# Patient Record
Sex: FEMALE | Race: WHITE | Employment: OTHER | URBAN - METROPOLITAN AREA
[De-identification: names, ages, dates, MRNs, and addresses within clinical notes are randomized per-mention and may not be internally consistent; named-entity substitution may affect disease eponyms.]

---

## 2017-01-09 PROBLEM — I20.1 CORONARY ARTERY SPASM (HCC): Status: ACTIVE | Noted: 2017-01-09

## 2017-01-09 PROBLEM — K21.9 GASTROESOPHAGEAL REFLUX DISEASE WITHOUT ESOPHAGITIS: Chronic | Status: ACTIVE | Noted: 2017-01-09

## 2017-01-09 PROBLEM — F32.A ANXIETY AND DEPRESSION: Chronic | Status: ACTIVE | Noted: 2017-01-09

## 2017-01-09 PROBLEM — Z86.39 HISTORY OF HYPERLIPIDEMIA: Chronic | Status: ACTIVE | Noted: 2017-01-09

## 2017-01-09 PROBLEM — Z87.2 HISTORY OF PSORIASIS: Chronic | Status: ACTIVE | Noted: 2017-01-09

## 2017-01-09 PROBLEM — F41.9 ANXIETY AND DEPRESSION: Chronic | Status: ACTIVE | Noted: 2017-01-09

## 2017-01-09 PROBLEM — G89.29 CHRONIC PAIN OF MULTIPLE JOINTS: Chronic | Status: ACTIVE | Noted: 2017-01-09

## 2017-01-09 PROBLEM — E03.9 HYPOTHYROIDISM, ADULT: Chronic | Status: ACTIVE | Noted: 2017-01-09

## 2017-01-09 PROBLEM — M25.50 CHRONIC PAIN OF MULTIPLE JOINTS: Chronic | Status: ACTIVE | Noted: 2017-01-09

## 2017-02-01 ENCOUNTER — HOSPITAL ENCOUNTER (OUTPATIENT)
Dept: GENERAL RADIOLOGY | Age: 67
Discharge: HOME | End: 2017-02-01
Payer: MEDICARE

## 2017-02-01 DIAGNOSIS — G89.29 CHRONIC HAND PAIN, LEFT: ICD-10-CM

## 2017-02-01 DIAGNOSIS — G89.29 CHRONIC HAND PAIN, RIGHT: ICD-10-CM

## 2017-02-01 DIAGNOSIS — M79.642 CHRONIC HAND PAIN, LEFT: ICD-10-CM

## 2017-02-01 DIAGNOSIS — M79.641 CHRONIC HAND PAIN, RIGHT: ICD-10-CM

## 2017-02-01 PROCEDURE — 73120 X-RAY EXAM OF HAND: CPT

## 2017-02-25 PROCEDURE — 85025 COMPLETE CBC W/AUTO DIFF WBC: CPT | Performed by: EMERGENCY MEDICINE

## 2017-02-25 PROCEDURE — 80048 BASIC METABOLIC PNL TOTAL CA: CPT | Performed by: EMERGENCY MEDICINE

## 2017-02-25 PROCEDURE — 99284 EMERGENCY DEPT VISIT MOD MDM: CPT | Performed by: EMERGENCY MEDICINE

## 2017-02-26 ENCOUNTER — HOSPITAL ENCOUNTER (EMERGENCY)
Age: 67
Discharge: HOME | End: 2017-02-26
Attending: EMERGENCY MEDICINE
Payer: MEDICARE

## 2017-02-26 VITALS
BODY MASS INDEX: 38.87 KG/M2 | RESPIRATION RATE: 20 BRPM | HEART RATE: 64 BPM | TEMPERATURE: 98 F | OXYGEN SATURATION: 97 % | DIASTOLIC BLOOD PRESSURE: 80 MMHG | WEIGHT: 198 LBS | HEIGHT: 60 IN | SYSTOLIC BLOOD PRESSURE: 164 MMHG

## 2017-02-26 DIAGNOSIS — F41.1 ANXIETY STATE: ICD-10-CM

## 2017-02-26 DIAGNOSIS — R07.89 ATYPICAL CHEST PAIN: Primary | ICD-10-CM

## 2017-02-26 LAB
ANION GAP BLD CALC-SCNC: 10 MMOL/L (ref 7–16)
ATRIAL RATE: 72 BPM
BASOPHILS # BLD AUTO: 0 K/UL (ref 0–0.2)
BASOPHILS # BLD: 0 % (ref 0–2)
BUN SERPL-MCNC: 14 MG/DL (ref 8–23)
CALCIUM SERPL-MCNC: 9.6 MG/DL (ref 8.3–10.4)
CALCULATED P AXIS, ECG09: 39 DEGREES
CALCULATED R AXIS, ECG10: 29 DEGREES
CALCULATED T AXIS, ECG11: 68 DEGREES
CHLORIDE SERPL-SCNC: 102 MMOL/L (ref 98–107)
CO2 SERPL-SCNC: 27 MMOL/L (ref 21–32)
CREAT SERPL-MCNC: 0.88 MG/DL (ref 0.6–1)
DIAGNOSIS, 93000: NORMAL
DIASTOLIC BP, ECG02: NORMAL MMHG
DIFFERENTIAL METHOD BLD: ABNORMAL
EOSINOPHIL # BLD: 0.2 K/UL (ref 0–0.8)
EOSINOPHIL NFR BLD: 3 % (ref 0.5–7.8)
ERYTHROCYTE [DISTWIDTH] IN BLOOD BY AUTOMATED COUNT: 13.2 % (ref 11.9–14.6)
GLUCOSE SERPL-MCNC: 100 MG/DL (ref 65–100)
HCT VFR BLD AUTO: 41.7 % (ref 35.8–46.3)
HGB BLD-MCNC: 14 G/DL (ref 11.7–15.4)
IMM GRANULOCYTES # BLD: 0 K/UL (ref 0–0.5)
IMM GRANULOCYTES NFR BLD AUTO: 0.5 % (ref 0–5)
LYMPHOCYTES # BLD AUTO: 38 % (ref 13–44)
LYMPHOCYTES # BLD: 2.5 K/UL (ref 0.5–4.6)
MCH RBC QN AUTO: 29.9 PG (ref 26.1–32.9)
MCHC RBC AUTO-ENTMCNC: 33.6 G/DL (ref 31.4–35)
MCV RBC AUTO: 88.9 FL (ref 79.6–97.8)
MONOCYTES # BLD: 0.5 K/UL (ref 0.1–1.3)
MONOCYTES NFR BLD AUTO: 7 % (ref 4–12)
NEUTS SEG # BLD: 3.5 K/UL (ref 1.7–8.2)
NEUTS SEG NFR BLD AUTO: 52 % (ref 43–78)
P-R INTERVAL, ECG05: 160 MS
PLATELET # BLD AUTO: 322 K/UL (ref 150–450)
PMV BLD AUTO: 9.7 FL (ref 10.8–14.1)
POTASSIUM SERPL-SCNC: 4.2 MMOL/L (ref 3.5–5.1)
Q-T INTERVAL, ECG07: 426 MS
QRS DURATION, ECG06: 82 MS
QTC CALCULATION (BEZET), ECG08: 466 MS
RBC # BLD AUTO: 4.69 M/UL (ref 4.05–5.25)
SODIUM SERPL-SCNC: 139 MMOL/L (ref 136–145)
SYSTOLIC BP, ECG01: NORMAL MMHG
TROPONIN I BLD-MCNC: 0 NG/ML (ref 0–0.08)
VENTRICULAR RATE, ECG03: 72 BPM
WBC # BLD AUTO: 6.6 K/UL (ref 4.3–11.1)

## 2017-02-26 PROCEDURE — 93005 ELECTROCARDIOGRAM TRACING: CPT | Performed by: EMERGENCY MEDICINE

## 2017-02-26 PROCEDURE — 84484 ASSAY OF TROPONIN QUANT: CPT

## 2017-02-26 NOTE — ED TRIAGE NOTES
Pt c/o intermittent chest pain all day , took a ntg with no relief, c/o chronic arm pain with anxiety.

## 2017-02-26 NOTE — DISCHARGE INSTRUCTIONS
Chest Pain: Care Instructions  Your Care Instructions  There are many things that can cause chest pain. Some are not serious and will get better on their own in a few days. But some kinds of chest pain need more testing and treatment. Your doctor may have recommended a follow-up visit in the next 8 to 12 hours. If you are not getting better, you may need more tests or treatment. Even though your doctor has released you, you still need to watch for any problems. The doctor carefully checked you, but sometimes problems can develop later. If you have new symptoms or if your symptoms do not get better, get medical care right away. If you have worse or different chest pain or pressure that lasts more than 5 minutes or you passed out (lost consciousness), call 911 or seek other emergency help right away. A medical visit is only one step in your treatment. Even if you feel better, you still need to do what your doctor recommends, such as going to all suggested follow-up appointments and taking medicines exactly as directed. This will help you recover and help prevent future problems. How can you care for yourself at home? · Rest until you feel better. · Take your medicine exactly as prescribed. Call your doctor if you think you are having a problem with your medicine. · Do not drive after taking a prescription pain medicine. When should you call for help? Call 911 if:  · You passed out (lost consciousness). · You have severe difficulty breathing. · You have symptoms of a heart attack. These may include:  ¨ Chest pain or pressure, or a strange feeling in your chest.  ¨ Sweating. ¨ Shortness of breath. ¨ Nausea or vomiting. ¨ Pain, pressure, or a strange feeling in your back, neck, jaw, or upper belly or in one or both shoulders or arms. ¨ Lightheadedness or sudden weakness. ¨ A fast or irregular heartbeat.   After you call 911, the  may tell you to chew 1 adult-strength or 2 to 4 low-dose aspirin. Wait for an ambulance. Do not try to drive yourself. Call your doctor today if:  · You have any trouble breathing. · Your chest pain gets worse. · You are dizzy or lightheaded, or you feel like you may faint. · You are not getting better as expected. · You are having new or different chest pain. Where can you learn more? Go to http://bre-tiffanie.info/. Enter A120 in the search box to learn more about \"Chest Pain: Care Instructions. \"  Current as of: May 27, 2016  Content Version: 11.1  © 3059-8651 [employer]. Care instructions adapted under license by [employer] (which disclaims liability or warranty for this information). If you have questions about a medical condition or this instruction, always ask your healthcare professional. [address] any warranty or liability for your use of this information.

## 2017-02-26 NOTE — ED NOTES
I have reviewed discharge instructions with the patient. The patient verbalized understanding. Pt to car via w/c.

## 2017-02-26 NOTE — ED NOTES
Agree w/ triage note, pt reports intermittent CP throughout the day, she states that she took NTG x 3 w/ slight relief, increased anxiety and pain to L UE that is chronic for this pt. Noted to have tremor to same arm that is also chronic but more pronounced per the spouse.

## 2017-02-26 NOTE — ED PROVIDER NOTES
HPI Comments: Patient states that sometime before 6 PM she started having left-sided chest pressure. She describes it as severe at times without any obvious aggravating or alleviating factors. She took nitroglycerin around 6 PM which seemed to markedly improve her symptoms. She continued having pain after that but it was better. She states that she had similar pain several years ago when she had to have a cardiac catheterization which she states showed Prinzmetal's angina. She sees [place] [facility] for this and has not had any problems since then. She has a history of anxiety for which she takes no medication. Her  states that she has been out of her Synthroid recently which causes her to get anxious. Elements of this note were created with speech recognition software. As such, there may be errors of speech recognition present. Patient is a 73 y.o. female presenting with anxiety and arm pain. The history is provided by the patient. Anxiety    Pertinent negatives include no fever, no nausea and no vomiting. Arm Pain           Past Medical History:   Diagnosis Date    Arthritis     Autoimmune disease ([facility] [address]     skin changes, unknown name    Cancer ([facility] [address]     ovarian    Chronic pain     arthritis in back and legs    Gastritis     GERD (gastroesophageal reflux disease)     Migraine headache     Psoriasis     Psychiatric disorder     anxiety and depression    Thyroid disease     Diagnosed in        Past Surgical History:   Procedure Laterality Date    CARDIAC SURG PROCEDURE UNLIST      cardiac cath. Dx with spasms.     HX GYN      ovaries    HX TOTAL ABDOMINAL HYSTERECTOMY      HX TUBAL LIGATION           Family History:   Problem Relation Age of Onset    Parkinson's Disease Mother     Stroke Mother      [facility] Father      Kidney    Prostate Cancer Father       age 80        Social History     Social History    Marital status:  Spouse name: N/A    Number of children: N/A    Years of education: N/A     Occupational History    Not on file. Social History Main Topics    Smoking status: Never Smoker    Smokeless tobacco: Never Used    Alcohol use No    Drug use: No    Sexual activity: No     Other Topics Concern     Service No    Blood Transfusions No    Caffeine Concern No    Occupational Exposure No    Hobby Hazards No    Sleep Concern No    Stress Concern Yes    Weight Concern Yes    Special Diet No    Back Care No    Exercise No    Bike Helmet No    Seat Belt Yes    Self-Exams Yes     Social History Narrative         ALLERGIES: Codeine; Pcn [penicillins]; and Sulfa (sulfonamide antibiotics)    Review of Systems   Constitutional: Negative for chills and fever. Gastrointestinal: Negative for nausea and vomiting. All other systems reviewed and are negative. Vitals:    02/25/17 2344 02/26/17 0133 02/26/17 0135 02/26/17 0201   BP: 147/84 152/84  (!) 153/98   Pulse: 73  62 61   Resp: 20  11 24   Temp: 98 °F (36.7 °C)      SpO2: 96%  96% 96%   Weight: 89.8 kg (198 lb)      Height: 5' (1.524 m)               Physical Exam   Constitutional: She is oriented to person, place, and time. She appears well-developed and well-nourished. HENT:   Head: Normocephalic and atraumatic. Eyes: Conjunctivae are normal. Pupils are equal, round, and reactive to light. Neck: Normal range of motion. Neck supple. Cardiovascular: Normal rate and regular rhythm. Pulmonary/Chest: Effort normal and breath sounds normal.   Abdominal: Soft. Bowel sounds are normal.   Musculoskeletal: She exhibits no edema or tenderness. Neurological: She is alert and oriented to person, place, and time. Skin: Skin is warm and dry. Psychiatric:   Patient appears anxious and tremulous   Nursing note and vitals reviewed.        MDM  Number of Diagnoses or Management Options  Diagnosis management comments: differential diagnosis: acute coronary syndrome, Prinzmetal's angina, esophageal spasm  2:22 AM 4/16/12 cardiac catheter showed normal coronary arteries with an EF 55%. Discussed normal labs with patient, need for follow-up. She has no history of coronary artery disease and appears comfortable. Her EKG is normal.  She does have some artifact in her lateral leads which is likely due to her hand tremor.        Amount and/or Complexity of Data Reviewed  Clinical lab tests: ordered and reviewed  Tests in the medicine section of CPT®: ordered and reviewed  Decide to obtain previous medical records or to obtain history from someone other than the patient: yes  Review and summarize past medical records: yes    Risk of Complications, Morbidity, and/or Mortality  Presenting problems: moderate  Diagnostic procedures: moderate  Management options: moderate    Patient Progress  Patient progress: improved    ED Course       Procedures

## 2017-03-07 PROBLEM — Z85.43 HISTORY OF OVARIAN CANCER: Chronic | Status: ACTIVE | Noted: 2017-03-07

## 2017-03-07 PROBLEM — I20.1 CORONARY ARTERY SPASM (HCC): Chronic | Status: ACTIVE | Noted: 2017-01-09

## 2017-03-07 PROBLEM — Z87.2 HISTORY OF PSORIASIS: Chronic | Status: RESOLVED | Noted: 2017-01-09 | Resolved: 2017-03-07

## 2017-03-07 PROBLEM — E78.2 MIXED HYPERLIPIDEMIA: Chronic | Status: ACTIVE | Noted: 2017-03-07

## 2017-03-07 PROBLEM — Z88.9 MULTIPLE ALLERGIES: Chronic | Status: ACTIVE | Noted: 2017-03-07

## 2017-03-07 PROBLEM — G89.29 CHRONIC HAND PAIN: Chronic | Status: ACTIVE | Noted: 2017-03-07

## 2017-03-07 PROBLEM — L40.4 PSORIASIS, GUTTATE: Chronic | Status: ACTIVE | Noted: 2017-03-07

## 2017-03-07 PROBLEM — M79.643 CHRONIC HAND PAIN: Chronic | Status: ACTIVE | Noted: 2017-03-07

## 2017-03-16 ENCOUNTER — HOSPITAL ENCOUNTER (OUTPATIENT)
Dept: MRI IMAGING | Age: 67
Discharge: HOME | End: 2017-03-16
Attending: PSYCHIATRY & NEUROLOGY
Payer: MEDICARE

## 2017-03-16 DIAGNOSIS — R29.898 WEAKNESS OF BOTH ARMS: ICD-10-CM

## 2017-03-16 DIAGNOSIS — R25.1 TREMORS OF NERVOUS SYSTEM: ICD-10-CM

## 2017-03-16 DIAGNOSIS — R41.3 MEMORY CHANGE: ICD-10-CM

## 2017-03-16 DIAGNOSIS — R29.898 WEAKNESS OF BOTH LEGS: ICD-10-CM

## 2017-03-16 PROCEDURE — A9577 INJ MULTIHANCE: HCPCS | Performed by: PSYCHIATRY & NEUROLOGY

## 2017-03-16 PROCEDURE — 72141 MRI NECK SPINE W/O DYE: CPT

## 2017-03-16 PROCEDURE — 70553 MRI BRAIN STEM W/O & W/DYE: CPT

## 2017-03-16 PROCEDURE — 74011250636 HC RX REV CODE- 250/636: Performed by: PSYCHIATRY & NEUROLOGY

## 2017-03-16 RX ORDER — SODIUM CHLORIDE 0.9 % (FLUSH) 0.9 %
10 SYRINGE (ML) INJECTION
Status: COMPLETED | OUTPATIENT
Start: 2017-03-16 | End: 2017-03-16

## 2017-03-16 RX ADMIN — GADOBENATE DIMEGLUMINE 18 ML: 529 INJECTION, SOLUTION INTRAVENOUS at 16:47

## 2017-03-16 RX ADMIN — Medication 10 ML: at 16:47

## 2017-04-28 ENCOUNTER — HOSPITAL ENCOUNTER (OUTPATIENT)
Dept: MAMMOGRAPHY | Age: 67
Discharge: HOME | End: 2017-04-28
Attending: FAMILY MEDICINE
Payer: MEDICARE

## 2017-04-28 ENCOUNTER — HOSPITAL ENCOUNTER (OUTPATIENT)
Dept: MAMMOGRAPHY | Age: 67
Discharge: HOME | End: 2017-04-28
Attending: NURSE PRACTITIONER
Payer: MEDICARE

## 2017-04-28 DIAGNOSIS — Z12.31 ENCOUNTER FOR SCREENING MAMMOGRAM FOR MALIGNANT NEOPLASM OF BREAST: ICD-10-CM

## 2017-04-28 DIAGNOSIS — Z78.0 POSTMENOPAUSAL: ICD-10-CM

## 2017-04-28 PROCEDURE — 77067 SCR MAMMO BI INCL CAD: CPT

## 2017-04-28 PROCEDURE — 77080 DXA BONE DENSITY AXIAL: CPT

## 2017-04-28 NOTE — PROGRESS NOTES
Mammogram impression: No mammographic evidence of malignancy. Please notify patient of normal results.

## 2017-05-01 NOTE — PROGRESS NOTES
I called patient to inform them of normal lab/radiology results. Patient verbalized understanding on all.

## 2017-06-07 PROBLEM — Z86.39 HISTORY OF HYPERLIPIDEMIA: Chronic | Status: RESOLVED | Noted: 2017-01-09 | Resolved: 2017-06-07

## 2017-09-14 ENCOUNTER — HOSPITAL ENCOUNTER (OUTPATIENT)
Dept: PHYSICAL THERAPY | Age: 67
End: 2017-09-14

## 2017-12-06 PROBLEM — R10.13 EPIGASTRIC PAIN: Status: ACTIVE | Noted: 2017-12-06

## 2017-12-06 PROBLEM — L40.50 PSORIATIC ARTHRITIS (HCC): Chronic | Status: ACTIVE | Noted: 2017-12-06

## 2018-02-07 ENCOUNTER — HOSPITAL ENCOUNTER (OUTPATIENT)
Dept: LAB | Age: 68
Discharge: HOME | End: 2018-02-07

## 2018-02-07 PROCEDURE — 88312 SPECIAL STAINS GROUP 1: CPT | Performed by: INTERNAL MEDICINE

## 2018-02-07 PROCEDURE — 88305 TISSUE EXAM BY PATHOLOGIST: CPT | Performed by: INTERNAL MEDICINE

## 2018-06-26 PROBLEM — M54.50 CHRONIC MIDLINE LOW BACK PAIN WITHOUT SCIATICA: Chronic | Status: ACTIVE | Noted: 2018-06-26

## 2018-06-26 PROBLEM — G89.29 CHRONIC MIDLINE LOW BACK PAIN WITHOUT SCIATICA: Chronic | Status: ACTIVE | Noted: 2018-06-26

## 2018-06-26 PROBLEM — E66.01 SEVERE OBESITY (BMI 35.0-39.9): Status: ACTIVE | Noted: 2018-06-26

## 2018-06-26 PROBLEM — E66.01 SEVERE OBESITY (BMI 35.0-39.9): Chronic | Status: ACTIVE | Noted: 2018-06-26

## 2018-06-26 PROBLEM — R10.13 EPIGASTRIC PAIN: Status: RESOLVED | Noted: 2017-12-06 | Resolved: 2018-06-26

## 2019-11-08 PROBLEM — I10 ESSENTIAL HYPERTENSION: Status: ACTIVE | Noted: 2019-11-08

## 2020-02-24 PROBLEM — R11.0 NAUSEA: Status: ACTIVE | Noted: 2020-02-24

## 2021-02-25 ENCOUNTER — APPOINTMENT (OUTPATIENT)
Dept: GENERAL RADIOLOGY | Age: 71
End: 2021-02-25
Attending: EMERGENCY MEDICINE
Payer: MEDICARE

## 2021-02-25 ENCOUNTER — HOSPITAL ENCOUNTER (EMERGENCY)
Age: 71
Discharge: HOME | End: 2021-02-25
Attending: EMERGENCY MEDICINE
Payer: MEDICARE

## 2021-02-25 VITALS
WEIGHT: 195 LBS | OXYGEN SATURATION: 97 % | TEMPERATURE: 98.9 F | HEIGHT: 61 IN | DIASTOLIC BLOOD PRESSURE: 61 MMHG | RESPIRATION RATE: 16 BRPM | BODY MASS INDEX: 36.82 KG/M2 | HEART RATE: 86 BPM | SYSTOLIC BLOOD PRESSURE: 138 MMHG

## 2021-02-25 DIAGNOSIS — R44.1 EPISODES OF FORMED VISUAL HALLUCINATIONS: ICD-10-CM

## 2021-02-25 DIAGNOSIS — L40.50 PSORIATIC ARTHRITIS (HCC): Primary | ICD-10-CM

## 2021-02-25 DIAGNOSIS — F41.9 ANXIETY: ICD-10-CM

## 2021-02-25 LAB
ALBUMIN SERPL-MCNC: 3.9 G/DL (ref 3.2–4.6)
ALBUMIN/GLOB SERPL: 1 {RATIO} (ref 1.2–3.5)
ALP SERPL-CCNC: 107 U/L (ref 50–136)
ALT SERPL-CCNC: 100 U/L (ref 12–65)
AMMONIA PLAS-SCNC: 30 UMOL/L (ref 11–32)
ANION GAP SERPL CALC-SCNC: 2 MMOL/L (ref 7–16)
AST SERPL-CCNC: 50 U/L (ref 15–37)
BACTERIA URNS QL MICRO: 0 /HPF
BASOPHILS # BLD: 0.1 K/UL (ref 0–0.2)
BASOPHILS NFR BLD: 1 % (ref 0–2)
BILIRUB SERPL-MCNC: 1.3 MG/DL (ref 0.2–1.1)
BUN SERPL-MCNC: 19 MG/DL (ref 8–23)
CALCIUM SERPL-MCNC: 9.3 MG/DL (ref 8.3–10.4)
CASTS URNS QL MICRO: NORMAL /LPF
CHLORIDE SERPL-SCNC: 107 MMOL/L (ref 98–107)
CO2 SERPL-SCNC: 30 MMOL/L (ref 21–32)
CREAT SERPL-MCNC: 0.96 MG/DL (ref 0.6–1)
DIFFERENTIAL METHOD BLD: ABNORMAL
EOSINOPHIL # BLD: 0.2 K/UL (ref 0–0.8)
EOSINOPHIL NFR BLD: 3 % (ref 0.5–7.8)
EPI CELLS #/AREA URNS HPF: NORMAL /HPF
ERYTHROCYTE [DISTWIDTH] IN BLOOD BY AUTOMATED COUNT: 16.7 % (ref 11.9–14.6)
GLOBULIN SER CALC-MCNC: 4 G/DL (ref 2.3–3.5)
GLUCOSE SERPL-MCNC: 104 MG/DL (ref 65–100)
HCT VFR BLD AUTO: 44.9 % (ref 35.8–46.3)
HGB BLD-MCNC: 14.6 G/DL (ref 11.7–15.4)
IMM GRANULOCYTES # BLD AUTO: 0 K/UL (ref 0–0.5)
IMM GRANULOCYTES NFR BLD AUTO: 0 % (ref 0–5)
INR PPP: 0.9
LYMPHOCYTES # BLD: 2 K/UL (ref 0.5–4.6)
LYMPHOCYTES NFR BLD: 36 % (ref 13–44)
MAGNESIUM SERPL-MCNC: 2.4 MG/DL (ref 1.8–2.4)
MCH RBC QN AUTO: 28.4 PG (ref 26.1–32.9)
MCHC RBC AUTO-ENTMCNC: 32.5 G/DL (ref 31.4–35)
MCV RBC AUTO: 87.4 FL (ref 79.6–97.8)
MONOCYTES # BLD: 0.8 K/UL (ref 0.1–1.3)
MONOCYTES NFR BLD: 14 % (ref 4–12)
NEUTS SEG # BLD: 2.7 K/UL (ref 1.7–8.2)
NEUTS SEG NFR BLD: 47 % (ref 43–78)
NRBC # BLD: 0.02 K/UL (ref 0–0.2)
PLATELET # BLD AUTO: 199 K/UL (ref 150–450)
PMV BLD AUTO: 10.4 FL (ref 9.4–12.3)
POTASSIUM SERPL-SCNC: 4.1 MMOL/L (ref 3.5–5.1)
PROT SERPL-MCNC: 7.9 G/DL (ref 6.3–8.2)
PROTHROMBIN TIME: 12.8 SEC (ref 12.5–14.7)
RBC # BLD AUTO: 5.14 M/UL (ref 4.05–5.2)
RBC #/AREA URNS HPF: NORMAL /HPF
SODIUM SERPL-SCNC: 139 MMOL/L (ref 136–145)
TSH SERPL DL<=0.005 MIU/L-ACNC: 1.69 UIU/ML (ref 0.36–3.74)
VIT B12 SERPL-MCNC: 558 PG/ML (ref 193–986)
WBC # BLD AUTO: 5.7 K/UL (ref 4.3–11.1)
WBC URNS QL MICRO: NORMAL /HPF

## 2021-02-25 PROCEDURE — 80053 COMPREHEN METABOLIC PANEL: CPT

## 2021-02-25 PROCEDURE — 82607 VITAMIN B-12: CPT

## 2021-02-25 PROCEDURE — 83735 ASSAY OF MAGNESIUM: CPT

## 2021-02-25 PROCEDURE — 74022 RADEX COMPL AQT ABD SERIES: CPT

## 2021-02-25 PROCEDURE — 85610 PROTHROMBIN TIME: CPT

## 2021-02-25 PROCEDURE — 85025 COMPLETE CBC W/AUTO DIFF WBC: CPT

## 2021-02-25 PROCEDURE — 99284 EMERGENCY DEPT VISIT MOD MDM: CPT

## 2021-02-25 PROCEDURE — 84443 ASSAY THYROID STIM HORMONE: CPT

## 2021-02-25 PROCEDURE — 81003 URINALYSIS AUTO W/O SCOPE: CPT

## 2021-02-25 PROCEDURE — 82140 ASSAY OF AMMONIA: CPT

## 2021-02-25 PROCEDURE — 81015 MICROSCOPIC EXAM OF URINE: CPT

## 2021-02-25 NOTE — ED NOTES
I have reviewed discharge instructions with the patient and spouse. The patient and spouse verbalized understanding. Patient left ED via Discharge Method: ambulatory to Home with transport from spouse. The patient is ambulatory upon exit and appears in no acute distress. The patient and spouse have been provided discharge instructions and follow up information. The patient and  do not have any questions at this time. Opportunity for questions and clarification provided. Patient given 0 scripts. To continue your aftercare when you leave the hospital, you may receive an automated call from our care team to check in on how you are doing. This is a free service and part of our promise to provide the best care and service to meet your aftercare needs.  If you have questions, or wish to unsubscribe from this service please call [phone]. Thank you for Choosing our [facility] Department.

## 2021-02-25 NOTE — ED PROVIDER NOTES
Patient is a 52-year-old female presenting to the emergency department today with 1 week worth of visual hallucinations. The patient was on methotrexate about 6 weeks ago but was stopped because of progressive liver damage. The patient has had several follow-ups with her doctor and continues to have worsening liver function. The patient has not had any burning with urination. She went to her family doctor yesterday and he recommended she come to the emergency department because of the hallucinations which are new however she had an appointment see her pain management doctor today so they went to that appointment today but then decided to come this evening. The patient is trembling but says this is something has been ongoing for years. She has been evaluated for Parkinson's which was negative. The patient tells me that she always shakes on the inside but if it comes out upper body through her hands it is better. Past Medical History:   Diagnosis Date    Arthritis     Autoimmune disease ([facility] [address]     skin changes, unknown name    Cancer ([facility] [address]     ovarian    Chronic pain     arthritis in back and legs    Essential hypertension 2019    Gastritis     GERD (gastroesophageal reflux disease)     Migraine headache     Psoriasis     Psychiatric disorder     anxiety and depression    Severe obesity (BMI 35.0-39.9) 2018    Thyroid disease     Diagnosed in        Past Surgical History:   Procedure Laterality Date    HX CARPAL TUNNEL RELEASE      HX GYN      ovaries    HX TOTAL ABDOMINAL HYSTERECTOMY      HX TUBAL LIGATION      IN CARDIAC SURG PROCEDURE UNLIST      cardiac cath. Dx with spasms.          Family History:   Problem Relation Age of Onset    Parkinson's Disease Mother     Stroke Mother         Passed away in [facility] [place] 59 Father         Kidney    Prostate Cancer Father          age 80     No Known Problems Maternal Grandmother     No Known Problems Maternal Grandfather     No Known Problems Paternal Grandmother     No Known Problems Paternal Grandfather        Social History     Socioeconomic History    Marital status:      Spouse name: Not on file    Number of children: Not on file    Years of education: Not on file    Highest education level: Not on file   Occupational History    Not on file   Social Needs    Financial resource strain: Not on file    Food insecurity     Worry: Not on file     Inability: Not on file   [employer] needs     Medical: Not on file     Non-medical: Not on file   Tobacco Use    Smoking status: Never Smoker    Smokeless tobacco: Never Used   Substance and Sexual Activity    Alcohol use: No    Drug use: Yes     Types: Prescription    Sexual activity: Not Currently     Partners: Male     Birth control/protection: None   Lifestyle    Physical activity     Days per week: Not on file     Minutes per session: Not on file    Stress: Not on file   Relationships    Social connections     Talks on phone: Not on file     Gets together: Not on file     Attends Gnosticism service: Not on file     Active member of club or organization: Not on file     Attends meetings of clubs or organizations: Not on file     Relationship status: Not on file    Intimate partner violence     Fear of current or ex partner: Not on file     Emotionally abused: Not on file     Physically abused: Not on file     Forced sexual activity: Not on file   Other Topics Concern     Service No    Blood Transfusions No    Caffeine Concern No    Occupational Exposure No    Hobby Hazards No    Sleep Concern No    Stress Concern Yes    Weight Concern Yes    Special Diet No    Back Care No    Exercise No    Bike Helmet No    Seat Belt Yes    Self-Exams Yes   Social History Narrative    Not on file         ALLERGIES: Codeine, Pcn [penicillins], and Sulfa (sulfonamide antibiotics)    Review of Systems   Psychiatric/Behavioral: Positive for agitation, hallucinations and sleep disturbance. Negative for suicidal ideas. The patient is nervous/anxious and is hyperactive. All other systems reviewed and are negative. Vitals:    02/25/21 1712 02/25/21 1753 02/25/21 1830   BP: (!) 140/81 130/72 (!) 149/72   Pulse: 86     Resp: 17     Temp: 98.8 °F (37.1 °C)     SpO2: 96% 97% 96%   Weight: 88.5 kg (195 lb)     Height: 5' 1\" (1.549 m)              Physical Exam     GENERAL:The patient has Body mass index is 36.84 kg/m². Well-hydrated. VITAL SIGNS: Heart rate, blood pressure, respiratory rate reviewed as recorded in  nurse's notes  EYES: Pupils reactive. Extraocular motion intact. No conjunctival redness or drainage. NECK: Supple, no meningeal signs. Trachea midline. No masses or thyromegaly. LUNGS: Breath sounds clear and equal bilaterally no accessory muscle use  CHEST: No deformity  CARDIOVASCULAR: Regular rate and rhythm  ABDOMEN: Soft without tenderness. No palpable masses or organomegaly. No  peritoneal signs. No rigidity. EXTREMITIES: No clubbing or cyanosis. No joint swelling. Normal muscle tone. No  restricted range of motion appreciated. NEUROLOGIC: Sensation is grossly intact. Cranial nerve exam reveals face is  symmetrical, tongue is midline speech is clear. No clonus present. SKIN: No rash or petechiae. Good skin turgor palpated. PSYCHIATRIC: Alert and oriented. Positive visual hallucinations. The patient is very agitated and has constant tremoring in her hands bilaterally.        MDM  Number of Diagnoses or Management Options  Diagnosis management comments: Suicidal ideations, homicidal ideations, self-inflicted injury,    Schizophrenia, schizoaffective disorder, personality disorder, major depression,   depression with anxiety, depression reactive to situation, depression, anxiety, panic  attack, hyperventilation syndrome, bipolar disorder,    Substance abuse, medication noncompliance, drug abuse, alcohol abuse, alcohol  intoxication,           Amount and/or Complexity of Data Reviewed  Clinical lab tests: reviewed and ordered  Tests in the radiology section of CPT®: ordered and reviewed  Tests in the medicine section of CPT®: ordered and reviewed  Obtain history from someone other than the patient: yes  Review and summarize past medical records: yes  Independent visualization of images, tracings, or specimens: yes      ED Course as of Feb 25 1840   Thu Feb 25, 2021   1828 IMPRESSION  Negative for free air, ileus or obstruction. XR ABD ACUTE W 1 V CHEST [KH]   1837 I talked to the patient and her  about the findings in the emergency department on the blood work and the imaging studies. The patient has not interested in talking to a psychiatrist on the telemetry psych and I do not believe she needs to be committed to a facility at this time. I did talk to them both at length about the need for psychiatry evaluation as an outpatient. I also encouraged the patient to talk to her pain management doctor about the use of the Ultram and see if there is something alternatively available for control of her pain.     [KH]      ED Course User Index  [KH] Adline Morning, DO       Procedures

## 2021-02-25 NOTE — DISCHARGE INSTRUCTIONS
The patient needs to be evaluated by a psychiatrist in the next 1 to 2 weeks. Follow-up with your family doctor tomorrow to discuss your other concerns about pain control and the use of the Ultram.  Continue her other medications as prescribed.

## 2022-01-26 ENCOUNTER — NURSE TRIAGE (OUTPATIENT)
Dept: OTHER | Facility: CLINIC | Age: 72
End: 2022-01-26

## 2022-01-26 NOTE — TELEPHONE ENCOUNTER
Received call from [place] at [facility] with [facility]. Triage completed with . Subjective: Caller states \"Chest tightness, productive cough and negative COVID. Getting worse. Her anxiety level is through the roof. \"     Current Symptoms: Chest tightness that comes and goes, productive cough, generalized soreness. Onset: 1 month ago;   Pain Severity: 5/6    Temperature: none. What has been tried: COVID tested (negative) 3 weeks ago       Recommended disposition: be seen today or go to [facility]/walk in clinic. Care advice provided, patient verbalizes understanding; denies any other questions or concerns; instructed to call back for any new or worsening symptoms. Writer provided warm transfer to [place] at [facility] for appointment scheduling    Attention Provider: Thank you for allowing me to participate in the care of your patient. The patient was connected to triage in response to information provided to the [facility]. Please do not respond through this encounter as the response is not directed to a shared pool.     Reminders:     Call [address] Provider/Office    Care Advice - both instruction and documentation    Routing - PCP     PLEASE DELETE ALL RED TEXT PRIOR TO SIGNING NOTE    Reason for Disposition   MILD difficulty breathing (e.g., minimal/no SOB at rest, SOB with walking, pulse <100) and still present when not coughing    Protocols used: COUGH-ADULT-OH

## 2022-02-02 ENCOUNTER — HOSPITAL ENCOUNTER (OUTPATIENT)
Dept: GENERAL RADIOLOGY | Age: 72
Discharge: HOME | End: 2022-02-02
Payer: MEDICARE

## 2022-02-02 DIAGNOSIS — M25.511 CHRONIC RIGHT SHOULDER PAIN: ICD-10-CM

## 2022-02-02 DIAGNOSIS — G89.29 CHRONIC RIGHT SHOULDER PAIN: ICD-10-CM

## 2022-02-02 PROCEDURE — 73030 X-RAY EXAM OF SHOULDER: CPT

## 2022-03-18 PROBLEM — M79.643 CHRONIC HAND PAIN: Status: ACTIVE | Noted: 2017-03-07

## 2022-03-18 PROBLEM — L40.4 PSORIASIS, GUTTATE: Status: ACTIVE | Noted: 2017-03-07

## 2022-03-18 PROBLEM — I10 ESSENTIAL HYPERTENSION: Status: ACTIVE | Noted: 2019-11-08

## 2022-03-18 PROBLEM — Z88.9 MULTIPLE ALLERGIES: Status: ACTIVE | Noted: 2017-03-07

## 2022-03-18 PROBLEM — L40.50 PSORIATIC ARTHRITIS (HCC): Status: ACTIVE | Noted: 2017-12-06

## 2022-03-18 PROBLEM — G89.29 CHRONIC HAND PAIN: Status: ACTIVE | Noted: 2017-03-07

## 2022-03-18 PROBLEM — R11.0 NAUSEA: Status: ACTIVE | Noted: 2020-02-24

## 2022-03-18 PROBLEM — E03.9 HYPOTHYROIDISM, ADULT: Status: ACTIVE | Noted: 2017-01-09

## 2022-03-19 PROBLEM — K21.9 GASTROESOPHAGEAL REFLUX DISEASE WITHOUT ESOPHAGITIS: Status: ACTIVE | Noted: 2017-01-09

## 2022-03-19 PROBLEM — E78.2 MIXED HYPERLIPIDEMIA: Status: ACTIVE | Noted: 2017-03-07

## 2022-03-19 PROBLEM — I20.1 CORONARY ARTERY SPASM (HCC): Status: ACTIVE | Noted: 2017-01-09

## 2022-03-19 PROBLEM — M54.50 CHRONIC MIDLINE LOW BACK PAIN WITHOUT SCIATICA: Status: ACTIVE | Noted: 2018-06-26

## 2022-03-19 PROBLEM — G89.29 CHRONIC PAIN OF MULTIPLE JOINTS: Status: ACTIVE | Noted: 2017-01-09

## 2022-03-19 PROBLEM — F41.9 ANXIETY AND DEPRESSION: Status: ACTIVE | Noted: 2017-01-09

## 2022-03-19 PROBLEM — G89.29 CHRONIC MIDLINE LOW BACK PAIN WITHOUT SCIATICA: Status: ACTIVE | Noted: 2018-06-26

## 2022-03-19 PROBLEM — F32.A ANXIETY AND DEPRESSION: Status: ACTIVE | Noted: 2017-01-09

## 2022-03-19 PROBLEM — M25.50 CHRONIC PAIN OF MULTIPLE JOINTS: Status: ACTIVE | Noted: 2017-01-09

## 2022-03-19 PROBLEM — Z85.43 HISTORY OF OVARIAN CANCER: Status: ACTIVE | Noted: 2017-03-07

## 2022-03-20 PROBLEM — E66.01 SEVERE OBESITY WITH BODY MASS INDEX (BMI) OF 35.0 TO 39.9 WITH SERIOUS COMORBIDITY (HCC): Status: ACTIVE | Noted: 2018-06-26

## 2022-05-23 ENCOUNTER — TELEPHONE (OUTPATIENT)
Dept: FAMILY MEDICINE CLINIC | Facility: CLINIC | Age: 72
End: 2022-05-23

## 2022-05-29 DIAGNOSIS — E78.2 MIXED HYPERLIPIDEMIA: ICD-10-CM

## 2022-05-29 DIAGNOSIS — E03.9 HYPOTHYROIDISM, UNSPECIFIED: ICD-10-CM

## 2022-05-29 DIAGNOSIS — K21.9 GASTRO-ESOPHAGEAL REFLUX DISEASE WITHOUT ESOPHAGITIS: ICD-10-CM

## 2022-05-29 DIAGNOSIS — M62.838 OTHER MUSCLE SPASM: ICD-10-CM

## 2022-05-29 DIAGNOSIS — Z88.9 ALLERGY STATUS TO UNSPECIFIED DRUGS, MEDICAMENTS AND BIOLOGICAL SUBSTANCES: ICD-10-CM

## 2022-05-29 DIAGNOSIS — I20.1 ANGINA PECTORIS WITH DOCUMENTED SPASM (HCC): ICD-10-CM

## 2022-05-29 DIAGNOSIS — F41.9 ANXIETY DISORDER, UNSPECIFIED: ICD-10-CM

## 2022-05-31 RX ORDER — FLUOXETINE HYDROCHLORIDE 20 MG/1
CAPSULE ORAL
Qty: 180 CAPSULE | Refills: 1 | Status: SHIPPED | OUTPATIENT
Start: 2022-05-31

## 2022-05-31 RX ORDER — MONTELUKAST SODIUM 10 MG/1
TABLET ORAL
Qty: 90 TABLET | Refills: 1 | Status: SHIPPED | OUTPATIENT
Start: 2022-05-31

## 2022-05-31 RX ORDER — ROSUVASTATIN CALCIUM 10 MG/1
TABLET, COATED ORAL
Qty: 90 TABLET | Refills: 1 | Status: SHIPPED | OUTPATIENT
Start: 2022-05-31

## 2022-05-31 RX ORDER — PANTOPRAZOLE SODIUM 40 MG/1
TABLET, DELAYED RELEASE ORAL
Qty: 180 TABLET | Refills: 1 | Status: SHIPPED | OUTPATIENT
Start: 2022-05-31

## 2022-05-31 RX ORDER — AMLODIPINE BESYLATE 5 MG/1
TABLET ORAL
Qty: 90 TABLET | Refills: 1 | Status: SHIPPED | OUTPATIENT
Start: 2022-05-31

## 2022-05-31 RX ORDER — TIZANIDINE 4 MG/1
TABLET ORAL
Qty: 90 TABLET | Refills: 1 | OUTPATIENT
Start: 2022-05-31

## 2022-05-31 RX ORDER — LEVOTHYROXINE SODIUM 0.12 MG/1
TABLET ORAL
Qty: 90 TABLET | Refills: 1 | Status: SHIPPED | OUTPATIENT
Start: 2022-05-31

## 2022-06-02 ENCOUNTER — OFFICE VISIT (OUTPATIENT)
Dept: RHEUMATOLOGY | Age: 72
End: 2022-06-02
Payer: MEDICARE

## 2022-06-02 VITALS
DIASTOLIC BLOOD PRESSURE: 65 MMHG | HEIGHT: 60 IN | BODY MASS INDEX: 39.66 KG/M2 | SYSTOLIC BLOOD PRESSURE: 138 MMHG | HEART RATE: 73 BPM | WEIGHT: 202 LBS

## 2022-06-02 DIAGNOSIS — Z11.1 SCREENING FOR TUBERCULOSIS: ICD-10-CM

## 2022-06-02 DIAGNOSIS — Z79.899 LONG-TERM USE OF HIGH-RISK MEDICATION: ICD-10-CM

## 2022-06-02 DIAGNOSIS — L40.50 PSORIATIC ARTHRITIS (HCC): Primary | ICD-10-CM

## 2022-06-02 DIAGNOSIS — M62.838 MUSCLE SPASMS OF NECK: ICD-10-CM

## 2022-06-02 PROCEDURE — G8400 PT W/DXA NO RESULTS DOC: HCPCS | Performed by: INTERNAL MEDICINE

## 2022-06-02 PROCEDURE — 3017F COLORECTAL CA SCREEN DOC REV: CPT | Performed by: INTERNAL MEDICINE

## 2022-06-02 PROCEDURE — 1036F TOBACCO NON-USER: CPT | Performed by: INTERNAL MEDICINE

## 2022-06-02 PROCEDURE — 1090F PRES/ABSN URINE INCON ASSESS: CPT | Performed by: INTERNAL MEDICINE

## 2022-06-02 PROCEDURE — G8427 DOCREV CUR MEDS BY ELIG CLIN: HCPCS | Performed by: INTERNAL MEDICINE

## 2022-06-02 PROCEDURE — 99214 OFFICE O/P EST MOD 30 MIN: CPT | Performed by: INTERNAL MEDICINE

## 2022-06-02 PROCEDURE — 1123F ACP DISCUSS/DSCN MKR DOCD: CPT | Performed by: INTERNAL MEDICINE

## 2022-06-02 PROCEDURE — G8419 CALC BMI OUT NRM PARAM NOF/U: HCPCS | Performed by: INTERNAL MEDICINE

## 2022-06-02 RX ORDER — TIZANIDINE 4 MG/1
TABLET ORAL
Qty: 90 TABLET | Refills: 1 | Status: SHIPPED | OUTPATIENT
Start: 2022-06-02 | End: 2022-10-04 | Stop reason: SDUPTHER

## 2022-06-02 RX ORDER — ADALIMUMAB 40MG/0.4ML
40 KIT SUBCUTANEOUS
Qty: 6 EACH | Refills: 1 | Status: SHIPPED | OUTPATIENT
Start: 2022-06-02 | End: 2022-08-11 | Stop reason: SDUPTHER

## 2022-06-02 ASSESSMENT — COLUMBIA-SUICIDE SEVERITY RATING SCALE - C-SSRS
6. HAVE YOU EVER DONE ANYTHING, STARTED TO DO ANYTHING, OR PREPARED TO DO ANYTHING TO END YOUR LIFE?: NO
1. WITHIN THE PAST MONTH, HAVE YOU WISHED YOU WERE DEAD OR WISHED YOU COULD GO TO SLEEP AND NOT WAKE UP?: YES
2. HAVE YOU ACTUALLY HAD ANY THOUGHTS OF KILLING YOURSELF?: NO

## 2022-06-02 ASSESSMENT — PATIENT HEALTH QUESTIONNAIRE - PHQ9
1. LITTLE INTEREST OR PLEASURE IN DOING THINGS: 1
4. FEELING TIRED OR HAVING LITTLE ENERGY: 1
SUM OF ALL RESPONSES TO PHQ QUESTIONS 1-9: 8
SUM OF ALL RESPONSES TO PHQ QUESTIONS 1-9: 8
2. FEELING DOWN, DEPRESSED OR HOPELESS: 1
3. TROUBLE FALLING OR STAYING ASLEEP: 1
8. MOVING OR SPEAKING SO SLOWLY THAT OTHER PEOPLE COULD HAVE NOTICED. OR THE OPPOSITE, BEING SO FIGETY OR RESTLESS THAT YOU HAVE BEEN MOVING AROUND A LOT MORE THAN USUAL: 0
SUM OF ALL RESPONSES TO PHQ QUESTIONS 1-9: 8
SUM OF ALL RESPONSES TO PHQ9 QUESTIONS 1 & 2: 2
SUM OF ALL RESPONSES TO PHQ QUESTIONS 1-9: 7
10. IF YOU CHECKED OFF ANY PROBLEMS, HOW DIFFICULT HAVE THESE PROBLEMS MADE IT FOR YOU TO DO YOUR WORK, TAKE CARE OF THINGS AT HOME, OR GET ALONG WITH OTHER PEOPLE: 2
5. POOR APPETITE OR OVEREATING: 1
7. TROUBLE CONCENTRATING ON THINGS, SUCH AS READING THE NEWSPAPER OR WATCHING TELEVISION: 1
9. THOUGHTS THAT YOU WOULD BE BETTER OFF DEAD, OR OF HURTING YOURSELF: 1
6. FEELING BAD ABOUT YOURSELF - OR THAT YOU ARE A FAILURE OR HAVE LET YOURSELF OR YOUR FAMILY DOWN: 1

## 2022-06-02 NOTE — PROGRESS NOTES
Chrissy Pacheco M.D.  [address] [place] [address], [address]ly [address] [facility]  Office : [phone], Fax: [phone] OFFICE VISIT NOTE  Date of Visit:  2022 2:55 PM    Patient Information:  Name:  Greg Olivas  :  1950  Age:  80 y.o. Gender:  female      Ms. Maria Dolores Aguilar is here today for follow-up of psoriatic arthritis and muscle spasms. Last visit: 22 virtual      History of Present Illness: On talking to the patient today she states that she has had COVID in the mid part of May but was not admitted to the hospital for it. Has had increased joint pain post COVID. Since she last saw me she has had left knee pain with a sensation of it giving away along with pain in her groin that comes and goes. Has also noticed some leg weakness. Her other current joint complaints are as mentioned below. Since the last visit, patient is feeling \"good\". Pain: 7/10  Location:  Some left knee pain worse than the right knee pain with swelling of the left knee worse than the right knee. Occasional buckling of the knees. Some lower back pain. Some right hip and groin pain. Some right ankle pain with swelling with no warmth and redness. Occasional buckling of the right ankle. Has cramping sensation of her feet. Quality:  Sharp to throbbing to achy pain. Modifying Factors: End of the day the pain and stiffness is the worst.   Associated Symptoms: Intermittent tingling and numbness with no pain down the arms from the shoulders to the fingertips with intermittent pain from the hips to the toes with no tingling and numbness down the  Legs. Bilateral LE weakness.      Last TB screen: 2021   TB result: Negative     Curent dose of steroids: None  How long on current dose of steroids: N/A  How long on continuous steroid therapy: N/A     Past DMARDs, if applicable (methotrexate, plaquenil/hydroxychloroquine, sulfasalazine, Arava/leflunomide): Was on Methotrexate 2.5 mg Every Sunday- (8 tablets) stopped due to elevated LFT's in past .      Past biologics, if applicable (enbrel, humira, simponi, cimzia, xeljanz, orencia, remicade, simponi aria, actemra, rituximab, Sandra Eth, stelara, cosentyx): Currently on Humira 40 mg 1 shot to be administered every 2 weeks. Held this last week due to being on ABX.     Past NSAIDs, if applicable (motrin, aleve, naproxen, advil, ibuprofen, celebrex, voltaren/diclofenac, etc.):Aleve PRN. Diclofenac in the past, Tazidime prn      Last BMD: Unsure  Past osteoporosis drugs, if applicable (fosamax, actonel, boniva, reclast, prolia, forteo):none     Current exercise regimen, if any: none  Current vitamin D dose: none  Current calcium dose: none  Fractures since last visit, if any: none     The patient otherwise has no significant interval changes in health or medical history to report. History Reviewed:    Past Medical History  Past Medical History:   Diagnosis Date    Arthritis     Autoimmune disease ([facility] [address]     skin changes, unknown name    Cancer ([facility] [address] 1990    ovarian    Chronic pain     arthritis in back and legs    COVID 05/15/2022    Essential hypertension 11/8/2019    Gastritis     GERD (gastroesophageal reflux disease)     Migraine headache     Psoriasis     Psychiatric disorder     anxiety and depression    Severe obesity (BMI 35.0-39.9) 6/26/2018    Thyroid disease     Diagnosed in 1996       Past Surgical History  Past Surgical History:   Procedure Laterality Date    CARPAL TUNNEL RELEASE      GYN      ovaries    HYSTERECTOMY, TOTAL ABDOMINAL  1990    [place] CARDIAC SURG PROCEDURE UNLIST      cardiac cath. Dx with spasms.     TUBAL LIGATION         Family History  Family History   Problem Relation Age of Onset    Parkinson's Disease Mother     Stroke Mother         Passed away in 1969    No Known Problems Paternal Grandfather     No Known Problems Paternal Grandmother     No Known Problems Maternal Grandfather     No Known Problems Maternal Grandmother     Prostate Cancer Father          age 80     Cancer Father         Kidney       Social History  Social History     Socioeconomic History    Marital status:      Spouse name: None    Number of children: None    Years of education: None    Highest education level: None   Occupational History    None   Tobacco Use    Smoking status: Never Smoker    Smokeless tobacco: Never Used   Substance and Sexual Activity    Alcohol use: No    Drug use: Yes     Types: Prescription    Sexual activity: None   Other Topics Concern    None   Social History Narrative    None     Social Determinants of Health     Financial Resource Strain:     Difficulty of Paying Living Expenses: Not on file   Food Insecurity:     Worried About Running Out of Food in the Last Year: Not on file    Amelia of Food in the Last Year: Not on file   Transportation Needs:     Lack of Transportation (Medical): Not on file    Lack of Transportation (Non-Medical):  Not on file   Physical Activity:     Days of Exercise per Week: Not on file    Minutes of Exercise per Session: Not on file   Stress:     Feeling of Stress : Not on file   Social Connections:     Frequency of Communication with Friends and Family: Not on file    Frequency of Social Gatherings with Friends and Family: Not on file    Attends Jehovah's witness Services: Not on file    Active Member of [address] [place] [address] or Organizations: Not on file    Attends Club or Organization Meetings: Not on file    Marital Status: Not on file   Intimate Partner Violence:     Fear of Current or Ex-Partner: Not on file    Emotionally Abused: Not on file    Physically Abused: Not on file    Sexually Abused: Not on file   Housing Stability:     Unable to Pay for Housing in the Last Year: Not on file    Number of [place] in the Last Year: Not on file    Unstable Housing in the Last Year: Not on file               Allergy:  Allergies   Allergen Reactions  Penicillins Hives    Sulfa Antibiotics Hives    Codeine Nausea And Vomiting         Current Medications:  Outpatient Encounter Medications as of 6/2/2022   Medication Sig Dispense Refill    Adalimumab (HUMIRA PEN) 40 MG/0.4ML PNKT Inject 40 mg into the skin every 14 days 6 each 1    tiZANidine (ZANAFLEX) 4 MG tablet Take 1 pill at bedtime as needed. 90 tablet 1    montelukast (SINGULAIR) 10 MG tablet TAKE 1 TABLET BY MOUTH EVERY DAY 90 tablet 1    pantoprazole (PROTONIX) 40 MG tablet TAKE 1 TABLET BY MOUTH TWO TIMES A DAY. 180 tablet 1    levothyroxine (SYNTHROID) 125 MCG tablet TAKE 1 TABLET BY MOUTH EVERY DAY BEFORE BREAKFAST 90 tablet 1    FLUoxetine (PROZAC) 20 MG capsule TAKE 1 CAPSULE BY MOUTH TWICE A  capsule 1    rosuvastatin (CRESTOR) 10 MG tablet TAKE 1 TABLET BY MOUTH EVERYDAY AT BEDTIME 90 tablet 1    amLODIPine (NORVASC) 5 MG tablet TAKE 1 TABLET BY MOUTH EVERY DAY 90 tablet 1    aspirin 81 MG EC tablet Take by mouth daily      clobetasol (TEMOVATE) 0.05 % ointment APPLY TO AFFECTED AREA TWICE A DAY      diclofenac sodium (VOLTAREN) 1 % GEL Apply topically 4 times daily      docusate (COLACE, DULCOLAX) 100 MG CAPS Take 200 mg by mouth 2 times daily      EPINEPHrine (EPIPEN) 0.3 MG/0.3ML SOAJ injection Inject 0.3 mg into the muscle once as needed      hydrOXYzine (ATARAX) 25 MG tablet TAKE 1 TABLET BY MOUTH EVERY 12 HOURS AS NEEDED      leucovorin calcium (WELLCOVORIN) 5 MG tablet TAKE 1 TABLET BY MOUTH EVERY DAY      nitroGLYCERIN (NITROSTAT) 0.4 MG SL tablet Place 0.4 mg under the tongue      traMADol (ULTRAM) 50 MG tablet Take 50 mg by mouth every 6 hours as needed.       zoster recombinant adjuvanted vaccine [facility]) 50 MCG/0.5ML SUSR injection 0.5mL by IntraMUSCular route once now and then repeat in 2-6 months (Patient not taking: Reported on 6/2/2022)      [DISCONTINUED] Adalimumab (HUMIRA PEN) 40 MG/0.4ML PNKT Inject 40 mg into the skin every 14 days      [DISCONTINUED] tiZANidine (ZANAFLEX) 4 MG tablet Take 4 mg by mouth       No facility-administered encounter medications on file as of 6/2/2022. REVIEW OF SYSTEMS: The following systems were reviewed with patient today and were negative except for the following (depicted with an \"X\"):        \"X\" General  \"X\" Head and Neck  \"X\" Heart and Breathing  \"X\" Gastrointestinal   x Fever/chills   Hair loss   Shortness of breath   Upset stomach    Falls   Dry mouth   Coughing   Diarrhea / constipation    Wt loss   Mouth sores   Wheezing   Heartburn    Wt gain   Ringing ears   Chest pain   Dark or bloody stools    Night sweats   Diff. swallowing  X None of above   Nausea or vomiting    None of above  X None of above     X None of above                \"X\" Skin  \"X\" Neurology  \"X\" Urinary/Gyn  \"X\" Other    Easy bruising   Numbness/ tingling   Female problems   Depression    Rashes  x Weakness  x Problems with urination  x Feeling anxious    Sun sensitivity   Headaches   None of above   Problems sleeping   X None of above   None of above      None of above          Physical Exam:  Blood pressure 138/65, pulse 73, height 5' (1.524 m), weight 202 lb (91.6 kg). General:  Patient alert, cooperative and in no apparent distress. HEENT: Pupils equally reactive to light and accommodation, no scleral injection noted. Heart: Regular rate and rhythm, normal S1 and S2, no rubs or gallops. Lungs: Clear to auscultation bilaterally. Abdomen: Soft, nontender, no hepatosplenomegaly. Skin:  No rashes. No nail abnormalities. Neurologic:  Oriented, normal speech and affect. Normal gait. Extremities:  No edema in bilateral lower extremities with no cyanosis or clubbing. Muskoskeletal Exam:     I examined the shoulders, elbows, wrists, MCPs, PIPs, DIPs and knees bilaterally for strength, range of motion, deformity, tenderness, swelling, and synovitis.       The findings are: Does have tenderness on palpation of the left little finger MCP joint and the right ring finger PIP joint with no synovitis, warmth or redness. Flexion as well as extension of the fingers in both hands is intact. Does have tenderness on palpation of the T-spine as well as L-spine with no C-spine tenderness. No paraspinal muscle tenderness with no SI joint tenderness. Patient otherwise has a normal joint exam without other evidence of joint tenderness, synovitis, warmth, erythema, decreased ROM, weakness or deformities. Radiology Reports Reviewed (if available):  Last 3 months  [unfilled]    Lab Reports Reviewed (if available): Last 3 months    No visits with results within 3 Month(s) from this visit. Latest known visit with results is:   Office Visit on 02/22/2022   Component Date Value Ref Range Status    Cholesterol, Total 02/22/2022 177  100 - 199 mg/dL Final    Triglycerides 02/22/2022 111  0 - 149 mg/dL Final    HDL 02/22/2022 61  >39 mg/dL Final    VLDL 02/22/2022 20  5 - 40 mg/dL Final    LDL Calculated 02/22/2022 96  0 - 99 mg/dL Final    TSH 02/22/2022 7.060* 0.450 - 4.500 uIU/mL Final         The results above were reviewed and discussed with patient. Assessment/Plan:   Greg Olivas is a 80 y.o. female who presents with:     1. Psoriatic arthritis ([facility] [address]): She was instructed to continue Humira 40 mg 1 shot to be administered to self every 2 weeks. Patient is aware that if she is sick requiring her to be on an antibiotic or antiviral drug she will need to skip administering the Humira until she has completed the antibiotic/antiviral course and is over the infection.  -     Adalimumab (HUMIRA PEN) 40 MG/0.4ML PNKT; Inject 40 mg into the skin every 14 days  -     C-Reactive Protein; Future  -     C-Reactive Protein    2. Muscle spasms of neck: She was instructed to continue tizanidine 4 mg 1 pill at bedtime as needed for muscle spasms. -     tiZANidine (ZANAFLEX) 4 MG tablet; Take 1 pill at bedtime as needed.     3. Long-term use of high-risk medication: If there is any noted abnormality I will keep the patient informed but if not I will review her labs with her on follow-up. -     CBC with Auto Differential; Future  -     Comprehensive Metabolic Panel; Future  -     Comprehensive Metabolic Panel  -     CBC with Auto Differential    4. Screening for tuberculosis: I will keep the patient informed accordingly. -     QuantiFERON In Tube; Future  -     QuantiFERON In Tube    Disease activity plan:  As stated above. Steroid management plan:  As stated above, if applicable. Pain management plan:  As stated above, if applicable. Weight management plan:  Weight loss through diet and exercise is always encouraged    Disease prognosis: Good        I appreciate the opportunity to continue to participate in the care of this patient. Follow-up and Dispositions    · Return in about 4 months (around 10/2/2022). Electronically signed by:  Allie Wall MD      This note was dictated using dragon voice recognition software.   It has been proofread, but there may still exist voice recognition errors that the author did not detect.                --------------------------------------------------------------------------------------------------------------------------------------------------------------------------------------------------------------------------------

## 2022-06-03 LAB
ALBUMIN SERPL-MCNC: 4 G/DL (ref 3.2–4.6)
ALBUMIN/GLOB SERPL: 1.1 {RATIO} (ref 1.2–3.5)
ALP SERPL-CCNC: 70 U/L (ref 50–136)
ALT SERPL-CCNC: 28 U/L (ref 12–65)
ANION GAP SERPL CALC-SCNC: 4 MMOL/L (ref 7–16)
AST SERPL-CCNC: 24 U/L (ref 15–37)
BASOPHILS # BLD: 0.1 K/UL (ref 0–0.2)
BASOPHILS NFR BLD: 1 % (ref 0–2)
BILIRUB SERPL-MCNC: 1 MG/DL (ref 0.2–1.1)
BUN SERPL-MCNC: 15 MG/DL (ref 8–23)
CALCIUM SERPL-MCNC: 9.4 MG/DL (ref 8.3–10.4)
CHLORIDE SERPL-SCNC: 104 MMOL/L (ref 98–107)
CO2 SERPL-SCNC: 30 MMOL/L (ref 21–32)
CREAT SERPL-MCNC: 0.8 MG/DL (ref 0.6–1)
CRP SERPL-MCNC: <0.3 MG/DL (ref 0–0.9)
DIFFERENTIAL METHOD BLD: ABNORMAL
EOSINOPHIL # BLD: 0.3 K/UL (ref 0–0.8)
EOSINOPHIL NFR BLD: 6 % (ref 0.5–7.8)
ERYTHROCYTE [DISTWIDTH] IN BLOOD BY AUTOMATED COUNT: 16.2 % (ref 11.9–14.6)
GLOBULIN SER CALC-MCNC: 3.6 G/DL (ref 2.3–3.5)
GLUCOSE SERPL-MCNC: 86 MG/DL (ref 65–100)
HCT VFR BLD AUTO: 42.2 % (ref 35.8–46.3)
HGB BLD-MCNC: 13.8 G/DL (ref 11.7–15.4)
IMM GRANULOCYTES # BLD AUTO: 0 K/UL (ref 0–0.5)
IMM GRANULOCYTES NFR BLD AUTO: 0 % (ref 0–5)
LYMPHOCYTES # BLD: 2.7 K/UL (ref 0.5–4.6)
LYMPHOCYTES NFR BLD: 52 % (ref 13–44)
MCH RBC QN AUTO: 29.3 PG (ref 26.1–32.9)
MCHC RBC AUTO-ENTMCNC: 32.7 G/DL (ref 31.4–35)
MCV RBC AUTO: 89.6 FL (ref 79.6–97.8)
MONOCYTES # BLD: 0.6 K/UL (ref 0.1–1.3)
MONOCYTES NFR BLD: 12 % (ref 4–12)
NEUTS SEG # BLD: 1.5 K/UL (ref 1.7–8.2)
NEUTS SEG NFR BLD: 29 % (ref 43–78)
NRBC # BLD: 0 K/UL (ref 0–0.2)
PLATELET # BLD AUTO: 254 K/UL (ref 150–450)
PMV BLD AUTO: 11.6 FL (ref 9.4–12.3)
POTASSIUM SERPL-SCNC: 4.6 MMOL/L (ref 3.5–5.1)
PROT SERPL-MCNC: 7.6 G/DL (ref 6.3–8.2)
RBC # BLD AUTO: 4.71 M/UL (ref 4.05–5.2)
SODIUM SERPL-SCNC: 138 MMOL/L (ref 136–145)
WBC # BLD AUTO: 5.2 K/UL (ref 4.3–11.1)

## 2022-06-06 LAB
M TB IFN-G BLD-IMP: NEGATIVE
QUANTIFERON CRITERIA: NORMAL
QUANTIFERON MITOGEN VALUE: >10 IU/ML
QUANTIFERON NIL VALUE: 0.03 IU/ML
QUANTIFERON TB1 AG: 0.05 IU/ML
QUANTIFERON TB2 AG: 0.05 IU/ML
QUANTIFERON, INCUBATION: NORMAL

## 2022-06-23 ENCOUNTER — OFFICE VISIT (OUTPATIENT)
Dept: FAMILY MEDICINE CLINIC | Facility: CLINIC | Age: 72
End: 2022-06-23
Payer: MEDICARE

## 2022-06-23 VITALS
SYSTOLIC BLOOD PRESSURE: 133 MMHG | DIASTOLIC BLOOD PRESSURE: 70 MMHG | HEART RATE: 74 BPM | HEIGHT: 60 IN | WEIGHT: 203 LBS | TEMPERATURE: 98 F | OXYGEN SATURATION: 96 % | BODY MASS INDEX: 39.85 KG/M2 | RESPIRATION RATE: 12 BRPM

## 2022-06-23 DIAGNOSIS — R93.89 ABNORMAL FINDING ON CHEST XRAY: Primary | ICD-10-CM

## 2022-06-23 PROCEDURE — 1123F ACP DISCUSS/DSCN MKR DOCD: CPT | Performed by: INTERNAL MEDICINE

## 2022-06-23 PROCEDURE — G8427 DOCREV CUR MEDS BY ELIG CLIN: HCPCS | Performed by: INTERNAL MEDICINE

## 2022-06-23 PROCEDURE — 1036F TOBACCO NON-USER: CPT | Performed by: INTERNAL MEDICINE

## 2022-06-23 PROCEDURE — 99214 OFFICE O/P EST MOD 30 MIN: CPT | Performed by: INTERNAL MEDICINE

## 2022-06-23 PROCEDURE — G8417 CALC BMI ABV UP PARAM F/U: HCPCS | Performed by: INTERNAL MEDICINE

## 2022-06-23 PROCEDURE — G8400 PT W/DXA NO RESULTS DOC: HCPCS | Performed by: INTERNAL MEDICINE

## 2022-06-23 PROCEDURE — 1090F PRES/ABSN URINE INCON ASSESS: CPT | Performed by: INTERNAL MEDICINE

## 2022-06-23 PROCEDURE — 3017F COLORECTAL CA SCREEN DOC REV: CPT | Performed by: INTERNAL MEDICINE

## 2022-06-23 RX ORDER — HYDROCODONE BITARTRATE AND ACETAMINOPHEN 7.5; 325 MG/1; MG/1
TABLET ORAL
COMMUNITY
Start: 2022-06-12

## 2022-06-23 ASSESSMENT — PATIENT HEALTH QUESTIONNAIRE - PHQ9
4. FEELING TIRED OR HAVING LITTLE ENERGY: 0
7. TROUBLE CONCENTRATING ON THINGS, SUCH AS READING THE NEWSPAPER OR WATCHING TELEVISION: 0
3. TROUBLE FALLING OR STAYING ASLEEP: 0
9. THOUGHTS THAT YOU WOULD BE BETTER OFF DEAD, OR OF HURTING YOURSELF: 0
10. IF YOU CHECKED OFF ANY PROBLEMS, HOW DIFFICULT HAVE THESE PROBLEMS MADE IT FOR YOU TO DO YOUR WORK, TAKE CARE OF THINGS AT HOME, OR GET ALONG WITH OTHER PEOPLE: 0
SUM OF ALL RESPONSES TO PHQ QUESTIONS 1-9: 0
1. LITTLE INTEREST OR PLEASURE IN DOING THINGS: 0
5. POOR APPETITE OR OVEREATING: 0
SUM OF ALL RESPONSES TO PHQ9 QUESTIONS 1 & 2: 0
2. FEELING DOWN, DEPRESSED OR HOPELESS: 0
SUM OF ALL RESPONSES TO PHQ QUESTIONS 1-9: 0
8. MOVING OR SPEAKING SO SLOWLY THAT OTHER PEOPLE COULD HAVE NOTICED. OR THE OPPOSITE, BEING SO FIGETY OR RESTLESS THAT YOU HAVE BEEN MOVING AROUND A LOT MORE THAN USUAL: 0
6. FEELING BAD ABOUT YOURSELF - OR THAT YOU ARE A FAILURE OR HAVE LET YOURSELF OR YOUR FAMILY DOWN: 0

## 2022-06-23 ASSESSMENT — ENCOUNTER SYMPTOMS
RESPIRATORY NEGATIVE: 1
GASTROINTESTINAL NEGATIVE: 1

## 2022-06-23 NOTE — PROGRESS NOTES
Deandre Maya D.O. [facility]  [address], [address] [id number]  Tel: [phone]    Office Visit: Follow Up     Patient Name: Estevan Sanders   :  1950   MRN:   [id number]      Today's Date: 22 8:37 AM    Subjective     The patient is a 80y.o.-year-old female who presents for follow-up. She is a patient of Dr. Ghada Peralta. Patient states she had an appointment at the pain clinic about 2 weeks ago and had xrays there and they told her that they found something significant in her lungs on the XRAY and that she would need to be seen for a pulmonology referral. She is only here for a pulmonology referral. She denies any new symptoms today. She did have COVID-19 6 weeks prior to her XRAY. She states she will sometimes have a feeling that something is \"sucking the air out of her lungs\", but other than this she has no other pulmonary symptoms. Review of Systems   Constitutional: Negative. HENT: Negative. Respiratory: Negative. Cardiovascular: Negative. Gastrointestinal: Negative. Genitourinary: Negative. Musculoskeletal: Negative. Neurological: Negative. Psychiatric/Behavioral: Negative.        Past Medical History:   Diagnosis Date    Arthritis     Autoimmune disease ([facility] [address]     skin changes, unknown name    Cancer ([facility] [address]     ovarian    Chronic pain     arthritis in back and legs    COVID 05/15/2022    Essential hypertension 2019    Gastritis     GERD (gastroesophageal reflux disease)     Migraine headache     Psoriasis     Psychiatric disorder     anxiety and depression    Severe obesity (BMI 35.0-39.9) 2018    Thyroid disease     Diagnosed in      Social History     Socioeconomic History    Marital status:      Spouse name: Not on file    Number of children: Not on file    Years of education: Not on file    Highest education level: Not on file   Occupational History    Not on file   Tobacco Use    Smoking status: Never Smoker    Smokeless tobacco: Never Used   Substance and Sexual Activity    Alcohol use: No    Drug use: Yes     Types: Prescription    Sexual activity: Not on file   Other Topics Concern    Not on file   Social History Narrative    Not on file     Social Determinants of Health     Financial Resource Strain:     Difficulty of Paying Living Expenses: Not on file   Food Insecurity:     Worried About Running Out of Food in the Last Year: Not on file    Amelia of Food in the Last Year: Not on file   Transportation Needs:     Lack of Transportation (Medical): Not on file    Lack of Transportation (Non-Medical): Not on file   Physical Activity:     Days of Exercise per Week: Not on file    Minutes of Exercise per Session: Not on file   Stress:     Feeling of Stress : Not on file   Social Connections:     Frequency of Communication with Friends and Family: Not on file    Frequency of Social Gatherings with Friends and Family: Not on file    Attends Voodoo Services: Not on file    Active Member of [address] [place] [address] or Organizations: Not on file    Attends Club or Organization Meetings: Not on file    Marital Status: Not on file   Intimate Partner Violence:     Fear of Current or Ex-Partner: Not on file    Emotionally Abused: Not on file    Physically Abused: Not on file    Sexually Abused: Not on file   Housing Stability:     Unable to Pay for Housing in the Last Year: Not on file    Number of [place] in the Last Year: Not on file    Unstable Housing in the Last Year: Not on file         Current Outpatient Medications   Medication Sig    Adalimumab (HUMIRA PEN) 40 MG/0.4ML PNKT Inject 40 mg into the skin every 14 days    tiZANidine (ZANAFLEX) 4 MG tablet Take 1 pill at bedtime as needed.  montelukast (SINGULAIR) 10 MG tablet TAKE 1 TABLET BY MOUTH EVERY DAY    pantoprazole (PROTONIX) 40 MG tablet TAKE 1 TABLET BY MOUTH TWO TIMES A DAY.     levothyroxine (SYNTHROID) 125 MCG tablet TAKE 1 TABLET BY MOUTH EVERY DAY BEFORE BREAKFAST    FLUoxetine (PROZAC) 20 MG capsule TAKE 1 CAPSULE BY MOUTH TWICE A DAY    rosuvastatin (CRESTOR) 10 MG tablet TAKE 1 TABLET BY MOUTH EVERYDAY AT BEDTIME    amLODIPine (NORVASC) 5 MG tablet TAKE 1 TABLET BY MOUTH EVERY DAY    aspirin 81 MG EC tablet Take by mouth daily    clobetasol (TEMOVATE) 0.05 % ointment APPLY TO AFFECTED AREA TWICE A DAY    diclofenac sodium (VOLTAREN) 1 % GEL Apply topically 4 times daily    docusate (COLACE, DULCOLAX) 100 MG CAPS Take 200 mg by mouth 2 times daily    EPINEPHrine (EPIPEN) 0.3 MG/0.3ML SOAJ injection Inject 0.3 mg into the muscle once as needed    hydrOXYzine (ATARAX) 25 MG tablet TAKE 1 TABLET BY MOUTH EVERY 12 HOURS AS NEEDED    leucovorin calcium (WELLCOVORIN) 5 MG tablet TAKE 1 TABLET BY MOUTH EVERY DAY    nitroGLYCERIN (NITROSTAT) 0.4 MG SL tablet Place 0.4 mg under the tongue    traMADol (ULTRAM) 50 MG tablet Take 50 mg by mouth every 6 hours as needed.  zoster recombinant adjuvanted vaccine [facility]) 50 MCG/0.5ML SUSR injection 0.5mL by IntraMUSCular route once now and then repeat in 2-6 months (Patient not taking: Reported on 6/2/2022)     No current facility-administered medications for this visit. Objective     Vitals:    06/23/22 0835   Weight: 203 lb (92.1 kg)   Height: 5' (1.524 m)      Physical Exam:  Constitutional: Appears well kempt. Alert/oriented x3. In no acute distress. Head: Normocephalic No trauma. No deformity. No bruits. Neck: Supple. ROM normal. No tenderness. No masses. Cardiac: Heart with normal rate/rhythm. No murmurs. No gallops. Pulses normal.   Pulmonary: Lungs clear to auscultation bilaterally. In no respiratory distress. No wheezing. No rales. No rhonci. Psychiatric: Normal thought content. Normal behavior. Normal judgment.      Recommendations     Assessment:  Patient Active Problem List   Diagnosis    Hypothyroidism, adult    Essential hypertension    Chronic hand pain    Nausea    Psoriatic arthritis (HCC)    Multiple allergies    Psoriasis, guttate    Other chest pain    Coronary artery spasm (HCC)    Chronic midline low back pain without sciatica    History of ovarian cancer    Chronic pain of multiple joints    Gastroesophageal reflux disease without esophagitis    Anxiety and depression    Mixed hyperlipidemia    Severe obesity with body mass index (BMI) of 35.0 to 39.9 with serious comorbidity ([facility] [address]      Plan:  -Pulmonology referral for abnormal finding on an xray done two weeks ago  -CT scan of her chest with contrast to get a better picture of what her xray at the pain clinic found. We do not have these xrays or the report. -The patient expresses understanding of the plan as I've explained it to her and is in agreement with the current plan.     Follow Up: 4 weeks with Dr. Valdemar Grubbs     Time Spent in Patient Room: 15 minutes  Time Spent Pre- and Post Reviewing patient's chart: 15 minutes  Total Time: 30 minutes    Signed: Joseph Norton D.O.  06/23/22  8:37 AM

## 2022-07-12 ENCOUNTER — TELEPHONE (OUTPATIENT)
Dept: INTERNAL MEDICINE CLINIC | Facility: CLINIC | Age: 72
End: 2022-07-12

## 2022-07-12 DIAGNOSIS — L40.50 ARTHROPATHIC PSORIASIS, UNSPECIFIED (HCC): ICD-10-CM

## 2022-07-12 NOTE — TELEPHONE ENCOUNTER
Abhinav ARNOLD On behalf of [facility] Verification Department called stating that the CT of the Chest that was ordered by Hay Wilhelm on 06/23/2023 will be denied due to the reason for the CT SCAN being invalid. Abhinav Sanchez stated that she contacted [address] office and was told that the last imaging that was done was of the patient shoulder. Information forwarded to Hay Wilhelm for recommendation:    Upon receiving this information  recommends that the Imaging Order be cancelled until we can locate the Pain Clinic the patient stated she had the xray done at and the results of that xray. Abhinav ARNOLD verbally expressed understanding of recommendations and denied all additional questions, comments and/or concerns at this time.

## 2022-07-13 ENCOUNTER — HOSPITAL ENCOUNTER (OUTPATIENT)
Dept: MAMMOGRAPHY | Age: 72
Discharge: HOME | End: 2022-07-16
Payer: MEDICARE

## 2022-07-13 ENCOUNTER — HOSPITAL ENCOUNTER (OUTPATIENT)
Dept: CT IMAGING | Age: 72
End: 2022-07-13
Payer: MEDICARE

## 2022-07-13 DIAGNOSIS — Z12.31 ENCOUNTER FOR SCREENING MAMMOGRAM FOR MALIGNANT NEOPLASM OF BREAST: ICD-10-CM

## 2022-07-13 PROCEDURE — 77067 SCR MAMMO BI INCL CAD: CPT

## 2022-07-13 RX ORDER — LEUCOVORIN CALCIUM 5 MG/1
TABLET ORAL
Qty: 90 TABLET | Refills: 1 | Status: SHIPPED | OUTPATIENT
Start: 2022-07-13

## 2022-07-18 ENCOUNTER — OFFICE VISIT (OUTPATIENT)
Dept: FAMILY MEDICINE CLINIC | Facility: CLINIC | Age: 72
End: 2022-07-18
Payer: MEDICARE

## 2022-07-18 VITALS
TEMPERATURE: 97 F | HEART RATE: 82 BPM | OXYGEN SATURATION: 93 % | DIASTOLIC BLOOD PRESSURE: 80 MMHG | BODY MASS INDEX: 38.87 KG/M2 | HEIGHT: 60 IN | WEIGHT: 198 LBS | SYSTOLIC BLOOD PRESSURE: 122 MMHG

## 2022-07-18 DIAGNOSIS — E03.9 HYPOTHYROIDISM, UNSPECIFIED TYPE: Primary | ICD-10-CM

## 2022-07-18 DIAGNOSIS — E78.2 MIXED HYPERLIPIDEMIA: ICD-10-CM

## 2022-07-18 DIAGNOSIS — E03.9 HYPOTHYROIDISM, UNSPECIFIED TYPE: ICD-10-CM

## 2022-07-18 DIAGNOSIS — R05.9 COUGH: ICD-10-CM

## 2022-07-18 PROCEDURE — 1123F ACP DISCUSS/DSCN MKR DOCD: CPT | Performed by: STUDENT IN AN ORGANIZED HEALTH CARE EDUCATION/TRAINING PROGRAM

## 2022-07-18 PROCEDURE — G8400 PT W/DXA NO RESULTS DOC: HCPCS | Performed by: STUDENT IN AN ORGANIZED HEALTH CARE EDUCATION/TRAINING PROGRAM

## 2022-07-18 PROCEDURE — 1036F TOBACCO NON-USER: CPT | Performed by: STUDENT IN AN ORGANIZED HEALTH CARE EDUCATION/TRAINING PROGRAM

## 2022-07-18 PROCEDURE — G8427 DOCREV CUR MEDS BY ELIG CLIN: HCPCS | Performed by: STUDENT IN AN ORGANIZED HEALTH CARE EDUCATION/TRAINING PROGRAM

## 2022-07-18 PROCEDURE — G8417 CALC BMI ABV UP PARAM F/U: HCPCS | Performed by: STUDENT IN AN ORGANIZED HEALTH CARE EDUCATION/TRAINING PROGRAM

## 2022-07-18 PROCEDURE — 1090F PRES/ABSN URINE INCON ASSESS: CPT | Performed by: STUDENT IN AN ORGANIZED HEALTH CARE EDUCATION/TRAINING PROGRAM

## 2022-07-18 PROCEDURE — 3017F COLORECTAL CA SCREEN DOC REV: CPT | Performed by: STUDENT IN AN ORGANIZED HEALTH CARE EDUCATION/TRAINING PROGRAM

## 2022-07-18 PROCEDURE — 99214 OFFICE O/P EST MOD 30 MIN: CPT | Performed by: STUDENT IN AN ORGANIZED HEALTH CARE EDUCATION/TRAINING PROGRAM

## 2022-07-18 RX ORDER — FAMOTIDINE 40 MG/1
40 TABLET, FILM COATED ORAL EVERY EVENING
Qty: 30 TABLET | Refills: 0 | Status: SHIPPED | OUTPATIENT
Start: 2022-07-18

## 2022-07-18 ASSESSMENT — PATIENT HEALTH QUESTIONNAIRE - PHQ9
7. TROUBLE CONCENTRATING ON THINGS, SUCH AS READING THE NEWSPAPER OR WATCHING TELEVISION: 0
9. THOUGHTS THAT YOU WOULD BE BETTER OFF DEAD, OR OF HURTING YOURSELF: 0
SUM OF ALL RESPONSES TO PHQ QUESTIONS 1-9: 0
1. LITTLE INTEREST OR PLEASURE IN DOING THINGS: 0
5. POOR APPETITE OR OVEREATING: 0
SUM OF ALL RESPONSES TO PHQ9 QUESTIONS 1 & 2: 0
2. FEELING DOWN, DEPRESSED OR HOPELESS: 0
SUM OF ALL RESPONSES TO PHQ QUESTIONS 1-9: 0
3. TROUBLE FALLING OR STAYING ASLEEP: 0
6. FEELING BAD ABOUT YOURSELF - OR THAT YOU ARE A FAILURE OR HAVE LET YOURSELF OR YOUR FAMILY DOWN: 0
SUM OF ALL RESPONSES TO PHQ QUESTIONS 1-9: 0
SUM OF ALL RESPONSES TO PHQ QUESTIONS 1-9: 0
10. IF YOU CHECKED OFF ANY PROBLEMS, HOW DIFFICULT HAVE THESE PROBLEMS MADE IT FOR YOU TO DO YOUR WORK, TAKE CARE OF THINGS AT HOME, OR GET ALONG WITH OTHER PEOPLE: 0
4. FEELING TIRED OR HAVING LITTLE ENERGY: 0
8. MOVING OR SPEAKING SO SLOWLY THAT OTHER PEOPLE COULD HAVE NOTICED. OR THE OPPOSITE, BEING SO FIGETY OR RESTLESS THAT YOU HAVE BEEN MOVING AROUND A LOT MORE THAN USUAL: 0

## 2022-07-18 ASSESSMENT — ANXIETY QUESTIONNAIRES
1. FEELING NERVOUS, ANXIOUS, OR ON EDGE: 0
4. TROUBLE RELAXING: 0
2. NOT BEING ABLE TO STOP OR CONTROL WORRYING: 0
5. BEING SO RESTLESS THAT IT IS HARD TO SIT STILL: 0
7. FEELING AFRAID AS IF SOMETHING AWFUL MIGHT HAPPEN: 0
6. BECOMING EASILY ANNOYED OR IRRITABLE: 0
3. WORRYING TOO MUCH ABOUT DIFFERENT THINGS: 0
GAD7 TOTAL SCORE: 0
IF YOU CHECKED OFF ANY PROBLEMS ON THIS QUESTIONNAIRE, HOW DIFFICULT HAVE THESE PROBLEMS MADE IT FOR YOU TO DO YOUR WORK, TAKE CARE OF THINGS AT HOME, OR GET ALONG WITH OTHER PEOPLE: NOT DIFFICULT AT ALL

## 2022-07-18 ASSESSMENT — ENCOUNTER SYMPTOMS
VOMITING: 0
BLOOD IN STOOL: 0
DIARRHEA: 0
SHORTNESS OF BREATH: 0

## 2022-07-18 NOTE — PROGRESS NOTES
[facility]  [address]  Phone [phone]  Fax:  [phone]    Osito Zuniga (:  1950) is a 80 y.o. female here for evaluation of the following chief complaint(s):  Orders (Pt saw Rosa Elena Nelson-- abn cxr-- ct scan cancelled due to ins and information needed) and Hypothyroidism (Due for lab. Last was abnormal)       ASSESSMENT/PLAN:  1. Hypothyroidism, unspecified type  -     TSH with Reflex; Future  2. Cough  -     XR CHEST PA LAT (2 VIEWS); Future  3. Mixed hyperlipidemia  -     Lipid Panel; Future    Continue Synthroid 125 mcg daily for hypothyroidism, will check TSH and adjust dose if needed. Continue Crestor for HLD, check lipid panel. Try daily Pepcid for upper abdominal discomfort, suspect this is residual from recent gastroenteritis. Reportedly had abnormal chest x-ray a few months ago but do not have these results. CT chest was previously ordered but insurance did not cover it. Will recheck chest x-ray. Return if symptoms worsen or fail to improve. Subjective   SUBJECTIVE/OBJECTIVE:  HPI  52-year-old female with PMH of HTN, CAD, hypothyroidism, psoriatic arthritis, HLD, ovarian cancer status post hysterectomy, GERD, and anxiety/depression who presents for 4 week f/u. -Reports she had an xray a few months ago by pain management that reportedly showed abnormality of her lungs, reports it was done a few weeks after she had covid  -No SOB, occasional dry cough  -Reports recent gastroenteritis, still having some upper abd discomfort    Review of Systems   Constitutional:  Negative for fever. Respiratory:  Negative for shortness of breath. Gastrointestinal:  Negative for blood in stool, diarrhea and vomiting. Objective     Vitals:    22 1114   BP: 122/80   Pulse: 82   Temp: 97 °F (36.1 °C)   SpO2: 93%       Physical Exam  Vitals reviewed. Constitutional:       General: She is not in acute distress. Appearance: She is obese. HENT:      Head: Normocephalic and atraumatic. Cardiovascular:      Rate and Rhythm: Normal rate and regular rhythm. Pulmonary:      Effort: Pulmonary effort is normal.      Breath sounds: Normal breath sounds. Abdominal:      General: There is no distension. Palpations: Abdomen is soft. Tenderness: There is no abdominal tenderness. Musculoskeletal:      Right lower leg: No edema. Left lower leg: No edema. Skin:     General: Skin is warm and dry. Neurological:      General: No focal deficit present. Mental Status: She is alert and oriented to person, place, and time. An electronic signature was used to authenticate this note.     --Arabella Oleary MD

## 2022-07-19 LAB
CHOLEST SERPL-MCNC: 154 MG/DL
HDLC SERPL-MCNC: 51 MG/DL (ref 40–60)
HDLC SERPL: 3 {RATIO}
LDLC SERPL CALC-MCNC: 73 MG/DL
TRIGL SERPL-MCNC: 150 MG/DL (ref 35–150)
TSH W FREE THYROID IF ABNORMAL: 3.69 UIU/ML (ref 0.36–3.74)
VLDLC SERPL CALC-MCNC: 30 MG/DL (ref 6–23)

## 2022-07-22 ENCOUNTER — HOSPITAL ENCOUNTER (OUTPATIENT)
Dept: GENERAL RADIOLOGY | Age: 72
Discharge: HOME | End: 2022-07-25
Payer: MEDICARE

## 2022-07-22 DIAGNOSIS — R05.9 COUGH: ICD-10-CM

## 2022-07-22 PROCEDURE — 71046 X-RAY EXAM CHEST 2 VIEWS: CPT

## 2022-08-11 ENCOUNTER — TELEPHONE (OUTPATIENT)
Dept: RHEUMATOLOGY | Age: 72
End: 2022-08-11

## 2022-08-11 DIAGNOSIS — L40.50 PSORIATIC ARTHRITIS (HCC): Primary | ICD-10-CM

## 2022-08-11 RX ORDER — ADALIMUMAB 40MG/0.4ML
40 KIT SUBCUTANEOUS
Qty: 6 EACH | Refills: 1 | Status: SHIPPED | OUTPATIENT
Start: 2022-08-11 | End: 2022-10-04 | Stop reason: SDUPTHER

## 2022-08-11 NOTE — TELEPHONE ENCOUNTER
Electronic prescription for Humira that was pended was electronically signed by me and sent to [facility] just now.

## 2022-08-11 NOTE — TELEPHONE ENCOUNTER
PC stating that the Pharmacy never got her RX from her visit , ask that we resend , rx pend for humana specality

## 2022-08-15 ENCOUNTER — TELEPHONE (OUTPATIENT)
Dept: RHEUMATOLOGY | Age: 72
End: 2022-08-15

## 2022-08-23 DIAGNOSIS — L40.4 GUTTATE PSORIASIS: ICD-10-CM

## 2022-08-23 RX ORDER — FAMOTIDINE 40 MG/1
TABLET, FILM COATED ORAL
Qty: 30 TABLET | Refills: 0 | OUTPATIENT
Start: 2022-08-23

## 2022-08-23 RX ORDER — HYDROXYZINE HYDROCHLORIDE 25 MG/1
TABLET, FILM COATED ORAL
Qty: 90 TABLET | Refills: 1 | OUTPATIENT
Start: 2022-08-23

## 2022-09-06 RX ORDER — HYDROXYZINE HYDROCHLORIDE 25 MG/1
TABLET, FILM COATED ORAL
Qty: 90 TABLET | Refills: 1 | OUTPATIENT
Start: 2022-09-06

## 2022-09-06 RX ORDER — FAMOTIDINE 40 MG/1
TABLET, FILM COATED ORAL
Qty: 30 TABLET | Refills: 0 | OUTPATIENT
Start: 2022-09-06

## 2022-09-14 RX ORDER — HYDROXYZINE HYDROCHLORIDE 25 MG/1
TABLET, FILM COATED ORAL
Qty: 90 TABLET | Refills: 1 | OUTPATIENT
Start: 2022-09-14

## 2022-09-14 RX ORDER — FAMOTIDINE 40 MG/1
TABLET, FILM COATED ORAL
Qty: 30 TABLET | Refills: 0 | OUTPATIENT
Start: 2022-09-14

## 2022-10-04 ENCOUNTER — OFFICE VISIT (OUTPATIENT)
Dept: RHEUMATOLOGY | Age: 72
End: 2022-10-04
Payer: MEDICARE

## 2022-10-04 VITALS
WEIGHT: 198 LBS | SYSTOLIC BLOOD PRESSURE: 135 MMHG | HEART RATE: 77 BPM | DIASTOLIC BLOOD PRESSURE: 84 MMHG | HEIGHT: 60 IN | BODY MASS INDEX: 38.87 KG/M2

## 2022-10-04 DIAGNOSIS — L40.50 PSORIATIC ARTHRITIS (HCC): ICD-10-CM

## 2022-10-04 DIAGNOSIS — L40.50 PSORIATIC ARTHRITIS (HCC): Primary | ICD-10-CM

## 2022-10-04 DIAGNOSIS — Z79.899 LONG-TERM USE OF HIGH-RISK MEDICATION: ICD-10-CM

## 2022-10-04 DIAGNOSIS — M62.838 MUSCLE SPASMS OF NECK: ICD-10-CM

## 2022-10-04 LAB
ALBUMIN SERPL-MCNC: 4.3 G/DL (ref 3.2–4.6)
ALBUMIN/GLOB SERPL: 1.2 {RATIO} (ref 1.2–3.5)
ALP SERPL-CCNC: 71 U/L (ref 50–136)
ALT SERPL-CCNC: 46 U/L (ref 12–65)
ANION GAP SERPL CALC-SCNC: 6 MMOL/L (ref 4–13)
AST SERPL-CCNC: 27 U/L (ref 15–37)
BASOPHILS # BLD: 0 K/UL (ref 0–0.2)
BASOPHILS NFR BLD: 1 % (ref 0–2)
BILIRUB SERPL-MCNC: 1 MG/DL (ref 0.2–1.1)
BUN SERPL-MCNC: 15 MG/DL (ref 8–23)
CALCIUM SERPL-MCNC: 10.1 MG/DL (ref 8.3–10.4)
CHLORIDE SERPL-SCNC: 106 MMOL/L (ref 101–110)
CO2 SERPL-SCNC: 27 MMOL/L (ref 21–32)
CREAT SERPL-MCNC: 0.8 MG/DL (ref 0.6–1)
CRP SERPL-MCNC: <0.3 MG/DL (ref 0–0.9)
DIFFERENTIAL METHOD BLD: ABNORMAL
EOSINOPHIL # BLD: 0.2 K/UL (ref 0–0.8)
EOSINOPHIL NFR BLD: 3 % (ref 0.5–7.8)
ERYTHROCYTE [DISTWIDTH] IN BLOOD BY AUTOMATED COUNT: 16.7 % (ref 11.9–14.6)
GLOBULIN SER CALC-MCNC: 3.5 G/DL (ref 2.3–3.5)
GLUCOSE SERPL-MCNC: 104 MG/DL (ref 65–100)
HCT VFR BLD AUTO: 42.3 % (ref 35.8–46.3)
HGB BLD-MCNC: 13.5 G/DL (ref 11.7–15.4)
IMM GRANULOCYTES # BLD AUTO: 0 K/UL (ref 0–0.5)
IMM GRANULOCYTES NFR BLD AUTO: 1 % (ref 0–5)
LYMPHOCYTES # BLD: 1.6 K/UL (ref 0.5–4.6)
LYMPHOCYTES NFR BLD: 30 % (ref 13–44)
MCH RBC QN AUTO: 28.7 PG (ref 26.1–32.9)
MCHC RBC AUTO-ENTMCNC: 31.9 G/DL (ref 31.4–35)
MCV RBC AUTO: 89.8 FL (ref 79.6–97.8)
MONOCYTES # BLD: 0.8 K/UL (ref 0.1–1.3)
MONOCYTES NFR BLD: 15 % (ref 4–12)
NEUTS SEG # BLD: 2.7 K/UL (ref 1.7–8.2)
NEUTS SEG NFR BLD: 50 % (ref 43–78)
NRBC # BLD: 0 K/UL (ref 0–0.2)
PLATELET # BLD AUTO: 206 K/UL (ref 150–450)
PMV BLD AUTO: 11.4 FL (ref 9.4–12.3)
POTASSIUM SERPL-SCNC: 4 MMOL/L (ref 3.5–5.1)
PROT SERPL-MCNC: 7.8 G/DL (ref 6.3–8.2)
RBC # BLD AUTO: 4.71 M/UL (ref 4.05–5.2)
SODIUM SERPL-SCNC: 139 MMOL/L (ref 136–145)
WBC # BLD AUTO: 5.4 K/UL (ref 4.3–11.1)

## 2022-10-04 PROCEDURE — G8417 CALC BMI ABV UP PARAM F/U: HCPCS | Performed by: INTERNAL MEDICINE

## 2022-10-04 PROCEDURE — G8484 FLU IMMUNIZE NO ADMIN: HCPCS | Performed by: INTERNAL MEDICINE

## 2022-10-04 PROCEDURE — 3017F COLORECTAL CA SCREEN DOC REV: CPT | Performed by: INTERNAL MEDICINE

## 2022-10-04 PROCEDURE — G8427 DOCREV CUR MEDS BY ELIG CLIN: HCPCS | Performed by: INTERNAL MEDICINE

## 2022-10-04 PROCEDURE — 1123F ACP DISCUSS/DSCN MKR DOCD: CPT | Performed by: INTERNAL MEDICINE

## 2022-10-04 PROCEDURE — 1036F TOBACCO NON-USER: CPT | Performed by: INTERNAL MEDICINE

## 2022-10-04 PROCEDURE — 1090F PRES/ABSN URINE INCON ASSESS: CPT | Performed by: INTERNAL MEDICINE

## 2022-10-04 PROCEDURE — G8400 PT W/DXA NO RESULTS DOC: HCPCS | Performed by: INTERNAL MEDICINE

## 2022-10-04 PROCEDURE — 99214 OFFICE O/P EST MOD 30 MIN: CPT | Performed by: INTERNAL MEDICINE

## 2022-10-04 RX ORDER — TIZANIDINE 4 MG/1
TABLET ORAL
Qty: 90 TABLET | Refills: 1 | Status: SHIPPED | OUTPATIENT
Start: 2022-10-04

## 2022-10-04 RX ORDER — ADALIMUMAB 40MG/0.4ML
40 KIT SUBCUTANEOUS
Qty: 6 EACH | Refills: 1 | Status: SHIPPED | OUTPATIENT
Start: 2022-10-04

## 2022-10-04 ASSESSMENT — ROUTINE ASSESSMENT OF PATIENT INDEX DATA (RAPID3)
ON A SCALE OF ONE TO TEN, CONSIDERING ALL THE WAYS IN WHICH ILLNESS AND HEALTH CONDITIONS MAY AFFECT YOU AT THIS TIME, PLEASE INDICATE BELOW HOW YOU ARE DOING:: 8
ON A SCALE OF ONE TO TEN, HOW DIFFICULT WAS IT FOR YOU TO COMPLETE THE LISTED DAILY PHYSICAL TASKS OVER THE LAST WEEK: 1.7
ON A SCALE OF ONE TO TEN, HOW MUCH OF A PROBLEM HAS UNUSUAL FATIGUE OR TIREDNESS BEEN FOR YOU OVER THE PAST WEEK?: 8
ON A SCALE OF ONE TO TEN, HOW MUCH PAIN HAVE YOU HAD BECAUSE OF YOUR CONDITION OVER THE PAST WEEK?: 8
WHEN YOU AWAKENED IN THE MORNING OVER THE LAST WEEK, PLEASE INDICATE THE AMOUNT OF TIME IT TAKES UNTIL YOU ARE AS LIMBER AS YOU WILL BE FOR THE DAY: > 1 HOUR

## 2022-10-04 ASSESSMENT — JOINT PAIN
TOTAL NUMBER OF TENDER JOINTS: 11
TOTAL NUMBER OF SWOLLEN JOINTS: 0

## 2022-10-04 NOTE — PROGRESS NOTES
Lisette Lam M.D.  [address] [place] [address], [address] [facility]  Office : [phone], Fax: [phone] OFFICE VISIT NOTE  Date of Visit:  10/4/2022 3:18 PM    Patient Information:  Name:  Radha Chou  :  1950  Age:  80 y.o. Gender:  female      Ms. Mary Torres is here today for follow-up of psoriatic arthritis and muscle spasms. Last visit: 22      History of Present Illness: On talking to the patient today she states that she has not had any cough, congestion, fever or chills secondary to allergy related problems. Has had increased pain and stiffness all over. Has had radiofrequency ablation of the L spine which has helped relieve the pain down her lower extremities. She does have recurrence of her psoriatic rash when she had to skip administering the Humira shot as there was a delay in the shipment of the drug. Her current joint complaints are as mentioned below. Since the last visit, patient is feeling \"poor\". Pain: 8/10  Location:  Some pain and swelling with MCP an PIP joints with no warmth and redness. Some neck stiffness with pain with neck ROM. Occasional headaches. Some para spinal muscle pain. Bilateral shoulder pain. No elbow or wrist pain. Some right hip and groin pain. Some left knee pain with swelling with no buckling with no warmth and redness. Some right ankle pain with no swelling or buckling with no warmth and redness. Has pain in the arches of her feet with no swelling, warmth and redness. Quality:  Deep achy to throbbing pain. Modifying Factors:  End of the day the pain and stiffness is the worst.   Associated Symptoms:  Needs help with opening jars and has been dropping things. Intermittent tingling and numbness of the hands. Has some LE weakness. No tingling, numbness or pain down the legs.         DMARD/Biologic 10/4/2022   AM Stiffness > 1 hour   Pain 8   Fatigue 8   MDHAQ 1.7   Patient Global Score 8 Medication Name Humira     Last TB screen: 06/2/2022   TB result: Negative     Curent dose of steroids: None  How long on current dose of steroids: N/A  How long on continuous steroid therapy: N/A     Past DMARDs, if applicable (methotrexate, plaquenil/hydroxychloroquine, sulfasalazine, Arava/leflunomide): Was on Methotrexate 2.5 mg Every Sunday- (8 tablets) stopped due to elevated LFT's in past .      Past biologics, if applicable (enbrel, humira, simponi, cimzia, xeljanz, CEVALLOS, remicade, simponi Amy Reek, rituximab, Kristofer Serum, stelara, cosentyx): Was on [place] Levi in the past but it did not help her underlying disease. Currently on Humira 40 mg 1 shot to be administered every 2 weeks. Past NSAIDs, if applicable (motrin, aleve, naproxen, advil, ibuprofen, celebrex, voltaren/diclofenac, etc.):Aleve PRN. Diclofenac in the past, Tazidime prn      Last BMD: Unsure  Past osteoporosis drugs, if applicable (fosamax, actonel, boniva, reclast, prolia, forteo):none     BMI:38.67  Current exercise regimen, if any: none  Current vitamin D dose: none  Current calcium dose: none  Fractures since last visit, if any: none       The patient otherwise has no significant interval changes in health or medical history to report.      History Reviewed:    Past Medical History  Past Medical History:   Diagnosis Date    Arthritis     Autoimmune disease ([facility] [address]     skin changes, unknown name    Cancer ([facility] [address] 1990    ovarian    Chronic pain     arthritis in back and legs    Coronary artery spasm ([facility] [address]     COVID 05/15/2022    Essential hypertension 11/8/2019    Gastritis     GERD (gastroesophageal reflux disease)     Hx antineoplastic chemo     Migraine headache     Psoriasis     Psychiatric disorder     anxiety and depression    Severe obesity (BMI 35.0-39.9) 6/26/2018    Thyroid disease     Diagnosed in 1996       Past Surgical History  Past Surgical History:   Procedure Laterality Date    CARPAL TUNNEL RELEASE      GYN      ovaries tablet TAKE 1 TABLET BY MOUTH EVERY DAY BEFORE BREAKFAST 90 tablet 1    FLUoxetine (PROZAC) 20 MG capsule TAKE 1 CAPSULE BY MOUTH TWICE A  capsule 1    rosuvastatin (CRESTOR) 10 MG tablet TAKE 1 TABLET BY MOUTH EVERYDAY AT BEDTIME 90 tablet 1    amLODIPine (NORVASC) 5 MG tablet TAKE 1 TABLET BY MOUTH EVERY DAY 90 tablet 1    aspirin 81 MG EC tablet Take by mouth daily      clobetasol (TEMOVATE) 0.05 % ointment APPLY TO AFFECTED AREA TWICE A DAY      diclofenac sodium (VOLTAREN) 1 % GEL Apply topically 4 times daily      docusate (COLACE, DULCOLAX) 100 MG CAPS Take 200 mg by mouth 2 times daily      EPINEPHrine (EPIPEN) 0.3 MG/0.3ML SOAJ injection Inject 0.3 mg into the muscle once as needed      hydrOXYzine (ATARAX) 25 MG tablet TAKE 1 TABLET BY MOUTH EVERY 12 HOURS AS NEEDED      nitroGLYCERIN (NITROSTAT) 0.4 MG SL tablet Place 0.4 mg under the tongue      [DISCONTINUED] Adalimumab (HUMIRA PEN) 40 MG/0.4ML PNKT Inject 40 mg into the skin every 14 days 6 each 1    [DISCONTINUED] tiZANidine (ZANAFLEX) 4 MG tablet Take 1 pill at bedtime as needed. 90 tablet 1     No facility-administered encounter medications on file as of 10/4/2022.            REVIEW OF SYSTEMS: The following systems were reviewed with patient today and were negative except for the following (depicted with an \"X\"):        \"X\" General  \"X\" Head and Neck  \"X\" Heart and Breathing  \"X\" Gastrointestinal    Fever/chills   Hair loss   Shortness of breath   Upset stomach    Falls   Dry mouth   Coughing  x Diarrhea / constipation    Wt loss   Mouth sores   Wheezing   Heartburn    Wt gain  x Ringing ears   Chest pain   Dark or bloody stools    Night sweats   Diff. swallowing  X None of above  x Nausea or vomiting   X None of above   None of above      None of above                \"X\" Skin  \"X\" Neurology  \"X\" Urinary/Gyn  \"X\" Other    Easy bruising  x Numbness/ tingling   Female problems  x Depression   x Rashes  x Weakness  x Problems with urination Feeling anxious    Sun sensitivity   Headaches   None of above   Problems sleeping    None of above   None of above      None of above          Physical Exam:  Blood pressure 135/84, pulse 77, height 5' (1.524 m), weight 198 lb (89.8 kg). General:  Patient alert, cooperative and in no apparent distress. HEENT: Pupils equally reactive to light and accommodation, minimal scleral injection noted. Heart: Regular rate and rhythm, normal S1 and S2, audible murmur with no rubs or gallops. Lungs: Clear to auscultation bilaterally. Abdomen: Soft, nontender, no hepatosplenomegaly. Skin:  Noted psoriatic patches on her upper extremity. No nail abnormalities. Neurologic:  Oriented, normal speech and affect. Normal gait. Extremities:  No edema in bilateral lower extremities with no cyanosis or clubbing. Muskoskeletal Exam:     I examined the shoulders, elbows, wrists, MCPs, PIPs, DIPs and knees bilaterally for strength, range of motion, deformity, tenderness, swelling, and synovitis. The findings are:       Physical Exam              Joint Exam 10/04/2022        Right  Left   Glenohumeral   Tender   Tender   PIP 2   Tender   Tender   PIP 3   Tender   Tender   PIP 4   Tender   Tender   PIP 5   Tender   Tender   Cervical Spine   Tender      Knee      Tender   Tarsometatarsal      Tender   MTP 1      Tender   MTP 2      Tender   MTP 3      Tender   MTP 4      Tender   MTP 5      Tender       Patient otherwise has a normal joint exam without other evidence of joint tenderness, synovitis, warmth, erythema, decreased ROM, weakness or deformities.      Radiology Reports Reviewed (if available):  Last 3 months  [unfilled]    Lab Reports Reviewed (if available): Last 3 months    Orders Only on 07/18/2022   Component Date Value Ref Range Status    TSH w Free Thyroid if Abnormal 07/18/2022 3.69  0.358 - 3.740 UIU/ML Final    Cholesterol, Total 07/18/2022 154  <200 MG/DL Final    Comment: Borderline High: 200-239 mg/dL  High: Greater than or equal to 240 mg/dL      Triglycerides 07/18/2022 150  35 - 150 MG/DL Final    Comment: Borderline High: 150-199 mg/dL, High: 200-499 mg/dL  Very High: Greater than or equal to 500 mg/dL      HDL 07/18/2022 51  40 - 60 MG/DL Final    LDL Calculated 07/18/2022 73  <100 MG/DL Final    Comment: Near Optimal: 100-129 mg/dL  Borderline High: 130-159, High: 160-189 mg/dL  Very High: Greater than or equal to 190 mg/dL      VLDL Cholesterol Calculated 07/18/2022 30 (A)  6.0 - 23.0 MG/DL Final    Chol/HDL Ratio 07/18/2022 3.0    Final         The results above were reviewed and discussed with patient. Assessment/Plan:   Shazia Hernandez is a 80 y.o. female who presents with:     Psoriatic arthritis [facility]): Patient was instructed to continue Humira 40 mg 1 shot to be administered to self every 2 weeks. Patient is aware that if she is sick requiring her to be on an antibiotic or antiviral drug she will need to skip administering the Humira until she has completed the antibiotic/antiviral course and is over the infection.  -     Adalimumab (HUMIRA PEN) 40 MG/0.4ML PNKT; Inject 40 mg into the skin every 14 days  -     C-Reactive Protein; Future    Muscle spasms of neck: Patient was instructed to continue tizanidine 4 mg to be taken at bedtime as needed for muscle spasms. -     tiZANidine (ZANAFLEX) 4 MG tablet; Take 1 pill at bedtime as needed. Long-term use of high-risk medication: If there is any noted abnormality I will keep the patient informed but if not I will review her labs with her on follow-up. -     CBC with Auto Differential; Future  -     Comprehensive Metabolic Panel; Future     Disease activity plan:  As stated above. Steroid management plan:  As stated above, if applicable. Pain management plan:  As stated above, if applicable.     Weight management plan:  Weight loss through diet and exercise is always encouraged    Disease prognosis: Good    I appreciate the opportunity to continue to participate in the care of this patient. Follow-up and Dispositions    Return in about 4 months (around 2/4/2023). Electronically signed by:  Leobardo Valenzuela MD      This note was dictated using dragon voice recognition software.   It has been proofread, but there may still exist voice recognition errors that the author did not detect.                --------------------------------------------------------------------------------------------------------------------------------------------------------------------------------------------------------------------------------

## 2022-10-10 RX ORDER — HYDROXYZINE HYDROCHLORIDE 25 MG/1
TABLET, FILM COATED ORAL
Qty: 90 TABLET | Refills: 1 | OUTPATIENT
Start: 2022-10-10

## 2022-10-10 RX ORDER — FAMOTIDINE 40 MG/1
TABLET, FILM COATED ORAL
Qty: 30 TABLET | Refills: 0 | OUTPATIENT
Start: 2022-10-10

## 2022-10-21 DIAGNOSIS — I20.1 ANGINA PECTORIS WITH DOCUMENTED SPASM (HCC): ICD-10-CM

## 2022-10-24 RX ORDER — AMLODIPINE BESYLATE 5 MG/1
TABLET ORAL
Qty: 90 TABLET | Refills: 1 | OUTPATIENT
Start: 2022-10-24

## 2022-10-24 RX ORDER — HYDROXYZINE HYDROCHLORIDE 25 MG/1
TABLET, FILM COATED ORAL
Qty: 90 TABLET | Refills: 1 | OUTPATIENT
Start: 2022-10-24

## 2022-10-26 RX ORDER — HYDROXYZINE HYDROCHLORIDE 25 MG/1
25 TABLET, FILM COATED ORAL EVERY 12 HOURS
Qty: 60 TABLET | Refills: 5 | Status: SHIPPED | OUTPATIENT
Start: 2022-10-26

## 2022-11-09 DIAGNOSIS — E03.9 HYPOTHYROIDISM, UNSPECIFIED: ICD-10-CM

## 2022-11-09 DIAGNOSIS — Z88.9 ALLERGY STATUS TO UNSPECIFIED DRUGS, MEDICAMENTS AND BIOLOGICAL SUBSTANCES: ICD-10-CM

## 2022-11-09 DIAGNOSIS — I20.1 ANGINA PECTORIS WITH DOCUMENTED SPASM (HCC): ICD-10-CM

## 2022-11-09 DIAGNOSIS — E78.2 MIXED HYPERLIPIDEMIA: ICD-10-CM

## 2022-11-09 DIAGNOSIS — K21.9 GASTRO-ESOPHAGEAL REFLUX DISEASE WITHOUT ESOPHAGITIS: ICD-10-CM

## 2022-11-09 DIAGNOSIS — F41.9 ANXIETY DISORDER, UNSPECIFIED: ICD-10-CM

## 2022-11-09 RX ORDER — LEVOTHYROXINE SODIUM 0.12 MG/1
TABLET ORAL
Qty: 90 TABLET | Refills: 1 | OUTPATIENT
Start: 2022-11-09

## 2022-11-09 RX ORDER — FLUOXETINE HYDROCHLORIDE 20 MG/1
CAPSULE ORAL
Qty: 180 CAPSULE | Refills: 1 | OUTPATIENT
Start: 2022-11-09

## 2022-11-09 RX ORDER — ROSUVASTATIN CALCIUM 10 MG/1
TABLET, COATED ORAL
Qty: 90 TABLET | Refills: 1 | OUTPATIENT
Start: 2022-11-09

## 2022-11-09 RX ORDER — FAMOTIDINE 40 MG/1
TABLET, FILM COATED ORAL
Qty: 30 TABLET | Refills: 0 | OUTPATIENT
Start: 2022-11-09

## 2022-11-09 RX ORDER — AMLODIPINE BESYLATE 5 MG/1
TABLET ORAL
Qty: 90 TABLET | Refills: 1 | OUTPATIENT
Start: 2022-11-09

## 2022-11-09 RX ORDER — PANTOPRAZOLE SODIUM 40 MG/1
TABLET, DELAYED RELEASE ORAL
Qty: 180 TABLET | Refills: 1 | OUTPATIENT
Start: 2022-11-09

## 2022-11-09 RX ORDER — MONTELUKAST SODIUM 10 MG/1
TABLET ORAL
Qty: 90 TABLET | Refills: 1 | OUTPATIENT
Start: 2022-11-09

## 2022-11-16 RX ORDER — FAMOTIDINE 40 MG/1
TABLET, FILM COATED ORAL
Qty: 30 TABLET | Refills: 0 | OUTPATIENT
Start: 2022-11-16

## 2022-11-28 RX ORDER — FAMOTIDINE 40 MG/1
TABLET, FILM COATED ORAL
Qty: 30 TABLET | Refills: 5 | OUTPATIENT
Start: 2022-11-28

## 2022-12-31 DIAGNOSIS — E78.2 MIXED HYPERLIPIDEMIA: ICD-10-CM

## 2023-01-03 RX ORDER — ROSUVASTATIN CALCIUM 10 MG/1
TABLET, COATED ORAL
Qty: 90 TABLET | Refills: 1 | OUTPATIENT
Start: 2023-01-03

## 2023-01-08 DIAGNOSIS — E03.9 HYPOTHYROIDISM, UNSPECIFIED: ICD-10-CM

## 2023-01-08 DIAGNOSIS — I20.1 ANGINA PECTORIS WITH DOCUMENTED SPASM (HCC): ICD-10-CM

## 2023-01-08 DIAGNOSIS — E78.2 MIXED HYPERLIPIDEMIA: ICD-10-CM

## 2023-01-08 DIAGNOSIS — F41.9 ANXIETY DISORDER, UNSPECIFIED: ICD-10-CM

## 2023-01-08 DIAGNOSIS — K21.9 GASTRO-ESOPHAGEAL REFLUX DISEASE WITHOUT ESOPHAGITIS: ICD-10-CM

## 2023-01-08 DIAGNOSIS — Z88.9 ALLERGY STATUS TO UNSPECIFIED DRUGS, MEDICAMENTS AND BIOLOGICAL SUBSTANCES: ICD-10-CM

## 2023-01-09 RX ORDER — PANTOPRAZOLE SODIUM 40 MG/1
TABLET, DELAYED RELEASE ORAL
Qty: 180 TABLET | Refills: 1 | OUTPATIENT
Start: 2023-01-09

## 2023-01-09 RX ORDER — FAMOTIDINE 40 MG/1
TABLET, FILM COATED ORAL
Qty: 30 TABLET | Refills: 0 | OUTPATIENT
Start: 2023-01-09

## 2023-01-09 RX ORDER — LEVOTHYROXINE SODIUM 0.12 MG/1
TABLET ORAL
Qty: 90 TABLET | Refills: 1 | OUTPATIENT
Start: 2023-01-09

## 2023-01-09 RX ORDER — FLUOXETINE HYDROCHLORIDE 20 MG/1
CAPSULE ORAL
Qty: 180 CAPSULE | Refills: 1 | OUTPATIENT
Start: 2023-01-09

## 2023-01-09 RX ORDER — ROSUVASTATIN CALCIUM 10 MG/1
TABLET, COATED ORAL
Qty: 90 TABLET | Refills: 1 | OUTPATIENT
Start: 2023-01-09

## 2023-01-09 RX ORDER — AMLODIPINE BESYLATE 5 MG/1
TABLET ORAL
Qty: 90 TABLET | Refills: 1 | OUTPATIENT
Start: 2023-01-09

## 2023-01-09 RX ORDER — MONTELUKAST SODIUM 10 MG/1
TABLET ORAL
Qty: 90 TABLET | Refills: 1 | OUTPATIENT
Start: 2023-01-09

## 2023-02-07 ENCOUNTER — HOSPITAL ENCOUNTER (OUTPATIENT)
Dept: GENERAL RADIOLOGY | Age: 73
Discharge: HOME | End: 2023-02-10
Payer: MEDICARE

## 2023-02-07 DIAGNOSIS — M25.511 RIGHT SHOULDER PAIN, UNSPECIFIED CHRONICITY: ICD-10-CM

## 2023-02-07 PROCEDURE — 73030 X-RAY EXAM OF SHOULDER: CPT

## 2023-02-20 ENCOUNTER — OFFICE VISIT (OUTPATIENT)
Dept: RHEUMATOLOGY | Age: 73
End: 2023-02-20
Payer: MEDICARE

## 2023-02-20 VITALS
HEIGHT: 60 IN | WEIGHT: 186.4 LBS | HEART RATE: 80 BPM | BODY MASS INDEX: 36.6 KG/M2 | DIASTOLIC BLOOD PRESSURE: 67 MMHG | SYSTOLIC BLOOD PRESSURE: 110 MMHG

## 2023-02-20 DIAGNOSIS — L40.50 PSORIATIC ARTHRITIS (HCC): ICD-10-CM

## 2023-02-20 DIAGNOSIS — M62.838 MUSCLE SPASMS OF NECK: ICD-10-CM

## 2023-02-20 DIAGNOSIS — Z79.899 LONG-TERM USE OF HIGH-RISK MEDICATION: ICD-10-CM

## 2023-02-20 DIAGNOSIS — L40.50 PSORIATIC ARTHRITIS (HCC): Primary | ICD-10-CM

## 2023-02-20 LAB
BASOPHILS # BLD: 0.1 K/UL (ref 0–0.2)
BASOPHILS NFR BLD: 1 % (ref 0–2)
DIFFERENTIAL METHOD BLD: ABNORMAL
EOSINOPHIL # BLD: 0.1 K/UL (ref 0–0.8)
EOSINOPHIL NFR BLD: 2 % (ref 0.5–7.8)
ERYTHROCYTE [DISTWIDTH] IN BLOOD BY AUTOMATED COUNT: 17 % (ref 11.9–14.6)
HCT VFR BLD AUTO: 41.4 % (ref 35.8–46.3)
HGB BLD-MCNC: 13.7 G/DL (ref 11.7–15.4)
IMM GRANULOCYTES # BLD AUTO: 0.1 K/UL (ref 0–0.5)
IMM GRANULOCYTES NFR BLD AUTO: 1 % (ref 0–5)
LYMPHOCYTES # BLD: 1.2 K/UL (ref 0.5–4.6)
LYMPHOCYTES NFR BLD: 27 % (ref 13–44)
MCH RBC QN AUTO: 29.1 PG (ref 26.1–32.9)
MCHC RBC AUTO-ENTMCNC: 33.1 G/DL (ref 31.4–35)
MCV RBC AUTO: 88.1 FL (ref 82–102)
MONOCYTES # BLD: 0.9 K/UL (ref 0.1–1.3)
MONOCYTES NFR BLD: 20 % (ref 4–12)
NEUTS SEG # BLD: 2.1 K/UL (ref 1.7–8.2)
NEUTS SEG NFR BLD: 49 % (ref 43–78)
NRBC # BLD: 0 K/UL (ref 0–0.2)
PLATELET # BLD AUTO: 134 K/UL (ref 150–450)
PMV BLD AUTO: 11.3 FL (ref 9.4–12.3)
RBC # BLD AUTO: 4.7 M/UL (ref 4.05–5.2)
WBC # BLD AUTO: 4.3 K/UL (ref 4.3–11.1)

## 2023-02-20 PROCEDURE — 3017F COLORECTAL CA SCREEN DOC REV: CPT | Performed by: INTERNAL MEDICINE

## 2023-02-20 PROCEDURE — 1036F TOBACCO NON-USER: CPT | Performed by: INTERNAL MEDICINE

## 2023-02-20 PROCEDURE — G8484 FLU IMMUNIZE NO ADMIN: HCPCS | Performed by: INTERNAL MEDICINE

## 2023-02-20 PROCEDURE — 3074F SYST BP LT 130 MM HG: CPT | Performed by: INTERNAL MEDICINE

## 2023-02-20 PROCEDURE — G8427 DOCREV CUR MEDS BY ELIG CLIN: HCPCS | Performed by: INTERNAL MEDICINE

## 2023-02-20 PROCEDURE — 1090F PRES/ABSN URINE INCON ASSESS: CPT | Performed by: INTERNAL MEDICINE

## 2023-02-20 PROCEDURE — 3078F DIAST BP <80 MM HG: CPT | Performed by: INTERNAL MEDICINE

## 2023-02-20 PROCEDURE — G8417 CALC BMI ABV UP PARAM F/U: HCPCS | Performed by: INTERNAL MEDICINE

## 2023-02-20 PROCEDURE — G8400 PT W/DXA NO RESULTS DOC: HCPCS | Performed by: INTERNAL MEDICINE

## 2023-02-20 PROCEDURE — 1123F ACP DISCUSS/DSCN MKR DOCD: CPT | Performed by: INTERNAL MEDICINE

## 2023-02-20 PROCEDURE — 99214 OFFICE O/P EST MOD 30 MIN: CPT | Performed by: INTERNAL MEDICINE

## 2023-02-20 RX ORDER — TIZANIDINE 4 MG/1
TABLET ORAL
Qty: 90 TABLET | Refills: 1 | Status: SHIPPED | OUTPATIENT
Start: 2023-02-20

## 2023-02-20 RX ORDER — MORPHINE SULFATE 15 MG/1
TABLET, FILM COATED, EXTENDED RELEASE ORAL
COMMUNITY
Start: 2023-02-07

## 2023-02-20 RX ORDER — LEUCOVORIN CALCIUM 5 MG/1
TABLET ORAL
Qty: 90 TABLET | Refills: 1 | Status: SHIPPED | OUTPATIENT
Start: 2023-02-20

## 2023-02-20 RX ORDER — ADALIMUMAB 40MG/0.4ML
40 KIT SUBCUTANEOUS
Qty: 6 EACH | Refills: 1 | Status: SHIPPED | OUTPATIENT
Start: 2023-02-20

## 2023-02-20 ASSESSMENT — JOINT PAIN
TOTAL NUMBER OF SWOLLEN JOINTS: 0
TOTAL NUMBER OF TENDER JOINTS: 5

## 2023-02-20 ASSESSMENT — ROUTINE ASSESSMENT OF PATIENT INDEX DATA (RAPID3)
WHEN YOU AWAKENED IN THE MORNING OVER THE LAST WEEK, PLEASE INDICATE THE AMOUNT OF TIME IT TAKES UNTIL YOU ARE AS LIMBER AS YOU WILL BE FOR THE DAY: > 1 HOUR
ON A SCALE OF ONE TO TEN, HOW DIFFICULT WAS IT FOR YOU TO COMPLETE THE LISTED DAILY PHYSICAL TASKS OVER THE LAST WEEK: 1.8
ON A SCALE OF ONE TO TEN, HOW MUCH PAIN HAVE YOU HAD BECAUSE OF YOUR CONDITION OVER THE PAST WEEK?: 8
ON A SCALE OF ONE TO TEN, CONSIDERING ALL THE WAYS IN WHICH ILLNESS AND HEALTH CONDITIONS MAY AFFECT YOU AT THIS TIME, PLEASE INDICATE BELOW HOW YOU ARE DOING:: 7
ON A SCALE OF ONE TO TEN, HOW MUCH OF A PROBLEM HAS UNUSUAL FATIGUE OR TIREDNESS BEEN FOR YOU OVER THE PAST WEEK?: 7

## 2023-02-20 NOTE — PROGRESS NOTES
Malathi Mendoza M.D.  [address]  [address]  Office : [phone], Fax: [phone] OFFICE VISIT NOTE  Date of Visit:  2023 3:01 PM    Patient Information:  Name:  Dawna Ruiz  :  1950  Age:  71 y.o. Gender:  female      Ms. Jose Alvarado is here today for follow-up of psoriatic arthritis and muscle spasms. Last visit: 10/4/2022      History of Present Illness: On talking to the patient today she states that she has had a dry spot in the inner corner of her left eye for 2 months now which has opened up and bled a little. She was instructed to follow-up with her PCP and then be referred to a dermatologist for evaluation to make sure it does not squamous cell carcinoma or basal cell carcinoma. She has had oral ulcers which has lasted for 1 week since she last saw me, and would like to get back on leucovorin calcium 5 mg once a day. On talking to her further she states that she has been having increased right shoulder pain for which she did get an xray done that was ordered by her pain management doctor and was started on MS contin 15 mg at bedtime to help ease the pain. Since the pain is not any better she was instructed to be in touch with her pain management doctor to see if injecting the right shoulder with cortisone would be an option versus getting an MRI of the right shoulder done. Since the last visit, patient is feeling \"worse\". Pain: 8/10  Location:  Some right shoulder pain worse than the left shoulder pain. Some right para spinal muscle pain. Some neck stiffness with pain with neck ROM. Occasional frontal headaches. Some lower back pain. Some left knee pain with occasional swelling with occasional buckling with no warmth and redness. Some right ankle pain and swelling with no warmth and redness. Occasional buckling of the right ankle. Quality:  Deep achy to throbbing pain. Modifying Factors:  End of the day the pain and stiffness is the worst.   Associated Symptoms:  Intermittent pain from the right shoulder to the wrist with no tingling and numbness down the right arm. Intermittent pain from the left hip to the left knee with no tingling and numbness down the left leg. Some left LE weakness. DMARD/Biologic 2/20/2023   AM Stiffness > 1 hour   Pain 8   Fatigue 7   MDHAQ 1.8   Patient Global Score 7   Medication Name Humira     Last TB screen: 06/2/2022   TB result: Negative     Curent dose of steroids: None  How long on current dose of steroids: N/A  How long on continuous steroid therapy: N/A     Past DMARDs, if applicable (methotrexate, plaquenil/hydroxychloroquine, sulfasalazine, Arava/leflunomide): Was on Methotrexate 2.5 mg Every Sunday- (8 tablets) stopped due to elevated LFT's in the past .      Past biologics, if applicable (enbrel, humira, simponi, cimzia, xeljanz, CEVALLOS, remicade, simponi Frederich Chasity, rituximab, Harlene Limerick, stelara, cosentyx): Was on Collie Amarjit in the past but it did not help her underlying disease. Currently on Humira 40 mg 1 shot to be administered every 2 weeks. Past NSAIDs, if applicable (motrin, aleve, naproxen, advil, ibuprofen, celebrex, voltaren/diclofenac, etc.):Aleve PRN. Diclofenac in the past, Tazidime prn      Last BMD: Unsure  Past osteoporosis drugs, if applicable (fosamax, actonel, boniva, reclast, prolia, forteo):none     BMI:36.40  Current exercise regimen, if any: none  Current vitamin D dose: none  Current calcium dose: none  Fractures since last visit, if any: none     The patient otherwise has no significant interval changes in health or medical history to report.      History Reviewed:    Past Medical History  Past Medical History:   Diagnosis Date    Arthritis     Autoimmune disease ([facility] [address]     skin changes, unknown name    Cancer ([facility] [address] 1990    ovarian    Chronic pain     arthritis in back and legs    Coronary artery spasm ([facility] [address] COVID 05/15/2022    Essential hypertension 2019    Gastritis     GERD (gastroesophageal reflux disease)     Hx antineoplastic chemo     Migraine headache     Psoriasis     Psychiatric disorder     anxiety and depression    Severe obesity (BMI 35.0-39.9) 2018    Thyroid disease     Diagnosed in        Past Surgical History  Past Surgical History:   Procedure Laterality Date    CARPAL TUNNEL RELEASE      GYN      ovaries    HYSTERECTOMY, TOTAL ABDOMINAL (CERVIX REMOVED)   [facility]      cardiac cath. Dx with spasms.     TUBAL LIGATION         Family History  Family History   Problem Relation Age of Onset    Parkinson's Disease Mother     Stroke Mother         Passed away in     No Known Problems Paternal Grandfather     No Known Problems Paternal Grandmother     No Known Problems Maternal Grandfather     No Known Problems Maternal Grandmother     Prostate Cancer Father          age 80     Cancer Father         Kidney       Social History  Social History     Socioeconomic History    Marital status:      Spouse name: None    Number of children: None    Years of education: None    Highest education level: None   Tobacco Use    Smoking status: Never    Smokeless tobacco: Never   Vaping Use    Vaping Use: Never used   Substance and Sexual Activity    Alcohol use: No    Drug use: Yes     Types: Prescription    Sexual activity: Not Currently     Birth control/protection: None               Allergy:  Allergies   Allergen Reactions    Penicillins Hives    Sulfa Antibiotics Hives    Codeine Nausea And Vomiting         Current Medications:  Outpatient Encounter Medications as of 2023   Medication Sig Dispense Refill    morphine (MS CONTIN) 15 MG extended release tablet TAKE 1 TABLET BY MOUTH ONCE A DAY AT BEDTIME X 30 DAYS      Adalimumab (HUMIRA PEN) 40 MG/0.4ML PNKT Inject 40 mg into the skin every 14 days 6 each 1    leucovorin calcium (WELLCOVORIN) 5 MG tablet TAKE 1 TABLET BY MOUTH EVERY DAY 90 tablet 1    tiZANidine (ZANAFLEX) 4 MG tablet Take 1 pill at bedtime as needed. 90 tablet 1    hydrOXYzine HCl (ATARAX) 25 MG tablet Take 1 tablet by mouth in the morning and 1 tablet in the evening. 60 tablet 5    famotidine (PEPCID) 40 MG tablet Take 1 tablet by mouth every evening 30 tablet 0    HYDROcodone-acetaminophen (NORCO) 7.5-325 MG per tablet TAKE 1 TABLET BY MOUTH EVERY 6 HOURS AS NEEDED FOR 30 DAYS      montelukast (SINGULAIR) 10 MG tablet TAKE 1 TABLET BY MOUTH EVERY DAY 90 tablet 1    pantoprazole (PROTONIX) 40 MG tablet TAKE 1 TABLET BY MOUTH TWO TIMES A DAY. 180 tablet 1    levothyroxine (SYNTHROID) 125 MCG tablet TAKE 1 TABLET BY MOUTH EVERY DAY BEFORE BREAKFAST 90 tablet 1    FLUoxetine (PROZAC) 20 MG capsule TAKE 1 CAPSULE BY MOUTH TWICE A  capsule 1    rosuvastatin (CRESTOR) 10 MG tablet TAKE 1 TABLET BY MOUTH EVERYDAY AT BEDTIME 90 tablet 1    amLODIPine (NORVASC) 5 MG tablet TAKE 1 TABLET BY MOUTH EVERY DAY 90 tablet 1    aspirin 81 MG EC tablet Take by mouth daily      clobetasol (TEMOVATE) 0.05 % ointment APPLY TO AFFECTED AREA TWICE A DAY      diclofenac sodium (VOLTAREN) 1 % GEL Apply topically 4 times daily      docusate (COLACE, DULCOLAX) 100 MG CAPS Take 200 mg by mouth 2 times daily      EPINEPHrine (EPIPEN) 0.3 MG/0.3ML SOAJ injection Inject 0.3 mg into the muscle once as needed      nitroGLYCERIN (NITROSTAT) 0.4 MG SL tablet Place 0.4 mg under the tongue      [DISCONTINUED] Adalimumab (HUMIRA PEN) 40 MG/0.4ML PNKT Inject 40 mg into the skin every 14 days 6 each 1    [DISCONTINUED] tiZANidine (ZANAFLEX) 4 MG tablet Take 1 pill at bedtime as needed. 90 tablet 1    [DISCONTINUED] leucovorin calcium (WELLCOVORIN) 5 MG tablet TAKE 1 TABLET BY MOUTH EVERY DAY (Patient not taking: Reported on 2/20/2023) 90 tablet 1     No facility-administered encounter medications on file as of 2/20/2023.            REVIEW OF SYSTEMS: The following systems were reviewed with patient today and were negative except for the following (depicted with an \"X\"):        \"X\" General  \"X\" Head and Neck  \"X\" Heart and Breathing  \"X\" Gastrointestinal    Fever/chills   Hair loss   Shortness of breath   Upset stomach    Falls   Dry mouth   Coughing  x Diarrhea / constipation    Wt loss  x Mouth sores   Wheezing   Heartburn    Wt gain   Ringing ears   Chest pain   Dark or bloody stools    Night sweats   Diff. swallowing  X None of above  x Nausea or vomiting   X None of above   None of above      None of above                \"X\" Skin  \"X\" Neurology  \"X\" Urinary/Gyn  \"X\" Other   x Easy bruising   Numbness/ tingling   Female problems   Depression    Rashes   Weakness   Problems with urination   Feeling anxious    Sun sensitivity  x Headaches  X None of above  x Problems sleeping    None of above   None of above      None of above          Physical Exam:  Blood pressure 110/67, pulse 80, height 5' (1.524 m), weight 186 lb 6.4 oz (84.6 kg). General:  Patient alert, cooperative and in no apparent distress. HEENT: Pupils equally reactive to light and accommodation, no scleral injection noted. Heart: Regular rate and rhythm, normal S1 and S2, no rubs or gallops. Lungs: Clear to auscultation bilaterally. Abdomen: Soft, nontender, no hepatosplenomegaly. Skin:  No rashes. No nail abnormalities. Neurologic:  Oriented, normal speech and affect. Normal gait. Extremities:  No edema in bilateral lower extremities with no cyanosis or clubbing. Muskoskeletal Exam:     I examined the shoulders, elbows, wrists, MCPs, PIPs, DIPs and knees bilaterally for strength, range of motion, deformity, tenderness, swelling, and synovitis.       The findings are:         Physical Exam              Joint Exam 02/20/2023        Right  Left   Glenohumeral   Tender      PIP 2   Tender      PIP 3   Tender      PIP 4   Tender      PIP 5   Tender      Cervical Spine   Tender      Lumbar Spine   Tender Ankle  Swollen   Swollen      Patient otherwise has a normal joint exam without other evidence of joint tenderness, synovitis, warmth, erythema, decreased ROM, weakness or deformities. Radiology Reports Reviewed (if available):  Last 3 months  [unfilled]    Lab Reports Reviewed (if available): Last 3 months    No visits with results within 3 Month(s) from this visit. Latest known visit with results is:   Orders Only on 10/04/2022   Component Date Value Ref Range Status    CRP 10/04/2022 <0.3  0.0 - 0.9 mg/dL Final    Sodium 10/04/2022 139  136 - 145 mmol/L Final    Potassium 10/04/2022 4.0  3.5 - 5.1 mmol/L Final    Chloride 10/04/2022 106  101 - 110 mmol/L Final    CO2 10/04/2022 27  21 - 32 mmol/L Final    Anion Gap 10/04/2022 6  4 - 13 mmol/L Final    Glucose 10/04/2022 104 (A)  65 - 100 mg/dL Final    BUN 10/04/2022 15  8 - 23 MG/DL Final    Creatinine 10/04/2022 0.80  0.6 - 1.0 MG/DL Final    Est, Glom Filt Rate 10/04/2022 >60  >60 ml/min/1.73m2 Final    Comment:   Pediatric calculator link: [employer].[email or web address] org/professionals/kdoqi/gfr_calculatorped    Effective Oct 3, 2022    These results are not intended for use in patients <25years of age. eGFR results are calculated without a race factor using  the 2021 CKD-EPI equation. Careful clinical correlation is recommended, particularly when comparing to results calculated using previous equations. The CKD-EPI equation is less accurate in patients with extremes of muscle mass, extra-renal metabolism of creatinine, excessive creatine ingestion, or following therapy that affects renal tubular secretion.       Calcium 10/04/2022 10.1  8.3 - 10.4 MG/DL Final    Total Bilirubin 10/04/2022 1.0  0.2 - 1.1 MG/DL Final    ALT 10/04/2022 46  12 - 65 U/L Final    AST 10/04/2022 27  15 - 37 U/L Final    Alk Phosphatase 10/04/2022 71  50 - 136 U/L Final    Total Protein 10/04/2022 7.8  6.3 - 8.2 g/dL Final    Albumin 10/04/2022 4.3  3.2 - 4.6 g/dL Final Globulin 10/04/2022 3.5  2.3 - 3.5 g/dL Final    Albumin/Globulin Ratio 10/04/2022 1.2  1.2 - 3.5   Final    WBC 10/04/2022 5.4  4.3 - 11.1 K/uL Final    RBC 10/04/2022 4.71  4.05 - 5.2 M/uL Final    Hemoglobin 10/04/2022 13.5  11.7 - 15.4 g/dL Final    Hematocrit 10/04/2022 42.3  35.8 - 46.3 % Final    MCV 10/04/2022 89.8  79.6 - 97.8 FL Final    MCH 10/04/2022 28.7  26.1 - 32.9 PG Final    MCHC 10/04/2022 31.9  31.4 - 35.0 g/dL Final    RDW 10/04/2022 16.7 (A)  11.9 - 14.6 % Final    Platelets 61/49/1464 206  150 - 450 K/uL Final    MPV 10/04/2022 11.4  9.4 - 12.3 FL Final    nRBC 10/04/2022 0.00  0.0 - 0.2 K/uL Final    **Note: Absolute NRBC parameter is now reported with Hemogram**    Differential Type 10/04/2022 AUTOMATED    Final    Seg Neutrophils 10/04/2022 50  43 - 78 % Final    Lymphocytes 10/04/2022 30  13 - 44 % Final    Monocytes 10/04/2022 15 (A)  4.0 - 12.0 % Final    Eosinophils % 10/04/2022 3  0.5 - 7.8 % Final    Basophils 10/04/2022 1  0.0 - 2.0 % Final    Immature Granulocytes 10/04/2022 1  0.0 - 5.0 % Final    Segs Absolute 10/04/2022 2.7  1.7 - 8.2 K/UL Final    Absolute Lymph # 10/04/2022 1.6  0.5 - 4.6 K/UL Final    Absolute Mono # 10/04/2022 0.8  0.1 - 1.3 K/UL Final    Absolute Eos # 10/04/2022 0.2  0.0 - 0.8 K/UL Final    Basophils Absolute 10/04/2022 0.0  0.0 - 0.2 K/UL Final    Absolute Immature Granulocyte 10/04/2022 0.0  0.0 - 0.5 K/UL Final         The results above were reviewed and discussed with patient. Assessment/Plan:   Brittany Marrero is a 71 y.o. female who presents with:     Psoriatic arthritis [facility]): Patient was instructed to follow-up with pain management with regard to her right shoulder pain since the work-up was done by them recently. In light of absence of active synovitis I did not feel the need to put the patient on a different biologic at this time.   I did instruct her to continue Humira 40 mg 1 shot to be administered to self every 2 weeks but to treat the oral ulcers she was started back on leucovorin calcium 5 mg once a day. Patient is aware that if she is sick requiring her to be on antibiotic or antiviral drug she will need to skip administering the Humira until she has completed the antibiotic/antibiotic course and is over the infection.  -     Adalimumab (HUMIRA PEN) 40 MG/0.4ML PNKT; Inject 40 mg into the skin every 14 days  -     leucovorin calcium (WELLCOVORIN) 5 MG tablet; TAKE 1 TABLET BY MOUTH EVERY DAY  -     C-Reactive Protein; Future    Muscle spasms of neck: Patient was instructed to continue tizanidine 4 mg 1 pill to be taken at bedtime as needed for muscle spasms. -     tiZANidine (ZANAFLEX) 4 MG tablet; Take 1 pill at bedtime as needed. Long-term use of high-risk medication: If there is any noted abnormality I will keep the patient informed but if not I will review her labs with her on follow-up. -     CBC with Auto Differential; Future  -     Comprehensive Metabolic Panel; Future     Disease activity plan:  As stated above. Steroid management plan:  As stated above, if applicable. Pain management plan:  As stated above, if applicable. Weight management plan:  Weight loss through diet and exercise is always encouraged    Disease prognosis: Good    I appreciate the opportunity to continue to participate in the care of this patient. Follow-up and Dispositions    Return in about 4 months (around 6/20/2023). Electronically signed by:  Dior Waite MD      This note was dictated using dragon voice recognition software.   It has been proofread, but there may still exist voice recognition errors that the author did not detect.                --------------------------------------------------------------------------------------------------------------------------------------------------------------------------------------------------------------------------------

## 2023-02-21 LAB
ALBUMIN SERPL-MCNC: 4.1 G/DL (ref 3.2–4.6)
ALBUMIN/GLOB SERPL: 1.1 (ref 0.4–1.6)
ALP SERPL-CCNC: 75 U/L (ref 50–136)
ALT SERPL-CCNC: 29 U/L (ref 12–65)
ANION GAP SERPL CALC-SCNC: 6 MMOL/L (ref 2–11)
AST SERPL-CCNC: 20 U/L (ref 15–37)
BILIRUB SERPL-MCNC: 1.2 MG/DL (ref 0.2–1.1)
BUN SERPL-MCNC: 17 MG/DL (ref 8–23)
CALCIUM SERPL-MCNC: 9.6 MG/DL (ref 8.3–10.4)
CHLORIDE SERPL-SCNC: 108 MMOL/L (ref 101–110)
CO2 SERPL-SCNC: 23 MMOL/L (ref 21–32)
CREAT SERPL-MCNC: 0.9 MG/DL (ref 0.6–1)
CRP SERPL-MCNC: <0.3 MG/DL (ref 0–0.9)
GLOBULIN SER CALC-MCNC: 3.8 G/DL (ref 2.8–4.5)
GLUCOSE SERPL-MCNC: 104 MG/DL (ref 65–100)
POTASSIUM SERPL-SCNC: 4.1 MMOL/L (ref 3.5–5.1)
PROT SERPL-MCNC: 7.9 G/DL (ref 6.3–8.2)
SODIUM SERPL-SCNC: 137 MMOL/L (ref 133–143)

## 2023-03-21 DIAGNOSIS — I20.1 ANGINA PECTORIS WITH DOCUMENTED SPASM (HCC): ICD-10-CM

## 2023-03-21 DIAGNOSIS — E78.2 MIXED HYPERLIPIDEMIA: ICD-10-CM

## 2023-03-21 DIAGNOSIS — F41.9 ANXIETY DISORDER, UNSPECIFIED: ICD-10-CM

## 2023-03-21 DIAGNOSIS — E03.9 HYPOTHYROIDISM, UNSPECIFIED: ICD-10-CM

## 2023-03-21 DIAGNOSIS — K21.9 GASTRO-ESOPHAGEAL REFLUX DISEASE WITHOUT ESOPHAGITIS: ICD-10-CM

## 2023-03-21 DIAGNOSIS — Z88.9 ALLERGY STATUS TO UNSPECIFIED DRUGS, MEDICAMENTS AND BIOLOGICAL SUBSTANCES: ICD-10-CM

## 2023-03-22 RX ORDER — FLUOXETINE HYDROCHLORIDE 20 MG/1
CAPSULE ORAL
Qty: 180 CAPSULE | Refills: 1 | OUTPATIENT
Start: 2023-03-22

## 2023-03-22 RX ORDER — MONTELUKAST SODIUM 10 MG/1
TABLET ORAL
Qty: 90 TABLET | Refills: 1 | OUTPATIENT
Start: 2023-03-22

## 2023-03-22 RX ORDER — FAMOTIDINE 40 MG/1
TABLET, FILM COATED ORAL
Qty: 30 TABLET | Refills: 0 | OUTPATIENT
Start: 2023-03-22

## 2023-03-22 RX ORDER — AMLODIPINE BESYLATE 5 MG/1
TABLET ORAL
Qty: 90 TABLET | Refills: 1 | OUTPATIENT
Start: 2023-03-22

## 2023-03-22 RX ORDER — LEVOTHYROXINE SODIUM 0.12 MG/1
TABLET ORAL
Qty: 90 TABLET | Refills: 1 | OUTPATIENT
Start: 2023-03-22

## 2023-03-22 RX ORDER — PANTOPRAZOLE SODIUM 40 MG/1
TABLET, DELAYED RELEASE ORAL
Qty: 180 TABLET | Refills: 1 | OUTPATIENT
Start: 2023-03-22

## 2023-03-22 RX ORDER — ROSUVASTATIN CALCIUM 10 MG/1
TABLET, COATED ORAL
Qty: 90 TABLET | Refills: 1 | OUTPATIENT
Start: 2023-03-22

## 2023-03-23 DIAGNOSIS — Z88.9 ALLERGY STATUS TO UNSPECIFIED DRUGS, MEDICAMENTS AND BIOLOGICAL SUBSTANCES: ICD-10-CM

## 2023-03-23 RX ORDER — MONTELUKAST SODIUM 10 MG/1
10 TABLET ORAL DAILY
Qty: 90 TABLET | Refills: 1 | Status: SHIPPED | OUTPATIENT
Start: 2023-03-23

## 2023-04-05 DIAGNOSIS — I20.1 ANGINA PECTORIS WITH DOCUMENTED SPASM (HCC): ICD-10-CM

## 2023-04-05 DIAGNOSIS — K21.9 GASTRO-ESOPHAGEAL REFLUX DISEASE WITHOUT ESOPHAGITIS: ICD-10-CM

## 2023-04-05 DIAGNOSIS — F41.9 ANXIETY DISORDER, UNSPECIFIED: ICD-10-CM

## 2023-04-05 DIAGNOSIS — E03.9 HYPOTHYROIDISM, UNSPECIFIED: ICD-10-CM

## 2023-04-05 DIAGNOSIS — E78.2 MIXED HYPERLIPIDEMIA: ICD-10-CM

## 2023-04-06 RX ORDER — ROSUVASTATIN CALCIUM 10 MG/1
10 TABLET, COATED ORAL DAILY
Qty: 90 TABLET | Refills: 1 | Status: SHIPPED | OUTPATIENT
Start: 2023-04-06

## 2023-04-06 RX ORDER — HYDROXYZINE HYDROCHLORIDE 25 MG/1
25 TABLET, FILM COATED ORAL EVERY 12 HOURS
Qty: 180 TABLET | Refills: 1 | Status: SHIPPED | OUTPATIENT
Start: 2023-04-06

## 2023-04-06 RX ORDER — LEVOTHYROXINE SODIUM 0.12 MG/1
125 TABLET ORAL
Qty: 90 TABLET | Refills: 1 | Status: SHIPPED | OUTPATIENT
Start: 2023-04-06

## 2023-04-06 RX ORDER — FLUOXETINE HYDROCHLORIDE 20 MG/1
20 CAPSULE ORAL 2 TIMES DAILY
Qty: 180 CAPSULE | Refills: 1 | Status: SHIPPED | OUTPATIENT
Start: 2023-04-06

## 2023-04-06 RX ORDER — PANTOPRAZOLE SODIUM 40 MG/1
40 TABLET, DELAYED RELEASE ORAL 2 TIMES DAILY
Qty: 180 TABLET | Refills: 1 | Status: SHIPPED | OUTPATIENT
Start: 2023-04-06

## 2023-04-06 RX ORDER — AMLODIPINE BESYLATE 5 MG/1
5 TABLET ORAL DAILY
Qty: 90 TABLET | Refills: 1 | Status: SHIPPED | OUTPATIENT
Start: 2023-04-06

## 2023-05-31 DIAGNOSIS — L40.50 PSORIATIC ARTHRITIS (HCC): ICD-10-CM

## 2023-05-31 DIAGNOSIS — L40.50 ARTHROPATHIC PSORIASIS, UNSPECIFIED (HCC): Primary | ICD-10-CM

## 2023-05-31 NOTE — TELEPHONE ENCOUNTER
[facility] requesting refills on patient's Humira. Last OV 2/20/23. Next Ov 6/20/23.  Rx for Humira entered for 3 month supply with 1 refill to go to [facility]

## 2023-06-01 RX ORDER — ADALIMUMAB 40MG/0.4ML
40 KIT SUBCUTANEOUS
Qty: 3 EACH | Refills: 0 | Status: SHIPPED | OUTPATIENT
Start: 2023-06-01 | End: 2023-08-24

## 2023-06-10 NOTE — ED TRIAGE NOTES
Pt arrives pov c/o hallucinations, dizziness and flank pain x 1week ago. Reports liver issues and recently was taken off of methotrexate. No new injuries. Reports dysuria and denies hematuria. Vaccine status unknown

## 2023-06-15 DIAGNOSIS — L40.9 PSORIASIS, UNSPECIFIED: ICD-10-CM

## 2023-06-15 RX ORDER — CLOBETASOL PROPIONATE 0.5 MG/G
OINTMENT TOPICAL
Qty: 60 G | Refills: 5 | OUTPATIENT
Start: 2023-06-15

## 2023-06-20 ENCOUNTER — OFFICE VISIT (OUTPATIENT)
Dept: RHEUMATOLOGY | Age: 73
End: 2023-06-20
Payer: MEDICARE

## 2023-06-20 VITALS
DIASTOLIC BLOOD PRESSURE: 67 MMHG | HEART RATE: 72 BPM | SYSTOLIC BLOOD PRESSURE: 113 MMHG | HEIGHT: 60 IN | WEIGHT: 180 LBS | BODY MASS INDEX: 35.34 KG/M2

## 2023-06-20 DIAGNOSIS — L40.9 PSORIASIS: ICD-10-CM

## 2023-06-20 DIAGNOSIS — Z11.1 SCREENING FOR TUBERCULOSIS: ICD-10-CM

## 2023-06-20 DIAGNOSIS — L40.50 PSORIATIC ARTHRITIS (HCC): Primary | ICD-10-CM

## 2023-06-20 DIAGNOSIS — Z79.899 LONG-TERM USE OF HIGH-RISK MEDICATION: ICD-10-CM

## 2023-06-20 DIAGNOSIS — L40.50 PSORIATIC ARTHRITIS (HCC): ICD-10-CM

## 2023-06-20 LAB
ALBUMIN SERPL-MCNC: 4.1 G/DL (ref 3.2–4.6)
ALBUMIN/GLOB SERPL: 1.2 (ref 0.4–1.6)
ALP SERPL-CCNC: 72 U/L (ref 50–136)
ALT SERPL-CCNC: 23 U/L (ref 12–65)
ANION GAP SERPL CALC-SCNC: 6 MMOL/L (ref 2–11)
AST SERPL-CCNC: 18 U/L (ref 15–37)
BASOPHILS # BLD: 0 K/UL (ref 0–0.2)
BASOPHILS NFR BLD: 0 % (ref 0–2)
BILIRUB SERPL-MCNC: 1.1 MG/DL (ref 0.2–1.1)
BUN SERPL-MCNC: 16 MG/DL (ref 8–23)
CALCIUM SERPL-MCNC: 9.4 MG/DL (ref 8.3–10.4)
CHLORIDE SERPL-SCNC: 107 MMOL/L (ref 101–110)
CO2 SERPL-SCNC: 26 MMOL/L (ref 21–32)
CREAT SERPL-MCNC: 0.8 MG/DL (ref 0.6–1)
CRP SERPL-MCNC: <0.3 MG/DL (ref 0–0.9)
DIFFERENTIAL METHOD BLD: ABNORMAL
EOSINOPHIL # BLD: 0 K/UL (ref 0–0.8)
EOSINOPHIL NFR BLD: 0 % (ref 0.5–7.8)
ERYTHROCYTE [DISTWIDTH] IN BLOOD BY AUTOMATED COUNT: 19.7 % (ref 11.9–14.6)
GLOBULIN SER CALC-MCNC: 3.4 G/DL (ref 2.8–4.5)
GLUCOSE SERPL-MCNC: 106 MG/DL (ref 65–100)
HCT VFR BLD AUTO: 32.4 % (ref 35.8–46.3)
HGB BLD-MCNC: 10.6 G/DL (ref 11.7–15.4)
IMM GRANULOCYTES # BLD AUTO: 0 K/UL (ref 0–0.5)
IMM GRANULOCYTES NFR BLD AUTO: 1 % (ref 0–5)
LYMPHOCYTES # BLD: 1.7 K/UL (ref 0.5–4.6)
LYMPHOCYTES NFR BLD: 61 % (ref 13–44)
MCH RBC QN AUTO: 31.7 PG (ref 26.1–32.9)
MCHC RBC AUTO-ENTMCNC: 32.7 G/DL (ref 31.4–35)
MCV RBC AUTO: 97 FL (ref 82–102)
MONOCYTES # BLD: 0.3 K/UL (ref 0.1–1.3)
MONOCYTES NFR BLD: 11 % (ref 4–12)
NEUTS SEG # BLD: 0.8 K/UL (ref 1.7–8.2)
NEUTS SEG NFR BLD: 27 % (ref 43–78)
NRBC # BLD: 0 K/UL (ref 0–0.2)
PLATELET # BLD AUTO: 59 K/UL (ref 150–450)
PMV BLD AUTO: 10.9 FL (ref 9.4–12.3)
POTASSIUM SERPL-SCNC: 3.9 MMOL/L (ref 3.5–5.1)
PROT SERPL-MCNC: 7.5 G/DL (ref 6.3–8.2)
RBC # BLD AUTO: 3.34 M/UL (ref 4.05–5.2)
SODIUM SERPL-SCNC: 139 MMOL/L (ref 133–143)
WBC # BLD AUTO: 2.8 K/UL (ref 4.3–11.1)

## 2023-06-20 PROCEDURE — 1036F TOBACCO NON-USER: CPT | Performed by: INTERNAL MEDICINE

## 2023-06-20 PROCEDURE — 1123F ACP DISCUSS/DSCN MKR DOCD: CPT | Performed by: INTERNAL MEDICINE

## 2023-06-20 PROCEDURE — 1090F PRES/ABSN URINE INCON ASSESS: CPT | Performed by: INTERNAL MEDICINE

## 2023-06-20 PROCEDURE — 3078F DIAST BP <80 MM HG: CPT | Performed by: INTERNAL MEDICINE

## 2023-06-20 PROCEDURE — 3074F SYST BP LT 130 MM HG: CPT | Performed by: INTERNAL MEDICINE

## 2023-06-20 PROCEDURE — G8417 CALC BMI ABV UP PARAM F/U: HCPCS | Performed by: INTERNAL MEDICINE

## 2023-06-20 PROCEDURE — 99214 OFFICE O/P EST MOD 30 MIN: CPT | Performed by: INTERNAL MEDICINE

## 2023-06-20 PROCEDURE — G8399 PT W/DXA RESULTS DOCUMENT: HCPCS | Performed by: INTERNAL MEDICINE

## 2023-06-20 PROCEDURE — 3017F COLORECTAL CA SCREEN DOC REV: CPT | Performed by: INTERNAL MEDICINE

## 2023-06-20 PROCEDURE — G8427 DOCREV CUR MEDS BY ELIG CLIN: HCPCS | Performed by: INTERNAL MEDICINE

## 2023-06-20 RX ORDER — ADALIMUMAB 40MG/0.4ML
40 KIT SUBCUTANEOUS
Qty: 6 EACH | Refills: 1 | Status: SHIPPED | OUTPATIENT
Start: 2023-06-20 | End: 2023-09-12

## 2023-06-20 RX ORDER — TRIAMCINOLONE ACETONIDE 5 MG/G
OINTMENT TOPICAL
Qty: 15 G | Refills: 3 | Status: SHIPPED | OUTPATIENT
Start: 2023-06-20 | End: 2023-06-27

## 2023-06-20 RX ORDER — HYDROCODONE BITARTRATE AND ACETAMINOPHEN 10; 325 MG/1; MG/1
1 TABLET ORAL EVERY 6 HOURS PRN
COMMUNITY
Start: 2023-06-08

## 2023-06-20 RX ORDER — LEUCOVORIN CALCIUM 5 MG/1
TABLET ORAL
Qty: 90 TABLET | Refills: 1 | Status: SHIPPED | OUTPATIENT
Start: 2023-06-20

## 2023-06-20 ASSESSMENT — JOINT PAIN
TOTAL NUMBER OF SWOLLEN JOINTS: 0
TOTAL NUMBER OF TENDER JOINTS: 1

## 2023-06-20 ASSESSMENT — ROUTINE ASSESSMENT OF PATIENT INDEX DATA (RAPID3)
WHEN YOU AWAKENED IN THE MORNING OVER THE LAST WEEK, PLEASE INDICATE THE AMOUNT OF TIME IT TAKES UNTIL YOU ARE AS LIMBER AS YOU WILL BE FOR THE DAY: > 1 HOUR
ON A SCALE OF ONE TO TEN, HOW DIFFICULT WAS IT FOR YOU TO COMPLETE THE LISTED DAILY PHYSICAL TASKS OVER THE LAST WEEK: 2.4
ON A SCALE OF ONE TO TEN, CONSIDERING ALL THE WAYS IN WHICH ILLNESS AND HEALTH CONDITIONS MAY AFFECT YOU AT THIS TIME, PLEASE INDICATE BELOW HOW YOU ARE DOING:: 10
ON A SCALE OF ONE TO TEN, HOW MUCH OF A PROBLEM HAS UNUSUAL FATIGUE OR TIREDNESS BEEN FOR YOU OVER THE PAST WEEK?: 10
ON A SCALE OF ONE TO TEN, HOW MUCH PAIN HAVE YOU HAD BECAUSE OF YOUR CONDITION OVER THE PAST WEEK?: 8

## 2023-06-24 LAB
M TB IFN-G BLD-IMP: NEGATIVE
M TB IFN-G CD4+ T-CELLS BLD-ACNC: 0.06 IU/ML
M TBIFN-G CD4+ CD8+T-CELLS BLD-ACNC: 0.06 IU/ML
QUANTIFERON CRITERIA: NORMAL
QUANTIFERON MITOGEN VALUE: >10 IU/ML
QUANTIFERON NIL VALUE: 0.06 IU/ML
QUANTIFERON, INCUBATION: NORMAL

## 2023-07-11 ENCOUNTER — TELEPHONE (OUTPATIENT)
Dept: FAMILY MEDICINE CLINIC | Facility: CLINIC | Age: 73
End: 2023-07-11

## 2023-07-11 NOTE — TELEPHONE ENCOUNTER
Received call from patient's . He has concerns about her lab results from where she saw Rheumatology. And about a referral they were supposed to put in but has not been ordered.  He also has concerns about some bumps in her skin, she hasn't been seen by PCP since last year, so I made her an appt for 8/4 at 3:30pm

## 2023-07-12 NOTE — TELEPHONE ENCOUNTER
Called spouse and told him that all would and could be discussed at upcoming visit and any labs he wanted he would order then.

## 2023-07-25 ENCOUNTER — TELEPHONE (OUTPATIENT)
Dept: RHEUMATOLOGY | Age: 73
End: 2023-07-25

## 2023-07-25 DIAGNOSIS — D61.818 OTHER PANCYTOPENIA (HCC): Primary | ICD-10-CM

## 2023-07-25 NOTE — TELEPHONE ENCOUNTER
Per pt  you were going to recheck her labs 4 weeks after being off the leucovorin calcium  please add order for labs to be rechecked.

## 2023-07-27 DIAGNOSIS — D61.818 OTHER PANCYTOPENIA (HCC): ICD-10-CM

## 2023-07-27 LAB
BASOPHILS # BLD: 0 K/UL (ref 0–0.2)
BASOPHILS NFR BLD: 1 % (ref 0–2)
DIFFERENTIAL METHOD BLD: ABNORMAL
EOSINOPHIL # BLD: 0 K/UL (ref 0–0.8)
EOSINOPHIL NFR BLD: 1 % (ref 0.5–7.8)
ERYTHROCYTE [DISTWIDTH] IN BLOOD BY AUTOMATED COUNT: 18.9 % (ref 11.9–14.6)
HCT VFR BLD AUTO: 31 % (ref 35.8–46.3)
HGB BLD-MCNC: 10.2 G/DL (ref 11.7–15.4)
IMM GRANULOCYTES # BLD AUTO: 0 K/UL (ref 0–0.5)
IMM GRANULOCYTES NFR BLD AUTO: 0 % (ref 0–5)
LYMPHOCYTES # BLD: 1.8 K/UL (ref 0.5–4.6)
LYMPHOCYTES NFR BLD: 67 % (ref 13–44)
MCH RBC QN AUTO: 33.3 PG (ref 26.1–32.9)
MCHC RBC AUTO-ENTMCNC: 32.9 G/DL (ref 31.4–35)
MCV RBC AUTO: 101.3 FL (ref 82–102)
MONOCYTES # BLD: 0.3 K/UL (ref 0.1–1.3)
MONOCYTES NFR BLD: 10 % (ref 4–12)
NEUTS SEG # BLD: 0.5 K/UL (ref 1.7–8.2)
NEUTS SEG NFR BLD: 20 % (ref 43–78)
NRBC # BLD: 0.02 K/UL (ref 0–0.2)
PLATELET # BLD AUTO: 68 K/UL (ref 150–450)
PMV BLD AUTO: 12 FL (ref 9.4–12.3)
RBC # BLD AUTO: 3.06 M/UL (ref 4.05–5.2)
WBC # BLD AUTO: 2.7 K/UL (ref 4.3–11.1)

## 2023-07-31 DIAGNOSIS — D61.818 PANCYTOPENIA (HCC): Primary | ICD-10-CM

## 2023-07-31 NOTE — RESULT ENCOUNTER NOTE
Called pt with lab results and referral to hematologist  per Dr Darby Cheng , they gave verbal undersstanding

## 2023-08-07 ENCOUNTER — OFFICE VISIT (OUTPATIENT)
Dept: FAMILY MEDICINE CLINIC | Facility: CLINIC | Age: 73
End: 2023-08-07
Payer: MEDICARE

## 2023-08-07 VITALS
OXYGEN SATURATION: 95 % | BODY MASS INDEX: 34.55 KG/M2 | HEIGHT: 60 IN | DIASTOLIC BLOOD PRESSURE: 70 MMHG | SYSTOLIC BLOOD PRESSURE: 128 MMHG | WEIGHT: 176 LBS | HEART RATE: 94 BPM | TEMPERATURE: 97 F

## 2023-08-07 DIAGNOSIS — E03.9 HYPOTHYROIDISM, ADULT: ICD-10-CM

## 2023-08-07 DIAGNOSIS — F32.A ANXIETY AND DEPRESSION: ICD-10-CM

## 2023-08-07 DIAGNOSIS — R11.0 NAUSEA: ICD-10-CM

## 2023-08-07 DIAGNOSIS — M85.80 OSTEOPENIA, UNSPECIFIED LOCATION: ICD-10-CM

## 2023-08-07 DIAGNOSIS — R63.4 WEIGHT LOSS: ICD-10-CM

## 2023-08-07 DIAGNOSIS — F41.9 ANXIETY AND DEPRESSION: ICD-10-CM

## 2023-08-07 DIAGNOSIS — K21.9 GASTROESOPHAGEAL REFLUX DISEASE WITHOUT ESOPHAGITIS: ICD-10-CM

## 2023-08-07 DIAGNOSIS — R53.1 GENERALIZED WEAKNESS: ICD-10-CM

## 2023-08-07 DIAGNOSIS — L40.50 PSORIATIC ARTHRITIS (HCC): ICD-10-CM

## 2023-08-07 DIAGNOSIS — D61.818 PANCYTOPENIA (HCC): Primary | ICD-10-CM

## 2023-08-07 DIAGNOSIS — E78.2 MIXED HYPERLIPIDEMIA: ICD-10-CM

## 2023-08-07 DIAGNOSIS — I10 ESSENTIAL HYPERTENSION: ICD-10-CM

## 2023-08-07 PROCEDURE — 3074F SYST BP LT 130 MM HG: CPT | Performed by: STUDENT IN AN ORGANIZED HEALTH CARE EDUCATION/TRAINING PROGRAM

## 2023-08-07 PROCEDURE — 3078F DIAST BP <80 MM HG: CPT | Performed by: STUDENT IN AN ORGANIZED HEALTH CARE EDUCATION/TRAINING PROGRAM

## 2023-08-07 PROCEDURE — 3017F COLORECTAL CA SCREEN DOC REV: CPT | Performed by: STUDENT IN AN ORGANIZED HEALTH CARE EDUCATION/TRAINING PROGRAM

## 2023-08-07 PROCEDURE — 99214 OFFICE O/P EST MOD 30 MIN: CPT | Performed by: STUDENT IN AN ORGANIZED HEALTH CARE EDUCATION/TRAINING PROGRAM

## 2023-08-07 PROCEDURE — 1123F ACP DISCUSS/DSCN MKR DOCD: CPT | Performed by: STUDENT IN AN ORGANIZED HEALTH CARE EDUCATION/TRAINING PROGRAM

## 2023-08-07 PROCEDURE — 1036F TOBACCO NON-USER: CPT | Performed by: STUDENT IN AN ORGANIZED HEALTH CARE EDUCATION/TRAINING PROGRAM

## 2023-08-07 PROCEDURE — G8417 CALC BMI ABV UP PARAM F/U: HCPCS | Performed by: STUDENT IN AN ORGANIZED HEALTH CARE EDUCATION/TRAINING PROGRAM

## 2023-08-07 PROCEDURE — 1090F PRES/ABSN URINE INCON ASSESS: CPT | Performed by: STUDENT IN AN ORGANIZED HEALTH CARE EDUCATION/TRAINING PROGRAM

## 2023-08-07 PROCEDURE — G8427 DOCREV CUR MEDS BY ELIG CLIN: HCPCS | Performed by: STUDENT IN AN ORGANIZED HEALTH CARE EDUCATION/TRAINING PROGRAM

## 2023-08-07 PROCEDURE — G8399 PT W/DXA RESULTS DOCUMENT: HCPCS | Performed by: STUDENT IN AN ORGANIZED HEALTH CARE EDUCATION/TRAINING PROGRAM

## 2023-08-07 RX ORDER — ONDANSETRON 4 MG/1
4 TABLET, ORALLY DISINTEGRATING ORAL 3 TIMES DAILY PRN
Qty: 30 TABLET | Refills: 1 | Status: SHIPPED | OUTPATIENT
Start: 2023-08-07

## 2023-08-07 SDOH — ECONOMIC STABILITY: FOOD INSECURITY: WITHIN THE PAST 12 MONTHS, THE FOOD YOU BOUGHT JUST DIDN'T LAST AND YOU DIDN'T HAVE MONEY TO GET MORE.: NEVER TRUE

## 2023-08-07 SDOH — ECONOMIC STABILITY: INCOME INSECURITY: HOW HARD IS IT FOR YOU TO PAY FOR THE VERY BASICS LIKE FOOD, HOUSING, MEDICAL CARE, AND HEATING?: NOT HARD AT ALL

## 2023-08-07 SDOH — ECONOMIC STABILITY: HOUSING INSECURITY
IN THE LAST 12 MONTHS, WAS THERE A TIME WHEN YOU DID NOT HAVE A STEADY PLACE TO SLEEP OR SLEPT IN A SHELTER (INCLUDING NOW)?: NO

## 2023-08-07 SDOH — ECONOMIC STABILITY: FOOD INSECURITY: WITHIN THE PAST 12 MONTHS, YOU WORRIED THAT YOUR FOOD WOULD RUN OUT BEFORE YOU GOT MONEY TO BUY MORE.: NEVER TRUE

## 2023-08-07 ASSESSMENT — PATIENT HEALTH QUESTIONNAIRE - PHQ9
7. TROUBLE CONCENTRATING ON THINGS, SUCH AS READING THE NEWSPAPER OR WATCHING TELEVISION: 0
6. FEELING BAD ABOUT YOURSELF - OR THAT YOU ARE A FAILURE OR HAVE LET YOURSELF OR YOUR FAMILY DOWN: 0
4. FEELING TIRED OR HAVING LITTLE ENERGY: 1
5. POOR APPETITE OR OVEREATING: 1
SUM OF ALL RESPONSES TO PHQ9 QUESTIONS 1 & 2: 2
SUM OF ALL RESPONSES TO PHQ QUESTIONS 1-9: 5
2. FEELING DOWN, DEPRESSED OR HOPELESS: 1
SUM OF ALL RESPONSES TO PHQ QUESTIONS 1-9: 5
SUM OF ALL RESPONSES TO PHQ QUESTIONS 1-9: 5
8. MOVING OR SPEAKING SO SLOWLY THAT OTHER PEOPLE COULD HAVE NOTICED. OR THE OPPOSITE, BEING SO FIGETY OR RESTLESS THAT YOU HAVE BEEN MOVING AROUND A LOT MORE THAN USUAL: 0
3. TROUBLE FALLING OR STAYING ASLEEP: 1
1. LITTLE INTEREST OR PLEASURE IN DOING THINGS: 1
10. IF YOU CHECKED OFF ANY PROBLEMS, HOW DIFFICULT HAVE THESE PROBLEMS MADE IT FOR YOU TO DO YOUR WORK, TAKE CARE OF THINGS AT HOME, OR GET ALONG WITH OTHER PEOPLE: 0
9. THOUGHTS THAT YOU WOULD BE BETTER OFF DEAD, OR OF HURTING YOURSELF: 0
SUM OF ALL RESPONSES TO PHQ QUESTIONS 1-9: 5

## 2023-08-07 ASSESSMENT — ENCOUNTER SYMPTOMS
SHORTNESS OF BREATH: 0
TROUBLE SWALLOWING: 0
BLOOD IN STOOL: 0

## 2023-08-07 NOTE — PROGRESS NOTES
[address], [address]  Phone [phone]  Fax:  [phone]    Yaquelin Farrell (:  1950) is a 71 y.o. female here for evaluation of the following chief complaint(s):  Discuss Labs (Rheum advised to see due to [facility] low/Has a hem/onc 2023) and Follow-up Arthur Cade to ER/[place] due to weakness/See care every where/Weight loss as well)       ASSESSMENT/PLAN:  1. Pancytopenia ([address])  2. Nausea  3. Essential hypertension  4. Gastroesophageal reflux disease without esophagitis  5. Hypothyroidism, adult  6. Psoriatic arthritis ([address])  7. Anxiety and depression  8. Mixed hyperlipidemia  9. Osteopenia, unspecified location  10. Weight loss  11. Generalized weakness    Recently found to have pancytopenia on CBC done by rheumatology. Patient was referred to hematology, has appointment . Recent CBC in the ED showed WBC of 2.2, hemoglobin of 9.9, and platelets of 73. Reports some poor appetite and nausea. Weight down over the past year. CT abdomen/pelvis at the ER recently was unremarkable. Prescription for Zofran provided to use as needed. Continue Pepcid and Protonix for GERD. Patient to let me know if symptoms persist or worsen. Blood pressure well controlled, continue amlodipine. Continue Synthroid 125 mcg daily for hypothyroidism. Followed by rheumatology for psoriatic arthritis. Leucovorin was stopped, still on Humira. Followed by pain management for chronic back pain, on Norco and Morphine. Negative mammogram 2022. S/p hysterectomy. DEXA scan 2017 showed osteopenia. Last colonoscopy 2018, 5 year repeat recommended per patient. Return in about 3 months (around 2023) for routine f/u. Subjective   SUBJECTIVE/OBJECTIVE:  HPI  78-year-old female with PMH of HTN, CAD, hypothyroidism, psoriatic arthritis, HLD, ovarian cancer status post hysterectomy, GERD, and anxiety/depression who presents to discuss labs.   Arthur Cade to the ED

## 2023-08-17 DIAGNOSIS — R71.8 OTHER ABNORMALITY OF RED BLOOD CELLS: ICD-10-CM

## 2023-08-17 DIAGNOSIS — D61.818 PANCYTOPENIA (HCC): Primary | ICD-10-CM

## 2023-08-21 NOTE — PROGRESS NOTES
The following abstract was written by TAYLOR Flores, RHRUDY, CTR    Trini Walter NEW PATIENT INTAKE    Referral Diagnosis: Pancytopenia     Referring Provider: Dr. Yohana Farooq    Primary Care Provider: Dr. Simone Hernández     Presenting Symptoms: Pt found to have pancytopenia on CBC done by rheumatology. Family/ Social/ Medical/ Surgical History Updated in Epic:                 Chronological History of Pertinent Events:  Pt recently found to have pancytopenia on CBC done by rheumatology. Patient was referred to hematology, has appointment 8/24. Pt Presented to ED at [facility] on 8/4 for generalized weakness and abdominal pain/nausea/vomiting. BNP and troponin negative. BMP unremarkable. Hepatic function panel showed mildly elevated total bilirubin. Lipase normal. CBC showed known pancytopenia. CBC in the ED showed WBC of 2.2, hemoglobin of 9.9, and platelets of 73. TSH normal. Urinalysis unremarkable. CT abdomen/pelvis unremarkable. -Reports generalized weakness worse past 2 months.  Weight down 22 lb since 1 yr ago               Notes from Referring Provider: 71 y.o. female for evaluation of the following chief complaint(s):Discuss Labs (Rheum advised to see due to [facility] low/Has a hem/onc 8-) and Follow-up     Presented at Tumor Board:no N0    Other Pertinent Information:

## 2023-08-24 ENCOUNTER — OFFICE VISIT (OUTPATIENT)
Dept: ONCOLOGY | Age: 73
End: 2023-08-24
Payer: MEDICARE

## 2023-08-24 ENCOUNTER — HOSPITAL ENCOUNTER (OUTPATIENT)
Dept: LAB | Age: 73
Discharge: HOME | End: 2023-08-24
Payer: MEDICARE

## 2023-08-24 VITALS
HEIGHT: 60 IN | HEART RATE: 80 BPM | SYSTOLIC BLOOD PRESSURE: 125 MMHG | BODY MASS INDEX: 35.01 KG/M2 | OXYGEN SATURATION: 90 % | RESPIRATION RATE: 22 BRPM | WEIGHT: 178.3 LBS | TEMPERATURE: 98.1 F | DIASTOLIC BLOOD PRESSURE: 75 MMHG

## 2023-08-24 DIAGNOSIS — D61.818 PANCYTOPENIA (HCC): Primary | ICD-10-CM

## 2023-08-24 DIAGNOSIS — R63.4 WEIGHT LOSS: ICD-10-CM

## 2023-08-24 DIAGNOSIS — D61.818 PANCYTOPENIA (HCC): ICD-10-CM

## 2023-08-24 DIAGNOSIS — R11.0 NAUSEA: ICD-10-CM

## 2023-08-24 DIAGNOSIS — R71.8 OTHER ABNORMALITY OF RED BLOOD CELLS: ICD-10-CM

## 2023-08-24 LAB
ALBUMIN SERPL-MCNC: 4.1 G/DL (ref 3.2–4.6)
ALBUMIN/GLOB SERPL: 1.2 (ref 0.4–1.6)
ALP SERPL-CCNC: 68 U/L (ref 50–136)
ALT SERPL-CCNC: 21 U/L (ref 12–65)
ANION GAP SERPL CALC-SCNC: 5 MMOL/L (ref 2–11)
AST SERPL-CCNC: 20 U/L (ref 15–37)
BASOPHILS # BLD: 0 K/UL (ref 0–0.2)
BASOPHILS NFR BLD: 1 % (ref 0–2)
BILIRUB SERPL-MCNC: 1.1 MG/DL (ref 0.2–1.1)
BUN SERPL-MCNC: 14 MG/DL (ref 8–23)
CALCIUM SERPL-MCNC: 9.4 MG/DL (ref 8.3–10.4)
CHLORIDE SERPL-SCNC: 106 MMOL/L (ref 101–110)
CO2 SERPL-SCNC: 28 MMOL/L (ref 21–32)
CREAT SERPL-MCNC: 1 MG/DL (ref 0.6–1)
DIFFERENTIAL METHOD BLD: ABNORMAL
EOSINOPHIL # BLD: 0.1 K/UL (ref 0–0.8)
EOSINOPHIL NFR BLD: 2 % (ref 0.5–7.8)
ERYTHROCYTE [DISTWIDTH] IN BLOOD BY AUTOMATED COUNT: 17.2 % (ref 11.9–14.6)
FERRITIN SERPL-MCNC: 123 NG/ML (ref 8–388)
FOLATE SERPL-MCNC: 30.3 NG/ML (ref 3.1–17.5)
GLOBULIN SER CALC-MCNC: 3.4 G/DL (ref 2.8–4.5)
GLUCOSE SERPL-MCNC: 106 MG/DL (ref 65–100)
HCT VFR BLD AUTO: 30.6 % (ref 35.8–46.3)
HGB BLD-MCNC: 10.2 G/DL (ref 11.7–15.4)
HGB RETIC QN AUTO: 39 PG (ref 29–35)
IMM GRANULOCYTES # BLD AUTO: 0 K/UL (ref 0–0.5)
IMM GRANULOCYTES NFR BLD AUTO: 0 % (ref 0–5)
IMM RETICS NFR: 22.7 % (ref 3–15.9)
IRON SATN MFR SERPL: 36 %
IRON SERPL-MCNC: 123 UG/DL (ref 35–150)
LYMPHOCYTES # BLD: 1.9 K/UL (ref 0.5–4.6)
LYMPHOCYTES NFR BLD: 71 % (ref 13–44)
MCH RBC QN AUTO: 33 PG (ref 26.1–32.9)
MCHC RBC AUTO-ENTMCNC: 33.3 G/DL (ref 31.4–35)
MCV RBC AUTO: 99 FL (ref 82–102)
MONOCYTES # BLD: 0.2 K/UL (ref 0.1–1.3)
MONOCYTES NFR BLD: 8 % (ref 4–12)
NEUTS SEG # BLD: 0.5 K/UL (ref 1.7–8.2)
NEUTS SEG NFR BLD: 18 % (ref 43–78)
NRBC # BLD: 0.03 K/UL (ref 0–0.2)
PLATELET # BLD AUTO: 59 K/UL (ref 150–450)
PLATELET # BLD AUTO: 65 K/UL (ref 150–450)
PLATELET COMMENT: ABNORMAL
PMV BLD AUTO: 11.4 FL (ref 9.4–12.3)
POTASSIUM SERPL-SCNC: 3.9 MMOL/L (ref 3.5–5.1)
PROT SERPL-MCNC: 7.5 G/DL (ref 6.3–8.2)
RBC # BLD AUTO: 3.09 M/UL (ref 4.05–5.2)
RBC MORPH BLD: ABNORMAL
RETICS # AUTO: 0.06 M/UL (ref 0.03–0.1)
RETICS/RBC NFR AUTO: 1.9 % (ref 0.3–2)
SODIUM SERPL-SCNC: 139 MMOL/L (ref 133–143)
TIBC SERPL-MCNC: 337 UG/DL (ref 250–450)
VIT B12 SERPL-MCNC: 1888 PG/ML (ref 193–986)
WBC # BLD AUTO: 2.7 K/UL (ref 4.3–11.1)
WBC MORPH BLD: ABNORMAL

## 2023-08-24 PROCEDURE — 3074F SYST BP LT 130 MM HG: CPT | Performed by: INTERNAL MEDICINE

## 2023-08-24 PROCEDURE — 1090F PRES/ABSN URINE INCON ASSESS: CPT | Performed by: INTERNAL MEDICINE

## 2023-08-24 PROCEDURE — 82728 ASSAY OF FERRITIN: CPT

## 2023-08-24 PROCEDURE — G8399 PT W/DXA RESULTS DOCUMENT: HCPCS | Performed by: INTERNAL MEDICINE

## 2023-08-24 PROCEDURE — 3078F DIAST BP <80 MM HG: CPT | Performed by: INTERNAL MEDICINE

## 2023-08-24 PROCEDURE — 82746 ASSAY OF FOLIC ACID SERUM: CPT

## 2023-08-24 PROCEDURE — 36415 COLL VENOUS BLD VENIPUNCTURE: CPT

## 2023-08-24 PROCEDURE — G8427 DOCREV CUR MEDS BY ELIG CLIN: HCPCS | Performed by: INTERNAL MEDICINE

## 2023-08-24 PROCEDURE — 83540 ASSAY OF IRON: CPT

## 2023-08-24 PROCEDURE — 1123F ACP DISCUSS/DSCN MKR DOCD: CPT | Performed by: INTERNAL MEDICINE

## 2023-08-24 PROCEDURE — 1036F TOBACCO NON-USER: CPT | Performed by: INTERNAL MEDICINE

## 2023-08-24 PROCEDURE — 82607 VITAMIN B-12: CPT

## 2023-08-24 PROCEDURE — 85046 RETICYTE/HGB CONCENTRATE: CPT

## 2023-08-24 PROCEDURE — 85049 AUTOMATED PLATELET COUNT: CPT

## 2023-08-24 PROCEDURE — 3017F COLORECTAL CA SCREEN DOC REV: CPT | Performed by: INTERNAL MEDICINE

## 2023-08-24 PROCEDURE — 80053 COMPREHEN METABOLIC PANEL: CPT

## 2023-08-24 PROCEDURE — G8417 CALC BMI ABV UP PARAM F/U: HCPCS | Performed by: INTERNAL MEDICINE

## 2023-08-24 PROCEDURE — 99204 OFFICE O/P NEW MOD 45 MIN: CPT | Performed by: INTERNAL MEDICINE

## 2023-08-24 PROCEDURE — 83550 IRON BINDING TEST: CPT

## 2023-08-24 PROCEDURE — 85025 COMPLETE CBC W/AUTO DIFF WBC: CPT

## 2023-08-24 ASSESSMENT — PATIENT HEALTH QUESTIONNAIRE - PHQ9
SUM OF ALL RESPONSES TO PHQ9 QUESTIONS 1 & 2: 0
SUM OF ALL RESPONSES TO PHQ QUESTIONS 1-9: 0
2. FEELING DOWN, DEPRESSED OR HOPELESS: 0
SUM OF ALL RESPONSES TO PHQ QUESTIONS 1-9: 0
1. LITTLE INTEREST OR PLEASURE IN DOING THINGS: 0

## 2023-08-24 NOTE — PATIENT INSTRUCTIONS
Patient Instructions from Today's Visit    Reason for Visit:  New patient visit for pancytopenia      Plan:  -Will send referral to GI doctor for diarrhea and throwing up.  -You will need bone marrow biopsy to figure out why your blood levels have been dropping. Follow Up:  4 weeks    Recent Lab Results:      Treatment Summary has been discussed and given to patient: n/a        -------------------------------------------------------------------------------------------------------------------  Please call our office at [phone] if you have any  of the following symptoms:   Fever of 100.5 or greater  Chills  Shortness of breath  Swelling or pain in one leg    After office hours an answering service is available and will contact a provider for emergencies or if you are experiencing any of the above symptoms. Patient does express an interest in My Chart. My Chart log in information explained on the after visit summary printout at the [address] desk.     Swapnil Wan RN

## 2023-08-24 NOTE — PROGRESS NOTES
[address] [facility]: Office Visit New Patient H/P    Chief Complaint:    Pancytopenia    History of Present Illness:  Referral Diagnosis: Pancytopenia      Referring Provider: Dr. Sherine Sims     Primary Care Provider: Dr. Abbott Nageotte      Presenting Symptoms: Pt found to have pancytopenia on CBC done by rheumatology. Family/ Social/ Medical/ Surgical History Updated in Epic:                       Chronological History of Pertinent Events:  Pt recently found to have pancytopenia on CBC done by rheumatology. Patient was referred to hematology, has appointment 8/24. Pt Presented to ED at [facility] on 8/4 for generalized weakness and abdominal pain/nausea/vomiting. BNP and troponin negative. BMP unremarkable. Hepatic function panel showed mildly elevated total bilirubin. Lipase normal. CBC showed known pancytopenia. CBC in the ED showed WBC of 2.2, hemoglobin of 9.9, and platelets of 73. TSH normal. Urinalysis unremarkable. CT abdomen/pelvis unremarkable. -Reports generalized weakness worse past 2 months. Weight down 22 lb since 1 yr ago                 Notes from Referring Provider: 71 y.o. female for evaluation of the following chief complaint(s):Discuss Labs (Rheum advised to see due to [facility] low/Has a hem/onc 8-) and Follow-up        Review of Systems:  Constitutional Denies fever or chills. Denies weight loss or appetite changes. Denies fatigue. Denies anorexia. HEENT Denies trauma, bluring vision, hearing loss, ear pain, nosebleeds, sore throat, neck pain and ear discharge. Skin Denies lesions or rashes. Lungs Denies shortness of breath, cough, sputum production or hemoptysis. Cardiovascular Denies chest pain, palpitations, orthopnea, claudication and leg swelling. Gastrointestinal Denies nausea, vomiting, bowel changes. Denies bloody or black stools. Denies abdominal pain.     Denies dysuria, frequency or hesitancy of urination   Neuro Denies headaches,

## 2023-08-24 NOTE — H&P (VIEW-ONLY)
[facility] Hematology & Oncology: Office Visit New Patient H/P    Chief Complaint:    Pancytopenia    History of Present Illness:  Referral Diagnosis: Pancytopenia      Referring Provider: Dr. Delores Coelho     Primary Care Provider: Dr. Colt Nassar      Presenting Symptoms: Pt found to have pancytopenia on CBC done by rheumatology. Family/ Social/ Medical/ Surgical History Updated in Epic:                       Chronological History of Pertinent Events:  Pt recently found to have pancytopenia on CBC done by rheumatology. Patient was referred to hematology, has appointment 8/24. Pt Presented to ED at [facility] on 8/4 for generalized weakness and abdominal pain/nausea/vomiting. BNP and troponin negative. BMP unremarkable. Hepatic function panel showed mildly elevated total bilirubin. Lipase normal. CBC showed known pancytopenia. CBC in the ED showed WBC of 2.2, hemoglobin of 9.9, and platelets of 73. TSH normal. Urinalysis unremarkable. CT abdomen/pelvis unremarkable. -Reports generalized weakness worse past 2 months. Weight down 22 lb since 1 yr ago                 Notes from Referring Provider: 71 y.o. female for evaluation of the following chief complaint(s):Discuss Labs (Rheum advised to see due to [facility] low/Has a hem/onc 8-) and Follow-up        Review of Systems:  Constitutional Denies fever or chills. Denies weight loss or appetite changes. Denies fatigue. Denies anorexia. HEENT Denies trauma, bluring vision, hearing loss, ear pain, nosebleeds, sore throat, neck pain and ear discharge. Skin Denies lesions or rashes. Lungs Denies shortness of breath, cough, sputum production or hemoptysis. Cardiovascular Denies chest pain, palpitations, orthopnea, claudication and leg swelling. Gastrointestinal Denies nausea, vomiting, bowel changes. Denies bloody or black stools. Denies abdominal pain.     Denies dysuria, frequency or hesitancy of urination   Neuro Denies headaches, 2:50 PM   Result Value Ref Range    Iron 123 35 - 150 ug/dL    TIBC 337 250 - 450 ug/dL    TRANSFERRIN SATURATION 36 >20 %   Platelet Count    Collection Time: 08/24/23  2:50 PM   Result Value Ref Range    Platelets 59 (L) 980 - 450 K/uL       Imaging:  No results found for this or any previous visit. ASSESSMENT/PLAN:   Diagnosis Orders   1. Pancytopenia ([address])  CT BONE MARROW BIOPSY AND ASPIRATION    CBC with Auto Differential    Comprehensive Metabolic Panel      2. Nausea  [address] [facility]      3. Weight loss          71 y.o. F consulted for pancytopenia presented to [facility] on 8/24/2023. Review of labs showed this has been developing in the last few months, also complaining of progressive abdominal symptoms, eating poorly, accelerating weight loss, recent CT abdomen in [place] reportedly normal, agreed to arrange GI evaluation, check B12/folate, arrange bone marrow biopsy, return in 3 to 4 weeks to follow outcome and further plan. All questions are answered to their satisfaction. They will call for further questions and concerns. ECOG PERFORMANCE STATUS - 1- Restricted in physically strenuous activity but ambulatory and able to carry out work of a light or sedentary nature such as light house work, office work. Pain - 0 - No pain/10. Mild to moderate pain, requiring medication - see MAR     Fatigue - No flowsheet data found. Distress - No flowsheet data found. Total time independently spent on today's visit was 45min.  This time included: face-to-face time evaluating the patient as well as additional non-face-to-face time spent on: Preparing to see the patient by obtaining and reviewing previous test results, records and medical history, Performing a medically appropriate history and exam and documenting relevant clinical information for this visit, Counseling and educating patient and family, Ordering tests, Communicating with other health care professionals

## 2023-08-25 ENCOUNTER — TELEPHONE (OUTPATIENT)
Dept: INTERVENTIONAL RADIOLOGY/VASCULAR | Age: 73
End: 2023-08-25

## 2023-08-25 LAB — PATH REV BLD -IMP: NORMAL

## 2023-08-25 NOTE — TELEPHONE ENCOUNTER
[] Phone call to: spouse after confirming 2 pt identifiers    [] Number used to reach this person: [phone]    [] Voicemail reached: N/A     [] Appointment date:  9/7    [] Arrival time:  1300    [] Location given: yes    [] AM Medicine with a small sip of water: Yes    [] Patient is NPO: Yes    [] Need for : Yes    [] Anesthetic Moderate Sedation    [] Blood thinners held: No    [] Education on Hold requirements prior to procedure: na     [] Allergies: OTHER:      [x] Reviewed    [] Latex Allergy: No    [] Lidocaine Allergy: No    [] CPAP at night: No    [] Any recent infections: No    [] Diabetes: Yes    [] Additional information:  BM orders requested via pools      [] Time taken to answer all questions    [] Call back phone number of [phone] given

## 2023-09-07 ENCOUNTER — HOSPITAL ENCOUNTER (OUTPATIENT)
Dept: CT IMAGING | Age: 73
End: 2023-09-07
Attending: INTERNAL MEDICINE
Payer: MEDICARE

## 2023-09-07 VITALS
RESPIRATION RATE: 16 BRPM | BODY MASS INDEX: 34.95 KG/M2 | WEIGHT: 178 LBS | HEART RATE: 68 BPM | TEMPERATURE: 98.1 F | SYSTOLIC BLOOD PRESSURE: 118 MMHG | HEIGHT: 60 IN | DIASTOLIC BLOOD PRESSURE: 61 MMHG | OXYGEN SATURATION: 92 %

## 2023-09-07 DIAGNOSIS — F41.9 ANXIETY DISORDER, UNSPECIFIED: ICD-10-CM

## 2023-09-07 DIAGNOSIS — E03.9 HYPOTHYROIDISM, UNSPECIFIED: ICD-10-CM

## 2023-09-07 DIAGNOSIS — I20.1 ANGINA PECTORIS WITH DOCUMENTED SPASM (HCC): ICD-10-CM

## 2023-09-07 DIAGNOSIS — K21.9 GASTRO-ESOPHAGEAL REFLUX DISEASE WITHOUT ESOPHAGITIS: ICD-10-CM

## 2023-09-07 DIAGNOSIS — Z88.9 ALLERGY STATUS TO UNSPECIFIED DRUGS, MEDICAMENTS AND BIOLOGICAL SUBSTANCES: ICD-10-CM

## 2023-09-07 DIAGNOSIS — D61.818 PANCYTOPENIA (HCC): ICD-10-CM

## 2023-09-07 DIAGNOSIS — E78.2 MIXED HYPERLIPIDEMIA: ICD-10-CM

## 2023-09-07 LAB
BASOPHILS # BLD: 0 K/UL (ref 0–0.2)
BASOPHILS NFR BLD: 0 % (ref 0–2)
BONE MARROW PREP & WRIGHT STAIN: NORMAL
DIFFERENTIAL METHOD BLD: ABNORMAL
EOSINOPHIL # BLD: 0 K/UL (ref 0–0.8)
EOSINOPHIL NFR BLD: 1 % (ref 0.5–7.8)
ERYTHROCYTE [DISTWIDTH] IN BLOOD BY AUTOMATED COUNT: 17.1 % (ref 11.9–14.6)
HCT VFR BLD AUTO: 28.6 % (ref 35.8–46.3)
HGB BLD-MCNC: 9.8 G/DL (ref 11.7–15.4)
IMM GRANULOCYTES # BLD AUTO: 0 K/UL (ref 0–0.5)
IMM GRANULOCYTES NFR BLD AUTO: 1 % (ref 0–5)
LYMPHOCYTES # BLD: 1.7 K/UL (ref 0.5–4.6)
LYMPHOCYTES NFR BLD: 58 % (ref 13–44)
MCH RBC QN AUTO: 33.9 PG (ref 26.1–32.9)
MCHC RBC AUTO-ENTMCNC: 34.3 G/DL (ref 31.4–35)
MCV RBC AUTO: 99 FL (ref 82–102)
MONOCYTES # BLD: 0.2 K/UL (ref 0.1–1.3)
MONOCYTES NFR BLD: 8 % (ref 4–12)
NEUTS SEG # BLD: 0.9 K/UL (ref 1.7–8.2)
NEUTS SEG NFR BLD: 32 % (ref 43–78)
NRBC # BLD: 0 K/UL (ref 0–0.2)
PLATELET # BLD AUTO: 56 K/UL (ref 150–450)
PMV BLD AUTO: 12.4 FL (ref 9.4–12.3)
RBC # BLD AUTO: 2.89 M/UL (ref 4.05–5.2)
WBC # BLD AUTO: 2.8 K/UL (ref 4.3–11.1)

## 2023-09-07 PROCEDURE — 6360000002 HC RX W HCPCS: Performed by: RADIOLOGY

## 2023-09-07 PROCEDURE — 85025 COMPLETE CBC W/AUTO DIFF WBC: CPT

## 2023-09-07 PROCEDURE — 2500000003 HC RX 250 WO HCPCS: Performed by: RADIOLOGY

## 2023-09-07 PROCEDURE — 88305 TISSUE EXAM BY PATHOLOGIST: CPT

## 2023-09-07 PROCEDURE — 88313 SPECIAL STAINS GROUP 2: CPT

## 2023-09-07 PROCEDURE — 88311 DECALCIFY TISSUE: CPT

## 2023-09-07 PROCEDURE — 38222 DX BONE MARROW BX & ASPIR: CPT

## 2023-09-07 RX ORDER — MIDAZOLAM HYDROCHLORIDE 2 MG/2ML
INJECTION, SOLUTION INTRAMUSCULAR; INTRAVENOUS PRN
Status: COMPLETED | OUTPATIENT
Start: 2023-09-07 | End: 2023-09-07

## 2023-09-07 RX ORDER — FENTANYL CITRATE 50 UG/ML
INJECTION, SOLUTION INTRAMUSCULAR; INTRAVENOUS PRN
Status: COMPLETED | OUTPATIENT
Start: 2023-09-07 | End: 2023-09-07

## 2023-09-07 RX ORDER — LIDOCAINE HYDROCHLORIDE 20 MG/ML
INJECTION, SOLUTION INFILTRATION; PERINEURAL PRN
Status: COMPLETED | OUTPATIENT
Start: 2023-09-07 | End: 2023-09-07

## 2023-09-07 RX ADMIN — FENTANYL CITRATE 50 MCG: 50 INJECTION, SOLUTION INTRAMUSCULAR; INTRAVENOUS at 15:14

## 2023-09-07 RX ADMIN — MIDAZOLAM HYDROCHLORIDE 1 MG: 1 INJECTION, SOLUTION INTRAMUSCULAR; INTRAVENOUS at 15:14

## 2023-09-07 RX ADMIN — LIDOCAINE HYDROCHLORIDE 10 ML: 20 INJECTION, SOLUTION INFILTRATION; PERINEURAL at 15:19

## 2023-09-07 RX ADMIN — FENTANYL CITRATE 50 MCG: 50 INJECTION, SOLUTION INTRAMUSCULAR; INTRAVENOUS at 15:19

## 2023-09-07 RX ADMIN — MIDAZOLAM HYDROCHLORIDE 1 MG: 1 INJECTION, SOLUTION INTRAMUSCULAR; INTRAVENOUS at 15:19

## 2023-09-07 ASSESSMENT — PAIN - FUNCTIONAL ASSESSMENT: PAIN_FUNCTIONAL_ASSESSMENT: 0-10

## 2023-09-07 ASSESSMENT — PAIN SCALES - GENERAL
PAINLEVEL_OUTOF10: 0

## 2023-09-07 NOTE — OR NURSING
Recovery period without difficulty. Pt alert and oriented and denies pain. Dressing is clean, dry, and intact. Reviewed discharge instructions with patient and family, both verbalized understanding. Pt escorted to lobby discharge area via wheelchair. Vital signs and Coco score completed.

## 2023-09-07 NOTE — INTERVAL H&P NOTE
Update History & Physical    The patient's History and Physical of August 24, 2023 was reviewed with the patient and I examined the patient. There was no change. The surgical site was confirmed by the patient and me. Plan: The risks, benefits, expected outcome, and alternative to the recommended procedure have been discussed with the patient. Patient understands and wants to proceed with the procedure.      Electronically signed by Demetrio Funes MD on 9/7/2023 at 2:44 PM

## 2023-09-07 NOTE — BRIEF OP NOTE
Department of Interventional Radiology  [phone]        Interventional Radiology Brief Procedure Note    Patient: Tena Jerome MRN: [id number]  SSN: [id number]    YOB: 1950  Age: 71 y.o.   Sex: female      Date of Procedure: 9/7/2023    Pre-Procedure Diagnosis: Pancytopenia    Post-Procedure Diagnosis: SAME    Procedure(s): Image Guided Biopsy    Brief Description of Procedure: CT guided bone marrow bx    Performed By: Jessie Ortiz MD     Assistants: None    Anesthesia:Moderate Sedation    Estimated Blood Loss: Less than 10ml    Specimens:  Cytology    Implants:  None    Complications: None      Signed By: Jessie Ortiz MD     September 7, 2023

## 2023-09-08 RX ORDER — LEVOTHYROXINE SODIUM 0.12 MG/1
TABLET ORAL
Qty: 90 TABLET | Refills: 1 | Status: SHIPPED | OUTPATIENT
Start: 2023-09-08

## 2023-09-08 RX ORDER — AMLODIPINE BESYLATE 5 MG/1
TABLET ORAL
Qty: 90 TABLET | Refills: 1 | Status: SHIPPED | OUTPATIENT
Start: 2023-09-08

## 2023-09-08 RX ORDER — HYDROXYZINE HYDROCHLORIDE 25 MG/1
TABLET, FILM COATED ORAL
Qty: 180 TABLET | Refills: 1 | Status: SHIPPED | OUTPATIENT
Start: 2023-09-08

## 2023-09-08 RX ORDER — PANTOPRAZOLE SODIUM 40 MG/1
TABLET, DELAYED RELEASE ORAL
Qty: 180 TABLET | Refills: 1 | Status: SHIPPED | OUTPATIENT
Start: 2023-09-08

## 2023-09-08 RX ORDER — ROSUVASTATIN CALCIUM 10 MG/1
TABLET, COATED ORAL
Qty: 90 TABLET | Refills: 1 | Status: SHIPPED | OUTPATIENT
Start: 2023-09-08

## 2023-09-08 RX ORDER — MONTELUKAST SODIUM 10 MG/1
TABLET ORAL
Qty: 90 TABLET | Refills: 1 | Status: SHIPPED | OUTPATIENT
Start: 2023-09-08

## 2023-09-08 RX ORDER — FLUOXETINE HYDROCHLORIDE 20 MG/1
CAPSULE ORAL
Qty: 180 CAPSULE | Refills: 1 | Status: SHIPPED | OUTPATIENT
Start: 2023-09-08

## 2023-09-13 ENCOUNTER — TELEPHONE (OUTPATIENT)
Dept: RADIATION ONCOLOGY | Age: 73
End: 2023-09-13

## 2023-09-13 NOTE — TELEPHONE ENCOUNTER
Chart reviewed. Called and spoke with . Informed results are not completed. Confirmed patient has follow-up appointment date and time. No further questions @ this time.

## 2023-09-15 LAB
FLOW CYTOMETRY RESULTS: NORMAL
SPECIMEN SOURCE: NORMAL
TEST ORDERED: NORMAL

## 2023-09-21 ENCOUNTER — CLINICAL DOCUMENTATION (OUTPATIENT)
Dept: ONCOLOGY | Age: 73
End: 2023-09-21

## 2023-09-21 ENCOUNTER — HOSPITAL ENCOUNTER (OUTPATIENT)
Dept: LAB | Age: 73
Discharge: HOME | End: 2023-09-21
Payer: MEDICARE

## 2023-09-21 ENCOUNTER — OFFICE VISIT (OUTPATIENT)
Dept: ONCOLOGY | Age: 73
End: 2023-09-21
Payer: MEDICARE

## 2023-09-21 VITALS
OXYGEN SATURATION: 95 % | HEART RATE: 74 BPM | DIASTOLIC BLOOD PRESSURE: 68 MMHG | BODY MASS INDEX: 34.16 KG/M2 | TEMPERATURE: 98.2 F | WEIGHT: 174 LBS | HEIGHT: 60 IN | RESPIRATION RATE: 16 BRPM | SYSTOLIC BLOOD PRESSURE: 121 MMHG

## 2023-09-21 DIAGNOSIS — D61.818 PANCYTOPENIA (HCC): ICD-10-CM

## 2023-09-21 DIAGNOSIS — D46.C MDS (MYELODYSPLASTIC SYNDROME) WITH 5Q DELETION (HCC): Primary | ICD-10-CM

## 2023-09-21 DIAGNOSIS — Z79.899 HIGH RISK MEDICATION USE: ICD-10-CM

## 2023-09-21 DIAGNOSIS — D46.Z MDS (MYELODYSPLASTIC SYNDROME), HIGH GRADE (HCC): ICD-10-CM

## 2023-09-21 LAB
ALBUMIN SERPL-MCNC: 4 G/DL (ref 3.2–4.6)
ALBUMIN/GLOB SERPL: 1.1 (ref 0.4–1.6)
ALP SERPL-CCNC: 66 U/L (ref 50–136)
ALT SERPL-CCNC: 24 U/L (ref 12–65)
ANION GAP SERPL CALC-SCNC: 6 MMOL/L (ref 2–11)
AST SERPL-CCNC: 18 U/L (ref 15–37)
BASOPHILS # BLD: 0 K/UL (ref 0–0.2)
BASOPHILS NFR BLD: 1 % (ref 0–2)
BILIRUB SERPL-MCNC: 1 MG/DL (ref 0.2–1.1)
BUN SERPL-MCNC: 19 MG/DL (ref 8–23)
CALCIUM SERPL-MCNC: 9.4 MG/DL (ref 8.3–10.4)
CHLORIDE SERPL-SCNC: 104 MMOL/L (ref 101–110)
CO2 SERPL-SCNC: 29 MMOL/L (ref 21–32)
CREAT SERPL-MCNC: 0.7 MG/DL (ref 0.6–1)
DIFFERENTIAL METHOD BLD: ABNORMAL
EOSINOPHIL # BLD: 0 K/UL (ref 0–0.8)
EOSINOPHIL NFR BLD: 2 % (ref 0.5–7.8)
ERYTHROCYTE [DISTWIDTH] IN BLOOD BY AUTOMATED COUNT: 16.6 % (ref 11.9–14.6)
GLOBULIN SER CALC-MCNC: 3.6 G/DL (ref 2.8–4.5)
GLUCOSE SERPL-MCNC: 111 MG/DL (ref 65–100)
HCT VFR BLD AUTO: 29.1 % (ref 35.8–46.3)
HGB BLD-MCNC: 9.8 G/DL (ref 11.7–15.4)
IMM GRANULOCYTES # BLD AUTO: 0 K/UL (ref 0–0.5)
IMM GRANULOCYTES NFR BLD AUTO: 1 % (ref 0–5)
LYMPHOCYTES # BLD: 1.2 K/UL (ref 0.5–4.6)
LYMPHOCYTES NFR BLD: 58 % (ref 13–44)
MCH RBC QN AUTO: 33.4 PG (ref 26.1–32.9)
MCHC RBC AUTO-ENTMCNC: 33.7 G/DL (ref 31.4–35)
MCV RBC AUTO: 99.3 FL (ref 82–102)
MONOCYTES # BLD: 0.2 K/UL (ref 0.1–1.3)
MONOCYTES NFR BLD: 10 % (ref 4–12)
NEUTS SEG # BLD: 0.6 K/UL (ref 1.7–8.2)
NEUTS SEG NFR BLD: 28 % (ref 43–78)
NRBC # BLD: 0.02 K/UL (ref 0–0.2)
PLATELET # BLD AUTO: 44 K/UL (ref 150–450)
PMV BLD AUTO: 11.8 FL (ref 9.4–12.3)
POTASSIUM SERPL-SCNC: 3.7 MMOL/L (ref 3.5–5.1)
PROT SERPL-MCNC: 7.6 G/DL (ref 6.3–8.2)
RBC # BLD AUTO: 2.93 M/UL (ref 4.05–5.2)
SODIUM SERPL-SCNC: 139 MMOL/L (ref 133–143)
WBC # BLD AUTO: 2 K/UL (ref 4.3–11.1)

## 2023-09-21 PROCEDURE — 1036F TOBACCO NON-USER: CPT | Performed by: INTERNAL MEDICINE

## 2023-09-21 PROCEDURE — 1123F ACP DISCUSS/DSCN MKR DOCD: CPT | Performed by: INTERNAL MEDICINE

## 2023-09-21 PROCEDURE — 3074F SYST BP LT 130 MM HG: CPT | Performed by: INTERNAL MEDICINE

## 2023-09-21 PROCEDURE — 3078F DIAST BP <80 MM HG: CPT | Performed by: INTERNAL MEDICINE

## 2023-09-21 PROCEDURE — 99215 OFFICE O/P EST HI 40 MIN: CPT | Performed by: INTERNAL MEDICINE

## 2023-09-21 PROCEDURE — G8417 CALC BMI ABV UP PARAM F/U: HCPCS | Performed by: INTERNAL MEDICINE

## 2023-09-21 PROCEDURE — 80053 COMPREHEN METABOLIC PANEL: CPT

## 2023-09-21 PROCEDURE — 36415 COLL VENOUS BLD VENIPUNCTURE: CPT

## 2023-09-21 PROCEDURE — G8399 PT W/DXA RESULTS DOCUMENT: HCPCS | Performed by: INTERNAL MEDICINE

## 2023-09-21 PROCEDURE — 1090F PRES/ABSN URINE INCON ASSESS: CPT | Performed by: INTERNAL MEDICINE

## 2023-09-21 PROCEDURE — 3017F COLORECTAL CA SCREEN DOC REV: CPT | Performed by: INTERNAL MEDICINE

## 2023-09-21 PROCEDURE — G8427 DOCREV CUR MEDS BY ELIG CLIN: HCPCS | Performed by: INTERNAL MEDICINE

## 2023-09-21 PROCEDURE — 85025 COMPLETE CBC W/AUTO DIFF WBC: CPT

## 2023-09-21 RX ORDER — LENALIDOMIDE 10 MG/1
10 CAPSULE ORAL DAILY
Qty: 30 CAPSULE | Refills: 0 | Status: SHIPPED | OUTPATIENT
Start: 2023-09-21 | End: 2023-10-21

## 2023-09-21 NOTE — PROGRESS NOTES
[facility] Hematology & Oncology: Office Visit Progress Note    Chief Complaint:    Pancytopenia    History of Present Illness:  Referral Diagnosis: Pancytopenia      Referring Provider: Dr. Elida Silverman     Primary Care Provider: Dr. Christy Alcazar      Presenting Symptoms: Pt found to have pancytopenia on CBC done by rheumatology. Family/ Social/ Medical/ Surgical History Updated in Epic:                       Chronological History of Pertinent Events:  Pt recently found to have pancytopenia on CBC done by rheumatology. Patient was referred to hematology, has appointment 8/24. Pt Presented to ED at [facility] on 8/4 for generalized weakness and abdominal pain/nausea/vomiting. BNP and troponin negative. BMP unremarkable. Hepatic function panel showed mildly elevated total bilirubin. Lipase normal. CBC showed known pancytopenia. CBC in the ED showed WBC of 2.2, hemoglobin of 9.9, and platelets of 73. TSH normal. Urinalysis unremarkable. CT abdomen/pelvis unremarkable. -Reports generalized weakness worse past 2 months. Weight down 22 lb since 1 yr ago                 Notes from Referring Provider: 71 y.o. female for evaluation of the following chief complaint(s):Discuss Labs (Rheum advised to see due to [facility] low/Has a hem/onc 8-) and Follow-up              Review of Systems:  Constitutional Denies fever or chills. Denies weight loss or appetite changes. Denies fatigue. Denies anorexia. HEENT Denies trauma, bluring vision, hearing loss, ear pain, nosebleeds, sore throat, neck pain and ear discharge. Skin Denies lesions or rashes. Lungs Denies shortness of breath, cough, sputum production or hemoptysis. Cardiovascular Denies chest pain, palpitations, orthopnea, claudication and leg swelling. Gastrointestinal Denies nausea, vomiting, bowel changes. Denies bloody or black stools. Denies abdominal pain.     Denies dysuria, frequency or hesitancy of urination   Neuro Denies headaches,

## 2023-09-21 NOTE — PROGRESS NOTES
Oral Chemotherapy Adherence:     Current Regimen:  Drug Name: Revlimid  Dose: 10mg  Frequency: daily    Barriers to care identified including (financial, physical, psychosocial) : No    Missed doses reported: No    Patient verbalizes understanding of what to do in the event of a missed dose:  Yes    Adverse reactions/toxicities reported:None, See Review of Systems

## 2023-09-27 RX ORDER — LENALIDOMIDE 10 MG/1
10 CAPSULE ORAL DAILY
Qty: 28 CAPSULE | Refills: 0 | Status: ACTIVE | OUTPATIENT
Start: 2023-09-27 | End: 2023-10-25

## 2023-09-27 NOTE — TELEPHONE ENCOUNTER
INITIAL REMS    Medication Name: Revlimid    Prescribers Name: Sheree Lopez MD    Name of authorized patient representative, if not the patient: Clara Sanford    Section 1. Patient agreement section reviewed?: yes, reviewed with patient's  Clara Chinmayra    Section 2. Authorization completed?: yes    Section 3.  Treatment authorization completed?: yes    If any of the above were answered no, please provide explanation:     Educational Materials requested?: yes    Authorization Number: [id number]

## 2023-10-02 RX ORDER — PROCHLORPERAZINE MALEATE 10 MG
10 TABLET ORAL EVERY 6 HOURS PRN
Qty: 60 TABLET | Refills: 1 | Status: CANCELLED | OUTPATIENT
Start: 2023-10-02

## 2023-10-02 RX ORDER — ONDANSETRON HYDROCHLORIDE 8 MG/1
8 TABLET, FILM COATED ORAL EVERY 8 HOURS PRN
Qty: 90 TABLET | Refills: 1 | Status: CANCELLED | OUTPATIENT
Start: 2023-10-02

## 2023-10-02 NOTE — PROGRESS NOTES
Oswald Kussmaul is a 71 y.o. with MDS who presents for chemotherapy education for the following medications:  Revlimid  Schedule and frequency of chemotherapy was discussed with patient. The following instructions regarding the handling and administration of oral chemotherapy were discussed. ...  - Wash hands before and after chemotherapy administration  - Store away from other medications and out of the reach of children or pets  - Never crush break or chew the medication  - Never \"double up on doses\" if a dose is missed  - Return discontinued or unused medication to the clinic for proper disposal   - Report medications including herbal supplements to all providers    The patient was given handouts published by the [employer] entitled, \"Chemotherapy and You\" and \"Eating Hints Before, During, and After Cancer Treatment\" for their reference. Education was then given to the patient regarding the mechanism by which chemotherapy exerts its effects. It was explained that because chemotherapy affects all rapidly dividing cells, it not only affects the intended cancers cells, but can also effect cells in the GI tract, hair, mucous membranes, nails, and bone marrow. It was then communicated to the patient he/she may experience some of the following side effects. .... Revlimid Side Effects: Allergic Reactions  Breathing Problems  Bruising  Chest Pain  Confusion  Constipation  Cough  Diarrhea  Dizziness  Feeling Faint  Fever  Headache  Infection  Injury  Itching  Joint Pain  Low Blood Counts  Nausea  Numbness  Pain  Rash  Seizures  Swelling  Trouble Sleeping  Vomiting  Weakness    Time was then allowed to accept patient questions. The patient was instructed to call the office at [phone] for the following symptoms. ..                 FEVER >100.4 or shaking chills    Nausea (interferes with ability to eat and unrelieved with prescribed medication)  Vomiting (vomiting more than 4-5 times in a 24

## 2023-10-03 ENCOUNTER — OFFICE VISIT (OUTPATIENT)
Dept: ONCOLOGY | Age: 73
End: 2023-10-03
Payer: MEDICARE

## 2023-10-03 ENCOUNTER — CLINICAL DOCUMENTATION (OUTPATIENT)
Dept: INFUSION THERAPY | Age: 73
End: 2023-10-03

## 2023-10-03 VITALS
RESPIRATION RATE: 16 BRPM | TEMPERATURE: 97 F | HEART RATE: 76 BPM | BODY MASS INDEX: 33.77 KG/M2 | WEIGHT: 172 LBS | DIASTOLIC BLOOD PRESSURE: 61 MMHG | OXYGEN SATURATION: 94 % | SYSTOLIC BLOOD PRESSURE: 90 MMHG | HEIGHT: 60 IN

## 2023-10-03 DIAGNOSIS — Z71.9 HEALTH EDUCATION/COUNSELING: ICD-10-CM

## 2023-10-03 DIAGNOSIS — D46.Z MDS (MYELODYSPLASTIC SYNDROME), HIGH GRADE (HCC): Primary | ICD-10-CM

## 2023-10-03 PROCEDURE — G8417 CALC BMI ABV UP PARAM F/U: HCPCS | Performed by: NURSE PRACTITIONER

## 2023-10-03 PROCEDURE — 1090F PRES/ABSN URINE INCON ASSESS: CPT | Performed by: NURSE PRACTITIONER

## 2023-10-03 PROCEDURE — 3017F COLORECTAL CA SCREEN DOC REV: CPT | Performed by: NURSE PRACTITIONER

## 2023-10-03 PROCEDURE — 3074F SYST BP LT 130 MM HG: CPT | Performed by: NURSE PRACTITIONER

## 2023-10-03 PROCEDURE — 1036F TOBACCO NON-USER: CPT | Performed by: NURSE PRACTITIONER

## 2023-10-03 PROCEDURE — 99214 OFFICE O/P EST MOD 30 MIN: CPT | Performed by: NURSE PRACTITIONER

## 2023-10-03 PROCEDURE — G8484 FLU IMMUNIZE NO ADMIN: HCPCS | Performed by: NURSE PRACTITIONER

## 2023-10-03 PROCEDURE — 3078F DIAST BP <80 MM HG: CPT | Performed by: NURSE PRACTITIONER

## 2023-10-03 PROCEDURE — 1123F ACP DISCUSS/DSCN MKR DOCD: CPT | Performed by: NURSE PRACTITIONER

## 2023-10-03 PROCEDURE — G8399 PT W/DXA RESULTS DOCUMENT: HCPCS | Performed by: NURSE PRACTITIONER

## 2023-10-03 PROCEDURE — G8427 DOCREV CUR MEDS BY ELIG CLIN: HCPCS | Performed by: NURSE PRACTITIONER

## 2023-10-03 RX ORDER — ONDANSETRON 4 MG/1
4 TABLET, ORALLY DISINTEGRATING ORAL 3 TIMES DAILY PRN
Qty: 30 TABLET | Refills: 1 | Status: SHIPPED | OUTPATIENT
Start: 2023-10-03

## 2023-10-03 RX ORDER — PROCHLORPERAZINE MALEATE 10 MG
10 TABLET ORAL EVERY 6 HOURS PRN
Qty: 120 TABLET | Refills: 1 | Status: SHIPPED | OUTPATIENT
Start: 2023-10-03

## 2023-10-03 ASSESSMENT — PATIENT HEALTH QUESTIONNAIRE - PHQ9
SUM OF ALL RESPONSES TO PHQ QUESTIONS 1-9: 0
2. FEELING DOWN, DEPRESSED OR HOPELESS: 0
DEPRESSION UNABLE TO ASSESS: FUNCTIONAL CAPACITY MOTIVATION LIMITS ACCURACY
SUM OF ALL RESPONSES TO PHQ QUESTIONS 1-9: 0
SUM OF ALL RESPONSES TO PHQ QUESTIONS 1-9: 0
SUM OF ALL RESPONSES TO PHQ9 QUESTIONS 1 & 2: 0
1. LITTLE INTEREST OR PLEASURE IN DOING THINGS: 0
SUM OF ALL RESPONSES TO PHQ QUESTIONS 1-9: 0

## 2023-10-03 NOTE — PROGRESS NOTES
Patient Assistance    Met with: Michael Torres    Navigator Type: Oral  Documentation Type: New Patient  Contact Type: Telephone  Navigation Status: New Patient  Status of Patient Insurance Coverage: Patient has active coverage          Additional notes: [employer]/May apply for Financial Assistance     Drug Name: OTHER  Other Drug Name: Revlimid

## 2023-10-05 DIAGNOSIS — L40.50 PSORIATIC ARTHRITIS (HCC): ICD-10-CM

## 2023-10-05 RX ORDER — ADALIMUMAB 40MG/0.4ML
40 KIT SUBCUTANEOUS
Qty: 6 EACH | Refills: 0 | Status: SHIPPED | OUTPATIENT
Start: 2023-10-05 | End: 2023-12-28

## 2023-10-06 ENCOUNTER — TELEPHONE (OUTPATIENT)
Dept: ONCOLOGY | Age: 73
End: 2023-10-06

## 2023-10-06 NOTE — TELEPHONE ENCOUNTER
Physician provider: Lynn Neumann MD  Reason for today's call:Medical question  Last office visit:10/03/23    Patient notified that their information will be routed to the [facility] clinical triage team for review. Patient is advised that they will receive a phone call from the triage department.         Pt spouse state Pt medication  Lenalidomide (Revlimid) 10 chemo capsule wad delivered today & he state he was informed to reach out to our office prior to giving medication to Pt but he state he was unable to get through so he administered the medication this morning around 11am. Pt request a call back to verify if there is any additional instructions for this medication since he had to call 1st.

## 2023-10-06 NOTE — TELEPHONE ENCOUNTER
Called spouse back and let him know okay for her to start medicine and that they should follow up as scheduled next week for tox check.  LISETTE

## 2023-10-09 DIAGNOSIS — D46.Z MDS (MYELODYSPLASTIC SYNDROME), HIGH GRADE (HCC): Primary | ICD-10-CM

## 2023-10-10 ENCOUNTER — PREP FOR PROCEDURE (OUTPATIENT)
Dept: GASTROENTEROLOGY | Age: 73
End: 2023-10-10

## 2023-10-10 ENCOUNTER — OFFICE VISIT (OUTPATIENT)
Dept: GASTROENTEROLOGY | Age: 73
End: 2023-10-10
Payer: MEDICARE

## 2023-10-10 VITALS
BODY MASS INDEX: 34.06 KG/M2 | HEIGHT: 60 IN | HEART RATE: 88 BPM | WEIGHT: 173.5 LBS | OXYGEN SATURATION: 94 % | SYSTOLIC BLOOD PRESSURE: 111 MMHG | TEMPERATURE: 97.6 F | DIASTOLIC BLOOD PRESSURE: 54 MMHG

## 2023-10-10 DIAGNOSIS — K59.09 CHRONIC CONSTIPATION: ICD-10-CM

## 2023-10-10 DIAGNOSIS — R11.0 NAUSEA: ICD-10-CM

## 2023-10-10 DIAGNOSIS — R53.1 GENERALIZED WEAKNESS: ICD-10-CM

## 2023-10-10 DIAGNOSIS — R63.0 DECREASED APPETITE: ICD-10-CM

## 2023-10-10 DIAGNOSIS — R13.19 ESOPHAGEAL DYSPHAGIA: ICD-10-CM

## 2023-10-10 DIAGNOSIS — R10.10 UPPER ABDOMINAL PAIN: Primary | ICD-10-CM

## 2023-10-10 DIAGNOSIS — R10.84 GENERALIZED ABDOMINAL PAIN: ICD-10-CM

## 2023-10-10 DIAGNOSIS — K21.9 GASTROESOPHAGEAL REFLUX DISEASE, UNSPECIFIED WHETHER ESOPHAGITIS PRESENT: ICD-10-CM

## 2023-10-10 DIAGNOSIS — R63.4 UNINTENTIONAL WEIGHT LOSS: ICD-10-CM

## 2023-10-10 DIAGNOSIS — R17 ELEVATED BILIRUBIN: ICD-10-CM

## 2023-10-10 DIAGNOSIS — R13.10 PILL DYSPHAGIA: ICD-10-CM

## 2023-10-10 PROBLEM — R11.2 NAUSEA AND VOMITING: Status: ACTIVE | Noted: 2023-10-10

## 2023-10-10 LAB
ALBUMIN SERPL-MCNC: 3.8 G/DL (ref 3.2–4.6)
ALBUMIN/GLOB SERPL: 1.1 (ref 0.4–1.6)
ALP SERPL-CCNC: 57 U/L (ref 50–136)
ALT SERPL-CCNC: 33 U/L (ref 12–65)
ANION GAP SERPL CALC-SCNC: 9 MMOL/L (ref 2–11)
AST SERPL-CCNC: 23 U/L (ref 15–37)
BASOPHILS # BLD: 0 K/UL (ref 0–0.2)
BASOPHILS NFR BLD: 2 % (ref 0–2)
BILIRUB DIRECT SERPL-MCNC: 0.2 MG/DL
BILIRUB SERPL-MCNC: 0.9 MG/DL (ref 0.2–1.1)
BUN SERPL-MCNC: 17 MG/DL (ref 8–23)
CALCIUM SERPL-MCNC: 9.3 MG/DL (ref 8.3–10.4)
CHLORIDE SERPL-SCNC: 106 MMOL/L (ref 101–110)
CO2 SERPL-SCNC: 27 MMOL/L (ref 21–32)
CREAT SERPL-MCNC: 0.8 MG/DL (ref 0.6–1)
DIFFERENTIAL METHOD BLD: ABNORMAL
EOSINOPHIL # BLD: 0 K/UL (ref 0–0.8)
EOSINOPHIL NFR BLD: 1 % (ref 0.5–7.8)
ERYTHROCYTE [DISTWIDTH] IN BLOOD BY AUTOMATED COUNT: 15.7 % (ref 11.9–14.6)
GLOBULIN SER CALC-MCNC: 3.4 G/DL (ref 2.8–4.5)
GLUCOSE SERPL-MCNC: 103 MG/DL (ref 65–100)
HCT VFR BLD AUTO: 24.1 % (ref 35.8–46.3)
HGB BLD-MCNC: 8.1 G/DL (ref 11.7–15.4)
IMM GRANULOCYTES # BLD AUTO: 0 K/UL (ref 0–0.5)
IMM GRANULOCYTES NFR BLD AUTO: 1 % (ref 0–5)
LIPASE SERPL-CCNC: 62 U/L (ref 73–393)
LYMPHOCYTES # BLD: 0.8 K/UL (ref 0.5–4.6)
LYMPHOCYTES NFR BLD: 53 % (ref 13–44)
MAGNESIUM SERPL-MCNC: 2.3 MG/DL (ref 1.8–2.4)
MCH RBC QN AUTO: 33.1 PG (ref 26.1–32.9)
MCHC RBC AUTO-ENTMCNC: 33.6 G/DL (ref 31.4–35)
MCV RBC AUTO: 98.4 FL (ref 82–102)
MONOCYTES # BLD: 0.2 K/UL (ref 0.1–1.3)
MONOCYTES NFR BLD: 13 % (ref 4–12)
NEUTS SEG # BLD: 0.5 K/UL (ref 1.7–8.2)
NEUTS SEG NFR BLD: 30 % (ref 43–78)
NRBC # BLD: 0 K/UL (ref 0–0.2)
PLATELET # BLD AUTO: 17 K/UL (ref 150–450)
PLATELET COMMENT: ABNORMAL
PMV BLD AUTO: ABNORMAL FL (ref 9.4–12.3)
POTASSIUM SERPL-SCNC: 3.5 MMOL/L (ref 3.5–5.1)
PROT SERPL-MCNC: 7.2 G/DL (ref 6.3–8.2)
RBC # BLD AUTO: 2.45 M/UL (ref 4.05–5.2)
RBC MORPH BLD: ABNORMAL
RBC MORPH BLD: ABNORMAL
SODIUM SERPL-SCNC: 142 MMOL/L (ref 133–143)
WBC # BLD AUTO: 1.5 K/UL (ref 4.3–11.1)
WBC MORPH BLD: ABNORMAL

## 2023-10-10 PROCEDURE — 3074F SYST BP LT 130 MM HG: CPT | Performed by: PHYSICIAN ASSISTANT

## 2023-10-10 PROCEDURE — 99204 OFFICE O/P NEW MOD 45 MIN: CPT | Performed by: PHYSICIAN ASSISTANT

## 2023-10-10 PROCEDURE — 1123F ACP DISCUSS/DSCN MKR DOCD: CPT | Performed by: PHYSICIAN ASSISTANT

## 2023-10-10 PROCEDURE — 3078F DIAST BP <80 MM HG: CPT | Performed by: PHYSICIAN ASSISTANT

## 2023-10-10 RX ORDER — SODIUM CHLORIDE 9 MG/ML
25 INJECTION, SOLUTION INTRAVENOUS PRN
Status: CANCELLED | OUTPATIENT
Start: 2023-10-10

## 2023-10-10 RX ORDER — SODIUM CHLORIDE 0.9 % (FLUSH) 0.9 %
5-40 SYRINGE (ML) INJECTION EVERY 12 HOURS SCHEDULED
Status: CANCELLED | OUTPATIENT
Start: 2023-10-10

## 2023-10-10 RX ORDER — SODIUM CHLORIDE 0.9 % (FLUSH) 0.9 %
5-40 SYRINGE (ML) INJECTION PRN
Status: CANCELLED | OUTPATIENT
Start: 2023-10-10

## 2023-10-10 NOTE — H&P (VIEW-ONLY)
Abdomen is soft. Tenderness: There is abdominal tenderness (epigastrium). There is no guarding or rebound. Skin:     General: Skin is warm and dry. Coloration: Skin is pale. Skin is not jaundiced. Neurological:      General: No focal deficit present. Mental Status: She is alert and oriented to person, place, and time. Psychiatric:         Mood and Affect: Mood normal.         Behavior: Behavior normal.         Thought Content: Thought content normal.         Judgment: Judgment normal.              Return for scheduled EGD, follow-up visit 1-2 weeks after procedure. An electronic signature was used to authenticate this note.     --Alicia Aguilar PA-C
L heel

## 2023-10-11 ENCOUNTER — ANESTHESIA (OUTPATIENT)
Dept: ENDOSCOPY | Age: 73
End: 2023-10-11
Payer: MEDICARE

## 2023-10-11 ENCOUNTER — HOSPITAL ENCOUNTER (EMERGENCY)
Age: 73
Discharge: HOME | End: 2023-10-11
Attending: EMERGENCY MEDICINE
Payer: MEDICARE

## 2023-10-11 ENCOUNTER — HOSPITAL ENCOUNTER (OUTPATIENT)
Age: 73
Setting detail: OUTPATIENT SURGERY
Discharge: STILL ADMITTED OR ADMINISTRATIVE | End: 2023-10-11
Attending: INTERNAL MEDICINE | Admitting: INTERNAL MEDICINE
Payer: MEDICARE

## 2023-10-11 ENCOUNTER — ANESTHESIA EVENT (OUTPATIENT)
Dept: ENDOSCOPY | Age: 73
End: 2023-10-11
Payer: MEDICARE

## 2023-10-11 VITALS
SYSTOLIC BLOOD PRESSURE: 135 MMHG | TEMPERATURE: 98.9 F | HEIGHT: 60 IN | BODY MASS INDEX: 33.57 KG/M2 | DIASTOLIC BLOOD PRESSURE: 63 MMHG | WEIGHT: 171 LBS | HEART RATE: 78 BPM | RESPIRATION RATE: 16 BRPM | OXYGEN SATURATION: 92 %

## 2023-10-11 VITALS
HEIGHT: 60 IN | RESPIRATION RATE: 18 BRPM | TEMPERATURE: 99 F | HEART RATE: 78 BPM | DIASTOLIC BLOOD PRESSURE: 77 MMHG | WEIGHT: 171 LBS | SYSTOLIC BLOOD PRESSURE: 93 MMHG | BODY MASS INDEX: 33.57 KG/M2 | OXYGEN SATURATION: 94 %

## 2023-10-11 DIAGNOSIS — D46.9 MYELODYSPLASTIC SYNDROME (HCC): ICD-10-CM

## 2023-10-11 DIAGNOSIS — R11.2 NAUSEA AND VOMITING: ICD-10-CM

## 2023-10-11 DIAGNOSIS — D69.6 THROMBOCYTOPENIA (HCC): Primary | ICD-10-CM

## 2023-10-11 DIAGNOSIS — R42 EPISODIC LIGHTHEADEDNESS: ICD-10-CM

## 2023-10-11 DIAGNOSIS — R17 ELEVATED BILIRUBIN: ICD-10-CM

## 2023-10-11 DIAGNOSIS — R13.19 ESOPHAGEAL DYSPHAGIA: ICD-10-CM

## 2023-10-11 DIAGNOSIS — R10.84 GENERALIZED ABDOMINAL PAIN: ICD-10-CM

## 2023-10-11 LAB
ABO + RH BLD: NORMAL
ALBUMIN SERPL-MCNC: 3.7 G/DL (ref 3.2–4.6)
ALBUMIN/GLOB SERPL: 1.2 (ref 0.4–1.6)
ALP SERPL-CCNC: 46 U/L (ref 50–136)
ALT SERPL-CCNC: 31 U/L (ref 12–65)
ANION GAP SERPL CALC-SCNC: 6 MMOL/L (ref 2–11)
AST SERPL-CCNC: 19 U/L (ref 15–37)
BILIRUB SERPL-MCNC: 1.1 MG/DL (ref 0.2–1.1)
BLOOD GROUP ANTIBODIES SERPL: NORMAL
BUN SERPL-MCNC: 15 MG/DL (ref 8–23)
CALCIUM SERPL-MCNC: 8.9 MG/DL (ref 8.3–10.4)
CHLORIDE SERPL-SCNC: 104 MMOL/L (ref 101–110)
CO2 SERPL-SCNC: 27 MMOL/L (ref 21–32)
CREAT SERPL-MCNC: 0.7 MG/DL (ref 0.6–1)
EKG ATRIAL RATE: 72 BPM
EKG DIAGNOSIS: NORMAL
EKG P AXIS: 31 DEGREES
EKG P-R INTERVAL: 169 MS
EKG Q-T INTERVAL: 419 MS
EKG QRS DURATION: 101 MS
EKG QTC CALCULATION (BAZETT): 459 MS
EKG R AXIS: 33 DEGREES
EKG T AXIS: 53 DEGREES
EKG VENTRICULAR RATE: 72 BPM
ERYTHROCYTE [DISTWIDTH] IN BLOOD BY AUTOMATED COUNT: 16.2 % (ref 11.9–14.6)
GLOBULIN SER CALC-MCNC: 3.2 G/DL (ref 2.8–4.5)
GLUCOSE SERPL-MCNC: 100 MG/DL (ref 65–100)
HCT VFR BLD AUTO: 21.8 % (ref 35.8–46.3)
HGB BLD-MCNC: 7.4 G/DL (ref 11.7–15.4)
MCH RBC QN AUTO: 33 PG (ref 26.1–32.9)
MCHC RBC AUTO-ENTMCNC: 33.9 G/DL (ref 31.4–35)
MCV RBC AUTO: 97.3 FL (ref 82–102)
NRBC # BLD: 0 K/UL (ref 0–0.2)
PLATELET # BLD AUTO: 27 K/UL (ref 150–450)
PMV BLD AUTO: ABNORMAL FL (ref 9.4–12.3)
POTASSIUM SERPL-SCNC: 3.5 MMOL/L (ref 3.5–5.1)
PROT SERPL-MCNC: 6.9 G/DL (ref 6.3–8.2)
RBC # BLD AUTO: 2.24 M/UL (ref 4.05–5.2)
SODIUM SERPL-SCNC: 137 MMOL/L (ref 133–143)
SPECIMEN EXP DATE BLD: NORMAL
WBC # BLD AUTO: 1.4 K/UL (ref 4.3–11.1)

## 2023-10-11 PROCEDURE — 86900 BLOOD TYPING SEROLOGIC ABO: CPT

## 2023-10-11 PROCEDURE — 93005 ELECTROCARDIOGRAM TRACING: CPT | Performed by: EMERGENCY MEDICINE

## 2023-10-11 PROCEDURE — 85027 COMPLETE CBC AUTOMATED: CPT

## 2023-10-11 PROCEDURE — P9035 PLATELET PHERES LEUKOREDUCED: HCPCS

## 2023-10-11 PROCEDURE — 36430 TRANSFUSION BLD/BLD COMPNT: CPT

## 2023-10-11 PROCEDURE — 93010 ELECTROCARDIOGRAM REPORT: CPT | Performed by: INTERNAL MEDICINE

## 2023-10-11 PROCEDURE — 80053 COMPREHEN METABOLIC PANEL: CPT

## 2023-10-11 PROCEDURE — 86901 BLOOD TYPING SEROLOGIC RH(D): CPT

## 2023-10-11 PROCEDURE — 86850 RBC ANTIBODY SCREEN: CPT

## 2023-10-11 PROCEDURE — 2580000003 HC RX 258: Performed by: ANESTHESIOLOGY

## 2023-10-11 RX ORDER — SODIUM CHLORIDE, SODIUM LACTATE, POTASSIUM CHLORIDE, CALCIUM CHLORIDE 600; 310; 30; 20 MG/100ML; MG/100ML; MG/100ML; MG/100ML
INJECTION, SOLUTION INTRAVENOUS CONTINUOUS
Status: DISCONTINUED | OUTPATIENT
Start: 2023-10-11 | End: 2023-10-11 | Stop reason: HOSPADM

## 2023-10-11 RX ORDER — 0.9 % SODIUM CHLORIDE 0.9 %
500 INTRAVENOUS SOLUTION INTRAVENOUS
Status: DISCONTINUED | OUTPATIENT
Start: 2023-10-11 | End: 2023-10-11

## 2023-10-11 RX ORDER — SODIUM CHLORIDE, SODIUM LACTATE, POTASSIUM CHLORIDE, AND CALCIUM CHLORIDE .6; .31; .03; .02 G/100ML; G/100ML; G/100ML; G/100ML
500 INJECTION, SOLUTION INTRAVENOUS
Status: DISCONTINUED | OUTPATIENT
Start: 2023-10-11 | End: 2023-10-11

## 2023-10-11 RX ORDER — SODIUM CHLORIDE 9 MG/ML
INJECTION, SOLUTION INTRAVENOUS PRN
Status: DISCONTINUED | OUTPATIENT
Start: 2023-10-11 | End: 2023-10-11 | Stop reason: HOSPADM

## 2023-10-11 RX ORDER — SODIUM CHLORIDE 0.9 % (FLUSH) 0.9 %
5-40 SYRINGE (ML) INJECTION PRN
Status: DISCONTINUED | OUTPATIENT
Start: 2023-10-11 | End: 2023-10-11 | Stop reason: HOSPADM

## 2023-10-11 RX ORDER — SODIUM CHLORIDE 0.9 % (FLUSH) 0.9 %
5-40 SYRINGE (ML) INJECTION EVERY 12 HOURS SCHEDULED
Status: DISCONTINUED | OUTPATIENT
Start: 2023-10-11 | End: 2023-10-11 | Stop reason: HOSPADM

## 2023-10-11 RX ORDER — SODIUM CHLORIDE 9 MG/ML
25 INJECTION, SOLUTION INTRAVENOUS PRN
Status: DISCONTINUED | OUTPATIENT
Start: 2023-10-11 | End: 2023-10-11 | Stop reason: HOSPADM

## 2023-10-11 RX ADMIN — SODIUM CHLORIDE, POTASSIUM CHLORIDE, SODIUM LACTATE AND CALCIUM CHLORIDE: 600; 310; 30; 20 INJECTION, SOLUTION INTRAVENOUS at 09:43

## 2023-10-11 ASSESSMENT — PAIN - FUNCTIONAL ASSESSMENT
PAIN_FUNCTIONAL_ASSESSMENT: 0-10
PAIN_FUNCTIONAL_ASSESSMENT: NONE - DENIES PAIN

## 2023-10-11 ASSESSMENT — PAIN SCALES - GENERAL: PAINLEVEL_OUTOF10: 5

## 2023-10-11 ASSESSMENT — PAIN DESCRIPTION - FREQUENCY: FREQUENCY: CONTINUOUS

## 2023-10-11 ASSESSMENT — PAIN DESCRIPTION - LOCATION: LOCATION: ABDOMEN;BACK

## 2023-10-11 NOTE — ED PROVIDER NOTES
mg by mouth 2 times daily    FAMOTIDINE (PEPCID) 40 MG TABLET    Take 1 tablet by mouth every evening    FLUOXETINE (PROZAC) 20 MG CAPSULE    TAKE 1 CAPSULE BY MOUTH TWICE A DAY    HYDROCODONE-ACETAMINOPHEN (NORCO)  MG PER TABLET    Take 1 tablet by mouth every 6 hours as needed. HYDROXYZINE HCL (ATARAX) 25 MG TABLET    TAKE 1 TABLET BY MOUTH IN THE MORNING AND IN THE EVENING    LENALIDOMIDE (REVLIMID) 10 MG CHEMO CAPSULE    Take 1 capsule by mouth daily for 28 days    LEVOTHYROXINE (SYNTHROID) 125 MCG TABLET    TAKE 1 TABLET BY MOUTH EVERY DAY BEFORE BREAKFAST    MONTELUKAST (SINGULAIR) 10 MG TABLET    TAKE 1 TABLET BY MOUTH EVERY DAY    MORPHINE (MS CONTIN) 15 MG EXTENDED RELEASE TABLET    TAKE 1 TABLET BY MOUTH ONCE A DAY AT BEDTIME X 30 DAYS    ONDANSETRON (ZOFRAN-ODT) 4 MG DISINTEGRATING TABLET    Take 1 tablet by mouth 3 times daily as needed for Nausea or Vomiting    PANTOPRAZOLE (PROTONIX) 40 MG TABLET    TAKE 1 TABLET BY MOUTH TWICE A DAY    PROCHLORPERAZINE (COMPAZINE) 10 MG TABLET    Take 1 tablet by mouth every 6 hours as needed (nausea)    ROSUVASTATIN (CRESTOR) 10 MG TABLET    TAKE 1 TABLET BY MOUTH EVERY DAY    TIZANIDINE (ZANAFLEX) 4 MG TABLET    Take 1 pill at bedtime as needed. Results for orders placed or performed during the hospital encounter of 10/11/23   CBC   Result Value Ref Range    WBC 1.4 (LL) 4.3 - 11.1 K/uL    RBC 2.24 (L) 4.05 - 5.2 M/uL    Hemoglobin 7.4 (L) 11.7 - 15.4 g/dL    Hematocrit 21.8 (L) 35.8 - 46.3 %    MCV 97.3 82 - 102 FL    MCH 33.0 (H) 26.1 - 32.9 PG    MCHC 33.9 31.4 - 35.0 g/dL    RDW 16.2 (H) 11.9 - 14.6 %    Platelets 27 (LL) 460 - 450 K/uL    MPV Unable to calculate. Recommend adding IPF.  9.4 - 12.3 FL    nRBC 0.00 0.0 - 0.2 K/uL   Comprehensive Metabolic Panel   Result Value Ref Range    Sodium 137 133 - 143 mmol/L    Potassium 3.5 3.5 - 5.1 mmol/L    Chloride 104 101 - 110 mmol/L    CO2 27 21 - 32 mmol/L    Anion Gap 6 2 - 11 mmol/L    Glucose 100

## 2023-10-11 NOTE — DISCHARGE INSTRUCTIONS
Follow-up with your oncologist next week for recheck of platelets. Follow-up with your primary care doctor as well. Reschedule EGD when platelets recover and are greater than 50,000. Return if any new, worsening or concerning symptoms.

## 2023-10-11 NOTE — ED TRIAGE NOTES
Pt arrives from GI lab, RN that brought pt down stated that they have had to cancel her procedure due to low platelet count. Oncology was notified per GI RN and stated did not need to see pt, but she could be sent to ED for evaluation if she wanted. Pt  states she gets \"woozy\" a lot and pt has had blood in her stool recently and is concerned for the pt.

## 2023-10-11 NOTE — INTERVAL H&P NOTE
Update History & Physical    The patient's History and Physical of October 10,  was reviewed with the patient and I examined the patient. There was no change. The surgical site was confirmed by the patient and me. Plan: The risks, benefits, expected outcome, and alternative to the recommended procedure have been discussed with the patient. Patient understands and wants to proceed with the procedure.      Electronically signed by Florentin Beyer MD on 10/11/2023 at 9:26 AM

## 2023-10-11 NOTE — CONSENT
Informed Consent for Blood Component Transfusion Note    I have discussed with the patient the rationale for blood component transfusion; its benefits in treating or preventing fatigue, organ damage, or death; and its risk which includes mild transfusion reactions, rare risk of blood borne infection, or more serious but rare reactions. I have discussed the alternatives to transfusion, including the risk and consequences of not receiving transfusion. The patient had an opportunity to ask questions and had agreed to proceed with transfusion of blood components.     Electronically signed by Dina Montalvo MD on 10/11/23 at 11:44 AM EDT

## 2023-10-11 NOTE — PROGRESS NOTES
Spoke with JORDON Gifford from Gastroenterology on the telephone per request of Dr. Ila Rodriguez about patients labs and canceling procedure. Belinda Peralta stated that she had reached out to Oncology and they did not believe the patient needed to be admitted and would get the patient in for an office visit as soon as possible. Patient was to be given the option to go to the ED for treatment of pain if they felt the need or make appointment with Oncology. Patient chose to be taken to the ED.

## 2023-10-11 NOTE — PROGRESS NOTES
Pt taken to the ER at this time for evaluation, per pt's request, after cancelled procedure.  with pt.  Notified ER charge RN of situation before arrival.

## 2023-10-11 NOTE — ANESTHESIA PRE PROCEDURE
Neuro/Psych:   (+) headaches:,             GI/Hepatic/Renal:   (+) GERD: well controlled,           Endo/Other:    (+) hypothyroidism::., .                 Abdominal:             Vascular: Other Findings:           Anesthesia Plan      TIVA     ASA 3       Induction: intravenous. Anesthetic plan and risks discussed with patient.                         Estefany Arroyo MD   10/11/2023

## 2023-10-11 NOTE — INTERVAL H&P NOTE
Update History & Physical    The patient's History and Physical of October 10,  was reviewed with the patient and I examined the patient. There was noted to be drop in plt to 17k, after chemo and drop in wbc;will cancel EGD. The surgical site was confirmed by the patient and me. Plan: The risks, benefits, expected outcome, and alternative to the recommended procedure have been discussed with the patient. Patient understands and wants to proceed with the procedure.      Electronically signed by Efren Henning MD on 10/11/2023 at 10:04 AM

## 2023-10-12 LAB
BLD PROD TYP BPU: NORMAL
BLOOD BANK CMNT PATIENT-IMP: NORMAL
BLOOD BANK DISPENSE STATUS: NORMAL
BPU ID: NORMAL
UNIT DIVISION: 0

## 2023-10-20 ENCOUNTER — TELEPHONE (OUTPATIENT)
Dept: RHEUMATOLOGY | Age: 73
End: 2023-10-20

## 2023-10-20 DIAGNOSIS — L40.50 PSORIATIC ARTHRITIS (HCC): ICD-10-CM

## 2023-10-20 RX ORDER — ADALIMUMAB 40MG/0.4ML
40 KIT SUBCUTANEOUS
Qty: 6 EACH | Refills: 0 | Status: SHIPPED | OUTPATIENT
Start: 2023-10-20 | End: 2024-01-12

## 2023-10-20 NOTE — TELEPHONE ENCOUNTER
PHONE Glen Inman from patient's  requesting her Humira go to [facility], not [facility] as it was sent on 10/5/23 incorrectly. Rx entered and pended for review and sig to go to [facility]. Patient is now on chemo for MDS. I asked if they have discussed taking the Humira with the oncologist and he said yes. Said the oncologist is aware and told her she could continue the Humira.

## 2023-10-25 RX ORDER — LENALIDOMIDE 10 MG/1
10 CAPSULE ORAL DAILY
Qty: 28 CAPSULE | Refills: 0 | Status: ON HOLD | OUTPATIENT
Start: 2023-10-25 | End: 2023-11-22

## 2023-10-27 ENCOUNTER — APPOINTMENT (OUTPATIENT)
Dept: GENERAL RADIOLOGY | Age: 73
DRG: 808 | End: 2023-10-27
Payer: MEDICARE

## 2023-10-27 ENCOUNTER — OFFICE VISIT (OUTPATIENT)
Dept: FAMILY MEDICINE CLINIC | Facility: CLINIC | Age: 73
End: 2023-10-27
Payer: MEDICARE

## 2023-10-27 ENCOUNTER — TELEPHONE (OUTPATIENT)
Dept: GASTROENTEROLOGY | Age: 73
End: 2023-10-27

## 2023-10-27 ENCOUNTER — HOSPITAL ENCOUNTER (INPATIENT)
Age: 73
DRG: 808 | End: 2023-10-27
Attending: EMERGENCY MEDICINE | Admitting: FAMILY MEDICINE
Payer: MEDICARE

## 2023-10-27 VITALS
OXYGEN SATURATION: 96 % | HEART RATE: 92 BPM | BODY MASS INDEX: 33.4 KG/M2 | HEIGHT: 60 IN | SYSTOLIC BLOOD PRESSURE: 102 MMHG | TEMPERATURE: 100 F | DIASTOLIC BLOOD PRESSURE: 64 MMHG

## 2023-10-27 DIAGNOSIS — G89.3 CANCER RELATED PAIN: ICD-10-CM

## 2023-10-27 DIAGNOSIS — D61.818 PANCYTOPENIA (HCC): ICD-10-CM

## 2023-10-27 DIAGNOSIS — D46.9 MDS (MYELODYSPLASTIC SYNDROME) (HCC): ICD-10-CM

## 2023-10-27 DIAGNOSIS — B95.2 VRE BACTEREMIA: Primary | ICD-10-CM

## 2023-10-27 DIAGNOSIS — D70.9 NEUTROPENIC FEVER (HCC): Primary | ICD-10-CM

## 2023-10-27 DIAGNOSIS — C94.00 ACUTE ERYTHROID LEUKEMIA NOT HAVING ACHIEVED REMISSION (HCC): ICD-10-CM

## 2023-10-27 DIAGNOSIS — R50.9 FEVER, UNSPECIFIED FEVER CAUSE: Primary | ICD-10-CM

## 2023-10-27 DIAGNOSIS — Z16.21 VRE BACTEREMIA: Primary | ICD-10-CM

## 2023-10-27 DIAGNOSIS — R78.81 VRE BACTEREMIA: Primary | ICD-10-CM

## 2023-10-27 DIAGNOSIS — R50.81 NEUTROPENIC FEVER (HCC): Primary | ICD-10-CM

## 2023-10-27 PROBLEM — D84.9 IMMUNOSUPPRESSED STATUS (HCC): Chronic | Status: ACTIVE | Noted: 2023-10-27

## 2023-10-27 PROBLEM — J06.9 ACUTE UPPER RESPIRATORY INFECTION: Status: ACTIVE | Noted: 2023-10-27

## 2023-10-27 PROBLEM — K62.5 RECTAL BLEEDING: Status: ACTIVE | Noted: 2023-10-27

## 2023-10-27 PROBLEM — F39 MOOD DISORDER (HCC): Chronic | Status: ACTIVE | Noted: 2023-10-27

## 2023-10-27 PROBLEM — K59.03 CONSTIPATION DUE TO PAIN MEDICATION: Chronic | Status: ACTIVE | Noted: 2023-10-27

## 2023-10-27 PROBLEM — A41.9 SEPSIS (HCC): Status: ACTIVE | Noted: 2023-10-27

## 2023-10-27 PROBLEM — E66.9 CLASS 1 OBESITY WITH SERIOUS COMORBIDITY AND BODY MASS INDEX (BMI) OF 33.0 TO 33.9 IN ADULT: Status: ACTIVE | Noted: 2018-06-26

## 2023-10-27 PROBLEM — E78.2 MIXED HYPERLIPIDEMIA: Status: ACTIVE | Noted: 2017-03-07

## 2023-10-27 PROBLEM — H61.23 BILATERAL IMPACTED CERUMEN: Status: ACTIVE | Noted: 2023-10-27

## 2023-10-27 PROBLEM — I10 ESSENTIAL HYPERTENSION: Status: ACTIVE | Noted: 2019-11-08

## 2023-10-27 PROBLEM — E03.9 HYPOTHYROIDISM, ADULT: Status: ACTIVE | Noted: 2017-01-09

## 2023-10-27 PROBLEM — M54.50 CHRONIC MIDLINE LOW BACK PAIN WITHOUT SCIATICA: Status: ACTIVE | Noted: 2018-06-26

## 2023-10-27 PROBLEM — R11.0 NAUSEA: Status: RESOLVED | Noted: 2020-02-24 | Resolved: 2023-10-27

## 2023-10-27 PROBLEM — G89.29 CHRONIC MIDLINE LOW BACK PAIN WITHOUT SCIATICA: Status: ACTIVE | Noted: 2018-06-26

## 2023-10-27 PROBLEM — E87.1 HYPONATREMIA: Status: ACTIVE | Noted: 2023-10-27

## 2023-10-27 PROBLEM — L40.50 PSORIATIC ARTHRITIS (HCC): Status: ACTIVE | Noted: 2017-12-06

## 2023-10-27 LAB
ALBUMIN SERPL-MCNC: 3.1 G/DL (ref 3.2–4.6)
ALBUMIN/GLOB SERPL: 0.7 (ref 0.4–1.6)
ALP SERPL-CCNC: 54 U/L (ref 50–136)
ALT SERPL-CCNC: 14 U/L (ref 12–65)
ANION GAP SERPL CALC-SCNC: 8 MMOL/L (ref 2–11)
APPEARANCE UR: CLEAR
AST SERPL-CCNC: 25 U/L (ref 15–37)
B PERT DNA SPEC QL NAA+PROBE: NOT DETECTED
BACTERIA URNS QL MICRO: ABNORMAL /HPF
BASOPHILS # BLD: 0 K/UL (ref 0–0.2)
BASOPHILS NFR BLD: 2 % (ref 0–2)
BILIRUB SERPL-MCNC: 1.1 MG/DL (ref 0.2–1.1)
BILIRUB UR QL: NEGATIVE
BORDETELLA PARAPERTUSSIS BY PCR: NOT DETECTED
BUN SERPL-MCNC: 14 MG/DL (ref 8–23)
C PNEUM DNA SPEC QL NAA+PROBE: NOT DETECTED
CALCIUM SERPL-MCNC: 8.7 MG/DL (ref 8.3–10.4)
CHLORIDE SERPL-SCNC: 98 MMOL/L (ref 101–110)
CO2 SERPL-SCNC: 25 MMOL/L (ref 21–32)
COLOR UR: ABNORMAL
CREAT SERPL-MCNC: 0.9 MG/DL (ref 0.6–1)
DIFFERENTIAL METHOD BLD: ABNORMAL
EKG ATRIAL RATE: 82 BPM
EKG DIAGNOSIS: NORMAL
EKG P AXIS: 27 DEGREES
EKG P-R INTERVAL: 147 MS
EKG Q-T INTERVAL: 424 MS
EKG QRS DURATION: 88 MS
EKG QTC CALCULATION (BAZETT): 493 MS
EKG R AXIS: 41 DEGREES
EKG T AXIS: -5 DEGREES
EKG VENTRICULAR RATE: 81 BPM
EOSINOPHIL # BLD: 0 K/UL (ref 0–0.8)
EOSINOPHIL NFR BLD: 1 % (ref 0.5–7.8)
EPI CELLS #/AREA URNS HPF: ABNORMAL /HPF
ERYTHROCYTE [DISTWIDTH] IN BLOOD BY AUTOMATED COUNT: 16.5 % (ref 11.9–14.6)
FERRITIN SERPL-MCNC: 1259 NG/ML (ref 8–388)
FLUAV RNA SPEC QL NAA+PROBE: NOT DETECTED
FLUAV SUBTYP SPEC NAA+PROBE: NOT DETECTED
FLUBV RNA SPEC QL NAA+PROBE: NOT DETECTED
FLUBV RNA SPEC QL NAA+PROBE: NOT DETECTED
FOLATE SERPL-MCNC: 20.5 NG/ML (ref 3.1–17.5)
GLOBULIN SER CALC-MCNC: 4.2 G/DL (ref 2.8–4.5)
GLUCOSE SERPL-MCNC: 105 MG/DL (ref 65–100)
GLUCOSE UR STRIP.AUTO-MCNC: NEGATIVE MG/DL
HADV DNA SPEC QL NAA+PROBE: NOT DETECTED
HCOV 229E RNA SPEC QL NAA+PROBE: NOT DETECTED
HCOV HKU1 RNA SPEC QL NAA+PROBE: NOT DETECTED
HCOV NL63 RNA SPEC QL NAA+PROBE: NOT DETECTED
HCOV OC43 RNA SPEC QL NAA+PROBE: NOT DETECTED
HCT VFR BLD AUTO: 16.5 % (ref 35.8–46.3)
HCT VFR BLD AUTO: 17 % (ref 35.8–46.3)
HGB BLD-MCNC: 5.3 G/DL (ref 11.7–15.4)
HGB BLD-MCNC: 5.7 G/DL (ref 11.7–15.4)
HGB UR QL STRIP: ABNORMAL
HISTORY CHECK: NORMAL
HMPV RNA SPEC QL NAA+PROBE: NOT DETECTED
HPIV1 RNA SPEC QL NAA+PROBE: NOT DETECTED
HPIV2 RNA SPEC QL NAA+PROBE: NOT DETECTED
HPIV3 RNA SPEC QL NAA+PROBE: NOT DETECTED
HPIV4 RNA SPEC QL NAA+PROBE: NOT DETECTED
IMM GRANULOCYTES # BLD AUTO: 0.1 K/UL (ref 0–0.5)
IMM GRANULOCYTES NFR BLD AUTO: 6 % (ref 0–5)
IRON SATN MFR SERPL: 16 %
IRON SERPL-MCNC: 36 UG/DL (ref 35–150)
KETONES UR QL STRIP.AUTO: ABNORMAL MG/DL
LACTATE SERPL-SCNC: 1 MMOL/L (ref 0.4–2)
LACTATE SERPL-SCNC: 1.6 MMOL/L (ref 0.4–2)
LEUKOCYTE ESTERASE UR QL STRIP.AUTO: ABNORMAL
LYMPHOCYTES # BLD: 0.8 K/UL (ref 0.5–4.6)
LYMPHOCYTES NFR BLD: 53 % (ref 13–44)
M PNEUMO DNA SPEC QL NAA+PROBE: NOT DETECTED
MCH RBC QN AUTO: 32.4 PG (ref 26.1–32.9)
MCHC RBC AUTO-ENTMCNC: 33.5 G/DL (ref 31.4–35)
MCV RBC AUTO: 96.6 FL (ref 82–102)
MONOCYTES # BLD: 0.3 K/UL (ref 0.1–1.3)
MONOCYTES NFR BLD: 20 % (ref 4–12)
MUCOUS THREADS URNS QL MICRO: ABNORMAL /LPF
NEUTS SEG # BLD: 0.3 K/UL (ref 1.7–8.2)
NEUTS SEG NFR BLD: 19 % (ref 43–78)
NITRITE UR QL STRIP.AUTO: NEGATIVE
NRBC # BLD: 0.02 K/UL (ref 0–0.2)
OTHER OBSERVATIONS: ABNORMAL
PH UR STRIP: 5.5 (ref 5–9)
PLATELET # BLD AUTO: 7 K/UL (ref 150–450)
PMV BLD AUTO: ABNORMAL FL (ref 9.4–12.3)
POTASSIUM SERPL-SCNC: 3.6 MMOL/L (ref 3.5–5.1)
PROCALCITONIN SERPL-MCNC: 0.21 NG/ML (ref 0–0.49)
PROT SERPL-MCNC: 7.3 G/DL (ref 6.3–8.2)
PROT UR STRIP-MCNC: 30 MG/DL
RBC # BLD AUTO: 1.76 M/UL (ref 4.05–5.2)
RSV RNA SPEC QL NAA+PROBE: NOT DETECTED
RV+EV RNA SPEC QL NAA+PROBE: NOT DETECTED
SARS-COV-2 RDRP RESP QL NAA+PROBE: NOT DETECTED
SARS-COV-2 RNA RESP QL NAA+PROBE: NOT DETECTED
SODIUM SERPL-SCNC: 131 MMOL/L (ref 133–143)
SOURCE: NORMAL
SP GR UR REFRACTOMETRY: 1.02 (ref 1–1.02)
T4 FREE SERPL-MCNC: 1 NG/DL (ref 0.78–1.46)
TIBC SERPL-MCNC: 231 UG/DL (ref 250–450)
TSH W FREE THYROID IF ABNORMAL: 7.22 UIU/ML (ref 0.36–3.74)
UROBILINOGEN UR QL STRIP.AUTO: 1 EU/DL (ref 0.2–1)
VIT B12 SERPL-MCNC: 1244 PG/ML (ref 193–986)
WBC # BLD AUTO: 1.6 K/UL (ref 4.3–11.1)
WBC URNS QL MICRO: ABNORMAL /HPF

## 2023-10-27 PROCEDURE — 99214 OFFICE O/P EST MOD 30 MIN: CPT | Performed by: NURSE PRACTITIONER

## 2023-10-27 PROCEDURE — 85018 HEMOGLOBIN: CPT

## 2023-10-27 PROCEDURE — 87086 URINE CULTURE/COLONY COUNT: CPT

## 2023-10-27 PROCEDURE — G8484 FLU IMMUNIZE NO ADMIN: HCPCS | Performed by: NURSE PRACTITIONER

## 2023-10-27 PROCEDURE — 2580000003 HC RX 258: Performed by: EMERGENCY MEDICINE

## 2023-10-27 PROCEDURE — 87635 SARS-COV-2 COVID-19 AMP PRB: CPT

## 2023-10-27 PROCEDURE — 6360000002 HC RX W HCPCS: Performed by: EMERGENCY MEDICINE

## 2023-10-27 PROCEDURE — 83550 IRON BINDING TEST: CPT

## 2023-10-27 PROCEDURE — 6370000000 HC RX 637 (ALT 250 FOR IP): Performed by: FAMILY MEDICINE

## 2023-10-27 PROCEDURE — 82746 ASSAY OF FOLIC ACID SERUM: CPT

## 2023-10-27 PROCEDURE — 1123F ACP DISCUSS/DSCN MKR DOCD: CPT | Performed by: NURSE PRACTITIONER

## 2023-10-27 PROCEDURE — 3017F COLORECTAL CA SCREEN DOC REV: CPT | Performed by: NURSE PRACTITIONER

## 2023-10-27 PROCEDURE — 80053 COMPREHEN METABOLIC PANEL: CPT

## 2023-10-27 PROCEDURE — 86870 RBC ANTIBODY IDENTIFICATION: CPT

## 2023-10-27 PROCEDURE — G8427 DOCREV CUR MEDS BY ELIG CLIN: HCPCS | Performed by: NURSE PRACTITIONER

## 2023-10-27 PROCEDURE — 86922 COMPATIBILITY TEST ANTIGLOB: CPT

## 2023-10-27 PROCEDURE — 93005 ELECTROCARDIOGRAM TRACING: CPT | Performed by: EMERGENCY MEDICINE

## 2023-10-27 PROCEDURE — 84439 ASSAY OF FREE THYROXINE: CPT

## 2023-10-27 PROCEDURE — 86905 BLOOD TYPING RBC ANTIGENS: CPT

## 2023-10-27 PROCEDURE — P9016 RBC LEUKOCYTES REDUCED: HCPCS

## 2023-10-27 PROCEDURE — 83540 ASSAY OF IRON: CPT

## 2023-10-27 PROCEDURE — 85025 COMPLETE CBC W/AUTO DIFF WBC: CPT

## 2023-10-27 PROCEDURE — 93010 ELECTROCARDIOGRAM REPORT: CPT | Performed by: INTERNAL MEDICINE

## 2023-10-27 PROCEDURE — 87154 CUL TYP ID BLD PTHGN 6+ TRGT: CPT

## 2023-10-27 PROCEDURE — 99285 EMERGENCY DEPT VISIT HI MDM: CPT

## 2023-10-27 PROCEDURE — 86920 COMPATIBILITY TEST SPIN: CPT

## 2023-10-27 PROCEDURE — 85014 HEMATOCRIT: CPT

## 2023-10-27 PROCEDURE — 87040 BLOOD CULTURE FOR BACTERIA: CPT

## 2023-10-27 PROCEDURE — 87205 SMEAR GRAM STAIN: CPT

## 2023-10-27 PROCEDURE — 71045 X-RAY EXAM CHEST 1 VIEW: CPT

## 2023-10-27 PROCEDURE — G8417 CALC BMI ABV UP PARAM F/U: HCPCS | Performed by: NURSE PRACTITIONER

## 2023-10-27 PROCEDURE — 86901 BLOOD TYPING SEROLOGIC RH(D): CPT

## 2023-10-27 PROCEDURE — 3074F SYST BP LT 130 MM HG: CPT | Performed by: NURSE PRACTITIONER

## 2023-10-27 PROCEDURE — G8399 PT W/DXA RESULTS DOCUMENT: HCPCS | Performed by: NURSE PRACTITIONER

## 2023-10-27 PROCEDURE — 96365 THER/PROPH/DIAG IV INF INIT: CPT

## 2023-10-27 PROCEDURE — 81001 URINALYSIS AUTO W/SCOPE: CPT

## 2023-10-27 PROCEDURE — 82607 VITAMIN B-12: CPT

## 2023-10-27 PROCEDURE — 6360000002 HC RX W HCPCS: Performed by: FAMILY MEDICINE

## 2023-10-27 PROCEDURE — 82728 ASSAY OF FERRITIN: CPT

## 2023-10-27 PROCEDURE — 84443 ASSAY THYROID STIM HORMONE: CPT

## 2023-10-27 PROCEDURE — 86921 COMPATIBILITY TEST INCUBATE: CPT

## 2023-10-27 PROCEDURE — 86902 BLOOD TYPE ANTIGEN DONOR EA: CPT

## 2023-10-27 PROCEDURE — 1036F TOBACCO NON-USER: CPT | Performed by: NURSE PRACTITIONER

## 2023-10-27 PROCEDURE — 3078F DIAST BP <80 MM HG: CPT | Performed by: NURSE PRACTITIONER

## 2023-10-27 PROCEDURE — 36415 COLL VENOUS BLD VENIPUNCTURE: CPT

## 2023-10-27 PROCEDURE — 86850 RBC ANTIBODY SCREEN: CPT

## 2023-10-27 PROCEDURE — 36430 TRANSFUSION BLD/BLD COMPNT: CPT

## 2023-10-27 PROCEDURE — 84145 PROCALCITONIN (PCT): CPT

## 2023-10-27 PROCEDURE — 30233N1 TRANSFUSION OF NONAUTOLOGOUS RED BLOOD CELLS INTO PERIPHERAL VEIN, PERCUTANEOUS APPROACH: ICD-10-PCS | Performed by: INTERNAL MEDICINE

## 2023-10-27 PROCEDURE — 86900 BLOOD TYPING SEROLOGIC ABO: CPT

## 2023-10-27 PROCEDURE — 1090F PRES/ABSN URINE INCON ASSESS: CPT | Performed by: NURSE PRACTITIONER

## 2023-10-27 PROCEDURE — 83605 ASSAY OF LACTIC ACID: CPT

## 2023-10-27 PROCEDURE — 2580000003 HC RX 258: Performed by: FAMILY MEDICINE

## 2023-10-27 PROCEDURE — 1100000000 HC RM PRIVATE

## 2023-10-27 PROCEDURE — 2500000003 HC RX 250 WO HCPCS: Performed by: FAMILY MEDICINE

## 2023-10-27 PROCEDURE — 0202U NFCT DS 22 TRGT SARS-COV-2: CPT

## 2023-10-27 PROCEDURE — 87502 INFLUENZA DNA AMP PROBE: CPT

## 2023-10-27 RX ORDER — MONTELUKAST SODIUM 10 MG/1
10 TABLET ORAL DAILY
Status: DISPENSED | OUTPATIENT
Start: 2023-10-28

## 2023-10-27 RX ORDER — POLYETHYLENE GLYCOL 3350 17 G/17G
17 POWDER, FOR SOLUTION ORAL DAILY PRN
Status: ACTIVE | OUTPATIENT
Start: 2023-10-27

## 2023-10-27 RX ORDER — HYDROCODONE BITARTRATE AND ACETAMINOPHEN 5; 325 MG/1; MG/1
1 TABLET ORAL EVERY 6 HOURS PRN
Status: DISPENSED | OUTPATIENT
Start: 2023-10-27

## 2023-10-27 RX ORDER — IPRATROPIUM BROMIDE 42 UG/1
2 SPRAY, METERED NASAL 2 TIMES DAILY
Status: DISPENSED | OUTPATIENT
Start: 2023-10-27

## 2023-10-27 RX ORDER — DOXYCYCLINE HYCLATE 100 MG/1
100 CAPSULE ORAL EVERY 12 HOURS SCHEDULED
Status: CANCELLED | OUTPATIENT
Start: 2023-10-27 | End: 2023-11-01

## 2023-10-27 RX ORDER — SODIUM CHLORIDE, SODIUM LACTATE, POTASSIUM CHLORIDE, CALCIUM CHLORIDE 600; 310; 30; 20 MG/100ML; MG/100ML; MG/100ML; MG/100ML
INJECTION, SOLUTION INTRAVENOUS CONTINUOUS
Status: DISCONTINUED | OUTPATIENT
Start: 2023-10-27 | End: 2023-10-30

## 2023-10-27 RX ORDER — ACETAMINOPHEN 325 MG/1
650 TABLET ORAL EVERY 6 HOURS PRN
Status: DISPENSED | OUTPATIENT
Start: 2023-10-27

## 2023-10-27 RX ORDER — FAMOTIDINE 20 MG/1
40 TABLET, FILM COATED ORAL
Status: DISPENSED | OUTPATIENT
Start: 2023-10-27

## 2023-10-27 RX ORDER — ROSUVASTATIN CALCIUM 10 MG/1
10 TABLET, COATED ORAL DAILY
Status: DISPENSED | OUTPATIENT
Start: 2023-10-28

## 2023-10-27 RX ORDER — LEVOTHYROXINE SODIUM 0.12 MG/1
125 TABLET ORAL
Status: DISPENSED | OUTPATIENT
Start: 2023-10-28

## 2023-10-27 RX ORDER — SODIUM CHLORIDE, SODIUM LACTATE, POTASSIUM CHLORIDE, AND CALCIUM CHLORIDE .6; .31; .03; .02 G/100ML; G/100ML; G/100ML; G/100ML
500 INJECTION, SOLUTION INTRAVENOUS ONCE
Status: COMPLETED | OUTPATIENT
Start: 2023-10-27 | End: 2023-10-27

## 2023-10-27 RX ORDER — PROCHLORPERAZINE MALEATE 10 MG
10 TABLET ORAL EVERY 6 HOURS PRN
Status: DISPENSED | OUTPATIENT
Start: 2023-10-27

## 2023-10-27 RX ORDER — POLYETHYLENE GLYCOL 3350 17 G/17G
17 POWDER, FOR SOLUTION ORAL DAILY
Status: DISPENSED | OUTPATIENT
Start: 2023-10-28

## 2023-10-27 RX ORDER — SODIUM CHLORIDE 9 MG/ML
INJECTION, SOLUTION INTRAVENOUS PRN
Status: DISCONTINUED | OUTPATIENT
Start: 2023-10-27 | End: 2023-11-01

## 2023-10-27 RX ORDER — MAGNESIUM HYDROXIDE/ALUMINUM HYDROXICE/SIMETHICONE 120; 1200; 1200 MG/30ML; MG/30ML; MG/30ML
30 SUSPENSION ORAL EVERY 6 HOURS PRN
Status: DISPENSED | OUTPATIENT
Start: 2023-10-27

## 2023-10-27 RX ORDER — TIZANIDINE 2 MG/1
4 TABLET ORAL NIGHTLY PRN
Status: DISPENSED | OUTPATIENT
Start: 2023-10-27

## 2023-10-27 RX ORDER — ONDANSETRON 2 MG/ML
4 INJECTION INTRAMUSCULAR; INTRAVENOUS EVERY 6 HOURS PRN
Status: DISPENSED | OUTPATIENT
Start: 2023-10-27

## 2023-10-27 RX ORDER — ONDANSETRON 2 MG/ML
4 INJECTION INTRAMUSCULAR; INTRAVENOUS ONCE
Status: COMPLETED | OUTPATIENT
Start: 2023-10-27 | End: 2023-10-27

## 2023-10-27 RX ORDER — FLUOXETINE HYDROCHLORIDE 20 MG/1
20 CAPSULE ORAL 2 TIMES DAILY
Status: DISPENSED | OUTPATIENT
Start: 2023-10-27

## 2023-10-27 RX ORDER — PANTOPRAZOLE SODIUM 40 MG/1
40 TABLET, DELAYED RELEASE ORAL 2 TIMES DAILY
Status: DISPENSED | OUTPATIENT
Start: 2023-10-27

## 2023-10-27 RX ORDER — ONDANSETRON 4 MG/1
4 TABLET, ORALLY DISINTEGRATING ORAL EVERY 6 HOURS PRN
Status: ACTIVE | OUTPATIENT
Start: 2023-10-27

## 2023-10-27 RX ORDER — NALOXONE HYDROCHLORIDE 0.4 MG/ML
0.4 INJECTION, SOLUTION INTRAMUSCULAR; INTRAVENOUS; SUBCUTANEOUS PRN
Status: ACTIVE | OUTPATIENT
Start: 2023-10-27

## 2023-10-27 RX ORDER — HYDROXYZINE HYDROCHLORIDE 25 MG/1
25 TABLET, FILM COATED ORAL 3 TIMES DAILY PRN
Status: DISPENSED | OUTPATIENT
Start: 2023-10-27

## 2023-10-27 RX ORDER — SENNA AND DOCUSATE SODIUM 50; 8.6 MG/1; MG/1
1 TABLET, FILM COATED ORAL
Status: DISPENSED | OUTPATIENT
Start: 2023-10-27

## 2023-10-27 RX ORDER — ALBUTEROL SULFATE 2.5 MG/3ML
2.5 SOLUTION RESPIRATORY (INHALATION) EVERY 6 HOURS PRN
Status: DISPENSED | OUTPATIENT
Start: 2023-10-27

## 2023-10-27 RX ORDER — SODIUM CHLORIDE 0.9 % (FLUSH) 0.9 %
5-40 SYRINGE (ML) INJECTION EVERY 12 HOURS SCHEDULED
Status: ACTIVE | OUTPATIENT
Start: 2023-10-27

## 2023-10-27 RX ORDER — ACETAMINOPHEN 650 MG/1
650 SUPPOSITORY RECTAL EVERY 6 HOURS PRN
Status: ACTIVE | OUTPATIENT
Start: 2023-10-27

## 2023-10-27 RX ORDER — SODIUM CHLORIDE, SODIUM LACTATE, POTASSIUM CHLORIDE, AND CALCIUM CHLORIDE .6; .31; .03; .02 G/100ML; G/100ML; G/100ML; G/100ML
30 INJECTION, SOLUTION INTRAVENOUS ONCE
Status: COMPLETED | OUTPATIENT
Start: 2023-10-27 | End: 2023-10-27

## 2023-10-27 RX ORDER — GUAIFENESIN/DEXTROMETHORPHAN 100-10MG/5
10 SYRUP ORAL EVERY 4 HOURS PRN
Status: DISPENSED | OUTPATIENT
Start: 2023-10-27

## 2023-10-27 RX ORDER — SODIUM CHLORIDE 0.9 % (FLUSH) 0.9 %
5-40 SYRINGE (ML) INJECTION PRN
Status: ACTIVE | OUTPATIENT
Start: 2023-10-27

## 2023-10-27 RX ORDER — MORPHINE SULFATE 15 MG/1
15 TABLET, FILM COATED, EXTENDED RELEASE ORAL EVERY 12 HOURS SCHEDULED
Status: DISPENSED | OUTPATIENT
Start: 2023-10-27

## 2023-10-27 RX ADMIN — FLUOXETINE 20 MG: 20 CAPSULE ORAL at 21:43

## 2023-10-27 RX ADMIN — DOCUSATE SODIUM 50 MG AND SENNOSIDES 8.6 MG 1 TABLET: 8.6; 5 TABLET, FILM COATED ORAL at 21:43

## 2023-10-27 RX ADMIN — ONDANSETRON 4 MG: 2 INJECTION INTRAMUSCULAR; INTRAVENOUS at 17:41

## 2023-10-27 RX ADMIN — CEFEPIME 2000 MG: 2 INJECTION, POWDER, FOR SOLUTION INTRAVENOUS at 15:08

## 2023-10-27 RX ADMIN — DOXYCYCLINE 100 MG: 100 INJECTION, POWDER, LYOPHILIZED, FOR SOLUTION INTRAVENOUS at 18:09

## 2023-10-27 RX ADMIN — ACETAMINOPHEN 650 MG: 325 TABLET ORAL at 22:18

## 2023-10-27 RX ADMIN — PANTOPRAZOLE SODIUM 40 MG: 40 TABLET, DELAYED RELEASE ORAL at 22:04

## 2023-10-27 RX ADMIN — SODIUM CHLORIDE, PRESERVATIVE FREE 10 ML: 5 INJECTION INTRAVENOUS at 21:44

## 2023-10-27 RX ADMIN — VANCOMYCIN HYDROCHLORIDE 2000 MG: 10 INJECTION, POWDER, LYOPHILIZED, FOR SOLUTION INTRAVENOUS at 16:05

## 2023-10-27 RX ADMIN — SODIUM CHLORIDE, POTASSIUM CHLORIDE, SODIUM LACTATE AND CALCIUM CHLORIDE 2328 ML: 600; 310; 30; 20 INJECTION, SOLUTION INTRAVENOUS at 15:08

## 2023-10-27 RX ADMIN — SODIUM CHLORIDE, POTASSIUM CHLORIDE, SODIUM LACTATE AND CALCIUM CHLORIDE 500 ML: 600; 310; 30; 20 INJECTION, SOLUTION INTRAVENOUS at 18:12

## 2023-10-27 RX ADMIN — SODIUM CHLORIDE, POTASSIUM CHLORIDE, SODIUM LACTATE AND CALCIUM CHLORIDE: 600; 310; 30; 20 INJECTION, SOLUTION INTRAVENOUS at 22:00

## 2023-10-27 RX ADMIN — FAMOTIDINE 40 MG: 20 TABLET, FILM COATED ORAL at 21:43

## 2023-10-27 RX ADMIN — SALINE NASAL SPRAY 2 SPRAY: 1.5 SOLUTION NASAL at 22:02

## 2023-10-27 RX ADMIN — IPRATROPIUM BROMIDE 2 SPRAY: 42 SPRAY, METERED NASAL at 22:00

## 2023-10-27 ASSESSMENT — LIFESTYLE VARIABLES
HOW OFTEN DO YOU HAVE A DRINK CONTAINING ALCOHOL: NEVER
HOW MANY STANDARD DRINKS CONTAINING ALCOHOL DO YOU HAVE ON A TYPICAL DAY: PATIENT DOES NOT DRINK

## 2023-10-27 ASSESSMENT — ENCOUNTER SYMPTOMS
DIARRHEA: 0
COUGH: 1
VOMITING: 0
ABDOMINAL PAIN: 0
DIARRHEA: 0
NAUSEA: 1
WHEEZING: 0
SORE THROAT: 1
ABDOMINAL PAIN: 0
VOMITING: 1
SHORTNESS OF BREATH: 0
NAUSEA: 1

## 2023-10-27 ASSESSMENT — PAIN - FUNCTIONAL ASSESSMENT: PAIN_FUNCTIONAL_ASSESSMENT: 0-10

## 2023-10-27 ASSESSMENT — PAIN DESCRIPTION - DESCRIPTORS: DESCRIPTORS: ACHING

## 2023-10-27 ASSESSMENT — PAIN SCALES - GENERAL: PAINLEVEL_OUTOF10: 6

## 2023-10-27 ASSESSMENT — PAIN DESCRIPTION - LOCATION: LOCATION: BREAST;ABDOMEN

## 2023-10-27 NOTE — ED TRIAGE NOTES
Pt brought in by EMS from PCP's office c/o weakness, lethargy, and fever (x4-5 days). (+) cough/congestion. (+) N/V. Pt reports platelets were down to 17 last time she had blood work drawn. Pt reports taking oral chemo for leukemia for about 4 wks. A&O 4/4. GCS 15. Speech clear. Pt resting quietly in bed, attached to bedside monitor.

## 2023-10-27 NOTE — ED PROVIDER NOTES
Emergency Department Provider Note       PCP: Arie Pichardo MD   Age: 71 y.o. Sex: female     DISPOSITION Decision To Admit 10/27/2023 03:24:26 PM       ICD-10-CM    1. Neutropenic fever ([address])  D70.9     R50.81       2. Pancytopenia [facility])  J91.771           Medical Decision Making     Complexity of Problems Addressed:  1 or more chronic illnesses with a severe exacerbation or progression. Patient requires evaluation for possible sepsis and neutropenic fever. Data Reviewed and Analyzed:   I independently ordered and reviewed each unique test.         I independently ordered and interpreted the ED EKG in the absence of a Cardiologist.    Rate: 81  EKG Interpretation: EKG Interpretation: sinus rhythm, no evidence of arrhythmia and no acute changes  ST Segments: Normal ST segments - NO STEMI          Discussion of management or test interpretation. Patient with pancytopenia and neutropenia specifically. Will require transfusion of red blood cells as well as platelets. Discussed with hospitalist for admission. Risk of Complications and/or Morbidity of Patient Management:  Shared medical decision making was utilized in creating the patients health plan today. Is this patient to be included in the SEP-1 core measure due to severe sepsis or septic shock? No Exclusion criteria - the patient is NOT to be included for SEP-1 Core Measure due to: May have criteria for sepsis, but does not meet criteria for severe sepsis or septic shock      History      Patient with a past medical history skin significant for mild dysplastic syndrome and recent infusion of platelets presents complaining of fever and chills as well as a cough and congestion for the past few weeks. She denies diarrhea but has had some vomiting as well. The cough has been productive of sputum. She reports generalized malaise and fatigue. Review of Systems   Constitutional:  Positive for chills, fatigue and fever.    HENT: Food in the Last Year: Never true     [address] in the Last Year: Never true   Transportation Needs: Unknown (8/7/2023)    PRAPARE - Transportation     Lack of Transportation (Non-Medical): No   Housing Stability: Unknown (8/7/2023)    Housing Stability Vital Sign     Unstable Housing in the Last Year: No        Previous Medications    ADALIMUMAB (HUMIRA PEN) 40 MG/0.4ML PNKT    Inject 40 mg into the skin every 14 days    AMLODIPINE (NORVASC) 5 MG TABLET    TAKE 1 TABLET BY MOUTH EVERY DAY    ASPIRIN 81 MG EC TABLET    Take by mouth daily    DICLOFENAC SODIUM (VOLTAREN) 1 % GEL    Apply topically 4 times daily    DOCUSATE (COLACE, DULCOLAX) 100 MG CAPS    Take 200 mg by mouth 2 times daily    FAMOTIDINE (PEPCID) 40 MG TABLET    Take 1 tablet by mouth every evening    FLUOXETINE (PROZAC) 20 MG CAPSULE    TAKE 1 CAPSULE BY MOUTH TWICE A DAY    HYDROCODONE-ACETAMINOPHEN (NORCO)  MG PER TABLET    Take 1 tablet by mouth every 6 hours as needed. HYDROXYZINE HCL (ATARAX) 25 MG TABLET    TAKE 1 TABLET BY MOUTH IN THE MORNING AND IN THE EVENING    LENALIDOMIDE (REVLIMID) 10 MG CHEMO CAPSULE    Take 1 capsule by mouth daily for 28 days    LEVOTHYROXINE (SYNTHROID) 125 MCG TABLET    TAKE 1 TABLET BY MOUTH EVERY DAY BEFORE BREAKFAST    MONTELUKAST (SINGULAIR) 10 MG TABLET    TAKE 1 TABLET BY MOUTH EVERY DAY    MORPHINE (MS CONTIN) 15 MG EXTENDED RELEASE TABLET    TAKE 1 TABLET BY MOUTH ONCE A DAY AT BEDTIME X 30 DAYS    ONDANSETRON (ZOFRAN-ODT) 4 MG DISINTEGRATING TABLET    Take 1 tablet by mouth 3 times daily as needed for Nausea or Vomiting    PANTOPRAZOLE (PROTONIX) 40 MG TABLET    TAKE 1 TABLET BY MOUTH TWICE A DAY    PROCHLORPERAZINE (COMPAZINE) 10 MG TABLET    Take 1 tablet by mouth every 6 hours as needed (nausea)    ROSUVASTATIN (CRESTOR) 10 MG TABLET    TAKE 1 TABLET BY MOUTH EVERY DAY    TIZANIDINE (ZANAFLEX) 4 MG TABLET    Take 1 pill at bedtime as needed.         Results for orders placed or performed

## 2023-10-27 NOTE — PROGRESS NOTES
TRANSFER - IN REPORT:    Verbal report received from CHINO Campa on [employer]  being received from ED for routine progression of patient care      Report consisted of patient's Situation, Background, Assessment and   Recommendations(SBAR). Information from the following report(s) Nurse Handoff Report, ED Encounter Summary, ED SBAR, [facility], and Recent Results was reviewed with the receiving nurse. Opportunity for questions and clarification was provided. Assessment completed upon patient's arrival to unit and care assumed.

## 2023-10-27 NOTE — ED NOTES
TRANSFER - OUT REPORT:    Verbal report given to Britney Shaw RN on Tequila Rule  being transferred to 5th floor for routine progression of patient care       Report consisted of patient's Situation, Background, Assessment and   Recommendations(SBAR). Information from the following report(s) Nurse Handoff Report was reviewed with the receiving nurse. [place] Fall Assessment:    Presents to emergency department  because of falls (Syncope, seizure, or loss of consciousness): No  Age > 70: Yes  Altered Mental Status, Intoxication with alcohol or substance confusion (Disorientation, impaired judgment, poor safety awaremess, or inability to follow instructions): No  Impaired Mobility: Ambulates or transfers with assistive devices or assistance; Unable to ambulate or transer.: No  Nursing Judgement: Yes          Lines:   Peripheral IV 10/27/23 Right Antecubital (Active)       Peripheral IV 10/27/23 Left;Proximal Forearm (Active)        Opportunity for questions and clarification was provided.       Patient transported with:  Remington Dodd RN  10/27/23 4395

## 2023-10-27 NOTE — H&P
Hospitalist History and Physical   Admit Date:  10/27/2023  2:12 PM   Name:  Andrew Moreno   Age:  71 y.o. Sex:  female  :  1950   MRN:  [id number]   Room:  [facility]/    Presenting/Chief Complaint: Fever and Fatigue     Reason(s) for Admission: Neutropenic fever ([address]) [D70.9, R50.81]     History of Present Illness:   Andrew Moreno is a 71 y.o. female with medical history of obesity, MDS on revlimid, Psoriatic arthritis on humira, h/o overian cancer, HTN, HLD, GERD, pill dysphagia, and hypothyroidism who presented  from PCP office via EMS with c/o weakness, lethargy and fever x 4-5 days assocaited with cough, congestion, sore throat, n/v. Reportedly Tmax 102.5 @ home. Noted to be T100 @ PCP office. Sick grandchild at home with similar symptoms who's father works with EMS.  provides majority of hx d/t patients malaise and lethargy. States she has progressived worsened over the last week with significant weakness, fatigue, nausea, productive cough with rhinorrhea and congestion as well as sore throat. Has chronic constipation and has noticied bright red blood in toilet. No diarrhea. She has SOB worse with activity. Has been unable to reach oncology office regarding symptoms. Noticed blood in tissue after blowing nose. reports irritated feeling nasal cavity. Of note, was seen in ED ~2wks ago after being found to have thrombocytopenia Plt 17K when scheduled for OP     EGD. She rec'd 1 pack of platelets at that time. In ED, Tmax 100.1 HR 92 /43 94% on 2 L     Labs: WBC 1.6K Hgb 5.7 HCT 17 Plt 7K Sna 131 albumin 3.1 rapid covid/influenza negative. CXR no acute findings     She rec'd LR 2328 cc bolus, cefepime 2g IV, and vancomycin in the ED. Assessment & Plan:       Neutropenic fever - suspect related to respiratory infection   Tmax reported 102.5 @ home,    -  cont Cefepime/vancomycin & add doxycline for atypical coverage pending RVP and MRSA PCR screen   - MAP 62. Bolus additional  cc while awaiting blood products   - defer Neupogen use to oncology   - IVF bolus PRN (Goal CVP 8-12 / JVP 10-15, MAP>65, UOP>0.5mL/kg/hr)  - Neutropenic precautions  - Follow-up blood+urine cultures  - Discharge after confirm negative blood cultures, afebrile >24 hours, ANC>500    If persistent or recurrent fever >4 days without clear source, consider    - CT Thorax to assess for pulmonary nodules (likely Aspergillus mold)    - CT Sinuses? - Serum galactomannan (may get false positive if already received Zosyn)    - Empiric anti-fungal (Voriconazole or Caspofungin)    Pancytopenia - 1 Uprbc and 2 unit platelets ordered in ED. FU post transfusion CBC. Hold ASA. Check b12/folate/FE and hemolysis labs. Suspect r/t MDS, chemotherapy and BM suppression related to suspected sepsis. Anemia possibly also r/t GIB     Rectal bleeding - hold ASA. Possibly r/t chronic constipation and straining. Trend h/h. Consider GI eval pending clinical progression. MDS - hold PO chemotherapy. Consult oncology. GERD // Esophageal dysphasia - EGD recently canceled d/t severe thrombocytopenia. Cont PPI BID, Pepcid qhs. Soft diet, GERD precautions. SLP to see. Chronic pain - morphine ER 15 mg BID, norco 5 q6h prn. PDMP reviewed. Cont muscle relaxer qhs prn     Mood d/o - cont fluoxetine     Constipation due to pain medication - polyethylene glycol, pericolace qhs, hold for diarrhea. Class 1 Obesity - associated with unintentional weight loss. Hypothyroidism - TSH mildly elevated. FT4 wnl. Repeat TFTs in 8 weeks OP. Cont current synthroid dose    HTN - hold amlodipine pending stable VSS     HLD - cont crestor     Allergic rhinitis - cont singulair     Psoratic arthritis - hold humira until infection clears. FU rhemu OP to discuss regimen. Reviewed last OV on 6/20/23. Immunosuppressed - hold humira as above     Patient is critically ill.   Without intervention, there is a high probability of Order Specific Question:   CAP duration of therapy     Answer: Other     Order Specific Question:   Other CAP Duration     Answer:   1 dose       I have personally reviewed labs and tests:  Recent Labs:  Recent Results (from the past 24 hour(s))   EKG 12 Lead    Collection Time: 10/27/23  2:15 PM   Result Value Ref Range    Ventricular Rate 81 BPM    Atrial Rate 82 BPM    P-R Interval 147 ms    QRS Duration 88 ms    Q-T Interval 424 ms    QTc Calculation (Bazett) 493 ms    P Axis 27 degrees    R Axis 41 degrees    T Axis -5 degrees    Diagnosis       Sinus rhythm  Abnormal R-wave progression, early transition  Borderline repol abnormality, diffuse leads  Minimal ST elevation, inferior leads  Borderline prolonged QT interval     Comprehensive Metabolic Panel    Collection Time: 10/27/23  2:30 PM   Result Value Ref Range    Sodium 131 (L) 133 - 143 mmol/L    Potassium 3.6 3.5 - 5.1 mmol/L    Chloride 98 (L) 101 - 110 mmol/L    CO2 25 21 - 32 mmol/L    Anion Gap 8 2 - 11 mmol/L    Glucose 105 (H) 65 - 100 mg/dL    BUN 14 8 - 23 MG/DL    Creatinine 0.90 0.6 - 1.0 MG/DL    Est, Glom Filt Rate >60 >60 ml/min/1.73m2    Calcium 8.7 8.3 - 10.4 MG/DL    Total Bilirubin 1.1 0.2 - 1.1 MG/DL    ALT 14 12 - 65 U/L    AST 25 15 - 37 U/L    Alk Phosphatase 54 50 - 136 U/L    Total Protein 7.3 6.3 - 8.2 g/dL    Albumin 3.1 (L) 3.2 - 4.6 g/dL    Globulin 4.2 2.8 - 4.5 g/dL    Albumin/Globulin Ratio 0.7 0.4 - 1.6     CBC with Auto Differential    Collection Time: 10/27/23  2:30 PM   Result Value Ref Range    WBC 1.6 (LL) 4.3 - 11.1 K/uL    RBC 1.76 (L) 4.05 - 5.2 M/uL    Hemoglobin 5.7 (LL) 11.7 - 15.4 g/dL    Hematocrit 17.0 (L) 35.8 - 46.3 %    MCV 96.6 82 - 102 FL    MCH 32.4 26.1 - 32.9 PG    MCHC 33.5 31.4 - 35.0 g/dL    RDW 16.5 (H) 11.9 - 14.6 %    Platelets 7 (LL) 515 - 450 K/uL    MPV Unable to calculate. Recommend adding IPF.  9.4 - 12.3 FL    nRBC 0.02 0.0 - 0.2 K/uL    Differential Type AUTOMATED      Neutrophils % 19 (L)

## 2023-10-27 NOTE — TELEPHONE ENCOUNTER
Called both numbers on file to schedule ov prior to rescheduling EGD. No answer and vm not set up on one and vm is full on the other.

## 2023-10-27 NOTE — PROGRESS NOTES
[address], [address] [place] [address]  Phone [phone]  Fax:  [phone]    Patient: Mindy Hernandez  YOB: 1950  Patient Age 71 y.o. Patient sex: female  Medical Record:  [id number]  Visit Date: 10/27/23    [facility] Note     No chief complaint on file. History of Present Illness:  Ms. Ricci Valero is a pleasant 78-year-old female with a past medical history as noted below who presents to office today for an annual Medicare wellness visit. Upon being triaged by nurse, patient stated that she has had a fever all week, highest has been 102.5. Temperature is 100.0 at today's visit. She was recently seen by the ED on 10/11/23, according to notes seen for \"was scheduled to have EGD today for chronic abdominal pain nausea and dysphagia. Platelets were found to be 17 and patient was given the option of going home or coming to the ER. Her  to come to the ER. Patient started chemotherapy for MDS a few days ago. \"  Patient was given platelet transfusion while at the ED, no fevers. Patient states today that she has had increased weakness, nausea, sore throat along with fever. Allergies:   Allergies   Allergen Reactions    Penicillins Hives    Sulfa Antibiotics Hives    Codeine Nausea And Vomiting       Current Medications:   Medications marked \"taking\" at this time:    Current Outpatient Medications:     lenalidomide (REVLIMID) 10 MG chemo capsule, Take 1 capsule by mouth daily for 28 days, Disp: 28 capsule, Rfl: 0    adalimumab (HUMIRA PEN) 40 MG/0.4ML PNKT, Inject 40 mg into the skin every 14 days, Disp: 6 each, Rfl: 0    ondansetron (ZOFRAN-ODT) 4 MG disintegrating tablet, Take 1 tablet by mouth 3 times daily as needed for Nausea or Vomiting, Disp: 30 tablet, Rfl: 1    prochlorperazine (COMPAZINE) 10 MG tablet, Take 1 tablet by mouth every 6 hours as needed (nausea), Disp: 120 tablet, Rfl: 1    pantoprazole (PROTONIX) 40 MG tablet, TAKE 1 TABLET BY

## 2023-10-28 LAB
ACCESSION NUMBER, LLC1M: ABNORMAL
ACINETOBACTER CALCOAC BAUMANNII COMPLEX BY PCR: NOT DETECTED
ALBUMIN SERPL-MCNC: 2.8 G/DL (ref 3.2–4.6)
ALBUMIN/GLOB SERPL: 0.7 (ref 0.4–1.6)
ALP SERPL-CCNC: 48 U/L (ref 50–136)
ALT SERPL-CCNC: 15 U/L (ref 12–65)
ANION GAP SERPL CALC-SCNC: 6 MMOL/L (ref 2–11)
APTT PPP: 21.5 SEC (ref 24.5–34.2)
AST SERPL-CCNC: 24 U/L (ref 15–37)
B FRAGILIS DNA BLD POS QL NAA+NON-PROBE: NOT DETECTED
BASOPHILS # BLD: 0.1 K/UL (ref 0–0.2)
BASOPHILS NFR BLD: 5 % (ref 0–2)
BILIRUB DIRECT SERPL-MCNC: 0.3 MG/DL
BILIRUB INDIRECT SERPL-MCNC: 1 MG/DL (ref 0–1.1)
BILIRUB SERPL-MCNC: 1.3 MG/DL (ref 0.2–1.1)
BILIRUB SERPL-MCNC: 2.1 MG/DL (ref 0.2–1.1)
BIOFIRE TEST COMMENT: ABNORMAL
BUN SERPL-MCNC: 13 MG/DL (ref 8–23)
C ALBICANS DNA BLD POS QL NAA+NON-PROBE: NOT DETECTED
C AURIS DNA BLD POS QL NAA+NON-PROBE: NOT DETECTED
C GATTII+NEOFOR DNA BLD POS QL NAA+N-PRB: NOT DETECTED
C GLABRATA DNA BLD POS QL NAA+NON-PROBE: NOT DETECTED
C KRUSEI DNA BLD POS QL NAA+NON-PROBE: NOT DETECTED
C PARAP DNA BLD POS QL NAA+NON-PROBE: NOT DETECTED
C TROPICLS DNA BLD POS QL NAA+NON-PROBE: NOT DETECTED
CALCIUM SERPL-MCNC: 8.4 MG/DL (ref 8.3–10.4)
CHLORIDE SERPL-SCNC: 105 MMOL/L (ref 101–110)
CO2 SERPL-SCNC: 24 MMOL/L (ref 21–32)
CREAT SERPL-MCNC: 0.9 MG/DL (ref 0.6–1)
D DIMER PPP FEU-MCNC: 0.61 UG/ML(FEU)
DIFFERENTIAL METHOD BLD: ABNORMAL
E CLOAC COMP DNA BLD POS NAA+NON-PROBE: NOT DETECTED
E COLI DNA BLD POS QL NAA+NON-PROBE: NOT DETECTED
E FAECALIS DNA BLD POS QL NAA+NON-PROBE: NOT DETECTED
E FAECIUM DNA BLD POS QL NAA+NON-PROBE: NOT DETECTED
ENTEROBACTERALES DNA BLD POS NAA+N-PRB: NOT DETECTED
EOSINOPHIL # BLD: 0 K/UL (ref 0–0.8)
EOSINOPHIL NFR BLD: 0 % (ref 0.5–7.8)
ERYTHROCYTE [DISTWIDTH] IN BLOOD BY AUTOMATED COUNT: 18.2 % (ref 11.9–14.6)
FIBRINOGEN PPP-MCNC: 332 MG/DL (ref 190–501)
GLOBULIN SER CALC-MCNC: 3.9 G/DL (ref 2.8–4.5)
GLUCOSE SERPL-MCNC: 100 MG/DL (ref 65–100)
GP B STREP DNA BLD POS QL NAA+NON-PROBE: NOT DETECTED
HAEM INFLU DNA BLD POS QL NAA+NON-PROBE: NOT DETECTED
HCT VFR BLD AUTO: 21.4 % (ref 35.8–46.3)
HCT VFR BLD AUTO: 22.4 % (ref 35.8–46.3)
HGB BLD-MCNC: 7.3 G/DL (ref 11.7–15.4)
HGB BLD-MCNC: 7.5 G/DL (ref 11.7–15.4)
HGB RETIC QN AUTO: 33 PG (ref 29–35)
HISTORY CHECK: NORMAL
IMM GRANULOCYTES # BLD AUTO: 0.1 K/UL (ref 0–0.5)
IMM GRANULOCYTES NFR BLD AUTO: 8 % (ref 0–5)
IMM RETICS NFR: 10.7 % (ref 3–15.9)
INR PPP: 1.1
K OXYTOCA DNA BLD POS QL NAA+NON-PROBE: NOT DETECTED
KLEBSIELLA SP DNA BLD POS QL NAA+NON-PRB: NOT DETECTED
KLEBSIELLA SP DNA BLD POS QL NAA+NON-PRB: NOT DETECTED
L MONOCYTOG DNA BLD POS QL NAA+NON-PROBE: NOT DETECTED
LYMPHOCYTES # BLD: 0.4 K/UL (ref 0.5–4.6)
LYMPHOCYTES NFR BLD: 38 % (ref 13–44)
MAGNESIUM SERPL-MCNC: 1.8 MG/DL (ref 1.8–2.4)
MCH RBC QN AUTO: 30.5 PG (ref 26.1–32.9)
MCHC RBC AUTO-ENTMCNC: 33.5 G/DL (ref 31.4–35)
MCV RBC AUTO: 91.1 FL (ref 82–102)
MECA+MECC ISLT/SPM QL: DETECTED
MONOCYTES # BLD: 0.3 K/UL (ref 0.1–1.3)
MONOCYTES NFR BLD: 22 % (ref 4–12)
N MEN DNA BLD POS QL NAA+NON-PROBE: NOT DETECTED
NEUTS SEG # BLD: 0.4 K/UL (ref 1.7–8.2)
NEUTS SEG NFR BLD: 27 % (ref 43–78)
NRBC # BLD: 0.02 K/UL (ref 0–0.2)
P AERUGINOSA DNA BLD POS NAA+NON-PROBE: NOT DETECTED
PLATELET # BLD AUTO: 3 K/UL (ref 150–450)
PLATELET # BLD AUTO: 5 K/UL (ref 150–450)
PLATELET COMMENT: ABNORMAL
PMV BLD AUTO: ABNORMAL FL (ref 9.4–12.3)
POTASSIUM SERPL-SCNC: 3.5 MMOL/L (ref 3.5–5.1)
PROT SERPL-MCNC: 6.7 G/DL (ref 6.3–8.2)
PROTEUS SP DNA BLD POS QL NAA+NON-PROBE: NOT DETECTED
PROTHROMBIN TIME: 14.8 SEC (ref 12.6–14.3)
RBC # BLD AUTO: 2.46 M/UL (ref 4.05–5.2)
RBC MORPH BLD: ABNORMAL
RESISTANT GENE TARGETS: ABNORMAL
RETICS # AUTO: 0.04 M/UL (ref 0.03–0.1)
RETICS/RBC NFR AUTO: 1.5 % (ref 0.3–2)
S AUREUS DNA BLD POS QL NAA+NON-PROBE: NOT DETECTED
S AUREUS+CONS DNA BLD POS NAA+NON-PROBE: DETECTED
S EPIDERMIDIS DNA BLD POS QL NAA+NON-PRB: DETECTED
S LUGDUNENSIS DNA BLD POS QL NAA+NON-PRB: NOT DETECTED
S MALTOPHILIA DNA BLD POS QL NAA+NON-PRB: NOT DETECTED
S MARCESCENS DNA BLD POS NAA+NON-PROBE: NOT DETECTED
S PNEUM DNA BLD POS QL NAA+NON-PROBE: NOT DETECTED
S PYO DNA BLD POS QL NAA+NON-PROBE: NOT DETECTED
SALMONELLA DNA BLD POS QL NAA+NON-PROBE: NOT DETECTED
SODIUM SERPL-SCNC: 135 MMOL/L (ref 133–143)
STREPTOCOCCUS DNA BLD POS NAA+NON-PROBE: NOT DETECTED
WBC # BLD AUTO: 1.3 K/UL (ref 4.3–11.1)
WBC MORPH BLD: ABNORMAL

## 2023-10-28 PROCEDURE — 97165 OT EVAL LOW COMPLEX 30 MIN: CPT

## 2023-10-28 PROCEDURE — 85384 FIBRINOGEN ACTIVITY: CPT

## 2023-10-28 PROCEDURE — 2580000003 HC RX 258: Performed by: FAMILY MEDICINE

## 2023-10-28 PROCEDURE — 2580000003 HC RX 258: Performed by: HOSPITALIST

## 2023-10-28 PROCEDURE — 80053 COMPREHEN METABOLIC PANEL: CPT

## 2023-10-28 PROCEDURE — 6360000002 HC RX W HCPCS: Performed by: FAMILY MEDICINE

## 2023-10-28 PROCEDURE — 85025 COMPLETE CBC W/AUTO DIFF WBC: CPT

## 2023-10-28 PROCEDURE — 30233R1 TRANSFUSION OF NONAUTOLOGOUS PLATELETS INTO PERIPHERAL VEIN, PERCUTANEOUS APPROACH: ICD-10-PCS | Performed by: INTERNAL MEDICINE

## 2023-10-28 PROCEDURE — 97535 SELF CARE MNGMENT TRAINING: CPT

## 2023-10-28 PROCEDURE — P9016 RBC LEUKOCYTES REDUCED: HCPCS

## 2023-10-28 PROCEDURE — 97161 PT EVAL LOW COMPLEX 20 MIN: CPT

## 2023-10-28 PROCEDURE — 85046 RETICYTE/HGB CONCENTRATE: CPT

## 2023-10-28 PROCEDURE — 99223 1ST HOSP IP/OBS HIGH 75: CPT | Performed by: INTERNAL MEDICINE

## 2023-10-28 PROCEDURE — 6370000000 HC RX 637 (ALT 250 FOR IP): Performed by: FAMILY MEDICINE

## 2023-10-28 PROCEDURE — 82248 BILIRUBIN DIRECT: CPT

## 2023-10-28 PROCEDURE — 85018 HEMOGLOBIN: CPT

## 2023-10-28 PROCEDURE — 97112 NEUROMUSCULAR REEDUCATION: CPT

## 2023-10-28 PROCEDURE — 83735 ASSAY OF MAGNESIUM: CPT

## 2023-10-28 PROCEDURE — 36415 COLL VENOUS BLD VENIPUNCTURE: CPT

## 2023-10-28 PROCEDURE — 1100000000 HC RM PRIVATE

## 2023-10-28 PROCEDURE — 85730 THROMBOPLASTIN TIME PARTIAL: CPT

## 2023-10-28 PROCEDURE — 82247 BILIRUBIN TOTAL: CPT

## 2023-10-28 PROCEDURE — 6370000000 HC RX 637 (ALT 250 FOR IP): Performed by: NURSE PRACTITIONER

## 2023-10-28 PROCEDURE — 92610 EVALUATE SWALLOWING FUNCTION: CPT

## 2023-10-28 PROCEDURE — 85379 FIBRIN DEGRADATION QUANT: CPT

## 2023-10-28 PROCEDURE — 85049 AUTOMATED PLATELET COUNT: CPT

## 2023-10-28 PROCEDURE — 85014 HEMATOCRIT: CPT

## 2023-10-28 PROCEDURE — 85610 PROTHROMBIN TIME: CPT

## 2023-10-28 PROCEDURE — 36430 TRANSFUSION BLD/BLD COMPNT: CPT

## 2023-10-28 PROCEDURE — P9035 PLATELET PHERES LEUKOREDUCED: HCPCS

## 2023-10-28 PROCEDURE — 97530 THERAPEUTIC ACTIVITIES: CPT

## 2023-10-28 PROCEDURE — 83010 ASSAY OF HAPTOGLOBIN QUANT: CPT

## 2023-10-28 RX ORDER — SODIUM CHLORIDE 9 MG/ML
INJECTION, SOLUTION INTRAVENOUS PRN
Status: DISCONTINUED | OUTPATIENT
Start: 2023-10-28 | End: 2023-11-01

## 2023-10-28 RX ORDER — SODIUM CHLORIDE, SODIUM LACTATE, POTASSIUM CHLORIDE, AND CALCIUM CHLORIDE .6; .31; .03; .02 G/100ML; G/100ML; G/100ML; G/100ML
1000 INJECTION, SOLUTION INTRAVENOUS ONCE
Status: COMPLETED | OUTPATIENT
Start: 2023-10-28 | End: 2023-10-28

## 2023-10-28 RX ORDER — FERROUS SULFATE 325(65) MG
325 TABLET ORAL 2 TIMES DAILY WITH MEALS
Status: DISPENSED | OUTPATIENT
Start: 2023-10-28

## 2023-10-28 RX ORDER — NOREPINEPHRINE BITARTRATE 0.02 MG/ML
1-100 INJECTION, SOLUTION INTRAVENOUS CONTINUOUS
Status: DISCONTINUED | OUTPATIENT
Start: 2023-10-28 | End: 2023-10-30

## 2023-10-28 RX ADMIN — SODIUM CHLORIDE, POTASSIUM CHLORIDE, SODIUM LACTATE AND CALCIUM CHLORIDE: 600; 310; 30; 20 INJECTION, SOLUTION INTRAVENOUS at 11:37

## 2023-10-28 RX ADMIN — DOCUSATE SODIUM 50 MG AND SENNOSIDES 8.6 MG 1 TABLET: 8.6; 5 TABLET, FILM COATED ORAL at 20:22

## 2023-10-28 RX ADMIN — IPRATROPIUM BROMIDE 2 SPRAY: 42 SPRAY, METERED NASAL at 20:25

## 2023-10-28 RX ADMIN — MORPHINE SULFATE 15 MG: 15 TABLET, FILM COATED, EXTENDED RELEASE ORAL at 08:09

## 2023-10-28 RX ADMIN — HYDROXYZINE HYDROCHLORIDE 25 MG: 25 TABLET, FILM COATED ORAL at 12:58

## 2023-10-28 RX ADMIN — FERROUS SULFATE TAB 325 MG (65 MG ELEMENTAL FE) 325 MG: 325 (65 FE) TAB at 20:30

## 2023-10-28 RX ADMIN — TIZANIDINE 4 MG: 2 TABLET ORAL at 00:43

## 2023-10-28 RX ADMIN — VANCOMYCIN HYDROCHLORIDE 1000 MG: 1 INJECTION, POWDER, LYOPHILIZED, FOR SOLUTION INTRAVENOUS at 15:44

## 2023-10-28 RX ADMIN — SALINE NASAL SPRAY 2 SPRAY: 1.5 SOLUTION NASAL at 08:14

## 2023-10-28 RX ADMIN — ROSUVASTATIN CALCIUM 10 MG: 10 TABLET, FILM COATED ORAL at 08:09

## 2023-10-28 RX ADMIN — FLUOXETINE 20 MG: 20 CAPSULE ORAL at 08:09

## 2023-10-28 RX ADMIN — FAMOTIDINE 40 MG: 20 TABLET, FILM COATED ORAL at 20:22

## 2023-10-28 RX ADMIN — CEFEPIME 2000 MG: 2 INJECTION, POWDER, FOR SOLUTION INTRAVENOUS at 15:46

## 2023-10-28 RX ADMIN — FERROUS SULFATE TAB 325 MG (65 MG ELEMENTAL FE) 325 MG: 325 (65 FE) TAB at 12:58

## 2023-10-28 RX ADMIN — CEFEPIME 2000 MG: 2 INJECTION, POWDER, FOR SOLUTION INTRAVENOUS at 06:37

## 2023-10-28 RX ADMIN — SODIUM CHLORIDE, POTASSIUM CHLORIDE, SODIUM LACTATE AND CALCIUM CHLORIDE 1000 ML: 600; 310; 30; 20 INJECTION, SOLUTION INTRAVENOUS at 02:06

## 2023-10-28 RX ADMIN — SODIUM CHLORIDE, POTASSIUM CHLORIDE, SODIUM LACTATE AND CALCIUM CHLORIDE 1000 ML: 600; 310; 30; 20 INJECTION, SOLUTION INTRAVENOUS at 03:15

## 2023-10-28 RX ADMIN — PANTOPRAZOLE SODIUM 40 MG: 40 TABLET, DELAYED RELEASE ORAL at 08:00

## 2023-10-28 RX ADMIN — IPRATROPIUM BROMIDE 2 SPRAY: 42 SPRAY, METERED NASAL at 08:13

## 2023-10-28 RX ADMIN — SODIUM CHLORIDE, PRESERVATIVE FREE 10 ML: 5 INJECTION INTRAVENOUS at 20:23

## 2023-10-28 RX ADMIN — MONTELUKAST 10 MG: 10 TABLET, FILM COATED ORAL at 08:09

## 2023-10-28 RX ADMIN — FLUOXETINE 20 MG: 20 CAPSULE ORAL at 20:22

## 2023-10-28 RX ADMIN — SALINE NASAL SPRAY 2 SPRAY: 1.5 SOLUTION NASAL at 17:20

## 2023-10-28 RX ADMIN — LEVOTHYROXINE SODIUM 125 MCG: 0.12 TABLET ORAL at 08:00

## 2023-10-28 RX ADMIN — PANTOPRAZOLE SODIUM 40 MG: 40 TABLET, DELAYED RELEASE ORAL at 15:47

## 2023-10-28 RX ADMIN — SALINE NASAL SPRAY 2 SPRAY: 1.5 SOLUTION NASAL at 20:25

## 2023-10-28 RX ADMIN — ACETAMINOPHEN 650 MG: 325 TABLET ORAL at 12:58

## 2023-10-28 RX ADMIN — SODIUM CHLORIDE, PRESERVATIVE FREE 10 ML: 5 INJECTION INTRAVENOUS at 08:10

## 2023-10-28 ASSESSMENT — PAIN SCALES - GENERAL: PAINLEVEL_OUTOF10: 0

## 2023-10-28 NOTE — CONSULTS
[facility] Hematology and Oncology: Inpatient Hematology / Oncology History & Physical Note    Reason for Admission:    PCP Physician:  Simone Hernández MD    History of Present Illness:  Ms Lilliam Llamas is a 80yo woman admitted when brought by EMS from PCP office with weakness, lethargy, cough, congestion sore throat and N/V. PMHx: psoriatic arthritis on Humira, hx of ovarian cam HTN, HLD, GERD and hypothyroidism. She was admitted for further care. Tmax 102.5 at home. Sick grandchild at home whose father works w EMS. + chronic constipation with recent bld in toilet w BMs. No diarrhea. LOWRY. In ED T max 100.1, HR 92, /43, 94% on 2L. Wbc 1.6,  , Hb 5.3, plts 7. Rapid influenza/covid neg. CXR no acute findings. Of note, she is pt of Dr Viraj Rao with new dx of MDS. She presented to dr Viraj Rao on 8/24 for evaluation of gradual pancytopenia. At that time she reported eating poorly, wt loss. Recent CT at [facility] reportedly normal.  Rec Gi eval, labs and BMBx. 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 Dr Viraj Rao reviewed with pt risk of POD and transformation to leukemia. They reviewed tx options and elected to p/w Revlimid 10mg daily vs HMA due to 5q del. Tel note encounter indicates that pt started tx ~10/6. She was admitted w NF. Started on IVF, cef/vanc/doxy w sepsis workup. S/p 1 u PRBC and 2 unit plts. ASA on hold. Nutritional/hemolysis labs pending. We were asked for recs. Pt seen in ICU, feeling much better this am.  Plan to transfer to 5th floor. Review of Systems:   Constitutional + fever , + wt loss; + fatigue. HEENT Denies trauma, blurry vision, hearing loss, ear pain, mild epistaxis    Skin Denies lesions or rashes. Lungs + dyspnea, cough, sputum production, no  hemoptysis. Cardiovascular Denies chest pain, palpitations, or lower extremity edema.    Gastrointestinal Denies nausea or vomiting. + constipation   + BRBPR     Denies dysuria, frequency or hesitancy of urination. Neuro Denies headaches, visual changes or ataxia. Denies dizziness, tingling, tremors, sensory change, speech change   Hematology +  easy bruising or bleeding   Endo Denies heat/cold intolerance   MSK +  arthralgias     Psychiatric/Behavioral + depression          Allergies   Allergen Reactions    Penicillins Hives    Sulfa Antibiotics Hives    Codeine Nausea And Vomiting     Past Medical History:   Diagnosis Date    Arthritis     Autoimmune disease ([address])     skin changes, unknown name    Cancer ([address])     ovarian    Chronic pain     arthritis in back and legs    Coronary artery spasm ([address])     COVID 05/15/2022    Essential hypertension 2019    Gastritis     GERD (gastroesophageal reflux disease)     Hx antineoplastic chemo     Migraine headache     Psoriasis     Psychiatric disorder     anxiety and depression    Severe obesity (BMI 35.0-39.9) 2018    Thyroid disease     Diagnosed in      Past Surgical History:   Procedure Laterality Date    CARPAL TUNNEL RELEASE      GYN      ovaries    HYSTERECTOMY, TOTAL ABDOMINAL (CERVIX REMOVED)  Patriciabury      cardiac cath. Dx with spasms.     TUBAL LIGATION       Family History   Problem Relation Age of Onset    Parkinson's Disease Mother     Stroke Mother         Passed away in     No Known Problems Paternal Grandfather     No Known Problems Paternal Grandmother     No Known Problems Maternal Grandfather     No Known Problems Maternal Grandmother     Prostate Cancer Father          age 80     Cancer Father         Kidney     Social History     Socioeconomic History    Marital status:      Spouse name: Not on file    Number of children: Not on file    Years of education: Not on file    Highest education level: Not on file   Occupational History    Not on file   Tobacco Use    Smoking status: Never     Passive

## 2023-10-28 NOTE — PROGRESS NOTES
Lab called with positive blood cultures in aerobic bottle; gram positive cocci. Dr. Michelle Ramirez made aware and no new orders received.

## 2023-10-28 NOTE — PROGRESS NOTES
ACUTE OCCUPATIONAL THERAPY GOALS:   (Developed with and agreed upon by patient and/or caregiver.)  1. Patient will complete lower body bathing and dressing with MOD I and adaptive equipment as needed. 2. Patient will complete toileting with MOD I.   3. Patient will tolerate 30 minutes of OT treatment with 1-2 rest breaks to increase activity tolerance for ADLs. 4. Patient will complete functional transfers with MOD I and adaptive equipment as needed. 5. Patient will complete functional mobility for household distances with MOD I and adaptive equipment as needed. 6. Patient will complete self-grooming while standing edge of sink with MOD I and adaptive equipment as needed. Timeframe: 7 visits       OCCUPATIONAL THERAPY Initial Assessment, Daily Note, and AM       OT Visit Days: 1   Acknowledge Orders  Time  OT Charge Capture  Rehab Caseload Tracker      Neutropenic Precautions    Uday Ortega is a 71 y.o. female   PRIMARY DIAGNOSIS: Neutropenic fever ([address])  Neutropenic fever ([address]) [D70.9, R50.81]  Pancytopenia ([address]) [G94.101]       Reason for Referral: Generalized Muscle Weakness (M62.81)  Difficulty in walking, Not elsewhere classified (R26.2)  Inpatient: Payor: Yael Nguyen / Plan: Cecilia Scriver / Product Type: *No Product type* /     ASSESSMENT:     REHAB RECOMMENDATIONS:   Recommendation to date pending progress:  Setting:  Home Health Therapy    Equipment:    To Be Determined     ASSESSMENT:  Ms. Tien Myers presents with deficits in overall strength, activity tolerance, activities of daily living performance, and functional mobility. Presents in ICU for neutropenic fever. Resting on 2L 02 however able to wean to RA during session (RN aware). Today, BUE are generally decreased but WFL. Min A for functional bed mobility/supine <> sit transfer to edge of bed; intact unsupported edge of bed sitting balance.  Min A to nelly socks while SBA to nelly gown as robe in preparation for OOB participated in functional mobility, functional transfer, and energy conservation training to increase independence, decrease assistance required, increase activity tolerance, and increase safety awareness.      TREATMENT GRID:  N/A    AFTER TREATMENT PRECAUTIONS: Alarm Activated, Call light within reach, Chair, Needs within reach, and RN notified    INTERDISCIPLINARY COLLABORATION:  RN/ PCT, PT/ PTA, and OT/ PETTY    EDUCATION:  Education Given To: Patient  Education Provided: Role of Therapy;Plan of Care  Barriers to Learning: None  Education Outcome: Demonstrated understanding;Verbalized understanding     TOTAL TREATMENT DURATION AND TIME:  Time In: 0559  Time Out: 0813  Minutes: Catracho Velazquez OT

## 2023-10-28 NOTE — PROGRESS NOTES
Hospitalist Progress Note   Admit Date:  10/27/2023  2:12 PM   Name:  Oswald Kussmaul   Age:  71 y.o. Sex:  female  :  1950   MRN:  [id number]   Room:  [facility]    Presenting/Chief Complaint: Fever and Fatigue     Reason(s) for Admission: Neutropenic fever ([address] [facility]) [D70.9, R50.81]  Pancytopenia [facility]) [D61.818]     Hospital Course:   Oswald Kussmaul is a 71 y.o. female with medical history of obesity, MDS on revlimid, Psoriatic arthritis on humira, h/o overian cancer, HTN, HLD, GERD, pill dysphagia, and hypothyroidism who presents with cough, congestion, sore throat, n/v.  Patient was also noted to be febrile at home. On admission patient was found to be neutropenic and will be admitted for treatment of neutropenic fever. On admission patient was noted to be severely anemic as well as hypotensive. She was given multiple units of blood and her blood pressure did improve. She was transferred to the ICU for further management. Patient did not receive any pressor support while in the unit. Subjective & 24hr Events:   Patient seen evaluated bedside this morning. Patient reports he feels slightly better after transfusions. She is having breakfast.  Denies any significant fevers, chills, shortness of breath, chest pain, nausea, vomiting today. Assessment & Plan:       Neutropenic fever  Unclear source at this time. Chest x-ray was clear. Respiratory panel was negative. Blood cultures are still pending at this time. Patient has been afebrile overnight. Nate 351 this AM.  -Continue with vancomycin/cefepime/doxycycline  -Follow-up blood cultures  -Follow-up oncology consult     Pancytopenia  MDS on Revlimid  Likely related to bone marrow suppression from chemotherapy meds and infection. Patient did report having some bloody bowel movements. Received 2 units PRBCs with third unit pending to be given today. Hemoglobin has responded appropriately as per last hemoglobin check.   Platelet Rectal Q6H PRN    lactated ringers IV soln infusion   IntraVENous Continuous    ondansetron (ZOFRAN-ODT) disintegrating tablet 4 mg  4 mg Oral Q6H PRN    Or    ondansetron (ZOFRAN) injection 4 mg  4 mg IntraVENous Q6H PRN    polyethylene glycol (GLYCOLAX) packet 17 g  17 g Oral Daily    sennosides-docusate sodium (SENOKOT-S) 8.6-50 MG tablet 1 tablet  1 tablet Oral QHS    aluminum & magnesium hydroxide-simethicone (MAALOX) 200-200-20 MG/5ML suspension 30 mL  30 mL Oral Q6H PRN    ceFEPIme (MAXIPIME) 2,000 mg in sodium chloride 0.9 % 100 mL IVPB (mini-bag)  2,000 mg IntraVENous Q12H    morphine (MS CONTIN) extended release tablet 15 mg  15 mg Oral 2 times per day    naloxone (NARCAN) injection 0.4 mg  0.4 mg IntraVENous PRN    HYDROcodone-acetaminophen (NORCO) 5-325 MG per tablet 1 tablet  1 tablet Oral Q6H PRN    hydrOXYzine HCl (ATARAX) tablet 25 mg  25 mg Oral TID PRN    tiZANidine (ZANAFLEX) tablet 4 mg  4 mg Oral Nightly PRN    diclofenac sodium (VOLTAREN) 1 % gel 4 g  4 g Topical 4x Daily PRN    phenol 1.4 % mouth spray 1 spray  1 spray Mouth/Throat Q2H PRN    magic (miracle) mouthwash  10 mL Swish & Spit 4x Daily PRN    sodium chloride (OCEAN, BABY AYR) 0.65 % nasal spray 2 spray  2 spray Each Nostril Q6H    albuterol (PROVENTIL) (2.5 MG/3ML) 0.083% nebulizer solution 2.5 mg  2.5 mg Nebulization Q6H PRN    ipratropium (ATROVENT) 0.06 % nasal spray 2 spray  2 spray Each Nostril BID    guaiFENesin-dextromethorphan (ROBITUSSIN DM) 100-10 MG/5ML syrup 10 mL  10 mL Oral Q4H PRN    nystatin (MYCOSTATIN) 013767 UNIT/ML suspension 500,000 Units  5 mL Oral 4x Daily PRN    vancomycin (VANCOCIN) 1,000 mg in sodium chloride 0.9 % 250 mL IVPB ([id number])  1,000 mg IntraVENous Q24H       Signed:  Rubens Fan MD    Part of this note may have been written by using a voice dictation software. The note has been proof read but may still contain some grammatical/other typographical errors.

## 2023-10-28 NOTE — PROGRESS NOTES
TRANSFER - OUT REPORT:    Verbal report given to Lisa Bone RN on [employer]  being transferred to ICU Rm. 3106 for urgent transfer       Report consisted of patient's Situation, Background, Assessment and   Recommendations(SBAR). Information from the following report(s) Nurse Handoff Report was reviewed with the receiving nurse. Lines:   Peripheral IV 10/27/23 Right Antecubital (Active)   Site Assessment Clean, dry & intact 10/28/23 0142   Line Status Infusing 10/28/23 0142   Line Care Connections checked and tightened 10/28/23 0142   Phlebitis Assessment No symptoms 10/28/23 0142   Infiltration Assessment 0 10/28/23 0142   Alcohol Cap Used Yes 10/28/23 0142   Dressing Status Clean, dry & intact 10/28/23 0142   Dressing Type Transparent 10/28/23 0142       Peripheral IV 10/27/23 Left;Proximal Forearm (Active)   Site Assessment Clean, dry & intact 10/28/23 0142   Line Status Infusing;Flushed 10/28/23 0142   Line Care Connections checked and tightened 10/28/23 0142   Phlebitis Assessment No symptoms 10/28/23 0142   Infiltration Assessment 0 10/28/23 0142   Alcohol Cap Used Yes 10/28/23 0142   Dressing Status Clean, dry & intact 10/28/23 0142   Dressing Type Transparent 10/28/23 0142        Opportunity for questions and clarification was provided.       Patient transported with:  Registered Nurse

## 2023-10-28 NOTE — CONSENT
Informed Consent for Blood Component Transfusion Note    I have discussed with the patient the rationale for blood component transfusion; its benefits in treating or preventing fatigue, organ damage, or death; and its risk which includes mild transfusion reactions, rare risk of blood borne infection, or more serious but rare reactions. I have discussed the alternatives to transfusion, including the risk and consequences of not receiving transfusion. The patient had an opportunity to ask questions and had agreed to proceed with transfusion of blood components.     Electronically signed by TIM Nguyen CNP on 10/27/23 at 10:01 PM EDT

## 2023-10-28 NOTE — PROGRESS NOTES
Patient's BP 79/47. Dr. Dayna Mcdonald notified. New order received to give another 1L LR bolus and transfer to ICU.

## 2023-10-28 NOTE — PROGRESS NOTES
If you need to see a   -  just ask the nurse to call us. We look forward to serving your family!!         Vance Stevenson

## 2023-10-28 NOTE — PROGRESS NOTES
ACUTE PHYSICAL THERAPY GOALS:   (Developed with and agreed upon by patient and/or caregiver. )  LTG:  (1.)Ms. Lonnie Patel will move from supine to sit and sit to supine , scoot up and down, and roll side to side in bed with INDEPENDENCE within 7 treatment day(s). (2.)Ms. Lonnie Patel will transfer from bed to chair and chair to bed with MODIFIED INDEPENDENCE using the least restrictive device within 7 treatment day(s). (3.)Ms. Lonnie Patel will ambulate with MODIFIED INDEPENDENCE for 500 feet with the least restrictive device within 7 treatment day(s). (4.)Ms. Lonnie Patel will participate in therapeutic activity/exercises x 25 minutes for increased activity tolerance within 7 treatment days. (5.)Ms. Lonnie Patel will perform standing static and dynamic balance activities x 15 minutes with SUPERVISION to improve safety within 7 day(s). ________________________________________________________________________________________________        PHYSICAL THERAPY Initial Assessment and AM  (Link to Caseload Tracking: PT Visit Days : 1  Acknowledge Orders  Time In/Out  PT Charge Capture  Rehab Caseload Tracker    Misael Du is a 71 y.o. female   PRIMARY DIAGNOSIS: Neutropenic fever ([address])  Neutropenic fever ([address]) [D70.9, R50.81]  Pancytopenia ([address]) [R58.793]       Reason for Referral: Generalized Muscle Weakness (M62.81)  Difficulty in walking, Not elsewhere classified (R26.2)  Inpatient: Payor: Ryan Lazar / Plan: Radha Hdez / Product Type: *No Product type* /     ASSESSMENT:     REHAB RECOMMENDATIONS:   Recommendation to date pending progress:  Setting:  Home Health Therapy    Equipment:    To Be Determined     ASSESSMENT:  Ms. Lonnie Patel was admitted to the hospital with c/o weakness and lethargy. Pt has a history of MDS and was on neutropenic precautions today. She presents to PT with [facility] and decreased strength in B LEs. Pt came to sitting on EOB with SBA and good sitting balance.   She was able to stand with CGA today COGNITION/  PERCEPTION: Intact Impaired (Comments):   Orientation [x]     Vision [x]     Hearing [x]     Cognition  [x]       MOBILITY: I Mod I S SBA CGA Min Mod Max Total  NT x2 Comments:   Bed Mobility    Rolling [] [] [] [] [] [] [] [] [] [] []    Supine to Sit [] [] [] [x] [] [] [] [] [] [] []    Scooting [] [] [] [x] [] [] [] [] [] [] []    Sit to Supine [] [] [] [] [] [] [] [] [] [] []    Transfers    Sit to Stand [] [] [] [] [x] [] [] [] [] [] []    Bed to Chair [] [] [] [] [x] [x] [] [] [] [] []    Stand to Sit [] [] [] [] [x] [] [] [] [] [] []     [] [] [] [] [] [] [] [] [] [] []    I=Independent, Mod I=Modified Independent, S=Supervision, SBA=Standby Assistance, CGA=Contact Guard Assistance,   Min=Minimal Assistance, Mod=Moderate Assistance, Max=Maximal Assistance, Total=Total Assistance, NT=Not Tested    GAIT: I Mod I S SBA CGA Min Mod Max Total  NT x2 Comments:   Level of Assistance [] [] [] [] [x] [x] [] [] [] [] []    Distance   80 feet    DME Rolling Walker    Gait Quality Decreased annabel , Decreased step length, and Trunk sway increased    Weightbearing Status Restrictions/Precautions  Restrictions/Precautions: Fall Risk    Stairs      I=Independent, Mod I=Modified Independent, S=Supervision, SBA=Standby Assistance, CGA=Contact Guard Assistance,   Min=Minimal Assistance, Mod=Moderate Assistance, Max=Maximal Assistance, Total=Total Assistance, NT=Not Tested    PLAN:   FREQUENCY AND DURATION: 3 times/week for duration of hospital stay or until stated goals are met, whichever comes first.    THERAPY PROGNOSIS: Good    PROBLEM LIST:   (Skilled intervention is medically necessary to address:)  Decreased Activity Tolerance  Decreased Balance  Decreased Gait Ability  Decreased Safety Awareness  Decreased Strength  Decreased Transfer Abilities INTERVENTIONS PLANNED:   (Benefits and precautions of physical therapy have been discussed with the patient.)  Therapeutic Activity  Therapeutic

## 2023-10-28 NOTE — PROGRESS NOTES
LTG: Patient will tolerate least restrictive oral diet without overt s/sx of airway compromise. Goal met 10/28  STG: Patient will tolerated easy to chew with thin liquids without overt s/sx of airway compromise. goal met 10/28    SPEECH LANGUAGE PATHOLOGY: DYSPHAGIA  Initial Assessment and Discharge    NAME: Marguerite Bauer  : 1950  MRN: [id number]    ADMISSION DATE: 10/27/2023  PRIMARY DIAGNOSIS: Neutropenic fever ([address])  Neutropenic fever ([address]) [D70.9, R50.81]  Pancytopenia ([address]) [D61.818]    ICD-10: Treatment Diagnosis: R13.11 Dysphagia, Oral Phase    RECOMMENDATIONS   Diet:  Diet Solids Recommendation: Easy to Chew  Liquid Consistency Recommendation: Thin    Medications: Whole with water (pureed large ones)     Compensatory Swallowing Strategies:        none      Patient continues to require skilled intervention:  No. Please re-consult if new concerns arise. ASSESSMENT    Dysphagia Diagnosis: Swallow function appears WFL  Dysphagia Impression : Pt tolerated thin via cup and straw, pureed, mixed and solid with no overt signs. sx of aspiration. Recommend continued diet per pt request. Pt reports large pills in pureed are easier. speech clear. cognition appears baseline. No further ST indicated at this time. GENERAL    History of Present Injury/Illness: Ms. Hillary Whalen  has a past medical history of Arthritis, Autoimmune disease ([address]), Cancer ([address]), Chronic pain, Coronary artery spasm ([address]), COVID, Essential hypertension, Gastritis, GERD (gastroesophageal reflux disease), Hx antineoplastic chemo, Migraine headache, Psoriasis, Psychiatric disorder, Severe obesity (BMI 35.0-39.9), and Thyroid disease. . She also  has a past surgical history that includes Hysterectomy, total abdominal (); Carpal tunnel release; pr unlisted procedure cardiac surgery; gyn; and Tubal ligation.        Behavior/Cognition: Alert  Communication Observation: Functional  Follows Directions: Simple       Prior Dysphagia History:

## 2023-10-29 PROBLEM — D64.9 ANEMIA REQUIRING TRANSFUSIONS: Status: ACTIVE | Noted: 2023-10-29

## 2023-10-29 LAB
ALBUMIN SERPL-MCNC: 2.7 G/DL (ref 3.2–4.6)
ALBUMIN/GLOB SERPL: 0.8 (ref 0.4–1.6)
ALP SERPL-CCNC: 44 U/L (ref 50–136)
ALT SERPL-CCNC: 15 U/L (ref 12–65)
ANION GAP SERPL CALC-SCNC: 7 MMOL/L (ref 2–11)
AST SERPL-CCNC: 23 U/L (ref 15–37)
BACTERIA SPEC CULT: ABNORMAL
BACTERIA SPEC CULT: ABNORMAL
BASOPHILS # BLD: 0 K/UL (ref 0–0.2)
BASOPHILS # BLD: 0.1 K/UL (ref 0–0.2)
BASOPHILS NFR BLD: 3 % (ref 0–2)
BASOPHILS NFR BLD: 4 % (ref 0–2)
BILIRUB SERPL-MCNC: 1.2 MG/DL (ref 0.2–1.1)
BLD PROD TYP BPU: NORMAL
BLOOD BANK CMNT PATIENT-IMP: NORMAL
BLOOD BANK DISPENSE STATUS: NORMAL
BPU ID: NORMAL
BUN SERPL-MCNC: 14 MG/DL (ref 8–23)
CALCIUM SERPL-MCNC: 8.3 MG/DL (ref 8.3–10.4)
CHLORIDE SERPL-SCNC: 109 MMOL/L (ref 101–110)
CO2 SERPL-SCNC: 24 MMOL/L (ref 21–32)
CREAT SERPL-MCNC: 0.8 MG/DL (ref 0.6–1)
DIFFERENTIAL METHOD BLD: ABNORMAL
DIFFERENTIAL METHOD BLD: ABNORMAL
EOSINOPHIL # BLD: 0 K/UL (ref 0–0.8)
EOSINOPHIL # BLD: 0 K/UL (ref 0–0.8)
EOSINOPHIL NFR BLD: 1 % (ref 0.5–7.8)
EOSINOPHIL NFR BLD: 1 % (ref 0.5–7.8)
ERYTHROCYTE [DISTWIDTH] IN BLOOD BY AUTOMATED COUNT: 17.4 % (ref 11.9–14.6)
ERYTHROCYTE [DISTWIDTH] IN BLOOD BY AUTOMATED COUNT: 18.5 % (ref 11.9–14.6)
GLOBULIN SER CALC-MCNC: 3.2 G/DL (ref 2.8–4.5)
GLUCOSE SERPL-MCNC: 95 MG/DL (ref 65–100)
GRAM STN SPEC: ABNORMAL
HCT VFR BLD AUTO: 20.3 % (ref 35.8–46.3)
HCT VFR BLD AUTO: 25 % (ref 35.8–46.3)
HGB BLD-MCNC: 6.9 G/DL (ref 11.7–15.4)
HGB BLD-MCNC: 8.5 G/DL (ref 11.7–15.4)
HISTORY CHECK: NORMAL
IMM GRANULOCYTES # BLD AUTO: 0.1 K/UL (ref 0–0.5)
IMM GRANULOCYTES # BLD AUTO: 0.1 K/UL (ref 0–0.5)
IMM GRANULOCYTES NFR BLD AUTO: 5 % (ref 0–5)
IMM GRANULOCYTES NFR BLD AUTO: 7 % (ref 0–5)
LYMPHOCYTES # BLD: 0.8 K/UL (ref 0.5–4.6)
LYMPHOCYTES # BLD: 1 K/UL (ref 0.5–4.6)
LYMPHOCYTES NFR BLD: 52 % (ref 13–44)
LYMPHOCYTES NFR BLD: 59 % (ref 13–44)
MAGNESIUM SERPL-MCNC: 2 MG/DL (ref 1.8–2.4)
MCH RBC QN AUTO: 31 PG (ref 26.1–32.9)
MCH RBC QN AUTO: 31.1 PG (ref 26.1–32.9)
MCHC RBC AUTO-ENTMCNC: 34 G/DL (ref 31.4–35)
MCHC RBC AUTO-ENTMCNC: 34 G/DL (ref 31.4–35)
MCV RBC AUTO: 91.2 FL (ref 82–102)
MCV RBC AUTO: 91.4 FL (ref 82–102)
MONOCYTES # BLD: 0.2 K/UL (ref 0.1–1.3)
MONOCYTES # BLD: 0.3 K/UL (ref 0.1–1.3)
MONOCYTES NFR BLD: 10 % (ref 4–12)
MONOCYTES NFR BLD: 16 % (ref 4–12)
MRSA DNA SPEC QL NAA+PROBE: NOT DETECTED
NEUTS SEG # BLD: 0.3 K/UL (ref 1.7–8.2)
NEUTS SEG # BLD: 0.4 K/UL (ref 1.7–8.2)
NEUTS SEG NFR BLD: 19 % (ref 43–78)
NEUTS SEG NFR BLD: 23 % (ref 43–78)
NRBC # BLD: 0 K/UL (ref 0–0.2)
NRBC # BLD: 0.02 K/UL (ref 0–0.2)
PLATELET # BLD AUTO: 3 K/UL (ref 150–450)
PLATELET # BLD AUTO: 5 K/UL (ref 150–450)
PLATELET # BLD AUTO: 5 K/UL (ref 150–450)
PLATELET # BLD AUTO: 8 K/UL (ref 150–450)
PLATELET COMMENT: ABNORMAL
PLATELET COMMENT: ABNORMAL
PLATELETS.RETICULATED NFR BLD AUTO: 18.1 % (ref 1.8–7.9)
PMV BLD AUTO: ABNORMAL FL (ref 9.4–12.3)
PMV BLD AUTO: ABNORMAL FL (ref 9.4–12.3)
POTASSIUM SERPL-SCNC: 3.3 MMOL/L (ref 3.5–5.1)
PROT SERPL-MCNC: 5.9 G/DL (ref 6.3–8.2)
RBC # BLD AUTO: 2.22 M/UL (ref 4.05–5.2)
RBC # BLD AUTO: 2.74 M/UL (ref 4.05–5.2)
RBC MORPH BLD: ABNORMAL
S AUREUS CPE NOSE QL NAA+PROBE: DETECTED
SERVICE CMNT-IMP: ABNORMAL
SODIUM SERPL-SCNC: 140 MMOL/L (ref 133–143)
UNIT DIVISION: 0
VANCOMYCIN SERPL-MCNC: 7.9 UG/ML
WBC # BLD AUTO: 1.6 K/UL (ref 4.3–11.1)
WBC # BLD AUTO: 1.7 K/UL (ref 4.3–11.1)
WBC MORPH BLD: ABNORMAL
WBC MORPH BLD: ABNORMAL

## 2023-10-29 PROCEDURE — 2580000003 HC RX 258: Performed by: INTERNAL MEDICINE

## 2023-10-29 PROCEDURE — 99233 SBSQ HOSP IP/OBS HIGH 50: CPT | Performed by: INTERNAL MEDICINE

## 2023-10-29 PROCEDURE — 86022 PLATELET ANTIBODIES: CPT

## 2023-10-29 PROCEDURE — 86922 COMPATIBILITY TEST ANTIGLOB: CPT

## 2023-10-29 PROCEDURE — 86921 COMPATIBILITY TEST INCUBATE: CPT

## 2023-10-29 PROCEDURE — 80053 COMPREHEN METABOLIC PANEL: CPT

## 2023-10-29 PROCEDURE — 85049 AUTOMATED PLATELET COUNT: CPT

## 2023-10-29 PROCEDURE — 6370000000 HC RX 637 (ALT 250 FOR IP): Performed by: FAMILY MEDICINE

## 2023-10-29 PROCEDURE — 36415 COLL VENOUS BLD VENIPUNCTURE: CPT

## 2023-10-29 PROCEDURE — 6360000002 HC RX W HCPCS: Performed by: FAMILY MEDICINE

## 2023-10-29 PROCEDURE — 6360000002 HC RX W HCPCS: Performed by: INTERNAL MEDICINE

## 2023-10-29 PROCEDURE — 6370000000 HC RX 637 (ALT 250 FOR IP): Performed by: INTERNAL MEDICINE

## 2023-10-29 PROCEDURE — 1100000000 HC RM PRIVATE

## 2023-10-29 PROCEDURE — 87040 BLOOD CULTURE FOR BACTERIA: CPT

## 2023-10-29 PROCEDURE — 36430 TRANSFUSION BLD/BLD COMPNT: CPT

## 2023-10-29 PROCEDURE — 85025 COMPLETE CBC W/AUTO DIFF WBC: CPT

## 2023-10-29 PROCEDURE — 80202 ASSAY OF VANCOMYCIN: CPT

## 2023-10-29 PROCEDURE — P9040 RBC LEUKOREDUCED IRRADIATED: HCPCS

## 2023-10-29 PROCEDURE — 2580000003 HC RX 258: Performed by: FAMILY MEDICINE

## 2023-10-29 PROCEDURE — 85055 RETICULATED PLATELET ASSAY: CPT

## 2023-10-29 PROCEDURE — 86644 CMV ANTIBODY: CPT

## 2023-10-29 PROCEDURE — 6370000000 HC RX 637 (ALT 250 FOR IP): Performed by: NURSE PRACTITIONER

## 2023-10-29 PROCEDURE — 83735 ASSAY OF MAGNESIUM: CPT

## 2023-10-29 PROCEDURE — 87641 MR-STAPH DNA AMP PROBE: CPT

## 2023-10-29 PROCEDURE — P9037 PLATE PHERES LEUKOREDU IRRAD: HCPCS

## 2023-10-29 RX ORDER — SODIUM CHLORIDE 9 MG/ML
INJECTION, SOLUTION INTRAVENOUS PRN
Status: DISCONTINUED | OUTPATIENT
Start: 2023-10-29 | End: 2023-10-29

## 2023-10-29 RX ORDER — POTASSIUM CHLORIDE 20 MEQ/1
20 TABLET, EXTENDED RELEASE ORAL 2 TIMES DAILY WITH MEALS
Status: DISPENSED | OUTPATIENT
Start: 2023-10-29

## 2023-10-29 RX ORDER — SODIUM CHLORIDE 9 MG/ML
INJECTION, SOLUTION INTRAVENOUS PRN
Status: DISCONTINUED | OUTPATIENT
Start: 2023-10-29 | End: 2023-11-01

## 2023-10-29 RX ORDER — SODIUM CHLORIDE 9 MG/ML
INJECTION, SOLUTION INTRAVENOUS PRN
Status: DISCONTINUED | OUTPATIENT
Start: 2023-10-29 | End: 2023-10-30

## 2023-10-29 RX ORDER — DIPHENHYDRAMINE HCL 25 MG
25 CAPSULE ORAL EVERY 6 HOURS PRN
Status: DISPENSED | OUTPATIENT
Start: 2023-10-29

## 2023-10-29 RX ADMIN — LEVOTHYROXINE SODIUM 125 MCG: 0.12 TABLET ORAL at 08:05

## 2023-10-29 RX ADMIN — FAMOTIDINE 40 MG: 20 TABLET, FILM COATED ORAL at 20:52

## 2023-10-29 RX ADMIN — HYDROCODONE BITARTRATE AND ACETAMINOPHEN 1 TABLET: 5; 325 TABLET ORAL at 01:48

## 2023-10-29 RX ADMIN — MONTELUKAST 10 MG: 10 TABLET, FILM COATED ORAL at 08:06

## 2023-10-29 RX ADMIN — SALINE NASAL SPRAY 2 SPRAY: 1.5 SOLUTION NASAL at 15:19

## 2023-10-29 RX ADMIN — FLUOXETINE 20 MG: 20 CAPSULE ORAL at 20:52

## 2023-10-29 RX ADMIN — CEFEPIME 2000 MG: 2 INJECTION, POWDER, FOR SOLUTION INTRAVENOUS at 04:07

## 2023-10-29 RX ADMIN — FERROUS SULFATE TAB 325 MG (65 MG ELEMENTAL FE) 325 MG: 325 (65 FE) TAB at 08:06

## 2023-10-29 RX ADMIN — DIPHENHYDRAMINE HYDROCHLORIDE 25 MG: 25 CAPSULE ORAL at 22:18

## 2023-10-29 RX ADMIN — IPRATROPIUM BROMIDE 2 SPRAY: 42 SPRAY, METERED NASAL at 20:55

## 2023-10-29 RX ADMIN — ROSUVASTATIN CALCIUM 10 MG: 10 TABLET, FILM COATED ORAL at 08:06

## 2023-10-29 RX ADMIN — MORPHINE SULFATE 15 MG: 15 TABLET, FILM COATED, EXTENDED RELEASE ORAL at 08:05

## 2023-10-29 RX ADMIN — CEFEPIME 2000 MG: 2 INJECTION, POWDER, FOR SOLUTION INTRAVENOUS at 15:18

## 2023-10-29 RX ADMIN — ACETAMINOPHEN 650 MG: 325 TABLET ORAL at 22:18

## 2023-10-29 RX ADMIN — FLUOXETINE 20 MG: 20 CAPSULE ORAL at 08:05

## 2023-10-29 RX ADMIN — SODIUM CHLORIDE, PRESERVATIVE FREE 10 ML: 5 INJECTION INTRAVENOUS at 08:08

## 2023-10-29 RX ADMIN — VANCOMYCIN HYDROCHLORIDE 1000 MG: 1 INJECTION, POWDER, LYOPHILIZED, FOR SOLUTION INTRAVENOUS at 11:25

## 2023-10-29 RX ADMIN — VANCOMYCIN HYDROCHLORIDE 1000 MG: 1 INJECTION, POWDER, LYOPHILIZED, FOR SOLUTION INTRAVENOUS at 23:02

## 2023-10-29 RX ADMIN — IPRATROPIUM BROMIDE 2 SPRAY: 42 SPRAY, METERED NASAL at 10:35

## 2023-10-29 RX ADMIN — PANTOPRAZOLE SODIUM 40 MG: 40 TABLET, DELAYED RELEASE ORAL at 16:35

## 2023-10-29 RX ADMIN — SALINE NASAL SPRAY 2 SPRAY: 1.5 SOLUTION NASAL at 20:54

## 2023-10-29 RX ADMIN — POTASSIUM CHLORIDE 20 MEQ: 1500 TABLET, EXTENDED RELEASE ORAL at 16:35

## 2023-10-29 RX ADMIN — SALINE NASAL SPRAY 2 SPRAY: 1.5 SOLUTION NASAL at 04:07

## 2023-10-29 RX ADMIN — SODIUM CHLORIDE, POTASSIUM CHLORIDE, SODIUM LACTATE AND CALCIUM CHLORIDE: 600; 310; 30; 20 INJECTION, SOLUTION INTRAVENOUS at 01:19

## 2023-10-29 RX ADMIN — DICLOFENAC SODIUM 4 G: 10 GEL TOPICAL at 05:41

## 2023-10-29 RX ADMIN — MORPHINE SULFATE 15 MG: 15 TABLET, FILM COATED, EXTENDED RELEASE ORAL at 20:52

## 2023-10-29 RX ADMIN — DIPHENHYDRAMINE HYDROCHLORIDE 25 MG: 25 CAPSULE ORAL at 10:35

## 2023-10-29 RX ADMIN — ACETAMINOPHEN 650 MG: 325 TABLET ORAL at 10:35

## 2023-10-29 RX ADMIN — SODIUM CHLORIDE, PRESERVATIVE FREE 10 ML: 5 INJECTION INTRAVENOUS at 20:54

## 2023-10-29 RX ADMIN — PANTOPRAZOLE SODIUM 40 MG: 40 TABLET, DELAYED RELEASE ORAL at 08:06

## 2023-10-29 RX ADMIN — POTASSIUM CHLORIDE 20 MEQ: 1500 TABLET, EXTENDED RELEASE ORAL at 09:34

## 2023-10-29 RX ADMIN — DOCUSATE SODIUM 50 MG AND SENNOSIDES 8.6 MG 1 TABLET: 8.6; 5 TABLET, FILM COATED ORAL at 20:52

## 2023-10-29 RX ADMIN — ACETAMINOPHEN 650 MG: 325 TABLET ORAL at 00:24

## 2023-10-29 ASSESSMENT — PAIN DESCRIPTION - LOCATION
LOCATION: BACK
LOCATION: LEG;BACK
LOCATION: BACK

## 2023-10-29 ASSESSMENT — PAIN DESCRIPTION - FREQUENCY: FREQUENCY: CONTINUOUS

## 2023-10-29 ASSESSMENT — PAIN SCALES - GENERAL
PAINLEVEL_OUTOF10: 10
PAINLEVEL_OUTOF10: 9
PAINLEVEL_OUTOF10: 6

## 2023-10-29 ASSESSMENT — PAIN - FUNCTIONAL ASSESSMENT
PAIN_FUNCTIONAL_ASSESSMENT: ACTIVITIES ARE NOT PREVENTED
PAIN_FUNCTIONAL_ASSESSMENT: ACTIVITIES ARE NOT PREVENTED

## 2023-10-29 ASSESSMENT — PAIN DESCRIPTION - ORIENTATION
ORIENTATION: LOWER;MID
ORIENTATION: LOWER;RIGHT;LEFT

## 2023-10-29 ASSESSMENT — PAIN DESCRIPTION - DESCRIPTORS
DESCRIPTORS: ACHING;DISCOMFORT
DESCRIPTORS: ACHING;DISCOMFORT

## 2023-10-29 ASSESSMENT — PAIN DESCRIPTION - ONSET: ONSET: GRADUAL

## 2023-10-29 ASSESSMENT — PAIN DESCRIPTION - PAIN TYPE: TYPE: ACUTE PAIN

## 2023-10-29 NOTE — PROGRESS NOTES
Hospitalist Progress Note   Admit Date:  10/27/2023  2:12 PM   Name:  Oswald Kussmaul   Age:  71 y.o. Sex:  female  :  1950   MRN:  [id number]   Room:  [facility]    Presenting/Chief Complaint: Fever and Fatigue     Reason(s) for Admission: Neutropenic fever ([address]) [D70.9, R50.81]  Pancytopenia [facility]) [D61.818]     Hospital Course:   Oswald Kussmaul is a 71 y.o. female with medical history of   Obesity   MDS on Revlimid   Psoriatic arthritis, on Humira   Ovrian cancer   Hypertension   Hyperlipidemia   GERD   Hypothyroidism     who presented with cough, congestion, sore throat, nausea, vomiting. Patient had fever at home. Patient is found to have pancytopenia on admission. She had BP on the low side, so she was moved to ICU. She did not require vasopressors. She is on Empiric IV antibiotics. Subjective & 24hr Events:     10/29/23   Afebrile in the past 24 hours. Patient is seen and examined at bedside. No fever now. No cough. No shortness of breath. No chest pain. No diarrhea. No abdominal pain       Assessment & Plan:     Principal Problem:    Neutropenic fever ([address])    MDS (myelodysplastic syndrome) (HCC)    Pancytopenia ([address])    Sepsis ([address])  Plan: On Empiric IV antibiotics. IV access   IV fluid for gentle hydration. Symptomatic treatments   Follow up on culture result. Oncology service is consulted and following. CBC is checked today. Dr. Dunia Pratt saw the patient and ordered Transfusion. Acute upper respiratory infection  Plan:   Chest x-ray shows no acute changes. Symptomatic treatments   Patient is on Empiric IV antibiotics already. Hypothyroidism, adult  Plan:   Patient is on Levothyroxine. Essential hypertension  Plan:   BP is on the low side. Patient is alert and oriented to place, time and person   Patient is able to sit up in bed. No sign of compromised organ circulation. Monitor. Patient is not on BP medications.    On (L) 250 - 450 ug/dL    TRANSFERRIN SATURATION 16 (L) >20 %   Ferritin    Collection Time: 10/27/23  2:30 PM   Result Value Ref Range    Ferritin 1,259 (H) 8 - 388 NG/ML   Culture, Blood, PCR ID Panel    Collection Time: 10/27/23  2:30 PM    Specimen: Blood   Result Value Ref Range    Accession Number        Bloody specimen received, interpret positive results with caution, bloody specimens can cause false positive or invalid results.     Enterococcus faecalis by PCR NOT DETECTED NOTDET      Enterococcus faecium by PCR NOT DETECTED NOTDET      Listeria monocytogenes by PCR NOT DETECTED NOTDET      STAPHYLOCOCCUS Detected (A) NOTDET      Staphylococcus Aureus NOT DETECTED NOTDET      Staphylococcus epidermidis by PCR Detected (A) NOTDET      Staphylococcus lugdunensis by PCR NOT DETECTED NOTDET      STREPTOCOCCUS NOT DETECTED NOTDET      Streptococcus agalactiae (Group B) NOT DETECTED NOTDET      Strep pneumoniae NOT DETECTED NOTDET      Strep pyogenes,(Grp. A) NOT DETECTED NOTDET      Acinetobacter calcoac baumannii complex by PCR NOT DETECTED NOTDET      Bacteroides fragilis by PCR NOT DETECTED NOTDET      Enterobacteriaceae by PCR NOT DETECTED NOTDET      Enterobacter cloacae complex by PCR NOT DETECTED NOTDET      Escherichia Coli NOT DETECTED NOTDET      Klebsiella aerogenes by PCR NOT DETECTED NOTDET      Klebsiella oxytoca by PCR NOT DETECTED NOTDET      Klebsiella pneumoniae group by PCR NOT DETECTED NOTDET      Proteus by PCR NOT DETECTED NOTDET      Salmonella species by PCR NOT DETECTED NOTDET      Serratia marcescens by PCR NOT DETECTED NOTDET      Haemophilus Influenzae by PCR NOT DETECTED NOTDET      Neisseria meningitidis by PCR NOT DETECTED NOTDET      Pseudomonas aeruginosa NOT DETECTED NOTDET      Stenotrophomonas maltophilia by PCR NOT DETECTED NOTDET      Candida albicans by PCR NOT DETECTED NOTDET      Candida auris by PCR NOT DETECTED NOTDET      Candida glabrata NOT DETECTED NOTDET      Candida mmol/L    Glucose 95 65 - 100 mg/dL    BUN 14 8 - 23 MG/DL    Creatinine 0.80 0.6 - 1.0 MG/DL    Est, Glom Filt Rate >60 >60 ml/min/1.73m2    Calcium 8.3 8.3 - 10.4 MG/DL    Total Bilirubin 1.2 (H) 0.2 - 1.1 MG/DL    ALT 15 12 - 65 U/L    AST 23 15 - 37 U/L    Alk Phosphatase 44 (L) 50 - 136 U/L    Total Protein 5.9 (L) 6.3 - 8.2 g/dL    Albumin 2.7 (L) 3.2 - 4.6 g/dL    Globulin 3.2 2.8 - 4.5 g/dL    Albumin/Globulin Ratio 0.8 0.4 - 1.6     Vancomycin Level, Random    Collection Time: 10/29/23  6:40 AM   Result Value Ref Range    Vancomycin Rm 7.9 UG/ML       Current Meds:  Current Facility-Administered Medications   Medication Dose Route Frequency    0.9 % sodium chloride infusion   IntraVENous PRN    0.9 % sodium chloride infusion   IntraVENous PRN    potassium chloride (KLOR-CON M) extended release tablet 20 mEq  20 mEq Oral BID WC    0.9 % sodium chloride infusion   IntraVENous PRN    norepinephrine (LEVOPHED) 4 mg in sodium chloride 0.9 % 250 mL infusion  1-100 mcg/min IntraVENous Continuous    0.9 % sodium chloride infusion   IntraVENous PRN    ferrous sulfate (IRON 325) tablet 325 mg  325 mg Oral BID WC    famotidine (PEPCID) tablet 40 mg  40 mg Oral QHS    FLUoxetine (PROZAC) capsule 20 mg  20 mg Oral BID    levothyroxine (SYNTHROID) tablet 125 mcg  125 mcg Oral QAM AC    montelukast (SINGULAIR) tablet 10 mg  10 mg Oral Daily    pantoprazole (PROTONIX) tablet 40 mg  40 mg Oral BID    prochlorperazine (COMPAZINE) tablet 10 mg  10 mg Oral Q6H PRN    rosuvastatin (CRESTOR) tablet 10 mg  10 mg Oral Daily    sodium chloride flush 0.9 % injection 5-40 mL  5-40 mL IntraVENous 2 times per day    sodium chloride flush 0.9 % injection 5-40 mL  5-40 mL IntraVENous PRN    0.9 % sodium chloride infusion   IntraVENous PRN    polyethylene glycol (GLYCOLAX) packet 17 g  17 g Oral Daily PRN    acetaminophen (TYLENOL) tablet 650 mg  650 mg Oral Q6H PRN    Or    acetaminophen (TYLENOL) suppository 650 mg  650 mg Rectal Q6H

## 2023-10-29 NOTE — PROGRESS NOTES
END OF SHIFT SUMMARY:    Significant vitals this shift:  none  Significant labs this shift:  hbg/plts  Tests performed this shift:  none  Orders to be followed up on:  none  Blood products given this shift:  2 units of plts and 1 unit RBCs  Additional events this shift:   Patient has crossmatched ordered to give when ready. Plts did not increase much so NP was called and crossmatched ordered. Kept educating patient about the importance of falls and movement since her counts are low. Encourage to call for the bathroom. I/Os:  +/- this shift:   10/29 0701 - 10/29 1900  In: 1917.1 [P.O.:240;  I.V.:401.2]  Out: -   Unmeasured Occurrences this Shift:  Urine yes, BM no, Emesis no      Bedside shift report given to oncoming nurse    Garon Boxer, RN

## 2023-10-29 NOTE — CONSULTS
Infectious Disease Consult  Today's Date: 10/29/2023   Admit Date: 10/27/2023  CHIEF Complaint:    Impression:   Neutropenic fever: neutropenia likely due MDS . Unclear cause fever  S. Epi bacteremia: currently 1/4 bottles so possible this is contaminant. No lines, no obvious skin sources  MDS with excess blasts and pan-cytopenia on recent Bmbx: started Revlimid around 10/6  Bleeding with epistaxis and rectal bleeding in BM: likley due low PLT and Hgb. Psoriatic arthritis on Humira: holding   Listed allergies to PCN and sulfa with hives: tolerating Cefepime  Chronic constipation with BM about once a week; has been this way her whole life. Has had colonoscopies in past. Last had some polyps; she is not sure when next due. Nausea: has been present for months: some heartburn but not often. Was due for EGD recently but held for thrombocytopenia. No overt GI bleeding to suggest PUD with bleed. Plan:   Continue both Vanc and Cefepime  Follow initial blood cx, 1/4 could be a contaminant so would not narrow coverage just yet. Repeat blood cx today  Revlimid usually not overtly associated with increased infection risk but Humira can be and would definitely hold it for now. ROS not revealing for alternative causes or suggestion unusual infection.    Watch for any signs bleeding: can cause fever  Consider CT abdomen/pelvis with contrast given complaints from GI tract; upper and ? lower: had CT 6 weeks ago at [facility] that was normal.   ID following    Anti-infectives:   Vanc 10/27-  Cefepime 10/27-  Doxy x 1 10/27    Subjective:   Date of Consultation:  October 29, 2023  Date of Admission: 10/27/2023   Referring Provider: Oncology  Reason for Consult: neutropenic fever with S. Epi bacteremia    Patient is a 71 y.o. female with a history of recently diagnosed MDS with pancytopenia and treatment with Revlimd around 10/6 and Psoriatic Arthritis on Humira  who was admitted on 10/27/2023 with complaints of weakness and fever from PCP office. Reports fever for a week at home, up to 102.5 with sick grandchild exposure. Their father works for EMS. Also reported nausea and sore throat and chronic constipation. PMH also includes obesity and history of ovarian cancer. At PCP temp 100, /64, HR 92. Noted ED visit 10/11 with complaints of nausea/and dysphagia. Transfused and sent home. ED this time, temp 99.1, 98/41, 83, RR normal, sats mid 90's. WBC 1.6 (1.4-1.5 recently with PMN % upper teens to low 20's), Hgb 5.7, PLT 7. Na 131, BUN 14, CT 0.9. Transaminases normal. Ua negative. PCXR read as negative. Admitted on Vanc and Cefepime. Blood cx (peripheral) from 10/27 with 1/4 bottles with PCR id S. Epi caring Mec A gene. Temps down a little. Tm first 24 hours 102.7, last 24 hours 100.5. WBC about same. Has been given blood and PLT. BP improved but still low normal, No reported lines. ROS: Has tooth pain; dentist recommended capping but could not afford. Pain is with eating mostly. Thinks has had some enlarged submandibular LN. Some nose bleeding but not heavy. No  complaints; nothing like when had UTI as younger women. No vaginal bleeding. No hemoptysis. No MSK complaints, joints of LE are the ones that Psoriatic Arthritis mostly affects. Chronic low back pain, nothing new. Ingrown nails both feet but keeps well cut back. Grandson with possibly some kind of URI recently. He and son live with patient. S one works for EMS and she says has been sniffing a lot and looks tired.      Patient Active Problem List   Diagnosis    Hypothyroidism, adult    Essential hypertension    Chronic hand pain    Psoriatic arthritis ([address])    Multiple allergies    Psoriasis, guttate    Other chest pain    Coronary artery spasm (HCC)    Chronic midline low back pain without sciatica    History of ovarian cancer    Chronic pain of multiple joints    Esophageal reflux    Anxiety and depression    Mixed hyperlipidemia    Class 1 obesity

## 2023-10-29 NOTE — PROGRESS NOTES
Already spoke to Dr Alli Burris about plt level and hgb.   Order placed and recheck lab after transfuse

## 2023-10-29 NOTE — PROGRESS NOTES
[address] [facility] Hematology & Oncology        Inpatient Hematology / Oncology Progress Note    Reason for Admission:  Neutropenic fever ([address]) [D70.9, R50.81]  Pancytopenia (HCC) [D61.818]    24 Hour Events:  Tmax 100.5, VSS  No bleeding  Plt 3K s/p 2 units  Hgb 6.9  WBC 1.6  BC+ staph epidermidis, MRSA DNA pend    Feeling improved    Transfusions: PRBCs/Platelets  Replacements: None    ROS:  Constitutional: Positive for fever, fatigue; negative for fever, chills. CV: Negative for chest pain, palpitations, edema. Respiratory: +cough Negative for dyspnea, wheezing. GI: Negative for nausea, abdominal pain, diarrhea. 10 point review of systems is otherwise negative with the exception of the elements mentioned above in the HPI. Allergies   Allergen Reactions    Penicillins Hives    Sulfa Antibiotics Hives    Codeine Nausea And Vomiting     Past Medical History:   Diagnosis Date    Arthritis     Autoimmune disease ([address])     skin changes, unknown name    Cancer ([address]) 1990    ovarian    Chronic pain     arthritis in back and legs    Coronary artery spasm ([address])     COVID 05/15/2022    Essential hypertension 11/8/2019    Gastritis     GERD (gastroesophageal reflux disease)     Hx antineoplastic chemo     Migraine headache     Psoriasis     Psychiatric disorder     anxiety and depression    Severe obesity (BMI 35.0-39.9) 6/26/2018    Thyroid disease     Diagnosed in 1996     Past Surgical History:   Procedure Laterality Date    CARPAL TUNNEL RELEASE      GYN      ovaries    HYSTERECTOMY, TOTAL ABDOMINAL (CERVIX REMOVED)  Patriciabury      cardiac cath. Dx with spasms.     TUBAL LIGATION       Family History   Problem Relation Age of Onset    Parkinson's Disease Mother     Stroke Mother         Passed away in 1969    No Known Problems Paternal Grandfather     No Known Problems Paternal Grandmother     No Known Problems Maternal Grandfather     No Known Problems Maternal Grandmother Collection Time: 10/28/23 10:30 PM   Result Value Ref Range    Blood Bank Comment       [facility] [facility] CALLED 5TH FLOOR @ 1282 ON 10.28.23  [facility]    Unit Number [id number]     Product Code Blood Bank [facility],LR2     Unit Divison 00     Dispense Status Blood Bank TRANSFUSED    Platelet Count    Collection Time: 10/29/23  1:29 AM   Result Value Ref Range    Platelets 5 (LL) 288 - 450 K/uL   MRSA/Staph Aureas DNA    Collection Time: 10/29/23  6:35 AM    Specimen: Nasal Swab   Result Value Ref Range    MRSA by PCR Not detected NOTD      SA by PCR Detected (A) NOTD     CBC with Auto Differential    Collection Time: 10/29/23  6:40 AM   Result Value Ref Range    WBC 1.6 (LL) 4.3 - 11.1 K/uL    RBC 2.22 (L) 4.05 - 5.2 M/uL    Hemoglobin 6.9 (LL) 11.7 - 15.4 g/dL    Hematocrit 20.3 (L) 35.8 - 46.3 %    MCV 91.4 82 - 102 FL    MCH 31.1 26.1 - 32.9 PG    MCHC 34.0 31.4 - 35.0 g/dL    RDW 18.5 (H) 11.9 - 14.6 %    Platelets 3 (LL) 311 - 450 K/uL    MPV Unable to calculate. Recommend adding IPF.  9.4 - 12.3 FL    nRBC 0.02 0.0 - 0.2 K/uL    Neutrophils % 23 (L) 43 - 78 %    Lymphocytes % 52 (H) 13 - 44 %    Monocytes % 16 (H) 4.0 - 12.0 %    Eosinophils % 1 0.5 - 7.8 %    Basophils % 3 (H) 0.0 - 2.0 %    Immature Granulocytes 5 0.0 - 5.0 %    Neutrophils Absolute 0.4 (L) 1.7 - 8.2 K/UL    Lymphocytes Absolute 0.8 0.5 - 4.6 K/UL    Monocytes Absolute 0.3 0.1 - 1.3 K/UL    Eosinophils Absolute 0.0 0.0 - 0.8 K/UL    Basophils Absolute 0.0 0.0 - 0.2 K/UL    Absolute Immature Granulocyte 0.1 0.0 - 0.5 K/UL    RBC Comment SLIGHT  ANISOCYTOSIS + POIKILOCYTOSIS        WBC Comment WBC TOO FEW TO DIFFERENTIATE      Platelet Comment MARKED      Differential Type AUTOMATED     Magnesium    Collection Time: 10/29/23  6:40 AM   Result Value Ref Range    Magnesium 2.0 1.8 - 2.4 mg/dL   Comprehensive Metabolic Panel    Collection Time: 10/29/23  6:40 AM   Result Value Ref Range    Sodium 140 133 - 143 mmol/L    Potassium 3.3 (L) 3.5 - 5.1 mmol/L

## 2023-10-30 LAB
ALBUMIN SERPL-MCNC: 2.8 G/DL (ref 3.2–4.6)
ALBUMIN/GLOB SERPL: 0.8 (ref 0.4–1.6)
ALP SERPL-CCNC: 48 U/L (ref 50–136)
ALT SERPL-CCNC: 18 U/L (ref 12–65)
ANION GAP SERPL CALC-SCNC: 9 MMOL/L (ref 2–11)
AST SERPL-CCNC: 24 U/L (ref 15–37)
BACTERIA SPEC CULT: NORMAL
BASOPHILS # BLD: 0.1 K/UL (ref 0–0.2)
BASOPHILS NFR BLD: 5 % (ref 0–2)
BILIRUB SERPL-MCNC: 1.1 MG/DL (ref 0.2–1.1)
BLD PROD TYP BPU: NORMAL
BLOOD BANK CMNT PATIENT-IMP: NORMAL
BLOOD BANK DISPENSE STATUS: NORMAL
BPU ID: NORMAL
BUN SERPL-MCNC: 10 MG/DL (ref 8–23)
CALCIUM SERPL-MCNC: 8.1 MG/DL (ref 8.3–10.4)
CHLORIDE SERPL-SCNC: 110 MMOL/L (ref 101–110)
CO2 SERPL-SCNC: 20 MMOL/L (ref 21–32)
CREAT SERPL-MCNC: 0.7 MG/DL (ref 0.6–1)
DIFFERENTIAL METHOD BLD: ABNORMAL
EOSINOPHIL # BLD: 0 K/UL (ref 0–0.8)
EOSINOPHIL NFR BLD: 1 % (ref 0.5–7.8)
ERYTHROCYTE [DISTWIDTH] IN BLOOD BY AUTOMATED COUNT: 17.4 % (ref 11.9–14.6)
GLOBULIN SER CALC-MCNC: 3.5 G/DL (ref 2.8–4.5)
GLUCOSE SERPL-MCNC: 94 MG/DL (ref 65–100)
HCT VFR BLD AUTO: 24.7 % (ref 35.8–46.3)
HGB BLD-MCNC: 7.8 G/DL (ref 11.7–15.4)
IMM GRANULOCYTES # BLD AUTO: 0.1 K/UL (ref 0–0.5)
IMM GRANULOCYTES NFR BLD AUTO: 5 % (ref 0–5)
LYMPHOCYTES # BLD: 1.2 K/UL (ref 0.5–4.6)
LYMPHOCYTES NFR BLD: 70 % (ref 13–44)
MAGNESIUM SERPL-MCNC: 2 MG/DL (ref 1.8–2.4)
MCH RBC QN AUTO: 31.2 PG (ref 26.1–32.9)
MCHC RBC AUTO-ENTMCNC: 31.6 G/DL (ref 31.4–35)
MCV RBC AUTO: 98.8 FL (ref 82–102)
MONOCYTES # BLD: 0.1 K/UL (ref 0.1–1.3)
MONOCYTES NFR BLD: 5 % (ref 4–12)
NEUTS SEG # BLD: 0.2 K/UL (ref 1.7–8.2)
NEUTS SEG NFR BLD: 14 % (ref 43–78)
NRBC # BLD: 0 K/UL (ref 0–0.2)
PERIPHERAL SMEAR, MD REVIEW: NORMAL
PLATELET # BLD AUTO: 5 K/UL (ref 150–450)
PLATELET COMMENT: ABNORMAL
PMV BLD AUTO: ABNORMAL FL (ref 9.4–12.3)
POTASSIUM SERPL-SCNC: 3.6 MMOL/L (ref 3.5–5.1)
PROT SERPL-MCNC: 6.3 G/DL (ref 6.3–8.2)
RBC # BLD AUTO: 2.5 M/UL (ref 4.05–5.2)
RBC MORPH BLD: ABNORMAL
RBC MORPH BLD: ABNORMAL
SERVICE CMNT-IMP: NORMAL
SODIUM SERPL-SCNC: 139 MMOL/L (ref 133–143)
UNIT DIVISION: 0
WBC # BLD AUTO: 1.7 K/UL (ref 4.3–11.1)
WBC MORPH BLD: ABNORMAL

## 2023-10-30 PROCEDURE — 36415 COLL VENOUS BLD VENIPUNCTURE: CPT

## 2023-10-30 PROCEDURE — 85025 COMPLETE CBC W/AUTO DIFF WBC: CPT

## 2023-10-30 PROCEDURE — P9037 PLATE PHERES LEUKOREDU IRRAD: HCPCS

## 2023-10-30 PROCEDURE — 99232 SBSQ HOSP IP/OBS MODERATE 35: CPT | Performed by: INTERNAL MEDICINE

## 2023-10-30 PROCEDURE — 86644 CMV ANTIBODY: CPT

## 2023-10-30 PROCEDURE — 76937 US GUIDE VASCULAR ACCESS: CPT

## 2023-10-30 PROCEDURE — 6370000000 HC RX 637 (ALT 250 FOR IP): Performed by: INTERNAL MEDICINE

## 2023-10-30 PROCEDURE — 86022 PLATELET ANTIBODIES: CPT

## 2023-10-30 PROCEDURE — 2580000003 HC RX 258: Performed by: FAMILY MEDICINE

## 2023-10-30 PROCEDURE — 6360000002 HC RX W HCPCS: Performed by: FAMILY MEDICINE

## 2023-10-30 PROCEDURE — 80053 COMPREHEN METABOLIC PANEL: CPT

## 2023-10-30 PROCEDURE — 2580000003 HC RX 258: Performed by: INTERNAL MEDICINE

## 2023-10-30 PROCEDURE — 6370000000 HC RX 637 (ALT 250 FOR IP): Performed by: FAMILY MEDICINE

## 2023-10-30 PROCEDURE — APPSS45 APP SPLIT SHARED TIME 31-45 MINUTES: Performed by: NURSE PRACTITIONER

## 2023-10-30 PROCEDURE — 6370000000 HC RX 637 (ALT 250 FOR IP): Performed by: NURSE PRACTITIONER

## 2023-10-30 PROCEDURE — 6360000002 HC RX W HCPCS: Performed by: INTERNAL MEDICINE

## 2023-10-30 PROCEDURE — 36430 TRANSFUSION BLD/BLD COMPNT: CPT

## 2023-10-30 PROCEDURE — 6360000002 HC RX W HCPCS: Performed by: NURSE PRACTITIONER

## 2023-10-30 PROCEDURE — 2580000003 HC RX 258: Performed by: NURSE PRACTITIONER

## 2023-10-30 PROCEDURE — 1100000000 HC RM PRIVATE

## 2023-10-30 PROCEDURE — 83735 ASSAY OF MAGNESIUM: CPT

## 2023-10-30 RX ORDER — SODIUM CHLORIDE 9 MG/ML
INJECTION, SOLUTION INTRAVENOUS CONTINUOUS
Status: DISCONTINUED | OUTPATIENT
Start: 2023-10-30 | End: 2023-11-01

## 2023-10-30 RX ORDER — SODIUM CHLORIDE 9 MG/ML
INJECTION, SOLUTION INTRAVENOUS PRN
Status: DISCONTINUED | OUTPATIENT
Start: 2023-10-30 | End: 2023-11-01

## 2023-10-30 RX ADMIN — FAMOTIDINE 40 MG: 20 TABLET, FILM COATED ORAL at 20:20

## 2023-10-30 RX ADMIN — SALINE NASAL SPRAY 2 SPRAY: 1.5 SOLUTION NASAL at 08:14

## 2023-10-30 RX ADMIN — POLYETHYLENE GLYCOL 3350 17 G: 17 POWDER, FOR SOLUTION ORAL at 08:10

## 2023-10-30 RX ADMIN — CEFEPIME 2000 MG: 2 INJECTION, POWDER, FOR SOLUTION INTRAVENOUS at 10:10

## 2023-10-30 RX ADMIN — POTASSIUM CHLORIDE 20 MEQ: 1500 TABLET, EXTENDED RELEASE ORAL at 08:07

## 2023-10-30 RX ADMIN — MORPHINE SULFATE 15 MG: 15 TABLET, FILM COATED, EXTENDED RELEASE ORAL at 20:20

## 2023-10-30 RX ADMIN — SODIUM CHLORIDE, PRESERVATIVE FREE 10 ML: 5 INJECTION INTRAVENOUS at 20:21

## 2023-10-30 RX ADMIN — HYDROCODONE BITARTRATE AND ACETAMINOPHEN 1 TABLET: 5; 325 TABLET ORAL at 23:34

## 2023-10-30 RX ADMIN — SALINE NASAL SPRAY 2 SPRAY: 1.5 SOLUTION NASAL at 17:37

## 2023-10-30 RX ADMIN — ACETAMINOPHEN 650 MG: 325 TABLET ORAL at 10:36

## 2023-10-30 RX ADMIN — FLUOXETINE 20 MG: 20 CAPSULE ORAL at 08:07

## 2023-10-30 RX ADMIN — MORPHINE SULFATE 15 MG: 15 TABLET, FILM COATED, EXTENDED RELEASE ORAL at 08:07

## 2023-10-30 RX ADMIN — ACETAMINOPHEN 650 MG: 325 TABLET ORAL at 15:51

## 2023-10-30 RX ADMIN — IMMUNE GLOBULIN (HUMAN) 45 G: 10 INJECTION INTRAVENOUS; SUBCUTANEOUS at 10:41

## 2023-10-30 RX ADMIN — FLUOXETINE 20 MG: 20 CAPSULE ORAL at 20:19

## 2023-10-30 RX ADMIN — PANTOPRAZOLE SODIUM 40 MG: 40 TABLET, DELAYED RELEASE ORAL at 08:07

## 2023-10-30 RX ADMIN — DIPHENHYDRAMINE HYDROCHLORIDE 25 MG: 25 CAPSULE ORAL at 23:36

## 2023-10-30 RX ADMIN — DOCUSATE SODIUM 50 MG AND SENNOSIDES 8.6 MG 1 TABLET: 8.6; 5 TABLET, FILM COATED ORAL at 20:19

## 2023-10-30 RX ADMIN — DEXAMETHASONE SODIUM PHOSPHATE 40 MG: 10 INJECTION INTRAMUSCULAR; INTRAVENOUS at 11:58

## 2023-10-30 RX ADMIN — FERROUS SULFATE TAB 325 MG (65 MG ELEMENTAL FE) 325 MG: 325 (65 FE) TAB at 17:36

## 2023-10-30 RX ADMIN — VANCOMYCIN HYDROCHLORIDE 1000 MG: 1 INJECTION, POWDER, LYOPHILIZED, FOR SOLUTION INTRAVENOUS at 23:31

## 2023-10-30 RX ADMIN — PANTOPRAZOLE SODIUM 40 MG: 40 TABLET, DELAYED RELEASE ORAL at 15:51

## 2023-10-30 RX ADMIN — IPRATROPIUM BROMIDE 2 SPRAY: 42 SPRAY, METERED NASAL at 08:14

## 2023-10-30 RX ADMIN — SODIUM CHLORIDE: 9 INJECTION, SOLUTION INTRAVENOUS at 14:28

## 2023-10-30 RX ADMIN — POTASSIUM CHLORIDE 20 MEQ: 1500 TABLET, EXTENDED RELEASE ORAL at 17:36

## 2023-10-30 RX ADMIN — SODIUM CHLORIDE: 9 INJECTION, SOLUTION INTRAVENOUS at 18:25

## 2023-10-30 RX ADMIN — SALINE NASAL SPRAY 2 SPRAY: 1.5 SOLUTION NASAL at 20:21

## 2023-10-30 RX ADMIN — SODIUM CHLORIDE: 9 INJECTION, SOLUTION INTRAVENOUS at 10:40

## 2023-10-30 RX ADMIN — DIPHENHYDRAMINE HYDROCHLORIDE 25 MG: 25 CAPSULE ORAL at 10:36

## 2023-10-30 RX ADMIN — IPRATROPIUM BROMIDE 2 SPRAY: 42 SPRAY, METERED NASAL at 20:20

## 2023-10-30 RX ADMIN — CEFEPIME 2000 MG: 2 INJECTION, POWDER, FOR SOLUTION INTRAVENOUS at 18:26

## 2023-10-30 RX ADMIN — VANCOMYCIN HYDROCHLORIDE 1000 MG: 1 INJECTION, POWDER, LYOPHILIZED, FOR SOLUTION INTRAVENOUS at 14:28

## 2023-10-30 RX ADMIN — DIPHENHYDRAMINE HYDROCHLORIDE 25 MG: 25 CAPSULE ORAL at 15:51

## 2023-10-30 RX ADMIN — CEFEPIME 2000 MG: 2 INJECTION, POWDER, FOR SOLUTION INTRAVENOUS at 03:05

## 2023-10-30 RX ADMIN — LEVOTHYROXINE SODIUM 125 MCG: 0.12 TABLET ORAL at 08:07

## 2023-10-30 RX ADMIN — FERROUS SULFATE TAB 325 MG (65 MG ELEMENTAL FE) 325 MG: 325 (65 FE) TAB at 08:07

## 2023-10-30 RX ADMIN — ROSUVASTATIN CALCIUM 10 MG: 10 TABLET, FILM COATED ORAL at 08:08

## 2023-10-30 RX ADMIN — MONTELUKAST 10 MG: 10 TABLET, FILM COATED ORAL at 08:07

## 2023-10-30 ASSESSMENT — PAIN DESCRIPTION - LOCATION
LOCATION: ABDOMEN
LOCATION: ABDOMEN;BACK

## 2023-10-30 ASSESSMENT — PAIN DESCRIPTION - ORIENTATION
ORIENTATION: RIGHT
ORIENTATION: RIGHT

## 2023-10-30 ASSESSMENT — PAIN SCALES - GENERAL
PAINLEVEL_OUTOF10: 7
PAINLEVEL_OUTOF10: 7
PAINLEVEL_OUTOF10: 4
PAINLEVEL_OUTOF10: 0

## 2023-10-30 ASSESSMENT — PAIN DESCRIPTION - DESCRIPTORS
DESCRIPTORS: PRESSURE
DESCRIPTORS: PRESSURE

## 2023-10-30 NOTE — ACP (ADVANCE CARE PLANNING)
Advance Care Planning     General Advance Care Planning (ACP) Conversation    Date of Conversation: 10/27/2023  Conducted with: Patient with Decision Making Capacity    Healthcare Decision Maker:    Primary Decision Maker: Koki Barbosa - Spouse - [phone]  Click here to complete Healthcare Decision Makers including selection of the Healthcare Decision Maker Relationship (ie \"Primary\"). Today we documented Decision Maker(s) consistent with Legal Next of Kin hierarchy. Content/Action Overview:  Has NO ACP documents/care preferences - refer to ACP Clinical Specialist  Reviewed DNR/DNI and patient elects Full Code (Attempt Resuscitation)  treatment goals, hospitalization preferences, and resuscitation preferences  No ACP documents on file. Full Code per physician order.     Length of Voluntary ACP Conversation in minutes:  <16 minutes (Non-Billable)    CAMDEN Lainez

## 2023-10-30 NOTE — PROGRESS NOTES
END OF SHIFT SUMMARY:    Significant vitals this shift:  low BP  Significant labs this shift:  plt 5  Blood products given this shift:  1 unit plts  Additional events this shift:   -1 unit plt this shift  -T-max 99.4  -Pt complaining of multiple lab draw sticks, after educating pt on 1 hour post infusion lab draw, pt requesting to wait until 0300 to do all labs together  -Pt educated on the importance of calling for assistance when going to bathroom  -inaccurate I/O, no hat in toilet  -plt down to 5, ordered 2 units of plts    I/Os:  10/29 1901 - 10/30 0700  In: 916.7   Out: -     Elieser Cannon RN

## 2023-10-30 NOTE — PROGRESS NOTES
Hospitalist Progress Note   Admit Date:  10/27/2023  2:12 PM   Name:  Oswald Kussmaul   Age:  71 y.o. Sex:  female  :  1950   MRN:  [id number]   Room:  [facility]    Presenting/Chief Complaint: Fever and Fatigue     Reason(s) for Admission: Neutropenic fever ([address]) [D70.9, R50.81]  Pancytopenia [facility]) [D61.818]     Hospital Course:   Oswald Kussmaul is a 71 y.o. female with medical history of   Obesity   MDS on Revlimid   Psoriatic arthritis, on Humira   Ovrian cancer   Hypertension   Hyperlipidemia   GERD   Hypothyroidism     who presented with cough, congestion, sore throat, nausea, vomiting. Patient had fever at home. Patient is found to have pancytopenia on admission. She had BP on the low side, so she was moved to ICU. She did not require vasopressors. She is on Empiric IV antibiotics. Subjective & 24hr Events:     10/29/23   Afebrile in the past 24 hours. Patient is seen and examined at bedside. No fever now. No cough. No shortness of breath. No chest pain. No diarrhea. No abdominal pain     10/30/23   Patient is resting well in bed. Afebrile. No chest pain. No shortness of breath. Assessment & Plan:     Principal Problem:    Neutropenic fever ([address])    MDS (myelodysplastic syndrome) (HCC)    Pancytopenia ([address])    Sepsis ([address])  Plan: On Empiric IV antibiotics. IV access   IV fluid for gentle hydration. Symptomatic treatments   Follow up on culture result. Oncology service is consulted and following. CBC and BMP is checked today. Dr. Dunia Pratt saw the patient and ordered Transfusion on 10/29/2023. Will wait for oncologist's decision regarding ?need for transfusion today. Patient is still having severe pancytopenia today. Acute upper respiratory infection  Plan:   Chest x-ray shows no acute changes. Symptomatic treatments   Patient is on Empiric IV antibiotics already. Culture has been negative.   Patient does not have signs of sepsis guaiFENesin-dextromethorphan (ROBITUSSIN DM) 100-10 MG/5ML syrup 10 mL  10 mL Oral Q4H PRN    nystatin (MYCOSTATIN) 299954 UNIT/ML suspension 500,000 Units  5 mL Oral 4x Daily PRN       Signed:  Jasmyn Dewitt MD    Part of this note may have been written by using a voice dictation software. The note has been proof read but may still contain some grammatical/other typographical errors.

## 2023-10-30 NOTE — CONSULTS
Infectious Disease Progress Note  Today's Date: 10/30/2023   Admit Date: 10/27/2023   1950    Impression:   Neutropenic fever: neutropenia likely due MDS . Unclear cause fever  S. Epi bacteremia 10/27: currently 1/4 bottles so possible this is contaminant. Repeat Bcx 10/29 with NGTD. No lines, no obvious skin sources  MDS with excess blasts and pan-cytopenia on recent Bmbx: started Revlimid around 10/6  Bleeding with epistaxis and rectal bleeding in BM: likley due low PLT and Hgb. Psoriatic arthritis on Humira: Humira on hold  Listed allergies to PCN and sulfa with hives: tolerating Cefepime  Chronic constipation with BM about once a week; has been this way her whole life. Has had colonoscopies in past. Last had some polyps; she is not sure when next due. Nausea: has been present for months: some heartburn but not often. Was due for EGD recently but held for thrombocytopenia. No overt GI bleeding to suggest PUD with bleed. Plan:   Continue both Vanc and Cefepime for now. Follow results of repeat blood cultures   Revlimid usually not overtly associated with increased infection risk but Humira can be and would definitely hold it for now. ROS not revealing for alternative causes or suggestion unusual infection. Watch for any signs bleeding: can cause fever  Consider CT abdomen/pelvis with contrast given complaints from GI tract; upper and ? lower: had CT 6 weeks ago at [facility] that was normal.   ID following    Anti-infectives:   Vanc 10/27-  Cefepime 10/27-  Doxy x 1 10/27    Subjective: Interval Events:  Reports she is feeling a little better today compared PTA. WBC stable. Afebrile        Patient is a 71 y.o. female with a history of recently diagnosed MDS with pancytopenia and treatment with Revlimd around 10/6 and Psoriatic Arthritis on Humira  who was admitted on 10/27/2023 with complaints of weakness and fever from PCP office.   Reports fever for a week at home, up to 102.5 with sick [id number]     Product Code Blood Bank [facility],LRIR2     Unit Divison 00     Dispense Status Blood Bank TRANSFUSED    Immature Platelet Fraction    Collection Time: 10/29/23  7:54 PM   Result Value Ref Range    Immature Plt. Fraction 18.1 (H) 1.8 - 7.9 %   CBC with Auto Differential    Collection Time: 10/29/23  7:54 PM   Result Value Ref Range    WBC 1.7 (LL) 4.3 - 11.1 K/uL    RBC 2.74 (L) 4.05 - 5.2 M/uL    Hemoglobin 8.5 (L) 11.7 - 15.4 g/dL    Hematocrit 25.0 (L) 35.8 - 46.3 %    MCV 91.2 82 - 102 FL    MCH 31.0 26.1 - 32.9 PG    MCHC 34.0 31.4 - 35.0 g/dL    RDW 17.4 (H) 11.9 - 14.6 %    Platelets 8 (LL) 187 - 450 K/uL    MPV Unable to calculate. Recommend adding IPF. 9.4 - 12.3 FL    nRBC 0.00 0.0 - 0.2 K/uL    Neutrophils % 19 (L) 43 - 78 %    Lymphocytes % 59 (H) 13 - 44 %    Monocytes % 10 4.0 - 12.0 %    Eosinophils % 1 0.5 - 7.8 %    Basophils % 4 (H) 0.0 - 2.0 %    Immature Granulocytes 7 (H) 0.0 - 5.0 %    Neutrophils Absolute 0.3 (L) 1.7 - 8.2 K/UL    Lymphocytes Absolute 1.0 0.5 - 4.6 K/UL    Monocytes Absolute 0.2 0.1 - 1.3 K/UL    Eosinophils Absolute 0.0 0.0 - 0.8 K/UL    Basophils Absolute 0.1 0.0 - 0.2 K/UL    Absolute Immature Granulocyte 0.1 0.0 - 0.5 K/UL    RBC Comment MODERATE  HYPOCHROMIA        RBC Comment MODERATE  ANISOCYTOSIS + POIKILOCYTOSIS        RBC Comment OCCASIONAL  POLYCHROMASIA        RBC Comment OCCASIONAL  OVALOCYTES        WBC Comment Result Confirmed By Smear      Platelet Comment MARKED      Differential Type AUTOMATED     CBC with Auto Differential    Collection Time: 10/30/23  3:08 AM   Result Value Ref Range    WBC 1.7 (LL) 4.3 - 11.1 K/uL    RBC 2.50 (L) 4.05 - 5.2 M/uL    Hemoglobin 7.8 (L) 11.7 - 15.4 g/dL    Hematocrit 24.7 (L) 35.8 - 46.3 %    MCV 98.8 82 - 102 FL    MCH 31.2 26.1 - 32.9 PG    MCHC 31.6 31.4 - 35.0 g/dL    RDW 17.4 (H) 11.9 - 14.6 %    Platelets 5 (LL) 398 - 450 K/uL    MPV Unable to calculate. Recommend adding IPF.  9.4 - 12.3 FL    nRBC 0.00 0.0 - 0.2 K/uL    Neutrophils % 14 (L) 43 - 78 %    Lymphocytes % 70 (H) 13 - 44 %    Monocytes % 5 4.0 - 12.0 %    Eosinophils % 1 0.5 - 7.8 %    Basophils % 5 (H) 0.0 - 2.0 %    Immature Granulocytes 5 0.0 - 5.0 %    Neutrophils Absolute 0.2 (L) 1.7 - 8.2 K/UL    Lymphocytes Absolute 1.2 0.5 - 4.6 K/UL    Monocytes Absolute 0.1 0.1 - 1.3 K/UL    Eosinophils Absolute 0.0 0.0 - 0.8 K/UL    Basophils Absolute 0.1 0.0 - 0.2 K/UL    Absolute Immature Granulocyte 0.1 0.0 - 0.5 K/UL    RBC Comment OCCASIONAL  ANISOCYTOSIS + POIKILOCYTOSIS        RBC Comment OCCASIONAL  OVALOCYTES        WBC Comment Result Confirmed By Smear      Platelet Comment MARKED      Differential Type AUTOMATED     Magnesium    Collection Time: 10/30/23  3:08 AM   Result Value Ref Range    Magnesium 2.0 1.8 - 2.4 mg/dL   Comprehensive Metabolic Panel    Collection Time: 10/30/23  3:08 AM   Result Value Ref Range    Sodium 139 133 - 143 mmol/L    Potassium 3.6 3.5 - 5.1 mmol/L    Chloride 110 101 - 110 mmol/L    CO2 20 (L) 21 - 32 mmol/L    Anion Gap 9 2 - 11 mmol/L    Glucose 94 65 - 100 mg/dL    BUN 10 8 - 23 MG/DL    Creatinine 0.70 0.6 - 1.0 MG/DL    Est, Glom Filt Rate >60 >60 ml/min/1.73m2    Calcium 8.1 (L) 8.3 - 10.4 MG/DL    Total Bilirubin 1.1 0.2 - 1.1 MG/DL    ALT 18 12 - 65 U/L    AST 24 15 - 37 U/L    Alk Phosphatase 48 (L) 50 - 136 U/L    Total Protein 6.3 6.3 - 8.2 g/dL    Albumin 2.8 (L) 3.2 - 4.6 g/dL    Globulin 3.5 2.8 - 4.5 g/dL    Albumin/Globulin Ratio 0.8 0.4 - 1.6     PREPARE PLATELETS, 2 Product    Collection Time: 10/30/23  4:00 AM   Result Value Ref Range    Blood Bank Comment CROSSMATCH PERFORMED BY THE BLOOD CONNECTION     Unit Number [id number]     Product Code Blood Bank [facility],LRIR2     Unit Divison 00     Dispense Status Blood Bank ALLOCATED     Unit Number [id number]     Product Code Blood Bank [facility],LRIR1     Unit Divison 00     Dispense Status Blood Bank ALLOCATED         Microbiology: Reviewed

## 2023-10-30 NOTE — PROGRESS NOTES
Comprehensive Nutrition Assessment    Type and Reason for Visit: Initial, Positive Nutrition Screen  Malnutrition Screening Tool: Malnutrition Screen  Have you recently lost weight without trying?: 24 to 33 pounds (3 points)  Have you been eating poorly because of a decreased appetite?: Yes (1 point)  Malnutrition Screening Tool Score: 4    Nutrition Recommendations/Plan:   Meals and Snacks:  Diet: Continue current order  Nutrition Supplement Therapy:  Medical food supplement therapy:  Initiate Ensure Enlive three times per day (this provides 350 kcal and 20 grams protein per bottle) vanilla only     Malnutrition Assessment:  Malnutrition Status: At risk for malnutrition (Comment) (pt reports + wt loss, waxing and waning oral intake)  No fat or muscle loss appreciated. Nutrition Assessment:  Nutrition History: Pt reports her intake and wt initially started changing after having Covid in May 2022.  + loss of appetite, taste alterations. She indicates at one point her wt was >200# initially declined quickly then up and down with fluid changes. She states at this point she is unsure of her true wt. Do You Have Any Cultural, Mormon, or Ethnic Food Preferences?: No   Nutrition Background: PMHx: psoriatic arthritis on Humira, hx of ovarian cam HTN, HLD, GERD and hypothyroidism. Admitted with MDS, pancytopenia, neutropenic fever, chronic pain, constipation. Nutrition Interval:  Pt reclined in bed at RD visit. Alert and oriented times 4. Indicates currently she is consuming more than prior intake. Recorded intake highly variable from 0-100%. 10/30/23 0428 84.1 kg (185 lb 6.4 oz) Bed scale      10/28/23 1928 80.9 kg (178 lb 6.4 oz) Standing scale     10/27/23 2007 77.4 kg (170 lb 9.6 oz) Standing scale     10/27/23 1417 77.6 kg (171 lb) Stated   Wt trending up at this time consistent with pt report of fluid fluctuations.     Current Nutrition Therapies:  ADULT DIET; Easy to Chew; GI Lesvia Kil

## 2023-10-30 NOTE — CARE COORDINATION
ASSESSMENT NOTE    Attending Physician: Yamilet Geller MD  Admit Problem: Neutropenic fever ([address]) [D70.9, R50.81]  Pancytopenia ([address]) [D61.818]  Date/Time of Admission: 10/27/2023  2:12 PM  Problem List:  Patient Active Problem List   Diagnosis    Hypothyroidism, adult    Essential hypertension    Chronic hand pain    Psoriatic arthritis ([address])    Multiple allergies    Psoriasis, guttate    Other chest pain    Coronary artery spasm (HCC)    Chronic midline low back pain without sciatica    History of ovarian cancer    Chronic pain of multiple joints    Esophageal reflux    Anxiety and depression    Mixed hyperlipidemia    Class 1 obesity with serious comorbidity and body mass index (BMI) of 33.0 to 33.9 in adult    Esophageal dysphagia    Nausea and vomiting    Generalized abdominal pain    Elevated bilirubin    Neutropenic fever (HCC)    MDS (myelodysplastic syndrome) (HCC)    Pancytopenia (HCC)    Mood disorder (HCC)    Constipation due to pain medication    Immunosuppressed status ([address])    Rectal bleeding    Bilateral impacted cerumen    Acute upper respiratory infection    Sepsis ([address])    Hyponatremia    Anemia requiring transfusions       Service Assessment  Patient Orientation Alert and Oriented, Person, Place, Self   Cognition Alert   History Provided By Patient, Medical Record, Spouse   Primary Caregiver Self   Accompanied By/Relationship Spouse   Support Systems Spouse/Significant Other, Family Members   Patient's Healthcare Decision Maker is: Legal [facility] [place] [address] [place] [address]   PCP Verified by CM Yes Anthony Padilla NP)   Last Visit to PCP Within last 3 months   Prior Functional Level Independent in ADLs/IADLs   Current Functional Level Independent in ADLs/IADLs   Can patient return to prior living arrangement Yes   Ability to make needs known: Good   Family able to assist with home care needs: Yes   Would you like for me to discuss the discharge plan with any other family members/significant others, and if so, who?  No

## 2023-10-31 ENCOUNTER — APPOINTMENT (OUTPATIENT)
Dept: CT IMAGING | Age: 73
DRG: 808 | End: 2023-10-31
Payer: MEDICARE

## 2023-10-31 PROBLEM — R47.1 DYSARTHRIA: Status: ACTIVE | Noted: 2023-10-31

## 2023-10-31 LAB
ABO + RH BLD: NORMAL
ALBUMIN SERPL-MCNC: 3 G/DL (ref 3.2–4.6)
ALBUMIN/GLOB SERPL: 0.7 (ref 0.4–1.6)
ALP SERPL-CCNC: 58 U/L (ref 50–136)
ALT SERPL-CCNC: 22 U/L (ref 12–65)
ANION GAP SERPL CALC-SCNC: 4 MMOL/L (ref 2–11)
ANION GAP SERPL CALC-SCNC: 7 MMOL/L (ref 2–11)
ANTIGENS PRESENT BLD: NORMAL
ANTIGENS PRESENT RBC DONR: NORMAL
ANTIGENS PRESENT RBC DONR: NORMAL
AST SERPL-CCNC: 22 U/L (ref 15–37)
BASOPHILS # BLD: 0.1 K/UL (ref 0–0.2)
BASOPHILS NFR BLD MANUAL: 6 % (ref 0–2)
BASOPHILS NFR BLD: 3 % (ref 0–2)
BASOPHILS NFR BLD: 4 % (ref 0–2)
BILIRUB SERPL-MCNC: 1.1 MG/DL (ref 0.2–1.1)
BLASTS NFR BLD MANUAL: 4 %
BLD PROD TYP BPU: NORMAL
BLOOD BANK DISPENSE STATUS: NORMAL
BLOOD GROUP ANTIBODIES SERPL: NORMAL
BLOOD GROUP ANTIBODIES SERPL: NORMAL
BPU ID: NORMAL
BUN SERPL-MCNC: 17 MG/DL (ref 8–23)
BUN SERPL-MCNC: 18 MG/DL (ref 8–23)
CALCIUM SERPL-MCNC: 8.6 MG/DL (ref 8.3–10.4)
CALCIUM SERPL-MCNC: 8.7 MG/DL (ref 8.3–10.4)
CHLORIDE SERPL-SCNC: 109 MMOL/L (ref 101–110)
CHLORIDE SERPL-SCNC: 110 MMOL/L (ref 101–110)
CO2 SERPL-SCNC: 22 MMOL/L (ref 21–32)
CO2 SERPL-SCNC: 23 MMOL/L (ref 21–32)
CREAT SERPL-MCNC: 0.7 MG/DL (ref 0.6–1)
CREAT SERPL-MCNC: 0.7 MG/DL (ref 0.6–1)
CROSSMATCH RESULT: NORMAL
DIFFERENTIAL METHOD BLD: ABNORMAL
EOSINOPHIL # BLD: 0 K/UL (ref 0–0.8)
EOSINOPHIL # BLD: 0 K/UL (ref 0–0.8)
EOSINOPHIL NFR BLD: 0 % (ref 0.5–7.8)
EOSINOPHIL NFR BLD: 1 % (ref 0.5–7.8)
ERYTHROCYTE [DISTWIDTH] IN BLOOD BY AUTOMATED COUNT: 16.7 % (ref 11.9–14.6)
ERYTHROCYTE [DISTWIDTH] IN BLOOD BY AUTOMATED COUNT: 16.8 % (ref 11.9–14.6)
ERYTHROCYTE [DISTWIDTH] IN BLOOD BY AUTOMATED COUNT: 16.8 % (ref 11.9–14.6)
GLOBULIN SER CALC-MCNC: 4.4 G/DL (ref 2.8–4.5)
GLUCOSE BLD STRIP.AUTO-MCNC: 130 MG/DL (ref 65–100)
GLUCOSE SERPL-MCNC: 120 MG/DL (ref 65–100)
GLUCOSE SERPL-MCNC: 123 MG/DL (ref 65–100)
HAPTOGLOB SERPL-MCNC: 145 MG/DL (ref 30–200)
HAPTOGLOB SERPL-MCNC: 153 MG/DL (ref 30–200)
HCT VFR BLD AUTO: 22.8 % (ref 35.8–46.3)
HCT VFR BLD AUTO: 23.6 % (ref 35.8–46.3)
HCT VFR BLD AUTO: 23.8 % (ref 35.8–46.3)
HGB BLD-MCNC: 7.6 G/DL (ref 11.7–15.4)
HGB BLD-MCNC: 7.8 G/DL (ref 11.7–15.4)
HGB BLD-MCNC: 7.9 G/DL (ref 11.7–15.4)
IMM GRANULOCYTES # BLD AUTO: 0.1 K/UL (ref 0–0.5)
IMM GRANULOCYTES # BLD AUTO: 0.1 K/UL (ref 0–0.5)
IMM GRANULOCYTES NFR BLD AUTO: 7 % (ref 0–5)
IMM GRANULOCYTES NFR BLD AUTO: 7 % (ref 0–5)
INR PPP: 1.1
LYMPHOCYTES # BLD: 0.6 K/UL (ref 0.5–4.6)
LYMPHOCYTES # BLD: 0.6 K/UL (ref 0.5–4.6)
LYMPHOCYTES # BLD: 1 K/UL (ref 0.5–4.6)
LYMPHOCYTES NFR BLD MANUAL: 50 % (ref 16–44)
LYMPHOCYTES NFR BLD: 34 % (ref 13–44)
LYMPHOCYTES NFR BLD: 37 % (ref 13–44)
MCH RBC QN AUTO: 30.8 PG (ref 26.1–32.9)
MCH RBC QN AUTO: 30.9 PG (ref 26.1–32.9)
MCH RBC QN AUTO: 30.9 PG (ref 26.1–32.9)
MCHC RBC AUTO-ENTMCNC: 32.8 G/DL (ref 31.4–35)
MCHC RBC AUTO-ENTMCNC: 33.3 G/DL (ref 31.4–35)
MCHC RBC AUTO-ENTMCNC: 33.5 G/DL (ref 31.4–35)
MCV RBC AUTO: 92.2 FL (ref 82–102)
MCV RBC AUTO: 92.7 FL (ref 82–102)
MCV RBC AUTO: 94.1 FL (ref 82–102)
MONOCYTES # BLD: 0.4 K/UL (ref 0.1–1.3)
MONOCYTES # BLD: 0.4 K/UL (ref 0.1–1.3)
MONOCYTES NFR BLD: 20 % (ref 4–12)
MONOCYTES NFR BLD: 27 % (ref 4–12)
MYELOCYTES NFR BLD MANUAL: 2 %
NEUTS SEG # BLD: 0.4 K/UL (ref 1.7–8.2)
NEUTS SEG # BLD: 0.6 K/UL (ref 1.7–8.2)
NEUTS SEG # BLD: 0.8 K/UL (ref 1.7–8.2)
NEUTS SEG NFR BLD MANUAL: 28 % (ref 47–75)
NEUTS SEG NFR BLD: 27 % (ref 43–78)
NEUTS SEG NFR BLD: 33 % (ref 43–78)
NRBC # BLD: 0 K/UL (ref 0–0.2)
PLATELET # BLD AUTO: 3 K/UL (ref 150–450)
PLATELET # BLD AUTO: 3 K/UL (ref 150–450)
PLATELET # BLD AUTO: 5 K/UL (ref 150–450)
PLATELET COMMENT: ABNORMAL
PMV BLD AUTO: ABNORMAL FL (ref 9.4–12.3)
POTASSIUM SERPL-SCNC: 4 MMOL/L (ref 3.5–5.1)
POTASSIUM SERPL-SCNC: 4.1 MMOL/L (ref 3.5–5.1)
PROMYELOCYTES NFR BLD MANUAL: 10 %
PROT SERPL-MCNC: 7.4 G/DL (ref 6.3–8.2)
PROTHROMBIN TIME: 15 SEC (ref 12.6–14.3)
RBC # BLD AUTO: 2.46 M/UL (ref 4.05–5.2)
RBC # BLD AUTO: 2.53 M/UL (ref 4.05–5.2)
RBC # BLD AUTO: 2.56 M/UL (ref 4.05–5.2)
RBC MORPH BLD: ABNORMAL
SERVICE CMNT-IMP: ABNORMAL
SODIUM SERPL-SCNC: 136 MMOL/L (ref 133–143)
SODIUM SERPL-SCNC: 139 MMOL/L (ref 133–143)
SPECIMEN EXP DATE BLD: NORMAL
TOTAL CELLS COUNTED SPEC: 50
UNIT DIVISION: 0
VANCOMYCIN SERPL-MCNC: 19 UG/ML
WBC # BLD AUTO: 1.6 K/UL (ref 4.3–11.1)
WBC # BLD AUTO: 1.8 K/UL (ref 4.3–11.1)
WBC # BLD AUTO: 2 K/UL (ref 4.3–11.1)
WBC MORPH BLD: ABNORMAL

## 2023-10-31 PROCEDURE — 36415 COLL VENOUS BLD VENIPUNCTURE: CPT

## 2023-10-31 PROCEDURE — 94640 AIRWAY INHALATION TREATMENT: CPT

## 2023-10-31 PROCEDURE — 86900 BLOOD TYPING SEROLOGIC ABO: CPT

## 2023-10-31 PROCEDURE — 2580000003 HC RX 258: Performed by: INTERNAL MEDICINE

## 2023-10-31 PROCEDURE — 70450 CT HEAD/BRAIN W/O DYE: CPT

## 2023-10-31 PROCEDURE — 82962 GLUCOSE BLOOD TEST: CPT

## 2023-10-31 PROCEDURE — 97116 GAIT TRAINING THERAPY: CPT

## 2023-10-31 PROCEDURE — 86901 BLOOD TYPING SEROLOGIC RH(D): CPT

## 2023-10-31 PROCEDURE — 86920 COMPATIBILITY TEST SPIN: CPT

## 2023-10-31 PROCEDURE — 2580000003 HC RX 258: Performed by: NURSE PRACTITIONER

## 2023-10-31 PROCEDURE — 6360000002 HC RX W HCPCS: Performed by: INTERNAL MEDICINE

## 2023-10-31 PROCEDURE — 94760 N-INVAS EAR/PLS OXIMETRY 1: CPT

## 2023-10-31 PROCEDURE — APPSS30 APP SPLIT SHARED TIME 16-30 MINUTES: Performed by: NURSE PRACTITIONER

## 2023-10-31 PROCEDURE — 80053 COMPREHEN METABOLIC PANEL: CPT

## 2023-10-31 PROCEDURE — 6370000000 HC RX 637 (ALT 250 FOR IP): Performed by: INTERNAL MEDICINE

## 2023-10-31 PROCEDURE — 99232 SBSQ HOSP IP/OBS MODERATE 35: CPT | Performed by: INTERNAL MEDICINE

## 2023-10-31 PROCEDURE — 86850 RBC ANTIBODY SCREEN: CPT

## 2023-10-31 PROCEDURE — 6370000000 HC RX 637 (ALT 250 FOR IP): Performed by: FAMILY MEDICINE

## 2023-10-31 PROCEDURE — P9037 PLATE PHERES LEUKOREDU IRRAD: HCPCS

## 2023-10-31 PROCEDURE — 2700000000 HC OXYGEN THERAPY PER DAY

## 2023-10-31 PROCEDURE — 80061 LIPID PANEL: CPT

## 2023-10-31 PROCEDURE — 1170000001 HC RM PRIVATE ONCOLOGY W/TELEMETRY

## 2023-10-31 PROCEDURE — 86644 CMV ANTIBODY: CPT

## 2023-10-31 PROCEDURE — 6360000002 HC RX W HCPCS: Performed by: NURSE PRACTITIONER

## 2023-10-31 PROCEDURE — 85025 COMPLETE CBC W/AUTO DIFF WBC: CPT

## 2023-10-31 PROCEDURE — 2580000003 HC RX 258: Performed by: FAMILY MEDICINE

## 2023-10-31 PROCEDURE — 80202 ASSAY OF VANCOMYCIN: CPT

## 2023-10-31 PROCEDURE — 86022 PLATELET ANTIBODIES: CPT

## 2023-10-31 PROCEDURE — 97530 THERAPEUTIC ACTIVITIES: CPT

## 2023-10-31 PROCEDURE — 85610 PROTHROMBIN TIME: CPT

## 2023-10-31 PROCEDURE — 36430 TRANSFUSION BLD/BLD COMPNT: CPT

## 2023-10-31 PROCEDURE — 83036 HEMOGLOBIN GLYCOSYLATED A1C: CPT

## 2023-10-31 PROCEDURE — 6370000000 HC RX 637 (ALT 250 FOR IP): Performed by: NURSE PRACTITIONER

## 2023-10-31 RX ORDER — SODIUM CHLORIDE 9 MG/ML
INJECTION, SOLUTION INTRAVENOUS PRN
Status: DISCONTINUED | OUTPATIENT
Start: 2023-10-31 | End: 2023-11-01

## 2023-10-31 RX ADMIN — DEXAMETHASONE SODIUM PHOSPHATE 40 MG: 10 INJECTION INTRAMUSCULAR; INTRAVENOUS at 09:05

## 2023-10-31 RX ADMIN — MORPHINE SULFATE 15 MG: 15 TABLET, FILM COATED, EXTENDED RELEASE ORAL at 20:08

## 2023-10-31 RX ADMIN — FLUOXETINE 20 MG: 20 CAPSULE ORAL at 20:07

## 2023-10-31 RX ADMIN — CEFEPIME 2000 MG: 2 INJECTION, POWDER, FOR SOLUTION INTRAVENOUS at 02:03

## 2023-10-31 RX ADMIN — ACETAMINOPHEN 650 MG: 325 TABLET ORAL at 11:23

## 2023-10-31 RX ADMIN — IPRATROPIUM BROMIDE 2 SPRAY: 42 SPRAY, METERED NASAL at 20:19

## 2023-10-31 RX ADMIN — IMMUNE GLOBULIN (HUMAN) 45 G: 10 INJECTION INTRAVENOUS; SUBCUTANEOUS at 11:27

## 2023-10-31 RX ADMIN — FLUOXETINE 20 MG: 20 CAPSULE ORAL at 08:04

## 2023-10-31 RX ADMIN — CEFEPIME 2000 MG: 2 INJECTION, POWDER, FOR SOLUTION INTRAVENOUS at 20:17

## 2023-10-31 RX ADMIN — ROSUVASTATIN CALCIUM 10 MG: 10 TABLET, FILM COATED ORAL at 08:04

## 2023-10-31 RX ADMIN — DOCUSATE SODIUM 50 MG AND SENNOSIDES 8.6 MG 1 TABLET: 8.6; 5 TABLET, FILM COATED ORAL at 20:08

## 2023-10-31 RX ADMIN — LEVOTHYROXINE SODIUM 125 MCG: 0.12 TABLET ORAL at 08:04

## 2023-10-31 RX ADMIN — HYDROCODONE BITARTRATE AND ACETAMINOPHEN 1 TABLET: 5; 325 TABLET ORAL at 11:23

## 2023-10-31 RX ADMIN — SALINE NASAL SPRAY 2 SPRAY: 1.5 SOLUTION NASAL at 08:09

## 2023-10-31 RX ADMIN — IPRATROPIUM BROMIDE 2 SPRAY: 42 SPRAY, METERED NASAL at 08:09

## 2023-10-31 RX ADMIN — SALINE NASAL SPRAY 2 SPRAY: 1.5 SOLUTION NASAL at 16:09

## 2023-10-31 RX ADMIN — MONTELUKAST 10 MG: 10 TABLET, FILM COATED ORAL at 08:04

## 2023-10-31 RX ADMIN — SODIUM CHLORIDE, PRESERVATIVE FREE 10 ML: 5 INJECTION INTRAVENOUS at 20:12

## 2023-10-31 RX ADMIN — MORPHINE SULFATE 15 MG: 15 TABLET, FILM COATED, EXTENDED RELEASE ORAL at 08:03

## 2023-10-31 RX ADMIN — CEFEPIME 2000 MG: 2 INJECTION, POWDER, FOR SOLUTION INTRAVENOUS at 10:15

## 2023-10-31 RX ADMIN — POTASSIUM CHLORIDE 20 MEQ: 1500 TABLET, EXTENDED RELEASE ORAL at 16:12

## 2023-10-31 RX ADMIN — POLYETHYLENE GLYCOL 3350 17 G: 17 POWDER, FOR SOLUTION ORAL at 08:04

## 2023-10-31 RX ADMIN — FERROUS SULFATE TAB 325 MG (65 MG ELEMENTAL FE) 325 MG: 325 (65 FE) TAB at 08:04

## 2023-10-31 RX ADMIN — FAMOTIDINE 40 MG: 20 TABLET, FILM COATED ORAL at 20:07

## 2023-10-31 RX ADMIN — POTASSIUM CHLORIDE 20 MEQ: 1500 TABLET, EXTENDED RELEASE ORAL at 08:04

## 2023-10-31 RX ADMIN — PANTOPRAZOLE SODIUM 40 MG: 40 TABLET, DELAYED RELEASE ORAL at 16:12

## 2023-10-31 RX ADMIN — PANTOPRAZOLE SODIUM 40 MG: 40 TABLET, DELAYED RELEASE ORAL at 08:04

## 2023-10-31 RX ADMIN — SALINE NASAL SPRAY 2 SPRAY: 1.5 SOLUTION NASAL at 20:19

## 2023-10-31 RX ADMIN — DIPHENHYDRAMINE HYDROCHLORIDE 25 MG: 25 CAPSULE ORAL at 11:23

## 2023-10-31 RX ADMIN — HYDROCODONE BITARTRATE AND ACETAMINOPHEN 1 TABLET: 5; 325 TABLET ORAL at 22:01

## 2023-10-31 RX ADMIN — FERROUS SULFATE TAB 325 MG (65 MG ELEMENTAL FE) 325 MG: 325 (65 FE) TAB at 16:13

## 2023-10-31 RX ADMIN — SALINE NASAL SPRAY 2 SPRAY: 1.5 SOLUTION NASAL at 02:04

## 2023-10-31 ASSESSMENT — PAIN DESCRIPTION - DESCRIPTORS
DESCRIPTORS: ACHING
DESCRIPTORS: ACHING
DESCRIPTORS: ACHING;THROBBING

## 2023-10-31 ASSESSMENT — PAIN DESCRIPTION - LOCATION
LOCATION: ABDOMEN;BACK

## 2023-10-31 ASSESSMENT — PAIN SCALES - GENERAL
PAINLEVEL_OUTOF10: 6
PAINLEVEL_OUTOF10: 5
PAINLEVEL_OUTOF10: 8
PAINLEVEL_OUTOF10: 5

## 2023-10-31 ASSESSMENT — PAIN DESCRIPTION - ORIENTATION
ORIENTATION: RIGHT;LEFT;ANTERIOR;POSTERIOR
ORIENTATION: RIGHT;LEFT
ORIENTATION: RIGHT;LEFT;ANTERIOR;POSTERIOR

## 2023-10-31 NOTE — PROGRESS NOTES
Hospitalist Progress Note   Admit Date:  10/27/2023  2:12 PM   Name:  Elgin Ramirez   Age:  71 y.o. Sex:  female  :  1950   MRN:  [id number]   Room:  [facility]    Presenting/Chief Complaint: Fever and Fatigue     Reason(s) for Admission: Neutropenic fever ([address]) [D70.9, R50.81]  Pancytopenia [facility]) [D61.818]     Hospital Course:   Elgin Ramirez is a 71 y.o. female with medical history of   Obesity   MDS on Revlimid   Psoriatic arthritis, on Humira   Ovrian cancer   Hypertension   Hyperlipidemia   GERD   Hypothyroidism     who presented with cough, congestion, sore throat, nausea, vomiting. Patient had fever at home. Patient is found to have pancytopenia on admission. She had BP on the low side, so she was moved to ICU. She did not require vasopressors. She is on Empiric IV antibiotics. Subjective & 24hr Events:     10/29/23   Afebrile in the past 24 hours. Patient is seen and examined at bedside. No fever now. No cough. No shortness of breath. No chest pain. No diarrhea. No abdominal pain     10/30/23   Patient is resting well in bed. Afebrile. No chest pain. No shortness of breath. 10/31/23   Patient has no fever,   Patient is complaining of sore throat. She has had Chloraseptic spray to use as needed. No chest pain. No shortness of breath. Assessment & Plan:     Principal Problem:    Neutropenic fever ([address])    MDS (myelodysplastic syndrome) (HCC)    Pancytopenia ([address])    Sepsis ([address])  Plan: On Empiric IV antibiotics, Vancomycin and Cefepime. IV fluid for gentle hydration. Symptomatic treatments   Follow up on culture result. So far, culture results are negative. Oncology service is consulted and following. Dr. Colette Cardenas saw the patient and ordered Transfusion on 10/29/2023. Will wait for oncologist's decision regarding ?need for transfusion today. Will leave decision regarding pancytopenia to oncologists.        Acute upper

## 2023-10-31 NOTE — PROGRESS NOTES
ACUTE PHYSICAL THERAPY GOALS:   (Developed with and agreed upon by patient and/or caregiver.)    (1.)Ms. Antoine Burkett will move from supine to sit and sit to supine , scoot up and down, and roll side to side in bed with INDEPENDENCE within 7 treatment day(s). (2.)Ms. Antoine Burkett will transfer from bed to chair and chair to bed with MODIFIED INDEPENDENCE using the least restrictive device within 7 treatment day(s). (3.)Ms. Antoine Burkett will ambulate with MODIFIED INDEPENDENCE for 500 feet with the least restrictive device within 7 treatment day(s). (4.)Ms. Antoine Burkett will participate in therapeutic activity/exercises x 25 minutes for increased activity tolerance within 7 treatment days. (5.)Ms. Antoine Burkett will perform standing static and dynamic balance activities x 15 minutes with SUPERVISION to improve safety within 7 day(s). PHYSICAL THERAPY: Daily Note PM   (Link to Caseload Tracking: PT Visit Days : 2  Time In/Out PT Charge Capture  Rehab Caseload Tracker  Orders    Nadir Hernandez is a 71 y.o. female   PRIMARY DIAGNOSIS: Neutropenic fever ([address])  Neutropenic fever ([address]) [D70.9, R50.81]  Pancytopenia ([address]) [D61.818]       Inpatient: Payor: Susan Webb / Plan: Loly Kuo / Product Type: *No Product type* /     ASSESSMENT:     REHAB RECOMMENDATIONS:   Recommendation to date pending progress:  Setting:  Home Health Therapy    Equipment:    To Be Determined     ASSESSMENT:  Ms. Antoine Burkett was received supine in bed, agreeable to therapy. She was able to sit at EOB with SBA. She was able to progress to ambulating 250 ft with RW and SBA-CGA. She did require a couple of standing rest breaks. Pt very fatigued after transferring to chair but overall tolerated session well. Good progress, will continue to follow.       SUBJECTIVE:   Ms. Antoine Burkett states, \"I'm tired\"     Social/Functional Lives With: Spouse  Type of Home: House  Home Layout: One level  Bathroom Toilet: Standard  Bathroom Equipment: Commode  Bathroom Accessibility:

## 2023-10-31 NOTE — PROGRESS NOTES
[facility] Hematology & Oncology        Inpatient Hematology / Oncology Progress Note    Reason for Admission:  Neutropenic fever (HCC) [D70.9, R50.81]  Pancytopenia (HCC) [D61.818]    24 Hour Events:  Tmax 98.5 - fever curve trending down, VSS  Plts remain 5k - day 2 IVIG/steroids  Hgb stable  BC+ staph epidermidis, repeat BC NGTD  Continues on Cefe/Vanc, ID following  Feeling improved    Transfusions: Plts  Replacements: None    ROS:  Constitutional: Positive for fever, fatigue; negative for fever, chills.  CV: Negative for chest pain, palpitations, edema.  Respiratory: +cough Negative for dyspnea, wheezing.  GI: Negative for nausea, abdominal pain, diarrhea.    10 point review of systems is otherwise negative with the exception of the elements mentioned above in the HPI.       Allergies   Allergen Reactions    Penicillins Hives    Sulfa Antibiotics Hives    Codeine Nausea And Vomiting     Past Medical History:   Diagnosis Date    Arthritis     Autoimmune disease (HCC)     skin changes, unknown name    Cancer (HCC) 1990    ovarian    Chronic pain     arthritis in back and legs    Coronary artery spasm (HCC)     COVID 05/15/2022    Essential hypertension 11/8/2019    Gastritis     GERD (gastroesophageal reflux disease)     Hx antineoplastic chemo     Migraine headache     Psoriasis     Psychiatric disorder     anxiety and depression    Severe obesity (BMI 35.0-39.9) 6/26/2018    Thyroid disease     Diagnosed in 1996     Past Surgical History:   Procedure Laterality Date    CARPAL TUNNEL RELEASE      GYN      ovaries    HYSTERECTOMY, TOTAL ABDOMINAL (CERVIX REMOVED)  1990    SC UNLISTED PROCEDURE CARDIAC SURGERY      cardiac cath.  Dx with spasms.    TUBAL LIGATION       Family History   Problem Relation Age of Onset    Parkinson's Disease Mother     Stroke Mother         Passed away in 1969    No Known Problems Paternal Grandfather     No Known Problems Paternal Grandmother     No Known Problems Maternal  92, /43, 94% on 2L. Wbc 1.6,  , Hb 5.3, plts 7. Rapid influenza/covid neg. CXR no acute findings. Of note, she is pt of Dr Sherrell Chowdhury with new dx of MDS. She presented to dr Sherrell Chowdhury on 8/24 for evaluation of gradual pancytopenia. At that time she reported eating poorly, wt loss. Recent CT at [facility] reportedly normal.  Rec Gi eval, labs and BMBx. 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 Dr Sherrell Chowdhury reviewed with pt risk of POD and transformation to leukemia. They reviewed tx options and elected to p/w Revlimid 10mg daily vs HMA due to 5q del. Tel note encounter indicates that pt started tx ~10/6. She was admitted w NF. Started on IVF, cef/vanc/doxy w sepsis workup. S/p 1 u PRBC and 2 unit plts. ASA on hold. Nutritional/hemolysis labs pending. We were asked for recs. Pt seen in ICU, feeling much better this am.  Plan to transfer to 5th floor. 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 - pt of Dr Sherrell Chowdhury recently started Revlimid 10mg daily due to 5q del (~10/6) - on hold for now due to acute pancytopenia      Pancytopenia   - related to 1   - s/p PRBC Hb up to 7.5, c.w plt transfusion to keep >50 with bleeding (may be diff due to MDS/tx)  - nutritional labs - B12/folate elevated; TESSA - IV iron on shortage, can try oral when able and outpt IV iron; pt did get transfused. - check DIC/hemolysis labs   - Humira held due to current immunosuppression   10/29 no DIC, WBC 1.6, Hgb 6.9, Plt 3K-s/p 2 units-transfuse now, if no improvement in platelets will need to transfuse crossmatched platelets  23/53 Plts 5k despite multiple units of platelets. IPF elevated, start IVIG and pulse dexamethasone. WBC and hgb relatively stable. Hapto pending and smear negative. No DIC.  10/31 Plts 5k, day 2 IVIG/dex. Other counts stable.      Neutropenic fever  - started

## 2023-10-31 NOTE — PROGRESS NOTES
EOS 7p-7a    BSSR received from off going [employer]    Awaiting PLT count post transfusion. 2334 norco given c/o pain  2336 benadryl given     Rounded on hourly by myself and patient assistant. Bed locked, low, and call light within reach. No new needs voiced at this time.      BSSR given to oncoming CHINO Webb

## 2023-10-31 NOTE — PROGRESS NOTES
ACUTE OCCUPATIONAL THERAPY GOALS:   (Developed with and agreed upon by patient and/or caregiver.)  1. Patient will complete lower body bathing and dressing with MOD I and adaptive equipment as needed. 2. Patient will complete toileting with MOD I.   3. Patient will tolerate 30 minutes of OT treatment with 1-2 rest breaks to increase activity tolerance for ADLs. 4. Patient will complete functional transfers with MOD I and adaptive equipment as needed. 5. Patient will complete functional mobility for household distances with MOD I and adaptive equipment as needed. 6. Patient will complete self-grooming while standing edge of sink with MOD I and adaptive equipment as needed. Timeframe: 7 visits     OCCUPATIONAL THERAPY: Daily Note AM   OT Visit Days: 2   Time In/Out  OT Charge Capture  Rehab Caseload Tracker  OT Orders    Neutropenic Precautions    Christie Strauss is a 71 y.o. female   PRIMARY DIAGNOSIS: Neutropenic fever ([address])  Neutropenic fever ([address]) [D70.9, R50.81]  Pancytopenia ([address]) [D61.818]       Inpatient: Payor: Belen Canchola / Plan: Jimi Gregorio / Product Type: *No Product type* /     ASSESSMENT:     REHAB RECOMMENDATIONS: CURRENT LEVEL OF FUNCTION:  (Most Recently Demonstrated)   Recommendation to date pending progress:  Setting:  Home Health Therapy    Equipment:    To Be Determined Bathing:  Not Tested  Dressing:  Stand by Assist  Feeding/Grooming:  Not Tested  Toileting:  Not Tested  Functional Mobility:  Stand by Assist / CGA with RW community distance     ASSESSMENT:  Ms. Kayley Davey is doing well today. Pt supine and agreeable to therapy. Pt overall SBA/CGA for functional transfers and mobility of community distance with RW. Pt completed upper body dressing in standing, hakan herrera. Pt was fatigued throughout and required occasional standing rest breaks. Pt states she is waiting for her daughter to arrive with her items for grooming and ADLs.  Educated pt on pacing tasks and 1123  Time Out: [address] [facility] [address]  Minutes: [address] [place], [place]

## 2023-11-01 ENCOUNTER — APPOINTMENT (OUTPATIENT)
Dept: MRI IMAGING | Age: 73
DRG: 808 | End: 2023-11-01
Payer: MEDICARE

## 2023-11-01 ENCOUNTER — APPOINTMENT (OUTPATIENT)
Dept: GENERAL RADIOLOGY | Age: 73
DRG: 808 | End: 2023-11-01
Payer: MEDICARE

## 2023-11-01 ENCOUNTER — APPOINTMENT (OUTPATIENT)
Dept: CT IMAGING | Age: 73
DRG: 808 | End: 2023-11-01
Payer: MEDICARE

## 2023-11-01 LAB
ALBUMIN SERPL-MCNC: 3 G/DL (ref 3.2–4.6)
ALBUMIN/GLOB SERPL: 0.6 (ref 0.4–1.6)
ALP SERPL-CCNC: 61 U/L (ref 50–136)
ALT SERPL-CCNC: 28 U/L (ref 12–65)
ANION GAP SERPL CALC-SCNC: 6 MMOL/L (ref 2–11)
AST SERPL-CCNC: 29 U/L (ref 15–37)
BASOPHILS # BLD: 0.1 K/UL (ref 0–0.2)
BASOPHILS NFR BLD: 5 % (ref 0–2)
BILIRUB SERPL-MCNC: 0.9 MG/DL (ref 0.2–1.1)
BLD PROD TYP BPU: NORMAL
BLOOD BANK CMNT PATIENT-IMP: NORMAL
BLOOD BANK CMNT PATIENT-IMP: NORMAL
BLOOD BANK DISPENSE STATUS: NORMAL
BPU ID: NORMAL
BUN SERPL-MCNC: 18 MG/DL (ref 8–23)
CALCIUM SERPL-MCNC: 8.8 MG/DL (ref 8.3–10.4)
CHLORIDE SERPL-SCNC: 109 MMOL/L (ref 101–110)
CHOLEST SERPL-MCNC: 115 MG/DL
CO2 SERPL-SCNC: 22 MMOL/L (ref 21–32)
CREAT SERPL-MCNC: 0.8 MG/DL (ref 0.6–1)
DIFFERENTIAL METHOD BLD: ABNORMAL
EOSINOPHIL # BLD: 0 K/UL (ref 0–0.8)
EOSINOPHIL NFR BLD: 0 % (ref 0.5–7.8)
ERYTHROCYTE [DISTWIDTH] IN BLOOD BY AUTOMATED COUNT: 16.5 % (ref 11.9–14.6)
EST. AVERAGE GLUCOSE BLD GHB EST-MCNC: 128 MG/DL
GLOBULIN SER CALC-MCNC: 5.1 G/DL (ref 2.8–4.5)
GLUCOSE SERPL-MCNC: 112 MG/DL (ref 65–100)
HBA1C MFR BLD: 6.1 % (ref 4.8–5.6)
HCT VFR BLD AUTO: 23 % (ref 35.8–46.3)
HDLC SERPL-MCNC: 30 MG/DL (ref 40–60)
HDLC SERPL: 3.8
HGB BLD-MCNC: 7.5 G/DL (ref 11.7–15.4)
IMM GRANULOCYTES # BLD AUTO: 0.2 K/UL (ref 0–0.5)
IMM GRANULOCYTES NFR BLD AUTO: 9 % (ref 0–5)
LDLC SERPL CALC-MCNC: 55.8 MG/DL
LYMPHOCYTES # BLD: 0.7 K/UL (ref 0.5–4.6)
LYMPHOCYTES NFR BLD: 40 % (ref 13–44)
MAGNESIUM SERPL-MCNC: 2.1 MG/DL (ref 1.8–2.4)
MCH RBC QN AUTO: 31 PG (ref 26.1–32.9)
MCHC RBC AUTO-ENTMCNC: 32.6 G/DL (ref 31.4–35)
MCV RBC AUTO: 95 FL (ref 82–102)
MONOCYTES # BLD: 0.4 K/UL (ref 0.1–1.3)
MONOCYTES NFR BLD: 20 % (ref 4–12)
NEUTS SEG # BLD: 0.5 K/UL (ref 1.7–8.2)
NEUTS SEG NFR BLD: 26 % (ref 43–78)
NRBC # BLD: 0 K/UL (ref 0–0.2)
NT PRO BNP: 4664 PG/ML (ref 5–125)
PLATELET # BLD AUTO: 7 K/UL (ref 150–450)
PLATELET COMMENT: ABNORMAL
PMV BLD AUTO: ABNORMAL FL (ref 9.4–12.3)
POTASSIUM SERPL-SCNC: 4.2 MMOL/L (ref 3.5–5.1)
PROT SERPL-MCNC: 8.1 G/DL (ref 6.3–8.2)
RBC # BLD AUTO: 2.42 M/UL (ref 4.05–5.2)
RBC MORPH BLD: ABNORMAL
SODIUM SERPL-SCNC: 137 MMOL/L (ref 133–143)
TRIGL SERPL-MCNC: 146 MG/DL (ref 35–150)
UNIT DIVISION: 0
VLDLC SERPL CALC-MCNC: 29.2 MG/DL (ref 6–23)
WBC # BLD AUTO: 1.9 K/UL (ref 4.3–11.1)
WBC MORPH BLD: ABNORMAL

## 2023-11-01 PROCEDURE — 83880 ASSAY OF NATRIURETIC PEPTIDE: CPT

## 2023-11-01 PROCEDURE — 80053 COMPREHEN METABOLIC PANEL: CPT

## 2023-11-01 PROCEDURE — 6360000004 HC RX CONTRAST MEDICATION: Performed by: NURSE PRACTITIONER

## 2023-11-01 PROCEDURE — 86644 CMV ANTIBODY: CPT

## 2023-11-01 PROCEDURE — 99232 SBSQ HOSP IP/OBS MODERATE 35: CPT | Performed by: INTERNAL MEDICINE

## 2023-11-01 PROCEDURE — 6360000002 HC RX W HCPCS: Performed by: FAMILY MEDICINE

## 2023-11-01 PROCEDURE — 94760 N-INVAS EAR/PLS OXIMETRY 1: CPT

## 2023-11-01 PROCEDURE — 6360000002 HC RX W HCPCS: Performed by: NURSE PRACTITIONER

## 2023-11-01 PROCEDURE — 2580000003 HC RX 258: Performed by: FAMILY MEDICINE

## 2023-11-01 PROCEDURE — 83735 ASSAY OF MAGNESIUM: CPT

## 2023-11-01 PROCEDURE — 6370000000 HC RX 637 (ALT 250 FOR IP): Performed by: INTERNAL MEDICINE

## 2023-11-01 PROCEDURE — APPSS30 APP SPLIT SHARED TIME 16-30 MINUTES: Performed by: NURSE PRACTITIONER

## 2023-11-01 PROCEDURE — 6370000000 HC RX 637 (ALT 250 FOR IP): Performed by: NURSE PRACTITIONER

## 2023-11-01 PROCEDURE — 94640 AIRWAY INHALATION TREATMENT: CPT

## 2023-11-01 PROCEDURE — 36430 TRANSFUSION BLD/BLD COMPNT: CPT

## 2023-11-01 PROCEDURE — 6370000000 HC RX 637 (ALT 250 FOR IP): Performed by: FAMILY MEDICINE

## 2023-11-01 PROCEDURE — 2580000003 HC RX 258: Performed by: NURSE PRACTITIONER

## 2023-11-01 PROCEDURE — 1170000001 HC RM PRIVATE ONCOLOGY W/TELEMETRY

## 2023-11-01 PROCEDURE — P9037 PLATE PHERES LEUKOREDU IRRAD: HCPCS

## 2023-11-01 PROCEDURE — 70498 CT ANGIOGRAPHY NECK: CPT

## 2023-11-01 PROCEDURE — 36415 COLL VENOUS BLD VENIPUNCTURE: CPT

## 2023-11-01 PROCEDURE — 86022 PLATELET ANTIBODIES: CPT

## 2023-11-01 PROCEDURE — 2700000000 HC OXYGEN THERAPY PER DAY

## 2023-11-01 PROCEDURE — 85025 COMPLETE CBC W/AUTO DIFF WBC: CPT

## 2023-11-01 PROCEDURE — 71045 X-RAY EXAM CHEST 1 VIEW: CPT

## 2023-11-01 RX ORDER — HYDRALAZINE HYDROCHLORIDE 20 MG/ML
5 INJECTION INTRAMUSCULAR; INTRAVENOUS EVERY 6 HOURS PRN
Status: ACTIVE | OUTPATIENT
Start: 2023-11-01

## 2023-11-01 RX ORDER — SODIUM CHLORIDE 0.9 % (FLUSH) 0.9 %
10 SYRINGE (ML) INJECTION
Status: ACTIVE | OUTPATIENT
Start: 2023-11-01

## 2023-11-01 RX ORDER — 0.9 % SODIUM CHLORIDE 0.9 %
100 INTRAVENOUS SOLUTION INTRAVENOUS
Status: ACTIVE | OUTPATIENT
Start: 2023-11-01

## 2023-11-01 RX ORDER — CIPROFLOXACIN 500 MG/1
500 TABLET, FILM COATED ORAL EVERY 12 HOURS SCHEDULED
Status: DISPENSED | OUTPATIENT
Start: 2023-11-01

## 2023-11-01 RX ORDER — SODIUM CHLORIDE 9 MG/ML
INJECTION, SOLUTION INTRAVENOUS PRN
Status: ACTIVE | OUTPATIENT
Start: 2023-11-01

## 2023-11-01 RX ADMIN — ALBUTEROL SULFATE 2.5 MG: 2.5 SOLUTION RESPIRATORY (INHALATION) at 20:33

## 2023-11-01 RX ADMIN — DEXAMETHASONE SODIUM PHOSPHATE 40 MG: 10 INJECTION INTRAMUSCULAR; INTRAVENOUS at 09:46

## 2023-11-01 RX ADMIN — MORPHINE SULFATE 15 MG: 15 TABLET, FILM COATED, EXTENDED RELEASE ORAL at 09:35

## 2023-11-01 RX ADMIN — SODIUM CHLORIDE, PRESERVATIVE FREE 10 ML: 5 INJECTION INTRAVENOUS at 09:33

## 2023-11-01 RX ADMIN — POTASSIUM CHLORIDE 20 MEQ: 1500 TABLET, EXTENDED RELEASE ORAL at 16:38

## 2023-11-01 RX ADMIN — FAMOTIDINE 40 MG: 20 TABLET, FILM COATED ORAL at 21:31

## 2023-11-01 RX ADMIN — POTASSIUM CHLORIDE 20 MEQ: 1500 TABLET, EXTENDED RELEASE ORAL at 09:33

## 2023-11-01 RX ADMIN — ROSUVASTATIN CALCIUM 10 MG: 10 TABLET, FILM COATED ORAL at 09:33

## 2023-11-01 RX ADMIN — DIPHENHYDRAMINE HYDROCHLORIDE 25 MG: 25 CAPSULE ORAL at 04:01

## 2023-11-01 RX ADMIN — PANTOPRAZOLE SODIUM 40 MG: 40 TABLET, DELAYED RELEASE ORAL at 16:38

## 2023-11-01 RX ADMIN — FERROUS SULFATE TAB 325 MG (65 MG ELEMENTAL FE) 325 MG: 325 (65 FE) TAB at 16:38

## 2023-11-01 RX ADMIN — HYDROCODONE BITARTRATE AND ACETAMINOPHEN 1 TABLET: 5; 325 TABLET ORAL at 11:21

## 2023-11-01 RX ADMIN — CIPROFLOXACIN HYDROCHLORIDE 500 MG: 500 TABLET, FILM COATED ORAL at 09:33

## 2023-11-01 RX ADMIN — MORPHINE SULFATE 15 MG: 15 TABLET, FILM COATED, EXTENDED RELEASE ORAL at 21:30

## 2023-11-01 RX ADMIN — IPRATROPIUM BROMIDE 2 SPRAY: 42 SPRAY, METERED NASAL at 21:36

## 2023-11-01 RX ADMIN — IPRATROPIUM BROMIDE 2 SPRAY: 42 SPRAY, METERED NASAL at 09:41

## 2023-11-01 RX ADMIN — FLUOXETINE 20 MG: 20 CAPSULE ORAL at 09:33

## 2023-11-01 RX ADMIN — FERROUS SULFATE TAB 325 MG (65 MG ELEMENTAL FE) 325 MG: 325 (65 FE) TAB at 11:21

## 2023-11-01 RX ADMIN — SODIUM CHLORIDE, PRESERVATIVE FREE 10 ML: 5 INJECTION INTRAVENOUS at 21:35

## 2023-11-01 RX ADMIN — ALBUTEROL SULFATE 2.5 MG: 2.5 SOLUTION RESPIRATORY (INHALATION) at 01:31

## 2023-11-01 RX ADMIN — SALINE NASAL SPRAY 2 SPRAY: 1.5 SOLUTION NASAL at 21:35

## 2023-11-01 RX ADMIN — POLYETHYLENE GLYCOL 3350 17 G: 17 POWDER, FOR SOLUTION ORAL at 09:32

## 2023-11-01 RX ADMIN — FLUOXETINE 20 MG: 20 CAPSULE ORAL at 21:31

## 2023-11-01 RX ADMIN — ALBUTEROL SULFATE 2.5 MG: 2.5 SOLUTION RESPIRATORY (INHALATION) at 12:14

## 2023-11-01 RX ADMIN — IOPAMIDOL 100 ML: 755 INJECTION, SOLUTION INTRAVENOUS at 07:49

## 2023-11-01 RX ADMIN — MONTELUKAST 10 MG: 10 TABLET, FILM COATED ORAL at 09:33

## 2023-11-01 RX ADMIN — DOCUSATE SODIUM 50 MG AND SENNOSIDES 8.6 MG 1 TABLET: 8.6; 5 TABLET, FILM COATED ORAL at 21:31

## 2023-11-01 RX ADMIN — LEVOTHYROXINE SODIUM 125 MCG: 0.12 TABLET ORAL at 09:33

## 2023-11-01 RX ADMIN — SALINE NASAL SPRAY 2 SPRAY: 1.5 SOLUTION NASAL at 16:38

## 2023-11-01 RX ADMIN — SALINE NASAL SPRAY 2 SPRAY: 1.5 SOLUTION NASAL at 09:41

## 2023-11-01 RX ADMIN — CIPROFLOXACIN HYDROCHLORIDE 500 MG: 500 TABLET, FILM COATED ORAL at 21:30

## 2023-11-01 RX ADMIN — PANTOPRAZOLE SODIUM 40 MG: 40 TABLET, DELAYED RELEASE ORAL at 09:33

## 2023-11-01 RX ADMIN — ACETAMINOPHEN 650 MG: 325 TABLET ORAL at 04:01

## 2023-11-01 ASSESSMENT — PAIN DESCRIPTION - ONSET
ONSET: ON-GOING
ONSET: ON-GOING

## 2023-11-01 ASSESSMENT — PAIN SCALES - GENERAL
PAINLEVEL_OUTOF10: 5
PAINLEVEL_OUTOF10: 0
PAINLEVEL_OUTOF10: 6
PAINLEVEL_OUTOF10: 3
PAINLEVEL_OUTOF10: 6
PAINLEVEL_OUTOF10: 6

## 2023-11-01 ASSESSMENT — PAIN DESCRIPTION - DESCRIPTORS
DESCRIPTORS: ACHING
DESCRIPTORS: ACHING;DISCOMFORT
DESCRIPTORS: ACHING;THROBBING
DESCRIPTORS: ACHING

## 2023-11-01 ASSESSMENT — PAIN DESCRIPTION - ORIENTATION
ORIENTATION: MID;POSTERIOR
ORIENTATION: MID
ORIENTATION: LOWER;POSTERIOR

## 2023-11-01 ASSESSMENT — PAIN DESCRIPTION - LOCATION
LOCATION: BACK
LOCATION: BACK
LOCATION: HEAD
LOCATION: BACK

## 2023-11-01 ASSESSMENT — PAIN DESCRIPTION - PAIN TYPE
TYPE: CHRONIC PAIN
TYPE: CHRONIC PAIN

## 2023-11-01 ASSESSMENT — PAIN DESCRIPTION - FREQUENCY
FREQUENCY: CONTINUOUS
FREQUENCY: CONTINUOUS

## 2023-11-01 ASSESSMENT — PAIN - FUNCTIONAL ASSESSMENT
PAIN_FUNCTIONAL_ASSESSMENT: PREVENTS OR INTERFERES SOME ACTIVE ACTIVITIES AND ADLS
PAIN_FUNCTIONAL_ASSESSMENT: PREVENTS OR INTERFERES WITH MANY ACTIVE NOT PASSIVE ACTIVITIES

## 2023-11-01 NOTE — PROGRESS NOTES
END OF SHIFT SUMMARY:7p-7a    Significant vitals this shift:  elevated BP, soraida at times  Significant labs this shift:  PLT 3, WBC 1.6  Tests performed this shift:  CT scan  Orders to be followed up on:  MRI brain, TTE  Blood products given this shift:  1 unit PLT  Additional events this shift:   -Pt A&O x4. [place] x1 for abd/back pain.  -Critical lab: PLT 3. 1 unit PLT ordered per [facility] SOP. 1 unit ordered ahead. 2300-RN called to room per family. Pt noted to have jarbled/incoherent speech. Code S called. NIH 6. Stat CT ordered. Pt placed on telemetry running NSR. Tele-neuro consult. MRI of the brain ordered. MRI screening form completed. -Pt speech improved throughout shift. Trouble swallowing reported, pills given crushed in applesauce.  -Pt and family concerned about pt edema and \"abd bloating. \" BLE noted to be 3+ non-pitting edema. Weight this am on standing scale=187.3 lbs  -Pt assisted to [facility] d/t weakness  -Pt has IV in R forearm placed via ultrasound. Pt and family refusing additional IV at this time. Abx and PLT transfusion administration delayed d/t this.  -Pt has large ulcer/lesion on inner L cheek. Per daughter, believes occurred when patient was lethargic during Code S. Not actively bleeding. 0300- Pt Spo2 88% on RA. Placed on 2 L NC 96% Spo2.  - 1 unit PLT transfusion started. 1 unit ordered ahead. 0615-pt and family agreeable to IV placement via [address] Attempted to reach Vascular access team. Will pass along to dayshift RN. MRI screening form completed. Per pt and family, pt is very claustrophobic and would need medication prior to scan. Will pass on to dayshift RN.      Bedside shift report given to oncoming RN    Noel Corey RN

## 2023-11-01 NOTE — PROGRESS NOTES
Hospitalist Progress Note   Admit Date:  10/27/2023  2:12 PM   Name:  Elbert Harris   Age:  71 y.o. Sex:  female  :  1950   MRN:  [id number]   Room:  [facility]/    Presenting/Chief Complaint: Fever and Fatigue     Reason(s) for Admission: Neutropenic fever ([address]) [D70.9, R50.81]  Pancytopenia [facility]) [D61.818]     Hospital Course:   Elbert Harris is a 71 y.o. female with history of  recently diagnosed MDS with pancytopenia and treatment with Revlimd around 10/6 and Psoriatic Arthritis on Humira, chronic back pain, HTN, hypothyroidism, HLD admitted to onc service 10/27. Noted with neutropenic fever, bacteremic with ID following, thrombocytopenia and has been receiving platelets. Also on Cefepime with plans to transition to Cipro for suppression given no clear source of bacteremia. 10/31 in the evening Code S called for acute onset dysarthria. NIH 1. CT head and neuro consult ordered. . Of note, nursing did notify onc 1st of change. Subjective & 24hr Events:   Seen and examined at bedside. No acute events overnight. Patient is alert and oriented x 3, but still mildly dysarthric. She denies any other complaints.        Assessment & Plan:     Principal Problem:    Neutropenic fever ([address])  Plan: per onc who is primary     Active Problems:    MDS (myelodysplastic syndrome) ([address])  Plan: per onc       Pancytopenia ([address])  Plan: per onc       Acute upper respiratory infection  Plan: noted, not hypoxic       Hypothyroidism, adult  Plan: Synthroid home dose       Essential hypertension  Plan: home meds       Psoriatic arthritis ([address])  Plan: noted       Chronic midline low back pain without sciatica  Plan: MS contin with prn [place] home dosing ongoing       Esophageal reflux  Plan: Pepcid, PPI       Mixed hyperlipidemia  Plan: statin       Class 1 obesity with serious comorbidity and body mass index (BMI) of 33.0 to 33.9 in adult  Plan: noted       Esophageal dysphagia  Plan: Pepcid, PPI None

## 2023-11-01 NOTE — ICUWATCH
Rapid Response Team Note      Subjective: Responded to Code Stroke secondary to dysarthria. Summary: Patient last known well approximately 10 min ago. Patient now with new slurred speech. Speech is practically incomprehensible at this time, requiring repeated questioning to understand patient. Patient has no other deficits at this time. NIH 5. Adeola Velázquez NP at bedside. New orders received for stat head CT and tele neuro consult. See Rapid Response/Code Blue Narrator for full documentation    Outcome: Patient to remain in current location. Will follow-up per Critical Care Clinical Rounding protocol.     Bernadine Ovalle RN  [place]: [address]: [phone]

## 2023-11-01 NOTE — CARE COORDINATION
Chart screened by CM for d/c planning. Pt currently requiring 2L O2 NC. Oncology monitoring low plt count. D3 IVIG/dex. Plan to switch from IV to PO ABX today. Today pt underwent CXR which showed:   Pulmonary vascular congestion and diffuse interstitial prominence reflecting edema or atypical infiltrates. PT/OT recommend HH at d/c. CM will continue to follow. Anticipated dispo: home once medically stable.   LOS = 5 days

## 2023-11-01 NOTE — PROGRESS NOTES
0720: Received pt from [address] in stable condition. Pt in bed resting quietly. Resp even & unlabored on 2L NC; no acute signs of distress noted. Pt does appear lethargic. Bed low & locked; call light in reach; no needs voiced. Family at bedside. 7370: Pt taken down by transport for CTA neck in stable condition. 0825: Pt back from CTA in stable condition. 0840: Pt c/o feeling SOB. 140/60, HR 56, O2 96% on 2L NC; repositioned for easier breathing. Pt states she feels better. Dr. Hairston Slim in to see pt.

## 2023-11-01 NOTE — PLAN OF CARE
Problem: Pain  Goal: Verbalizes/displays adequate comfort level or baseline comfort level  11/1/2023 1105 by Phill Ervin RN  Outcome: Progressing  11/1/2023 0329 by Eugenio Wong RN  Outcome: Progressing     Problem: ABCDS Injury Assessment  Goal: Absence of physical injury  11/1/2023 1105 by Phill Ervin RN  Outcome: Progressing  11/1/2023 0329 by Eugenio Wong RN  Outcome: Progressing     Problem: Safety - Adult  Goal: Free from fall injury  11/1/2023 1105 by Phill Ervin RN  Outcome: Progressing  11/1/2023 0329 by Eugenio Wong RN  Outcome: Progressing     Problem: Discharge Planning  Goal: Discharge to home or other facility with appropriate resources  11/1/2023 1105 by Phill Ervin RN  Outcome: Progressing  11/1/2023 0329 by Eugenio Wong RN  Outcome: Progressing  Flowsheets (Taken 10/31/2023 1915)  Discharge to home or other facility with appropriate resources:   Identify barriers to discharge with patient and caregiver   Arrange for needed discharge resources and transportation as appropriate

## 2023-11-01 NOTE — PLAN OF CARE
Problem: Pain  Goal: Verbalizes/displays adequate comfort level or baseline comfort level  Outcome: Progressing     Problem: ABCDS Injury Assessment  Goal: Absence of physical injury  Outcome: Progressing     Problem: Safety - Adult  Goal: Free from fall injury  Outcome: Progressing     Problem: Discharge Planning  Goal: Discharge to home or other facility with appropriate resources  Outcome: Progressing  Flowsheets (Taken 10/31/2023 0305)  Discharge to home or other facility with appropriate resources:   Identify barriers to discharge with patient and caregiver   Arrange for needed discharge resources and transportation as appropriate

## 2023-11-01 NOTE — PROGRESS NOTES
[facility] Hematology & Oncology        Inpatient Hematology / Oncology Progress Note    Reason for Admission:  Neutropenic fever ([address]) [D70.9, R50.81]  Pancytopenia ([address]) [D61.818]    24 Hour Events:  VSS, afebrile  Plts remain 5k - day 3 IVIG/steroids  Changed back to prophylactic cipro  Some AMS overnight as well as LE edema and hypoxia   at bedside    Transfusions: Plts  Replacements: None    ROS:  Constitutional: Positive for fever, fatigue; negative for fever, chills. CV: Negative for chest pain, palpitations, edema. Respiratory: +cough Negative for dyspnea, wheezing. GI: Negative for nausea, abdominal pain, diarrhea. 10 point review of systems is otherwise negative with the exception of the elements mentioned above in the HPI. Allergies   Allergen Reactions    Penicillins Hives    Sulfa Antibiotics Hives    Codeine Nausea And Vomiting     Past Medical History:   Diagnosis Date    Arthritis     Autoimmune disease ([address])     skin changes, unknown name    Cancer ([address]) 1990    ovarian    Chronic pain     arthritis in back and legs    Coronary artery spasm ([address])     COVID 05/15/2022    Essential hypertension 11/8/2019    Gastritis     GERD (gastroesophageal reflux disease)     Hx antineoplastic chemo     Migraine headache     Psoriasis     Psychiatric disorder     anxiety and depression    Severe obesity (BMI 35.0-39.9) 6/26/2018    Thyroid disease     Diagnosed in 1996     Past Surgical History:   Procedure Laterality Date    CARPAL TUNNEL RELEASE      GYN      ovaries    HYSTERECTOMY, TOTAL ABDOMINAL (CERVIX REMOVED)  Patriciabury      cardiac cath. Dx with spasms.     TUBAL LIGATION       Family History   Problem Relation Age of Onset    Parkinson's Disease Mother     Stroke Mother         Passed away in 1969    No Known Problems Paternal Grandfather     No Known Problems Paternal Grandmother     No Known Problems Maternal Grandfather     No Known Problems

## 2023-11-01 NOTE — CONSULTS
[place] Hospitalist Consult   Admit Date:  10/27/2023  2:12 PM   Name:  Fadi Rodriguez   Age:  71 y.o. Sex:  female  :  1950   MRN:  [id number]   Room:  [facility]    Presenting/Chief Complaint: Fever and Fatigue    Reason(s) for Admission: Neutropenic fever ([address]) [D70.9, R50.81]  Pancytopenia [facility]) [X66.889]     Hospitalists consulted by Kortney Minor MD for: dysarthria    History of Presenting Illness:   Fadi Rodriguez is a 71 y.o. female with history of  recently diagnosed MDS with pancytopenia and treatment with Revlimd around 10/6 and Psoriatic Arthritis on Humira, chronic back pain, HTN, hypothyroidism, HLD admitted to onc service 10/27. Noted with neutropenic fever, bacteremic with ID following, thrombocytopenia and has been receiving platelets. Also on Cefepime with plans to transition to Cipro for suppression given no clear source of bacteremia. 10/31 in the evening Code S called for acute onset dysarthria. NIH 1. CT head and neuro consult ordered. . Of note, nursing did notify onc 1st of change.        Assessment & Plan:     Principal Problem:    Neutropenic fever ([address])  Plan: per onc who is primary    Active Problems:    MDS (myelodysplastic syndrome) ([address])  Plan: per onc      Pancytopenia ([address])  Plan: per onc      Acute upper respiratory infection  Plan: noted, not hypoxic      Hypothyroidism, adult  Plan: Synthroid home dose      Essential hypertension  Plan: home meds      Psoriatic arthritis ([address])  Plan: noted      Chronic midline low back pain without sciatica  Plan: MS contin with prn [place] home dosing ongoing      Esophageal reflux  Plan: Pepcid, PPI      Mixed hyperlipidemia  Plan: statin      Class 1 obesity with serious comorbidity and body mass index (BMI) of 33.0 to 33.9 in adult  Plan: noted      Esophageal dysphagia  Plan: Pepcid, PPI      Mood disorder ([address])  Plan: home meds      Constipation due to pain medication  Plan: bowel regimen      Immunosuppressed

## 2023-11-02 LAB
ALBUMIN SERPL-MCNC: 3 G/DL (ref 3.2–4.6)
ALBUMIN/GLOB SERPL: 0.6 (ref 0.4–1.6)
ALP SERPL-CCNC: 55 U/L (ref 50–136)
ALT SERPL-CCNC: 32 U/L (ref 12–65)
ANION GAP SERPL CALC-SCNC: 7 MMOL/L (ref 2–11)
AST SERPL-CCNC: 39 U/L (ref 15–37)
BACTERIA SPEC CULT: NORMAL
BASOPHILS # BLD: 0.1 K/UL (ref 0–0.2)
BASOPHILS NFR BLD: 5 % (ref 0–2)
BILIRUB SERPL-MCNC: 1.1 MG/DL (ref 0.2–1.1)
BLD PROD TYP BPU: NORMAL
BLD PROD TYP BPU: NORMAL
BLOOD BANK CMNT PATIENT-IMP: NORMAL
BLOOD BANK CMNT PATIENT-IMP: NORMAL
BLOOD BANK DISPENSE STATUS: NORMAL
BLOOD BANK DISPENSE STATUS: NORMAL
BPU ID: NORMAL
BPU ID: NORMAL
BUN SERPL-MCNC: 18 MG/DL (ref 8–23)
CALCIUM SERPL-MCNC: 8.8 MG/DL (ref 8.3–10.4)
CHLORIDE SERPL-SCNC: 105 MMOL/L (ref 101–110)
CO2 SERPL-SCNC: 23 MMOL/L (ref 21–32)
CREAT SERPL-MCNC: 0.7 MG/DL (ref 0.6–1)
DIFFERENTIAL METHOD BLD: ABNORMAL
EOSINOPHIL # BLD: 0 K/UL (ref 0–0.8)
EOSINOPHIL NFR BLD: 1 % (ref 0.5–7.8)
ERYTHROCYTE [DISTWIDTH] IN BLOOD BY AUTOMATED COUNT: 16.4 % (ref 11.9–14.6)
GLOBULIN SER CALC-MCNC: 4.8 G/DL (ref 2.8–4.5)
GLUCOSE SERPL-MCNC: 101 MG/DL (ref 65–100)
HCT VFR BLD AUTO: 21.5 % (ref 35.8–46.3)
HGB BLD-MCNC: 7 G/DL (ref 11.7–15.4)
IMM GRANULOCYTES # BLD AUTO: 0.2 K/UL (ref 0–0.5)
IMM GRANULOCYTES NFR BLD AUTO: 10 % (ref 0–5)
LYMPHOCYTES # BLD: 0.6 K/UL (ref 0.5–4.6)
LYMPHOCYTES NFR BLD: 40 % (ref 13–44)
MAGNESIUM SERPL-MCNC: 2.4 MG/DL (ref 1.8–2.4)
MCH RBC QN AUTO: 31 PG (ref 26.1–32.9)
MCHC RBC AUTO-ENTMCNC: 32.6 G/DL (ref 31.4–35)
MCV RBC AUTO: 95.1 FL (ref 82–102)
MONOCYTES # BLD: 0.3 K/UL (ref 0.1–1.3)
MONOCYTES NFR BLD: 19 % (ref 4–12)
NEUTS SEG # BLD: 0.4 K/UL (ref 1.7–8.2)
NEUTS SEG NFR BLD: 25 % (ref 43–78)
NRBC # BLD: 0 K/UL (ref 0–0.2)
PLATELET # BLD AUTO: 12 K/UL (ref 150–450)
PLATELET # BLD AUTO: 4 K/UL (ref 150–450)
PLATELET COMMENT: ABNORMAL
PMV BLD AUTO: ABNORMAL FL (ref 9.4–12.3)
POTASSIUM SERPL-SCNC: 4.4 MMOL/L (ref 3.5–5.1)
PROT SERPL-MCNC: 7.8 G/DL (ref 6.3–8.2)
RBC # BLD AUTO: 2.26 M/UL (ref 4.05–5.2)
RBC MORPH BLD: ABNORMAL
SERVICE CMNT-IMP: NORMAL
SODIUM SERPL-SCNC: 135 MMOL/L (ref 133–143)
UNIT DIVISION: 0
UNIT DIVISION: 0
WBC # BLD AUTO: 1.6 K/UL (ref 4.3–11.1)
WBC MORPH BLD: ABNORMAL

## 2023-11-02 PROCEDURE — 86644 CMV ANTIBODY: CPT

## 2023-11-02 PROCEDURE — 85049 AUTOMATED PLATELET COUNT: CPT

## 2023-11-02 PROCEDURE — 36415 COLL VENOUS BLD VENIPUNCTURE: CPT

## 2023-11-02 PROCEDURE — 80053 COMPREHEN METABOLIC PANEL: CPT

## 2023-11-02 PROCEDURE — 6360000002 HC RX W HCPCS: Performed by: NURSE PRACTITIONER

## 2023-11-02 PROCEDURE — APPSS30 APP SPLIT SHARED TIME 16-30 MINUTES: Performed by: NURSE PRACTITIONER

## 2023-11-02 PROCEDURE — 36430 TRANSFUSION BLD/BLD COMPNT: CPT

## 2023-11-02 PROCEDURE — P9037 PLATE PHERES LEUKOREDU IRRAD: HCPCS

## 2023-11-02 PROCEDURE — 97530 THERAPEUTIC ACTIVITIES: CPT

## 2023-11-02 PROCEDURE — 2700000000 HC OXYGEN THERAPY PER DAY

## 2023-11-02 PROCEDURE — 6370000000 HC RX 637 (ALT 250 FOR IP): Performed by: FAMILY MEDICINE

## 2023-11-02 PROCEDURE — 97535 SELF CARE MNGMENT TRAINING: CPT

## 2023-11-02 PROCEDURE — 94640 AIRWAY INHALATION TREATMENT: CPT

## 2023-11-02 PROCEDURE — 6370000000 HC RX 637 (ALT 250 FOR IP): Performed by: NURSE PRACTITIONER

## 2023-11-02 PROCEDURE — 86022 PLATELET ANTIBODIES: CPT

## 2023-11-02 PROCEDURE — 99233 SBSQ HOSP IP/OBS HIGH 50: CPT | Performed by: INTERNAL MEDICINE

## 2023-11-02 PROCEDURE — 94760 N-INVAS EAR/PLS OXIMETRY 1: CPT

## 2023-11-02 PROCEDURE — 2580000003 HC RX 258: Performed by: NURSE PRACTITIONER

## 2023-11-02 PROCEDURE — 1170000001 HC RM PRIVATE ONCOLOGY W/TELEMETRY

## 2023-11-02 PROCEDURE — 6370000000 HC RX 637 (ALT 250 FOR IP): Performed by: INTERNAL MEDICINE

## 2023-11-02 PROCEDURE — 85025 COMPLETE CBC W/AUTO DIFF WBC: CPT

## 2023-11-02 PROCEDURE — 83735 ASSAY OF MAGNESIUM: CPT

## 2023-11-02 PROCEDURE — 2580000003 HC RX 258: Performed by: FAMILY MEDICINE

## 2023-11-02 RX ORDER — SODIUM CHLORIDE 9 MG/ML
INJECTION, SOLUTION INTRAVENOUS PRN
Status: ACTIVE | OUTPATIENT
Start: 2023-11-02

## 2023-11-02 RX ORDER — AMLODIPINE BESYLATE 5 MG/1
5 TABLET ORAL DAILY
Status: DISPENSED | OUTPATIENT
Start: 2023-11-02

## 2023-11-02 RX ADMIN — DOCUSATE SODIUM 50 MG AND SENNOSIDES 8.6 MG 1 TABLET: 8.6; 5 TABLET, FILM COATED ORAL at 20:21

## 2023-11-02 RX ADMIN — SALINE NASAL SPRAY 2 SPRAY: 1.5 SOLUTION NASAL at 04:58

## 2023-11-02 RX ADMIN — LEVOTHYROXINE SODIUM 125 MCG: 0.12 TABLET ORAL at 09:08

## 2023-11-02 RX ADMIN — ACETAMINOPHEN 650 MG: 325 TABLET ORAL at 00:01

## 2023-11-02 RX ADMIN — PANTOPRAZOLE SODIUM 40 MG: 40 TABLET, DELAYED RELEASE ORAL at 16:34

## 2023-11-02 RX ADMIN — POTASSIUM CHLORIDE 20 MEQ: 1500 TABLET, EXTENDED RELEASE ORAL at 09:08

## 2023-11-02 RX ADMIN — MORPHINE SULFATE 15 MG: 15 TABLET, FILM COATED, EXTENDED RELEASE ORAL at 20:21

## 2023-11-02 RX ADMIN — ACETAMINOPHEN 650 MG: 325 TABLET ORAL at 10:57

## 2023-11-02 RX ADMIN — PANTOPRAZOLE SODIUM 40 MG: 40 TABLET, DELAYED RELEASE ORAL at 09:08

## 2023-11-02 RX ADMIN — FERROUS SULFATE TAB 325 MG (65 MG ELEMENTAL FE) 325 MG: 325 (65 FE) TAB at 11:48

## 2023-11-02 RX ADMIN — AMLODIPINE BESYLATE 5 MG: 5 TABLET ORAL at 09:08

## 2023-11-02 RX ADMIN — MORPHINE SULFATE 15 MG: 15 TABLET, FILM COATED, EXTENDED RELEASE ORAL at 09:08

## 2023-11-02 RX ADMIN — FLUOXETINE 20 MG: 20 CAPSULE ORAL at 20:21

## 2023-11-02 RX ADMIN — CIPROFLOXACIN HYDROCHLORIDE 500 MG: 500 TABLET, FILM COATED ORAL at 09:08

## 2023-11-02 RX ADMIN — DIPHENHYDRAMINE HYDROCHLORIDE 25 MG: 25 CAPSULE ORAL at 10:57

## 2023-11-02 RX ADMIN — SALINE NASAL SPRAY 2 SPRAY: 1.5 SOLUTION NASAL at 20:23

## 2023-11-02 RX ADMIN — ROSUVASTATIN CALCIUM 10 MG: 10 TABLET, FILM COATED ORAL at 09:08

## 2023-11-02 RX ADMIN — FAMOTIDINE 40 MG: 20 TABLET, FILM COATED ORAL at 20:21

## 2023-11-02 RX ADMIN — IPRATROPIUM BROMIDE 2 SPRAY: 42 SPRAY, METERED NASAL at 09:09

## 2023-11-02 RX ADMIN — FLUOXETINE 20 MG: 20 CAPSULE ORAL at 09:08

## 2023-11-02 RX ADMIN — SALINE NASAL SPRAY 2 SPRAY: 1.5 SOLUTION NASAL at 09:09

## 2023-11-02 RX ADMIN — SODIUM CHLORIDE, PRESERVATIVE FREE 10 ML: 5 INJECTION INTRAVENOUS at 09:07

## 2023-11-02 RX ADMIN — SALINE NASAL SPRAY 2 SPRAY: 1.5 SOLUTION NASAL at 15:06

## 2023-11-02 RX ADMIN — POTASSIUM CHLORIDE 20 MEQ: 1500 TABLET, EXTENDED RELEASE ORAL at 17:23

## 2023-11-02 RX ADMIN — DIPHENHYDRAMINE HYDROCHLORIDE 25 MG: 25 CAPSULE ORAL at 00:01

## 2023-11-02 RX ADMIN — POLYETHYLENE GLYCOL 3350 17 G: 17 POWDER, FOR SOLUTION ORAL at 09:07

## 2023-11-02 RX ADMIN — FERROUS SULFATE TAB 325 MG (65 MG ELEMENTAL FE) 325 MG: 325 (65 FE) TAB at 17:23

## 2023-11-02 RX ADMIN — CIPROFLOXACIN HYDROCHLORIDE 500 MG: 500 TABLET, FILM COATED ORAL at 20:21

## 2023-11-02 RX ADMIN — SODIUM CHLORIDE, PRESERVATIVE FREE 10 ML: 5 INJECTION INTRAVENOUS at 20:22

## 2023-11-02 RX ADMIN — DEXAMETHASONE SODIUM PHOSPHATE 40 MG: 10 INJECTION INTRAMUSCULAR; INTRAVENOUS at 09:18

## 2023-11-02 RX ADMIN — IPRATROPIUM BROMIDE 2 SPRAY: 42 SPRAY, METERED NASAL at 20:23

## 2023-11-02 RX ADMIN — MONTELUKAST 10 MG: 10 TABLET, FILM COATED ORAL at 09:08

## 2023-11-02 ASSESSMENT — PAIN DESCRIPTION - DESCRIPTORS
DESCRIPTORS: ACHING
DESCRIPTORS: ACHING;THROBBING

## 2023-11-02 ASSESSMENT — PAIN SCALES - GENERAL
PAINLEVEL_OUTOF10: 7
PAINLEVEL_OUTOF10: 4
PAINLEVEL_OUTOF10: 0

## 2023-11-02 ASSESSMENT — PAIN - FUNCTIONAL ASSESSMENT
PAIN_FUNCTIONAL_ASSESSMENT: PREVENTS OR INTERFERES WITH MANY ACTIVE NOT PASSIVE ACTIVITIES
PAIN_FUNCTIONAL_ASSESSMENT: ACTIVITIES ARE NOT PREVENTED

## 2023-11-02 ASSESSMENT — PAIN DESCRIPTION - ORIENTATION
ORIENTATION: POSTERIOR;MID
ORIENTATION: POSTERIOR;MID

## 2023-11-02 ASSESSMENT — PAIN DESCRIPTION - LOCATION
LOCATION: BACK
LOCATION: BACK

## 2023-11-02 NOTE — PLAN OF CARE
Problem: Pain  Goal: Verbalizes/displays adequate comfort level or baseline comfort level  Outcome: Progressing     Problem: ABCDS Injury Assessment  Goal: Absence of physical injury  Outcome: Progressing     Problem: Safety - Adult  Goal: Free from fall injury  Outcome: Progressing     Problem: Discharge Planning  Goal: Discharge to home or other facility with appropriate resources  Outcome: Progressing     Problem: Skin/Tissue Integrity  Goal: Absence of new skin breakdown  Description: 1. Monitor for areas of redness and/or skin breakdown  2. Assess vascular access sites hourly  3. Every 4-6 hours minimum:  Change oxygen saturation probe site  4. Every 4-6 hours:  If on nasal continuous positive airway pressure, respiratory therapy assess nares and determine need for appliance change or resting period.   Outcome: Progressing

## 2023-11-02 NOTE — PROGRESS NOTES
ACUTE OCCUPATIONAL THERAPY GOALS:   (Developed with and agreed upon by patient and/or caregiver.)  1. Patient will complete lower body bathing and dressing with MOD I and adaptive equipment as needed. 2. Patient will complete toileting with MOD I.   3. Patient will tolerate 30 minutes of OT treatment with 1-2 rest breaks to increase activity tolerance for ADLs. 4. Patient will complete functional transfers with MOD I and adaptive equipment as needed. 5. Patient will complete functional mobility for household distances with MOD I and adaptive equipment as needed. 6. Patient will complete self-grooming while standing edge of sink with MOD I and adaptive equipment as needed. Timeframe: 7 visits     OCCUPATIONAL THERAPY: Daily Note PM   OT Visit Days: 3   Time In/Out  OT Charge Capture  Rehab Caseload Tracker  OT Orders    Neutropenic Precautions    Ellis Newman is a 71 y.o. female   PRIMARY DIAGNOSIS: Neutropenic fever ([address])  Neutropenic fever ([address]) [D70.9, R50.81]  Pancytopenia ([address]) [D61.818]       Inpatient: Payor: Ted [place] / Plan: Howie Denis / Product Type: *No Product type* /     ASSESSMENT:     REHAB RECOMMENDATIONS: CURRENT LEVEL OF FUNCTION:  (Most Recently Demonstrated)   Recommendation to date pending progress:  Setting:  Home Health Therapy    Equipment:    To Be Determined Bathing:  Not Tested  Dressing:  Stand by Assist  Feeding/Grooming:  Not Tested  Toileting:  Not Tested  Functional Mobility:  Contact Guard Assist / Abelardo with RW household distance     ASSESSMENT:  Ms. Army Baca is doing well today. Pt supine and agreeable to therapy, however stating she is very fatigued and has not been up much since previous session 2 days ago. Pt overall CGA-min A for functional transfers and mobility of household distances with RW. Pt initially requesting assistance with getting to the bathroom. Performed toileting with CGA.  Pt then completed mobility in the serrano and did require [] [] [] [] [] [] [x]     Lower Body Bathing [] [] [] [] [] [] [] [] [] [x]     Toileting [] [] [] [] [x] [] [] [] [] [] In bathroom, seated hygiene   Upper Body Dressing [] [] [] [] [] [] [] [] [] [x]    Lower Body Dressing [] [] [] [] [] [] [] [] [] [x]    Feeding [] [] [] [] [] [] [] [] [] [x]    Grooming [] [] [] [x] [] [] [] [] [] [] Seated, hair care   Personal Device Care [] [] [] [] [] [] [] [] [] [x]    Functional Mobility [] [] [] [] [x] [x] [] [] [] [] RW, household distance, seated rest break   I=Independent, Mod I=Modified Independent, S=Supervision/Setup, SBA=Standby Assistance, CGA=Contact Guard Assistance, Min=Minimal Assistance, Mod=Moderate Assistance, Max=Maximal Assistance, Total=Total Assistance, NT=Not Tested    BALANCE: Good Fair+ Fair Fair- Poor NT Comments   Sitting Static [x] [] [] [] [] []    Sitting Dynamic [] [x] [] [] [] []              Standing Static [] [x] [] [] [] []    Standing Dynamic [] [] [x] [] [] []        PLAN:     FREQUENCY/DURATION   OT Plan of Care: 3 times/week for duration of hospital stay or until stated goals are met, whichever comes first.    TREATMENT:     TREATMENT:   Co-Treatment PT/OT necessary due to patient's decreased overall endurance/tolerance levels, as well as need for high level skilled assistance to complete functional transfers/mobility and functional tasks  Self Care (19 minutes): Patient participated in toileting and grooming ADLs in unsupported sitting with minimal verbal cueing to increase independence, decrease assistance required, and increase activity tolerance. Patient also participated in functional mobility, functional transfer, energy conservation, and adaptive equipment training to increase independence, decrease assistance required, increase activity tolerance, and increase safety awareness.      TREATMENT GRID:  N/A    AFTER TREATMENT PRECAUTIONS: Bed/Chair Locked, Call light within reach, Chair, Heels floated, Needs within reach, and RN

## 2023-11-02 NOTE — PROGRESS NOTES
5242: Received pt from [address] [place] [address] Rd in stable condition. Pt in bed resting quietly. Resp even & unlabored on 2L NC; no acute signs of distress noted. Bed low & locked; call light in reach; no needs voiced. 1531:NP Yennifer Augustin. Informed of new platelet count of 12 post 1 unit. Per NP will continue to watch pt for any s/s of bleeding & check count with a.m labs in the morning. Pt in bed in stable condition.

## 2023-11-02 NOTE — PROGRESS NOTES
's visit with Mrs. Jessee Jean and her daughter to convey care and concern and to offer spiritual/emotional support, including prayer.      [address], [address] [facility] Certified

## 2023-11-02 NOTE — PROGRESS NOTES
ACUTE PHYSICAL THERAPY GOALS:   (Developed with and agreed upon by patient and/or caregiver.)    (1.)Ms. Cindy Davalos will move from supine to sit and sit to supine , scoot up and down, and roll side to side in bed with INDEPENDENCE within 7 treatment day(s). (2.)Ms. Cindy Davalos will transfer from bed to chair and chair to bed with MODIFIED INDEPENDENCE using the least restrictive device within 7 treatment day(s). (3.)Ms. Cindy Davalos will ambulate with MODIFIED INDEPENDENCE for 500 feet with the least restrictive device within 7 treatment day(s). (4.)Ms. Cindy Davalos will participate in therapeutic activity/exercises x 25 minutes for increased activity tolerance within 7 treatment days. (5.)Ms. Cindy Davalos will perform standing static and dynamic balance activities x 15 minutes with SUPERVISION to improve safety within 7 day(s). PHYSICAL THERAPY: Daily Note PM   (Link to Caseload Tracking: PT Visit Days : 3  Time In/Out PT Charge Capture  Rehab Caseload Tracker  Orders    Kristan Buerger is a 71 y.o. female   PRIMARY DIAGNOSIS: Neutropenic fever ([address])  Neutropenic fever ([address]) [D70.9, R50.81]  Pancytopenia ([address]) [D61.818]       Inpatient: Payor: Keturah Summers / Plan: Kaden Rico / Product Type: *No Product type* /     ASSESSMENT:     REHAB RECOMMENDATIONS:   Recommendation to date pending progress:  Setting:  Home Health Therapy    Equipment:    To Be Determined     ASSESSMENT:  Ms. Cindy Davalos was supine in bed on arrival and agreeable to PT. She states she is very tired today. Ambulated to restroom to void and was able to stand at sink to wash hands with close SBA. Pt not able to ambulate as far today and required increased O2 with activity to maintain SpO2 above 90%. Pt left sitting up in chair with needs in reach.        SUBJECTIVE:   Ms. Cindy Davalos states, \"I'm tired\"     Social/Functional Lives With: Spouse  Type of Home: House  Home Layout: One level  Bathroom Toilet: Standard  Bathroom Equipment: Commode  Bathroom

## 2023-11-02 NOTE — PROGRESS NOTES
[address] [facility] Hematology & Oncology        Inpatient Hematology / Oncology Progress Note    Reason for Admission:  Neutropenic fever ([address]) [D70.9, R50.81]  Pancytopenia ([address]) [D61.818]    24 Hour Events:  VSS, HTN, afebrile - on 2-4L NC  Plts 4k - day 4 steroids (s/p 2 days IVIG)  Confusion/AMS improving  CXR with pulm vascular congestion, elevated BNP  Family at bedside    Transfusions: Plts  Replacements: None    ROS:  Constitutional: Positive for fatigue; negative for fever, chills. CV: Negative for chest pain, palpitations, edema. Respiratory: +cough, dyspnea. Negative for wheezing. GI: Negative for nausea, abdominal pain, diarrhea. 10 point review of systems is otherwise negative with the exception of the elements mentioned above in the HPI. Allergies   Allergen Reactions    Penicillins Hives    Sulfa Antibiotics Hives    Codeine Nausea And Vomiting     Past Medical History:   Diagnosis Date    Arthritis     Autoimmune disease ([address])     skin changes, unknown name    Cancer ([address]) 1990    ovarian    Chronic pain     arthritis in back and legs    Coronary artery spasm ([address])     COVID 05/15/2022    Essential hypertension 11/8/2019    Gastritis     GERD (gastroesophageal reflux disease)     Hx antineoplastic chemo     Migraine headache     Psoriasis     Psychiatric disorder     anxiety and depression    Severe obesity (BMI 35.0-39.9) 6/26/2018    Thyroid disease     Diagnosed in 1996     Past Surgical History:   Procedure Laterality Date    CARPAL TUNNEL RELEASE      GYN      ovaries    HYSTERECTOMY, TOTAL ABDOMINAL (CERVIX REMOVED)  Patriciabury      cardiac cath. Dx with spasms.     TUBAL LIGATION       Family History   Problem Relation Age of Onset    Parkinson's Disease Mother     Stroke Mother         Passed away in 1969    No Known Problems Paternal Grandfather     No Known Problems Paternal Grandmother     No Known Problems Maternal Grandfather     No Known will need to FU with Dr Nick upon DC.    Goals and plan of care reviewed with the patient.  All questions answered to the best of our ability.                   Catie Sorensen, TIM - CNP   [facility] Hematology & Oncology  [address] [place] [address]  Office : [phone]  Fax : [phone]

## 2023-11-02 NOTE — PROGRESS NOTES
Comprehensive Nutrition Assessment    Type and Reason for Visit: Reassess  Malnutrition Screening Tool: Malnutrition Screen  Have you recently lost weight without trying?: 24 to 33 pounds (3 points)  Have you been eating poorly because of a decreased appetite?: Yes (1 point)  Malnutrition Screening Tool Score: 4    Nutrition Recommendations/Plan:   Meals and Snacks:  Diet: Continue current order  Nutrition Supplement Therapy:  Medical food supplement therapy:  Change Ensure Enlive three times per day (this provides 350 kcal and 20 grams protein per bottle) chocolate served with a milk container. Malnutrition Assessment:  Malnutrition Status: At risk for malnutrition (Comment) (pt reports + wt loss, waxing and waning oral intake)  No fat or muscle loss appreciated. Nutrition Assessment:  Nutrition History: Pt reports her intake and wt initially started changing after having Covid in May 2022.  + loss of appetite, taste alterations. She indicates at one point her wt was >200# initially declined quickly then up and down with fluid changes. She states at this point she is unsure of her true wt. Do You Have Any Cultural, Gnosticism, or Ethnic Food Preferences?: No   Nutrition Background: PMHx: psoriatic arthritis on Humira, hx of ovarian cam HTN, HLD, GERD and hypothyroidism. Admitted with MDS, pancytopenia, neutropenic fever, chronic pain, constipation. Nutrition Interval:  Confusion and AMS since last RD visit. At visit today pt is alert and oriented x 3, dtr at bedside. PO intake remains variable, pt c/o poor appetite and dislike of vanilla ensure. Her intake remains strongest with items she enjoys although she admits taste changes over time. Dtr inquires of acceptable foods to be brought into pt, questions answered. Provided chocolate ensure mixed with milk at RD encounter, pt reports this more preferable from ONS standpoint to assist with increasing calorie and protein intake.     Current Nutrition Therapies:  ADULT DIET; Easy to Chew; GI [place] (GERD/Peptic Ulcer); Low Microbial  ADULT ORAL NUTRITION SUPPLEMENT; Breakfast, Lunch, Dinner; Standard High Calorie/High Protein Oral Supplement    Current Intake:   Average Meal Intake: 26-50% Average Supplements Intake:  (did not like vanilla)      Anthropometric Measures:  Height: 152.4 cm (5')  Current Body Wt: 85.9 kg (189 lb 6 oz) (11/1), Weight source: Bed Scale  BMI: 37, Obese Class 2 (BMI 35.0 -39.9) (potentially skewed by fluid status)  Admission Body Weight: 77.1 kg (170 lb) (standing scale)  Ideal Body Weight (Kg) (Calculated): 45 kg (100 lbs), 185.4 %  Usual Body Wt:  (pt reports hx of fluctuating wts >200# at one point, lowest in the 170s), Percent weight change:  unable to validate loss  Wt Readings from Last 3 Encounters:   11/01/23 85.9 kg (189 lb 6.4 oz)   11/01/23 88.7 kg (195 lb 8 oz)   10/11/23 77.6 kg (171 lb)   Last wts variable       BMI Category Obese Class 2 (BMI 35.0 -39.9) (potentially skewed by fluid status)  Estimated Daily Nutrient Needs:  Energy (kcal/day): 7715-9581 (20-25 kcal/kg) (Kcal/kg Weight used: 77 kg Admission  Protein (g/day): 77-92 (1-1.2 g/kg) Weight Used: (Ideal) 77 kg  Fluid (ml/day):   (1 ml/kcal)    Nutrition Diagnosis:   Inadequate oral intake related to  (appetite, taste alterations) as evidenced by  (self reported barriers, reports +wt loss unable to validate with fluid changes, highly variable oral intake)  Nutrition Interventions:   Food and/or Nutrient Delivery: Continue Current Diet, Modify Oral Nutrition Supplement     Coordination of Nutrition Care: Continue to monitor while inpatient      Goals:   Previous Goal Met: Progressing toward Goal(s)  Active Goal: Meet at least 75% of estimated needs, by next RD assessment       Nutrition Monitoring and Evaluation:      Food/Nutrient Intake Outcomes: Food and Nutrient Intake, Supplement Intake  Physical Signs/Symptoms Outcomes: Biochemical Data,

## 2023-11-03 LAB
ABO + RH BLD: NORMAL
ALBUMIN SERPL-MCNC: 3 G/DL (ref 3.2–4.6)
ALBUMIN/GLOB SERPL: 0.6 (ref 0.4–1.6)
ALP SERPL-CCNC: 64 U/L (ref 50–136)
ALT SERPL-CCNC: 32 U/L (ref 12–65)
ANION GAP SERPL CALC-SCNC: 6 MMOL/L (ref 2–11)
AST SERPL-CCNC: 34 U/L (ref 15–37)
BACTERIA SPEC CULT: NORMAL
BACTERIA SPEC CULT: NORMAL
BASOPHILS # BLD: 0.1 K/UL (ref 0–0.2)
BASOPHILS NFR BLD: 5 % (ref 0–2)
BILIRUB SERPL-MCNC: 1.3 MG/DL (ref 0.2–1.1)
BLD PROD TYP BPU: NORMAL
BLD PROD TYP BPU: NORMAL
BLOOD BANK CMNT PATIENT-IMP: NORMAL
BLOOD BANK DISPENSE STATUS: NORMAL
BLOOD BANK DISPENSE STATUS: NORMAL
BLOOD GROUP ANTIBODIES SERPL: NORMAL
BPU ID: NORMAL
BPU ID: NORMAL
BUN SERPL-MCNC: 22 MG/DL (ref 8–23)
CALCIUM SERPL-MCNC: 8.7 MG/DL (ref 8.3–10.4)
CHLORIDE SERPL-SCNC: 106 MMOL/L (ref 101–110)
CO2 SERPL-SCNC: 24 MMOL/L (ref 21–32)
CREAT SERPL-MCNC: 0.6 MG/DL (ref 0.6–1)
CROSSMATCH RESULT: NORMAL
DIFFERENTIAL METHOD BLD: ABNORMAL
EOSINOPHIL # BLD: 0 K/UL (ref 0–0.8)
EOSINOPHIL NFR BLD: 0 % (ref 0.5–7.8)
ERYTHROCYTE [DISTWIDTH] IN BLOOD BY AUTOMATED COUNT: 16.2 % (ref 11.9–14.6)
GLOBULIN SER CALC-MCNC: 4.8 G/DL (ref 2.8–4.5)
GLUCOSE SERPL-MCNC: 112 MG/DL (ref 65–100)
HCT VFR BLD AUTO: 21.4 % (ref 35.8–46.3)
HCT VFR BLD AUTO: 23.1 % (ref 35.8–46.3)
HGB BLD-MCNC: 7 G/DL (ref 11.7–15.4)
HGB BLD-MCNC: 7.4 G/DL (ref 11.7–15.4)
HISTORY CHECK: NORMAL
HISTORY CHECK: NORMAL
IMM GRANULOCYTES # BLD AUTO: 0.2 K/UL (ref 0–0.5)
IMM GRANULOCYTES NFR BLD AUTO: 10 % (ref 0–5)
LYMPHOCYTES # BLD: 0.6 K/UL (ref 0.5–4.6)
LYMPHOCYTES NFR BLD: 36 % (ref 13–44)
MAGNESIUM SERPL-MCNC: 2.8 MG/DL (ref 1.8–2.4)
MCH RBC QN AUTO: 31.3 PG (ref 26.1–32.9)
MCHC RBC AUTO-ENTMCNC: 32.7 G/DL (ref 31.4–35)
MCV RBC AUTO: 95.5 FL (ref 82–102)
MONOCYTES # BLD: 0.5 K/UL (ref 0.1–1.3)
MONOCYTES NFR BLD: 24 % (ref 4–12)
NEUTS SEG # BLD: 0.5 K/UL (ref 1.7–8.2)
NEUTS SEG NFR BLD: 25 % (ref 43–78)
NRBC # BLD: 0.02 K/UL (ref 0–0.2)
PLATELET # BLD AUTO: 4 K/UL (ref 150–450)
PLATELET # BLD AUTO: 9 K/UL (ref 150–450)
PLATELET COMMENT: ABNORMAL
PMV BLD AUTO: ABNORMAL FL (ref 9.4–12.3)
POTASSIUM SERPL-SCNC: 4.5 MMOL/L (ref 3.5–5.1)
PROT SERPL-MCNC: 7.8 G/DL (ref 6.3–8.2)
RBC # BLD AUTO: 2.24 M/UL (ref 4.05–5.2)
RBC MORPH BLD: ABNORMAL
SERVICE CMNT-IMP: NORMAL
SERVICE CMNT-IMP: NORMAL
SODIUM SERPL-SCNC: 136 MMOL/L (ref 133–143)
SPECIMEN EXP DATE BLD: NORMAL
UNIT DIVISION: 0
UNIT DIVISION: 0
WBC # BLD AUTO: 1.9 K/UL (ref 4.3–11.1)
WBC MORPH BLD: ABNORMAL

## 2023-11-03 PROCEDURE — 85014 HEMATOCRIT: CPT

## 2023-11-03 PROCEDURE — 86920 COMPATIBILITY TEST SPIN: CPT

## 2023-11-03 PROCEDURE — 6370000000 HC RX 637 (ALT 250 FOR IP): Performed by: NURSE PRACTITIONER

## 2023-11-03 PROCEDURE — P9037 PLATE PHERES LEUKOREDU IRRAD: HCPCS

## 2023-11-03 PROCEDURE — 86901 BLOOD TYPING SEROLOGIC RH(D): CPT

## 2023-11-03 PROCEDURE — 99232 SBSQ HOSP IP/OBS MODERATE 35: CPT | Performed by: INTERNAL MEDICINE

## 2023-11-03 PROCEDURE — 6370000000 HC RX 637 (ALT 250 FOR IP): Performed by: INTERNAL MEDICINE

## 2023-11-03 PROCEDURE — 36415 COLL VENOUS BLD VENIPUNCTURE: CPT

## 2023-11-03 PROCEDURE — 85025 COMPLETE CBC W/AUTO DIFF WBC: CPT

## 2023-11-03 PROCEDURE — 86022 PLATELET ANTIBODIES: CPT

## 2023-11-03 PROCEDURE — 80053 COMPREHEN METABOLIC PANEL: CPT

## 2023-11-03 PROCEDURE — 86850 RBC ANTIBODY SCREEN: CPT

## 2023-11-03 PROCEDURE — 6360000002 HC RX W HCPCS: Performed by: FAMILY MEDICINE

## 2023-11-03 PROCEDURE — 2580000003 HC RX 258: Performed by: NURSE PRACTITIONER

## 2023-11-03 PROCEDURE — 86900 BLOOD TYPING SEROLOGIC ABO: CPT

## 2023-11-03 PROCEDURE — 6360000002 HC RX W HCPCS: Performed by: NURSE PRACTITIONER

## 2023-11-03 PROCEDURE — 86644 CMV ANTIBODY: CPT

## 2023-11-03 PROCEDURE — 86922 COMPATIBILITY TEST ANTIGLOB: CPT

## 2023-11-03 PROCEDURE — 86921 COMPATIBILITY TEST INCUBATE: CPT

## 2023-11-03 PROCEDURE — 94640 AIRWAY INHALATION TREATMENT: CPT

## 2023-11-03 PROCEDURE — 94760 N-INVAS EAR/PLS OXIMETRY 1: CPT

## 2023-11-03 PROCEDURE — 2580000003 HC RX 258: Performed by: FAMILY MEDICINE

## 2023-11-03 PROCEDURE — 36430 TRANSFUSION BLD/BLD COMPNT: CPT

## 2023-11-03 PROCEDURE — 6370000000 HC RX 637 (ALT 250 FOR IP): Performed by: FAMILY MEDICINE

## 2023-11-03 PROCEDURE — APPSS45 APP SPLIT SHARED TIME 31-45 MINUTES: Performed by: NURSE PRACTITIONER

## 2023-11-03 PROCEDURE — 85049 AUTOMATED PLATELET COUNT: CPT

## 2023-11-03 PROCEDURE — 85018 HEMOGLOBIN: CPT

## 2023-11-03 PROCEDURE — 1170000001 HC RM PRIVATE ONCOLOGY W/TELEMETRY

## 2023-11-03 PROCEDURE — P9040 RBC LEUKOREDUCED IRRADIATED: HCPCS

## 2023-11-03 PROCEDURE — 83735 ASSAY OF MAGNESIUM: CPT

## 2023-11-03 RX ORDER — SODIUM CHLORIDE 9 MG/ML
INJECTION, SOLUTION INTRAVENOUS PRN
Status: ACTIVE | OUTPATIENT
Start: 2023-11-03

## 2023-11-03 RX ADMIN — DOCUSATE SODIUM 50 MG AND SENNOSIDES 8.6 MG 1 TABLET: 8.6; 5 TABLET, FILM COATED ORAL at 20:55

## 2023-11-03 RX ADMIN — MORPHINE SULFATE 15 MG: 15 TABLET, FILM COATED, EXTENDED RELEASE ORAL at 20:56

## 2023-11-03 RX ADMIN — CIPROFLOXACIN HYDROCHLORIDE 500 MG: 500 TABLET, FILM COATED ORAL at 08:30

## 2023-11-03 RX ADMIN — FERROUS SULFATE TAB 325 MG (65 MG ELEMENTAL FE) 325 MG: 325 (65 FE) TAB at 17:00

## 2023-11-03 RX ADMIN — POTASSIUM CHLORIDE 20 MEQ: 1500 TABLET, EXTENDED RELEASE ORAL at 15:56

## 2023-11-03 RX ADMIN — FAMOTIDINE 40 MG: 20 TABLET, FILM COATED ORAL at 20:56

## 2023-11-03 RX ADMIN — ALBUTEROL SULFATE 2.5 MG: 2.5 SOLUTION RESPIRATORY (INHALATION) at 21:02

## 2023-11-03 RX ADMIN — LEVOTHYROXINE SODIUM 125 MCG: 0.12 TABLET ORAL at 08:31

## 2023-11-03 RX ADMIN — SALINE NASAL SPRAY 2 SPRAY: 1.5 SOLUTION NASAL at 03:01

## 2023-11-03 RX ADMIN — ALBUTEROL SULFATE 2.5 MG: 2.5 SOLUTION RESPIRATORY (INHALATION) at 06:26

## 2023-11-03 RX ADMIN — SALINE NASAL SPRAY 2 SPRAY: 1.5 SOLUTION NASAL at 20:58

## 2023-11-03 RX ADMIN — AMLODIPINE BESYLATE 5 MG: 5 TABLET ORAL at 08:30

## 2023-11-03 RX ADMIN — PANTOPRAZOLE SODIUM 40 MG: 40 TABLET, DELAYED RELEASE ORAL at 08:30

## 2023-11-03 RX ADMIN — IPRATROPIUM BROMIDE 2 SPRAY: 42 SPRAY, METERED NASAL at 20:58

## 2023-11-03 RX ADMIN — CIPROFLOXACIN HYDROCHLORIDE 500 MG: 500 TABLET, FILM COATED ORAL at 20:56

## 2023-11-03 RX ADMIN — IPRATROPIUM BROMIDE 2 SPRAY: 42 SPRAY, METERED NASAL at 08:34

## 2023-11-03 RX ADMIN — ACETAMINOPHEN 650 MG: 325 TABLET ORAL at 22:01

## 2023-11-03 RX ADMIN — ROSUVASTATIN CALCIUM 10 MG: 10 TABLET, FILM COATED ORAL at 08:31

## 2023-11-03 RX ADMIN — DEXAMETHASONE SODIUM PHOSPHATE 40 MG: 10 INJECTION INTRAMUSCULAR; INTRAVENOUS at 12:30

## 2023-11-03 RX ADMIN — FERROUS SULFATE TAB 325 MG (65 MG ELEMENTAL FE) 325 MG: 325 (65 FE) TAB at 13:07

## 2023-11-03 RX ADMIN — MONTELUKAST 10 MG: 10 TABLET, FILM COATED ORAL at 08:31

## 2023-11-03 RX ADMIN — POTASSIUM CHLORIDE 20 MEQ: 1500 TABLET, EXTENDED RELEASE ORAL at 08:31

## 2023-11-03 RX ADMIN — SALINE NASAL SPRAY 2 SPRAY: 1.5 SOLUTION NASAL at 08:34

## 2023-11-03 RX ADMIN — FLUOXETINE 20 MG: 20 CAPSULE ORAL at 20:56

## 2023-11-03 RX ADMIN — FLUOXETINE 20 MG: 20 CAPSULE ORAL at 08:31

## 2023-11-03 RX ADMIN — SODIUM CHLORIDE, PRESERVATIVE FREE 10 ML: 5 INJECTION INTRAVENOUS at 08:30

## 2023-11-03 RX ADMIN — DIPHENHYDRAMINE HYDROCHLORIDE 25 MG: 25 CAPSULE ORAL at 22:01

## 2023-11-03 RX ADMIN — SODIUM CHLORIDE, PRESERVATIVE FREE 10 ML: 5 INJECTION INTRAVENOUS at 20:59

## 2023-11-03 RX ADMIN — SALINE NASAL SPRAY 2 SPRAY: 1.5 SOLUTION NASAL at 15:32

## 2023-11-03 RX ADMIN — MORPHINE SULFATE 15 MG: 15 TABLET, FILM COATED, EXTENDED RELEASE ORAL at 08:30

## 2023-11-03 RX ADMIN — POLYETHYLENE GLYCOL 3350 17 G: 17 POWDER, FOR SOLUTION ORAL at 08:29

## 2023-11-03 RX ADMIN — PANTOPRAZOLE SODIUM 40 MG: 40 TABLET, DELAYED RELEASE ORAL at 15:56

## 2023-11-03 ASSESSMENT — PAIN DESCRIPTION - ONSET: ONSET: ON-GOING

## 2023-11-03 ASSESSMENT — PAIN DESCRIPTION - LOCATION
LOCATION: BACK
LOCATION: BACK

## 2023-11-03 ASSESSMENT — PAIN SCALES - GENERAL
PAINLEVEL_OUTOF10: 8
PAINLEVEL_OUTOF10: 5
PAINLEVEL_OUTOF10: 0

## 2023-11-03 ASSESSMENT — PAIN DESCRIPTION - ORIENTATION
ORIENTATION: MID
ORIENTATION: LOWER;MID

## 2023-11-03 ASSESSMENT — PAIN DESCRIPTION - DESCRIPTORS
DESCRIPTORS: ACHING;THROBBING;PENETRATING
DESCRIPTORS: ACHING;DISCOMFORT

## 2023-11-03 ASSESSMENT — PAIN DESCRIPTION - PAIN TYPE: TYPE: ACUTE PAIN

## 2023-11-03 ASSESSMENT — PAIN DESCRIPTION - FREQUENCY: FREQUENCY: CONTINUOUS

## 2023-11-03 NOTE — PROGRESS NOTES
Physician Progress Note      PATIENT:               Angelique Colby  CSN #:                  [id number]  :                       1950  ADMIT DATE:       10/27/2023 2:12 PM  [address] [facility] DATE:  RESPONDING  PROVIDER #:        Sanna BURTON          QUERY TEXT:    Pt admitted with MDS, neutropenic fever and has altered mental status   documented. If possible, please document in progress notes and discharge   summary further specificity regarding the type of encephalopathy:      The medical record reflects the following:  Risk Factors: 71 y.o. female with MDS, neutropenic fever, pancytopenia  Clinical Indicators:  progress note \"Altered mental status; Likely   secondary to HD steroids. CTA negative. \"  Treatment: CTA    Thank you,  Kg Garcia RN, BSN, [email or web address]  . Options provided:  -- Metabolic encephalopathy  -- Toxic encephalopathy  -- Drug-induced encephalopathy due to steroids  -- Toxic metabolic encephalopathy  -- Other - I will add my own diagnosis  -- Disagree - Not applicable / Not valid  -- Disagree - Clinically unable to determine / Unknown  -- Refer to Clinical Documentation Reviewer    PROVIDER RESPONSE TEXT:    This patient has drug-induced encephalopathy due to steroids.     Query created by: Kg Garcia on 11/3/2023 2:38 PM      Electronically signed by:  Sanna BURTON 11/3/2023 2:43 PM

## 2023-11-03 NOTE — PROGRESS NOTES
Hospitalist Progress Note   Admit Date:  10/27/2023  2:12 PM   Name:  Nadir Hernandez   Age:  71 y.o. Sex:  female  :  1950   MRN:  [id number]   Room:  [facility]    Presenting/Chief Complaint: Fever and Fatigue     Reason(s) for Admission: Neutropenic fever ([address]) [D70.9, R50.81]  Pancytopenia [facility]) [D61.818]     Hospital Course:   Nadir Hernandez is a 71 y.o. female with history of  recently diagnosed MDS with pancytopenia and treatment with Revlimd around 10/6 and Psoriatic Arthritis on Humira, chronic back pain, HTN, hypothyroidism, HLD admitted to onc service 10/27. Noted with neutropenic fever, bacteremic with ID following, thrombocytopenia and has been receiving platelets. Also on Cefepime with plans to transition to Cipro for suppression given no clear source of bacteremia. 10/31 in the evening Code S called for acute onset dysarthria. NIH 1. CT head and neuro consult ordered. . Of note, nursing did notify onc 1st of change. Subjective & 24hr Events:   Seen and examined at bedside. No acute events overnight. Patient is alert and oriented x 3. She denies any other complaints. She notes complete resolution of the dysarthria.       Assessment & Plan:     Principal Problem:    Neutropenic fever ([address])  Plan: per onc who is primary     Active Problems:    MDS (myelodysplastic syndrome) ([address])  Plan: per onc       Pancytopenia ([address])  Plan: per onc       Acute upper respiratory infection  Plan: noted, not hypoxic       Hypothyroidism, adult  Plan: Synthroid home dose       Essential hypertension  Plan: home meds       Psoriatic arthritis ([address])  Plan: noted       Chronic midline low back pain without sciatica  Plan: MS contin with prn [place] home dosing ongoing       Esophageal reflux  Plan: Pepcid, PPI       Mixed hyperlipidemia  Plan: statin       Class 1 obesity with serious comorbidity and body mass index (BMI) of 33.0 to 33.9 in adult  Plan: noted       Esophageal dysphagia  Plan: Albumin 3.0 (L) 3.2 - 4.6 g/dL    Globulin 4.8 (H) 2.8 - 4.5 g/dL    Albumin/Globulin Ratio 0.6 0.4 - 1.6     Magnesium    Collection Time: 11/02/23  5:25 AM   Result Value Ref Range    Magnesium 2.4 1.8 - 2.4 mg/dL   PREPARE PLATELETS, 1 Product    Collection Time: 11/02/23  6:30 AM   Result Value Ref Range    Blood Bank Comment PLATELETS CROSSMATCHED BY THE BLOOD CONNECTION     Unit Number [id number]     Product Code Blood Bank [facility]     Unit Divison 00     Dispense Status Blood Bank TRANSFUSED    Platelet Count    Collection Time: 11/02/23  2:35 PM   Result Value Ref Range    Platelets 12 (LL) 630 - 450 K/uL   CBC with Auto Differential    Collection Time: 11/03/23  6:38 AM   Result Value Ref Range    WBC 1.9 (LL) 4.3 - 11.1 K/uL    RBC 2.24 (L) 4.05 - 5.2 M/uL    Hemoglobin 7.0 (L) 11.7 - 15.4 g/dL    Hematocrit 21.4 (L) 35.8 - 46.3 %    MCV 95.5 82 - 102 FL    MCH 31.3 26.1 - 32.9 PG    MCHC 32.7 31.4 - 35.0 g/dL    RDW 16.2 (H) 11.9 - 14.6 %    Platelets 4 (LL) 343 - 450 K/uL    MPV Unable to calculate. Recommend adding IPF.  9.4 - 12.3 FL    nRBC 0.02 0.0 - 0.2 K/uL    Neutrophils % 25 (L) 43 - 78 %    Lymphocytes % 36 13 - 44 %    Monocytes % 24 (H) 4.0 - 12.0 %    Eosinophils % 0 (L) 0.5 - 7.8 %    Basophils % 5 (H) 0.0 - 2.0 %    Immature Granulocytes 10 (H) 0.0 - 5.0 %    Neutrophils Absolute 0.5 (L) 1.7 - 8.2 K/UL    Lymphocytes Absolute 0.6 0.5 - 4.6 K/UL    Monocytes Absolute 0.5 0.1 - 1.3 K/UL    Eosinophils Absolute 0.0 0.0 - 0.8 K/UL    Basophils Absolute 0.1 0.0 - 0.2 K/UL    Absolute Immature Granulocyte 0.2 0.0 - 0.5 K/UL    RBC Comment OCCASIONAL  SCHISTOCYTES        RBC Comment OCCASIONAL  TEARDROP CELLS        RBC Comment SLIGHT  ANISOCYTOSIS + POIKILOCYTOSIS        WBC Comment WBC'S APPEAR ABNORMAL/IMMATURE/ATYPICAL      Platelet Comment MARKED      Differential Type AUTOMATED     Comprehensive Metabolic Panel    Collection Time: 11/03/23  6:38 AM   Result Value Ref Range    Sodium 136

## 2023-11-03 NOTE — PROGRESS NOTES
[facility] Hematology & Oncology        Inpatient Hematology / Oncology Progress Note    Reason for Admission:  Neutropenic fever ([address]) [D70.9, R50.81]  Pancytopenia ([address]) [D61.818]    24 Hour Events:  VSS, HTN, afebrile - on 3L NC  Plts 4k - day 5 steroids (s/p 2 days IVIG)  Plts appeared to respond post-transfusion yesterday  Confusion/AMS nearly resolved, reports some fatigue  Stable fluid status  No family at bedside    Transfusions: Plts  Replacements: None    ROS:  Constitutional: Positive for fatigue; negative for fever, chills. CV: Negative for chest pain, palpitations, edema. Respiratory: +cough, dyspnea. Negative for wheezing. GI: Negative for nausea, abdominal pain, diarrhea. 10 point review of systems is otherwise negative with the exception of the elements mentioned above in the HPI. Allergies   Allergen Reactions    Penicillins Hives    Sulfa Antibiotics Hives    Codeine Nausea And Vomiting     Past Medical History:   Diagnosis Date    Arthritis     Autoimmune disease ([address])     skin changes, unknown name    Cancer ([address]) 1990    ovarian    Chronic pain     arthritis in back and legs    Coronary artery spasm ([address])     COVID 05/15/2022    Essential hypertension 11/8/2019    Gastritis     GERD (gastroesophageal reflux disease)     Hx antineoplastic chemo     Migraine headache     Psoriasis     Psychiatric disorder     anxiety and depression    Severe obesity (BMI 35.0-39.9) 6/26/2018    Thyroid disease     Diagnosed in 1996     Past Surgical History:   Procedure Laterality Date    CARPAL TUNNEL RELEASE      GYN      ovaries    HYSTERECTOMY, TOTAL ABDOMINAL (CERVIX REMOVED)  Patriciabury      cardiac cath. Dx with spasms.     TUBAL LIGATION       Family History   Problem Relation Age of Onset    Parkinson's Disease Mother     Stroke Mother         Passed away in 1969    No Known Problems Paternal Grandfather     No Known Problems Paternal Grandmother     No pancytopenia. At that time she reported eating poorly, wt loss. Recent CT at [facility] reportedly normal.  Rec Gi eval, labs and BMBx. 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 Dr Alessandra Walker reviewed with pt risk of POD and transformation to leukemia. They reviewed tx options and elected to p/w Revlimid 10mg daily vs HMA due to 5q del. Tel note encounter indicates that pt started tx ~10/6. She was admitted w NF. Started on IVF, cef/vanc/doxy w sepsis workup. S/p 1 u PRBC and 2 unit plts. ASA on hold. Nutritional/hemolysis labs pending. We were asked for recs. Pt seen in ICU, feeling much better this am.  Plan to transfer to 5th floor. 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 - pt of Dr Alessandra Walker recently started Revlimid 10mg daily due to 5q del (~10/6) - on hold for now due to acute pancytopenia      Pancytopenia / immune thrombocytopenia  - related to 1   - s/p PRBC Hb up to 7.5, c.w plt transfusion to keep >50 with bleeding (may be diff due to MDS/tx)  - nutritional labs - B12/folate elevated; TESSA - IV iron on shortage, can try oral when able and outpt IV iron; pt did get transfused. - check DIC/hemolysis labs   - Humira held due to current immunosuppression   10/29 no DIC, WBC 1.6, Hgb 6.9, Plt 3K-s/p 2 units-transfuse now, if no improvement in platelets will need to transfuse crossmatched platelets  56/55 Plts 5k despite multiple units of platelets. IPF elevated, start IVIG and pulse dexamethasone. WBC and hgb relatively stable. Hapto pending and smear negative. No DIC.  10/31 Plts 5k, day 2 IVIG/dex. Other counts stable. 11/1 Plts 8k, day 3 IVIG/dex. ANC up to 500.  11/2 Plts 4k - day 4 dex, received 2 days IVIG (on hold due to concern for volume overload but continues on steroids). Transfuse plts and check 30 min after transfusion.  Hgb down to 7

## 2023-11-03 NOTE — PROGRESS NOTES
Physical Therapy Note:    Attempted to see patient this PM for physical therapy treatment  session. RN requested to hold PT this date due to low platelets. Will follow and re-attempt as schedule permits/patient available.  Thank you,    Jaycee Cobb, PTA     Rehab Caseload Tracker

## 2023-11-03 NOTE — PROGRESS NOTES
Physician Progress Note      PATIENT:               Kassy Rossi  CSN #:                  [id number]  :                       1950  ADMIT DATE:       10/27/2023 2:12 PM  [address] [facility] DATE:  RESPONDING  PROVIDER #:        Malcolm BURTON          QUERY TEXT:    Patient admitted with neutropenic fever. Noted documentation of sepsis. In   order to support the diagnosis of sepsis, please include additional clinical   indicators in your documentation. Or please document if the diagnosis of   sepsis has been ruled out after further study    The medical record reflects the following:  Risk Factors: On admit WBC 1.6, HR 92, Temp 102.7 oral,  Clinical Indicators: 10/31 ID note \"S. Epi bacteremia 10/27: currently /   bottles so possible this is contaminant. \"  Treatment: Blood/urine cultures, ID consult    Thank you,  Ariadna Lagunas RN, BSN, CDI  Catie. [email or web address]  . Options provided:  -- Sepsis present as evidenced by, Please document evidence. -- Sepsis was ruled out after study  -- Other - I will add my own diagnosis  -- Disagree - Not applicable / Not valid  -- Disagree - Clinically unable to determine / Unknown  -- Refer to Clinical Documentation Reviewer    PROVIDER RESPONSE TEXT:    Sepsis was ruled out after study.     Query created by: Ariadna Lagunas on 11/3/2023 2:09 PM      Electronically signed by:  Malcolm BRUTON 11/3/2023 2:11 PM

## 2023-11-03 NOTE — CARE COORDINATION
Chart screened by CM for d/c planning. Pt currently requiring 3L HFO2 NC. Wean as tolerated. If unable to wean prior to d/c pt will require a 6MWT as she does not have home O2. Oncology monitoring low plt count. D5 dex. AMS improving. PT/OT recommend HH at d/c. Anticipated dispo: return home, with HH if agreeable, once plt count is stable. D/C timeframe undetermined at the present time. CM will continue to follow.   LOS = 7 days

## 2023-11-04 LAB
ALBUMIN SERPL-MCNC: 2.8 G/DL (ref 3.2–4.6)
ALBUMIN/GLOB SERPL: 0.6 (ref 0.4–1.6)
ALP SERPL-CCNC: 62 U/L (ref 50–136)
ALT SERPL-CCNC: 33 U/L (ref 12–65)
ANION GAP SERPL CALC-SCNC: 5 MMOL/L (ref 2–11)
AST SERPL-CCNC: 32 U/L (ref 15–37)
BASOPHILS # BLD: 0.1 K/UL (ref 0–0.2)
BASOPHILS # BLD: 0.1 K/UL (ref 0–0.2)
BASOPHILS NFR BLD: 6 % (ref 0–2)
BASOPHILS NFR BLD: 7 % (ref 0–2)
BILIRUB SERPL-MCNC: 1.4 MG/DL (ref 0.2–1.1)
BLD PROD TYP BPU: NORMAL
BLOOD BANK CMNT PATIENT-IMP: NORMAL
BLOOD BANK DISPENSE STATUS: NORMAL
BPU ID: NORMAL
BUN SERPL-MCNC: 24 MG/DL (ref 8–23)
CALCIUM SERPL-MCNC: 8.5 MG/DL (ref 8.3–10.4)
CHLORIDE SERPL-SCNC: 104 MMOL/L (ref 101–110)
CO2 SERPL-SCNC: 25 MMOL/L (ref 21–32)
CREAT SERPL-MCNC: 0.7 MG/DL (ref 0.6–1)
DIFFERENTIAL METHOD BLD: ABNORMAL
DIFFERENTIAL METHOD BLD: ABNORMAL
EOSINOPHIL # BLD: 0 K/UL (ref 0–0.8)
EOSINOPHIL # BLD: 0 K/UL (ref 0–0.8)
EOSINOPHIL NFR BLD: 0 % (ref 0.5–7.8)
EOSINOPHIL NFR BLD: 0 % (ref 0.5–7.8)
ERYTHROCYTE [DISTWIDTH] IN BLOOD BY AUTOMATED COUNT: 15.4 % (ref 11.9–14.6)
ERYTHROCYTE [DISTWIDTH] IN BLOOD BY AUTOMATED COUNT: 15.5 % (ref 11.9–14.6)
GLOBULIN SER CALC-MCNC: 4.7 G/DL (ref 2.8–4.5)
GLUCOSE SERPL-MCNC: 125 MG/DL (ref 65–100)
HCT VFR BLD AUTO: 24.7 % (ref 35.8–46.3)
HCT VFR BLD AUTO: 24.8 % (ref 35.8–46.3)
HCT VFR BLD AUTO: 25.4 % (ref 35.8–46.3)
HGB BLD-MCNC: 8.3 G/DL (ref 11.7–15.4)
HGB BLD-MCNC: 8.4 G/DL (ref 11.7–15.4)
HGB BLD-MCNC: 8.5 G/DL (ref 11.7–15.4)
IMM GRANULOCYTES # BLD AUTO: 0.2 K/UL (ref 0–0.5)
IMM GRANULOCYTES # BLD AUTO: 0.2 K/UL (ref 0–0.5)
IMM GRANULOCYTES NFR BLD AUTO: 10 % (ref 0–5)
IMM GRANULOCYTES NFR BLD AUTO: 11 % (ref 0–5)
LYMPHOCYTES # BLD: 0.9 K/UL (ref 0.5–4.6)
LYMPHOCYTES # BLD: 1.1 K/UL (ref 0.5–4.6)
LYMPHOCYTES NFR BLD: 43 % (ref 13–44)
LYMPHOCYTES NFR BLD: 55 % (ref 13–44)
MAGNESIUM SERPL-MCNC: 2.4 MG/DL (ref 1.8–2.4)
MCH RBC QN AUTO: 31.4 PG (ref 26.1–32.9)
MCH RBC QN AUTO: 31.7 PG (ref 26.1–32.9)
MCHC RBC AUTO-ENTMCNC: 33.5 G/DL (ref 31.4–35)
MCHC RBC AUTO-ENTMCNC: 34 G/DL (ref 31.4–35)
MCV RBC AUTO: 93.2 FL (ref 82–102)
MCV RBC AUTO: 93.7 FL (ref 82–102)
MONOCYTES # BLD: 0 K/UL (ref 0.1–1.3)
MONOCYTES # BLD: 0.4 K/UL (ref 0.1–1.3)
MONOCYTES NFR BLD: 18 % (ref 4–12)
MONOCYTES NFR BLD: 2 % (ref 4–12)
NEUTS SEG # BLD: 0.5 K/UL (ref 1.7–8.2)
NEUTS SEG # BLD: 0.5 K/UL (ref 1.7–8.2)
NEUTS SEG NFR BLD: 23 % (ref 43–78)
NEUTS SEG NFR BLD: 25 % (ref 43–78)
NRBC # BLD: 0 K/UL (ref 0–0.2)
NRBC # BLD: 0.02 K/UL (ref 0–0.2)
PLATELET # BLD AUTO: 2 K/UL (ref 150–450)
PLATELET # BLD AUTO: 3 K/UL (ref 150–450)
PLATELET # BLD AUTO: 3 K/UL (ref 150–450)
PLATELET COMMENT: ABNORMAL
PLATELET COMMENT: ABNORMAL
PLATELETS.RETICULATED NFR BLD AUTO: 38.5 % (ref 1.8–7.9)
PMV BLD AUTO: ABNORMAL FL (ref 9.4–12.3)
PMV BLD AUTO: ABNORMAL FL (ref 9.4–12.3)
POTASSIUM SERPL-SCNC: 4.6 MMOL/L (ref 3.5–5.1)
PROT SERPL-MCNC: 7.5 G/DL (ref 6.3–8.2)
RBC # BLD AUTO: 2.65 M/UL (ref 4.05–5.2)
RBC # BLD AUTO: 2.71 M/UL (ref 4.05–5.2)
RBC MORPH BLD: ABNORMAL
SODIUM SERPL-SCNC: 134 MMOL/L (ref 133–143)
UNIT DIVISION: 0
WBC # BLD AUTO: 1.9 K/UL (ref 4.3–11.1)
WBC # BLD AUTO: 2.1 K/UL (ref 4.3–11.1)
WBC MORPH BLD: ABNORMAL
WBC MORPH BLD: ABNORMAL

## 2023-11-04 PROCEDURE — APPSS30 APP SPLIT SHARED TIME 16-30 MINUTES: Performed by: NURSE PRACTITIONER

## 2023-11-04 PROCEDURE — 2580000003 HC RX 258: Performed by: NURSE PRACTITIONER

## 2023-11-04 PROCEDURE — 83735 ASSAY OF MAGNESIUM: CPT

## 2023-11-04 PROCEDURE — A4216 STERILE WATER/SALINE, 10 ML: HCPCS | Performed by: NURSE PRACTITIONER

## 2023-11-04 PROCEDURE — 85014 HEMATOCRIT: CPT

## 2023-11-04 PROCEDURE — 85018 HEMOGLOBIN: CPT

## 2023-11-04 PROCEDURE — 6370000000 HC RX 637 (ALT 250 FOR IP): Performed by: INTERNAL MEDICINE

## 2023-11-04 PROCEDURE — 36415 COLL VENOUS BLD VENIPUNCTURE: CPT

## 2023-11-04 PROCEDURE — 85055 RETICULATED PLATELET ASSAY: CPT

## 2023-11-04 PROCEDURE — 2500000003 HC RX 250 WO HCPCS: Performed by: NURSE PRACTITIONER

## 2023-11-04 PROCEDURE — 80053 COMPREHEN METABOLIC PANEL: CPT

## 2023-11-04 PROCEDURE — 86022 PLATELET ANTIBODIES: CPT

## 2023-11-04 PROCEDURE — 6360000002 HC RX W HCPCS: Performed by: NURSE PRACTITIONER

## 2023-11-04 PROCEDURE — 6370000000 HC RX 637 (ALT 250 FOR IP): Performed by: NURSE PRACTITIONER

## 2023-11-04 PROCEDURE — 2580000003 HC RX 258: Performed by: FAMILY MEDICINE

## 2023-11-04 PROCEDURE — 85025 COMPLETE CBC W/AUTO DIFF WBC: CPT

## 2023-11-04 PROCEDURE — 99232 SBSQ HOSP IP/OBS MODERATE 35: CPT | Performed by: INTERNAL MEDICINE

## 2023-11-04 PROCEDURE — P9037 PLATE PHERES LEUKOREDU IRRAD: HCPCS

## 2023-11-04 PROCEDURE — 6370000000 HC RX 637 (ALT 250 FOR IP): Performed by: FAMILY MEDICINE

## 2023-11-04 PROCEDURE — 86644 CMV ANTIBODY: CPT

## 2023-11-04 PROCEDURE — 85049 AUTOMATED PLATELET COUNT: CPT

## 2023-11-04 PROCEDURE — 1170000001 HC RM PRIVATE ONCOLOGY W/TELEMETRY

## 2023-11-04 PROCEDURE — 36430 TRANSFUSION BLD/BLD COMPNT: CPT

## 2023-11-04 PROCEDURE — 6360000002 HC RX W HCPCS: Performed by: FAMILY MEDICINE

## 2023-11-04 RX ORDER — SODIUM CHLORIDE 9 MG/ML
INJECTION, SOLUTION INTRAVENOUS PRN
Status: ACTIVE | OUTPATIENT
Start: 2023-11-04

## 2023-11-04 RX ADMIN — PANTOPRAZOLE SODIUM 40 MG: 40 TABLET, DELAYED RELEASE ORAL at 08:22

## 2023-11-04 RX ADMIN — FERROUS SULFATE TAB 325 MG (65 MG ELEMENTAL FE) 325 MG: 325 (65 FE) TAB at 16:03

## 2023-11-04 RX ADMIN — FERROUS SULFATE TAB 325 MG (65 MG ELEMENTAL FE) 325 MG: 325 (65 FE) TAB at 12:44

## 2023-11-04 RX ADMIN — SALINE NASAL SPRAY 2 SPRAY: 1.5 SOLUTION NASAL at 08:25

## 2023-11-04 RX ADMIN — FLUOXETINE 20 MG: 20 CAPSULE ORAL at 08:23

## 2023-11-04 RX ADMIN — IPRATROPIUM BROMIDE 2 SPRAY: 42 SPRAY, METERED NASAL at 20:51

## 2023-11-04 RX ADMIN — DIPHENHYDRAMINE HYDROCHLORIDE 25 MG: 25 CAPSULE ORAL at 22:36

## 2023-11-04 RX ADMIN — SALINE NASAL SPRAY 2 SPRAY: 1.5 SOLUTION NASAL at 20:51

## 2023-11-04 RX ADMIN — MORPHINE SULFATE 15 MG: 15 TABLET, FILM COATED, EXTENDED RELEASE ORAL at 20:49

## 2023-11-04 RX ADMIN — POTASSIUM CHLORIDE 20 MEQ: 1500 TABLET, EXTENDED RELEASE ORAL at 16:20

## 2023-11-04 RX ADMIN — DEXAMETHASONE SODIUM PHOSPHATE 40 MG: 10 INJECTION INTRAMUSCULAR; INTRAVENOUS at 08:56

## 2023-11-04 RX ADMIN — DOCUSATE SODIUM 50 MG AND SENNOSIDES 8.6 MG 1 TABLET: 8.6; 5 TABLET, FILM COATED ORAL at 20:49

## 2023-11-04 RX ADMIN — IPRATROPIUM BROMIDE 2 SPRAY: 42 SPRAY, METERED NASAL at 08:25

## 2023-11-04 RX ADMIN — AMLODIPINE BESYLATE 5 MG: 5 TABLET ORAL at 08:23

## 2023-11-04 RX ADMIN — PANTOPRAZOLE SODIUM 40 MG: 40 TABLET, DELAYED RELEASE ORAL at 15:23

## 2023-11-04 RX ADMIN — CIPROFLOXACIN HYDROCHLORIDE 500 MG: 500 TABLET, FILM COATED ORAL at 20:49

## 2023-11-04 RX ADMIN — LEVOTHYROXINE SODIUM 125 MCG: 0.12 TABLET ORAL at 08:23

## 2023-11-04 RX ADMIN — FAMOTIDINE 40 MG: 20 TABLET, FILM COATED ORAL at 20:49

## 2023-11-04 RX ADMIN — POLYETHYLENE GLYCOL 3350 17 G: 17 POWDER, FOR SOLUTION ORAL at 08:25

## 2023-11-04 RX ADMIN — CIPROFLOXACIN HYDROCHLORIDE 500 MG: 500 TABLET, FILM COATED ORAL at 09:30

## 2023-11-04 RX ADMIN — ACETAMINOPHEN 650 MG: 325 TABLET ORAL at 10:41

## 2023-11-04 RX ADMIN — MORPHINE SULFATE 15 MG: 15 TABLET, FILM COATED, EXTENDED RELEASE ORAL at 08:22

## 2023-11-04 RX ADMIN — MONTELUKAST 10 MG: 10 TABLET, FILM COATED ORAL at 08:23

## 2023-11-04 RX ADMIN — ONDANSETRON 4 MG: 2 INJECTION INTRAMUSCULAR; INTRAVENOUS at 15:24

## 2023-11-04 RX ADMIN — FLUOXETINE 20 MG: 20 CAPSULE ORAL at 20:49

## 2023-11-04 RX ADMIN — POTASSIUM CHLORIDE 20 MEQ: 1500 TABLET, EXTENDED RELEASE ORAL at 08:22

## 2023-11-04 RX ADMIN — ACETAMINOPHEN 650 MG: 325 TABLET ORAL at 04:01

## 2023-11-04 RX ADMIN — DIPHENHYDRAMINE HYDROCHLORIDE 25 MG: 25 CAPSULE ORAL at 10:41

## 2023-11-04 RX ADMIN — FAMOTIDINE 20 MG: 10 INJECTION, SOLUTION INTRAVENOUS at 17:45

## 2023-11-04 RX ADMIN — ACETAMINOPHEN 650 MG: 325 TABLET ORAL at 22:36

## 2023-11-04 RX ADMIN — SALINE NASAL SPRAY 2 SPRAY: 1.5 SOLUTION NASAL at 15:25

## 2023-11-04 RX ADMIN — SODIUM CHLORIDE, PRESERVATIVE FREE 10 ML: 5 INJECTION INTRAVENOUS at 20:51

## 2023-11-04 RX ADMIN — SODIUM CHLORIDE, PRESERVATIVE FREE 10 ML: 5 INJECTION INTRAVENOUS at 08:24

## 2023-11-04 RX ADMIN — DIPHENHYDRAMINE HYDROCHLORIDE 25 MG: 25 CAPSULE ORAL at 04:01

## 2023-11-04 RX ADMIN — ROSUVASTATIN CALCIUM 10 MG: 10 TABLET, FILM COATED ORAL at 08:23

## 2023-11-04 ASSESSMENT — PAIN SCALES - GENERAL
PAINLEVEL_OUTOF10: 4
PAINLEVEL_OUTOF10: 8
PAINLEVEL_OUTOF10: 0
PAINLEVEL_OUTOF10: 0

## 2023-11-04 ASSESSMENT — PAIN DESCRIPTION - DESCRIPTORS
DESCRIPTORS: ACHING
DESCRIPTORS: ACHING;CRAMPING;GNAWING

## 2023-11-04 ASSESSMENT — PAIN DESCRIPTION - ORIENTATION
ORIENTATION: MID
ORIENTATION: LOWER

## 2023-11-04 ASSESSMENT — PAIN DESCRIPTION - LOCATION
LOCATION: BACK;GENERALIZED
LOCATION: BACK

## 2023-11-04 ASSESSMENT — PAIN - FUNCTIONAL ASSESSMENT: PAIN_FUNCTIONAL_ASSESSMENT: PREVENTS OR INTERFERES SOME ACTIVE ACTIVITIES AND ADLS

## 2023-11-04 NOTE — PROGRESS NOTES
END OF SHIFT SUMMARY:    Significant vitals this shift:  BP not WDL  Blood products given this shift:  1 unit PRBC, 1 unit plts  Additional events this shift:   -Pt alert and oriented, with family at bedside  -Pt received 1 unit plt and 1 unit prbc  -Pt on 3 L HFNC, found sleeping with oxygen off and O2 88%, placed back on 3 L HFNC and O2 back up to 99%    Terence Swift RN

## 2023-11-04 NOTE — PROGRESS NOTES
[address] [facility] Hematology & Oncology        Inpatient Hematology / Oncology Progress Note    Reason for Admission:  Neutropenic fever ([address]) [D70.9, R50.81]  Pancytopenia ([address]) [D61.818]    24 Hour Events:  VSS, HTN, afebrile - on 3L NC  Plts 2k - day 6 steroids (s/p 2 days IVIG)  Plts appeared to respond post-transfusion yesterday  Confusion/AMS resolved  Stable fluid status  No family at bedside    Transfusions: Plts  Replacements: None    ROS:  Constitutional: Positive for fatigue; negative for fever, chills. CV: Negative for chest pain, palpitations, edema. Respiratory: +cough, dyspnea. Negative for wheezing. GI: Negative for nausea, abdominal pain, diarrhea. 10 point review of systems is otherwise negative with the exception of the elements mentioned above in the HPI. Allergies   Allergen Reactions    Penicillins Hives    Sulfa Antibiotics Hives    Codeine Nausea And Vomiting     Past Medical History:   Diagnosis Date    Arthritis     Autoimmune disease ([address])     skin changes, unknown name    Cancer ([address]) 1990    ovarian    Chronic pain     arthritis in back and legs    Coronary artery spasm ([address])     COVID 05/15/2022    Essential hypertension 11/8/2019    Gastritis     GERD (gastroesophageal reflux disease)     Hx antineoplastic chemo     Migraine headache     Psoriasis     Psychiatric disorder     anxiety and depression    Severe obesity (BMI 35.0-39.9) 6/26/2018    Thyroid disease     Diagnosed in 1996     Past Surgical History:   Procedure Laterality Date    CARPAL TUNNEL RELEASE      GYN      ovaries    HYSTERECTOMY, TOTAL ABDOMINAL (CERVIX REMOVED)  Patriciabury      cardiac cath. Dx with spasms.     TUBAL LIGATION       Family History   Problem Relation Age of Onset    Parkinson's Disease Mother     Stroke Mother         Passed away in 1969    No Known Problems Paternal Grandfather     No Known Problems Paternal Grandmother     No Known Problems Maternal today - transfuse PRBCs. WBC stable. 11/3 Plts down to 4k but responded when checked post-transfusion. Continue Dex 40mg IV daily for now. 11/3 Plts down to 2k (up to 9k from 4k on post-tx check). Day 6 Dex 40mg. Transfusing again today. Neutropenic fever  - started on cef/vanc RVP neg  - CXR unremarkable  10/29 BC+ staph epidermidis, continue Cef/Vanc, MRSA DNA pending, consult ID for recommendations  10/31 Continues Cefe/Vanc per ID but S epi likely contaminant. Repeat BC NGTD. TM 98. Likely transitioning to PO/prophylactic Cipro today. 11/1 Afebrile. Transition to prophylactic cipro now that she has completed 5 day course of abx with likely contaminant/S epi with repeat BC.  RESOLVED    Altered mental status  11/1 Likely secondary to HD steroids. CT head negative. CTA and MRI brain pending after Code S called last night for dysarthria. 11/2 CTA negative. Unable to tolerate MRI brain without sedation so holding for now as symptoms more consistent with steroid-related effects vs stroke as no focal deficits noted. RESOLVED    Hypoxia / LE edema  11/1 LE edema may be related to steroids. Incomplete I/O recorded, weight up since admission. Stopped IVF. Check CXR and BNP.  11/2 BNP elevated, CXR with pulmonary vascular congestion. On 2-4L NC. Weight remains elevated since admission. 11/4 Stable fluid status, on 3L NC - weaning as tolerated. Was hypoxic when found off O2 in the middle of the night     Bleeding - epistaxis and rectal bleeding w BM   - ASA on hold; transfuse Hb >7 and plts >50   - PPI  10/29 denies bleeding today   11/4 No bleeding noted. Transfusing plts. Hypertension  11/2 Resume home amlodipine  11/4 More hypertensive overnight, monitor. May need to adjust amlodipine if no improvement. Has PRN hydralazine.      Chronic pain   - on morphine ER 15mg BID, norco.  Muscle relaxant.    - monitoring BP      Constipation   - bowel regimen      No AC due to TCP   Continue home meds  Supportive

## 2023-11-05 VITALS
TEMPERATURE: 98.8 F | OXYGEN SATURATION: 96 % | DIASTOLIC BLOOD PRESSURE: 69 MMHG | BODY MASS INDEX: 36.12 KG/M2 | HEIGHT: 60 IN | SYSTOLIC BLOOD PRESSURE: 144 MMHG | HEART RATE: 56 BPM | WEIGHT: 184 LBS | RESPIRATION RATE: 18 BRPM

## 2023-11-05 LAB
ABO + RH BLD: NORMAL
ABO + RH BLD: NORMAL
ALBUMIN SERPL-MCNC: 3.2 G/DL (ref 3.2–4.6)
ALBUMIN/GLOB SERPL: 0.7 (ref 0.4–1.6)
ALP SERPL-CCNC: 63 U/L (ref 50–136)
ALT SERPL-CCNC: 35 U/L (ref 12–65)
ANION GAP SERPL CALC-SCNC: 3 MMOL/L (ref 2–11)
AST SERPL-CCNC: 29 U/L (ref 15–37)
BASOPHILS # BLD: 0.1 K/UL (ref 0–0.2)
BASOPHILS NFR BLD: 6 % (ref 0–2)
BILIRUB SERPL-MCNC: 1.5 MG/DL (ref 0.2–1.1)
BLD PROD TYP BPU: NORMAL
BLOOD BANK CMNT PATIENT-IMP: NORMAL
BLOOD BANK DISPENSE STATUS: NORMAL
BLOOD GROUP ANTIBODIES SERPL: NORMAL
BLOOD GROUP ANTIBODIES SERPL: NORMAL
BPU ID: NORMAL
BUN SERPL-MCNC: 23 MG/DL (ref 8–23)
CALCIUM SERPL-MCNC: 9 MG/DL (ref 8.3–10.4)
CHLORIDE SERPL-SCNC: 101 MMOL/L (ref 101–110)
CO2 SERPL-SCNC: 29 MMOL/L (ref 21–32)
CREAT SERPL-MCNC: 0.6 MG/DL (ref 0.6–1)
CROSSMATCH RESULT: NORMAL
DIFFERENTIAL METHOD BLD: ABNORMAL
EOSINOPHIL # BLD: 0 K/UL (ref 0–0.8)
EOSINOPHIL NFR BLD: 1 % (ref 0.5–7.8)
ERYTHROCYTE [DISTWIDTH] IN BLOOD BY AUTOMATED COUNT: 14.9 % (ref 11.9–14.6)
GLOBULIN SER CALC-MCNC: 4.5 G/DL (ref 2.8–4.5)
GLUCOSE SERPL-MCNC: 102 MG/DL (ref 65–100)
HCT VFR BLD AUTO: 24.2 % (ref 35.8–46.3)
HGB BLD-MCNC: 8.1 G/DL (ref 11.7–15.4)
IMM GRANULOCYTES # BLD AUTO: 0.2 K/UL (ref 0–0.5)
IMM GRANULOCYTES NFR BLD AUTO: 10 % (ref 0–5)
LYMPHOCYTES # BLD: 0.8 K/UL (ref 0.5–4.6)
LYMPHOCYTES NFR BLD: 41 % (ref 13–44)
MAGNESIUM SERPL-MCNC: 2.6 MG/DL (ref 1.8–2.4)
MCH RBC QN AUTO: 31.4 PG (ref 26.1–32.9)
MCHC RBC AUTO-ENTMCNC: 33.5 G/DL (ref 31.4–35)
MCV RBC AUTO: 93.8 FL (ref 82–102)
MONOCYTES # BLD: 0.3 K/UL (ref 0.1–1.3)
MONOCYTES NFR BLD: 19 % (ref 4–12)
NEUTS SEG # BLD: 0.4 K/UL (ref 1.7–8.2)
NEUTS SEG NFR BLD: 23 % (ref 43–78)
NRBC # BLD: 0 K/UL (ref 0–0.2)
PLATELET # BLD AUTO: 2 K/UL (ref 150–450)
PLATELET # BLD AUTO: 4 K/UL (ref 150–450)
PLATELET COMMENT: ABNORMAL
PMV BLD AUTO: ABNORMAL FL (ref 9.4–12.3)
POTASSIUM SERPL-SCNC: 4.3 MMOL/L (ref 3.5–5.1)
PROT SERPL-MCNC: 7.7 G/DL (ref 6.3–8.2)
RBC # BLD AUTO: 2.58 M/UL (ref 4.05–5.2)
RBC MORPH BLD: ABNORMAL
SODIUM SERPL-SCNC: 133 MMOL/L (ref 133–143)
SPECIMEN EXP DATE BLD: NORMAL
SPECIMEN EXP DATE BLD: NORMAL
UNIT DIVISION: 0
WBC # BLD AUTO: 1.8 K/UL (ref 4.3–11.1)
WBC MORPH BLD: ABNORMAL

## 2023-11-05 PROCEDURE — 6370000000 HC RX 637 (ALT 250 FOR IP): Performed by: NURSE PRACTITIONER

## 2023-11-05 PROCEDURE — 85025 COMPLETE CBC W/AUTO DIFF WBC: CPT

## 2023-11-05 PROCEDURE — 86850 RBC ANTIBODY SCREEN: CPT

## 2023-11-05 PROCEDURE — 86022 PLATELET ANTIBODIES: CPT

## 2023-11-05 PROCEDURE — 1170000001 HC RM PRIVATE ONCOLOGY W/TELEMETRY

## 2023-11-05 PROCEDURE — 94640 AIRWAY INHALATION TREATMENT: CPT

## 2023-11-05 PROCEDURE — 94760 N-INVAS EAR/PLS OXIMETRY 1: CPT

## 2023-11-05 PROCEDURE — 2580000003 HC RX 258: Performed by: FAMILY MEDICINE

## 2023-11-05 PROCEDURE — 86901 BLOOD TYPING SEROLOGIC RH(D): CPT

## 2023-11-05 PROCEDURE — 2580000003 HC RX 258: Performed by: NURSE PRACTITIONER

## 2023-11-05 PROCEDURE — 86900 BLOOD TYPING SEROLOGIC ABO: CPT

## 2023-11-05 PROCEDURE — 99232 SBSQ HOSP IP/OBS MODERATE 35: CPT | Performed by: INTERNAL MEDICINE

## 2023-11-05 PROCEDURE — 83735 ASSAY OF MAGNESIUM: CPT

## 2023-11-05 PROCEDURE — 6360000002 HC RX W HCPCS: Performed by: NURSE PRACTITIONER

## 2023-11-05 PROCEDURE — 36430 TRANSFUSION BLD/BLD COMPNT: CPT

## 2023-11-05 PROCEDURE — 80053 COMPREHEN METABOLIC PANEL: CPT

## 2023-11-05 PROCEDURE — 6360000002 HC RX W HCPCS: Performed by: FAMILY MEDICINE

## 2023-11-05 PROCEDURE — 2700000000 HC OXYGEN THERAPY PER DAY

## 2023-11-05 PROCEDURE — 85049 AUTOMATED PLATELET COUNT: CPT

## 2023-11-05 PROCEDURE — APPSS30 APP SPLIT SHARED TIME 16-30 MINUTES: Performed by: NURSE PRACTITIONER

## 2023-11-05 PROCEDURE — 6370000000 HC RX 637 (ALT 250 FOR IP): Performed by: INTERNAL MEDICINE

## 2023-11-05 PROCEDURE — 6370000000 HC RX 637 (ALT 250 FOR IP): Performed by: FAMILY MEDICINE

## 2023-11-05 PROCEDURE — 36415 COLL VENOUS BLD VENIPUNCTURE: CPT

## 2023-11-05 PROCEDURE — P9037 PLATE PHERES LEUKOREDU IRRAD: HCPCS

## 2023-11-05 RX ORDER — SODIUM CHLORIDE 9 MG/ML
INJECTION, SOLUTION INTRAVENOUS PRN
Status: ACTIVE | OUTPATIENT
Start: 2023-11-05

## 2023-11-05 RX ORDER — CALCIUM CARBONATE 500 MG/1
500 TABLET, CHEWABLE ORAL 3 TIMES DAILY PRN
Status: DISPENSED | OUTPATIENT
Start: 2023-11-05

## 2023-11-05 RX ADMIN — FLUOXETINE 20 MG: 20 CAPSULE ORAL at 20:53

## 2023-11-05 RX ADMIN — CALCIUM CARBONATE (ANTACID) CHEW TAB 500 MG 500 MG: 500 CHEW TAB at 10:24

## 2023-11-05 RX ADMIN — MONTELUKAST 10 MG: 10 TABLET, FILM COATED ORAL at 08:22

## 2023-11-05 RX ADMIN — IPRATROPIUM BROMIDE 2 SPRAY: 42 SPRAY, METERED NASAL at 08:28

## 2023-11-05 RX ADMIN — ALUMINUM HYDROXIDE, MAGNESIUM HYDROXIDE, AND SIMETHICONE 30 ML: 200; 200; 20 SUSPENSION ORAL at 13:14

## 2023-11-05 RX ADMIN — CIPROFLOXACIN HYDROCHLORIDE 500 MG: 500 TABLET, FILM COATED ORAL at 08:22

## 2023-11-05 RX ADMIN — POTASSIUM CHLORIDE 20 MEQ: 1500 TABLET, EXTENDED RELEASE ORAL at 08:22

## 2023-11-05 RX ADMIN — LEVOTHYROXINE SODIUM 125 MCG: 0.12 TABLET ORAL at 08:22

## 2023-11-05 RX ADMIN — ALUMINUM HYDROXIDE, MAGNESIUM HYDROXIDE, AND SIMETHICONE 30 ML: 200; 200; 20 SUSPENSION ORAL at 04:29

## 2023-11-05 RX ADMIN — SALINE NASAL SPRAY 2 SPRAY: 1.5 SOLUTION NASAL at 02:45

## 2023-11-05 RX ADMIN — ALBUTEROL SULFATE 2.5 MG: 2.5 SOLUTION RESPIRATORY (INHALATION) at 04:23

## 2023-11-05 RX ADMIN — CIPROFLOXACIN HYDROCHLORIDE 500 MG: 500 TABLET, FILM COATED ORAL at 20:53

## 2023-11-05 RX ADMIN — POLYETHYLENE GLYCOL 3350 17 G: 17 POWDER, FOR SOLUTION ORAL at 08:22

## 2023-11-05 RX ADMIN — SODIUM CHLORIDE, PRESERVATIVE FREE 10 ML: 5 INJECTION INTRAVENOUS at 20:54

## 2023-11-05 RX ADMIN — SALINE NASAL SPRAY 2 SPRAY: 1.5 SOLUTION NASAL at 08:28

## 2023-11-05 RX ADMIN — IPRATROPIUM BROMIDE 2 SPRAY: 42 SPRAY, METERED NASAL at 20:55

## 2023-11-05 RX ADMIN — ACETAMINOPHEN 650 MG: 325 TABLET ORAL at 14:26

## 2023-11-05 RX ADMIN — POTASSIUM CHLORIDE 20 MEQ: 1500 TABLET, EXTENDED RELEASE ORAL at 16:38

## 2023-11-05 RX ADMIN — FERROUS SULFATE TAB 325 MG (65 MG ELEMENTAL FE) 325 MG: 325 (65 FE) TAB at 12:36

## 2023-11-05 RX ADMIN — DEXAMETHASONE SODIUM PHOSPHATE 40 MG: 10 INJECTION INTRAMUSCULAR; INTRAVENOUS at 09:32

## 2023-11-05 RX ADMIN — SALINE NASAL SPRAY 2 SPRAY: 1.5 SOLUTION NASAL at 20:54

## 2023-11-05 RX ADMIN — ROSUVASTATIN CALCIUM 10 MG: 10 TABLET, FILM COATED ORAL at 08:22

## 2023-11-05 RX ADMIN — FERROUS SULFATE TAB 325 MG (65 MG ELEMENTAL FE) 325 MG: 325 (65 FE) TAB at 16:38

## 2023-11-05 RX ADMIN — PANTOPRAZOLE SODIUM 40 MG: 40 TABLET, DELAYED RELEASE ORAL at 08:22

## 2023-11-05 RX ADMIN — SODIUM CHLORIDE, PRESERVATIVE FREE 10 ML: 5 INJECTION INTRAVENOUS at 09:36

## 2023-11-05 RX ADMIN — MORPHINE SULFATE 15 MG: 15 TABLET, FILM COATED, EXTENDED RELEASE ORAL at 20:53

## 2023-11-05 RX ADMIN — SALINE NASAL SPRAY 2 SPRAY: 1.5 SOLUTION NASAL at 15:11

## 2023-11-05 RX ADMIN — AMLODIPINE BESYLATE 5 MG: 5 TABLET ORAL at 08:22

## 2023-11-05 RX ADMIN — DIPHENHYDRAMINE HYDROCHLORIDE 25 MG: 25 CAPSULE ORAL at 14:27

## 2023-11-05 RX ADMIN — FLUOXETINE 20 MG: 20 CAPSULE ORAL at 08:22

## 2023-11-05 RX ADMIN — MORPHINE SULFATE 15 MG: 15 TABLET, FILM COATED, EXTENDED RELEASE ORAL at 08:22

## 2023-11-05 RX ADMIN — DOCUSATE SODIUM 50 MG AND SENNOSIDES 8.6 MG 1 TABLET: 8.6; 5 TABLET, FILM COATED ORAL at 20:53

## 2023-11-05 RX ADMIN — PANTOPRAZOLE SODIUM 40 MG: 40 TABLET, DELAYED RELEASE ORAL at 16:38

## 2023-11-05 RX ADMIN — FAMOTIDINE 40 MG: 20 TABLET, FILM COATED ORAL at 20:52

## 2023-11-05 ASSESSMENT — PAIN SCALES - GENERAL
PAINLEVEL_OUTOF10: 0
PAINLEVEL_OUTOF10: 4

## 2023-11-05 ASSESSMENT — PAIN DESCRIPTION - ONSET: ONSET: GRADUAL

## 2023-11-05 ASSESSMENT — PAIN DESCRIPTION - DESCRIPTORS: DESCRIPTORS: ACHING

## 2023-11-05 ASSESSMENT — PAIN DESCRIPTION - ORIENTATION: ORIENTATION: MID

## 2023-11-05 ASSESSMENT — PAIN DESCRIPTION - FREQUENCY: FREQUENCY: CONTINUOUS

## 2023-11-05 ASSESSMENT — PAIN DESCRIPTION - PAIN TYPE: TYPE: ACUTE PAIN

## 2023-11-05 ASSESSMENT — PAIN - FUNCTIONAL ASSESSMENT: PAIN_FUNCTIONAL_ASSESSMENT: ACTIVITIES ARE NOT PREVENTED

## 2023-11-05 ASSESSMENT — PAIN DESCRIPTION - LOCATION: LOCATION: ABDOMEN

## 2023-11-05 NOTE — PROGRESS NOTES
[facility] Hematology & Oncology        Inpatient Hematology / Oncology Progress Note    Reason for Admission:  Neutropenic fever ([address]) [D70.9, R50.81]  Pancytopenia ([address]) [D61.818]    24 Hour Events:  VSS, HTN, afebrile - on 3L NC  Plts 2k - day 7 steroids (s/p 2 days IVIG)  IPF remains elevated  No family at bedside    Transfusions: Plts  Replacements: None    ROS:  Constitutional: Positive for fatigue; negative for fever, chills. CV: Negative for chest pain, palpitations, edema. Respiratory: +cough, dyspnea. Negative for wheezing. GI: Negative for nausea, abdominal pain, diarrhea. 10 point review of systems is otherwise negative with the exception of the elements mentioned above in the HPI. Allergies   Allergen Reactions    Penicillins Hives    Sulfa Antibiotics Hives    Codeine Nausea And Vomiting     Past Medical History:   Diagnosis Date    Arthritis     Autoimmune disease ([address])     skin changes, unknown name    Cancer ([address]) 1990    ovarian    Chronic pain     arthritis in back and legs    Coronary artery spasm ([address])     COVID 05/15/2022    Essential hypertension 11/8/2019    Gastritis     GERD (gastroesophageal reflux disease)     Hx antineoplastic chemo     Migraine headache     Psoriasis     Psychiatric disorder     anxiety and depression    Severe obesity (BMI 35.0-39.9) 6/26/2018    Thyroid disease     Diagnosed in 1996     Past Surgical History:   Procedure Laterality Date    CARPAL TUNNEL RELEASE      GYN      ovaries    HYSTERECTOMY, TOTAL ABDOMINAL (CERVIX REMOVED)  Patriciabury      cardiac cath. Dx with spasms.     TUBAL LIGATION       Family History   Problem Relation Age of Onset    Parkinson's Disease Mother     Stroke Mother         Passed away in 1969    No Known Problems Paternal Grandfather     No Known Problems Paternal Grandmother     No Known Problems Maternal Grandfather     No Known Problems Maternal Grandmother     Prostate Cancer Father

## 2023-11-05 NOTE — PROGRESS NOTES
EOS 7p-7a    BSSR received from off going [address] [place] elevated this shift. Premedicated with tylenol and benadyl prior to PLT transfusions. Pt received 3 unit of PLT this shift. Tolerated well. No adverse reactions noted. Post transfusion Plt  count: 2  - 1 unit ordered per PRAFUL SOPs     6266 maalox given c/o indigestion   Prn breathing treatment given by respiratory due to c/o SOB     Per TELE pt running sinus soraida this shift. The ending of Daylight Saving Time occurred at 0200 hrs. Documentation of patient care and medications administered is done with respect to the time change. Rounded on hourly by myself and patient assistant.  remained at bedside. Bed locked, low, and call light within reach. No new needs voiced at this time.     BSSR given to oncoming CHINO Capellan

## 2023-11-06 LAB
ALBUMIN SERPL-MCNC: 3.2 G/DL (ref 3.2–4.6)
ALBUMIN/GLOB SERPL: 0.7 (ref 0.4–1.6)
ALP SERPL-CCNC: 69 U/L (ref 50–136)
ALT SERPL-CCNC: 35 U/L (ref 12–65)
ANION GAP SERPL CALC-SCNC: 6 MMOL/L (ref 2–11)
AST SERPL-CCNC: 24 U/L (ref 15–37)
BASOPHILS # BLD: 0.1 K/UL (ref 0–0.2)
BASOPHILS NFR BLD: 5 % (ref 0–2)
BILIRUB SERPL-MCNC: 1.3 MG/DL (ref 0.2–1.1)
BLD PROD TYP BPU: NORMAL
BLOOD BANK CMNT PATIENT-IMP: NORMAL
BLOOD BANK CMNT PATIENT-IMP: NORMAL
BLOOD BANK DISPENSE STATUS: NORMAL
BPU ID: NORMAL
BUN SERPL-MCNC: 26 MG/DL (ref 8–23)
CALCIUM SERPL-MCNC: 8.8 MG/DL (ref 8.3–10.4)
CHLORIDE SERPL-SCNC: 102 MMOL/L (ref 101–110)
CO2 SERPL-SCNC: 27 MMOL/L (ref 21–32)
CREAT SERPL-MCNC: 0.6 MG/DL (ref 0.6–1)
DIFFERENTIAL METHOD BLD: ABNORMAL
EOSINOPHIL # BLD: 0 K/UL (ref 0–0.8)
EOSINOPHIL NFR BLD: 0 % (ref 0.5–7.8)
ERYTHROCYTE [DISTWIDTH] IN BLOOD BY AUTOMATED COUNT: 14.5 % (ref 11.9–14.6)
GLOBULIN SER CALC-MCNC: 4.4 G/DL (ref 2.8–4.5)
GLUCOSE SERPL-MCNC: 108 MG/DL (ref 65–100)
HCT VFR BLD AUTO: 23.8 % (ref 35.8–46.3)
HGB BLD-MCNC: 7.9 G/DL (ref 11.7–15.4)
IMM GRANULOCYTES # BLD AUTO: 0.2 K/UL (ref 0–0.5)
IMM GRANULOCYTES NFR BLD AUTO: 10 % (ref 0–5)
LYMPHOCYTES # BLD: 0.6 K/UL (ref 0.5–4.6)
LYMPHOCYTES NFR BLD: 42 % (ref 13–44)
Lab: NORMAL
Lab: NORMAL
MAGNESIUM SERPL-MCNC: 2.4 MG/DL (ref 1.8–2.4)
MCH RBC QN AUTO: 31.3 PG (ref 26.1–32.9)
MCHC RBC AUTO-ENTMCNC: 33.2 G/DL (ref 31.4–35)
MCV RBC AUTO: 94.4 FL (ref 82–102)
MONOCYTES # BLD: 0.3 K/UL (ref 0.1–1.3)
MONOCYTES NFR BLD: 21 % (ref 4–12)
NEUTS SEG # BLD: 0.3 K/UL (ref 1.7–8.2)
NEUTS SEG NFR BLD: 22 % (ref 43–78)
NRBC # BLD: 0 K/UL (ref 0–0.2)
PLATELET # BLD AUTO: 3 K/UL (ref 150–450)
PLATELET # BLD AUTO: 3 K/UL (ref 150–450)
PLATELET COMMENT: ABNORMAL
PMV BLD AUTO: ABNORMAL FL (ref 9.4–12.3)
POTASSIUM SERPL-SCNC: 4.5 MMOL/L (ref 3.5–5.1)
PROT SERPL-MCNC: 7.6 G/DL (ref 6.3–8.2)
RBC # BLD AUTO: 2.52 M/UL (ref 4.05–5.2)
RBC MORPH BLD: ABNORMAL
REFERENCE LAB: NORMAL
SODIUM SERPL-SCNC: 135 MMOL/L (ref 133–143)
UNIT DIVISION: 0
WBC # BLD AUTO: 1.5 K/UL (ref 4.3–11.1)
WBC MORPH BLD: ABNORMAL

## 2023-11-06 PROCEDURE — 6370000000 HC RX 637 (ALT 250 FOR IP): Performed by: FAMILY MEDICINE

## 2023-11-06 PROCEDURE — 81380 HLA I TYPING 1 LOCUS HR: CPT

## 2023-11-06 PROCEDURE — 85049 AUTOMATED PLATELET COUNT: CPT

## 2023-11-06 PROCEDURE — 83735 ASSAY OF MAGNESIUM: CPT

## 2023-11-06 PROCEDURE — 80053 COMPREHEN METABOLIC PANEL: CPT

## 2023-11-06 PROCEDURE — 99232 SBSQ HOSP IP/OBS MODERATE 35: CPT | Performed by: INTERNAL MEDICINE

## 2023-11-06 PROCEDURE — 6370000000 HC RX 637 (ALT 250 FOR IP): Performed by: INTERNAL MEDICINE

## 2023-11-06 PROCEDURE — 6360000002 HC RX W HCPCS: Performed by: NURSE PRACTITIONER

## 2023-11-06 PROCEDURE — 1170000001 HC RM PRIVATE ONCOLOGY W/TELEMETRY

## 2023-11-06 PROCEDURE — 2580000003 HC RX 258: Performed by: NURSE PRACTITIONER

## 2023-11-06 PROCEDURE — 6370000000 HC RX 637 (ALT 250 FOR IP): Performed by: NURSE PRACTITIONER

## 2023-11-06 PROCEDURE — 2580000003 HC RX 258: Performed by: FAMILY MEDICINE

## 2023-11-06 PROCEDURE — 86644 CMV ANTIBODY: CPT

## 2023-11-06 PROCEDURE — 85025 COMPLETE CBC W/AUTO DIFF WBC: CPT

## 2023-11-06 PROCEDURE — 36415 COLL VENOUS BLD VENIPUNCTURE: CPT

## 2023-11-06 PROCEDURE — 36430 TRANSFUSION BLD/BLD COMPNT: CPT

## 2023-11-06 PROCEDURE — APPSS30 APP SPLIT SHARED TIME 16-30 MINUTES: Performed by: NURSE PRACTITIONER

## 2023-11-06 PROCEDURE — 86022 PLATELET ANTIBODIES: CPT

## 2023-11-06 PROCEDURE — P9037 PLATE PHERES LEUKOREDU IRRAD: HCPCS

## 2023-11-06 RX ORDER — SODIUM CHLORIDE 9 MG/ML
INJECTION, SOLUTION INTRAVENOUS PRN
Status: DISCONTINUED | OUTPATIENT
Start: 2023-11-06 | End: 2023-11-09

## 2023-11-06 RX ADMIN — POTASSIUM CHLORIDE 20 MEQ: 1500 TABLET, EXTENDED RELEASE ORAL at 17:35

## 2023-11-06 RX ADMIN — FLUOXETINE HYDROCHLORIDE 20 MG: 20 CAPSULE ORAL at 08:01

## 2023-11-06 RX ADMIN — LEVOTHYROXINE SODIUM 125 MCG: 0.12 TABLET ORAL at 08:00

## 2023-11-06 RX ADMIN — SODIUM CHLORIDE, PRESERVATIVE FREE 10 ML: 5 INJECTION INTRAVENOUS at 22:36

## 2023-11-06 RX ADMIN — POTASSIUM CHLORIDE 20 MEQ: 1500 TABLET, EXTENDED RELEASE ORAL at 08:01

## 2023-11-06 RX ADMIN — DIPHENHYDRAMINE HYDROCHLORIDE 25 MG: 25 CAPSULE ORAL at 02:08

## 2023-11-06 RX ADMIN — DOCUSATE SODIUM 50 MG AND SENNOSIDES 8.6 MG 1 TABLET: 8.6; 5 TABLET, FILM COATED ORAL at 22:35

## 2023-11-06 RX ADMIN — FERROUS SULFATE TAB 325 MG (65 MG ELEMENTAL FE) 325 MG: 325 (65 FE) TAB at 11:31

## 2023-11-06 RX ADMIN — ACETAMINOPHEN 650 MG: 325 TABLET ORAL at 11:31

## 2023-11-06 RX ADMIN — FLUOXETINE HYDROCHLORIDE 20 MG: 20 CAPSULE ORAL at 22:34

## 2023-11-06 RX ADMIN — ROSUVASTATIN CALCIUM 10 MG: 5 TABLET, FILM COATED ORAL at 08:00

## 2023-11-06 RX ADMIN — SALINE NASAL SPRAY 2 SPRAY: 1.5 SOLUTION NASAL at 08:20

## 2023-11-06 RX ADMIN — AMLODIPINE BESYLATE 5 MG: 5 TABLET ORAL at 08:00

## 2023-11-06 RX ADMIN — FERROUS SULFATE TAB 325 MG (65 MG ELEMENTAL FE) 325 MG: 325 (65 FE) TAB at 17:35

## 2023-11-06 RX ADMIN — FAMOTIDINE 40 MG: 20 TABLET, FILM COATED ORAL at 22:35

## 2023-11-06 RX ADMIN — ACETAMINOPHEN 650 MG: 325 TABLET ORAL at 02:08

## 2023-11-06 RX ADMIN — MORPHINE SULFATE 15 MG: 15 TABLET, FILM COATED, EXTENDED RELEASE ORAL at 22:35

## 2023-11-06 RX ADMIN — MONTELUKAST 10 MG: 10 TABLET, FILM COATED ORAL at 08:00

## 2023-11-06 RX ADMIN — SODIUM CHLORIDE, PRESERVATIVE FREE 10 ML: 5 INJECTION INTRAVENOUS at 08:20

## 2023-11-06 RX ADMIN — IPRATROPIUM BROMIDE 2 SPRAY: 42 SPRAY NASAL at 22:36

## 2023-11-06 RX ADMIN — SALINE NASAL SPRAY 2 SPRAY: 1.5 SOLUTION NASAL at 02:16

## 2023-11-06 RX ADMIN — DEXAMETHASONE SODIUM PHOSPHATE 40 MG: 10 INJECTION INTRAMUSCULAR; INTRAVENOUS at 09:55

## 2023-11-06 RX ADMIN — MORPHINE SULFATE 15 MG: 15 TABLET, FILM COATED, EXTENDED RELEASE ORAL at 08:00

## 2023-11-06 RX ADMIN — DIPHENHYDRAMINE HYDROCHLORIDE 25 MG: 25 CAPSULE ORAL at 11:31

## 2023-11-06 RX ADMIN — PANTOPRAZOLE SODIUM 40 MG: 40 TABLET, DELAYED RELEASE ORAL at 08:00

## 2023-11-06 RX ADMIN — SALINE NASAL SPRAY 2 SPRAY: 1.5 SOLUTION NASAL at 22:36

## 2023-11-06 RX ADMIN — PANTOPRAZOLE SODIUM 40 MG: 40 TABLET, DELAYED RELEASE ORAL at 17:35

## 2023-11-06 RX ADMIN — POLYETHYLENE GLYCOL 3350 17 G: 17 POWDER, FOR SOLUTION ORAL at 08:00

## 2023-11-06 RX ADMIN — CIPROFLOXACIN HYDROCHLORIDE 500 MG: 500 TABLET, FILM COATED ORAL at 08:01

## 2023-11-06 RX ADMIN — CIPROFLOXACIN HYDROCHLORIDE 500 MG: 500 TABLET, FILM COATED ORAL at 22:35

## 2023-11-06 ASSESSMENT — PAIN SCALES - GENERAL
PAINLEVEL_OUTOF10: 0
PAINLEVEL_OUTOF10: 0

## 2023-11-06 NOTE — CARE COORDINATION
Chart screened by CM for d/c planning. Pt currently requiring 1L O2 NC. Wean as tolerated. If unable to wean prior to d/c pt will require a 6MWT as she does not have home O2. Oncology monitoring low plt count. Not responding well to steroids. D8 dex. AMS improving. IR consulted for BMBx. PT/OT recommend HH at d/c. Anticipated dispo: return home, with HH if agreeable, once plt count is stable. D/C timeframe undetermined at the present time. CM will continue to follow.   LOS = 10 days

## 2023-11-06 NOTE — PROGRESS NOTES
END OF SHIFT SUMMARY:    No new changes noted throughout the night. 1 unit of platelets were given and pt tolerated them well. Will continue to monitor. Bed low and locked, side rails up x3, and call light within reach. Pt was rounded on hourly by myself and pt assistant. Bedside shift report given to on coming RN.      Eva Huang RN

## 2023-11-06 NOTE — PROGRESS NOTES
END OF SHIFT SUMMARY:7a-7p      Significant labs this shift:  critical plt of 4 after administration of 1 unit of plt     Orders to be followed up on:  1 unit of plt ordered, continue to wean oxygen   Blood products given this shift:  1 unit of plt given  Additional events this shift:   Pt requesting tums for indigestion notified NP, see new orders. Weaned oxygen to 1L via nasal cannula from 5L via nasal cannula    I/Os:  +/- this shift: Pt voiding in toilet at time unable to obtain accuraate I and Os   No intake/output data recorded.   Unmeasured Occurrences this Shift:  Urine 3, BM 0, Emesis 0      Bedside shift report given to CHINO Lin RN

## 2023-11-06 NOTE — PROGRESS NOTES
[facility] Hematology & Oncology        Inpatient Hematology / Oncology Progress Note    Reason for Admission:  Neutropenic fever ([address]) [D70.9, R50.81]  Pancytopenia ([address]) [D61.818]    24 Hour Events:  VSS, HTN, afebrile - on 1L NC  Plts 3k - day 8 steroids (s/p 2 days IVIG)  Feeling ok, no complaints    Transfusions: Plts  Replacements: None    ROS:  Constitutional: Positive for fatigue; negative for fever, chills. CV: Negative for chest pain, palpitations, edema. Respiratory: +cough, dyspnea. Negative for wheezing. GI: Negative for nausea, abdominal pain, diarrhea. 10 point review of systems is otherwise negative with the exception of the elements mentioned above in the HPI. Allergies   Allergen Reactions    Penicillins Hives    Sulfa Antibiotics Hives    Codeine Nausea And Vomiting     Past Medical History:   Diagnosis Date    Arthritis     Autoimmune disease ([address])     skin changes, unknown name    Cancer ([address])     ovarian    Chronic pain     arthritis in back and legs    Coronary artery spasm ([address])     COVID 05/15/2022    Essential hypertension 2019    Gastritis     GERD (gastroesophageal reflux disease)     Hx antineoplastic chemo     Migraine headache     Psoriasis     Psychiatric disorder     anxiety and depression    Severe obesity (BMI 35.0-39.9) 2018    Thyroid disease     Diagnosed in      Past Surgical History:   Procedure Laterality Date    CARPAL TUNNEL RELEASE      GYN      ovaries    HYSTERECTOMY, TOTAL ABDOMINAL (CERVIX REMOVED)  Patriciabury      cardiac cath. Dx with spasms.     TUBAL LIGATION       Family History   Problem Relation Age of Onset    Parkinson's Disease Mother     Stroke Mother         Passed away in     No Known Problems Paternal Grandfather     No Known Problems Paternal Grandmother     No Known Problems Maternal Grandfather     No Known Problems Maternal Grandmother     Prostate Cancer Father          age non-tender, bowel sounds are positive.  72 female, history of psoriatic arthritis (on Humira), admitted with fever and sore throat.  Recently diagnosed with hematology Dr Nick with MDS, 5 q. deletion, RAEB 1, on Revlimid.  Treated with hydration and broad-spectrum antibiotics.  Also received blood transfusions including platelets.  Day 8 of dexamethasone, changed to prednisone.  Will discuss with IR would likely bone marrow biopsy to help drive further therapy.  No current bleeding, supportive care as above.                Chris Vora MD  [facility] Hematology/Oncology  [address] [place] [address]  Office : [phone]  Fax : [phone]

## 2023-11-06 NOTE — PROGRESS NOTES
Notified Catie NINO plt count 30mins after infusion at [address] [place] [address].  No new orders at this time as planned infusions in the AM prior to [facility]

## 2023-11-07 ENCOUNTER — APPOINTMENT (OUTPATIENT)
Dept: CT IMAGING | Age: 73
DRG: 808 | End: 2023-11-07
Payer: MEDICARE

## 2023-11-07 LAB
ALBUMIN SERPL-MCNC: 3.3 G/DL (ref 3.2–4.6)
ALBUMIN/GLOB SERPL: 0.8 (ref 0.4–1.6)
ALP SERPL-CCNC: 59 U/L (ref 50–136)
ALT SERPL-CCNC: 33 U/L (ref 12–65)
ANION GAP SERPL CALC-SCNC: 7 MMOL/L (ref 2–11)
AST SERPL-CCNC: 19 U/L (ref 15–37)
BASOPHILS # BLD: 0.1 K/UL (ref 0–0.2)
BASOPHILS NFR BLD: 6 % (ref 0–2)
BILIRUB SERPL-MCNC: 1.4 MG/DL (ref 0.2–1.1)
BLD PROD TYP BPU: NORMAL
BLD PROD TYP BPU: NORMAL
BLOOD BANK CMNT PATIENT-IMP: NORMAL
BLOOD BANK CMNT PATIENT-IMP: NORMAL
BLOOD BANK DISPENSE STATUS: NORMAL
BLOOD BANK DISPENSE STATUS: NORMAL
BONE MARROW PREP & WRIGHT STAIN: NORMAL
BPU ID: NORMAL
BPU ID: NORMAL
BUN SERPL-MCNC: 25 MG/DL (ref 8–23)
CALCIUM SERPL-MCNC: 9.1 MG/DL (ref 8.3–10.4)
CHLORIDE SERPL-SCNC: 101 MMOL/L (ref 101–110)
CO2 SERPL-SCNC: 28 MMOL/L (ref 21–32)
CREAT SERPL-MCNC: 0.6 MG/DL (ref 0.6–1)
DIFFERENTIAL METHOD BLD: ABNORMAL
EOSINOPHIL # BLD: 0 K/UL (ref 0–0.8)
EOSINOPHIL NFR BLD: 0 % (ref 0.5–7.8)
ERYTHROCYTE [DISTWIDTH] IN BLOOD BY AUTOMATED COUNT: 14.4 % (ref 11.9–14.6)
GLOBULIN SER CALC-MCNC: 4.3 G/DL (ref 2.8–4.5)
GLUCOSE SERPL-MCNC: 89 MG/DL (ref 65–100)
HCT VFR BLD AUTO: 25.7 % (ref 35.8–46.3)
HGB BLD-MCNC: 8.7 G/DL (ref 11.7–15.4)
IMM GRANULOCYTES # BLD AUTO: 0.2 K/UL (ref 0–0.5)
IMM GRANULOCYTES NFR BLD AUTO: 9 % (ref 0–5)
LYMPHOCYTES # BLD: 0.7 K/UL (ref 0.5–4.6)
LYMPHOCYTES NFR BLD: 44 % (ref 13–44)
MAGNESIUM SERPL-MCNC: 2.4 MG/DL (ref 1.8–2.4)
MCH RBC QN AUTO: 31.4 PG (ref 26.1–32.9)
MCHC RBC AUTO-ENTMCNC: 33.9 G/DL (ref 31.4–35)
MCV RBC AUTO: 92.8 FL (ref 82–102)
MONOCYTES # BLD: 0.3 K/UL (ref 0.1–1.3)
MONOCYTES NFR BLD: 17 % (ref 4–12)
NEUTS SEG # BLD: 0.4 K/UL (ref 1.7–8.2)
NEUTS SEG NFR BLD: 24 % (ref 43–78)
NRBC # BLD: 0 K/UL (ref 0–0.2)
PLATELET # BLD AUTO: 2 K/UL (ref 150–450)
PLATELET # BLD AUTO: 4 K/UL (ref 150–450)
PLATELET COMMENT: ABNORMAL
PMV BLD AUTO: ABNORMAL FL (ref 9.4–12.3)
POTASSIUM SERPL-SCNC: 4 MMOL/L (ref 3.5–5.1)
PROT SERPL-MCNC: 7.6 G/DL (ref 6.3–8.2)
RBC # BLD AUTO: 2.77 M/UL (ref 4.05–5.2)
RBC MORPH BLD: ABNORMAL
SODIUM SERPL-SCNC: 136 MMOL/L (ref 133–143)
UNIT DIVISION: 0
UNIT DIVISION: 0
WBC # BLD AUTO: 1.7 K/UL (ref 4.3–11.1)
WBC MORPH BLD: ABNORMAL

## 2023-11-07 PROCEDURE — 80053 COMPREHEN METABOLIC PANEL: CPT

## 2023-11-07 PROCEDURE — C1830 POWER BONE MARROW BX NEEDLE: HCPCS

## 2023-11-07 PROCEDURE — P9037 PLATE PHERES LEUKOREDU IRRAD: HCPCS

## 2023-11-07 PROCEDURE — 86644 CMV ANTIBODY: CPT

## 2023-11-07 PROCEDURE — 85025 COMPLETE CBC W/AUTO DIFF WBC: CPT

## 2023-11-07 PROCEDURE — 97112 NEUROMUSCULAR REEDUCATION: CPT

## 2023-11-07 PROCEDURE — 83735 ASSAY OF MAGNESIUM: CPT

## 2023-11-07 PROCEDURE — 97530 THERAPEUTIC ACTIVITIES: CPT

## 2023-11-07 PROCEDURE — 6360000002 HC RX W HCPCS: Performed by: RADIOLOGY

## 2023-11-07 PROCEDURE — 6370000000 HC RX 637 (ALT 250 FOR IP): Performed by: NURSE PRACTITIONER

## 2023-11-07 PROCEDURE — 6370000000 HC RX 637 (ALT 250 FOR IP): Performed by: INTERNAL MEDICINE

## 2023-11-07 PROCEDURE — 97535 SELF CARE MNGMENT TRAINING: CPT

## 2023-11-07 PROCEDURE — 88305 TISSUE EXAM BY PATHOLOGIST: CPT

## 2023-11-07 PROCEDURE — 1170000001 HC RM PRIVATE ONCOLOGY W/TELEMETRY

## 2023-11-07 PROCEDURE — 99232 SBSQ HOSP IP/OBS MODERATE 35: CPT | Performed by: INTERNAL MEDICINE

## 2023-11-07 PROCEDURE — 85049 AUTOMATED PLATELET COUNT: CPT

## 2023-11-07 PROCEDURE — 88341 IMHCHEM/IMCYTCHM EA ADD ANTB: CPT

## 2023-11-07 PROCEDURE — 86022 PLATELET ANTIBODIES: CPT

## 2023-11-07 PROCEDURE — 88365 INSITU HYBRIDIZATION (FISH): CPT

## 2023-11-07 PROCEDURE — 88313 SPECIAL STAINS GROUP 2: CPT

## 2023-11-07 PROCEDURE — 88237 TISSUE CULTURE BONE MARROW: CPT

## 2023-11-07 PROCEDURE — 88342 IMHCHEM/IMCYTCHM 1ST ANTB: CPT

## 2023-11-07 PROCEDURE — 36415 COLL VENOUS BLD VENIPUNCTURE: CPT

## 2023-11-07 PROCEDURE — 88374 M/PHMTRC ALYS ISHQUANT/SEMIQ: CPT

## 2023-11-07 PROCEDURE — 88184 FLOWCYTOMETRY/ TC 1 MARKER: CPT

## 2023-11-07 PROCEDURE — 88264 CHROMOSOME ANALYSIS 20-25: CPT

## 2023-11-07 PROCEDURE — APPSS30 APP SPLIT SHARED TIME 16-30 MINUTES: Performed by: NURSE PRACTITIONER

## 2023-11-07 PROCEDURE — 88311 DECALCIFY TISSUE: CPT

## 2023-11-07 PROCEDURE — 81479 UNLISTED MOLECULAR PATHOLOGY: CPT

## 2023-11-07 PROCEDURE — 77012 CT SCAN FOR NEEDLE BIOPSY: CPT

## 2023-11-07 PROCEDURE — 36430 TRANSFUSION BLD/BLD COMPNT: CPT

## 2023-11-07 PROCEDURE — 2580000003 HC RX 258: Performed by: RADIOLOGY

## 2023-11-07 PROCEDURE — 88185 FLOWCYTOMETRY/TC ADD-ON: CPT

## 2023-11-07 PROCEDURE — 2580000003 HC RX 258: Performed by: FAMILY MEDICINE

## 2023-11-07 PROCEDURE — 6370000000 HC RX 637 (ALT 250 FOR IP): Performed by: FAMILY MEDICINE

## 2023-11-07 RX ORDER — HYDROCODONE BITARTRATE AND ACETAMINOPHEN 5; 325 MG/1; MG/1
TABLET ORAL
Status: ON HOLD | COMMUNITY
Start: 2023-10-27 | End: 2023-12-15 | Stop reason: HOSPADM

## 2023-11-07 RX ORDER — MIDAZOLAM HYDROCHLORIDE 1 MG/ML
INJECTION INTRAMUSCULAR; INTRAVENOUS PRN
Status: COMPLETED | OUTPATIENT
Start: 2023-11-07 | End: 2023-11-07

## 2023-11-07 RX ORDER — SODIUM CHLORIDE 9 MG/ML
INJECTION, SOLUTION INTRAVENOUS PRN
Status: DISCONTINUED | OUTPATIENT
Start: 2023-11-07 | End: 2023-11-09

## 2023-11-07 RX ORDER — SODIUM CHLORIDE 9 MG/ML
INJECTION, SOLUTION INTRAVENOUS CONTINUOUS PRN
Status: COMPLETED | OUTPATIENT
Start: 2023-11-07 | End: 2023-11-07

## 2023-11-07 RX ORDER — FENTANYL CITRATE 50 UG/ML
INJECTION, SOLUTION INTRAMUSCULAR; INTRAVENOUS PRN
Status: COMPLETED | OUTPATIENT
Start: 2023-11-07 | End: 2023-11-07

## 2023-11-07 RX ADMIN — ROSUVASTATIN CALCIUM 10 MG: 5 TABLET, FILM COATED ORAL at 11:52

## 2023-11-07 RX ADMIN — IPRATROPIUM BROMIDE 2 SPRAY: 42 SPRAY NASAL at 22:32

## 2023-11-07 RX ADMIN — SALINE NASAL SPRAY 2 SPRAY: 1.5 SOLUTION NASAL at 23:12

## 2023-11-07 RX ADMIN — FERROUS SULFATE TAB 325 MG (65 MG ELEMENTAL FE) 325 MG: 325 (65 FE) TAB at 11:50

## 2023-11-07 RX ADMIN — MORPHINE SULFATE 15 MG: 15 TABLET, FILM COATED, EXTENDED RELEASE ORAL at 11:50

## 2023-11-07 RX ADMIN — ACETAMINOPHEN 650 MG: 325 TABLET ORAL at 03:54

## 2023-11-07 RX ADMIN — DIPHENHYDRAMINE HYDROCHLORIDE 25 MG: 25 CAPSULE ORAL at 03:54

## 2023-11-07 RX ADMIN — MORPHINE SULFATE 15 MG: 15 TABLET, FILM COATED, EXTENDED RELEASE ORAL at 22:31

## 2023-11-07 RX ADMIN — DOCUSATE SODIUM 50 MG AND SENNOSIDES 8.6 MG 1 TABLET: 8.6; 5 TABLET, FILM COATED ORAL at 22:31

## 2023-11-07 RX ADMIN — POTASSIUM CHLORIDE 20 MEQ: 1500 TABLET, EXTENDED RELEASE ORAL at 11:52

## 2023-11-07 RX ADMIN — AMLODIPINE BESYLATE 5 MG: 5 TABLET ORAL at 11:49

## 2023-11-07 RX ADMIN — DIPHENHYDRAMINE HYDROCHLORIDE 25 MG: 25 CAPSULE ORAL at 17:05

## 2023-11-07 RX ADMIN — SALINE NASAL SPRAY 2 SPRAY: 1.5 SOLUTION NASAL at 03:54

## 2023-11-07 RX ADMIN — FERROUS SULFATE TAB 325 MG (65 MG ELEMENTAL FE) 325 MG: 325 (65 FE) TAB at 17:05

## 2023-11-07 RX ADMIN — MONTELUKAST 10 MG: 10 TABLET, FILM COATED ORAL at 11:48

## 2023-11-07 RX ADMIN — MIDAZOLAM 1 MG: 1 INJECTION INTRAMUSCULAR; INTRAVENOUS at 09:21

## 2023-11-07 RX ADMIN — SODIUM CHLORIDE 500 ML: 900 INJECTION, SOLUTION INTRAVENOUS at 09:21

## 2023-11-07 RX ADMIN — CIPROFLOXACIN HYDROCHLORIDE 500 MG: 500 TABLET, FILM COATED ORAL at 11:50

## 2023-11-07 RX ADMIN — FENTANYL CITRATE 50 MCG: 50 INJECTION, SOLUTION INTRAMUSCULAR; INTRAVENOUS at 09:21

## 2023-11-07 RX ADMIN — POLYETHYLENE GLYCOL 3350 17 G: 17 POWDER, FOR SOLUTION ORAL at 11:50

## 2023-11-07 RX ADMIN — POTASSIUM CHLORIDE 20 MEQ: 1500 TABLET, EXTENDED RELEASE ORAL at 17:05

## 2023-11-07 RX ADMIN — FAMOTIDINE 40 MG: 20 TABLET, FILM COATED ORAL at 23:11

## 2023-11-07 RX ADMIN — FLUOXETINE HYDROCHLORIDE 20 MG: 20 CAPSULE ORAL at 22:31

## 2023-11-07 RX ADMIN — DIPHENHYDRAMINE HYDROCHLORIDE 10 ML: 12.5 LIQUID ORAL at 14:39

## 2023-11-07 RX ADMIN — CIPROFLOXACIN HYDROCHLORIDE 500 MG: 500 TABLET, FILM COATED ORAL at 22:31

## 2023-11-07 RX ADMIN — FENTANYL CITRATE 50 MCG: 50 INJECTION, SOLUTION INTRAMUSCULAR; INTRAVENOUS at 09:26

## 2023-11-07 RX ADMIN — ACETAMINOPHEN 650 MG: 325 TABLET ORAL at 17:05

## 2023-11-07 RX ADMIN — PANTOPRAZOLE SODIUM 40 MG: 40 TABLET, DELAYED RELEASE ORAL at 17:05

## 2023-11-07 RX ADMIN — PREDNISONE 80 MG: 20 TABLET ORAL at 11:49

## 2023-11-07 RX ADMIN — MIDAZOLAM 1 MG: 1 INJECTION INTRAMUSCULAR; INTRAVENOUS at 09:26

## 2023-11-07 RX ADMIN — SODIUM CHLORIDE, PRESERVATIVE FREE 10 ML: 5 INJECTION INTRAVENOUS at 22:33

## 2023-11-07 RX ADMIN — FLUOXETINE HYDROCHLORIDE 20 MG: 20 CAPSULE ORAL at 11:49

## 2023-11-07 ASSESSMENT — PAIN SCALES - GENERAL
PAINLEVEL_OUTOF10: 0

## 2023-11-07 NOTE — PROGRESS NOTES
ACUTE PHYSICAL THERAPY GOALS:   (Developed with and agreed upon by patient and/or caregiver.)    (1.)Ms. Dell Hu will move from supine to sit and sit to supine , scoot up and down, and roll side to side in bed with INDEPENDENCE within 7 treatment day(s). (2.)Ms. Dell Hu will transfer from bed to chair and chair to bed with MODIFIED INDEPENDENCE using the least restrictive device within 7 treatment day(s). (3.)Ms. Dell Hu will ambulate with MODIFIED INDEPENDENCE for 500 feet with the least restrictive device within 7 treatment day(s). (4.)Ms. Dell Hu will participate in therapeutic activity/exercises x 25 minutes for increased activity tolerance within 7 treatment days. (5.)Ms. Dell Hu will perform standing static and dynamic balance activities x 15 minutes with SUPERVISION to improve safety within 7 day(s). PHYSICAL THERAPY: Daily Note PM   (Link to Caseload Tracking: PT Visit Days : 4  Time In/Out PT Charge Capture  Rehab Caseload Tracker  Orders    Unknown Jessenia is a 71 y.o. female   PRIMARY DIAGNOSIS: Neutropenic fever ([address])  Neutropenic fever ([address]) [D70.9, R50.81]  Pancytopenia ([address]) [D61.818]       Inpatient: Payor: Too Adan / Plan: Curt Sawyer / Product Type: *No Product type* /     ASSESSMENT:     REHAB RECOMMENDATIONS:   Recommendation to date pending progress:  Setting:  Home Health Therapy    Equipment:    To Be Determined     ASSESSMENT:  Ms. Dell Hu was supine in bed on arrival and agreeable to PT. Platelets are sill low, however, RN cleared pt for mobility. She presented on room air, SpO2 95%. She was able to sit at EOB, ambulate to restroom and perform toilet transfer with CGA. SpO2 noted to drop to 80% after this but improved quickly when placed on 2L. She was able to ambulate 50 ft x 4 with RW and CGA with close chair follow. She moves well but fatigues easily requiring frequent rest breaks. She reports she has a rollator at home that she can use.  With 2L she was able to Walker    Gait Quality Decreased annabel  and Decreased step length    Weightbearing Status      Stairs      I=Independent, Mod I=Modified Independent, S=Supervision, SBA=Standby Assistance, CGA=Contact Guard Assistance,   Min=Minimal Assistance, Mod=Moderate Assistance, Max=Maximal Assistance, Total=Total Assistance, NT=Not Tested    PLAN:   FREQUENCY AND DURATION: 3 times/week for duration of hospital stay or until stated goals are met, whichever comes first.    TREATMENT:   TREATMENT:   Co-Treatment PT/OT necessary due to patient's decreased overall endurance/tolerance levels, as well as need for high level skilled assistance to complete functional transfers/mobility and functional tasks  Therapeutic Activity (32 Minutes): Therapeutic activity included Rolling, Supine to Sit, Scooting, Transfer Training, Ambulation on level ground, Sitting balance , and Standing balance to improve functional Activity tolerance, Balance, Mobility, and Strength.     TREATMENT GRID:  N/A    AFTER TREATMENT PRECAUTIONS: Bed/Chair Locked, Call light within reach, Chair, Needs within reach, RN notified, and Visitors at bedside    INTERDISCIPLINARY COLLABORATION:  RN/ PCT and OT/ PETTY    EDUCATION:      TIME IN/OUT:  Time In: 1347  Time Out: 1419  Minutes: 1910 Mukul DUNN PT

## 2023-11-07 NOTE — PROGRESS NOTES
Went to lab to get platelets around 2661 and there was an issue with the order in the computer on their end. Waiting for them to call me back to say it is fixed.

## 2023-11-07 NOTE — BRIEF OP NOTE
Department of Interventional Radiology  [phone]        Interventional Radiology Brief Procedure Note    Patient: Oswald Kussmaul MRN: [id number]  SSN: [id number]    YOB: 1950  Age: 71 y.o.   Sex: female      Date of Procedure: 11/7/2023    Pre-Procedure Diagnosis: pancytopenia    Post-Procedure Diagnosis: SAME    Procedure(s): Image Guided Biopsy    Brief Description of Procedure: CT guided BMBX    Performed By: Melodie Torres PA-C     Assistants: None    Anesthesia:Moderate Sedation per Corrie Sainz MD    Estimated Blood Loss: Less than 10ml    Specimens:   aspirate and core    Implants:  None    Findings: no post biopsy hemorrhage    Complications: None    Recommendations: routine wound care     Follow Up: prn    Signed By: Melodie Torres PA-C     November 7, 2023

## 2023-11-07 NOTE — PROGRESS NOTES
ACUTE OCCUPATIONAL THERAPY GOALS:   (Developed with and agreed upon by patient and/or caregiver.)  1. Patient will complete lower body bathing and dressing with MOD I and adaptive equipment as needed.   2. Patient will complete toileting with MOD I.   3. Patient will tolerate 30 minutes of OT treatment with 1-2 rest breaks to increase activity tolerance for ADLs.   4. Patient will complete functional transfers with MOD I and adaptive equipment as needed.   5. Patient will complete functional mobility for household distances with MOD I and adaptive equipment as needed.  6. Patient will complete self-grooming while standing edge of sink with MOD I and adaptive equipment as needed.     Timeframe: 7 visits     OCCUPATIONAL THERAPY: Daily Note PM   OT Visit Days: 4   Time In/Out  OT Charge Capture  Rehab Caseload Tracker  OT Orders    Neutropenic Precautions    Constance Gomes is a 72 y.o. female   PRIMARY DIAGNOSIS: Neutropenic fever (HCC)  Neutropenic fever (HCC) [D70.9, R50.81]  Pancytopenia (HCC) [D61.818]       Inpatient: Payor: [employer] MEDICARE / Plan: [employer] CHOICE-PPO MEDICARE / Product Type: *No Product type* /     ASSESSMENT:     REHAB RECOMMENDATIONS: CURRENT LEVEL OF FUNCTION:  (Most Recently Demonstrated)   Recommendation to date pending progress:  Setting:  Home Health Therapy    Equipment:    To Be Determined - has rollator Bathing:  Not Tested  Dressing:  Stand by Assist  Feeding/Grooming:  Not Tested  Toileting:  Contact Guard Assist  Functional Mobility:  Contact Guard Assist with RW household distance     ASSESSMENT:  Ms. Gomes is doing fair today. Pt supine and agreeable to therapy, however continues to complain of fatigue. Pt on RA upon arrival and initially got up to bathroom on RA but was noted to drop into low 80s. Placed pt on 2L for remainder of session with SpO2 >93%. Pt CGA for bathroom mobility and toileting without AD but did require seated rest break following task. Pt then able to  Mod I S SBA CGA Min Mod Max Total NT Comments   BASIC ADLs:              Upper Body   Bathing [] [] [] [] [] [] [] [] [] [x]     Lower Body Bathing [] [] [] [] [] [] [] [] [] [x]     Toileting [] [] [] [] [x] [] [] [] [] [] In bathroom, seated hygiene   Upper Body Dressing [] [] [] [] [] [] [] [] [] [x]    Lower Body Dressing [] [] [] [] [x] [] [] [] [] [] Seated, doffing/donning socks   Feeding [] [] [] [] [] [] [] [] [] [x]    Grooming [] [] [] [] [] [] [] [] [] [x]    Personal Device Care [] [] [] [] [] [] [] [] [] [x]    Functional Mobility [] [] [] [] [x] [] [] [] [] [] RW and chair follow, household distance, seated rest breaks   I=Independent, Mod I=Modified Independent, S=Supervision/Setup, SBA=Standby Assistance, CGA=Contact Guard Assistance, Min=Minimal Assistance, Mod=Moderate Assistance, Max=Maximal Assistance, Total=Total Assistance, NT=Not Tested    BALANCE: Good Fair+ Fair Fair- Poor NT Comments   Sitting Static [x] [] [] [] [] []    Sitting Dynamic [] [x] [] [] [] []              Standing Static [] [x] [] [] [] []    Standing Dynamic [] [] [x] [] [] []        PLAN:     FREQUENCY/DURATION   OT Plan of Care: 3 times/week for duration of hospital stay or until stated goals are met, whichever comes first.    TREATMENT:     TREATMENT:   Co-Treatment PT/OT necessary due to patient's decreased overall endurance/tolerance levels, as well as need for high level skilled assistance to complete functional transfers/mobility and functional tasks  Neuromuscular Re-education (17 Minutes): Patient participated in neuromuscular re-education including weight shifting, postural training, standing tolerance activity , and sitting balance activity   with minimal verbal cues, tactile cues, education, and adaptive equipment to improve sitting balance, standing balance, posture, coordination, static balance, and dynamic balance in order to prepare for functional task, prepare for standing ADLs, prepare for discharge home , and prepare for self care. .   Self Care (15 minutes): Patient participated in toileting and lower body dressing ADLs in unsupported sitting with minimal verbal and tactile cueing to increase independence, decrease assistance required, and increase activity tolerance. Patient also participated in functional mobility, functional transfer, energy conservation, and adaptive equipment training to increase independence, decrease assistance required, increase activity tolerance, and increase safety awareness.      TREATMENT GRID:  N/A    AFTER TREATMENT PRECAUTIONS: Bed/Chair Locked, Call light within reach, Chair, Heels floated, Needs within reach, and RN notified    INTERDISCIPLINARY COLLABORATION:  RN/ PCT and PT/ PTA    EDUCATION:       TOTAL TREATMENT DURATION AND TIME:  Time In: 1347  Time Out: [address]  Minutes: [address] [place], [place]

## 2023-11-07 NOTE — PROGRESS NOTES
Pt is receiving platelets and is tolerating well. She was monitored for the first 15 minutes and vitals were taken.

## 2023-11-07 NOTE — PROGRESS NOTES
END OF SHIFT SUMMARY:    No new changes noted throughout the night. Pt is receiving platelets. Going for BMBX this morning. Bed low and locked, rails up x2, and call light within reach. Bedside shift report given to on coming RN.     Eva Huang RN

## 2023-11-07 NOTE — PROGRESS NOTES
[address] [facility] Hematology & Oncology        Inpatient Hematology / Oncology Progress Note    Reason for Admission:  Neutropenic fever ([address]) [D70.9, R50.81]  Pancytopenia ([address]) [D61.818]    24 Hour Events:  VSS, HTN, afebrile - on 1L NC  Today's plt count pending - day 8 steroids (s/p 2 days IVIG)  No response to transfusion yesterday  BMBx today - tolerated well  Feeling ok, no complaints    Transfusions: Plts x2 for procedure  Replacements: None    ROS:  Constitutional: Positive for fatigue; negative for fever, chills. CV: Negative for chest pain, palpitations, edema. Respiratory: +cough, dyspnea. Negative for wheezing. GI: Negative for nausea, abdominal pain, diarrhea. 10 point review of systems is otherwise negative with the exception of the elements mentioned above in the HPI. Allergies   Allergen Reactions    Penicillins Hives    Sulfa Antibiotics Hives    Codeine Nausea And Vomiting     Past Medical History:   Diagnosis Date    Arthritis     Autoimmune disease ([address])     skin changes, unknown name    Cancer ([address]) 1990    ovarian    Chronic pain     arthritis in back and legs    Coronary artery spasm ([address])     COVID 05/15/2022    Essential hypertension 11/8/2019    Gastritis     GERD (gastroesophageal reflux disease)     Hx antineoplastic chemo     Migraine headache     Psoriasis     Psychiatric disorder     anxiety and depression    Severe obesity (BMI 35.0-39.9) 6/26/2018    Thyroid disease     Diagnosed in 1996     Past Surgical History:   Procedure Laterality Date    CARPAL TUNNEL RELEASE      GYN      ovaries    HYSTERECTOMY, TOTAL ABDOMINAL (CERVIX REMOVED)  Patriciabury      cardiac cath. Dx with spasms.     TUBAL LIGATION       Family History   Problem Relation Age of Onset    Parkinson's Disease Mother     Stroke Mother         Passed away in 1969    No Known Problems Paternal Grandfather     No Known Problems Paternal Grandmother     No Known Problems Unable to tolerate MRI brain without sedation so holding for now as symptoms more consistent with steroid-related effects vs stroke as no focal deficits noted. RESOLVED    Hypoxia / LE edema  11/1 LE edema may be related to steroids. Incomplete I/O recorded, weight up since admission. Stopped IVF. Check CXR and BNP.  11/2 BNP elevated, CXR with pulmonary vascular congestion. On 2-4L NC. Weight remains elevated since admission. 11/4 Stable fluid status, on 3L NC - weaning as tolerated. Was hypoxic when found off O2 in the middle of the night. 11/7 Continuing to wean O2 as tolerated. Down to 1L NC. Fluid balance stable. Bleeding - epistaxis and rectal bleeding w BM   - ASA on hold; transfuse Hb >7 and plts >50   - PPI  10/29 denies bleeding today   11/5 Per RN  yesterday, small amount of blood from nares, no epistaxis. Otherwise, no bleeding noted. Transfusing plts for plt 2k. Hypertension  11/2 Resume home amlodipine  11/4 More hypertensive overnight, monitor. May need to adjust amlodipine if no improvement. Has PRN hydralazine. Chronic pain   - on morphine ER 15mg BID, norco.  Muscle relaxant.    - monitoring BP      Constipation   - bowel regimen      No AC due to TCP   Continue home meds  Supportive care  PT/OT - HH  Antimicrobial prophylaxis - Cipro    Dispo - anticipate DC home with HH once plts improved. She will need to FU with Dr Lennie Urrutia upon DC. Goals and plan of care reviewed with the patient. All questions answered to the best of our ability. Dilcia Ramirez, APRN - [address] [facility] Hematology & Oncology  [address] [place] [address]  Office : [phone]  Fax : [phone]         Attending Addendum:  I have personally performed a face to face diagnostic evaluation on this patient.  I have reviewed and agree with the care plan as documented above by  Jose Saunders, N.P.  My findings are as follows: Patient appears stable, heart rate regular without

## 2023-11-07 NOTE — PROGRESS NOTES
Comprehensive Nutrition Assessment    Type and Reason for Visit: Reassess  Malnutrition Screening Tool: Malnutrition Screen  Have you recently lost weight without trying?: 24 to 33 pounds (3 points)  Have you been eating poorly because of a decreased appetite?: Yes (1 point)  Malnutrition Screening Tool Score: 4    Nutrition Recommendations/Plan:   Meals and Snacks:  Diet: Continue current order  Nutrition Supplement Therapy:  Medical food supplement therapy:  Continue Ensure Enlive three times per day (this provides 350 kcal and 20 grams protein per bottle) chocolate served with a milk container. Serving provided at RD visit, which pt began to drink. Malnutrition Assessment:  Malnutrition Status: At risk for malnutrition (Comment) (pt reports + wt loss, waxing and waning oral intake)  No fat or muscle loss appreciated. Nutrition Assessment:  Nutrition History: Pt reports her intake and wt initially started changing after having Covid in May 2022.  + loss of appetite, taste alterations. She indicates at one point her wt was >200# initially declined quickly then up and down with fluid changes. She states at this point she is unsure of her true wt. Do You Have Any Cultural, Bahai, or Ethnic Food Preferences?: No   Nutrition Background: PMHx: psoriatic arthritis on Humira, hx of ovarian cam HTN, HLD, GERD and hypothyroidism. Admitted with MDS, pancytopenia, neutropenic fever, chronic pain, constipation. IR BMBX 11/7. Nutrition Interval:  Pt reclined in bed, alert and oriented x 3. Lunch tray at bedside ~25% consumed by pt. Pt reports her intake remains variable, using outside foods in a addition to meals here. Recorded po 1-75%, pt reports slight increase from last RD encounter. C/o mouth pain as largest barrier today, +ulceration left side, mild redness. Nystatin available prn now administration thus far, discussed with Dhiraj Elizalde RN.       Current Nutrition Therapies:  ADULT Weight    Discharge Planning:    Continue Oral Nutrition Supplement    Meg Starks, JOAQUIM

## 2023-11-08 LAB
ALBUMIN SERPL-MCNC: 3.1 G/DL (ref 3.2–4.6)
ALBUMIN/GLOB SERPL: 0.8 (ref 0.4–1.6)
ALP SERPL-CCNC: 57 U/L (ref 50–136)
ALT SERPL-CCNC: 31 U/L (ref 12–65)
ANION GAP SERPL CALC-SCNC: 5 MMOL/L (ref 2–11)
AST SERPL-CCNC: 21 U/L (ref 15–37)
BASOPHILS # BLD: 0.1 K/UL (ref 0–0.2)
BASOPHILS # BLD: 0.1 K/UL (ref 0–0.2)
BASOPHILS NFR BLD: 4 % (ref 0–2)
BASOPHILS NFR BLD: 5 % (ref 0–2)
BILIRUB SERPL-MCNC: 1.3 MG/DL (ref 0.2–1.1)
BUN SERPL-MCNC: 25 MG/DL (ref 8–23)
CALCIUM SERPL-MCNC: 8.9 MG/DL (ref 8.3–10.4)
CHLORIDE SERPL-SCNC: 102 MMOL/L (ref 101–110)
CO2 SERPL-SCNC: 28 MMOL/L (ref 21–32)
CREAT SERPL-MCNC: 0.5 MG/DL (ref 0.6–1)
DIFFERENTIAL METHOD BLD: ABNORMAL
DIFFERENTIAL METHOD BLD: ABNORMAL
EOSINOPHIL # BLD: 0 K/UL (ref 0–0.8)
EOSINOPHIL # BLD: 0 K/UL (ref 0–0.8)
EOSINOPHIL NFR BLD: 1 % (ref 0.5–7.8)
EOSINOPHIL NFR BLD: 1 % (ref 0.5–7.8)
ERYTHROCYTE [DISTWIDTH] IN BLOOD BY AUTOMATED COUNT: 14.4 % (ref 11.9–14.6)
ERYTHROCYTE [DISTWIDTH] IN BLOOD BY AUTOMATED COUNT: 14.5 % (ref 11.9–14.6)
FERRITIN SERPL-MCNC: 724 NG/ML (ref 8–388)
GLOBULIN SER CALC-MCNC: 4 G/DL (ref 2.8–4.5)
GLUCOSE SERPL-MCNC: 100 MG/DL (ref 65–100)
HCT VFR BLD AUTO: 22.2 % (ref 35.8–46.3)
HCT VFR BLD AUTO: 22.7 % (ref 35.8–46.3)
HGB BLD-MCNC: 7.4 G/DL (ref 11.7–15.4)
HGB BLD-MCNC: 7.7 G/DL (ref 11.7–15.4)
IMM GRANULOCYTES # BLD AUTO: 0.1 K/UL (ref 0–0.5)
IMM GRANULOCYTES # BLD AUTO: 0.1 K/UL (ref 0–0.5)
IMM GRANULOCYTES NFR BLD AUTO: 7 % (ref 0–5)
IMM GRANULOCYTES NFR BLD AUTO: 8 % (ref 0–5)
LYMPHOCYTES # BLD: 0.4 K/UL (ref 0.5–4.6)
LYMPHOCYTES # BLD: 0.8 K/UL (ref 0.5–4.6)
LYMPHOCYTES NFR BLD: 35 % (ref 13–44)
LYMPHOCYTES NFR BLD: 49 % (ref 13–44)
MAGNESIUM SERPL-MCNC: 2.5 MG/DL (ref 1.8–2.4)
MCH RBC QN AUTO: 30.8 PG (ref 26.1–32.9)
MCH RBC QN AUTO: 31.4 PG (ref 26.1–32.9)
MCHC RBC AUTO-ENTMCNC: 33.3 G/DL (ref 31.4–35)
MCHC RBC AUTO-ENTMCNC: 33.9 G/DL (ref 31.4–35)
MCV RBC AUTO: 92.5 FL (ref 82–102)
MCV RBC AUTO: 92.7 FL (ref 82–102)
MONOCYTES # BLD: 0.2 K/UL (ref 0.1–1.3)
MONOCYTES # BLD: 0.3 K/UL (ref 0.1–1.3)
MONOCYTES NFR BLD: 14 % (ref 4–12)
MONOCYTES NFR BLD: 19 % (ref 4–12)
NEUTS SEG # BLD: 0.4 K/UL (ref 1.7–8.2)
NEUTS SEG # BLD: 0.5 K/UL (ref 1.7–8.2)
NEUTS SEG NFR BLD: 25 % (ref 43–78)
NEUTS SEG NFR BLD: 32 % (ref 43–78)
NRBC # BLD: 0 K/UL (ref 0–0.2)
NRBC # BLD: 0.02 K/UL (ref 0–0.2)
PLATELET # BLD AUTO: 3 K/UL (ref 150–450)
PLATELET # BLD AUTO: 3 K/UL (ref 150–450)
PLATELET # BLD AUTO: 6 K/UL (ref 150–450)
PLATELET COMMENT: ABNORMAL
PLATELET COMMENT: ABNORMAL
PMV BLD AUTO: ABNORMAL FL (ref 9.4–12.3)
PMV BLD AUTO: ABNORMAL FL (ref 9.4–12.3)
POTASSIUM SERPL-SCNC: 4.6 MMOL/L (ref 3.5–5.1)
PROT SERPL-MCNC: 7.1 G/DL (ref 6.3–8.2)
RBC # BLD AUTO: 2.4 M/UL (ref 4.05–5.2)
RBC # BLD AUTO: 2.45 M/UL (ref 4.05–5.2)
RBC MORPH BLD: ABNORMAL
SODIUM SERPL-SCNC: 135 MMOL/L (ref 133–143)
TRIGL SERPL-MCNC: 126 MG/DL (ref 35–150)
WBC # BLD AUTO: 1.2 K/UL (ref 4.3–11.1)
WBC # BLD AUTO: 1.8 K/UL (ref 4.3–11.1)
WBC MORPH BLD: ABNORMAL
WBC MORPH BLD: ABNORMAL

## 2023-11-08 PROCEDURE — 1170000001 HC RM PRIVATE ONCOLOGY W/TELEMETRY

## 2023-11-08 PROCEDURE — 36415 COLL VENOUS BLD VENIPUNCTURE: CPT

## 2023-11-08 PROCEDURE — 84478 ASSAY OF TRIGLYCERIDES: CPT

## 2023-11-08 PROCEDURE — 6370000000 HC RX 637 (ALT 250 FOR IP): Performed by: FAMILY MEDICINE

## 2023-11-08 PROCEDURE — 82728 ASSAY OF FERRITIN: CPT

## 2023-11-08 PROCEDURE — 86022 PLATELET ANTIBODIES: CPT

## 2023-11-08 PROCEDURE — 6370000000 HC RX 637 (ALT 250 FOR IP): Performed by: NURSE PRACTITIONER

## 2023-11-08 PROCEDURE — 6370000000 HC RX 637 (ALT 250 FOR IP): Performed by: INTERNAL MEDICINE

## 2023-11-08 PROCEDURE — 83520 IMMUNOASSAY QUANT NOS NONAB: CPT

## 2023-11-08 PROCEDURE — 85049 AUTOMATED PLATELET COUNT: CPT

## 2023-11-08 PROCEDURE — 85025 COMPLETE CBC W/AUTO DIFF WBC: CPT

## 2023-11-08 PROCEDURE — 80053 COMPREHEN METABOLIC PANEL: CPT

## 2023-11-08 PROCEDURE — 36430 TRANSFUSION BLD/BLD COMPNT: CPT

## 2023-11-08 PROCEDURE — 99232 SBSQ HOSP IP/OBS MODERATE 35: CPT | Performed by: INTERNAL MEDICINE

## 2023-11-08 PROCEDURE — 83735 ASSAY OF MAGNESIUM: CPT

## 2023-11-08 PROCEDURE — 2580000003 HC RX 258: Performed by: FAMILY MEDICINE

## 2023-11-08 PROCEDURE — P9037 PLATE PHERES LEUKOREDU IRRAD: HCPCS

## 2023-11-08 RX ORDER — SODIUM CHLORIDE 9 MG/ML
INJECTION, SOLUTION INTRAVENOUS PRN
Status: DISCONTINUED | OUTPATIENT
Start: 2023-11-08 | End: 2023-11-09

## 2023-11-08 RX ADMIN — FAMOTIDINE 40 MG: 20 TABLET, FILM COATED ORAL at 21:31

## 2023-11-08 RX ADMIN — SODIUM CHLORIDE, PRESERVATIVE FREE 10 ML: 5 INJECTION INTRAVENOUS at 21:40

## 2023-11-08 RX ADMIN — DIPHENHYDRAMINE HYDROCHLORIDE 25 MG: 25 CAPSULE ORAL at 14:59

## 2023-11-08 RX ADMIN — ACETAMINOPHEN 650 MG: 325 TABLET ORAL at 14:59

## 2023-11-08 RX ADMIN — PANTOPRAZOLE SODIUM 40 MG: 40 TABLET, DELAYED RELEASE ORAL at 08:28

## 2023-11-08 RX ADMIN — MONTELUKAST 10 MG: 10 TABLET, FILM COATED ORAL at 08:27

## 2023-11-08 RX ADMIN — CIPROFLOXACIN HYDROCHLORIDE 500 MG: 500 TABLET, FILM COATED ORAL at 08:27

## 2023-11-08 RX ADMIN — FLUOXETINE HYDROCHLORIDE 20 MG: 20 CAPSULE ORAL at 21:31

## 2023-11-08 RX ADMIN — DOCUSATE SODIUM 50 MG AND SENNOSIDES 8.6 MG 1 TABLET: 8.6; 5 TABLET, FILM COATED ORAL at 21:31

## 2023-11-08 RX ADMIN — DIPHENHYDRAMINE HYDROCHLORIDE 25 MG: 25 CAPSULE ORAL at 09:26

## 2023-11-08 RX ADMIN — MORPHINE SULFATE 15 MG: 15 TABLET, FILM COATED, EXTENDED RELEASE ORAL at 21:31

## 2023-11-08 RX ADMIN — PREDNISONE 80 MG: 20 TABLET ORAL at 08:27

## 2023-11-08 RX ADMIN — CIPROFLOXACIN HYDROCHLORIDE 500 MG: 500 TABLET, FILM COATED ORAL at 21:31

## 2023-11-08 RX ADMIN — IPRATROPIUM BROMIDE 2 SPRAY: 42 SPRAY NASAL at 21:32

## 2023-11-08 RX ADMIN — POLYETHYLENE GLYCOL 3350 17 G: 17 POWDER, FOR SOLUTION ORAL at 08:27

## 2023-11-08 RX ADMIN — FERROUS SULFATE TAB 325 MG (65 MG ELEMENTAL FE) 325 MG: 325 (65 FE) TAB at 11:22

## 2023-11-08 RX ADMIN — PANTOPRAZOLE SODIUM 40 MG: 40 TABLET, DELAYED RELEASE ORAL at 14:59

## 2023-11-08 RX ADMIN — FLUOXETINE HYDROCHLORIDE 20 MG: 20 CAPSULE ORAL at 08:28

## 2023-11-08 RX ADMIN — SALINE NASAL SPRAY 2 SPRAY: 1.5 SOLUTION NASAL at 21:32

## 2023-11-08 RX ADMIN — LEVOTHYROXINE SODIUM 125 MCG: 0.12 TABLET ORAL at 08:27

## 2023-11-08 RX ADMIN — ROSUVASTATIN CALCIUM 10 MG: 5 TABLET, FILM COATED ORAL at 08:28

## 2023-11-08 RX ADMIN — POTASSIUM CHLORIDE 20 MEQ: 1500 TABLET, EXTENDED RELEASE ORAL at 15:38

## 2023-11-08 RX ADMIN — SODIUM CHLORIDE, PRESERVATIVE FREE 10 ML: 5 INJECTION INTRAVENOUS at 08:34

## 2023-11-08 RX ADMIN — SALINE NASAL SPRAY 2 SPRAY: 1.5 SOLUTION NASAL at 03:17

## 2023-11-08 RX ADMIN — POTASSIUM CHLORIDE 20 MEQ: 1500 TABLET, EXTENDED RELEASE ORAL at 08:28

## 2023-11-08 RX ADMIN — ACETAMINOPHEN 650 MG: 325 TABLET ORAL at 09:25

## 2023-11-08 RX ADMIN — FERROUS SULFATE TAB 325 MG (65 MG ELEMENTAL FE) 325 MG: 325 (65 FE) TAB at 15:00

## 2023-11-08 RX ADMIN — AMLODIPINE BESYLATE 5 MG: 5 TABLET ORAL at 08:27

## 2023-11-08 RX ADMIN — MORPHINE SULFATE 15 MG: 15 TABLET, FILM COATED, EXTENDED RELEASE ORAL at 08:28

## 2023-11-08 ASSESSMENT — PAIN SCALES - GENERAL
PAINLEVEL_OUTOF10: 0
PAINLEVEL_OUTOF10: 0

## 2023-11-08 NOTE — PROGRESS NOTES
0700- BSSR received from [place], [place].     7402- Patient and patient's  noted to be very anxious this AM; nursing supervisor, Ruthy Walters RN aware. 5007- X1 unit of platelet infusion began. 26- Patient's  called to nurses station; patient noted with with extreme blood loss- total bed linen, X2 gowns, and her personal blanket saturated with blood. Patient accidentally took her left IV out during her sleep and scratched her right thigh. Antonette Lora NP aware via face to face. Pressure applied to X2 sites. Platelets ordered. 1515- Second unit of platelets infusion began. 1730- Patient has remained AX0X4 and able to voice her needs this shift. No s/sx of respiratory nor acute distress noted nor reported so far thus shift. 1754- No further s/sx of bleeding noted nor reported. Virtual sitter remains at bedside. BSSR given to CHINO Kaur.      Bryan Paredes RN

## 2023-11-08 NOTE — CARE COORDINATION
Pt discussed during IDR and chart screened by CM for d/c planning. On RA. Oncology monitoring low plt count. Not responding well to steroids. D10 steroids. Pt receiving plts bid. On 11/8 pt underwent BMBx. Per most recent oncology note: \"Preliminary results show possible hemaphagocytosis, not progression of MDS but cannot r/o ITP. Checking Ferritin, TG, and IL2. \"    PT/OT recommend [facility] at d/c. Anticipated dispo: return home, with HH if agreeable, once plt count is stable. D/C timeframe undetermined at the present time. CM will continue to follow.   LOS = 12 days

## 2023-11-08 NOTE — PROGRESS NOTES
Physical therapy note: PT held today at RN request.  Will continue to treat as pt is able and time/schedule permit.     Karl Dominguez, RICO    Rehab Caseload Tracker

## 2023-11-08 NOTE — PLAN OF CARE
Problem: Pain  Goal: Verbalizes/displays adequate comfort level or baseline comfort level  Outcome: Progressing  Flowsheets (Taken 11/7/2023 2006 by Amandeep Stanton RN)  Verbalizes/displays adequate comfort level or baseline comfort level:   Encourage patient to monitor pain and request assistance   Assess pain using appropriate pain scale     Problem: ABCDS Injury Assessment  Goal: Absence of physical injury  Outcome: Progressing     Problem: Safety - Adult  Goal: Free from fall injury  Outcome: Progressing     Problem: Discharge Planning  Goal: Discharge to home or other facility with appropriate resources  Outcome: Progressing  Flowsheets (Taken 11/7/2023 2006 by Amandeep Stanton RN)  Discharge to home or other facility with appropriate resources: Identify barriers to discharge with patient and caregiver

## 2023-11-08 NOTE — PROGRESS NOTES
[facility]        Inpatient Hematology / Oncology Progress Note    Reason for Admission:  Neutropenic fever ([address]) [D70.9, R50.81]  Pancytopenia ([address]) [D61.818]    24 Hour Events:  VSS, HTN at times, afebrile - on 3L NC  Today's plt count 3k - day 10 steroids (s/p 2 days IVIG)  No response to transfusions yesterday  BMBx 11/7 - prelim results reviewed by Dr. Melissa Armendariz with Dr. Sommer Burgess  Feeling ok, no complaints    Transfusions: Plts   Replacements: None    ROS:  Constitutional: Positive for fatigue; negative for fever, chills. CV: Negative for chest pain, palpitations, edema. Respiratory: +cough, dyspnea. Negative for wheezing. GI: Negative for nausea, abdominal pain, diarrhea. 10 point review of systems is otherwise negative with the exception of the elements mentioned above in the HPI. Allergies   Allergen Reactions    Penicillins Hives    Sulfa Antibiotics Hives    Codeine Nausea And Vomiting     Past Medical History:   Diagnosis Date    Arthritis     Autoimmune disease ([address])     skin changes, unknown name    Cancer ([address]) 1990    ovarian    Chronic pain     arthritis in back and legs    Coronary artery spasm ([address])     COVID 05/15/2022    Essential hypertension 11/8/2019    Gastritis     GERD (gastroesophageal reflux disease)     Hx antineoplastic chemo     Migraine headache     Psoriasis     Psychiatric disorder     anxiety and depression    Severe obesity (BMI 35.0-39.9) 6/26/2018    Thyroid disease     Diagnosed in 1996     Past Surgical History:   Procedure Laterality Date    CARPAL TUNNEL RELEASE      GYN      ovaries    HYSTERECTOMY, TOTAL ABDOMINAL (CERVIX REMOVED)  Patriciabury      cardiac cath. Dx with spasms.     TUBAL LIGATION       Family History   Problem Relation Age of Onset    Parkinson's Disease Mother     Stroke Mother         Passed away in 1969    No Known Problems Paternal Grandfather     No Known Problems Paternal Grandmother 0043    diclofenac sodium (VOLTAREN) 1 % gel 4 g  4 g Topical 4x Daily PRN Amrita Ware, DO   4 g at 10/29/23 0541    phenol 1.4 % mouth spray 1 spray  1 spray Mouth/Throat Q2H PRN Amrita Ware, DO        magic (miracle) mouthwash  10 mL Swish & Spit 4x Daily PRN Amrita Ware, DO   10 mL at 23 1439    sodium chloride (OCEAN, BABY AYR) 0.65 % nasal spray 2 spray  2 spray Each Nostril Q6H Amrita Ware, DO   2 spray at 23 0317    albuterol (PROVENTIL) (2.5 MG/3ML) 0.083% nebulizer solution 2.5 mg  2.5 mg Nebulization Q6H PRN Amrita Ware,    2.5 mg at 23 0423    ipratropium (ATROVENT) 0.06 % nasal spray 2 spray  2 spray Each Nostril BID Amrita Ware,    2 spray at 23 2232    guaiFENesin-dextromethorphan (ROBITUSSIN DM) 100-10 MG/5ML syrup 10 mL  10 mL Oral Q4H PRN Amrita Ware,         nystatin (MYCOSTATIN) [id number] UNIT/ML suspension 500,000 Units  5 mL Oral 4x Daily PRN Amrita Ware,            OBJECTIVE:  Patient Vitals for the past 8 hrs:   BP Temp Temp src Pulse Resp SpO2   23 1005 111/60 98.1 °F (36.7 °C) Oral 60 15 93 %   23 0949 106/63 98.1 °F (36.7 °C) Oral 59 16 97 %   23 0748 122/66 98.2 °F (36.8 °C) Oral 64 20 90 %   23 0352 (!) 151/74 98.4 °F (36.9 °C) Oral 60 18 96 %     Temp (24hrs), Av.2 °F (36.8 °C), Min:97.7 °F (36.5 °C), Max:98.6 °F (37 °C)    701 -  1900  In: 240 [P.O.:240]  Out: -     Physical Exam:  Constitutional: Well developed, well nourished female in no acute distress, sitting comfortably in the hospital bed.    HEENT: Normocephalic and atraumatic. Oropharynx is clear, mucous membranes are moist.  Extraocular muscles are intact.  Sclerae anicteric. Neck supple without JVD. No thyromegaly present.    Skin Warm and dry.  No bruising and no rash noted.  No erythema.  No pallor.    Neuro Grossly nonfocal with no obvious sensory or motor deficits.   MSK Normal range of motion in general.  No edema and no

## 2023-11-08 NOTE — PROGRESS NOTES
Occupational Therapy Note:    Attempted to see pt for OT treatment session--RN requested therapy hold at this time as pt was found saturated in blood after pulling out IV. Pt now awaiting stat platelets. Will continue to follow and attempt to see as schedule allows.     Thank you,  Ibeth Godinez, OTR/L

## 2023-11-09 LAB
ALBUMIN SERPL-MCNC: 3.2 G/DL (ref 3.2–4.6)
ALBUMIN/GLOB SERPL: 0.8 (ref 0.4–1.6)
ALP SERPL-CCNC: 61 U/L (ref 50–136)
ALT SERPL-CCNC: 28 U/L (ref 12–65)
ANION GAP SERPL CALC-SCNC: 6 MMOL/L (ref 2–11)
AST SERPL-CCNC: 17 U/L (ref 15–37)
BASOPHILS # BLD: 0 K/UL (ref 0–0.2)
BASOPHILS NFR BLD: 2 % (ref 0–2)
BILIRUB SERPL-MCNC: 1.2 MG/DL (ref 0.2–1.1)
BLD PROD TYP BPU: NORMAL
BLOOD BANK CMNT PATIENT-IMP: NORMAL
BLOOD BANK DISPENSE STATUS: NORMAL
BPU ID: NORMAL
BUN SERPL-MCNC: 21 MG/DL (ref 8–23)
CALCIUM SERPL-MCNC: 8.9 MG/DL (ref 8.3–10.4)
CHLORIDE SERPL-SCNC: 102 MMOL/L (ref 101–110)
CO2 SERPL-SCNC: 28 MMOL/L (ref 21–32)
CREAT SERPL-MCNC: 0.5 MG/DL (ref 0.6–1)
DIFFERENTIAL METHOD BLD: ABNORMAL
EOSINOPHIL # BLD: 0 K/UL (ref 0–0.8)
EOSINOPHIL NFR BLD: 0 % (ref 0.5–7.8)
ERYTHROCYTE [DISTWIDTH] IN BLOOD BY AUTOMATED COUNT: 14.2 % (ref 11.9–14.6)
FIBRINOGEN PPP-MCNC: 335 MG/DL (ref 190–501)
GLOBULIN SER CALC-MCNC: 4 G/DL (ref 2.8–4.5)
GLUCOSE SERPL-MCNC: 87 MG/DL (ref 65–100)
HCT VFR BLD AUTO: 21.7 % (ref 35.8–46.3)
HGB BLD-MCNC: 7.1 G/DL (ref 11.7–15.4)
IMM GRANULOCYTES # BLD AUTO: 0.1 K/UL (ref 0–0.5)
IMM GRANULOCYTES NFR BLD AUTO: 6 % (ref 0–5)
INTERLEUKIN 2 RECEPTOR ALPHA CHAIN: 549 U/ML (ref 223–710)
LYMPHOCYTES # BLD: 1 K/UL (ref 0.5–4.6)
LYMPHOCYTES NFR BLD: 52 % (ref 13–44)
MAGNESIUM SERPL-MCNC: 2.5 MG/DL (ref 1.8–2.4)
MCH RBC QN AUTO: 30.7 PG (ref 26.1–32.9)
MCHC RBC AUTO-ENTMCNC: 32.7 G/DL (ref 31.4–35)
MCV RBC AUTO: 93.9 FL (ref 82–102)
MONOCYTES # BLD: 0.3 K/UL (ref 0.1–1.3)
MONOCYTES NFR BLD: 16 % (ref 4–12)
NEUTS SEG # BLD: 0.4 K/UL (ref 1.7–8.2)
NEUTS SEG NFR BLD: 24 % (ref 43–78)
NRBC # BLD: 0.02 K/UL (ref 0–0.2)
PLATELET # BLD AUTO: 4 K/UL (ref 150–450)
PLATELET # BLD AUTO: 4 K/UL (ref 150–450)
PLATELET # BLD AUTO: 7 K/UL (ref 150–450)
PLATELET COMMENT: ABNORMAL
PMV BLD AUTO: ABNORMAL FL (ref 9.4–12.3)
POTASSIUM SERPL-SCNC: 4.2 MMOL/L (ref 3.5–5.1)
PROT SERPL-MCNC: 7.2 G/DL (ref 6.3–8.2)
RBC # BLD AUTO: 2.31 M/UL (ref 4.05–5.2)
RBC MORPH BLD: ABNORMAL
SODIUM SERPL-SCNC: 136 MMOL/L (ref 133–143)
UNIT DIVISION: 0
WBC # BLD AUTO: 1.8 K/UL (ref 4.3–11.1)
WBC MORPH BLD: ABNORMAL

## 2023-11-09 PROCEDURE — 85049 AUTOMATED PLATELET COUNT: CPT

## 2023-11-09 PROCEDURE — 36415 COLL VENOUS BLD VENIPUNCTURE: CPT

## 2023-11-09 PROCEDURE — 86850 RBC ANTIBODY SCREEN: CPT

## 2023-11-09 PROCEDURE — 86022 PLATELET ANTIBODIES: CPT

## 2023-11-09 PROCEDURE — 86357 NK CELLS TOTAL COUNT: CPT

## 2023-11-09 PROCEDURE — 80053 COMPREHEN METABOLIC PANEL: CPT

## 2023-11-09 PROCEDURE — 85025 COMPLETE CBC W/AUTO DIFF WBC: CPT

## 2023-11-09 PROCEDURE — 6370000000 HC RX 637 (ALT 250 FOR IP): Performed by: NURSE PRACTITIONER

## 2023-11-09 PROCEDURE — 86920 COMPATIBILITY TEST SPIN: CPT

## 2023-11-09 PROCEDURE — 6370000000 HC RX 637 (ALT 250 FOR IP): Performed by: FAMILY MEDICINE

## 2023-11-09 PROCEDURE — 83520 IMMUNOASSAY QUANT NOS NONAB: CPT

## 2023-11-09 PROCEDURE — 86900 BLOOD TYPING SEROLOGIC ABO: CPT

## 2023-11-09 PROCEDURE — 83735 ASSAY OF MAGNESIUM: CPT

## 2023-11-09 PROCEDURE — 86901 BLOOD TYPING SEROLOGIC RH(D): CPT

## 2023-11-09 PROCEDURE — 6370000000 HC RX 637 (ALT 250 FOR IP): Performed by: INTERNAL MEDICINE

## 2023-11-09 PROCEDURE — P9037 PLATE PHERES LEUKOREDU IRRAD: HCPCS

## 2023-11-09 PROCEDURE — 1170000000 HC RM PRIVATE ONCOLOGY

## 2023-11-09 PROCEDURE — 99232 SBSQ HOSP IP/OBS MODERATE 35: CPT | Performed by: INTERNAL MEDICINE

## 2023-11-09 PROCEDURE — 1170000001 HC RM PRIVATE ONCOLOGY W/TELEMETRY

## 2023-11-09 PROCEDURE — 97530 THERAPEUTIC ACTIVITIES: CPT

## 2023-11-09 PROCEDURE — 36430 TRANSFUSION BLD/BLD COMPNT: CPT

## 2023-11-09 PROCEDURE — 2580000003 HC RX 258: Performed by: FAMILY MEDICINE

## 2023-11-09 PROCEDURE — 85384 FIBRINOGEN ACTIVITY: CPT

## 2023-11-09 RX ORDER — SODIUM CHLORIDE 9 MG/ML
INJECTION, SOLUTION INTRAVENOUS PRN
Status: DISCONTINUED | OUTPATIENT
Start: 2023-11-09 | End: 2023-11-11

## 2023-11-09 RX ADMIN — FLUOXETINE HYDROCHLORIDE 20 MG: 20 CAPSULE ORAL at 08:10

## 2023-11-09 RX ADMIN — SODIUM CHLORIDE, PRESERVATIVE FREE 10 ML: 5 INJECTION INTRAVENOUS at 21:16

## 2023-11-09 RX ADMIN — AMLODIPINE BESYLATE 5 MG: 5 TABLET ORAL at 08:10

## 2023-11-09 RX ADMIN — DIPHENHYDRAMINE HYDROCHLORIDE 10 ML: 12.5 LIQUID ORAL at 02:55

## 2023-11-09 RX ADMIN — MORPHINE SULFATE 15 MG: 15 TABLET, FILM COATED, EXTENDED RELEASE ORAL at 08:11

## 2023-11-09 RX ADMIN — LEVOTHYROXINE SODIUM 125 MCG: 0.12 TABLET ORAL at 08:11

## 2023-11-09 RX ADMIN — HYDROCODONE BITARTRATE AND ACETAMINOPHEN 1 TABLET: 5; 325 TABLET ORAL at 14:58

## 2023-11-09 RX ADMIN — MORPHINE SULFATE 15 MG: 15 TABLET, FILM COATED, EXTENDED RELEASE ORAL at 21:14

## 2023-11-09 RX ADMIN — FERROUS SULFATE TAB 325 MG (65 MG ELEMENTAL FE) 325 MG: 325 (65 FE) TAB at 10:24

## 2023-11-09 RX ADMIN — PANTOPRAZOLE SODIUM 40 MG: 40 TABLET, DELAYED RELEASE ORAL at 14:14

## 2023-11-09 RX ADMIN — HYDROXYZINE HYDROCHLORIDE 25 MG: 25 TABLET, FILM COATED ORAL at 14:14

## 2023-11-09 RX ADMIN — FERROUS SULFATE TAB 325 MG (65 MG ELEMENTAL FE) 325 MG: 325 (65 FE) TAB at 15:01

## 2023-11-09 RX ADMIN — SALINE NASAL SPRAY 2 SPRAY: 1.5 SOLUTION NASAL at 21:16

## 2023-11-09 RX ADMIN — PREDNISONE 80 MG: 20 TABLET ORAL at 08:10

## 2023-11-09 RX ADMIN — SALINE NASAL SPRAY 2 SPRAY: 1.5 SOLUTION NASAL at 04:17

## 2023-11-09 RX ADMIN — DIPHENHYDRAMINE HYDROCHLORIDE 10 ML: 12.5 LIQUID ORAL at 21:51

## 2023-11-09 RX ADMIN — IPRATROPIUM BROMIDE 2 SPRAY: 42 SPRAY NASAL at 21:16

## 2023-11-09 RX ADMIN — FAMOTIDINE 40 MG: 20 TABLET, FILM COATED ORAL at 21:14

## 2023-11-09 RX ADMIN — MONTELUKAST 10 MG: 10 TABLET, FILM COATED ORAL at 08:10

## 2023-11-09 RX ADMIN — DOCUSATE SODIUM 50 MG AND SENNOSIDES 8.6 MG 1 TABLET: 8.6; 5 TABLET, FILM COATED ORAL at 21:13

## 2023-11-09 RX ADMIN — POTASSIUM CHLORIDE 20 MEQ: 1500 TABLET, EXTENDED RELEASE ORAL at 15:01

## 2023-11-09 RX ADMIN — ROSUVASTATIN CALCIUM 10 MG: 5 TABLET, FILM COATED ORAL at 08:11

## 2023-11-09 RX ADMIN — CIPROFLOXACIN HYDROCHLORIDE 500 MG: 500 TABLET, FILM COATED ORAL at 21:13

## 2023-11-09 RX ADMIN — DIPHENHYDRAMINE HYDROCHLORIDE 25 MG: 25 CAPSULE ORAL at 10:24

## 2023-11-09 RX ADMIN — CIPROFLOXACIN HYDROCHLORIDE 500 MG: 500 TABLET, FILM COATED ORAL at 08:11

## 2023-11-09 RX ADMIN — IPRATROPIUM BROMIDE 2 SPRAY: 42 SPRAY NASAL at 08:16

## 2023-11-09 RX ADMIN — SODIUM CHLORIDE, PRESERVATIVE FREE 10 ML: 5 INJECTION INTRAVENOUS at 08:11

## 2023-11-09 RX ADMIN — FLUOXETINE HYDROCHLORIDE 20 MG: 20 CAPSULE ORAL at 21:14

## 2023-11-09 RX ADMIN — ACETAMINOPHEN 650 MG: 325 TABLET ORAL at 10:24

## 2023-11-09 RX ADMIN — PANTOPRAZOLE SODIUM 40 MG: 40 TABLET, DELAYED RELEASE ORAL at 08:11

## 2023-11-09 RX ADMIN — POTASSIUM CHLORIDE 20 MEQ: 1500 TABLET, EXTENDED RELEASE ORAL at 08:10

## 2023-11-09 RX ADMIN — POLYETHYLENE GLYCOL 3350 17 G: 17 POWDER, FOR SOLUTION ORAL at 08:13

## 2023-11-09 ASSESSMENT — PAIN DESCRIPTION - LOCATION: LOCATION: BACK

## 2023-11-09 ASSESSMENT — PAIN DESCRIPTION - ONSET: ONSET: GRADUAL

## 2023-11-09 ASSESSMENT — PAIN SCALES - GENERAL
PAINLEVEL_OUTOF10: 2
PAINLEVEL_OUTOF10: 5
PAINLEVEL_OUTOF10: 0

## 2023-11-09 ASSESSMENT — PAIN DESCRIPTION - DESCRIPTORS: DESCRIPTORS: ACHING

## 2023-11-09 ASSESSMENT — PAIN DESCRIPTION - PAIN TYPE: TYPE: ACUTE PAIN

## 2023-11-09 ASSESSMENT — PAIN - FUNCTIONAL ASSESSMENT: PAIN_FUNCTIONAL_ASSESSMENT: PREVENTS OR INTERFERES SOME ACTIVE ACTIVITIES AND ADLS

## 2023-11-09 ASSESSMENT — PAIN DESCRIPTION - ORIENTATION: ORIENTATION: LOWER;MID

## 2023-11-09 ASSESSMENT — PAIN DESCRIPTION - FREQUENCY: FREQUENCY: INTERMITTENT

## 2023-11-09 NOTE — PROGRESS NOTES
[facility]        Inpatient Hematology / Oncology Progress Note    Reason for Admission:  Neutropenic fever ([address]) [D70.9, R50.81]  Pancytopenia ([address]) [D61.818]    24 Hour Events:  VSS, HTN at times, afebrile - on 3L NC  Today's plt count 4k - day 11 steroids (s/p 2 days IVIG)  3 units plts yesterday d/t episode of bleeding after scratching self   BMBx 11/7 - prelim results reviewed by Dr. Neeru Espinoza with Dr. Ranjit Wilson  Sending additional send off lab work, TG normal, Ferritin elevated   Feeling ok, no complaints    Transfusions: Plts   Replacements: None    ROS:  Constitutional: Positive for fatigue; negative for fever, chills. CV: Negative for chest pain, palpitations, edema. Respiratory: +cough, dyspnea. Negative for wheezing. GI: Negative for nausea, abdominal pain, diarrhea. 10 point review of systems is otherwise negative with the exception of the elements mentioned above in the HPI. Allergies   Allergen Reactions    Penicillins Hives    Sulfa Antibiotics Hives    Codeine Nausea And Vomiting     Past Medical History:   Diagnosis Date    Arthritis     Autoimmune disease ([address])     skin changes, unknown name    Cancer ([address]) 1990    ovarian    Chronic pain     arthritis in back and legs    Coronary artery spasm ([address])     COVID 05/15/2022    Essential hypertension 11/8/2019    Gastritis     GERD (gastroesophageal reflux disease)     Hx antineoplastic chemo     Migraine headache     Psoriasis     Psychiatric disorder     anxiety and depression    Severe obesity (BMI 35.0-39.9) 6/26/2018    Thyroid disease     Diagnosed in 1996     Past Surgical History:   Procedure Laterality Date    CARPAL TUNNEL RELEASE      GYN      ovaries    HYSTERECTOMY, TOTAL ABDOMINAL (CERVIX REMOVED)  Patriciabury      cardiac cath. Dx with spasms.     TUBAL LIGATION       Family History   Problem Relation Age of Onset    Parkinson's Disease Mother     Stroke Mother concern for volume overload but continues on steroids). Transfuse plts and check 30 min after transfusion. Hgb down to 7 today - transfuse PRBCs. WBC stable.  11/3 Plts down to 4k but responded when checked post-transfusion. Continue Dex 40mg IV daily for now.  11/5 Plts down to 2k. Day 7 Dex 40mg. Transfusing again today. IPF remains very elevated. Dr Roe discussing possible consideration for Rituxan as she has been refractory to IVIG and platelets.  11/6 Continues steroids (day 8 dex) - change over to prednisone 1mg/kg. Asking IR for BMBx with plt infusion during procedure.  11/7 Day 9 steroids (changed to prednisone 1mg/kg 11/6). BMBx today. Still no response to transfusion of cross-matched plts or steroids/recent IVIG. BB working on HLA matching. Check plt count BID and transfuse for plts <10k. May consider Rituxan pending BMBx results.  11/8 Day 10 steroids (changed to prednisone 1mg/kg 11/6).  Plt count 3k today.  BMBx prelim results discussed by Dr. Johnson with Dr. Vora - concern for hemaphagocytosis, not progression of MDS but cannot r/o ITP.  Checking Ferritin, TG, and IL2.    11/9 D11 steroids (changed to prednisone 1mg/kg 11/6), plt count 4k today, received 3 units plts yesterday d/t episode of bleeding.  TG WNL, Ferritin 724.  Checking also NK cell activity, CXCL9, Fibrinogen       Neutropenic fever  - started on cef/vanc RVP neg  - CXR unremarkable  10/29 BC+ staph epidermidis, continue Cef/Vanc, MRSA DNA pending, consult ID for recommendations  10/31 Continues Cefe/Vanc per ID but S epi likely contaminant. Repeat BC NGTD. TM 98. Likely transitioning to PO/prophylactic Cipro today.  11/1 Afebrile. Transition to prophylactic cipro now that she has completed 5 day course of abx with likely contaminant/S epi with repeat BC.  RESOLVED    Altered mental status  11/1 Likely secondary to HD steroids. CT head negative. CTA and MRI brain pending after Code S called last night for dysarthria.  11/2 CTA negative.  Unable to tolerate MRI brain without sedation so holding for now as symptoms more consistent with steroid-related effects vs stroke as no focal deficits noted. RESOLVED    Hypoxia / LE edema  11/1 LE edema may be related to steroids. Incomplete I/O recorded, weight up since admission. Stopped IVF. Check CXR and BNP.  11/2 BNP elevated, CXR with pulmonary vascular congestion. On 2-4L NC. Weight remains elevated since admission. 11/4 Stable fluid status, on 3L NC - weaning as tolerated. Was hypoxic when found off O2 in the middle of the night. 11/7 Continuing to wean O2 as tolerated. Down to 1L NC. Fluid balance stable. 11/8 O2 back up to 3 L NC. Bleeding - epistaxis and rectal bleeding w BM   - ASA on hold; transfuse Hb >7 and plts >50   - PPI  10/29 denies bleeding today   11/5 Per RN  yesterday, small amount of blood from nares, no epistaxis. Otherwise, no bleeding noted. Transfusing plts for plt 2k. 11/9 Pt with episode of bleeding yesterday after scratching self, pressure held and additional 1 unit plts given (3 total yesterday). No further bleeding noted today thus far. Hypertension  11/2 Resume home amlodipine  11/4 More hypertensive overnight, monitor. May need to adjust amlodipine if no improvement. Has PRN hydralazine. 11/8 Overall BP improved      Chronic pain   - on morphine ER 15mg BID, norco.  Muscle relaxant.    - monitoring BP      Constipation   - bowel regimen      No AC due to TCP   Continue home meds  Supportive care  PT/OT - HH  Antimicrobial prophylaxis - Cipro    Dispo - anticipate DC home with HH once plts improved. She will need to FU with Dr Thierry Lundberg upon DC. Goals and plan of care reviewed with the patient. All questions answered to the best of our ability.                    TIM York - NP   [facility]  [address]  [address] [facility]  Office : [phone]  Fax : [phone]       Attending Addendum:  I have personally

## 2023-11-09 NOTE — PLAN OF CARE
Problem: Pain  Goal: Verbalizes/displays adequate comfort level or baseline comfort level  Outcome: Progressing     Problem: ABCDS Injury Assessment  Goal: Absence of physical injury  Outcome: Progressing     Problem: Safety - Adult  Goal: Free from fall injury  Outcome: Progressing     Problem: Discharge Planning  Goal: Discharge to home or other facility with appropriate resources  Outcome: Progressing  Flowsheets (Taken 11/8/2023 2026 by Katy Aguero RN)  Discharge to home or other facility with appropriate resources: Identify barriers to discharge with patient and caregiver     Problem: Skin/Tissue Integrity  Goal: Absence of new skin breakdown  Description: 1. Monitor for areas of redness and/or skin breakdown  2. Assess vascular access sites hourly  3. Every 4-6 hours minimum:  Change oxygen saturation probe site  4. Every 4-6 hours:  If on nasal continuous positive airway pressure, respiratory therapy assess nares and determine need for appliance change or resting period.   Outcome: Progressing

## 2023-11-09 NOTE — PROGRESS NOTES
1033- Critical results relayed to Daphnie Brown NP.    0673- Patient noted to be on 3L and able to voice her needs. No s/sx of respiratory nor acute distress noted nor reported at present. 1010- Critical results relayed to Guerline Harris NP.     9149- Patient refused bed bath and linen change; nursing director, Audelia Valencia RN aware, patient's  at bedside and aware. 1414- PRN PO Atarax 25mg administered d/t patient c/o itching. 1458- PRN PO Norco 5-325mg administered d/t patient c/o back pain post ambulation with therapy. 1542- X1 unit of platelet infusion began. 1555- Patient received a shower and linens changed. [address] given to Dawna Wilhelm RN. Claudeen Serum, RN.

## 2023-11-09 NOTE — PROGRESS NOTES
1900: Received pt from [place], RN in stable condition. Pt in bed resting quietly. Respirations even & unlabored on 3L NC; no acute signs of distress noted. Bed low & locked; call light within reach; sitter at bedside; no needs voiced.

## 2023-11-09 NOTE — PROGRESS NOTES
ACUTE PHYSICAL THERAPY GOALS:   (Developed with and agreed upon by patient and/or caregiver.)    (1.)Ms. Moreno will move from supine to sit and sit to supine , scoot up and down, and roll side to side in bed with INDEPENDENCE within 7 treatment day(s). (2.)Ms. Moreno will transfer from bed to chair and chair to bed with MODIFIED INDEPENDENCE using the least restrictive device within 7 treatment day(s). (3.)Ms. Moreno will ambulate with MODIFIED INDEPENDENCE for 500 feet with the least restrictive device within 7 treatment day(s). (4.)Ms. Moreno will participate in therapeutic activity/exercises x 25 minutes for increased activity tolerance within 7 treatment days. (5.)Ms. Moreno will perform standing static and dynamic balance activities x 15 minutes with SUPERVISION to improve safety within 7 day(s). PHYSICAL THERAPY: Daily Note PM   (Link to Caseload Tracking: PT Visit Days : 5  Time In/Out PT Charge Capture  Rehab Caseload Tracker  Orders    Misael Du is a 71 y.o. female   PRIMARY DIAGNOSIS: Neutropenic fever ([address])  Neutropenic fever ([address]) [D70.9, R50.81]  Pancytopenia ([address]) [D61.818]       Inpatient: Payor: Ryan Lazar / Plan: Radha Hdez / Product Type: *No Product type* /     ASSESSMENT:     REHAB RECOMMENDATIONS:   Recommendation to date pending progress:  Setting:  Home Health Therapy    Equipment:    To Be Determined     ASSESSMENT:  Ms. Moreno was supine in bed and agreeable to PT. She had taken off her O2 but her SpO2 was 92% on RA. Donned 4L O2 before activity which included bed mobility, ambulation, and dynamic standing and sitting balance with CGA. She tolerated standing at the sink to perform ADLs for extended period of time. Pt left sitting up in chair with needs in reach.       SUBJECTIVE:   Ms. Moreno states, \"I feel better\"     Social/Functional Lives With: Spouse  Type of Home: House  Home Layout: One level  Bathroom Toilet: Standard  Bathroom Equipment: Commode  Bathroom Accessibility: Accessible  Home Equipment: Rollator  Receives Help From: Family  ADL Assistance: Independent  Homemaking Assistance: Independent  Ambulation Assistance: Needs assistance  Transfer Assistance: Independent  Active : Yes  Mode of Transportation: Car  Occupation: Retired  OBJECTIVE:     PAIN: VITALS / O2: PRECAUTION / Drena Stamp / Anshul Noss:   Pre Treatment:  none         Post Treatment:  none Vitals        Oxygen see above    Telemetry     RESTRICTIONS/PRECAUTIONS:  Restrictions/Precautions  Restrictions/Precautions: Fall Risk  Restrictions/Precautions: Fall Risk     MOBILITY: I Mod I S SBA CGA Min Mod Max Total  NT x2 Comments:   Bed Mobility    Rolling [] [] [] [x] [] [] [] [] [] [] []    Supine to Sit [] [] [] [x] [] [] [] [] [] [] []    Scooting [] [] [] [x] [] [] [] [] [] [] []    Sit to Supine [] [] [] [] [] [] [] [] [] [] []    Transfers    Sit to Stand [] [] [] [] [x] [] [] [] [] [] []    Bed to Chair [] [] [] [] [x] [] [] [] [] [] []    Stand to Sit [] [] [] [] [x] [] [] [] [] [] []     [] [] [] [] [] [] [] [] [] [] []    I=Independent, Mod I=Modified Independent, S=Supervision, SBA=Standby Assistance, CGA=Contact Guard Assistance,   Min=Minimal Assistance, Mod=Moderate Assistance, Max=Maximal Assistance, Total=Total Assistance, NT=Not Tested    BALANCE: Good Fair+ Fair Fair- Poor NT Comments   Sitting Static [] [x] [] [] [] []    Sitting Dynamic [] [x] [] [] [] []              Standing Static [] [x] [] [] [] []    Standing Dynamic [] [x] [x] [] [] []      GAIT: I Mod I S SBA CGA Min Mod Max Total  NT x2 Comments:   Level of Assistance [] [] [] [] [x] [] [] [] [] [] []    Distance 70 feet plus additional 40' then 10' x2    DME Rolling Walker    Gait Quality Decreased annabel  and Decreased step length    Weightbearing Status      Stairs      I=Independent, Mod I=Modified Independent, S=Supervision, SBA=Standby Assistance, CGA=Contact Guard Assistance,   Min=Minimal Assistance,

## 2023-11-10 LAB
ALBUMIN SERPL-MCNC: 3.1 G/DL (ref 3.2–4.6)
ALBUMIN/GLOB SERPL: 0.8 (ref 0.4–1.6)
ALP SERPL-CCNC: 68 U/L (ref 50–136)
ALT SERPL-CCNC: 28 U/L (ref 12–65)
ANION GAP SERPL CALC-SCNC: 7 MMOL/L (ref 2–11)
AST SERPL-CCNC: 15 U/L (ref 15–37)
BASOPHILS # BLD AUTO: ABNORMAL X10E3/UL
BASOPHILS # BLD: 0 K/UL (ref 0–0.2)
BASOPHILS NFR BLD AUTO: ABNORMAL %
BASOPHILS NFR BLD: 2 % (ref 0–2)
BILIRUB SERPL-MCNC: 0.8 MG/DL (ref 0.2–1.1)
BLD PROD TYP BPU: NORMAL
BLOOD BANK CMNT PATIENT-IMP: NORMAL
BLOOD BANK DISPENSE STATUS: NORMAL
BPU ID: NORMAL
BUN SERPL-MCNC: 23 MG/DL (ref 8–23)
CALCIUM SERPL-MCNC: 8.9 MG/DL (ref 8.3–10.4)
CD3+CD16+CD56+ CELLS # BLD: ABNORMAL /UL
CD3+CD16+CD56+ CELLS NFR BLD: 9.1 % (ref 1.4–19.4)
CHLORIDE SERPL-SCNC: 103 MMOL/L (ref 101–110)
CO2 SERPL-SCNC: 26 MMOL/L (ref 21–32)
CREAT SERPL-MCNC: 0.6 MG/DL (ref 0.6–1)
DIFFERENTIAL METHOD BLD: ABNORMAL
EOSINOPHIL # BLD AUTO: ABNORMAL X10E3/UL
EOSINOPHIL # BLD: 0 K/UL (ref 0–0.8)
EOSINOPHIL NFR BLD AUTO: ABNORMAL %
EOSINOPHIL NFR BLD: 1 % (ref 0.5–7.8)
ERYTHROCYTE [DISTWIDTH] IN BLOOD BY AUTOMATED COUNT: 14.1 % (ref 11.9–14.6)
ERYTHROCYTE [DISTWIDTH] IN BLOOD BY AUTOMATED COUNT: 14.4 % (ref 11.7–15.4)
GLOBULIN SER CALC-MCNC: 3.9 G/DL (ref 2.8–4.5)
GLUCOSE SERPL-MCNC: 109 MG/DL (ref 65–100)
HCT VFR BLD AUTO: 19.9 % (ref 35.8–46.3)
HCT VFR BLD AUTO: 20.4 % (ref 34–46.6)
HCT VFR BLD AUTO: 24.8 % (ref 35.8–46.3)
HGB BLD-MCNC: 6.5 G/DL (ref 11.7–15.4)
HGB BLD-MCNC: 7.1 G/DL (ref 11.1–15.9)
HGB BLD-MCNC: 8.3 G/DL (ref 11.7–15.4)
HISTORY CHECK: NORMAL
IMM GRANULOCYTES # BLD AUTO: 0.1 K/UL (ref 0–0.5)
IMM GRANULOCYTES # BLD: ABNORMAL X10E3/UL
IMM GRANULOCYTES NFR BLD AUTO: 5 % (ref 0–5)
IMM GRANULOCYTES NFR BLD: ABNORMAL %
LYMPHOCYTES # BLD AUTO: ABNORMAL X10E3/UL
LYMPHOCYTES # BLD: 0.9 K/UL (ref 0.5–4.6)
LYMPHOCYTES NFR BLD AUTO: ABNORMAL %
LYMPHOCYTES NFR BLD: 47 % (ref 13–44)
MAGNESIUM SERPL-MCNC: 2.5 MG/DL (ref 1.8–2.4)
MCH RBC QN AUTO: 30.5 PG (ref 26.1–32.9)
MCH RBC QN AUTO: 32.4 PG (ref 26.6–33)
MCHC RBC AUTO-ENTMCNC: 32.7 G/DL (ref 31.4–35)
MCHC RBC AUTO-ENTMCNC: 34 G/DL (ref 31.5–35.7)
MCV RBC AUTO: 93 FL (ref 79–97)
MCV RBC AUTO: 93.4 FL (ref 82–102)
MONOCYTES # BLD AUTO: ABNORMAL X10E3/UL
MONOCYTES # BLD: 0.3 K/UL (ref 0.1–1.3)
MONOCYTES NFR BLD AUTO: ABNORMAL %
MONOCYTES NFR BLD: 16 % (ref 4–12)
MORPHOLOGY BLD-IMP: ABNORMAL
NEUTROPHILS # BLD AUTO: ABNORMAL X10E3/UL
NEUTROPHILS NFR BLD AUTO: ABNORMAL %
NEUTS SEG # BLD: 0.5 K/UL (ref 1.7–8.2)
NEUTS SEG NFR BLD: 29 % (ref 43–78)
NRBC # BLD: 0 K/UL (ref 0–0.2)
NRBC BLD AUTO-RTO: ABNORMAL %
PLATELET # BLD AUTO: 4 K/UL (ref 150–450)
PLATELET # BLD AUTO: 5 K/UL (ref 150–450)
PLATELET # BLD AUTO: 5 K/UL (ref 150–450)
PLATELET # BLD AUTO: <5 X10E3/UL (ref 150–450)
PLATELET COMMENT: ABNORMAL
PMV BLD AUTO: ABNORMAL FL (ref 9.4–12.3)
POTASSIUM SERPL-SCNC: 4.2 MMOL/L (ref 3.5–5.1)
PROT SERPL-MCNC: 7 G/DL (ref 6.3–8.2)
RBC # BLD AUTO: 2.13 M/UL (ref 4.05–5.2)
RBC # BLD AUTO: 2.19 X10E6/UL (ref 3.77–5.28)
RBC MORPH BLD: ABNORMAL
SODIUM SERPL-SCNC: 136 MMOL/L (ref 133–143)
UNIT DIVISION: 0
WBC # BLD AUTO: 1.1 X10E3/UL (ref 3.4–10.8)
WBC # BLD AUTO: 1.8 K/UL (ref 4.3–11.1)
WBC MORPH BLD: ABNORMAL

## 2023-11-10 PROCEDURE — 6370000000 HC RX 637 (ALT 250 FOR IP): Performed by: NURSE PRACTITIONER

## 2023-11-10 PROCEDURE — 6370000000 HC RX 637 (ALT 250 FOR IP): Performed by: FAMILY MEDICINE

## 2023-11-10 PROCEDURE — 85014 HEMATOCRIT: CPT

## 2023-11-10 PROCEDURE — 1170000000 HC RM PRIVATE ONCOLOGY

## 2023-11-10 PROCEDURE — 85049 AUTOMATED PLATELET COUNT: CPT

## 2023-11-10 PROCEDURE — 85018 HEMOGLOBIN: CPT

## 2023-11-10 PROCEDURE — 80053 COMPREHEN METABOLIC PANEL: CPT

## 2023-11-10 PROCEDURE — 36415 COLL VENOUS BLD VENIPUNCTURE: CPT

## 2023-11-10 PROCEDURE — 36430 TRANSFUSION BLD/BLD COMPNT: CPT

## 2023-11-10 PROCEDURE — P9037 PLATE PHERES LEUKOREDU IRRAD: HCPCS

## 2023-11-10 PROCEDURE — 83735 ASSAY OF MAGNESIUM: CPT

## 2023-11-10 PROCEDURE — 6370000000 HC RX 637 (ALT 250 FOR IP): Performed by: INTERNAL MEDICINE

## 2023-11-10 PROCEDURE — 86922 COMPATIBILITY TEST ANTIGLOB: CPT

## 2023-11-10 PROCEDURE — 1170000001 HC RM PRIVATE ONCOLOGY W/TELEMETRY

## 2023-11-10 PROCEDURE — 86022 PLATELET ANTIBODIES: CPT

## 2023-11-10 PROCEDURE — P9040 RBC LEUKOREDUCED IRRADIATED: HCPCS

## 2023-11-10 PROCEDURE — 86921 COMPATIBILITY TEST INCUBATE: CPT

## 2023-11-10 PROCEDURE — 97112 NEUROMUSCULAR REEDUCATION: CPT

## 2023-11-10 PROCEDURE — 97530 THERAPEUTIC ACTIVITIES: CPT

## 2023-11-10 PROCEDURE — 99232 SBSQ HOSP IP/OBS MODERATE 35: CPT | Performed by: INTERNAL MEDICINE

## 2023-11-10 PROCEDURE — 2580000003 HC RX 258: Performed by: FAMILY MEDICINE

## 2023-11-10 PROCEDURE — 85025 COMPLETE CBC W/AUTO DIFF WBC: CPT

## 2023-11-10 RX ORDER — SODIUM CHLORIDE 9 MG/ML
INJECTION, SOLUTION INTRAVENOUS PRN
Status: DISCONTINUED | OUTPATIENT
Start: 2023-11-10 | End: 2023-11-11

## 2023-11-10 RX ADMIN — FERROUS SULFATE TAB 325 MG (65 MG ELEMENTAL FE) 325 MG: 325 (65 FE) TAB at 16:21

## 2023-11-10 RX ADMIN — CIPROFLOXACIN HYDROCHLORIDE 500 MG: 500 TABLET, FILM COATED ORAL at 08:42

## 2023-11-10 RX ADMIN — POTASSIUM CHLORIDE 20 MEQ: 1500 TABLET, EXTENDED RELEASE ORAL at 16:21

## 2023-11-10 RX ADMIN — IPRATROPIUM BROMIDE 2 SPRAY: 42 SPRAY NASAL at 20:23

## 2023-11-10 RX ADMIN — SODIUM CHLORIDE, PRESERVATIVE FREE 10 ML: 5 INJECTION INTRAVENOUS at 20:29

## 2023-11-10 RX ADMIN — FLUOXETINE HYDROCHLORIDE 20 MG: 20 CAPSULE ORAL at 08:43

## 2023-11-10 RX ADMIN — MORPHINE SULFATE 15 MG: 15 TABLET, FILM COATED, EXTENDED RELEASE ORAL at 08:43

## 2023-11-10 RX ADMIN — PANTOPRAZOLE SODIUM 40 MG: 40 TABLET, DELAYED RELEASE ORAL at 16:21

## 2023-11-10 RX ADMIN — ACETAMINOPHEN 650 MG: 325 TABLET ORAL at 10:43

## 2023-11-10 RX ADMIN — FAMOTIDINE 40 MG: 20 TABLET, FILM COATED ORAL at 20:22

## 2023-11-10 RX ADMIN — DIPHENHYDRAMINE HYDROCHLORIDE 25 MG: 25 CAPSULE ORAL at 22:28

## 2023-11-10 RX ADMIN — SALINE NASAL SPRAY 2 SPRAY: 1.5 SOLUTION NASAL at 08:45

## 2023-11-10 RX ADMIN — AMLODIPINE BESYLATE 5 MG: 5 TABLET ORAL at 08:43

## 2023-11-10 RX ADMIN — CIPROFLOXACIN HYDROCHLORIDE 500 MG: 500 TABLET, FILM COATED ORAL at 20:22

## 2023-11-10 RX ADMIN — MONTELUKAST 10 MG: 10 TABLET, FILM COATED ORAL at 08:43

## 2023-11-10 RX ADMIN — ROSUVASTATIN CALCIUM 10 MG: 5 TABLET, FILM COATED ORAL at 08:43

## 2023-11-10 RX ADMIN — IPRATROPIUM BROMIDE 2 SPRAY: 42 SPRAY NASAL at 08:40

## 2023-11-10 RX ADMIN — ACETAMINOPHEN 650 MG: 325 TABLET ORAL at 22:28

## 2023-11-10 RX ADMIN — DIPHENHYDRAMINE HYDROCHLORIDE 25 MG: 25 CAPSULE ORAL at 10:43

## 2023-11-10 RX ADMIN — SALINE NASAL SPRAY 2 SPRAY: 1.5 SOLUTION NASAL at 20:23

## 2023-11-10 RX ADMIN — DIPHENHYDRAMINE HYDROCHLORIDE 25 MG: 25 CAPSULE ORAL at 00:02

## 2023-11-10 RX ADMIN — POTASSIUM CHLORIDE 20 MEQ: 1500 TABLET, EXTENDED RELEASE ORAL at 08:43

## 2023-11-10 RX ADMIN — FERROUS SULFATE TAB 325 MG (65 MG ELEMENTAL FE) 325 MG: 325 (65 FE) TAB at 14:04

## 2023-11-10 RX ADMIN — ACETAMINOPHEN 650 MG: 325 TABLET ORAL at 00:02

## 2023-11-10 RX ADMIN — FLUOXETINE HYDROCHLORIDE 20 MG: 20 CAPSULE ORAL at 20:22

## 2023-11-10 RX ADMIN — SALINE NASAL SPRAY 2 SPRAY: 1.5 SOLUTION NASAL at 02:45

## 2023-11-10 RX ADMIN — SODIUM CHLORIDE, PRESERVATIVE FREE 10 ML: 5 INJECTION INTRAVENOUS at 08:44

## 2023-11-10 RX ADMIN — LEVOTHYROXINE SODIUM 125 MCG: 0.12 TABLET ORAL at 08:43

## 2023-11-10 RX ADMIN — DOCUSATE SODIUM 50 MG AND SENNOSIDES 8.6 MG 1 TABLET: 8.6; 5 TABLET, FILM COATED ORAL at 20:22

## 2023-11-10 RX ADMIN — PREDNISONE 80 MG: 20 TABLET ORAL at 08:42

## 2023-11-10 RX ADMIN — DIPHENHYDRAMINE HYDROCHLORIDE 10 ML: 12.5 LIQUID ORAL at 15:40

## 2023-11-10 RX ADMIN — SALINE NASAL SPRAY 2 SPRAY: 1.5 SOLUTION NASAL at 14:04

## 2023-11-10 RX ADMIN — PANTOPRAZOLE SODIUM 40 MG: 40 TABLET, DELAYED RELEASE ORAL at 08:43

## 2023-11-10 RX ADMIN — MORPHINE SULFATE 15 MG: 15 TABLET, FILM COATED, EXTENDED RELEASE ORAL at 20:22

## 2023-11-10 ASSESSMENT — PAIN SCALES - GENERAL
PAINLEVEL_OUTOF10: 0
PAINLEVEL_OUTOF10: 3
PAINLEVEL_OUTOF10: 0

## 2023-11-10 ASSESSMENT — PAIN DESCRIPTION - ONSET: ONSET: ON-GOING

## 2023-11-10 ASSESSMENT — PAIN - FUNCTIONAL ASSESSMENT: PAIN_FUNCTIONAL_ASSESSMENT: ACTIVITIES ARE NOT PREVENTED

## 2023-11-10 ASSESSMENT — PAIN DESCRIPTION - FREQUENCY: FREQUENCY: CONTINUOUS

## 2023-11-10 ASSESSMENT — PAIN DESCRIPTION - PAIN TYPE: TYPE: ACUTE PAIN

## 2023-11-10 ASSESSMENT — PAIN DESCRIPTION - LOCATION: LOCATION: BACK

## 2023-11-10 ASSESSMENT — PAIN DESCRIPTION - ORIENTATION: ORIENTATION: LOWER

## 2023-11-10 ASSESSMENT — PAIN DESCRIPTION - DESCRIPTORS: DESCRIPTORS: ACHING

## 2023-11-10 NOTE — PROGRESS NOTES
ACUTE OCCUPATIONAL THERAPY GOALS:   (Developed with and agreed upon by patient and/or caregiver.)  1. Patient will complete lower body bathing and dressing with MOD I and adaptive equipment as needed. 2. Patient will complete toileting with MOD I.   3. Patient will tolerate 30 minutes of OT treatment with 1-2 rest breaks to increase activity tolerance for ADLs. 4. Patient will complete functional transfers with MOD I and adaptive equipment as needed. 5. Patient will complete functional mobility for household distances with MOD I and adaptive equipment as needed. 6. Patient will complete self-grooming while standing edge of sink with MOD I and adaptive equipment as needed. Timeframe: 7 visits     OCCUPATIONAL THERAPY: Daily Note    OT Visit Days: 5   Time In/Out  OT Charge Capture  Rehab Caseload Tracker  OT Orders    Neutropenic Precautions    Marleny Hsieh is a 71 y.o. female   PRIMARY DIAGNOSIS: Neutropenic fever ([address])  Neutropenic fever ([address]) [D70.9, R50.81]  Pancytopenia ([address]) [D61.818]       Inpatient: Payor: Shira Oquendo / Plan: Emeli Huston / Product Type: *No Product type* /     ASSESSMENT:     REHAB RECOMMENDATIONS: CURRENT LEVEL OF FUNCTION:  (Most Recently Demonstrated)   Recommendation to date pending progress:  Setting:  Home Health Therapy    Equipment:    To Be Determined - has rollator Bathing:  Not Tested  Dressing:  Not Tested  Feeding/Grooming:  Not Tested  Toileting:  Not Tested  Functional Mobility:  Stand by Assist      ASSESSMENT:  Ms. Rick Clark is progressing well towards OT goals. Today, pt was received ambulating out of bathroom after completing bADLs. Completed functional transfers, ambulation with RW, and returned self to supine with overall SBA. Required multiple seated rest breaks throughout session. Recommend return home with [facility] therapy services at discharge.  Ms. Rick Clark continues to demonstrate overall deficits in strength, balance, activity tolerance, and ADL performance. Continue OT efforts and POC in order to address functional deficits and OT goals stated above. SUBJECTIVE:     Ms. Jose Marinelli states, \"I'll go for a walk\"     Social/Functional Lives With: Spouse  Type of Home: House  Home Layout: One level  Bathroom Toilet: Standard  Bathroom Equipment: Commode  Bathroom Accessibility: Accessible  Home Equipment: Rollator  Receives Help From: Family  ADL Assistance: Independent  Homemaking Assistance: Independent  Ambulation Assistance: Needs assistance  Transfer Assistance: Independent  Active : Yes  Mode of Transportation: Car  Occupation: Retired    OBJECTIVE:     LINES / Con Hey / AIRWAY: IV    RESTRICTIONS/PRECAUTIONS:  Restrictions/Precautions  Restrictions/Precautions:  Other (comment) (Neutropenic precautions)      PAIN: VITALS / O2:   Pre Treatment:    0/10        Post Treatment: 0/10 Vitals          Oxygen        MOBILITY: I Mod I S SBA CGA Min Mod Max Total  NT x2 Comments:   Bed Mobility    Rolling [] [] [] [] [] [] [] [] [] [x] []    Supine to Sit [] [] [] [] [] [] [] [] [] [x] [] Received ambulating out if bathroom   Scooting [] [] [] [] [] [] [] [] [] [x] []    Sit to Supine [] [] [] [x] [] [] [] [] [] [] []    Transfers    Sit to Stand [] [] [] [x] [] [] [] [] [] [] []    Bed to Chair [] [] [] [] [] [] [] [] [] [x] []    Stand to Sit [] [] [] [x] [] [] [] [] [] [] []    Tub/Shower [] [] [] [] [] [] [] [] [] [x] []     Toilet [] [] [] [] [] [] [] [] [] [x] []      [] [] [] [] [] [] [] [] [] [] []    I=Independent, Mod I=Modified Independent, S=Supervision/Setup, SBA=Standby Assistance, CGA=Contact Guard Assistance, Min=Minimal Assistance, Mod=Moderate Assistance, Max=Maximal Assistance, Total=Total Assistance, NT=Not Tested    ACTIVITIES OF DAILY LIVING: I Mod I S SBA CGA Min Mod Max Total NT Comments   BASIC ADLs:              Upper Body   Bathing [] [] [] [] [] [] [] [] [] [x]     Lower Body Bathing [] [] [] [] [] [] [] [] [] [x]     Toileting [] [] [] [] [] [] [] [] [] [x]    Upper Body Dressing [] [] [] [] [] [] [] [] [] [x]    Lower Body Dressing [] [] [] [] [] [] [] [] [] [x]    Feeding [] [] [] [] [] [] [] [] [] [x]    Grooming [] [] [] [] [] [] [] [] [] [x]    Personal Device Care [] [] [] [] [] [] [] [] [] [x]    Functional Mobility [] [] [] [x] [] [] [] [] [] [] RW, seated rest breaks required   I=Independent, Mod I=Modified Independent, S=Supervision/Setup, SBA=Standby Assistance, CGA=Contact Guard Assistance, Min=Minimal Assistance, Mod=Moderate Assistance, Max=Maximal Assistance, Total=Total Assistance, NT=Not Tested    BALANCE: Good Fair+ Fair Fair- Poor NT Comments   Sitting Static [x] [] [] [] [] []    Sitting Dynamic [x] [] [] [] [] []              Standing Static [x] [] [] [] [] []    Standing Dynamic [] [x] [] [] [] []        PLAN:     FREQUENCY/DURATION   OT Plan of Care: 3 times/week for duration of hospital stay or until stated goals are met, whichever comes first.    TREATMENT:     TREATMENT:   Co-Treatment PT/OT necessary due to patient's decreased overall endurance/tolerance levels, as well as need for high level skilled assistance to complete functional transfers/mobility and functional tasks  Neuromuscular Re-education (23 Minutes): Patient participated in neuromuscular re-education including weight shifting, postural training, standing tolerance activity , and sitting balance activity   with minimal verbal cues and tactile cues to improve sitting balance, standing balance, posture, coordination, static balance, and dynamic balance in order to prepare for functional task, prepare for standing ADLs, prepare for functional transfer, increase safety awareness, prepare for discharge home , and prepare for self care..     TREATMENT GRID:  N/A    AFTER TREATMENT PRECAUTIONS: Bed, Call light within reach, Needs within reach, RN notified, and Visitors at bedside    INTERDISCIPLINARY COLLABORATION:  RN/ PCT, PT/ PTA, and OT/

## 2023-11-10 NOTE — CARE COORDINATION
Chart screened by CM for d/c planning. Currently requiring 2L O2 NC. Wean as tolerated. Oncology monitoring low plt count. Not responding well to steroids. D12 steroids. Pt receiving plts bid. On 11/8 pt underwent BMBx. Per most recent oncology note: \"Preliminary results show possible hemaphagocytosis, not progression of MDS but cannot r/o ITP. PT/OT recommend HH at d/c. Anticipated dispo: return home, with HH if agreeable, once plt count is stable. D/C timeframe undetermined at the present time. CM will continue to follow.   LOS = 14 days

## 2023-11-10 NOTE — PROGRESS NOTES
Comprehensive Nutrition Assessment    Type and Reason for Visit: Reassess  Malnutrition Screening Tool: Malnutrition Screen  Have you recently lost weight without trying?: 24 to 33 pounds (3 points)  Have you been eating poorly because of a decreased appetite?: Yes (1 point)  Malnutrition Screening Tool Score: 4    Nutrition Recommendations/Plan:   Meals and Snacks:  Diet: Continue current order  Nutrition Supplement Therapy:  Medical food supplement therapy:  Continue Ensure Enlive three times per day (this provides 350 kcal and 20 grams protein per bottle) chocolate served with a milk container. Serving provided at RD visit, which pt began to drink. Malnutrition Assessment:  Malnutrition Status: At risk for malnutrition (Comment) (pt reports + wt loss, waxing and waning oral intake)  No fat or muscle loss appreciated. Nutrition Assessment:  Nutrition History: Pt reports her intake and wt initially started changing after having Covid in May 2022.  + loss of appetite, taste alterations. She indicates at one point her wt was >200# initially declined quickly then up and down with fluid changes. She states at this point she is unsure of her true wt. Do You Have Any Cultural, Spiritism, or Ethnic Food Preferences?: No   Nutrition Background: PMHx: psoriatic arthritis on Humira, hx of ovarian cam HTN, HLD, GERD and hypothyroidism. Admitted with MDS, pancytopenia, neutropenic fever, chronic pain, constipation. IR BMBX 11/7. Nutrition Interval:  Patient sitting up in bed with daughter present. She states she is eating a little better. She reports a little eggs and grits this am. She states she ate all her chicken salad for lunch. She states even when she cannot tolerate anything else, she can always tolerate chicken salad and her Ensure. She reports has been drinking all 3 each day. Nystatin ordered but not utilized. MMW utilized daily.   Discussed with Cathy Oliva RN    Current Nutrition

## 2023-11-10 NOTE — PLAN OF CARE
Problem: Pain  Goal: Verbalizes/displays adequate comfort level or baseline comfort level  Outcome: Progressing     Problem: ABCDS Injury Assessment  Goal: Absence of physical injury  Outcome: Progressing     Problem: Safety - Adult  Goal: Free from fall injury  Outcome: Progressing     Problem: Discharge Planning  Goal: Discharge to home or other facility with appropriate resources  Outcome: Progressing     Problem: Skin/Tissue Integrity  Goal: Absence of new skin breakdown  Description: 1. Monitor for areas of redness and/or skin breakdown  2. Assess vascular access sites hourly  3. Every 4-6 hours minimum:  Change oxygen saturation probe site  4. Every 4-6 hours:  If on nasal continuous positive airway pressure, respiratory therapy assess nares and determine need for appliance change or resting period.   Outcome: Progressing     Problem: Neurosensory - Adult  Goal: Achieves stable or improved neurological status  Outcome: Progressing  Goal: Achieves maximal functionality and self care  Outcome: Progressing     Problem: Respiratory - Adult  Goal: Achieves optimal ventilation and oxygenation  Outcome: Progressing  Flowsheets (Taken 11/10/2023 0840)  Achieves optimal ventilation and oxygenation: Assess for changes in respiratory status     Problem: Cardiovascular - Adult  Goal: Maintains optimal cardiac output and hemodynamic stability  Outcome: Progressing  Flowsheets (Taken 11/10/2023 0840)  Maintains optimal cardiac output and hemodynamic stability: Monitor blood pressure and heart rate  Goal: Absence of cardiac dysrhythmias or at baseline  Outcome: Progressing     Problem: Skin/Tissue Integrity - Adult  Goal: Skin integrity remains intact  Outcome: Progressing  Flowsheets (Taken 11/10/2023 0840)  Skin Integrity Remains Intact: Monitor for areas of redness and/or skin breakdown  Goal: Incisions, wounds, or drain sites healing without S/S of infection  Outcome: Progressing  Goal: Oral mucous membranes remain family  4. Initiate referral to Adult Protective Services, as appropriate  5. Initiate referral to Child Protective Services, as appropriate  6. Offer to have the patient's the patient's chart marked as Non-disclosed/Privacy patient for phone inquiries, as appropriate  7. Provide emotional support, including active listening and acknowledgment of concerns  Outcome: Progressing     Problem: Drug Abuse/Detox  Goal: Will have no detox symptoms and will verbalize plan for changing drug-related behavior  Description: INTERVENTIONS:  1. Administer medication as ordered  2. Monitor physical status  3. Provide emotional support with 1:1 interaction with staff  4. Encourage  recovery focused treatment   Outcome: Progressing     Problem: Spiritual Care  Goal: Pt/Family able to move forward in process of forgiving self, others, and/or higher power  Description: INTERVENTIONS:  1. Assist patient/family to overcome blocks to healing by use of spiritual practices (prayer, meditation, guided imagery, reiki, breath work, etc).  2. De-myth guilt and help patient/family identify possible irrational spiritual/cultural beliefs and values.  3. Explore possibilities of making amends & reconciliation with self, others, and/or a greater power.  4. Guide patient/family in identifying painful feelings of guilt.  5. Help patient/famiy explore and identify spiritual beliefs, cultural understandings or values that may help or hinder letting go of issue.  6. Help patient/family explore feelings of anger, bitterness, resentment.  7. Help patient/family identify and examine the situation in which these feelings are experienced.  8. Help patient/family identify destructive displacement of feelings onto other individuals.  9. Invite use of sacraments/rituals/ceremonies as appropriate (e.g. - confession, anointing, smudging).  10. Refer patient/family to formal counseling and/or to jatinder community for further support work.  Outcome: Progressing    negative...

## 2023-11-10 NOTE — PROGRESS NOTES
[facility] Hematology & Oncology        Inpatient Hematology / Oncology Progress Note    Reason for Admission:  Neutropenic fever ([address]) [D70.9, R50.81]  Pancytopenia ([address]) [D61.818]    24 Hour Events:  VSS, HTN at times, afebrile - on 3L NC  Today's plt count 4k - day 12 steroids (s/p 2 days IVIG)  No further noted bleeding  Hgb 6.5 - will receive PRBCs   BMBx 11/7   Sending additional send off lab work, IL2 WNL, Fibrinogen WNL  Feeling ok, no complaints    Transfusions: Plts/PRBCs  Replacements: None    ROS:  Constitutional: Positive for fatigue; negative for fever, chills. CV: Negative for chest pain, palpitations, edema. Respiratory: +cough, dyspnea. Negative for wheezing. GI: Negative for nausea, abdominal pain, diarrhea. 10 point review of systems is otherwise negative with the exception of the elements mentioned above in the HPI. Allergies   Allergen Reactions    Penicillins Hives    Sulfa Antibiotics Hives    Codeine Nausea And Vomiting     Past Medical History:   Diagnosis Date    Arthritis     Autoimmune disease ([address])     skin changes, unknown name    Cancer ([address]) 1990    ovarian    Chronic pain     arthritis in back and legs    Coronary artery spasm ([address])     COVID 05/15/2022    Essential hypertension 11/8/2019    Gastritis     GERD (gastroesophageal reflux disease)     Hx antineoplastic chemo     Migraine headache     Psoriasis     Psychiatric disorder     anxiety and depression    Severe obesity (BMI 35.0-39.9) 6/26/2018    Thyroid disease     Diagnosed in 1996     Past Surgical History:   Procedure Laterality Date    CARPAL TUNNEL RELEASE      GYN      ovaries    HYSTERECTOMY, TOTAL ABDOMINAL (CERVIX REMOVED)  Patriciabury      cardiac cath. Dx with spasms.     TUBAL LIGATION       Family History   Problem Relation Age of Onset    Parkinson's Disease Mother     Stroke Mother         Passed away in 1969    No Known Problems Paternal Grandfather     No

## 2023-11-10 NOTE — PROGRESS NOTES
ACUTE PHYSICAL THERAPY GOALS:   (Developed with and agreed upon by patient and/or caregiver.)    (1.)Ms. Tiesha Espitia will move from supine to sit and sit to supine , scoot up and down, and roll side to side in bed with INDEPENDENCE within 7 treatment day(s). (2.)Ms. Tiesha Espitia will transfer from bed to chair and chair to bed with MODIFIED INDEPENDENCE using the least restrictive device within 7 treatment day(s). (3.)Ms. Tiesha Espitia will ambulate with MODIFIED INDEPENDENCE for 500 feet with the least restrictive device within 7 treatment day(s). (4.)Ms. Tiesha Espitia will participate in therapeutic activity/exercises x 25 minutes for increased activity tolerance within 7 treatment days. (5.)Ms. Tiesha Espitia will perform standing static and dynamic balance activities x 15 minutes with SUPERVISION to improve safety within 7 day(s). PHYSICAL THERAPY: Daily Note PM   (Link to Caseload Tracking: PT Visit Days : 6  Time In/Out PT Charge Capture  Rehab Caseload Tracker  Orders    Oswald Kussmaul is a 71 y.o. female   PRIMARY DIAGNOSIS: Neutropenic fever ([address])  Neutropenic fever ([address]) [D70.9, R50.81]  Pancytopenia ([address]) [D61.818]       Inpatient: Payor: Tevin Mckenna / Plan: Nico Aurelia / Product Type: *No Product type* /     ASSESSMENT:     REHAB RECOMMENDATIONS:   Recommendation to date pending progress:  Setting:  Home Health Therapy    Equipment:    To Be Determined     ASSESSMENT:  Ms. Tiesha Espitia was walking out of restroom on arrival and agreeable to PT. She ambulated a greater distance today with CGA. She is receiving blood at the time. Pt left supine  in bed with needs in reach.       SUBJECTIVE:   Ms. Tiesha Espitia states, \"I'm not as spunky today\"     Social/Functional Lives With: Spouse  Type of Home: House  Home Layout: One level  Bathroom Toilet: Standard  Bathroom Equipment: Commode  Bathroom Accessibility: Accessible  Home Equipment: Rollator  Receives Help From: Family  ADL Assistance: Independent  Homemaking Assistance: Independent  Ambulation Assistance: Needs assistance  Transfer Assistance: Independent  Active : Yes  Mode of Transportation: Car  Occupation: Retired  OBJECTIVE:     PAIN: VITALS / O2: PRECAUTION / Ekaterina Lucks / DRAINS:   Pre Treatment:  none         Post Treatment:  none Vitals        Oxygen RA  Telemetry     RESTRICTIONS/PRECAUTIONS:  Restrictions/Precautions  Restrictions/Precautions: Fall Risk  Restrictions/Precautions: Fall Risk     MOBILITY: I Mod I S SBA CGA Min Mod Max Total  NT x2 Comments:   Bed Mobility    Rolling [] [] [] [] [] [] [] [] [] [] []    Supine to Sit [] [] [] [] [] [] [] [] [] [] []    Scooting [] [] [] [] [] [] [] [] [] [] []    Sit to Supine [] [] [] [x] [] [] [] [] [] [] []    Transfers    Sit to Stand [] [] [] [] [x] [] [] [] [] [] []    Bed to Chair [] [] [] [] [x] [] [] [] [] [] []    Stand to Sit [] [] [] [] [x] [] [] [] [] [] []     [] [] [] [] [] [] [] [] [] [] []    I=Independent, Mod I=Modified Independent, S=Supervision, SBA=Standby Assistance, CGA=Contact Guard Assistance,   Min=Minimal Assistance, Mod=Moderate Assistance, Max=Maximal Assistance, Total=Total Assistance, NT=Not Tested    BALANCE: Good Fair+ Fair Fair- Poor NT Comments   Sitting Static [] [x] [] [] [] []    Sitting Dynamic [] [x] [] [] [] []              Standing Static [] [x] [] [] [] []    Standing Dynamic [] [x] [x] [] [] []      GAIT: I Mod I S SBA CGA Min Mod Max Total  NT x2 Comments:   Level of Assistance [] [] [] [] [x] [] [] [] [] [] []    Distance 100 feet plus 50' x2    DME Rolling Walker    Gait Quality Decreased annabel  and Decreased step length    Weightbearing Status      Stairs      I=Independent, Mod I=Modified Independent, S=Supervision, SBA=Standby Assistance, CGA=Contact Guard Assistance,   Min=Minimal Assistance, Mod=Moderate Assistance, Max=Maximal Assistance, Total=Total Assistance, NT=Not Tested    PLAN:   FREQUENCY AND DURATION: 3 times/week for duration of hospital stay or until stated

## 2023-11-10 NOTE — PROGRESS NOTES
1850: Received pt from [place], RN in stable condition. Pt in bed resting quietly. Respirations even & unlabored on 2L NC; no acute signs of distress noted. Bed low & locked; call light within reach; no needs voiced. 2151: magic mouthwash 10mL PO given per pt request for mouth pain    0002: tylenol 650 mg tab & benadryl 25 mg cap PO given prior to plt transfusion per protocol    0036: began plt transfusion x1 unit per order; pt tolerated well; no acute signs of distress noted; no needs voiced.

## 2023-11-11 LAB
ABO + RH BLD: NORMAL
ALBUMIN SERPL-MCNC: 3.1 G/DL (ref 3.2–4.6)
ALBUMIN/GLOB SERPL: 0.8 (ref 0.4–1.6)
ALP SERPL-CCNC: 72 U/L (ref 50–136)
ALT SERPL-CCNC: 28 U/L (ref 12–65)
ANION GAP SERPL CALC-SCNC: 6 MMOL/L (ref 2–11)
AST SERPL-CCNC: 14 U/L (ref 15–37)
BILIRUB SERPL-MCNC: 0.7 MG/DL (ref 0.2–1.1)
BLASTS NFR BLD MANUAL: 5 %
BLD PROD TYP BPU: NORMAL
BLOOD BANK CMNT PATIENT-IMP: NORMAL
BLOOD BANK DISPENSE STATUS: NORMAL
BLOOD GROUP ANTIBODIES SERPL: NORMAL
BPU ID: NORMAL
BUN SERPL-MCNC: 27 MG/DL (ref 8–23)
CALCIUM SERPL-MCNC: 8.9 MG/DL (ref 8.3–10.4)
CHLORIDE SERPL-SCNC: 105 MMOL/L (ref 101–110)
CO2 SERPL-SCNC: 27 MMOL/L (ref 21–32)
CREAT SERPL-MCNC: 0.58 MG/DL (ref 0.6–1)
CROSSMATCH RESULT: NORMAL
DIFFERENTIAL METHOD BLD: ABNORMAL
ERYTHROCYTE [DISTWIDTH] IN BLOOD BY AUTOMATED COUNT: 14.8 % (ref 11.9–14.6)
GLOBULIN SER CALC-MCNC: 3.9 G/DL (ref 2.8–4.5)
GLUCOSE SERPL-MCNC: 117 MG/DL (ref 65–100)
HCT VFR BLD AUTO: 22.6 % (ref 35.8–46.3)
HGB BLD-MCNC: 7.6 G/DL (ref 11.7–15.4)
LYMPHOCYTES # BLD: 1.2 K/UL (ref 0.5–4.6)
LYMPHOCYTES NFR BLD MANUAL: 54 % (ref 16–44)
MAGNESIUM SERPL-MCNC: 2.3 MG/DL (ref 1.8–2.4)
MCH RBC QN AUTO: 30.6 PG (ref 26.1–32.9)
MCHC RBC AUTO-ENTMCNC: 33.6 G/DL (ref 31.4–35)
MCV RBC AUTO: 91.1 FL (ref 82–102)
METAMYELOCYTES NFR BLD MANUAL: 2 %
MONOCYTES # BLD: 0 K/UL (ref 0.1–1.3)
MONOCYTES NFR BLD MANUAL: 2 % (ref 3–9)
MYELOCYTES NFR BLD MANUAL: 2 %
NEUTS SEG # BLD: 0.9 K/UL (ref 1.7–8.2)
NEUTS SEG NFR BLD MANUAL: 34 % (ref 47–75)
NRBC # BLD: 0 K/UL (ref 0–0.2)
PLATELET # BLD AUTO: 40 K/UL (ref 150–450)
PLATELET # BLD AUTO: 5 K/UL (ref 150–450)
PLATELET # BLD AUTO: 6 K/UL (ref 150–450)
PLATELET COMMENT: ABNORMAL
PMV BLD AUTO: 10 FL (ref 9.4–12.3)
POTASSIUM SERPL-SCNC: 4.1 MMOL/L (ref 3.5–5.1)
PROMYELOCYTES NFR BLD MANUAL: 1 %
PROT SERPL-MCNC: 7 G/DL (ref 6.3–8.2)
RBC # BLD AUTO: 2.48 M/UL (ref 4.05–5.2)
RBC MORPH BLD: ABNORMAL
SODIUM SERPL-SCNC: 138 MMOL/L (ref 133–143)
SPECIMEN EXP DATE BLD: NORMAL
UNIT DIVISION: 0
WBC # BLD AUTO: 2.3 K/UL (ref 4.3–11.1)
WBC MORPH BLD: ABNORMAL

## 2023-11-11 PROCEDURE — 86022 PLATELET ANTIBODIES: CPT

## 2023-11-11 PROCEDURE — 6370000000 HC RX 637 (ALT 250 FOR IP): Performed by: INTERNAL MEDICINE

## 2023-11-11 PROCEDURE — 1170000001 HC RM PRIVATE ONCOLOGY W/TELEMETRY

## 2023-11-11 PROCEDURE — 36430 TRANSFUSION BLD/BLD COMPNT: CPT

## 2023-11-11 PROCEDURE — 2580000003 HC RX 258: Performed by: FAMILY MEDICINE

## 2023-11-11 PROCEDURE — 85025 COMPLETE CBC W/AUTO DIFF WBC: CPT

## 2023-11-11 PROCEDURE — 36415 COLL VENOUS BLD VENIPUNCTURE: CPT

## 2023-11-11 PROCEDURE — 86644 CMV ANTIBODY: CPT

## 2023-11-11 PROCEDURE — 6370000000 HC RX 637 (ALT 250 FOR IP): Performed by: NURSE PRACTITIONER

## 2023-11-11 PROCEDURE — 85049 AUTOMATED PLATELET COUNT: CPT

## 2023-11-11 PROCEDURE — 83735 ASSAY OF MAGNESIUM: CPT

## 2023-11-11 PROCEDURE — 6370000000 HC RX 637 (ALT 250 FOR IP): Performed by: FAMILY MEDICINE

## 2023-11-11 PROCEDURE — 1170000000 HC RM PRIVATE ONCOLOGY

## 2023-11-11 PROCEDURE — 80053 COMPREHEN METABOLIC PANEL: CPT

## 2023-11-11 PROCEDURE — 99233 SBSQ HOSP IP/OBS HIGH 50: CPT | Performed by: INTERNAL MEDICINE

## 2023-11-11 PROCEDURE — P9037 PLATE PHERES LEUKOREDU IRRAD: HCPCS

## 2023-11-11 RX ORDER — SODIUM CHLORIDE 9 MG/ML
INJECTION, SOLUTION INTRAVENOUS PRN
Status: DISCONTINUED | OUTPATIENT
Start: 2023-11-11 | End: 2023-11-11

## 2023-11-11 RX ADMIN — MONTELUKAST 10 MG: 10 TABLET, FILM COATED ORAL at 08:13

## 2023-11-11 RX ADMIN — IPRATROPIUM BROMIDE 2 SPRAY: 42 SPRAY NASAL at 08:30

## 2023-11-11 RX ADMIN — DIPHENHYDRAMINE HYDROCHLORIDE 10 ML: 12.5 LIQUID ORAL at 08:14

## 2023-11-11 RX ADMIN — PREDNISONE 80 MG: 20 TABLET ORAL at 08:13

## 2023-11-11 RX ADMIN — ACETAMINOPHEN 650 MG: 325 TABLET ORAL at 15:28

## 2023-11-11 RX ADMIN — MORPHINE SULFATE 15 MG: 15 TABLET, FILM COATED, EXTENDED RELEASE ORAL at 08:11

## 2023-11-11 RX ADMIN — SODIUM CHLORIDE, PRESERVATIVE FREE 10 ML: 5 INJECTION INTRAVENOUS at 08:14

## 2023-11-11 RX ADMIN — MORPHINE SULFATE 15 MG: 15 TABLET, FILM COATED, EXTENDED RELEASE ORAL at 20:36

## 2023-11-11 RX ADMIN — DOCUSATE SODIUM 50 MG AND SENNOSIDES 8.6 MG 1 TABLET: 8.6; 5 TABLET, FILM COATED ORAL at 20:36

## 2023-11-11 RX ADMIN — POTASSIUM CHLORIDE 20 MEQ: 1500 TABLET, EXTENDED RELEASE ORAL at 08:13

## 2023-11-11 RX ADMIN — PANTOPRAZOLE SODIUM 40 MG: 40 TABLET, DELAYED RELEASE ORAL at 15:28

## 2023-11-11 RX ADMIN — DIPHENHYDRAMINE HYDROCHLORIDE 25 MG: 25 CAPSULE ORAL at 15:28

## 2023-11-11 RX ADMIN — SALINE NASAL SPRAY 2 SPRAY: 1.5 SOLUTION NASAL at 08:14

## 2023-11-11 RX ADMIN — CIPROFLOXACIN HYDROCHLORIDE 500 MG: 500 TABLET, FILM COATED ORAL at 20:35

## 2023-11-11 RX ADMIN — FERROUS SULFATE TAB 325 MG (65 MG ELEMENTAL FE) 325 MG: 325 (65 FE) TAB at 11:37

## 2023-11-11 RX ADMIN — PANTOPRAZOLE SODIUM 40 MG: 40 TABLET, DELAYED RELEASE ORAL at 08:13

## 2023-11-11 RX ADMIN — SODIUM CHLORIDE, PRESERVATIVE FREE 10 ML: 5 INJECTION INTRAVENOUS at 20:36

## 2023-11-11 RX ADMIN — ROSUVASTATIN CALCIUM 10 MG: 5 TABLET, FILM COATED ORAL at 08:13

## 2023-11-11 RX ADMIN — FLUOXETINE HYDROCHLORIDE 20 MG: 20 CAPSULE ORAL at 08:11

## 2023-11-11 RX ADMIN — SALINE NASAL SPRAY 2 SPRAY: 1.5 SOLUTION NASAL at 20:37

## 2023-11-11 RX ADMIN — LEVOTHYROXINE SODIUM 125 MCG: 0.12 TABLET ORAL at 08:11

## 2023-11-11 RX ADMIN — FLUOXETINE HYDROCHLORIDE 20 MG: 20 CAPSULE ORAL at 20:36

## 2023-11-11 RX ADMIN — FERROUS SULFATE TAB 325 MG (65 MG ELEMENTAL FE) 325 MG: 325 (65 FE) TAB at 17:00

## 2023-11-11 RX ADMIN — IPRATROPIUM BROMIDE 2 SPRAY: 42 SPRAY NASAL at 20:37

## 2023-11-11 RX ADMIN — SALINE NASAL SPRAY 2 SPRAY: 1.5 SOLUTION NASAL at 14:19

## 2023-11-11 RX ADMIN — CIPROFLOXACIN HYDROCHLORIDE 500 MG: 500 TABLET, FILM COATED ORAL at 08:12

## 2023-11-11 RX ADMIN — AMLODIPINE BESYLATE 5 MG: 5 TABLET ORAL at 08:12

## 2023-11-11 RX ADMIN — POTASSIUM CHLORIDE 20 MEQ: 1500 TABLET, EXTENDED RELEASE ORAL at 16:59

## 2023-11-11 RX ADMIN — FAMOTIDINE 40 MG: 20 TABLET, FILM COATED ORAL at 20:35

## 2023-11-11 ASSESSMENT — PAIN SCALES - GENERAL
PAINLEVEL_OUTOF10: 0

## 2023-11-11 NOTE — PLAN OF CARE
Patient has remained A&O x 4. Vital signs have remained stable. No signs or symptoms of distress noted. Pain well controlled with scheduled PO pain medication. Denies nausea/vomiting.   -plts 6 this AM, waiting for plts, still being prepared. Patient educated on new medication. Patient rounded on hourly by myself or patient assistant and encouraged to call with any needs. No signs of distress noted. Bed low, locked, call bell within reach.    Brain Linker, RN    Problem: Pain  Goal: Verbalizes/displays adequate comfort level or baseline comfort level  Outcome: Progressing     Problem: ABCDS Injury Assessment  Goal: Absence of physical injury  Outcome: Progressing  Flowsheets (Taken 11/11/2023 0800)  Absence of Physical Injury: Implement safety measures based on patient assessment     Problem: Safety - Adult  Goal: Free from fall injury  Outcome: Progressing  Flowsheets (Taken 11/11/2023 0800)  Free From Fall Injury:   Instruct family/caregiver on patient safety   Based on caregiver fall risk screen, instruct family/caregiver to ask for assistance with transferring infant if caregiver noted to have fall risk factors     Problem: Discharge Planning  Goal: Discharge to home or other facility with appropriate resources  Outcome: Progressing     Problem: Neurosensory - Adult  Goal: Achieves stable or improved neurological status  Outcome: Progressing  Goal: Achieves maximal functionality and self care  Outcome: Progressing     Problem: Respiratory - Adult  Goal: Achieves optimal ventilation and oxygenation  Outcome: Progressing     Problem: Cardiovascular - Adult  Goal: Maintains optimal cardiac output and hemodynamic stability  Outcome: Progressing  Goal: Absence of cardiac dysrhythmias or at baseline  Outcome: Progressing     Problem: Musculoskeletal - Adult  Goal: Return mobility to safest level of function  Outcome: Progressing  Goal: Maintain proper alignment of affected body part  Outcome: Progressing  Goal: Return ADL status to a safe level of function  Outcome: Progressing     Problem: Gastrointestinal - Adult  Goal: Minimal or absence of nausea and vomiting  Outcome: Progressing  Goal: Maintains or returns to baseline bowel function  Outcome: Progressing  Goal: Maintains adequate nutritional intake  Outcome: Progressing  Goal: Establish and maintain optimal ostomy function  Outcome: Progressing     Problem: Genitourinary - Adult  Goal: Absence of urinary retention  Outcome: Progressing  Goal: Urinary catheter remains patent  Outcome: Progressing     Problem: Infection - Adult  Goal: Absence of infection at discharge  Outcome: Progressing  Flowsheets (Taken 11/11/2023 0810)  Absence of infection at discharge:   Monitor lab/diagnostic results   Administer medications as ordered   Assess and monitor for signs and symptoms of infection  Goal: Absence of infection during hospitalization  Outcome: Progressing  Flowsheets (Taken 11/11/2023 0810)  Absence of infection during hospitalization:   Assess and monitor for signs and symptoms of infection   Monitor lab/diagnostic results   Monitor all insertion sites i.e., indwelling lines, tubes and drains  Goal: Absence of fever/infection during anticipated neutropenic period  Outcome: Progressing     Problem: Metabolic/Fluid and Electrolytes - Adult  Goal: Electrolytes maintained within normal limits  Outcome: Progressing  Flowsheets (Taken 11/11/2023 0810)  Electrolytes maintained within normal limits:   Monitor labs and assess patient for signs and symptoms of electrolyte imbalances   Monitor response to electrolyte replacements, including repeat lab results as appropriate   Administer electrolyte replacement as ordered   Fluid restriction as ordered  Goal: Hemodynamic stability and optimal renal function maintained  Outcome: Progressing  Goal: Glucose maintained within prescribed range  Outcome: Progressing     Problem: Hematologic - Adult  Goal: Maintains hematologic

## 2023-11-11 NOTE — PROGRESS NOTES
1905: Received pt from [place], [place] in stable condition. Pt in bed resting quietly. Respirations even & unlabored on 2L NC; no acute signs of distress noted. Bed low & locked; call light within reach; no needs voiced.      2228: tylenol 650 mg tab & benadryl 25 mg tab PO given prior to plt transfusion    1 unit plts given this shift

## 2023-11-11 NOTE — PROGRESS NOTES
[address] [facility] Hematology & Oncology        Inpatient Hematology / Oncology Progress Note    Reason for Admission:  Neutropenic fever ([address]) [D70.9, R50.81]  Pancytopenia ([address]) [D61.818]    24 Hour Events:  VSS, HTN at times, afebrile - on RA  Today's plt count 5k - day 13 steroids (s/p 2 days IVIG)  No further noted bleeding  Hgb 7.6 s/p PRBCs yesterday   BMBx 11/7 concerning for [facility]  NK activity prelim, CXCL9 pending   Planning for possible Etop/Dex to start tomorrow  Feeling ok, no complaints    Transfusions: Plts/PRBCs  Replacements: None    ROS:  Constitutional: Positive for fatigue; negative for fever, chills. CV: Negative for chest pain, palpitations, edema. Respiratory: +cough, dyspnea. Negative for wheezing. GI: Negative for nausea, abdominal pain, diarrhea. 10 point review of systems is otherwise negative with the exception of the elements mentioned above in the HPI. Allergies   Allergen Reactions    Penicillins Hives    Sulfa Antibiotics Hives    Codeine Nausea And Vomiting     Past Medical History:   Diagnosis Date    Arthritis     Autoimmune disease ([address])     skin changes, unknown name    Cancer ([address]) 1990    ovarian    Chronic pain     arthritis in back and legs    Coronary artery spasm ([address])     COVID 05/15/2022    Essential hypertension 11/8/2019    Gastritis     GERD (gastroesophageal reflux disease)     Hx antineoplastic chemo     Migraine headache     Psoriasis     Psychiatric disorder     anxiety and depression    Severe obesity (BMI 35.0-39.9) 6/26/2018    Thyroid disease     Diagnosed in 1996     Past Surgical History:   Procedure Laterality Date    CARPAL TUNNEL RELEASE      GYN      ovaries    HYSTERECTOMY, TOTAL ABDOMINAL (CERVIX REMOVED)  Patriciabury      cardiac cath. Dx with spasms.     TUBAL LIGATION       Family History   Problem Relation Age of Onset    Parkinson's Disease Mother     Stroke Mother         Passed away in 56    No Known

## 2023-11-12 LAB
ALBUMIN SERPL-MCNC: 3.4 G/DL (ref 3.2–4.6)
ALBUMIN/GLOB SERPL: 0.9 (ref 0.4–1.6)
ALP SERPL-CCNC: 64 U/L (ref 50–136)
ALT SERPL-CCNC: 32 U/L (ref 12–65)
ANION GAP SERPL CALC-SCNC: 6 MMOL/L (ref 2–11)
AST SERPL-CCNC: 17 U/L (ref 15–37)
BILIRUB SERPL-MCNC: 1 MG/DL (ref 0.2–1.1)
BLASTS NFR BLD MANUAL: 6 %
BLD PROD TYP BPU: NORMAL
BLD PROD TYP BPU: NORMAL
BLOOD BANK CMNT PATIENT-IMP: NORMAL
BLOOD BANK CMNT PATIENT-IMP: NORMAL
BLOOD BANK DISPENSE STATUS: NORMAL
BLOOD BANK DISPENSE STATUS: NORMAL
BPU ID: NORMAL
BPU ID: NORMAL
BUN SERPL-MCNC: 24 MG/DL (ref 8–23)
CALCIUM SERPL-MCNC: 8.9 MG/DL (ref 8.3–10.4)
CHLORIDE SERPL-SCNC: 102 MMOL/L (ref 101–110)
CO2 SERPL-SCNC: 27 MMOL/L (ref 21–32)
CREAT SERPL-MCNC: 0.6 MG/DL (ref 0.6–1)
DIFFERENTIAL METHOD BLD: ABNORMAL
EOSINOPHIL # BLD: 0 K/UL (ref 0–0.8)
EOSINOPHIL NFR BLD MANUAL: 1 % (ref 1–8)
ERYTHROCYTE [DISTWIDTH] IN BLOOD BY AUTOMATED COUNT: 14.6 % (ref 11.9–14.6)
GLOBULIN SER CALC-MCNC: 3.9 G/DL (ref 2.8–4.5)
GLUCOSE SERPL-MCNC: 90 MG/DL (ref 65–100)
HCT VFR BLD AUTO: 23.1 % (ref 35.8–46.3)
HGB BLD-MCNC: 7.9 G/DL (ref 11.7–15.4)
LYMPHOCYTES # BLD: 1.6 K/UL (ref 0.5–4.6)
LYMPHOCYTES NFR BLD MANUAL: 59 % (ref 16–44)
MAGNESIUM SERPL-MCNC: 2.5 MG/DL (ref 1.8–2.4)
MCH RBC QN AUTO: 31.3 PG (ref 26.1–32.9)
MCHC RBC AUTO-ENTMCNC: 34.2 G/DL (ref 31.4–35)
MCV RBC AUTO: 91.7 FL (ref 82–102)
METAMYELOCYTES NFR BLD MANUAL: 1 %
MONOCYTES # BLD: 0.1 K/UL (ref 0.1–1.3)
MONOCYTES NFR BLD MANUAL: 3 % (ref 3–9)
MYELOCYTES NFR BLD MANUAL: 2 %
NEUTS SEG # BLD: 0.8 K/UL (ref 1.7–8.2)
NEUTS SEG NFR BLD MANUAL: 25 % (ref 47–75)
NRBC # BLD: 0.02 K/UL (ref 0–0.2)
PLATELET # BLD AUTO: 30 K/UL (ref 150–450)
PLATELET # BLD AUTO: 33 K/UL (ref 150–450)
PLATELET COMMENT: ABNORMAL
PMV BLD AUTO: 10.3 FL (ref 9.4–12.3)
POTASSIUM SERPL-SCNC: 4.1 MMOL/L (ref 3.5–5.1)
PROMYELOCYTES NFR BLD MANUAL: 3 %
PROT SERPL-MCNC: 7.3 G/DL (ref 6.3–8.2)
RBC # BLD AUTO: 2.52 M/UL (ref 4.05–5.2)
RBC MORPH BLD: ABNORMAL
SODIUM SERPL-SCNC: 135 MMOL/L (ref 133–143)
UNIT DIVISION: 0
UNIT DIVISION: 0
WBC # BLD AUTO: 2.7 K/UL (ref 4.3–11.1)

## 2023-11-12 PROCEDURE — 99232 SBSQ HOSP IP/OBS MODERATE 35: CPT | Performed by: INTERNAL MEDICINE

## 2023-11-12 PROCEDURE — 1170000000 HC RM PRIVATE ONCOLOGY

## 2023-11-12 PROCEDURE — 6370000000 HC RX 637 (ALT 250 FOR IP): Performed by: NURSE PRACTITIONER

## 2023-11-12 PROCEDURE — 86022 PLATELET ANTIBODIES: CPT

## 2023-11-12 PROCEDURE — P9037 PLATE PHERES LEUKOREDU IRRAD: HCPCS

## 2023-11-12 PROCEDURE — 80053 COMPREHEN METABOLIC PANEL: CPT

## 2023-11-12 PROCEDURE — 85049 AUTOMATED PLATELET COUNT: CPT

## 2023-11-12 PROCEDURE — 6370000000 HC RX 637 (ALT 250 FOR IP): Performed by: INTERNAL MEDICINE

## 2023-11-12 PROCEDURE — 36415 COLL VENOUS BLD VENIPUNCTURE: CPT

## 2023-11-12 PROCEDURE — 83735 ASSAY OF MAGNESIUM: CPT

## 2023-11-12 PROCEDURE — 36430 TRANSFUSION BLD/BLD COMPNT: CPT

## 2023-11-12 PROCEDURE — 85025 COMPLETE CBC W/AUTO DIFF WBC: CPT

## 2023-11-12 PROCEDURE — 6370000000 HC RX 637 (ALT 250 FOR IP): Performed by: FAMILY MEDICINE

## 2023-11-12 PROCEDURE — 1170000001 HC RM PRIVATE ONCOLOGY W/TELEMETRY

## 2023-11-12 PROCEDURE — 2580000003 HC RX 258: Performed by: FAMILY MEDICINE

## 2023-11-12 RX ORDER — SODIUM CHLORIDE 9 MG/ML
INJECTION, SOLUTION INTRAVENOUS PRN
Status: DISCONTINUED | OUTPATIENT
Start: 2023-11-12 | End: 2023-11-12

## 2023-11-12 RX ADMIN — ACETAMINOPHEN 650 MG: 325 TABLET ORAL at 09:41

## 2023-11-12 RX ADMIN — DIPHENHYDRAMINE HYDROCHLORIDE 10 ML: 12.5 LIQUID ORAL at 20:59

## 2023-11-12 RX ADMIN — MORPHINE SULFATE 15 MG: 15 TABLET, FILM COATED, EXTENDED RELEASE ORAL at 08:44

## 2023-11-12 RX ADMIN — LEVOTHYROXINE SODIUM 125 MCG: 0.12 TABLET ORAL at 08:43

## 2023-11-12 RX ADMIN — SODIUM CHLORIDE, PRESERVATIVE FREE 10 ML: 5 INJECTION INTRAVENOUS at 20:59

## 2023-11-12 RX ADMIN — MORPHINE SULFATE 15 MG: 15 TABLET, FILM COATED, EXTENDED RELEASE ORAL at 20:59

## 2023-11-12 RX ADMIN — POLYETHYLENE GLYCOL 3350 17 G: 17 POWDER, FOR SOLUTION ORAL at 08:43

## 2023-11-12 RX ADMIN — PANTOPRAZOLE SODIUM 40 MG: 40 TABLET, DELAYED RELEASE ORAL at 16:28

## 2023-11-12 RX ADMIN — DOCUSATE SODIUM 50 MG AND SENNOSIDES 8.6 MG 1 TABLET: 8.6; 5 TABLET, FILM COATED ORAL at 20:59

## 2023-11-12 RX ADMIN — POTASSIUM CHLORIDE 20 MEQ: 1500 TABLET, EXTENDED RELEASE ORAL at 08:44

## 2023-11-12 RX ADMIN — FAMOTIDINE 40 MG: 20 TABLET, FILM COATED ORAL at 20:58

## 2023-11-12 RX ADMIN — PREDNISONE 80 MG: 20 TABLET ORAL at 08:43

## 2023-11-12 RX ADMIN — SALINE NASAL SPRAY 2 SPRAY: 1.5 SOLUTION NASAL at 15:01

## 2023-11-12 RX ADMIN — SODIUM CHLORIDE, PRESERVATIVE FREE 10 ML: 5 INJECTION INTRAVENOUS at 08:45

## 2023-11-12 RX ADMIN — FERROUS SULFATE TAB 325 MG (65 MG ELEMENTAL FE) 325 MG: 325 (65 FE) TAB at 12:21

## 2023-11-12 RX ADMIN — ROSUVASTATIN CALCIUM 10 MG: 5 TABLET, FILM COATED ORAL at 08:44

## 2023-11-12 RX ADMIN — DIPHENHYDRAMINE HYDROCHLORIDE 25 MG: 25 CAPSULE ORAL at 09:41

## 2023-11-12 RX ADMIN — FLUOXETINE HYDROCHLORIDE 20 MG: 20 CAPSULE ORAL at 20:59

## 2023-11-12 RX ADMIN — CIPROFLOXACIN HYDROCHLORIDE 500 MG: 500 TABLET, FILM COATED ORAL at 20:59

## 2023-11-12 RX ADMIN — AMLODIPINE BESYLATE 5 MG: 5 TABLET ORAL at 08:43

## 2023-11-12 RX ADMIN — IPRATROPIUM BROMIDE 2 SPRAY: 42 SPRAY NASAL at 10:15

## 2023-11-12 RX ADMIN — SALINE NASAL SPRAY 2 SPRAY: 1.5 SOLUTION NASAL at 21:00

## 2023-11-12 RX ADMIN — IPRATROPIUM BROMIDE 2 SPRAY: 42 SPRAY NASAL at 21:00

## 2023-11-12 RX ADMIN — POTASSIUM CHLORIDE 20 MEQ: 1500 TABLET, EXTENDED RELEASE ORAL at 16:28

## 2023-11-12 RX ADMIN — FERROUS SULFATE TAB 325 MG (65 MG ELEMENTAL FE) 325 MG: 325 (65 FE) TAB at 16:28

## 2023-11-12 RX ADMIN — SALINE NASAL SPRAY 2 SPRAY: 1.5 SOLUTION NASAL at 02:51

## 2023-11-12 RX ADMIN — DIPHENHYDRAMINE HYDROCHLORIDE 10 ML: 12.5 LIQUID ORAL at 02:51

## 2023-11-12 RX ADMIN — CIPROFLOXACIN HYDROCHLORIDE 500 MG: 500 TABLET, FILM COATED ORAL at 08:44

## 2023-11-12 RX ADMIN — MONTELUKAST 10 MG: 10 TABLET, FILM COATED ORAL at 08:43

## 2023-11-12 RX ADMIN — SALINE NASAL SPRAY 2 SPRAY: 1.5 SOLUTION NASAL at 08:46

## 2023-11-12 RX ADMIN — PANTOPRAZOLE SODIUM 40 MG: 40 TABLET, DELAYED RELEASE ORAL at 08:43

## 2023-11-12 RX ADMIN — FLUOXETINE HYDROCHLORIDE 20 MG: 20 CAPSULE ORAL at 08:44

## 2023-11-12 ASSESSMENT — PAIN - FUNCTIONAL ASSESSMENT: PAIN_FUNCTIONAL_ASSESSMENT: ACTIVITIES ARE NOT PREVENTED

## 2023-11-12 ASSESSMENT — PAIN SCALES - GENERAL
PAINLEVEL_OUTOF10: 0
PAINLEVEL_OUTOF10: 2
PAINLEVEL_OUTOF10: 0

## 2023-11-12 ASSESSMENT — PAIN DESCRIPTION - FREQUENCY: FREQUENCY: INTERMITTENT

## 2023-11-12 ASSESSMENT — PAIN DESCRIPTION - ONSET: ONSET: ON-GOING

## 2023-11-12 ASSESSMENT — PAIN DESCRIPTION - LOCATION: LOCATION: BACK

## 2023-11-12 ASSESSMENT — PAIN DESCRIPTION - PAIN TYPE: TYPE: CHRONIC PAIN

## 2023-11-12 ASSESSMENT — PAIN DESCRIPTION - ORIENTATION: ORIENTATION: LOWER

## 2023-11-12 ASSESSMENT — PAIN DESCRIPTION - DESCRIPTORS: DESCRIPTORS: ACHING

## 2023-11-12 NOTE — PLAN OF CARE
Problem: Pain  Goal: Verbalizes/displays adequate comfort level or baseline comfort level  11/12/2023 0934 by Dandy Solis RN  Outcome: Progressing  11/11/2023 2137 by Adry Mosher RN  Outcome: Progressing     Problem: ABCDS Injury Assessment  Goal: Absence of physical injury  11/12/2023 0934 by Dandy Solis RN  Outcome: Progressing  11/11/2023 2137 by Adry Mosher RN  Outcome: Progressing  Flowsheets (Taken 11/11/2023 2000)  Absence of Physical Injury: Implement safety measures based on patient assessment     Problem: Safety - Adult  Goal: Free from fall injury  11/12/2023 0934 by Dandy Solis RN  Outcome: Progressing  11/11/2023 2137 by Adry Mosher RN  Outcome: Progressing  Flowsheets (Taken 11/11/2023 2000)  Free From Fall Injury: Instruct family/caregiver on patient safety

## 2023-11-12 NOTE — PROGRESS NOTES
END OF SHIFT SUMMARY:    Significant vitals this shift:  yes  Significant labs this shift:  no  Tests performed this shift:  no  Orders to be followed up on:  no  Blood products given this shift:  platelets   Additional events this shift:   Vss pt A&O X's 4 no complaints of pain or discomfort resting in bed voiding well received 1 unit of platelets tolerated it well     I/Os:  +/- this shift: yes  11/12 0701 - 11/12 1900  In: 3286.3 [P.O.:240]  Out: 300   Unmeasured Occurrences this Shift:  Urine yes, BM no, Emesis no      Bedside shift report given to Oneil Alexander RN

## 2023-11-12 NOTE — PROGRESS NOTES
EOS notes:    Remains afebrile. No bleeding;per report crossmatched platelets N/A til morning. Encouraged mouth care;compliant. Bleeding precautions observed. Used O2/NC 2LPM prn. Plan for chemo today.

## 2023-11-12 NOTE — PROGRESS NOTES
[facility]        Inpatient Hematology / Oncology Progress Note    Reason for Admission:  Neutropenic fever ([address]) [D70.9, R50.81]  Pancytopenia ([address]) [D61.818]    24 Hour Events:  VSS, HTN at times, afebrile - on RA  Today's plt count 30k - day 14 steroids (s/p 2 days IVIG)  No further noted bleeding  Hgb 7.9 s/p PRBCs yesterday   BMBx 11/7 concerning for [facility]  Holding off on Etop/Dex given plt improvement  Feeling ok, no complaints, no bleeding    Transfusions: Crossmatched plts BID   Replacements: None    ROS:  Constitutional: Positive for fatigue; negative for fever, chills. CV: Negative for chest pain, palpitations, edema. Respiratory: +cough, dyspnea. Negative for wheezing. GI: Negative for nausea, abdominal pain, diarrhea. 10 point review of systems is otherwise negative with the exception of the elements mentioned above in the HPI. Allergies   Allergen Reactions    Penicillins Hives    Sulfa Antibiotics Hives    Codeine Nausea And Vomiting     Past Medical History:   Diagnosis Date    Arthritis     Autoimmune disease ([address])     skin changes, unknown name    Cancer ([address]) 1990    ovarian    Chronic pain     arthritis in back and legs    Coronary artery spasm ([address])     COVID 05/15/2022    Essential hypertension 11/8/2019    Gastritis     GERD (gastroesophageal reflux disease)     Hx antineoplastic chemo     Migraine headache     Psoriasis     Psychiatric disorder     anxiety and depression    Severe obesity (BMI 35.0-39.9) 6/26/2018    Thyroid disease     Diagnosed in 1996     Past Surgical History:   Procedure Laterality Date    CARPAL TUNNEL RELEASE      GYN      ovaries    HYSTERECTOMY, TOTAL ABDOMINAL (CERVIX REMOVED)  Patriciabury      cardiac cath. Dx with spasms.     TUBAL LIGATION       Family History   Problem Relation Age of Onset    Parkinson's Disease Mother     Stroke Mother         Passed away in 56    No Known Problems 11/12/23  8:39 AM   Result Value Ref Range    Platelets 33 (L) 150 - 450 K/uL   PREPARE PLATELETS, 1 Product    Collection Time: 11/12/23  9:00 AM   Result Value Ref Range    Blood Bank Comment CROSSMATCHD BY THE BLOOD CONNECTION     Unit Number [id number]     Product Code Blood Bank MARIA TERESA,[id number]     Unit Divison 00     Dispense Status Blood Bank ISSUED        Imaging:  Xray Result (most recent):  XR CHEST PORTABLE 11/01/2023    Narrative  CHEST X-RAY, 1 VIEW    INDICATION: Fluid overload    COMPARISON: Chest x-ray 10/27/2023    TECHNIQUE: Single AP view of the chest was obtained.    FINDINGS:    Lungs and pleural spaces: Pulmonary vascular congestion and diffuse interstitial  prominence. The lungs are diminished in volume. No pneumothorax or pleural  effusion.    Cardiomediastinal silhouette: Normal.    Osseous structures: Normal.    Impression  Pulmonary vascular congestion and diffuse interstitial prominence reflecting  edema or atypical infiltrates.        ASSESSMENT:  Patient Active Problem List   Diagnosis    Hypothyroidism, adult    Essential hypertension    Chronic hand pain    Psoriatic arthritis (HCC)    Multiple allergies    Psoriasis, guttate    Other chest pain    Coronary artery spasm (HCC)    Chronic midline low back pain without sciatica    History of ovarian cancer    Chronic pain of multiple joints    Esophageal reflux    Anxiety and depression    Mixed hyperlipidemia    Class 1 obesity with serious comorbidity and body mass index (BMI) of 33.0 to 33.9 in adult    Esophageal dysphagia    Nausea and vomiting    Generalized abdominal pain    Elevated bilirubin    Neutropenic fever (HCC)    MDS (myelodysplastic syndrome) (HCC)    Pancytopenia (HCC)    Mood disorder (HCC)    Constipation due to pain medication    Immunosuppressed status (HCC)    Rectal bleeding    Bilateral impacted cerumen    Acute upper respiratory infection    Sepsis (HCC)    Hyponatremia    Anemia requiring transfusions     continues to receive twice daily plts for < 10k. IL2 WNL, Fibrinogen WNL. NK cell activity and CXCL9 pending    11/11 D13 steroids (changed to prednisone 1mg/kg 11/6). Plts up to 5k. NK cell activity prelim, CXCL9 pending. Due to concern for [address] [place] [address] and minimal improvement in plt count - planning for Etop/Dex likely to start tomorrow. 11/12 D14 steroids (changed to prednisone 1mg/kg 11/6). Plts up to 40k yesterday, 30k this AM.  Planned to start Etop/Dex today, however holding off given dramatic improvement of plt count since yesterday. Bone marrow may be recovering from prior Rev/Humira. Neutropenic fever  - started on cef/vanc RVP neg  - CXR unremarkable  10/29 BC+ staph epidermidis, continue Cef/Vanc, MRSA DNA pending, consult ID for recommendations  10/31 Continues Cefe/Vanc per ID but S epi likely contaminant. Repeat BC NGTD. TM 98. Likely transitioning to PO/prophylactic Cipro today. 11/1 Afebrile. Transition to prophylactic cipro now that she has completed 5 day course of abx with likely contaminant/S epi with repeat BC.  RESOLVED    Altered mental status  11/1 Likely secondary to HD steroids. CT head negative. CTA and MRI brain pending after Code S called last night for dysarthria. 11/2 CTA negative. Unable to tolerate MRI brain without sedation so holding for now as symptoms more consistent with steroid-related effects vs stroke as no focal deficits noted. RESOLVED    Hypoxia / LE edema  11/1 LE edema may be related to steroids. Incomplete I/O recorded, weight up since admission. Stopped IVF. Check CXR and BNP.  11/2 BNP elevated, CXR with pulmonary vascular congestion. On 2-4L NC. Weight remains elevated since admission. 11/4 Stable fluid status, on 3L NC - weaning as tolerated. Was hypoxic when found off O2 in the middle of the night. 11/7 Continuing to wean O2 as tolerated. Down to 1L NC. Fluid balance stable.   11/8 O2 back up to 3 L NC.    10/10 on RA      Bleeding - epistaxis and

## 2023-11-13 PROBLEM — D69.6 SEVERE THROMBOCYTOPENIA (HCC): Status: ACTIVE | Noted: 2023-11-13

## 2023-11-13 LAB
ABO + RH BLD: NORMAL
ALBUMIN SERPL-MCNC: 3.6 G/DL (ref 3.2–4.6)
ALBUMIN/GLOB SERPL: 0.9 (ref 0.4–1.6)
ALP SERPL-CCNC: 70 U/L (ref 50–136)
ALT SERPL-CCNC: 37 U/L (ref 12–65)
ANION GAP SERPL CALC-SCNC: 7 MMOL/L (ref 2–11)
AST SERPL-CCNC: 18 U/L (ref 15–37)
BASOPHILS # BLD AUTO: 0.1 X10E3/UL (ref 0–0.2)
BASOPHILS # BLD: 0.1 K/UL (ref 0–0.2)
BASOPHILS NFR BLD AUTO: 10 %
BASOPHILS NFR BLD: 2 % (ref 0–2)
BILIRUB SERPL-MCNC: 0.9 MG/DL (ref 0.2–1.1)
BLD PROD TYP BPU: NORMAL
BLOOD BANK CMNT PATIENT-IMP: NORMAL
BLOOD BANK DISPENSE STATUS: NORMAL
BLOOD GROUP ANTIBODIES SERPL: NORMAL
BPU ID: NORMAL
BUN SERPL-MCNC: 26 MG/DL (ref 8–23)
CALCIUM SERPL-MCNC: 9.2 MG/DL (ref 8.3–10.4)
CD3+CD16+CD56+ CELLS # BLD: 46 /UL (ref 24–406)
CD3+CD16+CD56+ CELLS NFR BLD: 9.1 % (ref 1.4–19.4)
CHLORIDE SERPL-SCNC: 102 MMOL/L (ref 101–110)
CO2 SERPL-SCNC: 26 MMOL/L (ref 21–32)
CREAT SERPL-MCNC: 0.6 MG/DL (ref 0.6–1)
DIFFERENTIAL METHOD BLD: ABNORMAL
EOSINOPHIL # BLD AUTO: 0 X10E3/UL (ref 0–0.4)
EOSINOPHIL # BLD: 0 K/UL (ref 0–0.8)
EOSINOPHIL NFR BLD AUTO: 0 %
EOSINOPHIL NFR BLD: 0 % (ref 0.5–7.8)
ERYTHROCYTE [DISTWIDTH] IN BLOOD BY AUTOMATED COUNT: 14.4 % (ref 11.7–15.4)
ERYTHROCYTE [DISTWIDTH] IN BLOOD BY AUTOMATED COUNT: 14.6 % (ref 11.9–14.6)
GLOBULIN SER CALC-MCNC: 4.2 G/DL (ref 2.8–4.5)
GLUCOSE SERPL-MCNC: 87 MG/DL (ref 65–100)
HCT VFR BLD AUTO: 20.4 % (ref 34–46.6)
HCT VFR BLD AUTO: 24.1 % (ref 35.8–46.3)
HGB BLD-MCNC: 7.1 G/DL (ref 11.1–15.9)
HGB BLD-MCNC: 8 G/DL (ref 11.7–15.4)
IMM GRANULOCYTES # BLD AUTO: 0.1 K/UL (ref 0–0.5)
IMM GRANULOCYTES NFR BLD AUTO: 3 % (ref 0–5)
IMMATURE CELLS: ABNORMAL
LYMPHOCYTES # BLD AUTO: 0.5 X10E3/UL (ref 0.7–3.1)
LYMPHOCYTES # BLD: 1.8 K/UL (ref 0.5–4.6)
LYMPHOCYTES NFR BLD AUTO: 45 %
LYMPHOCYTES NFR BLD: 62 % (ref 13–44)
Lab: 3 % (ref 0–0)
MAGNESIUM SERPL-MCNC: 2.6 MG/DL (ref 1.8–2.4)
MCH RBC QN AUTO: 30.4 PG (ref 26.1–32.9)
MCH RBC QN AUTO: 32.4 PG (ref 26.6–33)
MCHC RBC AUTO-ENTMCNC: 33.2 G/DL (ref 31.4–35)
MCHC RBC AUTO-ENTMCNC: 34 G/DL (ref 31.5–35.7)
MCV RBC AUTO: 91.6 FL (ref 82–102)
MCV RBC AUTO: 93 FL (ref 79–97)
MONOCYTES # BLD AUTO: 0.1 X10E3/UL (ref 0.1–0.9)
MONOCYTES # BLD: 0.2 K/UL (ref 0.1–1.3)
MONOCYTES NFR BLD AUTO: 6 %
MONOCYTES NFR BLD: 8 % (ref 4–12)
MORPHOLOGY BLD-IMP: ABNORMAL
NEUTROPHILS # BLD AUTO: 0.4 X10E3/UL (ref 1.4–7)
NEUTROPHILS NFR BLD AUTO: 36 %
NEUTS SEG # BLD: 0.8 K/UL (ref 1.7–8.2)
NEUTS SEG NFR BLD: 25 % (ref 43–78)
NRBC # BLD: 0.03 K/UL (ref 0–0.2)
PLATELET # BLD AUTO: 22 K/UL (ref 150–450)
PLATELET # BLD AUTO: <5 X10E3/UL (ref 150–450)
PLATELET COMMENT: ABNORMAL
PMV BLD AUTO: 11 FL (ref 9.4–12.3)
POTASSIUM SERPL-SCNC: 4.1 MMOL/L (ref 3.5–5.1)
PROT SERPL-MCNC: 7.8 G/DL (ref 6.3–8.2)
RBC # BLD AUTO: 2.19 X10E6/UL (ref 3.77–5.28)
RBC # BLD AUTO: 2.63 M/UL (ref 4.05–5.2)
RBC MORPH BLD: ABNORMAL
SODIUM SERPL-SCNC: 135 MMOL/L (ref 133–143)
SPECIMEN EXP DATE BLD: NORMAL
UNIT DIVISION: 0
WBC # BLD AUTO: 1.1 X10E3/UL (ref 3.4–10.8)
WBC # BLD AUTO: 3 K/UL (ref 4.3–11.1)
WBC MORPH BLD: ABNORMAL

## 2023-11-13 PROCEDURE — 80053 COMPREHEN METABOLIC PANEL: CPT

## 2023-11-13 PROCEDURE — 83735 ASSAY OF MAGNESIUM: CPT

## 2023-11-13 PROCEDURE — 86850 RBC ANTIBODY SCREEN: CPT

## 2023-11-13 PROCEDURE — APPSS30 APP SPLIT SHARED TIME 16-30 MINUTES: Performed by: NURSE PRACTITIONER

## 2023-11-13 PROCEDURE — 2580000003 HC RX 258: Performed by: FAMILY MEDICINE

## 2023-11-13 PROCEDURE — 97530 THERAPEUTIC ACTIVITIES: CPT

## 2023-11-13 PROCEDURE — 1170000000 HC RM PRIVATE ONCOLOGY

## 2023-11-13 PROCEDURE — 36415 COLL VENOUS BLD VENIPUNCTURE: CPT

## 2023-11-13 PROCEDURE — 6370000000 HC RX 637 (ALT 250 FOR IP): Performed by: NURSE PRACTITIONER

## 2023-11-13 PROCEDURE — 86901 BLOOD TYPING SEROLOGIC RH(D): CPT

## 2023-11-13 PROCEDURE — 6370000000 HC RX 637 (ALT 250 FOR IP): Performed by: INTERNAL MEDICINE

## 2023-11-13 PROCEDURE — 86900 BLOOD TYPING SEROLOGIC ABO: CPT

## 2023-11-13 PROCEDURE — 97164 PT RE-EVAL EST PLAN CARE: CPT

## 2023-11-13 PROCEDURE — 99232 SBSQ HOSP IP/OBS MODERATE 35: CPT | Performed by: INTERNAL MEDICINE

## 2023-11-13 PROCEDURE — 6370000000 HC RX 637 (ALT 250 FOR IP): Performed by: FAMILY MEDICINE

## 2023-11-13 PROCEDURE — 85025 COMPLETE CBC W/AUTO DIFF WBC: CPT

## 2023-11-13 RX ADMIN — POLYETHYLENE GLYCOL 3350 17 G: 17 POWDER, FOR SOLUTION ORAL at 08:20

## 2023-11-13 RX ADMIN — POTASSIUM CHLORIDE 20 MEQ: 1500 TABLET, EXTENDED RELEASE ORAL at 17:34

## 2023-11-13 RX ADMIN — MORPHINE SULFATE 15 MG: 15 TABLET, FILM COATED, EXTENDED RELEASE ORAL at 20:55

## 2023-11-13 RX ADMIN — SODIUM CHLORIDE, PRESERVATIVE FREE 10 ML: 5 INJECTION INTRAVENOUS at 20:57

## 2023-11-13 RX ADMIN — CIPROFLOXACIN HYDROCHLORIDE 500 MG: 500 TABLET, FILM COATED ORAL at 08:18

## 2023-11-13 RX ADMIN — SALINE NASAL SPRAY 2 SPRAY: 1.5 SOLUTION NASAL at 08:19

## 2023-11-13 RX ADMIN — SALINE NASAL SPRAY 2 SPRAY: 1.5 SOLUTION NASAL at 20:57

## 2023-11-13 RX ADMIN — AMLODIPINE BESYLATE 5 MG: 5 TABLET ORAL at 08:18

## 2023-11-13 RX ADMIN — ACETAMINOPHEN 650 MG: 325 TABLET ORAL at 17:47

## 2023-11-13 RX ADMIN — FERROUS SULFATE TAB 325 MG (65 MG ELEMENTAL FE) 325 MG: 325 (65 FE) TAB at 17:34

## 2023-11-13 RX ADMIN — PANTOPRAZOLE SODIUM 40 MG: 40 TABLET, DELAYED RELEASE ORAL at 08:18

## 2023-11-13 RX ADMIN — POTASSIUM CHLORIDE 20 MEQ: 1500 TABLET, EXTENDED RELEASE ORAL at 08:17

## 2023-11-13 RX ADMIN — SALINE NASAL SPRAY 2 SPRAY: 1.5 SOLUTION NASAL at 03:12

## 2023-11-13 RX ADMIN — IPRATROPIUM BROMIDE 2 SPRAY: 42 SPRAY NASAL at 20:57

## 2023-11-13 RX ADMIN — FERROUS SULFATE TAB 325 MG (65 MG ELEMENTAL FE) 325 MG: 325 (65 FE) TAB at 12:32

## 2023-11-13 RX ADMIN — PREDNISONE 80 MG: 20 TABLET ORAL at 08:18

## 2023-11-13 RX ADMIN — DOCUSATE SODIUM 50 MG AND SENNOSIDES 8.6 MG 1 TABLET: 8.6; 5 TABLET, FILM COATED ORAL at 20:55

## 2023-11-13 RX ADMIN — IPRATROPIUM BROMIDE 2 SPRAY: 42 SPRAY NASAL at 08:20

## 2023-11-13 RX ADMIN — MORPHINE SULFATE 15 MG: 15 TABLET, FILM COATED, EXTENDED RELEASE ORAL at 08:18

## 2023-11-13 RX ADMIN — DIPHENHYDRAMINE HYDROCHLORIDE 10 ML: 12.5 LIQUID ORAL at 12:32

## 2023-11-13 RX ADMIN — MONTELUKAST 10 MG: 10 TABLET, FILM COATED ORAL at 08:18

## 2023-11-13 RX ADMIN — LEVOTHYROXINE SODIUM 125 MCG: 0.12 TABLET ORAL at 08:18

## 2023-11-13 RX ADMIN — FAMOTIDINE 40 MG: 20 TABLET, FILM COATED ORAL at 20:55

## 2023-11-13 RX ADMIN — FLUOXETINE HYDROCHLORIDE 20 MG: 20 CAPSULE ORAL at 20:55

## 2023-11-13 RX ADMIN — DIPHENHYDRAMINE HYDROCHLORIDE 10 ML: 12.5 LIQUID ORAL at 08:28

## 2023-11-13 RX ADMIN — SODIUM CHLORIDE, PRESERVATIVE FREE 10 ML: 5 INJECTION INTRAVENOUS at 08:24

## 2023-11-13 RX ADMIN — CIPROFLOXACIN HYDROCHLORIDE 500 MG: 500 TABLET, FILM COATED ORAL at 20:55

## 2023-11-13 RX ADMIN — SALINE NASAL SPRAY 2 SPRAY: 1.5 SOLUTION NASAL at 15:03

## 2023-11-13 RX ADMIN — ROSUVASTATIN CALCIUM 10 MG: 5 TABLET, FILM COATED ORAL at 08:18

## 2023-11-13 RX ADMIN — FLUOXETINE HYDROCHLORIDE 20 MG: 20 CAPSULE ORAL at 08:18

## 2023-11-13 RX ADMIN — DIPHENHYDRAMINE HYDROCHLORIDE 10 ML: 12.5 LIQUID ORAL at 03:12

## 2023-11-13 RX ADMIN — PANTOPRAZOLE SODIUM 40 MG: 40 TABLET, DELAYED RELEASE ORAL at 15:03

## 2023-11-13 ASSESSMENT — PAIN DESCRIPTION - ORIENTATION
ORIENTATION: RIGHT;LOWER
ORIENTATION: LOWER

## 2023-11-13 ASSESSMENT — PAIN DESCRIPTION - DESCRIPTORS
DESCRIPTORS: ACHING
DESCRIPTORS: ACHING

## 2023-11-13 ASSESSMENT — PAIN SCALES - GENERAL
PAINLEVEL_OUTOF10: 0
PAINLEVEL_OUTOF10: 3
PAINLEVEL_OUTOF10: 0
PAINLEVEL_OUTOF10: 2
PAINLEVEL_OUTOF10: 0

## 2023-11-13 ASSESSMENT — PAIN DESCRIPTION - LOCATION
LOCATION: BACK
LOCATION: BACK

## 2023-11-13 ASSESSMENT — PAIN - FUNCTIONAL ASSESSMENT: PAIN_FUNCTIONAL_ASSESSMENT: ACTIVITIES ARE NOT PREVENTED

## 2023-11-13 NOTE — PLAN OF CARE
END OF SHIFT SUMMARY:    Significant vitals this shift:  WDL, has remained afebrile  Significant labs this shift:  platelets 22 this AM  Tests performed this shift:  NA  Orders to be followed up on:  monitor H&H, plts and for signs of bleeding  Blood products given this shift:  NA  Additional events this shift:  pt walked to nurses station and back x3. Tylenol x1 for c/o lower back pain. I/Os:  +/- this shift:   11/13 0701 - 11/13 1900  In: 1010 [P.O.:1000; I.V.:10]  Out: 600 [Urine:600]  Unmeasured Occurrences this Shift: BM 1, Emesis 0    Patient has remained A&O x 4. Patient rounded on hourly by myself or patient assistant and encouraged to call with any needs. No signs of distress noted. Bed low, locked, call bell within reach. Bedside shift report given to [address] [place] [address], RN    GAVINO NELSON RN    Problem: Pain  Goal: Verbalizes/displays adequate comfort level or baseline comfort level  Outcome: Progressing     Problem: ABCDS Injury Assessment  Goal: Absence of physical injury  Outcome: Progressing  Flowsheets (Taken 11/13/2023 1400)  Absence of Physical Injury: Implement safety measures based on patient assessment     Problem: Safety - Adult  Goal: Free from fall injury  Outcome: Progressing     Problem: Discharge Planning  Goal: Discharge to home or other facility with appropriate resources  Outcome: Progressing     Problem: Skin/Tissue Integrity  Goal: Absence of new skin breakdown  Description: 1. Monitor for areas of redness and/or skin breakdown  2. Assess vascular access sites hourly  3. Every 4-6 hours minimum:  Change oxygen saturation probe site  4. Every 4-6 hours:  If on nasal continuous positive airway pressure, respiratory therapy assess nares and determine need for appliance change or resting period.   Outcome: Progressing     Problem: Neurosensory - Adult  Goal: Achieves stable or improved neurological status  Outcome: Progressing  Goal: Achieves maximal functionality and self care  Outcome: staff  Outcome: Progressing     Problem: Coping  Goal: Pt/Family able to verbalize concerns and demonstrate effective coping strategies  Description: INTERVENTIONS:  1. Assist patient/family to identify coping skills, available support systems and cultural and spiritual values  2. Provide emotional support, including active listening and acknowledgement of concerns of patient and caregivers  3. Reduce environmental stimuli, as able  4. Instruct patient/family in relaxation techniques, as appropriate  5. Assess for spiritual pain/suffering and initiate Spiritual Care, Psychosocial Clinical Specialist consults as needed  Outcome: Progressing     Problem: Behavior  Goal: Pt/Family maintain appropriate behavior and adhere to behavioral management agreement, if implemented  Description: INTERVENTIONS:  1. Assess patient/family's coping skills and  non-compliant behavior (including use of illegal substances)  2. Notify security of behavior or suspected illegal substances which indicate the need for search of the family and/or belongings  3. Encourage verbalization of thoughts and concerns in a socially appropriate manner  4. Utilize positive, consistent limit setting strategies supporting safety of patient, staff and others  5. Encourage participation in the decision making process about the behavioral management agreement  6. If a visitor's behavior poses a threat to safety call refer to organization policy.   7. Initiate consult with , Psychosocial CNS, Spiritual Care as appropriate  Outcome: Progressing

## 2023-11-13 NOTE — PROGRESS NOTES
EOS notes:    No bleeding noted/reported. Remains afebrile. Slept fairly well overnight;no new complaints. Compliant w/ mouth care.

## 2023-11-13 NOTE — PROGRESS NOTES
ACUTE PHYSICAL THERAPY GOALS:   (Developed with and agreed upon by patient and/or caregiver.) Assessed 11/13/23  LTG:  (1.)Ms. Jaleesa Rollins will move from supine to sit and sit to supine , scoot up and down, and roll side to side in bed with INDEPENDENCE within 7 treatment day(s). (2.)Ms. Jaleesa Rollins will transfer from bed to chair and chair to bed with MODIFIED INDEPENDENCE using the least restrictive device within 7 treatment day(s). (3.)Ms. Jaleesa Rollins will ambulate with MODIFIED INDEPENDENCE for 500 feet with the least restrictive device within 7 treatment day(s). (4.)Ms. Jaleesa Rollins will participate in therapeutic activity/exercises x 25 minutes for increased activity tolerance within 7 treatment days. (5.)Ms. Jaleesa Rollins will perform standing static and dynamic balance activities x 15 minutes with SUPERVISION to improve safety within 7 day(s). New Goal:     Ms. Jaleesa Rollins will demonstrate understanding of energy conservation and activity pacing techniques and apply them with no cueing within 7 days. ________________________________________________________________________________________________        PHYSICAL THERAPY Daily Note, Re-evaluation, and AM  (Link to Caseload Tracking: PT Visit Days : 1  Acknowledge Orders  Time In/Out  PT Charge Capture  Rehab Caseload Tracker    Israel Holt is a 71 y.o. female   PRIMARY DIAGNOSIS: Neutropenic fever ([address])  Neutropenic fever ([address]) [D70.9, R50.81]  Pancytopenia ([address]) [W52.620]       Reason for Referral: Generalized Muscle Weakness (M62.81)  Difficulty in walking, Not elsewhere classified (R26.2)  Inpatient: Payor: Jaylan Rosas / Plan: Venkatesh Pel / Product Type: *No Product type* /     ASSESSMENT:     REHAB RECOMMENDATIONS:   Recommendation to date pending progress:  Setting:  Home Health Therapy    Equipment:    To Be Determined     ASSESSMENT:  Ms. Jaleesa Rollins was admitted to the hospital with c/o weakness and lethargy.   Pt has a history of MDS and was neutropenic [] [] [] []    Distance 100  feet x 3    DME Rolling Walker    Gait Quality Decreased annabel , Decreased step length, and Trunk sway increased    Weightbearing Status      Stairs      I=Independent, Mod I=Modified Independent, S=Supervision, SBA=Standby Assistance, CGA=Contact Guard Assistance,   Min=Minimal Assistance, Mod=Moderate Assistance, Max=Maximal Assistance, Total=Total Assistance, NT=Not Tested    PLAN:   FREQUENCY AND DURATION: 3 times/week for duration of hospital stay or until stated goals are met, whichever comes first.    THERAPY PROGNOSIS: Good    PROBLEM LIST:   (Skilled intervention is medically necessary to address:)  Decreased Activity Tolerance  Decreased Balance  Decreased Gait Ability  Decreased Safety Awareness  Decreased Strength  Decreased Transfer Abilities INTERVENTIONS PLANNED:   (Benefits and precautions of physical therapy have been discussed with the patient.)  Therapeutic Activity  Therapeutic Exercise/HEP  Neuromuscular Re-education  Gait Training  Education       TREATMENT:   RE-EVALUATION    TREATMENT:   Therapeutic Activity (23 Minutes): Therapeutic activity included Rolling, Supine to Sit, Sit to Supine, Scooting, Transfer Training, Ambulation on level ground, Sitting balance , and Standing balance to improve functional Activity tolerance, Balance, Mobility, and Strength.     TREATMENT GRID:  N/A    AFTER TREATMENT PRECAUTIONS: Bed, Bed/Chair Locked, Call light within reach, Needs within reach, RN notified, and Visitors at bedside    INTERDISCIPLINARY COLLABORATION:  RN/ PCT    EDUCATION:      TIME IN/OUT:  Time In: 0934  Time Out: 1000  Minutes: [address] ORLIN DUNN

## 2023-11-13 NOTE — CARE COORDINATION
Pt discussed during IDR and chart screened by CM for d/c planning. Currently requiring 2L O2 NC. Wean as tolerated. If unable to wean prior to d/c pt will require a 6MWT as she does not have home O2. Oncology monitoring plt count. D15 steroids. Pt will not receive plts today so as to see how plts respond on their own. Recent BMBx with possible hemophagocytosis (final path pending). PT/OT recommend HH at d/c. Anticipated dispo: return home, with HH if agreeable, once plt count is stable. Possible d/c toward the end of this week if plt count continues to improve. CM will continue to follow.   LOS = 17 days

## 2023-11-13 NOTE — PROGRESS NOTES
(changed to prednisone 1mg/kg 11/6).  Plts up to 40k yesterday, 30k this AM.  Planned to start Etop/Dex today, however holding off given dramatic improvement of plt count since yesterday.  Bone marrow may be recovering from prior Rev/Humira.     11/13 Recent BMBx with possible hemophagocytosis (final path pending). However, plts now relatively stable so holding on etop/dex. [facility] labs appear unremarkable. Holding off on transfusion today, recheck platelet count in AM.    Neutropenic fever  - started on cef/vanc RVP neg  - CXR unremarkable  10/29 BC+ staph epidermidis, continue Cef/Vanc, MRSA DNA pending, consult ID for recommendations  10/31 Continues Cefe/Vanc per ID but S epi likely contaminant. Repeat BC NGTD. TM 98. Likely transitioning to PO/prophylactic Cipro today.  11/1 Afebrile. Transition to prophylactic cipro now that she has completed 5 day course of abx with likely contaminant/S epi with repeat BC.  RESOLVED    Altered mental status  11/1 Likely secondary to HD steroids. CT head negative. CTA and MRI brain pending after Code S called last night for dysarthria.  11/2 CTA negative. Unable to tolerate MRI brain without sedation so holding for now as symptoms more consistent with steroid-related effects vs stroke as no focal deficits noted.  RESOLVED    Hypoxia / LE edema  11/1 LE edema may be related to steroids. Incomplete I/O recorded, weight up since admission. Stopped IVF. Check CXR and BNP.  11/2 BNP elevated, CXR with pulmonary vascular congestion. On 2-4L NC. Weight remains elevated since admission.   11/4 Stable fluid status, on 3L NC - weaning as tolerated. Was hypoxic when found off O2 in the middle of the night.  11/7 Continuing to wean O2 as tolerated. Down to 1L NC. Fluid balance stable.  11/8 O2 back up to 3 L NC.    10/10 on RA   11/13 on 2L - weight stable.     Bleeding - epistaxis and rectal bleeding w BM   - ASA on hold; transfuse Hb >7 and plts >50   - PPI  10/29 denies bleeding today  11/5 Per RN  yesterday, small amount of blood from nares, no epistaxis. Otherwise, no bleeding noted. Transfusing plts for plt 2k. 11/9 Pt with episode of bleeding yesterday after scratching self, pressure held and additional 1 unit plts given (3 total yesterday). No further bleeding noted today thus far. 11/10 No further noted bleeding ON/today thus far. Is receiving PRBCs/plts today   11/11 Hgb improved after transfusion yesterday. Plt count 5k , no notable bleeding. 11/12 Plt count up to 40k yesterday (received 1 unit plts yesterday) this AM at 30k, much improved. 11/13 No bleeding noted     Hypertension  11/2 Resume home amlodipine  11/4 More hypertensive overnight, monitor. May need to adjust amlodipine if no improvement. Has PRN hydralazine. 11/8 Overall BP improved      Chronic pain   - on morphine ER 15mg BID, norco.  Muscle relaxant. Constipation   - bowel regimen      No AC due to TCP   Continue home meds  Supportive care  PT/OT - HH  Antimicrobial prophylaxis - Cipro    Dispo - anticipate DC home with HH once plts improved. She will need to FU with Dr Sofiya Gaitan upon DC. Goals and plan of care reviewed with the patient. All questions answered to the best of our ability. Nelsy Watts, APRN - [facility]  [address] [place] [address]  Office : [phone]  Fax : [phone]     Attending Addendum:  I have personally performed a face to face diagnostic evaluation on this patient. I have reviewed and agree with the care plan as documented above by Yury Angeles, N.P.  My findings are as follows: Patient appears stable, heart rate regular without murmurs, abdomen is non-tender, bowel sounds are positive. 71 female, history of psoriatic arthritis (on Humira), admitted with fever and sore throat. Recently diagnosed with hematology Dr Sofiya Gaitan with MDS, 5q. deletion, RAEB 1, on Revlimid.   Treated with hydration and

## 2023-11-14 ENCOUNTER — APPOINTMENT (OUTPATIENT)
Dept: GENERAL RADIOLOGY | Age: 73
DRG: 808 | End: 2023-11-14
Payer: MEDICARE

## 2023-11-14 ENCOUNTER — APPOINTMENT (OUTPATIENT)
Dept: NON INVASIVE DIAGNOSTICS | Age: 73
DRG: 808 | End: 2023-11-14
Payer: MEDICARE

## 2023-11-14 PROBLEM — C94.00: Status: ACTIVE | Noted: 2023-11-14

## 2023-11-14 LAB
ALBUMIN SERPL-MCNC: 3.4 G/DL (ref 3.2–4.6)
ALBUMIN/GLOB SERPL: 0.8 (ref 0.4–1.6)
ALP SERPL-CCNC: 72 U/L (ref 50–136)
ALT SERPL-CCNC: 38 U/L (ref 12–65)
ANION GAP SERPL CALC-SCNC: 5 MMOL/L (ref 2–11)
AST SERPL-CCNC: 17 U/L (ref 15–37)
BASOPHILS # BLD: 0.1 K/UL (ref 0–0.2)
BASOPHILS NFR BLD: 2 % (ref 0–2)
BILIRUB SERPL-MCNC: 0.8 MG/DL (ref 0.2–1.1)
BLD PROD TYP BPU: NORMAL
BLOOD BANK CMNT PATIENT-IMP: NORMAL
BLOOD BANK DISPENSE STATUS: NORMAL
BPU ID: NORMAL
BUN SERPL-MCNC: 25 MG/DL (ref 8–23)
CALCIUM SERPL-MCNC: 9.3 MG/DL (ref 8.3–10.4)
CHLORIDE SERPL-SCNC: 100 MMOL/L (ref 101–110)
CO2 SERPL-SCNC: 29 MMOL/L (ref 21–32)
CREAT SERPL-MCNC: 0.6 MG/DL (ref 0.6–1)
DIFFERENTIAL METHOD BLD: ABNORMAL
ECHO AO ASC DIAM: 3.1 CM
ECHO AO ASCENDING AORTA INDEX: 1.78 CM/M2
ECHO AO ROOT DIAM: 2.9 CM
ECHO AO ROOT INDEX: 1.67 CM/M2
ECHO AV AREA PEAK VELOCITY: 2.7 CM2
ECHO AV AREA VTI: 2.8 CM2
ECHO AV AREA/BSA PEAK VELOCITY: 1.6 CM2/M2
ECHO AV AREA/BSA VTI: 1.6 CM2/M2
ECHO AV MEAN GRADIENT: 7 MMHG
ECHO AV MEAN VELOCITY: 1.2 M/S
ECHO AV PEAK GRADIENT: 11 MMHG
ECHO AV PEAK VELOCITY: 1.7 M/S
ECHO AV VELOCITY RATIO: 0.82
ECHO AV VTI: 37.1 CM
ECHO BSA: 1.94 M2
ECHO LV INTERNAL DIMENSION DIASTOLE INDEX: 2.01 CM/M2
ECHO LV INTERNAL DIMENSION DIASTOLIC: 3.5 CM (ref 3.9–5.3)
ECHO LV IVSD: 1.2 CM (ref 0.6–0.9)
ECHO LV MASS 2D: 127.3 G (ref 67–162)
ECHO LV MASS INDEX 2D: 73.1 G/M2 (ref 43–95)
ECHO LV POSTERIOR WALL DIASTOLIC: 1.1 CM (ref 0.6–0.9)
ECHO LV RELATIVE WALL THICKNESS RATIO: 0.63
ECHO LVOT AREA: 3.1 CM2
ECHO LVOT AV VTI INDEX: 0.91
ECHO LVOT DIAM: 2 CM
ECHO LVOT MEAN GRADIENT: 5 MMHG
ECHO LVOT PEAK GRADIENT: 8 MMHG
ECHO LVOT PEAK VELOCITY: 1.4 M/S
ECHO LVOT STROKE VOLUME INDEX: 61 ML/M2
ECHO LVOT SV: 106.1 ML
ECHO LVOT VTI: 33.8 CM
ECHO MV A VELOCITY: 0.94 M/S
ECHO MV AREA PHT: 4.2 CM2
ECHO MV E DECELERATION TIME (DT): 178.7 MS
ECHO MV E VELOCITY: 0.69 M/S
ECHO MV E/A RATIO: 0.73
ECHO MV PRESSURE HALF TIME (PHT): 51.8 MS
ECHO PV MAX VELOCITY: 1.2 M/S
ECHO PV PEAK GRADIENT: 6 MMHG
ECHO RV BASAL DIMENSION: 3.4 CM
ECHO RV TAPSE: 1.7 CM (ref 1.7–?)
ECHO RVOT MEAN GRADIENT: 2 MMHG
ECHO RVOT PEAK GRADIENT: 4 MMHG
ECHO RVOT PEAK VELOCITY: 1 M/S
ECHO RVOT VTI: 19.4 CM
EOSINOPHIL # BLD: 0 K/UL (ref 0–0.8)
EOSINOPHIL NFR BLD: 0 % (ref 0.5–7.8)
ERYTHROCYTE [DISTWIDTH] IN BLOOD BY AUTOMATED COUNT: 14.4 % (ref 11.9–14.6)
FLOW CYTOMETRY RESULTS: NORMAL
GLOBULIN SER CALC-MCNC: 4.1 G/DL (ref 2.8–4.5)
GLUCOSE SERPL-MCNC: 92 MG/DL (ref 65–100)
HCT VFR BLD AUTO: 23.9 % (ref 35.8–46.3)
HGB BLD-MCNC: 7.9 G/DL (ref 11.7–15.4)
IMM GRANULOCYTES # BLD AUTO: 0.2 K/UL (ref 0–0.5)
IMM GRANULOCYTES NFR BLD AUTO: 5 % (ref 0–5)
LYMPHOCYTES # BLD: 1.6 K/UL (ref 0.5–4.6)
LYMPHOCYTES NFR BLD: 56 % (ref 13–44)
MAGNESIUM SERPL-MCNC: 2.4 MG/DL (ref 1.8–2.4)
MCH RBC QN AUTO: 30.5 PG (ref 26.1–32.9)
MCHC RBC AUTO-ENTMCNC: 33.1 G/DL (ref 31.4–35)
MCV RBC AUTO: 92.3 FL (ref 82–102)
MONOCYTES # BLD: 0.2 K/UL (ref 0.1–1.3)
MONOCYTES NFR BLD: 8 % (ref 4–12)
NEUTS SEG # BLD: 0.9 K/UL (ref 1.7–8.2)
NEUTS SEG NFR BLD: 29 % (ref 43–78)
NRBC # BLD: 0.03 K/UL (ref 0–0.2)
PLATELET # BLD AUTO: 15 K/UL (ref 150–450)
PLATELET # BLD AUTO: 16 K/UL (ref 150–450)
PLATELET COMMENT: ABNORMAL
PMV BLD AUTO: 11.4 FL (ref 9.4–12.3)
POTASSIUM SERPL-SCNC: 4.2 MMOL/L (ref 3.5–5.1)
PROT SERPL-MCNC: 7.5 G/DL (ref 6.3–8.2)
RBC # BLD AUTO: 2.59 M/UL (ref 4.05–5.2)
RBC MORPH BLD: ABNORMAL
SODIUM SERPL-SCNC: 134 MMOL/L (ref 133–143)
SPECIMEN SOURCE: NORMAL
TEST ORDERED: NORMAL
UNIT DIVISION: 0
WBC # BLD AUTO: 3 K/UL (ref 4.3–11.1)
WBC MORPH BLD: ABNORMAL

## 2023-11-14 PROCEDURE — 6370000000 HC RX 637 (ALT 250 FOR IP): Performed by: NURSE PRACTITIONER

## 2023-11-14 PROCEDURE — 83735 ASSAY OF MAGNESIUM: CPT

## 2023-11-14 PROCEDURE — 6370000000 HC RX 637 (ALT 250 FOR IP): Performed by: FAMILY MEDICINE

## 2023-11-14 PROCEDURE — 36415 COLL VENOUS BLD VENIPUNCTURE: CPT

## 2023-11-14 PROCEDURE — 80053 COMPREHEN METABOLIC PANEL: CPT

## 2023-11-14 PROCEDURE — 97112 NEUROMUSCULAR REEDUCATION: CPT

## 2023-11-14 PROCEDURE — 93306 TTE W/DOPPLER COMPLETE: CPT

## 2023-11-14 PROCEDURE — 1170000000 HC RM PRIVATE ONCOLOGY

## 2023-11-14 PROCEDURE — 85049 AUTOMATED PLATELET COUNT: CPT

## 2023-11-14 PROCEDURE — 85025 COMPLETE CBC W/AUTO DIFF WBC: CPT

## 2023-11-14 PROCEDURE — 6370000000 HC RX 637 (ALT 250 FOR IP): Performed by: INTERNAL MEDICINE

## 2023-11-14 PROCEDURE — APPSS30 APP SPLIT SHARED TIME 16-30 MINUTES: Performed by: NURSE PRACTITIONER

## 2023-11-14 PROCEDURE — 97530 THERAPEUTIC ACTIVITIES: CPT

## 2023-11-14 PROCEDURE — 71045 X-RAY EXAM CHEST 1 VIEW: CPT

## 2023-11-14 PROCEDURE — 93306 TTE W/DOPPLER COMPLETE: CPT | Performed by: INTERNAL MEDICINE

## 2023-11-14 PROCEDURE — 2580000003 HC RX 258: Performed by: FAMILY MEDICINE

## 2023-11-14 PROCEDURE — 99233 SBSQ HOSP IP/OBS HIGH 50: CPT | Performed by: INTERNAL MEDICINE

## 2023-11-14 RX ADMIN — PREDNISONE 80 MG: 20 TABLET ORAL at 08:55

## 2023-11-14 RX ADMIN — AMLODIPINE BESYLATE 5 MG: 5 TABLET ORAL at 08:55

## 2023-11-14 RX ADMIN — TIZANIDINE 4 MG: 2 TABLET ORAL at 23:28

## 2023-11-14 RX ADMIN — CIPROFLOXACIN HYDROCHLORIDE 500 MG: 500 TABLET, FILM COATED ORAL at 20:15

## 2023-11-14 RX ADMIN — FERROUS SULFATE TAB 325 MG (65 MG ELEMENTAL FE) 325 MG: 325 (65 FE) TAB at 16:10

## 2023-11-14 RX ADMIN — FAMOTIDINE 40 MG: 20 TABLET, FILM COATED ORAL at 20:15

## 2023-11-14 RX ADMIN — DOCUSATE SODIUM 50 MG AND SENNOSIDES 8.6 MG 1 TABLET: 8.6; 5 TABLET, FILM COATED ORAL at 20:15

## 2023-11-14 RX ADMIN — POTASSIUM CHLORIDE 20 MEQ: 1500 TABLET, EXTENDED RELEASE ORAL at 08:55

## 2023-11-14 RX ADMIN — CIPROFLOXACIN HYDROCHLORIDE 500 MG: 500 TABLET, FILM COATED ORAL at 08:56

## 2023-11-14 RX ADMIN — TIZANIDINE 4 MG: 2 TABLET ORAL at 01:52

## 2023-11-14 RX ADMIN — FLUOXETINE HYDROCHLORIDE 20 MG: 20 CAPSULE ORAL at 08:55

## 2023-11-14 RX ADMIN — POTASSIUM CHLORIDE 20 MEQ: 1500 TABLET, EXTENDED RELEASE ORAL at 16:10

## 2023-11-14 RX ADMIN — IPRATROPIUM BROMIDE 2 SPRAY: 42 SPRAY NASAL at 08:59

## 2023-11-14 RX ADMIN — SALINE NASAL SPRAY 2 SPRAY: 1.5 SOLUTION NASAL at 08:59

## 2023-11-14 RX ADMIN — PANTOPRAZOLE SODIUM 40 MG: 40 TABLET, DELAYED RELEASE ORAL at 16:10

## 2023-11-14 RX ADMIN — SALINE NASAL SPRAY 2 SPRAY: 1.5 SOLUTION NASAL at 01:52

## 2023-11-14 RX ADMIN — SALINE NASAL SPRAY 2 SPRAY: 1.5 SOLUTION NASAL at 20:19

## 2023-11-14 RX ADMIN — ROSUVASTATIN CALCIUM 10 MG: 5 TABLET, FILM COATED ORAL at 08:55

## 2023-11-14 RX ADMIN — LEVOTHYROXINE SODIUM 125 MCG: 0.12 TABLET ORAL at 08:56

## 2023-11-14 RX ADMIN — SALINE NASAL SPRAY 2 SPRAY: 1.5 SOLUTION NASAL at 16:08

## 2023-11-14 RX ADMIN — MORPHINE SULFATE 15 MG: 15 TABLET, FILM COATED, EXTENDED RELEASE ORAL at 08:55

## 2023-11-14 RX ADMIN — FLUOXETINE HYDROCHLORIDE 20 MG: 20 CAPSULE ORAL at 20:15

## 2023-11-14 RX ADMIN — IPRATROPIUM BROMIDE 2 SPRAY: 42 SPRAY NASAL at 20:19

## 2023-11-14 RX ADMIN — PANTOPRAZOLE SODIUM 40 MG: 40 TABLET, DELAYED RELEASE ORAL at 08:56

## 2023-11-14 RX ADMIN — SODIUM CHLORIDE, PRESERVATIVE FREE 10 ML: 5 INJECTION INTRAVENOUS at 08:59

## 2023-11-14 RX ADMIN — FERROUS SULFATE TAB 325 MG (65 MG ELEMENTAL FE) 325 MG: 325 (65 FE) TAB at 11:41

## 2023-11-14 RX ADMIN — MONTELUKAST 10 MG: 10 TABLET, FILM COATED ORAL at 08:56

## 2023-11-14 RX ADMIN — SODIUM CHLORIDE, PRESERVATIVE FREE 10 ML: 5 INJECTION INTRAVENOUS at 20:19

## 2023-11-14 RX ADMIN — MORPHINE SULFATE 15 MG: 15 TABLET, FILM COATED, EXTENDED RELEASE ORAL at 20:15

## 2023-11-14 ASSESSMENT — PAIN DESCRIPTION - DESCRIPTORS: DESCRIPTORS: ACHING

## 2023-11-14 ASSESSMENT — PAIN SCALES - GENERAL: PAINLEVEL_OUTOF10: 2

## 2023-11-14 ASSESSMENT — PAIN DESCRIPTION - LOCATION: LOCATION: BACK

## 2023-11-14 ASSESSMENT — PAIN DESCRIPTION - ORIENTATION: ORIENTATION: LOWER

## 2023-11-14 NOTE — PROGRESS NOTES
EOS 7p-7a    BSSR received from off going CHINO Nuñez    No complaints of nausea this shift. 0291 zanaflex given    Awaiting morning labs to result. Rounded on hourly by myself and patient assistant. Bed locked, low, and call light within reach. No new needs voiced at this time.      BSSR given to oncoming CHINO Galvez

## 2023-11-14 NOTE — PROGRESS NOTES
Comprehensive Nutrition Assessment    Type and Reason for Visit: Reassess  Malnutrition Screening Tool: Malnutrition Screen  Have you recently lost weight without trying?: 24 to 33 pounds (3 points)  Have you been eating poorly because of a decreased appetite?: Yes (1 point)  Malnutrition Screening Tool Score: 4    Nutrition Recommendations/Plan:   Meals and Snacks:  Diet: Continue current order  Nutrition Supplement Therapy:  Medical food supplement therapy:  Modify Ensure Enlive twice per day (this provides 350 kcal and 20 grams protein per bottle) chocolate served with a milk container. Malnutrition Assessment:  Malnutrition Status: At risk for malnutrition (Comment) (pt reports + wt loss, waxing and waning oral intake)  No fat or muscle loss appreciated. Nutrition Assessment:  Nutrition History: Pt reports her intake and wt initially started changing after having Covid in May 2022.  + loss of appetite, taste alterations. She indicates at one point her wt was >200# initially declined quickly then up and down with fluid changes. She states at this point she is unsure of her true wt. Do You Have Any Cultural, Quaker, or Ethnic Food Preferences?: No   Nutrition Background: PMHx: psoriatic arthritis on Humira, hx of ovarian cam HTN, HLD, GERD and hypothyroidism. Admitted with MDS, pancytopenia, neutropenic fever, chronic pain, constipation. IR BMBX 11/7. Nutrition Interval:  Patient sitting up in bed. She states her appetite has improved and she is eating much better. She reports almost all of dinner last evening and at least half of breakfast this am. She has continued to drink Ensure TID. Discussed decreasing with improved intake of meals and she was agreeable. Current Nutrition Therapies:  ADULT DIET; Easy to Chew; GI [place] (GERD/Peptic Ulcer);  Low Microbial  ADULT ORAL NUTRITION SUPPLEMENT; Breakfast, Lunch, Dinner; Standard High Calorie/High Protein Oral

## 2023-11-14 NOTE — PROGRESS NOTES
ACUTE PHYSICAL THERAPY GOALS:   (Developed with and agreed upon by patient and/or caregiver. )    LTG:  (1.)Ms. Abbi Luke will move from supine to sit and sit to supine , scoot up and down, and roll side to side in bed with INDEPENDENCE within 7 treatment day(s). (2.)Ms. Abbi Luke will transfer from bed to chair and chair to bed with MODIFIED INDEPENDENCE using the least restrictive device within 7 treatment day(s). (3.)Ms. Abbi Luke will ambulate with MODIFIED INDEPENDENCE for 500 feet with the least restrictive device within 7 treatment day(s). (4.)Ms. Abbi Luke will participate in therapeutic activity/exercises x 25 minutes for increased activity tolerance within 7 treatment days. (5.)Ms. Abbi Luke will perform standing static and dynamic balance activities x 15 minutes with SUPERVISION to improve safety within 7 day(s). New Goal:      Ms. Abbi Luke will demonstrate understanding of energy conservation and activity pacing techniques and apply them with no cueing within 7 days. PHYSICAL THERAPY: Daily Note PM   (Link to Caseload Tracking: PT Visit Days : 2  Time In/Out PT Charge Capture  Rehab Caseload Tracker  Orders    Karl Felipe is a 71 y.o. female   PRIMARY DIAGNOSIS: Neutropenic fever ([address])  Neutropenic fever ([address]) [D70.9, R50.81]  Pancytopenia ([address]) [D61.818]       Inpatient: Payor: Momo Mccoy / Plan: Kristi Warner / Product Type: *No Product type* /     ASSESSMENT:     REHAB RECOMMENDATIONS:   Recommendation to date pending progress:  Setting:  Home Health Therapy    Equipment:    To Be Determined     ASSESSMENT:  Ms. Abbi Luke was received supine in bed, agreeable to therapy. She was able to sit at EOB with SBA. She progressed to ambulating 250 ft, 50 ft x 2 with seated rest breaks with RW and SBA. Cueing required for activity pacing, energy conservation. SpO2 >89% on RA throughout session. Multiple transfers to chair with SBA. Good progress, will continue to follow.       SUBJECTIVE:   Ms. Abbi Luke Weightbearing Status      Stairs      I=Independent, Mod I=Modified Independent, S=Supervision, SBA=Standby Assistance, CGA=Contact Guard Assistance,   Min=Minimal Assistance, Mod=Moderate Assistance, Max=Maximal Assistance, Total=Total Assistance, NT=Not Tested    PLAN:   FREQUENCY AND DURATION: 3 times/week for duration of hospital stay or until stated goals are met, whichever comes first.    TREATMENT:   TREATMENT:   Co-Treatment PT/OT necessary due to patient's decreased overall endurance/tolerance levels, as well as need for high level skilled assistance to complete functional transfers/mobility and functional tasks  Therapeutic Activity (23 Minutes): Therapeutic activity included Rolling, Supine to Sit, Scooting, Transfer Training, Ambulation on level ground, Sitting balance , and Standing balance to improve functional Activity tolerance, Balance, Mobility, and Strength.     TREATMENT GRID:  N/A    AFTER TREATMENT PRECAUTIONS: Bed/Chair Locked, Call light within reach, Chair, Needs within reach, and RN notified    INTERDISCIPLINARY COLLABORATION:  RN/ PCT and OT/ PETTY    EDUCATION:      TIME IN/OUT:  Time In: 1416  Time Out: 6863  Minutes: 23    MARVIN DUNN PT

## 2023-11-14 NOTE — PROGRESS NOTES
ACUTE OCCUPATIONAL THERAPY GOALS:   (Developed with and agreed upon by patient and/or caregiver.)  1. Patient will complete lower body bathing and dressing with MOD I and adaptive equipment as needed. 2. Patient will complete toileting with MOD I.   3. Patient will tolerate 30 minutes of OT treatment with 1-2 rest breaks to increase activity tolerance for ADLs. 4. Patient will complete functional transfers with MOD I and adaptive equipment as needed. 5. Patient will complete functional mobility for household distances with MOD I and adaptive equipment as needed. 6. Patient will complete self-grooming while standing edge of sink with MOD I and adaptive equipment as needed. Timeframe: 7 visits     OCCUPATIONAL THERAPY: Daily Note    OT Visit Days: 6   Time In/Out  OT Charge Capture  Rehab Caseload Tracker  OT Orders    Neutropenic Precautions    Jenny Ramachandran is a 71 y.o. female   PRIMARY DIAGNOSIS: Neutropenic fever ([address])  Neutropenic fever ([address]) [D70.9, R50.81]  Pancytopenia ([address]) [D61.818]       Inpatient: Payor: Mei Hearing / Plan: Hugh Bennett / Product Type: *No Product type* /     ASSESSMENT:     REHAB RECOMMENDATIONS: CURRENT LEVEL OF FUNCTION:  (Most Recently Demonstrated)   Recommendation to date pending progress:  Setting:  Home Health Therapy    Equipment:    To Be Determined - has rollator Bathing:  Not Tested  Dressing:  Not Tested  Feeding/Grooming:  Not Tested  Toileting:  Not Tested  Functional Mobility:  Stand by Assist      ASSESSMENT:  Ms. Luis Nagy is progressing well towards OT goals. Today, pt was received supine in bed. Completed bed mobility, functional transfers, and ambulation with RW. Required multiple seated rest breaks throughout session. Politely declined further bADLs on this date. Recommend return home with [address] [place] [address],[address] therapy services at discharge. Ms. Luis Nagy continues to demonstrate overall deficits in strength, balance, activity tolerance, and ADL performance. TIME:  Time In: 1416  Time Out: 1439  Minutes: 23    Irene Ritter, OT

## 2023-11-14 NOTE — PROGRESS NOTES
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 CV: Negative for chest pain, palpitations, edema. Respiratory: +cough, dyspnea. Negative for wheezing. GI: Negative for nausea, abdominal pain, diarrhea. 10 point review of systems is otherwise negative with the exception of the elements mentioned above in the HPI. Allergies   Allergen Reactions    Penicillins Hives    Sulfa Antibiotics Hives    Codeine Nausea And Vomiting     Past Medical History:   Diagnosis Date    Arthritis     Autoimmune disease ([address])     skin changes, unknown name    Cancer ([address]) 1990    ovarian    Chronic pain     arthritis in back and legs    Coronary artery spasm ([address])     COVID 05/15/2022    Essential hypertension 11/8/2019    Gastritis     GERD (gastroesophageal reflux disease)     Hx antineoplastic chemo     Migraine headache     Psoriasis     Psychiatric disorder     anxiety and depression    Severe obesity (BMI 35.0-39.9) 6/26/2018    Thyroid disease     Diagnosed in 1996     Past Surgical History:   Procedure Laterality Date    CARPAL TUNNEL RELEASE      GYN      ovaries    HYSTERECTOMY, TOTAL ABDOMINAL (CERVIX REMOVED)  Patriciabury      cardiac cath. Dx with spasms.     TUBAL LIGATION       Family History   Problem Relation Age of Onset    Parkinson's Disease Mother     Stroke Mother         Passed away in 1969    No Known Problems Paternal Grandfather     No Known Problems Paternal Grandmother     No Known Problems Maternal Grandfather hemophagocytosis (final path pending). However, plts now relatively stable so holding on etop/dex. [facility] labs appear unremarkable. Holding off on transfusion today, recheck platelet count in AM.  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 Also some hemophagocytosis noted per Dr Erich Leone but markers don't appear consistent with diagnosis. Dr Tarah Childers discussed with Dr Erich Leone and will be discussing potential treatment options with Dr Sharita Martinez. Neutropenic fever  - started on cef/vanc RVP neg  - CXR unremarkable  10/29 BC+ staph epidermidis, continue Cef/Vanc, MRSA DNA pending, consult ID for recommendations  10/31 Continues Cefe/Vanc per ID but S epi likely contaminant. Repeat BC NGTD. TM 98. Likely transitioning to PO/prophylactic Cipro today. 11/1 Afebrile. Transition to prophylactic cipro now that she has completed 5 day course of abx with likely contaminant/S epi with repeat BC.  RESOLVED    Altered mental status  11/1 Likely secondary to HD steroids. CT head negative. CTA and MRI brain pending after Code S called last night for dysarthria. 11/2 CTA negative. Unable to tolerate MRI brain without sedation so holding for now as symptoms more consistent with steroid-related effects vs stroke as no focal deficits noted. RESOLVED    Hypoxia / LE edema  11/1 LE edema may be related to steroids. Incomplete I/O recorded, weight up since admission. Stopped IVF. Check CXR and BNP.  11/2 BNP elevated, CXR with pulmonary vascular congestion. On 2-4L NC. Weight remains elevated since admission. 11/4 Stable fluid status, on 3L NC - weaning as tolerated. Was hypoxic when found off O2 in the middle of the night. 11/7 Continuing to wean O2 as tolerated. Down to 1L NC. Fluid balance stable. 11/8 O2 back up to 3 L NC.    10/10 on RA   11/13 on 2L - weight stable. 11/14 Check CXR as well as echo.  Weight back down to

## 2023-11-15 LAB
ALBUMIN SERPL-MCNC: 3.7 G/DL (ref 3.2–4.6)
ALBUMIN/GLOB SERPL: 1 (ref 0.4–1.6)
ALP SERPL-CCNC: 71 U/L (ref 50–136)
ALT SERPL-CCNC: 45 U/L (ref 12–65)
ANION GAP SERPL CALC-SCNC: 6 MMOL/L (ref 2–11)
AST SERPL-CCNC: 18 U/L (ref 15–37)
BASOPHILS # BLD: 0 K/UL (ref 0–0.2)
BASOPHILS NFR BLD: 1 % (ref 0–2)
BILIRUB SERPL-MCNC: 0.8 MG/DL (ref 0.2–1.1)
BUN SERPL-MCNC: 24 MG/DL (ref 8–23)
CALCIUM SERPL-MCNC: 9.5 MG/DL (ref 8.3–10.4)
CHLORIDE SERPL-SCNC: 100 MMOL/L (ref 101–110)
CO2 SERPL-SCNC: 28 MMOL/L (ref 21–32)
CREAT SERPL-MCNC: 0.6 MG/DL (ref 0.6–1)
DIFFERENTIAL METHOD BLD: ABNORMAL
EOSINOPHIL # BLD: 0 K/UL (ref 0–0.8)
EOSINOPHIL NFR BLD: 0 % (ref 0.5–7.8)
ERYTHROCYTE [DISTWIDTH] IN BLOOD BY AUTOMATED COUNT: 14.5 % (ref 11.9–14.6)
GLOBULIN SER CALC-MCNC: 3.8 G/DL (ref 2.8–4.5)
GLUCOSE SERPL-MCNC: 92 MG/DL (ref 65–100)
HCT VFR BLD AUTO: 23.8 % (ref 35.8–46.3)
HGB BLD-MCNC: 8 G/DL (ref 11.7–15.4)
IMM GRANULOCYTES # BLD AUTO: 0.1 K/UL (ref 0–0.5)
IMM GRANULOCYTES NFR BLD AUTO: 4 % (ref 0–5)
LYMPHOCYTES # BLD: 2.3 K/UL (ref 0.5–4.6)
LYMPHOCYTES NFR BLD: 60 % (ref 13–44)
MAGNESIUM SERPL-MCNC: 2.6 MG/DL (ref 1.8–2.4)
MCH RBC QN AUTO: 30.4 PG (ref 26.1–32.9)
MCHC RBC AUTO-ENTMCNC: 33.6 G/DL (ref 31.4–35)
MCV RBC AUTO: 90.5 FL (ref 82–102)
MONOCYTES # BLD: 0.3 K/UL (ref 0.1–1.3)
MONOCYTES NFR BLD: 7 % (ref 4–12)
NEUTS SEG # BLD: 1 K/UL (ref 1.7–8.2)
NEUTS SEG NFR BLD: 28 % (ref 43–78)
NRBC # BLD: 0.05 K/UL (ref 0–0.2)
PLATELET # BLD AUTO: 14 K/UL (ref 150–450)
PLATELET COMMENT: ABNORMAL
PMV BLD AUTO: ABNORMAL FL (ref 9.4–12.3)
POTASSIUM SERPL-SCNC: 4.6 MMOL/L (ref 3.5–5.1)
PROT SERPL-MCNC: 7.5 G/DL (ref 6.3–8.2)
RBC # BLD AUTO: 2.63 M/UL (ref 4.05–5.2)
RBC MORPH BLD: ABNORMAL
SODIUM SERPL-SCNC: 134 MMOL/L (ref 133–143)
WBC # BLD AUTO: 3.7 K/UL (ref 4.3–11.1)
WBC MORPH BLD: ABNORMAL

## 2023-11-15 PROCEDURE — 02HV33Z INSERTION OF INFUSION DEVICE INTO SUPERIOR VENA CAVA, PERCUTANEOUS APPROACH: ICD-10-PCS | Performed by: INTERNAL MEDICINE

## 2023-11-15 PROCEDURE — 6370000000 HC RX 637 (ALT 250 FOR IP): Performed by: NURSE PRACTITIONER

## 2023-11-15 PROCEDURE — 6370000000 HC RX 637 (ALT 250 FOR IP): Performed by: INTERNAL MEDICINE

## 2023-11-15 PROCEDURE — C1751 CATH, INF, PER/CENT/MIDLINE: HCPCS

## 2023-11-15 PROCEDURE — 2580000003 HC RX 258: Performed by: INTERNAL MEDICINE

## 2023-11-15 PROCEDURE — APPSS30 APP SPLIT SHARED TIME 16-30 MINUTES: Performed by: NURSE PRACTITIONER

## 2023-11-15 PROCEDURE — 36415 COLL VENOUS BLD VENIPUNCTURE: CPT

## 2023-11-15 PROCEDURE — 6360000002 HC RX W HCPCS: Performed by: INTERNAL MEDICINE

## 2023-11-15 PROCEDURE — 99233 SBSQ HOSP IP/OBS HIGH 50: CPT | Performed by: INTERNAL MEDICINE

## 2023-11-15 PROCEDURE — 1170000000 HC RM PRIVATE ONCOLOGY

## 2023-11-15 PROCEDURE — 2580000003 HC RX 258: Performed by: FAMILY MEDICINE

## 2023-11-15 PROCEDURE — 85025 COMPLETE CBC W/AUTO DIFF WBC: CPT

## 2023-11-15 PROCEDURE — 80053 COMPREHEN METABOLIC PANEL: CPT

## 2023-11-15 PROCEDURE — 97530 THERAPEUTIC ACTIVITIES: CPT

## 2023-11-15 PROCEDURE — 6370000000 HC RX 637 (ALT 250 FOR IP): Performed by: FAMILY MEDICINE

## 2023-11-15 PROCEDURE — 83735 ASSAY OF MAGNESIUM: CPT

## 2023-11-15 PROCEDURE — 36573 INSJ PICC RS&I 5 YR+: CPT

## 2023-11-15 RX ORDER — SODIUM CHLORIDE 9 MG/ML
5-250 INJECTION, SOLUTION INTRAVENOUS PRN
Status: CANCELLED | OUTPATIENT
Start: 2023-11-15

## 2023-11-15 RX ORDER — ONDANSETRON 2 MG/ML
8 INJECTION INTRAMUSCULAR; INTRAVENOUS EVERY 24 HOURS
Status: COMPLETED | OUTPATIENT
Start: 2023-11-15 | End: 2023-11-24

## 2023-11-15 RX ORDER — ACYCLOVIR 200 MG/1
400 CAPSULE ORAL 2 TIMES DAILY
Status: DISCONTINUED | OUTPATIENT
Start: 2023-11-15 | End: 2023-11-15 | Stop reason: SDUPTHER

## 2023-11-15 RX ORDER — ALBUTEROL SULFATE 90 UG/1
4 AEROSOL, METERED RESPIRATORY (INHALATION) PRN
Status: CANCELLED | OUTPATIENT
Start: 2023-11-15

## 2023-11-15 RX ORDER — HEPARIN 100 UNIT/ML
500 SYRINGE INTRAVENOUS PRN
Status: CANCELLED | OUTPATIENT
Start: 2023-11-15

## 2023-11-15 RX ORDER — SODIUM CHLORIDE 9 MG/ML
INJECTION, SOLUTION INTRAVENOUS CONTINUOUS
Status: CANCELLED | OUTPATIENT
Start: 2023-11-15

## 2023-11-15 RX ORDER — ACYCLOVIR 400 MG/1
400 TABLET ORAL 2 TIMES DAILY
Status: DISCONTINUED | OUTPATIENT
Start: 2023-11-15 | End: 2023-12-19 | Stop reason: HOSPADM

## 2023-11-15 RX ORDER — FLUCONAZOLE 100 MG/1
200 TABLET ORAL DAILY
Status: DISCONTINUED | OUTPATIENT
Start: 2023-11-15 | End: 2023-12-19 | Stop reason: HOSPADM

## 2023-11-15 RX ORDER — EPINEPHRINE 1 MG/ML
0.3 INJECTION, SOLUTION, CONCENTRATE INTRAVENOUS PRN
Status: CANCELLED | OUTPATIENT
Start: 2023-11-15

## 2023-11-15 RX ORDER — PREDNISONE 20 MG/1
60 TABLET ORAL DAILY
Status: DISCONTINUED | OUTPATIENT
Start: 2023-11-16 | End: 2023-11-17

## 2023-11-15 RX ORDER — DIPHENHYDRAMINE HYDROCHLORIDE 50 MG/ML
50 INJECTION INTRAMUSCULAR; INTRAVENOUS
Status: ACTIVE | OUTPATIENT
Start: 2023-11-15 | End: 2023-11-16

## 2023-11-15 RX ORDER — MEPERIDINE HYDROCHLORIDE 25 MG/ML
12.5 INJECTION INTRAMUSCULAR; INTRAVENOUS; SUBCUTANEOUS PRN
Status: CANCELLED | OUTPATIENT
Start: 2023-11-15

## 2023-11-15 RX ORDER — ACETAMINOPHEN 325 MG/1
650 TABLET ORAL
Status: CANCELLED | OUTPATIENT
Start: 2023-11-15

## 2023-11-15 RX ORDER — SODIUM CHLORIDE 0.9 % (FLUSH) 0.9 %
5-40 SYRINGE (ML) INJECTION PRN
Status: CANCELLED | OUTPATIENT
Start: 2023-11-15

## 2023-11-15 RX ORDER — ONDANSETRON 2 MG/ML
8 INJECTION INTRAMUSCULAR; INTRAVENOUS
Status: CANCELLED | OUTPATIENT
Start: 2023-11-15

## 2023-11-15 RX ADMIN — FLUCONAZOLE 200 MG: 100 TABLET ORAL at 09:14

## 2023-11-15 RX ADMIN — POTASSIUM CHLORIDE 20 MEQ: 1500 TABLET, EXTENDED RELEASE ORAL at 07:25

## 2023-11-15 RX ADMIN — DECITABINE 36 MG: 50 INJECTION, POWDER, LYOPHILIZED, FOR SOLUTION INTRAVENOUS at 16:50

## 2023-11-15 RX ADMIN — POTASSIUM CHLORIDE 20 MEQ: 1500 TABLET, EXTENDED RELEASE ORAL at 16:54

## 2023-11-15 RX ADMIN — SALINE NASAL SPRAY 2 SPRAY: 1.5 SOLUTION NASAL at 07:26

## 2023-11-15 RX ADMIN — DOCUSATE SODIUM 50 MG AND SENNOSIDES 8.6 MG 1 TABLET: 8.6; 5 TABLET, FILM COATED ORAL at 20:23

## 2023-11-15 RX ADMIN — ACYCLOVIR 400 MG: 400 TABLET ORAL at 09:14

## 2023-11-15 RX ADMIN — LEVOTHYROXINE SODIUM 125 MCG: 0.12 TABLET ORAL at 07:25

## 2023-11-15 RX ADMIN — FAMOTIDINE 40 MG: 20 TABLET, FILM COATED ORAL at 20:24

## 2023-11-15 RX ADMIN — MORPHINE SULFATE 15 MG: 15 TABLET, FILM COATED, EXTENDED RELEASE ORAL at 20:24

## 2023-11-15 RX ADMIN — SALINE NASAL SPRAY 2 SPRAY: 1.5 SOLUTION NASAL at 20:25

## 2023-11-15 RX ADMIN — ONDANSETRON 8 MG: 2 INJECTION INTRAMUSCULAR; INTRAVENOUS at 16:19

## 2023-11-15 RX ADMIN — FERROUS SULFATE TAB 325 MG (65 MG ELEMENTAL FE) 325 MG: 325 (65 FE) TAB at 16:54

## 2023-11-15 RX ADMIN — PANTOPRAZOLE SODIUM 40 MG: 40 TABLET, DELAYED RELEASE ORAL at 14:24

## 2023-11-15 RX ADMIN — ACYCLOVIR 400 MG: 400 TABLET ORAL at 20:23

## 2023-11-15 RX ADMIN — SALINE NASAL SPRAY 2 SPRAY: 1.5 SOLUTION NASAL at 02:27

## 2023-11-15 RX ADMIN — PANTOPRAZOLE SODIUM 40 MG: 40 TABLET, DELAYED RELEASE ORAL at 07:25

## 2023-11-15 RX ADMIN — FLUOXETINE HYDROCHLORIDE 20 MG: 20 CAPSULE ORAL at 20:23

## 2023-11-15 RX ADMIN — SODIUM CHLORIDE, PRESERVATIVE FREE 10 ML: 5 INJECTION INTRAVENOUS at 21:09

## 2023-11-15 RX ADMIN — ROSUVASTATIN CALCIUM 10 MG: 5 TABLET, FILM COATED ORAL at 07:25

## 2023-11-15 RX ADMIN — CIPROFLOXACIN HYDROCHLORIDE 500 MG: 500 TABLET, FILM COATED ORAL at 20:24

## 2023-11-15 RX ADMIN — MORPHINE SULFATE 15 MG: 15 TABLET, FILM COATED, EXTENDED RELEASE ORAL at 07:25

## 2023-11-15 RX ADMIN — FLUOXETINE HYDROCHLORIDE 20 MG: 20 CAPSULE ORAL at 07:25

## 2023-11-15 RX ADMIN — SODIUM CHLORIDE, PRESERVATIVE FREE 10 ML: 5 INJECTION INTRAVENOUS at 07:26

## 2023-11-15 RX ADMIN — AMLODIPINE BESYLATE 5 MG: 5 TABLET ORAL at 07:25

## 2023-11-15 RX ADMIN — MONTELUKAST 10 MG: 10 TABLET, FILM COATED ORAL at 07:26

## 2023-11-15 RX ADMIN — CIPROFLOXACIN HYDROCHLORIDE 500 MG: 500 TABLET, FILM COATED ORAL at 07:25

## 2023-11-15 RX ADMIN — PREDNISONE 80 MG: 20 TABLET ORAL at 07:25

## 2023-11-15 RX ADMIN — DIPHENHYDRAMINE HYDROCHLORIDE 10 ML: 12.5 LIQUID ORAL at 07:26

## 2023-11-15 RX ADMIN — FERROUS SULFATE TAB 325 MG (65 MG ELEMENTAL FE) 325 MG: 325 (65 FE) TAB at 14:24

## 2023-11-15 RX ADMIN — IPRATROPIUM BROMIDE 2 SPRAY: 42 SPRAY NASAL at 20:25

## 2023-11-15 RX ADMIN — DIPHENHYDRAMINE HYDROCHLORIDE 10 ML: 12.5 LIQUID ORAL at 16:54

## 2023-11-15 ASSESSMENT — PAIN DESCRIPTION - DESCRIPTORS: DESCRIPTORS: ACHING

## 2023-11-15 ASSESSMENT — PAIN - FUNCTIONAL ASSESSMENT: PAIN_FUNCTIONAL_ASSESSMENT: ACTIVITIES ARE NOT PREVENTED

## 2023-11-15 ASSESSMENT — PAIN DESCRIPTION - LOCATION: LOCATION: BACK

## 2023-11-15 ASSESSMENT — PAIN DESCRIPTION - PAIN TYPE: TYPE: CHRONIC PAIN

## 2023-11-15 ASSESSMENT — PAIN SCALES - GENERAL: PAINLEVEL_OUTOF10: 2

## 2023-11-15 ASSESSMENT — PAIN DESCRIPTION - ORIENTATION: ORIENTATION: LOWER

## 2023-11-15 NOTE — PROGRESS NOTES
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 CV: Negative for chest pain, palpitations, edema. Respiratory: +cough, dyspnea (improved). Negative for wheezing. GI: Negative for nausea, abdominal pain, diarrhea. 10 point review of systems is otherwise negative with the exception of the elements mentioned above in the HPI. Allergies   Allergen Reactions    Penicillins Hives    Sulfa Antibiotics Hives    Codeine Nausea And Vomiting     Past Medical History:   Diagnosis Date    Arthritis     Autoimmune disease ([address])     skin changes, unknown name    Cancer ([address]) 1990    ovarian    Chronic pain     arthritis in back and legs    Coronary artery spasm ([address])     COVID 05/15/2022    Essential hypertension 11/8/2019    Gastritis     GERD (gastroesophageal reflux disease)     Hx antineoplastic chemo     Migraine headache     Psoriasis     Psychiatric disorder     anxiety and depression    Severe obesity (BMI 35.0-39.9) 6/26/2018    Thyroid disease     Diagnosed in 1996     Past Surgical History:   Procedure Laterality Date    CARPAL TUNNEL RELEASE      GYN      ovaries    HYSTERECTOMY, TOTAL ABDOMINAL (CERVIX REMOVED)  Patriciabury      cardiac cath. Dx with spasms.     TUBAL LIGATION       Family History   Problem Relation Age of Onset    Parkinson's Disease Mother     Stroke Mother         Passed away in 1969    No Known Problems Paternal Grandfather     No Known Problems Paternal mg Oral Q6H PRN Lazarobert Boss, DO        rosuvastatin (CRESTOR) tablet 10 mg  10 mg Oral Daily Kaycee Amrita Mart, DO   10 mg at 11/15/23 0725    sodium chloride flush 0.9 % injection 5-40 mL  5-40 mL IntraVENous 2 times per day Philbert Boss, DO   10 mL at 11/15/23 0726    sodium chloride flush 0.9 % injection 5-40 mL  5-40 mL IntraVENous PRN Lazarobert Boss, DO        polyethylene glycol (GLYCOLAX) packet 17 g  17 g Oral Daily PRN Lazarobert Boss, DO        acetaminophen (TYLENOL) tablet 650 mg  650 mg Oral Q6H PRN Lazarobert Faustinos, DO   650 mg at 11/13/23 1747    Or    acetaminophen (TYLENOL) suppository 650 mg  650 mg Rectal Q6H PRN Afshan Harmons, DO        ondansetron (ZOFRAN-ODT) disintegrating tablet 4 mg  4 mg Oral Q6H PRN Afshan Harmons, DO        Or    ond[facility]ron [facility]) injection 4 mg  4 mg IntraVENous Q6H PRN Afshan Boss, DO   4 mg at 11/04/23 1524    polyethylene glycol (GLYCOLAX) packet 17 g  17 g Oral Daily Lazarobert Faustinos, DO   17 g at 11/13/23 0820    sennosides-docusate sodium (SENOKOT-S) 8.6-50 MG tablet 1 tablet  1 tablet Oral QHS Afshan Harmons, DO   1 tablet at 11/14/23 2015    aluminum & magnesium hydroxide-simethicone (MAALOX) 200-200-20 MG/5ML suspension 30 mL  30 mL Oral Q6H PRN Lazarobert Boss, DO   30 mL at 11/05/23 1314    morphine (MS CONTIN) extended release tablet 15 mg  15 mg Oral 2 times per day Afshan Boss, DO   15 mg at 11/15/23 0725    naloxone [facility]) injection 0.4 mg  0.4 mg IntraVENous PRN Afshan Boss, DO        HYDROcodone-acetaminophen (NORCO) 5-325 MG per tablet 1 tablet  1 tablet Oral Q6H PRN Afshan Harmons, DO   1 tablet at 11/09/23 1458    hydrOXYzine HCl (ATARAX) tablet 25 mg  25 mg Oral TID PRN Afshan Rivera, DO   25 mg at 11/09/23 1414    tiZANidine (ZANAFLEX) tablet 4 mg  4 mg Oral Nightly PRN Afshan Rivera,    4 mg at 11/14/23 2328    diclofenac sodium (VOLTAREN) 1 % gel 4 g  4 g Topical 4x Daily PRN Afshan Rivera DO   4 g at 10/29/23 0541    phenol 1.4 %

## 2023-11-15 NOTE — PROGRESS NOTES
EOS 7p-7a    BSSR received from [facility]     Pt rested well this shift. No complaints of pain or nausea this shift. 2328 zanaflex given    Awaiting morning labs to result. Rounded on hourly by myself and patient assistant. Bed locked, low, and call light within reach. No new needs voiced at this time.       BSSR given to oncoming RN Susan San

## 2023-11-15 NOTE — PROGRESS NOTES
ACUTE PHYSICAL THERAPY GOALS:   (Developed with and agreed upon by patient and/or caregiver. )    LTG:  (1.)Ms. Ana Kirkpatrick will move from supine to sit and sit to supine , scoot up and down, and roll side to side in bed with INDEPENDENCE within 7 treatment day(s). (2.)Ms. Ana Kirkpatrick will transfer from bed to chair and chair to bed with MODIFIED INDEPENDENCE using the least restrictive device within 7 treatment day(s). (3.)Ms. Ana Kirkpatrick will ambulate with MODIFIED INDEPENDENCE for 500 feet with the least restrictive device within 7 treatment day(s). (4.)Ms. Ana Kirkpatrick will participate in therapeutic activity/exercises x 25 minutes for increased activity tolerance within 7 treatment days. (5.)Ms. Ana Kirkpatrick will perform standing static and dynamic balance activities x 15 minutes with SUPERVISION to improve safety within 7 day(s). New Goal:      Ms. Ana Kirkpatrick will demonstrate understanding of energy conservation and activity pacing techniques and apply them with no cueing within 7 days. PHYSICAL THERAPY: Daily Note PM   (Link to Caseload Tracking: PT Visit Days : 3  Time In/Out PT Charge Capture  Rehab Caseload Tracker  Orders    Pankaj Gallagher is a 71 y.o. female   PRIMARY DIAGNOSIS: Neutropenic fever ([address])  Neutropenic fever ([address]) [D70.9, R50.81]  Pancytopenia ([address]) [D61.818]       Inpatient: Payor: Maria Teresa Gonzales / Plan: Tona Daniels / Product Type: *No Product type* /     ASSESSMENT:     REHAB RECOMMENDATIONS:   Recommendation to date pending progress:  Setting:  Home Health Therapy    Equipment:    To Be Determined     ASSESSMENT:  Ms. Ana Kirkpatrick was received supine in bed, agreeable to therapy. She was able to ambulate 250 ft x 2 with RW and SBA with seated rest breaks. Pt presented on 2L O2 today for comfort as she was experiencing increased SOB (SpO2 97% throughout ambulation). She was able to participate in standing balance and transfer activities with SBA. Good progress, will continue to follow.       SUBJECTIVE:

## 2023-11-15 NOTE — CARE COORDINATION
Pt discussed during IDR and chart screened by CM for d/c planning. Currently on RA. Oncology monitoring plt count. 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 Pt to have PICC placed today. Today pt will begin Decitabine     PT/OT recommend HH at d/c. Anticipated dispo: return home, with [facility] if agreeable, once pt is medically stable. D/C timeframe is undetermined at the present time. CM will continue to follow.   LOS = 19 days

## 2023-11-16 LAB
ALBUMIN SERPL-MCNC: 3.5 G/DL (ref 3.2–4.6)
ALBUMIN/GLOB SERPL: 1 (ref 0.4–1.6)
ALP SERPL-CCNC: 59 U/L (ref 50–136)
ALT SERPL-CCNC: 47 U/L (ref 12–65)
ANION GAP SERPL CALC-SCNC: 6 MMOL/L (ref 2–11)
AST SERPL-CCNC: 14 U/L (ref 15–37)
BASOPHILS # BLD: 0.1 K/UL (ref 0–0.2)
BASOPHILS NFR BLD: 2 % (ref 0–2)
BILIRUB SERPL-MCNC: 0.9 MG/DL (ref 0.2–1.1)
BUN SERPL-MCNC: 23 MG/DL (ref 8–23)
CALCIUM SERPL-MCNC: 9.2 MG/DL (ref 8.3–10.4)
CHLORIDE SERPL-SCNC: 101 MMOL/L (ref 101–110)
CO2 SERPL-SCNC: 28 MMOL/L (ref 21–32)
CREAT SERPL-MCNC: 0.6 MG/DL (ref 0.6–1)
DIFFERENTIAL METHOD BLD: ABNORMAL
EOSINOPHIL # BLD: 0 K/UL (ref 0–0.8)
EOSINOPHIL NFR BLD: 0 % (ref 0.5–7.8)
ERYTHROCYTE [DISTWIDTH] IN BLOOD BY AUTOMATED COUNT: 14.3 % (ref 11.9–14.6)
GLOBULIN SER CALC-MCNC: 3.6 G/DL (ref 2.8–4.5)
GLUCOSE SERPL-MCNC: 88 MG/DL (ref 65–100)
HCT VFR BLD AUTO: 22.1 % (ref 35.8–46.3)
HGB BLD-MCNC: 7.5 G/DL (ref 11.7–15.4)
IMM GRANULOCYTES # BLD AUTO: 0.1 K/UL (ref 0–0.5)
IMM GRANULOCYTES NFR BLD AUTO: 4 % (ref 0–5)
LDH SERPL L TO P-CCNC: 674 U/L (ref 110–210)
LYMPHOCYTES # BLD: 1.4 K/UL (ref 0.5–4.6)
LYMPHOCYTES NFR BLD: 53 % (ref 13–44)
Lab: NORMAL
MAGNESIUM SERPL-MCNC: 2.3 MG/DL (ref 1.8–2.4)
MCH RBC QN AUTO: 30.9 PG (ref 26.1–32.9)
MCHC RBC AUTO-ENTMCNC: 33.9 G/DL (ref 31.4–35)
MCV RBC AUTO: 90.9 FL (ref 82–102)
MONOCYTES # BLD: 0.2 K/UL (ref 0.1–1.3)
MONOCYTES NFR BLD: 9 % (ref 4–12)
NEUTS SEG # BLD: 0.8 K/UL (ref 1.7–8.2)
NEUTS SEG NFR BLD: 32 % (ref 43–78)
NRBC # BLD: 0.04 K/UL (ref 0–0.2)
PHOSPHATE SERPL-MCNC: 4.2 MG/DL (ref 2.3–3.7)
PLATELET # BLD AUTO: 12 K/UL (ref 150–450)
PLATELET COMMENT: ABNORMAL
PMV BLD AUTO: ABNORMAL FL (ref 9.4–12.3)
POTASSIUM SERPL-SCNC: 4.2 MMOL/L (ref 3.5–5.1)
PROT SERPL-MCNC: 7.1 G/DL (ref 6.3–8.2)
RBC # BLD AUTO: 2.43 M/UL (ref 4.05–5.2)
RBC MORPH BLD: ABNORMAL
REFERENCE LAB: NORMAL
REFERENCE LAB: NORMAL
SODIUM SERPL-SCNC: 135 MMOL/L (ref 133–143)
URATE SERPL-MCNC: 3.1 MG/DL (ref 2.6–6)
WBC # BLD AUTO: 2.6 K/UL (ref 4.3–11.1)
WBC MORPH BLD: ABNORMAL

## 2023-11-16 PROCEDURE — 80053 COMPREHEN METABOLIC PANEL: CPT

## 2023-11-16 PROCEDURE — 84100 ASSAY OF PHOSPHORUS: CPT

## 2023-11-16 PROCEDURE — 2580000003 HC RX 258: Performed by: INTERNAL MEDICINE

## 2023-11-16 PROCEDURE — 6370000000 HC RX 637 (ALT 250 FOR IP): Performed by: FAMILY MEDICINE

## 2023-11-16 PROCEDURE — 83735 ASSAY OF MAGNESIUM: CPT

## 2023-11-16 PROCEDURE — 6370000000 HC RX 637 (ALT 250 FOR IP): Performed by: NURSE PRACTITIONER

## 2023-11-16 PROCEDURE — 6360000002 HC RX W HCPCS: Performed by: INTERNAL MEDICINE

## 2023-11-16 PROCEDURE — 85025 COMPLETE CBC W/AUTO DIFF WBC: CPT

## 2023-11-16 PROCEDURE — 6370000000 HC RX 637 (ALT 250 FOR IP): Performed by: INTERNAL MEDICINE

## 2023-11-16 PROCEDURE — APPSS30 APP SPLIT SHARED TIME 16-30 MINUTES: Performed by: NURSE PRACTITIONER

## 2023-11-16 PROCEDURE — 99233 SBSQ HOSP IP/OBS HIGH 50: CPT | Performed by: INTERNAL MEDICINE

## 2023-11-16 PROCEDURE — 83615 LACTATE (LD) (LDH) ENZYME: CPT

## 2023-11-16 PROCEDURE — 84550 ASSAY OF BLOOD/URIC ACID: CPT

## 2023-11-16 PROCEDURE — 1170000000 HC RM PRIVATE ONCOLOGY

## 2023-11-16 PROCEDURE — 2060000001 HC ICU INTERMEDIATE R&B-BMT

## 2023-11-16 PROCEDURE — 97168 OT RE-EVAL EST PLAN CARE: CPT

## 2023-11-16 PROCEDURE — 2580000003 HC RX 258: Performed by: FAMILY MEDICINE

## 2023-11-16 PROCEDURE — 97530 THERAPEUTIC ACTIVITIES: CPT

## 2023-11-16 PROCEDURE — 36592 COLLECT BLOOD FROM PICC: CPT

## 2023-11-16 PROCEDURE — 97535 SELF CARE MNGMENT TRAINING: CPT

## 2023-11-16 RX ORDER — ACETAMINOPHEN 325 MG/1
650 TABLET ORAL ONCE
Status: COMPLETED | OUTPATIENT
Start: 2023-11-16 | End: 2023-11-16

## 2023-11-16 RX ORDER — ONDANSETRON 2 MG/ML
8 INJECTION INTRAMUSCULAR; INTRAVENOUS
Status: CANCELLED | OUTPATIENT
Start: 2023-11-16

## 2023-11-16 RX ORDER — VENETOCLAX 100 MG/1
200 TABLET, FILM COATED ORAL DAILY
Qty: 60 TABLET | Refills: 2
Start: 2023-11-16

## 2023-11-16 RX ORDER — SODIUM CHLORIDE 9 MG/ML
5-250 INJECTION, SOLUTION INTRAVENOUS PRN
Status: CANCELLED | OUTPATIENT
Start: 2023-11-16

## 2023-11-16 RX ORDER — ALLOPURINOL 300 MG/1
300 TABLET ORAL DAILY
Status: DISCONTINUED | OUTPATIENT
Start: 2023-11-16 | End: 2023-12-19 | Stop reason: HOSPADM

## 2023-11-16 RX ORDER — EPINEPHRINE 1 MG/ML
0.3 INJECTION, SOLUTION, CONCENTRATE INTRAVENOUS PRN
Status: CANCELLED | OUTPATIENT
Start: 2023-11-16

## 2023-11-16 RX ORDER — ACETAMINOPHEN 325 MG/1
650 TABLET ORAL
Status: CANCELLED | OUTPATIENT
Start: 2023-11-16

## 2023-11-16 RX ORDER — DIPHENHYDRAMINE HYDROCHLORIDE 50 MG/ML
50 INJECTION INTRAMUSCULAR; INTRAVENOUS
Status: CANCELLED | OUTPATIENT
Start: 2023-11-16

## 2023-11-16 RX ORDER — MEPERIDINE HYDROCHLORIDE 25 MG/ML
12.5 INJECTION INTRAMUSCULAR; INTRAVENOUS; SUBCUTANEOUS PRN
Status: CANCELLED | OUTPATIENT
Start: 2023-11-16

## 2023-11-16 RX ORDER — DIPHENHYDRAMINE HYDROCHLORIDE 50 MG/ML
25 INJECTION INTRAMUSCULAR; INTRAVENOUS ONCE
Status: COMPLETED | OUTPATIENT
Start: 2023-11-16 | End: 2023-11-16

## 2023-11-16 RX ORDER — HEPARIN 100 UNIT/ML
500 SYRINGE INTRAVENOUS PRN
Status: CANCELLED | OUTPATIENT
Start: 2023-11-16

## 2023-11-16 RX ORDER — SODIUM CHLORIDE 0.9 % (FLUSH) 0.9 %
5-40 SYRINGE (ML) INJECTION PRN
Status: CANCELLED | OUTPATIENT
Start: 2023-11-16

## 2023-11-16 RX ORDER — ALBUTEROL SULFATE 90 UG/1
4 AEROSOL, METERED RESPIRATORY (INHALATION) PRN
Status: CANCELLED | OUTPATIENT
Start: 2023-11-16

## 2023-11-16 RX ORDER — SODIUM CHLORIDE 9 MG/ML
INJECTION, SOLUTION INTRAVENOUS CONTINUOUS
Status: CANCELLED | OUTPATIENT
Start: 2023-11-16

## 2023-11-16 RX ADMIN — FERROUS SULFATE TAB 325 MG (65 MG ELEMENTAL FE) 325 MG: 325 (65 FE) TAB at 11:51

## 2023-11-16 RX ADMIN — ALLOPURINOL 300 MG: 300 TABLET ORAL at 08:43

## 2023-11-16 RX ADMIN — DOCUSATE SODIUM 50 MG AND SENNOSIDES 8.6 MG 1 TABLET: 8.6; 5 TABLET, FILM COATED ORAL at 20:25

## 2023-11-16 RX ADMIN — ACYCLOVIR 400 MG: 400 TABLET ORAL at 20:24

## 2023-11-16 RX ADMIN — POLYETHYLENE GLYCOL 3350 17 G: 17 POWDER, FOR SOLUTION ORAL at 08:42

## 2023-11-16 RX ADMIN — ROSUVASTATIN CALCIUM 10 MG: 5 TABLET, FILM COATED ORAL at 08:43

## 2023-11-16 RX ADMIN — CIPROFLOXACIN HYDROCHLORIDE 500 MG: 500 TABLET, FILM COATED ORAL at 20:24

## 2023-11-16 RX ADMIN — MORPHINE SULFATE 15 MG: 15 TABLET, FILM COATED, EXTENDED RELEASE ORAL at 20:26

## 2023-11-16 RX ADMIN — SODIUM CHLORIDE, PRESERVATIVE FREE 10 ML: 5 INJECTION INTRAVENOUS at 20:24

## 2023-11-16 RX ADMIN — SALINE NASAL SPRAY 2 SPRAY: 1.5 SOLUTION NASAL at 20:26

## 2023-11-16 RX ADMIN — MONTELUKAST 10 MG: 10 TABLET, FILM COATED ORAL at 08:44

## 2023-11-16 RX ADMIN — PANTOPRAZOLE SODIUM 40 MG: 40 TABLET, DELAYED RELEASE ORAL at 08:44

## 2023-11-16 RX ADMIN — MORPHINE SULFATE 15 MG: 15 TABLET, FILM COATED, EXTENDED RELEASE ORAL at 08:43

## 2023-11-16 RX ADMIN — IPRATROPIUM BROMIDE 2 SPRAY: 42 SPRAY NASAL at 20:26

## 2023-11-16 RX ADMIN — FERROUS SULFATE TAB 325 MG (65 MG ELEMENTAL FE) 325 MG: 325 (65 FE) TAB at 16:48

## 2023-11-16 RX ADMIN — DECITABINE 36 MG: 50 INJECTION, POWDER, LYOPHILIZED, FOR SOLUTION INTRAVENOUS at 16:07

## 2023-11-16 RX ADMIN — PREDNISONE 60 MG: 20 TABLET ORAL at 08:43

## 2023-11-16 RX ADMIN — AMLODIPINE BESYLATE 5 MG: 5 TABLET ORAL at 08:44

## 2023-11-16 RX ADMIN — FLUCONAZOLE 200 MG: 100 TABLET ORAL at 08:43

## 2023-11-16 RX ADMIN — SALINE NASAL SPRAY 2 SPRAY: 1.5 SOLUTION NASAL at 08:44

## 2023-11-16 RX ADMIN — FAMOTIDINE 40 MG: 20 TABLET, FILM COATED ORAL at 20:25

## 2023-11-16 RX ADMIN — CIPROFLOXACIN HYDROCHLORIDE 500 MG: 500 TABLET, FILM COATED ORAL at 08:43

## 2023-11-16 RX ADMIN — FLUOXETINE HYDROCHLORIDE 20 MG: 20 CAPSULE ORAL at 20:24

## 2023-11-16 RX ADMIN — SODIUM CHLORIDE, PRESERVATIVE FREE 10 ML: 5 INJECTION INTRAVENOUS at 08:45

## 2023-11-16 RX ADMIN — PANTOPRAZOLE SODIUM 40 MG: 40 TABLET, DELAYED RELEASE ORAL at 14:56

## 2023-11-16 RX ADMIN — SALINE NASAL SPRAY 2 SPRAY: 1.5 SOLUTION NASAL at 14:56

## 2023-11-16 RX ADMIN — ONDANSETRON 8 MG: 2 INJECTION INTRAMUSCULAR; INTRAVENOUS at 14:55

## 2023-11-16 RX ADMIN — ACYCLOVIR 400 MG: 400 TABLET ORAL at 08:44

## 2023-11-16 RX ADMIN — FLUOXETINE HYDROCHLORIDE 20 MG: 20 CAPSULE ORAL at 08:43

## 2023-11-16 RX ADMIN — POTASSIUM CHLORIDE 20 MEQ: 1500 TABLET, EXTENDED RELEASE ORAL at 16:48

## 2023-11-16 RX ADMIN — DIPHENHYDRAMINE HYDROCHLORIDE 25 MG: 50 INJECTION INTRAMUSCULAR; INTRAVENOUS at 16:48

## 2023-11-16 RX ADMIN — POTASSIUM CHLORIDE 20 MEQ: 1500 TABLET, EXTENDED RELEASE ORAL at 08:43

## 2023-11-16 RX ADMIN — SODIUM CHLORIDE 4.5 MG: 9 INJECTION, SOLUTION INTRAVENOUS at 17:15

## 2023-11-16 RX ADMIN — ACETAMINOPHEN 650 MG: 325 TABLET ORAL at 16:48

## 2023-11-16 RX ADMIN — METHYLPREDNISOLONE SODIUM SUCCINATE 80 MG: 125 INJECTION, POWDER, FOR SOLUTION INTRAMUSCULAR; INTRAVENOUS at 16:49

## 2023-11-16 RX ADMIN — LEVOTHYROXINE SODIUM 125 MCG: 0.12 TABLET ORAL at 08:44

## 2023-11-16 RX ADMIN — IPRATROPIUM BROMIDE 2 SPRAY: 42 SPRAY NASAL at 09:11

## 2023-11-16 ASSESSMENT — PAIN SCALES - GENERAL: PAINLEVEL_OUTOF10: 0

## 2023-11-16 NOTE — PLAN OF CARE
Problem: Pain  Goal: Verbalizes/displays adequate comfort level or baseline comfort level  11/16/2023 1156 by Merna Humphrey RN  Outcome: Progressing  11/16/2023 0003 by Susan Ashley RN  Outcome: Progressing     Problem: ABCDS Injury Assessment  Goal: Absence of physical injury  Outcome: Progressing     Problem: Safety - Adult  Goal: Free from fall injury  Outcome: Progressing     Problem: Discharge Planning  Goal: Discharge to home or other facility with appropriate resources  Outcome: Progressing     Problem: Skin/Tissue Integrity  Goal: Absence of new skin breakdown  Description: 1. Monitor for areas of redness and/or skin breakdown  2. Assess vascular access sites hourly  3. Every 4-6 hours minimum:  Change oxygen saturation probe site  4. Every 4-6 hours:  If on nasal continuous positive airway pressure, respiratory therapy assess nares and determine need for appliance change or resting period.   Outcome: Progressing     Problem: Neurosensory - Adult  Goal: Achieves stable or improved neurological status  Outcome: Progressing  Goal: Achieves maximal functionality and self care  Outcome: Progressing     Problem: Respiratory - Adult  Goal: Achieves optimal ventilation and oxygenation  Outcome: Progressing     Problem: Cardiovascular - Adult  Goal: Maintains optimal cardiac output and hemodynamic stability  Outcome: Progressing  Goal: Absence of cardiac dysrhythmias or at baseline  Outcome: Progressing     Problem: Skin/Tissue Integrity - Adult  Goal: Skin integrity remains intact  Outcome: Progressing  Goal: Oral mucous membranes remain intact  Outcome: Progressing     Problem: Musculoskeletal - Adult  Goal: Return mobility to safest level of function  Outcome: Progressing  Goal: Return ADL status to a safe level of function  Outcome: Progressing     Problem: Gastrointestinal - Adult  Goal: Maintains or returns to baseline bowel function  Outcome: Progressing  Goal: Maintains adequate nutritional intake  Outcome: Progressing     Problem: Genitourinary - Adult  Goal: Absence of urinary retention  Outcome: Progressing     Problem: Infection - Adult  Goal: Absence of infection during hospitalization  Outcome: Progressing     Problem: Metabolic/Fluid and Electrolytes - Adult  Goal: Electrolytes maintained within normal limits  Outcome: Progressing  Goal: Hemodynamic stability and optimal renal function maintained  Outcome: Progressing  Goal: Glucose maintained within prescribed range  Outcome: Progressing     Problem: Hematologic - Adult  Goal: Maintains hematologic stability  Outcome: Progressing     Problem: Anxiety  Goal: Will report anxiety at manageable levels  Description: INTERVENTIONS:  1. Administer medication as ordered  2. Teach and rehearse alternative coping skills  3. Provide emotional support with 1:1 interaction with staff  Outcome: Progressing     Problem: Coping  Goal: Pt/Family able to verbalize concerns and demonstrate effective coping strategies  Description: INTERVENTIONS:  1. Assist patient/family to identify coping skills, available support systems and cultural and spiritual values  2. Provide emotional support, including active listening and acknowledgement of concerns of patient and caregivers  3. Reduce environmental stimuli, as able  4. Instruct patient/family in relaxation techniques, as appropriate  5.  Assess for spiritual pain/suffering and initiate Spiritual Care, Psychosocial Clinical Specialist consults as needed  Outcome: Progressing

## 2023-11-16 NOTE — PROGRESS NOTES
0720: Received pt from [facility] No. [place] in stable condition. Pt in bed resting quietly. Resp even & unlabored at rest; no acute signs of distress noted. Bed low & locked; call light in reach; no needs voiced.

## 2023-11-16 NOTE — PROGRESS NOTES
Patient slept well overnight, no complaints voiced at this time.  at bedside. Patient receiving 2/10 doses of Dacogen today. Patient voiding well overnight. Plts at 12 this morning.

## 2023-11-16 NOTE — PROGRESS NOTES
ACUTE OCCUPATIONAL THERAPY GOALS:   (Developed with and agreed upon by patient and/or caregiver.)  Re-evaluation completed on 11/16/23  1. Patient will complete lower body bathing and dressing with MOD I and adaptive equipment as needed. 2. Patient will complete toileting with MOD I.   3. Patient will tolerate 30 minutes of OT treatment with 1-2 rest breaks to increase activity tolerance for ADLs. 4. Patient will complete functional transfers with MOD I and adaptive equipment as needed. 5. Patient will complete functional mobility for household distances with MOD I and adaptive equipment as needed. 6. Patient will complete self-grooming while standing edge of sink with MOD I and adaptive equipment as needed. New Goal Added:  7. Patient will complete UB bathing and dressing with MOD I and adaptive equipment as needed. 8. Patient will verbalize and demonstrate 3 energy conservation strategies throughout completion of bADLs. Timeframe: 7 visits       OCCUPATIONAL THERAPY Initial Assessment, Daily Note, and AM       OT Visit Days: 1   Acknowledge Orders  Time  OT Charge Capture  Rehab Caseload Tracker      Neutropenic Precautions    Surekha Altamirano is a 71 y.o. female   PRIMARY DIAGNOSIS: Neutropenic fever ([address])  Neutropenic fever ([address]) [D70.9, R50.81]  Pancytopenia ([address]) [A09.243]       Reason for Referral: Generalized Muscle Weakness (M62.81)  Difficulty in walking, Not elsewhere classified (R26.2)  Inpatient: Payor: Bethany Aburto / Plan: Julia Dawkins / Product Type: *No Product type* /     ASSESSMENT:     REHAB RECOMMENDATIONS:   Recommendation to date pending progress:  Setting:  Home Health Therapy    Equipment:    To Be Determined     ASSESSMENT:  Ms. Jose Marinelli presented to the hospital with weakness, lethargy, and cough. Pt now on hospital day 20 and admitted with neutropenic fever. Pt with a PMH significant for acute erythroblastic leukemia.  At baseline, pt is mod I for bADLs, tolerance, and increase safety awareness. TREATMENT GRID:  N/A    AFTER TREATMENT PRECAUTIONS: Bed, Call light within reach, Needs within reach, RN notified, and Visitors at bedside    INTERDISCIPLINARY COLLABORATION:  RN/ PCT, PT/ PTA, and OT/ PETTY    EDUCATION:  Education Given To: Patient  Education Provided: Role of Therapy;Plan of Care; Fall Prevention Strategies  Education Outcome: Verbalized understanding;Demonstrated understanding     TOTAL TREATMENT DURATION AND TIME:  Time In: 0945  Time Out: 809 [facility]  Minutes: [address] Peterson, OT

## 2023-11-16 NOTE — PROGRESS NOTES
ACUTE PHYSICAL THERAPY GOALS:   (Developed with and agreed upon by patient and/or caregiver. )    LTG:  (1.)Ms. Mary Torres will move from supine to sit and sit to supine , scoot up and down, and roll side to side in bed with INDEPENDENCE within 7 treatment day(s). (2.)Ms. Mary Torres will transfer from bed to chair and chair to bed with MODIFIED INDEPENDENCE using the least restrictive device within 7 treatment day(s). (3.)Ms. Mary Torres will ambulate with MODIFIED INDEPENDENCE for 500 feet with the least restrictive device within 7 treatment day(s). (4.)Ms. Mary Torres will participate in therapeutic activity/exercises x 25 minutes for increased activity tolerance within 7 treatment days. (5.)Ms. Mary Torres will perform standing static and dynamic balance activities x 15 minutes with SUPERVISION to improve safety within 7 day(s). New Goal:      Ms. Mary Torres will demonstrate understanding of energy conservation and activity pacing techniques and apply them with no cueing within 7 days. PHYSICAL THERAPY: Daily Note AM   (Link to Caseload Tracking: PT Visit Days : 4  Time In/Out PT Charge Capture  Rehab Caseload Tracker  Orders    Jenny Ramachandran is a 71 y.o. female   PRIMARY DIAGNOSIS: Neutropenic fever ([address])  Neutropenic fever ([address]) [D70.9, R50.81]  Pancytopenia ([address]) [D61.818]       Inpatient: Payor: Mei Hearing / Plan: Hugh Bennett / Product Type: *No Product type* /     ASSESSMENT:     REHAB RECOMMENDATIONS:   Recommendation to date pending progress:  Setting:  Home Health Therapy    Equipment:    To Be Determined     ASSESSMENT:  Ms. Mary Torres was supine in bed on arrival and agreeable to PT. She required CGA/SBA with all mobility today including bed mobility, ambulation, and dynamic standing balance activities. Pt left in shower with OT present.         SUBJECTIVE:   Ms. Mary Torres states, \"I would love to get a shower\"     Social/Functional Lives With: Spouse  Type of Home: House  Home Layout: One level  Bathroom

## 2023-11-16 NOTE — PROGRESS NOTES
's follow-up visit with Mrs. Burtonena Me and her spouse to convey care and concern and to continue spiritual support.      [address], [address]  Board Certified

## 2023-11-16 NOTE — PROGRESS NOTES
[facility] Hematology & Oncology        Inpatient Hematology / Oncology Progress Note    Reason for Admission:  Neutropenic fever (HCC) [D70.9, R50.81]  Pancytopenia (HCC) [D61.818]    24 Hour Events:  VSS, afebrile - on 2L  Day 2 (of 10) Dacogen, Mylotarg when able  Plts remain fairly stable - 12k this AM  Continues prednisone taper - down to 60mg  Intermittently on O2  Feeling ok,  at bedside    Transfusions: None  Replacements: None    ROS:  Constitutional: Positive for fatigue; negative for fever, chills.  CV: Negative for chest pain, palpitations, edema.  Respiratory: +cough, dyspnea (improved). Negative for wheezing.  GI: Negative for nausea, abdominal pain, diarrhea.    10 point review of systems is otherwise negative with the exception of the elements mentioned above in the HPI.       Allergies   Allergen Reactions    Penicillins Hives    Sulfa Antibiotics Hives    Codeine Nausea And Vomiting     Past Medical History:   Diagnosis Date    Arthritis     Autoimmune disease (HCC)     skin changes, unknown name    Cancer (HCC) 1990    ovarian    Chronic pain     arthritis in back and legs    Coronary artery spasm (HCC)     COVID 05/15/2022    Essential hypertension 11/8/2019    Gastritis     GERD (gastroesophageal reflux disease)     Hx antineoplastic chemo     Migraine headache     Psoriasis     Psychiatric disorder     anxiety and depression    Severe obesity (BMI 35.0-39.9) 6/26/2018    Thyroid disease     Diagnosed in 1996     Past Surgical History:   Procedure Laterality Date    CARPAL TUNNEL RELEASE      GYN      ovaries    HYSTERECTOMY, TOTAL ABDOMINAL (CERVIX REMOVED)  1990    [place] UNLISTED PROCEDURE CARDIAC SURGERY      cardiac cath.  Dx with spasms.    TUBAL LIGATION       Family History   Problem Relation Age of Onset    Parkinson's Disease Mother     Stroke Mother         Passed away in 1969    No Known Problems Paternal Grandfather     No Known Problems Paternal Grandmother     No Known  20 mEq at 11/16/23 0843    diphenhydrAMINE (BENADRYL) capsule 25 mg  25 mg Oral Q6H PRN Eliana Bill MD   25 mg at 11/12/23 0941    ferrous sulfate (IRON 325) tablet 325 mg  325 mg Oral BID  Georgia PorterTIM - CNP   325 mg at 11/15/23 1654    famotidine (PEPCID) tablet 40 mg  40 mg Oral QHS Fonnie Newness, DO   40 mg at 11/15/23 2024    FLUoxetine (PROZAC) capsule 20 mg  20 mg Oral BID [facility], DO   20 mg at 11/16/23 5072    levothyroxine (SYNTHROID) tablet 125 mcg  125 mcg Oral QAM Vazquez Kellyon, DO   125 mcg at 11/16/23 0844    montelukast (SINGULAIR) tablet 10 mg  10 mg Oral Daily Fonnie Newness, DO   10 mg at 11/16/23 0844    pantoprazole (PROTONIX) tablet 40 mg  40 mg Oral BID [facility], [facility]   40 mg at 11/16/23 0844    prochlorperazine (COMPAZINE) tablet 10 mg  10 mg Oral Q6H PRN Fonnie Newness, DO        rosuvastatin (CRESTOR) tablet 10 mg  10 mg Oral Daily Fonnie Newness, DO   10 mg at 11/16/23 1257    sodium chloride flush 0.9 % injection 5-40 mL  5-40 mL IntraVENous 2 times per day Fonnie [facility], DO   10 mL at 11/16/23 0845    sodium chloride flush 0.9 % injection 5-40 mL  5-40 mL IntraVENous PRN Fonnie Newness, DO        polyethylene glycol (GLYCOLAX) packet 17 g  17 g Oral Daily PRN Fonnie Newness, DO        acetaminophen (TYLENOL) tablet 650 mg  650 mg Oral Q6H PRN Fonnie Newness, DO   650 mg at 11/13/23 1747    Or    acetaminophen (TYLENOL) suppository 650 mg  650 mg Rectal Q6H PRN Fonnie Newness, DO        ondansetron (ZOFRAN-ODT) disintegrating tablet 4 mg  4 mg Oral Q6H PRN Fonnie Newness, DO        Or    ondansetron [facility]) injection 4 mg  4 mg IntraVENous Q6H PRN Fonnie Newness, DO   4 mg at 11/04/23 1524    polyethylene glycol (GLYCOLAX) packet 17 g  17 g Oral Daily Ewelina Webb, DO   17 g at 11/16/23 0842    sennosides-docusate sodium (SENOKOT-S) 8.6-50 MG tablet 1 tablet  1 tablet Oral QHS Ewelina Webb, DO   1 tablet at 11/15/23 2023    aluminum & magnesium hypertensive overnight, monitor. May need to adjust amlodipine if no improvement. Has PRN hydralazine. 11/8 Overall BP improved      Chronic pain   - on morphine ER 15mg BID, norco.  Muscle relaxant. Constipation   - bowel regimen      No AC due to TCP   Continue home meds  Supportive care  PT/OT -   Antimicrobial prophylaxis - ACV, Diflucan, Cipro    Dispo - too soon to determine. Expect prolonged hospitalization through 10 day course of Dacogen/count recovery. Goals and plan of care reviewed with the patient. All questions answered to the best of our ability. Matilda James, APRN - [address] [facility] Hematology & Oncology  [address] [place] [address] Road  Office : [phone]  Fax : [phone]        Attending Addendum:  I have personally performed a face to face diagnostic evaluation on this patient. I have reviewed and agree with the care plan as documented by Ene Moe N.P. 51 minutes were spent on patient care, including but not limited to, reviewing the chart and time with the patient and family, more than 50% of the time documented was spent in face-to-face contact with the patient and in the care of the patient on the floor/unit where the patient is located. My findings are as follows: She has AML, appears weak, heart rate regular without murmurs, abdomen is non-tender, bowel sounds are positive, we will continue Cycle 1 Day of Decitabine, since her Myeloid Blasts are CD-33+, we will administer 1 dose of Mylotarg today.               Tyson Berman MD    [facility] Hematology/Oncology  [address] [place] [address] Road  Office : [phone]  Fax : [phone]

## 2023-11-17 LAB
ALBUMIN SERPL-MCNC: 3.6 G/DL (ref 3.2–4.6)
ALBUMIN/GLOB SERPL: 0.9 (ref 0.4–1.6)
ALP SERPL-CCNC: 53 U/L (ref 50–136)
ALT SERPL-CCNC: 53 U/L (ref 12–65)
ANION GAP SERPL CALC-SCNC: 9 MMOL/L (ref 2–11)
AST SERPL-CCNC: 19 U/L (ref 15–37)
BASOPHILS # BLD: 0.1 K/UL (ref 0–0.2)
BASOPHILS NFR BLD: 3 % (ref 0–2)
BILIRUB SERPL-MCNC: 1.3 MG/DL (ref 0.2–1.1)
BUN SERPL-MCNC: 25 MG/DL (ref 8–23)
CALCIUM SERPL-MCNC: 9.8 MG/DL (ref 8.3–10.4)
CHLORIDE SERPL-SCNC: 99 MMOL/L (ref 101–110)
CO2 SERPL-SCNC: 26 MMOL/L (ref 21–32)
CREAT SERPL-MCNC: 0.7 MG/DL (ref 0.6–1)
DIFFERENTIAL METHOD BLD: ABNORMAL
EOSINOPHIL # BLD: 0 K/UL (ref 0–0.8)
EOSINOPHIL NFR BLD: 0 % (ref 0.5–7.8)
ERYTHROCYTE [DISTWIDTH] IN BLOOD BY AUTOMATED COUNT: 14.5 % (ref 11.9–14.6)
GLOBULIN SER CALC-MCNC: 3.8 G/DL (ref 2.8–4.5)
GLUCOSE SERPL-MCNC: 124 MG/DL (ref 65–100)
HCT VFR BLD AUTO: 23 % (ref 35.8–46.3)
HGB BLD-MCNC: 7.7 G/DL (ref 11.7–15.4)
IMM GRANULOCYTES # BLD AUTO: 0.1 K/UL (ref 0–0.5)
IMM GRANULOCYTES NFR BLD AUTO: 6 % (ref 0–5)
LDH SERPL L TO P-CCNC: 739 U/L (ref 110–210)
LYMPHOCYTES # BLD: 0.6 K/UL (ref 0.5–4.6)
LYMPHOCYTES NFR BLD: 25 % (ref 13–44)
MAGNESIUM SERPL-MCNC: 2.3 MG/DL (ref 1.8–2.4)
MCH RBC QN AUTO: 30.4 PG (ref 26.1–32.9)
MCHC RBC AUTO-ENTMCNC: 33.5 G/DL (ref 31.4–35)
MCV RBC AUTO: 90.9 FL (ref 82–102)
MONOCYTES # BLD: 0.3 K/UL (ref 0.1–1.3)
MONOCYTES NFR BLD: 12 % (ref 4–12)
NEUTS SEG # BLD: 1.3 K/UL (ref 1.7–8.2)
NEUTS SEG NFR BLD: 54 % (ref 43–78)
NRBC # BLD: 0.04 K/UL (ref 0–0.2)
PHOSPHATE SERPL-MCNC: 4.1 MG/DL (ref 2.3–3.7)
PLATELET # BLD AUTO: 11 K/UL (ref 150–450)
PLATELET COMMENT: ABNORMAL
PMV BLD AUTO: ABNORMAL FL (ref 9.4–12.3)
POTASSIUM SERPL-SCNC: 4.4 MMOL/L (ref 3.5–5.1)
PROT SERPL-MCNC: 7.4 G/DL (ref 6.3–8.2)
RBC # BLD AUTO: 2.53 M/UL (ref 4.05–5.2)
RBC MORPH BLD: ABNORMAL
SODIUM SERPL-SCNC: 134 MMOL/L (ref 133–143)
URATE SERPL-MCNC: 2.5 MG/DL (ref 2.6–6)
WBC # BLD AUTO: 2.4 K/UL (ref 4.3–11.1)
WBC MORPH BLD: ABNORMAL

## 2023-11-17 PROCEDURE — 6370000000 HC RX 637 (ALT 250 FOR IP): Performed by: NURSE PRACTITIONER

## 2023-11-17 PROCEDURE — 1170000000 HC RM PRIVATE ONCOLOGY

## 2023-11-17 PROCEDURE — 83735 ASSAY OF MAGNESIUM: CPT

## 2023-11-17 PROCEDURE — 80053 COMPREHEN METABOLIC PANEL: CPT

## 2023-11-17 PROCEDURE — 6370000000 HC RX 637 (ALT 250 FOR IP): Performed by: INTERNAL MEDICINE

## 2023-11-17 PROCEDURE — 84100 ASSAY OF PHOSPHORUS: CPT

## 2023-11-17 PROCEDURE — 07DR3ZX EXTRACTION OF ILIAC BONE MARROW, PERCUTANEOUS APPROACH, DIAGNOSTIC: ICD-10-PCS | Performed by: NURSE PRACTITIONER

## 2023-11-17 PROCEDURE — 99233 SBSQ HOSP IP/OBS HIGH 50: CPT | Performed by: INTERNAL MEDICINE

## 2023-11-17 PROCEDURE — 2580000003 HC RX 258: Performed by: FAMILY MEDICINE

## 2023-11-17 PROCEDURE — 6360000002 HC RX W HCPCS: Performed by: INTERNAL MEDICINE

## 2023-11-17 PROCEDURE — 079T3ZX DRAINAGE OF BONE MARROW, PERCUTANEOUS APPROACH, DIAGNOSTIC: ICD-10-PCS | Performed by: NURSE PRACTITIONER

## 2023-11-17 PROCEDURE — 2580000003 HC RX 258: Performed by: INTERNAL MEDICINE

## 2023-11-17 PROCEDURE — APPSS45 APP SPLIT SHARED TIME 31-45 MINUTES: Performed by: NURSE PRACTITIONER

## 2023-11-17 PROCEDURE — 83615 LACTATE (LD) (LDH) ENZYME: CPT

## 2023-11-17 PROCEDURE — 6370000000 HC RX 637 (ALT 250 FOR IP): Performed by: FAMILY MEDICINE

## 2023-11-17 PROCEDURE — 85025 COMPLETE CBC W/AUTO DIFF WBC: CPT

## 2023-11-17 PROCEDURE — 84550 ASSAY OF BLOOD/URIC ACID: CPT

## 2023-11-17 RX ORDER — PREDNISONE 20 MG/1
40 TABLET ORAL DAILY
Status: DISCONTINUED | OUTPATIENT
Start: 2023-11-18 | End: 2023-11-21

## 2023-11-17 RX ADMIN — CIPROFLOXACIN HYDROCHLORIDE 500 MG: 500 TABLET, FILM COATED ORAL at 08:37

## 2023-11-17 RX ADMIN — SALINE NASAL SPRAY 2 SPRAY: 1.5 SOLUTION NASAL at 08:34

## 2023-11-17 RX ADMIN — PREDNISONE 60 MG: 20 TABLET ORAL at 08:36

## 2023-11-17 RX ADMIN — SODIUM CHLORIDE, PRESERVATIVE FREE 10 ML: 5 INJECTION INTRAVENOUS at 08:37

## 2023-11-17 RX ADMIN — DIPHENHYDRAMINE HYDROCHLORIDE 25 MG: 25 CAPSULE ORAL at 11:30

## 2023-11-17 RX ADMIN — POTASSIUM CHLORIDE 20 MEQ: 1500 TABLET, EXTENDED RELEASE ORAL at 17:05

## 2023-11-17 RX ADMIN — FLUOXETINE HYDROCHLORIDE 20 MG: 20 CAPSULE ORAL at 21:39

## 2023-11-17 RX ADMIN — FLUCONAZOLE 200 MG: 100 TABLET ORAL at 08:37

## 2023-11-17 RX ADMIN — SALINE NASAL SPRAY 2 SPRAY: 1.5 SOLUTION NASAL at 14:55

## 2023-11-17 RX ADMIN — PANTOPRAZOLE SODIUM 40 MG: 40 TABLET, DELAYED RELEASE ORAL at 14:55

## 2023-11-17 RX ADMIN — DOCUSATE SODIUM 50 MG AND SENNOSIDES 8.6 MG 1 TABLET: 8.6; 5 TABLET, FILM COATED ORAL at 21:39

## 2023-11-17 RX ADMIN — ACYCLOVIR 400 MG: 400 TABLET ORAL at 21:39

## 2023-11-17 RX ADMIN — ROSUVASTATIN CALCIUM 10 MG: 5 TABLET, FILM COATED ORAL at 08:36

## 2023-11-17 RX ADMIN — AMLODIPINE BESYLATE 5 MG: 5 TABLET ORAL at 08:37

## 2023-11-17 RX ADMIN — SODIUM CHLORIDE, PRESERVATIVE FREE 10 ML: 5 INJECTION INTRAVENOUS at 21:43

## 2023-11-17 RX ADMIN — PANTOPRAZOLE SODIUM 40 MG: 40 TABLET, DELAYED RELEASE ORAL at 08:37

## 2023-11-17 RX ADMIN — IPRATROPIUM BROMIDE 2 SPRAY: 42 SPRAY NASAL at 21:44

## 2023-11-17 RX ADMIN — DECITABINE 36 MG: 50 INJECTION, POWDER, LYOPHILIZED, FOR SOLUTION INTRAVENOUS at 12:01

## 2023-11-17 RX ADMIN — FAMOTIDINE 40 MG: 20 TABLET, FILM COATED ORAL at 21:39

## 2023-11-17 RX ADMIN — ONDANSETRON 8 MG: 2 INJECTION INTRAMUSCULAR; INTRAVENOUS at 14:56

## 2023-11-17 RX ADMIN — ACYCLOVIR 400 MG: 400 TABLET ORAL at 08:36

## 2023-11-17 RX ADMIN — CIPROFLOXACIN HYDROCHLORIDE 500 MG: 500 TABLET, FILM COATED ORAL at 21:39

## 2023-11-17 RX ADMIN — POTASSIUM CHLORIDE 20 MEQ: 1500 TABLET, EXTENDED RELEASE ORAL at 08:37

## 2023-11-17 RX ADMIN — FERROUS SULFATE TAB 325 MG (65 MG ELEMENTAL FE) 325 MG: 325 (65 FE) TAB at 11:30

## 2023-11-17 RX ADMIN — LEVOTHYROXINE SODIUM 125 MCG: 0.12 TABLET ORAL at 08:37

## 2023-11-17 RX ADMIN — SALINE NASAL SPRAY 2 SPRAY: 1.5 SOLUTION NASAL at 21:44

## 2023-11-17 RX ADMIN — MONTELUKAST 10 MG: 10 TABLET, FILM COATED ORAL at 08:37

## 2023-11-17 RX ADMIN — MORPHINE SULFATE 15 MG: 15 TABLET, FILM COATED, EXTENDED RELEASE ORAL at 08:36

## 2023-11-17 RX ADMIN — FLUOXETINE HYDROCHLORIDE 20 MG: 20 CAPSULE ORAL at 08:43

## 2023-11-17 RX ADMIN — POLYETHYLENE GLYCOL 3350 17 G: 17 POWDER, FOR SOLUTION ORAL at 08:34

## 2023-11-17 RX ADMIN — DIPHENHYDRAMINE HYDROCHLORIDE 10 ML: 12.5 LIQUID ORAL at 22:51

## 2023-11-17 RX ADMIN — DIPHENHYDRAMINE HYDROCHLORIDE 25 MG: 25 CAPSULE ORAL at 19:17

## 2023-11-17 RX ADMIN — MORPHINE SULFATE 15 MG: 15 TABLET, FILM COATED, EXTENDED RELEASE ORAL at 21:39

## 2023-11-17 RX ADMIN — IPRATROPIUM BROMIDE 2 SPRAY: 42 SPRAY NASAL at 08:34

## 2023-11-17 RX ADMIN — ALLOPURINOL 300 MG: 300 TABLET ORAL at 08:36

## 2023-11-17 RX ADMIN — FERROUS SULFATE TAB 325 MG (65 MG ELEMENTAL FE) 325 MG: 325 (65 FE) TAB at 17:05

## 2023-11-17 ASSESSMENT — PAIN DESCRIPTION - ORIENTATION: ORIENTATION: MID;LOWER

## 2023-11-17 ASSESSMENT — PAIN DESCRIPTION - DESCRIPTORS: DESCRIPTORS: ACHING

## 2023-11-17 ASSESSMENT — PAIN DESCRIPTION - LOCATION
LOCATION: BACK
LOCATION: HIP;BACK

## 2023-11-17 ASSESSMENT — PAIN SCALES - GENERAL
PAINLEVEL_OUTOF10: 0
PAINLEVEL_OUTOF10: 2
PAINLEVEL_OUTOF10: 3

## 2023-11-17 ASSESSMENT — PAIN DESCRIPTION - PAIN TYPE: TYPE: ACUTE PAIN

## 2023-11-17 NOTE — PROGRESS NOTES
6432: Received pt from Damir Sandhu in stable condition. Pt in bed resting quietly. Resp even & unlabored at rest; no acute signs of distress noted. Bed low & locked; call light in reach; no needs voiced. 1131: Pt is very itchy therese on her hands. Benadryl 25 mg po given for c/o itching.

## 2023-11-17 NOTE — CARE COORDINATION
Chart screened by CM for d/c planning. Currently on RA. Oncology monitoring plt count. 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 Per NP progress note dated 11/17:  Day 3 (of 10) Dacogen, Mylotarg completed 11/16. Continues prednisone taper - down to 40mg tomorrow. PT/OT recommend HH at d/c. Anticipated dispo: return home, with [address] [place] [address],[address] if agreeable, once pt is medically stable. D/C timeframe is undetermined at the present time. CM will continue to follow.   LOS = 21 days

## 2023-11-17 NOTE — PROGRESS NOTES
[facility] Hematology & Oncology        Inpatient Hematology / Oncology Progress Note    Reason for Admission:  Neutropenic fever (HCC) [D70.9, R50.81]  Pancytopenia (HCC) [D61.818]    24 Hour Events:  VSS, afebrile - on 2L  Day 3 (of 10) Dacogen, Mylotarg completed 11/16  Continues prednisone taper - down to 40mg tomorrow  Intermittently on O2, RA today  Feeling ok,  at bedside    Transfusions: None  Replacements: None    ROS:  Constitutional: Positive for fatigue; negative for fever, chills.  CV: Negative for chest pain, palpitations, edema.  Respiratory: +cough, dyspnea (improved). Negative for wheezing.  GI: Negative for nausea, abdominal pain, diarrhea.    10 point review of systems is otherwise negative with the exception of the elements mentioned above in the HPI.       Allergies   Allergen Reactions    Penicillins Hives    Sulfa Antibiotics Hives    Codeine Nausea And Vomiting     Past Medical History:   Diagnosis Date    Arthritis     Autoimmune disease (HCC)     skin changes, unknown name    Cancer (HCC) 1990    ovarian    Chronic pain     arthritis in back and legs    Coronary artery spasm (HCC)     COVID 05/15/2022    Essential hypertension 11/8/2019    Gastritis     GERD (gastroesophageal reflux disease)     Hx antineoplastic chemo     Migraine headache     Psoriasis     Psychiatric disorder     anxiety and depression    Severe obesity (BMI 35.0-39.9) 6/26/2018    Thyroid disease     Diagnosed in 1996     Past Surgical History:   Procedure Laterality Date    CARPAL TUNNEL RELEASE      GYN      ovaries    HYSTERECTOMY, TOTAL ABDOMINAL (CERVIX REMOVED)  1990    [place] UNLISTED PROCEDURE CARDIAC SURGERY      cardiac cath.  Dx with spasms.    TUBAL LIGATION       Family History   Problem Relation Age of Onset    Parkinson's Disease Mother     Stroke Mother         Passed away in 1969    No Known Problems Paternal Grandfather     No Known Problems Paternal Grandmother     No Known Problems Maternal  65 - 100 mg/dL    BUN 25 (H) 8 - 23 MG/DL    Creatinine 0.70 0.6 - 1.0 MG/DL    Est, Glom Filt Rate >60 >60 ml/min/1.73m2    Calcium 9.8 8.3 - 10.4 MG/DL    Total Bilirubin 1.3 (H) 0.2 - 1.1 MG/DL    ALT 53 12 - 65 U/L    AST 19 15 - 37 U/L    Alk Phosphatase 53 50 - 136 U/L    Total Protein 7.4 6.3 - 8.2 g/dL    Albumin 3.6 3.2 - 4.6 g/dL    Globulin 3.8 2.8 - 4.5 g/dL    Albumin/Globulin Ratio 0.9 0.4 - 1.6     Magnesium    Collection Time: 11/17/23  5:09 AM   Result Value Ref Range    Magnesium 2.3 1.8 - 2.4 mg/dL   Uric Acid    Collection Time: 11/17/23  5:09 AM   Result Value Ref Range    Uric Acid 2.5 (L) 2.6 - 6.0 MG/DL   Phosphorus    Collection Time: 11/17/23  5:09 AM   Result Value Ref Range    Phosphorus 4.1 (H) 2.3 - 3.7 MG/DL   Lactate Dehydrogenase    Collection Time: 11/17/23  5:09 AM   Result Value Ref Range     (H) 110 - 210 U/L       Imaging:  Xray Result (most recent):  XR CHEST PORTABLE 11/14/2023    Narrative  EXAMINATION: XR CHEST PORTABLE 11/14/2023 2:44 PM    ACCESSION NUMBER: [id number]    COMPARISON: Chest radiograph 11/1/2023 and earlier. INDICATION: concern for fluid overload    TECHNIQUE: A single view of the chest was obtained. FINDINGS:  No focal consolidation. No pulmonary edema. No pneumothorax or sizable pleural effusion. Stable cardiomediastinal silhouette. Impression  -No acute airspace disease. Resolution of the previously seen pulmonary edema.         ASSESSMENT:  Patient Active Problem List   Diagnosis    Hypothyroidism, adult    Essential hypertension    Chronic hand pain    Psoriatic arthritis (HCC)    Multiple allergies    Psoriasis, guttate    Other chest pain    Coronary artery spasm (HCC)    Chronic midline low back pain without sciatica    History of ovarian cancer    Chronic pain of multiple joints    Esophageal reflux    Anxiety and depression    Mixed hyperlipidemia    Class 1 obesity with serious comorbidity and body mass index (BMI) of 33.0 to speech recognition software. As a result, errors of speech recognition may have occurred.

## 2023-11-18 PROBLEM — Z79.899 ON ANTINEOPLASTIC CHEMOTHERAPY: Status: ACTIVE | Noted: 2023-11-18

## 2023-11-18 LAB
ALBUMIN SERPL-MCNC: 3.7 G/DL (ref 3.2–4.6)
ALBUMIN/GLOB SERPL: 1 (ref 0.4–1.6)
ALP SERPL-CCNC: 54 U/L (ref 50–136)
ALT SERPL-CCNC: 59 U/L (ref 12–65)
ANION GAP SERPL CALC-SCNC: 7 MMOL/L (ref 2–11)
AST SERPL-CCNC: 21 U/L (ref 15–37)
BASOPHILS # BLD: 0 K/UL (ref 0–0.2)
BASOPHILS NFR BLD: 2 % (ref 0–2)
BILIRUB SERPL-MCNC: 0.9 MG/DL (ref 0.2–1.1)
BUN SERPL-MCNC: 28 MG/DL (ref 8–23)
CALCIUM SERPL-MCNC: 9.7 MG/DL (ref 8.3–10.4)
CHLORIDE SERPL-SCNC: 100 MMOL/L (ref 101–110)
CO2 SERPL-SCNC: 27 MMOL/L (ref 21–32)
CREAT SERPL-MCNC: 0.7 MG/DL (ref 0.6–1)
DIFFERENTIAL METHOD BLD: ABNORMAL
EOSINOPHIL # BLD: 0 K/UL (ref 0–0.8)
EOSINOPHIL NFR BLD: 0 % (ref 0.5–7.8)
ERYTHROCYTE [DISTWIDTH] IN BLOOD BY AUTOMATED COUNT: 14.1 % (ref 11.9–14.6)
GLOBULIN SER CALC-MCNC: 3.6 G/DL (ref 2.8–4.5)
GLUCOSE SERPL-MCNC: 123 MG/DL (ref 65–100)
HCT VFR BLD AUTO: 22.9 % (ref 35.8–46.3)
HGB BLD-MCNC: 7.6 G/DL (ref 11.7–15.4)
IMM GRANULOCYTES # BLD AUTO: 0.1 K/UL (ref 0–0.5)
IMM GRANULOCYTES NFR BLD AUTO: 3 % (ref 0–5)
LDH SERPL L TO P-CCNC: 809 U/L (ref 110–210)
LYMPHOCYTES # BLD: 0.9 K/UL (ref 0.5–4.6)
LYMPHOCYTES NFR BLD: 49 % (ref 13–44)
MAGNESIUM SERPL-MCNC: 2.3 MG/DL (ref 1.8–2.4)
MCH RBC QN AUTO: 30.5 PG (ref 26.1–32.9)
MCHC RBC AUTO-ENTMCNC: 33.2 G/DL (ref 31.4–35)
MCV RBC AUTO: 92 FL (ref 82–102)
MONOCYTES # BLD: 0.1 K/UL (ref 0.1–1.3)
MONOCYTES NFR BLD: 8 % (ref 4–12)
NEUTS SEG # BLD: 0.7 K/UL (ref 1.7–8.2)
NEUTS SEG NFR BLD: 38 % (ref 43–78)
NRBC # BLD: 0.05 K/UL (ref 0–0.2)
PHOSPHATE SERPL-MCNC: 4.6 MG/DL (ref 2.3–3.7)
PLATELET # BLD AUTO: 11 K/UL (ref 150–450)
PLATELET COMMENT: ABNORMAL
PMV BLD AUTO: ABNORMAL FL (ref 9.4–12.3)
POTASSIUM SERPL-SCNC: 4.3 MMOL/L (ref 3.5–5.1)
PROT SERPL-MCNC: 7.3 G/DL (ref 6.3–8.2)
RBC # BLD AUTO: 2.49 M/UL (ref 4.05–5.2)
RBC MORPH BLD: ABNORMAL
RBC MORPH BLD: ABNORMAL
SODIUM SERPL-SCNC: 134 MMOL/L (ref 133–143)
URATE SERPL-MCNC: 2.4 MG/DL (ref 2.6–6)
WBC # BLD AUTO: 1.8 K/UL (ref 4.3–11.1)
WBC MORPH BLD: ABNORMAL

## 2023-11-18 PROCEDURE — 6370000000 HC RX 637 (ALT 250 FOR IP): Performed by: NURSE PRACTITIONER

## 2023-11-18 PROCEDURE — 6370000000 HC RX 637 (ALT 250 FOR IP): Performed by: FAMILY MEDICINE

## 2023-11-18 PROCEDURE — 6370000000 HC RX 637 (ALT 250 FOR IP): Performed by: INTERNAL MEDICINE

## 2023-11-18 PROCEDURE — 99233 SBSQ HOSP IP/OBS HIGH 50: CPT | Performed by: INTERNAL MEDICINE

## 2023-11-18 PROCEDURE — 1170000000 HC RM PRIVATE ONCOLOGY

## 2023-11-18 PROCEDURE — 2580000003 HC RX 258: Performed by: FAMILY MEDICINE

## 2023-11-18 PROCEDURE — 84100 ASSAY OF PHOSPHORUS: CPT

## 2023-11-18 PROCEDURE — 6360000002 HC RX W HCPCS: Performed by: INTERNAL MEDICINE

## 2023-11-18 PROCEDURE — APPSS30 APP SPLIT SHARED TIME 16-30 MINUTES: Performed by: NURSE PRACTITIONER

## 2023-11-18 PROCEDURE — 83615 LACTATE (LD) (LDH) ENZYME: CPT

## 2023-11-18 PROCEDURE — 80053 COMPREHEN METABOLIC PANEL: CPT

## 2023-11-18 PROCEDURE — 83735 ASSAY OF MAGNESIUM: CPT

## 2023-11-18 PROCEDURE — 2580000003 HC RX 258: Performed by: INTERNAL MEDICINE

## 2023-11-18 PROCEDURE — 84550 ASSAY OF BLOOD/URIC ACID: CPT

## 2023-11-18 PROCEDURE — 85025 COMPLETE CBC W/AUTO DIFF WBC: CPT

## 2023-11-18 PROCEDURE — 36592 COLLECT BLOOD FROM PICC: CPT

## 2023-11-18 RX ADMIN — ALLOPURINOL 300 MG: 300 TABLET ORAL at 08:54

## 2023-11-18 RX ADMIN — PANTOPRAZOLE SODIUM 40 MG: 40 TABLET, DELAYED RELEASE ORAL at 08:53

## 2023-11-18 RX ADMIN — FLUCONAZOLE 200 MG: 100 TABLET ORAL at 08:53

## 2023-11-18 RX ADMIN — IPRATROPIUM BROMIDE 2 SPRAY: 42 SPRAY NASAL at 20:51

## 2023-11-18 RX ADMIN — POLYETHYLENE GLYCOL 3350 17 G: 17 POWDER, FOR SOLUTION ORAL at 08:54

## 2023-11-18 RX ADMIN — FAMOTIDINE 40 MG: 20 TABLET, FILM COATED ORAL at 20:48

## 2023-11-18 RX ADMIN — FERROUS SULFATE TAB 325 MG (65 MG ELEMENTAL FE) 325 MG: 325 (65 FE) TAB at 16:43

## 2023-11-18 RX ADMIN — POTASSIUM CHLORIDE 20 MEQ: 1500 TABLET, EXTENDED RELEASE ORAL at 16:43

## 2023-11-18 RX ADMIN — MONTELUKAST 10 MG: 10 TABLET, FILM COATED ORAL at 08:53

## 2023-11-18 RX ADMIN — SALINE NASAL SPRAY 2 SPRAY: 1.5 SOLUTION NASAL at 03:47

## 2023-11-18 RX ADMIN — SODIUM CHLORIDE, PRESERVATIVE FREE 10 ML: 5 INJECTION INTRAVENOUS at 20:51

## 2023-11-18 RX ADMIN — PREDNISONE 40 MG: 20 TABLET ORAL at 08:53

## 2023-11-18 RX ADMIN — SODIUM CHLORIDE, PRESERVATIVE FREE 10 ML: 5 INJECTION INTRAVENOUS at 14:02

## 2023-11-18 RX ADMIN — CIPROFLOXACIN HYDROCHLORIDE 500 MG: 500 TABLET, FILM COATED ORAL at 08:53

## 2023-11-18 RX ADMIN — TIZANIDINE 4 MG: 2 TABLET ORAL at 23:01

## 2023-11-18 RX ADMIN — TIZANIDINE 4 MG: 2 TABLET ORAL at 02:00

## 2023-11-18 RX ADMIN — AMLODIPINE BESYLATE 5 MG: 5 TABLET ORAL at 08:53

## 2023-11-18 RX ADMIN — POTASSIUM CHLORIDE 20 MEQ: 1500 TABLET, EXTENDED RELEASE ORAL at 08:53

## 2023-11-18 RX ADMIN — ACYCLOVIR 400 MG: 400 TABLET ORAL at 20:48

## 2023-11-18 RX ADMIN — ONDANSETRON 8 MG: 2 INJECTION INTRAMUSCULAR; INTRAVENOUS at 13:35

## 2023-11-18 RX ADMIN — ACYCLOVIR 400 MG: 400 TABLET ORAL at 08:52

## 2023-11-18 RX ADMIN — DECITABINE 36 MG: 50 INJECTION, POWDER, LYOPHILIZED, FOR SOLUTION INTRAVENOUS at 14:06

## 2023-11-18 RX ADMIN — FERROUS SULFATE TAB 325 MG (65 MG ELEMENTAL FE) 325 MG: 325 (65 FE) TAB at 13:35

## 2023-11-18 RX ADMIN — CIPROFLOXACIN HYDROCHLORIDE 500 MG: 500 TABLET, FILM COATED ORAL at 20:48

## 2023-11-18 RX ADMIN — FLUOXETINE HYDROCHLORIDE 20 MG: 20 CAPSULE ORAL at 08:53

## 2023-11-18 RX ADMIN — MORPHINE SULFATE 15 MG: 15 TABLET, FILM COATED, EXTENDED RELEASE ORAL at 20:47

## 2023-11-18 RX ADMIN — SALINE NASAL SPRAY 2 SPRAY: 1.5 SOLUTION NASAL at 20:51

## 2023-11-18 RX ADMIN — PANTOPRAZOLE SODIUM 40 MG: 40 TABLET, DELAYED RELEASE ORAL at 16:43

## 2023-11-18 RX ADMIN — FLUOXETINE HYDROCHLORIDE 20 MG: 20 CAPSULE ORAL at 20:48

## 2023-11-18 RX ADMIN — DIPHENHYDRAMINE HYDROCHLORIDE 25 MG: 25 CAPSULE ORAL at 08:53

## 2023-11-18 RX ADMIN — DOCUSATE SODIUM 50 MG AND SENNOSIDES 8.6 MG 1 TABLET: 8.6; 5 TABLET, FILM COATED ORAL at 20:48

## 2023-11-18 RX ADMIN — LEVOTHYROXINE SODIUM 125 MCG: 0.12 TABLET ORAL at 08:53

## 2023-11-18 RX ADMIN — MORPHINE SULFATE 15 MG: 15 TABLET, FILM COATED, EXTENDED RELEASE ORAL at 08:54

## 2023-11-18 RX ADMIN — ROSUVASTATIN CALCIUM 10 MG: 5 TABLET, FILM COATED ORAL at 08:53

## 2023-11-18 ASSESSMENT — PAIN DESCRIPTION - ORIENTATION: ORIENTATION: MID;LOWER

## 2023-11-18 ASSESSMENT — PAIN DESCRIPTION - LOCATION: LOCATION: BACK

## 2023-11-18 ASSESSMENT — PAIN DESCRIPTION - DESCRIPTORS: DESCRIPTORS: ACHING

## 2023-11-18 ASSESSMENT — PAIN SCALES - GENERAL: PAINLEVEL_OUTOF10: 3

## 2023-11-18 NOTE — PROGRESS NOTES
[facility] Hematology & Oncology        Inpatient Hematology / Oncology Progress Note    Reason for Admission:  Neutropenic fever ([address]) [D70.9, R50.81]  Pancytopenia ([address]) [D61.818]    24 Hour Events:  Afebrile, VSS  Day 4 (of 10) Dacogen, Mylotarg completed 11/16  Continues prednisone taper - down to 40mg today  Intermittently on O2, RA today  C/o \"itchy\" hands  Spouse at bedside    Transfusions: None  Replacements: None    ROS:  Constitutional: Positive for fatigue; negative for fever, chills. CV: Negative for chest pain, palpitations, edema. Respiratory: +cough, dyspnea (improved). Negative for wheezing. GI: Negative for nausea, abdominal pain, diarrhea. 10 point review of systems is otherwise negative with the exception of the elements mentioned above in the HPI. Allergies   Allergen Reactions    Penicillins Hives    Sulfa Antibiotics Hives    Codeine Nausea And Vomiting     Past Medical History:   Diagnosis Date    Arthritis     Autoimmune disease ([address])     skin changes, unknown name    Cancer ([address]) 1990    ovarian    Chronic pain     arthritis in back and legs    Coronary artery spasm ([address])     COVID 05/15/2022    Essential hypertension 11/8/2019    Gastritis     GERD (gastroesophageal reflux disease)     Hx antineoplastic chemo     Migraine headache     Psoriasis     Psychiatric disorder     anxiety and depression    Severe obesity (BMI 35.0-39.9) 6/26/2018    Thyroid disease     Diagnosed in 1996     Past Surgical History:   Procedure Laterality Date    CARPAL TUNNEL RELEASE      GYN      ovaries    HYSTERECTOMY, TOTAL ABDOMINAL (CERVIX REMOVED)  Patriciabury      cardiac cath. Dx with spasms.     TUBAL LIGATION       Family History   Problem Relation Age of Onset    Parkinson's Disease Mother     Stroke Mother         Passed away in 1969    No Known Problems Paternal Grandfather     No Known Problems Paternal Grandmother     No Known Problems Maternal

## 2023-11-18 NOTE — PROGRESS NOTES
1900: Received pt from Michelle Peres in stable condition. Pt in bed resting quietly. Respirations even & unlabored on room air; no acute signs of distress noted. Bed low & locked; call light within reach; no needs voiced. 1917: benadryl 25 mg tab given per pt request for c/o itchiness in arms and hands. 2251: magic mouthwash given per pt request for oral pain    0200: zanaflex 4 mg tab PO given per pt request for c/o muscle spasms.     BSSR given to Eugene Nunez

## 2023-11-19 LAB
ALBUMIN SERPL-MCNC: 3.7 G/DL (ref 3.2–4.6)
ALBUMIN/GLOB SERPL: 1 (ref 0.4–1.6)
ALP SERPL-CCNC: 51 U/L (ref 50–136)
ALT SERPL-CCNC: 60 U/L (ref 12–65)
ANION GAP SERPL CALC-SCNC: 6 MMOL/L (ref 2–11)
APTT PPP: 21.8 SEC (ref 24.5–34.2)
AST SERPL-CCNC: 25 U/L (ref 15–37)
BASOPHILS # BLD: 0 K/UL (ref 0–0.2)
BASOPHILS NFR BLD: 2 % (ref 0–2)
BILIRUB SERPL-MCNC: 1.1 MG/DL (ref 0.2–1.1)
BUN SERPL-MCNC: 24 MG/DL (ref 8–23)
CALCIUM SERPL-MCNC: 9.1 MG/DL (ref 8.3–10.4)
CHLORIDE SERPL-SCNC: 99 MMOL/L (ref 101–110)
CO2 SERPL-SCNC: 27 MMOL/L (ref 21–32)
CREAT SERPL-MCNC: 0.7 MG/DL (ref 0.6–1)
D DIMER PPP FEU-MCNC: 1.04 UG/ML(FEU)
DIFFERENTIAL METHOD BLD: ABNORMAL
EOSINOPHIL # BLD: 0 K/UL (ref 0–0.8)
EOSINOPHIL NFR BLD: 0 % (ref 0.5–7.8)
ERYTHROCYTE [DISTWIDTH] IN BLOOD BY AUTOMATED COUNT: 14.4 % (ref 11.9–14.6)
FIBRINOGEN PPP-MCNC: 251 MG/DL (ref 190–501)
GLOBULIN SER CALC-MCNC: 3.6 G/DL (ref 2.8–4.5)
GLUCOSE SERPL-MCNC: 109 MG/DL (ref 65–100)
HCT VFR BLD AUTO: 22.3 % (ref 35.8–46.3)
HGB BLD-MCNC: 7.4 G/DL (ref 11.7–15.4)
IMM GRANULOCYTES # BLD AUTO: 0 K/UL (ref 0–0.5)
IMM GRANULOCYTES NFR BLD AUTO: 3 % (ref 0–5)
INR PPP: 1.1
LDH SERPL L TO P-CCNC: 812 U/L (ref 110–210)
LYMPHOCYTES # BLD: 0.7 K/UL (ref 0.5–4.6)
LYMPHOCYTES NFR BLD: 51 % (ref 13–44)
MAGNESIUM SERPL-MCNC: 2.6 MG/DL (ref 1.8–2.4)
MCH RBC QN AUTO: 30.5 PG (ref 26.1–32.9)
MCHC RBC AUTO-ENTMCNC: 33.2 G/DL (ref 31.4–35)
MCV RBC AUTO: 91.8 FL (ref 82–102)
MONOCYTES # BLD: 0.2 K/UL (ref 0.1–1.3)
MONOCYTES NFR BLD: 11 % (ref 4–12)
NEUTS SEG # BLD: 0.5 K/UL (ref 1.7–8.2)
NEUTS SEG NFR BLD: 33 % (ref 43–78)
NRBC # BLD: 0.06 K/UL (ref 0–0.2)
PHOSPHATE SERPL-MCNC: 4 MG/DL (ref 2.3–3.7)
PLATELET # BLD AUTO: 11 K/UL (ref 150–450)
PLATELET COMMENT: ABNORMAL
PMV BLD AUTO: ABNORMAL FL (ref 9.4–12.3)
POTASSIUM SERPL-SCNC: 4.4 MMOL/L (ref 3.5–5.1)
PROT SERPL-MCNC: 7.3 G/DL (ref 6.3–8.2)
PROTHROMBIN TIME: 14.6 SEC (ref 12.6–14.3)
RBC # BLD AUTO: 2.43 M/UL (ref 4.05–5.2)
RBC MORPH BLD: ABNORMAL
SODIUM SERPL-SCNC: 132 MMOL/L (ref 133–143)
URATE SERPL-MCNC: 2.6 MG/DL (ref 2.6–6)
WBC # BLD AUTO: 1.4 K/UL (ref 4.3–11.1)
WBC MORPH BLD: ABNORMAL

## 2023-11-19 PROCEDURE — 6370000000 HC RX 637 (ALT 250 FOR IP): Performed by: INTERNAL MEDICINE

## 2023-11-19 PROCEDURE — 6370000000 HC RX 637 (ALT 250 FOR IP): Performed by: FAMILY MEDICINE

## 2023-11-19 PROCEDURE — 99232 SBSQ HOSP IP/OBS MODERATE 35: CPT | Performed by: INTERNAL MEDICINE

## 2023-11-19 PROCEDURE — 6360000002 HC RX W HCPCS: Performed by: INTERNAL MEDICINE

## 2023-11-19 PROCEDURE — 6370000000 HC RX 637 (ALT 250 FOR IP): Performed by: NURSE PRACTITIONER

## 2023-11-19 PROCEDURE — 85025 COMPLETE CBC W/AUTO DIFF WBC: CPT

## 2023-11-19 PROCEDURE — 85384 FIBRINOGEN ACTIVITY: CPT

## 2023-11-19 PROCEDURE — 83615 LACTATE (LD) (LDH) ENZYME: CPT

## 2023-11-19 PROCEDURE — 80053 COMPREHEN METABOLIC PANEL: CPT

## 2023-11-19 PROCEDURE — APPSS30 APP SPLIT SHARED TIME 16-30 MINUTES: Performed by: NURSE PRACTITIONER

## 2023-11-19 PROCEDURE — 36592 COLLECT BLOOD FROM PICC: CPT

## 2023-11-19 PROCEDURE — 85730 THROMBOPLASTIN TIME PARTIAL: CPT

## 2023-11-19 PROCEDURE — 84550 ASSAY OF BLOOD/URIC ACID: CPT

## 2023-11-19 PROCEDURE — 83735 ASSAY OF MAGNESIUM: CPT

## 2023-11-19 PROCEDURE — 1170000000 HC RM PRIVATE ONCOLOGY

## 2023-11-19 PROCEDURE — 2580000003 HC RX 258: Performed by: FAMILY MEDICINE

## 2023-11-19 PROCEDURE — 2580000003 HC RX 258: Performed by: INTERNAL MEDICINE

## 2023-11-19 PROCEDURE — 85610 PROTHROMBIN TIME: CPT

## 2023-11-19 PROCEDURE — 84100 ASSAY OF PHOSPHORUS: CPT

## 2023-11-19 PROCEDURE — 85379 FIBRIN DEGRADATION QUANT: CPT

## 2023-11-19 RX ORDER — SODIUM CHLORIDE 9 MG/ML
1000 INJECTION, SOLUTION INTRAVENOUS CONTINUOUS
Status: ACTIVE | OUTPATIENT
Start: 2023-11-19 | End: 2023-11-20

## 2023-11-19 RX ADMIN — SODIUM CHLORIDE, PRESERVATIVE FREE 10 ML: 5 INJECTION INTRAVENOUS at 20:34

## 2023-11-19 RX ADMIN — IPRATROPIUM BROMIDE 2 SPRAY: 42 SPRAY NASAL at 20:35

## 2023-11-19 RX ADMIN — PANTOPRAZOLE SODIUM 40 MG: 40 TABLET, DELAYED RELEASE ORAL at 08:26

## 2023-11-19 RX ADMIN — POTASSIUM CHLORIDE 20 MEQ: 1500 TABLET, EXTENDED RELEASE ORAL at 16:35

## 2023-11-19 RX ADMIN — POTASSIUM CHLORIDE 20 MEQ: 1500 TABLET, EXTENDED RELEASE ORAL at 08:26

## 2023-11-19 RX ADMIN — ROSUVASTATIN CALCIUM 10 MG: 5 TABLET, FILM COATED ORAL at 08:52

## 2023-11-19 RX ADMIN — ACETAMINOPHEN 650 MG: 325 TABLET ORAL at 16:52

## 2023-11-19 RX ADMIN — DECITABINE 36 MG: 50 INJECTION, POWDER, LYOPHILIZED, FOR SOLUTION INTRAVENOUS at 13:53

## 2023-11-19 RX ADMIN — PANTOPRAZOLE SODIUM 40 MG: 40 TABLET, DELAYED RELEASE ORAL at 16:36

## 2023-11-19 RX ADMIN — POLYETHYLENE GLYCOL 3350 17 G: 17 POWDER, FOR SOLUTION ORAL at 08:26

## 2023-11-19 RX ADMIN — SALINE NASAL SPRAY 2 SPRAY: 1.5 SOLUTION NASAL at 08:27

## 2023-11-19 RX ADMIN — ACYCLOVIR 400 MG: 400 TABLET ORAL at 20:34

## 2023-11-19 RX ADMIN — CIPROFLOXACIN HYDROCHLORIDE 500 MG: 500 TABLET, FILM COATED ORAL at 20:34

## 2023-11-19 RX ADMIN — ALLOPURINOL 300 MG: 300 TABLET ORAL at 08:26

## 2023-11-19 RX ADMIN — FAMOTIDINE 40 MG: 20 TABLET, FILM COATED ORAL at 20:34

## 2023-11-19 RX ADMIN — ACYCLOVIR 400 MG: 400 TABLET ORAL at 08:26

## 2023-11-19 RX ADMIN — FERROUS SULFATE TAB 325 MG (65 MG ELEMENTAL FE) 325 MG: 325 (65 FE) TAB at 13:20

## 2023-11-19 RX ADMIN — IPRATROPIUM BROMIDE 2 SPRAY: 42 SPRAY NASAL at 09:15

## 2023-11-19 RX ADMIN — MORPHINE SULFATE 15 MG: 15 TABLET, FILM COATED, EXTENDED RELEASE ORAL at 20:34

## 2023-11-19 RX ADMIN — FERROUS SULFATE TAB 325 MG (65 MG ELEMENTAL FE) 325 MG: 325 (65 FE) TAB at 16:36

## 2023-11-19 RX ADMIN — SODIUM CHLORIDE 1000 ML: 9 INJECTION, SOLUTION INTRAVENOUS at 13:19

## 2023-11-19 RX ADMIN — PREDNISONE 40 MG: 20 TABLET ORAL at 08:26

## 2023-11-19 RX ADMIN — ONDANSETRON 8 MG: 2 INJECTION INTRAMUSCULAR; INTRAVENOUS at 13:20

## 2023-11-19 RX ADMIN — CIPROFLOXACIN HYDROCHLORIDE 500 MG: 500 TABLET, FILM COATED ORAL at 08:26

## 2023-11-19 RX ADMIN — AMLODIPINE BESYLATE 5 MG: 5 TABLET ORAL at 08:26

## 2023-11-19 RX ADMIN — DOCUSATE SODIUM 50 MG AND SENNOSIDES 8.6 MG 1 TABLET: 8.6; 5 TABLET, FILM COATED ORAL at 20:33

## 2023-11-19 RX ADMIN — MONTELUKAST 10 MG: 10 TABLET, FILM COATED ORAL at 08:26

## 2023-11-19 RX ADMIN — SALINE NASAL SPRAY 2 SPRAY: 1.5 SOLUTION NASAL at 20:35

## 2023-11-19 RX ADMIN — MORPHINE SULFATE 15 MG: 15 TABLET, FILM COATED, EXTENDED RELEASE ORAL at 08:26

## 2023-11-19 RX ADMIN — SALINE NASAL SPRAY 2 SPRAY: 1.5 SOLUTION NASAL at 13:54

## 2023-11-19 RX ADMIN — TIZANIDINE 4 MG: 2 TABLET ORAL at 22:56

## 2023-11-19 RX ADMIN — FLUOXETINE HYDROCHLORIDE 20 MG: 20 CAPSULE ORAL at 08:26

## 2023-11-19 RX ADMIN — LEVOTHYROXINE SODIUM 125 MCG: 0.12 TABLET ORAL at 08:26

## 2023-11-19 RX ADMIN — FLUOXETINE HYDROCHLORIDE 20 MG: 20 CAPSULE ORAL at 20:34

## 2023-11-19 RX ADMIN — FLUCONAZOLE 200 MG: 100 TABLET ORAL at 08:26

## 2023-11-19 RX ADMIN — SODIUM CHLORIDE, PRESERVATIVE FREE 10 ML: 5 INJECTION INTRAVENOUS at 08:26

## 2023-11-19 ASSESSMENT — PAIN DESCRIPTION - DESCRIPTORS: DESCRIPTORS: PRESSURE

## 2023-11-19 ASSESSMENT — PAIN DESCRIPTION - ORIENTATION: ORIENTATION: LOWER;MID

## 2023-11-19 ASSESSMENT — PAIN - FUNCTIONAL ASSESSMENT: PAIN_FUNCTIONAL_ASSESSMENT: ACTIVITIES ARE NOT PREVENTED

## 2023-11-19 ASSESSMENT — PAIN DESCRIPTION - FREQUENCY: FREQUENCY: INTERMITTENT

## 2023-11-19 ASSESSMENT — PAIN DESCRIPTION - PAIN TYPE: TYPE: ACUTE PAIN

## 2023-11-19 ASSESSMENT — PAIN SCALES - GENERAL: PAINLEVEL_OUTOF10: 2

## 2023-11-19 ASSESSMENT — PAIN DESCRIPTION - LOCATION: LOCATION: BACK

## 2023-11-19 ASSESSMENT — PAIN DESCRIPTION - ONSET: ONSET: ON-GOING

## 2023-11-19 NOTE — PROGRESS NOTES
Patient planned to get Dacogen again today, will be 5/10. Patient requesting muscle relaxer for sleep. No current complaints at this time. Bed kept low and locked.

## 2023-11-19 NOTE — PROGRESS NOTES
[facility] Hematology & Oncology        Inpatient Hematology / Oncology Progress Note    Reason for Admission:  Neutropenic fever ([address]) [D70.9, R50.81]  Pancytopenia ([address]) [D61.818]    24 Hour Events:  Afebrile, VSS  Day 5 (of 10) Dacogen, Mylotarg completed 11/16  Continues prednisone taper - down to 40mg yesterday  Intermittently on O2, RA today  Na+ down to 132    Transfusions: None  Replacements: None    ROS:  Constitutional: Positive for fatigue; negative for fever, chills. CV: Negative for chest pain, palpitations, edema. Respiratory: +cough, dyspnea (improved). Negative for wheezing. GI: Negative for nausea, abdominal pain, diarrhea. 10 point review of systems is otherwise negative with the exception of the elements mentioned above in the HPI. Allergies   Allergen Reactions    Penicillins Hives    Sulfa Antibiotics Hives    Codeine Nausea And Vomiting     Past Medical History:   Diagnosis Date    Arthritis     Autoimmune disease ([address])     skin changes, unknown name    Cancer ([address]) 1990    ovarian    Chronic pain     arthritis in back and legs    Coronary artery spasm ([address])     COVID 05/15/2022    Essential hypertension 11/8/2019    Gastritis     GERD (gastroesophageal reflux disease)     Hx antineoplastic chemo     Migraine headache     Psoriasis     Psychiatric disorder     anxiety and depression    Severe obesity (BMI 35.0-39.9) 6/26/2018    Thyroid disease     Diagnosed in 1996     Past Surgical History:   Procedure Laterality Date    CARPAL TUNNEL RELEASE      GYN      ovaries    HYSTERECTOMY, TOTAL ABDOMINAL (CERVIX REMOVED)  Patriciabury      cardiac cath. Dx with spasms.     TUBAL LIGATION       Family History   Problem Relation Age of Onset    Parkinson's Disease Mother     Stroke Mother         Passed away in 1969    No Known Problems Paternal Grandfather     No Known Problems Paternal Grandmother     No Known Problems Maternal Grandfather     No PRBCs. WBC stable.  11/5 Plts down to 2k. Day 7 Dex 40mg. Transfusing again today. IPF remains very elevated. Dr Roe discussing possible consideration for Rituxan as she has been refractory to IVIG and platelets.  11/7 Day 9 steroids (changed to prednisone 1mg/kg 11/6). BMBx today. Still no response to transfusion of cross-matched plts or steroids/recent IVIG. BB working on HLA matching. Check plt count BID and transfuse for plts <10k. May consider Rituxan pending BMBx results.  11/8 Day 10 steroids (changed to prednisone 1mg/kg 11/6).  Plt count 3k today.  BMBx prelim results discussed by Dr. Johnson with Dr. Vora - concern for hemaphagocytosis, not progression of MDS but cannot r/o ITP.  Checking Ferritin, TG, and IL2.    11/12 D14 steroids (changed to prednisone 1mg/kg 11/6).  Plts up to 40k yesterday, 30k this AM.  Planned to start Etop/Dex today, however holding off given dramatic improvement of plt count since yesterday.  Bone marrow may be recovering from prior Rev/Humira.     11/13 Recent BMBx with possible hemophagocytosis (final path pending). However, plts now relatively stable so holding on etop/dex. HLH labs appear unremarkable. Holding off on transfusion today, recheck platelet count in AM.  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 Also some hemophagocytosis noted per Dr Johnson but markers don't appear consistent with diagnosis and possibly related to macrophage activation. Proceed with Dacogen when able. Weaning prednisone to 60mg.  11/18 Continue to wean prednisone (down to 40mg daily with today's dose). Thrombocytopenia likely related to disease, on treatment.  11/19 Continues on Pred 40mg daily    Risk for TLS / DIC  - Check TLS daily  - Start prophylactic allopurinol  11/19 No evidence of TLS or DIC noted    Neutropenic fever  - started on cef/vanc RVP neg  - CXR unremarkable  10/29  BC+ staph epidermidis, continue Cef/Vanc, MRSA DNA pending, consult ID for recommendations  10/31 Continues Cefe/Vanc per ID but S epi likely contaminant. Repeat BC NGTD. TM 98. Likely transitioning to PO/prophylactic Cipro today. 11/1 Afebrile. Transition to prophylactic cipro now that she has completed 5 day course of abx with likely contaminant/S epi with repeat BC.  RESOLVED    Altered mental status  11/1 Likely secondary to HD steroids. CT head negative. CTA and MRI brain pending after Code S called last night for dysarthria. 11/2 CTA negative. Unable to tolerate MRI brain without sedation so holding for now as symptoms more consistent with steroid-related effects vs stroke as no focal deficits noted. RESOLVED    Hypoxia / LE edema  11/1 LE edema may be related to steroids. Incomplete I/O recorded, weight up since admission. Stopped IVF. Check CXR and BNP.  11/2 BNP elevated, CXR with pulmonary vascular congestion. On 2-4L NC. Weight remains elevated since admission. 11/4 Stable fluid status, on 3L NC - weaning as tolerated. Was hypoxic when found off O2 in the middle of the night. 11/7 Continuing to wean O2 as tolerated. Down to 1L NC. Fluid balance stable. 11/8 O2 back up to 3 L NC.    10/10 on RA   11/13 on 2L - weight stable. 11/14 Check CXR as well as echo. Weight back down to admission baseline. On 2L NC with worsening dyspnea today. 11/15 Stable fluid status/weight. CXR negative. Echo WNL. On RA overnight. 11/17 On RA, +I/O 600ml and weight up (using different scale), no signs of overload. 11/19 on RA     Bleeding - epistaxis and rectal bleeding w BM   - ASA on hold; transfuse Hb >7 and plts >50   - PPI  10/29 denies bleeding today   11/5 Per RN  yesterday, small amount of blood from nares, no epistaxis. Otherwise, no bleeding noted. Transfusing plts for plt 2k.    11/9 Pt with episode of bleeding yesterday after scratching self, pressure held and additional 1 unit plts given (3 total

## 2023-11-20 LAB
ALBUMIN SERPL-MCNC: 3.8 G/DL (ref 3.2–4.6)
ALBUMIN/GLOB SERPL: 1.1 (ref 0.4–1.6)
ALP SERPL-CCNC: 54 U/L (ref 50–136)
ALT SERPL-CCNC: 60 U/L (ref 12–65)
ANION GAP SERPL CALC-SCNC: 7 MMOL/L (ref 2–11)
APTT PPP: 21 SEC (ref 24.5–34.2)
AST SERPL-CCNC: 21 U/L (ref 15–37)
BASOPHILS # BLD: 0 K/UL (ref 0–0.2)
BASOPHILS NFR BLD: 2 % (ref 0–2)
BILIRUB SERPL-MCNC: 1.1 MG/DL (ref 0.2–1.1)
BUN SERPL-MCNC: 25 MG/DL (ref 8–23)
CALCIUM SERPL-MCNC: 9 MG/DL (ref 8.3–10.4)
CHLORIDE SERPL-SCNC: 101 MMOL/L (ref 101–110)
CO2 SERPL-SCNC: 26 MMOL/L (ref 21–32)
CREAT SERPL-MCNC: 0.7 MG/DL (ref 0.6–1)
D DIMER PPP FEU-MCNC: 0.95 UG/ML(FEU)
DIFFERENTIAL METHOD BLD: ABNORMAL
EOSINOPHIL # BLD: 0 K/UL (ref 0–0.8)
EOSINOPHIL NFR BLD: 1 % (ref 0.5–7.8)
ERYTHROCYTE [DISTWIDTH] IN BLOOD BY AUTOMATED COUNT: 14.4 % (ref 11.9–14.6)
FIBRINOGEN PPP-MCNC: 253 MG/DL (ref 190–501)
GLOBULIN SER CALC-MCNC: 3.5 G/DL (ref 2.8–4.5)
GLUCOSE SERPL-MCNC: 106 MG/DL (ref 65–100)
HCT VFR BLD AUTO: 23 % (ref 35.8–46.3)
HGB BLD-MCNC: 7.5 G/DL (ref 11.7–15.4)
IMM GRANULOCYTES # BLD AUTO: 0 K/UL (ref 0–0.5)
IMM GRANULOCYTES NFR BLD AUTO: 2 % (ref 0–5)
INR PPP: 1.1
LDH SERPL L TO P-CCNC: 788 U/L (ref 110–210)
LYMPHOCYTES # BLD: 1.4 K/UL (ref 0.5–4.6)
LYMPHOCYTES NFR BLD: 63 % (ref 13–44)
MAGNESIUM SERPL-MCNC: 2.5 MG/DL (ref 1.8–2.4)
MCH RBC QN AUTO: 29.4 PG (ref 26.1–32.9)
MCHC RBC AUTO-ENTMCNC: 32.6 G/DL (ref 31.4–35)
MCV RBC AUTO: 90.2 FL (ref 82–102)
MONOCYTES # BLD: 0.1 K/UL (ref 0.1–1.3)
MONOCYTES NFR BLD: 7 % (ref 4–12)
NEUTS SEG # BLD: 0.5 K/UL (ref 1.7–8.2)
NEUTS SEG NFR BLD: 25 % (ref 43–78)
NRBC # BLD: 0.05 K/UL (ref 0–0.2)
PHOSPHATE SERPL-MCNC: 3.9 MG/DL (ref 2.3–3.7)
PLATELET # BLD AUTO: 11 K/UL (ref 150–450)
PLATELET COMMENT: ABNORMAL
PMV BLD AUTO: ABNORMAL FL (ref 9.4–12.3)
POTASSIUM SERPL-SCNC: 4.3 MMOL/L (ref 3.5–5.1)
PROT SERPL-MCNC: 7.3 G/DL (ref 6.3–8.2)
PROTHROMBIN TIME: 14.4 SEC (ref 12.6–14.3)
RBC # BLD AUTO: 2.55 M/UL (ref 4.05–5.2)
RBC MORPH BLD: ABNORMAL
SODIUM SERPL-SCNC: 134 MMOL/L (ref 133–143)
URATE SERPL-MCNC: 2.7 MG/DL (ref 2.6–6)
WBC # BLD AUTO: 2 K/UL (ref 4.3–11.1)
WBC MORPH BLD: ABNORMAL

## 2023-11-20 PROCEDURE — 6370000000 HC RX 637 (ALT 250 FOR IP): Performed by: INTERNAL MEDICINE

## 2023-11-20 PROCEDURE — 97530 THERAPEUTIC ACTIVITIES: CPT

## 2023-11-20 PROCEDURE — 84550 ASSAY OF BLOOD/URIC ACID: CPT

## 2023-11-20 PROCEDURE — 84100 ASSAY OF PHOSPHORUS: CPT

## 2023-11-20 PROCEDURE — 97112 NEUROMUSCULAR REEDUCATION: CPT

## 2023-11-20 PROCEDURE — 6360000002 HC RX W HCPCS: Performed by: INTERNAL MEDICINE

## 2023-11-20 PROCEDURE — 1170000000 HC RM PRIVATE ONCOLOGY

## 2023-11-20 PROCEDURE — 85384 FIBRINOGEN ACTIVITY: CPT

## 2023-11-20 PROCEDURE — 36592 COLLECT BLOOD FROM PICC: CPT

## 2023-11-20 PROCEDURE — 85379 FIBRIN DEGRADATION QUANT: CPT

## 2023-11-20 PROCEDURE — 2580000003 HC RX 258: Performed by: FAMILY MEDICINE

## 2023-11-20 PROCEDURE — 6370000000 HC RX 637 (ALT 250 FOR IP): Performed by: FAMILY MEDICINE

## 2023-11-20 PROCEDURE — 99233 SBSQ HOSP IP/OBS HIGH 50: CPT | Performed by: INTERNAL MEDICINE

## 2023-11-20 PROCEDURE — 85730 THROMBOPLASTIN TIME PARTIAL: CPT

## 2023-11-20 PROCEDURE — 85025 COMPLETE CBC W/AUTO DIFF WBC: CPT

## 2023-11-20 PROCEDURE — 6370000000 HC RX 637 (ALT 250 FOR IP): Performed by: NURSE PRACTITIONER

## 2023-11-20 PROCEDURE — 80053 COMPREHEN METABOLIC PANEL: CPT

## 2023-11-20 PROCEDURE — 2580000003 HC RX 258: Performed by: INTERNAL MEDICINE

## 2023-11-20 PROCEDURE — 83615 LACTATE (LD) (LDH) ENZYME: CPT

## 2023-11-20 PROCEDURE — 83735 ASSAY OF MAGNESIUM: CPT

## 2023-11-20 PROCEDURE — APPSS30 APP SPLIT SHARED TIME 16-30 MINUTES: Performed by: NURSE PRACTITIONER

## 2023-11-20 PROCEDURE — 85610 PROTHROMBIN TIME: CPT

## 2023-11-20 RX ADMIN — FERROUS SULFATE TAB 325 MG (65 MG ELEMENTAL FE) 325 MG: 325 (65 FE) TAB at 12:56

## 2023-11-20 RX ADMIN — ACYCLOVIR 400 MG: 400 TABLET ORAL at 08:30

## 2023-11-20 RX ADMIN — CIPROFLOXACIN HYDROCHLORIDE 500 MG: 500 TABLET, FILM COATED ORAL at 20:44

## 2023-11-20 RX ADMIN — MORPHINE SULFATE 15 MG: 15 TABLET, FILM COATED, EXTENDED RELEASE ORAL at 20:43

## 2023-11-20 RX ADMIN — AMLODIPINE BESYLATE 5 MG: 5 TABLET ORAL at 09:43

## 2023-11-20 RX ADMIN — LEVOTHYROXINE SODIUM 125 MCG: 0.12 TABLET ORAL at 09:44

## 2023-11-20 RX ADMIN — ACYCLOVIR 400 MG: 400 TABLET ORAL at 20:44

## 2023-11-20 RX ADMIN — POLYETHYLENE GLYCOL 3350 17 G: 17 POWDER, FOR SOLUTION ORAL at 08:30

## 2023-11-20 RX ADMIN — DECITABINE 36 MG: 50 INJECTION, POWDER, LYOPHILIZED, FOR SOLUTION INTRAVENOUS at 13:30

## 2023-11-20 RX ADMIN — DOCUSATE SODIUM 50 MG AND SENNOSIDES 8.6 MG 1 TABLET: 8.6; 5 TABLET, FILM COATED ORAL at 20:44

## 2023-11-20 RX ADMIN — ALLOPURINOL 300 MG: 300 TABLET ORAL at 09:43

## 2023-11-20 RX ADMIN — FAMOTIDINE 40 MG: 20 TABLET, FILM COATED ORAL at 20:44

## 2023-11-20 RX ADMIN — FERROUS SULFATE TAB 325 MG (65 MG ELEMENTAL FE) 325 MG: 325 (65 FE) TAB at 16:52

## 2023-11-20 RX ADMIN — ROSUVASTATIN CALCIUM 10 MG: 5 TABLET, FILM COATED ORAL at 08:30

## 2023-11-20 RX ADMIN — SODIUM CHLORIDE, PRESERVATIVE FREE 10 ML: 5 INJECTION INTRAVENOUS at 20:44

## 2023-11-20 RX ADMIN — TIZANIDINE 4 MG: 2 TABLET ORAL at 20:44

## 2023-11-20 RX ADMIN — FLUCONAZOLE 200 MG: 100 TABLET ORAL at 08:30

## 2023-11-20 RX ADMIN — SODIUM CHLORIDE, PRESERVATIVE FREE 10 ML: 5 INJECTION INTRAVENOUS at 09:48

## 2023-11-20 RX ADMIN — ONDANSETRON 8 MG: 2 INJECTION INTRAMUSCULAR; INTRAVENOUS at 12:56

## 2023-11-20 RX ADMIN — PREDNISONE 40 MG: 20 TABLET ORAL at 08:30

## 2023-11-20 RX ADMIN — MORPHINE SULFATE 15 MG: 15 TABLET, FILM COATED, EXTENDED RELEASE ORAL at 08:30

## 2023-11-20 RX ADMIN — SALINE NASAL SPRAY 2 SPRAY: 1.5 SOLUTION NASAL at 16:17

## 2023-11-20 RX ADMIN — CIPROFLOXACIN HYDROCHLORIDE 500 MG: 500 TABLET, FILM COATED ORAL at 08:30

## 2023-11-20 RX ADMIN — MONTELUKAST 10 MG: 10 TABLET, FILM COATED ORAL at 08:30

## 2023-11-20 RX ADMIN — FLUOXETINE HYDROCHLORIDE 20 MG: 20 CAPSULE ORAL at 20:44

## 2023-11-20 RX ADMIN — SALINE NASAL SPRAY 2 SPRAY: 1.5 SOLUTION NASAL at 20:45

## 2023-11-20 RX ADMIN — PANTOPRAZOLE SODIUM 40 MG: 40 TABLET, DELAYED RELEASE ORAL at 16:17

## 2023-11-20 RX ADMIN — FLUOXETINE HYDROCHLORIDE 20 MG: 20 CAPSULE ORAL at 08:30

## 2023-11-20 RX ADMIN — POTASSIUM CHLORIDE 20 MEQ: 1500 TABLET, EXTENDED RELEASE ORAL at 16:52

## 2023-11-20 RX ADMIN — POTASSIUM CHLORIDE 20 MEQ: 1500 TABLET, EXTENDED RELEASE ORAL at 08:30

## 2023-11-20 RX ADMIN — PANTOPRAZOLE SODIUM 40 MG: 40 TABLET, DELAYED RELEASE ORAL at 08:30

## 2023-11-20 ASSESSMENT — PAIN DESCRIPTION - FREQUENCY: FREQUENCY: INTERMITTENT

## 2023-11-20 ASSESSMENT — PAIN DESCRIPTION - LOCATION: LOCATION: BACK

## 2023-11-20 ASSESSMENT — PAIN SCALES - GENERAL
PAINLEVEL_OUTOF10: 0
PAINLEVEL_OUTOF10: 1
PAINLEVEL_OUTOF10: 0

## 2023-11-20 ASSESSMENT — PAIN DESCRIPTION - ONSET: ONSET: ON-GOING

## 2023-11-20 ASSESSMENT — PAIN DESCRIPTION - PAIN TYPE: TYPE: OTHER (COMMENT)

## 2023-11-20 ASSESSMENT — PAIN DESCRIPTION - DESCRIPTORS: DESCRIPTORS: DISCOMFORT

## 2023-11-20 ASSESSMENT — PAIN - FUNCTIONAL ASSESSMENT: PAIN_FUNCTIONAL_ASSESSMENT: ACTIVITIES ARE NOT PREVENTED

## 2023-11-20 ASSESSMENT — PAIN DESCRIPTION - ORIENTATION: ORIENTATION: MID

## 2023-11-20 NOTE — PROGRESS NOTES
ACUTE PHYSICAL THERAPY GOALS:   (Developed with and agreed upon by patient and/or caregiver. )    LTG:  (1.)Ms. Hillary Whalen will move from supine to sit and sit to supine , scoot up and down, and roll side to side in bed with INDEPENDENCE within 7 treatment day(s). (2.)Ms. Hillary Whalen will transfer from bed to chair and chair to bed with MODIFIED INDEPENDENCE using the least restrictive device within 7 treatment day(s). (3.)Ms. Hillary Whalen will ambulate with MODIFIED INDEPENDENCE for 500 feet with the least restrictive device within 7 treatment day(s). (4.)Ms. Hillary Whalen will participate in therapeutic activity/exercises x 25 minutes for increased activity tolerance within 7 treatment days. (5.)Ms. Hillary Whalen will perform standing static and dynamic balance activities x 15 minutes with SUPERVISION to improve safety within 7 day(s). New Goal:      Ms. Hillary Whalen will demonstrate understanding of energy conservation and activity pacing techniques and apply them with no cueing within 7 days. PHYSICAL THERAPY: Daily Note AM   (Link to Caseload Tracking: PT Visit Days : 5  Time In/Out PT Charge Capture  Rehab Caseload Tracker  Orders    Marguerite Bauer is a 71 y.o. female   PRIMARY DIAGNOSIS: Neutropenic fever ([address])  Neutropenic fever ([address]) [D70.9, R50.81]  Pancytopenia ([address]) [D61.818]       Inpatient: Payor: Pauly Huston / Plan: Carlitos Rangel / Product Type: *No Product type* /     ASSESSMENT:     REHAB RECOMMENDATIONS:   Recommendation to date pending progress:  Setting:  Home Health Therapy    Equipment:    To Be Determined     ASSESSMENT:  Ms. Hillary Whalen was supine in bed on arrival and agreeable to PT. She is very weak today from chemo. Bed mobility with SBA. She ambulated short distances with min A plus chair follow. Pt left in bed with need in reach.         SUBJECTIVE:   Ms. Hillary Whalen states, \"I've had a rough morning\"     Social/Functional Lives With: Spouse  Type of Home: House  Home Layout: One level  Bathroom Toilet: Standard  Bathroom Equipment: Commode  Bathroom Accessibility: Accessible  Home Equipment: Rollator  Receives Help From: Family  ADL Assistance: Independent  Homemaking Assistance: Independent  Ambulation Assistance: Needs assistance  Transfer Assistance: Independent  Active : Yes  Mode of Transportation: Car  Occupation: Retired  OBJECTIVE:     PAIN: VITALS / O2: PRECAUTION / [place] / Yulissa Winston:   Pre Treatment:  none         Post Treatment:  none Vitals        Oxygen   RA None    RESTRICTIONS/PRECAUTIONS:  Restrictions/Precautions  Restrictions/Precautions: Fall Risk  Restrictions/Precautions: Fall Risk     MOBILITY: I Mod I S SBA CGA Min Mod Max Total  NT x2 Comments:   Bed Mobility    Rolling [] [] [] [x] [] [] [] [] [] [] []    Supine to Sit [] [] [] [x] [] [] [] [] [] [] []    Scooting [] [] [] [x] [] [] [] [] [] [] []    Sit to Supine [] [] [] [] [] [] [] [] [] [] []    Transfers    Sit to Stand [] [] [] [x] [] [] [] [] [] [] []    Bed to Chair [] [] [] [] [x] [x] [] [] [] [] []    Stand to Sit [] [] [] [x] [] [] [] [] [] [] []     [] [] [] [] [] [] [] [] [] [] []    I=Independent, Mod I=Modified Independent, S=Supervision, SBA=Standby Assistance, CGA=Contact Guard Assistance,   Min=Minimal Assistance, Mod=Moderate Assistance, Max=Maximal Assistance, Total=Total Assistance, NT=Not Tested    BALANCE: Good Fair+ Fair Fair- Poor NT Comments   Sitting Static [x] [] [] [] [] []    Sitting Dynamic [] [x] [] [] [] []              Standing Static [] [x] [] [] [] []    Standing Dynamic [] [] [x] [] [] []      GAIT: I Mod I S SBA CGA Min Mod Max Total  NT x2 Comments:   Level of Assistance [] [] [] [] [] [x] [] [] [] [] []    Distance 25 x4 feet     DME Rolling Walker    Gait Quality Decreased annabel     Weightbearing Status      Stairs      I=Independent, Mod I=Modified Independent, S=Supervision, SBA=Standby Assistance, CGA=Contact Guard Assistance,   Min=Minimal Assistance, Mod=Moderate Assistance, Max=Maximal

## 2023-11-20 NOTE — CARE COORDINATION
Chart screened by CM for d/c planning. Currently on RA. Oncology monitoring plt count. Today is D6/10 Dacogen. Mylotarg completed 11/16  Continue prednisone taper - 40 mg on 11/18. [facility] navigator working on obtaining Venetoclax. PT/OT recommend HH at d/c. Anticipated dispo: return home, with [facility] if agreeable, once pt is medically stable. D/C timeframe: estimated 3-4 weeks. CM will continue to follow.   LOS = 24 days

## 2023-11-20 NOTE — PROGRESS NOTES
ACUTE OCCUPATIONAL THERAPY GOALS:   (Developed with and agreed upon by patient and/or caregiver.)  Re-evaluation completed on 11/16/23  1. Patient will complete lower body bathing and dressing with MOD I and adaptive equipment as needed. 2. Patient will complete toileting with MOD I.   3. Patient will tolerate 30 minutes of OT treatment with 1-2 rest breaks to increase activity tolerance for ADLs. 4. Patient will complete functional transfers with MOD I and adaptive equipment as needed. 5. Patient will complete functional mobility for household distances with MOD I and adaptive equipment as needed. 6. Patient will complete self-grooming while standing edge of sink with MOD I and adaptive equipment as needed. New Goal Added:  7. Patient will complete UB bathing and dressing with MOD I and adaptive equipment as needed. 8. Patient will verbalize and demonstrate 3 energy conservation strategies throughout completion of bADLs. Timeframe: 7 visits        OCCUPATIONAL THERAPY: Daily Note    OT Visit Days: 2   Time In/Out  OT Charge Capture  Rehab Caseload Tracker  OT Orders    Oswald Kussmaul is a 71 y.o. female   PRIMARY DIAGNOSIS: Neutropenic fever ([address])  Neutropenic fever ([address]) [D70.9, R50.81]  Pancytopenia ([address]) [D61.818]       Inpatient: Payor: Tevin Mckenna / Plan: Nico Aurelia / Product Type: *No Product type* /     ASSESSMENT:     REHAB RECOMMENDATIONS: CURRENT LEVEL OF FUNCTION:  (Most Recently Demonstrated)   Recommendation to date pending progress:  Setting:  Home Health Therapy    Equipment:    To Be Determined Bathing:  Not Tested  Dressing:  Supervision/Setup  Feeding/Grooming:  Not Tested  Toileting:  Not Tested  Functional Mobility:  Minimal Assist     ASSESSMENT:  Ms. Tiesha Espitia is progressing well towards OT goals. Today, pt was received supine in bed--pt reported being much weaker today due to the chemo she has been receiving.  Completed bed mobility and LB dressing with training, standing tolerance activity , and sitting balance activity   with minimal verbal cues to improve sitting balance, standing balance, posture, coordination, static balance, and dynamic balance in order to prepare for functional task, prepare for seated ADLs, prepare for standing ADLs, prepare for functional transfer, increase safety awareness, prepare for discharge home , and prepare for self care..     TREATMENT GRID:  N/A    AFTER TREATMENT PRECAUTIONS: Bed, Call light within reach, Needs within reach, and RN notified    INTERDISCIPLINARY COLLABORATION:  RN/ PCT, PT/ PTA, and OT/ PETTY    EDUCATION:       TOTAL TREATMENT DURATION AND TIME:  Time In: 1135  Time Out: 1151  Minutes: 16    Irene Ritter, OT

## 2023-11-20 NOTE — PROGRESS NOTES
0708: Received pt from [address] [place] [address],[address] in stable condition. Pt in bed resting quietly. Resp even & unlabored on room air; no acute signs of distress noted. Bed low & locked; call light in reach; no needs voiced.  at bedside.

## 2023-11-20 NOTE — PROGRESS NOTES
[facility] Hematology & Oncology        Inpatient Hematology / Oncology Progress Note    Reason for Admission:  Neutropenic fever ([address]) [D70.9, R50.81]  Pancytopenia ([address]) [D61.818]    24 Hour Events:  Afebrile, VSS  Day 6 (of 10) Dacogen, Mylotarg completed 11/16  Continues prednisone taper - down to 40mg 11/18  Plts stable, no transfusions  Na up to 134 after IV hydration    Transfusions: None  Replacements: None    ROS:  Constitutional: Positive for fatigue; negative for fever, chills. CV: Negative for chest pain, palpitations, edema. Respiratory: +cough, dyspnea (improved). Negative for wheezing. GI: Negative for nausea, abdominal pain, diarrhea. 10 point review of systems is otherwise negative with the exception of the elements mentioned above in the HPI. Allergies   Allergen Reactions    Penicillins Hives    Sulfa Antibiotics Hives    Codeine Nausea And Vomiting     Past Medical History:   Diagnosis Date    Arthritis     Autoimmune disease ([address])     skin changes, unknown name    Cancer ([address]) 1990    ovarian    Chronic pain     arthritis in back and legs    Coronary artery spasm ([address])     COVID 05/15/2022    Essential hypertension 11/8/2019    Gastritis     GERD (gastroesophageal reflux disease)     Hx antineoplastic chemo     Migraine headache     Psoriasis     Psychiatric disorder     anxiety and depression    Severe obesity (BMI 35.0-39.9) 6/26/2018    Thyroid disease     Diagnosed in 1996     Past Surgical History:   Procedure Laterality Date    CARPAL TUNNEL RELEASE      GYN      ovaries    HYSTERECTOMY, TOTAL ABDOMINAL (CERVIX REMOVED)  Patriciabury      cardiac cath. Dx with spasms.     TUBAL LIGATION       Family History   Problem Relation Age of Onset    Parkinson's Disease Mother     Stroke Mother         Passed away in 1969    No Known Problems Paternal Grandfather     No Known Problems Paternal Grandmother     No Known Problems Maternal Grandfather    No Known Problems Maternal Grandmother     Prostate Cancer Father          age 86     Cancer Father         Kidney     Social History     Socioeconomic History    Marital status:      Spouse name: Not on file    Number of children: Not on file    Years of education: Not on file    Highest education level: Not on file   Occupational History    Not on file   Tobacco Use    Smoking status: Never     Passive exposure: Past    Smokeless tobacco: Never   Vaping Use    Vaping Use: Never used   Substance and Sexual Activity    Alcohol use: No    Drug use: Yes     Types: Prescription    Sexual activity: Not Currently     Birth control/protection: None   Other Topics Concern    Not on file   Social History Narrative    Not on file     Social Determinants of Health     Financial Resource Strain: Low Risk  (2023)    Overall Financial Resource Strain (CARDIA)     Difficulty of Paying Living Expenses: Not hard at all   Food Insecurity: No Food Insecurity (2023)    Hunger Vital Sign     Worried About Running Out of Food in the Last Year: Never true     Ran Out of Food in the Last Year: Never true   Transportation Needs: No Transportation Needs (2023)    Transportation Problems ([facility] HRSN)     In the past 12 months, has lack of reliable transportation kept you from medical appointments, meetings, work or from getting things needed for daily living?: Not on file   Recent Concern: Transportation Needs - Unmet Transportation Needs (2023)    PRAPARE - Transportation     Lack of Transportation (Medical): Yes     Lack of Transportation (Non-Medical): No   Physical Activity: Not on file   Stress: Not on file   Social Connections: Not on file   Intimate Partner Violence: Not on file   Housing Stability: High Risk (2023)    Housing Stability Vital Sign     Unable to Pay for Housing in the Last Year: Yes     Number of Places Lived in the Last Year: 1     Unstable Housing in the Last Year: No     Current  unremarkable  10/29 BC+ staph epidermidis, continue Cef/Vanc, MRSA DNA pending, consult ID for recommendations  10/31 Continues Cefe/Vanc per ID but S epi likely contaminant. Repeat BC NGTD. TM 98. Likely transitioning to PO/prophylactic Cipro today. 11/1 Afebrile. Transition to prophylactic cipro now that she has completed 5 day course of abx with likely contaminant/S epi with repeat BC.  RESOLVED    Altered mental status  11/1 Likely secondary to HD steroids. CT head negative. CTA and MRI brain pending after Code S called last night for dysarthria. 11/2 CTA negative. Unable to tolerate MRI brain without sedation so holding for now as symptoms more consistent with steroid-related effects vs stroke as no focal deficits noted. RESOLVED    Hypoxia / LE edema  11/1 LE edema may be related to steroids. Incomplete I/O recorded, weight up since admission. Stopped IVF. Check CXR and BNP.  11/2 BNP elevated, CXR with pulmonary vascular congestion. On 2-4L NC. Weight remains elevated since admission. 11/4 Stable fluid status, on 3L NC - weaning as tolerated. Was hypoxic when found off O2 in the middle of the night. 11/7 Continuing to wean O2 as tolerated. Down to 1L NC. Fluid balance stable. 11/8 O2 back up to 3 L NC.    10/10 on RA   11/13 on 2L - weight stable. 11/14 Check CXR as well as echo. Weight back down to admission baseline. On 2L NC with worsening dyspnea today. 11/15 Stable fluid status/weight. CXR negative. Echo WNL. On RA overnight. 11/17 On RA, +I/O 600ml and weight up (using different scale), no signs of overload. 11/20 on RA     Bleeding - epistaxis and rectal bleeding w BM   - ASA on hold; transfuse Hb >7 and plts >50   - PPI  10/29 denies bleeding today   11/5 Per RN  yesterday, small amount of blood from nares, no epistaxis. Otherwise, no bleeding noted. Transfusing plts for plt 2k.    11/9 Pt with episode of bleeding yesterday after scratching self, pressure held and additional 1 unit plts

## 2023-11-21 LAB
ALBUMIN SERPL-MCNC: 3.5 G/DL (ref 3.2–4.6)
ALBUMIN/GLOB SERPL: 1 (ref 0.4–1.6)
ALP SERPL-CCNC: 53 U/L (ref 50–136)
ALT SERPL-CCNC: 54 U/L (ref 12–65)
ANION GAP SERPL CALC-SCNC: 6 MMOL/L (ref 2–11)
APTT PPP: 20.8 SEC (ref 24.5–34.2)
AST SERPL-CCNC: 19 U/L (ref 15–37)
BASOPHILS # BLD: 0 K/UL (ref 0–0.2)
BASOPHILS NFR BLD: 1 % (ref 0–2)
BILIRUB SERPL-MCNC: 0.9 MG/DL (ref 0.2–1.1)
BUN SERPL-MCNC: 24 MG/DL (ref 8–23)
CALCIUM SERPL-MCNC: 9.3 MG/DL (ref 8.3–10.4)
CHLORIDE SERPL-SCNC: 100 MMOL/L (ref 101–110)
CO2 SERPL-SCNC: 27 MMOL/L (ref 21–32)
CREAT SERPL-MCNC: 0.6 MG/DL (ref 0.6–1)
D DIMER PPP FEU-MCNC: 0.95 UG/ML(FEU)
DIFFERENTIAL METHOD BLD: ABNORMAL
EOSINOPHIL # BLD: 0 K/UL (ref 0–0.8)
EOSINOPHIL NFR BLD: 0 % (ref 0.5–7.8)
ERYTHROCYTE [DISTWIDTH] IN BLOOD BY AUTOMATED COUNT: 14.1 % (ref 11.9–14.6)
FIBRINOGEN PPP-MCNC: 258 MG/DL (ref 190–501)
GLOBULIN SER CALC-MCNC: 3.4 G/DL (ref 2.8–4.5)
GLUCOSE SERPL-MCNC: 99 MG/DL (ref 65–100)
HCT VFR BLD AUTO: 20.1 % (ref 35.8–46.3)
HCT VFR BLD AUTO: 23.5 % (ref 35.8–46.3)
HGB BLD-MCNC: 6.7 G/DL (ref 11.7–15.4)
HGB BLD-MCNC: 8.1 G/DL (ref 11.7–15.4)
HISTORY CHECK: NORMAL
IMM GRANULOCYTES # BLD AUTO: 0 K/UL (ref 0–0.5)
IMM GRANULOCYTES NFR BLD AUTO: 2 % (ref 0–5)
INR PPP: 1.1
LYMPHOCYTES # BLD: 0.8 K/UL (ref 0.5–4.6)
LYMPHOCYTES NFR BLD: 63 % (ref 13–44)
MAGNESIUM SERPL-MCNC: 2.4 MG/DL (ref 1.8–2.4)
MCH RBC QN AUTO: 29.9 PG (ref 26.1–32.9)
MCHC RBC AUTO-ENTMCNC: 33.3 G/DL (ref 31.4–35)
MCV RBC AUTO: 89.7 FL (ref 82–102)
MONOCYTES # BLD: 0 K/UL (ref 0.1–1.3)
MONOCYTES NFR BLD: 4 % (ref 4–12)
NEUTS SEG # BLD: 0.3 K/UL (ref 1.7–8.2)
NEUTS SEG NFR BLD: 30 % (ref 43–78)
NRBC # BLD: 0.03 K/UL (ref 0–0.2)
PLATELET # BLD AUTO: 8 K/UL (ref 150–450)
PLATELET # BLD AUTO: 9 K/UL (ref 150–450)
PLATELET COMMENT: ABNORMAL
PMV BLD AUTO: ABNORMAL FL (ref 9.4–12.3)
POTASSIUM SERPL-SCNC: 4.5 MMOL/L (ref 3.5–5.1)
PROT SERPL-MCNC: 6.9 G/DL (ref 6.3–8.2)
PROTHROMBIN TIME: 14.4 SEC (ref 12.6–14.3)
RBC # BLD AUTO: 2.24 M/UL (ref 4.05–5.2)
RBC MORPH BLD: ABNORMAL
RBC MORPH BLD: ABNORMAL
SODIUM SERPL-SCNC: 133 MMOL/L (ref 133–143)
WBC # BLD AUTO: 1.1 K/UL (ref 4.3–11.1)
WBC MORPH BLD: ABNORMAL

## 2023-11-21 PROCEDURE — 6370000000 HC RX 637 (ALT 250 FOR IP): Performed by: NURSE PRACTITIONER

## 2023-11-21 PROCEDURE — P9037 PLATE PHERES LEUKOREDU IRRAD: HCPCS

## 2023-11-21 PROCEDURE — 97530 THERAPEUTIC ACTIVITIES: CPT

## 2023-11-21 PROCEDURE — 99233 SBSQ HOSP IP/OBS HIGH 50: CPT | Performed by: INTERNAL MEDICINE

## 2023-11-21 PROCEDURE — 85018 HEMOGLOBIN: CPT

## 2023-11-21 PROCEDURE — 86850 RBC ANTIBODY SCREEN: CPT

## 2023-11-21 PROCEDURE — 85730 THROMBOPLASTIN TIME PARTIAL: CPT

## 2023-11-21 PROCEDURE — 85014 HEMATOCRIT: CPT

## 2023-11-21 PROCEDURE — APPSS30 APP SPLIT SHARED TIME 16-30 MINUTES: Performed by: NURSE PRACTITIONER

## 2023-11-21 PROCEDURE — 6370000000 HC RX 637 (ALT 250 FOR IP): Performed by: INTERNAL MEDICINE

## 2023-11-21 PROCEDURE — 86921 COMPATIBILITY TEST INCUBATE: CPT

## 2023-11-21 PROCEDURE — 97112 NEUROMUSCULAR REEDUCATION: CPT

## 2023-11-21 PROCEDURE — 2580000003 HC RX 258: Performed by: INTERNAL MEDICINE

## 2023-11-21 PROCEDURE — 86920 COMPATIBILITY TEST SPIN: CPT

## 2023-11-21 PROCEDURE — 36430 TRANSFUSION BLD/BLD COMPNT: CPT

## 2023-11-21 PROCEDURE — 83735 ASSAY OF MAGNESIUM: CPT

## 2023-11-21 PROCEDURE — 1170000000 HC RM PRIVATE ONCOLOGY

## 2023-11-21 PROCEDURE — 86644 CMV ANTIBODY: CPT

## 2023-11-21 PROCEDURE — 85384 FIBRINOGEN ACTIVITY: CPT

## 2023-11-21 PROCEDURE — 86922 COMPATIBILITY TEST ANTIGLOB: CPT

## 2023-11-21 PROCEDURE — 85049 AUTOMATED PLATELET COUNT: CPT

## 2023-11-21 PROCEDURE — P9040 RBC LEUKOREDUCED IRRADIATED: HCPCS

## 2023-11-21 PROCEDURE — 2500000003 HC RX 250 WO HCPCS: Performed by: NURSE PRACTITIONER

## 2023-11-21 PROCEDURE — 85025 COMPLETE CBC W/AUTO DIFF WBC: CPT

## 2023-11-21 PROCEDURE — 80053 COMPREHEN METABOLIC PANEL: CPT

## 2023-11-21 PROCEDURE — 6360000002 HC RX W HCPCS: Performed by: INTERNAL MEDICINE

## 2023-11-21 PROCEDURE — 2580000003 HC RX 258: Performed by: FAMILY MEDICINE

## 2023-11-21 PROCEDURE — 85379 FIBRIN DEGRADATION QUANT: CPT

## 2023-11-21 PROCEDURE — 86901 BLOOD TYPING SEROLOGIC RH(D): CPT

## 2023-11-21 PROCEDURE — 6370000000 HC RX 637 (ALT 250 FOR IP): Performed by: FAMILY MEDICINE

## 2023-11-21 PROCEDURE — 86813 HLA TYPING A B OR C: CPT

## 2023-11-21 PROCEDURE — 86900 BLOOD TYPING SEROLOGIC ABO: CPT

## 2023-11-21 PROCEDURE — 85610 PROTHROMBIN TIME: CPT

## 2023-11-21 RX ORDER — PREDNISONE 20 MG/1
20 TABLET ORAL DAILY
Status: COMPLETED | OUTPATIENT
Start: 2023-11-21 | End: 2023-11-23

## 2023-11-21 RX ORDER — SODIUM CHLORIDE 9 MG/ML
INJECTION, SOLUTION INTRAVENOUS PRN
Status: DISCONTINUED | OUTPATIENT
Start: 2023-11-21 | End: 2023-11-26 | Stop reason: SDUPTHER

## 2023-11-21 RX ADMIN — TIZANIDINE 4 MG: 2 TABLET ORAL at 22:11

## 2023-11-21 RX ADMIN — SALINE NASAL SPRAY 2 SPRAY: 1.5 SOLUTION NASAL at 22:06

## 2023-11-21 RX ADMIN — PANTOPRAZOLE SODIUM 40 MG: 40 TABLET, DELAYED RELEASE ORAL at 16:50

## 2023-11-21 RX ADMIN — ROSUVASTATIN CALCIUM 10 MG: 5 TABLET, FILM COATED ORAL at 08:43

## 2023-11-21 RX ADMIN — FERROUS SULFATE TAB 325 MG (65 MG ELEMENTAL FE) 325 MG: 325 (65 FE) TAB at 12:28

## 2023-11-21 RX ADMIN — LEVOTHYROXINE SODIUM 125 MCG: 0.12 TABLET ORAL at 08:43

## 2023-11-21 RX ADMIN — CIPROFLOXACIN HYDROCHLORIDE 500 MG: 500 TABLET, FILM COATED ORAL at 08:43

## 2023-11-21 RX ADMIN — MONTELUKAST 10 MG: 10 TABLET, FILM COATED ORAL at 08:43

## 2023-11-21 RX ADMIN — DIPHENHYDRAMINE HYDROCHLORIDE 25 MG: 25 CAPSULE ORAL at 09:59

## 2023-11-21 RX ADMIN — FLUCONAZOLE 200 MG: 100 TABLET ORAL at 08:43

## 2023-11-21 RX ADMIN — ACYCLOVIR 400 MG: 400 TABLET ORAL at 08:43

## 2023-11-21 RX ADMIN — ONDANSETRON 8 MG: 2 INJECTION INTRAMUSCULAR; INTRAVENOUS at 12:52

## 2023-11-21 RX ADMIN — POTASSIUM CHLORIDE 20 MEQ: 1500 TABLET, EXTENDED RELEASE ORAL at 16:50

## 2023-11-21 RX ADMIN — POTASSIUM CHLORIDE 20 MEQ: 1500 TABLET, EXTENDED RELEASE ORAL at 08:43

## 2023-11-21 RX ADMIN — MORPHINE SULFATE 15 MG: 15 TABLET, FILM COATED, EXTENDED RELEASE ORAL at 08:43

## 2023-11-21 RX ADMIN — PANTOPRAZOLE SODIUM 40 MG: 40 TABLET, DELAYED RELEASE ORAL at 08:43

## 2023-11-21 RX ADMIN — ACYCLOVIR 400 MG: 400 TABLET ORAL at 22:04

## 2023-11-21 RX ADMIN — IPRATROPIUM BROMIDE 2 SPRAY: 42 SPRAY NASAL at 08:51

## 2023-11-21 RX ADMIN — SODIUM CHLORIDE, PRESERVATIVE FREE 10 ML: 5 INJECTION INTRAVENOUS at 22:08

## 2023-11-21 RX ADMIN — FLUOXETINE HYDROCHLORIDE 20 MG: 20 CAPSULE ORAL at 08:51

## 2023-11-21 RX ADMIN — CIPROFLOXACIN HYDROCHLORIDE 500 MG: 500 TABLET, FILM COATED ORAL at 22:04

## 2023-11-21 RX ADMIN — DECITABINE 36 MG: 50 INJECTION, POWDER, LYOPHILIZED, FOR SOLUTION INTRAVENOUS at 13:09

## 2023-11-21 RX ADMIN — DOCUSATE SODIUM 50 MG AND SENNOSIDES 8.6 MG 1 TABLET: 8.6; 5 TABLET, FILM COATED ORAL at 22:04

## 2023-11-21 RX ADMIN — SALINE NASAL SPRAY 2 SPRAY: 1.5 SOLUTION NASAL at 08:51

## 2023-11-21 RX ADMIN — FAMOTIDINE 40 MG: 20 TABLET, FILM COATED ORAL at 22:04

## 2023-11-21 RX ADMIN — ANTI-FUNGAL POWDER MICONAZOLE NITRATE TALC FREE: 1.42 POWDER TOPICAL at 16:00

## 2023-11-21 RX ADMIN — MORPHINE SULFATE 15 MG: 15 TABLET, FILM COATED, EXTENDED RELEASE ORAL at 22:03

## 2023-11-21 RX ADMIN — ACETAMINOPHEN 650 MG: 325 TABLET ORAL at 09:59

## 2023-11-21 RX ADMIN — FERROUS SULFATE TAB 325 MG (65 MG ELEMENTAL FE) 325 MG: 325 (65 FE) TAB at 16:50

## 2023-11-21 RX ADMIN — PREDNISONE 20 MG: 20 TABLET ORAL at 08:43

## 2023-11-21 RX ADMIN — AMLODIPINE BESYLATE 5 MG: 5 TABLET ORAL at 08:50

## 2023-11-21 RX ADMIN — FLUOXETINE HYDROCHLORIDE 20 MG: 20 CAPSULE ORAL at 22:04

## 2023-11-21 RX ADMIN — POLYETHYLENE GLYCOL 3350 17 G: 17 POWDER, FOR SOLUTION ORAL at 08:43

## 2023-11-21 RX ADMIN — ALLOPURINOL 300 MG: 300 TABLET ORAL at 08:43

## 2023-11-21 RX ADMIN — SALINE NASAL SPRAY 2 SPRAY: 1.5 SOLUTION NASAL at 15:22

## 2023-11-21 RX ADMIN — ANTI-FUNGAL POWDER MICONAZOLE NITRATE TALC FREE: 1.42 POWDER TOPICAL at 22:05

## 2023-11-21 RX ADMIN — SODIUM CHLORIDE, PRESERVATIVE FREE 10 ML: 5 INJECTION INTRAVENOUS at 08:50

## 2023-11-21 ASSESSMENT — PAIN DESCRIPTION - ORIENTATION: ORIENTATION: RIGHT;LEFT;LOWER;MID

## 2023-11-21 ASSESSMENT — PAIN DESCRIPTION - FREQUENCY: FREQUENCY: INTERMITTENT

## 2023-11-21 ASSESSMENT — PAIN SCALES - GENERAL: PAINLEVEL_OUTOF10: 3

## 2023-11-21 ASSESSMENT — PAIN DESCRIPTION - LOCATION: LOCATION: BACK

## 2023-11-21 ASSESSMENT — PAIN DESCRIPTION - DESCRIPTORS: DESCRIPTORS: ACHING;DISCOMFORT

## 2023-11-21 ASSESSMENT — PAIN - FUNCTIONAL ASSESSMENT: PAIN_FUNCTIONAL_ASSESSMENT: ACTIVITIES ARE NOT PREVENTED

## 2023-11-21 ASSESSMENT — PAIN DESCRIPTION - ONSET: ONSET: ON-GOING

## 2023-11-21 NOTE — PROGRESS NOTES
ACUTE PHYSICAL THERAPY GOALS:   (Developed with and agreed upon by patient and/or caregiver. )    LTG:  (1.)Ms. Hina Bhatt will move from supine to sit and sit to supine , scoot up and down, and roll side to side in bed with INDEPENDENCE within 7 treatment day(s). (2.)Ms. Hina Bhatt will transfer from bed to chair and chair to bed with MODIFIED INDEPENDENCE using the least restrictive device within 7 treatment day(s). (3.)Ms. Hina Bhatt will ambulate with MODIFIED INDEPENDENCE for 500 feet with the least restrictive device within 7 treatment day(s). (4.)Ms. Hina Bhatt will participate in therapeutic activity/exercises x 25 minutes for increased activity tolerance within 7 treatment days. (5.)Ms. Hina Bhatt will perform standing static and dynamic balance activities x 15 minutes with SUPERVISION to improve safety within 7 day(s). New Goal:      Ms. Hina Bhatt will demonstrate understanding of energy conservation and activity pacing techniques and apply them with no cueing within 7 days. PHYSICAL THERAPY: Daily Note PM   (Link to Caseload Tracking: PT Visit Days : 6  Time In/Out PT Charge Capture  Rehab Caseload Tracker  Orders    Lenard Keller is a 71 y.o. female   PRIMARY DIAGNOSIS: Neutropenic fever ([address])  Neutropenic fever ([address]) [D70.9, R50.81]  Pancytopenia ([address]) [D61.818]       Inpatient: Payor: Alvaro Gerard / Plan: Magan Maldonado / Product Type: *No Product type* /     ASSESSMENT:     REHAB RECOMMENDATIONS:   Recommendation to date pending progress:  Setting:  Home Health Therapy    Equipment:    To Be Determined     ASSESSMENT:  Ms. Hina Bhatt was up in restroom on arrival and willing to take a walk in the hallway. Pt is feeling better today but still weak. Stood at sink to brush teeth with OT staff with SBA for balance. Ambulated 100' x2 with RW and min A plus chair follow for safety. Pt left sitting up in chair with needs in reach.          SUBJECTIVE:   Ms. Hina Bhatt states, \"Mornings are rough for me\" Assistance, CGA=Contact [employer],   Min=Minimal Assistance, Mod=Moderate Assistance, Max=Maximal Assistance, Total=Total Assistance, NT=Not Tested    PLAN:   FREQUENCY AND DURATION: 3 times/week for duration of hospital stay or until stated goals are met, whichever comes first.    TREATMENT:   TREATMENT:   Therapeutic Activity (15 Minutes): Therapeutic activity included Scooting, Ambulation on level ground, Sitting balance , and Standing balance to improve functional Activity tolerance, Balance, Mobility, and Strength.     TREATMENT GRID:  N/A    AFTER TREATMENT PRECAUTIONS: Bed, Bed/Chair Locked, Call light within reach, and RN notified    INTERDISCIPLINARY COLLABORATION:  RN/ PCT    EDUCATION:      TIME IN/OUT:  Time In: 1430  Time Out: 0833 [facility]  Minutes: 300 [facility], [facility]

## 2023-11-21 NOTE — PROGRESS NOTES
Comprehensive Nutrition Assessment    Type and Reason for Visit: Reassess  Malnutrition Screening Tool: Malnutrition Screen  Have you recently lost weight without trying?: 24 to 33 pounds (3 points)  Have you been eating poorly because of a decreased appetite?: Yes (1 point)  Malnutrition Screening Tool Score: 4    Nutrition Recommendations/Plan:   Meals and Snacks:  Diet: Continue current order  Nutrition Supplement Therapy:  Medical food supplement therapy:  Continue Ensure Enlive twice per day (this provides 350 kcal and 20 grams protein per bottle) chocolate served with a milk container. Malnutrition Assessment:  Malnutrition Status: At risk for malnutrition (Comment) (pt reports + wt loss, waxing and waning oral intake)  No fat or muscle loss appreciated. Nutrition Assessment:  Nutrition History: Pt reports her intake and wt initially started changing after having Covid in May 2022.  + loss of appetite, taste alterations. She indicates at one point her wt was >200# initially declined quickly then up and down with fluid changes. She states at this point she is unsure of her true wt. Do You Have Any Cultural, Sikhism, or Ethnic Food Preferences?: No   Nutrition Background: PMHx: psoriatic arthritis on Humira, hx of ovarian cam HTN, HLD, GERD and hypothyroidism. Admitted with MDS, pancytopenia, neutropenic fever, chronic pain, constipation. IR BMBX 11/7. Nutrition Interval:  Patient sitting up in recliner with visitor. She states that her appetite has been pretty good but still c/o of some variability. She states she was tired and not feeling well this morning so she did not do well  with breakfast. She reports feeling much better after receiving blood. She has been drinking Ensure and states that she tolerates Ensure and chicken salad if she cannot eat anything else. She is working with food services for preferences.      Current Nutrition Therapies:  ADULT ORAL NUTRITION

## 2023-11-21 NOTE — PROGRESS NOTES
END OF SHIFT SUMMARY:7a-7p    Significant vitals this shift:  /  Significant labs this shift:   critical plt of received see new orders  Tests performed this shift:  /  Orders to be followed up on:  /  Blood products given this shift:  1 unit PRBCs and 1 unit of Plts given  Additional events this shift:   PICC line dressing change, external cath length now at 1cm, brisk blood return. Notified vascular access okay to use at this time. I/Os:  +/- this shift: Inaccurate I and Os patient voiding in toilet at times.   11/21 0701 - 11/21 1900  In: 1497.6 [P.O.:1080]  Out: 700 [Urine:700]  Unmeasured Occurrences this Shift:  Urine 2, BM 0, Emesis 0      Bedside shift report given to Constantine Morris RN

## 2023-11-21 NOTE — PROGRESS NOTES
[facility] Hematology & Oncology        Inpatient Hematology / Oncology Progress Note    Reason for Admission:  Neutropenic fever ([address]) [D70.9, R50.81]  Pancytopenia ([address]) [D61.818]    24 Hour Events:  Afebrile, VSS  Day 7 (of 10) Dacogen, Mylotarg completed 11/16  Continues prednisone taper - down to 20mg today  Remains on RA, CMP unremarkable    Transfusions: PRBCs, plts  Replacements: None    ROS:  Constitutional: Positive for fatigue; negative for fever, chills. CV: Negative for chest pain, palpitations, edema. Respiratory: +cough, dyspnea (improved). Negative for wheezing. GI: Negative for nausea, abdominal pain, diarrhea. 10 point review of systems is otherwise negative with the exception of the elements mentioned above in the HPI. Allergies   Allergen Reactions    Penicillins Hives    Sulfa Antibiotics Hives    Codeine Nausea And Vomiting     Past Medical History:   Diagnosis Date    Arthritis     Autoimmune disease ([address])     skin changes, unknown name    Cancer ([address]) 1990    ovarian    Chronic pain     arthritis in back and legs    Coronary artery spasm ([address])     COVID 05/15/2022    Essential hypertension 11/8/2019    Gastritis     GERD (gastroesophageal reflux disease)     Hx antineoplastic chemo     Migraine headache     Psoriasis     Psychiatric disorder     anxiety and depression    Severe obesity (BMI 35.0-39.9) 6/26/2018    Thyroid disease     Diagnosed in 1996     Past Surgical History:   Procedure Laterality Date    CARPAL TUNNEL RELEASE      GYN      ovaries    HYSTERECTOMY, TOTAL ABDOMINAL (CERVIX REMOVED)  Patriciabury      cardiac cath. Dx with spasms.     TUBAL LIGATION       Family History   Problem Relation Age of Onset    Parkinson's Disease Mother     Stroke Mother         Passed away in 1969    No Known Problems Paternal Grandfather     No Known Problems Paternal Grandmother     No Known Problems Maternal Grandfather     No Known Problems   11/12 Plt count up to 40k yesterday (received 1 unit plts yesterday) this AM at 30k, much improved.    11/13 No bleeding noted   RESOLVED    Hypertension  11/2 Resume home amlodipine  11/4 More hypertensive overnight, monitor. May need to adjust amlodipine if no improvement. Has PRN hydralazine.  11/8 Overall BP improved      Chronic pain   - on morphine ER 15mg BID, norco.  Muscle relaxant.       Constipation   - bowel regimen      No AC due to TCP   Continue home meds  Supportive care  PT/OT -   Antimicrobial prophylaxis - ACV, Diflucan, Cipro    Dispo - too soon to determine. Expect prolonged hospitalization through 10 day course of Dacogen/count recovery.    Goals and plan of care reviewed with the patient.  All questions answered to the best of our ability.                   Catie Sorensen, TIM - CNP   [facility] Hematology & Oncology  [address] [place] [address]  Office : [phone]  Fax : [phone]    Attending Addendum:  I have personally performed a face to face diagnostic evaluation on this patient. I have reviewed and agree with the care plan as documented above by NOctaviaP.  My findings are as follows:   Vitals were reviewed.  /88   Pulse 90   Temp 97.5 °F (36.4 °C) (Rectal)   Resp 18   Ht 1.524 m (5')   Wt 73.2 kg (161 lb 4.8 oz)   SpO2 97%   BMI 31.50 kg/m²   Appears ill, lungs clear, cor regular, abdomen benign, mild BLE edema  I have personally reviewed pertinent labs and imaging.  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  Fevers have resolved.  She reports resolution of mouth sores.  She is receiving Dacogen and has received a dose of Mylotarg based on CD33 + blasts.  She is on tapering dose of prednisone for ITP as outlined above.  Transfuse a unit of platelets today.

## 2023-11-21 NOTE — PROGRESS NOTES
ACUTE OCCUPATIONAL THERAPY GOALS:   (Developed with and agreed upon by patient and/or caregiver.)  Re-evaluation completed on 11/16/23  1. Patient will complete lower body bathing and dressing with MOD I and adaptive equipment as needed.   2. Patient will complete toileting with MOD I.   3. Patient will tolerate 30 minutes of OT treatment with 1-2 rest breaks to increase activity tolerance for ADLs.   4. Patient will complete functional transfers with MOD I and adaptive equipment as needed.   5. Patient will complete functional mobility for household distances with MOD I and adaptive equipment as needed.  6. Patient will complete self-grooming while standing edge of sink with MOD I and adaptive equipment as needed.     New Goal Added:  7. Patient will complete UB bathing and dressing with MOD I and adaptive equipment as needed.  8. Patient will verbalize and demonstrate 3 energy conservation strategies throughout completion of bADLs.     Timeframe: 7 visits        OCCUPATIONAL THERAPY: Daily Note    OT Visit Days: 3   Time In/Out  OT Charge Capture  Rehab Caseload Tracker  OT Orders    Constance Goems is a 72 y.o. female   PRIMARY DIAGNOSIS: Neutropenic fever (HCC)  Neutropenic fever (HCC) [D70.9, R50.81]  Pancytopenia (HCC) [D61.818]       Inpatient: Payor: [employer] / Plan: [employer] CHOICE-PPO MEDICARE / Product Type: *No Product type* /     ASSESSMENT:     REHAB RECOMMENDATIONS: CURRENT LEVEL OF FUNCTION:  (Most Recently Demonstrated)   Recommendation to date pending progress:  Setting:  Home Health Therapy    Equipment:    To Be Determined Bathing:  Not Tested  Dressing:  Supervision/Setup  Feeding/Grooming:  Contact Guard Assist  Toileting:  Not Tested  Functional Mobility:  Contact Guard Assist     ASSESSMENT:  Ms. Gomes is progressing well towards OT goals. Today, pt was received getting off the toilet with PCT. Completed functional transfers, ambulation with RW, and grooming tasks standing at the  Assistance, CGA=Contact [employer], Min=Minimal Assistance, Mod=Moderate Assistance, Max=Maximal Assistance, Total=Total Assistance, NT=Not Tested    ACTIVITIES OF DAILY LIVING: I Mod I S SBA CGA Min Mod Max Total NT Comments   BASIC ADLs:              Upper Body   Bathing [] [] [] [] [] [] [] [] [] [x]     Lower Body Bathing [] [] [] [] [] [] [] [] [] [x]     Toileting [] [] [] [] [x] [] [] [] [] []    Upper Body Dressing [] [] [] [] [] [] [] [] [] [x]    Lower Body Dressing [] [] [] [x] [] [] [] [] [] [] Socks    Feeding [] [] [] [] [] [] [] [] [] [x]    Grooming [] [] [] [] [x] [] [] [] [] [] Brushed teeth and washed hands standing at the sink   Personal Device Care [] [] [] [] [] [] [] [] [] [x]    Functional Mobility [] [] [] [] [] [x] [] [] [] [] RW, multiple seated rest breaks   I=Independent, Mod I=Modified Independent, S=Supervision/Setup, SBA=Standby Assistance, CGA=Contact Guard Assistance, Min=Minimal Assistance, Mod=Moderate Assistance, Max=Maximal Assistance, Total=Total Assistance, NT=Not Tested    BALANCE: Good Fair+ Fair Fair- Poor NT Comments   Sitting Static [x] [] [] [] [] []    Sitting Dynamic [x] [] [] [] [] []              Standing Static [] [x] [] [] [] []    Standing Dynamic [] [x] [] [] [] []        PLAN:     FREQUENCY/DURATION   OT Plan of Care: 3 times/week for duration of hospital stay or until stated goals are met, whichever comes first.    TREATMENT:     TREATMENT:   Co-Treatment PT/OT necessary due to patient's decreased overall endurance/tolerance levels, as well as need for high level skilled assistance to complete functional transfers/mobility and functional tasks  Neuromuscular Re-education (15 Minutes): Patient participated in neuromuscular re-education including functional reaching, weight shifting, postural training, midline training, standing tolerance activity , and sitting balance activity   with minimal verbal cues to improve sitting balance, standing balance,

## 2023-11-22 LAB
ABO + RH BLD: NORMAL
ALBUMIN SERPL-MCNC: 3.5 G/DL (ref 3.2–4.6)
ALBUMIN/GLOB SERPL: 1.1 (ref 0.4–1.6)
ALP SERPL-CCNC: 53 U/L (ref 50–136)
ALT SERPL-CCNC: 46 U/L (ref 12–65)
ANION GAP SERPL CALC-SCNC: 5 MMOL/L (ref 2–11)
APTT PPP: 20.6 SEC (ref 24.5–34.2)
AST SERPL-CCNC: 19 U/L (ref 15–37)
BASOPHILS # BLD: 0 K/UL (ref 0–0.2)
BASOPHILS NFR BLD: 1 % (ref 0–2)
BILIRUB SERPL-MCNC: 1.2 MG/DL (ref 0.2–1.1)
BLD PROD TYP BPU: NORMAL
BLD PROD TYP BPU: NORMAL
BLOOD BANK CMNT PATIENT-IMP: NORMAL
BLOOD BANK DISPENSE STATUS: NORMAL
BLOOD BANK DISPENSE STATUS: NORMAL
BLOOD GROUP ANTIBODIES SERPL: NORMAL
BPU ID: NORMAL
BPU ID: NORMAL
BUN SERPL-MCNC: 22 MG/DL (ref 8–23)
CALCIUM SERPL-MCNC: 9 MG/DL (ref 8.3–10.4)
CHLORIDE SERPL-SCNC: 101 MMOL/L (ref 101–110)
CO2 SERPL-SCNC: 28 MMOL/L (ref 21–32)
CREAT SERPL-MCNC: 0.6 MG/DL (ref 0.6–1)
CROSSMATCH RESULT: NORMAL
D DIMER PPP FEU-MCNC: 1.44 UG/ML(FEU)
DIFFERENTIAL METHOD BLD: ABNORMAL
EOSINOPHIL # BLD: 0 K/UL (ref 0–0.8)
EOSINOPHIL NFR BLD: 0 % (ref 0.5–7.8)
ERYTHROCYTE [DISTWIDTH] IN BLOOD BY AUTOMATED COUNT: 14 % (ref 11.9–14.6)
ERYTHROCYTE [DISTWIDTH] IN BLOOD BY AUTOMATED COUNT: 14.3 % (ref 11.9–14.6)
FIBRINOGEN PPP-MCNC: 281 MG/DL (ref 190–501)
GLOBULIN SER CALC-MCNC: 3.2 G/DL (ref 2.8–4.5)
GLUCOSE SERPL-MCNC: 91 MG/DL (ref 65–100)
HCT VFR BLD AUTO: 20.5 % (ref 35.8–46.3)
HCT VFR BLD AUTO: 26 % (ref 35.8–46.3)
HGB BLD-MCNC: 6.9 G/DL (ref 11.7–15.4)
HGB BLD-MCNC: 9.2 G/DL (ref 11.7–15.4)
HISTORY CHECK: NORMAL
IMM GRANULOCYTES # BLD AUTO: 0 K/UL (ref 0–0.5)
IMM GRANULOCYTES NFR BLD AUTO: 1 % (ref 0–5)
INR PPP: 1
LDH SERPL L TO P-CCNC: 594 U/L (ref 110–210)
LYMPHOCYTES # BLD: 0.9 K/UL (ref 0.5–4.6)
LYMPHOCYTES NFR BLD: 79 % (ref 13–44)
MAGNESIUM SERPL-MCNC: 2.5 MG/DL (ref 1.8–2.4)
MCH RBC QN AUTO: 29.9 PG (ref 26.1–32.9)
MCH RBC QN AUTO: 30.7 PG (ref 26.1–32.9)
MCHC RBC AUTO-ENTMCNC: 33.7 G/DL (ref 31.4–35)
MCHC RBC AUTO-ENTMCNC: 35.4 G/DL (ref 31.4–35)
MCV RBC AUTO: 86.7 FL (ref 82–102)
MCV RBC AUTO: 88.7 FL (ref 82–102)
MONOCYTES # BLD: 0 K/UL (ref 0.1–1.3)
MONOCYTES NFR BLD: 3 % (ref 4–12)
NEUTS SEG # BLD: 0.2 K/UL (ref 1.7–8.2)
NEUTS SEG NFR BLD: 16 % (ref 43–78)
NRBC # BLD: 0.02 K/UL (ref 0–0.2)
NRBC # BLD: 0.03 K/UL (ref 0–0.2)
PHOSPHATE SERPL-MCNC: 3.5 MG/DL (ref 2.3–3.7)
PLATELET # BLD AUTO: 5 K/UL (ref 150–450)
PLATELET # BLD AUTO: 8 K/UL (ref 150–450)
PLATELET COMMENT: ABNORMAL
PMV BLD AUTO: 9.3 FL (ref 9.4–12.3)
PMV BLD AUTO: ABNORMAL FL (ref 9.4–12.3)
POTASSIUM SERPL-SCNC: 4.1 MMOL/L (ref 3.5–5.1)
PROT SERPL-MCNC: 6.7 G/DL (ref 6.3–8.2)
PROTHROMBIN TIME: 13.9 SEC (ref 12.6–14.3)
RBC # BLD AUTO: 2.31 M/UL (ref 4.05–5.2)
RBC # BLD AUTO: 3 M/UL (ref 4.05–5.2)
RBC MORPH BLD: ABNORMAL
RBC MORPH BLD: ABNORMAL
SODIUM SERPL-SCNC: 134 MMOL/L (ref 133–143)
SPECIMEN EXP DATE BLD: NORMAL
UNIT DIVISION: 0
UNIT DIVISION: 0
URATE SERPL-MCNC: 3.1 MG/DL (ref 2.6–6)
WBC # BLD AUTO: 0.6 K/UL (ref 4.3–11.1)
WBC # BLD AUTO: 1.1 K/UL (ref 4.3–11.1)
WBC MORPH BLD: ABNORMAL

## 2023-11-22 PROCEDURE — 86644 CMV ANTIBODY: CPT

## 2023-11-22 PROCEDURE — 84550 ASSAY OF BLOOD/URIC ACID: CPT

## 2023-11-22 PROCEDURE — 1170000000 HC RM PRIVATE ONCOLOGY

## 2023-11-22 PROCEDURE — 85379 FIBRIN DEGRADATION QUANT: CPT

## 2023-11-22 PROCEDURE — 83615 LACTATE (LD) (LDH) ENZYME: CPT

## 2023-11-22 PROCEDURE — 86813 HLA TYPING A B OR C: CPT

## 2023-11-22 PROCEDURE — 86921 COMPATIBILITY TEST INCUBATE: CPT

## 2023-11-22 PROCEDURE — 86922 COMPATIBILITY TEST ANTIGLOB: CPT

## 2023-11-22 PROCEDURE — 2580000003 HC RX 258: Performed by: INTERNAL MEDICINE

## 2023-11-22 PROCEDURE — 86850 RBC ANTIBODY SCREEN: CPT

## 2023-11-22 PROCEDURE — 85730 THROMBOPLASTIN TIME PARTIAL: CPT

## 2023-11-22 PROCEDURE — 85384 FIBRINOGEN ACTIVITY: CPT

## 2023-11-22 PROCEDURE — APPSS45 APP SPLIT SHARED TIME 31-45 MINUTES: Performed by: NURSE PRACTITIONER

## 2023-11-22 PROCEDURE — 85610 PROTHROMBIN TIME: CPT

## 2023-11-22 PROCEDURE — 6370000000 HC RX 637 (ALT 250 FOR IP): Performed by: NURSE PRACTITIONER

## 2023-11-22 PROCEDURE — 6370000000 HC RX 637 (ALT 250 FOR IP): Performed by: INTERNAL MEDICINE

## 2023-11-22 PROCEDURE — 86920 COMPATIBILITY TEST SPIN: CPT

## 2023-11-22 PROCEDURE — 6360000002 HC RX W HCPCS: Performed by: INTERNAL MEDICINE

## 2023-11-22 PROCEDURE — 99232 SBSQ HOSP IP/OBS MODERATE 35: CPT | Performed by: INTERNAL MEDICINE

## 2023-11-22 PROCEDURE — 84100 ASSAY OF PHOSPHORUS: CPT

## 2023-11-22 PROCEDURE — 86901 BLOOD TYPING SEROLOGIC RH(D): CPT

## 2023-11-22 PROCEDURE — 2580000003 HC RX 258: Performed by: FAMILY MEDICINE

## 2023-11-22 PROCEDURE — P9037 PLATE PHERES LEUKOREDU IRRAD: HCPCS

## 2023-11-22 PROCEDURE — 85025 COMPLETE CBC W/AUTO DIFF WBC: CPT

## 2023-11-22 PROCEDURE — P9040 RBC LEUKOREDUCED IRRADIATED: HCPCS

## 2023-11-22 PROCEDURE — 36592 COLLECT BLOOD FROM PICC: CPT

## 2023-11-22 PROCEDURE — 85027 COMPLETE CBC AUTOMATED: CPT

## 2023-11-22 PROCEDURE — 80053 COMPREHEN METABOLIC PANEL: CPT

## 2023-11-22 PROCEDURE — 86900 BLOOD TYPING SEROLOGIC ABO: CPT

## 2023-11-22 PROCEDURE — 6370000000 HC RX 637 (ALT 250 FOR IP): Performed by: FAMILY MEDICINE

## 2023-11-22 PROCEDURE — 36430 TRANSFUSION BLD/BLD COMPNT: CPT

## 2023-11-22 PROCEDURE — 83735 ASSAY OF MAGNESIUM: CPT

## 2023-11-22 RX ORDER — ACETAMINOPHEN 325 MG/1
650 TABLET ORAL EVERY 6 HOURS PRN
Status: DISCONTINUED | OUTPATIENT
Start: 2023-11-22 | End: 2023-12-19 | Stop reason: HOSPADM

## 2023-11-22 RX ADMIN — DOCUSATE SODIUM 50 MG AND SENNOSIDES 8.6 MG 1 TABLET: 8.6; 5 TABLET, FILM COATED ORAL at 20:42

## 2023-11-22 RX ADMIN — FLUOXETINE HYDROCHLORIDE 20 MG: 20 CAPSULE ORAL at 08:52

## 2023-11-22 RX ADMIN — ROSUVASTATIN CALCIUM 10 MG: 5 TABLET, FILM COATED ORAL at 08:58

## 2023-11-22 RX ADMIN — LEVOTHYROXINE SODIUM 125 MCG: 0.12 TABLET ORAL at 08:52

## 2023-11-22 RX ADMIN — PANTOPRAZOLE SODIUM 40 MG: 40 TABLET, DELAYED RELEASE ORAL at 08:51

## 2023-11-22 RX ADMIN — SODIUM CHLORIDE, PRESERVATIVE FREE 10 ML: 5 INJECTION INTRAVENOUS at 20:43

## 2023-11-22 RX ADMIN — DECITABINE 36 MG: 50 INJECTION, POWDER, LYOPHILIZED, FOR SOLUTION INTRAVENOUS at 13:45

## 2023-11-22 RX ADMIN — FLUOXETINE HYDROCHLORIDE 20 MG: 20 CAPSULE ORAL at 20:42

## 2023-11-22 RX ADMIN — CIPROFLOXACIN HYDROCHLORIDE 500 MG: 500 TABLET, FILM COATED ORAL at 08:52

## 2023-11-22 RX ADMIN — FLUCONAZOLE 200 MG: 100 TABLET ORAL at 08:52

## 2023-11-22 RX ADMIN — ANTI-FUNGAL POWDER MICONAZOLE NITRATE TALC FREE: 1.42 POWDER TOPICAL at 08:52

## 2023-11-22 RX ADMIN — DIPHENHYDRAMINE HYDROCHLORIDE 25 MG: 25 CAPSULE ORAL at 00:54

## 2023-11-22 RX ADMIN — PANTOPRAZOLE SODIUM 40 MG: 40 TABLET, DELAYED RELEASE ORAL at 15:01

## 2023-11-22 RX ADMIN — ALLOPURINOL 300 MG: 300 TABLET ORAL at 08:52

## 2023-11-22 RX ADMIN — IPRATROPIUM BROMIDE 2 SPRAY: 42 SPRAY NASAL at 08:53

## 2023-11-22 RX ADMIN — POLYETHYLENE GLYCOL 3350 17 G: 17 POWDER, FOR SOLUTION ORAL at 08:51

## 2023-11-22 RX ADMIN — MORPHINE SULFATE 15 MG: 15 TABLET, FILM COATED, EXTENDED RELEASE ORAL at 20:43

## 2023-11-22 RX ADMIN — DIPHENHYDRAMINE HYDROCHLORIDE 25 MG: 25 CAPSULE ORAL at 15:01

## 2023-11-22 RX ADMIN — CIPROFLOXACIN HYDROCHLORIDE 500 MG: 500 TABLET, FILM COATED ORAL at 20:42

## 2023-11-22 RX ADMIN — MORPHINE SULFATE 15 MG: 15 TABLET, FILM COATED, EXTENDED RELEASE ORAL at 08:52

## 2023-11-22 RX ADMIN — SODIUM CHLORIDE, PRESERVATIVE FREE 10 ML: 5 INJECTION INTRAVENOUS at 08:52

## 2023-11-22 RX ADMIN — PREDNISONE 20 MG: 20 TABLET ORAL at 08:52

## 2023-11-22 RX ADMIN — ACETAMINOPHEN 650 MG: 325 TABLET ORAL at 00:54

## 2023-11-22 RX ADMIN — FAMOTIDINE 40 MG: 20 TABLET, FILM COATED ORAL at 20:42

## 2023-11-22 RX ADMIN — FERROUS SULFATE TAB 325 MG (65 MG ELEMENTAL FE) 325 MG: 325 (65 FE) TAB at 13:41

## 2023-11-22 RX ADMIN — FERROUS SULFATE TAB 325 MG (65 MG ELEMENTAL FE) 325 MG: 325 (65 FE) TAB at 17:36

## 2023-11-22 RX ADMIN — ACYCLOVIR 400 MG: 400 TABLET ORAL at 20:42

## 2023-11-22 RX ADMIN — TIZANIDINE 4 MG: 2 TABLET ORAL at 20:42

## 2023-11-22 RX ADMIN — SALINE NASAL SPRAY 2 SPRAY: 1.5 SOLUTION NASAL at 08:53

## 2023-11-22 RX ADMIN — POTASSIUM CHLORIDE 20 MEQ: 1500 TABLET, EXTENDED RELEASE ORAL at 08:51

## 2023-11-22 RX ADMIN — POTASSIUM CHLORIDE 20 MEQ: 1500 TABLET, EXTENDED RELEASE ORAL at 17:36

## 2023-11-22 RX ADMIN — ACETAMINOPHEN 650 MG: 325 TABLET ORAL at 15:01

## 2023-11-22 RX ADMIN — SALINE NASAL SPRAY 2 SPRAY: 1.5 SOLUTION NASAL at 15:02

## 2023-11-22 RX ADMIN — MONTELUKAST 10 MG: 10 TABLET, FILM COATED ORAL at 08:52

## 2023-11-22 RX ADMIN — ACYCLOVIR 400 MG: 400 TABLET ORAL at 08:52

## 2023-11-22 RX ADMIN — ONDANSETRON 8 MG: 2 INJECTION INTRAMUSCULAR; INTRAVENOUS at 13:40

## 2023-11-22 RX ADMIN — AMLODIPINE BESYLATE 5 MG: 5 TABLET ORAL at 08:51

## 2023-11-22 ASSESSMENT — PAIN SCALES - GENERAL
PAINLEVEL_OUTOF10: 2
PAINLEVEL_OUTOF10: 0

## 2023-11-22 ASSESSMENT — PAIN DESCRIPTION - ONSET: ONSET: ON-GOING

## 2023-11-22 ASSESSMENT — PAIN - FUNCTIONAL ASSESSMENT: PAIN_FUNCTIONAL_ASSESSMENT: ACTIVITIES ARE NOT PREVENTED

## 2023-11-22 ASSESSMENT — PAIN DESCRIPTION - FREQUENCY: FREQUENCY: INTERMITTENT

## 2023-11-22 ASSESSMENT — PAIN DESCRIPTION - LOCATION: LOCATION: BACK

## 2023-11-22 ASSESSMENT — PAIN DESCRIPTION - DESCRIPTORS: DESCRIPTORS: ACHING;DISCOMFORT

## 2023-11-22 ASSESSMENT — PAIN DESCRIPTION - ORIENTATION: ORIENTATION: RIGHT;LEFT;LOWER

## 2023-11-22 NOTE — PROGRESS NOTES
END OF SHIFT SUMMARY:7a-7p    Significant vitals this shift:  /  Significant labs this shift:  hgb 6.9, plt 5  Tests performed this shift:  /  Orders to be followed up on:  Check plts 30 min after transfusion complete per order  Blood products given this shift:  1 unit PRBCs transfused 1  unit of plts transfusing  Additional events this shift:       I/Os:  +/- this shift:   11/22 0701 - 11/22 1900  In: 1383 [P.O.:960]  Out: 550 [Urine:550]  Unmeasured Occurrences this Shift:  Urine 3, BM 1, Emesis 0      Bedside shift report given to [facility] BRADEN, CHINO

## 2023-11-23 LAB
ABO + RH BLD: NORMAL
ALBUMIN SERPL-MCNC: 3.6 G/DL (ref 3.2–4.6)
ALBUMIN/GLOB SERPL: 1.1 (ref 0.4–1.6)
ALP SERPL-CCNC: 57 U/L (ref 50–136)
ALT SERPL-CCNC: 45 U/L (ref 12–65)
ANION GAP SERPL CALC-SCNC: 7 MMOL/L (ref 2–11)
APTT PPP: 21.8 SEC (ref 24.5–34.2)
AST SERPL-CCNC: 20 U/L (ref 15–37)
BASOPHILS # BLD: 0 K/UL (ref 0–0.2)
BASOPHILS NFR BLD: 1 % (ref 0–2)
BILIRUB SERPL-MCNC: 1.4 MG/DL (ref 0.2–1.1)
BLD PROD TYP BPU: NORMAL
BLOOD BANK CMNT PATIENT-IMP: NORMAL
BLOOD BANK CMNT PATIENT-IMP: NORMAL
BLOOD BANK DISPENSE STATUS: NORMAL
BLOOD GROUP ANTIBODIES SERPL: NORMAL
BPU ID: NORMAL
BUN SERPL-MCNC: 20 MG/DL (ref 8–23)
CALCIUM SERPL-MCNC: 8.7 MG/DL (ref 8.3–10.4)
CHLORIDE SERPL-SCNC: 102 MMOL/L (ref 101–110)
CO2 SERPL-SCNC: 27 MMOL/L (ref 21–32)
CREAT SERPL-MCNC: 0.6 MG/DL (ref 0.6–1)
CROSSMATCH RESULT: NORMAL
D DIMER PPP FEU-MCNC: 1.34 UG/ML(FEU)
DIFFERENTIAL METHOD BLD: ABNORMAL
EOSINOPHIL # BLD: 0 K/UL (ref 0–0.8)
EOSINOPHIL NFR BLD: 0 % (ref 0.5–7.8)
ERYTHROCYTE [DISTWIDTH] IN BLOOD BY AUTOMATED COUNT: 14 % (ref 11.9–14.6)
FIBRINOGEN PPP-MCNC: 274 MG/DL (ref 190–501)
GLOBULIN SER CALC-MCNC: 3.3 G/DL (ref 2.8–4.5)
GLUCOSE SERPL-MCNC: 100 MG/DL (ref 65–100)
HCT VFR BLD AUTO: 25.9 % (ref 35.8–46.3)
HGB BLD-MCNC: 8.9 G/DL (ref 11.7–15.4)
IMM GRANULOCYTES # BLD AUTO: 0 K/UL (ref 0–0.5)
IMM GRANULOCYTES NFR BLD AUTO: 1 % (ref 0–5)
INR PPP: 1
LDH SERPL L TO P-CCNC: 579 U/L (ref 110–210)
LYMPHOCYTES # BLD: 1 K/UL (ref 0.5–4.6)
LYMPHOCYTES NFR BLD: 81 % (ref 13–44)
MAGNESIUM SERPL-MCNC: 2.2 MG/DL (ref 1.8–2.4)
MCH RBC QN AUTO: 30.1 PG (ref 26.1–32.9)
MCHC RBC AUTO-ENTMCNC: 34.4 G/DL (ref 31.4–35)
MCV RBC AUTO: 87.5 FL (ref 82–102)
MONOCYTES # BLD: 0 K/UL (ref 0.1–1.3)
MONOCYTES NFR BLD: 3 % (ref 4–12)
NEUTS SEG # BLD: 0.2 K/UL (ref 1.7–8.2)
NEUTS SEG NFR BLD: 14 % (ref 43–78)
NRBC # BLD: 0.04 K/UL (ref 0–0.2)
PHOSPHATE SERPL-MCNC: 3 MG/DL (ref 2.3–3.7)
PLATELET # BLD AUTO: 4 K/UL (ref 150–450)
PLATELET # BLD AUTO: 5 K/UL (ref 150–450)
PLATELET # BLD AUTO: 6 K/UL (ref 150–450)
PLATELET COMMENT: ABNORMAL
PMV BLD AUTO: ABNORMAL FL (ref 9.4–12.3)
POTASSIUM SERPL-SCNC: 4 MMOL/L (ref 3.5–5.1)
PROT SERPL-MCNC: 6.9 G/DL (ref 6.3–8.2)
PROTHROMBIN TIME: 14 SEC (ref 12.6–14.3)
RBC # BLD AUTO: 2.96 M/UL (ref 4.05–5.2)
RBC MORPH BLD: ABNORMAL
SODIUM SERPL-SCNC: 136 MMOL/L (ref 133–143)
SPECIMEN EXP DATE BLD: NORMAL
UNIT DIVISION: 0
URATE SERPL-MCNC: 2.7 MG/DL (ref 2.6–6)
WBC # BLD AUTO: 1.2 K/UL (ref 4.3–11.1)
WBC MORPH BLD: ABNORMAL

## 2023-11-23 PROCEDURE — 2580000003 HC RX 258: Performed by: INTERNAL MEDICINE

## 2023-11-23 PROCEDURE — 6360000002 HC RX W HCPCS: Performed by: INTERNAL MEDICINE

## 2023-11-23 PROCEDURE — 84100 ASSAY OF PHOSPHORUS: CPT

## 2023-11-23 PROCEDURE — 80053 COMPREHEN METABOLIC PANEL: CPT

## 2023-11-23 PROCEDURE — 6370000000 HC RX 637 (ALT 250 FOR IP): Performed by: FAMILY MEDICINE

## 2023-11-23 PROCEDURE — 6370000000 HC RX 637 (ALT 250 FOR IP): Performed by: INTERNAL MEDICINE

## 2023-11-23 PROCEDURE — 85610 PROTHROMBIN TIME: CPT

## 2023-11-23 PROCEDURE — 6370000000 HC RX 637 (ALT 250 FOR IP): Performed by: NURSE PRACTITIONER

## 2023-11-23 PROCEDURE — 86022 PLATELET ANTIBODIES: CPT

## 2023-11-23 PROCEDURE — 85025 COMPLETE CBC W/AUTO DIFF WBC: CPT

## 2023-11-23 PROCEDURE — 85379 FIBRIN DEGRADATION QUANT: CPT

## 2023-11-23 PROCEDURE — 2580000003 HC RX 258: Performed by: FAMILY MEDICINE

## 2023-11-23 PROCEDURE — P9037 PLATE PHERES LEUKOREDU IRRAD: HCPCS

## 2023-11-23 PROCEDURE — 85384 FIBRINOGEN ACTIVITY: CPT

## 2023-11-23 PROCEDURE — 83615 LACTATE (LD) (LDH) ENZYME: CPT

## 2023-11-23 PROCEDURE — 84550 ASSAY OF BLOOD/URIC ACID: CPT

## 2023-11-23 PROCEDURE — 36430 TRANSFUSION BLD/BLD COMPNT: CPT

## 2023-11-23 PROCEDURE — 85049 AUTOMATED PLATELET COUNT: CPT

## 2023-11-23 PROCEDURE — 86813 HLA TYPING A B OR C: CPT

## 2023-11-23 PROCEDURE — 83735 ASSAY OF MAGNESIUM: CPT

## 2023-11-23 PROCEDURE — 1170000000 HC RM PRIVATE ONCOLOGY

## 2023-11-23 PROCEDURE — 85730 THROMBOPLASTIN TIME PARTIAL: CPT

## 2023-11-23 PROCEDURE — 99232 SBSQ HOSP IP/OBS MODERATE 35: CPT | Performed by: INTERNAL MEDICINE

## 2023-11-23 RX ORDER — PREDNISONE 10 MG/1
10 TABLET ORAL DAILY
Status: DISCONTINUED | OUTPATIENT
Start: 2023-11-24 | End: 2023-12-01

## 2023-11-23 RX ADMIN — ACETAMINOPHEN 650 MG: 325 TABLET ORAL at 14:04

## 2023-11-23 RX ADMIN — ROSUVASTATIN CALCIUM 10 MG: 5 TABLET, FILM COATED ORAL at 09:02

## 2023-11-23 RX ADMIN — IPRATROPIUM BROMIDE 2 SPRAY: 42 SPRAY NASAL at 20:42

## 2023-11-23 RX ADMIN — ACYCLOVIR 400 MG: 400 TABLET ORAL at 08:53

## 2023-11-23 RX ADMIN — MORPHINE SULFATE 15 MG: 15 TABLET, FILM COATED, EXTENDED RELEASE ORAL at 20:35

## 2023-11-23 RX ADMIN — FLUOXETINE HYDROCHLORIDE 20 MG: 20 CAPSULE ORAL at 20:36

## 2023-11-23 RX ADMIN — ANTI-FUNGAL POWDER MICONAZOLE NITRATE TALC FREE: 1.42 POWDER TOPICAL at 08:58

## 2023-11-23 RX ADMIN — SALINE NASAL SPRAY 2 SPRAY: 1.5 SOLUTION NASAL at 16:00

## 2023-11-23 RX ADMIN — FAMOTIDINE 40 MG: 20 TABLET, FILM COATED ORAL at 20:35

## 2023-11-23 RX ADMIN — POTASSIUM CHLORIDE 20 MEQ: 1500 TABLET, EXTENDED RELEASE ORAL at 16:00

## 2023-11-23 RX ADMIN — ONDANSETRON 8 MG: 2 INJECTION INTRAMUSCULAR; INTRAVENOUS at 12:40

## 2023-11-23 RX ADMIN — FERROUS SULFATE TAB 325 MG (65 MG ELEMENTAL FE) 325 MG: 325 (65 FE) TAB at 16:00

## 2023-11-23 RX ADMIN — TIZANIDINE 4 MG: 2 TABLET ORAL at 20:35

## 2023-11-23 RX ADMIN — CIPROFLOXACIN HYDROCHLORIDE 500 MG: 500 TABLET, FILM COATED ORAL at 08:53

## 2023-11-23 RX ADMIN — PREDNISONE 20 MG: 20 TABLET ORAL at 08:53

## 2023-11-23 RX ADMIN — ALLOPURINOL 300 MG: 300 TABLET ORAL at 08:52

## 2023-11-23 RX ADMIN — PANTOPRAZOLE SODIUM 40 MG: 40 TABLET, DELAYED RELEASE ORAL at 08:53

## 2023-11-23 RX ADMIN — DOCUSATE SODIUM 50 MG AND SENNOSIDES 8.6 MG 1 TABLET: 8.6; 5 TABLET, FILM COATED ORAL at 20:35

## 2023-11-23 RX ADMIN — FLUOXETINE HYDROCHLORIDE 20 MG: 20 CAPSULE ORAL at 08:53

## 2023-11-23 RX ADMIN — ACETAMINOPHEN 650 MG: 325 TABLET ORAL at 03:58

## 2023-11-23 RX ADMIN — FERROUS SULFATE TAB 325 MG (65 MG ELEMENTAL FE) 325 MG: 325 (65 FE) TAB at 12:40

## 2023-11-23 RX ADMIN — FLUCONAZOLE 200 MG: 100 TABLET ORAL at 08:52

## 2023-11-23 RX ADMIN — MONTELUKAST 10 MG: 10 TABLET, FILM COATED ORAL at 08:53

## 2023-11-23 RX ADMIN — DIPHENHYDRAMINE HYDROCHLORIDE 25 MG: 25 CAPSULE ORAL at 03:58

## 2023-11-23 RX ADMIN — POLYETHYLENE GLYCOL 3350 17 G: 17 POWDER, FOR SOLUTION ORAL at 08:51

## 2023-11-23 RX ADMIN — LEVOTHYROXINE SODIUM 125 MCG: 0.12 TABLET ORAL at 08:52

## 2023-11-23 RX ADMIN — SALINE NASAL SPRAY 2 SPRAY: 1.5 SOLUTION NASAL at 08:58

## 2023-11-23 RX ADMIN — POTASSIUM CHLORIDE 20 MEQ: 1500 TABLET, EXTENDED RELEASE ORAL at 08:53

## 2023-11-23 RX ADMIN — IPRATROPIUM BROMIDE 2 SPRAY: 42 SPRAY NASAL at 08:57

## 2023-11-23 RX ADMIN — MORPHINE SULFATE 15 MG: 15 TABLET, FILM COATED, EXTENDED RELEASE ORAL at 09:02

## 2023-11-23 RX ADMIN — SODIUM CHLORIDE, PRESERVATIVE FREE 10 ML: 5 INJECTION INTRAVENOUS at 20:36

## 2023-11-23 RX ADMIN — CIPROFLOXACIN HYDROCHLORIDE 500 MG: 500 TABLET, FILM COATED ORAL at 20:35

## 2023-11-23 RX ADMIN — DIPHENHYDRAMINE HYDROCHLORIDE 25 MG: 25 CAPSULE ORAL at 14:04

## 2023-11-23 RX ADMIN — SALINE NASAL SPRAY 2 SPRAY: 1.5 SOLUTION NASAL at 20:42

## 2023-11-23 RX ADMIN — DECITABINE 36 MG: 50 INJECTION, POWDER, LYOPHILIZED, FOR SOLUTION INTRAVENOUS at 13:12

## 2023-11-23 RX ADMIN — ACYCLOVIR 400 MG: 400 TABLET ORAL at 20:36

## 2023-11-23 RX ADMIN — SODIUM CHLORIDE, PRESERVATIVE FREE 10 ML: 5 INJECTION INTRAVENOUS at 08:54

## 2023-11-23 RX ADMIN — AMLODIPINE BESYLATE 5 MG: 5 TABLET ORAL at 08:53

## 2023-11-23 RX ADMIN — PANTOPRAZOLE SODIUM 40 MG: 40 TABLET, DELAYED RELEASE ORAL at 16:00

## 2023-11-23 RX ADMIN — HYDROCODONE BITARTRATE AND ACETAMINOPHEN 1 TABLET: 5; 325 TABLET ORAL at 09:02

## 2023-11-23 ASSESSMENT — PAIN DESCRIPTION - DESCRIPTORS
DESCRIPTORS: ACHING
DESCRIPTORS: ACHING

## 2023-11-23 ASSESSMENT — PAIN DESCRIPTION - ORIENTATION
ORIENTATION: RIGHT;LEFT
ORIENTATION: RIGHT;LEFT

## 2023-11-23 ASSESSMENT — PAIN DESCRIPTION - ONSET: ONSET: ON-GOING

## 2023-11-23 ASSESSMENT — PAIN - FUNCTIONAL ASSESSMENT: PAIN_FUNCTIONAL_ASSESSMENT: ACTIVITIES ARE NOT PREVENTED

## 2023-11-23 ASSESSMENT — PAIN DESCRIPTION - LOCATION
LOCATION: BACK
LOCATION: BACK

## 2023-11-23 ASSESSMENT — PAIN DESCRIPTION - FREQUENCY: FREQUENCY: INTERMITTENT

## 2023-11-23 ASSESSMENT — PAIN SCALES - GENERAL
PAINLEVEL_OUTOF10: 6
PAINLEVEL_OUTOF10: 2

## 2023-11-23 NOTE — PROGRESS NOTES
[facility]        Inpatient Hematology / Oncology Progress Note    Reason for Admission:  Neutropenic fever ([address]) [D70.9, R50.81]  Pancytopenia ([address]) [D61.818]    24 Hour Events:  Afebrile, VSS  Day 9 (of 10) DacogenKokoarg completed 11/16  Continues prednisone taper - down to 20mg 11/21  Requiring plts again today, Plt 5K  Remains on RA, no complaints  Family at bedside    Transfusions: PRBCs, plts  Replacements: None    ROS:  Constitutional: Positive for fatigue; negative for fever, chills. CV: Negative for chest pain, palpitations, edema. Respiratory: +cough, dyspnea (improved). Negative for wheezing. GI: Negative for nausea, abdominal pain, diarrhea. 10 point review of systems is otherwise negative with the exception of the elements mentioned above in the HPI. Allergies   Allergen Reactions    Penicillins Hives    Sulfa Antibiotics Hives    Codeine Nausea And Vomiting     Past Medical History:   Diagnosis Date    Arthritis     Autoimmune disease ([address])     skin changes, unknown name    Cancer ([address]) 1990    ovarian    Chronic pain     arthritis in back and legs    Coronary artery spasm ([address])     COVID 05/15/2022    Essential hypertension 11/8/2019    Gastritis     GERD (gastroesophageal reflux disease)     Hx antineoplastic chemo     Migraine headache     Psoriasis     Psychiatric disorder     anxiety and depression    Severe obesity (BMI 35.0-39.9) 6/26/2018    Thyroid disease     Diagnosed in 1996     Past Surgical History:   Procedure Laterality Date    CARPAL TUNNEL RELEASE      GYN      ovaries    HYSTERECTOMY, TOTAL ABDOMINAL (CERVIX REMOVED)  Patriciabury      cardiac cath. Dx with spasms.     TUBAL LIGATION       Family History   Problem Relation Age of Onset    Parkinson's Disease Mother     Stroke Mother         Passed away in 1969    No Known Problems Paternal Grandfather     No Known Problems Paternal Grandmother     No Known admitted when brought by EMS from PCP office with weakness, lethargy, cough, congestion sore throat and N/V. PMHx: psoriatic arthritis on Humira, hx of ovarian cam HTN, HLD, GERD and hypothyroidism. She was admitted for further care. Tmax 102.5 at home. Sick grandchild at home whose father works w EMS. + chronic constipation with recent bld in toilet w BMs. No diarrhea. LOWRY. In ED T max 100.1, HR 92, /43, 94% on 2L. Wbc 1.6,  , Hb 5.3, plts 7. Rapid influenza/covid neg. CXR no acute findings. Of note, she is pt of Dr Marcelino Burnett with new dx of MDS. She presented to dr Marcelino Burnett on 8/24 for evaluation of gradual pancytopenia. At that time she reported eating poorly, wt loss. Recent CT at [facility] reportedly normal.  Rec Gi eval, labs and BMBx. 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 Dr Marcelino Burnett reviewed with pt risk of POD and transformation to leukemia. They reviewed tx options and elected to p/w Revlimid 10mg daily vs HMA due to 5q del. Tel note encounter indicates that pt started tx ~10/6. She was admitted w NF. Started on IVF, cef/vanc/doxy w sepsis workup. S/p 1 u PRBC and 2 unit plts. ASA on hold. Nutritional/hemolysis labs pending. We were asked for recs. Pt seen in ICU, feeling much better this am.  Plan to transfer to 5th floor. 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  BMBx completed 11/17 and shows progress to AEL with 60% erythroblasts as well as some concern for [address] (hemophagocytosis felt to be macrophage activation from disease as [address] [facility] markers deficits noted.  RESOLVED    Hypoxia / LE edema  11/1 LE edema may be related to steroids. Incomplete I/O recorded, weight up since admission. Stopped IVF. Check CXR and BNP.  11/2 BNP elevated, CXR with pulmonary vascular congestion. On 2-4L NC. Weight remains elevated since admission.   11/4 Stable fluid status, on 3L NC - weaning as tolerated. Was hypoxic when found off O2 in the middle of the night.  11/7 Continuing to wean O2 as tolerated. Down to 1L NC. Fluid balance stable.  11/8 O2 back up to 3 L NC.    10/10 on RA   11/13 on 2L - weight stable.  11/14 Check CXR as well as echo. Weight back down to admission baseline. On 2L NC with worsening dyspnea today.  11/15 Stable fluid status/weight. CXR negative. Echo WNL. On RA overnight.  11/17 On RA, +I/O 600ml and weight up (using different scale), no signs of overload.  11/22 on RA  11/23 Up 5# and +1.9L, remains on RA     Bleeding - epistaxis and rectal bleeding w BM   - ASA on hold; transfuse Hb >7 and plts >50   - PPI  10/29 denies bleeding today   11/5 Per RN  yesterday, small amount of blood from nares, no epistaxis. Otherwise, no bleeding noted. Transfusing plts for plt 2k.   11/9 Pt with episode of bleeding yesterday after scratching self, pressure held and additional 1 unit plts given (3 total yesterday).  No further bleeding noted today thus far.    11/10 No further noted bleeding ON/today thus far.  Is receiving PRBCs/plts today   11/11 Hgb improved after transfusion yesterday.  Plt count 5k , no notable bleeding.   11/12 Plt count up to 40k yesterday (received 1 unit plts yesterday) this AM at 30k, much improved.    11/13 No bleeding noted   RESOLVED    Hypertension  11/2 Resume home amlodipine  11/4 More hypertensive overnight, monitor. May need to adjust amlodipine if no improvement. Has PRN hydralazine.  11/8 Overall BP improved      Chronic pain   - on morphine ER 15mg BID, norco.  Muscle relaxant.       Constipation   - bowel regimen      No AC

## 2023-11-24 ENCOUNTER — CLINICAL DOCUMENTATION (OUTPATIENT)
Dept: ONCOLOGY | Age: 73
End: 2023-11-24

## 2023-11-24 LAB
ALBUMIN SERPL-MCNC: 3.8 G/DL (ref 3.2–4.6)
ALBUMIN/GLOB SERPL: 1.2 (ref 0.4–1.6)
ALP SERPL-CCNC: 70 U/L (ref 50–136)
ALT SERPL-CCNC: 43 U/L (ref 12–65)
ANION GAP SERPL CALC-SCNC: 6 MMOL/L (ref 2–11)
APTT PPP: 23.1 SEC (ref 24.5–34.2)
AST SERPL-CCNC: 19 U/L (ref 15–37)
BASOPHILS # BLD: 0 K/UL (ref 0–0.2)
BASOPHILS NFR BLD: 0 % (ref 0–2)
BILIRUB SERPL-MCNC: 1.1 MG/DL (ref 0.2–1.1)
BLD PROD TYP BPU: NORMAL
BLD PROD TYP BPU: NORMAL
BLOOD BANK CMNT PATIENT-IMP: NORMAL
BLOOD BANK CMNT PATIENT-IMP: NORMAL
BLOOD BANK DISPENSE STATUS: NORMAL
BLOOD BANK DISPENSE STATUS: NORMAL
BPU ID: NORMAL
BPU ID: NORMAL
BUN SERPL-MCNC: 20 MG/DL (ref 8–23)
CALCIUM SERPL-MCNC: 9.3 MG/DL (ref 8.3–10.4)
CHLORIDE SERPL-SCNC: 102 MMOL/L (ref 101–110)
CO2 SERPL-SCNC: 28 MMOL/L (ref 21–32)
CREAT SERPL-MCNC: 0.6 MG/DL (ref 0.6–1)
D DIMER PPP FEU-MCNC: 1.12 UG/ML(FEU)
DIFFERENTIAL METHOD BLD: ABNORMAL
EOSINOPHIL # BLD: 0 K/UL (ref 0–0.8)
EOSINOPHIL NFR BLD: 0 % (ref 0.5–7.8)
ERYTHROCYTE [DISTWIDTH] IN BLOOD BY AUTOMATED COUNT: 13.8 % (ref 11.9–14.6)
FIBRINOGEN PPP-MCNC: 287 MG/DL (ref 190–501)
GLOBULIN SER CALC-MCNC: 3.2 G/DL (ref 2.8–4.5)
GLUCOSE SERPL-MCNC: 94 MG/DL (ref 65–100)
HCT VFR BLD AUTO: 24.3 % (ref 35.8–46.3)
HGB BLD-MCNC: 8.3 G/DL (ref 11.7–15.4)
IMM GRANULOCYTES # BLD AUTO: 0 K/UL (ref 0–0.5)
IMM GRANULOCYTES NFR BLD AUTO: 2 % (ref 0–5)
INR PPP: 1
LDH SERPL L TO P-CCNC: 526 U/L (ref 110–210)
LYMPHOCYTES # BLD: 1.1 K/UL (ref 0.5–4.6)
LYMPHOCYTES NFR BLD: 88 % (ref 13–44)
MAGNESIUM SERPL-MCNC: 2.3 MG/DL (ref 1.8–2.4)
MCH RBC QN AUTO: 29.9 PG (ref 26.1–32.9)
MCHC RBC AUTO-ENTMCNC: 34.2 G/DL (ref 31.4–35)
MCV RBC AUTO: 87.4 FL (ref 82–102)
MONOCYTES # BLD: 0 K/UL (ref 0.1–1.3)
MONOCYTES NFR BLD: 2 % (ref 4–12)
NEUTS SEG # BLD: 0.1 K/UL (ref 1.7–8.2)
NEUTS SEG NFR BLD: 8 % (ref 43–78)
NRBC # BLD: 0.02 K/UL (ref 0–0.2)
PHOSPHATE SERPL-MCNC: 3.8 MG/DL (ref 2.3–3.7)
PLATELET # BLD AUTO: 4 K/UL (ref 150–450)
PLATELET COMMENT: ABNORMAL
PMV BLD AUTO: ABNORMAL FL (ref 9.4–12.3)
POTASSIUM SERPL-SCNC: 3.9 MMOL/L (ref 3.5–5.1)
PROT SERPL-MCNC: 7 G/DL (ref 6.3–8.2)
PROTHROMBIN TIME: 13.7 SEC (ref 12.6–14.3)
RBC # BLD AUTO: 2.78 M/UL (ref 4.05–5.2)
RBC MORPH BLD: ABNORMAL
SODIUM SERPL-SCNC: 136 MMOL/L (ref 133–143)
UNIT DIVISION: 0
UNIT DIVISION: 0
URATE SERPL-MCNC: 2.7 MG/DL (ref 2.6–6)
WBC # BLD AUTO: 1.2 K/UL (ref 4.3–11.1)
WBC MORPH BLD: ABNORMAL

## 2023-11-24 PROCEDURE — 84550 ASSAY OF BLOOD/URIC ACID: CPT

## 2023-11-24 PROCEDURE — 6370000000 HC RX 637 (ALT 250 FOR IP): Performed by: INTERNAL MEDICINE

## 2023-11-24 PROCEDURE — 6370000000 HC RX 637 (ALT 250 FOR IP): Performed by: FAMILY MEDICINE

## 2023-11-24 PROCEDURE — 85610 PROTHROMBIN TIME: CPT

## 2023-11-24 PROCEDURE — 6370000000 HC RX 637 (ALT 250 FOR IP): Performed by: NURSE PRACTITIONER

## 2023-11-24 PROCEDURE — 85379 FIBRIN DEGRADATION QUANT: CPT

## 2023-11-24 PROCEDURE — 85025 COMPLETE CBC W/AUTO DIFF WBC: CPT

## 2023-11-24 PROCEDURE — 85730 THROMBOPLASTIN TIME PARTIAL: CPT

## 2023-11-24 PROCEDURE — 2580000003 HC RX 258: Performed by: FAMILY MEDICINE

## 2023-11-24 PROCEDURE — P9037 PLATE PHERES LEUKOREDU IRRAD: HCPCS

## 2023-11-24 PROCEDURE — 1170000000 HC RM PRIVATE ONCOLOGY

## 2023-11-24 PROCEDURE — 85384 FIBRINOGEN ACTIVITY: CPT

## 2023-11-24 PROCEDURE — APPSS45 APP SPLIT SHARED TIME 31-45 MINUTES: Performed by: NURSE PRACTITIONER

## 2023-11-24 PROCEDURE — 86022 PLATELET ANTIBODIES: CPT

## 2023-11-24 PROCEDURE — 99232 SBSQ HOSP IP/OBS MODERATE 35: CPT | Performed by: INTERNAL MEDICINE

## 2023-11-24 PROCEDURE — 83735 ASSAY OF MAGNESIUM: CPT

## 2023-11-24 PROCEDURE — 6360000002 HC RX W HCPCS: Performed by: INTERNAL MEDICINE

## 2023-11-24 PROCEDURE — 84100 ASSAY OF PHOSPHORUS: CPT

## 2023-11-24 PROCEDURE — 83615 LACTATE (LD) (LDH) ENZYME: CPT

## 2023-11-24 PROCEDURE — 2580000003 HC RX 258: Performed by: INTERNAL MEDICINE

## 2023-11-24 PROCEDURE — 36430 TRANSFUSION BLD/BLD COMPNT: CPT

## 2023-11-24 PROCEDURE — 80053 COMPREHEN METABOLIC PANEL: CPT

## 2023-11-24 RX ADMIN — SODIUM CHLORIDE, PRESERVATIVE FREE 10 ML: 5 INJECTION INTRAVENOUS at 09:04

## 2023-11-24 RX ADMIN — ALLOPURINOL 300 MG: 300 TABLET ORAL at 09:03

## 2023-11-24 RX ADMIN — SALINE NASAL SPRAY 2 SPRAY: 1.5 SOLUTION NASAL at 09:15

## 2023-11-24 RX ADMIN — FLUOXETINE HYDROCHLORIDE 20 MG: 20 CAPSULE ORAL at 21:24

## 2023-11-24 RX ADMIN — SODIUM CHLORIDE, PRESERVATIVE FREE 10 ML: 5 INJECTION INTRAVENOUS at 21:25

## 2023-11-24 RX ADMIN — ACETAMINOPHEN 650 MG: 325 TABLET ORAL at 15:11

## 2023-11-24 RX ADMIN — POLYETHYLENE GLYCOL 3350 17 G: 17 POWDER, FOR SOLUTION ORAL at 09:03

## 2023-11-24 RX ADMIN — MORPHINE SULFATE 15 MG: 15 TABLET, FILM COATED, EXTENDED RELEASE ORAL at 21:24

## 2023-11-24 RX ADMIN — POTASSIUM CHLORIDE 20 MEQ: 1500 TABLET, EXTENDED RELEASE ORAL at 17:22

## 2023-11-24 RX ADMIN — DIPHENHYDRAMINE HYDROCHLORIDE 25 MG: 25 CAPSULE ORAL at 01:08

## 2023-11-24 RX ADMIN — DECITABINE 36 MG: 50 INJECTION, POWDER, LYOPHILIZED, FOR SOLUTION INTRAVENOUS at 13:59

## 2023-11-24 RX ADMIN — FAMOTIDINE 40 MG: 20 TABLET, FILM COATED ORAL at 21:24

## 2023-11-24 RX ADMIN — ANTI-FUNGAL POWDER MICONAZOLE NITRATE TALC FREE: 1.42 POWDER TOPICAL at 11:08

## 2023-11-24 RX ADMIN — CIPROFLOXACIN HYDROCHLORIDE 500 MG: 500 TABLET, FILM COATED ORAL at 21:24

## 2023-11-24 RX ADMIN — PANTOPRAZOLE SODIUM 40 MG: 40 TABLET, DELAYED RELEASE ORAL at 17:22

## 2023-11-24 RX ADMIN — PANTOPRAZOLE SODIUM 40 MG: 40 TABLET, DELAYED RELEASE ORAL at 09:03

## 2023-11-24 RX ADMIN — TIZANIDINE 4 MG: 2 TABLET ORAL at 21:24

## 2023-11-24 RX ADMIN — MONTELUKAST 10 MG: 10 TABLET, FILM COATED ORAL at 09:03

## 2023-11-24 RX ADMIN — DIPHENHYDRAMINE HYDROCHLORIDE 25 MG: 25 CAPSULE ORAL at 15:11

## 2023-11-24 RX ADMIN — ACYCLOVIR 400 MG: 400 TABLET ORAL at 21:24

## 2023-11-24 RX ADMIN — FLUCONAZOLE 200 MG: 100 TABLET ORAL at 09:03

## 2023-11-24 RX ADMIN — FERROUS SULFATE TAB 325 MG (65 MG ELEMENTAL FE) 325 MG: 325 (65 FE) TAB at 13:17

## 2023-11-24 RX ADMIN — SALINE NASAL SPRAY 2 SPRAY: 1.5 SOLUTION NASAL at 21:25

## 2023-11-24 RX ADMIN — FLUOXETINE HYDROCHLORIDE 20 MG: 20 CAPSULE ORAL at 09:03

## 2023-11-24 RX ADMIN — ONDANSETRON 8 MG: 2 INJECTION INTRAMUSCULAR; INTRAVENOUS at 13:18

## 2023-11-24 RX ADMIN — ACETAMINOPHEN 650 MG: 325 TABLET ORAL at 01:08

## 2023-11-24 RX ADMIN — PREDNISONE 10 MG: 10 TABLET ORAL at 09:03

## 2023-11-24 RX ADMIN — IPRATROPIUM BROMIDE 2 SPRAY: 42 SPRAY NASAL at 21:25

## 2023-11-24 RX ADMIN — CIPROFLOXACIN HYDROCHLORIDE 500 MG: 500 TABLET, FILM COATED ORAL at 09:03

## 2023-11-24 RX ADMIN — ACYCLOVIR 400 MG: 400 TABLET ORAL at 09:03

## 2023-11-24 RX ADMIN — MORPHINE SULFATE 15 MG: 15 TABLET, FILM COATED, EXTENDED RELEASE ORAL at 09:03

## 2023-11-24 RX ADMIN — POTASSIUM CHLORIDE 20 MEQ: 1500 TABLET, EXTENDED RELEASE ORAL at 09:03

## 2023-11-24 RX ADMIN — AMLODIPINE BESYLATE 5 MG: 5 TABLET ORAL at 09:03

## 2023-11-24 RX ADMIN — LEVOTHYROXINE SODIUM 125 MCG: 0.12 TABLET ORAL at 09:03

## 2023-11-24 RX ADMIN — DOCUSATE SODIUM 50 MG AND SENNOSIDES 8.6 MG 1 TABLET: 8.6; 5 TABLET, FILM COATED ORAL at 21:23

## 2023-11-24 RX ADMIN — FERROUS SULFATE TAB 325 MG (65 MG ELEMENTAL FE) 325 MG: 325 (65 FE) TAB at 17:22

## 2023-11-24 RX ADMIN — IPRATROPIUM BROMIDE 2 SPRAY: 42 SPRAY NASAL at 09:15

## 2023-11-24 ASSESSMENT — PAIN SCALES - GENERAL
PAINLEVEL_OUTOF10: 0
PAINLEVEL_OUTOF10: 0

## 2023-11-24 NOTE — CASE COMMUNICATION
LOS 28 d  Chart reviewed by [facility] for discharge planning. PT/OT recommend HH at d/c. Anticipated dispo: return home, with [facility] if agreeable, once pt is medically stable. D/C timeframe: estimated 3-4 weeks. Discharge plan ongoing, no further concerns as of present. Please consult  if any new issues arise.

## 2023-11-24 NOTE — PROGRESS NOTES
11/24 Pt has not received a call about Tru Meza ordered on 11/16. Email sent to  and will follow up on Monday 11/27 as they are not in office today.

## 2023-11-25 LAB
ALBUMIN SERPL-MCNC: 3.8 G/DL (ref 3.2–4.6)
ALBUMIN/GLOB SERPL: 1.2 (ref 0.4–1.6)
ALP SERPL-CCNC: 76 U/L (ref 50–136)
ALT SERPL-CCNC: 41 U/L (ref 12–65)
ANION GAP SERPL CALC-SCNC: 4 MMOL/L (ref 2–11)
APTT PPP: 23.3 SEC (ref 24.5–34.2)
AST SERPL-CCNC: 14 U/L (ref 15–37)
BASOPHILS # BLD: 0 K/UL (ref 0–0.2)
BASOPHILS NFR BLD: 0 % (ref 0–2)
BILIRUB SERPL-MCNC: 0.8 MG/DL (ref 0.2–1.1)
BLD PROD TYP BPU: NORMAL
BLD PROD TYP BPU: NORMAL
BLOOD BANK CMNT PATIENT-IMP: NORMAL
BLOOD BANK CMNT PATIENT-IMP: NORMAL
BLOOD BANK DISPENSE STATUS: NORMAL
BLOOD BANK DISPENSE STATUS: NORMAL
BPU ID: NORMAL
BPU ID: NORMAL
BUN SERPL-MCNC: 23 MG/DL (ref 8–23)
CALCIUM SERPL-MCNC: 9 MG/DL (ref 8.3–10.4)
CHLORIDE SERPL-SCNC: 101 MMOL/L (ref 101–110)
CO2 SERPL-SCNC: 31 MMOL/L (ref 21–32)
CREAT SERPL-MCNC: 0.6 MG/DL (ref 0.6–1)
D DIMER PPP FEU-MCNC: 1.23 UG/ML(FEU)
DIFFERENTIAL METHOD BLD: ABNORMAL
EOSINOPHIL # BLD: 0 K/UL (ref 0–0.8)
EOSINOPHIL NFR BLD: 0 % (ref 0.5–7.8)
ERYTHROCYTE [DISTWIDTH] IN BLOOD BY AUTOMATED COUNT: 13.4 % (ref 11.9–14.6)
FIBRINOGEN PPP-MCNC: 286 MG/DL (ref 190–501)
GLOBULIN SER CALC-MCNC: 3.2 G/DL (ref 2.8–4.5)
GLUCOSE SERPL-MCNC: 90 MG/DL (ref 65–100)
HCT VFR BLD AUTO: 24.8 % (ref 35.8–46.3)
HGB BLD-MCNC: 8.5 G/DL (ref 11.7–15.4)
IMM GRANULOCYTES # BLD AUTO: 0 K/UL (ref 0–0.5)
IMM GRANULOCYTES NFR BLD AUTO: 0 % (ref 0–5)
INR PPP: 1
LYMPHOCYTES # BLD: 1.1 K/UL (ref 0.5–4.6)
LYMPHOCYTES NFR BLD: 94 % (ref 13–44)
MAGNESIUM SERPL-MCNC: 2.3 MG/DL (ref 1.8–2.4)
MCH RBC QN AUTO: 30 PG (ref 26.1–32.9)
MCHC RBC AUTO-ENTMCNC: 34.3 G/DL (ref 31.4–35)
MCV RBC AUTO: 87.6 FL (ref 82–102)
MONOCYTES # BLD: 0 K/UL (ref 0.1–1.3)
MONOCYTES NFR BLD: 1 % (ref 4–12)
NEUTS SEG # BLD: 0.1 K/UL (ref 1.7–8.2)
NEUTS SEG NFR BLD: 5 % (ref 43–78)
NRBC # BLD: 0.02 K/UL (ref 0–0.2)
PHOSPHATE SERPL-MCNC: 3.7 MG/DL (ref 2.3–3.7)
PLATELET # BLD AUTO: 3 K/UL (ref 150–450)
PLATELET # BLD AUTO: 4 K/UL (ref 150–450)
PLATELET COMMENT: ABNORMAL
PMV BLD AUTO: ABNORMAL FL (ref 9.4–12.3)
POTASSIUM SERPL-SCNC: 4 MMOL/L (ref 3.5–5.1)
PROT SERPL-MCNC: 7 G/DL (ref 6.3–8.2)
PROTHROMBIN TIME: 13.5 SEC (ref 12.6–14.3)
RBC # BLD AUTO: 2.83 M/UL (ref 4.05–5.2)
RBC MORPH BLD: ABNORMAL
SODIUM SERPL-SCNC: 136 MMOL/L (ref 133–143)
UNIT DIVISION: 0
UNIT DIVISION: 0
WBC # BLD AUTO: 1.2 K/UL (ref 4.3–11.1)
WBC MORPH BLD: ABNORMAL

## 2023-11-25 PROCEDURE — 86022 PLATELET ANTIBODIES: CPT

## 2023-11-25 PROCEDURE — 6370000000 HC RX 637 (ALT 250 FOR IP): Performed by: NURSE PRACTITIONER

## 2023-11-25 PROCEDURE — 36430 TRANSFUSION BLD/BLD COMPNT: CPT

## 2023-11-25 PROCEDURE — 80053 COMPREHEN METABOLIC PANEL: CPT

## 2023-11-25 PROCEDURE — 83735 ASSAY OF MAGNESIUM: CPT

## 2023-11-25 PROCEDURE — 85384 FIBRINOGEN ACTIVITY: CPT

## 2023-11-25 PROCEDURE — 6370000000 HC RX 637 (ALT 250 FOR IP): Performed by: INTERNAL MEDICINE

## 2023-11-25 PROCEDURE — 6360000002 HC RX W HCPCS

## 2023-11-25 PROCEDURE — 85049 AUTOMATED PLATELET COUNT: CPT

## 2023-11-25 PROCEDURE — 85025 COMPLETE CBC W/AUTO DIFF WBC: CPT

## 2023-11-25 PROCEDURE — 85379 FIBRIN DEGRADATION QUANT: CPT

## 2023-11-25 PROCEDURE — 1170000000 HC RM PRIVATE ONCOLOGY

## 2023-11-25 PROCEDURE — 85730 THROMBOPLASTIN TIME PARTIAL: CPT

## 2023-11-25 PROCEDURE — 6370000000 HC RX 637 (ALT 250 FOR IP): Performed by: FAMILY MEDICINE

## 2023-11-25 PROCEDURE — 84100 ASSAY OF PHOSPHORUS: CPT

## 2023-11-25 PROCEDURE — 2580000003 HC RX 258: Performed by: FAMILY MEDICINE

## 2023-11-25 PROCEDURE — P9037 PLATE PHERES LEUKOREDU IRRAD: HCPCS

## 2023-11-25 PROCEDURE — 85610 PROTHROMBIN TIME: CPT

## 2023-11-25 PROCEDURE — 99233 SBSQ HOSP IP/OBS HIGH 50: CPT | Performed by: INTERNAL MEDICINE

## 2023-11-25 RX ADMIN — FLUCONAZOLE 200 MG: 100 TABLET ORAL at 08:21

## 2023-11-25 RX ADMIN — PREDNISONE 10 MG: 10 TABLET ORAL at 08:20

## 2023-11-25 RX ADMIN — SODIUM CHLORIDE, PRESERVATIVE FREE 10 ML: 5 INJECTION INTRAVENOUS at 08:25

## 2023-11-25 RX ADMIN — FERROUS SULFATE TAB 325 MG (65 MG ELEMENTAL FE) 325 MG: 325 (65 FE) TAB at 11:55

## 2023-11-25 RX ADMIN — ACYCLOVIR 400 MG: 400 TABLET ORAL at 20:30

## 2023-11-25 RX ADMIN — ANTI-FUNGAL POWDER MICONAZOLE NITRATE TALC FREE: 1.42 POWDER TOPICAL at 08:28

## 2023-11-25 RX ADMIN — SALINE NASAL SPRAY 2 SPRAY: 1.5 SOLUTION NASAL at 20:30

## 2023-11-25 RX ADMIN — FLUOXETINE HYDROCHLORIDE 20 MG: 20 CAPSULE ORAL at 08:21

## 2023-11-25 RX ADMIN — ROSUVASTATIN CALCIUM 10 MG: 5 TABLET, FILM COATED ORAL at 08:20

## 2023-11-25 RX ADMIN — POTASSIUM CHLORIDE 20 MEQ: 1500 TABLET, EXTENDED RELEASE ORAL at 08:21

## 2023-11-25 RX ADMIN — DIPHENHYDRAMINE HYDROCHLORIDE 10 ML: 12.5 LIQUID ORAL at 20:30

## 2023-11-25 RX ADMIN — MORPHINE SULFATE 15 MG: 15 TABLET, FILM COATED, EXTENDED RELEASE ORAL at 20:30

## 2023-11-25 RX ADMIN — CIPROFLOXACIN HYDROCHLORIDE 500 MG: 500 TABLET, FILM COATED ORAL at 08:21

## 2023-11-25 RX ADMIN — LEVOTHYROXINE SODIUM 125 MCG: 0.12 TABLET ORAL at 08:21

## 2023-11-25 RX ADMIN — TIZANIDINE 4 MG: 2 TABLET ORAL at 23:36

## 2023-11-25 RX ADMIN — CIPROFLOXACIN HYDROCHLORIDE 500 MG: 500 TABLET, FILM COATED ORAL at 20:30

## 2023-11-25 RX ADMIN — DIPHENHYDRAMINE HYDROCHLORIDE 25 MG: 25 CAPSULE ORAL at 13:14

## 2023-11-25 RX ADMIN — ACETAMINOPHEN 650 MG: 325 TABLET ORAL at 13:14

## 2023-11-25 RX ADMIN — AMLODIPINE BESYLATE 5 MG: 5 TABLET ORAL at 08:21

## 2023-11-25 RX ADMIN — DOCUSATE SODIUM 50 MG AND SENNOSIDES 8.6 MG 1 TABLET: 8.6; 5 TABLET, FILM COATED ORAL at 20:27

## 2023-11-25 RX ADMIN — FLUOXETINE HYDROCHLORIDE 20 MG: 20 CAPSULE ORAL at 20:26

## 2023-11-25 RX ADMIN — FAMOTIDINE 40 MG: 20 TABLET, FILM COATED ORAL at 20:26

## 2023-11-25 RX ADMIN — POTASSIUM CHLORIDE 20 MEQ: 1500 TABLET, EXTENDED RELEASE ORAL at 16:58

## 2023-11-25 RX ADMIN — FERROUS SULFATE TAB 325 MG (65 MG ELEMENTAL FE) 325 MG: 325 (65 FE) TAB at 16:58

## 2023-11-25 RX ADMIN — ANTI-FUNGAL POWDER MICONAZOLE NITRATE TALC FREE: 1.42 POWDER TOPICAL at 20:31

## 2023-11-25 RX ADMIN — IPRATROPIUM BROMIDE 2 SPRAY: 42 SPRAY NASAL at 20:48

## 2023-11-25 RX ADMIN — PANTOPRAZOLE SODIUM 40 MG: 40 TABLET, DELAYED RELEASE ORAL at 16:58

## 2023-11-25 RX ADMIN — ALLOPURINOL 300 MG: 300 TABLET ORAL at 08:21

## 2023-11-25 RX ADMIN — IMMUNE GLOBULIN (HUMAN) 45 G: 10 INJECTION INTRAVENOUS; SUBCUTANEOUS at 19:44

## 2023-11-25 RX ADMIN — MORPHINE SULFATE 15 MG: 15 TABLET, FILM COATED, EXTENDED RELEASE ORAL at 08:22

## 2023-11-25 RX ADMIN — ACYCLOVIR 400 MG: 400 TABLET ORAL at 08:21

## 2023-11-25 RX ADMIN — PANTOPRAZOLE SODIUM 40 MG: 40 TABLET, DELAYED RELEASE ORAL at 08:20

## 2023-11-25 RX ADMIN — MONTELUKAST 10 MG: 10 TABLET, FILM COATED ORAL at 08:21

## 2023-11-25 RX ADMIN — ACETAMINOPHEN 650 MG: 325 TABLET ORAL at 22:54

## 2023-11-25 RX ADMIN — IPRATROPIUM BROMIDE 2 SPRAY: 42 SPRAY NASAL at 08:29

## 2023-11-25 RX ADMIN — SODIUM CHLORIDE, PRESERVATIVE FREE 10 ML: 5 INJECTION INTRAVENOUS at 20:27

## 2023-11-25 ASSESSMENT — PAIN DESCRIPTION - DESCRIPTORS: DESCRIPTORS: ACHING

## 2023-11-25 ASSESSMENT — PAIN DESCRIPTION - ORIENTATION: ORIENTATION: ANTERIOR

## 2023-11-25 ASSESSMENT — PAIN DESCRIPTION - ONSET: ONSET: GRADUAL

## 2023-11-25 ASSESSMENT — PAIN SCALES - GENERAL
PAINLEVEL_OUTOF10: 0
PAINLEVEL_OUTOF10: 3

## 2023-11-25 ASSESSMENT — PAIN DESCRIPTION - LOCATION: LOCATION: HEAD

## 2023-11-25 ASSESSMENT — PAIN DESCRIPTION - FREQUENCY: FREQUENCY: INTERMITTENT

## 2023-11-25 ASSESSMENT — PAIN - FUNCTIONAL ASSESSMENT: PAIN_FUNCTIONAL_ASSESSMENT: ACTIVITIES ARE NOT PREVENTED

## 2023-11-25 ASSESSMENT — PAIN DESCRIPTION - PAIN TYPE: TYPE: ACUTE PAIN

## 2023-11-25 NOTE — PROGRESS NOTES
END OF SHIFT SUMMARY:    Significant vitals this shift:  none  Significant labs this shift:  plt count  Tests performed this shift:  none  Orders to be followed up on:  none  Blood products given this shift:  1 unit plts given  Additional events this shift:   Patient tolerated platelets well. Patient watching TV or on computer for most of the day.       I/Os:  +/- this shift:   11/25 0701 - 11/25 1900  In: 295   Out: -   Unmeasured Occurrences this Shift:  Urine yes, BM no, Emesis no      Bedside shift report given to oncoming nurse     Saman Tuttle RN

## 2023-11-26 PROBLEM — F32.A DEPRESSION: Status: ACTIVE | Noted: 2023-10-27

## 2023-11-26 LAB
ABO + RH BLD: NORMAL
ALBUMIN SERPL-MCNC: 3.5 G/DL (ref 3.2–4.6)
ALBUMIN/GLOB SERPL: 0.8 (ref 0.4–1.6)
ALP SERPL-CCNC: 66 U/L (ref 50–136)
ALT SERPL-CCNC: 39 U/L (ref 12–65)
ANION GAP SERPL CALC-SCNC: 4 MMOL/L (ref 2–11)
AST SERPL-CCNC: 13 U/L (ref 15–37)
BASOPHILS # BLD: 0 K/UL (ref 0–0.2)
BASOPHILS NFR BLD: 0 % (ref 0–2)
BILIRUB SERPL-MCNC: 1.4 MG/DL (ref 0.2–1.1)
BLD PROD TYP BPU: NORMAL
BLD PROD TYP BPU: NORMAL
BLOOD BANK CMNT PATIENT-IMP: NORMAL
BLOOD BANK CMNT PATIENT-IMP: NORMAL
BLOOD BANK DISPENSE STATUS: NORMAL
BLOOD BANK DISPENSE STATUS: NORMAL
BLOOD GROUP ANTIBODIES SERPL: NORMAL
BPU ID: NORMAL
BPU ID: NORMAL
BUN SERPL-MCNC: 24 MG/DL (ref 8–23)
CALCIUM SERPL-MCNC: 9.2 MG/DL (ref 8.3–10.4)
CHLORIDE SERPL-SCNC: 102 MMOL/L (ref 101–110)
CO2 SERPL-SCNC: 28 MMOL/L (ref 21–32)
CREAT SERPL-MCNC: 0.7 MG/DL (ref 0.6–1)
DIFFERENTIAL METHOD BLD: ABNORMAL
EOSINOPHIL # BLD: 0 K/UL (ref 0–0.8)
EOSINOPHIL NFR BLD: 0 % (ref 0.5–7.8)
ERYTHROCYTE [DISTWIDTH] IN BLOOD BY AUTOMATED COUNT: 13.2 % (ref 11.9–14.6)
ERYTHROCYTE [DISTWIDTH] IN BLOOD BY AUTOMATED COUNT: 13.4 % (ref 11.9–14.6)
GLOBULIN SER CALC-MCNC: 4.5 G/DL (ref 2.8–4.5)
GLUCOSE SERPL-MCNC: 101 MG/DL (ref 65–100)
HCT VFR BLD AUTO: 23.1 % (ref 35.8–46.3)
HCT VFR BLD AUTO: 23.7 % (ref 35.8–46.3)
HGB BLD-MCNC: 7.8 G/DL (ref 11.7–15.4)
HGB BLD-MCNC: 8.1 G/DL (ref 11.7–15.4)
IMM GRANULOCYTES # BLD AUTO: 0 K/UL (ref 0–0.5)
IMM GRANULOCYTES NFR BLD AUTO: 0 % (ref 0–5)
LYMPHOCYTES # BLD: 1 K/UL (ref 0.5–4.6)
LYMPHOCYTES NFR BLD: 95 % (ref 13–44)
MAGNESIUM SERPL-MCNC: 2.2 MG/DL (ref 1.8–2.4)
MCH RBC QN AUTO: 29.9 PG (ref 26.1–32.9)
MCH RBC QN AUTO: 30 PG (ref 26.1–32.9)
MCHC RBC AUTO-ENTMCNC: 33.8 G/DL (ref 31.4–35)
MCHC RBC AUTO-ENTMCNC: 34.2 G/DL (ref 31.4–35)
MCV RBC AUTO: 87.5 FL (ref 82–102)
MCV RBC AUTO: 88.8 FL (ref 82–102)
MONOCYTES # BLD: 0 K/UL (ref 0.1–1.3)
MONOCYTES NFR BLD: 1 % (ref 4–12)
NEUTS SEG # BLD: 0 K/UL (ref 1.7–8.2)
NEUTS SEG NFR BLD: 4 % (ref 43–78)
NRBC # BLD: 0 K/UL (ref 0–0.2)
NRBC # BLD: 0 K/UL (ref 0–0.2)
PLATELET # BLD AUTO: 2 K/UL (ref 150–450)
PLATELET # BLD AUTO: 3 K/UL (ref 150–450)
PLATELET COMMENT: ABNORMAL
PMV BLD AUTO: 7.9 FL (ref 9.4–12.3)
PMV BLD AUTO: ABNORMAL FL (ref 9.4–12.3)
POTASSIUM SERPL-SCNC: 4 MMOL/L (ref 3.5–5.1)
PROT SERPL-MCNC: 8 G/DL (ref 6.3–8.2)
RBC # BLD AUTO: 2.6 M/UL (ref 4.05–5.2)
RBC # BLD AUTO: 2.71 M/UL (ref 4.05–5.2)
RBC MORPH BLD: ABNORMAL
SODIUM SERPL-SCNC: 134 MMOL/L (ref 133–143)
SPECIMEN EXP DATE BLD: NORMAL
UNIT DIVISION: 0
UNIT DIVISION: 0
WBC # BLD AUTO: 0.2 K/UL (ref 4.3–11.1)
WBC # BLD AUTO: 1 K/UL (ref 4.3–11.1)
WBC MORPH BLD: ABNORMAL

## 2023-11-26 PROCEDURE — 85025 COMPLETE CBC W/AUTO DIFF WBC: CPT

## 2023-11-26 PROCEDURE — 1170000000 HC RM PRIVATE ONCOLOGY

## 2023-11-26 PROCEDURE — 6360000002 HC RX W HCPCS

## 2023-11-26 PROCEDURE — 6370000000 HC RX 637 (ALT 250 FOR IP): Performed by: NURSE PRACTITIONER

## 2023-11-26 PROCEDURE — 86920 COMPATIBILITY TEST SPIN: CPT

## 2023-11-26 PROCEDURE — P9037 PLATE PHERES LEUKOREDU IRRAD: HCPCS

## 2023-11-26 PROCEDURE — 6370000000 HC RX 637 (ALT 250 FOR IP): Performed by: INTERNAL MEDICINE

## 2023-11-26 PROCEDURE — 86900 BLOOD TYPING SEROLOGIC ABO: CPT

## 2023-11-26 PROCEDURE — 2580000003 HC RX 258: Performed by: FAMILY MEDICINE

## 2023-11-26 PROCEDURE — 86813 HLA TYPING A B OR C: CPT

## 2023-11-26 PROCEDURE — 83735 ASSAY OF MAGNESIUM: CPT

## 2023-11-26 PROCEDURE — 85027 COMPLETE CBC AUTOMATED: CPT

## 2023-11-26 PROCEDURE — 6370000000 HC RX 637 (ALT 250 FOR IP): Performed by: FAMILY MEDICINE

## 2023-11-26 PROCEDURE — 99233 SBSQ HOSP IP/OBS HIGH 50: CPT | Performed by: INTERNAL MEDICINE

## 2023-11-26 PROCEDURE — 36592 COLLECT BLOOD FROM PICC: CPT

## 2023-11-26 PROCEDURE — 86644 CMV ANTIBODY: CPT

## 2023-11-26 PROCEDURE — 80053 COMPREHEN METABOLIC PANEL: CPT

## 2023-11-26 PROCEDURE — 86850 RBC ANTIBODY SCREEN: CPT

## 2023-11-26 PROCEDURE — 86901 BLOOD TYPING SEROLOGIC RH(D): CPT

## 2023-11-26 PROCEDURE — 36430 TRANSFUSION BLD/BLD COMPNT: CPT

## 2023-11-26 PROCEDURE — 2400000000

## 2023-11-26 RX ORDER — SODIUM CHLORIDE 9 MG/ML
INJECTION, SOLUTION INTRAVENOUS PRN
Status: DISCONTINUED | OUTPATIENT
Start: 2023-11-26 | End: 2023-11-26

## 2023-11-26 RX ORDER — SODIUM CHLORIDE 9 MG/ML
INJECTION, SOLUTION INTRAVENOUS PRN
Status: DISCONTINUED | OUTPATIENT
Start: 2023-11-26 | End: 2023-12-01

## 2023-11-26 RX ADMIN — AMLODIPINE BESYLATE 5 MG: 5 TABLET ORAL at 09:22

## 2023-11-26 RX ADMIN — CIPROFLOXACIN HYDROCHLORIDE 500 MG: 500 TABLET, FILM COATED ORAL at 09:22

## 2023-11-26 RX ADMIN — SODIUM CHLORIDE, PRESERVATIVE FREE 10 ML: 5 INJECTION INTRAVENOUS at 20:41

## 2023-11-26 RX ADMIN — FLUOXETINE HYDROCHLORIDE 20 MG: 20 CAPSULE ORAL at 20:40

## 2023-11-26 RX ADMIN — FLUCONAZOLE 200 MG: 100 TABLET ORAL at 09:23

## 2023-11-26 RX ADMIN — ALLOPURINOL 300 MG: 300 TABLET ORAL at 09:23

## 2023-11-26 RX ADMIN — FLUOXETINE HYDROCHLORIDE 20 MG: 20 CAPSULE ORAL at 09:33

## 2023-11-26 RX ADMIN — POTASSIUM CHLORIDE 20 MEQ: 1500 TABLET, EXTENDED RELEASE ORAL at 09:23

## 2023-11-26 RX ADMIN — LEVOTHYROXINE SODIUM 125 MCG: 0.12 TABLET ORAL at 09:23

## 2023-11-26 RX ADMIN — POLYETHYLENE GLYCOL 3350 17 G: 17 POWDER, FOR SOLUTION ORAL at 09:23

## 2023-11-26 RX ADMIN — ACETAMINOPHEN 650 MG: 325 TABLET ORAL at 10:36

## 2023-11-26 RX ADMIN — ACYCLOVIR 400 MG: 400 TABLET ORAL at 20:39

## 2023-11-26 RX ADMIN — POTASSIUM CHLORIDE 20 MEQ: 1500 TABLET, EXTENDED RELEASE ORAL at 16:57

## 2023-11-26 RX ADMIN — IMMUNE GLOBULIN (HUMAN) 45 G: 10 INJECTION INTRAVENOUS; SUBCUTANEOUS at 17:01

## 2023-11-26 RX ADMIN — SALINE NASAL SPRAY 2 SPRAY: 1.5 SOLUTION NASAL at 03:06

## 2023-11-26 RX ADMIN — DIPHENHYDRAMINE HYDROCHLORIDE 25 MG: 25 CAPSULE ORAL at 10:36

## 2023-11-26 RX ADMIN — CIPROFLOXACIN HYDROCHLORIDE 500 MG: 500 TABLET, FILM COATED ORAL at 20:40

## 2023-11-26 RX ADMIN — SALINE NASAL SPRAY 2 SPRAY: 1.5 SOLUTION NASAL at 20:41

## 2023-11-26 RX ADMIN — PANTOPRAZOLE SODIUM 40 MG: 40 TABLET, DELAYED RELEASE ORAL at 09:23

## 2023-11-26 RX ADMIN — PANTOPRAZOLE SODIUM 40 MG: 40 TABLET, DELAYED RELEASE ORAL at 16:57

## 2023-11-26 RX ADMIN — FAMOTIDINE 40 MG: 20 TABLET, FILM COATED ORAL at 20:39

## 2023-11-26 RX ADMIN — MORPHINE SULFATE 15 MG: 15 TABLET, FILM COATED, EXTENDED RELEASE ORAL at 20:40

## 2023-11-26 RX ADMIN — ACYCLOVIR 400 MG: 400 TABLET ORAL at 09:22

## 2023-11-26 RX ADMIN — ANTI-FUNGAL POWDER MICONAZOLE NITRATE TALC FREE: 1.42 POWDER TOPICAL at 09:24

## 2023-11-26 RX ADMIN — MONTELUKAST 10 MG: 10 TABLET, FILM COATED ORAL at 09:22

## 2023-11-26 RX ADMIN — SODIUM CHLORIDE, PRESERVATIVE FREE 10 ML: 5 INJECTION INTRAVENOUS at 09:24

## 2023-11-26 RX ADMIN — FERROUS SULFATE TAB 325 MG (65 MG ELEMENTAL FE) 325 MG: 325 (65 FE) TAB at 16:57

## 2023-11-26 RX ADMIN — FERROUS SULFATE TAB 325 MG (65 MG ELEMENTAL FE) 325 MG: 325 (65 FE) TAB at 12:46

## 2023-11-26 RX ADMIN — DIPHENHYDRAMINE HYDROCHLORIDE 10 ML: 12.5 LIQUID ORAL at 03:06

## 2023-11-26 RX ADMIN — PREDNISONE 10 MG: 10 TABLET ORAL at 09:23

## 2023-11-26 RX ADMIN — SALINE NASAL SPRAY 2 SPRAY: 1.5 SOLUTION NASAL at 09:24

## 2023-11-26 RX ADMIN — IPRATROPIUM BROMIDE 2 SPRAY: 42 SPRAY NASAL at 20:40

## 2023-11-26 RX ADMIN — ROSUVASTATIN CALCIUM 10 MG: 5 TABLET, FILM COATED ORAL at 09:22

## 2023-11-26 RX ADMIN — IPRATROPIUM BROMIDE 2 SPRAY: 42 SPRAY NASAL at 09:24

## 2023-11-26 RX ADMIN — MORPHINE SULFATE 15 MG: 15 TABLET, FILM COATED, EXTENDED RELEASE ORAL at 09:23

## 2023-11-26 RX ADMIN — DOCUSATE SODIUM 50 MG AND SENNOSIDES 8.6 MG 1 TABLET: 8.6; 5 TABLET, FILM COATED ORAL at 20:39

## 2023-11-26 RX ADMIN — SALINE NASAL SPRAY 2 SPRAY: 1.5 SOLUTION NASAL at 15:03

## 2023-11-26 ASSESSMENT — PAIN SCALES - GENERAL
PAINLEVEL_OUTOF10: 0

## 2023-11-26 NOTE — PROGRESS NOTES
EOS notes:    No bleeding;latest plt ct 3. IV IG given;monitored;tolerated well. Afebrile. This AM rpt plt ct 3;ordered 1 unit as per standing order. No new complaints.

## 2023-11-26 NOTE — PROGRESS NOTES
3640: Received pt from [place] in stable condition. Pt in bed resting quietly. Resp even & unlabored on room air; no acute signs of distress noted. Bed low & locked; call light in reach; no needs voiced.  at bedside.

## 2023-11-26 NOTE — PLAN OF CARE
Problem: Pain  Goal: Verbalizes/displays adequate comfort level or baseline comfort level  11/26/2023 1026 by Kailash Tao RN  Outcome: Progressing  Flowsheets (Taken 11/25/2023 2254 by Troy Mak RN)  Verbalizes/displays adequate comfort level or baseline comfort level:   Encourage patient to monitor pain and request assistance   Assess pain using appropriate pain scale   Administer analgesics based on type and severity of pain and evaluate response   Implement non-pharmacological measures as appropriate and evaluate response  11/25/2023 2221 by Troy Mak RN  Outcome: Progressing  Flowsheets (Taken 11/25/2023 0825 by Hannah Muro RN)  Verbalizes/displays adequate comfort level or baseline comfort level: Encourage patient to monitor pain and request assistance     Problem: ABCDS Injury Assessment  Goal: Absence of physical injury  11/26/2023 1026 by Kailash Tao RN  Outcome: Progressing  11/25/2023 2221 by Troy Mak RN  Outcome: Progressing  Flowsheets (Taken 11/25/2023 2000)  Absence of Physical Injury: Implement safety measures based on patient assessment     Problem: Safety - Adult  Goal: Free from fall injury  11/26/2023 1026 by Kailash Tao RN  Outcome: Progressing  11/25/2023 2221 by Troy Mak RN  Outcome: Progressing  Flowsheets  Taken 11/25/2023 2000 by Troy Mak RN  Free From Fall Injury: Instruct family/caregiver on patient safety  Taken 11/25/2023 1347 by Hannah Muro RN  Free From Fall Injury: Instruct family/caregiver on patient safety     Problem: Discharge Planning  Goal: Discharge to home or other facility with appropriate resources  11/26/2023 1026 by Kailash Tao RN  Outcome: Progressing  11/25/2023 2221 by Troy Mak RN  Outcome: Progressing  Flowsheets (Taken 11/25/2023 2000)  Discharge to home or other facility with appropriate resources: Identify barriers to discharge with patient and caregiver     Problem: YULIANA RN  Outcome: Progressing     Problem: Behavior  Goal: Pt/Family maintain appropriate behavior and adhere to behavioral management agreement, if implemented  Description: INTERVENTIONS:  1. Assess patient/family's coping skills and  non-compliant behavior (including use of illegal substances)  2. Notify security of behavior or suspected illegal substances which indicate the need for search of the family and/or belongings  3. Encourage verbalization of thoughts and concerns in a socially appropriate manner  4. Utilize positive, consistent limit setting strategies supporting safety of patient, staff and others  5. Encourage participation in the decision making process about the behavioral management agreement  6. If a visitor's behavior poses a threat to safety call refer to organization policy.  7. Initiate consult with , Psychosocial CNS, Spiritual Care as appropriate  11/25/2023 2221 by Stacey Devries, RN  Outcome: Progressing     Problem: Depression/Self Harm  Goal: Effect of psychiatric condition will be minimized and patient will be protected from self harm  Description: INTERVENTIONS:  1. Assess impact of patient's symptoms on level of functioning, self care needs and offer support as indicated  2. Assess patient/family knowledge of depression, impact on illness and need for teaching  3. Provide emotional support, presence and reassurance  4. Assess for possible suicidal thoughts or ideation. If patient expresses suicidal thoughts or statements do not leave alone, initiate Suicide Precautions, move to a room close to the nursing station and obtain sitter  5. Initiate consults as appropriate with Mental Health Professional, Spiritual Care, Psychosocial CNS, and consider a recommendation to the LIP for a Psychiatric Consultation  11/25/2023 2221 by Stacey Devries, RN  Outcome: Progressing     Problem: Abuse/Neglect  Goal: Pt/Caregiver aware of resources to assist with issues of abuse and

## 2023-11-26 NOTE — PROGRESS NOTES
[facility]        Inpatient Hematology / Oncology Progress Note    Reason for Admission:  Neutropenic fever ([address]) [D70.9, R50.81]  Pancytopenia ([address]) [D61.818]    24 Hour Events:  Afebrile, VSS  Dacogen completed 11/24 Mylotarg completed 11/16  Continues prednisone taper  Requiring plts again today, Plt 3K  Remains on RA, no complaints  Family at bedside    Transfusions: Plts  Replacements: None    ROS:  Constitutional: Positive for fatigue; negative for fever, chills. CV: Negative for chest pain, palpitations, edema. Respiratory: +cough, dyspnea (improved). Negative for wheezing. GI: Negative for nausea, abdominal pain, diarrhea. 10 point review of systems is otherwise negative with the exception of the elements mentioned above in the HPI. Allergies   Allergen Reactions    Penicillins Hives    Sulfa Antibiotics Hives    Codeine Nausea And Vomiting     Past Medical History:   Diagnosis Date    Arthritis     Autoimmune disease ([address])     skin changes, unknown name    Cancer ([address]) 1990    ovarian    Chronic pain     arthritis in back and legs    Coronary artery spasm ([address])     COVID 05/15/2022    Essential hypertension 11/8/2019    Gastritis     GERD (gastroesophageal reflux disease)     Hx antineoplastic chemo     Migraine headache     Psoriasis     Psychiatric disorder     anxiety and depression    Severe obesity (BMI 35.0-39.9) 6/26/2018    Thyroid disease     Diagnosed in 1996     Past Surgical History:   Procedure Laterality Date    CARPAL TUNNEL RELEASE      GYN      ovaries    HYSTERECTOMY, TOTAL ABDOMINAL (CERVIX REMOVED)  Patriciabury      cardiac cath. Dx with spasms.     TUBAL LIGATION       Family History   Problem Relation Age of Onset    Parkinson's Disease Mother     Stroke Mother         Passed away in 1969    No Known Problems Paternal Grandfather     No Known Problems Paternal Grandmother     No Known Problems Maternal Grandfather

## 2023-11-27 ENCOUNTER — APPOINTMENT (OUTPATIENT)
Dept: GENERAL RADIOLOGY | Age: 73
DRG: 808 | End: 2023-11-27
Payer: MEDICARE

## 2023-11-27 DIAGNOSIS — C94.00 ACUTE ERYTHROID LEUKEMIA NOT HAVING ACHIEVED REMISSION (HCC): Primary | ICD-10-CM

## 2023-11-27 LAB
ALBUMIN SERPL-MCNC: 3.5 G/DL (ref 3.2–4.6)
ALBUMIN/GLOB SERPL: 0.7 (ref 0.4–1.6)
ALP SERPL-CCNC: 70 U/L (ref 50–136)
ALT SERPL-CCNC: 37 U/L (ref 12–65)
ANION GAP SERPL CALC-SCNC: 6 MMOL/L (ref 2–11)
AST SERPL-CCNC: 19 U/L (ref 15–37)
BASOPHILS # BLD: 0 K/UL (ref 0–0.2)
BASOPHILS NFR BLD: 0 % (ref 0–2)
BILIRUB SERPL-MCNC: 0.9 MG/DL (ref 0.2–1.1)
BLD PROD TYP BPU: NORMAL
BLOOD BANK CMNT PATIENT-IMP: NORMAL
BLOOD BANK DISPENSE STATUS: NORMAL
BPU ID: NORMAL
BUN SERPL-MCNC: 21 MG/DL (ref 8–23)
CALCIUM SERPL-MCNC: 8.8 MG/DL (ref 8.3–10.4)
CHLORIDE SERPL-SCNC: 100 MMOL/L (ref 101–110)
CO2 SERPL-SCNC: 27 MMOL/L (ref 21–32)
CREAT SERPL-MCNC: 0.6 MG/DL (ref 0.6–1)
DIFFERENTIAL METHOD BLD: ABNORMAL
EOSINOPHIL # BLD: 0 K/UL (ref 0–0.8)
EOSINOPHIL NFR BLD: 0 % (ref 0.5–7.8)
ERYTHROCYTE [DISTWIDTH] IN BLOOD BY AUTOMATED COUNT: 13 % (ref 11.9–14.6)
GLOBULIN SER CALC-MCNC: 5.2 G/DL (ref 2.8–4.5)
GLUCOSE SERPL-MCNC: 89 MG/DL (ref 65–100)
HCT VFR BLD AUTO: 21.6 % (ref 35.8–46.3)
HGB BLD-MCNC: 7.3 G/DL (ref 11.7–15.4)
IMM GRANULOCYTES # BLD AUTO: 0 K/UL (ref 0–0.5)
IMM GRANULOCYTES NFR BLD AUTO: 0 % (ref 0–5)
LYMPHOCYTES # BLD: 1 K/UL (ref 0.5–4.6)
LYMPHOCYTES NFR BLD: 99 % (ref 13–44)
MAGNESIUM SERPL-MCNC: 2.3 MG/DL (ref 1.8–2.4)
MCH RBC QN AUTO: 29.9 PG (ref 26.1–32.9)
MCHC RBC AUTO-ENTMCNC: 33.8 G/DL (ref 31.4–35)
MCV RBC AUTO: 88.5 FL (ref 82–102)
MONOCYTES # BLD: 0 K/UL (ref 0.1–1.3)
MONOCYTES NFR BLD: 1 % (ref 4–12)
NEUTS SEG # BLD: 0 K/UL (ref 1.7–8.2)
NEUTS SEG NFR BLD: 0 % (ref 43–78)
NRBC # BLD: 0 K/UL (ref 0–0.2)
PLATELET # BLD AUTO: 2 K/UL (ref 150–450)
PLATELET COMMENT: ABNORMAL
PMV BLD AUTO: ABNORMAL FL (ref 9.4–12.3)
POTASSIUM SERPL-SCNC: 4.1 MMOL/L (ref 3.5–5.1)
PROT SERPL-MCNC: 8.7 G/DL (ref 6.3–8.2)
RBC # BLD AUTO: 2.44 M/UL (ref 4.05–5.2)
RBC MORPH BLD: ABNORMAL
SODIUM SERPL-SCNC: 133 MMOL/L (ref 133–143)
UNIT DIVISION: 0
WBC # BLD AUTO: 1 K/UL (ref 4.3–11.1)
WBC MORPH BLD: ABNORMAL

## 2023-11-27 PROCEDURE — 6370000000 HC RX 637 (ALT 250 FOR IP): Performed by: INTERNAL MEDICINE

## 2023-11-27 PROCEDURE — 6370000000 HC RX 637 (ALT 250 FOR IP): Performed by: FAMILY MEDICINE

## 2023-11-27 PROCEDURE — 6370000000 HC RX 637 (ALT 250 FOR IP): Performed by: NURSE PRACTITIONER

## 2023-11-27 PROCEDURE — 83735 ASSAY OF MAGNESIUM: CPT

## 2023-11-27 PROCEDURE — 86813 HLA TYPING A B OR C: CPT

## 2023-11-27 PROCEDURE — 99223 1ST HOSP IP/OBS HIGH 75: CPT | Performed by: NURSE PRACTITIONER

## 2023-11-27 PROCEDURE — 1170000000 HC RM PRIVATE ONCOLOGY

## 2023-11-27 PROCEDURE — 71045 X-RAY EXAM CHEST 1 VIEW: CPT

## 2023-11-27 PROCEDURE — 86644 CMV ANTIBODY: CPT

## 2023-11-27 PROCEDURE — 97116 GAIT TRAINING THERAPY: CPT

## 2023-11-27 PROCEDURE — 36592 COLLECT BLOOD FROM PICC: CPT

## 2023-11-27 PROCEDURE — 2580000003 HC RX 258: Performed by: FAMILY MEDICINE

## 2023-11-27 PROCEDURE — 99233 SBSQ HOSP IP/OBS HIGH 50: CPT | Performed by: INTERNAL MEDICINE

## 2023-11-27 PROCEDURE — P9037 PLATE PHERES LEUKOREDU IRRAD: HCPCS

## 2023-11-27 PROCEDURE — 80053 COMPREHEN METABOLIC PANEL: CPT

## 2023-11-27 PROCEDURE — 97164 PT RE-EVAL EST PLAN CARE: CPT

## 2023-11-27 PROCEDURE — 36430 TRANSFUSION BLD/BLD COMPNT: CPT

## 2023-11-27 PROCEDURE — 97530 THERAPEUTIC ACTIVITIES: CPT

## 2023-11-27 PROCEDURE — 85025 COMPLETE CBC W/AUTO DIFF WBC: CPT

## 2023-11-27 RX ORDER — SODIUM CHLORIDE 9 MG/ML
INJECTION, SOLUTION INTRAVENOUS PRN
Status: DISCONTINUED | OUTPATIENT
Start: 2023-11-27 | End: 2023-12-01

## 2023-11-27 RX ADMIN — MORPHINE SULFATE 15 MG: 15 TABLET, FILM COATED, EXTENDED RELEASE ORAL at 08:19

## 2023-11-27 RX ADMIN — FAMOTIDINE 40 MG: 20 TABLET, FILM COATED ORAL at 21:04

## 2023-11-27 RX ADMIN — DOCUSATE SODIUM 50 MG AND SENNOSIDES 8.6 MG 1 TABLET: 8.6; 5 TABLET, FILM COATED ORAL at 21:04

## 2023-11-27 RX ADMIN — LEVOTHYROXINE SODIUM 125 MCG: 0.12 TABLET ORAL at 08:20

## 2023-11-27 RX ADMIN — PANTOPRAZOLE SODIUM 40 MG: 40 TABLET, DELAYED RELEASE ORAL at 16:21

## 2023-11-27 RX ADMIN — POLYETHYLENE GLYCOL 3350 17 G: 17 POWDER, FOR SOLUTION ORAL at 08:18

## 2023-11-27 RX ADMIN — IPRATROPIUM BROMIDE 2 SPRAY: 42 SPRAY NASAL at 21:05

## 2023-11-27 RX ADMIN — ANTI-FUNGAL POWDER MICONAZOLE NITRATE TALC FREE: 1.42 POWDER TOPICAL at 08:21

## 2023-11-27 RX ADMIN — FLUCONAZOLE 200 MG: 100 TABLET ORAL at 08:19

## 2023-11-27 RX ADMIN — SALINE NASAL SPRAY 2 SPRAY: 1.5 SOLUTION NASAL at 16:21

## 2023-11-27 RX ADMIN — TIZANIDINE 4 MG: 2 TABLET ORAL at 00:10

## 2023-11-27 RX ADMIN — ACYCLOVIR 400 MG: 400 TABLET ORAL at 08:20

## 2023-11-27 RX ADMIN — FLUOXETINE HYDROCHLORIDE 20 MG: 20 CAPSULE ORAL at 08:21

## 2023-11-27 RX ADMIN — CIPROFLOXACIN HYDROCHLORIDE 500 MG: 500 TABLET, FILM COATED ORAL at 21:04

## 2023-11-27 RX ADMIN — IPRATROPIUM BROMIDE 2 SPRAY: 42 SPRAY NASAL at 08:18

## 2023-11-27 RX ADMIN — SODIUM CHLORIDE, PRESERVATIVE FREE 10 ML: 5 INJECTION INTRAVENOUS at 08:23

## 2023-11-27 RX ADMIN — ACYCLOVIR 400 MG: 400 TABLET ORAL at 21:04

## 2023-11-27 RX ADMIN — POTASSIUM CHLORIDE 20 MEQ: 1500 TABLET, EXTENDED RELEASE ORAL at 16:21

## 2023-11-27 RX ADMIN — ALUMINUM HYDROXIDE, MAGNESIUM HYDROXIDE, AND SIMETHICONE 30 ML: 200; 200; 20 SUSPENSION ORAL at 10:41

## 2023-11-27 RX ADMIN — DIPHENHYDRAMINE HYDROCHLORIDE 25 MG: 25 CAPSULE ORAL at 10:41

## 2023-11-27 RX ADMIN — ALLOPURINOL 300 MG: 300 TABLET ORAL at 08:21

## 2023-11-27 RX ADMIN — SALINE NASAL SPRAY 2 SPRAY: 1.5 SOLUTION NASAL at 08:18

## 2023-11-27 RX ADMIN — FLUOXETINE HYDROCHLORIDE 20 MG: 20 CAPSULE ORAL at 21:04

## 2023-11-27 RX ADMIN — POTASSIUM CHLORIDE 20 MEQ: 1500 TABLET, EXTENDED RELEASE ORAL at 08:19

## 2023-11-27 RX ADMIN — SODIUM CHLORIDE, PRESERVATIVE FREE 10 ML: 5 INJECTION INTRAVENOUS at 21:05

## 2023-11-27 RX ADMIN — PREDNISONE 10 MG: 10 TABLET ORAL at 08:20

## 2023-11-27 RX ADMIN — PANTOPRAZOLE SODIUM 40 MG: 40 TABLET, DELAYED RELEASE ORAL at 08:21

## 2023-11-27 RX ADMIN — ACETAMINOPHEN 650 MG: 325 TABLET ORAL at 10:41

## 2023-11-27 RX ADMIN — ROSUVASTATIN CALCIUM 10 MG: 5 TABLET, FILM COATED ORAL at 08:20

## 2023-11-27 RX ADMIN — CIPROFLOXACIN HYDROCHLORIDE 500 MG: 500 TABLET, FILM COATED ORAL at 08:19

## 2023-11-27 RX ADMIN — FERROUS SULFATE TAB 325 MG (65 MG ELEMENTAL FE) 325 MG: 325 (65 FE) TAB at 11:44

## 2023-11-27 RX ADMIN — MORPHINE SULFATE 15 MG: 15 TABLET, FILM COATED, EXTENDED RELEASE ORAL at 21:04

## 2023-11-27 RX ADMIN — FERROUS SULFATE TAB 325 MG (65 MG ELEMENTAL FE) 325 MG: 325 (65 FE) TAB at 16:21

## 2023-11-27 RX ADMIN — MONTELUKAST 10 MG: 10 TABLET, FILM COATED ORAL at 08:19

## 2023-11-27 RX ADMIN — TIZANIDINE 4 MG: 2 TABLET ORAL at 21:04

## 2023-11-27 RX ADMIN — AMLODIPINE BESYLATE 5 MG: 5 TABLET ORAL at 08:20

## 2023-11-27 ASSESSMENT — PAIN SCALES - GENERAL
PAINLEVEL_OUTOF10: 0
PAINLEVEL_OUTOF10: 1
PAINLEVEL_OUTOF10: 0

## 2023-11-27 NOTE — PROGRESS NOTES
Problems Maternal Grandfather     No Known Problems Maternal Grandmother     Prostate Cancer Father          age 80     Cancer Father         Kidney     Social History     Socioeconomic History    Marital status:      Spouse name: Not on file    Number of children: Not on file    Years of education: Not on file    Highest education level: Not on file   Occupational History    Not on file   Tobacco Use    Smoking status: Never     Passive exposure: Past    Smokeless tobacco: Never   Vaping Use    Vaping Use: Never used   Substance and Sexual Activity    Alcohol use: No    Drug use: Yes     Types: Prescription    Sexual activity: Not Currently     Birth control/protection: None   Other Topics Concern    Not on file   Social History Narrative    Not on file     Social Determinants of Health     Financial Resource Strain: Low Risk  (2023)    Overall Financial Resource Strain (CARDIA)     Difficulty of Paying Living Expenses: Not hard at all   Food Insecurity: No Food Insecurity (2023)    Hunger Vital Sign     Worried About Running Out of Food in the Last Year: Never true     [address] in the Last Year: Never true   Transportation Needs: No Transportation Needs (2023)    Transportation Problems ([facility])     In the past 12 months, has lack of reliable transportation kept you from medical appointments, meetings, work or from getting things needed for daily living?: Not on file   Recent Concern: Transportation Needs - Unmet Transportation Needs (2023)    PRAPARE - Transportation     Lack of Transportation (Medical): Yes     Lack of Transportation (Non-Medical):  No   Physical Activity: Not on file   Stress: Not on file   Social Connections: Not on file   Intimate Partner Violence: Not on file   Housing Stability: High Risk (2023)    Housing Stability Vital Sign     Unable to Pay for Housing in the Last Year: Yes     Number of Places Lived in the Last Year: 1     Unstable Housing in relatively stable. Hapto pending and smear negative. No DIC.  11/2 Plts 4k - day 4 dex, received 2 days IVIG (on hold due to concern for volume overload but continues on steroids). Transfuse plts and check 30 min after transfusion. Hgb down to 7 today - transfuse PRBCs. WBC stable.  11/5 Plts down to 2k. Day 7 Dex 40mg. Transfusing again today. IPF remains very elevated. Dr Roe discussing possible consideration for Rituxan as she has been refractory to IVIG and platelets.  11/7 Day 9 steroids (changed to prednisone 1mg/kg 11/6). BMBx today. Still no response to transfusion of cross-matched plts or steroids/recent IVIG. BB working on HLA matching. Check plt count BID and transfuse for plts <10k. May consider Rituxan pending BMBx results.  11/8 Day 10 steroids (changed to prednisone 1mg/kg 11/6).  Plt count 3k today.  BMBx prelim results discussed by Dr. Johnson with Dr. Vora - concern for hemaphagocytosis, not progression of MDS but cannot r/o ITP.  Checking Ferritin, TG, and IL2.    11/12 D14 steroids (changed to prednisone 1mg/kg 11/6).  Plts up to 40k yesterday, 30k this AM.  Planned to start Etop/Dex today, however holding off given dramatic improvement of plt count since yesterday.  Bone marrow may be recovering from prior Rev/Humira.     11/13 Recent BMBx with possible hemophagocytosis (final path pending). However, plts now relatively stable so holding on etop/dex. HLH labs appear unremarkable. Holding off on transfusion today, recheck platelet count in AM.  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 Also some hemophagocytosis noted per Dr Johnson but markers don't appear consistent with diagnosis and possibly related to macrophage activation. Proceed with Dacogen when able. Weaning prednisone to 60mg.  11/18 Continue to wean prednisone (down to 40mg daily with today's dose). Thrombocytopenia  stable. 11/14 Check CXR as well as echo. Weight back down to admission baseline. On 2L NC with worsening dyspnea today. 11/15 Stable fluid status/weight. CXR negative. Echo WNL. On RA overnight. 11/17 On RA, +I/O 600ml and weight up (using different scale), no signs of overload. 11/22 on RA  11/23 Up 5# and +1.9L, remains on RA  11/27 Check CXR, start I/S     Bleeding - epistaxis and rectal bleeding w BM   - ASA on hold; transfuse Hb >7 and plts >50   - PPI  10/29 denies bleeding today   11/5 Per RN  yesterday, small amount of blood from nares, no epistaxis. Otherwise, no bleeding noted. Transfusing plts for plt 2k. 11/9 Pt with episode of bleeding yesterday after scratching self, pressure held and additional 1 unit plts given (3 total yesterday). No further bleeding noted today thus far. 11/10 No further noted bleeding ON/today thus far. Is receiving PRBCs/plts today   11/11 Hgb improved after transfusion yesterday. Plt count 5k , no notable bleeding. 11/12 Plt count up to 40k yesterday (received 1 unit plts yesterday) this AM at 30k, much improved. 11/13 No bleeding noted   RESOLVED    Hypertension  11/2 Resume home amlodipine  11/4 More hypertensive overnight, monitor. May need to adjust amlodipine if no improvement. Has PRN hydralazine. 11/8 Overall BP improved      Chronic pain   - on morphine ER 15mg BID, norco.  Muscle relaxant. Constipation   - bowel regimen      No AC due to TCP   Continue home meds  Supportive care  PT/OT - HH  Antimicrobial prophylaxis - ACV, Diflucan, Cipro    Dispo - too soon to determine. Expect prolonged hospitalization through 10 day course of Dacogen/count recovery. Updates to plan of care  11/26 Discussed the prognosis being very poor that she remains refractory to plt tx,  very depressed they had lost two children, consult spiritual care. 11/27 Wishes to remain full code, and continue with transfusion support for now.       Goals and plan of care

## 2023-11-27 NOTE — PROGRESS NOTES
3043: Received pt from [address] [place] [address] in stable condition. Pt in bed resting quietly. Resp even & unlabored on room air; no acute signs of distress noted. Bed low & locked; call light in reach; no needs voiced. Daughter at bedside. 1041: Maalox 30 mL po given for c/o indigestion. 1125: Pt states the Maalox did help with her indigestion.

## 2023-11-27 NOTE — PROGRESS NOTES
EOS:    -IVIG completed this shift with no adverse reactions   -plts: 2  -no orders to give another unit of plts per NP Mely  -pt resting in bed  -no needs at this time  -vss

## 2023-11-27 NOTE — PROGRESS NOTES
ACUTE PHYSICAL THERAPY GOALS:   (Developed with and agreed upon by patient and/or caregiver.)  Goals assessed and revised 11/27/23  LTG:  (1.)Ms. Crispin Best will move from supine to sit and sit to supine , scoot up and down, and roll side to side in bed with INDEPENDENCE within 7 treatment day(s). GOAL MET 11/27/2023  (2.)Ms. Crispin Best will transfer from bed to chair and chair to bed with MODIFIED INDEPENDENCE using the least restrictive device within 7 treatment day(s). GOAL MET 11/27/2023  (3.)Ms. Crispin Best will ambulate with MODIFIED INDEPENDENCE for 500 feet with the least restrictive device within 7 treatment day(s). (4.)Ms. Crispin Best will participate in therapeutic activity/exercises x 25 minutes for increased activity tolerance within 7 treatment days. (5.)Ms. Crispin Best will perform standing static and dynamic balance activities x 15 minutes with SUPERVISION to improve safety within 7 day(s). New Goal:     (6.)Ms. Crispin Best will demonstrate understanding of energy conservation and activity pacing techniques and apply them with no cueing within 7 days. (7.)Ms. Crispin Best will be independent in all bed mobility and bed to chair, chair to bed transfers within 7 treatment day.     ________________________________________________________________________________________________    PHYSICAL THERAPY Re-evaluation and PM  (Link to Caseload Tracking: PT Visit Days : 1  Acknowledge Orders  Time In/Out  PT Charge Capture  Rehab Caseload Tracker    Libby Del Cid is a 71 y.o. female   PRIMARY DIAGNOSIS: Neutropenic fever ([address])  Neutropenic fever ([address]) [D70.9, R50.81]  Pancytopenia ([address]) [I03.940]       Reason for Referral: Generalized Muscle Weakness (M62.81)  Difficulty in walking, Not elsewhere classified (R26.2)  Inpatient: Payor: Breanne Mcnamara / Plan: Renetta Mendenhall / Product Type: *No Product type* /     ASSESSMENT:     REHAB RECOMMENDATIONS:   Recommendation to date pending progress:  Setting:  [facility] Stairs      I=Independent, Mod I=Modified Independent, S=Supervision, SBA=Standby Assistance, CGA=Contact Guard Assistance,   Min=Minimal Assistance, Mod=Moderate Assistance, Max=Maximal Assistance, Total=Total Assistance, NT=Not Tested    PLAN:   [address]: 3 times/week for duration of hospital stay or until stated goals are met, whichever comes first.    THERAPY PROGNOSIS: Good    PROBLEM LIST:   (Skilled intervention is medically necessary to address:)  Decreased Activity Tolerance  Decreased Balance  Decreased Gait Ability  Decreased Safety Awareness  Decreased Strength  Decreased Transfer Abilities INTERVENTIONS PLANNED:   (Benefits and precautions of physical therapy have been discussed with the patient.)  Therapeutic Activity  Therapeutic Exercise/HEP  Neuromuscular Re-education  Gait Training  Education       TREATMENT:   RE-EVALUATION    TREATMENT:   Gait Training (10 Minutes): Gait training for 100'x 2 feet utilizing [address] and chair follow. Patient required Tactile and Verbal cueing to improve Activity Pacing, Dynamic Standing Balance, Gait Mechanics, and energy conservation techniques.      TREATMENT GRID:  N/A    AFTER TREATMENT PRECAUTIONS: Bed/Chair Locked, Call light within reach, Chair, Needs within reach, RN notified, and Visitors at bedside    INTERDISCIPLINARY COLLABORATION:  RN/ PCT, PT/ PTA, and OT/ PETTY    EDUCATION: Education Given To: Patient  Education Provided: Energy Conservation  Education Method: Verbal  Barriers to Learning: None  Education Outcome: Continued education needed    TIME IN/OUT:  Time In: 1310  Time Out: [address]   Minutes: [facility], PT

## 2023-11-27 NOTE — CONSULTS
Palliative Care    Patient: Constance Gomes MRN: [id number]  SSN: [id number]    YOB: 1950  Age: 72 y.o.  Sex: female       Date of Request: 11/26/2023  Date of Consult:  11/27/2023  Reason for Consult:  pain and symptom management  Requesting Physician: Mely Silva      Assessment/Plan:     Principal Diagnosis:    Nausea/Vomiting  R11.2    Additional Diagnoses:   Fatigue, Lethargy  R53.83  Encounter for Palliative Care  Z51.5    Palliative Performance Scale (PPS):       Medical Decision Making:   Reviewed and summarized notes from admission to present   Discussed case with appropriate providers  Reviewed laboratory and x-ray data from admission to present     Pt resting in bed, no distress noted.   and another family member asleep at bedside.  Introduced role of PC and reviewed events.  Pt reports she has been doing fairly well.  She endorses fatigue, and mild nausea at present.  Pt states she took some Maalox a few minutes prior, and is hopeful it will make her feel better.  Pt denied worries or fears at this time.  She states she is ready to fight, and wants to live as long as she can.  Affirmed her desires and provided support.  Counseled that we are available as needed if she wants to talk about worries, fears, issues, etc.  Pt voiced understanding and appreciation.  We will continue to follow loosely.    Will discuss findings with members of the interdisciplinary team.      Thank you for this referral.          .    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  Pt denied chest pain, diarrhea.  Pt was admitted  for neutropenic fever and pancytopenia. BMBx on  showed progression to AEL, and pt was started on Dacogen and Mylotarg. Venetoclax is being obtained. Advance Directive: No       Code Status:  Full Code            Health Care Power of : No - Patient does not have a [address]. Past Medical History:   Diagnosis Date    Arthritis     Autoimmune disease ([address])     skin changes, unknown name    Cancer ([address])     ovarian    Chronic pain     arthritis in back and legs    Coronary artery spasm ([address])     COVID 05/15/2022    Essential hypertension 2019    Gastritis     GERD (gastroesophageal reflux disease)     Hx antineoplastic chemo     Migraine headache     Psoriasis     Psychiatric disorder     anxiety and depression    Severe obesity (BMI 35.0-39.9) 2018    Thyroid disease     Diagnosed in       Past Surgical History:   Procedure Laterality Date    CARPAL TUNNEL RELEASE      GYN      ovaries    HYSTERECTOMY, TOTAL ABDOMINAL (CERVIX REMOVED)  Patriciabury      cardiac cath. Dx with spasms. TUBAL LIGATION       Family History   Problem Relation Age of Onset    Parkinson's Disease Mother     Stroke Mother         Passed away in     No Known Problems Paternal Grandfather     No Known Problems Paternal Grandmother     No Known Problems Maternal Grandfather     No Known Problems Maternal Grandmother     Prostate Cancer Father          age 80     Cancer Father         Kidney      Social History     Tobacco Use    Smoking status: Never     Passive exposure: Past    Smokeless tobacco: Never   Substance Use Topics    Alcohol use: No     Prior to Admission medications    Medication Sig Start Date End Date Taking?  Authorizing Provider   Venetoclax 100 MG TABS Take 2 tablets by mouth daily 23   Kingsley Morales MD   Venetoclax [facility]) 100 MG TABS Take 2 tablets by mouth daily 23   TIM Still - CNP

## 2023-11-27 NOTE — PROGRESS NOTES
ACUTE OCCUPATIONAL THERAPY GOALS:   (Developed with and agreed upon by patient and/or caregiver.)  Re-evaluation completed on 11/16/23  1. Patient will complete lower body bathing and dressing with MOD I and adaptive equipment as needed. 2. Patient will complete toileting with MOD I.   3. Patient will tolerate 30 minutes of OT treatment with 1-2 rest breaks to increase activity tolerance for ADLs. 4. Patient will complete functional transfers with MOD I and adaptive equipment as needed. 5. Patient will complete functional mobility for household distances with MOD I and adaptive equipment as needed. 6. Patient will complete self-grooming while standing edge of sink with MOD I and adaptive equipment as needed. New Goal Added:  7. Patient will complete UB bathing and dressing with MOD I and adaptive equipment as needed. 8. Patient will verbalize and demonstrate 3 energy conservation strategies throughout completion of bADLs. Timeframe: 7 visits        OCCUPATIONAL THERAPY: Daily Note    OT Visit Days: 4   Time In/Out  OT Charge Capture  Rehab Caseload Tracker  OT Orders    Yaquelin Farrell is a 71 y.o. female   PRIMARY DIAGNOSIS: Neutropenic fever ([address])  Neutropenic fever ([address]) [D70.9, R50.81]  Pancytopenia ([address]) [D61.818]       Inpatient: Payor: Juancarlos Berkowitz / Plan: [employer] / Product Type: *No Product type* /     ASSESSMENT:     REHAB RECOMMENDATIONS: CURRENT LEVEL OF FUNCTION:  (Most Recently Demonstrated)   Recommendation to date pending progress:  Setting:  Home Health Therapy    Equipment:    To Be Determined Bathing:  Not Tested  Dressing:  Supervision/Setup  Feeding/Grooming:  Contact Guard Assist  Toileting:  Not Tested  Functional Mobility:  Contact Guard Assist     ASSESSMENT:  Ms. Scarlett Jose is progressing well towards OT goals. Today, pt was received supine in bed.  Completed bed mobility, LB dressing, functional transfers, ambulation with RW, toileting, and grooming task awareness, and increase activity tolerance.      TREATMENT GRID:  N/A    AFTER TREATMENT PRECAUTIONS: Call light within reach, Chair, Needs within reach, RN notified, and Visitors at bedside    INTERDISCIPLINARY COLLABORATION:  RN/ PCT, PT/ PTA, and OT/ PETTY    EDUCATION:       TOTAL TREATMENT DURATION AND TIME:  Time In: 1310  Time Out: [address]  Minutes: [address], OT

## 2023-11-28 LAB
ALBUMIN SERPL-MCNC: 3.5 G/DL (ref 3.2–4.6)
ALBUMIN/GLOB SERPL: 0.7 (ref 0.4–1.6)
ALP SERPL-CCNC: 87 U/L (ref 50–136)
ALT SERPL-CCNC: 37 U/L (ref 12–65)
ANION GAP SERPL CALC-SCNC: 6 MMOL/L (ref 2–11)
AST SERPL-CCNC: 17 U/L (ref 15–37)
BASOPHILS # BLD: 0 K/UL (ref 0–0.2)
BASOPHILS NFR BLD: 0 % (ref 0–2)
BILIRUB SERPL-MCNC: 0.9 MG/DL (ref 0.2–1.1)
BLD PROD TYP BPU: NORMAL
BLOOD BANK DISPENSE STATUS: NORMAL
BPU ID: NORMAL
BUN SERPL-MCNC: 23 MG/DL (ref 8–23)
CALCIUM SERPL-MCNC: 9.2 MG/DL (ref 8.3–10.4)
CHLORIDE SERPL-SCNC: 103 MMOL/L (ref 101–110)
CO2 SERPL-SCNC: 25 MMOL/L (ref 21–32)
CREAT SERPL-MCNC: 0.7 MG/DL (ref 0.6–1)
DIFFERENTIAL METHOD BLD: ABNORMAL
EOSINOPHIL # BLD: 0 K/UL (ref 0–0.8)
EOSINOPHIL NFR BLD: 0 % (ref 0.5–7.8)
ERYTHROCYTE [DISTWIDTH] IN BLOOD BY AUTOMATED COUNT: 13.1 % (ref 11.9–14.6)
GLOBULIN SER CALC-MCNC: 5.1 G/DL (ref 2.8–4.5)
GLUCOSE SERPL-MCNC: 93 MG/DL (ref 65–100)
HAV IGM SER QL: NONREACTIVE
HBV CORE IGM SER QL: NONREACTIVE
HBV SURFACE AG SER QL: NONREACTIVE
HCT VFR BLD AUTO: 21.4 % (ref 35.8–46.3)
HCV AB SER QL: NONREACTIVE
HGB BLD-MCNC: 7.4 G/DL (ref 11.7–15.4)
HIV 1+2 AB+HIV1 P24 AG SERPL QL IA: NONREACTIVE
HIV 1/2 RESULT COMMENT: NORMAL
IMM GRANULOCYTES # BLD AUTO: 0 K/UL (ref 0–0.5)
IMM GRANULOCYTES NFR BLD AUTO: 0 % (ref 0–5)
LYMPHOCYTES # BLD: 0.8 K/UL (ref 0.5–4.6)
LYMPHOCYTES NFR BLD: 97 % (ref 13–44)
MAGNESIUM SERPL-MCNC: 2.4 MG/DL (ref 1.8–2.4)
MCH RBC QN AUTO: 30.6 PG (ref 26.1–32.9)
MCHC RBC AUTO-ENTMCNC: 34.6 G/DL (ref 31.4–35)
MCV RBC AUTO: 88.4 FL (ref 82–102)
MONOCYTES # BLD: 0 K/UL (ref 0.1–1.3)
MONOCYTES NFR BLD: 1 % (ref 4–12)
NEUTS SEG # BLD: 0 K/UL (ref 1.7–8.2)
NEUTS SEG NFR BLD: 2 % (ref 43–78)
NRBC # BLD: 0 K/UL (ref 0–0.2)
PLATELET # BLD AUTO: <2 K/UL (ref 150–450)
PLATELET COMMENT: ABNORMAL
PMV BLD AUTO: ABNORMAL FL (ref 9.4–12.3)
POTASSIUM SERPL-SCNC: 4.1 MMOL/L (ref 3.5–5.1)
PROT SERPL-MCNC: 8.6 G/DL (ref 6.3–8.2)
RBC # BLD AUTO: 2.42 M/UL (ref 4.05–5.2)
RBC MORPH BLD: ABNORMAL
SODIUM SERPL-SCNC: 134 MMOL/L (ref 133–143)
UNIT DIVISION: 0
WBC # BLD AUTO: 0.8 K/UL (ref 4.3–11.1)
WBC MORPH BLD: ABNORMAL

## 2023-11-28 PROCEDURE — 83735 ASSAY OF MAGNESIUM: CPT

## 2023-11-28 PROCEDURE — 6370000000 HC RX 637 (ALT 250 FOR IP): Performed by: NURSE PRACTITIONER

## 2023-11-28 PROCEDURE — 36430 TRANSFUSION BLD/BLD COMPNT: CPT

## 2023-11-28 PROCEDURE — 86022 PLATELET ANTIBODIES: CPT

## 2023-11-28 PROCEDURE — 80074 ACUTE HEPATITIS PANEL: CPT

## 2023-11-28 PROCEDURE — 87389 HIV-1 AG W/HIV-1&-2 AB AG IA: CPT

## 2023-11-28 PROCEDURE — 6370000000 HC RX 637 (ALT 250 FOR IP): Performed by: INTERNAL MEDICINE

## 2023-11-28 PROCEDURE — 2580000003 HC RX 258: Performed by: FAMILY MEDICINE

## 2023-11-28 PROCEDURE — 6370000000 HC RX 637 (ALT 250 FOR IP): Performed by: FAMILY MEDICINE

## 2023-11-28 PROCEDURE — 80053 COMPREHEN METABOLIC PANEL: CPT

## 2023-11-28 PROCEDURE — 1170000000 HC RM PRIVATE ONCOLOGY

## 2023-11-28 PROCEDURE — 97530 THERAPEUTIC ACTIVITIES: CPT

## 2023-11-28 PROCEDURE — 85025 COMPLETE CBC W/AUTO DIFF WBC: CPT

## 2023-11-28 PROCEDURE — 36592 COLLECT BLOOD FROM PICC: CPT

## 2023-11-28 PROCEDURE — P9037 PLATE PHERES LEUKOREDU IRRAD: HCPCS

## 2023-11-28 PROCEDURE — APPSS30 APP SPLIT SHARED TIME 16-30 MINUTES: Performed by: NURSE PRACTITIONER

## 2023-11-28 PROCEDURE — 99233 SBSQ HOSP IP/OBS HIGH 50: CPT | Performed by: INTERNAL MEDICINE

## 2023-11-28 RX ADMIN — IPRATROPIUM BROMIDE 2 SPRAY: 42 SPRAY NASAL at 08:17

## 2023-11-28 RX ADMIN — DIPHENHYDRAMINE HYDROCHLORIDE 25 MG: 25 CAPSULE ORAL at 16:13

## 2023-11-28 RX ADMIN — POTASSIUM CHLORIDE 20 MEQ: 1500 TABLET, EXTENDED RELEASE ORAL at 17:05

## 2023-11-28 RX ADMIN — CIPROFLOXACIN HYDROCHLORIDE 500 MG: 500 TABLET, FILM COATED ORAL at 19:43

## 2023-11-28 RX ADMIN — FLUCONAZOLE 200 MG: 100 TABLET ORAL at 08:15

## 2023-11-28 RX ADMIN — ROSUVASTATIN CALCIUM 10 MG: 5 TABLET, FILM COATED ORAL at 08:15

## 2023-11-28 RX ADMIN — MORPHINE SULFATE 15 MG: 15 TABLET, FILM COATED, EXTENDED RELEASE ORAL at 08:15

## 2023-11-28 RX ADMIN — FERROUS SULFATE TAB 325 MG (65 MG ELEMENTAL FE) 325 MG: 325 (65 FE) TAB at 14:20

## 2023-11-28 RX ADMIN — MONTELUKAST 10 MG: 10 TABLET, FILM COATED ORAL at 08:15

## 2023-11-28 RX ADMIN — PANTOPRAZOLE SODIUM 40 MG: 40 TABLET, DELAYED RELEASE ORAL at 08:15

## 2023-11-28 RX ADMIN — ACETAMINOPHEN 650 MG: 325 TABLET ORAL at 16:13

## 2023-11-28 RX ADMIN — ONDANSETRON 4 MG: 4 TABLET, ORALLY DISINTEGRATING ORAL at 19:52

## 2023-11-28 RX ADMIN — MORPHINE SULFATE 15 MG: 15 TABLET, FILM COATED, EXTENDED RELEASE ORAL at 19:43

## 2023-11-28 RX ADMIN — DOCUSATE SODIUM 50 MG AND SENNOSIDES 8.6 MG 1 TABLET: 8.6; 5 TABLET, FILM COATED ORAL at 19:43

## 2023-11-28 RX ADMIN — ALLOPURINOL 300 MG: 300 TABLET ORAL at 08:15

## 2023-11-28 RX ADMIN — SODIUM CHLORIDE, PRESERVATIVE FREE 10 ML: 5 INJECTION INTRAVENOUS at 19:45

## 2023-11-28 RX ADMIN — CIPROFLOXACIN HYDROCHLORIDE 500 MG: 500 TABLET, FILM COATED ORAL at 08:15

## 2023-11-28 RX ADMIN — POTASSIUM CHLORIDE 20 MEQ: 1500 TABLET, EXTENDED RELEASE ORAL at 08:15

## 2023-11-28 RX ADMIN — SALINE NASAL SPRAY 2 SPRAY: 1.5 SOLUTION NASAL at 19:44

## 2023-11-28 RX ADMIN — SALINE NASAL SPRAY 2 SPRAY: 1.5 SOLUTION NASAL at 08:16

## 2023-11-28 RX ADMIN — FERROUS SULFATE TAB 325 MG (65 MG ELEMENTAL FE) 325 MG: 325 (65 FE) TAB at 17:05

## 2023-11-28 RX ADMIN — TIZANIDINE 4 MG: 2 TABLET ORAL at 19:43

## 2023-11-28 RX ADMIN — LEVOTHYROXINE SODIUM 125 MCG: 0.12 TABLET ORAL at 08:15

## 2023-11-28 RX ADMIN — FAMOTIDINE 40 MG: 20 TABLET, FILM COATED ORAL at 19:43

## 2023-11-28 RX ADMIN — FLUOXETINE HYDROCHLORIDE 20 MG: 20 CAPSULE ORAL at 09:16

## 2023-11-28 RX ADMIN — ACYCLOVIR 400 MG: 400 TABLET ORAL at 19:43

## 2023-11-28 RX ADMIN — IPRATROPIUM BROMIDE 2 SPRAY: 42 SPRAY NASAL at 19:45

## 2023-11-28 RX ADMIN — AMLODIPINE BESYLATE 5 MG: 5 TABLET ORAL at 08:18

## 2023-11-28 RX ADMIN — SODIUM CHLORIDE, PRESERVATIVE FREE 10 ML: 5 INJECTION INTRAVENOUS at 08:17

## 2023-11-28 RX ADMIN — PREDNISONE 10 MG: 10 TABLET ORAL at 08:15

## 2023-11-28 RX ADMIN — DIPHENHYDRAMINE HYDROCHLORIDE 10 ML: 12.5 LIQUID ORAL at 09:16

## 2023-11-28 RX ADMIN — PANTOPRAZOLE SODIUM 40 MG: 40 TABLET, DELAYED RELEASE ORAL at 14:20

## 2023-11-28 RX ADMIN — ALUMINUM HYDROXIDE, MAGNESIUM HYDROXIDE, AND SIMETHICONE 30 ML: 200; 200; 20 SUSPENSION ORAL at 16:13

## 2023-11-28 RX ADMIN — ACYCLOVIR 400 MG: 400 TABLET ORAL at 08:15

## 2023-11-28 RX ADMIN — POLYETHYLENE GLYCOL 3350 17 G: 17 POWDER, FOR SOLUTION ORAL at 08:14

## 2023-11-28 ASSESSMENT — PAIN SCALES - GENERAL
PAINLEVEL_OUTOF10: 0
PAINLEVEL_OUTOF10: 0

## 2023-11-28 NOTE — PROGRESS NOTES
ACUTE PHYSICAL THERAPY GOALS:   (Developed with and agreed upon by patient and/or caregiver.)  Goals assessed and revised 11/27/23  LTG:  (1.)Ms. Rochel Mohs will move from supine to sit and sit to supine , scoot up and down, and roll side to side in bed with INDEPENDENCE within 7 treatment day(s). GOAL MET 11/27/2023  (2.)Ms. Rochel Mohs will transfer from bed to chair and chair to bed with MODIFIED INDEPENDENCE using the least restrictive device within 7 treatment day(s). GOAL MET 11/27/2023  (3.)Ms. Rochel Mohs will ambulate with MODIFIED INDEPENDENCE for 500 feet with the least restrictive device within 7 treatment day(s). (4.)Ms. Rochel Mohs will participate in therapeutic activity/exercises x 25 minutes for increased activity tolerance within 7 treatment days. (5.)Ms. Rochel Mohs will perform standing static and dynamic balance activities x 15 minutes with SUPERVISION to improve safety within 7 day(s). New Goal:      (6.)Ms. Rochel Mohs will demonstrate understanding of energy conservation and activity pacing techniques and apply them with no cueing within 7 days. (7.)Ms. Rochel Mohs will be independent in all bed mobility and bed to chair, chair to bed transfers within 7 treatment day. PHYSICAL THERAPY: Daily Note AM   (Link to Caseload Tracking: PT Visit Days : 2  Time In/Out PT Charge Capture  Rehab Caseload Tracker  Orders    Gracy Hardy is a 71 y.o. female   PRIMARY DIAGNOSIS: Neutropenic fever ([address])  Neutropenic fever ([address]) [D70.9, R50.81]  Pancytopenia ([address]) [D61.818]       Inpatient: Payor: Mya Rosario / Plan: Cl[place] / Product Type: *No Product type* /     ASSESSMENT:     REHAB RECOMMENDATIONS:   Recommendation to date pending progress:  Setting:  Home Health Therapy    Equipment:    To Be Determined     ASSESSMENT:  Ms. Rochel Mohs is supine with family present and agreeable to PT with some minor encouragement due to c/o fatigue. Pt on neutropenic precautions.  Pt demonstrates Mod I for bed mobility, STS CGA=Contact Guard Assistance,   Min=Minimal Assistance, Mod=Moderate Assistance, Max=Maximal Assistance, Total=Total Assistance, NT=Not Tested    BALANCE: Good Fair+ Fair Fair- Poor NT Comments   Sitting Static [x] [] [] [] [] []    Sitting Dynamic [x] [] [] [] [] []              Standing Static [x] [] [] [] [] []    Standing Dynamic [x] [] [] [] [] []      GAIT: I Mod I S SBA CGA Min Mod Max Total  NT x2 Comments:   Level of Assistance [] [] [x] [x] [] [] [] [] [] [] []    Distance 75  feet 2x    DME Rolling Walker    Gait Quality Decreased annabel , Decreased step clearance, and Decreased step length    Weightbearing Status      Stairs      I=Independent, Mod I=Modified Independent, S=Supervision, SBA=Standby Assistance, CGA=Contact Guard Assistance,   Min=Minimal Assistance, Mod=Moderate Assistance, Max=Maximal Assistance, Total=Total Assistance, NT=Not Tested    PLAN:   FREQUENCY AND DURATION: 3 times/week for duration of hospital stay or until stated goals are met, whichever comes first.    TREATMENT:   TREATMENT:   Therapeutic Activity (14 Minutes): Therapeutic activity included Supine to Sit, Scooting, Transfer Training, Ambulation on level ground, Sitting balance , and Standing balance to improve functional Activity tolerance, Balance, Mobility, and Strength.    TREATMENT GRID:  N/A    AFTER TREATMENT PRECAUTIONS: Bed/Chair Locked, Call light within reach, Chair, Needs within reach, and Visitors at bedside    INTERDISCIPLINARY COLLABORATION:  RN/ PCT and PT/ PTA    EDUCATION:      TIME IN/OUT:  Time In: 0854  Time Out: 0908  Minutes: 14    Kelly Ortiz PT

## 2023-11-28 NOTE — PROGRESS NOTES
Comprehensive Nutrition Assessment    Type and Reason for Visit: Reassess  Malnutrition Screening Tool: Malnutrition Screen  Have you recently lost weight without trying?: 24 to 33 pounds (3 points)  Have you been eating poorly because of a decreased appetite?: Yes (1 point)  Malnutrition Screening Tool Score: 4    Nutrition Recommendations/Plan:   Meals and Snacks:  Diet: Continue current order  Nutrition Supplement Therapy:  Medical food supplement therapy:  Continue Ensure Enlive twice per day (this provides 350 kcal and 20 grams protein per bottle) chocolate served with a milk container. Malnutrition Assessment:  Malnutrition Status: At risk for malnutrition (Comment) (pt reports + wt loss, waxing and waning oral intake)  No fat or muscle loss appreciated. Nutrition Assessment:  Nutrition History: Pt reports her intake and wt initially started changing after having Covid in May 2022.  + loss of appetite, taste alterations. She indicates at one point her wt was >200# initially declined quickly then up and down with fluid changes. She states at this point she is unsure of her true wt. Do You Have Any Cultural, Adventism, or Ethnic Food Preferences?: No   Nutrition Background: PMHx: psoriatic arthritis on Humira, hx of ovarian cam HTN, HLD, GERD and hypothyroidism. Admitted with MDS, pancytopenia, neutropenic fever, chronic pain, constipation. IR BMBX 11/7. Nutrition Interval:  Patient reclined in bed with visitors present. She states she is not feeling well today. She also c/o new mouth sores but states medication is helping. She reports really good PO yesterday which is consistent with nursing documentation. She continues to drink Ensure. Wt is stable with no edema. Average intake and Ensure Enlive BID meeting at least 75% estimated needs.     Current Nutrition Therapies:  ADULT ORAL NUTRITION SUPPLEMENT; Breakfast, Dinner; Standard High Calorie/High Protein Oral Supplement  ADULT DIET; Easy to Chew; GI [place] (GERD/Peptic Ulcer); Low Microbial    Current Intake:   Average Meal Intake:  (variable %) Average Supplements Intake: %      Anthropometric Measures:  Height: 152.4 cm (5')  Current Body Wt: 75.9 kg (167 lb 5.3 oz) (11/26), Weight source: Bed Scale  BMI: 32.7, Obese Class 1 (BMI 30.0-34. 9)  Admission Body Weight: 77.1 kg (170 lb) (standing scale)  Ideal Body Weight (Kg) (Calculated): 45 kg (100 lbs), 185.4 %  Usual Body Wt:  (pt reports hx of fluctuating wts >200# at one point, lowest in the 170s), Percent weight change:  unable to validate loss  Weight trending up since admission 10/27 through 11/1, now trending down at each interval.  Edema currently resolved. BMI Category Obese Class 1 (BMI 30.0-34. 9)  Estimated Daily Nutrient Needs:  Energy (kcal/day): 3214-6136 (20-25 kcal/kg) (Kcal/kg Weight used: 77 kg Admission  Protein (g/day): 77-92 (1-1.2 g/kg) Weight Used: (Ideal) 77 kg  Fluid (ml/day):   (1 ml/kcal)    Nutrition Diagnosis:   Inadequate oral intake related to  (fatigue, variable appetite, mouth sores) as evidenced by  (reported barriers to PO, intake as above)  Nutrition Interventions:   Food and/or Nutrient Delivery: Continue Current Diet, Continue Oral Nutrition Supplement     Coordination of Nutrition Care: Continue to monitor while inpatient      Goals:   Previous Goal Met: Goal(s) Achieved  Active Goal:  (Continue to meet at least 75% needs)       Nutrition Monitoring and Evaluation:      Food/Nutrient Intake Outcomes: Food and Nutrient Intake, Supplement Intake  Physical Signs/Symptoms Outcomes: Meal Time Behavior, Weight    Discharge Planning:    Continue Oral Nutrition Supplement    FITO WEIR RD

## 2023-11-28 NOTE — PROGRESS NOTES
's visit with Mrs. Martinez  and her family due to consult. Conveyed care and concern and offered support. Also, a  follow-up visit is planned for tomorrow.      [address], [address] [facility] Certified

## 2023-11-28 NOTE — PROGRESS NOTES
[facility] Hematology & Oncology        Inpatient Hematology / Oncology Progress Note    Reason for Admission:  Neutropenic fever ([address]) [D70.9, R50.81]  Pancytopenia ([address]) [D61.818]    24 Hour Events:  Afebrile, VSS  Dacogen completed 11/24, Mylotarg completed 11/16  Continues prednisone taper, s/p IVIG 11/25-11/26  Requiring HLA-matched plts again today, Plt <2K  Remains on RA, no complaints  Family at bedside    Transfusions: Plts  Replacements: None    ROS:  Constitutional: Positive for fatigue; negative for fever, chills. CV: Negative for chest pain, palpitations, edema. Respiratory: +cough, dyspnea (improved). Negative for wheezing. GI: Negative for nausea, abdominal pain, diarrhea. 10 point review of systems is otherwise negative with the exception of the elements mentioned above in the HPI. Allergies   Allergen Reactions    Penicillins Hives    Sulfa Antibiotics Hives    Codeine Nausea And Vomiting     Past Medical History:   Diagnosis Date    Arthritis     Autoimmune disease ([address])     skin changes, unknown name    Cancer ([address]) 1990    ovarian    Chronic pain     arthritis in back and legs    Coronary artery spasm ([address])     COVID 05/15/2022    Essential hypertension 11/8/2019    Gastritis     GERD (gastroesophageal reflux disease)     Hx antineoplastic chemo     Migraine headache     Psoriasis     Psychiatric disorder     anxiety and depression    Severe obesity (BMI 35.0-39.9) 6/26/2018    Thyroid disease     Diagnosed in 1996     Past Surgical History:   Procedure Laterality Date    CARPAL TUNNEL RELEASE      GYN      ovaries    HYSTERECTOMY, TOTAL ABDOMINAL (CERVIX REMOVED)  Patriciabury      cardiac cath. Dx with spasms.     TUBAL LIGATION       Family History   Problem Relation Age of Onset    Parkinson's Disease Mother     Stroke Mother         Passed away in 1969    No Known Problems Paternal Grandfather     No Known Problems Paternal Grandmother     No transitioning to PO/prophylactic Cipro today. 11/1 Afebrile. Transition to prophylactic cipro now that she has completed 5 day course of abx with likely contaminant/S epi with repeat BC.  RESOLVED    Altered mental status  11/1 Likely secondary to HD steroids. CT head negative. CTA and MRI brain pending after Code S called last night for dysarthria. 11/2 CTA negative. Unable to tolerate MRI brain without sedation so holding for now as symptoms more consistent with steroid-related effects vs stroke as no focal deficits noted. RESOLVED    Hypoxia / LE edema / abnormal breath sounds  11/1 LE edema may be related to steroids. Incomplete I/O recorded, weight up since admission. Stopped IVF. Check CXR and BNP.  11/2 BNP elevated, CXR with pulmonary vascular congestion. On 2-4L NC. Weight remains elevated since admission. 11/4 Stable fluid status, on 3L NC - weaning as tolerated. Was hypoxic when found off O2 in the middle of the night. 11/7 Continuing to wean O2 as tolerated. Down to 1L NC. Fluid balance stable. 11/8 O2 back up to 3 L NC.    10/10 on RA   11/13 on 2L - weight stable. 11/14 Check CXR as well as echo. Weight back down to admission baseline. On 2L NC with worsening dyspnea today. 11/15 Stable fluid status/weight. CXR negative. Echo WNL. On RA overnight. 11/17 On RA, +I/O 600ml and weight up (using different scale), no signs of overload. 11/22 on RA  11/23 Up 5# and +1.9L, remains on RA  11/27 Remains on RA, CXR unremarkable, continue I/S and mobilization. Stable fluid balance. Bleeding - epistaxis and rectal bleeding w BM   - ASA on hold; transfuse Hb >7 and plts >50   - PPI  10/29 denies bleeding today   11/5 Per RN  yesterday, small amount of blood from nares, no epistaxis. Otherwise, no bleeding noted. Transfusing plts for plt 2k. 11/9 Pt with episode of bleeding yesterday after scratching self, pressure held and additional 1 unit plts given (3 total yesterday).   No further bleeding noted

## 2023-11-29 LAB
ALBUMIN SERPL-MCNC: 3.4 G/DL (ref 3.2–4.6)
ALBUMIN/GLOB SERPL: 0.7 (ref 0.4–1.6)
ALP SERPL-CCNC: 90 U/L (ref 50–136)
ALT SERPL-CCNC: 33 U/L (ref 12–65)
ANION GAP SERPL CALC-SCNC: 6 MMOL/L (ref 2–11)
AST SERPL-CCNC: 17 U/L (ref 15–37)
BASOPHILS # BLD: 0 K/UL (ref 0–0.2)
BASOPHILS NFR BLD: 0 % (ref 0–2)
BILIRUB SERPL-MCNC: 0.9 MG/DL (ref 0.2–1.1)
BLD PROD TYP BPU: NORMAL
BLOOD BANK CMNT PATIENT-IMP: NORMAL
BLOOD BANK DISPENSE STATUS: NORMAL
BPU ID: NORMAL
BUN SERPL-MCNC: 26 MG/DL (ref 8–23)
CALCIUM SERPL-MCNC: 9.3 MG/DL (ref 8.3–10.4)
CHLORIDE SERPL-SCNC: 103 MMOL/L (ref 101–110)
CO2 SERPL-SCNC: 27 MMOL/L (ref 21–32)
CREAT SERPL-MCNC: 0.7 MG/DL (ref 0.6–1)
DIFFERENTIAL METHOD BLD: ABNORMAL
EOSINOPHIL # BLD: 0 K/UL (ref 0–0.8)
EOSINOPHIL NFR BLD: 0 % (ref 0.5–7.8)
ERYTHROCYTE [DISTWIDTH] IN BLOOD BY AUTOMATED COUNT: 12.9 % (ref 11.9–14.6)
GLOBULIN SER CALC-MCNC: 4.9 G/DL (ref 2.8–4.5)
GLUCOSE SERPL-MCNC: 99 MG/DL (ref 65–100)
HCT VFR BLD AUTO: 19.3 % (ref 35.8–46.3)
HGB BLD-MCNC: 6.6 G/DL (ref 11.7–15.4)
HISTORY CHECK: NORMAL
IMM GRANULOCYTES # BLD AUTO: 0 K/UL (ref 0–0.5)
IMM GRANULOCYTES NFR BLD AUTO: 0 % (ref 0–5)
LYMPHOCYTES # BLD: 1.1 K/UL (ref 0.5–4.6)
LYMPHOCYTES NFR BLD: 99 % (ref 13–44)
MAGNESIUM SERPL-MCNC: 2.4 MG/DL (ref 1.8–2.4)
MCH RBC QN AUTO: 30.1 PG (ref 26.1–32.9)
MCHC RBC AUTO-ENTMCNC: 34.2 G/DL (ref 31.4–35)
MCV RBC AUTO: 88.1 FL (ref 82–102)
MONOCYTES # BLD: 0 K/UL (ref 0.1–1.3)
MONOCYTES NFR BLD: 0 % (ref 4–12)
NEUTS SEG # BLD: 0 K/UL (ref 1.7–8.2)
NEUTS SEG NFR BLD: 1 % (ref 43–78)
NRBC # BLD: 0 K/UL (ref 0–0.2)
PLATELET # BLD AUTO: 2 K/UL (ref 150–450)
PLATELET COMMENT: ABNORMAL
PMV BLD AUTO: ABNORMAL FL (ref 9.4–12.3)
POTASSIUM SERPL-SCNC: 4.3 MMOL/L (ref 3.5–5.1)
PROT SERPL-MCNC: 8.3 G/DL (ref 6.3–8.2)
RBC # BLD AUTO: 2.19 M/UL (ref 4.05–5.2)
RBC MORPH BLD: ABNORMAL
SODIUM SERPL-SCNC: 136 MMOL/L (ref 133–143)
UNIT DIVISION: 0
WBC # BLD AUTO: 1.1 K/UL (ref 4.3–11.1)
WBC MORPH BLD: ABNORMAL

## 2023-11-29 PROCEDURE — 6370000000 HC RX 637 (ALT 250 FOR IP): Performed by: NURSE PRACTITIONER

## 2023-11-29 PROCEDURE — 36430 TRANSFUSION BLD/BLD COMPNT: CPT

## 2023-11-29 PROCEDURE — 99233 SBSQ HOSP IP/OBS HIGH 50: CPT | Performed by: INTERNAL MEDICINE

## 2023-11-29 PROCEDURE — 80053 COMPREHEN METABOLIC PANEL: CPT

## 2023-11-29 PROCEDURE — P9037 PLATE PHERES LEUKOREDU IRRAD: HCPCS

## 2023-11-29 PROCEDURE — 97530 THERAPEUTIC ACTIVITIES: CPT

## 2023-11-29 PROCEDURE — 6370000000 HC RX 637 (ALT 250 FOR IP): Performed by: FAMILY MEDICINE

## 2023-11-29 PROCEDURE — 86644 CMV ANTIBODY: CPT

## 2023-11-29 PROCEDURE — 83735 ASSAY OF MAGNESIUM: CPT

## 2023-11-29 PROCEDURE — 2580000003 HC RX 258: Performed by: FAMILY MEDICINE

## 2023-11-29 PROCEDURE — 86922 COMPATIBILITY TEST ANTIGLOB: CPT

## 2023-11-29 PROCEDURE — 6370000000 HC RX 637 (ALT 250 FOR IP): Performed by: INTERNAL MEDICINE

## 2023-11-29 PROCEDURE — 86921 COMPATIBILITY TEST INCUBATE: CPT

## 2023-11-29 PROCEDURE — 86022 PLATELET ANTIBODIES: CPT

## 2023-11-29 PROCEDURE — 1170000000 HC RM PRIVATE ONCOLOGY

## 2023-11-29 PROCEDURE — P9040 RBC LEUKOREDUCED IRRADIATED: HCPCS

## 2023-11-29 PROCEDURE — 36592 COLLECT BLOOD FROM PICC: CPT

## 2023-11-29 PROCEDURE — 85025 COMPLETE CBC W/AUTO DIFF WBC: CPT

## 2023-11-29 PROCEDURE — 97535 SELF CARE MNGMENT TRAINING: CPT

## 2023-11-29 PROCEDURE — APPSS30 APP SPLIT SHARED TIME 16-30 MINUTES: Performed by: NURSE PRACTITIONER

## 2023-11-29 RX ORDER — ELTROMBOPAG OLAMINE 25 MG/1
25 TABLET, FILM COATED ORAL DAILY
Qty: 30 TABLET | Refills: 0 | Status: SHIPPED | OUTPATIENT
Start: 2023-11-29

## 2023-11-29 RX ADMIN — FERROUS SULFATE TAB 325 MG (65 MG ELEMENTAL FE) 325 MG: 325 (65 FE) TAB at 12:05

## 2023-11-29 RX ADMIN — ALLOPURINOL 300 MG: 300 TABLET ORAL at 07:57

## 2023-11-29 RX ADMIN — CIPROFLOXACIN HYDROCHLORIDE 500 MG: 500 TABLET, FILM COATED ORAL at 19:36

## 2023-11-29 RX ADMIN — SODIUM CHLORIDE, PRESERVATIVE FREE 10 ML: 5 INJECTION INTRAVENOUS at 07:58

## 2023-11-29 RX ADMIN — PANTOPRAZOLE SODIUM 40 MG: 40 TABLET, DELAYED RELEASE ORAL at 17:41

## 2023-11-29 RX ADMIN — CIPROFLOXACIN HYDROCHLORIDE 500 MG: 500 TABLET, FILM COATED ORAL at 07:57

## 2023-11-29 RX ADMIN — IPRATROPIUM BROMIDE 2 SPRAY: 42 SPRAY NASAL at 07:58

## 2023-11-29 RX ADMIN — MORPHINE SULFATE 15 MG: 15 TABLET, FILM COATED, EXTENDED RELEASE ORAL at 07:57

## 2023-11-29 RX ADMIN — POTASSIUM CHLORIDE 20 MEQ: 1500 TABLET, EXTENDED RELEASE ORAL at 07:57

## 2023-11-29 RX ADMIN — ROSUVASTATIN CALCIUM 10 MG: 5 TABLET, FILM COATED ORAL at 07:57

## 2023-11-29 RX ADMIN — FERROUS SULFATE TAB 325 MG (65 MG ELEMENTAL FE) 325 MG: 325 (65 FE) TAB at 17:41

## 2023-11-29 RX ADMIN — FLUCONAZOLE 200 MG: 100 TABLET ORAL at 07:57

## 2023-11-29 RX ADMIN — ACYCLOVIR 400 MG: 400 TABLET ORAL at 07:57

## 2023-11-29 RX ADMIN — POLYETHYLENE GLYCOL 3350 17 G: 17 POWDER, FOR SOLUTION ORAL at 07:57

## 2023-11-29 RX ADMIN — MORPHINE SULFATE 15 MG: 15 TABLET, FILM COATED, EXTENDED RELEASE ORAL at 19:37

## 2023-11-29 RX ADMIN — FLUOXETINE HYDROCHLORIDE 20 MG: 20 CAPSULE ORAL at 19:36

## 2023-11-29 RX ADMIN — POTASSIUM CHLORIDE 20 MEQ: 1500 TABLET, EXTENDED RELEASE ORAL at 17:41

## 2023-11-29 RX ADMIN — ACYCLOVIR 400 MG: 400 TABLET ORAL at 19:36

## 2023-11-29 RX ADMIN — TIZANIDINE 4 MG: 2 TABLET ORAL at 22:24

## 2023-11-29 RX ADMIN — PANTOPRAZOLE SODIUM 40 MG: 40 TABLET, DELAYED RELEASE ORAL at 07:57

## 2023-11-29 RX ADMIN — SALINE NASAL SPRAY 2 SPRAY: 1.5 SOLUTION NASAL at 07:58

## 2023-11-29 RX ADMIN — DIPHENHYDRAMINE HYDROCHLORIDE 10 ML: 12.5 LIQUID ORAL at 14:56

## 2023-11-29 RX ADMIN — AMLODIPINE BESYLATE 5 MG: 5 TABLET ORAL at 07:57

## 2023-11-29 RX ADMIN — DOCUSATE SODIUM 50 MG AND SENNOSIDES 8.6 MG 1 TABLET: 8.6; 5 TABLET, FILM COATED ORAL at 19:36

## 2023-11-29 RX ADMIN — PREDNISONE 10 MG: 10 TABLET ORAL at 07:57

## 2023-11-29 RX ADMIN — DIPHENHYDRAMINE HYDROCHLORIDE 25 MG: 25 CAPSULE ORAL at 14:11

## 2023-11-29 RX ADMIN — DIPHENHYDRAMINE HYDROCHLORIDE 10 ML: 12.5 LIQUID ORAL at 17:43

## 2023-11-29 RX ADMIN — FLUOXETINE HYDROCHLORIDE 20 MG: 20 CAPSULE ORAL at 07:57

## 2023-11-29 RX ADMIN — FAMOTIDINE 40 MG: 20 TABLET, FILM COATED ORAL at 19:37

## 2023-11-29 RX ADMIN — SALINE NASAL SPRAY 2 SPRAY: 1.5 SOLUTION NASAL at 14:55

## 2023-11-29 RX ADMIN — SODIUM CHLORIDE, PRESERVATIVE FREE 10 ML: 5 INJECTION INTRAVENOUS at 19:38

## 2023-11-29 RX ADMIN — ACETAMINOPHEN 650 MG: 325 TABLET ORAL at 14:11

## 2023-11-29 RX ADMIN — MONTELUKAST 10 MG: 10 TABLET, FILM COATED ORAL at 07:57

## 2023-11-29 RX ADMIN — LEVOTHYROXINE SODIUM 125 MCG: 0.12 TABLET ORAL at 07:57

## 2023-11-29 ASSESSMENT — PAIN DESCRIPTION - ORIENTATION
ORIENTATION: MID
ORIENTATION: RIGHT;LEFT

## 2023-11-29 ASSESSMENT — PAIN DESCRIPTION - ONSET
ONSET: ON-GOING
ONSET: ON-GOING

## 2023-11-29 ASSESSMENT — PAIN DESCRIPTION - FREQUENCY
FREQUENCY: CONTINUOUS
FREQUENCY: INTERMITTENT

## 2023-11-29 ASSESSMENT — PAIN SCALES - GENERAL
PAINLEVEL_OUTOF10: 1
PAINLEVEL_OUTOF10: 0
PAINLEVEL_OUTOF10: 0
PAINLEVEL_OUTOF10: 2

## 2023-11-29 ASSESSMENT — PAIN DESCRIPTION - PAIN TYPE
TYPE: CHRONIC PAIN
TYPE: CHRONIC PAIN

## 2023-11-29 ASSESSMENT — PAIN DESCRIPTION - DESCRIPTORS
DESCRIPTORS: ACHING;DISCOMFORT
DESCRIPTORS: SPASM

## 2023-11-29 ASSESSMENT — PAIN DESCRIPTION - LOCATION
LOCATION: BACK
LOCATION: LEG

## 2023-11-29 NOTE — PROGRESS NOTES
Follow-up visit with Mrs. Gomes to continue spiritual/emotional support. Addressed staff's request for grief support as well.      [address], [address] [facility] Certified

## 2023-11-29 NOTE — PROGRESS NOTES
ACUTE OCCUPATIONAL THERAPY GOALS:   (Developed with and agreed upon by patient and/or caregiver.)  Re-evaluation completed on 11/16/23  1. Patient will complete lower body bathing and dressing with MOD I and adaptive equipment as needed. 2. Patient will complete toileting with MOD I.   3. Patient will tolerate 30 minutes of OT treatment with 1-2 rest breaks to increase activity tolerance for ADLs. 4. Patient will complete functional transfers with MOD I and adaptive equipment as needed. 5. Patient will complete functional mobility for household distances with MOD I and adaptive equipment as needed. 6. Patient will complete self-grooming while standing edge of sink with MOD I and adaptive equipment as needed. New Goal Added:  7. Patient will complete UB bathing and dressing with MOD I and adaptive equipment as needed. 8. Patient will verbalize and demonstrate 3 energy conservation strategies throughout completion of bADLs. Timeframe: 7 visits        OCCUPATIONAL THERAPY: Daily Note    OT Visit Days: 5   Time In/Out  OT Charge Capture  Rehab Caseload Tracker  OT Orders    Michael Cornejo is a 71 y.o. female   PRIMARY DIAGNOSIS: Neutropenic fever ([address])  Neutropenic fever ([address]) [D70.9, R50.81]  Pancytopenia ([address]) [D61.818]       Inpatient: Payor: Osito Shelton / Plan: Marylin Block / Product Type: *No Product type* /     ASSESSMENT:     REHAB RECOMMENDATIONS: CURRENT LEVEL OF FUNCTION:  (Most Recently Demonstrated)   Recommendation to date pending progress:  Setting:  Home Health Therapy    Equipment:    To Be Determined Bathing:  Not Tested  Dressing:  Supervision/Setup  Feeding/Grooming:  Stand by Assist  Toileting:  Stand by Assist  Functional Mobility:  Stand by Assist     ASSESSMENT:  Ms. Deacon Grimes is progressing well towards OT goals. Today, pt was received supine in bed.  Completed bed mobility, LB dressing, functional transfers, ambulation without AD, toileting, and grooming task

## 2023-11-29 NOTE — PROGRESS NOTES
[address] [facility] Hematology & Oncology        Inpatient Hematology / Oncology Progress Note    Reason for Admission:  Neutropenic fever ([address]) [D70.9, R50.81]  Pancytopenia ([address]) [D61.818]    24 Hour Events:  Afebrile, VSS  Dacogen completed 11/24, Mylotarg completed 11/16  Continues prednisone taper, s/p IVIG 11/25-11/26  Requiring HLA-matched plts again today, Plt 2K  Remains on RA, no complaints  Family at bedside    Transfusions: Plts, PRBCs  Replacements: None    ROS:  Constitutional: Positive for fatigue; negative for fever, chills. CV: Negative for chest pain, palpitations, edema. Respiratory: +cough, dyspnea (improved). Negative for wheezing. GI: Negative for nausea, abdominal pain, diarrhea. 10 point review of systems is otherwise negative with the exception of the elements mentioned above in the HPI. Allergies   Allergen Reactions    Penicillins Hives    Sulfa Antibiotics Hives    Codeine Nausea And Vomiting     Past Medical History:   Diagnosis Date    Arthritis     Autoimmune disease ([address])     skin changes, unknown name    Cancer ([address]) 1990    ovarian    Chronic pain     arthritis in back and legs    Coronary artery spasm ([address])     COVID 05/15/2022    Essential hypertension 11/8/2019    Gastritis     GERD (gastroesophageal reflux disease)     Hx antineoplastic chemo     Migraine headache     Psoriasis     Psychiatric disorder     anxiety and depression    Severe obesity (BMI 35.0-39.9) 6/26/2018    Thyroid disease     Diagnosed in 1996     Past Surgical History:   Procedure Laterality Date    CARPAL TUNNEL RELEASE      GYN      ovaries    HYSTERECTOMY, TOTAL ABDOMINAL (CERVIX REMOVED)  Patriciabury      cardiac cath. Dx with spasms.     TUBAL LIGATION       Family History   Problem Relation Age of Onset    Parkinson's Disease Mother     Stroke Mother         Passed away in 1969    No Known Problems Paternal Grandfather     No Known Problems Paternal Grandmother [address]  Office : [phone]  Fax : [phone]

## 2023-11-29 NOTE — PROGRESS NOTES
Physical Therapy Note:    Attempted to see patient this AM for physical therapy treatment  session. Patient refused this AM, as she states she just ambulated. Will follow and re-attempt as schedule permits/patient available.  Thank you,    Fernandez May, PT     [facility]

## 2023-11-29 NOTE — PROGRESS NOTES
ACUTE PHYSICAL THERAPY GOALS:   (Developed with and agreed upon by patient and/or caregiver.)  Goals assessed and revised 11/27/23  LTG:  (1.)Ms. Dell Hu will move from supine to sit and sit to supine , scoot up and down, and roll side to side in bed with INDEPENDENCE within 7 treatment day(s). GOAL MET 11/27/2023  (2.)Ms. Dell Hu will transfer from bed to chair and chair to bed with MODIFIED INDEPENDENCE using the least restrictive device within 7 treatment day(s). GOAL MET 11/27/2023  (3.)Ms. Dell Hu will ambulate with MODIFIED INDEPENDENCE for 500 feet with the least restrictive device within 7 treatment day(s). (4.)Ms. Dell Hu will participate in therapeutic activity/exercises x 25 minutes for increased activity tolerance within 7 treatment days. (5.)Ms. Dell Hu will perform standing static and dynamic balance activities x 15 minutes with SUPERVISION to improve safety within 7 day(s). New Goal:      (6.)Ms. Dell Hu will demonstrate understanding of energy conservation and activity pacing techniques and apply them with no cueing within 7 days. (7.)Ms. Dell Hu will be independent in all bed mobility and bed to chair, chair to bed transfers within 7 treatment day. PHYSICAL THERAPY: Daily Note AM   (Link to Caseload Tracking: PT Visit Days : 3  Time In/Out PT Charge Capture  Rehab Caseload Tracker  Orders    Unknown Jessenia is a 71 y.o. female   PRIMARY DIAGNOSIS: Neutropenic fever ([address])  Neutropenic fever ([address]) [D70.9, R50.81]  Pancytopenia ([address]) [D61.818]       Inpatient: Payor: Too Adan / Plan: Curt Sawyer / Product Type: *No Product type* /     ASSESSMENT:     REHAB RECOMMENDATIONS:   Recommendation to date pending progress:  Setting:  Home Health Therapy    Equipment:    To Be Determined     ASSESSMENT:  Ms. Dell Hu is supine with family present and agreeable to PT this afternoon. Mod I for bed mobility and transferring into bathroom with no AD for toileting.  Good seated and standing Dynamic [x] [] [] [] [] []      GAIT: I Mod I S SBA CGA Min Mod Max Total  NT x2 Comments:   Level of Assistance [] [] [x] [x] [] [] [] [] [] [] []    Distance 200  feet    DME None    Gait Quality Decreased annabel , Decreased step clearance, and Decreased step length    Weightbearing Status      Stairs      I=Independent, Mod I=Modified Independent, S=Supervision, SBA=Standby Assistance, CGA=Contact Guard Assistance,   Min=Minimal Assistance, Mod=Moderate Assistance, Max=Maximal Assistance, Total=Total Assistance, NT=Not Tested    PLAN:   FREQUENCY AND DURATION: 3 times/week for duration of hospital stay or until stated goals are met, whichever comes first.    TREATMENT:   TREATMENT:   Therapeutic Activity (10 Minutes): Therapeutic activity included Supine to Sit, Scooting, Transfer Training, Ambulation on level ground, Sitting balance , and Standing balance to improve functional Activity tolerance, Balance, Mobility, and Strength.     TREATMENT GRID:  N/A    AFTER TREATMENT PRECAUTIONS: Bed/Chair Locked, Call light within reach, Chair, Needs within reach, and Visitors at bedside    INTERDISCIPLINARY COLLABORATION:  RN/ PCT and PT/ PTA    EDUCATION:      TIME IN/OUT:  Time In: 1347  Time Out: [facility] [address] [place] 530  Minutes: [address] PT

## 2023-11-29 NOTE — CARE COORDINATION
LOS 33d  Chart reviewed by [facility] for discharge planning. IDR with treatment team  Patient receiving Dacogen and will remain in IP during count recovery. PT/OT following recommendation is for Home Health at discharge    Discharge plan is Home Health when counts have recovered. Discharge plan ongoing, no further concerns as of present. Please consult  if any new issues arise. Will continue to follow.

## 2023-11-30 ENCOUNTER — APPOINTMENT (OUTPATIENT)
Dept: CT IMAGING | Age: 73
DRG: 808 | End: 2023-11-30
Payer: MEDICARE

## 2023-11-30 PROBLEM — K06.8 GINGIVAL BLEEDING: Status: ACTIVE | Noted: 2023-11-30

## 2023-11-30 PROBLEM — R51.9 NONINTRACTABLE HEADACHE: Status: ACTIVE | Noted: 2023-11-30

## 2023-11-30 LAB
ABO + RH BLD: NORMAL
ALBUMIN SERPL-MCNC: 3.3 G/DL (ref 3.2–4.6)
ALBUMIN/GLOB SERPL: 0.7 (ref 0.4–1.6)
ALP SERPL-CCNC: 101 U/L (ref 50–136)
ALT SERPL-CCNC: 33 U/L (ref 12–65)
ANION GAP SERPL CALC-SCNC: 3 MMOL/L (ref 2–11)
AST SERPL-CCNC: 18 U/L (ref 15–37)
BASOPHILS # BLD: 0 K/UL (ref 0–0.2)
BASOPHILS NFR BLD: 0 % (ref 0–2)
BILIRUB SERPL-MCNC: 0.7 MG/DL (ref 0.2–1.1)
BLD PROD TYP BPU: NORMAL
BLD PROD TYP BPU: NORMAL
BLOOD BANK CMNT PATIENT-IMP: NORMAL
BLOOD BANK DISPENSE STATUS: NORMAL
BLOOD BANK DISPENSE STATUS: NORMAL
BLOOD GROUP ANTIBODIES SERPL: NORMAL
BPU ID: NORMAL
BPU ID: NORMAL
BUN SERPL-MCNC: 18 MG/DL (ref 8–23)
CALCIUM SERPL-MCNC: 9.2 MG/DL (ref 8.3–10.4)
CHLORIDE SERPL-SCNC: 103 MMOL/L (ref 101–110)
CO2 SERPL-SCNC: 27 MMOL/L (ref 21–32)
CREAT SERPL-MCNC: 0.6 MG/DL (ref 0.6–1)
CROSSMATCH RESULT: NORMAL
DIFFERENTIAL METHOD BLD: ABNORMAL
EOSINOPHIL # BLD: 0 K/UL (ref 0–0.8)
EOSINOPHIL NFR BLD: 0 % (ref 0.5–7.8)
ERYTHROCYTE [DISTWIDTH] IN BLOOD BY AUTOMATED COUNT: 14.6 % (ref 11.9–14.6)
ERYTHROCYTE [DISTWIDTH] IN BLOOD BY AUTOMATED COUNT: 14.8 % (ref 11.9–14.6)
GLOBULIN SER CALC-MCNC: 4.7 G/DL (ref 2.8–4.5)
GLUCOSE SERPL-MCNC: 89 MG/DL (ref 65–100)
HCT VFR BLD AUTO: 21.7 % (ref 35.8–46.3)
HCT VFR BLD AUTO: 21.8 % (ref 35.8–46.3)
HGB BLD-MCNC: 7.5 G/DL (ref 11.7–15.4)
HGB BLD-MCNC: 7.6 G/DL (ref 11.7–15.4)
IMM GRANULOCYTES # BLD AUTO: 0 K/UL (ref 0–0.5)
IMM GRANULOCYTES NFR BLD AUTO: 1 % (ref 0–5)
LYMPHOCYTES # BLD: 0.9 K/UL (ref 0.5–4.6)
LYMPHOCYTES NFR BLD: 98 % (ref 13–44)
MCH RBC QN AUTO: 29 PG (ref 26.1–32.9)
MCH RBC QN AUTO: 29.1 PG (ref 26.1–32.9)
MCHC RBC AUTO-ENTMCNC: 34.4 G/DL (ref 31.4–35)
MCHC RBC AUTO-ENTMCNC: 35 G/DL (ref 31.4–35)
MCV RBC AUTO: 82.8 FL (ref 82–102)
MCV RBC AUTO: 84.5 FL (ref 82–102)
MONOCYTES # BLD: 0 K/UL (ref 0.1–1.3)
MONOCYTES NFR BLD: 1 % (ref 4–12)
NEUTS SEG # BLD: 0 K/UL (ref 1.7–8.2)
NEUTS SEG NFR BLD: 0 % (ref 43–78)
NRBC # BLD: 0 K/UL (ref 0–0.2)
NRBC # BLD: 0 K/UL (ref 0–0.2)
PLATELET # BLD AUTO: 2 K/UL (ref 150–450)
PLATELET # BLD AUTO: <2 K/UL (ref 150–450)
PLATELET COMMENT: ABNORMAL
PMV BLD AUTO: ABNORMAL FL (ref 9.4–12.3)
PMV BLD AUTO: ABNORMAL FL (ref 9.4–12.3)
POTASSIUM SERPL-SCNC: 4.1 MMOL/L (ref 3.5–5.1)
PROT SERPL-MCNC: 8 G/DL (ref 6.3–8.2)
RBC # BLD AUTO: 2.58 M/UL (ref 4.05–5.2)
RBC # BLD AUTO: 2.62 M/UL (ref 4.05–5.2)
RBC MORPH BLD: ABNORMAL
SODIUM SERPL-SCNC: 133 MMOL/L (ref 133–143)
SPECIMEN EXP DATE BLD: NORMAL
UNIT DIVISION: 0
UNIT DIVISION: 0
WBC # BLD AUTO: 0.2 K/UL (ref 4.3–11.1)
WBC # BLD AUTO: 0.9 K/UL (ref 4.3–11.1)
WBC MORPH BLD: ABNORMAL

## 2023-11-30 PROCEDURE — 6370000000 HC RX 637 (ALT 250 FOR IP): Performed by: INTERNAL MEDICINE

## 2023-11-30 PROCEDURE — 70450 CT HEAD/BRAIN W/O DYE: CPT

## 2023-11-30 PROCEDURE — 6370000000 HC RX 637 (ALT 250 FOR IP): Performed by: NURSE PRACTITIONER

## 2023-11-30 PROCEDURE — 85027 COMPLETE CBC AUTOMATED: CPT

## 2023-11-30 PROCEDURE — 86900 BLOOD TYPING SEROLOGIC ABO: CPT

## 2023-11-30 PROCEDURE — 99233 SBSQ HOSP IP/OBS HIGH 50: CPT | Performed by: INTERNAL MEDICINE

## 2023-11-30 PROCEDURE — 80053 COMPREHEN METABOLIC PANEL: CPT

## 2023-11-30 PROCEDURE — 86813 HLA TYPING A B OR C: CPT

## 2023-11-30 PROCEDURE — 36592 COLLECT BLOOD FROM PICC: CPT

## 2023-11-30 PROCEDURE — 86850 RBC ANTIBODY SCREEN: CPT

## 2023-11-30 PROCEDURE — 2580000003 HC RX 258: Performed by: FAMILY MEDICINE

## 2023-11-30 PROCEDURE — 86901 BLOOD TYPING SEROLOGIC RH(D): CPT

## 2023-11-30 PROCEDURE — 85025 COMPLETE CBC W/AUTO DIFF WBC: CPT

## 2023-11-30 PROCEDURE — 6370000000 HC RX 637 (ALT 250 FOR IP): Performed by: FAMILY MEDICINE

## 2023-11-30 PROCEDURE — P9037 PLATE PHERES LEUKOREDU IRRAD: HCPCS

## 2023-11-30 PROCEDURE — 1170000000 HC RM PRIVATE ONCOLOGY

## 2023-11-30 PROCEDURE — APPSS30 APP SPLIT SHARED TIME 16-30 MINUTES: Performed by: NURSE PRACTITIONER

## 2023-11-30 PROCEDURE — 36430 TRANSFUSION BLD/BLD COMPNT: CPT

## 2023-11-30 PROCEDURE — 86920 COMPATIBILITY TEST SPIN: CPT

## 2023-11-30 PROCEDURE — 97530 THERAPEUTIC ACTIVITIES: CPT

## 2023-11-30 RX ORDER — CHLORHEXIDINE GLUCONATE ORAL RINSE 1.2 MG/ML
15 SOLUTION DENTAL 2 TIMES DAILY
Status: DISCONTINUED | OUTPATIENT
Start: 2023-11-30 | End: 2023-12-19 | Stop reason: HOSPADM

## 2023-11-30 RX ORDER — SODIUM CHLORIDE 9 MG/ML
INJECTION, SOLUTION INTRAVENOUS PRN
Status: DISCONTINUED | OUTPATIENT
Start: 2023-11-30 | End: 2023-12-18

## 2023-11-30 RX ADMIN — CIPROFLOXACIN HYDROCHLORIDE 500 MG: 500 TABLET, FILM COATED ORAL at 19:53

## 2023-11-30 RX ADMIN — ACYCLOVIR 400 MG: 400 TABLET ORAL at 07:50

## 2023-11-30 RX ADMIN — IPRATROPIUM BROMIDE 2 SPRAY: 42 SPRAY NASAL at 19:57

## 2023-11-30 RX ADMIN — ROSUVASTATIN CALCIUM 10 MG: 5 TABLET, FILM COATED ORAL at 07:49

## 2023-11-30 RX ADMIN — DOCUSATE SODIUM 50 MG AND SENNOSIDES 8.6 MG 1 TABLET: 8.6; 5 TABLET, FILM COATED ORAL at 19:53

## 2023-11-30 RX ADMIN — MONTELUKAST 10 MG: 10 TABLET, FILM COATED ORAL at 07:50

## 2023-11-30 RX ADMIN — PREDNISONE 10 MG: 10 TABLET ORAL at 07:50

## 2023-11-30 RX ADMIN — FAMOTIDINE 40 MG: 20 TABLET, FILM COATED ORAL at 19:53

## 2023-11-30 RX ADMIN — POLYETHYLENE GLYCOL 3350 17 G: 17 POWDER, FOR SOLUTION ORAL at 07:49

## 2023-11-30 RX ADMIN — ACETAMINOPHEN 650 MG: 325 TABLET ORAL at 22:42

## 2023-11-30 RX ADMIN — ACETAMINOPHEN 650 MG: 325 TABLET ORAL at 10:23

## 2023-11-30 RX ADMIN — LEVOTHYROXINE SODIUM 125 MCG: 0.12 TABLET ORAL at 07:50

## 2023-11-30 RX ADMIN — ANTI-FUNGAL POWDER MICONAZOLE NITRATE TALC FREE: 1.42 POWDER TOPICAL at 07:51

## 2023-11-30 RX ADMIN — DIPHENHYDRAMINE HYDROCHLORIDE 25 MG: 25 CAPSULE ORAL at 22:42

## 2023-11-30 RX ADMIN — CIPROFLOXACIN HYDROCHLORIDE 500 MG: 500 TABLET, FILM COATED ORAL at 07:51

## 2023-11-30 RX ADMIN — DIPHENHYDRAMINE HYDROCHLORIDE 25 MG: 25 CAPSULE ORAL at 10:24

## 2023-11-30 RX ADMIN — FLUOXETINE HYDROCHLORIDE 20 MG: 20 CAPSULE ORAL at 19:53

## 2023-11-30 RX ADMIN — FERROUS SULFATE TAB 325 MG (65 MG ELEMENTAL FE) 325 MG: 325 (65 FE) TAB at 17:49

## 2023-11-30 RX ADMIN — SODIUM CHLORIDE, PRESERVATIVE FREE 10 ML: 5 INJECTION INTRAVENOUS at 19:58

## 2023-11-30 RX ADMIN — SALINE NASAL SPRAY 2 SPRAY: 1.5 SOLUTION NASAL at 07:51

## 2023-11-30 RX ADMIN — FLUCONAZOLE 200 MG: 100 TABLET ORAL at 07:50

## 2023-11-30 RX ADMIN — AMLODIPINE BESYLATE 5 MG: 5 TABLET ORAL at 07:50

## 2023-11-30 RX ADMIN — CHLORHEXIDINE GLUCONATE 15 ML: 1.2 RINSE ORAL at 20:00

## 2023-11-30 RX ADMIN — POTASSIUM CHLORIDE 20 MEQ: 1500 TABLET, EXTENDED RELEASE ORAL at 17:49

## 2023-11-30 RX ADMIN — TIZANIDINE 4 MG: 2 TABLET ORAL at 22:42

## 2023-11-30 RX ADMIN — SALINE NASAL SPRAY 2 SPRAY: 1.5 SOLUTION NASAL at 19:57

## 2023-11-30 RX ADMIN — DIPHENHYDRAMINE HYDROCHLORIDE 10 ML: 12.5 LIQUID ORAL at 07:51

## 2023-11-30 RX ADMIN — MORPHINE SULFATE 15 MG: 15 TABLET, FILM COATED, EXTENDED RELEASE ORAL at 07:50

## 2023-11-30 RX ADMIN — FLUOXETINE HYDROCHLORIDE 20 MG: 20 CAPSULE ORAL at 07:50

## 2023-11-30 RX ADMIN — ALLOPURINOL 300 MG: 300 TABLET ORAL at 07:50

## 2023-11-30 RX ADMIN — SODIUM CHLORIDE, PRESERVATIVE FREE 10 ML: 5 INJECTION INTRAVENOUS at 07:51

## 2023-11-30 RX ADMIN — PANTOPRAZOLE SODIUM 40 MG: 40 TABLET, DELAYED RELEASE ORAL at 07:50

## 2023-11-30 RX ADMIN — CHLORHEXIDINE GLUCONATE 15 ML: 1.2 RINSE ORAL at 13:22

## 2023-11-30 RX ADMIN — IPRATROPIUM BROMIDE 2 SPRAY: 42 SPRAY NASAL at 07:51

## 2023-11-30 RX ADMIN — POTASSIUM CHLORIDE 20 MEQ: 1500 TABLET, EXTENDED RELEASE ORAL at 07:50

## 2023-11-30 RX ADMIN — MORPHINE SULFATE 15 MG: 15 TABLET, FILM COATED, EXTENDED RELEASE ORAL at 19:53

## 2023-11-30 RX ADMIN — ACYCLOVIR 400 MG: 400 TABLET ORAL at 19:53

## 2023-11-30 RX ADMIN — PANTOPRAZOLE SODIUM 40 MG: 40 TABLET, DELAYED RELEASE ORAL at 17:49

## 2023-11-30 RX ADMIN — HYDROCODONE BITARTRATE AND ACETAMINOPHEN 1 TABLET: 5; 325 TABLET ORAL at 09:01

## 2023-11-30 ASSESSMENT — PAIN DESCRIPTION - LOCATION
LOCATION: BACK
LOCATION: LEG

## 2023-11-30 ASSESSMENT — PAIN SCALES - GENERAL
PAINLEVEL_OUTOF10: 1
PAINLEVEL_OUTOF10: 2
PAINLEVEL_OUTOF10: 0

## 2023-11-30 ASSESSMENT — PAIN DESCRIPTION - PAIN TYPE: TYPE: CHRONIC PAIN

## 2023-11-30 ASSESSMENT — PAIN DESCRIPTION - ONSET
ONSET: ON-GOING
ONSET: ON-GOING

## 2023-11-30 ASSESSMENT — PAIN DESCRIPTION - FREQUENCY
FREQUENCY: INTERMITTENT
FREQUENCY: CONTINUOUS

## 2023-11-30 ASSESSMENT — PAIN DESCRIPTION - DESCRIPTORS: DESCRIPTORS: ACHING;DISCOMFORT

## 2023-11-30 ASSESSMENT — PAIN DESCRIPTION - ORIENTATION
ORIENTATION: POSTERIOR;MID
ORIENTATION: LOWER

## 2023-11-30 NOTE — PLAN OF CARE
Problem: Pain  Goal: Verbalizes/displays adequate comfort level or baseline comfort level  Outcome: Progressing     Problem: ABCDS Injury Assessment  Goal: Absence of physical injury  Outcome: Progressing     Problem: Safety - Adult  Goal: Free from fall injury  Outcome: Progressing  Flowsheets  Taken 11/30/2023 0300  Free From Fall Injury: Instruct family/caregiver on patient safety  Taken 11/29/2023 1930  Free From Fall Injury: Instruct family/caregiver on patient safety     Problem: Discharge Planning  Goal: Discharge to home or other facility with appropriate resources  Outcome: Progressing  Flowsheets (Taken 11/29/2023 1930)  Discharge to home or other facility with appropriate resources:   Identify barriers to discharge with patient and caregiver   Arrange for needed discharge resources and transportation as appropriate     Problem: Skin/Tissue Integrity  Goal: Absence of new skin breakdown  Description: 1. Monitor for areas of redness and/or skin breakdown  2. Assess vascular access sites hourly  3. Every 4-6 hours minimum:  Change oxygen saturation probe site  4. Every 4-6 hours:  If on nasal continuous positive airway pressure, respiratory therapy assess nares and determine need for appliance change or resting period.   Outcome: Progressing

## 2023-11-30 NOTE — PROGRESS NOTES
[facility] Hematology & Oncology        Inpatient Hematology / Oncology Progress Note    Reason for Admission:  Neutropenic fever ([address]) [D70.9, R50.81]  Pancytopenia ([address]) [D61.818]    24 Hour Events:  Afebrile, VSS  Dacogen completed 11/24, Mylotarg completed 11/16  Continues prednisone taper, s/p IVIG 11/25-11/26  Requiring HLA-matched plts again today, Plt 2K  Some bleeding overnight from gums, also headache  Remains on RA, no complaints  Family at bedside    Transfusions: Plts  Replacements: None    ROS:  Constitutional: Positive for fatigue; negative for fever, chills. CV: Negative for chest pain, palpitations, edema. Respiratory: +cough, dyspnea (improved). Negative for wheezing. GI: Negative for nausea, abdominal pain, diarrhea. 10 point review of systems is otherwise negative with the exception of the elements mentioned above in the HPI. Allergies   Allergen Reactions    Penicillins Hives    Sulfa Antibiotics Hives    Codeine Nausea And Vomiting     Past Medical History:   Diagnosis Date    Arthritis     Autoimmune disease ([address])     skin changes, unknown name    Cancer ([address]) 1990    ovarian    Chronic pain     arthritis in back and legs    Coronary artery spasm ([address])     COVID 05/15/2022    Essential hypertension 11/8/2019    Gastritis     GERD (gastroesophageal reflux disease)     Hx antineoplastic chemo     Migraine headache     Psoriasis     Psychiatric disorder     anxiety and depression    Severe obesity (BMI 35.0-39.9) 6/26/2018    Thyroid disease     Diagnosed in 1996     Past Surgical History:   Procedure Laterality Date    CARPAL TUNNEL RELEASE      GYN      ovaries    HYSTERECTOMY, TOTAL ABDOMINAL (CERVIX REMOVED)  Patriciabury      cardiac cath. Dx with spasms.     TUBAL LIGATION       Family History   Problem Relation Age of Onset    Parkinson's Disease Mother     Stroke Mother         Passed away in 1969    No Known Problems Paternal Grandfather Stable fluid balance. Bleeding - epistaxis and rectal bleeding w BM   - ASA on hold; transfuse Hb >7 and plts >50   - PPI  10/29 denies bleeding today   11/5 Per RN  yesterday, small amount of blood from nares, no epistaxis. Otherwise, no bleeding noted. Transfusing plts for plt 2k. 11/9 Pt with episode of bleeding yesterday after scratching self, pressure held and additional 1 unit plts given (3 total yesterday). No further bleeding noted today thus far. 11/10 No further noted bleeding ON/today thus far. Is receiving PRBCs/plts today   11/11 Hgb improved after transfusion yesterday. Plt count 5k , no notable bleeding. 11/12 Plt count up to 40k yesterday (received 1 unit plts yesterday) this AM at 30k, much improved. 11/13 No bleeding noted   11/30 Some bleeding from gums, transfusing PRN. Recheck CBC later this afternoon. Will order CHG, discouraged brushing/abrasive foods. Reiterated fall precautions. Hypertension  11/2 Resume home amlodipine  11/4 More hypertensive overnight, monitor. May need to adjust amlodipine if no improvement. Has PRN hydralazine. 11/8 Overall BP improved      Chronic pain   - on morphine ER 15mg BID, norco.  Muscle relaxant. Constipation   - bowel regimen      No AC due to TCP   Continue home meds  Supportive care  PT/OT - HH  Antimicrobial prophylaxis - ACV, Diflucan, Cipro    Dispo - too soon to determine. Expect prolonged hospitalization through 10 day course of Dacogen/count recovery. Updates to plan of care  11/26 Discussed the prognosis being very poor that she remains refractory to plt tx,  very depressed they had lost two children, consult spiritual care. 11/27 Wishes to remain full code, and continue with transfusion support for now. Goals and plan of care reviewed with the patient. All questions answered to the best of our ability.                    Woo Ellsworth, [address] [facility] Hematology & Oncology  [address] [facility]

## 2023-11-30 NOTE — PROGRESS NOTES
ACUTE OCCUPATIONAL THERAPY GOALS:   (Developed with and agreed upon by patient and/or caregiver.)  Re-evaluation completed on 11/16/23  1. Patient will complete lower body bathing and dressing with MOD I and adaptive equipment as needed. 2. Patient will complete toileting with MOD I.   3. Patient will tolerate 30 minutes of OT treatment with 1-2 rest breaks to increase activity tolerance for ADLs. 4. Patient will complete functional transfers with MOD I and adaptive equipment as needed. 5. Patient will complete functional mobility for household distances with MOD I and adaptive equipment as needed. 6. Patient will complete self-grooming while standing edge of sink with MOD I and adaptive equipment as needed. New Goal Added:  7. Patient will complete UB bathing and dressing with MOD I and adaptive equipment as needed. 8. Patient will verbalize and demonstrate 3 energy conservation strategies throughout completion of bADLs. Timeframe: 7 visits        OCCUPATIONAL THERAPY: Daily Note    OT Visit Days: 6   Time In/Out  OT Charge Capture  Rehab Caseload Tracker  OT Orders    Christie Strauss is a 71 y.o. female   PRIMARY DIAGNOSIS: Neutropenic fever ([address])  Neutropenic fever ([address]) [D70.9, R50.81]  Pancytopenia ([address]) [D61.818]       Inpatient: Payor: Belen Canchola / Plan: Jimi Gregorio / Product Type: *No Product type* /     ASSESSMENT:     REHAB RECOMMENDATIONS: CURRENT LEVEL OF FUNCTION:  (Most Recently Demonstrated)   Recommendation to date pending progress:  Setting:  Home Health Therapy    Equipment:    To Be Determined Bathing:  Not Tested  Dressing:  Supervision/Setup  Feeding/Grooming:  Stand by Assist  Toileting:  Stand by Assist  Functional Mobility:  Stand by Assist     ASSESSMENT:  Ms. Kayley Davey is progressing well towards OT goals. Today, pt was received supine in bed.  Completed bed mobility, LB dressing, functional transfers, ambulation without AD, and grooming tasks standing at

## 2023-11-30 NOTE — PROGRESS NOTES
EOS summary:7p-7a  -Pt A&O x4.  -prn Zanaflex given x1 for c/o muscle spasms  -Pills given crushed/whole in applesauce.  -Family at bedside.  and daughter requested to stay the night. Pt and family informed of hospital policy and that only one visitor could stay over night. Daughter and  stated that they were given permission by a doctor to both stay but unable to remember doctors name. This RN and charge RN, [facility] spoke with pt and family. Advised to speak with  in am to get permission. 0340-Pt c/o  bleeding in mouth from L blood blister that started earlier in shift. Per pt daughter, felt could be r/t brushing her teeth. Bleeding noted to have stopped at this time. Pt educated to notify RN of any new bleeding.  - Plt 2. 1 unit PLT ordered per Rigo SOPs  4325- RN called into room. Pt bleeding from front bridge in mouth and L lip lesion. Pt advised to rinse out mouth and saline soaked gauze placed in mouth.      Bedside shift report given to oncoming RN    Lucia Mike RN

## 2023-11-30 NOTE — PROGRESS NOTES
Plt count <2 after this AM infusion. Ordered 1 unit of plts due to pt's mouth still bleeding throughout the shift ,and currently bleeding.

## 2023-12-01 LAB
ALBUMIN SERPL-MCNC: 3.5 G/DL (ref 3.2–4.6)
ALBUMIN/GLOB SERPL: 0.8 (ref 0.4–1.6)
ALP SERPL-CCNC: 103 U/L (ref 50–136)
ALT SERPL-CCNC: 28 U/L (ref 12–65)
ANION GAP SERPL CALC-SCNC: 6 MMOL/L (ref 2–11)
AST SERPL-CCNC: 16 U/L (ref 15–37)
BASOPHILS # BLD: 0 K/UL (ref 0–0.2)
BASOPHILS NFR BLD: 0 % (ref 0–2)
BILIRUB SERPL-MCNC: 0.7 MG/DL (ref 0.2–1.1)
BLD PROD TYP BPU: NORMAL
BLD PROD TYP BPU: NORMAL
BLOOD BANK CMNT PATIENT-IMP: NORMAL
BLOOD BANK CMNT PATIENT-IMP: NORMAL
BLOOD BANK DISPENSE STATUS: NORMAL
BLOOD BANK DISPENSE STATUS: NORMAL
BPU ID: NORMAL
BPU ID: NORMAL
BUN SERPL-MCNC: 19 MG/DL (ref 8–23)
CALCIUM SERPL-MCNC: 9 MG/DL (ref 8.3–10.4)
CHLORIDE SERPL-SCNC: 102 MMOL/L (ref 101–110)
CO2 SERPL-SCNC: 27 MMOL/L (ref 21–32)
CREAT SERPL-MCNC: 0.7 MG/DL (ref 0.6–1)
DIFFERENTIAL METHOD BLD: ABNORMAL
EOSINOPHIL # BLD: 0 K/UL (ref 0–0.8)
EOSINOPHIL NFR BLD: 0 % (ref 0.5–7.8)
ERYTHROCYTE [DISTWIDTH] IN BLOOD BY AUTOMATED COUNT: 14.3 % (ref 11.9–14.6)
GLOBULIN SER CALC-MCNC: 4.4 G/DL (ref 2.8–4.5)
GLUCOSE SERPL-MCNC: 92 MG/DL (ref 65–100)
HCT VFR BLD AUTO: 19.7 % (ref 35.8–46.3)
HGB BLD-MCNC: 6.9 G/DL (ref 11.7–15.4)
HISTORY CHECK: NORMAL
IMM GRANULOCYTES # BLD AUTO: 0 K/UL (ref 0–0.5)
IMM GRANULOCYTES NFR BLD AUTO: 0 % (ref 0–5)
LYMPHOCYTES # BLD: 1 K/UL (ref 0.5–4.6)
LYMPHOCYTES NFR BLD: 99 % (ref 13–44)
MCH RBC QN AUTO: 29.1 PG (ref 26.1–32.9)
MCHC RBC AUTO-ENTMCNC: 35 G/DL (ref 31.4–35)
MCV RBC AUTO: 83.1 FL (ref 82–102)
MONOCYTES # BLD: 0 K/UL (ref 0.1–1.3)
MONOCYTES NFR BLD: 0 % (ref 4–12)
NEUTS SEG # BLD: 0 K/UL (ref 1.7–8.2)
NEUTS SEG NFR BLD: 1 % (ref 43–78)
NRBC # BLD: 0 K/UL (ref 0–0.2)
PLATELET # BLD AUTO: <2 K/UL (ref 150–450)
PLATELET COMMENT: ABNORMAL
PMV BLD AUTO: ABNORMAL FL (ref 9.4–12.3)
POTASSIUM SERPL-SCNC: 4 MMOL/L (ref 3.5–5.1)
PROT SERPL-MCNC: 7.9 G/DL (ref 6.3–8.2)
RBC # BLD AUTO: 2.37 M/UL (ref 4.05–5.2)
RBC MORPH BLD: ABNORMAL
SODIUM SERPL-SCNC: 135 MMOL/L (ref 133–143)
UNIT DIVISION: 0
UNIT DIVISION: 0
WBC # BLD AUTO: 1 K/UL (ref 4.3–11.1)
WBC MORPH BLD: ABNORMAL

## 2023-12-01 PROCEDURE — 86022 PLATELET ANTIBODIES: CPT

## 2023-12-01 PROCEDURE — 6370000000 HC RX 637 (ALT 250 FOR IP): Performed by: NURSE PRACTITIONER

## 2023-12-01 PROCEDURE — 86921 COMPATIBILITY TEST INCUBATE: CPT

## 2023-12-01 PROCEDURE — 80053 COMPREHEN METABOLIC PANEL: CPT

## 2023-12-01 PROCEDURE — 85025 COMPLETE CBC W/AUTO DIFF WBC: CPT

## 2023-12-01 PROCEDURE — 99232 SBSQ HOSP IP/OBS MODERATE 35: CPT | Performed by: INTERNAL MEDICINE

## 2023-12-01 PROCEDURE — 36592 COLLECT BLOOD FROM PICC: CPT

## 2023-12-01 PROCEDURE — 6370000000 HC RX 637 (ALT 250 FOR IP): Performed by: INTERNAL MEDICINE

## 2023-12-01 PROCEDURE — 2580000003 HC RX 258: Performed by: FAMILY MEDICINE

## 2023-12-01 PROCEDURE — 86922 COMPATIBILITY TEST ANTIGLOB: CPT

## 2023-12-01 PROCEDURE — 86644 CMV ANTIBODY: CPT

## 2023-12-01 PROCEDURE — APPSS30 APP SPLIT SHARED TIME 16-30 MINUTES: Performed by: NURSE PRACTITIONER

## 2023-12-01 PROCEDURE — 6370000000 HC RX 637 (ALT 250 FOR IP): Performed by: FAMILY MEDICINE

## 2023-12-01 PROCEDURE — 6360000002 HC RX W HCPCS: Performed by: NURSE PRACTITIONER

## 2023-12-01 PROCEDURE — 36430 TRANSFUSION BLD/BLD COMPNT: CPT

## 2023-12-01 PROCEDURE — 2580000003 HC RX 258: Performed by: NURSE PRACTITIONER

## 2023-12-01 PROCEDURE — P9037 PLATE PHERES LEUKOREDU IRRAD: HCPCS

## 2023-12-01 PROCEDURE — P9040 RBC LEUKOREDUCED IRRADIATED: HCPCS

## 2023-12-01 PROCEDURE — 1170000000 HC RM PRIVATE ONCOLOGY

## 2023-12-01 RX ORDER — SODIUM CHLORIDE 9 MG/ML
INJECTION, SOLUTION INTRAVENOUS PRN
Status: COMPLETED | OUTPATIENT
Start: 2023-12-01 | End: 2023-12-19

## 2023-12-01 RX ORDER — SODIUM CHLORIDE 9 MG/ML
INJECTION, SOLUTION INTRAVENOUS PRN
Status: DISCONTINUED | OUTPATIENT
Start: 2023-12-01 | End: 2023-12-19 | Stop reason: HOSPADM

## 2023-12-01 RX ADMIN — SODIUM CHLORIDE, PRESERVATIVE FREE 10 ML: 5 INJECTION INTRAVENOUS at 19:47

## 2023-12-01 RX ADMIN — CIPROFLOXACIN HYDROCHLORIDE 500 MG: 500 TABLET, FILM COATED ORAL at 19:44

## 2023-12-01 RX ADMIN — MORPHINE SULFATE 15 MG: 15 TABLET, FILM COATED, EXTENDED RELEASE ORAL at 07:45

## 2023-12-01 RX ADMIN — CHLORHEXIDINE GLUCONATE 15 ML: 1.2 RINSE ORAL at 19:50

## 2023-12-01 RX ADMIN — ALLOPURINOL 300 MG: 300 TABLET ORAL at 07:44

## 2023-12-01 RX ADMIN — FERROUS SULFATE TAB 325 MG (65 MG ELEMENTAL FE) 325 MG: 325 (65 FE) TAB at 16:58

## 2023-12-01 RX ADMIN — MORPHINE SULFATE 15 MG: 15 TABLET, FILM COATED, EXTENDED RELEASE ORAL at 19:44

## 2023-12-01 RX ADMIN — ACETAMINOPHEN 650 MG: 325 TABLET ORAL at 12:35

## 2023-12-01 RX ADMIN — SODIUM CHLORIDE, PRESERVATIVE FREE 10 ML: 5 INJECTION INTRAVENOUS at 07:47

## 2023-12-01 RX ADMIN — FLUCONAZOLE 200 MG: 100 TABLET ORAL at 07:45

## 2023-12-01 RX ADMIN — SALINE NASAL SPRAY 2 SPRAY: 1.5 SOLUTION NASAL at 16:58

## 2023-12-01 RX ADMIN — AMLODIPINE BESYLATE 5 MG: 5 TABLET ORAL at 07:44

## 2023-12-01 RX ADMIN — ACYCLOVIR 400 MG: 400 TABLET ORAL at 19:45

## 2023-12-01 RX ADMIN — FLUOXETINE HYDROCHLORIDE 20 MG: 20 CAPSULE ORAL at 19:44

## 2023-12-01 RX ADMIN — POTASSIUM CHLORIDE 20 MEQ: 1500 TABLET, EXTENDED RELEASE ORAL at 07:44

## 2023-12-01 RX ADMIN — FLUOXETINE HYDROCHLORIDE 20 MG: 20 CAPSULE ORAL at 07:45

## 2023-12-01 RX ADMIN — ROSUVASTATIN CALCIUM 10 MG: 5 TABLET, FILM COATED ORAL at 07:44

## 2023-12-01 RX ADMIN — ACYCLOVIR 400 MG: 400 TABLET ORAL at 07:44

## 2023-12-01 RX ADMIN — TIZANIDINE 4 MG: 2 TABLET ORAL at 19:52

## 2023-12-01 RX ADMIN — FAMOTIDINE 40 MG: 20 TABLET, FILM COATED ORAL at 19:44

## 2023-12-01 RX ADMIN — PANTOPRAZOLE SODIUM 40 MG: 40 TABLET, DELAYED RELEASE ORAL at 16:58

## 2023-12-01 RX ADMIN — PANTOPRAZOLE SODIUM 40 MG: 40 TABLET, DELAYED RELEASE ORAL at 07:45

## 2023-12-01 RX ADMIN — IPRATROPIUM BROMIDE 2 SPRAY: 42 SPRAY NASAL at 19:45

## 2023-12-01 RX ADMIN — POLYETHYLENE GLYCOL 3350 17 G: 17 POWDER, FOR SOLUTION ORAL at 07:44

## 2023-12-01 RX ADMIN — LEVOTHYROXINE SODIUM 125 MCG: 0.12 TABLET ORAL at 07:45

## 2023-12-01 RX ADMIN — DOCUSATE SODIUM 50 MG AND SENNOSIDES 8.6 MG 1 TABLET: 8.6; 5 TABLET, FILM COATED ORAL at 19:45

## 2023-12-01 RX ADMIN — CHLORHEXIDINE GLUCONATE 15 ML: 1.2 RINSE ORAL at 12:36

## 2023-12-01 RX ADMIN — POTASSIUM CHLORIDE 20 MEQ: 1500 TABLET, EXTENDED RELEASE ORAL at 16:58

## 2023-12-01 RX ADMIN — FERROUS SULFATE TAB 325 MG (65 MG ELEMENTAL FE) 325 MG: 325 (65 FE) TAB at 12:36

## 2023-12-01 RX ADMIN — DEXAMETHASONE SODIUM PHOSPHATE 40 MG: 10 INJECTION INTRAMUSCULAR; INTRAVENOUS at 15:06

## 2023-12-01 RX ADMIN — CIPROFLOXACIN HYDROCHLORIDE 500 MG: 500 TABLET, FILM COATED ORAL at 07:44

## 2023-12-01 RX ADMIN — SALINE NASAL SPRAY 2 SPRAY: 1.5 SOLUTION NASAL at 19:45

## 2023-12-01 RX ADMIN — DIPHENHYDRAMINE HYDROCHLORIDE 25 MG: 25 CAPSULE ORAL at 12:35

## 2023-12-01 RX ADMIN — MONTELUKAST 10 MG: 10 TABLET, FILM COATED ORAL at 07:45

## 2023-12-01 RX ADMIN — PREDNISONE 10 MG: 10 TABLET ORAL at 07:44

## 2023-12-01 ASSESSMENT — PAIN DESCRIPTION - FREQUENCY: FREQUENCY: CONTINUOUS

## 2023-12-01 ASSESSMENT — PAIN SCALES - GENERAL
PAINLEVEL_OUTOF10: 0
PAINLEVEL_OUTOF10: 2
PAINLEVEL_OUTOF10: 0
PAINLEVEL_OUTOF10: 0

## 2023-12-01 ASSESSMENT — PAIN DESCRIPTION - ORIENTATION: ORIENTATION: POSTERIOR;LOWER

## 2023-12-01 ASSESSMENT — PAIN DESCRIPTION - DESCRIPTORS: DESCRIPTORS: ACHING;DISCOMFORT

## 2023-12-01 ASSESSMENT — PAIN DESCRIPTION - ONSET: ONSET: ON-GOING

## 2023-12-01 ASSESSMENT — PAIN DESCRIPTION - LOCATION: LOCATION: BACK

## 2023-12-01 ASSESSMENT — PAIN DESCRIPTION - PAIN TYPE: TYPE: CHRONIC PAIN

## 2023-12-01 NOTE — PROGRESS NOTES
END OF SHIFT SUMMARY:      Significant labs this shift:  Hgb 6.9, PLT <2    Blood products given this shift:  1 unit PLT  Additional events this shift:   Pt A&O x4. Some bleeding from gums noted. PLT x1 given. Pt tolerated well. Pre-med with tylenol and benadryl given.    at bedside over night  Zanaflex x1 for c/o muscle spasms  1 unit PLT and 1 unit PRBC ordered per Rigo SOPs    Bedside shift report given to oncoming CHINO Bean RN

## 2023-12-01 NOTE — CARE COORDINATION
LOS 35d  Chart reviewed by [facility] for discharge planning  IDR  Treatment completed today  Will wait on count recovery  Dispo:too soon to determine. Expect prolonged hospitalization through 10 day course of Dacogen/count recovery  Discharge plan is home with Home Health when counts have recovered  Discharge plan ongoing, no further concerns as of present. Please consult  if any new issues arise. Will continue to follow.

## 2023-12-01 NOTE — PLAN OF CARE
Problem: Pain  Goal: Verbalizes/displays adequate comfort level or baseline comfort level  Outcome: Progressing     Problem: ABCDS Injury Assessment  Goal: Absence of physical injury  Outcome: Progressing     Problem: Safety - Adult  Goal: Free from fall injury  Outcome: Progressing  Flowsheets  Taken 12/1/2023 0315  Free From Fall Injury: Instruct family/caregiver on patient safety  Taken 11/30/2023 1945  Free From Fall Injury: Instruct family/caregiver on patient safety     Problem: Discharge Planning  Goal: Discharge to home or other facility with appropriate resources  Outcome: Progressing  Flowsheets (Taken 11/30/2023 1945)  Discharge to home or other facility with appropriate resources: Identify barriers to discharge with patient and caregiver

## 2023-12-01 NOTE — PROGRESS NOTES
[facility] Hematology & Oncology        Inpatient Hematology / Oncology Progress Note    Reason for Admission:  Neutropenic fever ([address]) [D70.9, R50.81]  Pancytopenia ([address]) [D61.818]    24 Hour Events:  Afebrile, VSS  Dacogen completed 11/24, Mylotarg completed 11/16  Still with significant thrombocytopenia, refractory to transfusion  Some bleeding from gums  Remains on RA, no complaints  Family at bedside    Transfusions: Plts, PRBCs  Replacements: None    ROS:  Constitutional: Positive for fatigue; negative for fever, chills. CV: Negative for chest pain, palpitations, edema. Respiratory: +cough, dyspnea (improved). Negative for wheezing. GI: Negative for nausea, abdominal pain, diarrhea. 10 point review of systems is otherwise negative with the exception of the elements mentioned above in the HPI. Allergies   Allergen Reactions    Penicillins Hives    Sulfa Antibiotics Hives    Codeine Nausea And Vomiting     Past Medical History:   Diagnosis Date    Arthritis     Autoimmune disease ([address])     skin changes, unknown name    Cancer ([address]) 1990    ovarian    Chronic pain     arthritis in back and legs    Coronary artery spasm ([address])     COVID 05/15/2022    Essential hypertension 11/8/2019    Gastritis     GERD (gastroesophageal reflux disease)     Hx antineoplastic chemo     Migraine headache     Psoriasis     Psychiatric disorder     anxiety and depression    Severe obesity (BMI 35.0-39.9) 6/26/2018    Thyroid disease     Diagnosed in 1996     Past Surgical History:   Procedure Laterality Date    CARPAL TUNNEL RELEASE      GYN      ovaries    HYSTERECTOMY, TOTAL ABDOMINAL (CERVIX REMOVED)  Patriciabury      cardiac cath. Dx with spasms.     TUBAL LIGATION       Family History   Problem Relation Age of Onset    Parkinson's Disease Mother     Stroke Mother         Passed away in 1969    No Known Problems Paternal Grandfather     No Known Problems Paternal Grandmother No Known Problems Maternal Grandfather     No Known Problems Maternal Grandmother     Prostate Cancer Father          age 80     Cancer Father         Kidney     Social History     Socioeconomic History    Marital status:      Spouse name: Not on file    Number of children: Not on file    Years of education: Not on file    Highest education level: Not on file   Occupational History    Not on file   Tobacco Use    Smoking status: Never     Passive exposure: Past    Smokeless tobacco: Never   Vaping Use    Vaping Use: Never used   Substance and Sexual Activity    Alcohol use: No    Drug use: Yes     Types: Prescription    Sexual activity: Not Currently     Birth control/protection: None   Other Topics Concern    Not on file   Social History Narrative    Not on file     Social Determinants of Health     Financial Resource Strain: Low Risk  (2023)    Overall Financial Resource Strain (CARDIA)     Difficulty of Paying Living Expenses: Not hard at all   Food Insecurity: Not on file (2023)   Transportation Needs: No Transportation Needs (2023)    Transportation Problems ([address] [place] [address])     In the past 12 months, has lack of reliable transportation kept you from medical appointments, meetings, work or from getting things needed for daily living?: Not on file   Recent Concern: Transportation Needs - Unmet Transportation Needs (2023)    PRAPARE - Transportation     Lack of Transportation (Medical): Yes     Lack of Transportation (Non-Medical):  No   Physical Activity: Not on file   Stress: Not on file   Social Connections: Not on file   Intimate Partner Violence: Not on file   Housing Stability: High Risk (2023)    Housing Stability Vital Sign     Unable to Pay for Housing in the Last Year: Yes     Number of Places Lived in the Last Year: 1     Unstable Housing in the Last Year: No     Current Facility-Administered Medications   Medication Dose Route Frequency Provider Last Rate Last Admin    0.9

## 2023-12-02 LAB
ALBUMIN SERPL-MCNC: 3.6 G/DL (ref 3.2–4.6)
ALBUMIN/GLOB SERPL: 0.8 (ref 0.4–1.6)
ALP SERPL-CCNC: 108 U/L (ref 50–136)
ALT SERPL-CCNC: 27 U/L (ref 12–65)
ANION GAP SERPL CALC-SCNC: 8 MMOL/L (ref 2–11)
AST SERPL-CCNC: 15 U/L (ref 15–37)
BILIRUB SERPL-MCNC: 1.2 MG/DL (ref 0.2–1.1)
BLD PROD TYP BPU: NORMAL
BLOOD BANK CMNT PATIENT-IMP: NORMAL
BLOOD BANK DISPENSE STATUS: NORMAL
BPU ID: NORMAL
BUN SERPL-MCNC: 24 MG/DL (ref 8–23)
CALCIUM SERPL-MCNC: 9.6 MG/DL (ref 8.3–10.4)
CHLORIDE SERPL-SCNC: 102 MMOL/L (ref 101–110)
CO2 SERPL-SCNC: 24 MMOL/L (ref 21–32)
CREAT SERPL-MCNC: 0.6 MG/DL (ref 0.6–1)
DIFFERENTIAL METHOD BLD: ABNORMAL
ERYTHROCYTE [DISTWIDTH] IN BLOOD BY AUTOMATED COUNT: 13.8 % (ref 11.9–14.6)
GLOBULIN SER CALC-MCNC: 4.8 G/DL (ref 2.8–4.5)
GLUCOSE SERPL-MCNC: 136 MG/DL (ref 65–100)
HCT VFR BLD AUTO: 23.5 % (ref 35.8–46.3)
HGB BLD-MCNC: 8.2 G/DL (ref 11.7–15.4)
MAGNESIUM SERPL-MCNC: 2.4 MG/DL (ref 1.8–2.4)
MCH RBC QN AUTO: 28.9 PG (ref 26.1–32.9)
MCHC RBC AUTO-ENTMCNC: 34.9 G/DL (ref 31.4–35)
MCV RBC AUTO: 82.7 FL (ref 82–102)
NRBC # BLD: 0 K/UL (ref 0–0.2)
PLATELET # BLD AUTO: <2 K/UL (ref 150–450)
PLATELET COMMENT: ABNORMAL
PMV BLD AUTO: ABNORMAL FL (ref 9.4–12.3)
POTASSIUM SERPL-SCNC: 4.5 MMOL/L (ref 3.5–5.1)
PROT SERPL-MCNC: 8.4 G/DL (ref 6.3–8.2)
RBC # BLD AUTO: 2.84 M/UL (ref 4.05–5.2)
RBC MORPH BLD: ABNORMAL
RBC MORPH BLD: ABNORMAL
SODIUM SERPL-SCNC: 134 MMOL/L (ref 133–143)
UNIT DIVISION: 0
WBC # BLD AUTO: 0.2 K/UL (ref 4.3–11.1)
WBC MORPH BLD: ABNORMAL

## 2023-12-02 PROCEDURE — 6370000000 HC RX 637 (ALT 250 FOR IP): Performed by: INTERNAL MEDICINE

## 2023-12-02 PROCEDURE — 36430 TRANSFUSION BLD/BLD COMPNT: CPT

## 2023-12-02 PROCEDURE — 1170000000 HC RM PRIVATE ONCOLOGY

## 2023-12-02 PROCEDURE — 2580000003 HC RX 258: Performed by: NURSE PRACTITIONER

## 2023-12-02 PROCEDURE — 6370000000 HC RX 637 (ALT 250 FOR IP): Performed by: NURSE PRACTITIONER

## 2023-12-02 PROCEDURE — 85025 COMPLETE CBC W/AUTO DIFF WBC: CPT

## 2023-12-02 PROCEDURE — APPSS30 APP SPLIT SHARED TIME 16-30 MINUTES: Performed by: NURSE PRACTITIONER

## 2023-12-02 PROCEDURE — 99232 SBSQ HOSP IP/OBS MODERATE 35: CPT | Performed by: INTERNAL MEDICINE

## 2023-12-02 PROCEDURE — 2580000003 HC RX 258: Performed by: FAMILY MEDICINE

## 2023-12-02 PROCEDURE — 80053 COMPREHEN METABOLIC PANEL: CPT

## 2023-12-02 PROCEDURE — 6360000002 HC RX W HCPCS: Performed by: NURSE PRACTITIONER

## 2023-12-02 PROCEDURE — P9037 PLATE PHERES LEUKOREDU IRRAD: HCPCS

## 2023-12-02 PROCEDURE — 36592 COLLECT BLOOD FROM PICC: CPT

## 2023-12-02 PROCEDURE — 83735 ASSAY OF MAGNESIUM: CPT

## 2023-12-02 PROCEDURE — 6370000000 HC RX 637 (ALT 250 FOR IP): Performed by: FAMILY MEDICINE

## 2023-12-02 PROCEDURE — 86813 HLA TYPING A B OR C: CPT

## 2023-12-02 RX ORDER — SODIUM CHLORIDE 9 MG/ML
INJECTION, SOLUTION INTRAVENOUS PRN
Status: COMPLETED | OUTPATIENT
Start: 2023-12-02 | End: 2023-12-18

## 2023-12-02 RX ORDER — OXYMETAZOLINE HYDROCHLORIDE 0.05 G/100ML
2 SPRAY NASAL 2 TIMES DAILY PRN
Status: DISPENSED | OUTPATIENT
Start: 2023-12-02 | End: 2023-12-05

## 2023-12-02 RX ADMIN — IPRATROPIUM BROMIDE 2 SPRAY: 42 SPRAY NASAL at 08:38

## 2023-12-02 RX ADMIN — DIPHENHYDRAMINE HYDROCHLORIDE 10 ML: 12.5 LIQUID ORAL at 08:37

## 2023-12-02 RX ADMIN — FERROUS SULFATE TAB 325 MG (65 MG ELEMENTAL FE) 325 MG: 325 (65 FE) TAB at 16:08

## 2023-12-02 RX ADMIN — FERROUS SULFATE TAB 325 MG (65 MG ELEMENTAL FE) 325 MG: 325 (65 FE) TAB at 11:40

## 2023-12-02 RX ADMIN — MONTELUKAST 10 MG: 10 TABLET, FILM COATED ORAL at 08:34

## 2023-12-02 RX ADMIN — FLUOXETINE HYDROCHLORIDE 20 MG: 20 CAPSULE ORAL at 19:43

## 2023-12-02 RX ADMIN — CHLORHEXIDINE GLUCONATE 15 ML: 1.2 RINSE ORAL at 19:42

## 2023-12-02 RX ADMIN — FLUOXETINE HYDROCHLORIDE 20 MG: 20 CAPSULE ORAL at 08:33

## 2023-12-02 RX ADMIN — ACYCLOVIR 400 MG: 400 TABLET ORAL at 08:34

## 2023-12-02 RX ADMIN — SODIUM CHLORIDE, PRESERVATIVE FREE 10 ML: 5 INJECTION INTRAVENOUS at 09:33

## 2023-12-02 RX ADMIN — DOCUSATE SODIUM 50 MG AND SENNOSIDES 8.6 MG 1 TABLET: 8.6; 5 TABLET, FILM COATED ORAL at 19:42

## 2023-12-02 RX ADMIN — PANTOPRAZOLE SODIUM 40 MG: 40 TABLET, DELAYED RELEASE ORAL at 08:34

## 2023-12-02 RX ADMIN — ALLOPURINOL 300 MG: 300 TABLET ORAL at 08:34

## 2023-12-02 RX ADMIN — MORPHINE SULFATE 15 MG: 15 TABLET, FILM COATED, EXTENDED RELEASE ORAL at 19:43

## 2023-12-02 RX ADMIN — OXYMETAZOLINE HCL 2 SPRAY: 0.05 SPRAY NASAL at 08:32

## 2023-12-02 RX ADMIN — ANTI-FUNGAL POWDER MICONAZOLE NITRATE TALC FREE: 1.42 POWDER TOPICAL at 08:37

## 2023-12-02 RX ADMIN — AMLODIPINE BESYLATE 5 MG: 5 TABLET ORAL at 08:34

## 2023-12-02 RX ADMIN — POTASSIUM CHLORIDE 20 MEQ: 1500 TABLET, EXTENDED RELEASE ORAL at 16:08

## 2023-12-02 RX ADMIN — ROSUVASTATIN CALCIUM 10 MG: 5 TABLET, FILM COATED ORAL at 08:34

## 2023-12-02 RX ADMIN — SALINE NASAL SPRAY 2 SPRAY: 1.5 SOLUTION NASAL at 16:08

## 2023-12-02 RX ADMIN — DEXAMETHASONE SODIUM PHOSPHATE 40 MG: 10 INJECTION INTRAMUSCULAR; INTRAVENOUS at 09:33

## 2023-12-02 RX ADMIN — CIPROFLOXACIN HYDROCHLORIDE 500 MG: 500 TABLET, FILM COATED ORAL at 08:34

## 2023-12-02 RX ADMIN — SODIUM CHLORIDE, PRESERVATIVE FREE 10 ML: 5 INJECTION INTRAVENOUS at 19:44

## 2023-12-02 RX ADMIN — FLUCONAZOLE 200 MG: 100 TABLET ORAL at 08:33

## 2023-12-02 RX ADMIN — PANTOPRAZOLE SODIUM 40 MG: 40 TABLET, DELAYED RELEASE ORAL at 16:08

## 2023-12-02 RX ADMIN — POTASSIUM CHLORIDE 20 MEQ: 1500 TABLET, EXTENDED RELEASE ORAL at 08:33

## 2023-12-02 RX ADMIN — CIPROFLOXACIN HYDROCHLORIDE 500 MG: 500 TABLET, FILM COATED ORAL at 19:42

## 2023-12-02 RX ADMIN — MORPHINE SULFATE 15 MG: 15 TABLET, FILM COATED, EXTENDED RELEASE ORAL at 08:33

## 2023-12-02 RX ADMIN — FAMOTIDINE 40 MG: 20 TABLET, FILM COATED ORAL at 19:42

## 2023-12-02 RX ADMIN — POLYETHYLENE GLYCOL 3350 17 G: 17 POWDER, FOR SOLUTION ORAL at 08:34

## 2023-12-02 RX ADMIN — TIZANIDINE 4 MG: 2 TABLET ORAL at 19:42

## 2023-12-02 RX ADMIN — ACYCLOVIR 400 MG: 400 TABLET ORAL at 19:42

## 2023-12-02 RX ADMIN — LEVOTHYROXINE SODIUM 125 MCG: 0.12 TABLET ORAL at 08:33

## 2023-12-02 RX ADMIN — CHLORHEXIDINE GLUCONATE 15 ML: 1.2 RINSE ORAL at 08:43

## 2023-12-02 ASSESSMENT — PAIN - FUNCTIONAL ASSESSMENT: PAIN_FUNCTIONAL_ASSESSMENT: ACTIVITIES ARE NOT PREVENTED

## 2023-12-02 ASSESSMENT — PAIN DESCRIPTION - FREQUENCY: FREQUENCY: CONTINUOUS

## 2023-12-02 ASSESSMENT — PAIN DESCRIPTION - DESCRIPTORS: DESCRIPTORS: ACHING

## 2023-12-02 ASSESSMENT — PAIN SCALES - GENERAL
PAINLEVEL_OUTOF10: 1
PAINLEVEL_OUTOF10: 3
PAINLEVEL_OUTOF10: 0

## 2023-12-02 ASSESSMENT — PAIN DESCRIPTION - ONSET: ONSET: ON-GOING

## 2023-12-02 ASSESSMENT — PAIN DESCRIPTION - PAIN TYPE: TYPE: CHRONIC PAIN

## 2023-12-02 ASSESSMENT — PAIN DESCRIPTION - ORIENTATION: ORIENTATION: RIGHT;LEFT;MID;LOWER

## 2023-12-02 ASSESSMENT — PAIN DESCRIPTION - LOCATION: LOCATION: BACK

## 2023-12-02 NOTE — PROGRESS NOTES
END OF SHIFT SUMMARY: 7p-7a    Significant labs this shift:  PLT<2    Additional events this shift:   Pt A&O x4.  Zanaflex x1 for c/o muscle spasms  Gums not actively bleeding over night. Pt continues to use ICS. 1 unit PLT ordered for PLT count <2 per Rigo SOPs.     Bedside shift report given to oncoming CHINO Bean RN

## 2023-12-02 NOTE — PLAN OF CARE
Problem: Pain  Goal: Verbalizes/displays adequate comfort level or baseline comfort level  Outcome: Progressing     Problem: ABCDS Injury Assessment  Goal: Absence of physical injury  Outcome: Progressing     Problem: Safety - Adult  Goal: Free from fall injury  Outcome: Progressing  Flowsheets (Taken 12/1/2023 1940)  Free From Fall Injury: Instruct family/caregiver on patient safety     Problem: Discharge Planning  Goal: Discharge to home or other facility with appropriate resources  Outcome: Progressing  Flowsheets  Taken 12/1/2023 1940 by Geovanna Lennon RN  Discharge to home or other facility with appropriate resources: Identify barriers to discharge with patient and caregiver  Taken 12/1/2023 0800 by Mirian Rivas RN  Discharge to home or other facility with appropriate resources: Identify barriers to discharge with patient and caregiver

## 2023-12-02 NOTE — PROGRESS NOTES
[facility] Hematology & Oncology        Inpatient Hematology / Oncology Progress Note    Reason for Admission:  Neutropenic fever (HCC) [D70.9, R50.81]  Pancytopenia (HCC) [D61.818]    24 Hour Events:  Afebrile, VSS  Dacogen completed 11/24, Mylotarg completed 11/16  Still with significant thrombocytopenia, refractory to transfusion  No bleeding during rounds, Hgb better after transfusion  Remains on RA, no complaints  Family at bedside    Transfusions: Plts  Replacements: None    ROS:  Constitutional: Positive for fatigue; negative for fever, chills.  CV: Negative for chest pain, palpitations, edema.  Respiratory: Negative for wheezing, cough, dyspnea.  GI: Negative for nausea, abdominal pain, diarrhea.    10 point review of systems is otherwise negative with the exception of the elements mentioned above in the HPI.       Allergies   Allergen Reactions    Penicillins Hives    Sulfa Antibiotics Hives    Codeine Nausea And Vomiting     Past Medical History:   Diagnosis Date    Arthritis     Autoimmune disease (HCC)     skin changes, unknown name    Cancer (HCC) 1990    ovarian    Chronic pain     arthritis in back and legs    Coronary artery spasm (HCC)     COVID 05/15/2022    Essential hypertension 11/8/2019    Gastritis     GERD (gastroesophageal reflux disease)     Hx antineoplastic chemo     Migraine headache     Psoriasis     Psychiatric disorder     anxiety and depression    Severe obesity (BMI 35.0-39.9) 6/26/2018    Thyroid disease     Diagnosed in 1996     Past Surgical History:   Procedure Laterality Date    CARPAL TUNNEL RELEASE      GYN      ovaries    HYSTERECTOMY, TOTAL ABDOMINAL (CERVIX REMOVED)  1990    ME UNLISTED PROCEDURE CARDIAC SURGERY      cardiac cath.  Dx with spasms.    TUBAL LIGATION       Family History   Problem Relation Age of Onset    Parkinson's Disease Mother     Stroke Mother         Passed away in 1969    No Known Problems Paternal Grandfather     No Known Problems Paternal  hx of ovarian cam HTN, HLD, GERD and hypothyroidism. She was admitted for further care. Tmax 102.5 at home. Sick grandchild at home whose father works w EMS. + chronic constipation with recent bld in toilet w BMs. No diarrhea. LOWRY. In ED T max 100.1, HR 92, /43, 94% on 2L. Wbc 1.6,  , Hb 5.3, plts 7. Rapid influenza/covid neg. CXR no acute findings. Of note, she is pt of Dr Aliyah Frank with new dx of MDS. She presented to dr Aliyah Frank on 8/24 for evaluation of gradual pancytopenia. At that time she reported eating poorly, wt loss. Recent CT at [facility] reportedly normal.  Rec Gi eval, labs and BMBx. 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 Dr Aliyah Frank reviewed with pt risk of POD and transformation to leukemia. They reviewed tx options and elected to p/w Revlimid 10mg daily vs HMA due to 5q del. Tel note encounter indicates that pt started tx ~10/6. She was admitted w NF. Started on IVF, cef/vanc/doxy w sepsis workup. S/p 1 u PRBC and 2 unit plts. ASA on hold. Nutritional/hemolysis labs pending. We were asked for recs. Pt seen in ICU, feeling much better this am.  Plan to transfer to 5th floor. 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 BMBx completed 11/17 and shows progress to AEL with 60% erythroblasts as well as some concern for [email or web address] (hemophagocytosis felt to be macrophage activation from disease as [address] markers unremarkable)  11/15 Day 1 Dacogen  11/24 Day 10 Dacogen - tolerating well completes today. Completed Mylotarg 11/18. Tolerating treatment well.  Navigation contaminant. Repeat BC NGTD. TM 98. Likely transitioning to PO/prophylactic Cipro today. 11/1 Afebrile. Transition to prophylactic cipro now that she has completed 5 day course of abx with likely contaminant/S epi with repeat BC.  RESOLVED    Altered mental status  11/1 Likely secondary to HD steroids. CT head negative. CTA and MRI brain pending after Code S called last night for dysarthria. 11/2 CTA negative. Unable to tolerate MRI brain without sedation so holding for now as symptoms more consistent with steroid-related effects vs stroke as no focal deficits noted. RESOLVED    Hypoxia / LE edema / abnormal breath sounds  11/1 LE edema may be related to steroids. Incomplete I/O recorded, weight up since admission. Stopped IVF. Check CXR and BNP.  11/2 BNP elevated, CXR with pulmonary vascular congestion. On 2-4L NC. Weight remains elevated since admission. 11/4 Stable fluid status, on 3L NC - weaning as tolerated. Was hypoxic when found off O2 in the middle of the night. 11/7 Continuing to wean O2 as tolerated. Down to 1L NC. Fluid balance stable. 11/8 O2 back up to 3 L NC.    10/10 on RA   11/13 on 2L - weight stable. 11/14 Check CXR as well as echo. Weight back down to admission baseline. On 2L NC with worsening dyspnea today. 11/15 Stable fluid status/weight. CXR negative. Echo WNL. On RA overnight. 11/17 On RA, +I/O 600ml and weight up (using different scale), no signs of overload. 11/22 on RA  11/23 Up 5# and +1.9L, remains on RA  11/27 Remains on RA, CXR unremarkable, continue I/S and mobilization. Stable fluid balance. RESOLVED     Bleeding - epistaxis and rectal bleeding w BM   - ASA on hold; transfuse Hb >7 and plts >50   - PPI  10/29 denies bleeding today   11/5 Per RN  yesterday, small amount of blood from nares, no epistaxis. Otherwise, no bleeding noted. Transfusing plts for plt 2k.    11/9 Pt with episode of bleeding yesterday after scratching self, pressure held and additional 1 unit plts

## 2023-12-03 LAB
ABO + RH BLD: NORMAL
ALBUMIN SERPL-MCNC: 3.5 G/DL (ref 3.2–4.6)
ALBUMIN/GLOB SERPL: 0.8 (ref 0.4–1.6)
ALP SERPL-CCNC: 102 U/L (ref 50–136)
ALT SERPL-CCNC: 25 U/L (ref 12–65)
ANION GAP SERPL CALC-SCNC: 6 MMOL/L (ref 2–11)
AST SERPL-CCNC: 16 U/L (ref 15–37)
BILIRUB SERPL-MCNC: 0.7 MG/DL (ref 0.2–1.1)
BLD PROD TYP BPU: NORMAL
BLOOD BANK CMNT PATIENT-IMP: NORMAL
BLOOD BANK DISPENSE STATUS: NORMAL
BLOOD GROUP ANTIBODIES SERPL: NORMAL
BPU ID: NORMAL
BUN SERPL-MCNC: 29 MG/DL (ref 8–23)
CALCIUM SERPL-MCNC: 9.2 MG/DL (ref 8.3–10.4)
CHLORIDE SERPL-SCNC: 105 MMOL/L (ref 101–110)
CO2 SERPL-SCNC: 24 MMOL/L (ref 21–32)
CREAT SERPL-MCNC: 0.7 MG/DL (ref 0.6–1)
CROSSMATCH RESULT: NORMAL
CROSSMATCH RESULT: NORMAL
DIFFERENTIAL METHOD BLD: ABNORMAL
ERYTHROCYTE [DISTWIDTH] IN BLOOD BY AUTOMATED COUNT: 13.7 % (ref 11.9–14.6)
GLOBULIN SER CALC-MCNC: 4.4 G/DL (ref 2.8–4.5)
GLUCOSE SERPL-MCNC: 144 MG/DL (ref 65–100)
HCT VFR BLD AUTO: 20.4 % (ref 35.8–46.3)
HGB BLD-MCNC: 7 G/DL (ref 11.7–15.4)
HISTORY CHECK: NORMAL
MAGNESIUM SERPL-MCNC: 2.4 MG/DL (ref 1.8–2.4)
MCH RBC QN AUTO: 28.6 PG (ref 26.1–32.9)
MCHC RBC AUTO-ENTMCNC: 34.3 G/DL (ref 31.4–35)
MCV RBC AUTO: 83.3 FL (ref 82–102)
NRBC # BLD: 0 K/UL (ref 0–0.2)
PLATELET # BLD AUTO: 36 K/UL (ref 150–450)
PLATELET COMMENT: ABNORMAL
PMV BLD AUTO: 10.3 FL (ref 9.4–12.3)
POTASSIUM SERPL-SCNC: 4.3 MMOL/L (ref 3.5–5.1)
PROT SERPL-MCNC: 7.9 G/DL (ref 6.3–8.2)
RBC # BLD AUTO: 2.45 M/UL (ref 4.05–5.2)
RBC MORPH BLD: ABNORMAL
SODIUM SERPL-SCNC: 135 MMOL/L (ref 133–143)
SPECIMEN EXP DATE BLD: NORMAL
UNIT DIVISION: 0
WBC # BLD AUTO: 0.2 K/UL (ref 4.3–11.1)
WBC MORPH BLD: ABNORMAL

## 2023-12-03 PROCEDURE — 2580000003 HC RX 258: Performed by: NURSE PRACTITIONER

## 2023-12-03 PROCEDURE — 6370000000 HC RX 637 (ALT 250 FOR IP): Performed by: FAMILY MEDICINE

## 2023-12-03 PROCEDURE — 6370000000 HC RX 637 (ALT 250 FOR IP): Performed by: INTERNAL MEDICINE

## 2023-12-03 PROCEDURE — 83735 ASSAY OF MAGNESIUM: CPT

## 2023-12-03 PROCEDURE — 86644 CMV ANTIBODY: CPT

## 2023-12-03 PROCEDURE — 6370000000 HC RX 637 (ALT 250 FOR IP): Performed by: NURSE PRACTITIONER

## 2023-12-03 PROCEDURE — P9040 RBC LEUKOREDUCED IRRADIATED: HCPCS

## 2023-12-03 PROCEDURE — 2580000003 HC RX 258: Performed by: FAMILY MEDICINE

## 2023-12-03 PROCEDURE — 85025 COMPLETE CBC W/AUTO DIFF WBC: CPT

## 2023-12-03 PROCEDURE — 86922 COMPATIBILITY TEST ANTIGLOB: CPT

## 2023-12-03 PROCEDURE — 6360000002 HC RX W HCPCS: Performed by: NURSE PRACTITIONER

## 2023-12-03 PROCEDURE — 36592 COLLECT BLOOD FROM PICC: CPT

## 2023-12-03 PROCEDURE — 86921 COMPATIBILITY TEST INCUBATE: CPT

## 2023-12-03 PROCEDURE — 99232 SBSQ HOSP IP/OBS MODERATE 35: CPT | Performed by: INTERNAL MEDICINE

## 2023-12-03 PROCEDURE — 86813 HLA TYPING A B OR C: CPT

## 2023-12-03 PROCEDURE — 36430 TRANSFUSION BLD/BLD COMPNT: CPT

## 2023-12-03 PROCEDURE — 1170000000 HC RM PRIVATE ONCOLOGY

## 2023-12-03 PROCEDURE — P9037 PLATE PHERES LEUKOREDU IRRAD: HCPCS

## 2023-12-03 PROCEDURE — 80053 COMPREHEN METABOLIC PANEL: CPT

## 2023-12-03 RX ADMIN — DIPHENHYDRAMINE HYDROCHLORIDE 25 MG: 25 CAPSULE ORAL at 02:15

## 2023-12-03 RX ADMIN — TIZANIDINE 4 MG: 2 TABLET ORAL at 21:31

## 2023-12-03 RX ADMIN — SALINE NASAL SPRAY 2 SPRAY: 1.5 SOLUTION NASAL at 20:46

## 2023-12-03 RX ADMIN — ACETAMINOPHEN 650 MG: 325 TABLET ORAL at 02:15

## 2023-12-03 RX ADMIN — ACYCLOVIR 400 MG: 400 TABLET ORAL at 20:42

## 2023-12-03 RX ADMIN — SALINE NASAL SPRAY 2 SPRAY: 1.5 SOLUTION NASAL at 16:54

## 2023-12-03 RX ADMIN — FAMOTIDINE 40 MG: 20 TABLET, FILM COATED ORAL at 20:42

## 2023-12-03 RX ADMIN — CIPROFLOXACIN HYDROCHLORIDE 500 MG: 500 TABLET, FILM COATED ORAL at 20:42

## 2023-12-03 RX ADMIN — MORPHINE SULFATE 15 MG: 15 TABLET, FILM COATED, EXTENDED RELEASE ORAL at 07:42

## 2023-12-03 RX ADMIN — SALINE NASAL SPRAY 2 SPRAY: 1.5 SOLUTION NASAL at 07:44

## 2023-12-03 RX ADMIN — FLUOXETINE HYDROCHLORIDE 20 MG: 20 CAPSULE ORAL at 20:42

## 2023-12-03 RX ADMIN — CHLORHEXIDINE GLUCONATE 15 ML: 1.2 RINSE ORAL at 07:44

## 2023-12-03 RX ADMIN — SODIUM CHLORIDE, PRESERVATIVE FREE 10 ML: 5 INJECTION INTRAVENOUS at 20:44

## 2023-12-03 RX ADMIN — POLYETHYLENE GLYCOL 3350 17 G: 17 POWDER, FOR SOLUTION ORAL at 07:43

## 2023-12-03 RX ADMIN — FLUOXETINE HYDROCHLORIDE 20 MG: 20 CAPSULE ORAL at 07:42

## 2023-12-03 RX ADMIN — CHLORHEXIDINE GLUCONATE 15 ML: 1.2 RINSE ORAL at 20:44

## 2023-12-03 RX ADMIN — ANTI-FUNGAL POWDER MICONAZOLE NITRATE TALC FREE: 1.42 POWDER TOPICAL at 20:47

## 2023-12-03 RX ADMIN — ANTI-FUNGAL POWDER MICONAZOLE NITRATE TALC FREE: 1.42 POWDER TOPICAL at 07:44

## 2023-12-03 RX ADMIN — FLUCONAZOLE 200 MG: 100 TABLET ORAL at 07:43

## 2023-12-03 RX ADMIN — ROSUVASTATIN CALCIUM 10 MG: 5 TABLET, FILM COATED ORAL at 07:43

## 2023-12-03 RX ADMIN — ACYCLOVIR 400 MG: 400 TABLET ORAL at 07:43

## 2023-12-03 RX ADMIN — PANTOPRAZOLE SODIUM 40 MG: 40 TABLET, DELAYED RELEASE ORAL at 16:54

## 2023-12-03 RX ADMIN — SODIUM CHLORIDE, PRESERVATIVE FREE 10 ML: 5 INJECTION INTRAVENOUS at 07:50

## 2023-12-03 RX ADMIN — MORPHINE SULFATE 15 MG: 15 TABLET, FILM COATED, EXTENDED RELEASE ORAL at 20:42

## 2023-12-03 RX ADMIN — PANTOPRAZOLE SODIUM 40 MG: 40 TABLET, DELAYED RELEASE ORAL at 07:43

## 2023-12-03 RX ADMIN — DOCUSATE SODIUM 50 MG AND SENNOSIDES 8.6 MG 1 TABLET: 8.6; 5 TABLET, FILM COATED ORAL at 20:42

## 2023-12-03 RX ADMIN — DIPHENHYDRAMINE HYDROCHLORIDE 25 MG: 25 CAPSULE ORAL at 10:49

## 2023-12-03 RX ADMIN — LEVOTHYROXINE SODIUM 125 MCG: 0.12 TABLET ORAL at 07:43

## 2023-12-03 RX ADMIN — MONTELUKAST 10 MG: 10 TABLET, FILM COATED ORAL at 07:43

## 2023-12-03 RX ADMIN — FERROUS SULFATE TAB 325 MG (65 MG ELEMENTAL FE) 325 MG: 325 (65 FE) TAB at 11:35

## 2023-12-03 RX ADMIN — ALLOPURINOL 300 MG: 300 TABLET ORAL at 07:43

## 2023-12-03 RX ADMIN — DIPHENHYDRAMINE HYDROCHLORIDE 10 ML: 12.5 LIQUID ORAL at 21:31

## 2023-12-03 RX ADMIN — FERROUS SULFATE TAB 325 MG (65 MG ELEMENTAL FE) 325 MG: 325 (65 FE) TAB at 16:54

## 2023-12-03 RX ADMIN — CIPROFLOXACIN HYDROCHLORIDE 500 MG: 500 TABLET, FILM COATED ORAL at 07:43

## 2023-12-03 RX ADMIN — POTASSIUM CHLORIDE 20 MEQ: 1500 TABLET, EXTENDED RELEASE ORAL at 07:43

## 2023-12-03 RX ADMIN — ACETAMINOPHEN 650 MG: 325 TABLET ORAL at 10:49

## 2023-12-03 RX ADMIN — POTASSIUM CHLORIDE 20 MEQ: 1500 TABLET, EXTENDED RELEASE ORAL at 16:54

## 2023-12-03 RX ADMIN — IPRATROPIUM BROMIDE 2 SPRAY: 42 SPRAY NASAL at 20:46

## 2023-12-03 RX ADMIN — DEXAMETHASONE SODIUM PHOSPHATE 40 MG: 10 INJECTION INTRAMUSCULAR; INTRAVENOUS at 09:06

## 2023-12-03 RX ADMIN — AMLODIPINE BESYLATE 5 MG: 5 TABLET ORAL at 07:50

## 2023-12-03 ASSESSMENT — PAIN SCALES - GENERAL
PAINLEVEL_OUTOF10: 0
PAINLEVEL_OUTOF10: 0

## 2023-12-03 NOTE — PROGRESS NOTES
[facility] Hematology & Oncology        Inpatient Hematology / Oncology Progress Note    Reason for Admission:  Neutropenic fever ([address]) [D70.9, R50.81]  Pancytopenia ([address]) [D61.818]    24 Hour Events:  Afebrile, VSS  Dacogen completed 11/24, Mylotarg completed 11/16  With significant thrombocytopenia, refractory to transfusion - today plts 36  Restarted Dex 40 - D3   No bleeding, epistaxis resolved w Afrin   Remains on RA, no complaints   at bedside    Transfusions: 1 unit PRBC   Replacements: None    ROS:  Constitutional: Positive for fatigue; negative for fever, chills. CV: Negative for chest pain, palpitations, edema. Respiratory: Negative for wheezing, cough, dyspnea. GI: Negative for nausea, abdominal pain, diarrhea. 10 point review of systems is otherwise negative with the exception of the elements mentioned above in the HPI. Allergies   Allergen Reactions    Penicillins Hives    Sulfa Antibiotics Hives    Codeine Nausea And Vomiting     Past Medical History:   Diagnosis Date    Arthritis     Autoimmune disease ([address])     skin changes, unknown name    Cancer ([address]) 1990    ovarian    Chronic pain     arthritis in back and legs    Coronary artery spasm ([address])     COVID 05/15/2022    Essential hypertension 11/8/2019    Gastritis     GERD (gastroesophageal reflux disease)     Hx antineoplastic chemo     Migraine headache     Psoriasis     Psychiatric disorder     anxiety and depression    Severe obesity (BMI 35.0-39.9) 6/26/2018    Thyroid disease     Diagnosed in 1996     Past Surgical History:   Procedure Laterality Date    CARPAL TUNNEL RELEASE      GYN      ovaries    HYSTERECTOMY, TOTAL ABDOMINAL (CERVIX REMOVED)  Patriciabury      cardiac cath. Dx with spasms.     TUBAL LIGATION       Family History   Problem Relation Age of Onset    Parkinson's Disease Mother     Stroke Mother         Passed away in 1969    No Known Problems Paternal Grandfather     No daily.  12/3 D3 Dex 40mg; plts 36K; awaiting Promacta     Risk for TLS / DIC  - Check TLS daily  - Start prophylactic allopurinol  11/19 No evidence of TLS or DIC noted  11/23 Uric acid normal and coags stable     Neutropenic fever  - started on cef/vanc RVP neg  - CXR unremarkable  10/29 BC+ staph epidermidis, continue Cef/Vanc, MRSA DNA pending, consult ID for recommendations  10/31 Continues Cefe/Vanc per ID but S epi likely contaminant. Repeat BC NGTD. TM 98. Likely transitioning to PO/prophylactic Cipro today. 11/1 Afebrile. Transition to prophylactic cipro now that she has completed 5 day course of abx with likely contaminant/S epi with repeat BC.  RESOLVED    Altered mental status  11/1 Likely secondary to HD steroids. CT head negative. CTA and MRI brain pending after Code S called last night for dysarthria. 11/2 CTA negative. Unable to tolerate MRI brain without sedation so holding for now as symptoms more consistent with steroid-related effects vs stroke as no focal deficits noted. RESOLVED    Hypoxia / LE edema / abnormal breath sounds  11/1 LE edema may be related to steroids. Incomplete I/O recorded, weight up since admission. Stopped IVF. Check CXR and BNP.  11/2 BNP elevated, CXR with pulmonary vascular congestion. On 2-4L NC. Weight remains elevated since admission. 11/4 Stable fluid status, on 3L NC - weaning as tolerated. Was hypoxic when found off O2 in the middle of the night. 11/7 Continuing to wean O2 as tolerated. Down to 1L NC. Fluid balance stable. 11/8 O2 back up to 3 L NC.    10/10 on RA   11/13 on 2L - weight stable. 11/14 Check CXR as well as echo. Weight back down to admission baseline. On 2L NC with worsening dyspnea today. 11/15 Stable fluid status/weight. CXR negative. Echo WNL. On RA overnight. 11/17 On RA, +I/O 600ml and weight up (using different scale), no signs of overload.   11/22 on RA  11/23 Up 5# and +1.9L, remains on RA  11/27 Remains on RA, CXR unremarkable,

## 2023-12-03 NOTE — PROGRESS NOTES
Vss Pt A&O X's 4 no complaints of pain or discomfort voiding well no BM during shift pt received 1 unit of blood today tolerated it well in bed  at bedside call light in reach

## 2023-12-04 ENCOUNTER — CLINICAL DOCUMENTATION (OUTPATIENT)
Dept: ONCOLOGY | Age: 73
End: 2023-12-04

## 2023-12-04 LAB
ALBUMIN SERPL-MCNC: 3.2 G/DL (ref 3.2–4.6)
ALBUMIN/GLOB SERPL: 0.7 (ref 0.4–1.6)
ALP SERPL-CCNC: 108 U/L (ref 50–136)
ALT SERPL-CCNC: 37 U/L (ref 12–65)
ANION GAP SERPL CALC-SCNC: 7 MMOL/L (ref 2–11)
AST SERPL-CCNC: 19 U/L (ref 15–37)
BILIRUB SERPL-MCNC: 0.7 MG/DL (ref 0.2–1.1)
BUN SERPL-MCNC: 25 MG/DL (ref 8–23)
CALCIUM SERPL-MCNC: 9.3 MG/DL (ref 8.3–10.4)
CHLORIDE SERPL-SCNC: 105 MMOL/L (ref 101–110)
CO2 SERPL-SCNC: 25 MMOL/L (ref 21–32)
CREAT SERPL-MCNC: 0.7 MG/DL (ref 0.6–1)
DIFFERENTIAL METHOD BLD: ABNORMAL
ERYTHROCYTE [DISTWIDTH] IN BLOOD BY AUTOMATED COUNT: 13.5 % (ref 11.9–14.6)
GLOBULIN SER CALC-MCNC: 4.5 G/DL (ref 2.8–4.5)
GLUCOSE SERPL-MCNC: 128 MG/DL (ref 65–100)
HCT VFR BLD AUTO: 25.1 % (ref 35.8–46.3)
HGB BLD-MCNC: 8.6 G/DL (ref 11.7–15.4)
MAGNESIUM SERPL-MCNC: 2.2 MG/DL (ref 1.8–2.4)
MCH RBC QN AUTO: 29 PG (ref 26.1–32.9)
MCHC RBC AUTO-ENTMCNC: 34.3 G/DL (ref 31.4–35)
MCV RBC AUTO: 84.5 FL (ref 82–102)
NRBC # BLD: 0 K/UL (ref 0–0.2)
PLATELET # BLD AUTO: 18 K/UL (ref 150–450)
PLATELET COMMENT: ABNORMAL
PMV BLD AUTO: 9.9 FL (ref 9.4–12.3)
POTASSIUM SERPL-SCNC: 4.4 MMOL/L (ref 3.5–5.1)
PROT SERPL-MCNC: 7.7 G/DL (ref 6.3–8.2)
RBC # BLD AUTO: 2.97 M/UL (ref 4.05–5.2)
RBC MORPH BLD: ABNORMAL
SODIUM SERPL-SCNC: 137 MMOL/L (ref 133–143)
WBC # BLD AUTO: 0.3 K/UL (ref 4.3–11.1)
WBC MORPH BLD: ABNORMAL

## 2023-12-04 PROCEDURE — 2580000003 HC RX 258: Performed by: NURSE PRACTITIONER

## 2023-12-04 PROCEDURE — 85025 COMPLETE CBC W/AUTO DIFF WBC: CPT

## 2023-12-04 PROCEDURE — 97530 THERAPEUTIC ACTIVITIES: CPT

## 2023-12-04 PROCEDURE — 6360000002 HC RX W HCPCS: Performed by: NURSE PRACTITIONER

## 2023-12-04 PROCEDURE — 99233 SBSQ HOSP IP/OBS HIGH 50: CPT | Performed by: INTERNAL MEDICINE

## 2023-12-04 PROCEDURE — 6370000000 HC RX 637 (ALT 250 FOR IP): Performed by: INTERNAL MEDICINE

## 2023-12-04 PROCEDURE — 1170000000 HC RM PRIVATE ONCOLOGY

## 2023-12-04 PROCEDURE — 83735 ASSAY OF MAGNESIUM: CPT

## 2023-12-04 PROCEDURE — 36592 COLLECT BLOOD FROM PICC: CPT

## 2023-12-04 PROCEDURE — APPSS30 APP SPLIT SHARED TIME 16-30 MINUTES: Performed by: NURSE PRACTITIONER

## 2023-12-04 PROCEDURE — 6370000000 HC RX 637 (ALT 250 FOR IP): Performed by: FAMILY MEDICINE

## 2023-12-04 PROCEDURE — 6370000000 HC RX 637 (ALT 250 FOR IP): Performed by: NURSE PRACTITIONER

## 2023-12-04 PROCEDURE — 2580000003 HC RX 258: Performed by: FAMILY MEDICINE

## 2023-12-04 PROCEDURE — 80053 COMPREHEN METABOLIC PANEL: CPT

## 2023-12-04 RX ADMIN — MORPHINE SULFATE 15 MG: 15 TABLET, FILM COATED, EXTENDED RELEASE ORAL at 21:08

## 2023-12-04 RX ADMIN — TIZANIDINE 4 MG: 2 TABLET ORAL at 23:29

## 2023-12-04 RX ADMIN — CIPROFLOXACIN HYDROCHLORIDE 500 MG: 500 TABLET, FILM COATED ORAL at 08:27

## 2023-12-04 RX ADMIN — FLUOXETINE HYDROCHLORIDE 20 MG: 20 CAPSULE ORAL at 08:27

## 2023-12-04 RX ADMIN — LEVOTHYROXINE SODIUM 125 MCG: 0.12 TABLET ORAL at 08:26

## 2023-12-04 RX ADMIN — FAMOTIDINE 40 MG: 20 TABLET, FILM COATED ORAL at 21:08

## 2023-12-04 RX ADMIN — DEXAMETHASONE SODIUM PHOSPHATE 40 MG: 10 INJECTION INTRAMUSCULAR; INTRAVENOUS at 10:03

## 2023-12-04 RX ADMIN — FERROUS SULFATE TAB 325 MG (65 MG ELEMENTAL FE) 325 MG: 325 (65 FE) TAB at 17:22

## 2023-12-04 RX ADMIN — POTASSIUM CHLORIDE 20 MEQ: 1500 TABLET, EXTENDED RELEASE ORAL at 17:22

## 2023-12-04 RX ADMIN — POTASSIUM CHLORIDE 20 MEQ: 1500 TABLET, EXTENDED RELEASE ORAL at 08:26

## 2023-12-04 RX ADMIN — SALINE NASAL SPRAY 2 SPRAY: 1.5 SOLUTION NASAL at 21:09

## 2023-12-04 RX ADMIN — MORPHINE SULFATE 15 MG: 15 TABLET, FILM COATED, EXTENDED RELEASE ORAL at 08:26

## 2023-12-04 RX ADMIN — CIPROFLOXACIN HYDROCHLORIDE 500 MG: 500 TABLET, FILM COATED ORAL at 21:08

## 2023-12-04 RX ADMIN — ANTI-FUNGAL POWDER MICONAZOLE NITRATE TALC FREE: 1.42 POWDER TOPICAL at 21:09

## 2023-12-04 RX ADMIN — ACYCLOVIR 400 MG: 400 TABLET ORAL at 21:08

## 2023-12-04 RX ADMIN — ALLOPURINOL 300 MG: 300 TABLET ORAL at 08:26

## 2023-12-04 RX ADMIN — CHLORHEXIDINE GLUCONATE 15 ML: 1.2 RINSE ORAL at 21:09

## 2023-12-04 RX ADMIN — FLUCONAZOLE 200 MG: 100 TABLET ORAL at 08:26

## 2023-12-04 RX ADMIN — DOCUSATE SODIUM 50 MG AND SENNOSIDES 8.6 MG 1 TABLET: 8.6; 5 TABLET, FILM COATED ORAL at 21:08

## 2023-12-04 RX ADMIN — MONTELUKAST 10 MG: 10 TABLET, FILM COATED ORAL at 08:27

## 2023-12-04 RX ADMIN — ROSUVASTATIN CALCIUM 10 MG: 5 TABLET, FILM COATED ORAL at 08:26

## 2023-12-04 RX ADMIN — FERROUS SULFATE TAB 325 MG (65 MG ELEMENTAL FE) 325 MG: 325 (65 FE) TAB at 11:45

## 2023-12-04 RX ADMIN — PANTOPRAZOLE SODIUM 40 MG: 40 TABLET, DELAYED RELEASE ORAL at 15:30

## 2023-12-04 RX ADMIN — SALINE NASAL SPRAY 2 SPRAY: 1.5 SOLUTION NASAL at 15:31

## 2023-12-04 RX ADMIN — SALINE NASAL SPRAY 2 SPRAY: 1.5 SOLUTION NASAL at 03:14

## 2023-12-04 RX ADMIN — SALINE NASAL SPRAY 2 SPRAY: 1.5 SOLUTION NASAL at 08:27

## 2023-12-04 RX ADMIN — SODIUM CHLORIDE, PRESERVATIVE FREE 10 ML: 5 INJECTION INTRAVENOUS at 21:09

## 2023-12-04 RX ADMIN — ANTI-FUNGAL POWDER MICONAZOLE NITRATE TALC FREE: 1.42 POWDER TOPICAL at 08:29

## 2023-12-04 RX ADMIN — POLYETHYLENE GLYCOL 3350 17 G: 17 POWDER, FOR SOLUTION ORAL at 08:29

## 2023-12-04 RX ADMIN — DIPHENHYDRAMINE HYDROCHLORIDE 10 ML: 12.5 LIQUID ORAL at 08:27

## 2023-12-04 RX ADMIN — ACYCLOVIR 400 MG: 400 TABLET ORAL at 08:26

## 2023-12-04 RX ADMIN — AMLODIPINE BESYLATE 5 MG: 5 TABLET ORAL at 08:27

## 2023-12-04 RX ADMIN — SODIUM CHLORIDE, PRESERVATIVE FREE 10 ML: 5 INJECTION INTRAVENOUS at 08:27

## 2023-12-04 RX ADMIN — PANTOPRAZOLE SODIUM 40 MG: 40 TABLET, DELAYED RELEASE ORAL at 08:27

## 2023-12-04 RX ADMIN — FLUOXETINE HYDROCHLORIDE 20 MG: 20 CAPSULE ORAL at 21:08

## 2023-12-04 RX ADMIN — IPRATROPIUM BROMIDE 2 SPRAY: 42 SPRAY NASAL at 21:09

## 2023-12-04 ASSESSMENT — PAIN SCALES - GENERAL: PAINLEVEL_OUTOF10: 5

## 2023-12-04 NOTE — PROGRESS NOTES
[facility] Hematology & Oncology        Inpatient Hematology / Oncology Progress Note    Reason for Admission:  Neutropenic fever ([address]) [D70.9, R50.81]  Pancytopenia ([address]) [D61.818]    24 Hour Events:  Afebrile, VSS  Dacogen completed 11/24, Mylotarg completed 11/16  Plts stable at 18k, didn't require transfusion  Restarted Dex 40mg - D4, completes today  Remains on RA, no complaints or bleeding   at bedside    Transfusions: None  Replacements: None    ROS:  Constitutional: Positive for fatigue; negative for fever, chills. CV: Negative for chest pain, palpitations, edema. Respiratory: Negative for wheezing, cough, dyspnea. GI: Negative for nausea, abdominal pain, diarrhea. 10 point review of systems is otherwise negative with the exception of the elements mentioned above in the HPI. Allergies   Allergen Reactions    Penicillins Hives    Sulfa Antibiotics Hives    Codeine Nausea And Vomiting     Past Medical History:   Diagnosis Date    Arthritis     Autoimmune disease ([address])     skin changes, unknown name    Cancer ([address]) 1990    ovarian    Chronic pain     arthritis in back and legs    Coronary artery spasm ([address])     COVID 05/15/2022    Essential hypertension 11/8/2019    Gastritis     GERD (gastroesophageal reflux disease)     Hx antineoplastic chemo     Migraine headache     Psoriasis     Psychiatric disorder     anxiety and depression    Severe obesity (BMI 35.0-39.9) 6/26/2018    Thyroid disease     Diagnosed in 1996     Past Surgical History:   Procedure Laterality Date    CARPAL TUNNEL RELEASE      GYN      ovaries    HYSTERECTOMY, TOTAL ABDOMINAL (CERVIX REMOVED)  Pat[place]iabury      cardiac cath. Dx with spasms.     TUBAL LIGATION       Family History   Problem Relation Age of Onset    Parkinson's Disease Mother     Stroke Mother         Passed away in 1969    No Known Problems Paternal Grandfather     No Known Problems Paternal Grandmother     No Known receiving PRBCs/plts today   11/11 Hgb improved after transfusion yesterday.  Plt count 5k , no notable bleeding.   11/12 Plt count up to 40k yesterday (received 1 unit plts yesterday) this AM at 30k, much improved.    11/13 No bleeding noted   11/30 Some bleeding from gums, transfusing PRN. Recheck CBC later this afternoon. Will order CHG, discouraged brushing/abrasive foods. Reiterated fall precautions.  12/2 No bleeding reported during rounds.  12/4 Mild epistaxis recently - resolved w Afrin, no bleeding today     Hypertension  11/2 Resume home amlodipine  11/4 More hypertensive overnight, monitor. May need to adjust amlodipine if no improvement. Has PRN hydralazine.  11/8 Overall BP improved      Chronic pain   - on morphine ER 15mg BID, norco.  Muscle relaxant.       Constipation   - bowel regimen      No AC due to TCP   Continue home meds  Supportive care  PT/OT -   Antimicrobial prophylaxis - ACV, Diflucan, Cipro    Dispo - too soon to determine. Expect prolonged hospitalization through 10 day course of Dacogen/count recovery.     Updates to plan of care  11/26 Discussed the prognosis being very poor that she remains refractory to plt tx,  very depressed they had lost two children, consult spiritual care.  11/27 Wishes to remain full code, and continue with transfusion support for now.      Goals and plan of care reviewed with the patient.  All questions answered to the best of our ability.                   Catie Sorensen, TIM - CNP   [facility] Hematology & Oncology  [address] [place] [address]  Office : [phone]  Fax : [phone]        Attending Addendum:  I have personally performed a face to face diagnostic evaluation on this patient. I have reviewed and agree with the care plan as documented by Catie Sorensen, N.P. 51 minutes were spent on patient care, including but not limited to, reviewing the chart and time with the patient and family, more than 50% of the

## 2023-12-04 NOTE — PROGRESS NOTES
Follow-up visit to continue support.      [address], [address]  Board Certified

## 2023-12-04 NOTE — PROGRESS NOTES
Pt VSS pt A&o X's 4 no complaints of pain or discomfort up in chair during shift back in bed call light in reach voiding well no BM during shift

## 2023-12-04 NOTE — PLAN OF CARE
Problem: Pain  Goal: Verbalizes/displays adequate comfort level or baseline comfort level  Recent Flowsheet Documentation  Taken 12/4/2023 0313 by Anastacia Robertson RN  Verbalizes/displays adequate comfort level or baseline comfort level:   Encourage patient to monitor pain and request assistance   Assess pain using appropriate pain scale   Administer analgesics based on type and severity of pain and evaluate response   Implement non-pharmacological measures as appropriate and evaluate response   Consider cultural and social influences on pain and pain management  12/3/2023 2146 by Anastacia Robertson RN  Outcome: Progressing  Flowsheets (Taken 12/3/2023 2042)  Verbalizes/displays adequate comfort level or baseline comfort level:   Encourage patient to monitor pain and request assistance   Assess pain using appropriate pain scale   Administer analgesics based on type and severity of pain and evaluate response   Implement non-pharmacological measures as appropriate and evaluate response   Consider cultural and social influences on pain and pain management     Problem: ABCDS Injury Assessment  Goal: Absence of physical injury  12/4/2023 0950 by Zenia Yoder RN  Outcome: Progressing  12/3/2023 2146 by Anastacia Robertson RN  Outcome: Progressing     Problem: Safety - Adult  Goal: Free from fall injury  12/4/2023 0950 by Zenia Yoder RN  Outcome: Progressing  12/3/2023 2146 by Anastacia Robertson RN  Outcome: Progressing

## 2023-12-04 NOTE — PROGRESS NOTES
ACUTE PHYSICAL THERAPY GOALS:   (Developed with and agreed upon by patient and/or caregiver.)  Goals assessed and revised 11/27/23  LTG:  (1.)Ms. Ilana Pate will move from supine to sit and sit to supine , scoot up and down, and roll side to side in bed with INDEPENDENCE within 7 treatment day(s). GOAL MET 11/27/2023  (2.)Ms. Ilana Pate will transfer from bed to chair and chair to bed with MODIFIED INDEPENDENCE using the least restrictive device within 7 treatment day(s). GOAL MET 11/27/2023  (3.)Ms. Ilana Pate will ambulate with MODIFIED INDEPENDENCE for 500 feet with the least restrictive device within 7 treatment day(s). (4.)Ms. Ilana Pate will participate in therapeutic activity/exercises x 25 minutes for increased activity tolerance within 7 treatment days. (5.)Ms. Ilana Pate will perform standing static and dynamic balance activities x 15 minutes with SUPERVISION to improve safety within 7 day(s). New Goal:      (6.)Ms. Ilana Pate will demonstrate understanding of energy conservation and activity pacing techniques and apply them with no cueing within 7 days. (7.)Ms. Ilana Pate will be independent in all bed mobility and bed to chair, chair to bed transfers within 7 treatment day. PHYSICAL THERAPY: Daily Note AM   (Link to Caseload Tracking: PT Visit Days : 4  Time In/Out PT Charge Capture  Rehab Caseload Tracker  Orders    Robe Fox is a 71 y.o. female   PRIMARY DIAGNOSIS: Neutropenic fever ([address])  Neutropenic fever ([address]) [D70.9, R50.81]  Pancytopenia ([address]) [D61.818]       Inpatient: Payor: Tyronne Hodgkins / Plan: Werner Haro / Product Type: *No Product type* /     ASSESSMENT:     REHAB RECOMMENDATIONS:   Recommendation to date pending progress:  Setting:  Home Health Therapy    Equipment:    To Be Determined     ASSESSMENT:  Ms. Ilana Pate was received supine in bed, agreeable to therapy. Reports more fatigue this morning. She was able to ambulate 100 ft x 2 with RW and SBA-CGA with a seated rest break.  Cueing

## 2023-12-04 NOTE — PROGRESS NOTES
END OF SHIFT SUMMARY:    Significant labs this shift:    Plt 18  WBC/ANC 0.3/0.0  Hgb 8.6    Additional events this shift:   VSS overnight, no c/o pain or nausea this shift        Shift report given to oncoming RN Mariajose Foley RN

## 2023-12-05 ENCOUNTER — CLINICAL DOCUMENTATION (OUTPATIENT)
Dept: ONCOLOGY | Age: 73
End: 2023-12-05

## 2023-12-05 LAB
25(OH)D3 SERPL-MCNC: 26 NG/ML (ref 30–100)
ABO + RH BLD: NORMAL
ALBUMIN SERPL-MCNC: 3.1 G/DL (ref 3.2–4.6)
ALBUMIN/GLOB SERPL: 0.7 (ref 0.4–1.6)
ALP SERPL-CCNC: 99 U/L (ref 50–136)
ALT SERPL-CCNC: 32 U/L (ref 12–65)
ANION GAP SERPL CALC-SCNC: 7 MMOL/L (ref 2–11)
AST SERPL-CCNC: 14 U/L (ref 15–37)
BASOPHILS # BLD: 0 K/UL (ref 0–0.2)
BASOPHILS NFR BLD: 0 % (ref 0–2)
BILIRUB SERPL-MCNC: 0.8 MG/DL (ref 0.2–1.1)
BLD PROD TYP BPU: NORMAL
BLOOD BANK CMNT PATIENT-IMP: NORMAL
BLOOD BANK CMNT PATIENT-IMP: NORMAL
BLOOD BANK DISPENSE STATUS: NORMAL
BLOOD GROUP ANTIBODIES SERPL: NORMAL
BPU ID: NORMAL
BUN SERPL-MCNC: 21 MG/DL (ref 8–23)
CALCIUM SERPL-MCNC: 8.8 MG/DL (ref 8.3–10.4)
CHLORIDE SERPL-SCNC: 104 MMOL/L (ref 103–113)
CO2 SERPL-SCNC: 24 MMOL/L (ref 21–32)
CREAT SERPL-MCNC: 0.63 MG/DL (ref 0.6–1)
CROSSMATCH RESULT: NORMAL
CROSSMATCH RESULT: NORMAL
DIFFERENTIAL METHOD BLD: ABNORMAL
DIFFERENTIAL METHOD BLD: ABNORMAL
EOSINOPHIL # BLD: 0 K/UL (ref 0–0.8)
EOSINOPHIL NFR BLD: 0 % (ref 0.5–7.8)
ERYTHROCYTE [DISTWIDTH] IN BLOOD BY AUTOMATED COUNT: 13.2 % (ref 11.9–14.6)
ERYTHROCYTE [DISTWIDTH] IN BLOOD BY AUTOMATED COUNT: 13.5 % (ref 11.9–14.6)
GLOBULIN SER CALC-MCNC: 4.2 G/DL (ref 2.8–4.5)
GLUCOSE SERPL-MCNC: 138 MG/DL (ref 65–100)
HCT VFR BLD AUTO: 23.5 % (ref 35.8–46.3)
HCT VFR BLD AUTO: 24.7 % (ref 35.8–46.3)
HGB BLD-MCNC: 8 G/DL (ref 11.7–15.4)
HGB BLD-MCNC: 8.4 G/DL (ref 11.7–15.4)
IMM GRANULOCYTES # BLD AUTO: 0 K/UL (ref 0–0.5)
IMM GRANULOCYTES NFR BLD AUTO: 0 % (ref 0–5)
LYMPHOCYTES # BLD: 0.5 K/UL (ref 0.5–4.6)
LYMPHOCYTES NFR BLD: 98 % (ref 13–44)
MAGNESIUM SERPL-MCNC: 2.3 MG/DL (ref 1.8–2.4)
MCH RBC QN AUTO: 28.7 PG (ref 26.1–32.9)
MCH RBC QN AUTO: 28.9 PG (ref 26.1–32.9)
MCHC RBC AUTO-ENTMCNC: 34 G/DL (ref 31.4–35)
MCHC RBC AUTO-ENTMCNC: 34 G/DL (ref 31.4–35)
MCV RBC AUTO: 84.3 FL (ref 82–102)
MCV RBC AUTO: 84.8 FL (ref 82–102)
MONOCYTES # BLD: 0 K/UL (ref 0.1–1.3)
MONOCYTES NFR BLD: 0 % (ref 4–12)
NEUTS SEG # BLD: 0 K/UL (ref 1.7–8.2)
NEUTS SEG NFR BLD: 2 % (ref 43–78)
NRBC # BLD: 0 K/UL (ref 0–0.2)
NRBC # BLD: 0 K/UL (ref 0–0.2)
PLATELET # BLD AUTO: 26 K/UL (ref 150–450)
PLATELET # BLD AUTO: 8 K/UL (ref 150–450)
PLATELET COMMENT: ABNORMAL
PLATELET COMMENT: ABNORMAL
PMV BLD AUTO: 10.2 FL (ref 9.4–12.3)
PMV BLD AUTO: 9.2 FL (ref 9.4–12.3)
POTASSIUM SERPL-SCNC: 4.3 MMOL/L (ref 3.5–5.1)
PROT SERPL-MCNC: 7.3 G/DL (ref 6.3–8.2)
RBC # BLD AUTO: 2.77 M/UL (ref 4.05–5.2)
RBC # BLD AUTO: 2.93 M/UL (ref 4.05–5.2)
RBC MORPH BLD: ABNORMAL
SODIUM SERPL-SCNC: 135 MMOL/L (ref 136–146)
SPECIMEN EXP DATE BLD: NORMAL
UNIT DIVISION: 0
WBC # BLD AUTO: 0.3 K/UL (ref 4.3–11.1)
WBC # BLD AUTO: 0.5 K/UL (ref 4.3–11.1)
WBC MORPH BLD: ABNORMAL
WBC MORPH BLD: ABNORMAL

## 2023-12-05 PROCEDURE — 6370000000 HC RX 637 (ALT 250 FOR IP): Performed by: NURSE PRACTITIONER

## 2023-12-05 PROCEDURE — 6370000000 HC RX 637 (ALT 250 FOR IP): Performed by: INTERNAL MEDICINE

## 2023-12-05 PROCEDURE — 86813 HLA TYPING A B OR C: CPT

## 2023-12-05 PROCEDURE — 82306 VITAMIN D 25 HYDROXY: CPT

## 2023-12-05 PROCEDURE — 2580000003 HC RX 258: Performed by: FAMILY MEDICINE

## 2023-12-05 PROCEDURE — 6370000000 HC RX 637 (ALT 250 FOR IP): Performed by: FAMILY MEDICINE

## 2023-12-05 PROCEDURE — 85025 COMPLETE CBC W/AUTO DIFF WBC: CPT

## 2023-12-05 PROCEDURE — APPSS45 APP SPLIT SHARED TIME 31-45 MINUTES

## 2023-12-05 PROCEDURE — 99231 SBSQ HOSP IP/OBS SF/LOW 25: CPT | Performed by: NURSE PRACTITIONER

## 2023-12-05 PROCEDURE — 36430 TRANSFUSION BLD/BLD COMPNT: CPT

## 2023-12-05 PROCEDURE — P9037 PLATE PHERES LEUKOREDU IRRAD: HCPCS

## 2023-12-05 PROCEDURE — 1170000000 HC RM PRIVATE ONCOLOGY

## 2023-12-05 PROCEDURE — 80053 COMPREHEN METABOLIC PANEL: CPT

## 2023-12-05 PROCEDURE — 36592 COLLECT BLOOD FROM PICC: CPT

## 2023-12-05 PROCEDURE — 99233 SBSQ HOSP IP/OBS HIGH 50: CPT | Performed by: INTERNAL MEDICINE

## 2023-12-05 PROCEDURE — 83735 ASSAY OF MAGNESIUM: CPT

## 2023-12-05 PROCEDURE — 97530 THERAPEUTIC ACTIVITIES: CPT

## 2023-12-05 PROCEDURE — 97535 SELF CARE MNGMENT TRAINING: CPT

## 2023-12-05 RX ORDER — CLOBETASOL PROPIONATE 0.5 MG/G
OINTMENT TOPICAL 2 TIMES DAILY PRN
Status: DISCONTINUED | OUTPATIENT
Start: 2023-12-05 | End: 2023-12-19 | Stop reason: HOSPADM

## 2023-12-05 RX ORDER — ERGOCALCIFEROL 1.25 MG/1
50000 CAPSULE ORAL DAILY
Status: DISPENSED | OUTPATIENT
Start: 2023-12-05 | End: 2023-12-12

## 2023-12-05 RX ORDER — CETIRIZINE HYDROCHLORIDE 10 MG/1
10 TABLET ORAL DAILY
Status: DISCONTINUED | OUTPATIENT
Start: 2023-12-05 | End: 2023-12-19 | Stop reason: HOSPADM

## 2023-12-05 RX ORDER — GUAIFENESIN 600 MG/1
600 TABLET, EXTENDED RELEASE ORAL 2 TIMES DAILY
Status: DISCONTINUED | OUTPATIENT
Start: 2023-12-05 | End: 2023-12-19 | Stop reason: HOSPADM

## 2023-12-05 RX ADMIN — POTASSIUM CHLORIDE 20 MEQ: 1500 TABLET, EXTENDED RELEASE ORAL at 08:50

## 2023-12-05 RX ADMIN — SALINE NASAL SPRAY 2 SPRAY: 1.5 SOLUTION NASAL at 08:55

## 2023-12-05 RX ADMIN — POLYETHYLENE GLYCOL 3350 17 G: 17 POWDER, FOR SOLUTION ORAL at 08:54

## 2023-12-05 RX ADMIN — ROSUVASTATIN CALCIUM 10 MG: 5 TABLET, FILM COATED ORAL at 08:50

## 2023-12-05 RX ADMIN — FLUOXETINE HYDROCHLORIDE 20 MG: 20 CAPSULE ORAL at 21:36

## 2023-12-05 RX ADMIN — ACYCLOVIR 400 MG: 400 TABLET ORAL at 08:53

## 2023-12-05 RX ADMIN — SALINE NASAL SPRAY 2 SPRAY: 1.5 SOLUTION NASAL at 15:55

## 2023-12-05 RX ADMIN — FERROUS SULFATE TAB 325 MG (65 MG ELEMENTAL FE) 325 MG: 325 (65 FE) TAB at 15:53

## 2023-12-05 RX ADMIN — MORPHINE SULFATE 15 MG: 15 TABLET, FILM COATED, EXTENDED RELEASE ORAL at 08:49

## 2023-12-05 RX ADMIN — FLUCONAZOLE 200 MG: 100 TABLET ORAL at 08:50

## 2023-12-05 RX ADMIN — CIPROFLOXACIN HYDROCHLORIDE 500 MG: 500 TABLET, FILM COATED ORAL at 08:52

## 2023-12-05 RX ADMIN — IPRATROPIUM BROMIDE 2 SPRAY: 42 SPRAY NASAL at 08:57

## 2023-12-05 RX ADMIN — LEVOTHYROXINE SODIUM 125 MCG: 0.12 TABLET ORAL at 08:53

## 2023-12-05 RX ADMIN — SODIUM CHLORIDE, PRESERVATIVE FREE 10 ML: 5 INJECTION INTRAVENOUS at 21:54

## 2023-12-05 RX ADMIN — PANTOPRAZOLE SODIUM 40 MG: 40 TABLET, DELAYED RELEASE ORAL at 08:50

## 2023-12-05 RX ADMIN — ACETAMINOPHEN 650 MG: 325 TABLET ORAL at 09:54

## 2023-12-05 RX ADMIN — CIPROFLOXACIN HYDROCHLORIDE 500 MG: 500 TABLET, FILM COATED ORAL at 21:35

## 2023-12-05 RX ADMIN — FAMOTIDINE 40 MG: 20 TABLET, FILM COATED ORAL at 21:36

## 2023-12-05 RX ADMIN — MORPHINE SULFATE 15 MG: 15 TABLET, FILM COATED, EXTENDED RELEASE ORAL at 21:36

## 2023-12-05 RX ADMIN — FLUOXETINE HYDROCHLORIDE 20 MG: 20 CAPSULE ORAL at 08:52

## 2023-12-05 RX ADMIN — POTASSIUM CHLORIDE 20 MEQ: 1500 TABLET, EXTENDED RELEASE ORAL at 15:53

## 2023-12-05 RX ADMIN — GUAIFENESIN 600 MG: 600 TABLET ORAL at 21:35

## 2023-12-05 RX ADMIN — ALLOPURINOL 300 MG: 300 TABLET ORAL at 08:50

## 2023-12-05 RX ADMIN — FERROUS SULFATE TAB 325 MG (65 MG ELEMENTAL FE) 325 MG: 325 (65 FE) TAB at 12:41

## 2023-12-05 RX ADMIN — MONTELUKAST 10 MG: 10 TABLET, FILM COATED ORAL at 08:52

## 2023-12-05 RX ADMIN — ACYCLOVIR 400 MG: 400 TABLET ORAL at 21:35

## 2023-12-05 RX ADMIN — CHLORHEXIDINE GLUCONATE 15 ML: 1.2 RINSE ORAL at 21:42

## 2023-12-05 RX ADMIN — AMLODIPINE BESYLATE 5 MG: 5 TABLET ORAL at 08:51

## 2023-12-05 RX ADMIN — SALINE NASAL SPRAY 2 SPRAY: 1.5 SOLUTION NASAL at 03:14

## 2023-12-05 RX ADMIN — CHLORHEXIDINE GLUCONATE 15 ML: 1.2 RINSE ORAL at 09:03

## 2023-12-05 RX ADMIN — TIZANIDINE 4 MG: 2 TABLET ORAL at 21:34

## 2023-12-05 RX ADMIN — DIPHENHYDRAMINE HYDROCHLORIDE 25 MG: 25 CAPSULE ORAL at 09:54

## 2023-12-05 RX ADMIN — CETIRIZINE HYDROCHLORIDE 10 MG: 10 TABLET, FILM COATED ORAL at 21:41

## 2023-12-05 RX ADMIN — PANTOPRAZOLE SODIUM 40 MG: 40 TABLET, DELAYED RELEASE ORAL at 15:53

## 2023-12-05 ASSESSMENT — PAIN SCALES - GENERAL: PAINLEVEL_OUTOF10: 0

## 2023-12-05 NOTE — CARE COORDINATION
Chart reviewed for discharge planning. LOS 39 days. Per MD, awaiting count recovery and potential BMBx next week. CM will continue to follow patient's plan of care and assist further with supportive care needs as appropriate.

## 2023-12-05 NOTE — PROGRESS NOTES
ACUTE OCCUPATIONAL THERAPY GOALS:   (Developed with and agreed upon by patient and/or caregiver.)  Re-evaluation completed on 12/05/23  1. Patient will complete lower body bathing and dressing with MOD I and adaptive equipment as needed. 2. Patient will complete toileting with MOD I.   3. Patient will tolerate 30 minutes of OT treatment with 1-2 rest breaks to increase activity tolerance for ADLs. 4. Patient will complete functional transfers with MOD I and adaptive equipment as needed. 5. Patient will complete functional mobility for household distances with MOD I and adaptive equipment as needed. 6. Patient will complete self-grooming while standing edge of sink with MOD I and adaptive equipment as needed. New Goal Added:  7. Patient will complete UB bathing and dressing with MOD I and adaptive equipment as needed. 8. Patient will verbalize and demonstrate 3 energy conservation strategies throughout completion of bADLs. Timeframe: 7 visits       OCCUPATIONAL THERAPY Initial Assessment, Daily Note, and AM       OT Visit Days: 1   Acknowledge Orders  Time  OT Charge Capture  Rehab Caseload Tracker      Neutropenic Precautions    Tequila Cannon is a 71 y.o. female   PRIMARY DIAGNOSIS: Neutropenic fever ([address])  Neutropenic fever ([address]) [D70.9, R50.81]  Pancytopenia ([address]) [H32.395]       Reason for Referral: Generalized Muscle Weakness (M62.81)  Difficulty in walking, Not elsewhere classified (R26.2)  Inpatient: Payor: Lamberto Miller / Plan: Brian Wilhelm / Product Type: *No Product type* /     ASSESSMENT:     REHAB RECOMMENDATIONS:   Recommendation to date pending progress:  Setting:  Home Health Therapy    Equipment:    To Be Determined     ASSESSMENT:  Ms. Ryan Troy presented to the hospital with weakness, lethargy, and cough. Pt now on hospital day 39 and admitted with neutropenic fever. Pt with a PMH significant for acute erythroblastic leukemia.  At baseline, pt is mod I for bADLs,

## 2023-12-05 NOTE — PROGRESS NOTES
Comprehensive Nutrition Assessment    Type and Reason for Visit: Reassess  Malnutrition Screening Tool: Malnutrition Screen  Have you recently lost weight without trying?: 24 to 33 pounds (3 points)  Have you been eating poorly because of a decreased appetite?: Yes (1 point)  Malnutrition Screening Tool Score: 4    Nutrition Recommendations/Plan:   Meals and Snacks:  Diet: Continue current order  Nutrition Supplement Therapy:  Medical food supplement therapy:  Continue Ensure Enlive twice per day (this provides 350 kcal and 20 grams protein per bottle) chocolate served with a milk container. Malnutrition Assessment:  Malnutrition Status: At risk for malnutrition (Comment) (pt reports + wt loss, waxing and waning oral intake)  No fat or muscle loss appreciated. Nutrition Assessment:  Nutrition History: Pt reports her intake and wt initially started changing after having Covid in May 2022.  + loss of appetite, taste alterations. She indicates at one point her wt was >200# initially declined quickly then up and down with fluid changes. She states at this point she is unsure of her true wt. Do You Have Any Cultural, Baptism, or Ethnic Food Preferences?: No   Nutrition Background: PMHx: psoriatic arthritis on Humira, hx of ovarian cam HTN, HLD, GERD and hypothyroidism. Admitted with MDS, pancytopenia, neutropenic fever, chronic pain, constipation. IR BMBX 11/7. Remains inpatient awaiting count recovery. Chronic low platelets. Nutrition Interval:  Patient seen sitting up in bed with spouse at bedside. She states she has been eating very well since last assessment. Observed breakfast tray with everything consumed except sausage. She continues to drink Ensure when she does not eat so well for a meal. Wt is stable.       Current Nutrition Therapies:  ADULT ORAL NUTRITION SUPPLEMENT; Breakfast, Dinner; Standard High Calorie/High Protein Oral Supplement  ADULT DIET; Easy to Chew    Current Intake:   Average Meal Intake:  (variable %) Average Supplements Intake: %      Anthropometric Measures:  Height: 152.4 cm (5')  Current Body Wt: 75.4 kg (166 lb 3.6 oz) (12/4), Weight source: Bed Scale  BMI: 32.5, Obese Class 1 (BMI 30.0-34. 9)  Admission Body Weight: 77.1 kg (170 lb) (standing scale)  Ideal Body Weight (Kg) (Calculated): 45 kg (100 lbs), 166.2 %  Usual Body Wt:  (pt reports hx of fluctuating wts >200# at one point, lowest in the 170s), Percent weight change:  unable to validate loss  Weight trending up since admission 10/27 through 11/1, now trending down at each interval.  Edema currently resolved. BMI Category Obese Class 1 (BMI 30.0-34. 9)  Estimated Daily Nutrient Needs:  Energy (kcal/day): 0682-5600 (20-25 kcal/kg) (Kcal/kg Weight used: 77 kg Admission  Protein (g/day): 77-92 (1-1.2 g/kg) Weight Used: (Ideal) 77 kg  Fluid (ml/day):   (1 ml/kcal)    Nutrition Diagnosis:   Inadequate oral intake related to  (variable appetite) as evidenced by  (reported barriers to PO, intake as above)  Nutrition Interventions:   Food and/or Nutrient Delivery: Continue Current Diet, Continue Oral Nutrition Supplement     Coordination of Nutrition Care: Continue to monitor while inpatient      Goals:   Previous Goal Met: Goal(s) Achieved  Active Goal:  (Continue to meet at least 75% needs)       Nutrition Monitoring and Evaluation:      Food/Nutrient Intake Outcomes: Food and Nutrient Intake, Supplement Intake  Physical Signs/Symptoms Outcomes: Meal Time Behavior, Weight    Discharge Planning:    Continue Oral Nutrition Supplement    FITO WEIR, RD

## 2023-12-05 NOTE — PROGRESS NOTES
[address] [facility] Hematology & Oncology        Inpatient Hematology / Oncology Progress Note    Reason for Admission:  Neutropenic fever ([address]) [D70.9, R50.81]  Pancytopenia ([address]) [D61.818]    24 Hour Events:  Afebrile, VSS, on RA. Dacogen completed 11/24, Mylotarg completed 11/16  Plts stable at 8K-1u PLTs given. Dex completed today. Will restart Saturday X4. Plan for BMBx next week.  at bedside. Transfusions: 1u PLTs. Replacements: On PO K.     ROS:  Constitutional: Positive for fatigue; negative for fever, chills. CV: Negative for chest pain, palpitations, edema. Respiratory: Negative for wheezing, cough, dyspnea. GI: Negative for nausea, abdominal pain, diarrhea. 10 point review of systems is otherwise negative with the exception of the elements mentioned above in the HPI. Allergies   Allergen Reactions    Penicillins Hives    Sulfa Antibiotics Hives    Codeine Nausea And Vomiting     Past Medical History:   Diagnosis Date    Arthritis     Autoimmune disease ([address])     skin changes, unknown name    Cancer ([address]) 1990    ovarian    Chronic pain     arthritis in back and legs    Coronary artery spasm ([address])     COVID 05/15/2022    Essential hypertension 11/8/2019    Gastritis     GERD (gastroesophageal reflux disease)     Hx antineoplastic chemo     Migraine headache     Psoriasis     Psychiatric disorder     anxiety and depression    Severe obesity (BMI 35.0-39.9) 6/26/2018    Thyroid disease     Diagnosed in 1996     Past Surgical History:   Procedure Laterality Date    CARPAL TUNNEL RELEASE      GYN      ovaries    HYSTERECTOMY, TOTAL ABDOMINAL (CERVIX REMOVED)  Patriciabury      cardiac cath. Dx with spasms.     TUBAL LIGATION       Family History   Problem Relation Age of Onset    Parkinson's Disease Mother     Stroke Mother         Passed away in 1969    No Known Problems Paternal Grandfather     No Known Problems Paternal Grandmother     No Known Problems Maternal Grandfather     No Known Problems Maternal Grandmother     Prostate Cancer Father          age 86     Cancer Father         Kidney     Social History     Socioeconomic History    Marital status:      Spouse name: Not on file    Number of children: Not on file    Years of education: Not on file    Highest education level: Not on file   Occupational History    Not on file   Tobacco Use    Smoking status: Never     Passive exposure: Past    Smokeless tobacco: Never   Vaping Use    Vaping Use: Never used   Substance and Sexual Activity    Alcohol use: No    Drug use: Yes     Types: Prescription    Sexual activity: Not Currently     Birth control/protection: None   Other Topics Concern    Not on file   Social History Narrative    Not on file     Social Determinants of Health     Financial Resource Strain: Low Risk  (2023)    Overall Financial Resource Strain (CARDIA)     Difficulty of Paying Living Expenses: Not hard at all   Food Insecurity: Not on file (2023)   Transportation Needs: No Transportation Needs (2023)    Transportation Problems ([facility] HRSN)     In the past 12 months, has lack of reliable transportation kept you from medical appointments, meetings, work or from getting things needed for daily living?: Not on file   Recent Concern: Transportation Needs - Unmet Transportation Needs (2023)    PRAPARE - Transportation     Lack of Transportation (Medical): Yes     Lack of Transportation (Non-Medical): No   Physical Activity: Not on file   Stress: Not on file   Social Connections: Not on file   Intimate Partner Violence: Not on file   Housing Stability: High Risk (2023)    Housing Stability Vital Sign     Unable to Pay for Housing in the Last Year: Yes     Number of Places Lived in the Last Year: 1     Unstable Housing in the Last Year: No     Current Facility-Administered Medications   Medication Dose Route Frequency Provider Last Rate Last Admin    0.9 % sodium chloride  transfusion support for now. Goals and plan of care reviewed with the patient. All questions answered to the best of our ability. Toney Crabtree, APRN - [address] [facility] Hematology & Oncology  [address] [place] [address]  Office : [phone]  Fax : [phone]         Attending Addendum:  I have personally performed a face to face diagnostic evaluation on this patient. I have reviewed and agree with the care plan as documented by Toney Crabtree, N.P. 51 minutes were spent on patient care, including but not limited to, reviewing the chart and time with the patient and family, more than 50% of the time documented was spent in face-to-face contact with the patient and in the care of the patient on the floor/unit where the patient is located. My findings are as follows:  she has AML, appears weak, heart rate regular without murmurs, abdomen is non-tender, bowel sounds are positive, we will transfuse Platelets and arrange for her to undergo a bone marrow biopsy next week.               Apolinar Hopson MD    [facility] Hematology/Oncology  [address] [place] [address] Road  Office : [phone]  Fax : [phone]

## 2023-12-06 ENCOUNTER — APPOINTMENT (OUTPATIENT)
Dept: GENERAL RADIOLOGY | Age: 73
DRG: 808 | End: 2023-12-06
Payer: MEDICARE

## 2023-12-06 ENCOUNTER — APPOINTMENT (OUTPATIENT)
Dept: CT IMAGING | Age: 73
DRG: 808 | End: 2023-12-06
Payer: MEDICARE

## 2023-12-06 LAB
ACCESSION NUMBER, LLC1M: ABNORMAL
ACINETOBACTER CALCOAC BAUMANNII COMPLEX BY PCR: NOT DETECTED
ALBUMIN SERPL-MCNC: 3 G/DL (ref 3.2–4.6)
ALBUMIN/GLOB SERPL: 0.7 (ref 0.4–1.6)
ALP SERPL-CCNC: 85 U/L (ref 50–136)
ALT SERPL-CCNC: 32 U/L (ref 12–65)
ANION GAP SERPL CALC-SCNC: 5 MMOL/L (ref 2–11)
APPEARANCE UR: CLEAR
AST SERPL-CCNC: 18 U/L (ref 15–37)
B FRAGILIS DNA BLD POS QL NAA+NON-PROBE: NOT DETECTED
BACTERIA URNS QL MICRO: NEGATIVE /HPF
BASOPHILS # BLD: 0 K/UL (ref 0–0.2)
BASOPHILS NFR BLD: 0 % (ref 0–2)
BILIRUB SERPL-MCNC: 0.7 MG/DL (ref 0.2–1.1)
BILIRUB UR QL: NEGATIVE
BIOFIRE TEST COMMENT: ABNORMAL
BLD PROD TYP BPU: NORMAL
BLOOD BANK CMNT PATIENT-IMP: NORMAL
BLOOD BANK DISPENSE STATUS: NORMAL
BPU ID: NORMAL
BUN SERPL-MCNC: 19 MG/DL (ref 8–23)
C ALBICANS DNA BLD POS QL NAA+NON-PROBE: NOT DETECTED
C AURIS DNA BLD POS QL NAA+NON-PROBE: NOT DETECTED
C GATTII+NEOFOR DNA BLD POS QL NAA+N-PRB: NOT DETECTED
C GLABRATA DNA BLD POS QL NAA+NON-PROBE: NOT DETECTED
C KRUSEI DNA BLD POS QL NAA+NON-PROBE: NOT DETECTED
C PARAP DNA BLD POS QL NAA+NON-PROBE: NOT DETECTED
C TROPICLS DNA BLD POS QL NAA+NON-PROBE: NOT DETECTED
CALCIUM SERPL-MCNC: 9 MG/DL (ref 8.3–10.4)
CASTS URNS QL MICRO: ABNORMAL /LPF
CHLORIDE SERPL-SCNC: 100 MMOL/L (ref 103–113)
CO2 SERPL-SCNC: 26 MMOL/L (ref 21–32)
COLOR UR: ABNORMAL
CREAT SERPL-MCNC: 0.7 MG/DL (ref 0.6–1)
DIFFERENTIAL METHOD BLD: ABNORMAL
E CLOAC COMP DNA BLD POS NAA+NON-PROBE: NOT DETECTED
E COLI DNA BLD POS QL NAA+NON-PROBE: NOT DETECTED
E FAECALIS DNA BLD POS QL NAA+NON-PROBE: NOT DETECTED
E FAECIUM DNA BLD POS QL NAA+NON-PROBE: DETECTED
ENTEROBACTERALES DNA BLD POS NAA+N-PRB: NOT DETECTED
EOSINOPHIL # BLD: 0 K/UL (ref 0–0.8)
EOSINOPHIL NFR BLD: 0 % (ref 0.5–7.8)
EPI CELLS #/AREA URNS HPF: ABNORMAL /HPF
ERYTHROCYTE [DISTWIDTH] IN BLOOD BY AUTOMATED COUNT: 13.5 % (ref 11.9–14.6)
GLOBULIN SER CALC-MCNC: 4.5 G/DL (ref 2.8–4.5)
GLUCOSE SERPL-MCNC: 92 MG/DL (ref 65–100)
GLUCOSE UR STRIP.AUTO-MCNC: NEGATIVE MG/DL
GP B STREP DNA BLD POS QL NAA+NON-PROBE: NOT DETECTED
HAEM INFLU DNA BLD POS QL NAA+NON-PROBE: NOT DETECTED
HCT VFR BLD AUTO: 26 % (ref 35.8–46.3)
HGB BLD-MCNC: 8.7 G/DL (ref 11.7–15.4)
HGB UR QL STRIP: NEGATIVE
IMM GRANULOCYTES # BLD AUTO: 0 K/UL (ref 0–0.5)
IMM GRANULOCYTES NFR BLD AUTO: 0 % (ref 0–5)
K OXYTOCA DNA BLD POS QL NAA+NON-PROBE: NOT DETECTED
KETONES UR QL STRIP.AUTO: NEGATIVE MG/DL
KLEBSIELLA SP DNA BLD POS QL NAA+NON-PRB: NOT DETECTED
KLEBSIELLA SP DNA BLD POS QL NAA+NON-PRB: NOT DETECTED
L MONOCYTOG DNA BLD POS QL NAA+NON-PROBE: NOT DETECTED
LEUKOCYTE ESTERASE UR QL STRIP.AUTO: NEGATIVE
LYMPHOCYTES # BLD: 1.3 K/UL (ref 0.5–4.6)
LYMPHOCYTES NFR BLD: 97 % (ref 13–44)
MAGNESIUM SERPL-MCNC: 2 MG/DL (ref 1.8–2.4)
MCH RBC QN AUTO: 28.5 PG (ref 26.1–32.9)
MCHC RBC AUTO-ENTMCNC: 33.5 G/DL (ref 31.4–35)
MCV RBC AUTO: 85.2 FL (ref 82–102)
MONOCYTES # BLD: 0 K/UL (ref 0.1–1.3)
MONOCYTES NFR BLD: 2 % (ref 4–12)
MUCOUS THREADS URNS QL MICRO: 0 /LPF
N MEN DNA BLD POS QL NAA+NON-PROBE: NOT DETECTED
NEUTS SEG # BLD: 0 K/UL (ref 1.7–8.2)
NEUTS SEG NFR BLD: 1 % (ref 43–78)
NITRITE UR QL STRIP.AUTO: NEGATIVE
NRBC # BLD: 0 K/UL (ref 0–0.2)
P AERUGINOSA DNA BLD POS NAA+NON-PROBE: NOT DETECTED
PH UR STRIP: 7 (ref 5–9)
PLATELET # BLD AUTO: 14 K/UL (ref 150–450)
PLATELET COMMENT: ABNORMAL
PMV BLD AUTO: 10.6 FL (ref 9.4–12.3)
POTASSIUM SERPL-SCNC: 4.6 MMOL/L (ref 3.5–5.1)
PROT SERPL-MCNC: 7.5 G/DL (ref 6.3–8.2)
PROT UR STRIP-MCNC: 30 MG/DL
PROTEUS SP DNA BLD POS QL NAA+NON-PROBE: NOT DETECTED
RBC # BLD AUTO: 3.05 M/UL (ref 4.05–5.2)
RBC #/AREA URNS HPF: ABNORMAL /HPF
RBC MORPH BLD: ABNORMAL
RESISTANT GENE TARGETS: ABNORMAL
S AUREUS DNA BLD POS QL NAA+NON-PROBE: NOT DETECTED
S AUREUS+CONS DNA BLD POS NAA+NON-PROBE: NOT DETECTED
S EPIDERMIDIS DNA BLD POS QL NAA+NON-PRB: NOT DETECTED
S LUGDUNENSIS DNA BLD POS QL NAA+NON-PRB: NOT DETECTED
S MALTOPHILIA DNA BLD POS QL NAA+NON-PRB: NOT DETECTED
S MARCESCENS DNA BLD POS NAA+NON-PROBE: NOT DETECTED
S PNEUM DNA BLD POS QL NAA+NON-PROBE: NOT DETECTED
S PYO DNA BLD POS QL NAA+NON-PROBE: NOT DETECTED
SALMONELLA DNA BLD POS QL NAA+NON-PROBE: NOT DETECTED
SODIUM SERPL-SCNC: 131 MMOL/L (ref 136–146)
SP GR UR REFRACTOMETRY: 1.02 (ref 1–1.02)
STREPTOCOCCUS DNA BLD POS NAA+NON-PROBE: NOT DETECTED
UNIT DIVISION: 0
URINE CULTURE IF INDICATED: ABNORMAL
UROBILINOGEN UR QL STRIP.AUTO: 1 EU/DL (ref 0.2–1)
VANA+VANB BLD POS QL NAA+NON-PROBE: DETECTED
WBC # BLD AUTO: 1.3 K/UL (ref 4.3–11.1)
WBC MORPH BLD: ABNORMAL
WBC URNS QL MICRO: ABNORMAL /HPF

## 2023-12-06 PROCEDURE — APPSS30 APP SPLIT SHARED TIME 16-30 MINUTES: Performed by: NURSE PRACTITIONER

## 2023-12-06 PROCEDURE — 80053 COMPREHEN METABOLIC PANEL: CPT

## 2023-12-06 PROCEDURE — 6370000000 HC RX 637 (ALT 250 FOR IP): Performed by: INTERNAL MEDICINE

## 2023-12-06 PROCEDURE — 1170000000 HC RM PRIVATE ONCOLOGY

## 2023-12-06 PROCEDURE — 6360000002 HC RX W HCPCS: Performed by: FAMILY MEDICINE

## 2023-12-06 PROCEDURE — 6370000000 HC RX 637 (ALT 250 FOR IP): Performed by: NURSE PRACTITIONER

## 2023-12-06 PROCEDURE — 71045 X-RAY EXAM CHEST 1 VIEW: CPT

## 2023-12-06 PROCEDURE — 6370000000 HC RX 637 (ALT 250 FOR IP): Performed by: FAMILY MEDICINE

## 2023-12-06 PROCEDURE — 99233 SBSQ HOSP IP/OBS HIGH 50: CPT | Performed by: INTERNAL MEDICINE

## 2023-12-06 PROCEDURE — 83735 ASSAY OF MAGNESIUM: CPT

## 2023-12-06 PROCEDURE — 87205 SMEAR GRAM STAIN: CPT

## 2023-12-06 PROCEDURE — 6360000002 HC RX W HCPCS: Performed by: INTERNAL MEDICINE

## 2023-12-06 PROCEDURE — 6360000002 HC RX W HCPCS: Performed by: NURSE PRACTITIONER

## 2023-12-06 PROCEDURE — 81001 URINALYSIS AUTO W/SCOPE: CPT

## 2023-12-06 PROCEDURE — 85025 COMPLETE CBC W/AUTO DIFF WBC: CPT

## 2023-12-06 PROCEDURE — 87154 CUL TYP ID BLD PTHGN 6+ TRGT: CPT

## 2023-12-06 PROCEDURE — 87040 BLOOD CULTURE FOR BACTERIA: CPT

## 2023-12-06 PROCEDURE — 87077 CULTURE AEROBIC IDENTIFY: CPT

## 2023-12-06 PROCEDURE — 2580000003 HC RX 258: Performed by: NURSE PRACTITIONER

## 2023-12-06 PROCEDURE — 36415 COLL VENOUS BLD VENIPUNCTURE: CPT

## 2023-12-06 PROCEDURE — 6360000004 HC RX CONTRAST MEDICATION: Performed by: NURSE PRACTITIONER

## 2023-12-06 PROCEDURE — 2580000003 HC RX 258: Performed by: INTERNAL MEDICINE

## 2023-12-06 PROCEDURE — 87186 SC STD MICRODIL/AGAR DIL: CPT

## 2023-12-06 PROCEDURE — 70491 CT SOFT TISSUE NECK W/DYE: CPT

## 2023-12-06 PROCEDURE — 2580000003 HC RX 258: Performed by: FAMILY MEDICINE

## 2023-12-06 PROCEDURE — 36592 COLLECT BLOOD FROM PICC: CPT

## 2023-12-06 RX ORDER — POTASSIUM CHLORIDE 20 MEQ/1
20 TABLET, EXTENDED RELEASE ORAL
Status: DISCONTINUED | OUTPATIENT
Start: 2023-12-06 | End: 2023-12-08

## 2023-12-06 RX ADMIN — CIPROFLOXACIN HYDROCHLORIDE 500 MG: 500 TABLET, FILM COATED ORAL at 08:14

## 2023-12-06 RX ADMIN — VANCOMYCIN HYDROCHLORIDE 750 MG: 750 INJECTION, POWDER, LYOPHILIZED, FOR SOLUTION INTRAVENOUS at 17:22

## 2023-12-06 RX ADMIN — ACETAMINOPHEN 650 MG: 325 TABLET ORAL at 04:23

## 2023-12-06 RX ADMIN — ACETAMINOPHEN 650 MG: 325 TABLET ORAL at 16:37

## 2023-12-06 RX ADMIN — CEFEPIME 2000 MG: 2 INJECTION, POWDER, FOR SOLUTION INTRAVENOUS at 11:55

## 2023-12-06 RX ADMIN — CEFEPIME 2000 MG: 2 INJECTION, POWDER, FOR SOLUTION INTRAVENOUS at 21:51

## 2023-12-06 RX ADMIN — MORPHINE SULFATE 15 MG: 15 TABLET, FILM COATED, EXTENDED RELEASE ORAL at 21:47

## 2023-12-06 RX ADMIN — GUAIFENESIN 600 MG: 600 TABLET ORAL at 08:12

## 2023-12-06 RX ADMIN — FERROUS SULFATE TAB 325 MG (65 MG ELEMENTAL FE) 325 MG: 325 (65 FE) TAB at 15:32

## 2023-12-06 RX ADMIN — POLYETHYLENE GLYCOL 3350 17 G: 17 POWDER, FOR SOLUTION ORAL at 08:20

## 2023-12-06 RX ADMIN — MONTELUKAST 10 MG: 10 TABLET, FILM COATED ORAL at 08:13

## 2023-12-06 RX ADMIN — CEFEPIME 2000 MG: 2 INJECTION, POWDER, FOR SOLUTION INTRAVENOUS at 04:25

## 2023-12-06 RX ADMIN — IPRATROPIUM BROMIDE 2 SPRAY: 42 SPRAY NASAL at 08:21

## 2023-12-06 RX ADMIN — VANCOMYCIN HYDROCHLORIDE 1750 MG: 10 INJECTION, POWDER, LYOPHILIZED, FOR SOLUTION INTRAVENOUS at 04:54

## 2023-12-06 RX ADMIN — GUAIFENESIN 600 MG: 600 TABLET ORAL at 21:47

## 2023-12-06 RX ADMIN — MORPHINE SULFATE 15 MG: 15 TABLET, FILM COATED, EXTENDED RELEASE ORAL at 08:14

## 2023-12-06 RX ADMIN — CHLORHEXIDINE GLUCONATE 15 ML: 1.2 RINSE ORAL at 08:20

## 2023-12-06 RX ADMIN — SODIUM CHLORIDE, PRESERVATIVE FREE 10 ML: 5 INJECTION INTRAVENOUS at 08:33

## 2023-12-06 RX ADMIN — ACYCLOVIR 400 MG: 400 TABLET ORAL at 08:12

## 2023-12-06 RX ADMIN — FLUOXETINE HYDROCHLORIDE 20 MG: 20 CAPSULE ORAL at 08:12

## 2023-12-06 RX ADMIN — ACYCLOVIR 400 MG: 400 TABLET ORAL at 21:47

## 2023-12-06 RX ADMIN — ONDANSETRON 4 MG: 2 INJECTION INTRAMUSCULAR; INTRAVENOUS at 08:27

## 2023-12-06 RX ADMIN — CETIRIZINE HYDROCHLORIDE 10 MG: 10 TABLET, FILM COATED ORAL at 08:13

## 2023-12-06 RX ADMIN — DOCUSATE SODIUM 50 MG AND SENNOSIDES 8.6 MG 1 TABLET: 8.6; 5 TABLET, FILM COATED ORAL at 21:47

## 2023-12-06 RX ADMIN — AMLODIPINE BESYLATE 5 MG: 5 TABLET ORAL at 08:14

## 2023-12-06 RX ADMIN — FAMOTIDINE 40 MG: 20 TABLET, FILM COATED ORAL at 21:47

## 2023-12-06 RX ADMIN — PANTOPRAZOLE SODIUM 40 MG: 40 TABLET, DELAYED RELEASE ORAL at 08:14

## 2023-12-06 RX ADMIN — PANTOPRAZOLE SODIUM 40 MG: 40 TABLET, DELAYED RELEASE ORAL at 15:32

## 2023-12-06 RX ADMIN — SODIUM CHLORIDE, PRESERVATIVE FREE 10 ML: 5 INJECTION INTRAVENOUS at 21:48

## 2023-12-06 RX ADMIN — SALINE NASAL SPRAY 2 SPRAY: 1.5 SOLUTION NASAL at 08:20

## 2023-12-06 RX ADMIN — LEVOTHYROXINE SODIUM 125 MCG: 0.12 TABLET ORAL at 08:14

## 2023-12-06 RX ADMIN — TIZANIDINE 4 MG: 2 TABLET ORAL at 21:47

## 2023-12-06 RX ADMIN — ERGOCALCIFEROL 50000 UNITS: 1.25 CAPSULE ORAL at 08:12

## 2023-12-06 RX ADMIN — IOPAMIDOL 80 ML: 755 INJECTION, SOLUTION INTRAVENOUS at 14:22

## 2023-12-06 RX ADMIN — FERROUS SULFATE TAB 325 MG (65 MG ELEMENTAL FE) 325 MG: 325 (65 FE) TAB at 11:57

## 2023-12-06 RX ADMIN — FLUOXETINE HYDROCHLORIDE 20 MG: 20 CAPSULE ORAL at 21:47

## 2023-12-06 RX ADMIN — POTASSIUM CHLORIDE 20 MEQ: 1500 TABLET, EXTENDED RELEASE ORAL at 08:14

## 2023-12-06 RX ADMIN — ROSUVASTATIN CALCIUM 10 MG: 5 TABLET, FILM COATED ORAL at 08:12

## 2023-12-06 RX ADMIN — FLUCONAZOLE 200 MG: 100 TABLET ORAL at 08:11

## 2023-12-06 RX ADMIN — SALINE NASAL SPRAY 2 SPRAY: 1.5 SOLUTION NASAL at 15:33

## 2023-12-06 RX ADMIN — ALLOPURINOL 300 MG: 300 TABLET ORAL at 08:13

## 2023-12-06 ASSESSMENT — PAIN SCALES - GENERAL
PAINLEVEL_OUTOF10: 0
PAINLEVEL_OUTOF10: 0

## 2023-12-06 NOTE — PROGRESS NOTES
-Pt w/ complaint of sore throat & L sided ear pain @ beginning of shift: on-call np S Temi notified - No new orders  -Prn zanaflex 4mg po x's 1 per pt request  -Pt w/ temp 100.5 @ 0355 - On-call NP notified - [place]igo neutropenic fever protocol -   -chest xray   -cef 2gm, vanc pharm to dose,  -ua w/ reflex to culture  -tylenol 650mg po x's 1 for temp 100.5    Shift report given to [facility], oncoming RN

## 2023-12-06 NOTE — PROGRESS NOTES
End of Shift Summary:    Significant vitals:   - tmax 101.3    Significant labs:   - WBC 1.3  - hg 8.7, plts 14      Significant events:  - patient very lethargic today. States doesn't feel good overall.  Slept much of shift  - had CT of neck/head for ear ache and throat pain  - orders to encourage Gatorade intake

## 2023-12-06 NOTE — PROGRESS NOTES
Plan:  Dosing recommendations based on Bayesian software  Start vancomycin 1,750 mg load, followed by 750 mg q12h  Anticipated AUC of 484 and trough concentration of 14.6 at steady state  Renal labs as indicated   Vancomycin concentrations will be ordered as clinically appropriate   Pharmacy will continue to monitor patient and adjust therapy as indicated    Thank you for the consult,  Norman Leiva, JanesD

## 2023-12-06 NOTE — PROGRESS NOTES
[facility] Hematology & Oncology        Inpatient Hematology / Oncology Progress Note    Reason for Admission:  Neutropenic fever ([address]) [D70.9, R50.81]  Pancytopenia ([address]) [D61.818]    24 Hour Events:  .9, VSS, on RA  Dacogen completed 11/24, Mylotarg completed 11/16  Awaiting count recovery  Plts stable, WBC up to 1300  Plan for BMBx next week   at bedside    Transfusions: None  Replacements: On PO K.     ROS:  Constitutional: Positive for fatigue; negative for fever, chills. CV: Negative for chest pain, palpitations, edema. Respiratory: Negative for wheezing, cough, dyspnea. GI: Negative for nausea, abdominal pain, diarrhea. 10 point review of systems is otherwise negative with the exception of the elements mentioned above in the HPI. Allergies   Allergen Reactions    Penicillins Hives    Sulfa Antibiotics Hives    Codeine Nausea And Vomiting     Past Medical History:   Diagnosis Date    Arthritis     Autoimmune disease ([address])     skin changes, unknown name    Cancer ([address]) 1990    ovarian    Chronic pain     arthritis in back and legs    Coronary artery spasm ([address])     COVID 05/15/2022    Essential hypertension 11/8/2019    Gastritis     GERD (gastroesophageal reflux disease)     Hx antineoplastic chemo     Migraine headache     Psoriasis     Psychiatric disorder     anxiety and depression    Severe obesity (BMI 35.0-39.9) 6/26/2018    Thyroid disease     Diagnosed in 1996     Past Surgical History:   Procedure Laterality Date    CARPAL TUNNEL RELEASE      GYN      ovaries    HYSTERECTOMY, TOTAL ABDOMINAL (CERVIX REMOVED)  Patriciabury      cardiac cath. Dx with spasms.     TUBAL LIGATION       Family History   Problem Relation Age of Onset    Parkinson's Disease Mother     Stroke Mother         Passed away in 1969    No Known Problems Paternal Grandfather     No Known Problems Paternal Grandmother     No Known Problems Maternal Grandfather     No Known   11/5 Per RN  yesterday, small amount of blood from nares, no epistaxis. Otherwise, no bleeding noted. Transfusing plts for plt 2k.   11/9 Pt with episode of bleeding yesterday after scratching self, pressure held and additional 1 unit plts given (3 total yesterday).  No further bleeding noted today thus far.    11/10 No further noted bleeding ON/today thus far.  Is receiving PRBCs/plts today   11/11 Hgb improved after transfusion yesterday.  Plt count 5k , no notable bleeding.   11/12 Plt count up to 40k yesterday (received 1 unit plts yesterday) this AM at 30k, much improved.    11/13 No bleeding noted   11/30 Some bleeding from gums, transfusing PRN. Recheck CBC later this afternoon. Will order CHG, discouraged brushing/abrasive foods. Reiterated fall precautions.  12/2 No bleeding reported during rounds.  12/4 Mild epistaxis recently - resolved w Afrin, no bleeding today     Hypertension  11/2 Resume home amlodipine  11/4 More hypertensive overnight, monitor. May need to adjust amlodipine if no improvement. Has PRN hydralazine.  11/8 Overall BP improved      Chronic pain   - on morphine ER 15mg BID, norco.  Muscle relaxant.       Constipation   - bowel regimen     Hyponatremia  12/6 Encourage PO hydration with electrolytes, etc. Easily fluid overloaded so avoiding IVF at this time.     No AC due to TCP   Continue home meds  Supportive care  PT/OT - HH  Antimicrobial prophylaxis - ACV, Diflucan, Cipro (held for BS abx)    Dispo - too soon to determine. Awaiting count recovery. Plan for BMBx next week.     Updates to plan of care  11/26 Discussed the prognosis being very poor that she remains refractory to plt tx,  very depressed they had lost two children, consult spiritual care.  11/27 Wishes to remain full code, and continue with transfusion support for now.      Goals and plan of care reviewed with the patient.  All questions answered to the best of our ability.                   Catie Sorensen,  APRN - [address] [facility] Hematology & Oncology  [address] [place] [address]  Office : [phone]  Fax : [phone]           Attending Addendum:  I have personally performed a face to face diagnostic evaluation on this patient. I have reviewed and agree with the care plan as documented by Adair Bud, N.P. 51 minutes were spent on patient care, including but not limited to, reviewing the chart and time with the patient and family, more than 50% of the time documented was spent in face-to-face contact with the patient and in the care of the patient on the floor/unit where the patient is located. My findings are as follows: She has AML and febrile neutropenia, appears weak, heart rate regular without murmurs, abdomen is non-tender, bowel sounds are positive, we will continue IV ABX and follow-up her cultures, next week she will undergo a bone marrow biopsy, we will also arrange for her to receive Venetoclax.               Anoop Vann MD    [address] [facility] Hematology/Oncology  [address] [place] [address]  Office : [phone]  Fax : [phone]

## 2023-12-06 NOTE — PROGRESS NOTES
Physical Therapy Note:    Attempted to see patient this PM for physical therapy treatment  session. Patient AVERY at time of attempt. Will follow and re-attempt as schedule permits/patient available.  Thank you,    MARVIN DUNN, PT     Rehab Caseload Tracker

## 2023-12-07 LAB
ALBUMIN SERPL-MCNC: 2.7 G/DL (ref 3.2–4.6)
ALBUMIN/GLOB SERPL: 0.7 (ref 0.4–1.6)
ALP SERPL-CCNC: 72 U/L (ref 50–136)
ALT SERPL-CCNC: 29 U/L (ref 12–65)
ANION GAP SERPL CALC-SCNC: 6 MMOL/L (ref 2–11)
AST SERPL-CCNC: 14 U/L (ref 15–37)
BILIRUB SERPL-MCNC: 0.8 MG/DL (ref 0.2–1.1)
BUN SERPL-MCNC: 17 MG/DL (ref 8–23)
CALCIUM SERPL-MCNC: 8.2 MG/DL (ref 8.3–10.4)
CHLORIDE SERPL-SCNC: 102 MMOL/L (ref 103–113)
CK SERPL-CCNC: 69 U/L (ref 21–215)
CO2 SERPL-SCNC: 25 MMOL/L (ref 21–32)
CREAT SERPL-MCNC: 0.5 MG/DL (ref 0.6–1)
DIFFERENTIAL METHOD BLD: ABNORMAL
ERYTHROCYTE [DISTWIDTH] IN BLOOD BY AUTOMATED COUNT: 13.5 % (ref 11.9–14.6)
GLOBULIN SER CALC-MCNC: 3.8 G/DL (ref 2.8–4.5)
GLUCOSE SERPL-MCNC: 106 MG/DL (ref 65–100)
HCT VFR BLD AUTO: 21.4 % (ref 35.8–46.3)
HGB BLD-MCNC: 7.1 G/DL (ref 11.7–15.4)
HISTORY CHECK: NORMAL
MAGNESIUM SERPL-MCNC: 2.2 MG/DL (ref 1.8–2.4)
MCH RBC QN AUTO: 28.5 PG (ref 26.1–32.9)
MCHC RBC AUTO-ENTMCNC: 33.2 G/DL (ref 31.4–35)
MCV RBC AUTO: 85.9 FL (ref 82–102)
NRBC # BLD: 0 K/UL (ref 0–0.2)
PLATELET # BLD AUTO: 4 K/UL (ref 150–450)
PLATELET COMMENT: ABNORMAL
PMV BLD AUTO: 8.8 FL (ref 9.4–12.3)
POTASSIUM SERPL-SCNC: 4.2 MMOL/L (ref 3.5–5.1)
PROT SERPL-MCNC: 6.5 G/DL (ref 6.3–8.2)
RBC # BLD AUTO: 2.49 M/UL (ref 4.05–5.2)
RBC MORPH BLD: ABNORMAL
SODIUM SERPL-SCNC: 133 MMOL/L (ref 136–146)
VANCOMYCIN SERPL-MCNC: 7.7 UG/ML
WBC # BLD AUTO: 0.4 K/UL (ref 4.3–11.1)
WBC MORPH BLD: ABNORMAL

## 2023-12-07 PROCEDURE — 86900 BLOOD TYPING SEROLOGIC ABO: CPT

## 2023-12-07 PROCEDURE — 2580000003 HC RX 258: Performed by: NURSE PRACTITIONER

## 2023-12-07 PROCEDURE — P9037 PLATE PHERES LEUKOREDU IRRAD: HCPCS

## 2023-12-07 PROCEDURE — 6370000000 HC RX 637 (ALT 250 FOR IP): Performed by: NURSE PRACTITIONER

## 2023-12-07 PROCEDURE — 6360000002 HC RX W HCPCS: Performed by: NURSE PRACTITIONER

## 2023-12-07 PROCEDURE — 2580000003 HC RX 258: Performed by: FAMILY MEDICINE

## 2023-12-07 PROCEDURE — 99233 SBSQ HOSP IP/OBS HIGH 50: CPT | Performed by: INTERNAL MEDICINE

## 2023-12-07 PROCEDURE — 97530 THERAPEUTIC ACTIVITIES: CPT

## 2023-12-07 PROCEDURE — 36430 TRANSFUSION BLD/BLD COMPNT: CPT

## 2023-12-07 PROCEDURE — 76937 US GUIDE VASCULAR ACCESS: CPT

## 2023-12-07 PROCEDURE — 83735 ASSAY OF MAGNESIUM: CPT

## 2023-12-07 PROCEDURE — A4216 STERILE WATER/SALINE, 10 ML: HCPCS | Performed by: INTERNAL MEDICINE

## 2023-12-07 PROCEDURE — 6370000000 HC RX 637 (ALT 250 FOR IP): Performed by: FAMILY MEDICINE

## 2023-12-07 PROCEDURE — 86922 COMPATIBILITY TEST ANTIGLOB: CPT

## 2023-12-07 PROCEDURE — 82550 ASSAY OF CK (CPK): CPT

## 2023-12-07 PROCEDURE — 86901 BLOOD TYPING SEROLOGIC RH(D): CPT

## 2023-12-07 PROCEDURE — 6370000000 HC RX 637 (ALT 250 FOR IP): Performed by: INTERNAL MEDICINE

## 2023-12-07 PROCEDURE — 86850 RBC ANTIBODY SCREEN: CPT

## 2023-12-07 PROCEDURE — 1100000000 HC RM PRIVATE

## 2023-12-07 PROCEDURE — 86644 CMV ANTIBODY: CPT

## 2023-12-07 PROCEDURE — 86813 HLA TYPING A B OR C: CPT

## 2023-12-07 PROCEDURE — 86921 COMPATIBILITY TEST INCUBATE: CPT

## 2023-12-07 PROCEDURE — APPSS30 APP SPLIT SHARED TIME 16-30 MINUTES: Performed by: NURSE PRACTITIONER

## 2023-12-07 PROCEDURE — 80053 COMPREHEN METABOLIC PANEL: CPT

## 2023-12-07 PROCEDURE — 6360000002 HC RX W HCPCS: Performed by: INTERNAL MEDICINE

## 2023-12-07 PROCEDURE — 80202 ASSAY OF VANCOMYCIN: CPT

## 2023-12-07 PROCEDURE — 2580000003 HC RX 258: Performed by: INTERNAL MEDICINE

## 2023-12-07 PROCEDURE — 85025 COMPLETE CBC W/AUTO DIFF WBC: CPT

## 2023-12-07 PROCEDURE — 87040 BLOOD CULTURE FOR BACTERIA: CPT

## 2023-12-07 PROCEDURE — 86920 COMPATIBILITY TEST SPIN: CPT

## 2023-12-07 PROCEDURE — 36415 COLL VENOUS BLD VENIPUNCTURE: CPT

## 2023-12-07 RX ORDER — SODIUM CHLORIDE 9 MG/ML
INJECTION, SOLUTION INTRAVENOUS PRN
Status: DISCONTINUED | OUTPATIENT
Start: 2023-12-07 | End: 2023-12-18

## 2023-12-07 RX ADMIN — FLUCONAZOLE 200 MG: 100 TABLET ORAL at 08:23

## 2023-12-07 RX ADMIN — GUAIFENESIN 600 MG: 600 TABLET ORAL at 08:23

## 2023-12-07 RX ADMIN — IPRATROPIUM BROMIDE 2 SPRAY: 42 SPRAY NASAL at 12:11

## 2023-12-07 RX ADMIN — CEFEPIME 2000 MG: 2 INJECTION, POWDER, FOR SOLUTION INTRAVENOUS at 22:14

## 2023-12-07 RX ADMIN — FLUOXETINE HYDROCHLORIDE 20 MG: 20 CAPSULE ORAL at 22:11

## 2023-12-07 RX ADMIN — SODIUM CHLORIDE, PRESERVATIVE FREE 10 ML: 5 INJECTION INTRAVENOUS at 08:25

## 2023-12-07 RX ADMIN — CEFEPIME 2000 MG: 2 INJECTION, POWDER, FOR SOLUTION INTRAVENOUS at 15:47

## 2023-12-07 RX ADMIN — CHLORHEXIDINE GLUCONATE 15 ML: 1.2 RINSE ORAL at 08:26

## 2023-12-07 RX ADMIN — ACYCLOVIR 400 MG: 400 TABLET ORAL at 08:24

## 2023-12-07 RX ADMIN — CHLORHEXIDINE GLUCONATE 15 ML: 1.2 RINSE ORAL at 02:26

## 2023-12-07 RX ADMIN — DOCUSATE SODIUM 50 MG AND SENNOSIDES 8.6 MG 1 TABLET: 8.6; 5 TABLET, FILM COATED ORAL at 22:12

## 2023-12-07 RX ADMIN — FERROUS SULFATE TAB 325 MG (65 MG ELEMENTAL FE) 325 MG: 325 (65 FE) TAB at 17:10

## 2023-12-07 RX ADMIN — POLYETHYLENE GLYCOL 3350 17 G: 17 POWDER, FOR SOLUTION ORAL at 08:22

## 2023-12-07 RX ADMIN — MORPHINE SULFATE 15 MG: 15 TABLET, FILM COATED, EXTENDED RELEASE ORAL at 08:23

## 2023-12-07 RX ADMIN — DIPHENHYDRAMINE HYDROCHLORIDE 25 MG: 25 CAPSULE ORAL at 12:09

## 2023-12-07 RX ADMIN — ALLOPURINOL 300 MG: 300 TABLET ORAL at 08:24

## 2023-12-07 RX ADMIN — PANTOPRAZOLE SODIUM 40 MG: 40 TABLET, DELAYED RELEASE ORAL at 15:45

## 2023-12-07 RX ADMIN — LEVOTHYROXINE SODIUM 125 MCG: 0.12 TABLET ORAL at 08:23

## 2023-12-07 RX ADMIN — FERROUS SULFATE TAB 325 MG (65 MG ELEMENTAL FE) 325 MG: 325 (65 FE) TAB at 12:09

## 2023-12-07 RX ADMIN — CEFEPIME 2000 MG: 2 INJECTION, POWDER, FOR SOLUTION INTRAVENOUS at 05:38

## 2023-12-07 RX ADMIN — GUAIFENESIN 600 MG: 600 TABLET ORAL at 22:12

## 2023-12-07 RX ADMIN — CETIRIZINE HYDROCHLORIDE 10 MG: 10 TABLET, FILM COATED ORAL at 08:23

## 2023-12-07 RX ADMIN — MONTELUKAST 10 MG: 10 TABLET, FILM COATED ORAL at 08:23

## 2023-12-07 RX ADMIN — FLUOXETINE HYDROCHLORIDE 20 MG: 20 CAPSULE ORAL at 08:24

## 2023-12-07 RX ADMIN — ACYCLOVIR 400 MG: 400 TABLET ORAL at 22:11

## 2023-12-07 RX ADMIN — DAPTOMYCIN 750 MG: 500 INJECTION, POWDER, LYOPHILIZED, FOR SOLUTION INTRAVENOUS at 15:39

## 2023-12-07 RX ADMIN — POTASSIUM CHLORIDE 20 MEQ: 1500 TABLET, EXTENDED RELEASE ORAL at 08:23

## 2023-12-07 RX ADMIN — FAMOTIDINE 40 MG: 20 TABLET, FILM COATED ORAL at 22:11

## 2023-12-07 RX ADMIN — SALINE NASAL SPRAY 2 SPRAY: 1.5 SOLUTION NASAL at 08:25

## 2023-12-07 RX ADMIN — DIPHENHYDRAMINE HYDROCHLORIDE 10 ML: 12.5 LIQUID ORAL at 02:26

## 2023-12-07 RX ADMIN — ROSUVASTATIN CALCIUM 10 MG: 5 TABLET, FILM COATED ORAL at 08:23

## 2023-12-07 RX ADMIN — PANTOPRAZOLE SODIUM 40 MG: 40 TABLET, DELAYED RELEASE ORAL at 08:24

## 2023-12-07 RX ADMIN — SODIUM CHLORIDE, PRESERVATIVE FREE 10 ML: 5 INJECTION INTRAVENOUS at 22:12

## 2023-12-07 RX ADMIN — AMLODIPINE BESYLATE 5 MG: 5 TABLET ORAL at 08:24

## 2023-12-07 RX ADMIN — VANCOMYCIN HYDROCHLORIDE 750 MG: 750 INJECTION, POWDER, LYOPHILIZED, FOR SOLUTION INTRAVENOUS at 04:12

## 2023-12-07 RX ADMIN — ANTI-FUNGAL POWDER MICONAZOLE NITRATE TALC FREE: 1.42 POWDER TOPICAL at 08:24

## 2023-12-07 RX ADMIN — SALINE NASAL SPRAY 2 SPRAY: 1.5 SOLUTION NASAL at 15:45

## 2023-12-07 RX ADMIN — ACETAMINOPHEN 650 MG: 325 TABLET ORAL at 12:09

## 2023-12-07 RX ADMIN — MORPHINE SULFATE 15 MG: 15 TABLET, FILM COATED, EXTENDED RELEASE ORAL at 22:11

## 2023-12-07 RX ADMIN — ERGOCALCIFEROL 50000 UNITS: 1.25 CAPSULE ORAL at 08:24

## 2023-12-07 ASSESSMENT — PAIN SCALES - GENERAL: PAINLEVEL_OUTOF10: 3

## 2023-12-07 NOTE — PROGRESS NOTES
[facility]        Inpatient Hematology / Oncology Progress Note    Reason for Admission:  Neutropenic fever ([address]) [D70.9, R50.81]  Pancytopenia ([address]) [D61.818]    24 Hour Events:  .3, VSS, on RA  Dacogen completed 11/24, Mylotarg completed 11/16  Awaiting count recovery  BC 1/2 with E faecium, VRE  Plan for BMBx next week   at bedside    Transfusions: PRBCs/Plts  Replacements: On PO K.     ROS:  Constitutional: Positive for fatigue; negative for fever, chills. CV: Negative for chest pain, palpitations, edema. Respiratory: Negative for wheezing, cough, dyspnea. GI: Negative for nausea, abdominal pain, diarrhea. 10 point review of systems is otherwise negative with the exception of the elements mentioned above in the HPI. Allergies   Allergen Reactions    Penicillins Hives    Sulfa Antibiotics Hives    Codeine Nausea And Vomiting     Past Medical History:   Diagnosis Date    Arthritis     Autoimmune disease ([address])     skin changes, unknown name    Cancer ([address]) 1990    ovarian    Chronic pain     arthritis in back and legs    Coronary artery spasm ([address])     COVID 05/15/2022    Essential hypertension 11/8/2019    Gastritis     GERD (gastroesophageal reflux disease)     Hx antineoplastic chemo     Migraine headache     Psoriasis     Psychiatric disorder     anxiety and depression    Severe obesity (BMI 35.0-39.9) 6/26/2018    Thyroid disease     Diagnosed in 1996     Past Surgical History:   Procedure Laterality Date    CARPAL TUNNEL RELEASE      GYN      ovaries    HYSTERECTOMY, TOTAL ABDOMINAL (CERVIX REMOVED)  Patriciabury      cardiac cath. Dx with spasms.     TUBAL LIGATION       Family History   Problem Relation Age of Onset    Parkinson's Disease Mother     Stroke Mother         Passed away in 1969    No Known Problems Paternal Grandfather     No Known Problems Paternal Grandmother     No Known Problems Maternal Grandfather     No Pred 40mg daily  11/21 Wean prednisone down to 20mg daily. 11/24 Decrease prednisone to 10 mg daily today. Continue with HLA-matched platelets given refractory thrombocytopenia despite transfusion. 12/2  Remains refractory to plt transfusions - continue to transfuse HLA-matched plts PRN. Completed IVIG x2 11/25-11/26. Day 2 Dex 40mg daily. HIV/Hep studies negative. Working to obtain Promacta 25mg daily. 12/4 D4 Dex 40mg; plts 18K and did not require transfusion. Checking on Venetoclax status. 12/6 PLT count 4k, transfusing. Responding appropriately to plt transfusion. May consider restarting pulse Dex X4 on Saturday if needed. Remains pancytopenic. Risk for TLS / DIC  - Check TLS daily  - Start prophylactic allopurinol  11/19 No evidence of TLS or DIC noted  11/23 Uric acid normal and coags stable     Neutropenic fever / L ear/neck pain / VRE bacteremia  - started on cef/vanc RVP neg  - CXR unremarkable  10/29 BC+ staph epidermidis, continue Cef/Vanc, MRSA DNA pending, consult ID for recommendations  10/31 Continues Cefe/Vanc per ID but S epi likely contaminant. Repeat BC NGTD. TM 98. Likely transitioning to PO/prophylactic Cipro today. 11/1 Afebrile. Transition to prophylactic cipro now that she has completed 5 day course of abx with likely contaminant/S epi with repeat BC.  RESOLVED  12/6 .9 overnight, CXR negative. On Cefe/Vanc. Follow BC. Checking CT soft tissue neck for L-sided ear/neck pain. 12/7 .3, continues Cefe/Vanc. However, BC 1/2 (from peripheral) with E faecium, VRE. ID consulted. Altered mental status  11/1 Likely secondary to HD steroids. CT head negative. CTA and MRI brain pending after Code S called last night for dysarthria. 11/2 CTA negative. Unable to tolerate MRI brain without sedation so holding for now as symptoms more consistent with steroid-related effects vs stroke as no focal deficits noted.   RESOLVED    Hypoxia / LE edema / abnormal breath sounds  11/1 LE edema     Hypertension  11/2 Resume home amlodipine  11/4 More hypertensive overnight, monitor. May need to adjust amlodipine if no improvement. Has PRN hydralazine.  11/8 Overall BP improved      Chronic pain   - on morphine ER 15mg BID, norco.  Muscle relaxant.       Constipation   - bowel regimen     Hyponatremia  12/6 Encourage PO hydration with electrolytes, etc. Easily fluid overloaded so avoiding IVF at this time.  12/7 Na up to 133      No AC due to TCP   Continue home meds  Supportive care  PT/OT - HH  Antimicrobial prophylaxis - ACV, Diflucan, Cipro (held for BS abx)    Dispo - too soon to determine. Awaiting count recovery. Plan for BMBx next week.     Updates to plan of care  11/26 Discussed the prognosis being very poor that she remains refractory to plt tx,  very depressed they had lost two children, consult spiritual care.  11/27 Wishes to remain full code, and continue with transfusion support for now.      Goals and plan of care reviewed with the patient.  All questions answered to the best of our ability.                   Catie Sorensen, TIM - CNP   [facility] Hematology & Oncology  [address] [place] [address]  Office : [phone]  Fax : [phone]         Attending Addendum:  I have personally performed a face to face diagnostic evaluation on this patient. I have reviewed and agree with the care plan as documented by Catie Sorensen, N.P. 51 minutes were spent on patient care, including but not limited to, reviewing the chart and time with the patient and family, more than 50% of the time documented was spent in face-to-face contact with the patient and in the care of the patient on the floor/unit where the patient is located. My findings are as follows:  She has AML and febrile neutropenia, appears weak, heart rate regular without murmurs, abdomen is non-tender, bowel sounds are positive, we will continue IV ABX and transfuse blood products today.              [place]

## 2023-12-07 NOTE — PROGRESS NOTES
ACUTE PHYSICAL THERAPY GOALS:   (Developed with and agreed upon by patient and/or caregiver.)  Goals assessed and revised 11/27/23  LTG:  (1.)Ms. Tiesha Espitia will move from supine to sit and sit to supine , scoot up and down, and roll side to side in bed with INDEPENDENCE within 7 treatment day(s). GOAL MET 11/27/2023  (2.)Ms. Tiesha Espitia will transfer from bed to chair and chair to bed with MODIFIED INDEPENDENCE using the least restrictive device within 7 treatment day(s). GOAL MET 11/27/2023  (3.)Ms. Tiesha Espitia will ambulate with MODIFIED INDEPENDENCE for 500 feet with the least restrictive device within 7 treatment day(s). (4.)Ms. Tiesha Espitia will participate in therapeutic activity/exercises x 25 minutes for increased activity tolerance within 7 treatment days. (5.)Ms. Tiesha Espitia will perform standing static and dynamic balance activities x 15 minutes with SUPERVISION to improve safety within 7 day(s). New Goal:      (6.)Ms. Tiesha Espitia will demonstrate understanding of energy conservation and activity pacing techniques and apply them with no cueing within 7 days. (7.)Ms. Tiesha Espitia will be independent in all bed mobility and bed to chair, chair to bed transfers within 7 treatment day. PHYSICAL THERAPY: Daily Note AM   (Link to Caseload Tracking: PT Visit Days : 5  Time In/Out PT Charge Capture  Rehab Caseload Tracker  Orders    Oswald Kussmaul is a 71 y.o. female   PRIMARY DIAGNOSIS: Neutropenic fever ([address])  Neutropenic fever ([address]) [D70.9, R50.81]  Pancytopenia ([address]) [D61.818]       Inpatient: Payor: Tevin Mckenna / Plan: Nico Aurelia / Product Type: *No Product type* /     ASSESSMENT:     REHAB RECOMMENDATIONS:   Recommendation to date pending progress:  Setting:  Home Health Therapy    Equipment:    To Be Determined     ASSESSMENT:  Ms. Tiesha Espitia was received supine in bed, agreeable to therapy. Reports sore throat and generalized weakness this morning, requesting to use RW for mobility.  She was able to ambulate 100 ft,

## 2023-12-07 NOTE — PROGRESS NOTES
-pt w/ + gram cocci in anaerobic peripheral stick  -on-call NP Jennifer Patton notified   -order for repeat blood cultures x's 1 from pts PICC w/ AM labs

## 2023-12-07 NOTE — CONSULTS
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 I would attempt a line holiday if possible. Once PICC line is out > 12 hours, you can repeat BC's to document clearance. I have dc'd the vanc and initiated daptomycin  Due to drug drug interactions, I have stopped her crestor. This could resume once her daptomycin has been completed. I have ordered a baseline CPK  Await results of CT ST of the neck  Given that we have an etiology for the fevers. You could change the cefepime back to cipro, but will defer to Onc for this  Continue prophylactic acyclovir and fluconazole  Will consider an Echo tomorrow    Anti-infectives:   Daptomycin 10 mg/kg  -  Vanc -  Cefepime  -  Fluconazole for prophlyaxis  Acyclovir for prophylaxis    Subjective:   Date of Consultation:  2023  Referring Physician: Dr. Reginaldo Lagunas    Patient is a 71 y.o. female who had MDS, but it converted to acute erythroblastic leukemia. She was admitted on 10/27, but she did not begin chemo until \"my platelets had improved. \" She has received mylotarg () and dacogen (). A bone marrow biopsy is looming. She began having fevers recently and antibiotics are noted above. Her only acute c/o is sore throat and neck pain. A CT has been completed and is pending. She denies acute sob, cough, n/v/d, abdominal pain. Dysuria, rash, joint complaints, etc...     Patient Active Problem List   Diagnosis    Hypothyroidism, adult    Essential hypertension    Chronic hand pain    Psoriatic arthritis ([address])    Multiple allergies    Psoriasis, guttate    Other chest pain    Coronary artery spasm (HCC)    Chronic midline low back pain without sciatica    History of ovarian cancer    Chronic pain of multiple joints    Esophageal reflux    Anxiety and depression    Mixed hyperlipidemia    Class 1 obesity with serious comorbidity and body mass index (BMI) of 33.0 to 33.9 in adult    Esophageal dysphagia    Nausea and vomiting    Generalized abdominal pain    Elevated bilirubin    Neutropenic fever (HCC)    MDS (myelodysplastic syndrome) (HCC)    Pancytopenia (HCC)    Depression    Constipation due to pain medication    Immunosuppressed status (HCC)    Rectal bleeding    Bilateral impacted cerumen    Acute upper respiratory infection    Sepsis (HCC)    Hyponatremia    Anemia requiring transfusions    Dysarthria    Severe thrombocytopenia (HCC)    Acute erythroid leukemia not having achieved remission (HCC)    On antineoplastic chemotherapy    Gingival bleeding    Nonintractable headache     Past Medical History:   Diagnosis Date    Arthritis     Autoimmune disease (HCC)     skin changes, unknown name    Cancer ([facility])     ovarian    Chronic pain     arthritis in back and legs    Coronary artery spasm (HCC)     COVID 05/15/2022    Essential hypertension 2019    Gastritis     GERD (gastroesophageal reflux disease)     Hx antineoplastic chemo     Migraine headache     Psoriasis     Psychiatric disorder     anxiety and depression    Severe obesity (BMI 35.0-39.9) 2018    Thyroid disease     Diagnosed in       Family History   Problem Relation Age of Onset    Parkinson's Disease Mother     Stroke Mother         Passed away in     No Known Problems Paternal Grandfather     No Known Problems Paternal Grandmother     No Known Problems Maternal Grandfather     No Known Problems Maternal Grandmother     Prostate Cancer Father          age 86     Cancer Father         Kidney      Social History     Tobacco Use    Smoking status: Never     Passive exposure: Past    Smokeless tobacco: Never   Substance Use Topics    Alcohol use:  Cervical and supraclavicular normal   Musculoskeletal: No swelling or deformity   Lines/Devices:  Intact, no erythema, drainage or tenderness   Psych: Alert and oriented, normal mood affect given the setting       Data Review:     CBC:  Recent Labs     23  1238 23   WBC 0.5* 1.3* 0.4*   HGB 8.4* 8.7* 7.1*   HCT 24.7* 26.0* 21.4*   PLT 26* 14* 4*       BMP:  Recent Labs     23  0315 23   BUN 21 19 17   * 131* 133*   K 4.3 4.6 4.2    100* 102*   CO2 24 26 25       LFTS:  Recent Labs     23  0315 23   ALT 32 32 29       Microbiology:   Reviewed    Imagin/6: CXR benign    Signed By: Mara Saleh.  MD Geoffrey     2023

## 2023-12-07 NOTE — PROGRESS NOTES
2004: Received pt from [address] [place] [address],[address] in stable condition. Pt in bed resting quietly. Resp even & unlabored on room air; no acute signs of distress noted. Bed low & locked; call light in reach; no needs voiced. 1008: Pt does not want to get cleaned up today. She told me that she does not feel well & does not feel up to doing anything. I stressed the importance of having a bath daily to avoid infection. Pt states she feels too weak. Discussed with Sylvain MCKEON.

## 2023-12-07 NOTE — CARE COORDINATION
Pt discussed in IDR. LOS 41D. PT/OT recommending HH at discharge. CM completed chart review. Pt with VRE bacteremia as of 12/7 per notes. Patient is not medically ready for discharge at this time. When medically ready for discharge, patient should discharge home with home health. Will continue to follow patient's plan of care and assist further with supportive care needs as appropriate.

## 2023-12-07 NOTE — PROGRESS NOTES
US Guided PIV access-    Ultrasound was used to find the vein which was compressible and without any ultrasound features of an artery or nerve bundle. Skin was cleaned and disinfected prior to IV puncture. Under real-time ultrasound guidance peripheral access was obtained in the left forearm using 20 G 2.25\" [employer] IV Catheter LOT# [id number]  after 1 attempt(s). Blood return was present and IV flushed without difficulty with no clinical signs of infiltration. IV dressing applied and no immediate complications noted. Patient tolerated the procedure well.

## 2023-12-08 ENCOUNTER — APPOINTMENT (OUTPATIENT)
Dept: NON INVASIVE DIAGNOSTICS | Age: 73
DRG: 808 | End: 2023-12-08
Attending: INTERNAL MEDICINE
Payer: MEDICARE

## 2023-12-08 PROBLEM — R78.81 VRE BACTEREMIA: Status: ACTIVE | Noted: 2023-12-08

## 2023-12-08 PROBLEM — Z16.21 VRE BACTEREMIA: Status: ACTIVE | Noted: 2023-12-08

## 2023-12-08 PROBLEM — B95.2 VRE BACTEREMIA: Status: ACTIVE | Noted: 2023-12-08

## 2023-12-08 LAB
ALBUMIN SERPL-MCNC: 2.8 G/DL (ref 3.2–4.6)
ALBUMIN/GLOB SERPL: 0.6 (ref 0.4–1.6)
ALP SERPL-CCNC: 81 U/L (ref 50–136)
ALT SERPL-CCNC: 34 U/L (ref 12–65)
ANION GAP SERPL CALC-SCNC: 8 MMOL/L (ref 2–11)
AST SERPL-CCNC: 16 U/L (ref 15–37)
BILIRUB SERPL-MCNC: 1 MG/DL (ref 0.2–1.1)
BLD PROD TYP BPU: NORMAL
BLOOD BANK CMNT PATIENT-IMP: NORMAL
BLOOD BANK DISPENSE STATUS: NORMAL
BPU ID: NORMAL
BUN SERPL-MCNC: 13 MG/DL (ref 8–23)
CALCIUM SERPL-MCNC: 8.7 MG/DL (ref 8.3–10.4)
CHLORIDE SERPL-SCNC: 101 MMOL/L (ref 103–113)
CO2 SERPL-SCNC: 24 MMOL/L (ref 21–32)
CREAT SERPL-MCNC: 0.5 MG/DL (ref 0.6–1)
DIFFERENTIAL METHOD BLD: ABNORMAL
ECHO BSA: 1.94 M2
ECHO EST RA PRESSURE: 3 MMHG
ECHO IVC PROX: 1.2 CM
ECHO LV EDV A2C: 104 ML
ECHO LV EDV A4C: 97 ML
ECHO LV EDV INDEX A4C: 55 ML/M2
ECHO LV EDV NDEX A2C: 59 ML/M2
ECHO LV EJECTION FRACTION A2C: 59 %
ECHO LV EJECTION FRACTION A4C: 63 %
ECHO LV EJECTION FRACTION BIPLANE: 61 % (ref 55–100)
ECHO LV ESV A2C: 43 ML
ECHO LV ESV A4C: 36 ML
ECHO LV ESV INDEX A2C: 24 ML/M2
ECHO LV ESV INDEX A4C: 20 ML/M2
ECHO LV FRACTIONAL SHORTENING: 43 % (ref 28–44)
ECHO LV INTERNAL DIMENSION DIASTOLE INDEX: 2.27 CM/M2
ECHO LV INTERNAL DIMENSION DIASTOLIC: 4 CM (ref 3.9–5.3)
ECHO LV INTERNAL DIMENSION SYSTOLIC INDEX: 1.31 CM/M2
ECHO LV INTERNAL DIMENSION SYSTOLIC: 2.3 CM
ECHO LV IVSD: 1.1 CM (ref 0.6–0.9)
ECHO LV MASS 2D: 136.2 G (ref 67–162)
ECHO LV MASS INDEX 2D: 77.4 G/M2 (ref 43–95)
ECHO LV POSTERIOR WALL DIASTOLIC: 1 CM (ref 0.6–0.9)
ECHO LV RELATIVE WALL THICKNESS RATIO: 0.5
ECHO RIGHT VENTRICULAR SYSTOLIC PRESSURE (RVSP): 28 MMHG
ECHO TV REGURGITANT MAX VELOCITY: 2.5 M/S
ECHO TV REGURGITANT PEAK GRADIENT: 25 MMHG
ERYTHROCYTE [DISTWIDTH] IN BLOOD BY AUTOMATED COUNT: 13.5 % (ref 11.9–14.6)
ERYTHROCYTE [DISTWIDTH] IN BLOOD BY AUTOMATED COUNT: 13.6 % (ref 11.9–14.6)
GLOBULIN SER CALC-MCNC: 5 G/DL (ref 2.8–4.5)
GLUCOSE SERPL-MCNC: 115 MG/DL (ref 65–100)
HCT VFR BLD AUTO: 22.9 % (ref 35.8–46.3)
HCT VFR BLD AUTO: 25.2 % (ref 35.8–46.3)
HGB BLD-MCNC: 7.9 G/DL (ref 11.7–15.4)
HGB BLD-MCNC: 8.6 G/DL (ref 11.7–15.4)
MAGNESIUM SERPL-MCNC: 2.2 MG/DL (ref 1.8–2.4)
MCH RBC QN AUTO: 28.6 PG (ref 26.1–32.9)
MCH RBC QN AUTO: 28.7 PG (ref 26.1–32.9)
MCHC RBC AUTO-ENTMCNC: 34.1 G/DL (ref 31.4–35)
MCHC RBC AUTO-ENTMCNC: 34.5 G/DL (ref 31.4–35)
MCV RBC AUTO: 83 FL (ref 82–102)
MCV RBC AUTO: 84 FL (ref 82–102)
NRBC # BLD: 0 K/UL (ref 0–0.2)
NRBC # BLD: 0 K/UL (ref 0–0.2)
PLATELET # BLD AUTO: 10 K/UL (ref 150–450)
PLATELET # BLD AUTO: 16 K/UL (ref 150–450)
PLATELET COMMENT: ABNORMAL
PMV BLD AUTO: 10.7 FL (ref 9.4–12.3)
PMV BLD AUTO: 11.2 FL (ref 9.4–12.3)
POTASSIUM SERPL-SCNC: 3.2 MMOL/L (ref 3.5–5.1)
PROT SERPL-MCNC: 7.8 G/DL (ref 6.3–8.2)
RBC # BLD AUTO: 2.76 M/UL (ref 4.05–5.2)
RBC # BLD AUTO: 3 M/UL (ref 4.05–5.2)
RBC MORPH BLD: ABNORMAL
SODIUM SERPL-SCNC: 133 MMOL/L (ref 136–146)
UNIT DIVISION: 0
WBC # BLD AUTO: 0.4 K/UL (ref 4.3–11.1)
WBC # BLD AUTO: 0.6 K/UL (ref 4.3–11.1)
WBC MORPH BLD: ABNORMAL

## 2023-12-08 PROCEDURE — P9040 RBC LEUKOREDUCED IRRADIATED: HCPCS

## 2023-12-08 PROCEDURE — 83735 ASSAY OF MAGNESIUM: CPT

## 2023-12-08 PROCEDURE — P9037 PLATE PHERES LEUKOREDU IRRAD: HCPCS

## 2023-12-08 PROCEDURE — 36430 TRANSFUSION BLD/BLD COMPNT: CPT

## 2023-12-08 PROCEDURE — 6370000000 HC RX 637 (ALT 250 FOR IP): Performed by: NURSE PRACTITIONER

## 2023-12-08 PROCEDURE — 6370000000 HC RX 637 (ALT 250 FOR IP): Performed by: INTERNAL MEDICINE

## 2023-12-08 PROCEDURE — 97530 THERAPEUTIC ACTIVITIES: CPT

## 2023-12-08 PROCEDURE — 2580000003 HC RX 258: Performed by: INTERNAL MEDICINE

## 2023-12-08 PROCEDURE — 2580000003 HC RX 258: Performed by: NURSE PRACTITIONER

## 2023-12-08 PROCEDURE — 87040 BLOOD CULTURE FOR BACTERIA: CPT

## 2023-12-08 PROCEDURE — 2580000003 HC RX 258: Performed by: FAMILY MEDICINE

## 2023-12-08 PROCEDURE — APPSS45 APP SPLIT SHARED TIME 31-45 MINUTES

## 2023-12-08 PROCEDURE — 80053 COMPREHEN METABOLIC PANEL: CPT

## 2023-12-08 PROCEDURE — 6360000002 HC RX W HCPCS: Performed by: NURSE PRACTITIONER

## 2023-12-08 PROCEDURE — 76937 US GUIDE VASCULAR ACCESS: CPT

## 2023-12-08 PROCEDURE — 85025 COMPLETE CBC W/AUTO DIFF WBC: CPT

## 2023-12-08 PROCEDURE — 36415 COLL VENOUS BLD VENIPUNCTURE: CPT

## 2023-12-08 PROCEDURE — 6360000002 HC RX W HCPCS: Performed by: INTERNAL MEDICINE

## 2023-12-08 PROCEDURE — 99233 SBSQ HOSP IP/OBS HIGH 50: CPT | Performed by: INTERNAL MEDICINE

## 2023-12-08 PROCEDURE — 6370000000 HC RX 637 (ALT 250 FOR IP)

## 2023-12-08 PROCEDURE — 1100000000 HC RM PRIVATE

## 2023-12-08 PROCEDURE — 86813 HLA TYPING A B OR C: CPT

## 2023-12-08 PROCEDURE — A4216 STERILE WATER/SALINE, 10 ML: HCPCS | Performed by: INTERNAL MEDICINE

## 2023-12-08 PROCEDURE — 85027 COMPLETE CBC AUTOMATED: CPT

## 2023-12-08 PROCEDURE — 93308 TTE F-UP OR LMTD: CPT

## 2023-12-08 PROCEDURE — 6370000000 HC RX 637 (ALT 250 FOR IP): Performed by: FAMILY MEDICINE

## 2023-12-08 RX ORDER — SODIUM CHLORIDE 9 MG/ML
INJECTION, SOLUTION INTRAVENOUS PRN
Status: COMPLETED | OUTPATIENT
Start: 2023-12-08 | End: 2023-12-09

## 2023-12-08 RX ORDER — POTASSIUM CHLORIDE 20 MEQ/1
20 TABLET, EXTENDED RELEASE ORAL 2 TIMES DAILY WITH MEALS
Status: DISCONTINUED | OUTPATIENT
Start: 2023-12-08 | End: 2023-12-13

## 2023-12-08 RX ADMIN — CHLORHEXIDINE GLUCONATE 15 ML: 1.2 RINSE ORAL at 10:27

## 2023-12-08 RX ADMIN — SALINE NASAL SPRAY 2 SPRAY: 1.5 SOLUTION NASAL at 20:40

## 2023-12-08 RX ADMIN — PANTOPRAZOLE SODIUM 40 MG: 40 TABLET, DELAYED RELEASE ORAL at 08:49

## 2023-12-08 RX ADMIN — DIPHENHYDRAMINE HYDROCHLORIDE 25 MG: 25 CAPSULE ORAL at 17:16

## 2023-12-08 RX ADMIN — MONTELUKAST 10 MG: 10 TABLET, FILM COATED ORAL at 08:49

## 2023-12-08 RX ADMIN — CIPROFLOXACIN HYDROCHLORIDE 500 MG: 500 TABLET, FILM COATED ORAL at 20:37

## 2023-12-08 RX ADMIN — FAMOTIDINE 40 MG: 20 TABLET, FILM COATED ORAL at 20:38

## 2023-12-08 RX ADMIN — IPRATROPIUM BROMIDE 2 SPRAY: 42 SPRAY NASAL at 20:44

## 2023-12-08 RX ADMIN — DOCUSATE SODIUM 50 MG AND SENNOSIDES 8.6 MG 1 TABLET: 8.6; 5 TABLET, FILM COATED ORAL at 20:37

## 2023-12-08 RX ADMIN — CHLORHEXIDINE GLUCONATE 15 ML: 1.2 RINSE ORAL at 20:37

## 2023-12-08 RX ADMIN — SODIUM CHLORIDE, PRESERVATIVE FREE 10 ML: 5 INJECTION INTRAVENOUS at 20:43

## 2023-12-08 RX ADMIN — POLYETHYLENE GLYCOL 3350 17 G: 17 POWDER, FOR SOLUTION ORAL at 08:49

## 2023-12-08 RX ADMIN — ERGOCALCIFEROL 50000 UNITS: 1.25 CAPSULE ORAL at 08:49

## 2023-12-08 RX ADMIN — POTASSIUM CHLORIDE 20 MEQ: 1500 TABLET, EXTENDED RELEASE ORAL at 08:49

## 2023-12-08 RX ADMIN — CEFEPIME 2000 MG: 2 INJECTION, POWDER, FOR SOLUTION INTRAVENOUS at 10:32

## 2023-12-08 RX ADMIN — HYDROCODONE BITARTRATE AND ACETAMINOPHEN 1 TABLET: 5; 325 TABLET ORAL at 10:27

## 2023-12-08 RX ADMIN — GUAIFENESIN 600 MG: 600 TABLET ORAL at 08:49

## 2023-12-08 RX ADMIN — DIPHENHYDRAMINE HYDROCHLORIDE 25 MG: 25 CAPSULE ORAL at 03:39

## 2023-12-08 RX ADMIN — POTASSIUM CHLORIDE 20 MEQ: 1500 TABLET, EXTENDED RELEASE ORAL at 16:26

## 2023-12-08 RX ADMIN — LEVOTHYROXINE SODIUM 125 MCG: 0.12 TABLET ORAL at 08:49

## 2023-12-08 RX ADMIN — CETIRIZINE HYDROCHLORIDE 10 MG: 10 TABLET, FILM COATED ORAL at 08:49

## 2023-12-08 RX ADMIN — MORPHINE SULFATE 15 MG: 15 TABLET, FILM COATED, EXTENDED RELEASE ORAL at 20:38

## 2023-12-08 RX ADMIN — FLUOXETINE HYDROCHLORIDE 20 MG: 20 CAPSULE ORAL at 20:37

## 2023-12-08 RX ADMIN — ANTI-FUNGAL POWDER MICONAZOLE NITRATE TALC FREE: 1.42 POWDER TOPICAL at 08:53

## 2023-12-08 RX ADMIN — GUAIFENESIN 600 MG: 600 TABLET ORAL at 20:38

## 2023-12-08 RX ADMIN — DAPTOMYCIN 750 MG: 500 INJECTION, POWDER, LYOPHILIZED, FOR SOLUTION INTRAVENOUS at 14:44

## 2023-12-08 RX ADMIN — FERROUS SULFATE TAB 325 MG (65 MG ELEMENTAL FE) 325 MG: 325 (65 FE) TAB at 16:26

## 2023-12-08 RX ADMIN — FLUOXETINE HYDROCHLORIDE 20 MG: 20 CAPSULE ORAL at 08:49

## 2023-12-08 RX ADMIN — ANTI-FUNGAL POWDER MICONAZOLE NITRATE TALC FREE: 1.42 POWDER TOPICAL at 20:41

## 2023-12-08 RX ADMIN — ACETAMINOPHEN 650 MG: 325 TABLET ORAL at 17:00

## 2023-12-08 RX ADMIN — SODIUM CHLORIDE, PRESERVATIVE FREE 10 ML: 5 INJECTION INTRAVENOUS at 08:49

## 2023-12-08 RX ADMIN — ACETAMINOPHEN 650 MG: 325 TABLET ORAL at 03:39

## 2023-12-08 RX ADMIN — ALLOPURINOL 300 MG: 300 TABLET ORAL at 08:49

## 2023-12-08 RX ADMIN — FERROUS SULFATE TAB 325 MG (65 MG ELEMENTAL FE) 325 MG: 325 (65 FE) TAB at 11:47

## 2023-12-08 RX ADMIN — ACYCLOVIR 400 MG: 400 TABLET ORAL at 08:49

## 2023-12-08 RX ADMIN — PANTOPRAZOLE SODIUM 40 MG: 40 TABLET, DELAYED RELEASE ORAL at 16:26

## 2023-12-08 RX ADMIN — FLUCONAZOLE 200 MG: 100 TABLET ORAL at 08:49

## 2023-12-08 RX ADMIN — MORPHINE SULFATE 15 MG: 15 TABLET, FILM COATED, EXTENDED RELEASE ORAL at 08:49

## 2023-12-08 RX ADMIN — AMLODIPINE BESYLATE 5 MG: 5 TABLET ORAL at 08:49

## 2023-12-08 RX ADMIN — ACYCLOVIR 400 MG: 400 TABLET ORAL at 20:37

## 2023-12-08 ASSESSMENT — PAIN DESCRIPTION - ORIENTATION
ORIENTATION: LEFT
ORIENTATION: LEFT

## 2023-12-08 ASSESSMENT — PAIN DESCRIPTION - PAIN TYPE: TYPE: CHRONIC PAIN

## 2023-12-08 ASSESSMENT — PAIN DESCRIPTION - DESCRIPTORS
DESCRIPTORS: ACHING
DESCRIPTORS: DISCOMFORT;ACHING

## 2023-12-08 ASSESSMENT — PAIN DESCRIPTION - ONSET: ONSET: ON-GOING

## 2023-12-08 ASSESSMENT — PAIN DESCRIPTION - LOCATION
LOCATION: TEETH
LOCATION: TEETH;THROAT

## 2023-12-08 ASSESSMENT — PAIN SCALES - GENERAL
PAINLEVEL_OUTOF10: 8
PAINLEVEL_OUTOF10: 8

## 2023-12-08 ASSESSMENT — PAIN DESCRIPTION - FREQUENCY: FREQUENCY: CONTINUOUS

## 2023-12-08 NOTE — PROGRESS NOTES
END OF SHIFT SUMMARY: 7a-7p      Blood products given this shift:  1 unit platelets transfusing          Bedside shift report given to Renae Vázquez RN

## 2023-12-08 NOTE — CARE COORDINATION
Pt discussed during IDR and chart screened by CM for d/c planning. On 12/7 pt was transferred from room 529 to 515 d/t testing + VRE. Awaiting count recovery. Pt to have repeat BMBx next week. PT/OT recommend HH at d/c. Dispo timeframe: undetermined at the present time. CM will continue to follow.   LOS = 42 days

## 2023-12-08 NOTE — PROGRESS NOTES
ACUTE OCCUPATIONAL THERAPY GOALS:   (Developed with and agreed upon by patient and/or caregiver.)  Re-evaluation completed on 12/05/23  1. Patient will complete lower body bathing and dressing with MOD I and adaptive equipment as needed. 2. Patient will complete toileting with MOD I.   3. Patient will tolerate 30 minutes of OT treatment with 1-2 rest breaks to increase activity tolerance for ADLs. 4. Patient will complete functional transfers with MOD I and adaptive equipment as needed. 5. Patient will complete functional mobility for household distances with MOD I and adaptive equipment as needed. 6. Patient will complete self-grooming while standing edge of sink with MOD I and adaptive equipment as needed. New Goal Added:  7. Patient will complete UB bathing and dressing with MOD I and adaptive equipment as needed. 8. Patient will verbalize and demonstrate 3 energy conservation strategies throughout completion of bADLs. Timeframe: 7 visits     OCCUPATIONAL THERAPY: Daily Note AM   OT Visit Days: 2   Time In/Out  OT Charge Capture  Rehab Caseload Tracker  OT Orders  Neutropenic Precautions    Christie Strauss is a 71 y.o. female   PRIMARY DIAGNOSIS: Neutropenic fever ([address])  Neutropenic fever ([address]) [D70.9, R50.81]  Pancytopenia ([address]) [D61.818]       Inpatient: Payor: Belen Canchola / Plan: Jimi Gregorio / Product Type: *No Product type* /     ASSESSMENT:     REHAB RECOMMENDATIONS: CURRENT LEVEL OF FUNCTION:  (Most Recently Demonstrated)   Recommendation to date pending progress:  Setting:  Home Health Therapy    Equipment:    To Be Determined Bathing:  Not Tested  Dressing:  Stand by Assist  Feeding/Grooming:  Not Tested  Toileting:  Stand by Assist  Functional Mobility:  Contact Guard Assist     ASSESSMENT:  Ms. Kayley Davey is doing fair today. Pt presents supine and agreeable to therapy.  Pt overall CGA with functional transfers and mobility of extended household distance without AD, pt did require seated rest break after ~100 ft. Able to mobilize this distance x2 trials. Pt completed toileting and hand hygiene in bathroom with SBA. Pt continues to have deficits in strength, balance, functional mobility, and activity tolerance. Steady progress. Continue OT POC. .       SUBJECTIVE:     Ms. Kishore Jones states, Codie Malik I have been here a while\"     Social/Functional Lives With: Spouse  Type of Home: House  Home Layout: One level  Bathroom Toilet: Standard  Bathroom Equipment: Commode  Bathroom Accessibility: Accessible  Home Equipment: Rollator  Receives Help From: Family  ADL Assistance: Independent  Homemaking Assistance: Independent  Ambulation Assistance: Needs assistance  Transfer Assistance: Independent  Active : Yes  Mode of Transportation: Car  Occupation: Retired    OBJECTIVE:     LINES / Tedra Purpura / AIRWAY: IV    RESTRICTIONS/PRECAUTIONS:  Restrictions/Precautions  Restrictions/Precautions:  Other (comment) (Neutropenic precautions)        PAIN: VITALS / O2:   Pre Treatment:    Pt did not c/o pain         Post Treatment: same Vitals          Oxygen   RA     MOBILITY: I Mod I S SBA CGA Min Mod Max Total  NT x2 Comments:   Bed Mobility    Rolling [] [] [] [] [] [] [] [] [] [x] []    Supine to Sit [] [] [] [x] [] [] [] [] [] [] []    Scooting [] [] [] [x] [] [] [] [] [] [] []    Sit to Supine [] [] [] [x] [] [] [] [] [] [] []    Transfers    Sit to Stand [] [] [] [] [x] [] [] [] [] [] []    Bed to Chair [] [] [] [] [] [] [] [] [] [x] []    Stand to Sit [] [] [] [] [x] [] [] [] [] [] []    Tub/Shower [] [] [] [] [] [] [] [] [] [x] []     Toilet [] [] [] [] [x] [] [] [] [] [] []      [] [] [] [] [] [] [] [] [] [] []    I=Independent, Mod I=Modified Independent, S=Supervision/Setup, SBA=Standby Assistance, CGA=Contact Guard Assistance, Min=Minimal Assistance, Mod=Moderate Assistance, Max=Maximal Assistance, Total=Total Assistance, NT=Not Tested    ACTIVITIES OF DAILY LIVING: I Mod I S SBA CGA Min Mod Max

## 2023-12-08 NOTE — PROGRESS NOTES
200 mg at 12/08/23 0849    acyclovir (ZOVIRAX) tablet 400 mg  400 mg Oral BID Micaela Hernandez MD   400 mg at 12/08/23 7589    mineral oil-hydrophilic petrolatum (AQUAPHOR) ointment   Topical BID PRN Kennedi Merida MD        calcium carbonate (TUMS) chewable tablet 500 mg  500 mg Oral TID PRN Eldridge Hand, APRN - CNP   500 mg at 11/05/23 1024    amLODIPine (NORVASC) tablet 5 mg  5 mg Oral Daily Leah ALEJO, APRN - CNP   5 mg at 12/08/23 0849    sodium chloride flush 0.9 % injection 10 mL  10 mL IntraVENous ONCE PRN Gwendolynn Evens, APRN - CNP        sodium chloride 0.9 % bolus 100 mL  100 mL IntraVENous ONCE PRN Gwendolynn Evens, APRN - CNP        [Held by provider] ciprofloxacin (CIPRO) tablet 500 mg  500 mg Oral 2 times per day Chetna Hand, APRN - CNP   500 mg at 12/06/23 2447    hydrALAZINE (APRESOLINE) injection 5 mg  5 mg IntraVENous Q6H PRN Chetna Hand, APRN - CNP        diphenhydrAMINE (BENADRYL) capsule 25 mg  25 mg Oral Q6H PRN Kennedi Merida MD   25 mg at 12/08/23 0339    ferrous sulfate (IRON 325) tablet 325 mg  325 mg Oral BID  Aryan Norwood, APRN - CNP   325 mg at 12/08/23 1147    famotidine (PEPCID) tablet 40 mg  40 mg Oral QHS Sandra Keri, DO   40 mg at 12/07/23 2211    FLUoxetine (PROZAC) capsule 20 mg  20 mg Oral BID Sandra Keri, DO   20 mg at 12/08/23 0849    levothyroxine (SYNTHROID) tablet 125 mcg  125 mcg Oral ADAM Dina Montes, DO   125 mcg at 12/08/23 0849    montelukast (SINGULAIR) tablet 10 mg  10 mg Oral Daily Sandra Keri, DO   10 mg at 12/08/23 0849    pantoprazole (PROTONIX) tablet 40 mg  40 mg Oral BID Sandra Keri, DO   40 mg at 12/08/23 0849    prochlorperazine (COMPAZINE) tablet 10 mg  10 mg Oral Q6H PRN Sandra Keri, DO        sodium chloride flush 0.9 % injection 5-40 mL  5-40 mL IntraVENous 2 times per day Sandra Keri, DO   10 mL at 12/08/23 0849    sodium chloride flush 0.9 % injection 5-40 mL  5-40 mL IntraVENous PRN Emily Adhikari, Amrita, DO        polyethylene glycol (GLYCOLAX) packet 17 g  17 g Oral Daily PRN Sivakumar Mata, DO        ondansetron (ZOFRAN-ODT) disintegrating tablet 4 mg  4 mg Oral Q6H PRN Sivakumar Mata, DO   4 mg at 11/28/23 1952    Or    ondansetron (ZOFRAN) injection 4 mg  4 mg IntraVENous Q6H PRN Sivakumar Mata, DO   4 mg at 12/06/23 0827    polyethylene glycol (GLYCOLAX) packet 17 g  17 g Oral Daily Sivakumar Mata, DO   17 g at 12/08/23 0849    sennosides-docusate sodium (SENOKOT-S) 8.6-50 MG tablet 1 tablet  1 tablet Oral QHS Sivakumar Mata, DO   1 tablet at 12/07/23 2212    aluminum & magnesium hydroxide-simethicone (MAALOX) 200-200-20 MG/5ML suspension 30 mL  30 mL Oral Q6H PRN Sivakumar Mata, DO   30 mL at 11/28/23 1613    morphine (MS CONTIN) extended release tablet 15 mg  15 mg Oral 2 times per day Sivakumar Mata, DO   15 mg at 12/08/23 0849    naloxone (NARCAN) injection 0.4 mg  0.4 mg IntraVENous PRN Sivakumar Mata, DO        HYDROcodone-acetaminophen (NORCO) 5-325 MG per tablet 1 tablet  1 tablet Oral Q6H PRN Sivakumar Mata, DO   1 tablet at 12/08/23 1027    hydrOXYzine HCl (ATARAX) tablet 25 mg  25 mg Oral TID PRN Sivakumar Mata, DO   25 mg at 11/09/23 1414    tiZANidine (ZANAFLEX) tablet 4 mg  4 mg Oral Nightly PRN Sivakumar Mata, DO   4 mg at 12/06/23 2147    diclofenac sodium (VOLTAREN) 1 % gel 4 g  4 g Topical 4x Daily PRN Sivakumar Mata, DO   4 g at 10/29/23 0541    phenol 1.4 % mouth spray 1 spray  1 spray Mouth/Throat Q2H PRN Sivakumar Mata, DO        magic (miracle) mouthwash  10 mL Swish & Spit 4x Daily PRN Sivakumar Mata, DO   10 mL at 12/07/23 0226    sodium chloride (OCEAN, BABY AYR) 0.65 % nasal spray 2 spray  2 spray Each Nostril Q6H Sivakumar Mata, DO   2 spray at 12/07/23 1545    albuterol (PROVENTIL) (2.5 MG/3ML) 0.083% nebulizer solution 2.5 mg  2.5 mg Nebulization Q6H PRN Sivakumar Mata DO   2.5 mg at 11/05/23 0423    ipratropium (ATROVENT) 0.06 % nasal spray 2 spray  2 spray Each Nostril yesterday.  Plt count 5k , no notable bleeding.   11/12 Plt count up to 40k yesterday (received 1 unit plts yesterday) this AM at 30k, much improved.    11/13 No bleeding noted   11/30 Some bleeding from gums, transfusing PRN. Recheck CBC later this afternoon. Will order CHG, discouraged brushing/abrasive foods. Reiterated fall precautions.  12/2 No bleeding reported during rounds.  12/4 Mild epistaxis recently - resolved w Afrin, no bleeding today     Hypertension  11/2 Resume home amlodipine  11/4 More hypertensive overnight, monitor. May need to adjust amlodipine if no improvement. Has PRN hydralazine.  11/8 Overall BP improved      Chronic pain   - on morphine ER 15mg BID, norco.  Muscle relaxant.       Constipation   - bowel regimen     Hyponatremia  12/6 Encourage PO hydration with electrolytes, etc. Easily fluid overloaded so avoiding IVF at this time.  12/7 Na up to 133     Hypokalemia  12/8 K 3.2. Will increase PO K to BID. Will hold off on IV if possible since PICC line was removed.       No AC due to TCP   Continue home meds  Supportive care  PT/OT - HH  Antimicrobial prophylaxis - ACV, Diflucan, Cipro (held for BS abx)    Dispo - too soon to determine. Awaiting count recovery. Plan for BMBx next week.     Updates to plan of care  11/26 Discussed the prognosis being very poor that she remains refractory to plt tx,  very depressed they had lost two children, consult spiritual care.  11/27 Wishes to remain full code, and continue with transfusion support for now.      Goals and plan of care reviewed with the patient.  All questions answered to the best of our ability.                   Mely Silva, APRN - CNP   [facility]  [address] [place] [address]  Office : [phone]  Fax : [phone]         Attending Addendum:  I have personally performed a face to face diagnostic evaluation on this patient. I have reviewed and agree with the care plan as

## 2023-12-09 LAB
ALBUMIN SERPL-MCNC: 2.7 G/DL (ref 3.2–4.6)
ALBUMIN/GLOB SERPL: 0.5 (ref 0.4–1.6)
ALP SERPL-CCNC: 104 U/L (ref 50–136)
ALT SERPL-CCNC: 42 U/L (ref 12–65)
ANION GAP SERPL CALC-SCNC: 7 MMOL/L (ref 2–11)
AST SERPL-CCNC: 24 U/L (ref 15–37)
BACTERIA SPEC CULT: ABNORMAL
BACTERIA SPEC CULT: ABNORMAL
BASOPHILS # BLD: 0 K/UL (ref 0–0.2)
BASOPHILS NFR BLD: 0 % (ref 0–2)
BILIRUB SERPL-MCNC: 0.8 MG/DL (ref 0.2–1.1)
BLD PROD TYP BPU: NORMAL
BLOOD BANK CMNT PATIENT-IMP: NORMAL
BLOOD BANK DISPENSE STATUS: NORMAL
BPU ID: NORMAL
BUN SERPL-MCNC: 15 MG/DL (ref 8–23)
CALCIUM SERPL-MCNC: 8.6 MG/DL (ref 8.3–10.4)
CHLORIDE SERPL-SCNC: 99 MMOL/L (ref 103–113)
CO2 SERPL-SCNC: 26 MMOL/L (ref 21–32)
CREAT SERPL-MCNC: 0.4 MG/DL (ref 0.6–1)
DIFFERENTIAL METHOD BLD: ABNORMAL
EOSINOPHIL # BLD: 0 K/UL (ref 0–0.8)
EOSINOPHIL NFR BLD: 0 % (ref 0.5–7.8)
ERYTHROCYTE [DISTWIDTH] IN BLOOD BY AUTOMATED COUNT: 13.6 % (ref 11.9–14.6)
GLOBULIN SER CALC-MCNC: 5.1 G/DL (ref 2.8–4.5)
GLUCOSE BLD STRIP.AUTO-MCNC: 119 MG/DL (ref 65–100)
GLUCOSE SERPL-MCNC: 95 MG/DL (ref 65–100)
GRAM STN SPEC: ABNORMAL
HCT VFR BLD AUTO: 22.9 % (ref 35.8–46.3)
HGB BLD-MCNC: 7.9 G/DL (ref 11.7–15.4)
IMM GRANULOCYTES # BLD AUTO: 0 K/UL (ref 0–0.5)
IMM GRANULOCYTES NFR BLD AUTO: 0 % (ref 0–5)
LYMPHOCYTES # BLD: 0.5 K/UL (ref 0.5–4.6)
LYMPHOCYTES NFR BLD: 92 % (ref 13–44)
MAGNESIUM SERPL-MCNC: 2.3 MG/DL (ref 1.8–2.4)
MCH RBC QN AUTO: 28.8 PG (ref 26.1–32.9)
MCHC RBC AUTO-ENTMCNC: 34.5 G/DL (ref 31.4–35)
MCV RBC AUTO: 83.6 FL (ref 82–102)
MONOCYTES # BLD: 0 K/UL (ref 0.1–1.3)
MONOCYTES NFR BLD: 4 % (ref 4–12)
NEUTS SEG # BLD: 0 K/UL (ref 1.7–8.2)
NEUTS SEG NFR BLD: 4 % (ref 43–78)
NRBC # BLD: 0 K/UL (ref 0–0.2)
PLATELET # BLD AUTO: 11 K/UL (ref 150–450)
PLATELET COMMENT: ABNORMAL
PMV BLD AUTO: 11.1 FL (ref 9.4–12.3)
POTASSIUM SERPL-SCNC: 3.8 MMOL/L (ref 3.5–5.1)
PROT SERPL-MCNC: 7.8 G/DL (ref 6.3–8.2)
RBC # BLD AUTO: 2.74 M/UL (ref 4.05–5.2)
RBC MORPH BLD: ABNORMAL
SERVICE CMNT-IMP: ABNORMAL
SERVICE CMNT-IMP: ABNORMAL
SODIUM SERPL-SCNC: 132 MMOL/L (ref 136–146)
UNIT DIVISION: 0
WBC # BLD AUTO: 0.5 K/UL (ref 4.3–11.1)
WBC MORPH BLD: ABNORMAL

## 2023-12-09 PROCEDURE — A4216 STERILE WATER/SALINE, 10 ML: HCPCS | Performed by: INTERNAL MEDICINE

## 2023-12-09 PROCEDURE — 6370000000 HC RX 637 (ALT 250 FOR IP): Performed by: NURSE PRACTITIONER

## 2023-12-09 PROCEDURE — 2580000003 HC RX 258: Performed by: INTERNAL MEDICINE

## 2023-12-09 PROCEDURE — 2580000003 HC RX 258: Performed by: NURSE PRACTITIONER

## 2023-12-09 PROCEDURE — 6370000000 HC RX 637 (ALT 250 FOR IP): Performed by: INTERNAL MEDICINE

## 2023-12-09 PROCEDURE — 6360000002 HC RX W HCPCS: Performed by: INTERNAL MEDICINE

## 2023-12-09 PROCEDURE — 6370000000 HC RX 637 (ALT 250 FOR IP): Performed by: FAMILY MEDICINE

## 2023-12-09 PROCEDURE — 82962 GLUCOSE BLOOD TEST: CPT

## 2023-12-09 PROCEDURE — 80053 COMPREHEN METABOLIC PANEL: CPT

## 2023-12-09 PROCEDURE — 85025 COMPLETE CBC W/AUTO DIFF WBC: CPT

## 2023-12-09 PROCEDURE — 6360000002 HC RX W HCPCS: Performed by: FAMILY MEDICINE

## 2023-12-09 PROCEDURE — 86022 PLATELET ANTIBODIES: CPT

## 2023-12-09 PROCEDURE — 1100000000 HC RM PRIVATE

## 2023-12-09 PROCEDURE — 36415 COLL VENOUS BLD VENIPUNCTURE: CPT

## 2023-12-09 PROCEDURE — 36430 TRANSFUSION BLD/BLD COMPNT: CPT

## 2023-12-09 PROCEDURE — 6360000002 HC RX W HCPCS: Performed by: NURSE PRACTITIONER

## 2023-12-09 PROCEDURE — 99233 SBSQ HOSP IP/OBS HIGH 50: CPT | Performed by: INTERNAL MEDICINE

## 2023-12-09 PROCEDURE — 2580000003 HC RX 258

## 2023-12-09 PROCEDURE — 2580000003 HC RX 258: Performed by: FAMILY MEDICINE

## 2023-12-09 PROCEDURE — 6370000000 HC RX 637 (ALT 250 FOR IP)

## 2023-12-09 PROCEDURE — P9037 PLATE PHERES LEUKOREDU IRRAD: HCPCS

## 2023-12-09 PROCEDURE — 83735 ASSAY OF MAGNESIUM: CPT

## 2023-12-09 RX ORDER — SODIUM CHLORIDE 9 MG/ML
INJECTION, SOLUTION INTRAVENOUS PRN
Status: DISCONTINUED | OUTPATIENT
Start: 2023-12-09 | End: 2023-12-10

## 2023-12-09 RX ADMIN — DEXAMETHASONE SODIUM PHOSPHATE 40 MG: 4 INJECTION INTRA-ARTICULAR; INTRALESIONAL; INTRAMUSCULAR; INTRAVENOUS; SOFT TISSUE at 12:16

## 2023-12-09 RX ADMIN — CHLORHEXIDINE GLUCONATE 15 ML: 1.2 RINSE ORAL at 09:27

## 2023-12-09 RX ADMIN — AMLODIPINE BESYLATE 5 MG: 5 TABLET ORAL at 09:03

## 2023-12-09 RX ADMIN — PANTOPRAZOLE SODIUM 40 MG: 40 TABLET, DELAYED RELEASE ORAL at 16:33

## 2023-12-09 RX ADMIN — MORPHINE SULFATE 15 MG: 15 TABLET, FILM COATED, EXTENDED RELEASE ORAL at 09:03

## 2023-12-09 RX ADMIN — DIPHENHYDRAMINE HYDROCHLORIDE 25 MG: 25 CAPSULE ORAL at 21:16

## 2023-12-09 RX ADMIN — GUAIFENESIN 600 MG: 600 TABLET ORAL at 09:03

## 2023-12-09 RX ADMIN — SODIUM CHLORIDE: 9 INJECTION, SOLUTION INTRAVENOUS at 12:16

## 2023-12-09 RX ADMIN — CIPROFLOXACIN HYDROCHLORIDE 500 MG: 500 TABLET, FILM COATED ORAL at 09:03

## 2023-12-09 RX ADMIN — SALINE NASAL SPRAY 2 SPRAY: 1.5 SOLUTION NASAL at 21:20

## 2023-12-09 RX ADMIN — SODIUM CHLORIDE, PRESERVATIVE FREE 10 ML: 5 INJECTION INTRAVENOUS at 21:18

## 2023-12-09 RX ADMIN — ERGOCALCIFEROL 50000 UNITS: 1.25 CAPSULE ORAL at 09:03

## 2023-12-09 RX ADMIN — DAPTOMYCIN 750 MG: 500 INJECTION, POWDER, LYOPHILIZED, FOR SOLUTION INTRAVENOUS at 14:48

## 2023-12-09 RX ADMIN — ACYCLOVIR 400 MG: 400 TABLET ORAL at 21:16

## 2023-12-09 RX ADMIN — ACETAMINOPHEN 650 MG: 325 TABLET ORAL at 21:16

## 2023-12-09 RX ADMIN — GUAIFENESIN 600 MG: 600 TABLET ORAL at 21:16

## 2023-12-09 RX ADMIN — MORPHINE SULFATE 15 MG: 15 TABLET, FILM COATED, EXTENDED RELEASE ORAL at 21:16

## 2023-12-09 RX ADMIN — POTASSIUM CHLORIDE 20 MEQ: 1500 TABLET, EXTENDED RELEASE ORAL at 09:03

## 2023-12-09 RX ADMIN — LEVOTHYROXINE SODIUM 125 MCG: 0.12 TABLET ORAL at 09:03

## 2023-12-09 RX ADMIN — BENZOCAINE: 200 SPRAY DENTAL; ORAL; PERIODONTAL at 05:44

## 2023-12-09 RX ADMIN — POTASSIUM CHLORIDE 20 MEQ: 1500 TABLET, EXTENDED RELEASE ORAL at 16:33

## 2023-12-09 RX ADMIN — FLUOXETINE HYDROCHLORIDE 20 MG: 20 CAPSULE ORAL at 09:03

## 2023-12-09 RX ADMIN — CETIRIZINE HYDROCHLORIDE 10 MG: 10 TABLET, FILM COATED ORAL at 09:03

## 2023-12-09 RX ADMIN — FLUCONAZOLE 200 MG: 100 TABLET ORAL at 09:03

## 2023-12-09 RX ADMIN — MONTELUKAST 10 MG: 10 TABLET, FILM COATED ORAL at 09:03

## 2023-12-09 RX ADMIN — DOCUSATE SODIUM 50 MG AND SENNOSIDES 8.6 MG 1 TABLET: 8.6; 5 TABLET, FILM COATED ORAL at 21:16

## 2023-12-09 RX ADMIN — CIPROFLOXACIN HYDROCHLORIDE 500 MG: 500 TABLET, FILM COATED ORAL at 21:16

## 2023-12-09 RX ADMIN — SALINE NASAL SPRAY 2 SPRAY: 1.5 SOLUTION NASAL at 14:50

## 2023-12-09 RX ADMIN — FERROUS SULFATE TAB 325 MG (65 MG ELEMENTAL FE) 325 MG: 325 (65 FE) TAB at 12:09

## 2023-12-09 RX ADMIN — FERROUS SULFATE TAB 325 MG (65 MG ELEMENTAL FE) 325 MG: 325 (65 FE) TAB at 16:33

## 2023-12-09 RX ADMIN — FLUOXETINE HYDROCHLORIDE 20 MG: 20 CAPSULE ORAL at 21:16

## 2023-12-09 RX ADMIN — ALLOPURINOL 300 MG: 300 TABLET ORAL at 09:03

## 2023-12-09 RX ADMIN — ANTI-FUNGAL POWDER MICONAZOLE NITRATE TALC FREE: 1.42 POWDER TOPICAL at 09:06

## 2023-12-09 RX ADMIN — ONDANSETRON 4 MG: 2 INJECTION INTRAMUSCULAR; INTRAVENOUS at 07:38

## 2023-12-09 RX ADMIN — SODIUM CHLORIDE, PRESERVATIVE FREE 10 ML: 5 INJECTION INTRAVENOUS at 09:07

## 2023-12-09 RX ADMIN — IPRATROPIUM BROMIDE 2 SPRAY: 42 SPRAY NASAL at 09:06

## 2023-12-09 RX ADMIN — SALINE NASAL SPRAY 2 SPRAY: 1.5 SOLUTION NASAL at 09:06

## 2023-12-09 RX ADMIN — FAMOTIDINE 40 MG: 20 TABLET, FILM COATED ORAL at 21:16

## 2023-12-09 RX ADMIN — ACYCLOVIR 400 MG: 400 TABLET ORAL at 09:03

## 2023-12-09 RX ADMIN — IPRATROPIUM BROMIDE 2 SPRAY: 42 SPRAY NASAL at 21:20

## 2023-12-09 RX ADMIN — PANTOPRAZOLE SODIUM 40 MG: 40 TABLET, DELAYED RELEASE ORAL at 09:03

## 2023-12-09 RX ADMIN — CHLORHEXIDINE GLUCONATE 15 ML: 1.2 RINSE ORAL at 21:55

## 2023-12-09 RX ADMIN — POLYETHYLENE GLYCOL 3350 17 G: 17 POWDER, FOR SOLUTION ORAL at 09:03

## 2023-12-09 ASSESSMENT — PAIN - FUNCTIONAL ASSESSMENT: PAIN_FUNCTIONAL_ASSESSMENT: ACTIVITIES ARE NOT PREVENTED

## 2023-12-09 ASSESSMENT — PAIN SCALES - GENERAL
PAINLEVEL_OUTOF10: 0
PAINLEVEL_OUTOF10: 8

## 2023-12-09 ASSESSMENT — PAIN DESCRIPTION - LOCATION: LOCATION: TEETH

## 2023-12-09 ASSESSMENT — PAIN DESCRIPTION - DESCRIPTORS: DESCRIPTORS: ACHING;DISCOMFORT

## 2023-12-09 ASSESSMENT — PAIN DESCRIPTION - ORIENTATION: ORIENTATION: LEFT

## 2023-12-10 LAB
ABO + RH BLD: NORMAL
ABO + RH BLD: NORMAL
ALBUMIN SERPL-MCNC: 2.6 G/DL (ref 3.2–4.6)
ALBUMIN/GLOB SERPL: 0.5 (ref 0.4–1.6)
ALP SERPL-CCNC: 101 U/L (ref 50–136)
ALT SERPL-CCNC: 39 U/L (ref 12–65)
ANION GAP SERPL CALC-SCNC: 4 MMOL/L (ref 2–11)
AST SERPL-CCNC: 13 U/L (ref 15–37)
BACTERIA SPEC CULT: ABNORMAL
BACTERIA SPEC CULT: NORMAL
BILIRUB SERPL-MCNC: 0.6 MG/DL (ref 0.2–1.1)
BLD PROD TYP BPU: NORMAL
BLD PROD TYP BPU: NORMAL
BLOOD BANK CMNT PATIENT-IMP: NORMAL
BLOOD BANK DISPENSE STATUS: NORMAL
BLOOD BANK DISPENSE STATUS: NORMAL
BLOOD GROUP ANTIBODIES SERPL: NORMAL
BLOOD GROUP ANTIBODIES SERPL: NORMAL
BPU ID: NORMAL
BPU ID: NORMAL
BUN SERPL-MCNC: 20 MG/DL (ref 8–23)
CALCIUM SERPL-MCNC: 9.4 MG/DL (ref 8.3–10.4)
CHLORIDE SERPL-SCNC: 104 MMOL/L (ref 103–113)
CO2 SERPL-SCNC: 24 MMOL/L (ref 21–32)
CREAT SERPL-MCNC: 0.5 MG/DL (ref 0.6–1)
CROSSMATCH RESULT: NORMAL
DIFFERENTIAL METHOD BLD: ABNORMAL
ERYTHROCYTE [DISTWIDTH] IN BLOOD BY AUTOMATED COUNT: 13.5 % (ref 11.9–14.6)
GLOBULIN SER CALC-MCNC: 5.2 G/DL (ref 2.8–4.5)
GLUCOSE SERPL-MCNC: 145 MG/DL (ref 65–100)
GRAM STN SPEC: ABNORMAL
HCT VFR BLD AUTO: 21.2 % (ref 35.8–46.3)
HGB BLD-MCNC: 7.3 G/DL (ref 11.7–15.4)
MAGNESIUM SERPL-MCNC: 2.4 MG/DL (ref 1.8–2.4)
MCH RBC QN AUTO: 28.7 PG (ref 26.1–32.9)
MCHC RBC AUTO-ENTMCNC: 34.4 G/DL (ref 31.4–35)
MCV RBC AUTO: 83.5 FL (ref 82–102)
NRBC # BLD: 0 K/UL (ref 0–0.2)
PLATELET # BLD AUTO: 24 K/UL (ref 150–450)
PLATELET COMMENT: ABNORMAL
PMV BLD AUTO: 11.5 FL (ref 9.4–12.3)
POTASSIUM SERPL-SCNC: 4.3 MMOL/L (ref 3.5–5.1)
PROT SERPL-MCNC: 7.8 G/DL (ref 6.3–8.2)
RBC # BLD AUTO: 2.54 M/UL (ref 4.05–5.2)
RBC MORPH BLD: ABNORMAL
SERVICE CMNT-IMP: ABNORMAL
SERVICE CMNT-IMP: NORMAL
SODIUM SERPL-SCNC: 132 MMOL/L (ref 136–146)
SPECIMEN EXP DATE BLD: NORMAL
SPECIMEN EXP DATE BLD: NORMAL
UNIT DIVISION: 0
UNIT DIVISION: 0
WBC # BLD AUTO: 0.3 K/UL (ref 4.3–11.1)
WBC MORPH BLD: ABNORMAL

## 2023-12-10 PROCEDURE — 99233 SBSQ HOSP IP/OBS HIGH 50: CPT | Performed by: INTERNAL MEDICINE

## 2023-12-10 PROCEDURE — 85025 COMPLETE CBC W/AUTO DIFF WBC: CPT

## 2023-12-10 PROCEDURE — 6370000000 HC RX 637 (ALT 250 FOR IP): Performed by: NURSE PRACTITIONER

## 2023-12-10 PROCEDURE — 2580000003 HC RX 258: Performed by: NURSE PRACTITIONER

## 2023-12-10 PROCEDURE — 6370000000 HC RX 637 (ALT 250 FOR IP)

## 2023-12-10 PROCEDURE — 83735 ASSAY OF MAGNESIUM: CPT

## 2023-12-10 PROCEDURE — 2580000003 HC RX 258: Performed by: INTERNAL MEDICINE

## 2023-12-10 PROCEDURE — 6370000000 HC RX 637 (ALT 250 FOR IP): Performed by: INTERNAL MEDICINE

## 2023-12-10 PROCEDURE — 6360000002 HC RX W HCPCS: Performed by: INTERNAL MEDICINE

## 2023-12-10 PROCEDURE — 86901 BLOOD TYPING SEROLOGIC RH(D): CPT

## 2023-12-10 PROCEDURE — 86850 RBC ANTIBODY SCREEN: CPT

## 2023-12-10 PROCEDURE — 36415 COLL VENOUS BLD VENIPUNCTURE: CPT

## 2023-12-10 PROCEDURE — 86920 COMPATIBILITY TEST SPIN: CPT

## 2023-12-10 PROCEDURE — 1100000000 HC RM PRIVATE

## 2023-12-10 PROCEDURE — 6360000002 HC RX W HCPCS: Performed by: NURSE PRACTITIONER

## 2023-12-10 PROCEDURE — 86900 BLOOD TYPING SEROLOGIC ABO: CPT

## 2023-12-10 PROCEDURE — 80053 COMPREHEN METABOLIC PANEL: CPT

## 2023-12-10 PROCEDURE — 6370000000 HC RX 637 (ALT 250 FOR IP): Performed by: FAMILY MEDICINE

## 2023-12-10 PROCEDURE — 2580000003 HC RX 258: Performed by: FAMILY MEDICINE

## 2023-12-10 PROCEDURE — A4216 STERILE WATER/SALINE, 10 ML: HCPCS | Performed by: INTERNAL MEDICINE

## 2023-12-10 RX ORDER — OXYMETAZOLINE HYDROCHLORIDE 0.05 G/100ML
2 SPRAY NASAL 2 TIMES DAILY PRN
Status: DISPENSED | OUTPATIENT
Start: 2023-12-10 | End: 2023-12-13

## 2023-12-10 RX ADMIN — CETIRIZINE HYDROCHLORIDE 10 MG: 10 TABLET, FILM COATED ORAL at 08:54

## 2023-12-10 RX ADMIN — DIPHENHYDRAMINE HYDROCHLORIDE 10 ML: 12.5 LIQUID ORAL at 08:55

## 2023-12-10 RX ADMIN — SODIUM CHLORIDE, PRESERVATIVE FREE 10 ML: 5 INJECTION INTRAVENOUS at 08:55

## 2023-12-10 RX ADMIN — GUAIFENESIN 600 MG: 600 TABLET ORAL at 20:19

## 2023-12-10 RX ADMIN — POTASSIUM CHLORIDE 20 MEQ: 1500 TABLET, EXTENDED RELEASE ORAL at 08:55

## 2023-12-10 RX ADMIN — PANTOPRAZOLE SODIUM 40 MG: 40 TABLET, DELAYED RELEASE ORAL at 08:54

## 2023-12-10 RX ADMIN — MONTELUKAST 10 MG: 10 TABLET, FILM COATED ORAL at 08:55

## 2023-12-10 RX ADMIN — SALINE NASAL SPRAY 2 SPRAY: 1.5 SOLUTION NASAL at 16:54

## 2023-12-10 RX ADMIN — SALINE NASAL SPRAY 2 SPRAY: 1.5 SOLUTION NASAL at 03:23

## 2023-12-10 RX ADMIN — MORPHINE SULFATE 15 MG: 15 TABLET, FILM COATED, EXTENDED RELEASE ORAL at 20:19

## 2023-12-10 RX ADMIN — LEVOTHYROXINE SODIUM 125 MCG: 0.12 TABLET ORAL at 08:55

## 2023-12-10 RX ADMIN — IPRATROPIUM BROMIDE 2 SPRAY: 42 SPRAY NASAL at 08:56

## 2023-12-10 RX ADMIN — GUAIFENESIN 600 MG: 600 TABLET ORAL at 08:55

## 2023-12-10 RX ADMIN — FLUOXETINE HYDROCHLORIDE 20 MG: 20 CAPSULE ORAL at 20:19

## 2023-12-10 RX ADMIN — AMLODIPINE BESYLATE 5 MG: 5 TABLET ORAL at 08:54

## 2023-12-10 RX ADMIN — ACYCLOVIR 400 MG: 400 TABLET ORAL at 20:19

## 2023-12-10 RX ADMIN — CIPROFLOXACIN HYDROCHLORIDE 500 MG: 500 TABLET, FILM COATED ORAL at 20:19

## 2023-12-10 RX ADMIN — SALINE NASAL SPRAY 2 SPRAY: 1.5 SOLUTION NASAL at 20:21

## 2023-12-10 RX ADMIN — ACYCLOVIR 400 MG: 400 TABLET ORAL at 08:54

## 2023-12-10 RX ADMIN — POLYETHYLENE GLYCOL 3350 17 G: 17 POWDER, FOR SOLUTION ORAL at 08:54

## 2023-12-10 RX ADMIN — FERROUS SULFATE TAB 325 MG (65 MG ELEMENTAL FE) 325 MG: 325 (65 FE) TAB at 11:17

## 2023-12-10 RX ADMIN — SODIUM CHLORIDE, PRESERVATIVE FREE 10 ML: 5 INJECTION INTRAVENOUS at 20:24

## 2023-12-10 RX ADMIN — FLUCONAZOLE 200 MG: 100 TABLET ORAL at 08:54

## 2023-12-10 RX ADMIN — DOCUSATE SODIUM 50 MG AND SENNOSIDES 8.6 MG 1 TABLET: 8.6; 5 TABLET, FILM COATED ORAL at 20:19

## 2023-12-10 RX ADMIN — FLUOXETINE HYDROCHLORIDE 20 MG: 20 CAPSULE ORAL at 08:54

## 2023-12-10 RX ADMIN — OXYMETAZOLINE HCL 2 SPRAY: 0.05 SPRAY NASAL at 03:22

## 2023-12-10 RX ADMIN — MORPHINE SULFATE 15 MG: 15 TABLET, FILM COATED, EXTENDED RELEASE ORAL at 08:55

## 2023-12-10 RX ADMIN — TIZANIDINE 4 MG: 2 TABLET ORAL at 22:24

## 2023-12-10 RX ADMIN — DEXAMETHASONE SODIUM PHOSPHATE 40 MG: 4 INJECTION INTRA-ARTICULAR; INTRALESIONAL; INTRAMUSCULAR; INTRAVENOUS; SOFT TISSUE at 11:25

## 2023-12-10 RX ADMIN — DAPTOMYCIN 750 MG: 500 INJECTION, POWDER, LYOPHILIZED, FOR SOLUTION INTRAVENOUS at 14:19

## 2023-12-10 RX ADMIN — CHLORHEXIDINE GLUCONATE 15 ML: 1.2 RINSE ORAL at 22:25

## 2023-12-10 RX ADMIN — POTASSIUM CHLORIDE 20 MEQ: 1500 TABLET, EXTENDED RELEASE ORAL at 16:53

## 2023-12-10 RX ADMIN — SALINE NASAL SPRAY 2 SPRAY: 1.5 SOLUTION NASAL at 08:58

## 2023-12-10 RX ADMIN — ANTI-FUNGAL POWDER MICONAZOLE NITRATE TALC FREE: 1.42 POWDER TOPICAL at 08:56

## 2023-12-10 RX ADMIN — FAMOTIDINE 40 MG: 20 TABLET, FILM COATED ORAL at 20:18

## 2023-12-10 RX ADMIN — ERGOCALCIFEROL 50000 UNITS: 1.25 CAPSULE ORAL at 09:03

## 2023-12-10 RX ADMIN — ANTI-FUNGAL POWDER MICONAZOLE NITRATE TALC FREE: 1.42 POWDER TOPICAL at 20:22

## 2023-12-10 RX ADMIN — PANTOPRAZOLE SODIUM 40 MG: 40 TABLET, DELAYED RELEASE ORAL at 16:53

## 2023-12-10 RX ADMIN — ALLOPURINOL 300 MG: 300 TABLET ORAL at 08:54

## 2023-12-10 RX ADMIN — CHLORHEXIDINE GLUCONATE 15 ML: 1.2 RINSE ORAL at 09:03

## 2023-12-10 RX ADMIN — IPRATROPIUM BROMIDE 2 SPRAY: 42 SPRAY NASAL at 20:21

## 2023-12-10 RX ADMIN — FERROUS SULFATE TAB 325 MG (65 MG ELEMENTAL FE) 325 MG: 325 (65 FE) TAB at 16:53

## 2023-12-10 RX ADMIN — DIPHENHYDRAMINE HYDROCHLORIDE 10 ML: 12.5 LIQUID ORAL at 22:24

## 2023-12-10 RX ADMIN — CIPROFLOXACIN HYDROCHLORIDE 500 MG: 500 TABLET, FILM COATED ORAL at 08:54

## 2023-12-10 ASSESSMENT — PAIN DESCRIPTION - DESCRIPTORS: DESCRIPTORS: ACHING

## 2023-12-10 ASSESSMENT — PAIN DESCRIPTION - ORIENTATION: ORIENTATION: MID;LOWER

## 2023-12-10 ASSESSMENT — PAIN DESCRIPTION - LOCATION: LOCATION: BACK

## 2023-12-10 ASSESSMENT — PAIN SCALES - GENERAL: PAINLEVEL_OUTOF10: 3

## 2023-12-10 NOTE — PROGRESS NOTES
[facility] Hematology & On[facility]        Inpatient Hematology / Oncology Progress Note    Reason for Admission:  Neutropenic fever (HCC) [D70.9, R50.81]  Pancytopenia (HCC) [D61.818]    24 Hour Events:  Afebrile, VSS, on RA  Dacogen completed 11/24, Mylotarg completed 11/16  Awaiting count recovery  BC 1/2 with E faecium, VRE - PICC removed 12/7, BC repeated 12/8 and NGTD  ABX per ID recs - continue daptomycin.  Can replace line tomorrow  Plan for BMBx next week  Repeat Decadron 40 mg daily x 4 doses - (D2/4)    Transfusions: Platelets    Replacements: On PO K.     ROS:  Constitutional: Positive for fatigue; negative for fever, chills.  CV: Negative for chest pain, palpitations, edema.  Respiratory: Negative for wheezing, cough, dyspnea.  GI: Negative for nausea, abdominal pain, diarrhea.    10 point review of systems is otherwise negative with the exception of the elements mentioned above in the HPI.     Allergies   Allergen Reactions    Penicillins Hives    Sulfa Antibiotics Hives    Codeine Nausea And Vomiting     Past Medical History:   Diagnosis Date    Arthritis     Autoimmune disease (HCC)     skin changes, unknown name    Cancer (HCC) 1990    ovarian    Chronic pain     arthritis in back and legs    Coronary artery spasm (HCC)     COVID 05/15/2022    Essential hypertension 11/8/2019    Gastritis     GERD (gastroesophageal reflux disease)     Hx antineoplastic chemo     Migraine headache     Psoriasis     Psychiatric disorder     anxiety and depression    Severe obesity (BMI 35.0-39.9) 6/26/2018    Thyroid disease     Diagnosed in 1996     Past Surgical History:   Procedure Laterality Date    CARPAL TUNNEL RELEASE      GYN      ovaries    HYSTERECTOMY, TOTAL ABDOMINAL (CERVIX REMOVED)  1990    [facility] UNLISTED PROCEDURE CARDIAC SURGERY      cardiac cath.  Dx with spasms.    TUBAL LIGATION       Family History   Problem Relation Age of Onset    Parkinson's Disease Mother     Stroke Mother         Passed away in  Bleeding - epistaxis and rectal bleeding w BM   - ASA on hold; transfuse Hb >7 and plts >50   - PPI  10/29 denies bleeding today   11/5 Per RN  yesterday, small amount of blood from nares, no epistaxis. Otherwise, no bleeding noted. Transfusing plts for plt 2k. 11/9 Pt with episode of bleeding yesterday after scratching self, pressure held and additional 1 unit plts given (3 total yesterday). No further bleeding noted today thus far. 11/10 No further noted bleeding ON/today thus far. Is receiving PRBCs/plts today   11/11 Hgb improved after transfusion yesterday. Plt count 5k , no notable bleeding. 11/12 Plt count up to 40k yesterday (received 1 unit plts yesterday) this AM at 30k, much improved. 11/13 No bleeding noted   11/30 Some bleeding from gums, transfusing PRN. Recheck CBC later this afternoon. Will order CHG, discouraged brushing/abrasive foods. Reiterated fall precautions. 12/2 No bleeding reported during rounds. 12/10 epistaxis today, repeat platelets      Hypertension  11/2 Resume home amlodipine  11/4 More hypertensive overnight, monitor. May need to adjust amlodipine if no improvement. Has PRN hydralazine. 11/8 Overall BP improved      Chronic pain   - on morphine ER 15mg BID, norco.  Muscle relaxant. Constipation   - bowel regimen     Hyponatremia  12/6 Encourage PO hydration with electrolytes, etc. Easily fluid overloaded so avoiding IVF at this time. 12/7 Na up to 133     Hypokalemia  12/8 K 3.2. Will increase PO K to BID. Will hold off on IV if possible since PICC line was removed. No AC due to TCP   Continue home meds  Supportive care  PT/OT - HH  Antimicrobial prophylaxis - ACV, Diflucan, Cipro     Dispo - too soon to determine. Awaiting count recovery. Plan for BMBx next week.      Updates to plan of care  11/26 Discussed the prognosis being very poor that she remains refractory to plt tx,  very depressed they had lost two children, consult spiritual

## 2023-12-10 NOTE — PROGRESS NOTES
Pt had scant bloodstain on the tissue at beginning of  this shift. Through all this shift, pt doesn't have any nose bleeding. I unit platelets order for prevention. Called blood bank, the platelets isn't ready at this time. Afebrile, VSS,  at bedside. Shift report given to ongoing nurse at bedside.

## 2023-12-10 NOTE — PROGRESS NOTES
END OF SHIFT SUMMARY:    Significant vitals this shift:  none   Significant labs this shift:  none  Tests performed this shift:  none  Orders to be followed up on:  none  Blood products given this shift:  1 unit Platelets  Additional events this shift:   Nose bleeds consistently through out the night. Afrin ordered and restarted to help with them.      I/Os:  +/- this shift:   12/09 1901 - 12/10 0700  In: 293   Out: 435 [Urine:435]  Unmeasured Occurrences this Shift:  Urine 0, BM 0, Emesis 0      Bedside shift report given to CHINO Carrasco RN

## 2023-12-11 ENCOUNTER — CLINICAL DOCUMENTATION (OUTPATIENT)
Facility: HOSPITAL | Age: 73
End: 2023-12-11

## 2023-12-11 LAB
ALBUMIN SERPL-MCNC: 2.5 G/DL (ref 3.2–4.6)
ALBUMIN/GLOB SERPL: 0.5 (ref 0.4–1.6)
ALP SERPL-CCNC: 91 U/L (ref 50–136)
ALT SERPL-CCNC: 37 U/L (ref 12–65)
ANION GAP SERPL CALC-SCNC: 5 MMOL/L (ref 2–11)
AST SERPL-CCNC: 15 U/L (ref 15–37)
BILIRUB SERPL-MCNC: 0.5 MG/DL (ref 0.2–1.1)
BLD PROD TYP BPU: NORMAL
BLD PROD TYP BPU: NORMAL
BLOOD BANK CMNT PATIENT-IMP: NORMAL
BLOOD BANK CMNT PATIENT-IMP: NORMAL
BLOOD BANK DISPENSE STATUS: NORMAL
BLOOD BANK DISPENSE STATUS: NORMAL
BPU ID: NORMAL
BPU ID: NORMAL
BUN SERPL-MCNC: 25 MG/DL (ref 8–23)
CALCIUM SERPL-MCNC: 9.2 MG/DL (ref 8.3–10.4)
CHLORIDE SERPL-SCNC: 105 MMOL/L (ref 103–113)
CO2 SERPL-SCNC: 24 MMOL/L (ref 21–32)
CREAT SERPL-MCNC: 0.5 MG/DL (ref 0.6–1)
DIFFERENTIAL METHOD BLD: ABNORMAL
ERYTHROCYTE [DISTWIDTH] IN BLOOD BY AUTOMATED COUNT: 13.8 % (ref 11.9–14.6)
GLOBULIN SER CALC-MCNC: 5.1 G/DL (ref 2.8–4.5)
GLUCOSE SERPL-MCNC: 125 MG/DL (ref 65–100)
HCT VFR BLD AUTO: 21.4 % (ref 35.8–46.3)
HGB BLD-MCNC: 7 G/DL (ref 11.7–15.4)
HISTORY CHECK: NORMAL
MAGNESIUM SERPL-MCNC: 2.4 MG/DL (ref 1.8–2.4)
MCH RBC QN AUTO: 28.3 PG (ref 26.1–32.9)
MCHC RBC AUTO-ENTMCNC: 32.7 G/DL (ref 31.4–35)
MCV RBC AUTO: 86.6 FL (ref 82–102)
NRBC # BLD: 0.02 K/UL (ref 0–0.2)
PLATELET # BLD AUTO: 70 K/UL (ref 150–450)
PLATELET COMMENT: ABNORMAL
PMV BLD AUTO: 10.6 FL (ref 9.4–12.3)
POTASSIUM SERPL-SCNC: 4.6 MMOL/L (ref 3.5–5.1)
PROT SERPL-MCNC: 7.6 G/DL (ref 6.3–8.2)
RBC # BLD AUTO: 2.47 M/UL (ref 4.05–5.2)
RBC MORPH BLD: ABNORMAL
SODIUM SERPL-SCNC: 134 MMOL/L (ref 136–146)
UNIT DIVISION: 0
UNIT DIVISION: 0
WBC # BLD AUTO: 0.5 K/UL (ref 4.3–11.1)
WBC MORPH BLD: ABNORMAL

## 2023-12-11 PROCEDURE — 6370000000 HC RX 637 (ALT 250 FOR IP): Performed by: INTERNAL MEDICINE

## 2023-12-11 PROCEDURE — 6370000000 HC RX 637 (ALT 250 FOR IP): Performed by: NURSE PRACTITIONER

## 2023-12-11 PROCEDURE — 6360000002 HC RX W HCPCS: Performed by: INTERNAL MEDICINE

## 2023-12-11 PROCEDURE — 6360000002 HC RX W HCPCS: Performed by: NURSE PRACTITIONER

## 2023-12-11 PROCEDURE — 2580000003 HC RX 258: Performed by: INTERNAL MEDICINE

## 2023-12-11 PROCEDURE — 2580000003 HC RX 258: Performed by: NURSE PRACTITIONER

## 2023-12-11 PROCEDURE — 6370000000 HC RX 637 (ALT 250 FOR IP)

## 2023-12-11 PROCEDURE — 99232 SBSQ HOSP IP/OBS MODERATE 35: CPT | Performed by: INTERNAL MEDICINE

## 2023-12-11 PROCEDURE — 80053 COMPREHEN METABOLIC PANEL: CPT

## 2023-12-11 PROCEDURE — 1100000000 HC RM PRIVATE

## 2023-12-11 PROCEDURE — A4216 STERILE WATER/SALINE, 10 ML: HCPCS | Performed by: INTERNAL MEDICINE

## 2023-12-11 PROCEDURE — 83735 ASSAY OF MAGNESIUM: CPT

## 2023-12-11 PROCEDURE — 85025 COMPLETE CBC W/AUTO DIFF WBC: CPT

## 2023-12-11 PROCEDURE — APPSS30 APP SPLIT SHARED TIME 16-30 MINUTES: Performed by: NURSE PRACTITIONER

## 2023-12-11 PROCEDURE — 2580000003 HC RX 258: Performed by: FAMILY MEDICINE

## 2023-12-11 PROCEDURE — 36430 TRANSFUSION BLD/BLD COMPNT: CPT

## 2023-12-11 PROCEDURE — 86922 COMPATIBILITY TEST ANTIGLOB: CPT

## 2023-12-11 PROCEDURE — 86921 COMPATIBILITY TEST INCUBATE: CPT

## 2023-12-11 PROCEDURE — P9040 RBC LEUKOREDUCED IRRADIATED: HCPCS

## 2023-12-11 PROCEDURE — 6370000000 HC RX 637 (ALT 250 FOR IP): Performed by: FAMILY MEDICINE

## 2023-12-11 PROCEDURE — 86644 CMV ANTIBODY: CPT

## 2023-12-11 PROCEDURE — 36415 COLL VENOUS BLD VENIPUNCTURE: CPT

## 2023-12-11 RX ORDER — SODIUM CHLORIDE 9 MG/ML
INJECTION, SOLUTION INTRAVENOUS PRN
Status: DISCONTINUED | OUTPATIENT
Start: 2023-12-11 | End: 2023-12-18

## 2023-12-11 RX ADMIN — ACETAMINOPHEN 650 MG: 325 TABLET ORAL at 14:10

## 2023-12-11 RX ADMIN — DEXAMETHASONE SODIUM PHOSPHATE 40 MG: 4 INJECTION INTRA-ARTICULAR; INTRALESIONAL; INTRAMUSCULAR; INTRAVENOUS; SOFT TISSUE at 12:00

## 2023-12-11 RX ADMIN — CIPROFLOXACIN HYDROCHLORIDE 500 MG: 500 TABLET, FILM COATED ORAL at 20:39

## 2023-12-11 RX ADMIN — SALINE NASAL SPRAY 2 SPRAY: 1.5 SOLUTION NASAL at 07:39

## 2023-12-11 RX ADMIN — GUAIFENESIN 600 MG: 600 TABLET ORAL at 07:36

## 2023-12-11 RX ADMIN — SODIUM CHLORIDE, PRESERVATIVE FREE 10 ML: 5 INJECTION INTRAVENOUS at 20:40

## 2023-12-11 RX ADMIN — FLUOXETINE HYDROCHLORIDE 20 MG: 20 CAPSULE ORAL at 07:36

## 2023-12-11 RX ADMIN — ERGOCALCIFEROL 50000 UNITS: 1.25 CAPSULE ORAL at 07:36

## 2023-12-11 RX ADMIN — MORPHINE SULFATE 15 MG: 15 TABLET, FILM COATED, EXTENDED RELEASE ORAL at 20:38

## 2023-12-11 RX ADMIN — CIPROFLOXACIN HYDROCHLORIDE 500 MG: 500 TABLET, FILM COATED ORAL at 07:36

## 2023-12-11 RX ADMIN — FLUCONAZOLE 200 MG: 100 TABLET ORAL at 07:36

## 2023-12-11 RX ADMIN — HYDROCODONE BITARTRATE AND ACETAMINOPHEN 1 TABLET: 5; 325 TABLET ORAL at 11:03

## 2023-12-11 RX ADMIN — CHLORHEXIDINE GLUCONATE 15 ML: 1.2 RINSE ORAL at 07:37

## 2023-12-11 RX ADMIN — PANTOPRAZOLE SODIUM 40 MG: 40 TABLET, DELAYED RELEASE ORAL at 14:10

## 2023-12-11 RX ADMIN — POTASSIUM CHLORIDE 20 MEQ: 1500 TABLET, EXTENDED RELEASE ORAL at 16:10

## 2023-12-11 RX ADMIN — TIZANIDINE 4 MG: 2 TABLET ORAL at 20:43

## 2023-12-11 RX ADMIN — IPRATROPIUM BROMIDE 2 SPRAY: 42 SPRAY NASAL at 20:42

## 2023-12-11 RX ADMIN — FERROUS SULFATE TAB 325 MG (65 MG ELEMENTAL FE) 325 MG: 325 (65 FE) TAB at 16:10

## 2023-12-11 RX ADMIN — SALINE NASAL SPRAY 2 SPRAY: 1.5 SOLUTION NASAL at 04:05

## 2023-12-11 RX ADMIN — FAMOTIDINE 40 MG: 20 TABLET, FILM COATED ORAL at 20:38

## 2023-12-11 RX ADMIN — HYDROCODONE BITARTRATE AND ACETAMINOPHEN 1 TABLET: 5; 325 TABLET ORAL at 17:42

## 2023-12-11 RX ADMIN — ANTI-FUNGAL POWDER MICONAZOLE NITRATE TALC FREE: 1.42 POWDER TOPICAL at 07:38

## 2023-12-11 RX ADMIN — PANTOPRAZOLE SODIUM 40 MG: 40 TABLET, DELAYED RELEASE ORAL at 07:36

## 2023-12-11 RX ADMIN — POTASSIUM CHLORIDE 20 MEQ: 1500 TABLET, EXTENDED RELEASE ORAL at 07:37

## 2023-12-11 RX ADMIN — MORPHINE SULFATE 15 MG: 15 TABLET, FILM COATED, EXTENDED RELEASE ORAL at 07:37

## 2023-12-11 RX ADMIN — CETIRIZINE HYDROCHLORIDE 10 MG: 10 TABLET, FILM COATED ORAL at 07:37

## 2023-12-11 RX ADMIN — ALLOPURINOL 300 MG: 300 TABLET ORAL at 07:37

## 2023-12-11 RX ADMIN — SALINE NASAL SPRAY 2 SPRAY: 1.5 SOLUTION NASAL at 14:47

## 2023-12-11 RX ADMIN — IPRATROPIUM BROMIDE 2 SPRAY: 42 SPRAY NASAL at 07:38

## 2023-12-11 RX ADMIN — ACYCLOVIR 400 MG: 400 TABLET ORAL at 20:39

## 2023-12-11 RX ADMIN — GUAIFENESIN 600 MG: 600 TABLET ORAL at 20:38

## 2023-12-11 RX ADMIN — DIPHENHYDRAMINE HYDROCHLORIDE 25 MG: 25 CAPSULE ORAL at 14:10

## 2023-12-11 RX ADMIN — MONTELUKAST 10 MG: 10 TABLET, FILM COATED ORAL at 07:36

## 2023-12-11 RX ADMIN — LEVOTHYROXINE SODIUM 125 MCG: 0.12 TABLET ORAL at 07:37

## 2023-12-11 RX ADMIN — SODIUM CHLORIDE, PRESERVATIVE FREE 10 ML: 5 INJECTION INTRAVENOUS at 08:08

## 2023-12-11 RX ADMIN — AMLODIPINE BESYLATE 5 MG: 5 TABLET ORAL at 07:36

## 2023-12-11 RX ADMIN — POLYETHYLENE GLYCOL 3350 17 G: 17 POWDER, FOR SOLUTION ORAL at 07:37

## 2023-12-11 RX ADMIN — CHLORHEXIDINE GLUCONATE 15 ML: 1.2 RINSE ORAL at 20:39

## 2023-12-11 RX ADMIN — ACYCLOVIR 400 MG: 400 TABLET ORAL at 07:37

## 2023-12-11 RX ADMIN — FERROUS SULFATE TAB 325 MG (65 MG ELEMENTAL FE) 325 MG: 325 (65 FE) TAB at 11:03

## 2023-12-11 RX ADMIN — DOCUSATE SODIUM 50 MG AND SENNOSIDES 8.6 MG 1 TABLET: 8.6; 5 TABLET, FILM COATED ORAL at 20:38

## 2023-12-11 RX ADMIN — FLUOXETINE HYDROCHLORIDE 20 MG: 20 CAPSULE ORAL at 20:38

## 2023-12-11 RX ADMIN — DAPTOMYCIN 750 MG: 500 INJECTION, POWDER, LYOPHILIZED, FOR SOLUTION INTRAVENOUS at 16:09

## 2023-12-11 ASSESSMENT — PAIN DESCRIPTION - DESCRIPTORS
DESCRIPTORS: ACHING

## 2023-12-11 ASSESSMENT — PAIN DESCRIPTION - FREQUENCY: FREQUENCY: INTERMITTENT

## 2023-12-11 ASSESSMENT — PAIN SCALES - GENERAL
PAINLEVEL_OUTOF10: 6
PAINLEVEL_OUTOF10: 5
PAINLEVEL_OUTOF10: 6

## 2023-12-11 ASSESSMENT — PAIN - FUNCTIONAL ASSESSMENT: PAIN_FUNCTIONAL_ASSESSMENT: ACTIVITIES ARE NOT PREVENTED

## 2023-12-11 ASSESSMENT — PAIN DESCRIPTION - ORIENTATION
ORIENTATION: LEFT
ORIENTATION: MID
ORIENTATION: LEFT

## 2023-12-11 ASSESSMENT — PAIN DESCRIPTION - LOCATION
LOCATION: FACE;TEETH
LOCATION: TEETH
LOCATION: BACK;TEETH

## 2023-12-11 ASSESSMENT — PAIN DESCRIPTION - PAIN TYPE: TYPE: ACUTE PAIN

## 2023-12-11 NOTE — CARE COORDINATION
Pt discussed during IDR and chart screened by CM for d/c planning. ID following for + VRE. Pt receving IV Dapto. Awaiting count recovery. Pt to have repeat BMBx on 12/13. PT/OT recommend HH at d/c. Dispo timeframe: undetermined at the present time. Awaiting count recovery. CM will continue to follow.   LOS = 45 days

## 2023-12-11 NOTE — PROGRESS NOTES
EOS 7p-7a    BSSR received from off going CHINO Bravo    Pt PLTs today 24. Dayshift RN ordered PLT for \"prevention\". NP Lis James notified via telephone about infusing PLTs this shift. NP states to hold off as pt is not having any active bleeding and to check labs in AM.     2224 zanaflex given c/o muscle spasms    Rounded on hourly by myself and patient assistant. Daughter remained at bedside this shift. Bed locked, low, and call light within reach. No new needs voiced at this time.     BSSR given to oncoming CHINO Ventura

## 2023-12-11 NOTE — PROGRESS NOTES
[facility] Hematology & Oncology        Inpatient Hematology / Oncology Progress Note    Reason for Admission:  Neutropenic fever (HCC) [D70.9, R50.81]  Pancytopenia (HCC) [D61.818]    24 Hour Events:  Afebrile, VSS, on RA  Dacogen completed 11/24, Mylotarg completed 11/16  Awaiting count recovery - plts stable w/o transfusion  Continues pulse dex for thrombocytopenia (day 3/4)  On Dapto for VRE per ID, repeat BC NGTD  PICC replacement pending    Transfusions: PRBCs  Replacements: On PO K.     ROS:  Constitutional: Positive for fatigue; negative for fever, chills.  CV: Negative for chest pain, palpitations, edema.  Respiratory: Negative for wheezing, cough, dyspnea.  GI: Negative for nausea, abdominal pain, diarrhea.    10 point review of systems is otherwise negative with the exception of the elements mentioned above in the HPI.     Allergies   Allergen Reactions    Penicillins Hives    Sulfa Antibiotics Hives    Codeine Nausea And Vomiting     Past Medical History:   Diagnosis Date    Arthritis     Autoimmune disease (HCC)     skin changes, unknown name    Cancer (HCC) 1990    ovarian    Chronic pain     arthritis in back and legs    Coronary artery spasm (HCC)     COVID 05/15/2022    Essential hypertension 11/8/2019    Gastritis     GERD (gastroesophageal reflux disease)     Hx antineoplastic chemo     Migraine headache     Psoriasis     Psychiatric disorder     anxiety and depression    Severe obesity (BMI 35.0-39.9) 6/26/2018    Thyroid disease     Diagnosed in 1996     Past Surgical History:   Procedure Laterality Date    CARPAL TUNNEL RELEASE      GYN      ovaries    HYSTERECTOMY, TOTAL ABDOMINAL (CERVIX REMOVED)  1990    [place] UNLISTED PROCEDURE CARDIAC SURGERY      cardiac cath.  Dx with spasms.    TUBAL LIGATION       Family History   Problem Relation Age of Onset    Parkinson's Disease Mother     Stroke Mother         Passed away in 1969    No Known Problems Paternal Grandfather     No Known Problems  Paternal Grandmother     No Known Problems Maternal Grandfather     No Known Problems Maternal Grandmother     Prostate Cancer Father          age 80     Cancer Father         Kidney     Social History     Socioeconomic History    Marital status:      Spouse name: Not on file    Number of children: Not on file    Years of education: Not on file    Highest education level: Not on file   Occupational History    Not on file   Tobacco Use    Smoking status: Never     Passive exposure: Past    Smokeless tobacco: Never   Vaping Use    Vaping Use: Never used   Substance and Sexual Activity    Alcohol use: No    Drug use: Yes     Types: Prescription    Sexual activity: Not Currently     Birth control/protection: None   Other Topics Concern    Not on file   Social History Narrative    Not on file     Social Determinants of Health     Financial Resource Strain: Low Risk  (2023)    Overall Financial Resource Strain (CARDIA)     Difficulty of Paying Living Expenses: Not hard at all   Food Insecurity: Not on file (2023)   Transportation Needs: No Transportation Needs (2023)    Transportation Problems ([facility])     In the past 12 months, has lack of reliable transportation kept you from medical appointments, meetings, work or from getting things needed for daily living?: Not on file   Recent Concern: Transportation Needs - Unmet Transportation Needs (2023)    PRAPARE - Transportation     Lack of Transportation (Medical): Yes     Lack of Transportation (Non-Medical):  No   Physical Activity: Not on file   Stress: Not on file   Social Connections: Not on file   Intimate Partner Violence: Not on file   Housing Stability: High Risk (2023)    Housing Stability Vital Sign     Unable to Pay for Housing in the Last Year: Yes     Number of Places Lived in the Last Year: 1     Unstable Housing in the Last Year: No     Current Facility-Administered Medications   Medication Dose Route Frequency Provider PO/prophylactic Cipro today. 11/1 Afebrile. Transition to prophylactic cipro now that she has completed 5 day course of abx with likely contaminant/S epi with repeat BC.  RESOLVED  12/6 .9 overnight, CXR negative. On Cefe/Vanc. Follow BC. Checking CT soft tissue neck for L-sided ear/neck pain. 12/7 .3, continues Cefe/Vanc. However, BC 1/2 (from peripheral) with E faecium, VRE. ID consulted. 12/8 Afebrile. PICC removed. ID started Dapto. Will change cefe back to Cipro. 12/11 Repeated BC 12/8 and NGTD - continues daptomycin per ID. Can place line today if needed. Complains of dental pain. Recent CT soft tissue neck negative for abscess, supportive care for now. Altered mental status  11/1 Likely secondary to HD steroids. CT head negative. CTA and MRI brain pending after Code S called last night for dysarthria. 11/2 CTA negative. Unable to tolerate MRI brain without sedation so holding for now as symptoms more consistent with steroid-related effects vs stroke as no focal deficits noted. RESOLVED    Hypoxia / LE edema / abnormal breath sounds  11/1 LE edema may be related to steroids. Incomplete I/O recorded, weight up since admission. Stopped IVF. Check CXR and BNP.  11/2 BNP elevated, CXR with pulmonary vascular congestion. On 2-4L NC. Weight remains elevated since admission. 11/4 Stable fluid status, on 3L NC - weaning as tolerated. Was hypoxic when found off O2 in the middle of the night. 11/7 Continuing to wean O2 as tolerated. Down to 1L NC. Fluid balance stable. 11/8 O2 back up to 3 L NC.    10/10 on RA   11/13 on 2L - weight stable. 11/14 Check CXR as well as echo. Weight back down to admission baseline. On 2L NC with worsening dyspnea today. 11/15 Stable fluid status/weight. CXR negative. Echo WNL. On RA overnight. 11/17 On RA, +I/O 600ml and weight up (using different scale), no signs of overload.   11/22 on RA  11/23 Up 5# and +1.9L, remains on RA  11/27 Remains on RA, CXR

## 2023-12-11 NOTE — PROGRESS NOTES
Physical Therapy Note:    Attempted to see patient this PM for physical therapy treatment  session. RN attempting to get IV access at time of attempt. Will follow and re-attempt as schedule permits/patient available.  Thank you,    MARVIN DUNN, PT     Rehab Caseload Tracker

## 2023-12-11 NOTE — PROGRESS NOTES
Patient Assistance    Pt spouse contacted me regarding [facility] free drug.  I have not received a referral.  I will follow up w/Constantino to verify

## 2023-12-12 PROBLEM — Z51.5 ENCOUNTER FOR PALLIATIVE CARE: Status: ACTIVE | Noted: 2023-12-12

## 2023-12-12 PROBLEM — R53.83 FATIGUE: Status: ACTIVE | Noted: 2023-12-12

## 2023-12-12 LAB
ABO + RH BLD: NORMAL
ALBUMIN SERPL-MCNC: 2.7 G/DL (ref 3.2–4.6)
ALBUMIN/GLOB SERPL: 0.5 (ref 0.4–1.6)
ALP SERPL-CCNC: 88 U/L (ref 50–136)
ALT SERPL-CCNC: 41 U/L (ref 12–65)
ANION GAP SERPL CALC-SCNC: 4 MMOL/L (ref 2–11)
AST SERPL-CCNC: 18 U/L (ref 15–37)
BACTERIA SPEC CULT: NORMAL
BACTERIA SPEC CULT: NORMAL
BILIRUB SERPL-MCNC: 0.5 MG/DL (ref 0.2–1.1)
BLD PROD TYP BPU: NORMAL
BLOOD BANK DISPENSE STATUS: NORMAL
BLOOD GROUP ANTIBODIES SERPL: NORMAL
BPU ID: NORMAL
BUN SERPL-MCNC: 23 MG/DL (ref 8–23)
CALCIUM SERPL-MCNC: 8.8 MG/DL (ref 8.3–10.4)
CHLORIDE SERPL-SCNC: 104 MMOL/L (ref 103–113)
CK SERPL-CCNC: 24 U/L (ref 21–215)
CO2 SERPL-SCNC: 25 MMOL/L (ref 21–32)
CREAT SERPL-MCNC: 0.6 MG/DL (ref 0.6–1)
CROSSMATCH RESULT: NORMAL
DIFFERENTIAL METHOD BLD: ABNORMAL
ERYTHROCYTE [DISTWIDTH] IN BLOOD BY AUTOMATED COUNT: 14.4 % (ref 11.9–14.6)
GLOBULIN SER CALC-MCNC: 5 G/DL (ref 2.8–4.5)
GLUCOSE SERPL-MCNC: 105 MG/DL (ref 65–100)
HCT VFR BLD AUTO: 25.6 % (ref 35.8–46.3)
HGB BLD-MCNC: 8.6 G/DL (ref 11.7–15.4)
MAGNESIUM SERPL-MCNC: 2.5 MG/DL (ref 1.8–2.4)
MCH RBC QN AUTO: 28.7 PG (ref 26.1–32.9)
MCHC RBC AUTO-ENTMCNC: 33.6 G/DL (ref 31.4–35)
MCV RBC AUTO: 85.3 FL (ref 82–102)
NRBC # BLD: 0.02 K/UL (ref 0–0.2)
PLATELET # BLD AUTO: 139 K/UL (ref 150–450)
PLATELET COMMENT: SLIGHT
PMV BLD AUTO: 10.4 FL (ref 9.4–12.3)
POTASSIUM SERPL-SCNC: 4.8 MMOL/L (ref 3.5–5.1)
PROT SERPL-MCNC: 7.7 G/DL (ref 6.3–8.2)
RBC # BLD AUTO: 3 M/UL (ref 4.05–5.2)
RBC MORPH BLD: ABNORMAL
SERVICE CMNT-IMP: NORMAL
SERVICE CMNT-IMP: NORMAL
SODIUM SERPL-SCNC: 133 MMOL/L (ref 136–146)
SPECIMEN EXP DATE BLD: NORMAL
UNIT DIVISION: 0
WBC # BLD AUTO: 0.5 K/UL (ref 4.3–11.1)
WBC MORPH BLD: ABNORMAL

## 2023-12-12 PROCEDURE — A4216 STERILE WATER/SALINE, 10 ML: HCPCS | Performed by: INTERNAL MEDICINE

## 2023-12-12 PROCEDURE — 97530 THERAPEUTIC ACTIVITIES: CPT

## 2023-12-12 PROCEDURE — 6370000000 HC RX 637 (ALT 250 FOR IP): Performed by: NURSE PRACTITIONER

## 2023-12-12 PROCEDURE — 97535 SELF CARE MNGMENT TRAINING: CPT

## 2023-12-12 PROCEDURE — 99231 SBSQ HOSP IP/OBS SF/LOW 25: CPT | Performed by: NURSE PRACTITIONER

## 2023-12-12 PROCEDURE — 2580000003 HC RX 258: Performed by: INTERNAL MEDICINE

## 2023-12-12 PROCEDURE — 2500000003 HC RX 250 WO HCPCS: Performed by: NURSE PRACTITIONER

## 2023-12-12 PROCEDURE — 6370000000 HC RX 637 (ALT 250 FOR IP): Performed by: FAMILY MEDICINE

## 2023-12-12 PROCEDURE — 80053 COMPREHEN METABOLIC PANEL: CPT

## 2023-12-12 PROCEDURE — 6360000002 HC RX W HCPCS: Performed by: NURSE PRACTITIONER

## 2023-12-12 PROCEDURE — 83735 ASSAY OF MAGNESIUM: CPT

## 2023-12-12 PROCEDURE — 1100000000 HC RM PRIVATE

## 2023-12-12 PROCEDURE — 2580000003 HC RX 258: Performed by: NURSE PRACTITIONER

## 2023-12-12 PROCEDURE — 2580000003 HC RX 258: Performed by: FAMILY MEDICINE

## 2023-12-12 PROCEDURE — 99232 SBSQ HOSP IP/OBS MODERATE 35: CPT | Performed by: INTERNAL MEDICINE

## 2023-12-12 PROCEDURE — 6360000002 HC RX W HCPCS: Performed by: INTERNAL MEDICINE

## 2023-12-12 PROCEDURE — 82550 ASSAY OF CK (CPK): CPT

## 2023-12-12 PROCEDURE — 6370000000 HC RX 637 (ALT 250 FOR IP): Performed by: INTERNAL MEDICINE

## 2023-12-12 PROCEDURE — 85025 COMPLETE CBC W/AUTO DIFF WBC: CPT

## 2023-12-12 PROCEDURE — APPSS30 APP SPLIT SHARED TIME 16-30 MINUTES: Performed by: NURSE PRACTITIONER

## 2023-12-12 PROCEDURE — 6370000000 HC RX 637 (ALT 250 FOR IP)

## 2023-12-12 PROCEDURE — 36415 COLL VENOUS BLD VENIPUNCTURE: CPT

## 2023-12-12 RX ADMIN — HYDROCODONE BITARTRATE AND ACETAMINOPHEN 1 TABLET: 5; 325 TABLET ORAL at 11:26

## 2023-12-12 RX ADMIN — DOCUSATE SODIUM 50 MG AND SENNOSIDES 8.6 MG 1 TABLET: 8.6; 5 TABLET, FILM COATED ORAL at 19:52

## 2023-12-12 RX ADMIN — POLYETHYLENE GLYCOL 3350 17 G: 17 POWDER, FOR SOLUTION ORAL at 08:55

## 2023-12-12 RX ADMIN — ANTI-FUNGAL POWDER MICONAZOLE NITRATE TALC FREE: 1.42 POWDER TOPICAL at 08:59

## 2023-12-12 RX ADMIN — DAPTOMYCIN 750 MG: 500 INJECTION, POWDER, LYOPHILIZED, FOR SOLUTION INTRAVENOUS at 16:23

## 2023-12-12 RX ADMIN — SALINE NASAL SPRAY 2 SPRAY: 1.5 SOLUTION NASAL at 16:23

## 2023-12-12 RX ADMIN — FLUCONAZOLE 200 MG: 100 TABLET ORAL at 08:56

## 2023-12-12 RX ADMIN — FERROUS SULFATE TAB 325 MG (65 MG ELEMENTAL FE) 325 MG: 325 (65 FE) TAB at 12:44

## 2023-12-12 RX ADMIN — ANTI-FUNGAL POWDER MICONAZOLE NITRATE TALC FREE: 1.42 POWDER TOPICAL at 19:56

## 2023-12-12 RX ADMIN — MORPHINE SULFATE 15 MG: 15 TABLET, FILM COATED, EXTENDED RELEASE ORAL at 19:52

## 2023-12-12 RX ADMIN — ACYCLOVIR 400 MG: 400 TABLET ORAL at 19:52

## 2023-12-12 RX ADMIN — HYDROCODONE BITARTRATE AND ACETAMINOPHEN 1 TABLET: 5; 325 TABLET ORAL at 03:50

## 2023-12-12 RX ADMIN — SALINE NASAL SPRAY 2 SPRAY: 1.5 SOLUTION NASAL at 08:58

## 2023-12-12 RX ADMIN — FERROUS SULFATE TAB 325 MG (65 MG ELEMENTAL FE) 325 MG: 325 (65 FE) TAB at 16:23

## 2023-12-12 RX ADMIN — SODIUM CHLORIDE, PRESERVATIVE FREE 10 ML: 5 INJECTION INTRAVENOUS at 19:54

## 2023-12-12 RX ADMIN — FLUOXETINE HYDROCHLORIDE 20 MG: 20 CAPSULE ORAL at 08:56

## 2023-12-12 RX ADMIN — DEXAMETHASONE SODIUM PHOSPHATE 40 MG: 4 INJECTION INTRA-ARTICULAR; INTRALESIONAL; INTRAMUSCULAR; INTRAVENOUS; SOFT TISSUE at 12:47

## 2023-12-12 RX ADMIN — POTASSIUM CHLORIDE 20 MEQ: 1500 TABLET, EXTENDED RELEASE ORAL at 08:56

## 2023-12-12 RX ADMIN — AMLODIPINE BESYLATE 5 MG: 5 TABLET ORAL at 08:56

## 2023-12-12 RX ADMIN — ALLOPURINOL 300 MG: 300 TABLET ORAL at 08:56

## 2023-12-12 RX ADMIN — CHLORHEXIDINE GLUCONATE 15 ML: 1.2 RINSE ORAL at 19:53

## 2023-12-12 RX ADMIN — CHLORHEXIDINE GLUCONATE 15 ML: 1.2 RINSE ORAL at 08:56

## 2023-12-12 RX ADMIN — MORPHINE SULFATE 15 MG: 15 TABLET, FILM COATED, EXTENDED RELEASE ORAL at 08:56

## 2023-12-12 RX ADMIN — SODIUM CHLORIDE, PRESERVATIVE FREE 10 ML: 5 INJECTION INTRAVENOUS at 08:59

## 2023-12-12 RX ADMIN — GUAIFENESIN 600 MG: 600 TABLET ORAL at 08:56

## 2023-12-12 RX ADMIN — CIPROFLOXACIN HYDROCHLORIDE 500 MG: 500 TABLET, FILM COATED ORAL at 19:52

## 2023-12-12 RX ADMIN — ACYCLOVIR 400 MG: 400 TABLET ORAL at 08:56

## 2023-12-12 RX ADMIN — FLUOXETINE HYDROCHLORIDE 20 MG: 20 CAPSULE ORAL at 19:52

## 2023-12-12 RX ADMIN — SALINE NASAL SPRAY 2 SPRAY: 1.5 SOLUTION NASAL at 19:57

## 2023-12-12 RX ADMIN — GUAIFENESIN 600 MG: 600 TABLET ORAL at 19:52

## 2023-12-12 RX ADMIN — PANTOPRAZOLE SODIUM 40 MG: 40 TABLET, DELAYED RELEASE ORAL at 16:23

## 2023-12-12 RX ADMIN — PANTOPRAZOLE SODIUM 40 MG: 40 TABLET, DELAYED RELEASE ORAL at 08:56

## 2023-12-12 RX ADMIN — IPRATROPIUM BROMIDE 2 SPRAY: 42 SPRAY NASAL at 08:58

## 2023-12-12 RX ADMIN — LEVOTHYROXINE SODIUM 125 MCG: 0.12 TABLET ORAL at 08:56

## 2023-12-12 RX ADMIN — CETIRIZINE HYDROCHLORIDE 10 MG: 10 TABLET, FILM COATED ORAL at 08:56

## 2023-12-12 RX ADMIN — FAMOTIDINE 40 MG: 20 TABLET, FILM COATED ORAL at 19:52

## 2023-12-12 RX ADMIN — POTASSIUM CHLORIDE 20 MEQ: 1500 TABLET, EXTENDED RELEASE ORAL at 16:23

## 2023-12-12 RX ADMIN — MONTELUKAST 10 MG: 10 TABLET, FILM COATED ORAL at 08:56

## 2023-12-12 RX ADMIN — IPRATROPIUM BROMIDE 2 SPRAY: 42 SPRAY NASAL at 19:56

## 2023-12-12 RX ADMIN — CIPROFLOXACIN HYDROCHLORIDE 500 MG: 500 TABLET, FILM COATED ORAL at 08:56

## 2023-12-12 ASSESSMENT — PAIN DESCRIPTION - ORIENTATION
ORIENTATION: LEFT
ORIENTATION: LOWER

## 2023-12-12 ASSESSMENT — PAIN - FUNCTIONAL ASSESSMENT: PAIN_FUNCTIONAL_ASSESSMENT: ACTIVITIES ARE NOT PREVENTED

## 2023-12-12 ASSESSMENT — PAIN DESCRIPTION - DESCRIPTORS
DESCRIPTORS: ACHING

## 2023-12-12 ASSESSMENT — PAIN SCALES - GENERAL
PAINLEVEL_OUTOF10: 5
PAINLEVEL_OUTOF10: 5
PAINLEVEL_OUTOF10: 7
PAINLEVEL_OUTOF10: 0

## 2023-12-12 ASSESSMENT — PAIN DESCRIPTION - LOCATION
LOCATION: BACK
LOCATION: TEETH
LOCATION: TEETH

## 2023-12-12 NOTE — PROGRESS NOTES
ACUTE PHYSICAL THERAPY GOALS:   (Developed with and agreed upon by patient and/or caregiver.)  Goals assessed and revised 11/27/23  LTG:  (1.)Ms. Michaels will move from supine to sit and sit to supine , scoot up and down, and roll side to side in bed with INDEPENDENCE within 7 treatment day(s). GOAL MET 11/27/2023  (2.)Ms. Michaels will transfer from bed to chair and chair to bed with MODIFIED INDEPENDENCE using the least restrictive device within 7 treatment day(s). GOAL MET 11/27/2023  (3.)Ms. Michaels will ambulate with MODIFIED INDEPENDENCE for 500 feet with the least restrictive device within 7 treatment day(s). (4.)Ms. Michaels will participate in therapeutic activity/exercises x 25 minutes for increased activity tolerance within 7 treatment days. (5.)Ms. Michaels will perform standing static and dynamic balance activities x 15 minutes with SUPERVISION to improve safety within 7 day(s). New Goal:      (6.)Ms. Michaels will demonstrate understanding of energy conservation and activity pacing techniques and apply them with no cueing within 7 days. (7.)Ms. Michaels will be independent in all bed mobility and bed to chair, chair to bed transfers within 7 treatment day. PHYSICAL THERAPY: Daily Note AM   (Link to Caseload Tracking: PT Visit Days : 6  Time In/Out PT Charge Capture  Rehab Caseload Tracker  Orders    Himanshu Berkowitz is a 71 y.o. female   PRIMARY DIAGNOSIS: Neutropenic fever ([address])  Neutropenic fever ([address]) [D70.9, R50.81]  Pancytopenia ([address]) [D61.818]       Inpatient: Payor: Ariane Lubin / Plan: Aggie Lira / Product Type: *No Product type* /     ASSESSMENT:     REHAB RECOMMENDATIONS:   Recommendation to date pending progress:  Setting:  Home Health Therapy    Equipment:    To Be Determined     ASSESSMENT:  Ms. Michaels was received supine in bed, agreeable to therapy. She was able to ambulate 250 ft x 2 with HHA.  She required a few rest breaks both standing and seated but improvements noted

## 2023-12-12 NOTE — PROGRESS NOTES
ACUTE OCCUPATIONAL THERAPY GOALS:   (Developed with and agreed upon by patient and/or caregiver.)  Re-evaluation completed on 12/05/23  1. Patient will complete lower body bathing and dressing with MOD I and adaptive equipment as needed.   2. Patient will complete toileting with MOD I.   3. Patient will tolerate 30 minutes of OT treatment with 1-2 rest breaks to increase activity tolerance for ADLs.   4. Patient will complete functional transfers with MOD I and adaptive equipment as needed.   5. Patient will complete functional mobility for household distances with MOD I and adaptive equipment as needed.  6. Patient will complete self-grooming while standing edge of sink with MOD I and adaptive equipment as needed.     New Goal Added:  7. Patient will complete UB bathing and dressing with MOD I and adaptive equipment as needed.  8. Patient will verbalize and demonstrate 3 energy conservation strategies throughout completion of bADLs.     Timeframe: 7 visits     OCCUPATIONAL THERAPY: Daily Note AM   OT Visit Days: 3   Time In/Out  OT Charge Capture  Rehab Caseload Tracker  OT Orders  Neutropenic Precautions    Constance Gomes is a 72 y.o. female   PRIMARY DIAGNOSIS: Neutropenic fever (HCC)  Neutropenic fever (HCC) [D70.9, R50.81]  Pancytopenia (HCC) [D61.818]       Inpatient: Payor: [employer] MEDICARE / Plan: [employer] CHOICE-PPO MEDICARE / Product Type: *No Product type* /     ASSESSMENT:     REHAB RECOMMENDATIONS: CURRENT LEVEL OF FUNCTION:  (Most Recently Demonstrated)   Recommendation to date pending progress:  Setting:  Home Health Therapy    Equipment:    To Be Determined Bathing:  Not Tested  Dressing:  Stand by Assist  Feeding/Grooming:  Stand by Assist  Toileting:  Stand by Assist  Functional Mobility:  Stand by Assist     ASSESSMENT:  Ms. Gomes is progressing well towards OT goals. Today, pt was received supine in bed--pt in good spirits today. Completed bed moblity, LB dressing, functional transfers,  ambulation with HHA, toileting, and grooming tasks standing at the sink with overall SBA. Pt with increased activity tolerance but still required seated rest breask throughout session. Recommend return home with [address] [place] [address],[address] therapy services at discharge. Ms. Jeremie James continues to demonstrate overall deficits in strength, balance, activity tolerance, and ADL performance. Continue OT efforts and POC in order to address functional deficits and OT goals stated above. SUBJECTIVE:     Ms. Jeremie James states, \"I got some good news today! \"     Social/Functional Lives With: Spouse  Type of Home: House  Home Layout: One level  Bathroom Toilet: Standard  Bathroom Equipment: Commode  Bathroom Accessibility: Accessible  Home Equipment: Rollator  Receives Help From: Family  ADL Assistance: Independent  Homemaking Assistance: Independent  Ambulation Assistance: Needs assistance  Transfer Assistance: Independent  Active : Yes  Mode of Transportation: Car  Occupation: Retired    OBJECTIVE:     LINES / Liliane Paddy / AIRWAY: IV    RESTRICTIONS/PRECAUTIONS:  Restrictions/Precautions  Restrictions/Precautions:  Other (comment) (Neutropenic precautions)        PAIN: VITALS / O2:   Pre Treatment:   Pain Assessment: None - Denies Pain        Post Treatment: no change  Vitals          Oxygen   RA     MOBILITY: I Mod I S SBA CGA Min Mod Max Total  NT x2 Comments:   Bed Mobility    Rolling [] [] [] [] [] [] [] [] [] [x] []    Supine to Sit [] [] [] [x] [] [] [] [] [] [] []    Scooting [] [] [] [x] [] [] [] [] [] [] []    Sit to Supine [] [] [] [x] [] [] [] [] [] [] []    Transfers    Sit to Stand [] [] [] [x] [] [] [] [] [] [] []    Bed to Chair [] [] [] [] [] [] [] [] [] [x] []    Stand to Sit [] [] [] [x] [] [] [] [] [] [] []    Tub/Shower [] [] [] [] [] [] [] [] [] [x] []     Toilet [] [] [] [x] [] [] [] [] [] [] []      [] [] [] [] [] [] [] [] [] [] []    I=Independent, Mod I=Modified Independent, S=Supervision/Setup, SBA=Standby Assistance, minutes): Patient participated in toileting, lower body dressing, and grooming ADLs in unsupported sitting and standing with minimal verbal cueing to increase independence, decrease assistance required, increase activity tolerance, and increase safety awareness. Patient also participated in functional mobility and functional transfer training to increase independence, decrease assistance required, increase activity tolerance, and increase safety awareness.      TREATMENT GRID:  N/A    AFTER TREATMENT PRECAUTIONS: Bed, Bed/Chair Locked, Call light within reach, Needs within reach, RN notified, and Visitors at bedside    INTERDISCIPLINARY COLLABORATION:  RN/ PCT and OT/ PETTY    EDUCATION:       TOTAL TREATMENT DURATION AND TIME:  Time In: 6905  Time Out: 137[facility]  Minutes: [address], OT

## 2023-12-12 NOTE — CARE COORDINATION
Pt discussed during IDR and chart screened by CM for d/c planning. Plt count increasing. Pt to have repeat BMBx tomorrow. ID has final rec in progress note from today:  IV Dapto 10 mg q 24h with EOT 12/22. CM met with pt at the bedside to discuss whether she would like IV ABX scheduled at the infusion center or home. She requested home. CM [facility]. PT/OT continue to recommend [facility] at d/c. CM will continue to follow.   LOS = 46 days

## 2023-12-12 NOTE — PROGRESS NOTES
[facility] Hematology & Oncology        Inpatient Hematology / Oncology Progress Note    Reason for Admission:  Neutropenic fever ([address]) [D70.9, R50.81]  Pancytopenia ([address]) [D61.818]    24 Hour Events:  Afebrile, VSS, on RA  Dacogen completed 11/24, Mylotarg completed 11/16  Awaiting count recovery - plts improving  Completes pulse dex for thrombocytopenia today  On Dapto for VRE per ID, repeat BC NGTD  PICC replacement pending    Transfusions: None  Replacements: On PO K.     ROS:  Constitutional: Positive for fatigue; negative for fever, chills. CV: Negative for chest pain, palpitations, edema. Respiratory: Negative for wheezing, cough, dyspnea. GI: Negative for nausea, abdominal pain, diarrhea. 10 point review of systems is otherwise negative with the exception of the elements mentioned above in the HPI. Allergies   Allergen Reactions    Penicillins Hives    Sulfa Antibiotics Hives    Codeine Nausea And Vomiting     Past Medical History:   Diagnosis Date    Arthritis     Autoimmune disease ([address])     skin changes, unknown name    Cancer ([address]) 1990    ovarian    Chronic pain     arthritis in back and legs    Coronary artery spasm ([address])     COVID 05/15/2022    Essential hypertension 11/8/2019    Gastritis     GERD (gastroesophageal reflux disease)     Hx antineoplastic chemo     Migraine headache     Psoriasis     Psychiatric disorder     anxiety and depression    Severe obesity (BMI 35.0-39.9) 6/26/2018    Thyroid disease     Diagnosed in 1996     Past Surgical History:   Procedure Laterality Date    CARPAL TUNNEL RELEASE      GYN      ovaries    HYSTERECTOMY, TOTAL ABDOMINAL (CERVIX REMOVED)  [place]      cardiac cath. Dx with spasms.     TUBAL LIGATION       Family History   Problem Relation Age of Onset    Parkinson's Disease Mother     Stroke Mother         Passed away in 1969    No Known Problems Paternal Grandfather     No Known Problems Paternal Grandmother 0.9 % sodium chloride infusion   IntraVENous PRN Catie Sorensen APRN - CNP        oxymetazoline (AFRIN) 0.05 % nasal spray 2 spray  2 spray Each Nostril BID PRN Ariane Vanegas NP-C   2 spray at 12/10/23 0322    dexamethasone (DECADRON) 40 mg in sodium chloride 0.9 % 50 mL IVPB  40 mg IntraVENous Q24H Ariane Vanegas NP-C   Stopped at 12/11/23 1215    potassium chloride (KLOR-CON M) extended release tablet 20 mEq  20 mEq Oral BID Mely aCnseco APRN - CNP   20 mEq at 12/12/23 0856    benzocaine (HURRICAINE) 20 % oral spray   Mouth/Throat 4x Daily PRN Ariane Vanegas NP-C   Given at 12/09/23 0544    0.9 % sodium chloride infusion   IntraVENous PRN Catie Sorensen APRN - CNP        DAPTOmycin (CUBICIN) 750 mg in sodium chloride (PF) 0.9 % 15 mL IV syringe  10 mg/kg IntraVENous Q24H Call, Chris Elise MD   750 mg at 12/11/23 1609    clobetasol (TEMOVATE) 0.05 % ointment  (Patient Supplied)   Topical BID PRN Mely Silva APRN - CNP        guaiFENesin (MUCINEX) extended release tablet 600 mg  600 mg Oral BID Khadijah Membreno APRN - NP   600 mg at 12/12/23 0856    cetirizine (ZYRTEC) tablet 10 mg  10 mg Oral Daily Khadijah Membreno APRN - NP   10 mg at 12/12/23 0856    0.9 % sodium chloride infusion   IntraVENous PRN Mindy Gomez MD        0.9 % sodium chloride infusion   IntraVENous PRN Mindy Gomez MD        0.9 % sodium chloride infusion   IntraVENous PRN Mindy Gomez MD        0.9 % sodium chloride infusion   IntraVENous PRN Mindy Gomez MD        chlorhexidine (PERIDEX) 0.12 % solution 15 mL  15 mL Mouth/Throat BID Catie Sorensen APRN - CNP   15 mL at 12/12/23 0856    acetaminophen (TYLENOL) tablet 650 mg  650 mg Oral Q6H PRN Benjamin Castro MD   650 mg at 12/11/23 1410    miconazole (MICOTIN) 2 % powder   Topical BID Catie Sorensen, APRN - CNP   Given at 12/12/23 0859    allopurinol (ZYLOPRIM) tablet 300 mg  300 mg Oral Daily Edu,

## 2023-12-13 ENCOUNTER — APPOINTMENT (OUTPATIENT)
Dept: CT IMAGING | Age: 73
DRG: 808 | End: 2023-12-13
Payer: MEDICARE

## 2023-12-13 LAB
ALBUMIN SERPL-MCNC: 2.7 G/DL (ref 3.2–4.6)
ALBUMIN/GLOB SERPL: 0.6 (ref 0.4–1.6)
ALP SERPL-CCNC: 97 U/L (ref 50–136)
ALT SERPL-CCNC: 48 U/L (ref 12–65)
ANION GAP SERPL CALC-SCNC: 7 MMOL/L (ref 2–11)
AST SERPL-CCNC: 19 U/L (ref 15–37)
BACTERIA SPEC CULT: NORMAL
BACTERIA SPEC CULT: NORMAL
BILIRUB SERPL-MCNC: 0.5 MG/DL (ref 0.2–1.1)
BONE MARROW PREP & WRIGHT STAIN: NORMAL
BUN SERPL-MCNC: 20 MG/DL (ref 8–23)
CALCIUM SERPL-MCNC: 8.8 MG/DL (ref 8.3–10.4)
CHLORIDE SERPL-SCNC: 101 MMOL/L (ref 103–113)
CO2 SERPL-SCNC: 26 MMOL/L (ref 21–32)
CREAT SERPL-MCNC: 0.4 MG/DL (ref 0.6–1)
GLOBULIN SER CALC-MCNC: 4.9 G/DL (ref 2.8–4.5)
GLUCOSE SERPL-MCNC: 109 MG/DL (ref 65–100)
MAGNESIUM SERPL-MCNC: 2.4 MG/DL (ref 1.8–2.4)
POTASSIUM SERPL-SCNC: 4.6 MMOL/L (ref 3.5–5.1)
PROT SERPL-MCNC: 7.6 G/DL (ref 6.3–8.2)
SERVICE CMNT-IMP: NORMAL
SERVICE CMNT-IMP: NORMAL
SODIUM SERPL-SCNC: 134 MMOL/L (ref 136–146)

## 2023-12-13 PROCEDURE — A4216 STERILE WATER/SALINE, 10 ML: HCPCS | Performed by: INTERNAL MEDICINE

## 2023-12-13 PROCEDURE — 85025 COMPLETE CBC W/AUTO DIFF WBC: CPT

## 2023-12-13 PROCEDURE — 88264 CHROMOSOME ANALYSIS 20-25: CPT

## 2023-12-13 PROCEDURE — C1830 POWER BONE MARROW BX NEEDLE: HCPCS

## 2023-12-13 PROCEDURE — 70487 CT MAXILLOFACIAL W/DYE: CPT

## 2023-12-13 PROCEDURE — 38222 DX BONE MARROW BX & ASPIR: CPT | Performed by: RADIOLOGY

## 2023-12-13 PROCEDURE — 2580000003 HC RX 258: Performed by: FAMILY MEDICINE

## 2023-12-13 PROCEDURE — 079T3ZX DRAINAGE OF BONE MARROW, PERCUTANEOUS APPROACH, DIAGNOSTIC: ICD-10-PCS | Performed by: NURSE PRACTITIONER

## 2023-12-13 PROCEDURE — 80053 COMPREHEN METABOLIC PANEL: CPT

## 2023-12-13 PROCEDURE — 88185 FLOWCYTOMETRY/TC ADD-ON: CPT

## 2023-12-13 PROCEDURE — 6360000002 HC RX W HCPCS: Performed by: INTERNAL MEDICINE

## 2023-12-13 PROCEDURE — 88305 TISSUE EXAM BY PATHOLOGIST: CPT

## 2023-12-13 PROCEDURE — 1100000000 HC RM PRIVATE

## 2023-12-13 PROCEDURE — 77012 CT SCAN FOR NEEDLE BIOPSY: CPT | Performed by: RADIOLOGY

## 2023-12-13 PROCEDURE — 36415 COLL VENOUS BLD VENIPUNCTURE: CPT

## 2023-12-13 PROCEDURE — 99152 MOD SED SAME PHYS/QHP 5/>YRS: CPT | Performed by: RADIOLOGY

## 2023-12-13 PROCEDURE — 88237 TISSUE CULTURE BONE MARROW: CPT

## 2023-12-13 PROCEDURE — 99232 SBSQ HOSP IP/OBS MODERATE 35: CPT | Performed by: INTERNAL MEDICINE

## 2023-12-13 PROCEDURE — APPSS30 APP SPLIT SHARED TIME 16-30 MINUTES: Performed by: NURSE PRACTITIONER

## 2023-12-13 PROCEDURE — 07DR3ZX EXTRACTION OF ILIAC BONE MARROW, PERCUTANEOUS APPROACH, DIAGNOSTIC: ICD-10-PCS | Performed by: NURSE PRACTITIONER

## 2023-12-13 PROCEDURE — 6360000004 HC RX CONTRAST MEDICATION: Performed by: NURSE PRACTITIONER

## 2023-12-13 PROCEDURE — 6360000002 HC RX W HCPCS: Performed by: RADIOLOGY

## 2023-12-13 PROCEDURE — 6370000000 HC RX 637 (ALT 250 FOR IP): Performed by: NURSE PRACTITIONER

## 2023-12-13 PROCEDURE — 81352 TP53 GENE TRGT SEQUENCE ALYS: CPT

## 2023-12-13 PROCEDURE — 88313 SPECIAL STAINS GROUP 2: CPT

## 2023-12-13 PROCEDURE — 88341 IMHCHEM/IMCYTCHM EA ADD ANTB: CPT

## 2023-12-13 PROCEDURE — 2500000003 HC RX 250 WO HCPCS: Performed by: PHYSICIAN ASSISTANT

## 2023-12-13 PROCEDURE — 2580000003 HC RX 258: Performed by: INTERNAL MEDICINE

## 2023-12-13 PROCEDURE — 6370000000 HC RX 637 (ALT 250 FOR IP): Performed by: INTERNAL MEDICINE

## 2023-12-13 PROCEDURE — 88311 DECALCIFY TISSUE: CPT

## 2023-12-13 PROCEDURE — 88342 IMHCHEM/IMCYTCHM 1ST ANTB: CPT

## 2023-12-13 PROCEDURE — 88184 FLOWCYTOMETRY/ TC 1 MARKER: CPT

## 2023-12-13 PROCEDURE — 83735 ASSAY OF MAGNESIUM: CPT

## 2023-12-13 PROCEDURE — 6370000000 HC RX 637 (ALT 250 FOR IP): Performed by: FAMILY MEDICINE

## 2023-12-13 RX ORDER — FENTANYL CITRATE 50 UG/ML
INJECTION, SOLUTION INTRAMUSCULAR; INTRAVENOUS PRN
Status: COMPLETED | OUTPATIENT
Start: 2023-12-13 | End: 2023-12-13

## 2023-12-13 RX ORDER — MIDAZOLAM HYDROCHLORIDE 1 MG/ML
INJECTION INTRAMUSCULAR; INTRAVENOUS PRN
Status: COMPLETED | OUTPATIENT
Start: 2023-12-13 | End: 2023-12-13

## 2023-12-13 RX ORDER — POTASSIUM CHLORIDE 20 MEQ/1
20 TABLET, EXTENDED RELEASE ORAL
Status: DISCONTINUED | OUTPATIENT
Start: 2023-12-14 | End: 2023-12-19 | Stop reason: HOSPADM

## 2023-12-13 RX ORDER — LIDOCAINE HYDROCHLORIDE 20 MG/ML
INJECTION, SOLUTION INFILTRATION; PERINEURAL PRN
Status: COMPLETED | OUTPATIENT
Start: 2023-12-13 | End: 2023-12-13

## 2023-12-13 RX ORDER — FLUTICASONE PROPIONATE 50 MCG
2 SPRAY, SUSPENSION (ML) NASAL DAILY
Status: DISCONTINUED | OUTPATIENT
Start: 2023-12-13 | End: 2023-12-19 | Stop reason: HOSPADM

## 2023-12-13 RX ADMIN — IOPAMIDOL 75 ML: 755 INJECTION, SOLUTION INTRAVENOUS at 11:47

## 2023-12-13 RX ADMIN — MIDAZOLAM 1 MG: 1 INJECTION INTRAMUSCULAR; INTRAVENOUS at 08:27

## 2023-12-13 RX ADMIN — FLUOXETINE HYDROCHLORIDE 20 MG: 20 CAPSULE ORAL at 20:50

## 2023-12-13 RX ADMIN — CIPROFLOXACIN HYDROCHLORIDE 500 MG: 500 TABLET, FILM COATED ORAL at 10:18

## 2023-12-13 RX ADMIN — SALINE NASAL SPRAY 2 SPRAY: 1.5 SOLUTION NASAL at 01:58

## 2023-12-13 RX ADMIN — POLYETHYLENE GLYCOL 3350 17 G: 17 POWDER, FOR SOLUTION ORAL at 10:17

## 2023-12-13 RX ADMIN — FENTANYL CITRATE 50 MCG: 50 INJECTION, SOLUTION INTRAMUSCULAR; INTRAVENOUS at 08:22

## 2023-12-13 RX ADMIN — FAMOTIDINE 40 MG: 20 TABLET, FILM COATED ORAL at 20:49

## 2023-12-13 RX ADMIN — HYDROCODONE BITARTRATE AND ACETAMINOPHEN 1 TABLET: 5; 325 TABLET ORAL at 22:04

## 2023-12-13 RX ADMIN — GUAIFENESIN 600 MG: 600 TABLET ORAL at 10:18

## 2023-12-13 RX ADMIN — SODIUM CHLORIDE, PRESERVATIVE FREE 10 ML: 5 INJECTION INTRAVENOUS at 20:54

## 2023-12-13 RX ADMIN — CHLORHEXIDINE GLUCONATE 15 ML: 1.2 RINSE ORAL at 21:01

## 2023-12-13 RX ADMIN — FLUCONAZOLE 200 MG: 100 TABLET ORAL at 10:18

## 2023-12-13 RX ADMIN — MORPHINE SULFATE 15 MG: 15 TABLET, FILM COATED, EXTENDED RELEASE ORAL at 20:52

## 2023-12-13 RX ADMIN — SALINE NASAL SPRAY 2 SPRAY: 1.5 SOLUTION NASAL at 20:59

## 2023-12-13 RX ADMIN — LIDOCAINE HYDROCHLORIDE 10 ML: 20 INJECTION, SOLUTION INFILTRATION; PERINEURAL at 08:34

## 2023-12-13 RX ADMIN — CIPROFLOXACIN HYDROCHLORIDE 500 MG: 500 TABLET, FILM COATED ORAL at 20:51

## 2023-12-13 RX ADMIN — ALLOPURINOL 300 MG: 300 TABLET ORAL at 10:18

## 2023-12-13 RX ADMIN — MORPHINE SULFATE 15 MG: 15 TABLET, FILM COATED, EXTENDED RELEASE ORAL at 10:18

## 2023-12-13 RX ADMIN — IPRATROPIUM BROMIDE 2 SPRAY: 42 SPRAY NASAL at 21:03

## 2023-12-13 RX ADMIN — DAPTOMYCIN 750 MG: 500 INJECTION, POWDER, LYOPHILIZED, FOR SOLUTION INTRAVENOUS at 16:19

## 2023-12-13 RX ADMIN — FLUTICASONE PROPIONATE 2 SPRAY: 50 SPRAY, METERED NASAL at 12:18

## 2023-12-13 RX ADMIN — SODIUM CHLORIDE, PRESERVATIVE FREE 10 ML: 5 INJECTION INTRAVENOUS at 10:20

## 2023-12-13 RX ADMIN — FENTANYL CITRATE 50 MCG: 50 INJECTION, SOLUTION INTRAMUSCULAR; INTRAVENOUS at 08:27

## 2023-12-13 RX ADMIN — HYDROCODONE BITARTRATE AND ACETAMINOPHEN 1 TABLET: 5; 325 TABLET ORAL at 10:18

## 2023-12-13 RX ADMIN — ACYCLOVIR 400 MG: 400 TABLET ORAL at 10:18

## 2023-12-13 RX ADMIN — IPRATROPIUM BROMIDE 2 SPRAY: 42 SPRAY NASAL at 10:28

## 2023-12-13 RX ADMIN — MONTELUKAST 10 MG: 10 TABLET, FILM COATED ORAL at 10:18

## 2023-12-13 RX ADMIN — CHLORHEXIDINE GLUCONATE 15 ML: 1.2 RINSE ORAL at 10:31

## 2023-12-13 RX ADMIN — PANTOPRAZOLE SODIUM 40 MG: 40 TABLET, DELAYED RELEASE ORAL at 16:19

## 2023-12-13 RX ADMIN — HYDROCODONE BITARTRATE AND ACETAMINOPHEN 1 TABLET: 5; 325 TABLET ORAL at 16:26

## 2023-12-13 RX ADMIN — MIDAZOLAM 1 MG: 1 INJECTION INTRAMUSCULAR; INTRAVENOUS at 08:22

## 2023-12-13 RX ADMIN — ANTI-FUNGAL POWDER MICONAZOLE NITRATE TALC FREE: 1.42 POWDER TOPICAL at 20:59

## 2023-12-13 RX ADMIN — SALINE NASAL SPRAY 2 SPRAY: 1.5 SOLUTION NASAL at 10:28

## 2023-12-13 RX ADMIN — BENZOCAINE: 200 SPRAY DENTAL; ORAL; PERIODONTAL at 20:58

## 2023-12-13 RX ADMIN — FERROUS SULFATE TAB 325 MG (65 MG ELEMENTAL FE) 325 MG: 325 (65 FE) TAB at 16:19

## 2023-12-13 RX ADMIN — ACYCLOVIR 400 MG: 400 TABLET ORAL at 20:51

## 2023-12-13 RX ADMIN — GUAIFENESIN 600 MG: 600 TABLET ORAL at 20:51

## 2023-12-13 RX ADMIN — DOCUSATE SODIUM 50 MG AND SENNOSIDES 8.6 MG 1 TABLET: 8.6; 5 TABLET, FILM COATED ORAL at 20:49

## 2023-12-13 RX ADMIN — HYDROCODONE BITARTRATE AND ACETAMINOPHEN 1 TABLET: 5; 325 TABLET ORAL at 01:57

## 2023-12-13 RX ADMIN — AMLODIPINE BESYLATE 5 MG: 5 TABLET ORAL at 10:19

## 2023-12-13 RX ADMIN — FERROUS SULFATE TAB 325 MG (65 MG ELEMENTAL FE) 325 MG: 325 (65 FE) TAB at 10:19

## 2023-12-13 RX ADMIN — CETIRIZINE HYDROCHLORIDE 10 MG: 10 TABLET, FILM COATED ORAL at 10:18

## 2023-12-13 ASSESSMENT — PAIN SCALES - GENERAL
PAINLEVEL_OUTOF10: 8
PAINLEVEL_OUTOF10: 5
PAINLEVEL_OUTOF10: 8
PAINLEVEL_OUTOF10: 8
PAINLEVEL_OUTOF10: 4
PAINLEVEL_OUTOF10: 8

## 2023-12-13 ASSESSMENT — PAIN DESCRIPTION - LOCATION
LOCATION: TEETH
LOCATION: FACE;TEETH
LOCATION: TEETH

## 2023-12-13 ASSESSMENT — PAIN DESCRIPTION - DESCRIPTORS
DESCRIPTORS: ACHING;THROBBING
DESCRIPTORS: ACHING;DISCOMFORT;PRESSURE;THROBBING
DESCRIPTORS: ACHING
DESCRIPTORS: ACHING;DISCOMFORT

## 2023-12-13 ASSESSMENT — PAIN - FUNCTIONAL ASSESSMENT
PAIN_FUNCTIONAL_ASSESSMENT: NONE - DENIES PAIN
PAIN_FUNCTIONAL_ASSESSMENT: ACTIVITIES ARE NOT PREVENTED

## 2023-12-13 ASSESSMENT — PAIN DESCRIPTION - ORIENTATION
ORIENTATION: LEFT
ORIENTATION: LEFT
ORIENTATION: LEFT;LOWER

## 2023-12-13 ASSESSMENT — PAIN DESCRIPTION - ONSET: ONSET: ON-GOING

## 2023-12-13 ASSESSMENT — PAIN DESCRIPTION - FREQUENCY: FREQUENCY: INTERMITTENT

## 2023-12-13 ASSESSMENT — PAIN DESCRIPTION - PAIN TYPE: TYPE: ACUTE PAIN

## 2023-12-13 NOTE — PROGRESS NOTES
Physical Therapy Note:    Attempted to see patient this PM for physical therapy treatment  session. Patient sleeping soundly after having bone marrow biopsy this morning, daughter requesting therapy return at a later time. Will follow and re-attempt as schedule permits/patient available.  Thank you,    MARVIN DUNN, PT     Rehab Caseload Tracker

## 2023-12-13 NOTE — PROGRESS NOTES
Enrolled patient in [facility] yasir to cover Jamar Spike, no free drug needed. I independently performed a history and physical on Kt Terrell. All diagnostic, treatment, and disposition decisions were made by myself in conjunction with the advanced practice provider. For further details of Jani Reyes emergency department encounter, please see ESA Harvey's documentation. Patient is a 45-year-old female presenting today due to concern for having some strange issues with her right lateral vision earlier today that ultimately resolved but feeling somewhat fatigued and abnormal over the last couple of weeks. She went to see her primary doctor today and was diagnosed with atrial fibrillation and told to go to the ED. She denies having a history of atrial fibrillation. She denies any current chest pain, shortness of breath, headache, numbness or weakness in the arms or legs, other concerning symptoms and overall feels at baseline other than slightly fatigue. Physical:   Gen: No acute distress. AOx3. Psych: Normal mood and affect  HEENT: NCAT, EOMI, PERRL, MMM, no visual field defects noted  Neck: supple, normal ROM  Cardiac: irregularly irregular rhythm, tachycardia, pulses 2+ in upper extremities  Lungs: C2AB, no R/R/W  Abdomen: soft and nontender with no R/D/G  Neuro: no focal neuro deficits with strength and sensation 5/5 in all 4 extremities, ambulatory    The Ekg interpreted by me shows  atrial fibrillation with a rate of 132  Axis is   Normal  QTc is  borderline prolonged QT  Intervals and Durations are unremarkable. ST Segments: nonspecific changes  No significant change from prior EKG dated - no old EKG  No STEMI       MDM: Patient was evaluated due to concern for new onset atrial fibrillation. She has no complaints at this time other than slight fatigue. She did have some visual changes earlier in the day and therefore there was concern for possible TIA. CT of the head did not show any obvious acute stroke.   She is stable for the floor with telemetry.      Power Rutherford MD  05/20/21 0274

## 2023-12-13 NOTE — PROGRESS NOTES
Comprehensive Nutrition Assessment    Type and Reason for Visit: Reassess  Malnutrition Screening Tool: Malnutrition Screen  Have you recently lost weight without trying?: 24 to 33 pounds (3 points)  Have you been eating poorly because of a decreased appetite?: Yes (1 point)  Malnutrition Screening Tool Score: 4    Nutrition Recommendations/Plan:   Meals and Snacks:  Diet: Continue current order  Nutrition Supplement Therapy:  Medical food supplement therapy:  Continue Ensure Enlive twice per day (this provides 350 kcal and 20 grams protein per bottle) chocolate served with a milk container. Discontinue duplicate order     Malnutrition Assessment:  Malnutrition Status: At risk for malnutrition (Comment) (pt reports + wt loss, waxing and waning oral intake)  No fat or muscle loss appreciated. Nutrition Assessment:  Nutrition History: Pt reports her intake and wt initially started changing after having Covid in May 2022.  + loss of appetite, taste alterations. She indicates at one point her wt was >200# initially declined quickly then up and down with fluid changes. She states at this point she is unsure of her true wt. Do You Have Any Cultural, Mandaen, or Ethnic Food Preferences?: No   Nutrition Background: PMHx: psoriatic arthritis on Humira, hx of ovarian cam HTN, HLD, GERD and hypothyroidism. Admitted with MDS, pancytopenia, neutropenic fever, chronic pain, constipation. IR BMBX 11/7. Remains inpatient awaiting count recovery. Chronic low platelets. Repeat BMBx 12/13. Nutrition Interval:  Patient seen sitting up in bed with visitor at bedside. She states that she continues to eat at least half of meals. She uses Ensure when she does not eat well for a meal. She only had Ensure for lunch today due to being off the floor during meal service. She denies any needs.       Current Nutrition Therapies:  ADULT ORAL NUTRITION SUPPLEMENT; Breakfast, Dinner; Standard High Calorie/High Protein

## 2023-12-13 NOTE — PROGRESS NOTES
[facility] Hematology & Oncology        Inpatient Hematology / Oncology Progress Note    Reason for Admission:  Neutropenic fever (HCC) [D70.9, R50.81]  Pancytopenia (HCC) [D61.818]    24 Hour Events:  Afebrile, VSS, on RA  Dacogen completed 11/24, Mylotarg completed 11/16  BMBx today  Plts recovered, ANC recovery pending  Continues Dapto for VRE per ID, repeat BC NGTD - PICC today  Feeling well, no complaints    Transfusions: None  Replacements: On PO K.     ROS:  Constitutional: Positive for fatigue; negative for fever, chills.  CV: Negative for chest pain, palpitations, edema.  Respiratory: Negative for wheezing, cough, dyspnea.  GI: Negative for nausea, abdominal pain, diarrhea.    10 point review of systems is otherwise negative with the exception of the elements mentioned above in the HPI.     Allergies   Allergen Reactions    Penicillins Hives    Sulfa Antibiotics Hives    Codeine Nausea And Vomiting     Past Medical History:   Diagnosis Date    Arthritis     Autoimmune disease (HCC)     skin changes, unknown name    Cancer (HCC) 1990    ovarian    Chronic pain     arthritis in back and legs    Coronary artery spasm (HCC)     COVID 05/15/2022    Essential hypertension 11/8/2019    Gastritis     GERD (gastroesophageal reflux disease)     Hx antineoplastic chemo     Migraine headache     Psoriasis     Psychiatric disorder     anxiety and depression    Severe obesity (BMI 35.0-39.9) 6/26/2018    Thyroid disease     Diagnosed in 1996     Past Surgical History:   Procedure Laterality Date    CARPAL TUNNEL RELEASE      GYN      ovaries    HYSTERECTOMY, TOTAL ABDOMINAL (CERVIX REMOVED)  1990    MD UNLISTED PROCEDURE CARDIAC SURGERY      cardiac cath.  Dx with spasms.    TUBAL LIGATION       Family History   Problem Relation Age of Onset    Parkinson's Disease Mother     Stroke Mother         Passed away in 1969    No Known Problems Paternal Grandfather     No Known Problems Paternal Grandmother     No Known  Problems Maternal Grandfather     No Known Problems Maternal Grandmother     Prostate Cancer Father          age 80     Cancer Father         Kidney     Social History     Socioeconomic History    Marital status:      Spouse name: Not on file    Number of children: Not on file    Years of education: Not on file    Highest education level: Not on file   Occupational History    Not on file   Tobacco Use    Smoking status: Never     Passive exposure: Past    Smokeless tobacco: Never   Vaping Use    Vaping Use: Never used   Substance and Sexual Activity    Alcohol use: No    Drug use: Yes     Types: Prescription    Sexual activity: Not Currently     Birth control/protection: None   Other Topics Concern    Not on file   Social History Narrative    Not on file     Social Determinants of Health     Financial Resource Strain: Low Risk  (2023)    Overall Financial Resource Strain (CARDIA)     Difficulty of Paying Living Expenses: Not hard at all   Food Insecurity: Not on file (2023)   Transportation Needs: No Transportation Needs (2023)    Transportation Problems ([facility])     In the past 12 months, has lack of reliable transportation kept you from medical appointments, meetings, work or from getting things needed for daily living?: Not on file   Recent Concern: Transportation Needs - Unmet Transportation Needs (2023)    PRAPARE - Transportation     Lack of Transportation (Medical): Yes     Lack of Transportation (Non-Medical):  No   Physical Activity: Not on file   Stress: Not on file   Social Connections: Not on file   Intimate Partner Violence: Not on file   Housing Stability: High Risk (2023)    Housing Stability Vital Sign     Unable to Pay for Housing in the Last Year: Yes     Number of Places Lived in the Last Year: 1     Unstable Housing in the Last Year: No     Current Facility-Administered Medications   Medication Dose Route Frequency Provider Last Rate Last Admin    0.9 % sodium of ovarian cancer    Chronic pain of multiple joints    Esophageal reflux    Anxiety and depression    Mixed hyperlipidemia    Class 1 obesity with serious comorbidity and body mass index (BMI) of 33.0 to 33.9 in adult    Esophageal dysphagia    Nausea and vomiting    Generalized abdominal pain    Elevated bilirubin    Neutropenic fever (HCC)    MDS (myelodysplastic syndrome) (HCC)    Pancytopenia (HCC)    Depression    Constipation due to pain medication    Immunosuppressed status ([address])    Rectal bleeding    Bilateral impacted cerumen    Acute upper respiratory infection    Sepsis ([address])    Hyponatremia    Anemia requiring transfusions    Dysarthria    Severe thrombocytopenia (HCC)    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 PMHx: psoriatic arthritis on Humira, hx of ovarian cam HTN, HLD, GERD and hypothyroidism. She was admitted for further care. Tmax 102.5 at home. Sick grandchild at home whose father works w EMS. + chronic constipation with recent bld in toilet w BMs. No diarrhea. LOWRY. In ED T max 100.1, HR 92, /43, 94% on 2L. Wbc 1.6,  , Hb 5.3, plts 7. Rapid influenza/covid neg. CXR no acute findings. Of note, she is pt of Dr Gonzalo Martinez with new dx of MDS. She presented to dr Gonzalo Martinez on 8/24 for evaluation of gradual pancytopenia. At that time she reported eating poorly, wt loss. Recent CT at [facility] reportedly normal.  Rec Gi eval, labs and BMBx. 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 Dr Gonzalo Martinez reviewed with pt risk of POD and transformation to leukemia.   They reviewed tx options and elected

## 2023-12-13 NOTE — BRIEF OP NOTE
Department of Interventional Radiology  [phone]        Interventional Radiology Brief Procedure Note    Patient: Elgin Ramirez MRN: [id number]  SSN: [id number]    YOB: 1950  Age: 71 y.o. Sex: female      Date of Procedure: 12/13/2023    Pre-Procedure Diagnosis: AML    Post-Procedure Diagnosis: SAME    Procedure(s): Image Guided Biopsy    Brief Description of Procedure: CT guided BMBX    Performed By: Gennaro Nageotte, PA-C     Assistants: None    Anesthesia:Moderate Sedation per WILLIAM Tan MD    Estimated Blood Loss: Less than 10ml    Specimens:   to lab    Implants:  None    Findings: no post biopsy bleeding    Complications: None    Recommendations: routine wound care     Follow Up: prn    Signed By: Gennaro Nageotte, PA-C     December 13, 2023

## 2023-12-13 NOTE — CARE COORDINATION
Pt discussed during IDR and chart screened by CM for d/c planning. Plt count increasing. Pt underwent repeat BMBx today  ID has placed a CM consult with final IV ABX orders. [facility] is following for home IV ABX. CM placed an order and referral to [facility]: SN, PT, OT today. PT/OT recommend HH at d/c. CM will continue to follow. Anticipate d/c home on 12/15.   LOS = 47 days

## 2023-12-13 NOTE — PROGRESS NOTES
Infectious Disease Progress    Today's Date: 2023   Admit Date: 10/27/2023  : 1950    Impression:   Neutropenic fever. Resolved. VRE bacteremia in the setting of the above: [address] 12/6+; repeat [address] / &  negative. PICC line removed 23. 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 Patient with poor dentition and tooth pain at baseline. Throat pain has resolved. CT soft tissue/neck 23 without abscess but did note bilateral abnormal enhancing [place] tonsils most consistent with inflammatory phlegmon, along with moderate asymmetric enlargement, enhancement of the left posterior oropharyngeal mucosa adjacent to the left [place] tonsil may represent local inflammation versus less likely occult malignant lesion  Crestor on hold while receiving Daptomycin   H/o Psoriatic arthritis. Humira on hold      Plan:   Continue daptomycin for 2 weeks from negative blood cultures/PICC removal.  EOT 23. Recommend checking the following labs Q Monday at a minimum:  CBCd, creatinine, LFTs, CK    Okay to place PICC as repeat blood cultures are negative. Will defer PICC placement to Oncology. Continue to hold Crestor while patient is receiving Daptomycin secondary to DDIs. Crestor can be restarted after the patient completes therapy with Daptomycin. Continue other prophylaxis for neutropenia (defer to Oncology)  ID will remain signed off today but please reach out with any concerns/questions. OPAT orders below as Oncology reports the patient may be going home this Friday. ID office has been messaged to add the patient to OPAT while receiving IV abx therapy.            OPAT orders:  Daptomycin 750 mg Q 24 hours with EOT 23  Q Monday labs:  CBCd, creatinine, LFTs, CK  Routine PICC care  At abx EOT, patient will keep PICC line and Oncology will assume role of cyanosis or edema   Skin: Skin color, texture, turgor normal. no acute rash or lesions, Bruising noted to BUEs.      Musculoskeletal: No swelling or deformity   Lines/Devices:  Intact, no erythema, drainage or tenderness   Psych: Alert and oriented, normal mood affect given the setting       Data Review:     CBC:  Recent Labs     12/11/23  0451 12/12/23  0503 12/13/23  0458   WBC 0.5* 0.5* 0.5*   HGB 7.0* 8.6* 8.8*   HCT 21.4* 25.6* 26.4*   PLT 70* 139* 201         BMP:  Recent Labs     12/11/23  0451 12/12/23  0503 12/13/23  0458   BUN 25* 23 20   * 133* 134*   K 4.6 4.8 4.6    104 101*   CO2 24 25 26         LFTS:  Recent Labs     12/11/23  0451 12/12/23  0503 12/13/23  0458   ALT 37 41 48         Microbiology:   Reviewed    Imaging:   Reviewed     Signed By: TIM Rojo - CNP     December 13, 2023

## 2023-12-14 ENCOUNTER — HOME HEALTH ADMISSION (OUTPATIENT)
Dept: HOME HEALTH SERVICES | Facility: HOME HEALTH | Age: 73
End: 2023-12-14

## 2023-12-14 LAB
ALBUMIN SERPL-MCNC: 2.6 G/DL (ref 3.2–4.6)
ALBUMIN/GLOB SERPL: 0.5 (ref 0.4–1.6)
ALP SERPL-CCNC: 85 U/L (ref 50–136)
ALT SERPL-CCNC: 39 U/L (ref 12–65)
ANION GAP SERPL CALC-SCNC: 5 MMOL/L (ref 2–11)
AST SERPL-CCNC: 14 U/L (ref 15–37)
BASOPHILS # BLD: 0 K/UL (ref 0–0.2)
BASOPHILS NFR BLD: 0 % (ref 0–2)
BILIRUB SERPL-MCNC: 0.8 MG/DL (ref 0.2–1.1)
BUN SERPL-MCNC: 18 MG/DL (ref 8–23)
CALCIUM SERPL-MCNC: 8.9 MG/DL (ref 8.3–10.4)
CHLORIDE SERPL-SCNC: 101 MMOL/L (ref 103–113)
CO2 SERPL-SCNC: 27 MMOL/L (ref 21–32)
CREAT SERPL-MCNC: 0.4 MG/DL (ref 0.6–1)
DIFFERENTIAL METHOD BLD: ABNORMAL
DIFFERENTIAL METHOD BLD: ABNORMAL
EOSINOPHIL # BLD: 0 K/UL (ref 0–0.8)
EOSINOPHIL NFR BLD: 0 % (ref 0.5–7.8)
ERYTHROCYTE [DISTWIDTH] IN BLOOD BY AUTOMATED COUNT: 14.6 % (ref 11.9–14.6)
ERYTHROCYTE [DISTWIDTH] IN BLOOD BY AUTOMATED COUNT: 15.5 % (ref 11.9–14.6)
GLOBULIN SER CALC-MCNC: 5.1 G/DL (ref 2.8–4.5)
GLUCOSE SERPL-MCNC: 88 MG/DL (ref 65–100)
HCT VFR BLD AUTO: 26.4 % (ref 35.8–46.3)
HCT VFR BLD AUTO: 27.4 % (ref 35.8–46.3)
HGB BLD-MCNC: 8.8 G/DL (ref 11.7–15.4)
HGB BLD-MCNC: 9 G/DL (ref 11.7–15.4)
IMM GRANULOCYTES # BLD AUTO: 0 K/UL (ref 0–0.5)
IMM GRANULOCYTES NFR BLD AUTO: 3 % (ref 0–5)
LYMPHOCYTES # BLD: 0.5 K/UL (ref 0.5–4.6)
LYMPHOCYTES # BLD: 0.7 K/UL (ref 0.5–4.6)
LYMPHOCYTES NFR BLD MANUAL: 96 % (ref 16–44)
LYMPHOCYTES NFR BLD: 92 % (ref 13–44)
MAGNESIUM SERPL-MCNC: 2.5 MG/DL (ref 1.8–2.4)
MCH RBC QN AUTO: 28.7 PG (ref 26.1–32.9)
MCH RBC QN AUTO: 28.8 PG (ref 26.1–32.9)
MCHC RBC AUTO-ENTMCNC: 32.8 G/DL (ref 31.4–35)
MCHC RBC AUTO-ENTMCNC: 33.3 G/DL (ref 31.4–35)
MCV RBC AUTO: 86 FL (ref 82–102)
MCV RBC AUTO: 87.8 FL (ref 82–102)
MONOCYTES # BLD: 0 K/UL (ref 0.1–1.3)
MONOCYTES NFR BLD: 1 % (ref 4–12)
NEUTS BAND NFR BLD MANUAL: 1 % (ref 0–10)
NEUTS SEG # BLD: 0 K/UL (ref 1.7–8.2)
NEUTS SEG # BLD: 0 K/UL (ref 1.7–8.2)
NEUTS SEG NFR BLD MANUAL: 3 % (ref 47–75)
NEUTS SEG NFR BLD: 4 % (ref 43–78)
NRBC # BLD: 0.02 K/UL (ref 0–0.2)
NRBC # BLD: 0.02 K/UL (ref 0–0.2)
PLATELET # BLD AUTO: 201 K/UL (ref 150–450)
PLATELET # BLD AUTO: 230 K/UL (ref 150–450)
PLATELET COMMENT: ADEQUATE
PLATELET COMMENT: ADEQUATE
PMV BLD AUTO: 10.3 FL (ref 9.4–12.3)
PMV BLD AUTO: 9.9 FL (ref 9.4–12.3)
POTASSIUM SERPL-SCNC: 4 MMOL/L (ref 3.5–5.1)
PROT SERPL-MCNC: 7.7 G/DL (ref 6.3–8.2)
RBC # BLD AUTO: 3.07 M/UL (ref 4.05–5.2)
RBC # BLD AUTO: 3.12 M/UL (ref 4.05–5.2)
RBC MORPH BLD: ABNORMAL
SODIUM SERPL-SCNC: 133 MMOL/L (ref 136–146)
TOTAL CELLS COUNTED SPEC: 94
WBC # BLD AUTO: 0.5 K/UL (ref 4.3–11.1)
WBC # BLD AUTO: 0.7 K/UL (ref 4.3–11.1)
WBC MORPH BLD: ABNORMAL
WBC MORPH BLD: ABNORMAL

## 2023-12-14 PROCEDURE — 83735 ASSAY OF MAGNESIUM: CPT

## 2023-12-14 PROCEDURE — 99233 SBSQ HOSP IP/OBS HIGH 50: CPT | Performed by: INTERNAL MEDICINE

## 2023-12-14 PROCEDURE — 6370000000 HC RX 637 (ALT 250 FOR IP): Performed by: NURSE PRACTITIONER

## 2023-12-14 PROCEDURE — 6370000000 HC RX 637 (ALT 250 FOR IP): Performed by: INTERNAL MEDICINE

## 2023-12-14 PROCEDURE — 1100000000 HC RM PRIVATE

## 2023-12-14 PROCEDURE — 80053 COMPREHEN METABOLIC PANEL: CPT

## 2023-12-14 PROCEDURE — 2580000003 HC RX 258: Performed by: FAMILY MEDICINE

## 2023-12-14 PROCEDURE — A4216 STERILE WATER/SALINE, 10 ML: HCPCS | Performed by: INTERNAL MEDICINE

## 2023-12-14 PROCEDURE — 2580000003 HC RX 258: Performed by: NURSE PRACTITIONER

## 2023-12-14 PROCEDURE — 2580000003 HC RX 258: Performed by: INTERNAL MEDICINE

## 2023-12-14 PROCEDURE — 6360000002 HC RX W HCPCS: Performed by: NURSE PRACTITIONER

## 2023-12-14 PROCEDURE — 36415 COLL VENOUS BLD VENIPUNCTURE: CPT

## 2023-12-14 PROCEDURE — 6370000000 HC RX 637 (ALT 250 FOR IP): Performed by: FAMILY MEDICINE

## 2023-12-14 PROCEDURE — APPSS30 APP SPLIT SHARED TIME 16-30 MINUTES: Performed by: NURSE PRACTITIONER

## 2023-12-14 PROCEDURE — 85025 COMPLETE CBC W/AUTO DIFF WBC: CPT

## 2023-12-14 PROCEDURE — 6360000002 HC RX W HCPCS: Performed by: INTERNAL MEDICINE

## 2023-12-14 RX ORDER — ENOXAPARIN SODIUM 100 MG/ML
40 INJECTION SUBCUTANEOUS DAILY
Status: DISCONTINUED | OUTPATIENT
Start: 2023-12-14 | End: 2023-12-19 | Stop reason: HOSPADM

## 2023-12-14 RX ORDER — LIDOCAINE HYDROCHLORIDE 20 MG/ML
JELLY TOPICAL PRN
Status: DISCONTINUED | OUTPATIENT
Start: 2023-12-14 | End: 2023-12-19 | Stop reason: HOSPADM

## 2023-12-14 RX ORDER — MORPHINE SULFATE 2 MG/ML
0.5 INJECTION, SOLUTION INTRAMUSCULAR; INTRAVENOUS EVERY 6 HOURS PRN
Status: DISCONTINUED | OUTPATIENT
Start: 2023-12-14 | End: 2023-12-19 | Stop reason: HOSPADM

## 2023-12-14 RX ORDER — HYDROCODONE BITARTRATE AND ACETAMINOPHEN 5; 325 MG/1; MG/1
2 TABLET ORAL EVERY 6 HOURS PRN
Status: DISCONTINUED | OUTPATIENT
Start: 2023-12-14 | End: 2023-12-19 | Stop reason: HOSPADM

## 2023-12-14 RX ORDER — LIDOCAINE HYDROCHLORIDE 20 MG/ML
JELLY TOPICAL PRN
Status: DISCONTINUED | OUTPATIENT
Start: 2023-12-14 | End: 2023-12-14 | Stop reason: CLARIF

## 2023-12-14 RX ADMIN — CHLORHEXIDINE GLUCONATE 15 ML: 1.2 RINSE ORAL at 20:34

## 2023-12-14 RX ADMIN — POTASSIUM CHLORIDE 20 MEQ: 1500 TABLET, EXTENDED RELEASE ORAL at 08:35

## 2023-12-14 RX ADMIN — MEROPENEM 1000 MG: 1 INJECTION, POWDER, FOR SOLUTION INTRAVENOUS at 22:14

## 2023-12-14 RX ADMIN — MONTELUKAST 10 MG: 10 TABLET, FILM COATED ORAL at 08:35

## 2023-12-14 RX ADMIN — ACETAMINOPHEN 650 MG: 325 TABLET ORAL at 08:44

## 2023-12-14 RX ADMIN — HYDROCODONE BITARTRATE AND ACETAMINOPHEN 1 TABLET: 5; 325 TABLET ORAL at 03:40

## 2023-12-14 RX ADMIN — SODIUM CHLORIDE, PRESERVATIVE FREE 10 ML: 5 INJECTION INTRAVENOUS at 08:36

## 2023-12-14 RX ADMIN — HYDROCODONE BITARTRATE AND ACETAMINOPHEN 2 TABLET: 5; 325 TABLET ORAL at 16:10

## 2023-12-14 RX ADMIN — FLUTICASONE PROPIONATE 2 SPRAY: 50 SPRAY, METERED NASAL at 08:37

## 2023-12-14 RX ADMIN — SALINE NASAL SPRAY 2 SPRAY: 1.5 SOLUTION NASAL at 20:41

## 2023-12-14 RX ADMIN — PANTOPRAZOLE SODIUM 40 MG: 40 TABLET, DELAYED RELEASE ORAL at 08:35

## 2023-12-14 RX ADMIN — SALINE NASAL SPRAY 2 SPRAY: 1.5 SOLUTION NASAL at 03:00

## 2023-12-14 RX ADMIN — MORPHINE SULFATE 15 MG: 15 TABLET, FILM COATED, EXTENDED RELEASE ORAL at 20:33

## 2023-12-14 RX ADMIN — ANTI-FUNGAL POWDER MICONAZOLE NITRATE TALC FREE: 1.42 POWDER TOPICAL at 20:39

## 2023-12-14 RX ADMIN — FERROUS SULFATE TAB 325 MG (65 MG ELEMENTAL FE) 325 MG: 325 (65 FE) TAB at 10:41

## 2023-12-14 RX ADMIN — SODIUM CHLORIDE, PRESERVATIVE FREE 10 ML: 5 INJECTION INTRAVENOUS at 20:34

## 2023-12-14 RX ADMIN — POLYETHYLENE GLYCOL 3350 17 G: 17 POWDER, FOR SOLUTION ORAL at 08:35

## 2023-12-14 RX ADMIN — HYDROCODONE BITARTRATE AND ACETAMINOPHEN 1 TABLET: 5; 325 TABLET ORAL at 10:41

## 2023-12-14 RX ADMIN — LIDOCAINE HYDROCHLORIDE: 20 JELLY TOPICAL at 11:50

## 2023-12-14 RX ADMIN — SALINE NASAL SPRAY 2 SPRAY: 1.5 SOLUTION NASAL at 08:37

## 2023-12-14 RX ADMIN — FLUOXETINE HYDROCHLORIDE 20 MG: 20 CAPSULE ORAL at 08:35

## 2023-12-14 RX ADMIN — GUAIFENESIN 600 MG: 600 TABLET ORAL at 20:33

## 2023-12-14 RX ADMIN — DOCUSATE SODIUM 50 MG AND SENNOSIDES 8.6 MG 1 TABLET: 8.6; 5 TABLET, FILM COATED ORAL at 20:33

## 2023-12-14 RX ADMIN — CETIRIZINE HYDROCHLORIDE 10 MG: 10 TABLET, FILM COATED ORAL at 08:34

## 2023-12-14 RX ADMIN — CHLORHEXIDINE GLUCONATE 15 ML: 1.2 RINSE ORAL at 08:44

## 2023-12-14 RX ADMIN — ONDANSETRON 4 MG: 4 TABLET, ORALLY DISINTEGRATING ORAL at 08:52

## 2023-12-14 RX ADMIN — FLUOXETINE HYDROCHLORIDE 20 MG: 20 CAPSULE ORAL at 20:33

## 2023-12-14 RX ADMIN — PANTOPRAZOLE SODIUM 40 MG: 40 TABLET, DELAYED RELEASE ORAL at 16:10

## 2023-12-14 RX ADMIN — FERROUS SULFATE TAB 325 MG (65 MG ELEMENTAL FE) 325 MG: 325 (65 FE) TAB at 16:09

## 2023-12-14 RX ADMIN — MORPHINE SULFATE 15 MG: 15 TABLET, FILM COATED, EXTENDED RELEASE ORAL at 08:34

## 2023-12-14 RX ADMIN — HYDROCODONE BITARTRATE AND ACETAMINOPHEN 2 TABLET: 5; 325 TABLET ORAL at 22:10

## 2023-12-14 RX ADMIN — ACYCLOVIR 400 MG: 400 TABLET ORAL at 08:35

## 2023-12-14 RX ADMIN — IPRATROPIUM BROMIDE 2 SPRAY: 42 SPRAY NASAL at 08:37

## 2023-12-14 RX ADMIN — FLUCONAZOLE 200 MG: 100 TABLET ORAL at 08:35

## 2023-12-14 RX ADMIN — FAMOTIDINE 40 MG: 20 TABLET, FILM COATED ORAL at 20:34

## 2023-12-14 RX ADMIN — IPRATROPIUM BROMIDE 2 SPRAY: 42 SPRAY NASAL at 20:41

## 2023-12-14 RX ADMIN — CIPROFLOXACIN HYDROCHLORIDE 500 MG: 500 TABLET, FILM COATED ORAL at 20:33

## 2023-12-14 RX ADMIN — ALLOPURINOL 300 MG: 300 TABLET ORAL at 08:35

## 2023-12-14 RX ADMIN — LEVOTHYROXINE SODIUM 125 MCG: 0.12 TABLET ORAL at 08:35

## 2023-12-14 RX ADMIN — ACYCLOVIR 400 MG: 400 TABLET ORAL at 20:33

## 2023-12-14 RX ADMIN — GUAIFENESIN 600 MG: 600 TABLET ORAL at 08:35

## 2023-12-14 RX ADMIN — MEROPENEM 1000 MG: 1 INJECTION, POWDER, FOR SOLUTION INTRAVENOUS at 16:15

## 2023-12-14 RX ADMIN — AMLODIPINE BESYLATE 5 MG: 5 TABLET ORAL at 08:34

## 2023-12-14 RX ADMIN — DAPTOMYCIN 750 MG: 500 INJECTION, POWDER, LYOPHILIZED, FOR SOLUTION INTRAVENOUS at 16:17

## 2023-12-14 RX ADMIN — CIPROFLOXACIN HYDROCHLORIDE 500 MG: 500 TABLET, FILM COATED ORAL at 08:34

## 2023-12-14 ASSESSMENT — PAIN DESCRIPTION - LOCATION
LOCATION: TEETH
LOCATION: FACE
LOCATION: FACE;JAW;MOUTH

## 2023-12-14 ASSESSMENT — PAIN DESCRIPTION - FREQUENCY
FREQUENCY: INTERMITTENT
FREQUENCY: INTERMITTENT
FREQUENCY: CONTINUOUS
FREQUENCY: INTERMITTENT

## 2023-12-14 ASSESSMENT — PAIN DESCRIPTION - ONSET
ONSET: ON-GOING
ONSET: ON-GOING
ONSET: PROGRESSIVE
ONSET: ON-GOING

## 2023-12-14 ASSESSMENT — PAIN DESCRIPTION - DESCRIPTORS
DESCRIPTORS: DISCOMFORT;THROBBING
DESCRIPTORS: DISCOMFORT;THROBBING
DESCRIPTORS: BURNING;SHARP
DESCRIPTORS: GNAWING;THROBBING
DESCRIPTORS: ACHING;DISCOMFORT

## 2023-12-14 ASSESSMENT — PAIN DESCRIPTION - PAIN TYPE
TYPE: ACUTE PAIN

## 2023-12-14 ASSESSMENT — PAIN DESCRIPTION - ORIENTATION
ORIENTATION: LEFT

## 2023-12-14 ASSESSMENT — PAIN SCALES - GENERAL
PAINLEVEL_OUTOF10: 0
PAINLEVEL_OUTOF10: 7
PAINLEVEL_OUTOF10: 0
PAINLEVEL_OUTOF10: 10
PAINLEVEL_OUTOF10: 8
PAINLEVEL_OUTOF10: 8
PAINLEVEL_OUTOF10: 7
PAINLEVEL_OUTOF10: 0

## 2023-12-14 ASSESSMENT — PAIN - FUNCTIONAL ASSESSMENT
PAIN_FUNCTIONAL_ASSESSMENT: PREVENTS OR INTERFERES SOME ACTIVE ACTIVITIES AND ADLS
PAIN_FUNCTIONAL_ASSESSMENT: PREVENTS OR INTERFERES SOME ACTIVE ACTIVITIES AND ADLS
PAIN_FUNCTIONAL_ASSESSMENT: ACTIVITIES ARE NOT PREVENTED
PAIN_FUNCTIONAL_ASSESSMENT: PREVENTS OR INTERFERES SOME ACTIVE ACTIVITIES AND ADLS

## 2023-12-14 NOTE — PROGRESS NOTES
[facility] Hematology & Oncology        Inpatient Hematology / Oncology Progress Note    Reason for Admission:  Neutropenic fever ([address]) [D70.9, R50.81]  Pancytopenia ([address]) [D61.818]    24 Hour Events:  Afebrile, VSS, on RA  Dacogen completed 11/24, Mylotarg completed 11/16  BMBx 12/13, path pending  Plts recovered, ANC recovery pending  Continues Dapto for VRE per ID EOT 12/22  Complains of L orbital/facial pain, CT with preseptal cellulitis    Transfusions: None  Replacements: On PO K.     ROS:  Constitutional: Positive for fatigue; negative for fever, chills. CV: Negative for chest pain, palpitations, edema. Respiratory: Negative for wheezing, cough, dyspnea. GI: Negative for nausea, abdominal pain, diarrhea. 10 point review of systems is otherwise negative with the exception of the elements mentioned above in the HPI. Allergies   Allergen Reactions    Penicillins Hives    Sulfa Antibiotics Hives    Codeine Nausea And Vomiting     Past Medical History:   Diagnosis Date    Arthritis     Autoimmune disease ([address])     skin changes, unknown name    Cancer ([address]) 1990    ovarian    Chronic pain     arthritis in back and legs    Coronary artery spasm ([address])     COVID 05/15/2022    Essential hypertension 11/8/2019    Gastritis     GERD (gastroesophageal reflux disease)     Hx antineoplastic chemo     Migraine headache     Psoriasis     Psychiatric disorder     anxiety and depression    Severe obesity (BMI 35.0-39.9) 6/26/2018    Thyroid disease     Diagnosed in 1996     Past Surgical History:   Procedure Laterality Date    CARPAL TUNNEL RELEASE      GYN      ovaries    HYSTERECTOMY, TOTAL ABDOMINAL (CERVIX REMOVED)  Patriciabury      cardiac cath. Dx with spasms.     TUBAL LIGATION       Family History   Problem Relation Age of Onset    Parkinson's Disease Mother     Stroke Mother         Passed away in 1969    No Known Problems Paternal Grandfather     No Known Problems    fluconazole (DIFLUCAN) tablet 200 mg  200 mg Oral Daily Catie Sorensen APRN - CNP   200 mg at 12/13/23 1018    acyclovir (ZOVIRAX) tablet 400 mg  400 mg Oral BID Titus Roe MD   400 mg at 12/13/23 2051    mineral oil-hydrophilic petrolatum (AQUAPHOR) ointment   Topical BID PRN Mindy Gomez MD        calcium carbonate (TUMS) chewable tablet 500 mg  500 mg Oral TID PRN Catie Sorensen APRN - CNP   500 mg at 11/05/23 1024    amLODIPine (NORVASC) tablet 5 mg  5 mg Oral Daily Catie Sorensen APRN - CNP   5 mg at 12/13/23 1019    sodium chloride flush 0.9 % injection 10 mL  10 mL IntraVENous ONCE PRN Elza Cao APRN - CNP        sodium chloride 0.9 % bolus 100 mL  100 mL IntraVENous ONCE PRN Elza Cao APRN - CNP        ciprofloxacin (CIPRO) tablet 500 mg  500 mg Oral 2 times per day Catie Sorensen APRN - CNP   500 mg at 12/13/23 2051    hydrALAZINE (APRESOLINE) injection 5 mg  5 mg IntraVENous Q6H PRN Catie Sorensen APRN - CNP        diphenhydrAMINE (BENADRYL) capsule 25 mg  25 mg Oral Q6H PRN Mindy Gomez MD   25 mg at 12/11/23 1410    ferrous sulfate (IRON 325) tablet 325 mg  325 mg Oral BID  Radha Schwab APRN - CNP   325 mg at 12/13/23 1619    famotidine (PEPCID) tablet 40 mg  40 mg Oral QHS Amrita Ware DO   40 mg at 12/13/23 2049    FLUoxetine (PROZAC) capsule 20 mg  20 mg Oral BID Amrita Ware DO   20 mg at 12/13/23 2050    levothyroxine (SYNTHROID) tablet 125 mcg  125 mcg Oral QAM AC Amrita Ware DO   125 mcg at 12/12/23 0856    montelukast (SINGULAIR) tablet 10 mg  10 mg Oral Daily Amrita Ware DO   10 mg at 12/13/23 1018    pantoprazole (PROTONIX) tablet 40 mg  40 mg Oral BID Amrita Ware DO   40 mg at 12/13/23 1619    prochlorperazine (COMPAZINE) tablet 10 mg  10 mg Oral Q6H PRN Amrita Ware DO        sodium chloride flush 0.9 % injection 5-40 mL  5-40 mL IntraVENous 2 times per day Amrita Ware DO   10 mL at 12/13/23 2054    sodium  Muscle relaxant. Constipation   - bowel regimen     Hyponatremia  12/6 Encourage PO hydration with electrolytes, etc. Easily fluid overloaded so avoiding IVF at this time. 12/11 Na up to 134  RESOLVED/STABLE    Hypokalemia  12/8 K 3.2. Will increase PO K to BID. Will hold off on IV if possible since PICC line was removed. RESOLVED       No AC due to TCP   Continue home meds  Supportive care  PT/OT - HH  Antimicrobial prophylaxis - ACV, Diflucan, Cipro     Dispo - plan was to DC tomorrow with home abx. However, DC on hold for possible preseptal cellulitis. Will need labs/replacements in infusion Monday and then FU with MD next week to discuss BMBx and plan moving forward. Updates to plan of care  11/26 Discussed the prognosis being very poor that she remains refractory to plt tx,  very depressed they had lost two children, consult spiritual care. 11/27 Wishes to remain full code, and continue with transfusion support for now. Goals and plan of care reviewed with the patient. All questions answered to the best of our ability.                    Matilda James, APRN - [facility] Hematology & Oncology  [address] [place] [address]  Office : [phone]  Fax : [phone]

## 2023-12-14 NOTE — PROGRESS NOTES
Physical Therapy Note:    Attempted to see patient this AM for physical therapy re-evaluation session. Patient declines participation due to worsening facial swelling and pain. She didn't get any sleep last night and doesn't feel well enough to participate today. Will follow and re-attempt as schedule permits/patient available.  Thank you,    Liana Dong, PT     Rehab Caseload Tracker

## 2023-12-14 NOTE — PROGRESS NOTES
Went to place picc line pt stated she was not going home for a few days due to face swelling. ID re consulted will hold off on picc til after seen by ID due to infection risk.

## 2023-12-15 DIAGNOSIS — R79.9 ABNORMAL FINDING OF BLOOD CHEMISTRY, UNSPECIFIED: Primary | ICD-10-CM

## 2023-12-15 LAB
ALBUMIN SERPL-MCNC: 2.3 G/DL (ref 3.2–4.6)
ALBUMIN/GLOB SERPL: 0.4 (ref 0.4–1.6)
ALP SERPL-CCNC: 85 U/L (ref 50–136)
ALT SERPL-CCNC: 32 U/L (ref 12–65)
ANION GAP SERPL CALC-SCNC: 4 MMOL/L (ref 2–11)
AST SERPL-CCNC: 8 U/L (ref 15–37)
BASOPHILS # BLD: 0 K/UL (ref 0–0.2)
BASOPHILS NFR BLD: 0 % (ref 0–2)
BILIRUB SERPL-MCNC: 0.7 MG/DL (ref 0.2–1.1)
BUN SERPL-MCNC: 16 MG/DL (ref 8–23)
CALCIUM SERPL-MCNC: 8.8 MG/DL (ref 8.3–10.4)
CHLORIDE SERPL-SCNC: 101 MMOL/L (ref 103–113)
CO2 SERPL-SCNC: 27 MMOL/L (ref 21–32)
CREAT SERPL-MCNC: 0.4 MG/DL (ref 0.6–1)
DIFFERENTIAL METHOD BLD: ABNORMAL
EOSINOPHIL # BLD: 0 K/UL (ref 0–0.8)
EOSINOPHIL NFR BLD: 0 % (ref 0.5–7.8)
ERYTHROCYTE [DISTWIDTH] IN BLOOD BY AUTOMATED COUNT: 15.8 % (ref 11.9–14.6)
GLOBULIN SER CALC-MCNC: 5.4 G/DL (ref 2.8–4.5)
GLUCOSE SERPL-MCNC: 95 MG/DL (ref 65–100)
HCT VFR BLD AUTO: 27.3 % (ref 35.8–46.3)
HGB BLD-MCNC: 8.8 G/DL (ref 11.7–15.4)
IMM GRANULOCYTES # BLD AUTO: 0 K/UL (ref 0–0.5)
IMM GRANULOCYTES NFR BLD AUTO: 0 % (ref 0–5)
LYMPHOCYTES # BLD: 0.6 K/UL (ref 0.5–4.6)
LYMPHOCYTES NFR BLD: 87 % (ref 13–44)
MAGNESIUM SERPL-MCNC: 2.2 MG/DL (ref 1.8–2.4)
MCH RBC QN AUTO: 28.8 PG (ref 26.1–32.9)
MCHC RBC AUTO-ENTMCNC: 32.2 G/DL (ref 31.4–35)
MCV RBC AUTO: 89.2 FL (ref 82–102)
MONOCYTES # BLD: 0 K/UL (ref 0.1–1.3)
MONOCYTES NFR BLD: 1 % (ref 4–12)
NEUTS SEG # BLD: 0.1 K/UL (ref 1.7–8.2)
NEUTS SEG NFR BLD: 12 % (ref 43–78)
NRBC # BLD: 0 K/UL (ref 0–0.2)
PLATELET # BLD AUTO: 257 K/UL (ref 150–450)
PLATELET COMMENT: ADEQUATE
PMV BLD AUTO: 10 FL (ref 9.4–12.3)
POTASSIUM SERPL-SCNC: 4.1 MMOL/L (ref 3.5–5.1)
PROT SERPL-MCNC: 7.7 G/DL (ref 6.3–8.2)
RBC # BLD AUTO: 3.06 M/UL (ref 4.05–5.2)
RBC MORPH BLD: ABNORMAL
SODIUM SERPL-SCNC: 132 MMOL/L (ref 136–146)
WBC # BLD AUTO: 0.7 K/UL (ref 4.3–11.1)
WBC MORPH BLD: SLIGHT

## 2023-12-15 PROCEDURE — 6360000002 HC RX W HCPCS: Performed by: INTERNAL MEDICINE

## 2023-12-15 PROCEDURE — 6360000002 HC RX W HCPCS: Performed by: NURSE PRACTITIONER

## 2023-12-15 PROCEDURE — APPSS30 APP SPLIT SHARED TIME 16-30 MINUTES: Performed by: NURSE PRACTITIONER

## 2023-12-15 PROCEDURE — 36415 COLL VENOUS BLD VENIPUNCTURE: CPT

## 2023-12-15 PROCEDURE — 6370000000 HC RX 637 (ALT 250 FOR IP): Performed by: NURSE PRACTITIONER

## 2023-12-15 PROCEDURE — 36573 INSJ PICC RS&I 5 YR+: CPT

## 2023-12-15 PROCEDURE — C1751 CATH, INF, PER/CENT/MIDLINE: HCPCS

## 2023-12-15 PROCEDURE — 97530 THERAPEUTIC ACTIVITIES: CPT

## 2023-12-15 PROCEDURE — 6370000000 HC RX 637 (ALT 250 FOR IP): Performed by: INTERNAL MEDICINE

## 2023-12-15 PROCEDURE — 6370000000 HC RX 637 (ALT 250 FOR IP): Performed by: FAMILY MEDICINE

## 2023-12-15 PROCEDURE — A4216 STERILE WATER/SALINE, 10 ML: HCPCS | Performed by: INTERNAL MEDICINE

## 2023-12-15 PROCEDURE — 6360000002 HC RX W HCPCS: Performed by: FAMILY MEDICINE

## 2023-12-15 PROCEDURE — 83735 ASSAY OF MAGNESIUM: CPT

## 2023-12-15 PROCEDURE — 2580000003 HC RX 258: Performed by: INTERNAL MEDICINE

## 2023-12-15 PROCEDURE — 80053 COMPREHEN METABOLIC PANEL: CPT

## 2023-12-15 PROCEDURE — 85025 COMPLETE CBC W/AUTO DIFF WBC: CPT

## 2023-12-15 PROCEDURE — 97112 NEUROMUSCULAR REEDUCATION: CPT

## 2023-12-15 PROCEDURE — 99232 SBSQ HOSP IP/OBS MODERATE 35: CPT | Performed by: INTERNAL MEDICINE

## 2023-12-15 PROCEDURE — 1100000000 HC RM PRIVATE

## 2023-12-15 PROCEDURE — 2580000003 HC RX 258: Performed by: FAMILY MEDICINE

## 2023-12-15 PROCEDURE — 2580000003 HC RX 258: Performed by: NURSE PRACTITIONER

## 2023-12-15 RX ORDER — ALBUTEROL SULFATE 90 UG/1
4 AEROSOL, METERED RESPIRATORY (INHALATION) PRN
OUTPATIENT
Start: 2023-12-20

## 2023-12-15 RX ORDER — FLUCONAZOLE 200 MG/1
200 TABLET ORAL DAILY
Qty: 30 TABLET | Refills: 0 | Status: SHIPPED | OUTPATIENT
Start: 2023-12-16 | End: 2024-01-15

## 2023-12-15 RX ORDER — EPINEPHRINE 1 MG/ML
0.3 INJECTION, SOLUTION, CONCENTRATE INTRAVENOUS PRN
OUTPATIENT
Start: 2023-12-20

## 2023-12-15 RX ORDER — SODIUM CHLORIDE 0.9 % (FLUSH) 0.9 %
5-40 SYRINGE (ML) INJECTION PRN
OUTPATIENT
Start: 2023-12-20

## 2023-12-15 RX ORDER — SODIUM CHLORIDE 9 MG/ML
5-250 INJECTION, SOLUTION INTRAVENOUS PRN
OUTPATIENT
Start: 2023-12-20

## 2023-12-15 RX ORDER — DIPHENHYDRAMINE HYDROCHLORIDE 50 MG/ML
50 INJECTION INTRAMUSCULAR; INTRAVENOUS
OUTPATIENT
Start: 2023-12-20

## 2023-12-15 RX ORDER — SODIUM CHLORIDE 9 MG/ML
INJECTION, SOLUTION INTRAVENOUS CONTINUOUS
OUTPATIENT
Start: 2023-12-20

## 2023-12-15 RX ORDER — ONDANSETRON 2 MG/ML
8 INJECTION INTRAMUSCULAR; INTRAVENOUS
OUTPATIENT
Start: 2023-12-20

## 2023-12-15 RX ORDER — ACETAMINOPHEN 325 MG/1
650 TABLET ORAL
OUTPATIENT
Start: 2023-12-20

## 2023-12-15 RX ORDER — HEPARIN 100 UNIT/ML
500 SYRINGE INTRAVENOUS PRN
OUTPATIENT
Start: 2023-12-20

## 2023-12-15 RX ORDER — ALLOPURINOL 300 MG/1
300 TABLET ORAL DAILY
Qty: 30 TABLET | Refills: 3 | Status: SHIPPED | OUTPATIENT
Start: 2023-12-16

## 2023-12-15 RX ORDER — ACYCLOVIR 400 MG/1
400 TABLET ORAL 2 TIMES DAILY
Qty: 60 TABLET | Refills: 0 | Status: SHIPPED | OUTPATIENT
Start: 2023-12-15 | End: 2024-01-14

## 2023-12-15 RX ADMIN — GUAIFENESIN 600 MG: 600 TABLET ORAL at 21:09

## 2023-12-15 RX ADMIN — MORPHINE SULFATE 15 MG: 15 TABLET, FILM COATED, EXTENDED RELEASE ORAL at 21:09

## 2023-12-15 RX ADMIN — DOCUSATE SODIUM 50 MG AND SENNOSIDES 8.6 MG 1 TABLET: 8.6; 5 TABLET, FILM COATED ORAL at 21:09

## 2023-12-15 RX ADMIN — ACYCLOVIR 400 MG: 400 TABLET ORAL at 08:37

## 2023-12-15 RX ADMIN — SODIUM CHLORIDE, PRESERVATIVE FREE 10 ML: 5 INJECTION INTRAVENOUS at 21:11

## 2023-12-15 RX ADMIN — POLYETHYLENE GLYCOL 3350 17 G: 17 POWDER, FOR SOLUTION ORAL at 08:25

## 2023-12-15 RX ADMIN — FLUOXETINE HYDROCHLORIDE 20 MG: 20 CAPSULE ORAL at 10:19

## 2023-12-15 RX ADMIN — POTASSIUM CHLORIDE 20 MEQ: 1500 TABLET, EXTENDED RELEASE ORAL at 08:24

## 2023-12-15 RX ADMIN — MEROPENEM 1000 MG: 1 INJECTION, POWDER, FOR SOLUTION INTRAVENOUS at 06:30

## 2023-12-15 RX ADMIN — CETIRIZINE HYDROCHLORIDE 10 MG: 10 TABLET, FILM COATED ORAL at 08:25

## 2023-12-15 RX ADMIN — ACYCLOVIR 400 MG: 400 TABLET ORAL at 21:19

## 2023-12-15 RX ADMIN — FLUCONAZOLE 200 MG: 100 TABLET ORAL at 08:24

## 2023-12-15 RX ADMIN — ALLOPURINOL 300 MG: 300 TABLET ORAL at 08:24

## 2023-12-15 RX ADMIN — ENOXAPARIN SODIUM 40 MG: 100 INJECTION SUBCUTANEOUS at 08:27

## 2023-12-15 RX ADMIN — FLUTICASONE PROPIONATE 2 SPRAY: 50 SPRAY, METERED NASAL at 08:27

## 2023-12-15 RX ADMIN — LEVOTHYROXINE SODIUM 125 MCG: 0.12 TABLET ORAL at 08:25

## 2023-12-15 RX ADMIN — FAMOTIDINE 40 MG: 20 TABLET, FILM COATED ORAL at 21:09

## 2023-12-15 RX ADMIN — HYDROCODONE BITARTRATE AND ACETAMINOPHEN 2 TABLET: 5; 325 TABLET ORAL at 11:54

## 2023-12-15 RX ADMIN — MEROPENEM 1000 MG: 1 INJECTION, POWDER, FOR SOLUTION INTRAVENOUS at 15:22

## 2023-12-15 RX ADMIN — FERROUS SULFATE TAB 325 MG (65 MG ELEMENTAL FE) 325 MG: 325 (65 FE) TAB at 11:55

## 2023-12-15 RX ADMIN — ONDANSETRON 4 MG: 2 INJECTION INTRAMUSCULAR; INTRAVENOUS at 10:19

## 2023-12-15 RX ADMIN — SODIUM CHLORIDE, PRESERVATIVE FREE 10 ML: 5 INJECTION INTRAVENOUS at 08:26

## 2023-12-15 RX ADMIN — SALINE NASAL SPRAY 2 SPRAY: 1.5 SOLUTION NASAL at 08:27

## 2023-12-15 RX ADMIN — TIZANIDINE 4 MG: 2 TABLET ORAL at 21:09

## 2023-12-15 RX ADMIN — SALINE NASAL SPRAY 2 SPRAY: 1.5 SOLUTION NASAL at 15:23

## 2023-12-15 RX ADMIN — HYDROCODONE BITARTRATE AND ACETAMINOPHEN 2 TABLET: 5; 325 TABLET ORAL at 17:48

## 2023-12-15 RX ADMIN — CIPROFLOXACIN HYDROCHLORIDE 500 MG: 500 TABLET, FILM COATED ORAL at 08:24

## 2023-12-15 RX ADMIN — CIPROFLOXACIN HYDROCHLORIDE 500 MG: 500 TABLET, FILM COATED ORAL at 21:09

## 2023-12-15 RX ADMIN — PANTOPRAZOLE SODIUM 40 MG: 40 TABLET, DELAYED RELEASE ORAL at 16:22

## 2023-12-15 RX ADMIN — HYDROCODONE BITARTRATE AND ACETAMINOPHEN 2 TABLET: 5; 325 TABLET ORAL at 05:37

## 2023-12-15 RX ADMIN — AMLODIPINE BESYLATE 5 MG: 5 TABLET ORAL at 08:24

## 2023-12-15 RX ADMIN — CHLORHEXIDINE GLUCONATE 15 ML: 1.2 RINSE ORAL at 21:19

## 2023-12-15 RX ADMIN — CHLORHEXIDINE GLUCONATE 15 ML: 1.2 RINSE ORAL at 08:28

## 2023-12-15 RX ADMIN — GUAIFENESIN 600 MG: 600 TABLET ORAL at 08:24

## 2023-12-15 RX ADMIN — FERROUS SULFATE TAB 325 MG (65 MG ELEMENTAL FE) 325 MG: 325 (65 FE) TAB at 16:22

## 2023-12-15 RX ADMIN — SALINE NASAL SPRAY 2 SPRAY: 1.5 SOLUTION NASAL at 21:28

## 2023-12-15 RX ADMIN — ANTI-FUNGAL POWDER MICONAZOLE NITRATE TALC FREE: 1.42 POWDER TOPICAL at 08:26

## 2023-12-15 RX ADMIN — DAPTOMYCIN 750 MG: 500 INJECTION, POWDER, LYOPHILIZED, FOR SOLUTION INTRAVENOUS at 16:23

## 2023-12-15 RX ADMIN — PANTOPRAZOLE SODIUM 40 MG: 40 TABLET, DELAYED RELEASE ORAL at 08:24

## 2023-12-15 RX ADMIN — MONTELUKAST 10 MG: 10 TABLET, FILM COATED ORAL at 08:24

## 2023-12-15 RX ADMIN — MEROPENEM 1000 MG: 1 INJECTION, POWDER, FOR SOLUTION INTRAVENOUS at 23:04

## 2023-12-15 RX ADMIN — MORPHINE SULFATE 15 MG: 15 TABLET, FILM COATED, EXTENDED RELEASE ORAL at 08:24

## 2023-12-15 ASSESSMENT — PAIN SCALES - GENERAL
PAINLEVEL_OUTOF10: 0
PAINLEVEL_OUTOF10: 8
PAINLEVEL_OUTOF10: 9
PAINLEVEL_OUTOF10: 8
PAINLEVEL_OUTOF10: 9
PAINLEVEL_OUTOF10: 0
PAINLEVEL_OUTOF10: 0
PAINLEVEL_OUTOF10: 7

## 2023-12-15 ASSESSMENT — PAIN DESCRIPTION - PAIN TYPE
TYPE: ACUTE PAIN
TYPE: ACUTE PAIN

## 2023-12-15 ASSESSMENT — PAIN DESCRIPTION - ONSET
ONSET: ON-GOING
ONSET: PROGRESSIVE

## 2023-12-15 ASSESSMENT — PAIN DESCRIPTION - DESCRIPTORS
DESCRIPTORS: BURNING;SHARP
DESCRIPTORS: ACHING;DISCOMFORT;GNAWING
DESCRIPTORS: ACHING
DESCRIPTORS: ACHING;DISCOMFORT;GNAWING
DESCRIPTORS: ACHING;DISCOMFORT;THROBBING

## 2023-12-15 ASSESSMENT — PAIN DESCRIPTION - LOCATION
LOCATION: EYE;FACE
LOCATION: FACE;EYE
LOCATION: FACE;NECK
LOCATION: FACE

## 2023-12-15 ASSESSMENT — PAIN DESCRIPTION - ORIENTATION
ORIENTATION: LEFT
ORIENTATION: LEFT
ORIENTATION: LEFT;MID;LOWER
ORIENTATION: LEFT
ORIENTATION: LEFT

## 2023-12-15 ASSESSMENT — PAIN - FUNCTIONAL ASSESSMENT
PAIN_FUNCTIONAL_ASSESSMENT: PREVENTS OR INTERFERES SOME ACTIVE ACTIVITIES AND ADLS
PAIN_FUNCTIONAL_ASSESSMENT: ACTIVITIES ARE NOT PREVENTED

## 2023-12-15 ASSESSMENT — PAIN DESCRIPTION - FREQUENCY
FREQUENCY: CONTINUOUS
FREQUENCY: CONTINUOUS

## 2023-12-15 NOTE — PROGRESS NOTES
Pt vSS voiding well no BM during shift complain of pain X's 3 nausea X's 1 gave mediatation as ordered in mar pt up in chair during shift back in bed call light in reach

## 2023-12-15 NOTE — PROGRESS NOTES
Infectious Disease Progress    Today's Date: 12/15/2023   Admit Date: 10/27/2023  : 1950    Impression:   Neutropenic fever. Fever resolved;   [place] up to 400  VRE bacteremia in the setting of the above: [address] /6+; repeat [address] / &  negative. PICC line removed 23. TTE 23 with no vegetation   Left periorbital edema with CT maxillofacial 23 noting periorbital soft tissue enhancement/possible cellulitis/left sided paranasal sinus disease. Possible cellulitis but also consider cavernous sinus thrombosis. 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 Patient with poor dentition and tooth pain at baseline. CT soft tissue/neck 23 without abscess but did note bilateral abnormal enhancing [place] tonsils most consistent with inflammatory phlegmon, along with moderate asymmetric enlargement, enhancement of the left posterior oropharyngeal mucosa adjacent to the left [place] tonsil may represent local inflammation versus less likely occult malignant lesion  Crestor on hold while receiving Daptomycin   H/o Psoriatic arthritis. Humira on hold      Plan:   Continue daptomycin and meropenem  Check MRV head  Recommend checking the following labs Q Monday at a minimum:  CBCd, creatinine, LFTs, CK    Continue to hold Crestor while patient is receiving Daptomycin secondary to DDIs. Crestor can be restarted after the patient completes therapy with Daptomycin. Anti-infectives:   Daptomycin 10 mg/kg  - current   Meropenem -  Vanc -  Cefepime  -   Fluconazole for prophlyaxis  Acyclovir for prophylaxis    Subjective:   Afebrile; left eye swelling is unchanged and is uncomfortable but not tender or indurated. She states that the swelling has waxed and waned today.     Allergies   Allergen Reactions    Penicillins Hives    Sulfa Antibiotics Hives    Codeine Nausea And Vomiting        Review of Systems:  A comprehensive review of

## 2023-12-15 NOTE — PLAN OF CARE
Problem: Pain  Goal: Verbalizes/displays adequate comfort level or baseline comfort level  12/15/2023 1105 by Zenia Yoder RN  Outcome: Progressing  12/15/2023 0053 by Mitali Armando RN  Outcome: Progressing  12/15/2023 0052 by Mitali Armando RN  Outcome: Progressing     Problem: ABCDS Injury Assessment  Goal: Absence of physical injury  12/15/2023 1105 by Zenia Yoder RN  Outcome: Progressing  12/15/2023 0053 by Mitali Armando RN  Outcome: Progressing  12/15/2023 0052 by Mitali Armando RN  Outcome: Progressing     Problem: Safety - Adult  Goal: Free from fall injury  12/15/2023 0053 by Mitali Armando RN  Outcome: Progressing  12/15/2023 0052 by Mitali Armando RN  Outcome: Progressing

## 2023-12-15 NOTE — PROGRESS NOTES
[facility] Hematology & Oncology        Inpatient Hematology / Oncology Progress Note    Reason for Admission:  Neutropenic fever ([address]) [D70.9, R50.81]  Pancytopenia ([address]) [D61.818]    24 Hour Events:  Afebrile, VSS, on RA  Dacogen completed 11/24, Mylotarg completed 11/16  BMBx 12/13, path pending - asking Dr Lynda Heath for prelim  Plts recovered, Nate recovery pending  Continues Dapto for VRE per ID EOT 12/22  On Merrem for preseptal cellulitis - improved today    Transfusions: None  Replacements: On PO K.     ROS:  Constitutional: Positive for fatigue; negative for fever, chills. CV: Negative for chest pain, palpitations, edema. Respiratory: Negative for wheezing, cough, dyspnea. GI: Negative for nausea, abdominal pain, diarrhea. 10 point review of systems is otherwise negative with the exception of the elements mentioned above in the HPI. Allergies   Allergen Reactions    Penicillins Hives    Sulfa Antibiotics Hives    Codeine Nausea And Vomiting     Past Medical History:   Diagnosis Date    Arthritis     Autoimmune disease ([address])     skin changes, unknown name    Cancer ([address]) 1990    ovarian    Chronic pain     arthritis in back and legs    Coronary artery spasm ([address])     COVID 05/15/2022    Essential hypertension 11/8/2019    Gastritis     GERD (gastroesophageal reflux disease)     Hx antineoplastic chemo     Migraine headache     Psoriasis     Psychiatric disorder     anxiety and depression    Severe obesity (BMI 35.0-39.9) 6/26/2018    Thyroid disease     Diagnosed in 1996     Past Surgical History:   Procedure Laterality Date    CARPAL TUNNEL RELEASE      GYN      ovaries    HYSTERECTOMY, TOTAL ABDOMINAL (CERVIX REMOVED)  Patriciabury      cardiac cath. Dx with spasms.     TUBAL LIGATION       Family History   Problem Relation Age of Onset    Parkinson's Disease Mother     Stroke Mother         Passed away in 1969    No Known Problems Paternal Grandfather     No Known

## 2023-12-15 NOTE — PROGRESS NOTES
ACUTE OCCUPATIONAL THERAPY GOALS:   (Developed with and agreed upon by patient and/or caregiver.)  Re-evaluation completed on 12/05/23  1. Patient will complete lower body bathing and dressing with MOD I and adaptive equipment as needed. 2. Patient will complete toileting with MOD I.   3. Patient will tolerate 30 minutes of OT treatment with 1-2 rest breaks to increase activity tolerance for ADLs. 4. Patient will complete functional transfers with MOD I and adaptive equipment as needed. 5. Patient will complete functional mobility for household distances with MOD I and adaptive equipment as needed. 6. Patient will complete self-grooming while standing edge of sink with MOD I and adaptive equipment as needed. New Goal Added:  7. Patient will complete UB bathing and dressing with MOD I and adaptive equipment as needed. 8. Patient will verbalize and demonstrate 3 energy conservation strategies throughout completion of bADLs. Timeframe: 7 visits     OCCUPATIONAL THERAPY: Daily Note PM   OT Visit Days: 4   Time In/Out  OT Charge Capture  Rehab Caseload Tracker  OT Orders  Neutropenic Precautions    Sheila Vick is a 68 y.o. female   PRIMARY DIAGNOSIS: Neutropenic fever ([address])  Neutropenic fever ([address]) [D70.9, R50.81]  Pancytopenia ([address]) [D61.818]       Inpatient: Payor: Omid Perera / Plan: Jojo Wayne / Product Type: *No Product type* /     ASSESSMENT:     REHAB RECOMMENDATIONS: CURRENT LEVEL OF FUNCTION:  (Most Recently Demonstrated)   Recommendation to date pending progress:  Setting:  Home Health Therapy    Equipment:    To Be Determined Bathing:  Not Tested  Dressing:  Stand by Assist  Feeding/Grooming:  Stand by Assist  Toileting:  Not Tested  Functional Mobility:  Contact Guard Assist     ASSESSMENT:  Ms. Martinez  is progressing fair towards OT goals.  Today, pt was received sitting up in bedside chair-- pt feeling a little shakey today and does have cellulitis in her L eye now discharge home , and prepare for self care..     TREATMENT GRID:  N/A    AFTER TREATMENT PRECAUTIONS: Bed/Chair Locked, Call light within reach, Chair, Heels floated, Needs within reach, RN notified, and Visitors at bedside    INTERDISCIPLINARY COLLABORATION:  RN/ PCT, PT/ PTA, and OT/ PETTY    EDUCATION:       TOTAL TREATMENT DURATION AND TIME:  Time In: 1519  Time Out: 1543  Minutes: 24    Kerrie Waterman, OT

## 2023-12-15 NOTE — PLAN OF CARE
Problem: Pain  Goal: Verbalizes/displays adequate comfort level or baseline comfort level  12/15/2023 0053 by Tyrone Wilhelm RN  Outcome: Progressing  12/15/2023 0052 by Tyrone Wilhelm RN  Outcome: Progressing     Problem: ABCDS Injury Assessment  Goal: Absence of physical injury  12/15/2023 0053 by Tyrone Wilhelm RN  Outcome: Progressing  12/15/2023 0052 by Tyrone Wilhelm RN  Outcome: Progressing     Problem: Safety - Adult  Goal: Free from fall injury  12/15/2023 0053 by Tyrone Wilhelm RN  Outcome: Progressing  12/15/2023 0052 by Tyrone Wilhelm RN  Outcome: Progressing     Problem: Skin/Tissue Integrity  Goal: Absence of new skin breakdown  Description: 1. Monitor for areas of redness and/or skin breakdown  2. Assess vascular access sites hourly  3. Every 4-6 hours minimum:  Change oxygen saturation probe site  4. Every 4-6 hours:  If on nasal continuous positive airway pressure, respiratory therapy assess nares and determine need for appliance change or resting period.   12/15/2023 0053 by Tyrone Wilhelm RN  Outcome: Progressing  12/15/2023 0052 by Tyrone Wilhelm RN  Outcome: Progressing     Problem: Respiratory - Adult  Goal: Achieves optimal ventilation and oxygenation  12/15/2023 0053 by Tyrone Wilhelm RN  Outcome: Progressing  12/15/2023 0052 by Tyrone Wilhelm RN  Outcome: Progressing     Problem: Skin/Tissue Integrity - Adult  Goal: Skin integrity remains intact  12/15/2023 0053 by Tyrone Wilhelm RN  Outcome: Progressing  12/15/2023 0052 by Tyrone Wilhelm RN  Outcome: Progressing  Goal: Incisions, wounds, or drain sites healing without S/S of infection  12/15/2023 0053 by Tyrone Wilhelm RN  Outcome: Progressing  12/15/2023 0052 by Tyrone Wilhelm RN  Outcome: Progressing  Goal: Oral mucous membranes remain intact  12/15/2023 0053 by Tyrone Wilhelm RN  Outcome: Progressing  12/15/2023 0052 by Tyrone Wilhelm RN  Outcome: Progressing Problem: Skin/Tissue Integrity - Adult  Goal: Incisions, wounds, or drain sites healing without S/S of infection  12/15/2023 0053 by Echo Garcia RN  Outcome: Progressing  12/15/2023 0052 by Echo Garcia RN  Outcome: Progressing     Problem: Musculoskeletal - Adult  Goal: Return mobility to safest level of function  12/15/2023 0053 by Echo Garcia RN  Outcome: Progressing  12/15/2023 0052 by Echo Garcia RN  Outcome: Progressing  Goal: Maintain proper alignment of affected body part  12/15/2023 0053 by Echo Garcia RN  Outcome: Progressing  12/15/2023 0052 by Echo Garcia RN  Outcome: Progressing  Goal: Return ADL status to a safe level of function  12/15/2023 0053 by Echo Garcia RN  Outcome: Progressing  12/15/2023 0052 by Echo Garcia RN  Outcome: Progressing     Problem: Metabolic/Fluid and Electrolytes - Adult  Goal: Electrolytes maintained within normal limits  12/15/2023 0053 by Echo Garcia RN  Outcome: Progressing  12/15/2023 0052 by Echo Garcia RN  Outcome: Progressing  Goal: Hemodynamic stability and optimal renal function maintained  12/15/2023 0053 by Echo Garcia RN  Outcome: Progressing  12/15/2023 0052 by Echo Garcia RN  Outcome: Progressing  Goal: Glucose maintained within prescribed range  12/15/2023 0053 by Echo Garcia RN  Outcome: Progressing  12/15/2023 0052 by Echo Garcia RN  Outcome: Progressing     Problem: Infection - Adult  Goal: Absence of infection at discharge  12/15/2023 0053 by Echo Garcia RN  Outcome: Progressing  12/15/2023 0052 by Echo Garcia RN  Outcome: Progressing  Goal: Absence of infection during hospitalization  12/15/2023 0053 by Echo Garcia RN  Outcome: Progressing  12/15/2023 0052 by Echo Garcia RN  Outcome: Progressing  Goal: Absence of fever/infection during anticipated neutropenic period  12/15/2023 0053 by Echo Garcia RN  Outcome:

## 2023-12-15 NOTE — PROGRESS NOTES
ACUTE PHYSICAL THERAPY GOALS:   (Developed with and agreed upon by patient and/or caregiver.)  Goals assessed and revised 11/27/23  LTG:  (1.)Ms. Dell Hu will move from supine to sit and sit to supine , scoot up and down, and roll side to side in bed with INDEPENDENCE within 7 treatment day(s). GOAL MET 11/27/2023  (2.)Ms. Dell Hu will transfer from bed to chair and chair to bed with MODIFIED INDEPENDENCE using the least restrictive device within 7 treatment day(s). GOAL MET 11/27/2023  (3.)Ms. Dell Hu will ambulate with MODIFIED INDEPENDENCE for 500 feet with the least restrictive device within 7 treatment day(s). (4.)Ms. Dell Hu will participate in therapeutic activity/exercises x 25 minutes for increased activity tolerance within 7 treatment days. (5.)Ms. Dell Hu will perform standing static and dynamic balance activities x 15 minutes with SUPERVISION to improve safety within 7 day(s). New Goal:     (6.)Ms. Dell Hu will demonstrate understanding of energy conservation and activity pacing techniques and apply them with no cueing within 7 days. (7.)Ms. Dell Hu will be independent in all bed mobility and bed to chair, chair to bed transfers within 7 treatment day.     ________________________________________________________________________________________________    PHYSICAL THERAPY Daily Note, Re-evaluation, and PM  (Link to Caseload Tracking: PT Visit Days : 1  Acknowledge Orders  Time In/Out  PT Charge Capture  Rehab Caseload Tracker    Ronny Ruelas is a 68 y.o. female   PRIMARY DIAGNOSIS: Neutropenic fever ([address])  Neutropenic fever ([address]) [D70.9, R50.81]  Pancytopenia ([address]) [U34.709]       Reason for Referral: Generalized Muscle Weakness (M62.81)  Difficulty in walking, Not elsewhere classified (R26.2)  Inpatient: Payor: [employer] / Plan: Curt Sawyer / Product Type: *No Product type* /     ASSESSMENT:     REHAB RECOMMENDATIONS:   Recommendation to date pending progress:  Setting:  [facility] Max=Maximal Assistance, Total=Total Assistance, NT=Not Tested    GAIT: I Mod I S SBA CGA Min Mod Max Total  NT x2 Comments:   Level of Assistance [] [] [] [] [x] [] [] [] [] [] []    Distance 50 ft x 4 (seated breaks, chair follow)      DME Rolling Walker and chair follow    Gait Quality Decreased annabel , Decreased step length, and Trunk sway increased    Weightbearing Status      Stairs      I=Independent, Mod I=Modified Independent, S=Supervision, SBA=Standby Assistance, CGA=Contact Guard Assistance,   Min=Minimal Assistance, Mod=Moderate Assistance, Max=Maximal Assistance, Total=Total Assistance, NT=Not Tested    PLAN:   FREQUENCY AND DURATION: 3 times/week for duration of hospital stay or until stated goals are met, whichever comes first.    THERAPY PROGNOSIS: Good    PROBLEM LIST:   (Skilled intervention is medically necessary to address:)  Decreased Activity Tolerance  Decreased Balance  Decreased Gait Ability  Decreased Safety Awareness  Decreased Strength  Decreased Transfer Abilities INTERVENTIONS PLANNED:   (Benefits and precautions of physical therapy have been discussed with the patient.)  Therapeutic Activity  Therapeutic Exercise/HEP  Neuromuscular Re-education  Gait Training  Education       TREATMENT:   RE-EVALUATION    TREATMENT:   Therapeutic Activity (24 Minutes): Therapeutic activity included Scooting, Transfer Training, Ambulation on level ground, Sitting balance , and Standing balance to improve functional Activity tolerance, Balance, Mobility, and Strength.     TREATMENT GRID:  N/A    AFTER TREATMENT PRECAUTIONS: Bed/Chair Locked, Call light within reach, Chair, Needs within reach, RN notified, and Visitors at bedside    INTERDISCIPLINARY COLLABORATION:  RN/ PCT and OT/ PETTY    EDUCATION:      TIME IN/OUT:  Time In: 1519  Time Out: [address] [place] [address]  Minutes: 24    MARVIN DUNN, PT

## 2023-12-15 NOTE — PROGRESS NOTES
Please fill out MRI screening form with the Pt and have PT and RN sign, if AMS is present then family will need to do form. And please have any meds necessary for MRI ready.

## 2023-12-15 NOTE — CARE COORDINATION
Pt discussed during IDR and chart screened by CM for d/c planning. Plt count increasing. Awaiting path from repeat BMBx. Pt now with preseptal cellulitis   ID following and ordered IV meropenem: 1,000 mg q 8h. [facility] is following for home IV ABX (Dapto: EOT 12/22). CM has placed an order and referral to [facility]: SN, PT, OT.     PT/OT recommend HH at d/c. CM will continue to follow. Anticipated dispo: home with HH and [facility] once medically stable.   LOS = 49 days

## 2023-12-16 ENCOUNTER — APPOINTMENT (OUTPATIENT)
Dept: MRI IMAGING | Age: 73
DRG: 808 | End: 2023-12-16
Payer: MEDICARE

## 2023-12-16 LAB
ALBUMIN SERPL-MCNC: 2.3 G/DL (ref 3.2–4.6)
ALBUMIN/GLOB SERPL: 0.4 (ref 0.4–1.6)
ALP SERPL-CCNC: 96 U/L (ref 50–136)
ALT SERPL-CCNC: 31 U/L (ref 12–65)
ANION GAP SERPL CALC-SCNC: 2 MMOL/L (ref 2–11)
AST SERPL-CCNC: 12 U/L (ref 15–37)
BASOPHILS # BLD: 0 K/UL (ref 0–0.2)
BASOPHILS NFR BLD: 0 % (ref 0–2)
BILIRUB SERPL-MCNC: 0.6 MG/DL (ref 0.2–1.1)
BUN SERPL-MCNC: 19 MG/DL (ref 8–23)
CALCIUM SERPL-MCNC: 8.5 MG/DL (ref 8.3–10.4)
CHLORIDE SERPL-SCNC: 99 MMOL/L (ref 103–113)
CO2 SERPL-SCNC: 30 MMOL/L (ref 21–32)
CREAT SERPL-MCNC: 0.4 MG/DL (ref 0.6–1)
DIFFERENTIAL METHOD BLD: ABNORMAL
EOSINOPHIL # BLD: 0 K/UL (ref 0–0.8)
EOSINOPHIL NFR BLD: 0 % (ref 0.5–7.8)
ERYTHROCYTE [DISTWIDTH] IN BLOOD BY AUTOMATED COUNT: 15.4 % (ref 11.9–14.6)
GLOBULIN SER CALC-MCNC: 5.5 G/DL (ref 2.8–4.5)
GLUCOSE SERPL-MCNC: 106 MG/DL (ref 65–100)
HCT VFR BLD AUTO: 26 % (ref 35.8–46.3)
HGB BLD-MCNC: 8.4 G/DL (ref 11.7–15.4)
IMM GRANULOCYTES # BLD AUTO: 0 K/UL (ref 0–0.5)
IMM GRANULOCYTES NFR BLD AUTO: 1 % (ref 0–5)
LYMPHOCYTES # BLD: 0.7 K/UL (ref 0.5–4.6)
LYMPHOCYTES NFR BLD: 81 % (ref 13–44)
MAGNESIUM SERPL-MCNC: 2.4 MG/DL (ref 1.8–2.4)
MCH RBC QN AUTO: 29 PG (ref 26.1–32.9)
MCHC RBC AUTO-ENTMCNC: 32.3 G/DL (ref 31.4–35)
MCV RBC AUTO: 89.7 FL (ref 82–102)
MONOCYTES # BLD: 0 K/UL (ref 0.1–1.3)
MONOCYTES NFR BLD: 1 % (ref 4–12)
NEUTS SEG # BLD: 0.1 K/UL (ref 1.7–8.2)
NEUTS SEG NFR BLD: 17 % (ref 43–78)
NRBC # BLD: 0 K/UL (ref 0–0.2)
PLATELET # BLD AUTO: 270 K/UL (ref 150–450)
PLATELET COMMENT: ADEQUATE
PMV BLD AUTO: 10 FL (ref 9.4–12.3)
POTASSIUM SERPL-SCNC: 4.3 MMOL/L (ref 3.5–5.1)
PROT SERPL-MCNC: 7.8 G/DL (ref 6.3–8.2)
RBC # BLD AUTO: 2.9 M/UL (ref 4.05–5.2)
RBC MORPH BLD: ABNORMAL
RBC MORPH BLD: ABNORMAL
SODIUM SERPL-SCNC: 131 MMOL/L (ref 136–146)
WBC # BLD AUTO: 0.8 K/UL (ref 4.3–11.1)
WBC MORPH BLD: ABNORMAL

## 2023-12-16 PROCEDURE — A4216 STERILE WATER/SALINE, 10 ML: HCPCS | Performed by: INTERNAL MEDICINE

## 2023-12-16 PROCEDURE — 6360000002 HC RX W HCPCS: Performed by: INTERNAL MEDICINE

## 2023-12-16 PROCEDURE — 83735 ASSAY OF MAGNESIUM: CPT

## 2023-12-16 PROCEDURE — 6360000002 HC RX W HCPCS: Performed by: NURSE PRACTITIONER

## 2023-12-16 PROCEDURE — A4216 STERILE WATER/SALINE, 10 ML: HCPCS | Performed by: NURSE PRACTITIONER

## 2023-12-16 PROCEDURE — 6370000000 HC RX 637 (ALT 250 FOR IP): Performed by: FAMILY MEDICINE

## 2023-12-16 PROCEDURE — 2580000003 HC RX 258: Performed by: INTERNAL MEDICINE

## 2023-12-16 PROCEDURE — 80053 COMPREHEN METABOLIC PANEL: CPT

## 2023-12-16 PROCEDURE — 6370000000 HC RX 637 (ALT 250 FOR IP): Performed by: INTERNAL MEDICINE

## 2023-12-16 PROCEDURE — 99232 SBSQ HOSP IP/OBS MODERATE 35: CPT | Performed by: INTERNAL MEDICINE

## 2023-12-16 PROCEDURE — 6370000000 HC RX 637 (ALT 250 FOR IP): Performed by: NURSE PRACTITIONER

## 2023-12-16 PROCEDURE — 2580000003 HC RX 258: Performed by: FAMILY MEDICINE

## 2023-12-16 PROCEDURE — 70544 MR ANGIOGRAPHY HEAD W/O DYE: CPT

## 2023-12-16 PROCEDURE — 2580000003 HC RX 258: Performed by: NURSE PRACTITIONER

## 2023-12-16 PROCEDURE — 85025 COMPLETE CBC W/AUTO DIFF WBC: CPT

## 2023-12-16 PROCEDURE — 1100000000 HC RM PRIVATE

## 2023-12-16 RX ADMIN — DICLOFENAC SODIUM 4 G: 10 GEL TOPICAL at 07:49

## 2023-12-16 RX ADMIN — ANTI-FUNGAL POWDER MICONAZOLE NITRATE TALC FREE: 1.42 POWDER TOPICAL at 08:04

## 2023-12-16 RX ADMIN — GUAIFENESIN 600 MG: 600 TABLET ORAL at 22:46

## 2023-12-16 RX ADMIN — FAMOTIDINE 40 MG: 20 TABLET, FILM COATED ORAL at 22:46

## 2023-12-16 RX ADMIN — DAPTOMYCIN 750 MG: 500 INJECTION, POWDER, LYOPHILIZED, FOR SOLUTION INTRAVENOUS at 16:43

## 2023-12-16 RX ADMIN — CHLORHEXIDINE GLUCONATE 15 ML: 1.2 RINSE ORAL at 22:55

## 2023-12-16 RX ADMIN — POLYETHYLENE GLYCOL 3350 17 G: 17 POWDER, FOR SOLUTION ORAL at 07:47

## 2023-12-16 RX ADMIN — SALINE NASAL SPRAY 2 SPRAY: 1.5 SOLUTION NASAL at 15:04

## 2023-12-16 RX ADMIN — CETIRIZINE HYDROCHLORIDE 10 MG: 10 TABLET, FILM COATED ORAL at 07:47

## 2023-12-16 RX ADMIN — FLUOXETINE HYDROCHLORIDE 20 MG: 20 CAPSULE ORAL at 22:46

## 2023-12-16 RX ADMIN — ALLOPURINOL 300 MG: 300 TABLET ORAL at 07:47

## 2023-12-16 RX ADMIN — LEVOTHYROXINE SODIUM 125 MCG: 0.12 TABLET ORAL at 07:47

## 2023-12-16 RX ADMIN — FERROUS SULFATE TAB 325 MG (65 MG ELEMENTAL FE) 325 MG: 325 (65 FE) TAB at 16:43

## 2023-12-16 RX ADMIN — FERROUS SULFATE TAB 325 MG (65 MG ELEMENTAL FE) 325 MG: 325 (65 FE) TAB at 11:49

## 2023-12-16 RX ADMIN — FLUCONAZOLE 200 MG: 100 TABLET ORAL at 07:47

## 2023-12-16 RX ADMIN — HYDROCODONE BITARTRATE AND ACETAMINOPHEN 2 TABLET: 5; 325 TABLET ORAL at 07:56

## 2023-12-16 RX ADMIN — ACETAMINOPHEN 650 MG: 325 TABLET ORAL at 11:49

## 2023-12-16 RX ADMIN — PANTOPRAZOLE SODIUM 40 MG: 40 TABLET, DELAYED RELEASE ORAL at 15:00

## 2023-12-16 RX ADMIN — DOCUSATE SODIUM 50 MG AND SENNOSIDES 8.6 MG 1 TABLET: 8.6; 5 TABLET, FILM COATED ORAL at 22:46

## 2023-12-16 RX ADMIN — SALINE NASAL SPRAY 2 SPRAY: 1.5 SOLUTION NASAL at 07:48

## 2023-12-16 RX ADMIN — MORPHINE SULFATE 15 MG: 15 TABLET, FILM COATED, EXTENDED RELEASE ORAL at 07:47

## 2023-12-16 RX ADMIN — ACYCLOVIR 400 MG: 400 TABLET ORAL at 22:46

## 2023-12-16 RX ADMIN — FLUTICASONE PROPIONATE 2 SPRAY: 50 SPRAY, METERED NASAL at 07:50

## 2023-12-16 RX ADMIN — ACYCLOVIR 400 MG: 400 TABLET ORAL at 07:47

## 2023-12-16 RX ADMIN — IPRATROPIUM BROMIDE 2 SPRAY: 42 SPRAY NASAL at 07:51

## 2023-12-16 RX ADMIN — SODIUM CHLORIDE 1 MG: 9 INJECTION INTRAMUSCULAR; INTRAVENOUS; SUBCUTANEOUS at 15:20

## 2023-12-16 RX ADMIN — MORPHINE SULFATE 15 MG: 15 TABLET, FILM COATED, EXTENDED RELEASE ORAL at 22:46

## 2023-12-16 RX ADMIN — POTASSIUM CHLORIDE 20 MEQ: 1500 TABLET, EXTENDED RELEASE ORAL at 07:47

## 2023-12-16 RX ADMIN — SODIUM CHLORIDE, PRESERVATIVE FREE 10 ML: 5 INJECTION INTRAVENOUS at 22:47

## 2023-12-16 RX ADMIN — PANTOPRAZOLE SODIUM 40 MG: 40 TABLET, DELAYED RELEASE ORAL at 07:48

## 2023-12-16 RX ADMIN — MEROPENEM 1000 MG: 1 INJECTION, POWDER, FOR SOLUTION INTRAVENOUS at 15:03

## 2023-12-16 RX ADMIN — CIPROFLOXACIN HYDROCHLORIDE 500 MG: 500 TABLET, FILM COATED ORAL at 07:47

## 2023-12-16 RX ADMIN — MEROPENEM 1000 MG: 1 INJECTION, POWDER, FOR SOLUTION INTRAVENOUS at 22:48

## 2023-12-16 RX ADMIN — SODIUM CHLORIDE, PRESERVATIVE FREE 10 ML: 5 INJECTION INTRAVENOUS at 07:56

## 2023-12-16 RX ADMIN — MEROPENEM 1000 MG: 1 INJECTION, POWDER, FOR SOLUTION INTRAVENOUS at 06:13

## 2023-12-16 RX ADMIN — CHLORHEXIDINE GLUCONATE 15 ML: 1.2 RINSE ORAL at 07:49

## 2023-12-16 RX ADMIN — HYDROCODONE BITARTRATE AND ACETAMINOPHEN 2 TABLET: 5; 325 TABLET ORAL at 01:11

## 2023-12-16 RX ADMIN — GUAIFENESIN 600 MG: 600 TABLET ORAL at 07:47

## 2023-12-16 RX ADMIN — HYDROCODONE BITARTRATE AND ACETAMINOPHEN 2 TABLET: 5; 325 TABLET ORAL at 15:24

## 2023-12-16 RX ADMIN — MONTELUKAST 10 MG: 10 TABLET, FILM COATED ORAL at 07:48

## 2023-12-16 RX ADMIN — FLUOXETINE HYDROCHLORIDE 20 MG: 20 CAPSULE ORAL at 07:48

## 2023-12-16 RX ADMIN — ENOXAPARIN SODIUM 40 MG: 100 INJECTION SUBCUTANEOUS at 07:48

## 2023-12-16 RX ADMIN — CIPROFLOXACIN HYDROCHLORIDE 500 MG: 500 TABLET, FILM COATED ORAL at 22:46

## 2023-12-16 ASSESSMENT — PAIN DESCRIPTION - LOCATION
LOCATION: EYE;FACE;JAW
LOCATION: FACE;JAW

## 2023-12-16 ASSESSMENT — PAIN SCALES - GENERAL
PAINLEVEL_OUTOF10: 10
PAINLEVEL_OUTOF10: 6
PAINLEVEL_OUTOF10: 0

## 2023-12-16 ASSESSMENT — PAIN DESCRIPTION - DESCRIPTORS
DESCRIPTORS: ACHING
DESCRIPTORS: ACHING;SHOOTING;SHARP
DESCRIPTORS: ACHING;DISCOMFORT;THROBBING

## 2023-12-16 ASSESSMENT — PAIN - FUNCTIONAL ASSESSMENT: PAIN_FUNCTIONAL_ASSESSMENT: PREVENTS OR INTERFERES SOME ACTIVE ACTIVITIES AND ADLS

## 2023-12-16 ASSESSMENT — PAIN DESCRIPTION - ORIENTATION
ORIENTATION: LEFT

## 2023-12-16 NOTE — PROGRESS NOTES
IntraVENous PRN Connie Sifuentes MD        0.9 % sodium chloride infusion   IntraVENous PRN Connie Sifuentes MD        chlorhexidine (PERIDEX) 0.12 % solution 15 mL  15 mL Mouth/Throat BID TIM Sommers CNP   15 mL at 12/16/23 0749    acetaminophen (TYLENOL) tablet 650 mg  650 mg Oral Q6H PRN Gabe Barrera MD   650 mg at 12/14/23 0844    miconazole (MICOTIN) 2 % powder   Topical BID ITM Viramontes - CNP   Given at 12/16/23 0804    allopurinol (ZYLOPRIM) tablet 300 mg  300 mg Oral Daily TIM Viramontes - CNP   300 mg at 12/16/23 0747    fluconazole (DIFLUCAN) tablet 200 mg  200 mg Oral Daily TIM Viramontes - CNP   200 mg at 12/16/23 0747    acyclovir (ZOVIRAX) tablet 400 mg  400 mg Oral BID Fortino Sanchez MD   400 mg at 12/16/23 8125    mineral oil-hydrophilic petrolatum (AQUAPHOR) ointment   Topical BID PRN Connie Sifuentes MD        calcium carbonate (TUMS) chewable tablet 500 mg  500 mg Oral TID PRN TIM Viramontes CNP   500 mg at 11/05/23 1024    amLODIPine (NORVASC) tablet 5 mg  5 mg Oral Daily TIM Sommers - CNP   5 mg at 12/15/23 3302    sodium chloride flush 0.9 % injection 10 mL  10 mL IntraVENous ONCE PRN TIM Aguiar CNP        sodium chloride 0.9 % bolus 100 mL  100 mL IntraVENous ONCE PRN TIM Aguiar CNP        ciprofloxacin (CIPRO) tablet 500 mg  500 mg Oral 2 times per day TIM Viramontes - CNP   500 mg at 12/16/23 0747    hydrALAZINE (APRESOLINE) injection 5 mg  5 mg IntraVENous Q6H PRN TIM Viramontes - CNP        diphenhydrAMINE (BENADRYL) capsule 25 mg  25 mg Oral Q6H PRN Connie Sifuentes MD   25 mg at 12/11/23 1410    ferrous sulfate (IRON 325) tablet 325 mg  325 mg Oral BID  TIM Almaraz CNP   325 mg at 12/15/23 1622    famotidine (PEPCID) tablet 40 mg  40 mg Oral QHS Mar Dietrich DO   40 mg at 12/15/23 2109    FLUoxetine (PROZAC) capsule 20 mg  20 mg Oral BID Mar Dietrich DO the next day or so. Feeling better today. Continue supportive care for sinusitis. Continues Dapto EOT 12/22 for VRE.  12/16 continue Merrem and Dapto per ID, left eye improving    Altered mental status  11/1 Likely secondary to HD steroids. CT head negative. CTA and MRI brain pending after Code S called last night for dysarthria.  11/2 CTA negative. Unable to tolerate MRI brain without sedation so holding for now as symptoms more consistent with steroid-related effects vs stroke as no focal deficits noted.  RESOLVED    Hypoxia / LE edema / abnormal breath sounds  11/1 LE edema may be related to steroids. Incomplete I/O recorded, weight up since admission. Stopped IVF. Check CXR and BNP.  11/2 BNP elevated, CXR with pulmonary vascular congestion. On 2-4L NC. Weight remains elevated since admission.   11/4 Stable fluid status, on 3L NC - weaning as tolerated. Was hypoxic when found off O2 in the middle of the night.  11/7 Continuing to wean O2 as tolerated. Down to 1L NC. Fluid balance stable.  11/8 O2 back up to 3 L NC.    10/10 on RA   11/13 on 2L - weight stable.  11/14 Check CXR as well as echo. Weight back down to admission baseline. On 2L NC with worsening dyspnea today.  11/15 Stable fluid status/weight. CXR negative. Echo WNL. On RA overnight.  11/17 On RA, +I/O 600ml and weight up (using different scale), no signs of overload.  11/22 on RA  11/23 Up 5# and +1.9L, remains on RA  11/27 Remains on RA, CXR unremarkable, continue I/S and mobilization. Stable fluid balance.  RESOLVED     Bleeding - epistaxis and rectal bleeding w BM   - ASA on hold; transfuse Hb >7 and plts >50   - PPI  10/29 denies bleeding today   11/5 Per RN  yesterday, small amount of blood from nares, no epistaxis. Otherwise, no bleeding noted. Transfusing plts for plt 2k.   11/9 Pt with episode of bleeding yesterday after scratching self, pressure held and additional 1 unit plts given (3 total yesterday).  No further bleeding noted today thus  far.    11/10 No further noted bleeding ON/today thus far. Is receiving PRBCs/plts today   11/11 Hgb improved after transfusion yesterday. Plt count 5k , no notable bleeding. 11/12 Plt count up to 40k yesterday (received 1 unit plts yesterday) this AM at 30k, much improved. 11/13 No bleeding noted   11/30 Some bleeding from gums, transfusing PRN. Recheck CBC later this afternoon. Will order CHG, discouraged brushing/abrasive foods. Reiterated fall precautions. 12/2 No bleeding reported during rounds. 12/10 epistaxis today, repeat platelets    RESOLVED    Hypertension  11/2 Resume home amlodipine  11/4 More hypertensive overnight, monitor. May need to adjust amlodipine if no improvement. Has PRN hydralazine. 11/8 Overall BP improved   RESOLVED     Chronic pain   - on morphine ER 15mg BID, norco.  Muscle relaxant. Constipation   - bowel regimen     Hyponatremia  12/6 Encourage PO hydration with electrolytes, etc. Easily fluid overloaded so avoiding IVF at this time. 12/11 Na up to 134  RESOLVED/STABLE    Hypokalemia  12/8 K 3.2. Will increase PO K to BID. Will hold off on IV if possible since PICC line was removed. RESOLVED       VTE prophylaxis - Lovenox  Continue home meds  Supportive care  PT/OT - HH  Antimicrobial prophylaxis - ACV, Diflucan, Cipro     Dispo - pending clinical course as DC now on hold for preseptal cellulitis and final abx plan pending. CM arranging home Dapto (EOT 12/22) when able. Working with navigation [address] with MD to discuss BMBx results. Updates to plan of care  11/26 Discussed the prognosis being very poor that she remains refractory to plt tx,  very depressed they had lost two children, consult spiritual care. 11/27 Wishes to remain full code, and continue with transfusion support for now. Goals and plan of care reviewed with the patient. All questions answered to the best of our ability.                Kylie Norwood ([address]) Damir Thakkar SHERI-BC  [email or web address]

## 2023-12-16 NOTE — PROGRESS NOTES
Patient was off the floor      Spoke with family      Family asked  to come back to visit when pt returns

## 2023-12-17 LAB
ALBUMIN SERPL-MCNC: 2.4 G/DL (ref 3.2–4.6)
ALBUMIN/GLOB SERPL: 0.4 (ref 0.4–1.6)
ALP SERPL-CCNC: 96 U/L (ref 50–136)
ALT SERPL-CCNC: 33 U/L (ref 12–65)
ANION GAP SERPL CALC-SCNC: 6 MMOL/L (ref 2–11)
AST SERPL-CCNC: 16 U/L (ref 15–37)
BASOPHILS # BLD: 0 K/UL (ref 0–0.2)
BASOPHILS NFR BLD: 0 % (ref 0–2)
BILIRUB SERPL-MCNC: 0.6 MG/DL (ref 0.2–1.1)
BUN SERPL-MCNC: 13 MG/DL (ref 8–23)
CALCIUM SERPL-MCNC: 8.8 MG/DL (ref 8.3–10.4)
CHLORIDE SERPL-SCNC: 96 MMOL/L (ref 103–113)
CO2 SERPL-SCNC: 28 MMOL/L (ref 21–32)
CREAT SERPL-MCNC: 0.5 MG/DL (ref 0.6–1)
DIFFERENTIAL METHOD BLD: ABNORMAL
EOSINOPHIL # BLD: 0 K/UL (ref 0–0.8)
EOSINOPHIL NFR BLD: 0 % (ref 0.5–7.8)
ERYTHROCYTE [DISTWIDTH] IN BLOOD BY AUTOMATED COUNT: 15.6 % (ref 11.9–14.6)
GLOBULIN SER CALC-MCNC: 5.6 G/DL (ref 2.8–4.5)
GLUCOSE SERPL-MCNC: 104 MG/DL (ref 65–100)
HCT VFR BLD AUTO: 27.2 % (ref 35.8–46.3)
HGB BLD-MCNC: 8.8 G/DL (ref 11.7–15.4)
IMM GRANULOCYTES # BLD AUTO: 0 K/UL (ref 0–0.5)
IMM GRANULOCYTES NFR BLD AUTO: 1 % (ref 0–5)
LYMPHOCYTES # BLD: 0.8 K/UL (ref 0.5–4.6)
LYMPHOCYTES NFR BLD: 65 % (ref 13–44)
MAGNESIUM SERPL-MCNC: 2.2 MG/DL (ref 1.8–2.4)
MCH RBC QN AUTO: 28.7 PG (ref 26.1–32.9)
MCHC RBC AUTO-ENTMCNC: 32.4 G/DL (ref 31.4–35)
MCV RBC AUTO: 88.6 FL (ref 82–102)
MONOCYTES # BLD: 0 K/UL (ref 0.1–1.3)
MONOCYTES NFR BLD: 4 % (ref 4–12)
NEUTS SEG # BLD: 0.3 K/UL (ref 1.7–8.2)
NEUTS SEG NFR BLD: 30 % (ref 43–78)
NRBC # BLD: 0 K/UL (ref 0–0.2)
PLATELET # BLD AUTO: 398 K/UL (ref 150–450)
PLATELET COMMENT: ADEQUATE
PMV BLD AUTO: 9.9 FL (ref 9.4–12.3)
POTASSIUM SERPL-SCNC: 4.2 MMOL/L (ref 3.5–5.1)
PROT SERPL-MCNC: 8 G/DL (ref 6.3–8.2)
RBC # BLD AUTO: 3.07 M/UL (ref 4.05–5.2)
RBC MORPH BLD: ABNORMAL
RBC MORPH BLD: ABNORMAL
SODIUM SERPL-SCNC: 130 MMOL/L (ref 136–146)
WBC # BLD AUTO: 1.1 K/UL (ref 4.3–11.1)
WBC MORPH BLD: ABNORMAL

## 2023-12-17 PROCEDURE — 6360000002 HC RX W HCPCS: Performed by: INTERNAL MEDICINE

## 2023-12-17 PROCEDURE — A4216 STERILE WATER/SALINE, 10 ML: HCPCS | Performed by: INTERNAL MEDICINE

## 2023-12-17 PROCEDURE — 99232 SBSQ HOSP IP/OBS MODERATE 35: CPT | Performed by: INTERNAL MEDICINE

## 2023-12-17 PROCEDURE — 6370000000 HC RX 637 (ALT 250 FOR IP): Performed by: NURSE PRACTITIONER

## 2023-12-17 PROCEDURE — 6370000000 HC RX 637 (ALT 250 FOR IP): Performed by: INTERNAL MEDICINE

## 2023-12-17 PROCEDURE — 85025 COMPLETE CBC W/AUTO DIFF WBC: CPT

## 2023-12-17 PROCEDURE — 6360000002 HC RX W HCPCS: Performed by: NURSE PRACTITIONER

## 2023-12-17 PROCEDURE — 6370000000 HC RX 637 (ALT 250 FOR IP): Performed by: FAMILY MEDICINE

## 2023-12-17 PROCEDURE — 1100000000 HC RM PRIVATE

## 2023-12-17 PROCEDURE — 2580000003 HC RX 258: Performed by: NURSE PRACTITIONER

## 2023-12-17 PROCEDURE — 80053 COMPREHEN METABOLIC PANEL: CPT

## 2023-12-17 PROCEDURE — 83735 ASSAY OF MAGNESIUM: CPT

## 2023-12-17 PROCEDURE — 2580000003 HC RX 258: Performed by: FAMILY MEDICINE

## 2023-12-17 PROCEDURE — 2580000003 HC RX 258: Performed by: INTERNAL MEDICINE

## 2023-12-17 RX ADMIN — MEROPENEM 1000 MG: 1 INJECTION, POWDER, FOR SOLUTION INTRAVENOUS at 22:44

## 2023-12-17 RX ADMIN — FAMOTIDINE 40 MG: 20 TABLET, FILM COATED ORAL at 19:49

## 2023-12-17 RX ADMIN — PANTOPRAZOLE SODIUM 40 MG: 40 TABLET, DELAYED RELEASE ORAL at 09:25

## 2023-12-17 RX ADMIN — DAPTOMYCIN 750 MG: 500 INJECTION, POWDER, LYOPHILIZED, FOR SOLUTION INTRAVENOUS at 16:07

## 2023-12-17 RX ADMIN — HYDROCODONE BITARTRATE AND ACETAMINOPHEN 2 TABLET: 5; 325 TABLET ORAL at 01:31

## 2023-12-17 RX ADMIN — MONTELUKAST 10 MG: 10 TABLET, FILM COATED ORAL at 09:26

## 2023-12-17 RX ADMIN — ONDANSETRON 4 MG: 4 TABLET, ORALLY DISINTEGRATING ORAL at 12:12

## 2023-12-17 RX ADMIN — ALLOPURINOL 300 MG: 300 TABLET ORAL at 09:26

## 2023-12-17 RX ADMIN — FERROUS SULFATE TAB 325 MG (65 MG ELEMENTAL FE) 325 MG: 325 (65 FE) TAB at 12:09

## 2023-12-17 RX ADMIN — ACYCLOVIR 400 MG: 400 TABLET ORAL at 19:49

## 2023-12-17 RX ADMIN — MEROPENEM 1000 MG: 1 INJECTION, POWDER, FOR SOLUTION INTRAVENOUS at 14:07

## 2023-12-17 RX ADMIN — SALINE NASAL SPRAY 2 SPRAY: 1.5 SOLUTION NASAL at 09:31

## 2023-12-17 RX ADMIN — MORPHINE SULFATE 15 MG: 15 TABLET, FILM COATED, EXTENDED RELEASE ORAL at 19:49

## 2023-12-17 RX ADMIN — DIPHENHYDRAMINE HYDROCHLORIDE 10 ML: 12.5 LIQUID ORAL at 19:51

## 2023-12-17 RX ADMIN — SODIUM CHLORIDE, PRESERVATIVE FREE 10 ML: 5 INJECTION INTRAVENOUS at 19:50

## 2023-12-17 RX ADMIN — FLUCONAZOLE 200 MG: 100 TABLET ORAL at 09:25

## 2023-12-17 RX ADMIN — CHLORHEXIDINE GLUCONATE 15 ML: 1.2 RINSE ORAL at 09:35

## 2023-12-17 RX ADMIN — FLUOXETINE HYDROCHLORIDE 20 MG: 20 CAPSULE ORAL at 19:50

## 2023-12-17 RX ADMIN — PHENOL 1 SPRAY: 1.5 LIQUID ORAL at 19:51

## 2023-12-17 RX ADMIN — FERROUS SULFATE TAB 325 MG (65 MG ELEMENTAL FE) 325 MG: 325 (65 FE) TAB at 16:07

## 2023-12-17 RX ADMIN — ACYCLOVIR 400 MG: 400 TABLET ORAL at 09:25

## 2023-12-17 RX ADMIN — CHLORHEXIDINE GLUCONATE 15 ML: 1.2 RINSE ORAL at 19:49

## 2023-12-17 RX ADMIN — HYDROCODONE BITARTRATE AND ACETAMINOPHEN 2 TABLET: 5; 325 TABLET ORAL at 12:11

## 2023-12-17 RX ADMIN — SODIUM CHLORIDE, PRESERVATIVE FREE 10 ML: 5 INJECTION INTRAVENOUS at 09:32

## 2023-12-17 RX ADMIN — FLUOXETINE HYDROCHLORIDE 20 MG: 20 CAPSULE ORAL at 09:25

## 2023-12-17 RX ADMIN — MEROPENEM 1000 MG: 1 INJECTION, POWDER, FOR SOLUTION INTRAVENOUS at 06:36

## 2023-12-17 RX ADMIN — CETIRIZINE HYDROCHLORIDE 10 MG: 10 TABLET, FILM COATED ORAL at 09:25

## 2023-12-17 RX ADMIN — FLUTICASONE PROPIONATE 2 SPRAY: 50 SPRAY, METERED NASAL at 09:29

## 2023-12-17 RX ADMIN — GUAIFENESIN 600 MG: 600 TABLET ORAL at 09:25

## 2023-12-17 RX ADMIN — POTASSIUM CHLORIDE 20 MEQ: 1500 TABLET, EXTENDED RELEASE ORAL at 09:25

## 2023-12-17 RX ADMIN — AMLODIPINE BESYLATE 5 MG: 5 TABLET ORAL at 09:25

## 2023-12-17 RX ADMIN — POLYETHYLENE GLYCOL 3350 17 G: 17 POWDER, FOR SOLUTION ORAL at 09:26

## 2023-12-17 RX ADMIN — LEVOTHYROXINE SODIUM 125 MCG: 0.12 TABLET ORAL at 09:25

## 2023-12-17 RX ADMIN — IPRATROPIUM BROMIDE 2 SPRAY: 42 SPRAY NASAL at 09:30

## 2023-12-17 RX ADMIN — PANTOPRAZOLE SODIUM 40 MG: 40 TABLET, DELAYED RELEASE ORAL at 15:10

## 2023-12-17 RX ADMIN — GUAIFENESIN 600 MG: 600 TABLET ORAL at 19:49

## 2023-12-17 RX ADMIN — MORPHINE SULFATE 15 MG: 15 TABLET, FILM COATED, EXTENDED RELEASE ORAL at 09:25

## 2023-12-17 RX ADMIN — ENOXAPARIN SODIUM 40 MG: 100 INJECTION SUBCUTANEOUS at 09:25

## 2023-12-17 RX ADMIN — SALINE NASAL SPRAY 2 SPRAY: 1.5 SOLUTION NASAL at 14:33

## 2023-12-17 RX ADMIN — ANTI-FUNGAL POWDER MICONAZOLE NITRATE TALC FREE: 1.42 POWDER TOPICAL at 09:30

## 2023-12-17 RX ADMIN — CIPROFLOXACIN HYDROCHLORIDE 500 MG: 500 TABLET, FILM COATED ORAL at 09:25

## 2023-12-17 RX ADMIN — CIPROFLOXACIN HYDROCHLORIDE 500 MG: 500 TABLET, FILM COATED ORAL at 19:51

## 2023-12-17 RX ADMIN — HYDROCODONE BITARTRATE AND ACETAMINOPHEN 2 TABLET: 5; 325 TABLET ORAL at 19:06

## 2023-12-17 ASSESSMENT — PAIN DESCRIPTION - ORIENTATION
ORIENTATION: LEFT

## 2023-12-17 ASSESSMENT — PAIN SCALES - GENERAL
PAINLEVEL_OUTOF10: 5
PAINLEVEL_OUTOF10: 8
PAINLEVEL_OUTOF10: 4
PAINLEVEL_OUTOF10: 4
PAINLEVEL_OUTOF10: 3

## 2023-12-17 ASSESSMENT — PAIN DESCRIPTION - LOCATION
LOCATION: JAW
LOCATION: MOUTH;JAW

## 2023-12-17 ASSESSMENT — PAIN DESCRIPTION - DESCRIPTORS: DESCRIPTORS: ACHING

## 2023-12-17 ASSESSMENT — PAIN DESCRIPTION - PAIN TYPE: TYPE: ACUTE PAIN

## 2023-12-17 ASSESSMENT — PAIN DESCRIPTION - FREQUENCY: FREQUENCY: INTERMITTENT

## 2023-12-17 ASSESSMENT — PAIN - FUNCTIONAL ASSESSMENT: PAIN_FUNCTIONAL_ASSESSMENT: PREVENTS OR INTERFERES SOME ACTIVE ACTIVITIES AND ADLS

## 2023-12-17 ASSESSMENT — PAIN DESCRIPTION - ONSET: ONSET: ON-GOING

## 2023-12-17 NOTE — PROGRESS NOTES
Infectious Disease Progress    Today's Date: 2023   Admit Date: 10/27/2023  : 1950    Impression:   Neutropenic fever: temps improved  VRE bacteremia in the setting of the above: [address] /+; repeat [address] / &  negative. PICC line removed 23. TTE 23 with no vegetation   Left periorbital edema with CT maxillofacial 23 noting periorbital soft tissue enhancement/possible cellulitis/left sided paranasal sinus disease. Possible cellulitis. MRV did not note clear changes to suggest fungal sinus infection or venous sinus thrombosis but was limited by motion. Today face/eye swelling is really minimal to my exam, having not seen before. Suboptimal exam due to patient motion artifact. No definite evidence of venous sinus thrombosis. 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 Crestor on hold while receiving Daptomycin   H/o Psoriatic arthritis. Humira on hold      Plan:   Continue daptomycin and meropenem  Follow clinical exam but seems improving  WBC and PLT also both better today along with improved temp curve  Recommend checking the following labs Q Monday at a minimum:  CBCd, creatinine, LFTs, CK    Continue to hold Crestor while patient is receiving Daptomycin secondary to DDIs. Crestor can be restarted after the patient completes therapy with Daptomycin. ID following      Anti-infectives:   Daptomycin 10 mg/kg  - current   Meropenem -  Vanc -  Cefepime  -   Fluconazole for prophlyaxis  Acyclovir for prophylaxis    Subjective:   Afebrile; she feels L eye swelling and facial swelling improved.      Allergies   Allergen Reactions    Penicillins Hives    Sulfa Antibiotics Hives    Codeine Nausea And Vomiting        Review of Systems:  A comprehensive review of systems is negative except as stated in the

## 2023-12-17 NOTE — PROGRESS NOTES
[facility]        Inpatient Hematology / Oncology Progress Note    Reason for Admission:  Neutropenic fever ([address]) [D70.9, R50.81]  Pancytopenia ([address]) [D61.818]    24 Hour Events:  Afebrile, VSS, on RA  Dacogen completed 11/24, Mylotarg completed 11/16  BMBx 12/13, prelim very little blasts, residual myelodysplasia/myelofibrosis   ANC 0.3, Hgb 8.8  Continues Dapto for VRE per ID EOT 12/22  On Merrem for preseptal cellulitis  MRV head- no thrombus     Transfusions: None  Replacements: On PO K.     ROS:  Constitutional: Positive for fatigue; negative for fever, chills. CV: Negative for chest pain, palpitations, edema. Respiratory: Negative for wheezing, cough, dyspnea. GI: Negative for nausea, abdominal pain, diarrhea. 10 point review of systems is otherwise negative with the exception of the elements mentioned above in the HPI. Allergies   Allergen Reactions    Penicillins Hives    Sulfa Antibiotics Hives    Codeine Nausea And Vomiting     Past Medical History:   Diagnosis Date    Arthritis     Autoimmune disease ([address])     skin changes, unknown name    Cancer ([address]) 1990    ovarian    Chronic pain     arthritis in back and legs    Coronary artery spasm ([address])     COVID 05/15/2022    Essential hypertension 11/8/2019    Gastritis     GERD (gastroesophageal reflux disease)     Hx antineoplastic chemo     Migraine headache     Psoriasis     Psychiatric disorder     anxiety and depression    Severe obesity (BMI 35.0-39.9) 6/26/2018    Thyroid disease     Diagnosed in 1996     Past Surgical History:   Procedure Laterality Date    CARPAL TUNNEL RELEASE      GYN      ovaries    HYSTERECTOMY, TOTAL ABDOMINAL (CERVIX REMOVED)  Patriciabury      cardiac cath. Dx with spasms.     TUBAL LIGATION       Family History   Problem Relation Age of Onset    Parkinson's Disease Mother     Stroke Mother         Passed away in 1969    No Known Problems Paternal Grandfather transfusions as she just recently completed chemotherapy. 11/28 Venetoclax pending. 12/12 Awaiting count recovery. IR consulted for day 28 BMBx for tomorrow. Venetoclax pending - spoke to specialty pharmacy liaison, high co-pay and looking into financial assistance. Will likely stay IP for cycle 2 treatment (due 12/13) but will also depend on when Venetoclax can be obtained. 12/15 D28 BMBx completed 12/13. Awaiting Venetoclax - should be arriving today per specialty pharmacy team. Plts recovered, awaiting Nate recovery. Asking Dr Matthew Daniels for prelim - reports \"residual myelodysplasia and myelofibrosis but no or very little blasts. \"     Pancytopenia related to disease / ?immune thrombocytopenia   - related to 1   - s/p PRBC Hb up to 7.5, c.w plt transfusion to keep >50 with bleeding (may be diff due to MDS/tx)  - nutritional labs - B12/folate elevated; TESSA - IV iron on shortage, can try oral when able and outpt IV iron; pt did get transfused. - check DIC/hemolysis labs   - Humira held due to current immunosuppression   10/29 no DIC, WBC 1.6, Hgb 6.9, Plt 3K-s/p 2 units-transfuse now, if no improvement in platelets will need to transfuse crossmatched platelets  75/29 Plts 5k despite multiple units of platelets. IPF elevated, start IVIG and pulse dexamethasone. WBC and hgb relatively stable. Hapto pending and smear negative. No DIC.  11/2 Plts 4k - day 4 dex, received 2 days IVIG (on hold due to concern for volume overload but continues on steroids). Transfuse plts and check 30 min after transfusion. Hgb down to 7 today - transfuse PRBCs. WBC stable. 11/5 Plts down to 2k. Day 7 Dex 40mg. Transfusing again today. IPF remains very elevated. Dr Paola Oakley discussing possible consideration for Rituxan as she has been refractory to IVIG and platelets. 11/7 Day 9 steroids (changed to prednisone 1mg/kg 11/6). BMBx today. Still no response to transfusion of cross-matched plts or steroids/recent IVIG. BB working on HLA matching.        Radha Carpenter) JOSEPHINE Schwab-BC  [facility]  [address] [place] [address]  Office : [phone]  Fax : [phone]

## 2023-12-18 LAB
ALBUMIN SERPL-MCNC: 2 G/DL (ref 3.2–4.6)
ALBUMIN/GLOB SERPL: 0.4 (ref 0.4–1.6)
ALP SERPL-CCNC: 90 U/L (ref 50–136)
ALT SERPL-CCNC: 31 U/L (ref 12–65)
ANION GAP SERPL CALC-SCNC: 4 MMOL/L (ref 2–11)
AST SERPL-CCNC: 16 U/L (ref 15–37)
B PERT DNA SPEC QL NAA+PROBE: NOT DETECTED
BASOPHILS # BLD: 0 K/UL (ref 0–0.2)
BASOPHILS NFR BLD: 1 % (ref 0–2)
BILIRUB SERPL-MCNC: 0.4 MG/DL (ref 0.2–1.1)
BORDETELLA PARAPERTUSSIS BY PCR: NOT DETECTED
BUN SERPL-MCNC: 12 MG/DL (ref 8–23)
C PNEUM DNA SPEC QL NAA+PROBE: NOT DETECTED
CALCIUM SERPL-MCNC: 8.3 MG/DL (ref 8.3–10.4)
CHLORIDE SERPL-SCNC: 99 MMOL/L (ref 103–113)
CK SERPL-CCNC: 16 U/L (ref 21–215)
CO2 SERPL-SCNC: 27 MMOL/L (ref 21–32)
CREAT SERPL-MCNC: 0.4 MG/DL (ref 0.6–1)
DIFFERENTIAL METHOD BLD: ABNORMAL
EOSINOPHIL # BLD: 0 K/UL (ref 0–0.8)
EOSINOPHIL NFR BLD: 0 % (ref 0.5–7.8)
ERYTHROCYTE [DISTWIDTH] IN BLOOD BY AUTOMATED COUNT: 15.6 % (ref 11.9–14.6)
FLUAV SUBTYP SPEC NAA+PROBE: NOT DETECTED
FLUBV RNA SPEC QL NAA+PROBE: NOT DETECTED
GLOBULIN SER CALC-MCNC: 5.4 G/DL (ref 2.8–4.5)
GLUCOSE SERPL-MCNC: 97 MG/DL (ref 65–100)
HADV DNA SPEC QL NAA+PROBE: NOT DETECTED
HCOV 229E RNA SPEC QL NAA+PROBE: NOT DETECTED
HCOV HKU1 RNA SPEC QL NAA+PROBE: NOT DETECTED
HCOV NL63 RNA SPEC QL NAA+PROBE: NOT DETECTED
HCOV OC43 RNA SPEC QL NAA+PROBE: NOT DETECTED
HCT VFR BLD AUTO: 24.8 % (ref 35.8–46.3)
HGB BLD-MCNC: 7.9 G/DL (ref 11.7–15.4)
HMPV RNA SPEC QL NAA+PROBE: NOT DETECTED
HPIV1 RNA SPEC QL NAA+PROBE: NOT DETECTED
HPIV2 RNA SPEC QL NAA+PROBE: NOT DETECTED
HPIV3 RNA SPEC QL NAA+PROBE: DETECTED
HPIV4 RNA SPEC QL NAA+PROBE: NOT DETECTED
IMM GRANULOCYTES # BLD AUTO: 0 K/UL (ref 0–0.5)
IMM GRANULOCYTES NFR BLD AUTO: 1 % (ref 0–5)
LYMPHOCYTES # BLD: 0.9 K/UL (ref 0.5–4.6)
LYMPHOCYTES NFR BLD: 60 % (ref 13–44)
M PNEUMO DNA SPEC QL NAA+PROBE: NOT DETECTED
MAGNESIUM SERPL-MCNC: 2.1 MG/DL (ref 1.8–2.4)
MCH RBC QN AUTO: 28.4 PG (ref 26.1–32.9)
MCHC RBC AUTO-ENTMCNC: 31.9 G/DL (ref 31.4–35)
MCV RBC AUTO: 89.2 FL (ref 82–102)
MONOCYTES # BLD: 0.1 K/UL (ref 0.1–1.3)
MONOCYTES NFR BLD: 5 % (ref 4–12)
NEUTS SEG # BLD: 0.5 K/UL (ref 1.7–8.2)
NEUTS SEG NFR BLD: 33 % (ref 43–78)
NRBC # BLD: 0 K/UL (ref 0–0.2)
PLATELET # BLD AUTO: 386 K/UL (ref 150–450)
PLATELET COMMENT: ABNORMAL
PMV BLD AUTO: 9.2 FL (ref 9.4–12.3)
POTASSIUM SERPL-SCNC: 4 MMOL/L (ref 3.5–5.1)
PROT SERPL-MCNC: 7.4 G/DL (ref 6.3–8.2)
RBC # BLD AUTO: 2.78 M/UL (ref 4.05–5.2)
RBC MORPH BLD: ABNORMAL
RSV RNA SPEC QL NAA+PROBE: NOT DETECTED
RV+EV RNA SPEC QL NAA+PROBE: NOT DETECTED
SARS-COV-2 RNA RESP QL NAA+PROBE: NOT DETECTED
SODIUM SERPL-SCNC: 130 MMOL/L (ref 136–146)
WBC # BLD AUTO: 1.5 K/UL (ref 4.3–11.1)
WBC MORPH BLD: ABNORMAL

## 2023-12-18 PROCEDURE — 6370000000 HC RX 637 (ALT 250 FOR IP): Performed by: NURSE PRACTITIONER

## 2023-12-18 PROCEDURE — 2580000003 HC RX 258: Performed by: INTERNAL MEDICINE

## 2023-12-18 PROCEDURE — 97530 THERAPEUTIC ACTIVITIES: CPT

## 2023-12-18 PROCEDURE — 6360000002 HC RX W HCPCS: Performed by: INTERNAL MEDICINE

## 2023-12-18 PROCEDURE — 6370000000 HC RX 637 (ALT 250 FOR IP): Performed by: INTERNAL MEDICINE

## 2023-12-18 PROCEDURE — 6370000000 HC RX 637 (ALT 250 FOR IP): Performed by: FAMILY MEDICINE

## 2023-12-18 PROCEDURE — 1100000000 HC RM PRIVATE

## 2023-12-18 PROCEDURE — 6360000002 HC RX W HCPCS: Performed by: NURSE PRACTITIONER

## 2023-12-18 PROCEDURE — 0202U NFCT DS 22 TRGT SARS-COV-2: CPT

## 2023-12-18 PROCEDURE — 2580000003 HC RX 258: Performed by: FAMILY MEDICINE

## 2023-12-18 PROCEDURE — 82550 ASSAY OF CK (CPK): CPT

## 2023-12-18 PROCEDURE — 99233 SBSQ HOSP IP/OBS HIGH 50: CPT | Performed by: INTERNAL MEDICINE

## 2023-12-18 PROCEDURE — A4216 STERILE WATER/SALINE, 10 ML: HCPCS | Performed by: INTERNAL MEDICINE

## 2023-12-18 PROCEDURE — APPSS30 APP SPLIT SHARED TIME 16-30 MINUTES: Performed by: NURSE PRACTITIONER

## 2023-12-18 PROCEDURE — 80053 COMPREHEN METABOLIC PANEL: CPT

## 2023-12-18 PROCEDURE — 2580000003 HC RX 258: Performed by: NURSE PRACTITIONER

## 2023-12-18 PROCEDURE — 85025 COMPLETE CBC W/AUTO DIFF WBC: CPT

## 2023-12-18 PROCEDURE — 83735 ASSAY OF MAGNESIUM: CPT

## 2023-12-18 RX ADMIN — SALINE NASAL SPRAY 2 SPRAY: 1.5 SOLUTION NASAL at 09:50

## 2023-12-18 RX ADMIN — CHLORHEXIDINE GLUCONATE 15 ML: 1.2 RINSE ORAL at 09:50

## 2023-12-18 RX ADMIN — CHLORHEXIDINE GLUCONATE 15 ML: 1.2 RINSE ORAL at 20:08

## 2023-12-18 RX ADMIN — MONTELUKAST 10 MG: 10 TABLET, FILM COATED ORAL at 08:30

## 2023-12-18 RX ADMIN — FLUCONAZOLE 200 MG: 100 TABLET ORAL at 08:30

## 2023-12-18 RX ADMIN — CIPROFLOXACIN HYDROCHLORIDE 500 MG: 500 TABLET, FILM COATED ORAL at 20:04

## 2023-12-18 RX ADMIN — MEROPENEM 1000 MG: 1 INJECTION, POWDER, FOR SOLUTION INTRAVENOUS at 23:32

## 2023-12-18 RX ADMIN — MEROPENEM 1000 MG: 1 INJECTION, POWDER, FOR SOLUTION INTRAVENOUS at 14:43

## 2023-12-18 RX ADMIN — ENOXAPARIN SODIUM 40 MG: 100 INJECTION SUBCUTANEOUS at 08:33

## 2023-12-18 RX ADMIN — FLUOXETINE HYDROCHLORIDE 20 MG: 20 CAPSULE ORAL at 20:03

## 2023-12-18 RX ADMIN — LEVOTHYROXINE SODIUM 125 MCG: 0.12 TABLET ORAL at 08:31

## 2023-12-18 RX ADMIN — FERROUS SULFATE TAB 325 MG (65 MG ELEMENTAL FE) 325 MG: 325 (65 FE) TAB at 11:58

## 2023-12-18 RX ADMIN — CIPROFLOXACIN HYDROCHLORIDE 500 MG: 500 TABLET, FILM COATED ORAL at 08:30

## 2023-12-18 RX ADMIN — MORPHINE SULFATE 15 MG: 15 TABLET, FILM COATED, EXTENDED RELEASE ORAL at 08:30

## 2023-12-18 RX ADMIN — SODIUM CHLORIDE, PRESERVATIVE FREE 10 ML: 5 INJECTION INTRAVENOUS at 08:31

## 2023-12-18 RX ADMIN — FLUOXETINE HYDROCHLORIDE 20 MG: 20 CAPSULE ORAL at 08:29

## 2023-12-18 RX ADMIN — GUAIFENESIN 600 MG: 600 TABLET ORAL at 20:03

## 2023-12-18 RX ADMIN — PANTOPRAZOLE SODIUM 40 MG: 40 TABLET, DELAYED RELEASE ORAL at 08:30

## 2023-12-18 RX ADMIN — SALINE NASAL SPRAY 2 SPRAY: 1.5 SOLUTION NASAL at 20:12

## 2023-12-18 RX ADMIN — DAPTOMYCIN 750 MG: 500 INJECTION, POWDER, LYOPHILIZED, FOR SOLUTION INTRAVENOUS at 16:44

## 2023-12-18 RX ADMIN — MEROPENEM 1000 MG: 1 INJECTION, POWDER, FOR SOLUTION INTRAVENOUS at 06:20

## 2023-12-18 RX ADMIN — PANTOPRAZOLE SODIUM 40 MG: 40 TABLET, DELAYED RELEASE ORAL at 16:44

## 2023-12-18 RX ADMIN — ACYCLOVIR 400 MG: 400 TABLET ORAL at 08:29

## 2023-12-18 RX ADMIN — ACYCLOVIR 400 MG: 400 TABLET ORAL at 20:03

## 2023-12-18 RX ADMIN — IPRATROPIUM BROMIDE 2 SPRAY: 42 SPRAY NASAL at 09:51

## 2023-12-18 RX ADMIN — SODIUM CHLORIDE, PRESERVATIVE FREE 10 ML: 5 INJECTION INTRAVENOUS at 20:06

## 2023-12-18 RX ADMIN — FERROUS SULFATE TAB 325 MG (65 MG ELEMENTAL FE) 325 MG: 325 (65 FE) TAB at 16:44

## 2023-12-18 RX ADMIN — ANTI-FUNGAL POWDER MICONAZOLE NITRATE TALC FREE: 1.42 POWDER TOPICAL at 08:32

## 2023-12-18 RX ADMIN — ALLOPURINOL 300 MG: 300 TABLET ORAL at 08:30

## 2023-12-18 RX ADMIN — MORPHINE SULFATE 15 MG: 15 TABLET, FILM COATED, EXTENDED RELEASE ORAL at 20:04

## 2023-12-18 RX ADMIN — IPRATROPIUM BROMIDE 2 SPRAY: 42 SPRAY NASAL at 20:08

## 2023-12-18 RX ADMIN — FAMOTIDINE 40 MG: 20 TABLET, FILM COATED ORAL at 20:03

## 2023-12-18 RX ADMIN — SALINE NASAL SPRAY 2 SPRAY: 1.5 SOLUTION NASAL at 14:37

## 2023-12-18 RX ADMIN — SODIUM CHLORIDE: 9 INJECTION, SOLUTION INTRAVENOUS at 23:31

## 2023-12-18 RX ADMIN — POTASSIUM CHLORIDE 20 MEQ: 1500 TABLET, EXTENDED RELEASE ORAL at 08:30

## 2023-12-18 RX ADMIN — HYDROCODONE BITARTRATE AND ACETAMINOPHEN 2 TABLET: 5; 325 TABLET ORAL at 00:55

## 2023-12-18 RX ADMIN — ACETAMINOPHEN 650 MG: 325 TABLET ORAL at 11:58

## 2023-12-18 RX ADMIN — GUAIFENESIN 600 MG: 600 TABLET ORAL at 08:30

## 2023-12-18 RX ADMIN — CETIRIZINE HYDROCHLORIDE 10 MG: 10 TABLET, FILM COATED ORAL at 08:31

## 2023-12-18 RX ADMIN — FLUTICASONE PROPIONATE 2 SPRAY: 50 SPRAY, METERED NASAL at 09:51

## 2023-12-18 ASSESSMENT — PAIN DESCRIPTION - LOCATION: LOCATION: HEAD

## 2023-12-18 ASSESSMENT — PAIN DESCRIPTION - DESCRIPTORS
DESCRIPTORS: ACHING
DESCRIPTORS: ACHING

## 2023-12-18 ASSESSMENT — PAIN DESCRIPTION - ORIENTATION
ORIENTATION: LEFT
ORIENTATION: ANTERIOR

## 2023-12-18 ASSESSMENT — PAIN SCALES - GENERAL
PAINLEVEL_OUTOF10: 0
PAINLEVEL_OUTOF10: 3
PAINLEVEL_OUTOF10: 0
PAINLEVEL_OUTOF10: 0
PAINLEVEL_OUTOF10: 3

## 2023-12-18 NOTE — PROGRESS NOTES
7318: Received pt from [address] [place] [address],[address] in stable condition. Pt in bed resting quietly. Resp even & unlabored on room air; no acute signs of distress noted. Bed low & locked; call light in reach; no needs voiced. Daughter at bedside.

## 2023-12-18 NOTE — PROGRESS NOTES
Infectious Disease Progress    Today's Date: 2023   Admit Date: 10/27/2023  : 1950    Impression:   Neutropenic fever: temps improved  VRE bacteremia in the setting of the above: [address] /+; repeat [address]  &  negative. PICC line removed 23. TTE 23 with no vegetation   Left periorbital edema with CT maxillofacial 23 noting periorbital soft tissue enhancement/possible cellulitis/left sided paranasal sinus disease. Possible cellulitis. MRV 23 did not note clear changes to suggest fungal sinus infection or venous sinus thrombosis but was limited by motion. 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 Crestor on hold while receiving Daptomycin   H/o Psoriatic arthritis. Humira on hold      Plan:   Continue daptomycin with EOT 23  Continue Meropenem with tentative EOT 23 depending on patient's clinical course. Follow clinical exam but seems improving  Recommend checking the following labs Q Monday at a minimum while receiving Daptomycin and Meropenem:  CBCd, creatinine, LFTs, CK   CK added to labs today. Continue to hold Crestor while patient is receiving Daptomycin secondary to DDIs. Crestor can be restarted after the patient completes therapy with Daptomycin. ID following      Anti-infectives:   Daptomycin 10 mg/kg  - current   Meropenem -current   Vanc -  Cefepime  -   Fluconazole for prophlyaxis  Acyclovir for prophylaxis  Cipro for prophylaxis     Subjective:   Afebrile. WBC trending up to 1.5. Reports she is feeling better today. Left eye edema/redness is improving.          Allergies   Allergen Reactions    Penicillins Hives    Sulfa Antibiotics Hives    Codeine Nausea And Vomiting        Review of Systems:  A comprehensive review of systems is negative except as stated

## 2023-12-18 NOTE — PROGRESS NOTES
[facility] Hematology & Oncology        Inpatient Hematology / Oncology Progress Note    Reason for Admission:  Neutropenic fever ([address]) [D70.9, R50.81]  Pancytopenia ([address]) [D61.818]    24 Hour Events:  Afebrile, VSS, on RA  Dacogen completed 11/24, Mylotarg completed 11/16  BMBx 12/13, prelim very little blasts/final pending   ANC up to 500  Continues Dapto for VRE per ID EOT 12/22  On Merrem for preseptal cellulitis EOT 12/21  Feeling better    Transfusions: None  Replacements: On PO K.     ROS:  Constitutional: Positive for fatigue; negative for fever, chills. CV: Negative for chest pain, palpitations, edema. Respiratory: Negative for wheezing, cough, dyspnea. GI: Negative for nausea, abdominal pain, diarrhea. 10 point review of systems is otherwise negative with the exception of the elements mentioned above in the HPI. Allergies   Allergen Reactions    Penicillins Hives    Sulfa Antibiotics Hives    Codeine Nausea And Vomiting     Past Medical History:   Diagnosis Date    Arthritis     Autoimmune disease ([address])     skin changes, unknown name    Cancer ([address]) 1990    ovarian    Chronic pain     arthritis in back and legs    Coronary artery spasm ([address])     COVID 05/15/2022    Essential hypertension 11/8/2019    Gastritis     GERD (gastroesophageal reflux disease)     Hx antineoplastic chemo     Migraine headache     Psoriasis     Psychiatric disorder     anxiety and depression    Severe obesity (BMI 35.0-39.9) 6/26/2018    Thyroid disease     Diagnosed in 1996     Past Surgical History:   Procedure Laterality Date    CARPAL TUNNEL RELEASE      GYN      ovaries    HYSTERECTOMY, TOTAL ABDOMINAL (CERVIX REMOVED)  Patriciabury      cardiac cath. Dx with spasms.     TUBAL LIGATION       Family History   Problem Relation Age of Onset    Parkinson's Disease Mother     Stroke Mother         Passed away in 1969    No Known Problems Paternal Grandfather     No Known Problems IVIG and platelets. 11/7 Day 9 steroids (changed to prednisone 1mg/kg 11/6). BMBx today. Still no response to transfusion of cross-matched plts or steroids/recent IVIG. BB working on HLA matching. Check plt count BID and transfuse for plts <10k. May consider Rituxan pending BMBx results. 11/8 Day 10 steroids (changed to prednisone 1mg/kg 11/6). Plt count 3k today. BMBx prelim results discussed by Dr. Eva Akins with Dr. Adams Si - concern for hemaphagocytosis, not progression of MDS but cannot r/o ITP. Checking Ferritin, TG, and IL2.    11/12 D14 steroids (changed to prednisone 1mg/kg 11/6). Plts up to 40k yesterday, 30k this AM.  Planned to start Etop/Dex today, however holding off given dramatic improvement of plt count since yesterday. Bone marrow may be recovering from prior Rev/Humira. 11/13 Recent BMBx with possible hemophagocytosis (final path pending). However, plts now relatively stable so holding on etop/dex. [facility] labs appear unremarkable. Holding off on transfusion today, recheck platelet count in AM.  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 Also some hemophagocytosis noted per Dr Eva Akins but markers don't appear consistent with diagnosis and possibly related to macrophage activation. Proceed with Dacogen when able. Weaning prednisone to 60mg. 11/18 Continue to wean prednisone (down to 40mg daily with today's dose). Thrombocytopenia likely related to disease, on treatment. 11/20 Continues on Pred 40mg daily  11/21 Wean prednisone down to 20mg daily. 11/24 Decrease prednisone to 10 mg daily today. Continue with HLA-matched platelets given refractory thrombocytopenia despite transfusion. 12/2  Remains refractory to plt transfusions - continue to transfuse HLA-matched plts PRN. Completed IVIG x2 11/25-11/26. Day 2 Dex 40mg daily. HIV/Hep studies negative.  Working to obtain [employer] 25mg

## 2023-12-18 NOTE — CARE COORDINATION
LOS 52d  ID has added another Abx  for home infusion. RNCM sent message to [facility] to review  and for pricing. Pending response    Discharge  plan is possible home discharge with Home IV abx if insurance approves. Discharge plan ongoing, no further concerns as of present. Please consult  if any new issues arise. Will continue to follow.

## 2023-12-18 NOTE — PROGRESS NOTES
Rollator  Receives Help From: Family  ADL Assistance: Independent  Homemaking Assistance: Independent  Ambulation Assistance: Needs assistance  Transfer Assistance: Independent  Active : Yes  Mode of Transportation: Car  Occupation: Retired  OBJECTIVE:     PAIN: VITALS / O2: PRECAUTION / Nurys Lieberman / Rosangela Serjio:   Pre Treatment:    0      Post Treatment: 0 Vitals        Oxygen    IV    RESTRICTIONS/PRECAUTIONS:  Restrictions/Precautions  Restrictions/Precautions: Fall Risk  Restrictions/Precautions: Fall Risk     MOBILITY: I Mod I S SBA CGA Min Mod Max Total  NT x2 Comments:   Bed Mobility    Rolling [] [] [] [] [x] [] [] [] [] [] []    Supine to Sit [] [] [] [] [x] [] [] [] [] [] []    Scooting [] [] [] [] [x] [] [] [] [] [] []    Sit to Supine [] [] [] [] [] [] [] [] [] [x] []    Transfers    Sit to Stand [] [] [] [] [x] [] [] [] [] [] []    Bed to Chair [] [] [] [] [x] [] [] [] [] [] []    Stand to Sit [] [] [] [] [x] [] [] [] [] [] []     [] [] [] [] [] [] [] [] [] [] []    I=Independent, Mod I=Modified Independent, S=Supervision, SBA=Standby Assistance, CGA=Contact Guard Assistance,   Min=Minimal Assistance, Mod=Moderate Assistance, Max=Maximal Assistance, Total=Total Assistance, NT=Not Tested    BALANCE: Good Fair+ Fair Fair- Poor NT Comments   Sitting Static [x] [] [] [] [] []    Sitting Dynamic [] [x] [] [] [] []              Standing Static [] [x] [] [] [] []    Standing Dynamic [] [x] [x] [] [] []      GAIT: I Mod I S SBA CGA Min Mod Max Total  NT x2 Comments:   Level of Assistance [] [] [] [x] [x] [] [] [] [] [] []    Distance   feet    DME None    Gait Quality Decreased annabel  and Decreased step length    Weightbearing Status      Stairs      I=Independent, Mod I=Modified Independent, S=Supervision, SBA=Standby Assistance, CGA=Contact Guard Assistance,   Min=Minimal Assistance, Mod=Moderate Assistance, Max=Maximal Assistance, Total=Total Assistance, NT=Not Tested    PLAN:   FREQUENCY AND DURATION: 3 times/week for duration of hospital stay or until stated goals are met, whichever comes first.    TREATMENT:   TREATMENT:   Therapeutic Activity (17 Minutes): Therapeutic activity included Supine to Sit, Scooting, Transfer Training, Ambulation on level ground, Sitting balance , and Standing balance to improve functional Activity tolerance, Balance, Mobility, and Strength.     TREATMENT GRID:  N/A    AFTER TREATMENT PRECAUTIONS: Bed/Chair Locked, Call light within reach, Chair, Needs within reach, and RN notified    INTERDISCIPLINARY COLLABORATION:  RN/ PCT and PT/ PTA    EDUCATION:      TIME IN/OUT:  Time In: 1525  Time Out: [place]  Minutes: [address], PTA

## 2023-12-19 VITALS
OXYGEN SATURATION: 95 % | BODY MASS INDEX: 33.83 KG/M2 | TEMPERATURE: 97.8 F | SYSTOLIC BLOOD PRESSURE: 117 MMHG | HEART RATE: 78 BPM | DIASTOLIC BLOOD PRESSURE: 70 MMHG | HEIGHT: 60 IN | WEIGHT: 172.3 LBS | RESPIRATION RATE: 17 BRPM

## 2023-12-19 LAB
ALBUMIN SERPL-MCNC: 2.2 G/DL (ref 3.2–4.6)
ALBUMIN/GLOB SERPL: 0.4 (ref 0.4–1.6)
ALP SERPL-CCNC: 95 U/L (ref 50–136)
ALT SERPL-CCNC: 30 U/L (ref 12–65)
ANION GAP SERPL CALC-SCNC: 6 MMOL/L (ref 2–11)
AST SERPL-CCNC: 17 U/L (ref 15–37)
BASOPHILS # BLD: 0 K/UL (ref 0–0.2)
BASOPHILS NFR BLD: 1 % (ref 0–2)
BILIRUB SERPL-MCNC: 0.6 MG/DL (ref 0.2–1.1)
BUN SERPL-MCNC: 7 MG/DL (ref 8–23)
CALCIUM SERPL-MCNC: 8.4 MG/DL (ref 8.3–10.4)
CHLORIDE SERPL-SCNC: 95 MMOL/L (ref 103–113)
CO2 SERPL-SCNC: 28 MMOL/L (ref 21–32)
CREAT SERPL-MCNC: 0.4 MG/DL (ref 0.6–1)
DIFFERENTIAL METHOD BLD: ABNORMAL
EOSINOPHIL # BLD: 0 K/UL (ref 0–0.8)
EOSINOPHIL NFR BLD: 0 % (ref 0.5–7.8)
ERYTHROCYTE [DISTWIDTH] IN BLOOD BY AUTOMATED COUNT: 15.8 % (ref 11.9–14.6)
FLOW CYTOMETRY RESULTS: NORMAL
GLOBULIN SER CALC-MCNC: 5.5 G/DL (ref 2.8–4.5)
GLUCOSE SERPL-MCNC: 86 MG/DL (ref 65–100)
HCT VFR BLD AUTO: 25.2 % (ref 35.8–46.3)
HGB BLD-MCNC: 8.1 G/DL (ref 11.7–15.4)
IMM GRANULOCYTES # BLD AUTO: 0.1 K/UL (ref 0–0.5)
IMM GRANULOCYTES NFR BLD AUTO: 2 % (ref 0–5)
LYMPHOCYTES # BLD: 1.1 K/UL (ref 0.5–4.6)
LYMPHOCYTES NFR BLD: 53 % (ref 13–44)
MAGNESIUM SERPL-MCNC: 2.2 MG/DL (ref 1.8–2.4)
MCH RBC QN AUTO: 28.6 PG (ref 26.1–32.9)
MCHC RBC AUTO-ENTMCNC: 32.1 G/DL (ref 31.4–35)
MCV RBC AUTO: 89 FL (ref 82–102)
MONOCYTES # BLD: 0.2 K/UL (ref 0.1–1.3)
MONOCYTES NFR BLD: 7 % (ref 4–12)
NEUTS SEG # BLD: 0.8 K/UL (ref 1.7–8.2)
NEUTS SEG NFR BLD: 37 % (ref 43–78)
NRBC # BLD: 0 K/UL (ref 0–0.2)
PLATELET # BLD AUTO: 443 K/UL (ref 150–450)
PMV BLD AUTO: 9.1 FL (ref 9.4–12.3)
POTASSIUM SERPL-SCNC: 4.1 MMOL/L (ref 3.5–5.1)
PROT SERPL-MCNC: 7.7 G/DL (ref 6.3–8.2)
RBC # BLD AUTO: 2.83 M/UL (ref 4.05–5.2)
SODIUM SERPL-SCNC: 129 MMOL/L (ref 136–146)
SPECIMEN SOURCE: NORMAL
TEST ORDERED: NORMAL
WBC # BLD AUTO: 2.2 K/UL (ref 4.3–11.1)

## 2023-12-19 PROCEDURE — 80053 COMPREHEN METABOLIC PANEL: CPT

## 2023-12-19 PROCEDURE — 85025 COMPLETE CBC W/AUTO DIFF WBC: CPT

## 2023-12-19 PROCEDURE — 2580000003 HC RX 258: Performed by: INTERNAL MEDICINE

## 2023-12-19 PROCEDURE — 6370000000 HC RX 637 (ALT 250 FOR IP): Performed by: NURSE PRACTITIONER

## 2023-12-19 PROCEDURE — 6360000002 HC RX W HCPCS: Performed by: NURSE PRACTITIONER

## 2023-12-19 PROCEDURE — 6360000002 HC RX W HCPCS: Performed by: FAMILY MEDICINE

## 2023-12-19 PROCEDURE — 6360000002 HC RX W HCPCS: Performed by: INTERNAL MEDICINE

## 2023-12-19 PROCEDURE — 36592 COLLECT BLOOD FROM PICC: CPT

## 2023-12-19 PROCEDURE — A4216 STERILE WATER/SALINE, 10 ML: HCPCS | Performed by: INTERNAL MEDICINE

## 2023-12-19 PROCEDURE — 6370000000 HC RX 637 (ALT 250 FOR IP): Performed by: INTERNAL MEDICINE

## 2023-12-19 PROCEDURE — APPSS60 APP SPLIT SHARED TIME 46-60 MINUTES: Performed by: NURSE PRACTITIONER

## 2023-12-19 PROCEDURE — 99239 HOSP IP/OBS DSCHRG MGMT >30: CPT | Performed by: INTERNAL MEDICINE

## 2023-12-19 PROCEDURE — 83735 ASSAY OF MAGNESIUM: CPT

## 2023-12-19 PROCEDURE — 2580000003 HC RX 258: Performed by: FAMILY MEDICINE

## 2023-12-19 PROCEDURE — 6370000000 HC RX 637 (ALT 250 FOR IP): Performed by: FAMILY MEDICINE

## 2023-12-19 RX ORDER — CIPROFLOXACIN 500 MG/1
500 TABLET, FILM COATED ORAL EVERY 12 HOURS SCHEDULED
Qty: 20 TABLET | Refills: 0 | Status: SHIPPED | OUTPATIENT
Start: 2023-12-19 | End: 2023-12-29

## 2023-12-19 RX ORDER — HYDROCODONE BITARTRATE AND ACETAMINOPHEN 10; 325 MG/1; MG/1
1 TABLET ORAL EVERY 6 HOURS PRN
Qty: 28 TABLET | Refills: 0 | Status: SHIPPED | OUTPATIENT
Start: 2023-12-19 | End: 2023-12-26

## 2023-12-19 RX ORDER — POTASSIUM CHLORIDE 20 MEQ/1
20 TABLET, EXTENDED RELEASE ORAL DAILY
Qty: 7 TABLET | Refills: 0 | Status: SHIPPED | OUTPATIENT
Start: 2023-12-19 | End: 2023-12-26

## 2023-12-19 RX ORDER — HYDROCODONE BITARTRATE AND ACETAMINOPHEN 10; 325 MG/1; MG/1
1 TABLET ORAL EVERY 6 HOURS PRN
Qty: 28 TABLET | Refills: 0 | Status: SHIPPED | OUTPATIENT
Start: 2023-12-19 | End: 2023-12-19

## 2023-12-19 RX ORDER — NYSTATIN 100000 [USP'U]/G
POWDER TOPICAL
Qty: 15 G | Refills: 0 | Status: SHIPPED | OUTPATIENT
Start: 2023-12-19

## 2023-12-19 RX ADMIN — DAPTOMYCIN 750 MG: 500 INJECTION, POWDER, LYOPHILIZED, FOR SOLUTION INTRAVENOUS at 16:46

## 2023-12-19 RX ADMIN — FLUCONAZOLE 200 MG: 100 TABLET ORAL at 08:11

## 2023-12-19 RX ADMIN — SODIUM CHLORIDE: 9 INJECTION, SOLUTION INTRAVENOUS at 06:09

## 2023-12-19 RX ADMIN — FLUTICASONE PROPIONATE 2 SPRAY: 50 SPRAY, METERED NASAL at 08:14

## 2023-12-19 RX ADMIN — HYDROCODONE BITARTRATE AND ACETAMINOPHEN 2 TABLET: 5; 325 TABLET ORAL at 09:25

## 2023-12-19 RX ADMIN — ALLOPURINOL 300 MG: 300 TABLET ORAL at 08:11

## 2023-12-19 RX ADMIN — GUAIFENESIN 600 MG: 600 TABLET ORAL at 08:11

## 2023-12-19 RX ADMIN — POTASSIUM CHLORIDE 20 MEQ: 1500 TABLET, EXTENDED RELEASE ORAL at 08:11

## 2023-12-19 RX ADMIN — SODIUM CHLORIDE, PRESERVATIVE FREE 10 ML: 5 INJECTION INTRAVENOUS at 08:12

## 2023-12-19 RX ADMIN — CETIRIZINE HYDROCHLORIDE 10 MG: 10 TABLET, FILM COATED ORAL at 08:11

## 2023-12-19 RX ADMIN — MONTELUKAST 10 MG: 10 TABLET, FILM COATED ORAL at 08:11

## 2023-12-19 RX ADMIN — AMLODIPINE BESYLATE 5 MG: 5 TABLET ORAL at 08:11

## 2023-12-19 RX ADMIN — ANTI-FUNGAL POWDER MICONAZOLE NITRATE TALC FREE: 1.42 POWDER TOPICAL at 09:29

## 2023-12-19 RX ADMIN — SALINE NASAL SPRAY 2 SPRAY: 1.5 SOLUTION NASAL at 08:14

## 2023-12-19 RX ADMIN — ENOXAPARIN SODIUM 40 MG: 100 INJECTION SUBCUTANEOUS at 08:12

## 2023-12-19 RX ADMIN — ACYCLOVIR 400 MG: 400 TABLET ORAL at 08:11

## 2023-12-19 RX ADMIN — GUAIFENESIN SYRUP AND DEXTROMETHORPHAN 10 ML: 100; 10 SYRUP ORAL at 08:20

## 2023-12-19 RX ADMIN — MORPHINE SULFATE 15 MG: 15 TABLET, FILM COATED, EXTENDED RELEASE ORAL at 08:10

## 2023-12-19 RX ADMIN — LEVOTHYROXINE SODIUM 125 MCG: 0.12 TABLET ORAL at 08:11

## 2023-12-19 RX ADMIN — HYDROCODONE BITARTRATE AND ACETAMINOPHEN 2 TABLET: 5; 325 TABLET ORAL at 14:57

## 2023-12-19 RX ADMIN — PANTOPRAZOLE SODIUM 40 MG: 40 TABLET, DELAYED RELEASE ORAL at 08:11

## 2023-12-19 RX ADMIN — MEROPENEM 1000 MG: 1 INJECTION, POWDER, FOR SOLUTION INTRAVENOUS at 14:59

## 2023-12-19 RX ADMIN — MEROPENEM 1000 MG: 1 INJECTION, POWDER, FOR SOLUTION INTRAVENOUS at 06:09

## 2023-12-19 RX ADMIN — POLYETHYLENE GLYCOL 3350 17 G: 17 POWDER, FOR SOLUTION ORAL at 08:12

## 2023-12-19 RX ADMIN — ONDANSETRON 4 MG: 2 INJECTION INTRAMUSCULAR; INTRAVENOUS at 09:25

## 2023-12-19 RX ADMIN — CIPROFLOXACIN HYDROCHLORIDE 500 MG: 500 TABLET, FILM COATED ORAL at 08:12

## 2023-12-19 RX ADMIN — FLUOXETINE HYDROCHLORIDE 20 MG: 20 CAPSULE ORAL at 08:11

## 2023-12-19 ASSESSMENT — PAIN DESCRIPTION - ORIENTATION
ORIENTATION: LEFT
ORIENTATION: RIGHT;LEFT
ORIENTATION: LEFT

## 2023-12-19 ASSESSMENT — PAIN DESCRIPTION - LOCATION
LOCATION: TEETH
LOCATION: TEETH
LOCATION: FACE;TEETH

## 2023-12-19 ASSESSMENT — PAIN SCALES - GENERAL
PAINLEVEL_OUTOF10: 5
PAINLEVEL_OUTOF10: 5
PAINLEVEL_OUTOF10: 0
PAINLEVEL_OUTOF10: 7

## 2023-12-19 ASSESSMENT — PAIN DESCRIPTION - DESCRIPTORS: DESCRIPTORS: ACHING

## 2023-12-19 NOTE — PROGRESS NOTES
Patient's Norco prescription sent to [facility] and [facility] is unable to fill this prescription. I re-routed prescription for [facility] where they are able to fill the prescription for her discharge today.     TIM Pack [facility] Hematology and Oncology  [address] [place] [address]  Office : [phone]  Fax : [phone]

## 2023-12-19 NOTE — PROGRESS NOTES
EOS summary:7p-7a  Significant labs this shift: WBC 2.2,   Pt A&O x4. Upon initial assessment, pt reported productive cough with white sputum that started 12/18 per pt. Tee Burnett NP notified and ordered resp panel. Resp panel + parainfluenza 3. Tee Burnett NP notified. No new orders. Pt and family educated on importance of droplet precautions and limiting visitors/leaving the room.   IV abx continued  Daughter at bedside      Bedside shift report given to oncoming CHINO Patel RN

## 2023-12-19 NOTE — PLAN OF CARE
Problem: Pain  Goal: Verbalizes/displays adequate comfort level or baseline comfort level  12/19/2023 0937 by Jose Carlos RN  Outcome: Progressing  12/19/2023 0403 by Sindy Prabhakar RN  Outcome: Progressing     Problem: ABCDS Injury Assessment  Goal: Absence of physical injury  12/19/2023 0937 by Jose Carlos RN  Outcome: Progressing  12/19/2023 0403 by Sindy Prabhakar RN  Outcome: Progressing     Problem: Safety - Adult  Goal: Free from fall injury  12/19/2023 0403 by Sindy Prabhakar RN  Outcome: Progressing

## 2023-12-19 NOTE — PLAN OF CARE
Problem: Pain  Goal: Verbalizes/displays adequate comfort level or baseline comfort level  Outcome: Progressing     Problem: ABCDS Injury Assessment  Goal: Absence of physical injury  Outcome: Progressing     Problem: Safety - Adult  Goal: Free from fall injury  Outcome: Progressing     Problem: Skin/Tissue Integrity  Goal: Absence of new skin breakdown  Description: 1. Monitor for areas of redness and/or skin breakdown  2. Assess vascular access sites hourly  3. Every 4-6 hours minimum:  Change oxygen saturation probe site  4. Every 4-6 hours:  If on nasal continuous positive airway pressure, respiratory therapy assess nares and determine need for appliance change or resting period.   Outcome: Progressing     Problem: Respiratory - Adult  Goal: Achieves optimal ventilation and oxygenation  Outcome: Progressing  Flowsheets (Taken 12/18/2023 1945)  Achieves optimal ventilation and oxygenation:   Assess for changes in respiratory status   Assess for changes in mentation and behavior   Position to facilitate oxygenation and minimize respiratory effort     Problem: Musculoskeletal - Adult  Goal: Return mobility to safest level of function  Outcome: Progressing  Flowsheets (Taken 12/18/2023 1945)  Return Mobility to Safest Level of Function: Assess patient stability and activity tolerance for standing, transferring and ambulating with or without assistive devices     Problem: Infection - Adult  Goal: Absence of infection at discharge  Outcome: Progressing  Flowsheets (Taken 12/18/2023 1945)  Absence of infection at discharge:   Assess and monitor for signs and symptoms of infection   Monitor lab/diagnostic results   Monitor all insertion sites i.e., indwelling lines, tubes and drains     Problem: Metabolic/Fluid and Electrolytes - Adult  Goal: Electrolytes maintained within normal limits  Outcome: Progressing  Flowsheets (Taken 12/18/2023 1945)  Electrolytes maintained within normal limits: Monitor labs and assess Her/She

## 2023-12-20 ENCOUNTER — HOSPITAL ENCOUNTER (OUTPATIENT)
Dept: INFUSION THERAPY | Age: 73
End: 2023-12-20

## 2023-12-26 ENCOUNTER — HOSPITAL ENCOUNTER (OUTPATIENT)
Dept: LAB | Age: 73
Discharge: HOME | End: 2023-12-29
Payer: MEDICARE

## 2023-12-26 ENCOUNTER — CLINICAL DOCUMENTATION (OUTPATIENT)
Dept: ONCOLOGY | Age: 73
End: 2023-12-26

## 2023-12-26 ENCOUNTER — HOSPITAL ENCOUNTER (OUTPATIENT)
Dept: INFUSION THERAPY | Age: 73
Discharge: HOME | End: 2023-12-26
Payer: MEDICARE

## 2023-12-26 ENCOUNTER — OFFICE VISIT (OUTPATIENT)
Dept: ONCOLOGY | Age: 73
End: 2023-12-26
Payer: MEDICARE

## 2023-12-26 VITALS
HEIGHT: 60 IN | WEIGHT: 156 LBS | OXYGEN SATURATION: 96 % | HEART RATE: 78 BPM | BODY MASS INDEX: 30.63 KG/M2 | RESPIRATION RATE: 16 BRPM | SYSTOLIC BLOOD PRESSURE: 126 MMHG | DIASTOLIC BLOOD PRESSURE: 63 MMHG | TEMPERATURE: 97.7 F

## 2023-12-26 DIAGNOSIS — C92.01 ACUTE MYELOID LEUKEMIA IN REMISSION (HCC): Primary | ICD-10-CM

## 2023-12-26 DIAGNOSIS — E55.9 VITAMIN D DEFICIENCY: ICD-10-CM

## 2023-12-26 DIAGNOSIS — C94.00 ACUTE ERYTHROID LEUKEMIA NOT HAVING ACHIEVED REMISSION (HCC): ICD-10-CM

## 2023-12-26 DIAGNOSIS — D46.9 MDS (MYELODYSPLASTIC SYNDROME) (HCC): ICD-10-CM

## 2023-12-26 LAB
ABO + RH BLD: NORMAL
ALBUMIN SERPL-MCNC: 3 G/DL (ref 3.2–4.6)
ALBUMIN/GLOB SERPL: 0.6 (ref 0.4–1.6)
ALP SERPL-CCNC: 100 U/L (ref 50–136)
ALT SERPL-CCNC: 25 U/L (ref 12–65)
ANION GAP SERPL CALC-SCNC: 7 MMOL/L (ref 2–11)
AST SERPL-CCNC: 22 U/L (ref 15–37)
BASOPHILS # BLD: 0 K/UL (ref 0–0.2)
BASOPHILS NFR BLD: 1 % (ref 0–2)
BILIRUB SERPL-MCNC: 0.5 MG/DL (ref 0.2–1.1)
BLOOD GROUP ANTIBODIES SERPL: NORMAL
BUN SERPL-MCNC: 11 MG/DL (ref 8–23)
CALCIUM SERPL-MCNC: 9.3 MG/DL (ref 8.3–10.4)
CHLORIDE SERPL-SCNC: 106 MMOL/L (ref 103–113)
CO2 SERPL-SCNC: 25 MMOL/L (ref 21–32)
CREAT SERPL-MCNC: 0.7 MG/DL (ref 0.6–1)
DIFFERENTIAL METHOD BLD: ABNORMAL
EOSINOPHIL # BLD: 0 K/UL (ref 0–0.8)
EOSINOPHIL NFR BLD: 0 % (ref 0.5–7.8)
ERYTHROCYTE [DISTWIDTH] IN BLOOD BY AUTOMATED COUNT: 19.7 % (ref 11.9–14.6)
GLOBULIN SER CALC-MCNC: 5.1 G/DL (ref 2.8–4.5)
GLUCOSE SERPL-MCNC: 100 MG/DL (ref 65–100)
HCT VFR BLD AUTO: 31.8 % (ref 35.8–46.3)
HGB BLD-MCNC: 10.1 G/DL (ref 11.7–15.4)
IMM GRANULOCYTES # BLD AUTO: 0.1 K/UL (ref 0–0.5)
IMM GRANULOCYTES NFR BLD AUTO: 2 % (ref 0–5)
LDH SERPL L TO P-CCNC: 318 U/L (ref 110–210)
LYMPHOCYTES # BLD: 1.4 K/UL (ref 0.5–4.6)
LYMPHOCYTES NFR BLD: 44 % (ref 13–44)
MCH RBC QN AUTO: 28.9 PG (ref 26.1–32.9)
MCHC RBC AUTO-ENTMCNC: 31.8 G/DL (ref 31.4–35)
MCV RBC AUTO: 90.9 FL (ref 82–102)
MONOCYTES # BLD: 0.9 K/UL (ref 0.1–1.3)
MONOCYTES NFR BLD: 27 % (ref 4–12)
NEUTS SEG # BLD: 0.9 K/UL (ref 1.7–8.2)
NEUTS SEG NFR BLD: 26 % (ref 43–78)
NRBC # BLD: 0 K/UL (ref 0–0.2)
PLATELET # BLD AUTO: 426 K/UL (ref 150–450)
PLATELET COMMENT: ADEQUATE
PMV BLD AUTO: 9 FL (ref 9.4–12.3)
POTASSIUM SERPL-SCNC: 4 MMOL/L (ref 3.5–5.1)
PROT SERPL-MCNC: 8.1 G/DL (ref 6.3–8.2)
RBC # BLD AUTO: 3.5 M/UL (ref 4.05–5.2)
RBC MORPH BLD: ABNORMAL
RBC MORPH BLD: ABNORMAL
SODIUM SERPL-SCNC: 138 MMOL/L (ref 136–146)
SPECIMEN EXP DATE BLD: NORMAL
URATE SERPL-MCNC: 5 MG/DL (ref 2.6–6)
WBC # BLD AUTO: 3.3 K/UL (ref 4.3–11.1)
WBC MORPH BLD: ABNORMAL

## 2023-12-26 PROCEDURE — 86850 RBC ANTIBODY SCREEN: CPT

## 2023-12-26 PROCEDURE — 84550 ASSAY OF BLOOD/URIC ACID: CPT

## 2023-12-26 PROCEDURE — G8399 PT W/DXA RESULTS DOCUMENT: HCPCS | Performed by: INTERNAL MEDICINE

## 2023-12-26 PROCEDURE — 86900 BLOOD TYPING SEROLOGIC ABO: CPT

## 2023-12-26 PROCEDURE — 1123F ACP DISCUSS/DSCN MKR DOCD: CPT | Performed by: INTERNAL MEDICINE

## 2023-12-26 PROCEDURE — 99215 OFFICE O/P EST HI 40 MIN: CPT | Performed by: INTERNAL MEDICINE

## 2023-12-26 PROCEDURE — 3017F COLORECTAL CA SCREEN DOC REV: CPT | Performed by: INTERNAL MEDICINE

## 2023-12-26 PROCEDURE — 96523 IRRIG DRUG DELIVERY DEVICE: CPT

## 2023-12-26 PROCEDURE — 85025 COMPLETE CBC W/AUTO DIFF WBC: CPT

## 2023-12-26 PROCEDURE — 83615 LACTATE (LD) (LDH) ENZYME: CPT

## 2023-12-26 PROCEDURE — 1111F DSCHRG MED/CURRENT MED MERGE: CPT | Performed by: INTERNAL MEDICINE

## 2023-12-26 PROCEDURE — 86901 BLOOD TYPING SEROLOGIC RH(D): CPT

## 2023-12-26 PROCEDURE — 3074F SYST BP LT 130 MM HG: CPT | Performed by: INTERNAL MEDICINE

## 2023-12-26 PROCEDURE — 80053 COMPREHEN METABOLIC PANEL: CPT

## 2023-12-26 PROCEDURE — 1036F TOBACCO NON-USER: CPT | Performed by: INTERNAL MEDICINE

## 2023-12-26 PROCEDURE — G8484 FLU IMMUNIZE NO ADMIN: HCPCS | Performed by: INTERNAL MEDICINE

## 2023-12-26 PROCEDURE — 3078F DIAST BP <80 MM HG: CPT | Performed by: INTERNAL MEDICINE

## 2023-12-26 PROCEDURE — 1090F PRES/ABSN URINE INCON ASSESS: CPT | Performed by: INTERNAL MEDICINE

## 2023-12-26 PROCEDURE — 2580000003 HC RX 258: Performed by: INTERNAL MEDICINE

## 2023-12-26 PROCEDURE — G8417 CALC BMI ABV UP PARAM F/U: HCPCS | Performed by: INTERNAL MEDICINE

## 2023-12-26 PROCEDURE — G8427 DOCREV CUR MEDS BY ELIG CLIN: HCPCS | Performed by: INTERNAL MEDICINE

## 2023-12-26 RX ORDER — SODIUM CHLORIDE 9 MG/ML
INJECTION, SOLUTION INTRAVENOUS CONTINUOUS
OUTPATIENT
Start: 2023-12-26

## 2023-12-26 RX ORDER — ACETAMINOPHEN 325 MG/1
650 TABLET ORAL
OUTPATIENT
Start: 2023-12-26

## 2023-12-26 RX ORDER — SODIUM CHLORIDE 0.9 % (FLUSH) 0.9 %
5-40 SYRINGE (ML) INJECTION PRN
Status: DISCONTINUED | OUTPATIENT
Start: 2023-12-26 | End: 2023-12-27 | Stop reason: HOSPADM

## 2023-12-26 RX ORDER — SODIUM CHLORIDE 0.9 % (FLUSH) 0.9 %
5-40 SYRINGE (ML) INJECTION PRN
OUTPATIENT
Start: 2023-12-26

## 2023-12-26 RX ORDER — SODIUM CHLORIDE 9 MG/ML
5-250 INJECTION, SOLUTION INTRAVENOUS PRN
OUTPATIENT
Start: 2023-12-26

## 2023-12-26 RX ORDER — HEPARIN 100 UNIT/ML
500 SYRINGE INTRAVENOUS PRN
OUTPATIENT
Start: 2023-12-26

## 2023-12-26 RX ORDER — DIPHENHYDRAMINE HYDROCHLORIDE 50 MG/ML
50 INJECTION INTRAMUSCULAR; INTRAVENOUS
OUTPATIENT
Start: 2023-12-26

## 2023-12-26 RX ORDER — ALBUTEROL SULFATE 90 UG/1
4 AEROSOL, METERED RESPIRATORY (INHALATION) PRN
OUTPATIENT
Start: 2023-12-26

## 2023-12-26 RX ORDER — ONDANSETRON 2 MG/ML
8 INJECTION INTRAMUSCULAR; INTRAVENOUS
OUTPATIENT
Start: 2023-12-26

## 2023-12-26 RX ORDER — EPINEPHRINE 1 MG/ML
0.3 INJECTION, SOLUTION, CONCENTRATE INTRAVENOUS PRN
OUTPATIENT
Start: 2023-12-26

## 2023-12-26 RX ADMIN — SODIUM CHLORIDE, PRESERVATIVE FREE 20 ML: 5 INJECTION INTRAVENOUS at 12:45

## 2023-12-26 NOTE — PROGRESS NOTES
12/26 Pt seen today for hospital follow up and new to provider. She will begin vidaza and venetoclax on 1/8. We have scheduled her for a port. PICC was removed today in infusion. Introduced myself as navigator,  my role and contact information. Questions were answered and appointments verified. Will RTC in 2 Weeks. Will contact pharmacy to change subQ injection to IV.

## 2023-12-26 NOTE — PROGRESS NOTES
Arrived to the [facility] to have PICC line removed. Upon assessment, a red knot was found above the PICC line insertion site. Tucker Hoyt RN contacted the nurse navigator, who notified Dr. Garrett Nguyễn. Order placed for ultrasound. Nurse navigator to contact patient regarding appointment time. PICC line was not removed. Dressing change completed. Provided education on PICC line care. Patient instructed to report any side affects to ordering provider. Patient tolerated well. Any issues or concerns during appointment: see above. Patient aware of next infusion appointment on 1/8/2024 (date) at 2:45 pm (time). Discharged ambulatory with self.

## 2023-12-26 NOTE — PATIENT INSTRUCTIONS
patient: n/a        -------------------------------------------------------------------------------------------------------------------  Please call our office at [phone] if you have any  of the following symptoms:   Fever of 100.5 or greater  Chills  Shortness of breath  Swelling or pain in one leg    After office hours an answering service is available and will contact a provider for emergencies or if you are experiencing any of the above symptoms. Patient did express an interest in My Chart. My Chart log in information explained on the after visit summary printout at the [address] [employer]  desk. CLARISSE Delcid, RN  Hematology Navigator  [facility]  GTJZ:[phone]  Office: [phone]   Fax:[email or web address] [phone] is available by phone Monday through Friday 8 am to 4 pm.    If you need assistance after 4pm, or on the weekend please call [phone]. The answering service is available 24 hours a day, 7 days a week.

## 2023-12-26 NOTE — PROGRESS NOTES
12/26 Pt went to infusion to have PICC removed. Charge nurse called to inform that there was a knot above the insertion site. US was ordered for tomorrow 12/27. If negative for DVT IR will remove PICC when they place the port on 1/3.

## 2023-12-27 ENCOUNTER — TELEPHONE (OUTPATIENT)
Dept: ONCOLOGY | Age: 73
End: 2023-12-27

## 2023-12-27 ENCOUNTER — HOSPITAL ENCOUNTER (OUTPATIENT)
Dept: ULTRASOUND IMAGING | Age: 73
Discharge: HOME | End: 2023-12-29
Payer: MEDICARE

## 2023-12-27 DIAGNOSIS — C92.01 ACUTE MYELOID LEUKEMIA IN REMISSION (HCC): Primary | ICD-10-CM

## 2023-12-27 DIAGNOSIS — C92.01 ACUTE MYELOID LEUKEMIA IN REMISSION (HCC): ICD-10-CM

## 2023-12-27 DIAGNOSIS — I82.A11 DEEP VEIN THROMBOSIS (DVT) OF AXILLARY VEIN OF RIGHT UPPER EXTREMITY, UNSPECIFIED CHRONICITY (HCC): ICD-10-CM

## 2023-12-27 PROCEDURE — 93971 EXTREMITY STUDY: CPT

## 2023-12-27 PROCEDURE — 93971 EXTREMITY STUDY: CPT | Performed by: RADIOLOGY

## 2023-12-27 NOTE — TELEPHONE ENCOUNTER
Patient had US done of RUE. She is positive for DVT basilic vein extending into axilla.   Report given to  [facility]

## 2023-12-27 NOTE — PROGRESS NOTES
[address] [facility], [facility] [address]  Phone: [phone]        12/26/2023  Tequila Cannon  1950  [id number]      Tequila Cannon is a 68year old  female who has returned to my clinic for a follow-up visit; she was previously a patient of Dr. Cipriano Thomas. In 9/2023 she was diagnosed with MDS, RAEB-1, complex karyotype including del. 5q, mutations were noted in her KMT2A and TP53 genes, she was started on Revlimid. 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 ALLERGIES:    Penicillin and sulfa drugs. FAMILY HISTORY:    Her uncle had Leukemia. SOCIAL HISTORY:    She is  and lives with her . She used to work in a warehouse. She denies ever using any tobacco products. PAST MEDICAL HISTORY:    Hypertension, GERD, Vitamin D deficiency, Hypothyroidism, Depression, Ovarian Cancer, Hyperlipidemia, Psoriatic Arthritis, Anxiety Disorder, MDS and AML. ROS:  The patient complained of fatigue; all other systems negative. PHYSICAL EXAM:   The patient was alert, awake and oriented, no acute distress was noted. Oral examination did not reveal any mucosal lesions. Lymph node examination did not reveal any adenopathy. Neck examination revealed a supple neck, no thyromegaly or masses were noted. Chest examination revealed normal vesicular breath sounds. Heart examination revealed S-1 and S-2 with a 2/6 systolic murmur. Abdominal examination revealed a non-tender abdomen, bowel sounds were positive, no organomegaly could be appreciated. Examination of the extremities did not reveal any tenderness or erythema. Examination of the skin did not reveal any lesions. KPS:   80. LABORATORY INVESTIGATIONS:  CBC showed a WBC count of 3.3, ANC was 0.9, Hemoglobin was 10.1 and Platelets were 003.  Medical problems and test results were reviewed with the patient

## 2023-12-28 ENCOUNTER — HOME CARE VISIT (OUTPATIENT)
Dept: SCHEDULING | Facility: HOME HEALTH | Age: 73
End: 2023-12-28

## 2023-12-28 ENCOUNTER — CLINICAL DOCUMENTATION (OUTPATIENT)
Dept: ONCOLOGY | Age: 73
End: 2023-12-28

## 2023-12-28 NOTE — PROGRESS NOTES
12/27 Pt US showed an occlusive thrombus  in the right axillary vein. Ordered 5mg eliquis BID. Spoke to IR and they will remove PICC on 1/3 when port is placed. Pt advised to stop taking eliquis after her am dose on 1/1.

## 2024-01-03 ENCOUNTER — HOSPITAL ENCOUNTER (OUTPATIENT)
Dept: INTERVENTIONAL RADIOLOGY/VASCULAR | Age: 74
Discharge: HOME | End: 2024-01-06
Attending: INTERNAL MEDICINE
Payer: MEDICARE

## 2024-01-03 VITALS
BODY MASS INDEX: 30.63 KG/M2 | DIASTOLIC BLOOD PRESSURE: 67 MMHG | TEMPERATURE: 98.5 F | RESPIRATION RATE: 16 BRPM | WEIGHT: 156 LBS | OXYGEN SATURATION: 93 % | SYSTOLIC BLOOD PRESSURE: 120 MMHG | HEART RATE: 74 BPM | HEIGHT: 60 IN

## 2024-01-03 DIAGNOSIS — C92.01 ACUTE MYELOID LEUKEMIA IN REMISSION (HCC): ICD-10-CM

## 2024-01-03 PROCEDURE — 76937 US GUIDE VASCULAR ACCESS: CPT | Performed by: PHYSICIAN ASSISTANT

## 2024-01-03 PROCEDURE — 36561 INSERT TUNNELED CV CATH: CPT | Performed by: PHYSICIAN ASSISTANT

## 2024-01-03 PROCEDURE — 77001 FLUOROGUIDE FOR VEIN DEVICE: CPT | Performed by: PHYSICIAN ASSISTANT

## 2024-01-03 PROCEDURE — 36561 INSERT TUNNELED CV CATH: CPT

## 2024-01-03 PROCEDURE — 2580000003 HC RX 258: Performed by: RADIOLOGY

## 2024-01-03 PROCEDURE — 6360000002 HC RX W HCPCS: Performed by: RADIOLOGY

## 2024-01-03 PROCEDURE — 99153 MOD SED SAME PHYS/QHP EA: CPT | Performed by: PHYSICIAN ASSISTANT

## 2024-01-03 PROCEDURE — 2500000003 HC RX 250 WO HCPCS: Performed by: PHYSICIAN ASSISTANT

## 2024-01-03 PROCEDURE — 99152 MOD SED SAME PHYS/QHP 5/>YRS: CPT | Performed by: PHYSICIAN ASSISTANT

## 2024-01-03 PROCEDURE — 6360000002 HC RX W HCPCS: Performed by: PHYSICIAN ASSISTANT

## 2024-01-03 PROCEDURE — 99153 MOD SED SAME PHYS/QHP EA: CPT

## 2024-01-03 RX ORDER — HYDROCODONE BITARTRATE AND ACETAMINOPHEN 10; 325 MG/1; MG/1
1 TABLET ORAL EVERY 6 HOURS PRN
COMMUNITY
Start: 2023-12-28

## 2024-01-03 RX ORDER — LIDOCAINE HYDROCHLORIDE AND EPINEPHRINE BITARTRATE 20; .01 MG/ML; MG/ML
INJECTION, SOLUTION SUBCUTANEOUS PRN
Status: COMPLETED | OUTPATIENT
Start: 2024-01-03 | End: 2024-01-03

## 2024-01-03 RX ORDER — HEPARIN 100 UNIT/ML
SYRINGE INTRAVENOUS PRN
Status: COMPLETED | OUTPATIENT
Start: 2024-01-03 | End: 2024-01-03

## 2024-01-03 RX ORDER — LIDOCAINE HYDROCHLORIDE 20 MG/ML
INJECTION, SOLUTION INFILTRATION; PERINEURAL PRN
Status: COMPLETED | OUTPATIENT
Start: 2024-01-03 | End: 2024-01-03

## 2024-01-03 RX ORDER — MIDAZOLAM HYDROCHLORIDE 2 MG/2ML
INJECTION, SOLUTION INTRAMUSCULAR; INTRAVENOUS PRN
Status: COMPLETED | OUTPATIENT
Start: 2024-01-03 | End: 2024-01-03

## 2024-01-03 RX ORDER — SODIUM CHLORIDE 9 MG/ML
INJECTION, SOLUTION INTRAVENOUS CONTINUOUS PRN
Status: COMPLETED | OUTPATIENT
Start: 2024-01-03 | End: 2024-01-03

## 2024-01-03 RX ORDER — FENTANYL CITRATE 50 UG/ML
INJECTION, SOLUTION INTRAMUSCULAR; INTRAVENOUS PRN
Status: COMPLETED | OUTPATIENT
Start: 2024-01-03 | End: 2024-01-03

## 2024-01-03 RX ORDER — ONDANSETRON 2 MG/ML
INJECTION INTRAMUSCULAR; INTRAVENOUS PRN
Status: COMPLETED | OUTPATIENT
Start: 2024-01-03 | End: 2024-01-03

## 2024-01-03 RX ADMIN — MIDAZOLAM HYDROCHLORIDE 0.5 MG: 1 INJECTION, SOLUTION INTRAMUSCULAR; INTRAVENOUS at 11:32

## 2024-01-03 RX ADMIN — MIDAZOLAM HYDROCHLORIDE 0.5 MG: 1 INJECTION, SOLUTION INTRAMUSCULAR; INTRAVENOUS at 11:37

## 2024-01-03 RX ADMIN — LIDOCAINE HYDROCHLORIDE 4 ML: 20 INJECTION, SOLUTION INFILTRATION; PERINEURAL at 11:29

## 2024-01-03 RX ADMIN — LIDOCAINE HYDROCHLORIDE AND EPINEPHRINE 8 ML: 20; 10 INJECTION, SOLUTION INFILTRATION; PERINEURAL at 11:33

## 2024-01-03 RX ADMIN — MIDAZOLAM HYDROCHLORIDE 1 MG: 1 INJECTION, SOLUTION INTRAMUSCULAR; INTRAVENOUS at 11:21

## 2024-01-03 RX ADMIN — FENTANYL CITRATE 25 MCG: 50 INJECTION, SOLUTION INTRAMUSCULAR; INTRAVENOUS at 11:36

## 2024-01-03 RX ADMIN — ONDANSETRON 4 MG: 2 INJECTION INTRAMUSCULAR; INTRAVENOUS at 11:17

## 2024-01-03 RX ADMIN — Medication 2000 MG: at 11:24

## 2024-01-03 RX ADMIN — FENTANYL CITRATE 25 MCG: 50 INJECTION, SOLUTION INTRAMUSCULAR; INTRAVENOUS at 11:31

## 2024-01-03 RX ADMIN — HEPARIN 500 UNITS: 100 SYRINGE at 11:39

## 2024-01-03 RX ADMIN — SODIUM CHLORIDE 125 ML/HR: 9 INJECTION, SOLUTION INTRAVENOUS at 11:16

## 2024-01-03 RX ADMIN — FENTANYL CITRATE 50 MCG: 50 INJECTION, SOLUTION INTRAMUSCULAR; INTRAVENOUS at 11:20

## 2024-01-03 ASSESSMENT — PAIN SCALES - GENERAL
PAINLEVEL_OUTOF10: 0

## 2024-01-03 ASSESSMENT — PAIN - FUNCTIONAL ASSESSMENT: PAIN_FUNCTIONAL_ASSESSMENT: NONE - DENIES PAIN

## 2024-01-03 NOTE — H&P
12/18/2023 02:38 AM    PHOS 3.7 11/25/2023 02:42 AM    PHOS 3.8 11/24/2023 03:54 AM    GLOB 5.1 12/26/2023 09:36 AM    GLOB 5.5 12/19/2023 03:29 AM    ALT 25 12/26/2023 09:36 AM    ALT 30 12/19/2023 03:29 AM     Lab Results   Component Value Date/Time    WBC 3.3 12/26/2023 09:36 AM    WBC 2.2 12/19/2023 03:29 AM    HGB 10.1 12/26/2023 09:36 AM    HGB 8.1 12/19/2023 03:29 AM    HCT 31.8 12/26/2023 09:36 AM    HCT 25.2 12/19/2023 03:29 AM     12/26/2023 09:36 AM     12/19/2023 03:29 AM     Lab Results   Component Value Date/Time    APTT 23.3 11/25/2023 02:42 AM    APTT 23.1 11/24/2023 03:54 AM    INR 1.0 11/25/2023 02:42 AM    INR 1.0 11/24/2023 03:54 AM       Assessment:     AML        Plan:     Planned Procedure:  port    Risks, benefits, and alternatives reviewed with patient and she agrees to proceed with the procedure.      Signed By: Sary Hinson PA-C     January 3, 2024

## 2024-01-03 NOTE — OR NURSING
Recovery period without difficulty. Pt alert and oriented and denies pain. Dressing is clean, dry, and intact. Reviewed discharge instructions with patient and spouse, both verbalized understanding. Pt escorted to [facility] discharge area via wheelchair. Vital signs and Coco score completed.     Port booklet and card given to patient with instructions

## 2024-01-03 NOTE — DISCHARGE INSTRUCTIONS
If you have any questions about your procedure, please call the Interventional Radiology department at [phone].     After business hours (5pm) and weekends, call the answering service at [phone] and ask for the Radiologist on call to be paged.         Si tiene Preguntas acerca del procedimiento, por favor llame al departamento de Radiología Intervencional al [phone].     Después de horas de oficina (5 pm) y los fines de semana, llamar al servicio de llamadas al [phone] y pregunte por el Radiologo de spencer.

## 2024-01-03 NOTE — OR NURSING
TRANSFER - OUT REPORT:    Verbal report given to CHINO Falk on Constance Gomes  being transferred to IR prep/recovery room #4 for routine progression of patient care       Report consisted of patient's Situation, Background, Assessment and   Recommendations(SBAR).     Information from the following report(s) Nurse Handoff Report, Surgery Report, Intake/Output, and MAR was reviewed with the receiving nurse.           Lines:   PICC Double Lumen 12/15/23 Right Basilic (Active)   Central Line Being Utilized Yes 12/26/23 1245   Criteria for Appropriate Use Long term IV/antibiotic administration 12/26/23 1245   Site Assessment Clean, dry & intact;Other (Comment) 12/26/23 1245   Phlebitis Assessment No symptoms 12/26/23 1245   Infiltration Assessment 0 12/26/23 1245   Extremity Circumference (cm) 32 cm 12/15/23 1523   External Catheter Length (cm) 0 cm 12/15/23 1523   Proximal Lumen Color/Status Purple;Flushed;Blood return noted;Cap changed;Alcohol cap applied 12/26/23 1245   Distal Lumen Color/Status Red;Flushed;Blood return noted;Cap changed;Alcohol cap applied 12/26/23 1245   Line Care Connections checked and tightened 12/26/23 1245   Alcohol Cap Used Yes 12/26/23 1245   Date of Last Dressing Change 12/26/23 12/26/23 1245   Dressing Type Transparent w/CHG gel 12/26/23 1245   Dressing Status Clean, dry & intact;New dressing applied 12/26/23 1245   Dressing Intervention New;Dressing changed;Security device changed 12/26/23 1245       Single Lumen Implantable Port 01/03/24 Right Subclavian (Active)        Opportunity for questions and clarification was provided.      Patient transported with:  Registered Nurse

## 2024-01-03 NOTE — BRIEF OP NOTE
[facility]  Department of Interventional Radiology  [phone]        Interventional Radiology Brief Procedure Note    Patient: Constance Gomes MRN: [id number]  SSN: [id number]    YOB: 1950  Age: 73 y.o.  Sex: female      Date of Procedure: 1/3/2024    Pre-Procedure Diagnosis: AML    Post-Procedure Diagnosis: SAME    Procedure(s): Venous Chest Port Placement    Brief Description of Procedure: see above    Performed By: Sary Hinson PA-C     Assistants: None    Anesthesia:Moderate Sedation per WILLIAM Tan MD    Estimated Blood Loss: Less than 10ml    Specimens:  None    Implants:  Subcutaneous Port    Findings: catheter tip in right atrium     Complications: None    Recommendations: ok to use port     Follow Up: prn    Signed By: Sary Hinson PA-C     January 3, 2024

## 2024-01-03 NOTE — PRE SEDATION
Sedation Pre-Procedure Note    Patient Name: Constance Gomes   YOB: 1950  Room/Bed: Room/bed info not found  Medical Record Number: [id number]  Date: 1/3/2024   Time: 10:57 AM       Indication:  AML    Consent: I have discussed with the patient and/or the patient representative the indication, alternatives, and the possible risks and/or complications of the planned procedure and the anesthesia methods. The patient and/or patient representative appear to understand and agree to proceed.    Vital Signs:   Vitals:    01/03/24 1050   BP: (!) 162/73   Pulse: 66   Resp: 16   Temp: 98.5 °F (36.9 °C)   SpO2: 97%       Past Medical History:   has a past medical history of Arthritis, Autoimmune disease (HCC), Cancer (HCC), Chronic pain, Coronary artery spasm (HCC), COVID, Essential hypertension, Gastritis, GERD (gastroesophageal reflux disease), Hx antineoplastic chemo, Migraine headache, Psoriasis, Psychiatric disorder, Severe obesity (BMI 35.0-39.9), and Thyroid disease.    Past Surgical History:   has a past surgical history that includes Hysterectomy, total abdominal (1990); Carpal tunnel release; pr unlisted procedure cardiac surgery; gyn; and Tubal ligation.    Medications:   Scheduled Meds:   Continuous Infusions:   PRN Meds:   Home Meds:   Prior to Admission medications    Medication Sig Start Date End Date Taking? Authorizing Provider   HYDROcodone-acetaminophen (NORCO)  MG per tablet Take 1 tablet by mouth every 6 hours as needed. Max Daily Amount: 4 tablets 12/28/23  Yes Provider, MD Doug   apixaban (ELIQUIS) 5 MG TABS tablet Take 1 tablet by mouth 2 times daily 12/27/23 1/26/24  Gunnar Perez MD   potassium chloride (KLOR-CON M) 20 MEQ extended release tablet Take 1 tablet by mouth daily for 7 days 12/19/23 12/26/23  Catie Sorensen APRN - CNP   nystatin (MYCOSTATIN) 768711 UNIT/GM powder Apply 3 times daily. 12/19/23   Catie Sorensen APRN - CNP   acyclovir (ZOVIRAX) 400 MG

## 2024-01-05 RX ORDER — SODIUM CHLORIDE 0.9 % (FLUSH) 0.9 %
5-40 SYRINGE (ML) INJECTION PRN
Status: DISCONTINUED | OUTPATIENT
Start: 2024-01-05 | End: 2024-01-12 | Stop reason: HOSPADM

## 2024-01-08 ENCOUNTER — HOSPITAL ENCOUNTER (OUTPATIENT)
Dept: INFUSION THERAPY | Age: 74
Discharge: HOME | End: 2024-01-08
Payer: MEDICARE

## 2024-01-08 ENCOUNTER — OFFICE VISIT (OUTPATIENT)
Dept: ONCOLOGY | Age: 74
End: 2024-01-08

## 2024-01-08 ENCOUNTER — CLINICAL DOCUMENTATION (OUTPATIENT)
Dept: ONCOLOGY | Age: 74
End: 2024-01-08

## 2024-01-08 ENCOUNTER — HOSPITAL ENCOUNTER (OUTPATIENT)
Dept: LAB | Age: 74
Discharge: HOME | End: 2024-01-11
Payer: MEDICARE

## 2024-01-08 VITALS
DIASTOLIC BLOOD PRESSURE: 89 MMHG | HEART RATE: 97 BPM | WEIGHT: 160 LBS | BODY MASS INDEX: 31.41 KG/M2 | SYSTOLIC BLOOD PRESSURE: 112 MMHG | HEIGHT: 60 IN | TEMPERATURE: 98.2 F | RESPIRATION RATE: 16 BRPM | OXYGEN SATURATION: 92 %

## 2024-01-08 DIAGNOSIS — C92.01 ACUTE MYELOID LEUKEMIA IN REMISSION (HCC): Primary | ICD-10-CM

## 2024-01-08 DIAGNOSIS — D46.9 MDS (MYELODYSPLASTIC SYNDROME) (HCC): Primary | ICD-10-CM

## 2024-01-08 DIAGNOSIS — E55.9 VITAMIN D DEFICIENCY: ICD-10-CM

## 2024-01-08 DIAGNOSIS — E55.9 VITAMIN D DEFICIENCY: Primary | ICD-10-CM

## 2024-01-08 DIAGNOSIS — E55.9 VITAMIN D DEFICIENCY, UNSPECIFIED: ICD-10-CM

## 2024-01-08 DIAGNOSIS — D61.818 PANCYTOPENIA (HCC): ICD-10-CM

## 2024-01-08 DIAGNOSIS — C92.01 ACUTE MYELOID LEUKEMIA IN REMISSION (HCC): ICD-10-CM

## 2024-01-08 LAB
25(OH)D3 SERPL-MCNC: 33.7 NG/ML (ref 30–100)
ABO + RH BLD: NORMAL
ALBUMIN SERPL-MCNC: 3.3 G/DL (ref 3.2–4.6)
ALBUMIN/GLOB SERPL: 0.8 (ref 0.4–1.6)
ALP SERPL-CCNC: 79 U/L (ref 50–136)
ALT SERPL-CCNC: 17 U/L (ref 12–65)
ANION GAP SERPL CALC-SCNC: 5 MMOL/L (ref 2–11)
AST SERPL-CCNC: 21 U/L (ref 15–37)
BASOPHILS # BLD: 0.1 K/UL (ref 0–0.2)
BASOPHILS NFR BLD: 1 % (ref 0–2)
BILIRUB SERPL-MCNC: 0.5 MG/DL (ref 0.2–1.1)
BLOOD GROUP ANTIBODIES SERPL: NORMAL
BUN SERPL-MCNC: 12 MG/DL (ref 8–23)
CALCIUM SERPL-MCNC: 9.5 MG/DL (ref 8.3–10.4)
CHLORIDE SERPL-SCNC: 107 MMOL/L (ref 103–113)
CO2 SERPL-SCNC: 28 MMOL/L (ref 21–32)
CREAT SERPL-MCNC: 0.8 MG/DL (ref 0.6–1)
DIFFERENTIAL METHOD BLD: ABNORMAL
EOSINOPHIL # BLD: 0 K/UL (ref 0–0.8)
EOSINOPHIL NFR BLD: 1 % (ref 0.5–7.8)
ERYTHROCYTE [DISTWIDTH] IN BLOOD BY AUTOMATED COUNT: 19.4 % (ref 11.9–14.6)
GLOBULIN SER CALC-MCNC: 4.1 G/DL (ref 2.8–4.5)
GLUCOSE SERPL-MCNC: 110 MG/DL (ref 65–100)
HCT VFR BLD AUTO: 38.6 % (ref 35.8–46.3)
HGB BLD-MCNC: 12.3 G/DL (ref 11.7–15.4)
IMM GRANULOCYTES # BLD AUTO: 0 K/UL (ref 0–0.5)
IMM GRANULOCYTES NFR BLD AUTO: 1 % (ref 0–5)
LYMPHOCYTES # BLD: 1.8 K/UL (ref 0.5–4.6)
LYMPHOCYTES NFR BLD: 32 % (ref 13–44)
MCH RBC QN AUTO: 29.9 PG (ref 26.1–32.9)
MCHC RBC AUTO-ENTMCNC: 31.9 G/DL (ref 31.4–35)
MCV RBC AUTO: 93.7 FL (ref 82–102)
MONOCYTES # BLD: 0.6 K/UL (ref 0.1–1.3)
MONOCYTES NFR BLD: 10 % (ref 4–12)
NEUTS SEG # BLD: 3 K/UL (ref 1.7–8.2)
NEUTS SEG NFR BLD: 55 % (ref 43–78)
NRBC # BLD: 0 K/UL (ref 0–0.2)
PLATELET # BLD AUTO: 219 K/UL (ref 150–450)
PMV BLD AUTO: 9.7 FL (ref 9.4–12.3)
POTASSIUM SERPL-SCNC: 3.5 MMOL/L (ref 3.5–5.1)
PROT SERPL-MCNC: 7.4 G/DL (ref 6.3–8.2)
RBC # BLD AUTO: 4.12 M/UL (ref 4.05–5.2)
SODIUM SERPL-SCNC: 140 MMOL/L (ref 136–146)
SPECIMEN EXP DATE BLD: NORMAL
WBC # BLD AUTO: 5.5 K/UL (ref 4.3–11.1)

## 2024-01-08 PROCEDURE — 86900 BLOOD TYPING SEROLOGIC ABO: CPT

## 2024-01-08 PROCEDURE — 96409 CHEMO IV PUSH SNGL DRUG: CPT

## 2024-01-08 PROCEDURE — 84165 PROTEIN E-PHORESIS SERUM: CPT

## 2024-01-08 PROCEDURE — 86334 IMMUNOFIX E-PHORESIS SERUM: CPT

## 2024-01-08 PROCEDURE — 6360000002 HC RX W HCPCS: Performed by: INTERNAL MEDICINE

## 2024-01-08 PROCEDURE — 83521 IG LIGHT CHAINS FREE EACH: CPT

## 2024-01-08 PROCEDURE — 85025 COMPLETE CBC W/AUTO DIFF WBC: CPT

## 2024-01-08 PROCEDURE — 82784 ASSAY IGA/IGD/IGG/IGM EACH: CPT

## 2024-01-08 PROCEDURE — 86901 BLOOD TYPING SEROLOGIC RH(D): CPT

## 2024-01-08 PROCEDURE — 86850 RBC ANTIBODY SCREEN: CPT

## 2024-01-08 PROCEDURE — 82306 VITAMIN D 25 HYDROXY: CPT

## 2024-01-08 PROCEDURE — 80053 COMPREHEN METABOLIC PANEL: CPT

## 2024-01-08 PROCEDURE — 96375 TX/PRO/DX INJ NEW DRUG ADDON: CPT

## 2024-01-08 PROCEDURE — 2580000003 HC RX 258: Performed by: INTERNAL MEDICINE

## 2024-01-08 RX ORDER — ONDANSETRON 2 MG/ML
8 INJECTION INTRAMUSCULAR; INTRAVENOUS
OUTPATIENT
Start: 2024-01-12

## 2024-01-08 RX ORDER — ALBUTEROL SULFATE 90 UG/1
4 AEROSOL, METERED RESPIRATORY (INHALATION) PRN
Status: CANCELLED | OUTPATIENT
Start: 2024-01-11

## 2024-01-08 RX ORDER — HEPARIN 100 UNIT/ML
500 SYRINGE INTRAVENOUS PRN
Status: CANCELLED | OUTPATIENT
Start: 2024-01-09

## 2024-01-08 RX ORDER — HEPARIN 100 UNIT/ML
500 SYRINGE INTRAVENOUS PRN
Status: CANCELLED | OUTPATIENT
Start: 2024-01-08

## 2024-01-08 RX ORDER — ACETAMINOPHEN 325 MG/1
650 TABLET ORAL
Status: DISCONTINUED | OUTPATIENT
Start: 2024-01-08 | End: 2024-01-09 | Stop reason: HOSPADM

## 2024-01-08 RX ORDER — SODIUM CHLORIDE 9 MG/ML
5-250 INJECTION, SOLUTION INTRAVENOUS PRN
OUTPATIENT
Start: 2024-01-12

## 2024-01-08 RX ORDER — SODIUM CHLORIDE 0.9 % (FLUSH) 0.9 %
5-40 SYRINGE (ML) INJECTION PRN
Status: CANCELLED | OUTPATIENT
Start: 2024-01-08

## 2024-01-08 RX ORDER — EPINEPHRINE 1 MG/ML
0.3 INJECTION, SOLUTION, CONCENTRATE INTRAVENOUS PRN
OUTPATIENT
Start: 2024-01-12

## 2024-01-08 RX ORDER — ONDANSETRON 2 MG/ML
8 INJECTION INTRAMUSCULAR; INTRAVENOUS
Status: CANCELLED | OUTPATIENT
Start: 2024-01-11

## 2024-01-08 RX ORDER — ONDANSETRON 2 MG/ML
8 INJECTION INTRAMUSCULAR; INTRAVENOUS
Status: CANCELLED | OUTPATIENT
Start: 2024-01-09

## 2024-01-08 RX ORDER — MEPERIDINE HYDROCHLORIDE 25 MG/ML
12.5 INJECTION INTRAMUSCULAR; INTRAVENOUS; SUBCUTANEOUS PRN
Status: DISCONTINUED | OUTPATIENT
Start: 2024-01-08 | End: 2024-01-09 | Stop reason: HOSPADM

## 2024-01-08 RX ORDER — SODIUM CHLORIDE 9 MG/ML
5-250 INJECTION, SOLUTION INTRAVENOUS PRN
Status: CANCELLED | OUTPATIENT
Start: 2024-01-11

## 2024-01-08 RX ORDER — SODIUM CHLORIDE 0.9 % (FLUSH) 0.9 %
5-40 SYRINGE (ML) INJECTION PRN
Status: CANCELLED | OUTPATIENT
Start: 2024-01-10

## 2024-01-08 RX ORDER — SODIUM CHLORIDE 9 MG/ML
5-250 INJECTION, SOLUTION INTRAVENOUS PRN
Status: CANCELLED | OUTPATIENT
Start: 2024-01-08

## 2024-01-08 RX ORDER — MEPERIDINE HYDROCHLORIDE 25 MG/ML
12.5 INJECTION INTRAMUSCULAR; INTRAVENOUS; SUBCUTANEOUS PRN
Status: CANCELLED | OUTPATIENT
Start: 2024-01-10

## 2024-01-08 RX ORDER — SODIUM CHLORIDE 9 MG/ML
INJECTION, SOLUTION INTRAVENOUS CONTINUOUS
OUTPATIENT
Start: 2024-01-12

## 2024-01-08 RX ORDER — DIPHENHYDRAMINE HYDROCHLORIDE 50 MG/ML
50 INJECTION INTRAMUSCULAR; INTRAVENOUS
Status: CANCELLED | OUTPATIENT
Start: 2024-01-08

## 2024-01-08 RX ORDER — ONDANSETRON 2 MG/ML
8 INJECTION INTRAMUSCULAR; INTRAVENOUS ONCE
Status: CANCELLED
Start: 2024-01-10 | End: 2024-01-10

## 2024-01-08 RX ORDER — SODIUM CHLORIDE 9 MG/ML
5-250 INJECTION, SOLUTION INTRAVENOUS PRN
Status: DISCONTINUED | OUTPATIENT
Start: 2024-01-08 | End: 2024-01-09 | Stop reason: HOSPADM

## 2024-01-08 RX ORDER — EPINEPHRINE 1 MG/ML
0.3 INJECTION, SOLUTION, CONCENTRATE INTRAVENOUS PRN
Status: CANCELLED | OUTPATIENT
Start: 2024-01-08

## 2024-01-08 RX ORDER — SODIUM CHLORIDE 0.9 % (FLUSH) 0.9 %
5-40 SYRINGE (ML) INJECTION PRN
Status: DISCONTINUED | OUTPATIENT
Start: 2024-01-08 | End: 2024-01-09 | Stop reason: HOSPADM

## 2024-01-08 RX ORDER — ONDANSETRON 2 MG/ML
8 INJECTION INTRAMUSCULAR; INTRAVENOUS
Status: CANCELLED | OUTPATIENT
Start: 2024-01-08

## 2024-01-08 RX ORDER — ONDANSETRON 2 MG/ML
8 INJECTION INTRAMUSCULAR; INTRAVENOUS ONCE
Status: CANCELLED
Start: 2024-01-08 | End: 2024-01-08

## 2024-01-08 RX ORDER — ALBUTEROL SULFATE 90 UG/1
4 AEROSOL, METERED RESPIRATORY (INHALATION) PRN
Status: CANCELLED | OUTPATIENT
Start: 2024-01-09

## 2024-01-08 RX ORDER — SODIUM CHLORIDE 9 MG/ML
5-250 INJECTION, SOLUTION INTRAVENOUS PRN
Status: CANCELLED | OUTPATIENT
Start: 2024-01-09

## 2024-01-08 RX ORDER — ONDANSETRON 2 MG/ML
8 INJECTION INTRAMUSCULAR; INTRAVENOUS
Status: DISCONTINUED | OUTPATIENT
Start: 2024-01-08 | End: 2024-01-09 | Stop reason: HOSPADM

## 2024-01-08 RX ORDER — LIDOCAINE AND PRILOCAINE 25; 25 MG/G; MG/G
CREAM TOPICAL
Qty: 30 G | Refills: 2 | Status: SHIPPED | OUTPATIENT
Start: 2024-01-08

## 2024-01-08 RX ORDER — SODIUM CHLORIDE 9 MG/ML
INJECTION, SOLUTION INTRAVENOUS CONTINUOUS
Status: CANCELLED | OUTPATIENT
Start: 2024-01-08

## 2024-01-08 RX ORDER — SODIUM CHLORIDE 0.9 % (FLUSH) 0.9 %
5-40 SYRINGE (ML) INJECTION PRN
Status: CANCELLED | OUTPATIENT
Start: 2024-01-09

## 2024-01-08 RX ORDER — EPINEPHRINE 1 MG/ML
0.3 INJECTION, SOLUTION, CONCENTRATE INTRAVENOUS PRN
Status: CANCELLED | OUTPATIENT
Start: 2024-01-09

## 2024-01-08 RX ORDER — ONDANSETRON 2 MG/ML
8 INJECTION INTRAMUSCULAR; INTRAVENOUS ONCE
Status: COMPLETED | OUTPATIENT
Start: 2024-01-08 | End: 2024-01-08

## 2024-01-08 RX ORDER — ONDANSETRON 2 MG/ML
8 INJECTION INTRAMUSCULAR; INTRAVENOUS ONCE
Status: CANCELLED
Start: 2024-01-09 | End: 2024-01-09

## 2024-01-08 RX ORDER — ONDANSETRON 2 MG/ML
8 INJECTION INTRAMUSCULAR; INTRAVENOUS ONCE
Status: CANCELLED
Start: 2024-01-12 | End: 2024-01-12

## 2024-01-08 RX ORDER — MEPERIDINE HYDROCHLORIDE 25 MG/ML
12.5 INJECTION INTRAMUSCULAR; INTRAVENOUS; SUBCUTANEOUS PRN
Status: CANCELLED | OUTPATIENT
Start: 2024-01-11

## 2024-01-08 RX ORDER — MEPERIDINE HYDROCHLORIDE 25 MG/ML
12.5 INJECTION INTRAMUSCULAR; INTRAVENOUS; SUBCUTANEOUS PRN
OUTPATIENT
Start: 2024-01-12

## 2024-01-08 RX ORDER — DIPHENHYDRAMINE HYDROCHLORIDE 50 MG/ML
50 INJECTION INTRAMUSCULAR; INTRAVENOUS
Status: CANCELLED | OUTPATIENT
Start: 2024-01-11

## 2024-01-08 RX ORDER — SODIUM CHLORIDE 9 MG/ML
5-250 INJECTION, SOLUTION INTRAVENOUS PRN
Status: CANCELLED | OUTPATIENT
Start: 2024-01-10

## 2024-01-08 RX ORDER — ONDANSETRON 2 MG/ML
8 INJECTION INTRAMUSCULAR; INTRAVENOUS
Status: CANCELLED | OUTPATIENT
Start: 2024-01-10

## 2024-01-08 RX ORDER — ALBUTEROL SULFATE 90 UG/1
4 AEROSOL, METERED RESPIRATORY (INHALATION) PRN
OUTPATIENT
Start: 2024-01-12

## 2024-01-08 RX ORDER — HEPARIN 100 UNIT/ML
500 SYRINGE INTRAVENOUS PRN
Status: CANCELLED | OUTPATIENT
Start: 2024-01-11

## 2024-01-08 RX ORDER — ALBUTEROL SULFATE 90 UG/1
4 AEROSOL, METERED RESPIRATORY (INHALATION) PRN
Status: CANCELLED | OUTPATIENT
Start: 2024-01-08

## 2024-01-08 RX ORDER — DIPHENHYDRAMINE HYDROCHLORIDE 50 MG/ML
50 INJECTION INTRAMUSCULAR; INTRAVENOUS
Status: CANCELLED | OUTPATIENT
Start: 2024-01-10

## 2024-01-08 RX ORDER — ACETAMINOPHEN 325 MG/1
650 TABLET ORAL
OUTPATIENT
Start: 2024-01-12

## 2024-01-08 RX ORDER — SODIUM CHLORIDE 0.9 % (FLUSH) 0.9 %
5-40 SYRINGE (ML) INJECTION PRN
Status: CANCELLED | OUTPATIENT
Start: 2024-01-11

## 2024-01-08 RX ORDER — EPINEPHRINE 1 MG/ML
0.3 INJECTION, SOLUTION, CONCENTRATE INTRAVENOUS PRN
Status: CANCELLED | OUTPATIENT
Start: 2024-01-11

## 2024-01-08 RX ORDER — ALBUTEROL SULFATE 90 UG/1
4 AEROSOL, METERED RESPIRATORY (INHALATION) PRN
Status: CANCELLED | OUTPATIENT
Start: 2024-01-10

## 2024-01-08 RX ORDER — MEPERIDINE HYDROCHLORIDE 25 MG/ML
12.5 INJECTION INTRAMUSCULAR; INTRAVENOUS; SUBCUTANEOUS PRN
Status: CANCELLED | OUTPATIENT
Start: 2024-01-08

## 2024-01-08 RX ORDER — ACETAMINOPHEN 325 MG/1
650 TABLET ORAL
Status: CANCELLED | OUTPATIENT
Start: 2024-01-11

## 2024-01-08 RX ORDER — DIPHENHYDRAMINE HYDROCHLORIDE 50 MG/ML
50 INJECTION INTRAMUSCULAR; INTRAVENOUS
Status: DISCONTINUED | OUTPATIENT
Start: 2024-01-08 | End: 2024-01-09 | Stop reason: HOSPADM

## 2024-01-08 RX ORDER — DIPHENHYDRAMINE HYDROCHLORIDE 50 MG/ML
50 INJECTION INTRAMUSCULAR; INTRAVENOUS
Status: CANCELLED | OUTPATIENT
Start: 2024-01-09

## 2024-01-08 RX ORDER — ALBUTEROL SULFATE 90 UG/1
4 AEROSOL, METERED RESPIRATORY (INHALATION) PRN
Status: DISCONTINUED | OUTPATIENT
Start: 2024-01-08 | End: 2024-01-09 | Stop reason: HOSPADM

## 2024-01-08 RX ORDER — ACETAMINOPHEN 325 MG/1
650 TABLET ORAL
Status: CANCELLED | OUTPATIENT
Start: 2024-01-09

## 2024-01-08 RX ORDER — ONDANSETRON 2 MG/ML
8 INJECTION INTRAMUSCULAR; INTRAVENOUS ONCE
Status: CANCELLED
Start: 2024-01-11 | End: 2024-01-11

## 2024-01-08 RX ORDER — EPINEPHRINE 1 MG/ML
0.3 INJECTION, SOLUTION, CONCENTRATE INTRAVENOUS PRN
Status: DISCONTINUED | OUTPATIENT
Start: 2024-01-08 | End: 2024-01-09 | Stop reason: HOSPADM

## 2024-01-08 RX ORDER — FAMOTIDINE 40 MG/1
40 TABLET, FILM COATED ORAL EVERY EVENING
Qty: 30 TABLET | Refills: 2 | Status: SHIPPED | OUTPATIENT
Start: 2024-01-08

## 2024-01-08 RX ORDER — ACETAMINOPHEN 325 MG/1
650 TABLET ORAL
Status: CANCELLED | OUTPATIENT
Start: 2024-01-08

## 2024-01-08 RX ORDER — SODIUM CHLORIDE 0.9 % (FLUSH) 0.9 %
5-40 SYRINGE (ML) INJECTION PRN
Status: CANCELLED | OUTPATIENT
Start: 2024-01-12

## 2024-01-08 RX ORDER — HEPARIN 100 UNIT/ML
500 SYRINGE INTRAVENOUS PRN
Status: CANCELLED | OUTPATIENT
Start: 2024-01-10

## 2024-01-08 RX ORDER — ACETAMINOPHEN 325 MG/1
650 TABLET ORAL
Status: CANCELLED | OUTPATIENT
Start: 2024-01-10

## 2024-01-08 RX ORDER — NYSTATIN 100000 [USP'U]/G
POWDER TOPICAL
Qty: 15 G | Refills: 0 | Status: SHIPPED | OUTPATIENT
Start: 2024-01-08

## 2024-01-08 RX ORDER — EPINEPHRINE 1 MG/ML
0.3 INJECTION, SOLUTION, CONCENTRATE INTRAVENOUS PRN
Status: CANCELLED | OUTPATIENT
Start: 2024-01-10

## 2024-01-08 RX ORDER — HEPARIN 100 UNIT/ML
500 SYRINGE INTRAVENOUS PRN
OUTPATIENT
Start: 2024-01-12

## 2024-01-08 RX ORDER — SODIUM CHLORIDE 9 MG/ML
5-250 INJECTION, SOLUTION INTRAVENOUS PRN
Status: CANCELLED | OUTPATIENT
Start: 2024-01-12

## 2024-01-08 RX ORDER — SODIUM CHLORIDE 9 MG/ML
INJECTION, SOLUTION INTRAVENOUS CONTINUOUS
Status: CANCELLED | OUTPATIENT
Start: 2024-01-11

## 2024-01-08 RX ORDER — SODIUM CHLORIDE 9 MG/ML
INJECTION, SOLUTION INTRAVENOUS CONTINUOUS
Status: CANCELLED | OUTPATIENT
Start: 2024-01-10

## 2024-01-08 RX ORDER — SODIUM CHLORIDE 9 MG/ML
INJECTION, SOLUTION INTRAVENOUS CONTINUOUS
Status: CANCELLED | OUTPATIENT
Start: 2024-01-09

## 2024-01-08 RX ORDER — DIPHENHYDRAMINE HYDROCHLORIDE 50 MG/ML
50 INJECTION INTRAMUSCULAR; INTRAVENOUS
Status: CANCELLED | OUTPATIENT
Start: 2024-01-12

## 2024-01-08 RX ORDER — MEPERIDINE HYDROCHLORIDE 25 MG/ML
12.5 INJECTION INTRAMUSCULAR; INTRAVENOUS; SUBCUTANEOUS PRN
Status: CANCELLED | OUTPATIENT
Start: 2024-01-09

## 2024-01-08 RX ADMIN — SODIUM CHLORIDE, PRESERVATIVE FREE 10 ML: 5 INJECTION INTRAVENOUS at 12:47

## 2024-01-08 RX ADMIN — AZACITIDINE 130 MG: 100 INJECTION, POWDER, LYOPHILIZED, FOR SOLUTION INTRAVENOUS; SUBCUTANEOUS at 15:40

## 2024-01-08 RX ADMIN — DEXAMETHASONE SODIUM PHOSPHATE 12 MG: 4 INJECTION INTRA-ARTICULAR; INTRALESIONAL; INTRAMUSCULAR; INTRAVENOUS; SOFT TISSUE at 14:57

## 2024-01-08 RX ADMIN — SODIUM CHLORIDE 100 ML/HR: 9 INJECTION, SOLUTION INTRAVENOUS at 14:54

## 2024-01-08 RX ADMIN — ONDANSETRON 8 MG: 2 INJECTION INTRAMUSCULAR; INTRAVENOUS at 14:55

## 2024-01-08 RX ADMIN — SODIUM CHLORIDE, PRESERVATIVE FREE 10 ML: 5 INJECTION INTRAVENOUS at 14:54

## 2024-01-08 RX ADMIN — SODIUM CHLORIDE, PRESERVATIVE FREE 10 ML: 5 INJECTION INTRAVENOUS at 14:47

## 2024-01-08 ASSESSMENT — PATIENT HEALTH QUESTIONNAIRE - PHQ9
SUM OF ALL RESPONSES TO PHQ QUESTIONS 1-9: 1
SUM OF ALL RESPONSES TO PHQ QUESTIONS 1-9: 1
SUM OF ALL RESPONSES TO PHQ9 QUESTIONS 1 & 2: 1
SUM OF ALL RESPONSES TO PHQ QUESTIONS 1-9: 1
1. LITTLE INTEREST OR PLEASURE IN DOING THINGS: 0
2. FEELING DOWN, DEPRESSED OR HOPELESS: 1
SUM OF ALL RESPONSES TO PHQ QUESTIONS 1-9: 1

## 2024-01-08 NOTE — PROGRESS NOTES
[address] [address]  Phone: [phone]           1/8/2024  Constance Gomes  1950  [id number]        Constance Gomes is a 73 year old  female who has returned to my clinic for a follow-up visit; she was previously a patient of Dr. Tamela Nick. 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        ALLERGIES:    Penicillin and sulfa drugs.        FAMILY HISTORY:    Her uncle had Leukemia.        SOCIAL HISTORY:    She is  and lives with her .  She used to work in a warehouse. She denies ever using any tobacco products.        PAST MEDICAL HISTORY:    Hypertension, GERD, Vitamin D deficiency, Hypothyroidism, Depression, Ovarian Cancer, Hyperlipidemia, Psoriatic Arthritis, Anxiety Disorder, MDS and AML.        ROS:  The patient complained of fatigue; all other systems negative.        PHYSICAL EXAM:   The patient was alert, awake and oriented, no acute distress was noted, a right infra-clavicular Mediport was present. Oral examination did not reveal any mucosal lesions. Lymph node examination did not reveal any adenopathy. Neck examination revealed a supple neck, no thyromegaly or masses were noted. Chest examination revealed normal vesicular breath sounds. Heart examination revealed S-1 and S-2 with a 2/6 systolic murmur. Abdominal examination revealed a non-tender abdomen, bowel sounds were positive, no organomegaly could be appreciated. Examination of the extremities did not reveal any tenderness or erythema. Examination of the skin did not reveal any lesions.        KPS:   80.        LABORATORY INVESTIGATIONS:  CBC showed a WBC count of 5.5, ANC was 3.1, Hemoglobin was 12.3 and Platelets were 219.

## 2024-01-08 NOTE — PATIENT INSTRUCTIONS
Patient Instructions from Today's Visit    Reason for Visit:  Follow up      Plan:  - Infusion today        Follow Up:  As scheduled    Recent Lab Results:  Hospital Outpatient Visit on 01/08/2024   Component Date Value Ref Range Status    WBC 01/08/2024 5.5  4.3 - 11.1 K/uL Final    RBC 01/08/2024 4.12  4.05 - 5.2 M/uL Final    Hemoglobin 01/08/2024 12.3  11.7 - 15.4 g/dL Final    Hematocrit 01/08/2024 38.6  35.8 - 46.3 % Final    MCV 01/08/2024 93.7  82.0 - 102.0 FL Final    MCH 01/08/2024 29.9  26.1 - 32.9 PG Final    MCHC 01/08/2024 31.9  31.4 - 35.0 g/dL Final    RDW 01/08/2024 19.4 (H)  11.9 - 14.6 % Final    Platelets 01/08/2024 219  150 - 450 K/uL Final    MPV 01/08/2024 9.7  9.4 - 12.3 FL Final    nRBC 01/08/2024 0.00  0.0 - 0.2 K/uL Final    **Note: Absolute NRBC parameter is now reported with Hemogram**    Neutrophils % 01/08/2024 55  43 - 78 % Final    Lymphocytes % 01/08/2024 32  13 - 44 % Final    Monocytes % 01/08/2024 10  4.0 - 12.0 % Final    Eosinophils % 01/08/2024 1  0.5 - 7.8 % Final    Basophils % 01/08/2024 1  0.0 - 2.0 % Final    Immature Granulocytes 01/08/2024 1  0.0 - 5.0 % Final    Neutrophils Absolute 01/08/2024 3.0  1.7 - 8.2 K/UL Final    Lymphocytes Absolute 01/08/2024 1.8  0.5 - 4.6 K/UL Final    Monocytes Absolute 01/08/2024 0.6  0.1 - 1.3 K/UL Final    Eosinophils Absolute 01/08/2024 0.0  0.0 - 0.8 K/UL Final    Basophils Absolute 01/08/2024 0.1  0.0 - 0.2 K/UL Final    Absolute Immature Granulocyte 01/08/2024 0.0  0.0 - 0.5 K/UL Final    Differential Type 01/08/2024 AUTOMATED    Final    Sodium 01/08/2024 140  136 - 146 mmol/L Final    Potassium 01/08/2024 3.5  3.5 - 5.1 mmol/L Final    Chloride 01/08/2024 107  103 - 113 mmol/L Final    CO2 01/08/2024 28  21 - 32 mmol/L Final    Anion Gap 01/08/2024 5  2 - 11 mmol/L Final    Glucose 01/08/2024 110 (H)  65 - 100 mg/dL Final    BUN 01/08/2024 12  8 - 23 MG/DL Final    Creatinine 01/08/2024 0.80  0.6 - 1.0 MG/DL Final    Est, Glom

## 2024-01-08 NOTE — PROGRESS NOTES
1/8 Pt seen in office today for D1C1 Vidaza. Pt will start venetoclax today. Appointments verified and questions answered.Pt will RTC in 4 weeks.

## 2024-01-08 NOTE — PROGRESS NOTES
Patient arrived to port lab for port access and lab draw   Port accessed and labs drawn per protocol   *Port remains accessed   Patient discharged from port lab ambulatory*

## 2024-01-08 NOTE — PROGRESS NOTES
's visit with Mrs. Gomes and her family. Patient and family were in good spirits and they are known to me from a prior hospital admission.      Reverend Cameron Varma MDiv  Board Certified

## 2024-01-08 NOTE — PROGRESS NOTES
Patient arrived ambulatory to infusion. C1D1 Vidaza completed. Monitored x30 minutes. Patient tolerated without difficulty. Port flushed and remains accessed for infusion tomorrow. Patient aware of appt tomorrow at 2:45pm.

## 2024-01-09 ENCOUNTER — HOSPITAL ENCOUNTER (OUTPATIENT)
Dept: INFUSION THERAPY | Age: 74
Discharge: HOME | End: 2024-01-09
Payer: MEDICARE

## 2024-01-09 VITALS
RESPIRATION RATE: 16 BRPM | OXYGEN SATURATION: 90 % | DIASTOLIC BLOOD PRESSURE: 63 MMHG | SYSTOLIC BLOOD PRESSURE: 115 MMHG | HEART RATE: 98 BPM | TEMPERATURE: 97.1 F

## 2024-01-09 DIAGNOSIS — D46.9 MDS (MYELODYSPLASTIC SYNDROME) (HCC): Primary | ICD-10-CM

## 2024-01-09 DIAGNOSIS — D61.818 PANCYTOPENIA (HCC): ICD-10-CM

## 2024-01-09 PROBLEM — C92.01 ACUTE MYELOID LEUKEMIA IN REMISSION (HCC): Status: ACTIVE | Noted: 2024-01-09

## 2024-01-09 LAB
KAPPA LC FREE SER-MCNC: 17.5 MG/L (ref 3.3–19.4)
KAPPA LC FREE/LAMBDA FREE SER: 1.58 (ref 0.26–1.65)
LAMBDA LC FREE SERPL-MCNC: 11.1 MG/L (ref 5.7–26.3)

## 2024-01-09 PROCEDURE — 96375 TX/PRO/DX INJ NEW DRUG ADDON: CPT

## 2024-01-09 PROCEDURE — 2580000003 HC RX 258: Performed by: INTERNAL MEDICINE

## 2024-01-09 PROCEDURE — 96409 CHEMO IV PUSH SNGL DRUG: CPT

## 2024-01-09 PROCEDURE — 6360000002 HC RX W HCPCS: Performed by: INTERNAL MEDICINE

## 2024-01-09 RX ORDER — EPINEPHRINE 1 MG/ML
0.3 INJECTION, SOLUTION, CONCENTRATE INTRAVENOUS PRN
OUTPATIENT
Start: 2024-02-05

## 2024-01-09 RX ORDER — ONDANSETRON 2 MG/ML
8 INJECTION INTRAMUSCULAR; INTRAVENOUS
OUTPATIENT
Start: 2024-02-06

## 2024-01-09 RX ORDER — SODIUM CHLORIDE 9 MG/ML
5-250 INJECTION, SOLUTION INTRAVENOUS PRN
OUTPATIENT
Start: 2024-02-06

## 2024-01-09 RX ORDER — SODIUM CHLORIDE 9 MG/ML
INJECTION, SOLUTION INTRAVENOUS CONTINUOUS
OUTPATIENT
Start: 2024-02-09

## 2024-01-09 RX ORDER — MEPERIDINE HYDROCHLORIDE 25 MG/ML
12.5 INJECTION INTRAMUSCULAR; INTRAVENOUS; SUBCUTANEOUS PRN
OUTPATIENT
Start: 2024-02-05

## 2024-01-09 RX ORDER — ACETAMINOPHEN 325 MG/1
650 TABLET ORAL
OUTPATIENT
Start: 2024-02-07

## 2024-01-09 RX ORDER — ONDANSETRON 2 MG/ML
8 INJECTION INTRAMUSCULAR; INTRAVENOUS ONCE
Start: 2024-02-09 | End: 2024-02-09

## 2024-01-09 RX ORDER — MEPERIDINE HYDROCHLORIDE 25 MG/ML
12.5 INJECTION INTRAMUSCULAR; INTRAVENOUS; SUBCUTANEOUS PRN
Status: DISCONTINUED | OUTPATIENT
Start: 2024-01-09 | End: 2024-01-10 | Stop reason: HOSPADM

## 2024-01-09 RX ORDER — ACETAMINOPHEN 325 MG/1
650 TABLET ORAL
OUTPATIENT
Start: 2024-02-05

## 2024-01-09 RX ORDER — DIPHENHYDRAMINE HYDROCHLORIDE 50 MG/ML
50 INJECTION INTRAMUSCULAR; INTRAVENOUS
OUTPATIENT
Start: 2024-02-05

## 2024-01-09 RX ORDER — SODIUM CHLORIDE 9 MG/ML
5-250 INJECTION, SOLUTION INTRAVENOUS PRN
Status: DISCONTINUED | OUTPATIENT
Start: 2024-01-09 | End: 2024-01-10 | Stop reason: HOSPADM

## 2024-01-09 RX ORDER — ONDANSETRON 2 MG/ML
8 INJECTION INTRAMUSCULAR; INTRAVENOUS
OUTPATIENT
Start: 2024-02-08

## 2024-01-09 RX ORDER — SODIUM CHLORIDE 9 MG/ML
5-250 INJECTION, SOLUTION INTRAVENOUS PRN
OUTPATIENT
Start: 2024-02-05

## 2024-01-09 RX ORDER — EPINEPHRINE 1 MG/ML
0.3 INJECTION, SOLUTION, CONCENTRATE INTRAVENOUS PRN
OUTPATIENT
Start: 2024-02-08

## 2024-01-09 RX ORDER — DIPHENHYDRAMINE HYDROCHLORIDE 50 MG/ML
50 INJECTION INTRAMUSCULAR; INTRAVENOUS
Status: DISCONTINUED | OUTPATIENT
Start: 2024-01-09 | End: 2024-01-10 | Stop reason: HOSPADM

## 2024-01-09 RX ORDER — ONDANSETRON 2 MG/ML
8 INJECTION INTRAMUSCULAR; INTRAVENOUS ONCE
Start: 2024-02-06 | End: 2024-02-06

## 2024-01-09 RX ORDER — SODIUM CHLORIDE 9 MG/ML
5-250 INJECTION, SOLUTION INTRAVENOUS PRN
OUTPATIENT
Start: 2024-02-08

## 2024-01-09 RX ORDER — SODIUM CHLORIDE 9 MG/ML
INJECTION, SOLUTION INTRAVENOUS CONTINUOUS
OUTPATIENT
Start: 2024-02-06

## 2024-01-09 RX ORDER — ACETAMINOPHEN 325 MG/1
650 TABLET ORAL
Status: DISCONTINUED | OUTPATIENT
Start: 2024-01-09 | End: 2024-01-10 | Stop reason: HOSPADM

## 2024-01-09 RX ORDER — ALBUTEROL SULFATE 90 UG/1
4 AEROSOL, METERED RESPIRATORY (INHALATION) PRN
OUTPATIENT
Start: 2024-02-05

## 2024-01-09 RX ORDER — SODIUM CHLORIDE 9 MG/ML
5-250 INJECTION, SOLUTION INTRAVENOUS PRN
OUTPATIENT
Start: 2024-02-07

## 2024-01-09 RX ORDER — MEPERIDINE HYDROCHLORIDE 25 MG/ML
12.5 INJECTION INTRAMUSCULAR; INTRAVENOUS; SUBCUTANEOUS PRN
OUTPATIENT
Start: 2024-02-07

## 2024-01-09 RX ORDER — HEPARIN 100 UNIT/ML
500 SYRINGE INTRAVENOUS PRN
OUTPATIENT
Start: 2024-02-08

## 2024-01-09 RX ORDER — ACETAMINOPHEN 325 MG/1
650 TABLET ORAL
OUTPATIENT
Start: 2024-02-06

## 2024-01-09 RX ORDER — SODIUM CHLORIDE 0.9 % (FLUSH) 0.9 %
5-40 SYRINGE (ML) INJECTION PRN
OUTPATIENT
Start: 2024-02-09

## 2024-01-09 RX ORDER — SODIUM CHLORIDE 0.9 % (FLUSH) 0.9 %
5-40 SYRINGE (ML) INJECTION PRN
OUTPATIENT
Start: 2024-02-06

## 2024-01-09 RX ORDER — SODIUM CHLORIDE 0.9 % (FLUSH) 0.9 %
5-40 SYRINGE (ML) INJECTION PRN
OUTPATIENT
Start: 2024-02-05

## 2024-01-09 RX ORDER — ACETAMINOPHEN 325 MG/1
650 TABLET ORAL
OUTPATIENT
Start: 2024-02-08

## 2024-01-09 RX ORDER — ONDANSETRON 2 MG/ML
8 INJECTION INTRAMUSCULAR; INTRAVENOUS ONCE
Start: 2024-02-08 | End: 2024-02-08

## 2024-01-09 RX ORDER — EPINEPHRINE 1 MG/ML
0.3 INJECTION, SOLUTION, CONCENTRATE INTRAVENOUS PRN
OUTPATIENT
Start: 2024-02-06

## 2024-01-09 RX ORDER — DIPHENHYDRAMINE HYDROCHLORIDE 50 MG/ML
50 INJECTION INTRAMUSCULAR; INTRAVENOUS
OUTPATIENT
Start: 2024-02-07

## 2024-01-09 RX ORDER — SODIUM CHLORIDE 0.9 % (FLUSH) 0.9 %
5-40 SYRINGE (ML) INJECTION PRN
Status: DISCONTINUED | OUTPATIENT
Start: 2024-01-09 | End: 2024-01-10 | Stop reason: HOSPADM

## 2024-01-09 RX ORDER — SODIUM CHLORIDE 9 MG/ML
INJECTION, SOLUTION INTRAVENOUS CONTINUOUS
OUTPATIENT
Start: 2024-02-07

## 2024-01-09 RX ORDER — ONDANSETRON 2 MG/ML
8 INJECTION INTRAMUSCULAR; INTRAVENOUS ONCE
Start: 2024-02-05 | End: 2024-02-05

## 2024-01-09 RX ORDER — MEPERIDINE HYDROCHLORIDE 25 MG/ML
12.5 INJECTION INTRAMUSCULAR; INTRAVENOUS; SUBCUTANEOUS PRN
OUTPATIENT
Start: 2024-02-09

## 2024-01-09 RX ORDER — HEPARIN 100 UNIT/ML
500 SYRINGE INTRAVENOUS PRN
OUTPATIENT
Start: 2024-02-06

## 2024-01-09 RX ORDER — DIPHENHYDRAMINE HYDROCHLORIDE 50 MG/ML
50 INJECTION INTRAMUSCULAR; INTRAVENOUS
OUTPATIENT
Start: 2024-02-06

## 2024-01-09 RX ORDER — SODIUM CHLORIDE 9 MG/ML
5-250 INJECTION, SOLUTION INTRAVENOUS PRN
OUTPATIENT
Start: 2024-02-09

## 2024-01-09 RX ORDER — HEPARIN 100 UNIT/ML
500 SYRINGE INTRAVENOUS PRN
OUTPATIENT
Start: 2024-02-05

## 2024-01-09 RX ORDER — DIPHENHYDRAMINE HYDROCHLORIDE 50 MG/ML
50 INJECTION INTRAMUSCULAR; INTRAVENOUS
OUTPATIENT
Start: 2024-02-08

## 2024-01-09 RX ORDER — ONDANSETRON 2 MG/ML
8 INJECTION INTRAMUSCULAR; INTRAVENOUS
OUTPATIENT
Start: 2024-02-05

## 2024-01-09 RX ORDER — ALBUTEROL SULFATE 90 UG/1
4 AEROSOL, METERED RESPIRATORY (INHALATION) PRN
OUTPATIENT
Start: 2024-02-06

## 2024-01-09 RX ORDER — HEPARIN 100 UNIT/ML
500 SYRINGE INTRAVENOUS PRN
OUTPATIENT
Start: 2024-02-07

## 2024-01-09 RX ORDER — HEPARIN 100 UNIT/ML
500 SYRINGE INTRAVENOUS PRN
OUTPATIENT
Start: 2024-02-09

## 2024-01-09 RX ORDER — SODIUM CHLORIDE 9 MG/ML
INJECTION, SOLUTION INTRAVENOUS CONTINUOUS
OUTPATIENT
Start: 2024-02-05

## 2024-01-09 RX ORDER — SODIUM CHLORIDE 9 MG/ML
INJECTION, SOLUTION INTRAVENOUS CONTINUOUS
OUTPATIENT
Start: 2024-02-08

## 2024-01-09 RX ORDER — ALBUTEROL SULFATE 90 UG/1
4 AEROSOL, METERED RESPIRATORY (INHALATION) PRN
Status: DISCONTINUED | OUTPATIENT
Start: 2024-01-09 | End: 2024-01-10 | Stop reason: HOSPADM

## 2024-01-09 RX ORDER — ALBUTEROL SULFATE 90 UG/1
4 AEROSOL, METERED RESPIRATORY (INHALATION) PRN
OUTPATIENT
Start: 2024-02-08

## 2024-01-09 RX ORDER — EPINEPHRINE 1 MG/ML
0.3 INJECTION, SOLUTION, CONCENTRATE INTRAVENOUS PRN
OUTPATIENT
Start: 2024-02-07

## 2024-01-09 RX ORDER — EPINEPHRINE 1 MG/ML
0.3 INJECTION, SOLUTION, CONCENTRATE INTRAVENOUS PRN
OUTPATIENT
Start: 2024-02-09

## 2024-01-09 RX ORDER — MEPERIDINE HYDROCHLORIDE 25 MG/ML
12.5 INJECTION INTRAMUSCULAR; INTRAVENOUS; SUBCUTANEOUS PRN
OUTPATIENT
Start: 2024-02-08

## 2024-01-09 RX ORDER — ACETAMINOPHEN 325 MG/1
650 TABLET ORAL
OUTPATIENT
Start: 2024-02-09

## 2024-01-09 RX ORDER — ONDANSETRON 2 MG/ML
8 INJECTION INTRAMUSCULAR; INTRAVENOUS ONCE
Status: COMPLETED | OUTPATIENT
Start: 2024-01-09 | End: 2024-01-09

## 2024-01-09 RX ORDER — ONDANSETRON 2 MG/ML
8 INJECTION INTRAMUSCULAR; INTRAVENOUS
OUTPATIENT
Start: 2024-02-07

## 2024-01-09 RX ORDER — ONDANSETRON 2 MG/ML
8 INJECTION INTRAMUSCULAR; INTRAVENOUS ONCE
Start: 2024-02-07 | End: 2024-02-07

## 2024-01-09 RX ORDER — ONDANSETRON 2 MG/ML
8 INJECTION INTRAMUSCULAR; INTRAVENOUS
OUTPATIENT
Start: 2024-02-09

## 2024-01-09 RX ORDER — DIPHENHYDRAMINE HYDROCHLORIDE 50 MG/ML
50 INJECTION INTRAMUSCULAR; INTRAVENOUS
OUTPATIENT
Start: 2024-02-09

## 2024-01-09 RX ORDER — SODIUM CHLORIDE 0.9 % (FLUSH) 0.9 %
5-40 SYRINGE (ML) INJECTION PRN
OUTPATIENT
Start: 2024-02-07

## 2024-01-09 RX ORDER — ALBUTEROL SULFATE 90 UG/1
4 AEROSOL, METERED RESPIRATORY (INHALATION) PRN
OUTPATIENT
Start: 2024-02-07

## 2024-01-09 RX ORDER — ALBUTEROL SULFATE 90 UG/1
4 AEROSOL, METERED RESPIRATORY (INHALATION) PRN
OUTPATIENT
Start: 2024-02-09

## 2024-01-09 RX ORDER — SODIUM CHLORIDE 0.9 % (FLUSH) 0.9 %
5-40 SYRINGE (ML) INJECTION PRN
OUTPATIENT
Start: 2024-02-08

## 2024-01-09 RX ORDER — EPINEPHRINE 1 MG/ML
0.3 INJECTION, SOLUTION, CONCENTRATE INTRAVENOUS PRN
Status: DISCONTINUED | OUTPATIENT
Start: 2024-01-09 | End: 2024-01-10 | Stop reason: HOSPADM

## 2024-01-09 RX ORDER — ONDANSETRON 2 MG/ML
8 INJECTION INTRAMUSCULAR; INTRAVENOUS
Status: DISCONTINUED | OUTPATIENT
Start: 2024-01-09 | End: 2024-01-10 | Stop reason: HOSPADM

## 2024-01-09 RX ORDER — MEPERIDINE HYDROCHLORIDE 25 MG/ML
12.5 INJECTION INTRAMUSCULAR; INTRAVENOUS; SUBCUTANEOUS PRN
OUTPATIENT
Start: 2024-02-06

## 2024-01-09 RX ADMIN — ONDANSETRON 8 MG: 2 INJECTION INTRAMUSCULAR; INTRAVENOUS at 14:58

## 2024-01-09 RX ADMIN — DEXAMETHASONE SODIUM PHOSPHATE 12 MG: 4 INJECTION INTRA-ARTICULAR; INTRALESIONAL; INTRAMUSCULAR; INTRAVENOUS; SOFT TISSUE at 15:00

## 2024-01-09 RX ADMIN — AZACITIDINE 130 MG: 100 INJECTION, POWDER, LYOPHILIZED, FOR SOLUTION INTRAVENOUS; SUBCUTANEOUS at 15:39

## 2024-01-09 RX ADMIN — SODIUM CHLORIDE 50 ML/HR: 9 INJECTION, SOLUTION INTRAVENOUS at 14:56

## 2024-01-09 RX ADMIN — SODIUM CHLORIDE, PRESERVATIVE FREE 10 ML: 5 INJECTION INTRAVENOUS at 14:56

## 2024-01-09 NOTE — PROGRESS NOTES
Patient arrived ambulatory to infusion. Vidaza completed. Patient tolerated without difficulty. Port flushed. Remains accessed for infusion tomorrow. Discharged ambulatory.

## 2024-01-10 ENCOUNTER — HOSPITAL ENCOUNTER (OUTPATIENT)
Dept: INFUSION THERAPY | Age: 74
Discharge: HOME | End: 2024-01-10
Payer: MEDICARE

## 2024-01-10 VITALS
DIASTOLIC BLOOD PRESSURE: 79 MMHG | OXYGEN SATURATION: 94 % | HEART RATE: 89 BPM | TEMPERATURE: 97.3 F | RESPIRATION RATE: 18 BRPM | SYSTOLIC BLOOD PRESSURE: 134 MMHG

## 2024-01-10 DIAGNOSIS — D46.9 MDS (MYELODYSPLASTIC SYNDROME) (HCC): Primary | ICD-10-CM

## 2024-01-10 DIAGNOSIS — D61.818 PANCYTOPENIA (HCC): ICD-10-CM

## 2024-01-10 DIAGNOSIS — C92.01 ACUTE MYELOID LEUKEMIA IN REMISSION (HCC): ICD-10-CM

## 2024-01-10 PROCEDURE — 96375 TX/PRO/DX INJ NEW DRUG ADDON: CPT

## 2024-01-10 PROCEDURE — 6360000002 HC RX W HCPCS: Performed by: INTERNAL MEDICINE

## 2024-01-10 PROCEDURE — 96409 CHEMO IV PUSH SNGL DRUG: CPT

## 2024-01-10 PROCEDURE — 2580000003 HC RX 258: Performed by: INTERNAL MEDICINE

## 2024-01-10 PROCEDURE — 96367 TX/PROPH/DG ADDL SEQ IV INF: CPT

## 2024-01-10 RX ORDER — SODIUM CHLORIDE 9 MG/ML
5-250 INJECTION, SOLUTION INTRAVENOUS PRN
Status: DISCONTINUED | OUTPATIENT
Start: 2024-01-10 | End: 2024-01-11 | Stop reason: HOSPADM

## 2024-01-10 RX ORDER — SODIUM CHLORIDE 0.9 % (FLUSH) 0.9 %
5-40 SYRINGE (ML) INJECTION PRN
Status: DISCONTINUED | OUTPATIENT
Start: 2024-01-10 | End: 2024-01-11 | Stop reason: HOSPADM

## 2024-01-10 RX ORDER — ONDANSETRON 2 MG/ML
8 INJECTION INTRAMUSCULAR; INTRAVENOUS ONCE
Status: COMPLETED | OUTPATIENT
Start: 2024-01-10 | End: 2024-01-10

## 2024-01-10 RX ADMIN — SODIUM CHLORIDE, PRESERVATIVE FREE 10 ML: 5 INJECTION INTRAVENOUS at 17:26

## 2024-01-10 RX ADMIN — DEXAMETHASONE SODIUM PHOSPHATE 12 MG: 4 INJECTION INTRA-ARTICULAR; INTRALESIONAL; INTRAMUSCULAR; INTRAVENOUS; SOFT TISSUE at 16:44

## 2024-01-10 RX ADMIN — SODIUM CHLORIDE, PRESERVATIVE FREE 10 ML: 5 INJECTION INTRAVENOUS at 17:01

## 2024-01-10 RX ADMIN — SODIUM CHLORIDE 100 ML/HR: 9 INJECTION, SOLUTION INTRAVENOUS at 17:19

## 2024-01-10 RX ADMIN — ONDANSETRON 8 MG: 2 INJECTION INTRAMUSCULAR; INTRAVENOUS at 16:42

## 2024-01-10 RX ADMIN — AZACITIDINE 130 MG: 100 INJECTION, POWDER, LYOPHILIZED, FOR SOLUTION INTRAVENOUS; SUBCUTANEOUS at 17:06

## 2024-01-10 NOTE — PROGRESS NOTES
Patient arrived to Infusion Center for Vidaza. Assessment completed. No needs voiced at this time. Patient tolerated infusion well and is aware of next appointment on 1/11/2024 @6954.  Patient discharged ambulatory with spouse.

## 2024-01-11 ENCOUNTER — HOSPITAL ENCOUNTER (OUTPATIENT)
Dept: INFUSION THERAPY | Age: 74
Discharge: HOME | End: 2024-01-11
Payer: MEDICARE

## 2024-01-11 VITALS
SYSTOLIC BLOOD PRESSURE: 147 MMHG | HEART RATE: 88 BPM | RESPIRATION RATE: 16 BRPM | OXYGEN SATURATION: 92 % | DIASTOLIC BLOOD PRESSURE: 83 MMHG | TEMPERATURE: 97.7 F

## 2024-01-11 DIAGNOSIS — D46.9 MDS (MYELODYSPLASTIC SYNDROME) (HCC): Primary | ICD-10-CM

## 2024-01-11 DIAGNOSIS — C92.01 ACUTE MYELOID LEUKEMIA IN REMISSION (HCC): ICD-10-CM

## 2024-01-11 DIAGNOSIS — D61.818 PANCYTOPENIA (HCC): ICD-10-CM

## 2024-01-11 PROCEDURE — 6360000002 HC RX W HCPCS: Performed by: INTERNAL MEDICINE

## 2024-01-11 PROCEDURE — 96409 CHEMO IV PUSH SNGL DRUG: CPT

## 2024-01-11 PROCEDURE — 2580000003 HC RX 258: Performed by: INTERNAL MEDICINE

## 2024-01-11 PROCEDURE — 96375 TX/PRO/DX INJ NEW DRUG ADDON: CPT

## 2024-01-11 RX ORDER — MEPERIDINE HYDROCHLORIDE 25 MG/ML
12.5 INJECTION INTRAMUSCULAR; INTRAVENOUS; SUBCUTANEOUS PRN
Status: DISCONTINUED | OUTPATIENT
Start: 2024-01-11 | End: 2024-01-12 | Stop reason: HOSPADM

## 2024-01-11 RX ORDER — ALBUTEROL SULFATE 90 UG/1
4 AEROSOL, METERED RESPIRATORY (INHALATION) PRN
Status: DISCONTINUED | OUTPATIENT
Start: 2024-01-11 | End: 2024-01-12 | Stop reason: HOSPADM

## 2024-01-11 RX ORDER — EPINEPHRINE 1 MG/ML
0.3 INJECTION, SOLUTION, CONCENTRATE INTRAVENOUS PRN
Status: DISCONTINUED | OUTPATIENT
Start: 2024-01-11 | End: 2024-01-12 | Stop reason: HOSPADM

## 2024-01-11 RX ORDER — ONDANSETRON 2 MG/ML
8 INJECTION INTRAMUSCULAR; INTRAVENOUS ONCE
Status: COMPLETED | OUTPATIENT
Start: 2024-01-11 | End: 2024-01-11

## 2024-01-11 RX ORDER — ACETAMINOPHEN 325 MG/1
650 TABLET ORAL
Status: DISCONTINUED | OUTPATIENT
Start: 2024-01-11 | End: 2024-01-12 | Stop reason: HOSPADM

## 2024-01-11 RX ORDER — SODIUM CHLORIDE 0.9 % (FLUSH) 0.9 %
5-40 SYRINGE (ML) INJECTION PRN
Status: DISCONTINUED | OUTPATIENT
Start: 2024-01-11 | End: 2024-01-12 | Stop reason: HOSPADM

## 2024-01-11 RX ORDER — DIPHENHYDRAMINE HYDROCHLORIDE 50 MG/ML
50 INJECTION INTRAMUSCULAR; INTRAVENOUS
Status: DISCONTINUED | OUTPATIENT
Start: 2024-01-11 | End: 2024-01-12 | Stop reason: HOSPADM

## 2024-01-11 RX ORDER — SODIUM CHLORIDE 9 MG/ML
5-250 INJECTION, SOLUTION INTRAVENOUS PRN
Status: DISCONTINUED | OUTPATIENT
Start: 2024-01-11 | End: 2024-01-12 | Stop reason: HOSPADM

## 2024-01-11 RX ORDER — ONDANSETRON 2 MG/ML
8 INJECTION INTRAMUSCULAR; INTRAVENOUS
Status: DISCONTINUED | OUTPATIENT
Start: 2024-01-11 | End: 2024-01-12 | Stop reason: HOSPADM

## 2024-01-11 RX ADMIN — SODIUM CHLORIDE 25 ML/HR: 9 INJECTION, SOLUTION INTRAVENOUS at 16:35

## 2024-01-11 RX ADMIN — AZACITIDINE 130 MG: 100 INJECTION, POWDER, LYOPHILIZED, FOR SOLUTION INTRAVENOUS; SUBCUTANEOUS at 17:16

## 2024-01-11 RX ADMIN — SODIUM CHLORIDE, PRESERVATIVE FREE 10 ML: 5 INJECTION INTRAVENOUS at 16:35

## 2024-01-11 RX ADMIN — ONDANSETRON 8 MG: 2 INJECTION INTRAMUSCULAR; INTRAVENOUS at 16:52

## 2024-01-11 RX ADMIN — DEXAMETHASONE SODIUM PHOSPHATE 12 MG: 4 INJECTION INTRA-ARTICULAR; INTRALESIONAL; INTRAMUSCULAR; INTRAVENOUS; SOFT TISSUE at 16:54

## 2024-01-11 NOTE — PROGRESS NOTES
Arrived to the Infusion Center.  Vidaza IVPB day 4 completed.  Patient instructed to report any new/worsening side effects to ordering provider.  Patient tolerated well.   Any issues or concerns during appointment: patient did c/o some redness/flushing to face and neck since starting treatment, thinking may be related to the steroid pre-meds. Instructed patient to notify MD if flushing worsens or becomes uncomfortable. Tomorrow is 5 of 5, she states she will call provider if flushing persists after 5th dose of steroid tomorrow.  Patient aware of next infusion appointment on 1/12/24 (date) at 4:15 PM (time).  Discharged ambulatory with cane, .

## 2024-01-12 ENCOUNTER — HOSPITAL ENCOUNTER (OUTPATIENT)
Dept: INFUSION THERAPY | Age: 74
Discharge: HOME | End: 2024-01-12
Payer: MEDICARE

## 2024-01-12 ENCOUNTER — CLINICAL DOCUMENTATION (OUTPATIENT)
Dept: ONCOLOGY | Age: 74
End: 2024-01-12

## 2024-01-12 VITALS
HEART RATE: 89 BPM | TEMPERATURE: 97.8 F | SYSTOLIC BLOOD PRESSURE: 143 MMHG | RESPIRATION RATE: 16 BRPM | DIASTOLIC BLOOD PRESSURE: 89 MMHG | OXYGEN SATURATION: 92 %

## 2024-01-12 DIAGNOSIS — D46.9 MDS (MYELODYSPLASTIC SYNDROME) (HCC): Primary | ICD-10-CM

## 2024-01-12 DIAGNOSIS — C92.01 ACUTE MYELOID LEUKEMIA IN REMISSION (HCC): ICD-10-CM

## 2024-01-12 DIAGNOSIS — D61.818 PANCYTOPENIA (HCC): ICD-10-CM

## 2024-01-12 LAB
ALBUMIN SERPL ELPH-MCNC: 3.6 G/DL (ref 2.9–4.4)
ALBUMIN/GLOB SERPL: 1.2 (ref 0.7–1.7)
ALPHA1 GLOB SERPL ELPH-MCNC: 0.2 G/DL (ref 0–0.4)
ALPHA2 GLOB SERPL ELPH-MCNC: 1 G/DL (ref 0.4–1)
B-GLOBULIN SERPL ELPH-MCNC: 0.9 G/DL (ref 0.7–1.3)
GAMMA GLOB SERPL ELPH-MCNC: 1.1 G/DL (ref 0.4–1.8)
GLOBULIN SER-MCNC: 3.2 G/DL (ref 2.2–3.9)
IGA SERPL-MCNC: 284 MG/DL (ref 64–422)
IGG SERPL-MCNC: 1144 MG/DL (ref 586–1602)
IGM SERPL-MCNC: 66 MG/DL (ref 26–217)
INTERPRETATION SERPL IEP-IMP: ABNORMAL
M PROTEIN SERPL ELPH-MCNC: 0.2 G/DL
PROT SERPL-MCNC: 6.8 G/DL (ref 6–8.5)

## 2024-01-12 PROCEDURE — 2580000003 HC RX 258: Performed by: INTERNAL MEDICINE

## 2024-01-12 PROCEDURE — 6360000002 HC RX W HCPCS: Performed by: INTERNAL MEDICINE

## 2024-01-12 PROCEDURE — 96375 TX/PRO/DX INJ NEW DRUG ADDON: CPT

## 2024-01-12 PROCEDURE — 96409 CHEMO IV PUSH SNGL DRUG: CPT

## 2024-01-12 RX ORDER — DIPHENHYDRAMINE HYDROCHLORIDE 50 MG/ML
50 INJECTION INTRAMUSCULAR; INTRAVENOUS
Status: DISCONTINUED | OUTPATIENT
Start: 2024-01-12 | End: 2024-01-13 | Stop reason: HOSPADM

## 2024-01-12 RX ORDER — SODIUM CHLORIDE 9 MG/ML
5-250 INJECTION, SOLUTION INTRAVENOUS PRN
Status: DISCONTINUED | OUTPATIENT
Start: 2024-01-12 | End: 2024-01-13 | Stop reason: HOSPADM

## 2024-01-12 RX ORDER — SODIUM CHLORIDE 0.9 % (FLUSH) 0.9 %
5-40 SYRINGE (ML) INJECTION PRN
Status: DISCONTINUED | OUTPATIENT
Start: 2024-01-12 | End: 2024-01-13 | Stop reason: HOSPADM

## 2024-01-12 RX ORDER — ONDANSETRON 2 MG/ML
8 INJECTION INTRAMUSCULAR; INTRAVENOUS ONCE
Status: COMPLETED | OUTPATIENT
Start: 2024-01-12 | End: 2024-01-12

## 2024-01-12 RX ADMIN — DEXAMETHASONE SODIUM PHOSPHATE 12 MG: 4 INJECTION INTRA-ARTICULAR; INTRALESIONAL; INTRAMUSCULAR; INTRAVENOUS; SOFT TISSUE at 17:04

## 2024-01-12 RX ADMIN — SODIUM CHLORIDE, PRESERVATIVE FREE 10 ML: 5 INJECTION INTRAVENOUS at 16:40

## 2024-01-12 RX ADMIN — AZACITIDINE 130 MG: 100 INJECTION, POWDER, LYOPHILIZED, FOR SOLUTION INTRAVENOUS; SUBCUTANEOUS at 17:24

## 2024-01-12 RX ADMIN — ONDANSETRON 8 MG: 2 INJECTION INTRAMUSCULAR; INTRAVENOUS at 17:02

## 2024-01-12 RX ADMIN — SODIUM CHLORIDE, PRESERVATIVE FREE 10 ML: 5 INJECTION INTRAVENOUS at 17:38

## 2024-01-12 RX ADMIN — SODIUM CHLORIDE 100 ML/HR: 9 INJECTION, SOLUTION INTRAVENOUS at 16:40

## 2024-01-12 NOTE — PROGRESS NOTES
1/12 Called pt and instructed her to take 100mg venetoclax daily. This is adjusted from the dose that was ordered for her inpatient.

## 2024-01-12 NOTE — PROGRESS NOTES
Arrived to the Infusion Center.  Vidaza completed. Patient tolerated well.   Any issues or concerns during appointment: none.  Patient aware of next infusion appointment on 2/5 (date) at 4:00 PM (time).  Patient instructed to call provider with temperature of 100.4 or greater or nausea/vomiting/ diarrhea or pain not controlled by medications  Discharged ambulatory.

## 2024-01-19 ENCOUNTER — OFFICE VISIT (OUTPATIENT)
Dept: FAMILY MEDICINE CLINIC | Facility: CLINIC | Age: 74
End: 2024-01-19
Payer: MEDICARE

## 2024-01-19 ENCOUNTER — HOSPITAL ENCOUNTER (OUTPATIENT)
Dept: CT IMAGING | Age: 74
End: 2024-01-19
Payer: MEDICARE

## 2024-01-19 VITALS
WEIGHT: 169 LBS | TEMPERATURE: 100 F | BODY MASS INDEX: 33.18 KG/M2 | DIASTOLIC BLOOD PRESSURE: 70 MMHG | HEIGHT: 60 IN | SYSTOLIC BLOOD PRESSURE: 108 MMHG | OXYGEN SATURATION: 96 % | HEART RATE: 94 BPM

## 2024-01-19 DIAGNOSIS — R10.2 PELVIC PAIN: ICD-10-CM

## 2024-01-19 DIAGNOSIS — R10.9 FLANK PAIN: ICD-10-CM

## 2024-01-19 DIAGNOSIS — R09.81 CONGESTION OF NASAL SINUS: ICD-10-CM

## 2024-01-19 DIAGNOSIS — R05.1 ACUTE COUGH: ICD-10-CM

## 2024-01-19 DIAGNOSIS — R10.30 LOWER ABDOMINAL PAIN: Primary | ICD-10-CM

## 2024-01-19 LAB
ALBUMIN SERPL-MCNC: 3.5 G/DL (ref 3.2–4.6)
ALBUMIN/GLOB SERPL: 0.9 (ref 0.4–1.6)
ALP SERPL-CCNC: 103 U/L (ref 50–136)
ALT SERPL-CCNC: 23 U/L (ref 12–65)
ANION GAP SERPL CALC-SCNC: 6 MMOL/L (ref 2–11)
AST SERPL-CCNC: 19 U/L (ref 15–37)
BASOPHILS # BLD: 0 K/UL (ref 0–0.2)
BASOPHILS NFR BLD: 0 % (ref 0–2)
BILIRUB SERPL-MCNC: 0.5 MG/DL (ref 0.2–1.1)
BILIRUBIN, URINE, POC: ABNORMAL
BLOOD URINE, POC: ABNORMAL
BUN SERPL-MCNC: 11 MG/DL (ref 8–23)
CALCIUM SERPL-MCNC: 9.7 MG/DL (ref 8.3–10.4)
CHLORIDE SERPL-SCNC: 106 MMOL/L (ref 103–113)
CO2 SERPL-SCNC: 27 MMOL/L (ref 21–32)
CREAT SERPL-MCNC: 0.8 MG/DL (ref 0.6–1)
DIFFERENTIAL METHOD BLD: ABNORMAL
EOSINOPHIL # BLD: 0 K/UL (ref 0–0.8)
EOSINOPHIL NFR BLD: 0 % (ref 0.5–7.8)
ERYTHROCYTE [DISTWIDTH] IN BLOOD BY AUTOMATED COUNT: 19.3 % (ref 11.9–14.6)
EXP DATE SOLUTION: NORMAL
EXP DATE SWAB: NORMAL
EXPIRATION DATE: NORMAL
GLUCOSE SERPL-MCNC: 101 MG/DL (ref 65–100)
GLUCOSE URINE, POC: NEGATIVE
HCT VFR BLD AUTO: 39.6 % (ref 35.8–46.3)
HGB BLD-MCNC: 12.9 G/DL (ref 11.7–15.4)
IMM GRANULOCYTES # BLD AUTO: 0 K/UL (ref 0–0.5)
IMM GRANULOCYTES NFR BLD AUTO: 1 % (ref 0–5)
KETONES, URINE, POC: ABNORMAL
LEUKOCYTE ESTERASE, URINE, POC: NEGATIVE
LOT NUMBER POC: NORMAL
LOT NUMBER SOLUTION: NORMAL
LOT NUMBER SWAB: NORMAL
LYMPHOCYTES # BLD: 1.1 K/UL (ref 0.5–4.6)
LYMPHOCYTES NFR BLD: 16 % (ref 13–44)
MCH RBC QN AUTO: 31.5 PG (ref 26.1–32.9)
MCHC RBC AUTO-ENTMCNC: 32.6 G/DL (ref 31.4–35)
MCV RBC AUTO: 96.6 FL (ref 82–102)
MONOCYTES # BLD: 0.7 K/UL (ref 0.1–1.3)
MONOCYTES NFR BLD: 11 % (ref 4–12)
NEUTS SEG # BLD: 5 K/UL (ref 1.7–8.2)
NEUTS SEG NFR BLD: 72 % (ref 43–78)
NITRITE, URINE, POC: NEGATIVE
NRBC # BLD: 0 K/UL (ref 0–0.2)
PH, URINE, POC: 6 (ref 4.6–8)
PLATELET # BLD AUTO: 174 K/UL (ref 150–450)
PMV BLD AUTO: 10.5 FL (ref 9.4–12.3)
POTASSIUM SERPL-SCNC: 3.5 MMOL/L (ref 3.5–5.1)
PROT SERPL-MCNC: 7.3 G/DL (ref 6.3–8.2)
PROTEIN,URINE, POC: ABNORMAL
QUICKVUE INFLUENZA TEST: NEGATIVE
RBC # BLD AUTO: 4.1 M/UL (ref 4.05–5.2)
SARS-COV-2 RNA, POC: NEGATIVE
SODIUM SERPL-SCNC: 139 MMOL/L (ref 136–146)
SPECIFIC GRAVITY, URINE, POC: 1.01 (ref 1–1.03)
URINALYSIS CLARITY, POC: CLEAR
URINALYSIS COLOR, POC: YELLOW
UROBILINOGEN, POC: ABNORMAL
VALID INTERNAL CONTROL, POC: NORMAL
WBC # BLD AUTO: 6.9 K/UL (ref 4.3–11.1)

## 2024-01-19 PROCEDURE — 3078F DIAST BP <80 MM HG: CPT | Performed by: NURSE PRACTITIONER

## 2024-01-19 PROCEDURE — 3074F SYST BP LT 130 MM HG: CPT | Performed by: NURSE PRACTITIONER

## 2024-01-19 PROCEDURE — 6360000002 HC RX W HCPCS: Performed by: NURSE PRACTITIONER

## 2024-01-19 PROCEDURE — 6360000004 HC RX CONTRAST MEDICATION: Performed by: NURSE PRACTITIONER

## 2024-01-19 PROCEDURE — G8427 DOCREV CUR MEDS BY ELIG CLIN: HCPCS | Performed by: NURSE PRACTITIONER

## 2024-01-19 PROCEDURE — G8417 CALC BMI ABV UP PARAM F/U: HCPCS | Performed by: NURSE PRACTITIONER

## 2024-01-19 PROCEDURE — 87635 SARS-COV-2 COVID-19 AMP PRB: CPT | Performed by: NURSE PRACTITIONER

## 2024-01-19 PROCEDURE — 1123F ACP DISCUSS/DSCN MKR DOCD: CPT | Performed by: NURSE PRACTITIONER

## 2024-01-19 PROCEDURE — 3017F COLORECTAL CA SCREEN DOC REV: CPT | Performed by: NURSE PRACTITIONER

## 2024-01-19 PROCEDURE — G8484 FLU IMMUNIZE NO ADMIN: HCPCS | Performed by: NURSE PRACTITIONER

## 2024-01-19 PROCEDURE — 74177 CT ABD & PELVIS W/CONTRAST: CPT

## 2024-01-19 PROCEDURE — 1090F PRES/ABSN URINE INCON ASSESS: CPT | Performed by: NURSE PRACTITIONER

## 2024-01-19 PROCEDURE — 1036F TOBACCO NON-USER: CPT | Performed by: NURSE PRACTITIONER

## 2024-01-19 PROCEDURE — G8399 PT W/DXA RESULTS DOCUMENT: HCPCS | Performed by: NURSE PRACTITIONER

## 2024-01-19 RX ORDER — CIPROFLOXACIN 250 MG/1
250 TABLET, FILM COATED ORAL 2 TIMES DAILY
Qty: 10 TABLET | Refills: 0 | Status: SHIPPED | OUTPATIENT
Start: 2024-01-19 | End: 2024-01-24

## 2024-01-19 RX ORDER — HEPARIN 100 UNIT/ML
SYRINGE INTRAVENOUS PRN
Status: DISCONTINUED | OUTPATIENT
Start: 2024-01-19 | End: 2024-01-23 | Stop reason: HOSPADM

## 2024-01-19 RX ADMIN — IOPAMIDOL 100 ML: 755 INJECTION, SOLUTION INTRAVENOUS at 14:28

## 2024-01-19 RX ADMIN — HEPARIN 500 UNITS: 100 SYRINGE at 14:40

## 2024-01-19 RX ADMIN — DIATRIZOATE MEGLUMINE AND DIATRIZOATE SODIUM 15 ML: 660; 100 LIQUID ORAL; RECTAL at 14:28

## 2024-01-19 ASSESSMENT — ENCOUNTER SYMPTOMS
COUGH: 1
SORE THROAT: 0
RHINORRHEA: 1
CONSTIPATION: 0
NAUSEA: 0
ABDOMINAL PAIN: 1
SINUS PAIN: 0
WHEEZING: 0
HEMATOCHEZIA: 0
VOMITING: 0

## 2024-01-19 NOTE — PROGRESS NOTES
ROS  Review of Systems   Constitutional:  Negative for fever.   HENT:  Positive for congestion and rhinorrhea. Negative for sinus pain, sneezing and sore throat.    Respiratory:  Positive for cough. Negative for wheezing.    Gastrointestinal:  Positive for abdominal pain. Negative for constipation, hematochezia, melena, nausea and vomiting.   Genitourinary:  Negative for dysuria and hematuria.       Visit Vitals  /70 (Site: Left Upper Arm, Position: Sitting, Cuff Size: Small Adult)   Pulse 94   Temp 100 °F (37.8 °C) (Temporal)   Ht 1.524 m (5')   Wt 76.7 kg (169 lb)   SpO2 96%   BMI 33.01 kg/m²       Physical Exam  Physical Exam  Vitals reviewed.   Constitutional:       General: She is not in acute distress.     Appearance: Normal appearance. She is not ill-appearing or toxic-appearing.   HENT:      Nose: No congestion.      Mouth/Throat:      Pharynx: No oropharyngeal exudate or posterior oropharyngeal erythema.   Eyes:      Conjunctiva/sclera: Conjunctivae normal.   Cardiovascular:      Rate and Rhythm: Regular rhythm.      Heart sounds: No murmur heard.     No gallop.   Pulmonary:      Effort: Pulmonary effort is normal.      Breath sounds: Normal breath sounds. No wheezing or rhonchi.   Abdominal:      General: Bowel sounds are normal.      Tenderness: There is abdominal tenderness in the right lower quadrant and suprapubic area. There is no guarding or rebound.      Comments: No flank pain, TTP in lower quads, R>L.    Musculoskeletal:         General: No swelling.   Skin:     General: Skin is warm and dry.   Neurological:      General: No focal deficit present.      Mental Status: She is alert and oriented to person, place, and time.   Psychiatric:         Mood and Affect: Mood normal.         Behavior: Behavior normal.            ASSESSMENT & PLAN      I have reviewed the patient's past medical history, social history and family history and vitals.  We have discussed treatment plan and follow up

## 2024-01-22 ENCOUNTER — TELEPHONE (OUTPATIENT)
Dept: FAMILY MEDICINE CLINIC | Facility: CLINIC | Age: 74
End: 2024-01-22

## 2024-01-22 LAB
BACTERIA SPEC CULT: NORMAL
BACTERIA SPEC CULT: NORMAL
SERVICE CMNT-IMP: NORMAL

## 2024-01-22 NOTE — TELEPHONE ENCOUNTER
----- Message from TIM Valdez NP sent at 1/22/2024  1:17 PM EST -----  Please let Ms. Gomes know her urine culture was contaminated, since she is taking Cipro, I would complete.  If she's still having symptoms after completion, we can recheck.      Thanks,  TIM Valdez NP

## 2024-01-22 NOTE — TELEPHONE ENCOUNTER
Call to pt, reviewed results, pt encourage to complete abx and call office should symptoms not improve.

## 2024-01-26 DIAGNOSIS — C92.01 ACUTE MYELOID LEUKEMIA IN REMISSION (HCC): Primary | ICD-10-CM

## 2024-02-02 ENCOUNTER — OFFICE VISIT (OUTPATIENT)
Dept: FAMILY MEDICINE CLINIC | Facility: CLINIC | Age: 74
End: 2024-02-02

## 2024-02-02 VITALS
TEMPERATURE: 98 F | SYSTOLIC BLOOD PRESSURE: 132 MMHG | HEIGHT: 60 IN | BODY MASS INDEX: 32 KG/M2 | WEIGHT: 163 LBS | HEART RATE: 112 BPM | OXYGEN SATURATION: 98 % | DIASTOLIC BLOOD PRESSURE: 80 MMHG

## 2024-02-02 DIAGNOSIS — H61.23 BILATERAL IMPACTED CERUMEN: Primary | ICD-10-CM

## 2024-02-02 ASSESSMENT — ENCOUNTER SYMPTOMS
RHINORRHEA: 0
VOMITING: 0
COUGH: 0

## 2024-02-02 NOTE — PROGRESS NOTES
[facility]  [address] [place] [address]  Phone [phone]  Fax:  [phone]    Patient: Constance Gomes  YOB: 1950  Patient Age 73 y.o.  Patient sex: female  Medical Record:  [id number]  Visit Date: 02/02/24    [facility] Clinic Note     Chief Complaint   Patient presents with    Ear Fullness     Pt states both ears feel full, can barely hear out of left ear. Daughter states she thought she could see something in it. Denies drainage       History of Present Illness:  Ms. Gomes pleasant 73-year-old female with a past medical history as noted below who presents for an acute visit.  Patient is complaining of bilateral ear fullness that started approximately one week ago.  States that she feels like her left hearing is muffled, right is not as bad but feels full.  Patient denies any other associated symptoms including fever, chills, ear drainage, ear pain, URI symptoms.  Patient has not tried anything over-the-counter.    Ear Fullness   There is pain in both ears. This is a new problem. The current episode started 1 to 4 weeks ago. The problem occurs constantly. The problem has been unchanged. There has been no fever. The pain is at a severity of 0/10. Pertinent negatives include no coughing, ear discharge, headaches, rhinorrhea or vomiting. She has tried nothing for the symptoms.       Allergies:  Allergies   Allergen Reactions    Penicillins Hives    Sulfa Antibiotics Hives    Codeine Nausea And Vomiting       Current Medications:   Medications marked \"taking\" at this time:    Current Outpatient Medications:     Venetoclax 100 MG TABS, Take 1 tablet by mouth daily, Disp: 30 tablet, Rfl: 2    famotidine (PEPCID) 40 MG tablet, Take 1 tablet by mouth every evening, Disp: 30 tablet, Rfl: 2    lidocaine-prilocaine (EMLA) 2.5-2.5 % cream, Apply topically as needed. Place small amount to port site 45 minutes prior to any blood draws/infusions. Cover with saran wrap, Disp: 30 g,

## 2024-02-05 ENCOUNTER — OFFICE VISIT (OUTPATIENT)
Dept: ONCOLOGY | Age: 74
End: 2024-02-05
Payer: MEDICARE

## 2024-02-05 ENCOUNTER — HOSPITAL ENCOUNTER (OUTPATIENT)
Dept: INFUSION THERAPY | Age: 74
Discharge: HOME | End: 2024-02-05
Payer: MEDICARE

## 2024-02-05 ENCOUNTER — HOSPITAL ENCOUNTER (OUTPATIENT)
Dept: LAB | Age: 74
Discharge: HOME | End: 2024-02-08
Payer: MEDICARE

## 2024-02-05 ENCOUNTER — CLINICAL DOCUMENTATION (OUTPATIENT)
Dept: ONCOLOGY | Age: 74
End: 2024-02-05

## 2024-02-05 VITALS
OXYGEN SATURATION: 97 % | DIASTOLIC BLOOD PRESSURE: 82 MMHG | TEMPERATURE: 97.9 F | RESPIRATION RATE: 16 BRPM | BODY MASS INDEX: 32.53 KG/M2 | HEIGHT: 60 IN | SYSTOLIC BLOOD PRESSURE: 134 MMHG | HEART RATE: 83 BPM | WEIGHT: 165.7 LBS

## 2024-02-05 DIAGNOSIS — C92.01 ACUTE MYELOID LEUKEMIA IN REMISSION (HCC): ICD-10-CM

## 2024-02-05 DIAGNOSIS — E55.9 VITAMIN D DEFICIENCY: ICD-10-CM

## 2024-02-05 DIAGNOSIS — E55.9 VITAMIN D DEFICIENCY, UNSPECIFIED: ICD-10-CM

## 2024-02-05 DIAGNOSIS — C92.01 ACUTE MYELOID LEUKEMIA IN REMISSION (HCC): Primary | ICD-10-CM

## 2024-02-05 LAB
25(OH)D3 SERPL-MCNC: 29.4 NG/ML (ref 30–100)
ALBUMIN SERPL-MCNC: 3.6 G/DL (ref 3.2–4.6)
ALBUMIN/GLOB SERPL: 1 (ref 0.4–1.6)
ALP SERPL-CCNC: 97 U/L (ref 50–136)
ALT SERPL-CCNC: 19 U/L (ref 12–65)
ANION GAP SERPL CALC-SCNC: 3 MMOL/L (ref 2–11)
AST SERPL-CCNC: 16 U/L (ref 15–37)
BASOPHILS # BLD: 0 K/UL (ref 0–0.2)
BASOPHILS NFR BLD: 1 % (ref 0–2)
BILIRUB SERPL-MCNC: 0.5 MG/DL (ref 0.2–1.1)
BUN SERPL-MCNC: 8 MG/DL (ref 8–23)
CALCIUM SERPL-MCNC: 9.6 MG/DL (ref 8.3–10.4)
CHLORIDE SERPL-SCNC: 107 MMOL/L (ref 103–113)
CO2 SERPL-SCNC: 29 MMOL/L (ref 21–32)
CREAT SERPL-MCNC: 0.8 MG/DL (ref 0.6–1)
DIFFERENTIAL METHOD BLD: ABNORMAL
EOSINOPHIL # BLD: 0 K/UL (ref 0–0.8)
EOSINOPHIL NFR BLD: 1 % (ref 0.5–7.8)
ERYTHROCYTE [DISTWIDTH] IN BLOOD BY AUTOMATED COUNT: 16.5 % (ref 11.9–14.6)
GLOBULIN SER CALC-MCNC: 3.7 G/DL (ref 2.8–4.5)
GLUCOSE SERPL-MCNC: 103 MG/DL (ref 65–100)
HCT VFR BLD AUTO: 37.7 % (ref 35.8–46.3)
HGB BLD-MCNC: 12.6 G/DL (ref 11.7–15.4)
IMM GRANULOCYTES # BLD AUTO: 0 K/UL (ref 0–0.5)
IMM GRANULOCYTES NFR BLD AUTO: 0 % (ref 0–5)
LYMPHOCYTES # BLD: 1.2 K/UL (ref 0.5–4.6)
LYMPHOCYTES NFR BLD: 57 % (ref 13–44)
MCH RBC QN AUTO: 31.4 PG (ref 26.1–32.9)
MCHC RBC AUTO-ENTMCNC: 33.4 G/DL (ref 31.4–35)
MCV RBC AUTO: 94 FL (ref 82–102)
MONOCYTES # BLD: 0.3 K/UL (ref 0.1–1.3)
MONOCYTES NFR BLD: 15 % (ref 4–12)
NEUTS SEG # BLD: 0.5 K/UL (ref 1.7–8.2)
NEUTS SEG NFR BLD: 26 % (ref 43–78)
NRBC # BLD: 0 K/UL (ref 0–0.2)
PLATELET # BLD AUTO: 114 K/UL (ref 150–450)
PMV BLD AUTO: 9.6 FL (ref 9.4–12.3)
POTASSIUM SERPL-SCNC: 3.6 MMOL/L (ref 3.5–5.1)
PROT SERPL-MCNC: 7.3 G/DL (ref 6.3–8.2)
RBC # BLD AUTO: 4.01 M/UL (ref 4.05–5.2)
SODIUM SERPL-SCNC: 139 MMOL/L (ref 136–146)
WBC # BLD AUTO: 2 K/UL (ref 4.3–11.1)

## 2024-02-05 PROCEDURE — 2580000003 HC RX 258: Performed by: INTERNAL MEDICINE

## 2024-02-05 PROCEDURE — 1123F ACP DISCUSS/DSCN MKR DOCD: CPT | Performed by: INTERNAL MEDICINE

## 2024-02-05 PROCEDURE — G8484 FLU IMMUNIZE NO ADMIN: HCPCS | Performed by: INTERNAL MEDICINE

## 2024-02-05 PROCEDURE — G8427 DOCREV CUR MEDS BY ELIG CLIN: HCPCS | Performed by: INTERNAL MEDICINE

## 2024-02-05 PROCEDURE — 99215 OFFICE O/P EST HI 40 MIN: CPT | Performed by: INTERNAL MEDICINE

## 2024-02-05 PROCEDURE — 82306 VITAMIN D 25 HYDROXY: CPT

## 2024-02-05 PROCEDURE — 85025 COMPLETE CBC W/AUTO DIFF WBC: CPT

## 2024-02-05 PROCEDURE — 1090F PRES/ABSN URINE INCON ASSESS: CPT | Performed by: INTERNAL MEDICINE

## 2024-02-05 PROCEDURE — 3075F SYST BP GE 130 - 139MM HG: CPT | Performed by: INTERNAL MEDICINE

## 2024-02-05 PROCEDURE — 3079F DIAST BP 80-89 MM HG: CPT | Performed by: INTERNAL MEDICINE

## 2024-02-05 PROCEDURE — 2580000003 HC RX 258: Performed by: NURSE PRACTITIONER

## 2024-02-05 PROCEDURE — 96409 CHEMO IV PUSH SNGL DRUG: CPT

## 2024-02-05 PROCEDURE — 1036F TOBACCO NON-USER: CPT | Performed by: INTERNAL MEDICINE

## 2024-02-05 PROCEDURE — G8417 CALC BMI ABV UP PARAM F/U: HCPCS | Performed by: INTERNAL MEDICINE

## 2024-02-05 PROCEDURE — 96375 TX/PRO/DX INJ NEW DRUG ADDON: CPT

## 2024-02-05 PROCEDURE — 3017F COLORECTAL CA SCREEN DOC REV: CPT | Performed by: INTERNAL MEDICINE

## 2024-02-05 PROCEDURE — G8399 PT W/DXA RESULTS DOCUMENT: HCPCS | Performed by: INTERNAL MEDICINE

## 2024-02-05 PROCEDURE — 6360000002 HC RX W HCPCS: Performed by: INTERNAL MEDICINE

## 2024-02-05 PROCEDURE — 80053 COMPREHEN METABOLIC PANEL: CPT

## 2024-02-05 RX ORDER — SODIUM CHLORIDE 9 MG/ML
INJECTION, SOLUTION INTRAVENOUS CONTINUOUS
Status: DISCONTINUED | OUTPATIENT
Start: 2024-02-05 | End: 2024-02-06 | Stop reason: HOSPADM

## 2024-02-05 RX ORDER — DIPHENHYDRAMINE HYDROCHLORIDE 50 MG/ML
50 INJECTION INTRAMUSCULAR; INTRAVENOUS
Status: DISCONTINUED | OUTPATIENT
Start: 2024-02-05 | End: 2024-02-06 | Stop reason: HOSPADM

## 2024-02-05 RX ORDER — SODIUM CHLORIDE 9 MG/ML
5-250 INJECTION, SOLUTION INTRAVENOUS PRN
Status: DISCONTINUED | OUTPATIENT
Start: 2024-02-05 | End: 2024-02-06 | Stop reason: HOSPADM

## 2024-02-05 RX ORDER — ONDANSETRON 2 MG/ML
8 INJECTION INTRAMUSCULAR; INTRAVENOUS ONCE
Status: COMPLETED | OUTPATIENT
Start: 2024-02-05 | End: 2024-02-05

## 2024-02-05 RX ORDER — LEVOFLOXACIN 500 MG/1
500 TABLET, FILM COATED ORAL DAILY
Qty: 10 TABLET | Refills: 0 | Status: SHIPPED | OUTPATIENT
Start: 2024-02-05 | End: 2024-02-15

## 2024-02-05 RX ORDER — MEPERIDINE HYDROCHLORIDE 25 MG/ML
12.5 INJECTION INTRAMUSCULAR; INTRAVENOUS; SUBCUTANEOUS PRN
Status: DISCONTINUED | OUTPATIENT
Start: 2024-02-05 | End: 2024-02-06 | Stop reason: HOSPADM

## 2024-02-05 RX ORDER — ACETAMINOPHEN 325 MG/1
650 TABLET ORAL
Status: DISCONTINUED | OUTPATIENT
Start: 2024-02-05 | End: 2024-02-06 | Stop reason: HOSPADM

## 2024-02-05 RX ORDER — EPINEPHRINE 1 MG/ML
0.3 INJECTION, SOLUTION, CONCENTRATE INTRAVENOUS PRN
Status: DISCONTINUED | OUTPATIENT
Start: 2024-02-05 | End: 2024-02-06 | Stop reason: HOSPADM

## 2024-02-05 RX ORDER — SODIUM CHLORIDE 0.9 % (FLUSH) 0.9 %
10 SYRINGE (ML) INJECTION PRN
Status: DISCONTINUED | OUTPATIENT
Start: 2024-02-05 | End: 2024-02-09 | Stop reason: HOSPADM

## 2024-02-05 RX ORDER — ALBUTEROL SULFATE 90 UG/1
4 AEROSOL, METERED RESPIRATORY (INHALATION) PRN
Status: DISCONTINUED | OUTPATIENT
Start: 2024-02-05 | End: 2024-02-06 | Stop reason: HOSPADM

## 2024-02-05 RX ORDER — SODIUM CHLORIDE 0.9 % (FLUSH) 0.9 %
5-40 SYRINGE (ML) INJECTION PRN
Status: DISCONTINUED | OUTPATIENT
Start: 2024-02-05 | End: 2024-02-06 | Stop reason: HOSPADM

## 2024-02-05 RX ORDER — ONDANSETRON 2 MG/ML
8 INJECTION INTRAMUSCULAR; INTRAVENOUS
Status: DISCONTINUED | OUTPATIENT
Start: 2024-02-05 | End: 2024-02-06 | Stop reason: HOSPADM

## 2024-02-05 RX ADMIN — SODIUM CHLORIDE, PRESERVATIVE FREE 10 ML: 5 INJECTION INTRAVENOUS at 16:24

## 2024-02-05 RX ADMIN — ONDANSETRON 8 MG: 2 INJECTION INTRAMUSCULAR; INTRAVENOUS at 16:28

## 2024-02-05 RX ADMIN — DEXAMETHASONE SODIUM PHOSPHATE 12 MG: 4 INJECTION INTRA-ARTICULAR; INTRALESIONAL; INTRAMUSCULAR; INTRAVENOUS; SOFT TISSUE at 16:32

## 2024-02-05 RX ADMIN — SODIUM CHLORIDE, PRESERVATIVE FREE 10 ML: 5 INJECTION INTRAVENOUS at 14:50

## 2024-02-05 RX ADMIN — AZACITIDINE 130 MG: 100 INJECTION, POWDER, LYOPHILIZED, FOR SOLUTION INTRAVENOUS; SUBCUTANEOUS at 16:51

## 2024-02-05 RX ADMIN — SODIUM CHLORIDE 150 ML/HR: 9 INJECTION, SOLUTION INTRAVENOUS at 16:25

## 2024-02-05 RX ADMIN — SODIUM CHLORIDE, PRESERVATIVE FREE 10 ML: 5 INJECTION INTRAVENOUS at 17:08

## 2024-02-05 ASSESSMENT — PATIENT HEALTH QUESTIONNAIRE - PHQ9
SUM OF ALL RESPONSES TO PHQ QUESTIONS 1-9: 3
2. FEELING DOWN, DEPRESSED OR HOPELESS: 2
SUM OF ALL RESPONSES TO PHQ QUESTIONS 1-9: 3
SUM OF ALL RESPONSES TO PHQ QUESTIONS 1-9: 3
SUM OF ALL RESPONSES TO PHQ9 QUESTIONS 1 & 2: 3
1. LITTLE INTEREST OR PLEASURE IN DOING THINGS: 1

## 2024-02-05 NOTE — PATIENT INSTRUCTIONS
Patient Instructions from Today's Visit    Reason for Visit:  Follow      Plan:  - Take venetoclax the first two weeks with the start of treatment, do not take the last two weeks of February  - Take Vitamin D over the counter daily  - When you're done taking eliquis you do not need a refill    Follow Up:  As scheduled    Recent Lab Results:  Hospital Outpatient Visit on 02/05/2024   Component Date Value Ref Range Status    Sodium 02/05/2024 139  136 - 146 mmol/L Final    Potassium 02/05/2024 3.6  3.5 - 5.1 mmol/L Final    Chloride 02/05/2024 107  103 - 113 mmol/L Final    CO2 02/05/2024 29  21 - 32 mmol/L Final    Anion Gap 02/05/2024 3  2 - 11 mmol/L Final    Glucose 02/05/2024 103 (H)  65 - 100 mg/dL Final    BUN 02/05/2024 8  8 - 23 MG/DL Final    Creatinine 02/05/2024 0.80  0.6 - 1.0 MG/DL Final    Est, Glom Filt Rate 02/05/2024 >60  >60 ml/min/1.73m2 Final    Comment:    Pediatric calculator link: https://www.kidney.org/professionals/kdoqi/gfr_calculatorped     These results are not intended for use in patients <18 years of age.     eGFR results are calculated without a race factor using  the 2021 CKD-EPI equation. Careful clinical correlation is recommended, particularly when comparing to results calculated using previous equations.  The CKD-EPI equation is less accurate in patients with extremes of muscle mass, extra-renal metabolism of creatinine, excessive creatine ingestion, or following therapy that affects renal tubular secretion.      Calcium 02/05/2024 9.6  8.3 - 10.4 MG/DL Final    Total Bilirubin 02/05/2024 0.5  0.2 - 1.1 MG/DL Final    ALT 02/05/2024 19  12 - 65 U/L Final    AST 02/05/2024 16  15 - 37 U/L Final    Alk Phosphatase 02/05/2024 97  50 - 136 U/L Final    Total Protein 02/05/2024 7.3  6.3 - 8.2 g/dL Final    Albumin 02/05/2024 3.6  3.2 - 4.6 g/dL Final    Globulin 02/05/2024 3.7  2.8 - 4.5 g/dL Final    Albumin/Globulin Ratio 02/05/2024 1.0  0.4 - 1.6   Final    WBC 02/05/2024 2.0 (LL)

## 2024-02-05 NOTE — PROGRESS NOTES
Arrived to the Infusion Center.  Vidaza completed. Patient tolerated well.   Any issues or concerns during appointment: none.  Patient aware of next infusion appointment on 2/6/24 (date) at 1530 (time).  Patient aware of next lab and [facility] office visit on 3/4/24 (date) at 1500 (time).  Patient instructed to call provider with temperature of 100.4 or greater or nausea/vomiting/ diarrhea or pain not controlled by medications  Discharged amb.

## 2024-02-05 NOTE — PROGRESS NOTES
2/5 Pt seen in office today for follow up and Cycle 2 of Vidaza.Pt will take two weeks of vidaza and discontinue taking it until her next Cycle in 7 weeks.We are giving her time to recover.Pt will come in for labs in between visits.Pt instructed to stop taking eliquis once completed.A script for levaquin was sent in as well.Pt instructed to go to ER with a temp of 100.4 or greater.

## 2024-02-05 NOTE — PROGRESS NOTES
[address] [address]  Phone: [phone]           2/5/2024  Constance Gomes  1950  [id number]        Constance Gomes is a 73 year old  female who has returned to my clinic for a follow-up visit; she was previously a patient of Dr. Tamela Nick. 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 She has received 1 cycle of of 5-Azacitidine/Venetoclax.        ALLERGIES:    Penicillin and sulfa drugs.        FAMILY HISTORY:    Her uncle had Leukemia.        SOCIAL HISTORY:    She is  and lives with her .  She used to work in a warehouse. She denies ever using any tobacco products.        PAST MEDICAL HISTORY:    Hypertension, GERD, Vitamin D deficiency, Hypothyroidism, Depression, Ovarian Cancer, Hyperlipidemia, Psoriatic Arthritis, Anxiety Disorder, right arm DVT, MDS and AML.        ROS:  The patient complained of fatigue; all other systems negative.        PHYSICAL EXAM:   The patient was alert, awake and oriented, no acute distress was noted, a right infra-clavicular Mediport was present. Oral examination did not reveal any mucosal lesions. Lymph node examination did not reveal any adenopathy. Neck examination revealed a supple neck, no thyromegaly or masses were noted. Chest examination revealed normal vesicular breath sounds. Heart examination revealed S-1 and S-2 with a 2/6 systolic murmur. Abdominal examination revealed a non-tender abdomen, bowel sounds were positive, no organomegaly could be appreciated. Examination of the extremities did not reveal any tenderness or erythema. Examination of the skin did not reveal any lesions.        KPS:   80.        LABORATORY INVESTIGATIONS:  CBC showed a WBC

## 2024-02-06 ENCOUNTER — TELEPHONE (OUTPATIENT)
Dept: FAMILY MEDICINE CLINIC | Facility: CLINIC | Age: 74
End: 2024-02-06

## 2024-02-06 ENCOUNTER — HOSPITAL ENCOUNTER (OUTPATIENT)
Dept: INFUSION THERAPY | Age: 74
Setting detail: INFUSION SERIES
Discharge: HOME | End: 2024-02-06
Payer: MEDICARE

## 2024-02-06 ENCOUNTER — HOSPITAL ENCOUNTER (OUTPATIENT)
Dept: INFUSION THERAPY | Age: 74
End: 2024-02-06

## 2024-02-06 VITALS
RESPIRATION RATE: 16 BRPM | WEIGHT: 163 LBS | HEART RATE: 90 BPM | DIASTOLIC BLOOD PRESSURE: 87 MMHG | TEMPERATURE: 98.1 F | BODY MASS INDEX: 31.83 KG/M2 | SYSTOLIC BLOOD PRESSURE: 134 MMHG | OXYGEN SATURATION: 95 %

## 2024-02-06 DIAGNOSIS — Z88.9 ALLERGY STATUS TO UNSPECIFIED DRUGS, MEDICAMENTS AND BIOLOGICAL SUBSTANCES: ICD-10-CM

## 2024-02-06 DIAGNOSIS — I82.A11 DEEP VEIN THROMBOSIS (DVT) OF AXILLARY VEIN OF RIGHT UPPER EXTREMITY, UNSPECIFIED CHRONICITY (HCC): ICD-10-CM

## 2024-02-06 DIAGNOSIS — C92.01 ACUTE MYELOID LEUKEMIA IN REMISSION (HCC): Primary | ICD-10-CM

## 2024-02-06 PROCEDURE — 2580000003 HC RX 258: Performed by: INTERNAL MEDICINE

## 2024-02-06 PROCEDURE — 96409 CHEMO IV PUSH SNGL DRUG: CPT

## 2024-02-06 PROCEDURE — 6360000002 HC RX W HCPCS: Performed by: INTERNAL MEDICINE

## 2024-02-06 PROCEDURE — 96375 TX/PRO/DX INJ NEW DRUG ADDON: CPT

## 2024-02-06 RX ORDER — MEPERIDINE HYDROCHLORIDE 25 MG/ML
12.5 INJECTION INTRAMUSCULAR; INTRAVENOUS; SUBCUTANEOUS PRN
Status: DISCONTINUED | OUTPATIENT
Start: 2024-02-06 | End: 2024-02-07 | Stop reason: HOSPADM

## 2024-02-06 RX ORDER — ONDANSETRON 2 MG/ML
8 INJECTION INTRAMUSCULAR; INTRAVENOUS ONCE
Status: COMPLETED | OUTPATIENT
Start: 2024-02-06 | End: 2024-02-06

## 2024-02-06 RX ORDER — ALBUTEROL SULFATE 90 UG/1
4 AEROSOL, METERED RESPIRATORY (INHALATION) PRN
Status: DISCONTINUED | OUTPATIENT
Start: 2024-02-06 | End: 2024-02-07 | Stop reason: HOSPADM

## 2024-02-06 RX ORDER — SODIUM CHLORIDE 9 MG/ML
5-250 INJECTION, SOLUTION INTRAVENOUS PRN
Status: DISCONTINUED | OUTPATIENT
Start: 2024-02-06 | End: 2024-02-07 | Stop reason: HOSPADM

## 2024-02-06 RX ORDER — MONTELUKAST SODIUM 10 MG/1
10 TABLET ORAL DAILY
Qty: 90 TABLET | Refills: 1 | Status: SHIPPED | OUTPATIENT
Start: 2024-02-06

## 2024-02-06 RX ORDER — ACETAMINOPHEN 325 MG/1
650 TABLET ORAL
Status: DISCONTINUED | OUTPATIENT
Start: 2024-02-06 | End: 2024-02-07 | Stop reason: HOSPADM

## 2024-02-06 RX ORDER — DIPHENHYDRAMINE HYDROCHLORIDE 50 MG/ML
50 INJECTION INTRAMUSCULAR; INTRAVENOUS
Status: DISCONTINUED | OUTPATIENT
Start: 2024-02-06 | End: 2024-02-07 | Stop reason: HOSPADM

## 2024-02-06 RX ORDER — ONDANSETRON 2 MG/ML
8 INJECTION INTRAMUSCULAR; INTRAVENOUS
Status: DISCONTINUED | OUTPATIENT
Start: 2024-02-06 | End: 2024-02-07 | Stop reason: HOSPADM

## 2024-02-06 RX ORDER — SODIUM CHLORIDE 0.9 % (FLUSH) 0.9 %
5-40 SYRINGE (ML) INJECTION PRN
Status: DISCONTINUED | OUTPATIENT
Start: 2024-02-06 | End: 2024-02-07 | Stop reason: HOSPADM

## 2024-02-06 RX ORDER — EPINEPHRINE 1 MG/ML
0.3 INJECTION, SOLUTION, CONCENTRATE INTRAVENOUS PRN
Status: DISCONTINUED | OUTPATIENT
Start: 2024-02-06 | End: 2024-02-07 | Stop reason: HOSPADM

## 2024-02-06 RX ADMIN — SODIUM CHLORIDE 125 ML/HR: 9 INJECTION, SOLUTION INTRAVENOUS at 15:03

## 2024-02-06 RX ADMIN — ONDANSETRON 8 MG: 2 INJECTION INTRAMUSCULAR; INTRAVENOUS at 14:31

## 2024-02-06 RX ADMIN — DEXAMETHASONE SODIUM PHOSPHATE 12 MG: 4 INJECTION INTRA-ARTICULAR; INTRALESIONAL; INTRAMUSCULAR; INTRAVENOUS; SOFT TISSUE at 14:33

## 2024-02-06 RX ADMIN — AZACITIDINE 130 MG: 100 INJECTION, POWDER, LYOPHILIZED, FOR SOLUTION INTRAVENOUS; SUBCUTANEOUS at 15:02

## 2024-02-06 RX ADMIN — SODIUM CHLORIDE, PRESERVATIVE FREE 10 ML: 5 INJECTION INTRAVENOUS at 14:30

## 2024-02-06 NOTE — PROGRESS NOTES
Patient arrived to Infusion Center for Vidaza. Assessment completed.  No needs voiced at this time. Patient tolerated infusion well and is aware of next appointment on 2/7/25 @1430.  Patient discharged ambulatory with spouse.

## 2024-02-07 ENCOUNTER — HOSPITAL ENCOUNTER (OUTPATIENT)
Dept: INFUSION THERAPY | Age: 74
Discharge: HOME | End: 2024-02-07
Payer: MEDICARE

## 2024-02-07 VITALS
HEART RATE: 91 BPM | DIASTOLIC BLOOD PRESSURE: 80 MMHG | TEMPERATURE: 97.7 F | OXYGEN SATURATION: 93 % | SYSTOLIC BLOOD PRESSURE: 123 MMHG | RESPIRATION RATE: 16 BRPM

## 2024-02-07 DIAGNOSIS — C92.01 ACUTE MYELOID LEUKEMIA IN REMISSION (HCC): Primary | ICD-10-CM

## 2024-02-07 PROCEDURE — 96375 TX/PRO/DX INJ NEW DRUG ADDON: CPT

## 2024-02-07 PROCEDURE — 2580000003 HC RX 258: Performed by: INTERNAL MEDICINE

## 2024-02-07 PROCEDURE — 96409 CHEMO IV PUSH SNGL DRUG: CPT

## 2024-02-07 PROCEDURE — 6360000002 HC RX W HCPCS: Performed by: INTERNAL MEDICINE

## 2024-02-07 RX ORDER — SODIUM CHLORIDE 9 MG/ML
5-250 INJECTION, SOLUTION INTRAVENOUS PRN
Status: DISCONTINUED | OUTPATIENT
Start: 2024-02-07 | End: 2024-02-08 | Stop reason: HOSPADM

## 2024-02-07 RX ORDER — SODIUM CHLORIDE 0.9 % (FLUSH) 0.9 %
5-40 SYRINGE (ML) INJECTION PRN
Status: DISCONTINUED | OUTPATIENT
Start: 2024-02-07 | End: 2024-02-08 | Stop reason: HOSPADM

## 2024-02-07 RX ORDER — ONDANSETRON 2 MG/ML
8 INJECTION INTRAMUSCULAR; INTRAVENOUS ONCE
Status: COMPLETED | OUTPATIENT
Start: 2024-02-07 | End: 2024-02-07

## 2024-02-07 RX ORDER — DIPHENHYDRAMINE HYDROCHLORIDE 50 MG/ML
50 INJECTION INTRAMUSCULAR; INTRAVENOUS
Status: DISCONTINUED | OUTPATIENT
Start: 2024-02-07 | End: 2024-02-08 | Stop reason: HOSPADM

## 2024-02-07 RX ADMIN — AZACITIDINE 130 MG: 100 INJECTION, POWDER, LYOPHILIZED, FOR SOLUTION INTRAVENOUS; SUBCUTANEOUS at 15:41

## 2024-02-07 RX ADMIN — ONDANSETRON 8 MG: 2 INJECTION INTRAMUSCULAR; INTRAVENOUS at 14:58

## 2024-02-07 RX ADMIN — SODIUM CHLORIDE, PRESERVATIVE FREE 10 ML: 5 INJECTION INTRAVENOUS at 14:53

## 2024-02-07 RX ADMIN — SODIUM CHLORIDE, PRESERVATIVE FREE 10 ML: 5 INJECTION INTRAVENOUS at 15:56

## 2024-02-07 RX ADMIN — DEXAMETHASONE SODIUM PHOSPHATE 12 MG: 4 INJECTION INTRA-ARTICULAR; INTRALESIONAL; INTRAMUSCULAR; INTRAVENOUS; SOFT TISSUE at 15:00

## 2024-02-07 RX ADMIN — SODIUM CHLORIDE 500 ML/HR: 9 INJECTION, SOLUTION INTRAVENOUS at 14:53

## 2024-02-07 NOTE — PROGRESS NOTES
Arrived to the Infusion Center.  Vidaza completed. Patient tolerated well.   Any issues or concerns during appointment: Patient reports feeling more dizzy/weak tired today and reports not drinking enough fluids.  Order received to administer 500 ml normal saline bolus.  Patient tolerated well.   Patient aware of next infusion appointment on 2/8 (date) at 3:30 PM (time).  Patient instructed to call provider with temperature of 100.4 or greater or nausea/vomiting/ diarrhea or pain not controlled by medications  Discharged ambulatory.

## 2024-02-08 ENCOUNTER — HOSPITAL ENCOUNTER (OUTPATIENT)
Dept: INFUSION THERAPY | Age: 74
Discharge: HOME | End: 2024-02-08
Payer: MEDICARE

## 2024-02-08 VITALS
BODY MASS INDEX: 32.81 KG/M2 | TEMPERATURE: 97.9 F | WEIGHT: 168 LBS | DIASTOLIC BLOOD PRESSURE: 82 MMHG | RESPIRATION RATE: 16 BRPM | OXYGEN SATURATION: 94 % | HEART RATE: 94 BPM | SYSTOLIC BLOOD PRESSURE: 127 MMHG

## 2024-02-08 DIAGNOSIS — C92.01 ACUTE MYELOID LEUKEMIA IN REMISSION (HCC): Primary | ICD-10-CM

## 2024-02-08 PROCEDURE — 6360000002 HC RX W HCPCS: Performed by: INTERNAL MEDICINE

## 2024-02-08 PROCEDURE — 2580000003 HC RX 258: Performed by: INTERNAL MEDICINE

## 2024-02-08 PROCEDURE — 96375 TX/PRO/DX INJ NEW DRUG ADDON: CPT

## 2024-02-08 PROCEDURE — 96409 CHEMO IV PUSH SNGL DRUG: CPT

## 2024-02-08 RX ORDER — ONDANSETRON 2 MG/ML
8 INJECTION INTRAMUSCULAR; INTRAVENOUS ONCE
Status: COMPLETED | OUTPATIENT
Start: 2024-02-08 | End: 2024-02-08

## 2024-02-08 RX ORDER — SODIUM CHLORIDE 9 MG/ML
INJECTION, SOLUTION INTRAVENOUS CONTINUOUS
Status: DISCONTINUED | OUTPATIENT
Start: 2024-02-08 | End: 2024-02-09 | Stop reason: HOSPADM

## 2024-02-08 RX ORDER — DIPHENHYDRAMINE HYDROCHLORIDE 50 MG/ML
50 INJECTION INTRAMUSCULAR; INTRAVENOUS
Status: DISCONTINUED | OUTPATIENT
Start: 2024-02-08 | End: 2024-02-09 | Stop reason: HOSPADM

## 2024-02-08 RX ORDER — SODIUM CHLORIDE 9 MG/ML
5-250 INJECTION, SOLUTION INTRAVENOUS PRN
Status: DISCONTINUED | OUTPATIENT
Start: 2024-02-08 | End: 2024-02-09 | Stop reason: HOSPADM

## 2024-02-08 RX ORDER — HEPARIN 100 UNIT/ML
500 SYRINGE INTRAVENOUS PRN
Status: DISCONTINUED | OUTPATIENT
Start: 2024-02-08 | End: 2024-02-09 | Stop reason: HOSPADM

## 2024-02-08 RX ORDER — SODIUM CHLORIDE 0.9 % (FLUSH) 0.9 %
5-40 SYRINGE (ML) INJECTION PRN
Status: DISCONTINUED | OUTPATIENT
Start: 2024-02-08 | End: 2024-02-09 | Stop reason: HOSPADM

## 2024-02-08 RX ORDER — ALBUTEROL SULFATE 90 UG/1
4 AEROSOL, METERED RESPIRATORY (INHALATION) PRN
Status: DISCONTINUED | OUTPATIENT
Start: 2024-02-08 | End: 2024-02-09 | Stop reason: HOSPADM

## 2024-02-08 RX ORDER — EPINEPHRINE 1 MG/ML
0.3 INJECTION, SOLUTION, CONCENTRATE INTRAVENOUS PRN
Status: DISCONTINUED | OUTPATIENT
Start: 2024-02-08 | End: 2024-02-09 | Stop reason: HOSPADM

## 2024-02-08 RX ORDER — MEPERIDINE HYDROCHLORIDE 25 MG/ML
12.5 INJECTION INTRAMUSCULAR; INTRAVENOUS; SUBCUTANEOUS PRN
Status: DISCONTINUED | OUTPATIENT
Start: 2024-02-08 | End: 2024-02-09 | Stop reason: HOSPADM

## 2024-02-08 RX ORDER — ACETAMINOPHEN 325 MG/1
650 TABLET ORAL
Status: DISCONTINUED | OUTPATIENT
Start: 2024-02-08 | End: 2024-02-09 | Stop reason: HOSPADM

## 2024-02-08 RX ORDER — ONDANSETRON 2 MG/ML
8 INJECTION INTRAMUSCULAR; INTRAVENOUS
Status: DISCONTINUED | OUTPATIENT
Start: 2024-02-08 | End: 2024-02-09 | Stop reason: HOSPADM

## 2024-02-08 RX ADMIN — AZACITIDINE 130 MG: 100 INJECTION, POWDER, LYOPHILIZED, FOR SOLUTION INTRAVENOUS; SUBCUTANEOUS at 15:24

## 2024-02-08 RX ADMIN — SODIUM CHLORIDE: 9 INJECTION, SOLUTION INTRAVENOUS at 14:53

## 2024-02-08 RX ADMIN — DEXAMETHASONE SODIUM PHOSPHATE 12 MG: 4 INJECTION INTRA-ARTICULAR; INTRALESIONAL; INTRAMUSCULAR; INTRAVENOUS; SOFT TISSUE at 14:57

## 2024-02-08 RX ADMIN — ONDANSETRON 8 MG: 2 INJECTION INTRAMUSCULAR; INTRAVENOUS at 14:55

## 2024-02-08 NOTE — PROGRESS NOTES
Pt. Discharged ambulatory.  Tolerated infusion well. No distress noted.  To call physician with any problems or concerns.  Understanding voiced.  To return to Infusions on 2/9/24.

## 2024-02-09 ENCOUNTER — HOSPITAL ENCOUNTER (OUTPATIENT)
Dept: INFUSION THERAPY | Age: 74
Discharge: HOME | End: 2024-02-09
Payer: MEDICARE

## 2024-02-09 VITALS
HEART RATE: 83 BPM | SYSTOLIC BLOOD PRESSURE: 137 MMHG | RESPIRATION RATE: 16 BRPM | OXYGEN SATURATION: 93 % | DIASTOLIC BLOOD PRESSURE: 94 MMHG | TEMPERATURE: 98.4 F

## 2024-02-09 DIAGNOSIS — C92.01 ACUTE MYELOID LEUKEMIA IN REMISSION (HCC): Primary | ICD-10-CM

## 2024-02-09 PROCEDURE — 96409 CHEMO IV PUSH SNGL DRUG: CPT

## 2024-02-09 PROCEDURE — 96375 TX/PRO/DX INJ NEW DRUG ADDON: CPT

## 2024-02-09 PROCEDURE — 2580000003 HC RX 258: Performed by: INTERNAL MEDICINE

## 2024-02-09 PROCEDURE — 6360000002 HC RX W HCPCS: Performed by: INTERNAL MEDICINE

## 2024-02-09 RX ORDER — MEPERIDINE HYDROCHLORIDE 25 MG/ML
12.5 INJECTION INTRAMUSCULAR; INTRAVENOUS; SUBCUTANEOUS PRN
Status: DISCONTINUED | OUTPATIENT
Start: 2024-02-09 | End: 2024-02-10 | Stop reason: HOSPADM

## 2024-02-09 RX ORDER — SODIUM CHLORIDE 0.9 % (FLUSH) 0.9 %
5-40 SYRINGE (ML) INJECTION PRN
Status: DISCONTINUED | OUTPATIENT
Start: 2024-02-09 | End: 2024-02-10 | Stop reason: HOSPADM

## 2024-02-09 RX ORDER — EPINEPHRINE 1 MG/ML
0.3 INJECTION, SOLUTION, CONCENTRATE INTRAVENOUS PRN
Status: DISCONTINUED | OUTPATIENT
Start: 2024-02-09 | End: 2024-02-10 | Stop reason: HOSPADM

## 2024-02-09 RX ORDER — ONDANSETRON 2 MG/ML
8 INJECTION INTRAMUSCULAR; INTRAVENOUS ONCE
Status: COMPLETED | OUTPATIENT
Start: 2024-02-09 | End: 2024-02-09

## 2024-02-09 RX ORDER — ONDANSETRON 2 MG/ML
8 INJECTION INTRAMUSCULAR; INTRAVENOUS
Status: DISCONTINUED | OUTPATIENT
Start: 2024-02-09 | End: 2024-02-10 | Stop reason: HOSPADM

## 2024-02-09 RX ORDER — DIPHENHYDRAMINE HYDROCHLORIDE 50 MG/ML
50 INJECTION INTRAMUSCULAR; INTRAVENOUS
Status: DISCONTINUED | OUTPATIENT
Start: 2024-02-09 | End: 2024-02-10 | Stop reason: HOSPADM

## 2024-02-09 RX ORDER — SODIUM CHLORIDE 9 MG/ML
5-250 INJECTION, SOLUTION INTRAVENOUS PRN
Status: DISCONTINUED | OUTPATIENT
Start: 2024-02-09 | End: 2024-02-10 | Stop reason: HOSPADM

## 2024-02-09 RX ORDER — ACETAMINOPHEN 325 MG/1
650 TABLET ORAL
Status: DISCONTINUED | OUTPATIENT
Start: 2024-02-09 | End: 2024-02-10 | Stop reason: HOSPADM

## 2024-02-09 RX ORDER — SODIUM CHLORIDE 9 MG/ML
INJECTION, SOLUTION INTRAVENOUS CONTINUOUS
Status: DISCONTINUED | OUTPATIENT
Start: 2024-02-09 | End: 2024-02-10 | Stop reason: HOSPADM

## 2024-02-09 RX ORDER — ALBUTEROL SULFATE 90 UG/1
4 AEROSOL, METERED RESPIRATORY (INHALATION) PRN
Status: DISCONTINUED | OUTPATIENT
Start: 2024-02-09 | End: 2024-02-10 | Stop reason: HOSPADM

## 2024-02-09 RX ADMIN — ONDANSETRON 8 MG: 2 INJECTION INTRAMUSCULAR; INTRAVENOUS at 15:25

## 2024-02-09 RX ADMIN — AZACITIDINE 130 MG: 100 INJECTION, POWDER, LYOPHILIZED, FOR SOLUTION INTRAVENOUS; SUBCUTANEOUS at 15:46

## 2024-02-09 RX ADMIN — SODIUM CHLORIDE, PRESERVATIVE FREE 10 ML: 5 INJECTION INTRAVENOUS at 15:01

## 2024-02-09 RX ADMIN — DEXAMETHASONE SODIUM PHOSPHATE 12 MG: 4 INJECTION INTRA-ARTICULAR; INTRALESIONAL; INTRAMUSCULAR; INTRAVENOUS; SOFT TISSUE at 15:27

## 2024-02-09 RX ADMIN — SODIUM CHLORIDE 100 ML/HR: 9 INJECTION, SOLUTION INTRAVENOUS at 15:01

## 2024-02-09 NOTE — PROGRESS NOTES
Arrived to the Infusion Center.  Vidaza completed. Patient tolerated well.   Any issues or concerns during appointment: no.  Patient aware of next appointment on 3/4/24 (date) at 1400 (time).  Patient instructed to call provider with temperature of 100.4 or greater or nausea/vomiting/ diarrhea or pain not controlled by medications  Discharged ambulatory.

## 2024-02-14 RX ORDER — 0.9 % SODIUM CHLORIDE 0.9 %
1000 INTRAVENOUS SOLUTION INTRAVENOUS ONCE
Status: CANCELLED
Start: 2024-02-14

## 2024-02-14 RX ORDER — 0.9 % SODIUM CHLORIDE 0.9 %
1000 INTRAVENOUS SOLUTION INTRAVENOUS ONCE
Status: CANCELLED
Start: 2024-02-15

## 2024-02-15 ENCOUNTER — HOSPITAL ENCOUNTER (OUTPATIENT)
Dept: INFUSION THERAPY | Age: 74
Discharge: HOME | End: 2024-02-15
Payer: MEDICARE

## 2024-02-15 VITALS
TEMPERATURE: 98.1 F | SYSTOLIC BLOOD PRESSURE: 127 MMHG | RESPIRATION RATE: 16 BRPM | HEART RATE: 98 BPM | DIASTOLIC BLOOD PRESSURE: 83 MMHG | OXYGEN SATURATION: 96 %

## 2024-02-15 DIAGNOSIS — C92.01 ACUTE MYELOID LEUKEMIA IN REMISSION (HCC): ICD-10-CM

## 2024-02-15 DIAGNOSIS — E55.9 VITAMIN D DEFICIENCY, UNSPECIFIED: ICD-10-CM

## 2024-02-15 DIAGNOSIS — R79.9 ABNORMAL FINDING OF BLOOD CHEMISTRY, UNSPECIFIED: ICD-10-CM

## 2024-02-15 DIAGNOSIS — C94.00 ACUTE ERYTHROID LEUKEMIA NOT HAVING ACHIEVED REMISSION (HCC): Primary | ICD-10-CM

## 2024-02-15 LAB
ABO + RH BLD: NORMAL
ALBUMIN SERPL-MCNC: 3.6 G/DL (ref 3.2–4.6)
ALP SERPL-CCNC: 118 U/L (ref 50–136)
ALT SERPL-CCNC: 28 U/L (ref 12–65)
ANION GAP SERPL CALC-SCNC: 6 MMOL/L (ref 2–11)
AST SERPL-CCNC: 24 U/L (ref 15–37)
BASOPHILS # BLD: 0 K/UL (ref 0–0.2)
BASOPHILS NFR BLD: 0 % (ref 0–2)
BILIRUB SERPL-MCNC: 0.5 MG/DL (ref 0.2–1.1)
BLOOD GROUP ANTIBODIES SERPL: NORMAL
BUN SERPL-MCNC: 14 MG/DL (ref 8–23)
CALCIUM SERPL-MCNC: 9.3 MG/DL (ref 8.3–10.4)
CHLORIDE SERPL-SCNC: 104 MMOL/L (ref 103–113)
CO2 SERPL-SCNC: 28 MMOL/L (ref 21–32)
CREAT SERPL-MCNC: 1 MG/DL (ref 0.6–1)
DIFFERENTIAL METHOD BLD: ABNORMAL
EOSINOPHIL # BLD: 0 K/UL (ref 0–0.8)
EOSINOPHIL NFR BLD: 0 % (ref 0.5–7.8)
ERYTHROCYTE [DISTWIDTH] IN BLOOD BY AUTOMATED COUNT: 15.7 % (ref 11.9–14.6)
GLUCOSE SERPL-MCNC: 111 MG/DL (ref 65–100)
HCT VFR BLD AUTO: 38.8 % (ref 35.8–46.3)
HGB BLD-MCNC: 13.2 G/DL (ref 11.7–15.4)
IMM GRANULOCYTES # BLD AUTO: 0 K/UL (ref 0–0.5)
IMM GRANULOCYTES NFR BLD AUTO: 0 % (ref 0–5)
LYMPHOCYTES # BLD: 1.8 K/UL (ref 0.5–4.6)
LYMPHOCYTES NFR BLD: 55 % (ref 13–44)
MAGNESIUM SERPL-MCNC: 2.3 MG/DL (ref 1.8–2.4)
MCH RBC QN AUTO: 31.8 PG (ref 26.1–32.9)
MCHC RBC AUTO-ENTMCNC: 34 G/DL (ref 31.4–35)
MCV RBC AUTO: 93.5 FL (ref 82–102)
MONOCYTES # BLD: 0.2 K/UL (ref 0.1–1.3)
MONOCYTES NFR BLD: 5 % (ref 4–12)
NEUTS SEG # BLD: 1.4 K/UL (ref 1.7–8.2)
NEUTS SEG NFR BLD: 40 % (ref 43–78)
NRBC # BLD: 0 K/UL (ref 0–0.2)
PLATELET # BLD AUTO: 47 K/UL (ref 150–450)
PMV BLD AUTO: 10.2 FL (ref 9.4–12.3)
POTASSIUM SERPL-SCNC: 4.2 MMOL/L (ref 3.5–5.1)
PROT SERPL-MCNC: 7.1 G/DL (ref 6.3–8.2)
RBC # BLD AUTO: 4.15 M/UL (ref 4.05–5.2)
SODIUM SERPL-SCNC: 138 MMOL/L (ref 136–146)
SPECIMEN EXP DATE BLD: NORMAL
WBC # BLD AUTO: 3.4 K/UL (ref 4.3–11.1)

## 2024-02-15 PROCEDURE — 85025 COMPLETE CBC W/AUTO DIFF WBC: CPT

## 2024-02-15 PROCEDURE — 2580000003 HC RX 258: Performed by: INTERNAL MEDICINE

## 2024-02-15 PROCEDURE — 86901 BLOOD TYPING SEROLOGIC RH(D): CPT

## 2024-02-15 PROCEDURE — 80048 BASIC METABOLIC PNL TOTAL CA: CPT

## 2024-02-15 PROCEDURE — 96360 HYDRATION IV INFUSION INIT: CPT

## 2024-02-15 PROCEDURE — 2580000003 HC RX 258: Performed by: NURSE PRACTITIONER

## 2024-02-15 PROCEDURE — 84450 TRANSFERASE (AST) (SGOT): CPT

## 2024-02-15 PROCEDURE — 86900 BLOOD TYPING SEROLOGIC ABO: CPT

## 2024-02-15 PROCEDURE — 86850 RBC ANTIBODY SCREEN: CPT

## 2024-02-15 PROCEDURE — 84075 ASSAY ALKALINE PHOSPHATASE: CPT

## 2024-02-15 PROCEDURE — 83735 ASSAY OF MAGNESIUM: CPT

## 2024-02-15 PROCEDURE — 82247 BILIRUBIN TOTAL: CPT

## 2024-02-15 PROCEDURE — 84460 ALANINE AMINO (ALT) (SGPT): CPT

## 2024-02-15 PROCEDURE — 84155 ASSAY OF PROTEIN SERUM: CPT

## 2024-02-15 PROCEDURE — 82040 ASSAY OF SERUM ALBUMIN: CPT

## 2024-02-15 RX ORDER — ACETAMINOPHEN 325 MG/1
650 TABLET ORAL
OUTPATIENT
Start: 2024-02-15

## 2024-02-15 RX ORDER — ALBUTEROL SULFATE 90 UG/1
4 AEROSOL, METERED RESPIRATORY (INHALATION) PRN
OUTPATIENT
Start: 2024-02-15

## 2024-02-15 RX ORDER — DIPHENHYDRAMINE HYDROCHLORIDE 50 MG/ML
50 INJECTION INTRAMUSCULAR; INTRAVENOUS
OUTPATIENT
Start: 2024-02-15

## 2024-02-15 RX ORDER — SODIUM CHLORIDE 0.9 % (FLUSH) 0.9 %
5-40 SYRINGE (ML) INJECTION PRN
Status: DISCONTINUED | OUTPATIENT
Start: 2024-02-15 | End: 2024-02-16 | Stop reason: HOSPADM

## 2024-02-15 RX ORDER — HEPARIN 100 UNIT/ML
500 SYRINGE INTRAVENOUS PRN
OUTPATIENT
Start: 2024-02-15

## 2024-02-15 RX ORDER — ONDANSETRON 2 MG/ML
8 INJECTION INTRAMUSCULAR; INTRAVENOUS
OUTPATIENT
Start: 2024-02-15

## 2024-02-15 RX ORDER — SODIUM CHLORIDE 9 MG/ML
INJECTION, SOLUTION INTRAVENOUS CONTINUOUS
OUTPATIENT
Start: 2024-02-15

## 2024-02-15 RX ORDER — 0.9 % SODIUM CHLORIDE 0.9 %
1000 INTRAVENOUS SOLUTION INTRAVENOUS ONCE
Status: COMPLETED | OUTPATIENT
Start: 2024-02-15 | End: 2024-02-15

## 2024-02-15 RX ORDER — EPINEPHRINE 1 MG/ML
0.3 INJECTION, SOLUTION, CONCENTRATE INTRAVENOUS PRN
OUTPATIENT
Start: 2024-02-15

## 2024-02-15 RX ORDER — SODIUM CHLORIDE 0.9 % (FLUSH) 0.9 %
5-40 SYRINGE (ML) INJECTION PRN
OUTPATIENT
Start: 2024-02-15

## 2024-02-15 RX ORDER — SODIUM CHLORIDE 9 MG/ML
5-250 INJECTION, SOLUTION INTRAVENOUS PRN
OUTPATIENT
Start: 2024-02-15

## 2024-02-15 RX ORDER — 0.9 % SODIUM CHLORIDE 0.9 %
1000 INTRAVENOUS SOLUTION INTRAVENOUS ONCE
Status: CANCELLED
Start: 2024-02-15 | End: 2024-02-15

## 2024-02-15 RX ADMIN — SODIUM CHLORIDE 1000 ML: 9 INJECTION, SOLUTION INTRAVENOUS at 08:22

## 2024-02-15 RX ADMIN — SODIUM CHLORIDE, PRESERVATIVE FREE 10 ML: 5 INJECTION INTRAVENOUS at 09:25

## 2024-02-15 RX ADMIN — SODIUM CHLORIDE, PRESERVATIVE FREE 10 ML: 5 INJECTION INTRAVENOUS at 08:22

## 2024-02-15 ASSESSMENT — PAIN DESCRIPTION - DESCRIPTORS: DESCRIPTORS: THROBBING

## 2024-02-15 ASSESSMENT — PAIN DESCRIPTION - ONSET: ONSET: ON-GOING

## 2024-02-15 ASSESSMENT — PAIN DESCRIPTION - FREQUENCY: FREQUENCY: INTERMITTENT

## 2024-02-15 ASSESSMENT — PAIN - FUNCTIONAL ASSESSMENT: PAIN_FUNCTIONAL_ASSESSMENT: ACTIVITIES ARE NOT PREVENTED

## 2024-02-15 ASSESSMENT — PAIN DESCRIPTION - PAIN TYPE: TYPE: ACUTE PAIN

## 2024-02-15 ASSESSMENT — PAIN DESCRIPTION - LOCATION: LOCATION: ABDOMEN

## 2024-02-15 ASSESSMENT — PAIN DESCRIPTION - ORIENTATION: ORIENTATION: LOWER;MID

## 2024-02-15 ASSESSMENT — PAIN SCALES - GENERAL: PAINLEVEL_OUTOF10: 4

## 2024-02-15 NOTE — PROGRESS NOTES
Arrived to the Infusion Center.  Assessment completed. Labs drawn  and results reviewed and 1 liter of NS completed. Patient tolerated without problems.   Any issues or concerns during appointment: None  Instructed to call Dr Perez with any side effects or concerns  Patient aware of next infusion appointment on 3/4/24(date) at 4 PM (time).  Discharged ambulatory

## 2024-03-04 DIAGNOSIS — C92.01 ACUTE MYELOID LEUKEMIA IN REMISSION (HCC): Primary | ICD-10-CM

## 2024-03-04 DIAGNOSIS — E55.9 VITAMIN D DEFICIENCY, UNSPECIFIED: ICD-10-CM

## 2024-03-06 RX ORDER — PROCHLORPERAZINE MALEATE 10 MG
10 TABLET ORAL EVERY 6 HOURS PRN
Qty: 120 TABLET | Refills: 1 | Status: SHIPPED | OUTPATIENT
Start: 2024-03-06

## 2024-03-07 DIAGNOSIS — C92.01 ACUTE MYELOID LEUKEMIA IN REMISSION (HCC): Primary | ICD-10-CM

## 2024-03-07 RX ORDER — MAGNESIUM HYDROXIDE/ALUMINUM HYDROXICE/SIMETHICONE 120; 1200; 1200 MG/30ML; MG/30ML; MG/30ML
5 SUSPENSION ORAL EVERY 4 HOURS PRN
Qty: 480 ML | Refills: 1 | Status: SHIPPED | OUTPATIENT
Start: 2024-03-07

## 2024-03-11 ENCOUNTER — HOSPITAL ENCOUNTER (OUTPATIENT)
Dept: INFUSION THERAPY | Age: 74
Setting detail: INFUSION SERIES
Discharge: HOME | End: 2024-03-11
Payer: MEDICARE

## 2024-03-11 VITALS
TEMPERATURE: 97.7 F | DIASTOLIC BLOOD PRESSURE: 62 MMHG | SYSTOLIC BLOOD PRESSURE: 98 MMHG | RESPIRATION RATE: 16 BRPM | HEART RATE: 87 BPM | OXYGEN SATURATION: 95 %

## 2024-03-11 DIAGNOSIS — C92.01 ACUTE MYELOID LEUKEMIA IN REMISSION (HCC): Primary | ICD-10-CM

## 2024-03-11 DIAGNOSIS — C94.00 ACUTE ERYTHROID LEUKEMIA NOT HAVING ACHIEVED REMISSION (HCC): Primary | ICD-10-CM

## 2024-03-11 DIAGNOSIS — R79.9 ABNORMAL FINDING OF BLOOD CHEMISTRY, UNSPECIFIED: ICD-10-CM

## 2024-03-11 LAB
ABO + RH BLD: NORMAL
ANION GAP SERPL CALC-SCNC: 7 MMOL/L (ref 2–11)
BASOPHILS # BLD: 0 K/UL (ref 0–0.2)
BASOPHILS NFR BLD: 0 % (ref 0–2)
BLOOD GROUP ANTIBODIES SERPL: NORMAL
BUN SERPL-MCNC: 21 MG/DL (ref 8–23)
CALCIUM SERPL-MCNC: 9.3 MG/DL (ref 8.3–10.4)
CHLORIDE SERPL-SCNC: 103 MMOL/L (ref 103–113)
CO2 SERPL-SCNC: 29 MMOL/L (ref 21–32)
CREAT SERPL-MCNC: 0.7 MG/DL (ref 0.6–1)
DIFFERENTIAL METHOD BLD: ABNORMAL
EOSINOPHIL # BLD: 0 K/UL (ref 0–0.8)
EOSINOPHIL NFR BLD: 0 % (ref 0.5–7.8)
ERYTHROCYTE [DISTWIDTH] IN BLOOD BY AUTOMATED COUNT: 13.4 % (ref 11.9–14.6)
GLUCOSE SERPL-MCNC: 109 MG/DL (ref 65–100)
HCT VFR BLD AUTO: 24.3 % (ref 35.8–46.3)
HGB BLD-MCNC: 8.6 G/DL (ref 11.7–15.4)
IMM GRANULOCYTES # BLD AUTO: 0 K/UL (ref 0–0.5)
IMM GRANULOCYTES NFR BLD AUTO: 1 % (ref 0–5)
LYMPHOCYTES # BLD: 0.9 K/UL (ref 0.5–4.6)
LYMPHOCYTES NFR BLD: 34 % (ref 13–44)
MAGNESIUM SERPL-MCNC: 1.7 MG/DL (ref 1.8–2.4)
MCH RBC QN AUTO: 31.7 PG (ref 26.1–32.9)
MCHC RBC AUTO-ENTMCNC: 35.4 G/DL (ref 31.4–35)
MCV RBC AUTO: 89.7 FL (ref 82–102)
MONOCYTES # BLD: 0.4 K/UL (ref 0.1–1.3)
MONOCYTES NFR BLD: 14 % (ref 4–12)
NEUTS SEG # BLD: 1.2 K/UL (ref 1.7–8.2)
NEUTS SEG NFR BLD: 51 % (ref 43–78)
NRBC # BLD: 0 K/UL (ref 0–0.2)
PLATELET # BLD AUTO: 5 K/UL (ref 150–450)
PLATELET COMMENT: ABNORMAL
PMV BLD AUTO: ABNORMAL FL (ref 9.4–12.3)
POTASSIUM SERPL-SCNC: 3.4 MMOL/L (ref 3.5–5.1)
RBC # BLD AUTO: 2.71 M/UL (ref 4.05–5.2)
RBC MORPH BLD: ABNORMAL
RBC MORPH BLD: ABNORMAL
SODIUM SERPL-SCNC: 139 MMOL/L (ref 136–146)
SPECIMEN EXP DATE BLD: NORMAL
WBC # BLD AUTO: 2.5 K/UL (ref 4.3–11.1)
WBC MORPH BLD: ABNORMAL

## 2024-03-11 PROCEDURE — 85025 COMPLETE CBC W/AUTO DIFF WBC: CPT

## 2024-03-11 PROCEDURE — 2580000003 HC RX 258: Performed by: NURSE PRACTITIONER

## 2024-03-11 PROCEDURE — 86900 BLOOD TYPING SEROLOGIC ABO: CPT

## 2024-03-11 PROCEDURE — 86813 HLA TYPING A B OR C: CPT

## 2024-03-11 PROCEDURE — 83735 ASSAY OF MAGNESIUM: CPT

## 2024-03-11 PROCEDURE — 80048 BASIC METABOLIC PNL TOTAL CA: CPT

## 2024-03-11 PROCEDURE — 86850 RBC ANTIBODY SCREEN: CPT

## 2024-03-11 PROCEDURE — 36591 DRAW BLOOD OFF VENOUS DEVICE: CPT

## 2024-03-11 PROCEDURE — 86901 BLOOD TYPING SEROLOGIC RH(D): CPT

## 2024-03-11 RX ORDER — SODIUM CHLORIDE 0.9 % (FLUSH) 0.9 %
5-40 SYRINGE (ML) INJECTION PRN
Status: DISCONTINUED | OUTPATIENT
Start: 2024-03-11 | End: 2024-03-12 | Stop reason: HOSPADM

## 2024-03-11 RX ORDER — ALBUTEROL SULFATE 90 UG/1
4 AEROSOL, METERED RESPIRATORY (INHALATION) PRN
Status: CANCELLED | OUTPATIENT
Start: 2024-03-11

## 2024-03-11 RX ORDER — SODIUM CHLORIDE 0.9 % (FLUSH) 0.9 %
5-40 SYRINGE (ML) INJECTION PRN
Status: CANCELLED | OUTPATIENT
Start: 2024-03-11

## 2024-03-11 RX ORDER — SODIUM CHLORIDE 9 MG/ML
5-250 INJECTION, SOLUTION INTRAVENOUS PRN
Status: DISCONTINUED | OUTPATIENT
Start: 2024-03-11 | End: 2024-03-12 | Stop reason: HOSPADM

## 2024-03-11 RX ORDER — HEPARIN 100 UNIT/ML
500 SYRINGE INTRAVENOUS PRN
Status: CANCELLED | OUTPATIENT
Start: 2024-03-11

## 2024-03-11 RX ORDER — ACETAMINOPHEN 325 MG/1
650 TABLET ORAL
Status: CANCELLED | OUTPATIENT
Start: 2024-03-11

## 2024-03-11 RX ORDER — SODIUM CHLORIDE 9 MG/ML
INJECTION, SOLUTION INTRAVENOUS CONTINUOUS
Status: CANCELLED | OUTPATIENT
Start: 2024-03-11

## 2024-03-11 RX ORDER — DIPHENHYDRAMINE HYDROCHLORIDE 50 MG/ML
50 INJECTION INTRAMUSCULAR; INTRAVENOUS
Status: CANCELLED | OUTPATIENT
Start: 2024-03-11

## 2024-03-11 RX ORDER — SODIUM CHLORIDE 9 MG/ML
5-250 INJECTION, SOLUTION INTRAVENOUS PRN
Status: CANCELLED | OUTPATIENT
Start: 2024-03-11

## 2024-03-11 RX ORDER — EPINEPHRINE 1 MG/ML
0.3 INJECTION, SOLUTION, CONCENTRATE INTRAVENOUS PRN
Status: CANCELLED | OUTPATIENT
Start: 2024-03-11

## 2024-03-11 RX ORDER — ONDANSETRON 2 MG/ML
8 INJECTION INTRAMUSCULAR; INTRAVENOUS
Status: CANCELLED | OUTPATIENT
Start: 2024-03-11

## 2024-03-11 RX ADMIN — SODIUM CHLORIDE 999 ML/HR: 9 INJECTION, SOLUTION INTRAVENOUS at 14:00

## 2024-03-11 RX ADMIN — SODIUM CHLORIDE, PRESERVATIVE FREE 10 ML: 5 INJECTION INTRAVENOUS at 14:00

## 2024-03-11 NOTE — PROGRESS NOTES
Pt arrived ambulatory, labs drawn and reviewed, 1unit Platelets ordered for appointment 9am tomorrow, 1L NS infused, pt tolerated well, port needle left in place per pt request, 7discharged home in stable condition

## 2024-03-12 ENCOUNTER — HOSPITAL ENCOUNTER (OUTPATIENT)
Dept: INFUSION THERAPY | Age: 74
Setting detail: INFUSION SERIES
Discharge: HOME | End: 2024-03-12
Payer: MEDICARE

## 2024-03-12 VITALS
DIASTOLIC BLOOD PRESSURE: 65 MMHG | SYSTOLIC BLOOD PRESSURE: 104 MMHG | HEART RATE: 86 BPM | OXYGEN SATURATION: 96 % | RESPIRATION RATE: 16 BRPM | TEMPERATURE: 98.2 F

## 2024-03-12 DIAGNOSIS — C92.01 ACUTE MYELOID LEUKEMIA IN REMISSION (HCC): Primary | ICD-10-CM

## 2024-03-12 PROCEDURE — 36430 TRANSFUSION BLD/BLD COMPNT: CPT

## 2024-03-12 PROCEDURE — 96374 THER/PROPH/DIAG INJ IV PUSH: CPT

## 2024-03-12 PROCEDURE — 6360000002 HC RX W HCPCS: Performed by: INTERNAL MEDICINE

## 2024-03-12 PROCEDURE — 6370000000 HC RX 637 (ALT 250 FOR IP): Performed by: INTERNAL MEDICINE

## 2024-03-12 PROCEDURE — 2580000003 HC RX 258: Performed by: INTERNAL MEDICINE

## 2024-03-12 PROCEDURE — 96375 TX/PRO/DX INJ NEW DRUG ADDON: CPT

## 2024-03-12 PROCEDURE — P9037 PLATE PHERES LEUKOREDU IRRAD: HCPCS

## 2024-03-12 RX ORDER — ACETAMINOPHEN 325 MG/1
650 TABLET ORAL
OUTPATIENT
Start: 2024-03-12

## 2024-03-12 RX ORDER — SODIUM CHLORIDE 9 MG/ML
INJECTION, SOLUTION INTRAVENOUS CONTINUOUS
OUTPATIENT
Start: 2024-03-12

## 2024-03-12 RX ORDER — SODIUM CHLORIDE 9 MG/ML
25 INJECTION, SOLUTION INTRAVENOUS PRN
OUTPATIENT
Start: 2024-03-12

## 2024-03-12 RX ORDER — ONDANSETRON 2 MG/ML
8 INJECTION INTRAMUSCULAR; INTRAVENOUS
OUTPATIENT
Start: 2024-03-12

## 2024-03-12 RX ORDER — ACETAMINOPHEN 325 MG/1
650 TABLET ORAL
Status: DISCONTINUED | OUTPATIENT
Start: 2024-03-12 | End: 2024-03-13 | Stop reason: HOSPADM

## 2024-03-12 RX ORDER — ACETAMINOPHEN 325 MG/1
650 TABLET ORAL ONCE
OUTPATIENT
Start: 2024-03-12 | End: 2024-03-12

## 2024-03-12 RX ORDER — ONDANSETRON 2 MG/ML
8 INJECTION INTRAMUSCULAR; INTRAVENOUS
Status: COMPLETED | OUTPATIENT
Start: 2024-03-12 | End: 2024-03-12

## 2024-03-12 RX ORDER — DIPHENHYDRAMINE HYDROCHLORIDE 50 MG/ML
25 INJECTION INTRAMUSCULAR; INTRAVENOUS ONCE
Status: COMPLETED | OUTPATIENT
Start: 2024-03-12 | End: 2024-03-12

## 2024-03-12 RX ORDER — DIPHENHYDRAMINE HCL 25 MG
25 CAPSULE ORAL ONCE
OUTPATIENT
Start: 2024-03-12 | End: 2024-03-12

## 2024-03-12 RX ORDER — SODIUM CHLORIDE 9 MG/ML
20 INJECTION, SOLUTION INTRAVENOUS CONTINUOUS
OUTPATIENT
Start: 2024-03-12

## 2024-03-12 RX ORDER — ALBUTEROL SULFATE 90 UG/1
4 AEROSOL, METERED RESPIRATORY (INHALATION) PRN
OUTPATIENT
Start: 2024-03-12

## 2024-03-12 RX ORDER — EPINEPHRINE 1 MG/ML
0.3 INJECTION, SOLUTION, CONCENTRATE INTRAVENOUS PRN
Status: DISCONTINUED | OUTPATIENT
Start: 2024-03-12 | End: 2024-03-13 | Stop reason: HOSPADM

## 2024-03-12 RX ORDER — ACETAMINOPHEN 325 MG/1
650 TABLET ORAL SEE ADMIN INSTRUCTIONS
Status: COMPLETED | OUTPATIENT
Start: 2024-03-12 | End: 2024-03-12

## 2024-03-12 RX ORDER — SODIUM CHLORIDE 9 MG/ML
INJECTION, SOLUTION INTRAVENOUS PRN
Status: COMPLETED | OUTPATIENT
Start: 2024-03-12 | End: 2024-03-12

## 2024-03-12 RX ORDER — DIPHENHYDRAMINE HYDROCHLORIDE 50 MG/ML
50 INJECTION INTRAMUSCULAR; INTRAVENOUS
OUTPATIENT
Start: 2024-03-12

## 2024-03-12 RX ORDER — SODIUM CHLORIDE 0.9 % (FLUSH) 0.9 %
5-40 SYRINGE (ML) INJECTION PRN
OUTPATIENT
Start: 2024-03-12

## 2024-03-12 RX ORDER — ALBUTEROL SULFATE 90 UG/1
4 AEROSOL, METERED RESPIRATORY (INHALATION) PRN
Status: DISCONTINUED | OUTPATIENT
Start: 2024-03-12 | End: 2024-03-13 | Stop reason: HOSPADM

## 2024-03-12 RX ORDER — DIPHENHYDRAMINE HYDROCHLORIDE 50 MG/ML
50 INJECTION INTRAMUSCULAR; INTRAVENOUS
Status: DISCONTINUED | OUTPATIENT
Start: 2024-03-12 | End: 2024-03-13 | Stop reason: HOSPADM

## 2024-03-12 RX ORDER — EPINEPHRINE 1 MG/ML
0.3 INJECTION, SOLUTION, CONCENTRATE INTRAVENOUS PRN
OUTPATIENT
Start: 2024-03-12

## 2024-03-12 RX ADMIN — DIPHENHYDRAMINE HYDROCHLORIDE 25 MG: 50 INJECTION INTRAMUSCULAR; INTRAVENOUS at 09:31

## 2024-03-12 RX ADMIN — SODIUM CHLORIDE: 9 INJECTION, SOLUTION INTRAVENOUS at 09:31

## 2024-03-12 RX ADMIN — ONDANSETRON 8 MG: 2 INJECTION INTRAMUSCULAR; INTRAVENOUS at 09:54

## 2024-03-12 RX ADMIN — ACETAMINOPHEN 650 MG: 325 TABLET ORAL at 09:31

## 2024-03-12 NOTE — PROGRESS NOTES
Arrived to the Infusion Center.  One unit of platelets completed. Patient tolerated well.   Patient aware of next infusion appointment on 3/15/2024 (date) at 1030 (time).  Patient aware of next lab and [facility] office visit on 3/25/2024 (date) at 1330 (time).  Patient instructed to call provider with temperature of 100.4 or greater or nausea/vomiting/ diarrhea or pain not controlled by medications  Discharged ambulatory.

## 2024-03-13 DIAGNOSIS — C92.01 ACUTE MYELOID LEUKEMIA IN REMISSION (HCC): Primary | ICD-10-CM

## 2024-03-13 DIAGNOSIS — E55.9 VITAMIN D DEFICIENCY, UNSPECIFIED: ICD-10-CM

## 2024-03-13 LAB
BLD PROD TYP BPU: NORMAL
BLOOD BANK BLOOD PRODUCT EXPIRATION DATE: NORMAL
BLOOD BANK CMNT PATIENT-IMP: NORMAL
BLOOD BANK DISPENSE STATUS: NORMAL
BLOOD BANK ISBT PRODUCT BLOOD TYPE: 5100
BLOOD BANK UNIT TYPE AND RH: NORMAL
BPU ID: NORMAL
UNIT DIVISION: 0
UNIT ISSUE DATE/TIME: NORMAL

## 2024-03-18 ENCOUNTER — HOSPITAL ENCOUNTER (OUTPATIENT)
Dept: INFUSION THERAPY | Age: 74
Setting detail: INFUSION SERIES
Discharge: HOME | End: 2024-03-18
Payer: MEDICARE

## 2024-03-18 ENCOUNTER — HOSPITAL ENCOUNTER (INPATIENT)
Age: 74
LOS: 13 days | Discharge: HOME | DRG: 834 | End: 2024-04-02
Attending: EMERGENCY MEDICINE | Admitting: INTERNAL MEDICINE
Payer: MEDICARE

## 2024-03-18 VITALS
RESPIRATION RATE: 16 BRPM | OXYGEN SATURATION: 93 % | TEMPERATURE: 97.5 F | SYSTOLIC BLOOD PRESSURE: 90 MMHG | DIASTOLIC BLOOD PRESSURE: 59 MMHG | HEART RATE: 85 BPM

## 2024-03-18 DIAGNOSIS — R79.9 ABNORMAL FINDING OF BLOOD CHEMISTRY, UNSPECIFIED: ICD-10-CM

## 2024-03-18 DIAGNOSIS — D64.9 ACUTE ANEMIA: Primary | ICD-10-CM

## 2024-03-18 DIAGNOSIS — E87.6 HYPOKALEMIA: Primary | ICD-10-CM

## 2024-03-18 DIAGNOSIS — C94.00 ACUTE ERYTHROID LEUKEMIA NOT HAVING ACHIEVED REMISSION (HCC): Primary | ICD-10-CM

## 2024-03-18 DIAGNOSIS — D69.6 THROMBOCYTOPENIA (HCC): ICD-10-CM

## 2024-03-18 LAB
ABO + RH BLD: NORMAL
ALBUMIN SERPL-MCNC: 3.3 G/DL (ref 3.2–4.6)
ALBUMIN/GLOB SERPL: 0.9 (ref 0.4–1.6)
ALP SERPL-CCNC: 93 U/L (ref 50–136)
ALT SERPL-CCNC: 23 U/L (ref 12–65)
ANION GAP SERPL CALC-SCNC: 3 MMOL/L (ref 2–11)
ANION GAP SERPL CALC-SCNC: 6 MMOL/L (ref 2–11)
AST SERPL-CCNC: 44 U/L (ref 15–37)
BASOPHILS # BLD: 0 K/UL (ref 0–0.2)
BASOPHILS # BLD: 0 K/UL (ref 0–0.2)
BASOPHILS NFR BLD: 0 % (ref 0–2)
BASOPHILS NFR BLD: 0 % (ref 0–2)
BILIRUB SERPL-MCNC: 0.7 MG/DL (ref 0.2–1.1)
BUN SERPL-MCNC: 13 MG/DL (ref 8–23)
BUN SERPL-MCNC: 14 MG/DL (ref 8–23)
CALCIUM SERPL-MCNC: 9.2 MG/DL (ref 8.3–10.4)
CALCIUM SERPL-MCNC: 9.7 MG/DL (ref 8.3–10.4)
CHLORIDE SERPL-SCNC: 103 MMOL/L (ref 103–113)
CHLORIDE SERPL-SCNC: 107 MMOL/L (ref 103–113)
CO2 SERPL-SCNC: 28 MMOL/L (ref 21–32)
CO2 SERPL-SCNC: 29 MMOL/L (ref 21–32)
CREAT SERPL-MCNC: 0.6 MG/DL (ref 0.6–1)
CREAT SERPL-MCNC: 0.7 MG/DL (ref 0.6–1)
DIFFERENTIAL METHOD BLD: ABNORMAL
DIFFERENTIAL METHOD BLD: ABNORMAL
EOSINOPHIL # BLD: 0 K/UL (ref 0–0.8)
EOSINOPHIL # BLD: 0 K/UL (ref 0–0.8)
EOSINOPHIL NFR BLD: 0 % (ref 0.5–7.8)
EOSINOPHIL NFR BLD: 0 % (ref 0.5–7.8)
ERYTHROCYTE [DISTWIDTH] IN BLOOD BY AUTOMATED COUNT: 13.3 % (ref 11.9–14.6)
ERYTHROCYTE [DISTWIDTH] IN BLOOD BY AUTOMATED COUNT: 13.5 % (ref 11.9–14.6)
GLOBULIN SER CALC-MCNC: 3.7 G/DL (ref 2.8–4.5)
GLUCOSE SERPL-MCNC: 117 MG/DL (ref 65–100)
GLUCOSE SERPL-MCNC: 92 MG/DL (ref 65–100)
HCT VFR BLD AUTO: 19 % (ref 35.8–46.3)
HCT VFR BLD AUTO: 19.1 % (ref 35.8–46.3)
HGB BLD-MCNC: 6.6 G/DL (ref 11.7–15.4)
HGB BLD-MCNC: 6.7 G/DL (ref 11.7–15.4)
HISTORY CHECK: NORMAL
IMM GRANULOCYTES # BLD AUTO: 0 K/UL (ref 0–0.5)
IMM GRANULOCYTES # BLD AUTO: 0 K/UL (ref 0–0.5)
IMM GRANULOCYTES NFR BLD AUTO: 2 % (ref 0–5)
IMM GRANULOCYTES NFR BLD AUTO: 2 % (ref 0–5)
LACTATE SERPL-SCNC: 0.7 MMOL/L (ref 0.4–2)
LACTATE SERPL-SCNC: 1.3 MMOL/L (ref 0.4–2)
LYMPHOCYTES # BLD: 0.7 K/UL (ref 0.5–4.6)
LYMPHOCYTES # BLD: 0.7 K/UL (ref 0.5–4.6)
LYMPHOCYTES NFR BLD: 54 % (ref 13–44)
LYMPHOCYTES NFR BLD: 54 % (ref 13–44)
MAGNESIUM SERPL-MCNC: 1.7 MG/DL (ref 1.8–2.4)
MCH RBC QN AUTO: 30.7 PG (ref 26.1–32.9)
MCH RBC QN AUTO: 31 PG (ref 26.1–32.9)
MCHC RBC AUTO-ENTMCNC: 34.7 G/DL (ref 31.4–35)
MCHC RBC AUTO-ENTMCNC: 35.1 G/DL (ref 31.4–35)
MCV RBC AUTO: 88.4 FL (ref 82–102)
MCV RBC AUTO: 88.4 FL (ref 82–102)
MONOCYTES # BLD: 0.1 K/UL (ref 0.1–1.3)
MONOCYTES # BLD: 0.2 K/UL (ref 0.1–1.3)
MONOCYTES NFR BLD: 11 % (ref 4–12)
MONOCYTES NFR BLD: 9 % (ref 4–12)
NEUTS SEG # BLD: 0.5 K/UL (ref 1.7–8.2)
NEUTS SEG # BLD: 0.5 K/UL (ref 1.7–8.2)
NEUTS SEG NFR BLD: 33 % (ref 43–78)
NEUTS SEG NFR BLD: 35 % (ref 43–78)
NRBC # BLD: 0 K/UL (ref 0–0.2)
NRBC # BLD: 0 K/UL (ref 0–0.2)
PLATELET # BLD AUTO: 2 K/UL (ref 150–450)
PLATELET # BLD AUTO: 7 K/UL (ref 150–450)
PLATELET COMMENT: ABNORMAL
PLATELET COMMENT: ABNORMAL
PMV BLD AUTO: 10.6 FL (ref 9.4–12.3)
PMV BLD AUTO: ABNORMAL FL (ref 9.4–12.3)
POTASSIUM SERPL-SCNC: 3.1 MMOL/L (ref 3.5–5.1)
POTASSIUM SERPL-SCNC: 3.9 MMOL/L (ref 3.5–5.1)
PROCALCITONIN SERPL-MCNC: <0.05 NG/ML (ref 0–0.49)
PROT SERPL-MCNC: 7 G/DL (ref 6.3–8.2)
RBC # BLD AUTO: 2.15 M/UL (ref 4.05–5.2)
RBC # BLD AUTO: 2.16 M/UL (ref 4.05–5.2)
RBC MORPH BLD: ABNORMAL
SODIUM SERPL-SCNC: 137 MMOL/L (ref 136–146)
SODIUM SERPL-SCNC: 139 MMOL/L (ref 136–146)
SPECIMEN EXP DATE BLD: NORMAL
WBC # BLD AUTO: 1.3 K/UL (ref 4.3–11.1)
WBC # BLD AUTO: 1.4 K/UL (ref 4.3–11.1)
WBC MORPH BLD: ABNORMAL
WBC MORPH BLD: ABNORMAL

## 2024-03-18 PROCEDURE — P9037 PLATE PHERES LEUKOREDU IRRAD: HCPCS

## 2024-03-18 PROCEDURE — 86901 BLOOD TYPING SEROLOGIC RH(D): CPT

## 2024-03-18 PROCEDURE — 6360000002 HC RX W HCPCS: Performed by: NURSE PRACTITIONER

## 2024-03-18 PROCEDURE — 86921 COMPATIBILITY TEST INCUBATE: CPT

## 2024-03-18 PROCEDURE — 83605 ASSAY OF LACTIC ACID: CPT

## 2024-03-18 PROCEDURE — 99285 EMERGENCY DEPT VISIT HI MDM: CPT

## 2024-03-18 PROCEDURE — 6370000000 HC RX 637 (ALT 250 FOR IP): Performed by: NURSE PRACTITIONER

## 2024-03-18 PROCEDURE — 36430 TRANSFUSION BLD/BLD COMPNT: CPT

## 2024-03-18 PROCEDURE — 86850 RBC ANTIBODY SCREEN: CPT

## 2024-03-18 PROCEDURE — 85025 COMPLETE CBC W/AUTO DIFF WBC: CPT

## 2024-03-18 PROCEDURE — G0378 HOSPITAL OBSERVATION PER HR: HCPCS

## 2024-03-18 PROCEDURE — 2580000003 HC RX 258: Performed by: STUDENT IN AN ORGANIZED HEALTH CARE EDUCATION/TRAINING PROGRAM

## 2024-03-18 PROCEDURE — 2580000003 HC RX 258: Performed by: HOSPITALIST

## 2024-03-18 PROCEDURE — 80048 BASIC METABOLIC PNL TOTAL CA: CPT

## 2024-03-18 PROCEDURE — 83735 ASSAY OF MAGNESIUM: CPT

## 2024-03-18 PROCEDURE — 86900 BLOOD TYPING SEROLOGIC ABO: CPT

## 2024-03-18 PROCEDURE — 86644 CMV ANTIBODY: CPT

## 2024-03-18 PROCEDURE — 36415 COLL VENOUS BLD VENIPUNCTURE: CPT

## 2024-03-18 PROCEDURE — 2580000003 HC RX 258: Performed by: NURSE PRACTITIONER

## 2024-03-18 PROCEDURE — 96375 TX/PRO/DX INJ NEW DRUG ADDON: CPT

## 2024-03-18 PROCEDURE — 87040 BLOOD CULTURE FOR BACTERIA: CPT

## 2024-03-18 PROCEDURE — 80053 COMPREHEN METABOLIC PANEL: CPT

## 2024-03-18 PROCEDURE — 86920 COMPATIBILITY TEST SPIN: CPT

## 2024-03-18 PROCEDURE — 6370000000 HC RX 637 (ALT 250 FOR IP): Performed by: STUDENT IN AN ORGANIZED HEALTH CARE EDUCATION/TRAINING PROGRAM

## 2024-03-18 PROCEDURE — 96365 THER/PROPH/DIAG IV INF INIT: CPT

## 2024-03-18 PROCEDURE — 86022 PLATELET ANTIBODIES: CPT

## 2024-03-18 PROCEDURE — 84145 PROCALCITONIN (PCT): CPT

## 2024-03-18 PROCEDURE — 86922 COMPATIBILITY TEST ANTIGLOB: CPT

## 2024-03-18 RX ORDER — SODIUM CHLORIDE 9 MG/ML
5-250 INJECTION, SOLUTION INTRAVENOUS PRN
OUTPATIENT
Start: 2024-03-18

## 2024-03-18 RX ORDER — SODIUM CHLORIDE 9 MG/ML
INJECTION, SOLUTION INTRAVENOUS PRN
Status: DISCONTINUED | OUTPATIENT
Start: 2024-03-18 | End: 2024-03-27

## 2024-03-18 RX ORDER — SODIUM CHLORIDE 9 MG/ML
INJECTION, SOLUTION INTRAVENOUS CONTINUOUS
OUTPATIENT
Start: 2024-03-18

## 2024-03-18 RX ORDER — PROCHLORPERAZINE MALEATE 10 MG
10 TABLET ORAL EVERY 6 HOURS PRN
Status: DISCONTINUED | OUTPATIENT
Start: 2024-03-18 | End: 2024-04-02 | Stop reason: HOSPADM

## 2024-03-18 RX ORDER — ONDANSETRON 4 MG/1
4 TABLET, ORALLY DISINTEGRATING ORAL EVERY 8 HOURS PRN
Status: DISCONTINUED | OUTPATIENT
Start: 2024-03-18 | End: 2024-04-02 | Stop reason: HOSPADM

## 2024-03-18 RX ORDER — SODIUM CHLORIDE 0.9 % (FLUSH) 0.9 %
5-40 SYRINGE (ML) INJECTION PRN
OUTPATIENT
Start: 2024-03-18

## 2024-03-18 RX ORDER — POTASSIUM CHLORIDE 20 MEQ/1
40 TABLET, EXTENDED RELEASE ORAL PRN
Status: DISCONTINUED | OUTPATIENT
Start: 2024-03-18 | End: 2024-04-02 | Stop reason: HOSPADM

## 2024-03-18 RX ORDER — SODIUM CHLORIDE 0.9 % (FLUSH) 0.9 %
5-40 SYRINGE (ML) INJECTION EVERY 12 HOURS SCHEDULED
Status: DISCONTINUED | OUTPATIENT
Start: 2024-03-18 | End: 2024-04-02 | Stop reason: HOSPADM

## 2024-03-18 RX ORDER — ONDANSETRON 2 MG/ML
8 INJECTION INTRAMUSCULAR; INTRAVENOUS
Status: COMPLETED | OUTPATIENT
Start: 2024-03-18 | End: 2024-03-18

## 2024-03-18 RX ORDER — DIPHENHYDRAMINE HYDROCHLORIDE 50 MG/ML
50 INJECTION INTRAMUSCULAR; INTRAVENOUS
OUTPATIENT
Start: 2024-03-18

## 2024-03-18 RX ORDER — MAGNESIUM SULFATE IN WATER 40 MG/ML
2000 INJECTION, SOLUTION INTRAVENOUS ONCE
Status: COMPLETED | OUTPATIENT
Start: 2024-03-18 | End: 2024-03-18

## 2024-03-18 RX ORDER — HEPARIN 100 UNIT/ML
500 SYRINGE INTRAVENOUS PRN
OUTPATIENT
Start: 2024-03-18

## 2024-03-18 RX ORDER — SODIUM CHLORIDE 9 MG/ML
INJECTION, SOLUTION INTRAVENOUS PRN
Status: DISCONTINUED | OUTPATIENT
Start: 2024-03-18 | End: 2024-03-19 | Stop reason: HOSPADM

## 2024-03-18 RX ORDER — POLYETHYLENE GLYCOL 3350 17 G/17G
17 POWDER, FOR SOLUTION ORAL DAILY PRN
Status: DISCONTINUED | OUTPATIENT
Start: 2024-03-18 | End: 2024-04-02 | Stop reason: HOSPADM

## 2024-03-18 RX ORDER — ACETAMINOPHEN 650 MG/1
650 SUPPOSITORY RECTAL EVERY 6 HOURS PRN
Status: DISCONTINUED | OUTPATIENT
Start: 2024-03-18 | End: 2024-03-20

## 2024-03-18 RX ORDER — LEVOTHYROXINE SODIUM 0.12 MG/1
125 TABLET ORAL
Status: DISCONTINUED | OUTPATIENT
Start: 2024-03-19 | End: 2024-03-30

## 2024-03-18 RX ORDER — HYDROXYZINE HYDROCHLORIDE 25 MG/1
25 TABLET, FILM COATED ORAL 3 TIMES DAILY PRN
Status: DISCONTINUED | OUTPATIENT
Start: 2024-03-18 | End: 2024-04-02 | Stop reason: HOSPADM

## 2024-03-18 RX ORDER — EPINEPHRINE 1 MG/ML
0.3 INJECTION, SOLUTION, CONCENTRATE INTRAVENOUS PRN
OUTPATIENT
Start: 2024-03-18

## 2024-03-18 RX ORDER — SODIUM CHLORIDE 9 MG/ML
INJECTION, SOLUTION INTRAVENOUS CONTINUOUS
Status: ACTIVE | OUTPATIENT
Start: 2024-03-18 | End: 2024-03-20

## 2024-03-18 RX ORDER — FLUOXETINE HYDROCHLORIDE 20 MG/1
20 CAPSULE ORAL 2 TIMES DAILY
Status: DISCONTINUED | OUTPATIENT
Start: 2024-03-18 | End: 2024-04-02 | Stop reason: HOSPADM

## 2024-03-18 RX ORDER — HYDROCODONE BITARTRATE AND ACETAMINOPHEN 10; 325 MG/1; MG/1
1 TABLET ORAL EVERY 6 HOURS PRN
Status: DISCONTINUED | OUTPATIENT
Start: 2024-03-18 | End: 2024-04-02 | Stop reason: HOSPADM

## 2024-03-18 RX ORDER — AMLODIPINE BESYLATE 5 MG/1
5 TABLET ORAL DAILY
Status: DISCONTINUED | OUTPATIENT
Start: 2024-03-18 | End: 2024-04-02 | Stop reason: HOSPADM

## 2024-03-18 RX ORDER — SODIUM CHLORIDE 0.9 % (FLUSH) 0.9 %
5-40 SYRINGE (ML) INJECTION PRN
Status: DISCONTINUED | OUTPATIENT
Start: 2024-03-18 | End: 2024-03-19 | Stop reason: HOSPADM

## 2024-03-18 RX ORDER — ACETAMINOPHEN 325 MG/1
650 TABLET ORAL
OUTPATIENT
Start: 2024-03-18

## 2024-03-18 RX ORDER — FAMOTIDINE 20 MG/1
40 TABLET, FILM COATED ORAL EVERY EVENING
Status: DISCONTINUED | OUTPATIENT
Start: 2024-03-18 | End: 2024-04-02 | Stop reason: HOSPADM

## 2024-03-18 RX ORDER — POTASSIUM CHLORIDE 7.45 MG/ML
10 INJECTION INTRAVENOUS PRN
Status: DISCONTINUED | OUTPATIENT
Start: 2024-03-18 | End: 2024-04-02 | Stop reason: HOSPADM

## 2024-03-18 RX ORDER — MORPHINE SULFATE 15 MG/1
15 TABLET, FILM COATED, EXTENDED RELEASE ORAL 2 TIMES DAILY
Status: DISCONTINUED | OUTPATIENT
Start: 2024-03-18 | End: 2024-04-02 | Stop reason: HOSPADM

## 2024-03-18 RX ORDER — DIPHENHYDRAMINE HCL 25 MG
25 CAPSULE ORAL SEE ADMIN INSTRUCTIONS
Status: DISCONTINUED | OUTPATIENT
Start: 2024-03-18 | End: 2024-03-19 | Stop reason: HOSPADM

## 2024-03-18 RX ORDER — ONDANSETRON 2 MG/ML
4 INJECTION INTRAMUSCULAR; INTRAVENOUS EVERY 6 HOURS PRN
Status: DISCONTINUED | OUTPATIENT
Start: 2024-03-18 | End: 2024-04-02 | Stop reason: HOSPADM

## 2024-03-18 RX ORDER — ACETAMINOPHEN 325 MG/1
650 TABLET ORAL EVERY 6 HOURS PRN
Status: DISCONTINUED | OUTPATIENT
Start: 2024-03-18 | End: 2024-04-02 | Stop reason: HOSPADM

## 2024-03-18 RX ORDER — 0.9 % SODIUM CHLORIDE 0.9 %
1000 INTRAVENOUS SOLUTION INTRAVENOUS ONCE
Status: COMPLETED | OUTPATIENT
Start: 2024-03-18 | End: 2024-03-19

## 2024-03-18 RX ORDER — ACETAMINOPHEN 325 MG/1
650 TABLET ORAL SEE ADMIN INSTRUCTIONS
Status: DISCONTINUED | OUTPATIENT
Start: 2024-03-18 | End: 2024-03-19 | Stop reason: HOSPADM

## 2024-03-18 RX ORDER — ALBUTEROL SULFATE 90 UG/1
4 AEROSOL, METERED RESPIRATORY (INHALATION) PRN
OUTPATIENT
Start: 2024-03-18

## 2024-03-18 RX ORDER — ONDANSETRON 2 MG/ML
8 INJECTION INTRAMUSCULAR; INTRAVENOUS
OUTPATIENT
Start: 2024-03-18

## 2024-03-18 RX ORDER — TIZANIDINE 2 MG/1
4 TABLET ORAL NIGHTLY
Status: DISCONTINUED | OUTPATIENT
Start: 2024-03-18 | End: 2024-04-02 | Stop reason: HOSPADM

## 2024-03-18 RX ORDER — POTASSIUM CHLORIDE 750 MG/1
40 TABLET, EXTENDED RELEASE ORAL ONCE
Status: COMPLETED | OUTPATIENT
Start: 2024-03-18 | End: 2024-03-18

## 2024-03-18 RX ORDER — PANTOPRAZOLE SODIUM 40 MG/1
40 TABLET, DELAYED RELEASE ORAL 2 TIMES DAILY
Status: DISCONTINUED | OUTPATIENT
Start: 2024-03-18 | End: 2024-04-02 | Stop reason: HOSPADM

## 2024-03-18 RX ORDER — SODIUM CHLORIDE 9 MG/ML
5-250 INJECTION, SOLUTION INTRAVENOUS PRN
Status: DISCONTINUED | OUTPATIENT
Start: 2024-03-18 | End: 2024-03-19 | Stop reason: HOSPADM

## 2024-03-18 RX ORDER — DOCUSATE SODIUM 100 MG/1
100 CAPSULE, LIQUID FILLED ORAL 2 TIMES DAILY
Status: DISCONTINUED | OUTPATIENT
Start: 2024-03-18 | End: 2024-04-02 | Stop reason: HOSPADM

## 2024-03-18 RX ORDER — POTASSIUM CHLORIDE 20 MEQ/1
20 TABLET, EXTENDED RELEASE ORAL DAILY
Qty: 30 TABLET | Refills: 1 | Status: ON HOLD | OUTPATIENT
Start: 2024-03-18 | End: 2024-05-17

## 2024-03-18 RX ORDER — SODIUM CHLORIDE 0.9 % (FLUSH) 0.9 %
5-40 SYRINGE (ML) INJECTION PRN
Status: DISCONTINUED | OUTPATIENT
Start: 2024-03-18 | End: 2024-04-02 | Stop reason: HOSPADM

## 2024-03-18 RX ORDER — MAGNESIUM SULFATE IN WATER 40 MG/ML
2000 INJECTION, SOLUTION INTRAVENOUS PRN
Status: DISCONTINUED | OUTPATIENT
Start: 2024-03-18 | End: 2024-04-02 | Stop reason: HOSPADM

## 2024-03-18 RX ADMIN — SODIUM CHLORIDE 1000 ML: 9 INJECTION, SOLUTION INTRAVENOUS at 23:45

## 2024-03-18 RX ADMIN — SODIUM CHLORIDE, PRESERVATIVE FREE 5 ML: 5 INJECTION INTRAVENOUS at 20:01

## 2024-03-18 RX ADMIN — AMLODIPINE BESYLATE 5 MG: 5 TABLET ORAL at 20:03

## 2024-03-18 RX ADMIN — SODIUM CHLORIDE: 9 INJECTION, SOLUTION INTRAVENOUS at 20:05

## 2024-03-18 RX ADMIN — FLUOXETINE HYDROCHLORIDE 20 MG: 20 CAPSULE ORAL at 20:15

## 2024-03-18 RX ADMIN — FAMOTIDINE 40 MG: 20 TABLET, FILM COATED ORAL at 20:08

## 2024-03-18 RX ADMIN — MAGNESIUM SULFATE HEPTAHYDRATE 2000 MG: 40 INJECTION, SOLUTION INTRAVENOUS at 12:21

## 2024-03-18 RX ADMIN — SODIUM CHLORIDE, PRESERVATIVE FREE 10 ML: 5 INJECTION INTRAVENOUS at 11:15

## 2024-03-18 RX ADMIN — SODIUM CHLORIDE 250 ML/HR: 9 INJECTION, SOLUTION INTRAVENOUS at 11:36

## 2024-03-18 RX ADMIN — DOCUSATE SODIUM 100 MG: 100 CAPSULE, LIQUID FILLED ORAL at 20:10

## 2024-03-18 RX ADMIN — PANTOPRAZOLE SODIUM 40 MG: 40 TABLET, DELAYED RELEASE ORAL at 20:03

## 2024-03-18 RX ADMIN — POTASSIUM CHLORIDE 40 MEQ: 750 TABLET, EXTENDED RELEASE ORAL at 12:21

## 2024-03-18 RX ADMIN — ONDANSETRON 8 MG: 2 INJECTION INTRAMUSCULAR; INTRAVENOUS at 11:38

## 2024-03-18 RX ADMIN — TIZANIDINE 4 MG: 2 TABLET ORAL at 20:10

## 2024-03-18 RX ADMIN — MORPHINE SULFATE 15 MG: 15 TABLET, FILM COATED, EXTENDED RELEASE ORAL at 20:10

## 2024-03-18 NOTE — ED TRIAGE NOTES
Coming from [facility] via ems. Was called out due to low H&H. Was not able to get infusion due to low lab values. Pt symptomatic, lightheaded, dizziness, and pale. Pt has leukemia  Denies blood in stool and urine.   States bleeding gums and occasional nose bleeds

## 2024-03-18 NOTE — H&P
Hospitalist History and Physical   Admit Date:  3/18/2024  2:19 PM   Name:  Constance Gomes   Age:  73 y.o.  Sex:  female  :  1950   MRN:  [id number]   Room:  [place] [address]    Presenting/Chief Complaint: Abnormal lab     Reason(s) for Admission: Acute anemia [D64.9]     History of Present Illness:   Constance Gomes is a 73 y.o. female with medical history of chronic pain, essential hypertension, GERD, anxiety/depression, hypothyroidism, AML followed with oncology with remote history of chemo who presented with acute anemia and thrombocytopenia.  Patient was at cancer center today obtaining labs.  Hemoglobin of 6, platelets of 7.  Advised to go to the emergency department to be evaluated.  Patient mildly neutropenic at 1.6.  Patient also reports some fatigue and generalized malaise, chills.  Patient also claims she had some bleeding from her gums last night.  Patient will be admitted for observation.  Patient will also receive unit of blood and platelets.      Assessment & Plan:   Acute anemia  Thrombocytopenia  -Hemoglobin 6, platelets 7  - Patient received 1 unit packed red blood cell  - Follow-up post transfusion CBC  - Follow-up morning CBC  - Patient to receive unit of platelets  - Oncology consulted    History of AML  - Remote history of chemo  - Oncology consulted, appreciate recommendations  - Continue home Norco  - Continue home MS Contin  - Compazine as needed    GERD  - Continue home Pepcid    Hypothyroidism  - Continue home Synthroid    Anxiety/depression  - Continue home Prozac    Hypertension  - Continue home Norvasc    PT/OT evals and PPD ordered?  Therapy   Diet: No diet orders on file  VTE prophylaxis: SCD's   Code status: Prior      Non-peripheral Lines and Tubes (if present):      Single Lumen Implantable Port 24 Right Subclavian (Active)           Hospital Problems:  Principal Problem:    Acute anemia  Resolved Problems:    * No resolved hospital problems. *        Objective:

## 2024-03-18 NOTE — ED NOTES
TRANSFER - OUT REPORT:    Verbal report given to CHINO Ford on Constance Gomes  being transferred to [facility] for routine progression of patient care       Report consisted of patient's Situation, Background, Assessment and   Recommendations(SBAR).     Information from the following report(s) ED SBAR was reviewed with the receiving nurse.    Lines:   Single Lumen Implantable Port 01/03/24 Right Subclavian (Active)       Peripheral IV 03/18/24 Right Antecubital (Active)   Site Assessment Clean, dry & intact 03/18/24 1741   Line Status Blood return noted 03/18/24 1741   Phlebitis Assessment No symptoms 03/18/24 1741   Infiltration Assessment 0 03/18/24 1741        Opportunity for questions and clarification was provided.      Patient transported with:  Andrew Amezquita RN  03/18/24 1741

## 2024-03-18 NOTE — PROGRESS NOTES
Arrived to the Infusion Center via wheelchair.  Labs draw via port. Pt received 2g Mg+ IV, 40mEq PO K+ and 500cc bolus. Pt needs platelets and 1 U blood, but unable to receive these today due to special cross match needed.  felt more comfortable patient going to ED via transport. Patient left at 1355 via medical transport to go to [facility]. Patient in stable condition, A+Ox4, ambulatory but very weak. Pt reported bleeding gums over night last night. Report called into ED at 1400. Patient has an infusion/lab appointment  on 3/19 at 1530 if patient is not admitted tonight. Patient and  verbal understanding.

## 2024-03-18 NOTE — ED PROVIDER NOTES
appearance.   HENT:      Head: Normocephalic and atraumatic.      Right Ear: External ear normal.      Left Ear: External ear normal.   Eyes:      Extraocular Movements: Extraocular movements intact.      Pupils: Pupils are equal, round, and reactive to light.   Cardiovascular:      Rate and Rhythm: Normal rate and regular rhythm.      Pulses: Normal pulses.      Heart sounds: Normal heart sounds.   Pulmonary:      Effort: Pulmonary effort is normal.      Breath sounds: Normal breath sounds.   Abdominal:      General: Abdomen is flat. There is no distension.      Palpations: Abdomen is soft. There is no mass.   Musculoskeletal:         General: No swelling, tenderness, deformity or signs of injury. Normal range of motion.   Skin:     General: Skin is warm.      Coloration: Skin is not jaundiced or pale.      Findings: No bruising or erythema.   Neurological:      General: No focal deficit present.      Mental Status: She is alert and oriented to person, place, and time.      Cranial Nerves: No cranial nerve deficit.      Sensory: No sensory deficit.        Procedures     Procedures    Orders Placed This Encounter   Procedures    Blood Culture 1    Blood Culture 2    CBC with Auto Differential    CMP    Procalcitonin    Lactate, Sepsis    Hemoglobin and Hematocrit    CBC    Verify hospital blood product consent form has been signed and witnessed    Vital Signs For Blood Product Transfusion    Transfusion Reaction Management    Type and Screen    PREPARE RBC (CROSSMATCH), 1 Units    PREPARE PLATELETS, 1 Product        Medications given during this emergency department visit:  Medications   0.9 % sodium chloride infusion (has no administration in time range)       New Prescriptions    No medications on file        Past Medical History:   Diagnosis Date    Arthritis     Autoimmune disease (HCC)     skin changes, unknown name    Cancer (HCC) 1990    ovarian    Chronic pain     arthritis in back and legs    Coronary

## 2024-03-18 NOTE — CONSENT
Informed Consent for Blood Component Transfusion Note    I have discussed with the patient the rationale for blood component transfusion; its benefits in treating or preventing fatigue, organ damage, or death; and its risk which includes mild transfusion reactions, rare risk of blood borne infection, or more serious but rare reactions. I have discussed the alternatives to transfusion, including the risk and consequences of not receiving transfusion. The patient had an opportunity to ask questions and had agreed to proceed with transfusion of blood components.    Electronically signed by Isaac Quiroga MD on 3/18/24 at 4:42 PM EDT

## 2024-03-19 ENCOUNTER — APPOINTMENT (OUTPATIENT)
Dept: GENERAL RADIOLOGY | Age: 74
DRG: 834 | End: 2024-03-19
Payer: MEDICARE

## 2024-03-19 LAB
ALBUMIN SERPL-MCNC: 3 G/DL (ref 3.2–4.6)
ALBUMIN/GLOB SERPL: 0.8 (ref 0.4–1.6)
ALP SERPL-CCNC: 92 U/L (ref 50–136)
ALT SERPL-CCNC: 21 U/L (ref 12–65)
ANION GAP SERPL CALC-SCNC: 6 MMOL/L (ref 2–11)
APPEARANCE UR: CLEAR
AST SERPL-CCNC: 40 U/L (ref 15–37)
BACTERIA URNS QL MICRO: 0 /HPF
BASOPHILS # BLD: 0 K/UL (ref 0–0.2)
BASOPHILS NFR BLD: 0 % (ref 0–2)
BILIRUB DIRECT SERPL-MCNC: 0.2 MG/DL
BILIRUB INDIRECT SERPL-MCNC: 0.8 MG/DL (ref 0–1.1)
BILIRUB SERPL-MCNC: 1 MG/DL (ref 0.2–1.1)
BILIRUB SERPL-MCNC: 1 MG/DL (ref 0.2–1.1)
BILIRUB UR QL: NEGATIVE
BLD PROD TYP BPU: NORMAL
BLOOD BANK BLOOD PRODUCT EXPIRATION DATE: NORMAL
BLOOD BANK CMNT PATIENT-IMP: NORMAL
BLOOD BANK DISPENSE STATUS: NORMAL
BLOOD BANK ISBT PRODUCT BLOOD TYPE: 9500
BLOOD BANK UNIT TYPE AND RH: NORMAL
BPU ID: NORMAL
BUN SERPL-MCNC: 12 MG/DL (ref 8–23)
CALCIUM SERPL-MCNC: 9 MG/DL (ref 8.3–10.4)
CASTS URNS QL MICRO: 0 /LPF
CHLORIDE SERPL-SCNC: 108 MMOL/L (ref 103–113)
CO2 SERPL-SCNC: 26 MMOL/L (ref 21–32)
COLOR UR: NORMAL
CREAT SERPL-MCNC: 0.7 MG/DL (ref 0.6–1)
CRYSTALS URNS QL MICRO: 0 /LPF
DIFFERENTIAL METHOD BLD: ABNORMAL
EOSINOPHIL # BLD: 0 K/UL (ref 0–0.8)
EOSINOPHIL NFR BLD: 0 % (ref 0.5–7.8)
EPI CELLS #/AREA URNS HPF: NORMAL /HPF
ERYTHROCYTE [DISTWIDTH] IN BLOOD BY AUTOMATED COUNT: 14.1 % (ref 11.9–14.6)
ERYTHROCYTE [DISTWIDTH] IN BLOOD BY AUTOMATED COUNT: 14.3 % (ref 11.9–14.6)
GLOBULIN SER CALC-MCNC: 3.6 G/DL (ref 2.8–4.5)
GLUCOSE SERPL-MCNC: 101 MG/DL (ref 65–100)
GLUCOSE UR STRIP.AUTO-MCNC: NEGATIVE MG/DL
HCT VFR BLD AUTO: 21 % (ref 35.8–46.3)
HCT VFR BLD AUTO: 22 % (ref 35.8–46.3)
HGB BLD-MCNC: 7.5 G/DL (ref 11.7–15.4)
HGB BLD-MCNC: 7.7 G/DL (ref 11.7–15.4)
HGB RETIC QN AUTO: 37 PG (ref 29–35)
HGB UR QL STRIP: NEGATIVE
IMM GRANULOCYTES # BLD AUTO: 0 K/UL (ref 0–0.5)
IMM GRANULOCYTES NFR BLD AUTO: 2 % (ref 0–5)
IMM RETICS NFR: 1.1 % (ref 3–15.9)
KETONES UR QL STRIP.AUTO: NEGATIVE MG/DL
LDH SERPL L TO P-CCNC: 835 U/L (ref 110–210)
LEUKOCYTE ESTERASE UR QL STRIP.AUTO: NEGATIVE
LYMPHOCYTES # BLD: 0.8 K/UL (ref 0.5–4.6)
LYMPHOCYTES NFR BLD: 64 % (ref 13–44)
MCH RBC QN AUTO: 30.6 PG (ref 26.1–32.9)
MCH RBC QN AUTO: 31 PG (ref 26.1–32.9)
MCHC RBC AUTO-ENTMCNC: 35 G/DL (ref 31.4–35)
MCHC RBC AUTO-ENTMCNC: 35.7 G/DL (ref 31.4–35)
MCV RBC AUTO: 86.8 FL (ref 82–102)
MCV RBC AUTO: 87.3 FL (ref 82–102)
MONOCYTES # BLD: 0.1 K/UL (ref 0.1–1.3)
MONOCYTES NFR BLD: 7 % (ref 4–12)
MUCOUS THREADS URNS QL MICRO: 0 /LPF
NEUTS SEG # BLD: 0.4 K/UL (ref 1.7–8.2)
NEUTS SEG NFR BLD: 27 % (ref 43–78)
NITRITE UR QL STRIP.AUTO: NEGATIVE
NRBC # BLD: 0 K/UL (ref 0–0.2)
NRBC # BLD: 0 K/UL (ref 0–0.2)
OTHER OBSERVATIONS: NORMAL
PH UR STRIP: 5.5 (ref 5–9)
PLATELET # BLD AUTO: 2 K/UL (ref 150–450)
PLATELET # BLD AUTO: 4 K/UL (ref 150–450)
PLATELET # BLD AUTO: 7 K/UL (ref 150–450)
PLATELET COMMENT: ABNORMAL
PLATELETS.RETICULATED NFR BLD AUTO: 3.1 % (ref 1.8–7.9)
PMV BLD AUTO: ABNORMAL FL (ref 9.4–12.3)
PMV BLD AUTO: ABNORMAL FL (ref 9.4–12.3)
POTASSIUM SERPL-SCNC: 4 MMOL/L (ref 3.5–5.1)
PROT SERPL-MCNC: 6.6 G/DL (ref 6.3–8.2)
PROT UR STRIP-MCNC: NEGATIVE MG/DL
RBC # BLD AUTO: 2.42 M/UL (ref 4.05–5.2)
RBC # BLD AUTO: 2.52 M/UL (ref 4.05–5.2)
RBC #/AREA URNS HPF: NORMAL /HPF
RBC MORPH BLD: ABNORMAL
RBC MORPH BLD: ABNORMAL
RETICS # AUTO: 0.01 M/UL (ref 0.03–0.1)
RETICS/RBC NFR AUTO: 0.6 % (ref 0.3–2)
SODIUM SERPL-SCNC: 140 MMOL/L (ref 136–146)
SP GR UR REFRACTOMETRY: 1.01 (ref 1–1.02)
UNIT DIVISION: 0
UNIT ISSUE DATE/TIME: NORMAL
URINE CULTURE IF INDICATED: NORMAL
UROBILINOGEN UR QL STRIP.AUTO: 1 EU/DL (ref 0.2–1)
WBC # BLD AUTO: 1.3 K/UL (ref 4.3–11.1)
WBC # BLD AUTO: 1.4 K/UL (ref 4.3–11.1)
WBC MORPH BLD: ABNORMAL
WBC URNS QL MICRO: NORMAL /HPF

## 2024-03-19 PROCEDURE — P9040 RBC LEUKOREDUCED IRRADIATED: HCPCS

## 2024-03-19 PROCEDURE — 71045 X-RAY EXAM CHEST 1 VIEW: CPT

## 2024-03-19 PROCEDURE — 99223 1ST HOSP IP/OBS HIGH 75: CPT | Performed by: INTERNAL MEDICINE

## 2024-03-19 PROCEDURE — APPSS45 APP SPLIT SHARED TIME 31-45 MINUTES: Performed by: NURSE PRACTITIONER

## 2024-03-19 PROCEDURE — G0378 HOSPITAL OBSERVATION PER HR: HCPCS

## 2024-03-19 PROCEDURE — 36415 COLL VENOUS BLD VENIPUNCTURE: CPT

## 2024-03-19 PROCEDURE — 81001 URINALYSIS AUTO W/SCOPE: CPT

## 2024-03-19 PROCEDURE — P9037 PLATE PHERES LEUKOREDU IRRAD: HCPCS

## 2024-03-19 PROCEDURE — 36591 DRAW BLOOD OFF VENOUS DEVICE: CPT

## 2024-03-19 PROCEDURE — 6370000000 HC RX 637 (ALT 250 FOR IP): Performed by: STUDENT IN AN ORGANIZED HEALTH CARE EDUCATION/TRAINING PROGRAM

## 2024-03-19 PROCEDURE — 85055 RETICULATED PLATELET ASSAY: CPT

## 2024-03-19 PROCEDURE — 36430 TRANSFUSION BLD/BLD COMPNT: CPT

## 2024-03-19 PROCEDURE — 85027 COMPLETE CBC AUTOMATED: CPT

## 2024-03-19 PROCEDURE — 82248 BILIRUBIN DIRECT: CPT

## 2024-03-19 PROCEDURE — 82247 BILIRUBIN TOTAL: CPT

## 2024-03-19 PROCEDURE — 2580000003 HC RX 258: Performed by: STUDENT IN AN ORGANIZED HEALTH CARE EDUCATION/TRAINING PROGRAM

## 2024-03-19 PROCEDURE — 83010 ASSAY OF HAPTOGLOBIN QUANT: CPT

## 2024-03-19 PROCEDURE — 97165 OT EVAL LOW COMPLEX 30 MIN: CPT

## 2024-03-19 PROCEDURE — 80053 COMPREHEN METABOLIC PANEL: CPT

## 2024-03-19 PROCEDURE — 85049 AUTOMATED PLATELET COUNT: CPT

## 2024-03-19 PROCEDURE — 85046 RETICYTE/HGB CONCENTRATE: CPT

## 2024-03-19 PROCEDURE — 85025 COMPLETE CBC W/AUTO DIFF WBC: CPT

## 2024-03-19 PROCEDURE — 86022 PLATELET ANTIBODIES: CPT

## 2024-03-19 PROCEDURE — 97535 SELF CARE MNGMENT TRAINING: CPT

## 2024-03-19 PROCEDURE — 2580000003 HC RX 258: Performed by: HOSPITALIST

## 2024-03-19 PROCEDURE — 86644 CMV ANTIBODY: CPT

## 2024-03-19 PROCEDURE — 83615 LACTATE (LD) (LDH) ENZYME: CPT

## 2024-03-19 RX ORDER — 0.9 % SODIUM CHLORIDE 0.9 %
500 INTRAVENOUS SOLUTION INTRAVENOUS ONCE
Status: COMPLETED | OUTPATIENT
Start: 2024-03-19 | End: 2024-03-19

## 2024-03-19 RX ORDER — SODIUM CHLORIDE 9 MG/ML
INJECTION, SOLUTION INTRAVENOUS PRN
Status: DISCONTINUED | OUTPATIENT
Start: 2024-03-19 | End: 2024-03-27

## 2024-03-19 RX ADMIN — ONDANSETRON 4 MG: 4 TABLET, ORALLY DISINTEGRATING ORAL at 05:34

## 2024-03-19 RX ADMIN — SODIUM CHLORIDE 500 ML: 9 INJECTION, SOLUTION INTRAVENOUS at 01:23

## 2024-03-19 RX ADMIN — PANTOPRAZOLE SODIUM 40 MG: 40 TABLET, DELAYED RELEASE ORAL at 20:07

## 2024-03-19 RX ADMIN — FLUOXETINE HYDROCHLORIDE 20 MG: 20 CAPSULE ORAL at 08:57

## 2024-03-19 RX ADMIN — SODIUM CHLORIDE, PRESERVATIVE FREE 10 ML: 5 INJECTION INTRAVENOUS at 08:55

## 2024-03-19 RX ADMIN — SODIUM CHLORIDE: 9 INJECTION, SOLUTION INTRAVENOUS at 20:07

## 2024-03-19 RX ADMIN — MORPHINE SULFATE 15 MG: 15 TABLET, FILM COATED, EXTENDED RELEASE ORAL at 20:07

## 2024-03-19 RX ADMIN — PANTOPRAZOLE SODIUM 40 MG: 40 TABLET, DELAYED RELEASE ORAL at 08:54

## 2024-03-19 RX ADMIN — SODIUM CHLORIDE, PRESERVATIVE FREE 10 ML: 5 INJECTION INTRAVENOUS at 20:08

## 2024-03-19 RX ADMIN — TIZANIDINE 4 MG: 2 TABLET ORAL at 20:07

## 2024-03-19 RX ADMIN — MORPHINE SULFATE 15 MG: 15 TABLET, FILM COATED, EXTENDED RELEASE ORAL at 08:55

## 2024-03-19 RX ADMIN — DOCUSATE SODIUM 100 MG: 100 CAPSULE, LIQUID FILLED ORAL at 08:55

## 2024-03-19 RX ADMIN — FLUOXETINE HYDROCHLORIDE 20 MG: 20 CAPSULE ORAL at 20:07

## 2024-03-19 RX ADMIN — LEVOTHYROXINE SODIUM 125 MCG: 0.12 TABLET ORAL at 05:34

## 2024-03-19 RX ADMIN — FAMOTIDINE 40 MG: 20 TABLET, FILM COATED ORAL at 18:47

## 2024-03-19 RX ADMIN — DOCUSATE SODIUM 100 MG: 100 CAPSULE, LIQUID FILLED ORAL at 20:08

## 2024-03-19 NOTE — CONSULTS
[facility] Hematology & Oncology        Inpatient Hematology / Oncology Consult    Reason for Consult:  Thrombocytopenia (HCC) [D69.6]  Acute anemia [D64.9]  Referring Physician:  Yoko Ware DO    History of Present Illness:  Ms. Gomes is a 73 y.o. female admitted on 3/18/2024. The primary encounter diagnosis was Acute anemia. A diagnosis of Thrombocytopenia (HCC) was also pertinent to this visit.    Ms Gomes has PMH of psoriatic arthritis on Humira, HTN, HLD, GERD and hypothyroidism and chronic pain on chronic opioids. 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 She proceeded with Revlimid in 10/2023 but was noted to have POD in 11/2023 with BMBx showed AEL and 60% blasts. She completed 10 days of dacogen and Mylotarg and achieved CR-1. She is followed by Dr Perez and is currently undergoing Vidaza and Venetoclax. She completed cycle 2 day 1-5 2/5-2/9 and is on continuous Venetoclax 100mg.     Ms Gomes presented to infusion center on day of admission for routine labs/replacements. Hgb 6.6 and plts 2k. She reported being weak and noted some gingival bleeding. Unable to transfuse at cancer center in timely manner due to special attributes/cross-matching. She was admitted for further management. CBC today with WBC 1.3, . Hgb up to 7.7 after 1 U PRBCs. Plts up to 7k after transfusion, back down to 2k this AM. She was somewhat hypotensive overnight, improved this morning. LA and procal WNL. CXR without infection. BC pending. Overall, she is feeling ok after transfusion. Denies further bleeding or evidence of blood in stools or urine. Oncology asked to assume care.     Review of Systems:  Constitutional Denies fever, chills, weight loss, appetite changes, fatigue, night sweats.   HEENT Denies trauma, blurry vision, hearing loss, ear pain, nosebleeds, sore throat, neck pain and ear discharge.    Skin Denies lesions or rashes.

## 2024-03-20 LAB
25(OH)D3 SERPL-MCNC: 43.5 NG/ML (ref 30–100)
ABO + RH BLD: NORMAL
ALBUMIN SERPL-MCNC: 3.2 G/DL (ref 3.2–4.6)
ALBUMIN/GLOB SERPL: 0.9 (ref 0.4–1.6)
ALP SERPL-CCNC: 98 U/L (ref 50–136)
ALT SERPL-CCNC: 23 U/L (ref 12–65)
ANION GAP SERPL CALC-SCNC: 5 MMOL/L (ref 2–11)
APTT PPP: 27.8 SEC (ref 23.3–37.4)
AST SERPL-CCNC: 39 U/L (ref 15–37)
BASOPHILS # BLD: 0 K/UL (ref 0–0.2)
BASOPHILS NFR BLD: 0 % (ref 0–2)
BILIRUB SERPL-MCNC: 0.7 MG/DL (ref 0.2–1.1)
BLD PROD TYP BPU: NORMAL
BLD PROD TYP BPU: NORMAL
BLOOD BANK BLOOD PRODUCT EXPIRATION DATE: NORMAL
BLOOD BANK BLOOD PRODUCT EXPIRATION DATE: NORMAL
BLOOD BANK CMNT PATIENT-IMP: NORMAL
BLOOD BANK DISPENSE STATUS: NORMAL
BLOOD BANK DISPENSE STATUS: NORMAL
BLOOD BANK ISBT PRODUCT BLOOD TYPE: 5100
BLOOD BANK ISBT PRODUCT BLOOD TYPE: 9500
BLOOD BANK PRODUCT CODE: NORMAL
BLOOD BANK PRODUCT CODE: NORMAL
BLOOD BANK UNIT TYPE AND RH: NORMAL
BLOOD BANK UNIT TYPE AND RH: NORMAL
BLOOD GROUP ANTIBODIES SERPL: NORMAL
BPU ID: NORMAL
BPU ID: NORMAL
BUN SERPL-MCNC: 12 MG/DL (ref 8–23)
CALCIUM SERPL-MCNC: 9.3 MG/DL (ref 8.3–10.4)
CHLORIDE SERPL-SCNC: 103 MMOL/L (ref 103–113)
CO2 SERPL-SCNC: 29 MMOL/L (ref 21–32)
CREAT SERPL-MCNC: 0.7 MG/DL (ref 0.6–1)
CROSSMATCH RESULT: NORMAL
D DIMER PPP FEU-MCNC: 0.91 UG/ML(FEU)
DIFFERENTIAL METHOD BLD: ABNORMAL
EOSINOPHIL # BLD: 0 K/UL (ref 0–0.8)
EOSINOPHIL NFR BLD: 0 % (ref 0.5–7.8)
ERYTHROCYTE [DISTWIDTH] IN BLOOD BY AUTOMATED COUNT: 14.2 % (ref 11.9–14.6)
FERRITIN SERPL-MCNC: 1874 NG/ML (ref 8–388)
FIBRINOGEN PPP-MCNC: 498 MG/DL (ref 190–501)
FOLATE SERPL-MCNC: 14.8 NG/ML (ref 3.1–17.5)
GLOBULIN SER CALC-MCNC: 3.6 G/DL (ref 2.8–4.5)
GLUCOSE SERPL-MCNC: 121 MG/DL (ref 65–100)
HAPTOGLOB SERPL-MCNC: 103 MG/DL (ref 30–200)
HCT VFR BLD AUTO: 20.5 % (ref 35.8–46.3)
HGB BLD-MCNC: 7.2 G/DL (ref 11.7–15.4)
IMM GRANULOCYTES # BLD AUTO: 0 K/UL (ref 0–0.5)
IMM GRANULOCYTES NFR BLD AUTO: 2 % (ref 0–5)
INR PPP: 0.9
IRON SATN MFR SERPL: 59 %
IRON SERPL-MCNC: 162 UG/DL (ref 35–150)
LYMPHOCYTES # BLD: 0.7 K/UL (ref 0.5–4.6)
LYMPHOCYTES NFR BLD: 56 % (ref 13–44)
MAGNESIUM SERPL-MCNC: 1.8 MG/DL (ref 1.8–2.4)
MCH RBC QN AUTO: 30.6 PG (ref 26.1–32.9)
MCHC RBC AUTO-ENTMCNC: 35.1 G/DL (ref 31.4–35)
MCV RBC AUTO: 87.2 FL (ref 82–102)
MONOCYTES # BLD: 0.2 K/UL (ref 0.1–1.3)
MONOCYTES NFR BLD: 14 % (ref 4–12)
NEUTS SEG # BLD: 0.3 K/UL (ref 1.7–8.2)
NEUTS SEG NFR BLD: 28 % (ref 43–78)
NRBC # BLD: 0 K/UL (ref 0–0.2)
PATH REV BLD -IMP: NORMAL
PLATELET # BLD AUTO: 2 K/UL (ref 150–450)
PLATELET # BLD AUTO: 2 K/UL (ref 150–450)
PLATELET COMMENT: ABNORMAL
PMV BLD AUTO: ABNORMAL FL (ref 9.4–12.3)
POTASSIUM SERPL-SCNC: 3.6 MMOL/L (ref 3.5–5.1)
PROT SERPL-MCNC: 6.8 G/DL (ref 6.3–8.2)
PROTHROMBIN TIME: 13.1 SEC (ref 11.3–14.9)
RBC # BLD AUTO: 2.35 M/UL (ref 4.05–5.2)
RBC MORPH BLD: ABNORMAL
SODIUM SERPL-SCNC: 137 MMOL/L (ref 136–146)
SPECIMEN EXP DATE BLD: NORMAL
TIBC SERPL-MCNC: 276 UG/DL (ref 250–450)
UNIT DIVISION: 0
UNIT DIVISION: 0
UNIT ISSUE DATE/TIME: NORMAL
UNIT ISSUE DATE/TIME: NORMAL
VIT B12 SERPL-MCNC: 376 PG/ML (ref 193–986)
WBC # BLD AUTO: 1.2 K/UL (ref 4.3–11.1)
WBC MORPH BLD: ABNORMAL

## 2024-03-20 PROCEDURE — G0378 HOSPITAL OBSERVATION PER HR: HCPCS

## 2024-03-20 PROCEDURE — 85730 THROMBOPLASTIN TIME PARTIAL: CPT

## 2024-03-20 PROCEDURE — 6370000000 HC RX 637 (ALT 250 FOR IP): Performed by: INTERNAL MEDICINE

## 2024-03-20 PROCEDURE — 2580000003 HC RX 258: Performed by: STUDENT IN AN ORGANIZED HEALTH CARE EDUCATION/TRAINING PROGRAM

## 2024-03-20 PROCEDURE — 85379 FIBRIN DEGRADATION QUANT: CPT

## 2024-03-20 PROCEDURE — 97530 THERAPEUTIC ACTIVITIES: CPT

## 2024-03-20 PROCEDURE — 36430 TRANSFUSION BLD/BLD COMPNT: CPT

## 2024-03-20 PROCEDURE — 82746 ASSAY OF FOLIC ACID SERUM: CPT

## 2024-03-20 PROCEDURE — 82306 VITAMIN D 25 HYDROXY: CPT

## 2024-03-20 PROCEDURE — APPSS45 APP SPLIT SHARED TIME 31-45 MINUTES: Performed by: NURSE PRACTITIONER

## 2024-03-20 PROCEDURE — 85384 FIBRINOGEN ACTIVITY: CPT

## 2024-03-20 PROCEDURE — 85025 COMPLETE CBC W/AUTO DIFF WBC: CPT

## 2024-03-20 PROCEDURE — 1100000000 HC RM PRIVATE

## 2024-03-20 PROCEDURE — 86813 HLA TYPING A B OR C: CPT

## 2024-03-20 PROCEDURE — 85049 AUTOMATED PLATELET COUNT: CPT

## 2024-03-20 PROCEDURE — 83540 ASSAY OF IRON: CPT

## 2024-03-20 PROCEDURE — 2500000003 HC RX 250 WO HCPCS: Performed by: NURSE PRACTITIONER

## 2024-03-20 PROCEDURE — 6370000000 HC RX 637 (ALT 250 FOR IP): Performed by: STUDENT IN AN ORGANIZED HEALTH CARE EDUCATION/TRAINING PROGRAM

## 2024-03-20 PROCEDURE — 85610 PROTHROMBIN TIME: CPT

## 2024-03-20 PROCEDURE — 86022 PLATELET ANTIBODIES: CPT

## 2024-03-20 PROCEDURE — A4216 STERILE WATER/SALINE, 10 ML: HCPCS | Performed by: NURSE PRACTITIONER

## 2024-03-20 PROCEDURE — 80053 COMPREHEN METABOLIC PANEL: CPT

## 2024-03-20 PROCEDURE — 82728 ASSAY OF FERRITIN: CPT

## 2024-03-20 PROCEDURE — 99233 SBSQ HOSP IP/OBS HIGH 50: CPT | Performed by: INTERNAL MEDICINE

## 2024-03-20 PROCEDURE — 97161 PT EVAL LOW COMPLEX 20 MIN: CPT

## 2024-03-20 PROCEDURE — 83550 IRON BINDING TEST: CPT

## 2024-03-20 PROCEDURE — 2580000003 HC RX 258: Performed by: NURSE PRACTITIONER

## 2024-03-20 PROCEDURE — 83735 ASSAY OF MAGNESIUM: CPT

## 2024-03-20 PROCEDURE — 36591 DRAW BLOOD OFF VENOUS DEVICE: CPT

## 2024-03-20 PROCEDURE — P9037 PLATE PHERES LEUKOREDU IRRAD: HCPCS

## 2024-03-20 PROCEDURE — 82607 VITAMIN B-12: CPT

## 2024-03-20 PROCEDURE — 6360000002 HC RX W HCPCS: Performed by: NURSE PRACTITIONER

## 2024-03-20 RX ORDER — DIPHENHYDRAMINE HCL 25 MG
25 CAPSULE ORAL EVERY 6 HOURS PRN
Status: DISCONTINUED | OUTPATIENT
Start: 2024-03-20 | End: 2024-04-02 | Stop reason: HOSPADM

## 2024-03-20 RX ORDER — BENZOCAINE/MENTHOL 6 MG-10 MG
LOZENGE MUCOUS MEMBRANE PRN
Status: DISCONTINUED | OUTPATIENT
Start: 2024-03-20 | End: 2024-03-20

## 2024-03-20 RX ORDER — SODIUM CHLORIDE 9 MG/ML
INJECTION, SOLUTION INTRAVENOUS PRN
Status: DISCONTINUED | OUTPATIENT
Start: 2024-03-20 | End: 2024-03-27

## 2024-03-20 RX ORDER — BENZOCAINE/MENTHOL 6 MG-10 MG
LOZENGE MUCOUS MEMBRANE 3 TIMES DAILY PRN
Status: DISCONTINUED | OUTPATIENT
Start: 2024-03-20 | End: 2024-04-02 | Stop reason: HOSPADM

## 2024-03-20 RX ADMIN — METHYLPREDNISOLONE SODIUM SUCCINATE 125 MG: 125 INJECTION INTRAMUSCULAR; INTRAVENOUS at 23:42

## 2024-03-20 RX ADMIN — PANTOPRAZOLE SODIUM 40 MG: 40 TABLET, DELAYED RELEASE ORAL at 08:33

## 2024-03-20 RX ADMIN — FLUOXETINE HYDROCHLORIDE 20 MG: 20 CAPSULE ORAL at 08:39

## 2024-03-20 RX ADMIN — DIPHENHYDRAMINE HYDROCHLORIDE 25 MG: 25 CAPSULE ORAL at 20:01

## 2024-03-20 RX ADMIN — DOCUSATE SODIUM 100 MG: 100 CAPSULE, LIQUID FILLED ORAL at 20:01

## 2024-03-20 RX ADMIN — MORPHINE SULFATE 15 MG: 15 TABLET, FILM COATED, EXTENDED RELEASE ORAL at 20:01

## 2024-03-20 RX ADMIN — FAMOTIDINE 40 MG: 20 TABLET, FILM COATED ORAL at 18:27

## 2024-03-20 RX ADMIN — FLUOXETINE HYDROCHLORIDE 20 MG: 20 CAPSULE ORAL at 20:01

## 2024-03-20 RX ADMIN — DIPHENHYDRAMINE HYDROCHLORIDE 25 MG: 25 CAPSULE ORAL at 11:31

## 2024-03-20 RX ADMIN — HYDROXYZINE HYDROCHLORIDE 25 MG: 25 TABLET, FILM COATED ORAL at 22:34

## 2024-03-20 RX ADMIN — HYDROCODONE BITARTRATE AND ACETAMINOPHEN 1 TABLET: 10; 325 TABLET ORAL at 18:32

## 2024-03-20 RX ADMIN — SODIUM CHLORIDE, PRESERVATIVE FREE 10 ML: 5 INJECTION INTRAVENOUS at 20:02

## 2024-03-20 RX ADMIN — PANTOPRAZOLE SODIUM 40 MG: 40 TABLET, DELAYED RELEASE ORAL at 20:01

## 2024-03-20 RX ADMIN — LEVOTHYROXINE SODIUM 125 MCG: 0.12 TABLET ORAL at 08:34

## 2024-03-20 RX ADMIN — MORPHINE SULFATE 15 MG: 15 TABLET, FILM COATED, EXTENDED RELEASE ORAL at 08:34

## 2024-03-20 RX ADMIN — SODIUM CHLORIDE: 9 INJECTION, SOLUTION INTRAVENOUS at 10:17

## 2024-03-20 RX ADMIN — FAMOTIDINE 20 MG: 10 INJECTION, SOLUTION INTRAVENOUS at 22:47

## 2024-03-20 RX ADMIN — DOCUSATE SODIUM 100 MG: 100 CAPSULE, LIQUID FILLED ORAL at 08:34

## 2024-03-20 RX ADMIN — ACETAMINOPHEN 650 MG: 325 TABLET ORAL at 11:30

## 2024-03-20 RX ADMIN — ACETAMINOPHEN 650 MG: 325 TABLET ORAL at 20:01

## 2024-03-20 RX ADMIN — TIZANIDINE 4 MG: 2 TABLET ORAL at 20:01

## 2024-03-20 RX ADMIN — SODIUM CHLORIDE, PRESERVATIVE FREE 10 ML: 5 INJECTION INTRAVENOUS at 08:35

## 2024-03-20 ASSESSMENT — PAIN DESCRIPTION - DESCRIPTORS
DESCRIPTORS: ACHING
DESCRIPTORS: CRAMPING

## 2024-03-20 ASSESSMENT — PAIN SCALES - GENERAL
PAINLEVEL_OUTOF10: 0
PAINLEVEL_OUTOF10: 2
PAINLEVEL_OUTOF10: 5
PAINLEVEL_OUTOF10: 0

## 2024-03-20 ASSESSMENT — PAIN DESCRIPTION - ORIENTATION: ORIENTATION: LOWER

## 2024-03-20 ASSESSMENT — PAIN DESCRIPTION - LOCATION
LOCATION: KNEE
LOCATION: ABDOMEN

## 2024-03-21 LAB
ALBUMIN SERPL-MCNC: 3.6 G/DL (ref 3.2–4.6)
ALBUMIN/GLOB SERPL: 0.9 (ref 0.4–1.6)
ALP SERPL-CCNC: 98 U/L (ref 50–136)
ALT SERPL-CCNC: 26 U/L (ref 12–65)
ANION GAP SERPL CALC-SCNC: 6 MMOL/L (ref 2–11)
AST SERPL-CCNC: 39 U/L (ref 15–37)
BASOPHILS # BLD: 0 K/UL (ref 0–0.2)
BASOPHILS NFR BLD: 0 % (ref 0–2)
BILIRUB SERPL-MCNC: 0.6 MG/DL (ref 0.2–1.1)
BLD PROD TYP BPU: NORMAL
BLOOD BANK BLOOD PRODUCT EXPIRATION DATE: NORMAL
BLOOD BANK CMNT PATIENT-IMP: NORMAL
BLOOD BANK DISPENSE STATUS: NORMAL
BLOOD BANK ISBT PRODUCT BLOOD TYPE: 5100
BLOOD BANK PRODUCT CODE: NORMAL
BLOOD BANK UNIT TYPE AND RH: NORMAL
BPU ID: NORMAL
BUN SERPL-MCNC: 11 MG/DL (ref 8–23)
CALCIUM SERPL-MCNC: 9.9 MG/DL (ref 8.3–10.4)
CHLORIDE SERPL-SCNC: 104 MMOL/L (ref 103–113)
CO2 SERPL-SCNC: 28 MMOL/L (ref 21–32)
CREAT SERPL-MCNC: 0.7 MG/DL (ref 0.6–1)
DIFFERENTIAL METHOD BLD: ABNORMAL
EOSINOPHIL # BLD: 0 K/UL (ref 0–0.8)
EOSINOPHIL NFR BLD: 0 % (ref 0.5–7.8)
ERYTHROCYTE [DISTWIDTH] IN BLOOD BY AUTOMATED COUNT: 14 % (ref 11.9–14.6)
GLOBULIN SER CALC-MCNC: 3.9 G/DL (ref 2.8–4.5)
GLUCOSE SERPL-MCNC: 149 MG/DL (ref 65–100)
HCT VFR BLD AUTO: 20.3 % (ref 35.8–46.3)
HGB BLD-MCNC: 7.1 G/DL (ref 11.7–15.4)
IMM GRANULOCYTES # BLD AUTO: 0 K/UL (ref 0–0.5)
IMM GRANULOCYTES NFR BLD AUTO: 2 % (ref 0–5)
LYMPHOCYTES # BLD: 0.4 K/UL (ref 0.5–4.6)
LYMPHOCYTES NFR BLD: 44 % (ref 13–44)
MAGNESIUM SERPL-MCNC: 2 MG/DL (ref 1.8–2.4)
MCH RBC QN AUTO: 30.2 PG (ref 26.1–32.9)
MCHC RBC AUTO-ENTMCNC: 35 G/DL (ref 31.4–35)
MCV RBC AUTO: 86.4 FL (ref 82–102)
MONOCYTES # BLD: 0.1 K/UL (ref 0.1–1.3)
MONOCYTES NFR BLD: 11 % (ref 4–12)
NEUTS SEG # BLD: 0.4 K/UL (ref 1.7–8.2)
NEUTS SEG NFR BLD: 43 % (ref 43–78)
NRBC # BLD: 0 K/UL (ref 0–0.2)
PLATELET # BLD AUTO: 10 K/UL (ref 150–450)
PLATELET # BLD AUTO: 7 K/UL (ref 150–450)
PLATELET COMMENT: ABNORMAL
PMV BLD AUTO: ABNORMAL FL (ref 9.4–12.3)
POTASSIUM SERPL-SCNC: 4 MMOL/L (ref 3.5–5.1)
PROT SERPL-MCNC: 7.5 G/DL (ref 6.3–8.2)
RBC # BLD AUTO: 2.35 M/UL (ref 4.05–5.2)
RBC MORPH BLD: ABNORMAL
SODIUM SERPL-SCNC: 138 MMOL/L (ref 136–146)
UNIT DIVISION: 0
UNIT ISSUE DATE/TIME: NORMAL
WBC # BLD AUTO: 0.9 K/UL (ref 4.3–11.1)
WBC MORPH BLD: ABNORMAL

## 2024-03-21 PROCEDURE — 80053 COMPREHEN METABOLIC PANEL: CPT

## 2024-03-21 PROCEDURE — 36591 DRAW BLOOD OFF VENOUS DEVICE: CPT

## 2024-03-21 PROCEDURE — 36430 TRANSFUSION BLD/BLD COMPNT: CPT

## 2024-03-21 PROCEDURE — 6370000000 HC RX 637 (ALT 250 FOR IP): Performed by: INTERNAL MEDICINE

## 2024-03-21 PROCEDURE — 86850 RBC ANTIBODY SCREEN: CPT

## 2024-03-21 PROCEDURE — 86900 BLOOD TYPING SEROLOGIC ABO: CPT

## 2024-03-21 PROCEDURE — 2580000003 HC RX 258: Performed by: STUDENT IN AN ORGANIZED HEALTH CARE EDUCATION/TRAINING PROGRAM

## 2024-03-21 PROCEDURE — 83735 ASSAY OF MAGNESIUM: CPT

## 2024-03-21 PROCEDURE — 99232 SBSQ HOSP IP/OBS MODERATE 35: CPT | Performed by: INTERNAL MEDICINE

## 2024-03-21 PROCEDURE — 6370000000 HC RX 637 (ALT 250 FOR IP): Performed by: NURSE PRACTITIONER

## 2024-03-21 PROCEDURE — 1100000000 HC RM PRIVATE

## 2024-03-21 PROCEDURE — 6370000000 HC RX 637 (ALT 250 FOR IP): Performed by: STUDENT IN AN ORGANIZED HEALTH CARE EDUCATION/TRAINING PROGRAM

## 2024-03-21 PROCEDURE — 86920 COMPATIBILITY TEST SPIN: CPT

## 2024-03-21 PROCEDURE — 97535 SELF CARE MNGMENT TRAINING: CPT

## 2024-03-21 PROCEDURE — P9037 PLATE PHERES LEUKOREDU IRRAD: HCPCS

## 2024-03-21 PROCEDURE — 85049 AUTOMATED PLATELET COUNT: CPT

## 2024-03-21 PROCEDURE — 86901 BLOOD TYPING SEROLOGIC RH(D): CPT

## 2024-03-21 PROCEDURE — 86813 HLA TYPING A B OR C: CPT

## 2024-03-21 PROCEDURE — APPSS30 APP SPLIT SHARED TIME 16-30 MINUTES: Performed by: NURSE PRACTITIONER

## 2024-03-21 PROCEDURE — 85025 COMPLETE CBC W/AUTO DIFF WBC: CPT

## 2024-03-21 RX ORDER — LANOLIN ALCOHOL/MO/W.PET/CERES
500 CREAM (GRAM) TOPICAL DAILY
Status: DISCONTINUED | OUTPATIENT
Start: 2024-03-21 | End: 2024-04-02 | Stop reason: HOSPADM

## 2024-03-21 RX ADMIN — DIPHENHYDRAMINE HYDROCHLORIDE 25 MG: 25 CAPSULE ORAL at 23:13

## 2024-03-21 RX ADMIN — SODIUM CHLORIDE, PRESERVATIVE FREE 10 ML: 5 INJECTION INTRAVENOUS at 21:41

## 2024-03-21 RX ADMIN — TIZANIDINE 4 MG: 2 TABLET ORAL at 21:37

## 2024-03-21 RX ADMIN — SODIUM CHLORIDE, PRESERVATIVE FREE 10 ML: 5 INJECTION INTRAVENOUS at 07:42

## 2024-03-21 RX ADMIN — PANTOPRAZOLE SODIUM 40 MG: 40 TABLET, DELAYED RELEASE ORAL at 21:37

## 2024-03-21 RX ADMIN — FLUOXETINE HYDROCHLORIDE 20 MG: 20 CAPSULE ORAL at 07:42

## 2024-03-21 RX ADMIN — ACETAMINOPHEN 650 MG: 325 TABLET ORAL at 11:49

## 2024-03-21 RX ADMIN — ACETAMINOPHEN 650 MG: 325 TABLET ORAL at 23:14

## 2024-03-21 RX ADMIN — ACETAMINOPHEN 650 MG: 325 TABLET ORAL at 01:22

## 2024-03-21 RX ADMIN — DIPHENHYDRAMINE HYDROCHLORIDE 25 MG: 25 CAPSULE ORAL at 11:49

## 2024-03-21 RX ADMIN — PANTOPRAZOLE SODIUM 40 MG: 40 TABLET, DELAYED RELEASE ORAL at 07:42

## 2024-03-21 RX ADMIN — LEVOTHYROXINE SODIUM 125 MCG: 0.12 TABLET ORAL at 07:42

## 2024-03-21 RX ADMIN — FLUOXETINE HYDROCHLORIDE 20 MG: 20 CAPSULE ORAL at 21:40

## 2024-03-21 RX ADMIN — MORPHINE SULFATE 15 MG: 15 TABLET, FILM COATED, EXTENDED RELEASE ORAL at 21:37

## 2024-03-21 RX ADMIN — HYDROCODONE BITARTRATE AND ACETAMINOPHEN 1 TABLET: 10; 325 TABLET ORAL at 09:53

## 2024-03-21 RX ADMIN — MORPHINE SULFATE 15 MG: 15 TABLET, FILM COATED, EXTENDED RELEASE ORAL at 07:42

## 2024-03-21 RX ADMIN — FAMOTIDINE 40 MG: 20 TABLET, FILM COATED ORAL at 17:02

## 2024-03-21 RX ADMIN — CYANOCOBALAMIN TAB 1000 MCG 500 MCG: 1000 TAB at 13:28

## 2024-03-21 RX ADMIN — DOCUSATE SODIUM 100 MG: 100 CAPSULE, LIQUID FILLED ORAL at 07:42

## 2024-03-21 RX ADMIN — DOCUSATE SODIUM 100 MG: 100 CAPSULE, LIQUID FILLED ORAL at 21:36

## 2024-03-21 RX ADMIN — DIPHENHYDRAMINE HYDROCHLORIDE 25 MG: 25 CAPSULE ORAL at 01:22

## 2024-03-21 ASSESSMENT — PAIN SCALES - GENERAL
PAINLEVEL_OUTOF10: 0
PAINLEVEL_OUTOF10: 6
PAINLEVEL_OUTOF10: 0

## 2024-03-21 ASSESSMENT — PAIN DESCRIPTION - ORIENTATION: ORIENTATION: LOWER

## 2024-03-21 ASSESSMENT — PAIN DESCRIPTION - DESCRIPTORS: DESCRIPTORS: ACHING

## 2024-03-21 ASSESSMENT — PAIN DESCRIPTION - LOCATION: LOCATION: KNEE;ABDOMEN

## 2024-03-22 ENCOUNTER — TELEPHONE (OUTPATIENT)
Dept: ONCOLOGY | Age: 74
End: 2024-03-22

## 2024-03-22 DIAGNOSIS — E03.9 HYPOTHYROIDISM, UNSPECIFIED: ICD-10-CM

## 2024-03-22 LAB
ALBUMIN SERPL-MCNC: 3.5 G/DL (ref 3.2–4.6)
ALBUMIN/GLOB SERPL: 0.9 (ref 0.4–1.6)
ALP SERPL-CCNC: 95 U/L (ref 50–136)
ALT SERPL-CCNC: 25 U/L (ref 12–65)
ANION GAP SERPL CALC-SCNC: 5 MMOL/L (ref 2–11)
AST SERPL-CCNC: 36 U/L (ref 15–37)
BASOPHILS # BLD: 0 K/UL (ref 0–0.2)
BASOPHILS NFR BLD: 0 % (ref 0–2)
BILIRUB SERPL-MCNC: 0.5 MG/DL (ref 0.2–1.1)
BLD PROD TYP BPU: NORMAL
BLOOD BANK BLOOD PRODUCT EXPIRATION DATE: NORMAL
BLOOD BANK CMNT PATIENT-IMP: NORMAL
BLOOD BANK CMNT PATIENT-IMP: NORMAL
BLOOD BANK DISPENSE STATUS: NORMAL
BLOOD BANK ISBT PRODUCT BLOOD TYPE: 5100
BLOOD BANK ISBT PRODUCT BLOOD TYPE: 6200
BLOOD BANK ISBT PRODUCT BLOOD TYPE: 6200
BLOOD BANK PRODUCT CODE: NORMAL
BLOOD BANK PRODUCT CODE: NORMAL
BLOOD BANK UNIT TYPE AND RH: NORMAL
BPU ID: NORMAL
BUN SERPL-MCNC: 15 MG/DL (ref 8–23)
CALCIUM SERPL-MCNC: 9.7 MG/DL (ref 8.3–10.4)
CHLORIDE SERPL-SCNC: 104 MMOL/L (ref 103–113)
CO2 SERPL-SCNC: 31 MMOL/L (ref 21–32)
CREAT SERPL-MCNC: 0.7 MG/DL (ref 0.6–1)
DIFFERENTIAL METHOD BLD: ABNORMAL
EOSINOPHIL # BLD: 0 K/UL (ref 0–0.8)
EOSINOPHIL NFR BLD: 0 % (ref 0.5–7.8)
ERYTHROCYTE [DISTWIDTH] IN BLOOD BY AUTOMATED COUNT: 14 % (ref 11.9–14.6)
GLOBULIN SER CALC-MCNC: 3.8 G/DL (ref 2.8–4.5)
GLUCOSE SERPL-MCNC: 100 MG/DL (ref 65–100)
HCT VFR BLD AUTO: 18.7 % (ref 35.8–46.3)
HGB BLD-MCNC: 6.6 G/DL (ref 11.7–15.4)
HGB BLD-MCNC: 8.4 G/DL (ref 11.7–15.4)
HISTORY CHECK: NORMAL
IMM GRANULOCYTES # BLD AUTO: 0 K/UL (ref 0–0.5)
IMM GRANULOCYTES NFR BLD AUTO: 3 % (ref 0–5)
LYMPHOCYTES # BLD: 0.8 K/UL (ref 0.5–4.6)
LYMPHOCYTES NFR BLD: 72 % (ref 13–44)
MAGNESIUM SERPL-MCNC: 1.9 MG/DL (ref 1.8–2.4)
MCH RBC QN AUTO: 30.4 PG (ref 26.1–32.9)
MCHC RBC AUTO-ENTMCNC: 35.3 G/DL (ref 31.4–35)
MCV RBC AUTO: 86.2 FL (ref 82–102)
MONOCYTES # BLD: 0.1 K/UL (ref 0.1–1.3)
MONOCYTES NFR BLD: 9 % (ref 4–12)
NEUTS SEG # BLD: 0.2 K/UL (ref 1.7–8.2)
NEUTS SEG NFR BLD: 16 % (ref 43–78)
NRBC # BLD: 0 K/UL (ref 0–0.2)
PLATELET # BLD AUTO: 2 K/UL (ref 150–450)
PLATELET # BLD AUTO: 8 K/UL (ref 150–450)
PMV BLD AUTO: 8.8 FL (ref 9.4–12.3)
POTASSIUM SERPL-SCNC: 3.7 MMOL/L (ref 3.5–5.1)
PROT SERPL-MCNC: 7.3 G/DL (ref 6.3–8.2)
RBC # BLD AUTO: 2.17 M/UL (ref 4.05–5.2)
SODIUM SERPL-SCNC: 140 MMOL/L (ref 136–146)
UNIT DIVISION: 0
UNIT ISSUE DATE/TIME: NORMAL
WBC # BLD AUTO: 1.2 K/UL (ref 4.3–11.1)

## 2024-03-22 PROCEDURE — APPSS30 APP SPLIT SHARED TIME 16-30 MINUTES: Performed by: NURSE PRACTITIONER

## 2024-03-22 PROCEDURE — 86022 PLATELET ANTIBODIES: CPT

## 2024-03-22 PROCEDURE — 86921 COMPATIBILITY TEST INCUBATE: CPT

## 2024-03-22 PROCEDURE — 99232 SBSQ HOSP IP/OBS MODERATE 35: CPT | Performed by: INTERNAL MEDICINE

## 2024-03-22 PROCEDURE — 2580000003 HC RX 258: Performed by: STUDENT IN AN ORGANIZED HEALTH CARE EDUCATION/TRAINING PROGRAM

## 2024-03-22 PROCEDURE — 86644 CMV ANTIBODY: CPT

## 2024-03-22 PROCEDURE — 85025 COMPLETE CBC W/AUTO DIFF WBC: CPT

## 2024-03-22 PROCEDURE — 36591 DRAW BLOOD OFF VENOUS DEVICE: CPT

## 2024-03-22 PROCEDURE — 6370000000 HC RX 637 (ALT 250 FOR IP): Performed by: INTERNAL MEDICINE

## 2024-03-22 PROCEDURE — 85049 AUTOMATED PLATELET COUNT: CPT

## 2024-03-22 PROCEDURE — 36430 TRANSFUSION BLD/BLD COMPNT: CPT

## 2024-03-22 PROCEDURE — 85018 HEMOGLOBIN: CPT

## 2024-03-22 PROCEDURE — 1100000000 HC RM PRIVATE

## 2024-03-22 PROCEDURE — 83735 ASSAY OF MAGNESIUM: CPT

## 2024-03-22 PROCEDURE — 80053 COMPREHEN METABOLIC PANEL: CPT

## 2024-03-22 PROCEDURE — 6370000000 HC RX 637 (ALT 250 FOR IP): Performed by: NURSE PRACTITIONER

## 2024-03-22 PROCEDURE — 97116 GAIT TRAINING THERAPY: CPT

## 2024-03-22 PROCEDURE — P9037 PLATE PHERES LEUKOREDU IRRAD: HCPCS

## 2024-03-22 PROCEDURE — 6370000000 HC RX 637 (ALT 250 FOR IP): Performed by: STUDENT IN AN ORGANIZED HEALTH CARE EDUCATION/TRAINING PROGRAM

## 2024-03-22 PROCEDURE — P9040 RBC LEUKOREDUCED IRRADIATED: HCPCS

## 2024-03-22 PROCEDURE — 86922 COMPATIBILITY TEST ANTIGLOB: CPT

## 2024-03-22 RX ORDER — LEVOTHYROXINE SODIUM 0.12 MG/1
TABLET ORAL
Qty: 90 TABLET | Refills: 1 | Status: CANCELLED | OUTPATIENT
Start: 2024-03-22

## 2024-03-22 RX ADMIN — SODIUM CHLORIDE, PRESERVATIVE FREE 10 ML: 5 INJECTION INTRAVENOUS at 08:45

## 2024-03-22 RX ADMIN — SODIUM CHLORIDE, PRESERVATIVE FREE 10 ML: 5 INJECTION INTRAVENOUS at 21:17

## 2024-03-22 RX ADMIN — TIZANIDINE 4 MG: 2 TABLET ORAL at 21:16

## 2024-03-22 RX ADMIN — FLUOXETINE HYDROCHLORIDE 20 MG: 20 CAPSULE ORAL at 08:59

## 2024-03-22 RX ADMIN — DOCUSATE SODIUM 100 MG: 100 CAPSULE, LIQUID FILLED ORAL at 08:44

## 2024-03-22 RX ADMIN — FAMOTIDINE 40 MG: 20 TABLET, FILM COATED ORAL at 17:06

## 2024-03-22 RX ADMIN — CYANOCOBALAMIN TAB 1000 MCG 500 MCG: 1000 TAB at 08:44

## 2024-03-22 RX ADMIN — DOCUSATE SODIUM 100 MG: 100 CAPSULE, LIQUID FILLED ORAL at 21:17

## 2024-03-22 RX ADMIN — MORPHINE SULFATE 15 MG: 15 TABLET, FILM COATED, EXTENDED RELEASE ORAL at 08:44

## 2024-03-22 RX ADMIN — MORPHINE SULFATE 15 MG: 15 TABLET, FILM COATED, EXTENDED RELEASE ORAL at 21:17

## 2024-03-22 RX ADMIN — DIPHENHYDRAMINE HYDROCHLORIDE 25 MG: 25 CAPSULE ORAL at 23:01

## 2024-03-22 RX ADMIN — DIPHENHYDRAMINE HYDROCHLORIDE 25 MG: 25 CAPSULE ORAL at 10:33

## 2024-03-22 RX ADMIN — LEVOTHYROXINE SODIUM 125 MCG: 0.12 TABLET ORAL at 08:44

## 2024-03-22 RX ADMIN — ACETAMINOPHEN 650 MG: 325 TABLET ORAL at 23:01

## 2024-03-22 RX ADMIN — ACETAMINOPHEN 650 MG: 325 TABLET ORAL at 10:33

## 2024-03-22 RX ADMIN — FLUOXETINE HYDROCHLORIDE 20 MG: 20 CAPSULE ORAL at 21:16

## 2024-03-22 RX ADMIN — PANTOPRAZOLE SODIUM 40 MG: 40 TABLET, DELAYED RELEASE ORAL at 08:44

## 2024-03-22 RX ADMIN — PANTOPRAZOLE SODIUM 40 MG: 40 TABLET, DELAYED RELEASE ORAL at 21:16

## 2024-03-22 RX ADMIN — HYDROCODONE BITARTRATE AND ACETAMINOPHEN 1 TABLET: 10; 325 TABLET ORAL at 17:06

## 2024-03-22 ASSESSMENT — PAIN SCALES - GENERAL
PAINLEVEL_OUTOF10: 0

## 2024-03-22 NOTE — TELEPHONE ENCOUNTER
Physician provider: Gunnar Perez MD  Reason for today's call: Order Clarification  Last office visit:03/19/2024    Patient notified that their information will be routed to the [facility] clinical triage team for review. Patient is advised that they will receive a phone call from the triage department. If symptoms worsen before receiving a call back, the patient has been advised to proceed to the nearest ED.     Fani zacarias/ [facility] called stating they received three orders. Stated that they need clarification on why more orders came in. She stated that they did not need more toy for the blood bc they had already sent out the first set.    She can be reached at [phone] to give clarification on the orders.

## 2024-03-22 NOTE — TELEPHONE ENCOUNTER
Returned call to Fani.  Informed to contact inpatient NP.  Provided with TRUNG Haddad contact number.

## 2024-03-23 LAB
ABO + RH BLD: NORMAL
ALBUMIN SERPL-MCNC: 3.6 G/DL (ref 3.2–4.6)
ALBUMIN/GLOB SERPL: 0.9 (ref 0.4–1.6)
ALP SERPL-CCNC: 124 U/L (ref 50–136)
ALT SERPL-CCNC: 36 U/L (ref 12–65)
ANION GAP SERPL CALC-SCNC: 6 MMOL/L (ref 2–11)
AST SERPL-CCNC: 42 U/L (ref 15–37)
BACTERIA SPEC CULT: NORMAL
BACTERIA SPEC CULT: NORMAL
BASOPHILS # BLD: 0 K/UL (ref 0–0.2)
BASOPHILS # BLD: 0 K/UL (ref 0–0.2)
BASOPHILS NFR BLD: 0 % (ref 0–2)
BASOPHILS NFR BLD: 0 % (ref 0–2)
BILIRUB SERPL-MCNC: 0.6 MG/DL (ref 0.2–1.1)
BLD PROD TYP BPU: NORMAL
BLOOD BANK BLOOD PRODUCT EXPIRATION DATE: NORMAL
BLOOD BANK CMNT PATIENT-IMP: NORMAL
BLOOD BANK DISPENSE STATUS: NORMAL
BLOOD BANK ISBT PRODUCT BLOOD TYPE: 5100
BLOOD BANK ISBT PRODUCT BLOOD TYPE: 5100
BLOOD BANK ISBT PRODUCT BLOOD TYPE: 6200
BLOOD BANK ISBT PRODUCT BLOOD TYPE: 9500
BLOOD BANK PRODUCT CODE: NORMAL
BLOOD BANK UNIT TYPE AND RH: NORMAL
BLOOD GROUP ANTIBODIES SERPL: NORMAL
BPU ID: NORMAL
BUN SERPL-MCNC: 17 MG/DL (ref 8–23)
CALCIUM SERPL-MCNC: 9.5 MG/DL (ref 8.3–10.4)
CHLORIDE SERPL-SCNC: 103 MMOL/L (ref 103–113)
CO2 SERPL-SCNC: 30 MMOL/L (ref 21–32)
CREAT SERPL-MCNC: 0.8 MG/DL (ref 0.6–1)
CROSSMATCH RESULT: NORMAL
DIFFERENTIAL METHOD BLD: ABNORMAL
DIFFERENTIAL METHOD BLD: ABNORMAL
EOSINOPHIL # BLD: 0 K/UL (ref 0–0.8)
EOSINOPHIL # BLD: 0 K/UL (ref 0–0.8)
EOSINOPHIL NFR BLD: 0 % (ref 0.5–7.8)
EOSINOPHIL NFR BLD: 0 % (ref 0.5–7.8)
ERYTHROCYTE [DISTWIDTH] IN BLOOD BY AUTOMATED COUNT: 14.5 % (ref 11.9–14.6)
ERYTHROCYTE [DISTWIDTH] IN BLOOD BY AUTOMATED COUNT: 14.6 % (ref 11.9–14.6)
GLOBULIN SER CALC-MCNC: 3.9 G/DL (ref 2.8–4.5)
GLUCOSE SERPL-MCNC: 96 MG/DL (ref 65–100)
HCT VFR BLD AUTO: 21.8 % (ref 35.8–46.3)
HCT VFR BLD AUTO: 22.2 % (ref 35.8–46.3)
HGB BLD-MCNC: 7.6 G/DL (ref 11.7–15.4)
HGB BLD-MCNC: 7.8 G/DL (ref 11.7–15.4)
IMM GRANULOCYTES # BLD AUTO: 0 K/UL (ref 0–0.5)
IMM GRANULOCYTES # BLD AUTO: 0 K/UL (ref 0–0.5)
IMM GRANULOCYTES NFR BLD AUTO: 1 % (ref 0–5)
IMM GRANULOCYTES NFR BLD AUTO: 2 % (ref 0–5)
LYMPHOCYTES # BLD: 0.5 K/UL (ref 0.5–4.6)
LYMPHOCYTES # BLD: 0.8 K/UL (ref 0.5–4.6)
LYMPHOCYTES NFR BLD: 64 % (ref 13–44)
LYMPHOCYTES NFR BLD: 67 % (ref 13–44)
MAGNESIUM SERPL-MCNC: 2 MG/DL (ref 1.8–2.4)
MCH RBC QN AUTO: 29.2 PG (ref 26.1–32.9)
MCH RBC QN AUTO: 30.5 PG (ref 26.1–32.9)
MCHC RBC AUTO-ENTMCNC: 34.2 G/DL (ref 31.4–35)
MCHC RBC AUTO-ENTMCNC: 35.8 G/DL (ref 31.4–35)
MCV RBC AUTO: 85.2 FL (ref 82–102)
MCV RBC AUTO: 85.4 FL (ref 82–102)
MONOCYTES # BLD: 0.1 K/UL (ref 0.1–1.3)
MONOCYTES # BLD: 0.1 K/UL (ref 0.1–1.3)
MONOCYTES NFR BLD: 11 % (ref 4–12)
MONOCYTES NFR BLD: 11 % (ref 4–12)
NEUTS SEG # BLD: 0.2 K/UL (ref 1.7–8.2)
NEUTS SEG # BLD: 0.2 K/UL (ref 1.7–8.2)
NEUTS SEG NFR BLD: 20 % (ref 43–78)
NEUTS SEG NFR BLD: 24 % (ref 43–78)
NRBC # BLD: 0 K/UL (ref 0–0.2)
NRBC # BLD: 0 K/UL (ref 0–0.2)
PLATELET # BLD AUTO: 2 K/UL (ref 150–450)
PLATELET COMMENT: ABNORMAL
PLATELET COMMENT: ABNORMAL
PMV BLD AUTO: ABNORMAL FL (ref 9.4–12.3)
PMV BLD AUTO: ABNORMAL FL (ref 9.4–12.3)
POTASSIUM SERPL-SCNC: 3.8 MMOL/L (ref 3.5–5.1)
PROT SERPL-MCNC: 7.5 G/DL (ref 6.3–8.2)
RBC # BLD AUTO: 2.56 M/UL (ref 4.05–5.2)
RBC # BLD AUTO: 2.6 M/UL (ref 4.05–5.2)
RBC MORPH BLD: ABNORMAL
SERVICE CMNT-IMP: NORMAL
SERVICE CMNT-IMP: NORMAL
SODIUM SERPL-SCNC: 139 MMOL/L (ref 136–146)
SPECIMEN EXP DATE BLD: NORMAL
UNIT DIVISION: 0
UNIT ISSUE DATE/TIME: NORMAL
WBC # BLD AUTO: 0.8 K/UL (ref 4.3–11.1)
WBC # BLD AUTO: 1.1 K/UL (ref 4.3–11.1)
WBC MORPH BLD: ABNORMAL
WBC MORPH BLD: ABNORMAL

## 2024-03-23 PROCEDURE — 99232 SBSQ HOSP IP/OBS MODERATE 35: CPT | Performed by: INTERNAL MEDICINE

## 2024-03-23 PROCEDURE — 80053 COMPREHEN METABOLIC PANEL: CPT

## 2024-03-23 PROCEDURE — 85025 COMPLETE CBC W/AUTO DIFF WBC: CPT

## 2024-03-23 PROCEDURE — P9037 PLATE PHERES LEUKOREDU IRRAD: HCPCS

## 2024-03-23 PROCEDURE — 86813 HLA TYPING A B OR C: CPT

## 2024-03-23 PROCEDURE — 83735 ASSAY OF MAGNESIUM: CPT

## 2024-03-23 PROCEDURE — 36430 TRANSFUSION BLD/BLD COMPNT: CPT

## 2024-03-23 PROCEDURE — 85049 AUTOMATED PLATELET COUNT: CPT

## 2024-03-23 PROCEDURE — 6370000000 HC RX 637 (ALT 250 FOR IP): Performed by: INTERNAL MEDICINE

## 2024-03-23 PROCEDURE — 2580000003 HC RX 258: Performed by: STUDENT IN AN ORGANIZED HEALTH CARE EDUCATION/TRAINING PROGRAM

## 2024-03-23 PROCEDURE — 6370000000 HC RX 637 (ALT 250 FOR IP): Performed by: STUDENT IN AN ORGANIZED HEALTH CARE EDUCATION/TRAINING PROGRAM

## 2024-03-23 PROCEDURE — 6360000002 HC RX W HCPCS: Performed by: STUDENT IN AN ORGANIZED HEALTH CARE EDUCATION/TRAINING PROGRAM

## 2024-03-23 PROCEDURE — 36591 DRAW BLOOD OFF VENOUS DEVICE: CPT

## 2024-03-23 PROCEDURE — 1100000000 HC RM PRIVATE

## 2024-03-23 PROCEDURE — 6370000000 HC RX 637 (ALT 250 FOR IP): Performed by: NURSE PRACTITIONER

## 2024-03-23 RX ADMIN — ACETAMINOPHEN 650 MG: 325 TABLET ORAL at 17:07

## 2024-03-23 RX ADMIN — TIZANIDINE 4 MG: 2 TABLET ORAL at 20:49

## 2024-03-23 RX ADMIN — HYDROCODONE BITARTRATE AND ACETAMINOPHEN 1 TABLET: 10; 325 TABLET ORAL at 12:56

## 2024-03-23 RX ADMIN — MORPHINE SULFATE 15 MG: 15 TABLET, FILM COATED, EXTENDED RELEASE ORAL at 20:50

## 2024-03-23 RX ADMIN — DOCUSATE SODIUM 100 MG: 100 CAPSULE, LIQUID FILLED ORAL at 20:50

## 2024-03-23 RX ADMIN — CYANOCOBALAMIN TAB 1000 MCG 500 MCG: 1000 TAB at 08:08

## 2024-03-23 RX ADMIN — DIPHENHYDRAMINE HYDROCHLORIDE 25 MG: 25 CAPSULE ORAL at 10:09

## 2024-03-23 RX ADMIN — FLUOXETINE HYDROCHLORIDE 20 MG: 20 CAPSULE ORAL at 08:08

## 2024-03-23 RX ADMIN — ONDANSETRON 4 MG: 2 INJECTION INTRAMUSCULAR; INTRAVENOUS at 13:37

## 2024-03-23 RX ADMIN — DIPHENHYDRAMINE HYDROCHLORIDE 25 MG: 25 CAPSULE ORAL at 17:07

## 2024-03-23 RX ADMIN — DOCUSATE SODIUM 100 MG: 100 CAPSULE, LIQUID FILLED ORAL at 08:08

## 2024-03-23 RX ADMIN — SODIUM CHLORIDE, PRESERVATIVE FREE 10 ML: 5 INJECTION INTRAVENOUS at 20:51

## 2024-03-23 RX ADMIN — FAMOTIDINE 40 MG: 20 TABLET, FILM COATED ORAL at 17:07

## 2024-03-23 RX ADMIN — FLUOXETINE HYDROCHLORIDE 20 MG: 20 CAPSULE ORAL at 20:55

## 2024-03-23 RX ADMIN — MORPHINE SULFATE 15 MG: 15 TABLET, FILM COATED, EXTENDED RELEASE ORAL at 08:08

## 2024-03-23 RX ADMIN — PANTOPRAZOLE SODIUM 40 MG: 40 TABLET, DELAYED RELEASE ORAL at 20:50

## 2024-03-23 RX ADMIN — SODIUM CHLORIDE, PRESERVATIVE FREE 10 ML: 5 INJECTION INTRAVENOUS at 08:09

## 2024-03-23 RX ADMIN — PANTOPRAZOLE SODIUM 40 MG: 40 TABLET, DELAYED RELEASE ORAL at 08:08

## 2024-03-23 RX ADMIN — ACETAMINOPHEN 650 MG: 325 TABLET ORAL at 10:09

## 2024-03-23 RX ADMIN — LEVOTHYROXINE SODIUM 125 MCG: 0.12 TABLET ORAL at 08:08

## 2024-03-23 RX ADMIN — HYDROCODONE BITARTRATE AND ACETAMINOPHEN 1 TABLET: 10; 325 TABLET ORAL at 18:04

## 2024-03-23 ASSESSMENT — PAIN SCALES - GENERAL
PAINLEVEL_OUTOF10: 0
PAINLEVEL_OUTOF10: 4
PAINLEVEL_OUTOF10: 0

## 2024-03-23 ASSESSMENT — PAIN DESCRIPTION - LOCATION: LOCATION: HEAD

## 2024-03-23 ASSESSMENT — PAIN DESCRIPTION - ONSET: ONSET: ON-GOING

## 2024-03-23 ASSESSMENT — PAIN - FUNCTIONAL ASSESSMENT: PAIN_FUNCTIONAL_ASSESSMENT: ACTIVITIES ARE NOT PREVENTED

## 2024-03-23 ASSESSMENT — PAIN DESCRIPTION - DESCRIPTORS: DESCRIPTORS: ACHING

## 2024-03-23 ASSESSMENT — PAIN DESCRIPTION - PAIN TYPE: TYPE: ACUTE PAIN

## 2024-03-23 ASSESSMENT — PAIN DESCRIPTION - ORIENTATION: ORIENTATION: MID

## 2024-03-24 LAB
ALBUMIN SERPL-MCNC: 3.6 G/DL (ref 3.2–4.6)
ALBUMIN/GLOB SERPL: 0.9 (ref 0.4–1.6)
ALP SERPL-CCNC: 113 U/L (ref 50–136)
ALT SERPL-CCNC: 32 U/L (ref 12–65)
ANION GAP SERPL CALC-SCNC: 7 MMOL/L (ref 2–11)
AST SERPL-CCNC: 39 U/L (ref 15–37)
BASOPHILS # BLD: 0 K/UL (ref 0–0.2)
BASOPHILS NFR BLD: 0 % (ref 0–2)
BILIRUB SERPL-MCNC: 0.7 MG/DL (ref 0.2–1.1)
BLD PROD TYP BPU: NORMAL
BLD PROD TYP BPU: NORMAL
BLOOD BANK BLOOD PRODUCT EXPIRATION DATE: NORMAL
BLOOD BANK BLOOD PRODUCT EXPIRATION DATE: NORMAL
BLOOD BANK CMNT PATIENT-IMP: NORMAL
BLOOD BANK CMNT PATIENT-IMP: NORMAL
BLOOD BANK DISPENSE STATUS: NORMAL
BLOOD BANK DISPENSE STATUS: NORMAL
BLOOD BANK ISBT PRODUCT BLOOD TYPE: 5100
BLOOD BANK ISBT PRODUCT BLOOD TYPE: 5100
BLOOD BANK PRODUCT CODE: NORMAL
BLOOD BANK PRODUCT CODE: NORMAL
BLOOD BANK UNIT TYPE AND RH: NORMAL
BLOOD BANK UNIT TYPE AND RH: NORMAL
BPU ID: NORMAL
BPU ID: NORMAL
BUN SERPL-MCNC: 16 MG/DL (ref 8–23)
CALCIUM SERPL-MCNC: 9.7 MG/DL (ref 8.3–10.4)
CHLORIDE SERPL-SCNC: 101 MMOL/L (ref 103–113)
CO2 SERPL-SCNC: 28 MMOL/L (ref 21–32)
CREAT SERPL-MCNC: 0.7 MG/DL (ref 0.6–1)
DIFFERENTIAL METHOD BLD: ABNORMAL
EOSINOPHIL # BLD: 0 K/UL (ref 0–0.8)
EOSINOPHIL NFR BLD: 0 % (ref 0.5–7.8)
ERYTHROCYTE [DISTWIDTH] IN BLOOD BY AUTOMATED COUNT: 14.4 % (ref 11.9–14.6)
GLOBULIN SER CALC-MCNC: 3.9 G/DL (ref 2.8–4.5)
GLUCOSE SERPL-MCNC: 97 MG/DL (ref 65–100)
HCT VFR BLD AUTO: 22.5 % (ref 35.8–46.3)
HGB BLD-MCNC: 7.7 G/DL (ref 11.7–15.4)
IMM GRANULOCYTES # BLD AUTO: 0 K/UL (ref 0–0.5)
IMM GRANULOCYTES NFR BLD AUTO: 2 % (ref 0–5)
LYMPHOCYTES # BLD: 0.7 K/UL (ref 0.5–4.6)
LYMPHOCYTES NFR BLD: 70 % (ref 13–44)
MAGNESIUM SERPL-MCNC: 2 MG/DL (ref 1.8–2.4)
MCH RBC QN AUTO: 29.4 PG (ref 26.1–32.9)
MCHC RBC AUTO-ENTMCNC: 34.2 G/DL (ref 31.4–35)
MCV RBC AUTO: 85.9 FL (ref 82–102)
MONOCYTES # BLD: 0.1 K/UL (ref 0.1–1.3)
MONOCYTES NFR BLD: 7 % (ref 4–12)
NEUTS SEG # BLD: 0.2 K/UL (ref 1.7–8.2)
NEUTS SEG NFR BLD: 21 % (ref 43–78)
NRBC # BLD: 0 K/UL (ref 0–0.2)
PLATELET # BLD AUTO: 3 K/UL (ref 150–450)
PLATELET # BLD AUTO: <2 K/UL (ref 150–450)
PLATELET COMMENT: ABNORMAL
PMV BLD AUTO: ABNORMAL FL (ref 9.4–12.3)
POTASSIUM SERPL-SCNC: 3.8 MMOL/L (ref 3.5–5.1)
PROT SERPL-MCNC: 7.5 G/DL (ref 6.3–8.2)
RBC # BLD AUTO: 2.62 M/UL (ref 4.05–5.2)
RBC MORPH BLD: ABNORMAL
SODIUM SERPL-SCNC: 136 MMOL/L (ref 136–146)
UNIT DIVISION: 0
UNIT DIVISION: 0
UNIT ISSUE DATE/TIME: NORMAL
UNIT ISSUE DATE/TIME: NORMAL
WBC # BLD AUTO: 1 K/UL (ref 4.3–11.1)
WBC MORPH BLD: ABNORMAL

## 2024-03-24 PROCEDURE — 36430 TRANSFUSION BLD/BLD COMPNT: CPT

## 2024-03-24 PROCEDURE — 80053 COMPREHEN METABOLIC PANEL: CPT

## 2024-03-24 PROCEDURE — 85049 AUTOMATED PLATELET COUNT: CPT

## 2024-03-24 PROCEDURE — 6370000000 HC RX 637 (ALT 250 FOR IP): Performed by: NURSE PRACTITIONER

## 2024-03-24 PROCEDURE — 6370000000 HC RX 637 (ALT 250 FOR IP): Performed by: STUDENT IN AN ORGANIZED HEALTH CARE EDUCATION/TRAINING PROGRAM

## 2024-03-24 PROCEDURE — 1100000000 HC RM PRIVATE

## 2024-03-24 PROCEDURE — P9037 PLATE PHERES LEUKOREDU IRRAD: HCPCS

## 2024-03-24 PROCEDURE — 6370000000 HC RX 637 (ALT 250 FOR IP): Performed by: INTERNAL MEDICINE

## 2024-03-24 PROCEDURE — 83735 ASSAY OF MAGNESIUM: CPT

## 2024-03-24 PROCEDURE — 99232 SBSQ HOSP IP/OBS MODERATE 35: CPT | Performed by: INTERNAL MEDICINE

## 2024-03-24 PROCEDURE — 85025 COMPLETE CBC W/AUTO DIFF WBC: CPT

## 2024-03-24 PROCEDURE — 86813 HLA TYPING A B OR C: CPT

## 2024-03-24 PROCEDURE — 2580000003 HC RX 258: Performed by: STUDENT IN AN ORGANIZED HEALTH CARE EDUCATION/TRAINING PROGRAM

## 2024-03-24 RX ORDER — SODIUM CHLORIDE 9 MG/ML
INJECTION, SOLUTION INTRAVENOUS PRN
Status: DISCONTINUED | OUTPATIENT
Start: 2024-03-24 | End: 2024-03-27

## 2024-03-24 RX ADMIN — MORPHINE SULFATE 15 MG: 15 TABLET, FILM COATED, EXTENDED RELEASE ORAL at 21:21

## 2024-03-24 RX ADMIN — LEVOTHYROXINE SODIUM 125 MCG: 0.12 TABLET ORAL at 09:29

## 2024-03-24 RX ADMIN — MORPHINE SULFATE 15 MG: 15 TABLET, FILM COATED, EXTENDED RELEASE ORAL at 09:28

## 2024-03-24 RX ADMIN — FAMOTIDINE 40 MG: 20 TABLET, FILM COATED ORAL at 16:44

## 2024-03-24 RX ADMIN — TIZANIDINE 4 MG: 2 TABLET ORAL at 21:21

## 2024-03-24 RX ADMIN — SODIUM CHLORIDE, PRESERVATIVE FREE 10 ML: 5 INJECTION INTRAVENOUS at 09:28

## 2024-03-24 RX ADMIN — DOCUSATE SODIUM 100 MG: 100 CAPSULE, LIQUID FILLED ORAL at 09:28

## 2024-03-24 RX ADMIN — ACETAMINOPHEN 650 MG: 325 TABLET ORAL at 10:27

## 2024-03-24 RX ADMIN — SODIUM CHLORIDE, PRESERVATIVE FREE 10 ML: 5 INJECTION INTRAVENOUS at 21:22

## 2024-03-24 RX ADMIN — PANTOPRAZOLE SODIUM 40 MG: 40 TABLET, DELAYED RELEASE ORAL at 09:28

## 2024-03-24 RX ADMIN — CYANOCOBALAMIN TAB 1000 MCG 500 MCG: 1000 TAB at 09:26

## 2024-03-24 RX ADMIN — FLUOXETINE HYDROCHLORIDE 20 MG: 20 CAPSULE ORAL at 21:23

## 2024-03-24 RX ADMIN — HYDROCODONE BITARTRATE AND ACETAMINOPHEN 1 TABLET: 10; 325 TABLET ORAL at 09:33

## 2024-03-24 RX ADMIN — PANTOPRAZOLE SODIUM 40 MG: 40 TABLET, DELAYED RELEASE ORAL at 21:21

## 2024-03-24 RX ADMIN — FLUOXETINE HYDROCHLORIDE 20 MG: 20 CAPSULE ORAL at 09:29

## 2024-03-24 RX ADMIN — DIPHENHYDRAMINE HYDROCHLORIDE 25 MG: 25 CAPSULE ORAL at 10:27

## 2024-03-24 RX ADMIN — DOCUSATE SODIUM 100 MG: 100 CAPSULE, LIQUID FILLED ORAL at 21:21

## 2024-03-24 ASSESSMENT — PAIN SCALES - GENERAL
PAINLEVEL_OUTOF10: 0
PAINLEVEL_OUTOF10: 0

## 2024-03-25 ENCOUNTER — HOSPITAL ENCOUNTER (OUTPATIENT)
Dept: INFUSION THERAPY | Age: 74
End: 2024-03-25

## 2024-03-25 LAB
ALBUMIN SERPL-MCNC: 3.5 G/DL (ref 3.2–4.6)
ALBUMIN/GLOB SERPL: 0.9 (ref 0.4–1.6)
ALP SERPL-CCNC: 110 U/L (ref 50–136)
ALT SERPL-CCNC: 30 U/L (ref 12–65)
ANION GAP SERPL CALC-SCNC: 3 MMOL/L (ref 2–11)
APTT PPP: 25.2 SEC (ref 23.3–37.4)
AST SERPL-CCNC: 34 U/L (ref 15–37)
BASOPHILS # BLD: 0 K/UL (ref 0–0.2)
BASOPHILS NFR BLD: 0 % (ref 0–2)
BILIRUB SERPL-MCNC: 0.6 MG/DL (ref 0.2–1.1)
BLD PROD TYP BPU: NORMAL
BLOOD BANK BLOOD PRODUCT EXPIRATION DATE: NORMAL
BLOOD BANK CMNT PATIENT-IMP: NORMAL
BLOOD BANK DISPENSE STATUS: NORMAL
BLOOD BANK ISBT PRODUCT BLOOD TYPE: 6200
BLOOD BANK PRODUCT CODE: NORMAL
BLOOD BANK UNIT TYPE AND RH: NORMAL
BPU ID: NORMAL
BUN SERPL-MCNC: 16 MG/DL (ref 8–23)
CALCIUM SERPL-MCNC: 9.5 MG/DL (ref 8.3–10.4)
CHLORIDE SERPL-SCNC: 103 MMOL/L (ref 103–113)
CO2 SERPL-SCNC: 30 MMOL/L (ref 21–32)
CREAT SERPL-MCNC: 0.6 MG/DL (ref 0.6–1)
D DIMER PPP FEU-MCNC: 0.72 UG/ML(FEU)
DIFFERENTIAL METHOD BLD: ABNORMAL
EOSINOPHIL # BLD: 0 K/UL (ref 0–0.8)
EOSINOPHIL NFR BLD: 0 % (ref 0.5–7.8)
ERYTHROCYTE [DISTWIDTH] IN BLOOD BY AUTOMATED COUNT: 14.1 % (ref 11.9–14.6)
FIBRINOGEN PPP-MCNC: 480 MG/DL (ref 190–501)
GLOBULIN SER CALC-MCNC: 3.7 G/DL (ref 2.8–4.5)
GLUCOSE SERPL-MCNC: 98 MG/DL (ref 65–100)
HCT VFR BLD AUTO: 21.9 % (ref 35.8–46.3)
HGB BLD-MCNC: 7.4 G/DL (ref 11.7–15.4)
IMM GRANULOCYTES # BLD AUTO: 0 K/UL (ref 0–0.5)
IMM GRANULOCYTES NFR BLD AUTO: 2 % (ref 0–5)
INR PPP: 1
LYMPHOCYTES # BLD: 1 K/UL (ref 0.5–4.6)
LYMPHOCYTES NFR BLD: 80 % (ref 13–44)
MAGNESIUM SERPL-MCNC: 1.8 MG/DL (ref 1.8–2.4)
MCH RBC QN AUTO: 28.9 PG (ref 26.1–32.9)
MCHC RBC AUTO-ENTMCNC: 33.8 G/DL (ref 31.4–35)
MCV RBC AUTO: 85.5 FL (ref 82–102)
MONOCYTES # BLD: 0.1 K/UL (ref 0.1–1.3)
MONOCYTES NFR BLD: 6 % (ref 4–12)
NEUTS SEG # BLD: 0.1 K/UL (ref 1.7–8.2)
NEUTS SEG NFR BLD: 12 % (ref 43–78)
NRBC # BLD: 0 K/UL (ref 0–0.2)
PLATELET # BLD AUTO: 2 K/UL (ref 150–450)
PLATELET # BLD AUTO: 2 K/UL (ref 150–450)
PLATELET COMMENT: ABNORMAL
PMV BLD AUTO: ABNORMAL FL (ref 9.4–12.3)
POTASSIUM SERPL-SCNC: 3.7 MMOL/L (ref 3.5–5.1)
PROT SERPL-MCNC: 7.2 G/DL (ref 6.3–8.2)
PROTHROMBIN TIME: 13.6 SEC (ref 11.3–14.9)
RBC # BLD AUTO: 2.56 M/UL (ref 4.05–5.2)
RBC MORPH BLD: ABNORMAL
RBC MORPH BLD: ABNORMAL
SODIUM SERPL-SCNC: 136 MMOL/L (ref 136–146)
UNIT DIVISION: 0
UNIT ISSUE DATE/TIME: NORMAL
WBC # BLD AUTO: 1.2 K/UL (ref 4.3–11.1)
WBC MORPH BLD: ABNORMAL

## 2024-03-25 PROCEDURE — 85379 FIBRIN DEGRADATION QUANT: CPT

## 2024-03-25 PROCEDURE — 6370000000 HC RX 637 (ALT 250 FOR IP): Performed by: NURSE PRACTITIONER

## 2024-03-25 PROCEDURE — 97530 THERAPEUTIC ACTIVITIES: CPT

## 2024-03-25 PROCEDURE — 85025 COMPLETE CBC W/AUTO DIFF WBC: CPT

## 2024-03-25 PROCEDURE — APPSS30 APP SPLIT SHARED TIME 16-30 MINUTES: Performed by: NURSE PRACTITIONER

## 2024-03-25 PROCEDURE — 97535 SELF CARE MNGMENT TRAINING: CPT

## 2024-03-25 PROCEDURE — 2580000003 HC RX 258: Performed by: STUDENT IN AN ORGANIZED HEALTH CARE EDUCATION/TRAINING PROGRAM

## 2024-03-25 PROCEDURE — 99232 SBSQ HOSP IP/OBS MODERATE 35: CPT | Performed by: INTERNAL MEDICINE

## 2024-03-25 PROCEDURE — 80053 COMPREHEN METABOLIC PANEL: CPT

## 2024-03-25 PROCEDURE — 86644 CMV ANTIBODY: CPT

## 2024-03-25 PROCEDURE — 86813 HLA TYPING A B OR C: CPT

## 2024-03-25 PROCEDURE — 6370000000 HC RX 637 (ALT 250 FOR IP): Performed by: STUDENT IN AN ORGANIZED HEALTH CARE EDUCATION/TRAINING PROGRAM

## 2024-03-25 PROCEDURE — 85610 PROTHROMBIN TIME: CPT

## 2024-03-25 PROCEDURE — 83735 ASSAY OF MAGNESIUM: CPT

## 2024-03-25 PROCEDURE — 36430 TRANSFUSION BLD/BLD COMPNT: CPT

## 2024-03-25 PROCEDURE — 85049 AUTOMATED PLATELET COUNT: CPT

## 2024-03-25 PROCEDURE — 1100000000 HC RM PRIVATE

## 2024-03-25 PROCEDURE — 6370000000 HC RX 637 (ALT 250 FOR IP): Performed by: INTERNAL MEDICINE

## 2024-03-25 PROCEDURE — 36591 DRAW BLOOD OFF VENOUS DEVICE: CPT

## 2024-03-25 PROCEDURE — 85384 FIBRINOGEN ACTIVITY: CPT

## 2024-03-25 PROCEDURE — 85730 THROMBOPLASTIN TIME PARTIAL: CPT

## 2024-03-25 PROCEDURE — P9037 PLATE PHERES LEUKOREDU IRRAD: HCPCS

## 2024-03-25 RX ORDER — LIDOCAINE AND PRILOCAINE 25; 25 MG/G; MG/G
CREAM TOPICAL ONCE
Status: DISCONTINUED | OUTPATIENT
Start: 2024-03-25 | End: 2024-04-02 | Stop reason: HOSPADM

## 2024-03-25 RX ORDER — SODIUM CHLORIDE 9 MG/ML
INJECTION, SOLUTION INTRAVENOUS PRN
Status: DISCONTINUED | OUTPATIENT
Start: 2024-03-25 | End: 2024-03-27

## 2024-03-25 RX ORDER — DIMETHICONE, CAMPHOR (SYNTHETIC), MENTHOL, AND PHENOL 1.1; .5; .625; .5 G/100G; G/100G; G/100G; G/100G
OINTMENT TOPICAL PRN
Status: DISCONTINUED | OUTPATIENT
Start: 2024-03-25 | End: 2024-04-02 | Stop reason: HOSPADM

## 2024-03-25 RX ADMIN — SODIUM CHLORIDE, PRESERVATIVE FREE 10 ML: 5 INJECTION INTRAVENOUS at 20:59

## 2024-03-25 RX ADMIN — FAMOTIDINE 40 MG: 20 TABLET, FILM COATED ORAL at 16:41

## 2024-03-25 RX ADMIN — ACETAMINOPHEN 650 MG: 325 TABLET ORAL at 10:04

## 2024-03-25 RX ADMIN — TIZANIDINE 4 MG: 2 TABLET ORAL at 20:58

## 2024-03-25 RX ADMIN — FLUOXETINE HYDROCHLORIDE 20 MG: 20 CAPSULE ORAL at 20:58

## 2024-03-25 RX ADMIN — DIPHENHYDRAMINE HYDROCHLORIDE 25 MG: 25 CAPSULE ORAL at 10:04

## 2024-03-25 RX ADMIN — HYDROCODONE BITARTRATE AND ACETAMINOPHEN 1 TABLET: 10; 325 TABLET ORAL at 14:13

## 2024-03-25 RX ADMIN — POLYETHYLENE GLYCOL 3350 17 G: 17 POWDER, FOR SOLUTION ORAL at 08:54

## 2024-03-25 RX ADMIN — LEVOTHYROXINE SODIUM 125 MCG: 0.12 TABLET ORAL at 08:49

## 2024-03-25 RX ADMIN — MORPHINE SULFATE 15 MG: 15 TABLET, FILM COATED, EXTENDED RELEASE ORAL at 20:58

## 2024-03-25 RX ADMIN — ACETAMINOPHEN 650 MG: 325 TABLET ORAL at 15:36

## 2024-03-25 RX ADMIN — FLUOXETINE HYDROCHLORIDE 20 MG: 20 CAPSULE ORAL at 09:01

## 2024-03-25 RX ADMIN — SODIUM CHLORIDE, PRESERVATIVE FREE 10 ML: 5 INJECTION INTRAVENOUS at 08:54

## 2024-03-25 RX ADMIN — DIPHENHYDRAMINE HYDROCHLORIDE 25 MG: 25 CAPSULE ORAL at 15:36

## 2024-03-25 RX ADMIN — DOCUSATE SODIUM 100 MG: 100 CAPSULE, LIQUID FILLED ORAL at 08:49

## 2024-03-25 RX ADMIN — CYANOCOBALAMIN TAB 1000 MCG 500 MCG: 1000 TAB at 08:49

## 2024-03-25 RX ADMIN — PANTOPRAZOLE SODIUM 40 MG: 40 TABLET, DELAYED RELEASE ORAL at 20:58

## 2024-03-25 RX ADMIN — MORPHINE SULFATE 15 MG: 15 TABLET, FILM COATED, EXTENDED RELEASE ORAL at 08:49

## 2024-03-25 RX ADMIN — PANTOPRAZOLE SODIUM 40 MG: 40 TABLET, DELAYED RELEASE ORAL at 08:49

## 2024-03-25 RX ADMIN — DOCUSATE SODIUM 100 MG: 100 CAPSULE, LIQUID FILLED ORAL at 20:58

## 2024-03-25 ASSESSMENT — PAIN DESCRIPTION - LOCATION: LOCATION: GENERALIZED

## 2024-03-25 ASSESSMENT — PAIN DESCRIPTION - ORIENTATION: ORIENTATION: RIGHT

## 2024-03-25 ASSESSMENT — PAIN SCALES - GENERAL
PAINLEVEL_OUTOF10: 0
PAINLEVEL_OUTOF10: 7

## 2024-03-25 ASSESSMENT — PAIN - FUNCTIONAL ASSESSMENT: PAIN_FUNCTIONAL_ASSESSMENT: PREVENTS OR INTERFERES SOME ACTIVE ACTIVITIES AND ADLS

## 2024-03-25 ASSESSMENT — PAIN DESCRIPTION - DESCRIPTORS: DESCRIPTORS: ACHING

## 2024-03-26 ENCOUNTER — APPOINTMENT (OUTPATIENT)
Dept: CT IMAGING | Age: 74
DRG: 834 | End: 2024-03-26
Payer: MEDICARE

## 2024-03-26 ENCOUNTER — HOSPITAL ENCOUNTER (OUTPATIENT)
Dept: INFUSION THERAPY | Age: 74
Setting detail: INFUSION SERIES
End: 2024-03-26

## 2024-03-26 LAB
ABO + RH BLD: NORMAL
ALBUMIN SERPL-MCNC: 3.7 G/DL (ref 3.2–4.6)
ALBUMIN/GLOB SERPL: 0.9 (ref 0.4–1.6)
ALP SERPL-CCNC: 109 U/L (ref 50–136)
ALT SERPL-CCNC: 31 U/L (ref 12–65)
ANION GAP SERPL CALC-SCNC: 5 MMOL/L (ref 2–11)
AST SERPL-CCNC: 32 U/L (ref 15–37)
BILIRUB SERPL-MCNC: 0.7 MG/DL (ref 0.2–1.1)
BLD PROD TYP BPU: NORMAL
BLD PROD TYP BPU: NORMAL
BLOOD BANK BLOOD PRODUCT EXPIRATION DATE: NORMAL
BLOOD BANK BLOOD PRODUCT EXPIRATION DATE: NORMAL
BLOOD BANK CMNT PATIENT-IMP: NORMAL
BLOOD BANK CMNT PATIENT-IMP: NORMAL
BLOOD BANK DISPENSE STATUS: NORMAL
BLOOD BANK DISPENSE STATUS: NORMAL
BLOOD BANK ISBT PRODUCT BLOOD TYPE: 5100
BLOOD BANK ISBT PRODUCT BLOOD TYPE: 5100
BLOOD BANK PRODUCT CODE: NORMAL
BLOOD BANK PRODUCT CODE: NORMAL
BLOOD BANK UNIT TYPE AND RH: NORMAL
BLOOD BANK UNIT TYPE AND RH: NORMAL
BLOOD GROUP ANTIBODIES SERPL: NORMAL
BONE MARROW PREP & WRIGHT STAIN: NORMAL
BPU ID: NORMAL
BPU ID: NORMAL
BUN SERPL-MCNC: 16 MG/DL (ref 8–23)
CALCIUM SERPL-MCNC: 10 MG/DL (ref 8.3–10.4)
CHLORIDE SERPL-SCNC: 102 MMOL/L (ref 103–113)
CO2 SERPL-SCNC: 29 MMOL/L (ref 21–32)
CREAT SERPL-MCNC: 0.7 MG/DL (ref 0.6–1)
DIFFERENTIAL METHOD BLD: ABNORMAL
ERYTHROCYTE [DISTWIDTH] IN BLOOD BY AUTOMATED COUNT: 14.3 % (ref 11.9–14.6)
GLOBULIN SER CALC-MCNC: 4 G/DL (ref 2.8–4.5)
GLUCOSE SERPL-MCNC: 92 MG/DL (ref 65–100)
HCT VFR BLD AUTO: 20.8 % (ref 35.8–46.3)
HGB BLD-MCNC: 7.2 G/DL (ref 11.7–15.4)
MAGNESIUM SERPL-MCNC: 1.9 MG/DL (ref 1.8–2.4)
MCH RBC QN AUTO: 29.4 PG (ref 26.1–32.9)
MCHC RBC AUTO-ENTMCNC: 34.6 G/DL (ref 31.4–35)
MCV RBC AUTO: 84.9 FL (ref 82–102)
NRBC # BLD: 0 K/UL (ref 0–0.2)
PLATELET # BLD AUTO: 2 K/UL (ref 150–450)
PLATELET # BLD AUTO: 3 K/UL (ref 150–450)
PLATELET COMMENT: ABNORMAL
PMV BLD AUTO: ABNORMAL FL (ref 9.4–12.3)
POTASSIUM SERPL-SCNC: 3.9 MMOL/L (ref 3.5–5.1)
PROT SERPL-MCNC: 7.7 G/DL (ref 6.3–8.2)
RBC # BLD AUTO: 2.45 M/UL (ref 4.05–5.2)
RBC MORPH BLD: ABNORMAL
SODIUM SERPL-SCNC: 136 MMOL/L (ref 136–146)
SPECIMEN EXP DATE BLD: NORMAL
UNIT DIVISION: 0
UNIT DIVISION: 0
UNIT ISSUE DATE/TIME: NORMAL
UNIT ISSUE DATE/TIME: NORMAL
WBC # BLD AUTO: 1.1 K/UL (ref 4.3–11.1)
WBC MORPH BLD: ABNORMAL

## 2024-03-26 PROCEDURE — 99152 MOD SED SAME PHYS/QHP 5/>YRS: CPT | Performed by: RADIOLOGY

## 2024-03-26 PROCEDURE — 38222 DX BONE MARROW BX & ASPIR: CPT | Performed by: PHYSICIAN ASSISTANT

## 2024-03-26 PROCEDURE — 86022 PLATELET ANTIBODIES: CPT

## 2024-03-26 PROCEDURE — APPSS30 APP SPLIT SHARED TIME 16-30 MINUTES: Performed by: NURSE PRACTITIONER

## 2024-03-26 PROCEDURE — 2580000003 HC RX 258: Performed by: PHYSICIAN ASSISTANT

## 2024-03-26 PROCEDURE — 6370000000 HC RX 637 (ALT 250 FOR IP): Performed by: NURSE PRACTITIONER

## 2024-03-26 PROCEDURE — 99152 MOD SED SAME PHYS/QHP 5/>YRS: CPT

## 2024-03-26 PROCEDURE — 85025 COMPLETE CBC W/AUTO DIFF WBC: CPT

## 2024-03-26 PROCEDURE — 88313 SPECIAL STAINS GROUP 2: CPT

## 2024-03-26 PROCEDURE — 6370000000 HC RX 637 (ALT 250 FOR IP): Performed by: STUDENT IN AN ORGANIZED HEALTH CARE EDUCATION/TRAINING PROGRAM

## 2024-03-26 PROCEDURE — 77012 CT SCAN FOR NEEDLE BIOPSY: CPT | Performed by: PHYSICIAN ASSISTANT

## 2024-03-26 PROCEDURE — 30233N1 TRANSFUSION OF NONAUTOLOGOUS RED BLOOD CELLS INTO PERIPHERAL VEIN, PERCUTANEOUS APPROACH: ICD-10-PCS | Performed by: INTERNAL MEDICINE

## 2024-03-26 PROCEDURE — 97530 THERAPEUTIC ACTIVITIES: CPT

## 2024-03-26 PROCEDURE — 6370000000 HC RX 637 (ALT 250 FOR IP): Performed by: INTERNAL MEDICINE

## 2024-03-26 PROCEDURE — 88311 DECALCIFY TISSUE: CPT

## 2024-03-26 PROCEDURE — 2500000003 HC RX 250 WO HCPCS: Performed by: PHYSICIAN ASSISTANT

## 2024-03-26 PROCEDURE — 36430 TRANSFUSION BLD/BLD COMPNT: CPT

## 2024-03-26 PROCEDURE — 36591 DRAW BLOOD OFF VENOUS DEVICE: CPT

## 2024-03-26 PROCEDURE — 6360000002 HC RX W HCPCS: Performed by: NURSE PRACTITIONER

## 2024-03-26 PROCEDURE — 99232 SBSQ HOSP IP/OBS MODERATE 35: CPT | Performed by: INTERNAL MEDICINE

## 2024-03-26 PROCEDURE — 85049 AUTOMATED PLATELET COUNT: CPT

## 2024-03-26 PROCEDURE — 1100000000 HC RM PRIVATE

## 2024-03-26 PROCEDURE — 07DT3ZZ EXTRACTION OF BONE MARROW, PERCUTANEOUS APPROACH: ICD-10-PCS | Performed by: INTERNAL MEDICINE

## 2024-03-26 PROCEDURE — 6360000002 HC RX W HCPCS: Performed by: RADIOLOGY

## 2024-03-26 PROCEDURE — 2580000003 HC RX 258: Performed by: STUDENT IN AN ORGANIZED HEALTH CARE EDUCATION/TRAINING PROGRAM

## 2024-03-26 PROCEDURE — 83735 ASSAY OF MAGNESIUM: CPT

## 2024-03-26 PROCEDURE — 88305 TISSUE EXAM BY PATHOLOGIST: CPT

## 2024-03-26 PROCEDURE — P9037 PLATE PHERES LEUKOREDU IRRAD: HCPCS

## 2024-03-26 PROCEDURE — 80053 COMPREHEN METABOLIC PANEL: CPT

## 2024-03-26 PROCEDURE — 30233R1 TRANSFUSION OF NONAUTOLOGOUS PLATELETS INTO PERIPHERAL VEIN, PERCUTANEOUS APPROACH: ICD-10-PCS | Performed by: INTERNAL MEDICINE

## 2024-03-26 RX ORDER — SODIUM CHLORIDE 9 MG/ML
INJECTION, SOLUTION INTRAVENOUS PRN
Status: DISCONTINUED | OUTPATIENT
Start: 2024-03-26 | End: 2024-04-02 | Stop reason: HOSPADM

## 2024-03-26 RX ORDER — LIDOCAINE HYDROCHLORIDE 20 MG/ML
INJECTION, SOLUTION INFILTRATION; PERINEURAL PRN
Status: COMPLETED | OUTPATIENT
Start: 2024-03-26 | End: 2024-03-26

## 2024-03-26 RX ORDER — DEXAMETHASONE 4 MG/1
40 TABLET ORAL DAILY
Status: COMPLETED | OUTPATIENT
Start: 2024-03-26 | End: 2024-03-29

## 2024-03-26 RX ORDER — SODIUM CHLORIDE 9 MG/ML
INJECTION, SOLUTION INTRAVENOUS CONTINUOUS PRN
Status: COMPLETED | OUTPATIENT
Start: 2024-03-26 | End: 2024-03-26

## 2024-03-26 RX ORDER — FENTANYL CITRATE 50 UG/ML
INJECTION, SOLUTION INTRAMUSCULAR; INTRAVENOUS PRN
Status: COMPLETED | OUTPATIENT
Start: 2024-03-26 | End: 2024-03-26

## 2024-03-26 RX ORDER — MIDAZOLAM HYDROCHLORIDE 1 MG/ML
INJECTION INTRAMUSCULAR; INTRAVENOUS PRN
Status: COMPLETED | OUTPATIENT
Start: 2024-03-26 | End: 2024-03-26

## 2024-03-26 RX ADMIN — ACETAMINOPHEN 650 MG: 325 TABLET ORAL at 13:37

## 2024-03-26 RX ADMIN — DIPHENHYDRAMINE HYDROCHLORIDE 25 MG: 25 CAPSULE ORAL at 03:11

## 2024-03-26 RX ADMIN — LEVOTHYROXINE SODIUM 125 MCG: 0.12 TABLET ORAL at 06:26

## 2024-03-26 RX ADMIN — SODIUM CHLORIDE, PRESERVATIVE FREE 10 ML: 5 INJECTION INTRAVENOUS at 21:29

## 2024-03-26 RX ADMIN — MIDAZOLAM 1 MG: 1 INJECTION INTRAMUSCULAR; INTRAVENOUS at 08:24

## 2024-03-26 RX ADMIN — MORPHINE SULFATE 15 MG: 15 TABLET, FILM COATED, EXTENDED RELEASE ORAL at 17:49

## 2024-03-26 RX ADMIN — IMMUNE GLOBULIN (HUMAN) 40 G: 10 INJECTION INTRAVENOUS; SUBCUTANEOUS at 14:01

## 2024-03-26 RX ADMIN — Medication: at 17:49

## 2024-03-26 RX ADMIN — SODIUM CHLORIDE 50 ML/HR: 9 INJECTION, SOLUTION INTRAVENOUS at 08:25

## 2024-03-26 RX ADMIN — TIZANIDINE 4 MG: 2 TABLET ORAL at 21:28

## 2024-03-26 RX ADMIN — FENTANYL CITRATE 50 MCG: 50 INJECTION, SOLUTION INTRAMUSCULAR; INTRAVENOUS at 08:24

## 2024-03-26 RX ADMIN — DOCUSATE SODIUM 100 MG: 100 CAPSULE, LIQUID FILLED ORAL at 11:19

## 2024-03-26 RX ADMIN — SODIUM CHLORIDE, PRESERVATIVE FREE 10 ML: 5 INJECTION INTRAVENOUS at 07:00

## 2024-03-26 RX ADMIN — FENTANYL CITRATE 50 MCG: 50 INJECTION, SOLUTION INTRAMUSCULAR; INTRAVENOUS at 08:33

## 2024-03-26 RX ADMIN — DEXAMETHASONE 40 MG: 4 TABLET ORAL at 13:37

## 2024-03-26 RX ADMIN — FLUOXETINE HYDROCHLORIDE 20 MG: 20 CAPSULE ORAL at 11:20

## 2024-03-26 RX ADMIN — CYANOCOBALAMIN TAB 1000 MCG 500 MCG: 1000 TAB at 11:20

## 2024-03-26 RX ADMIN — PANTOPRAZOLE SODIUM 40 MG: 40 TABLET, DELAYED RELEASE ORAL at 21:28

## 2024-03-26 RX ADMIN — DIPHENHYDRAMINE HYDROCHLORIDE 25 MG: 25 CAPSULE ORAL at 21:28

## 2024-03-26 RX ADMIN — DIPHENHYDRAMINE HYDROCHLORIDE 25 MG: 25 CAPSULE ORAL at 13:37

## 2024-03-26 RX ADMIN — SODIUM CHLORIDE: 9 INJECTION, SOLUTION INTRAVENOUS at 13:54

## 2024-03-26 RX ADMIN — DIPHENHYDRAMINE HYDROCHLORIDE 5 ML: 12.5 LIQUID ORAL at 23:55

## 2024-03-26 RX ADMIN — LIDOCAINE HYDROCHLORIDE 10 ML: 20 INJECTION, SOLUTION INFILTRATION; PERINEURAL at 08:35

## 2024-03-26 RX ADMIN — HYDROCODONE BITARTRATE AND ACETAMINOPHEN 1 TABLET: 10; 325 TABLET ORAL at 15:58

## 2024-03-26 RX ADMIN — DOCUSATE SODIUM 100 MG: 100 CAPSULE, LIQUID FILLED ORAL at 21:28

## 2024-03-26 RX ADMIN — ACETAMINOPHEN 650 MG: 325 TABLET ORAL at 03:11

## 2024-03-26 RX ADMIN — MIDAZOLAM 1 MG: 1 INJECTION INTRAMUSCULAR; INTRAVENOUS at 08:33

## 2024-03-26 RX ADMIN — HYDROCODONE BITARTRATE AND ACETAMINOPHEN 1 TABLET: 10; 325 TABLET ORAL at 06:26

## 2024-03-26 RX ADMIN — HYDROCODONE BITARTRATE AND ACETAMINOPHEN 1 TABLET: 10; 325 TABLET ORAL at 22:35

## 2024-03-26 RX ADMIN — ACETAMINOPHEN 650 MG: 325 TABLET ORAL at 21:28

## 2024-03-26 RX ADMIN — FAMOTIDINE 40 MG: 20 TABLET, FILM COATED ORAL at 17:51

## 2024-03-26 RX ADMIN — FLUOXETINE HYDROCHLORIDE 20 MG: 20 CAPSULE ORAL at 21:28

## 2024-03-26 ASSESSMENT — PAIN DESCRIPTION - LOCATION
LOCATION: LEG
LOCATION: KNEE;LEG
LOCATION: KNEE
LOCATION: KNEE;LEG

## 2024-03-26 ASSESSMENT — PAIN DESCRIPTION - PAIN TYPE: TYPE: ACUTE PAIN

## 2024-03-26 ASSESSMENT — PAIN SCALES - GENERAL
PAINLEVEL_OUTOF10: 7
PAINLEVEL_OUTOF10: 6
PAINLEVEL_OUTOF10: 5
PAINLEVEL_OUTOF10: 6
PAINLEVEL_OUTOF10: 2
PAINLEVEL_OUTOF10: 0
PAINLEVEL_OUTOF10: 0

## 2024-03-26 ASSESSMENT — PAIN - FUNCTIONAL ASSESSMENT
PAIN_FUNCTIONAL_ASSESSMENT: PREVENTS OR INTERFERES SOME ACTIVE ACTIVITIES AND ADLS
PAIN_FUNCTIONAL_ASSESSMENT: NONE - DENIES PAIN
PAIN_FUNCTIONAL_ASSESSMENT: PREVENTS OR INTERFERES SOME ACTIVE ACTIVITIES AND ADLS
PAIN_FUNCTIONAL_ASSESSMENT: ACTIVITIES ARE NOT PREVENTED
PAIN_FUNCTIONAL_ASSESSMENT: PREVENTS OR INTERFERES SOME ACTIVE ACTIVITIES AND ADLS

## 2024-03-26 ASSESSMENT — PAIN DESCRIPTION - ORIENTATION
ORIENTATION: RIGHT;LEFT
ORIENTATION: RIGHT
ORIENTATION: RIGHT;LEFT
ORIENTATION: RIGHT

## 2024-03-26 ASSESSMENT — PAIN DESCRIPTION - DESCRIPTORS
DESCRIPTORS: ACHING
DESCRIPTORS: ACHING;THROBBING
DESCRIPTORS: ACHING;THROBBING
DESCRIPTORS: ACHING

## 2024-03-26 NOTE — PRE SEDATION
chloride, diphenhydrAMINE, sodium chloride, hydrocortisone, sodium chloride, sodium chloride, HYDROcodone-acetaminophen, hydrOXYzine HCl, prochlorperazine, sodium chloride flush, sodium chloride, potassium chloride **OR** potassium alternative oral replacement **OR** potassium chloride, magnesium sulfate, ondansetron **OR** ondansetron, polyethylene glycol, acetaminophen **OR** [DISCONTINUED] acetaminophen  Home Meds:   Prior to Admission medications    Medication Sig Start Date End Date Taking? Authorizing Provider   potassium chloride (KLOR-CON M) 20 MEQ extended release tablet Take 1 tablet by mouth daily 3/18/24 5/17/24  Ariane Vanegas NP-C   Magic Mouthwash (MIRACLE MOUTHWASH) Swish and spit 5 mLs 4 times daily as needed for Irritation  Patient not taking: Reported on 3/19/2024 3/7/24   Khadijah Membreno APRN - NP   aluminum & magnesium hydroxide-simethicone (MAALOX) 200-200-20 MG/5ML SUSP suspension Take 5 mLs by mouth every 4 hours as needed for Indigestion (sore throat) Take 5 mLs by mouth every 6 hours as needed (sore throat) Magic mouth wash mix equal portions of maalox, lidocaine 2%, benadryl.  5 mL every four hours swish and spit for mouth pain or swish and swallow for throat pain. 3/7/24   Khadijah Membreno APRN - NP   prochlorperazine (COMPAZINE) 10 MG tablet Take 1 tablet by mouth every 6 hours as needed (nausea) 3/6/24   Ariane Vanegas NP-C   montelukast (SINGULAIR) 10 MG tablet Take 1 tablet by mouth daily 2/6/24   Kris Roblero MD   famotidine (PEPCID) 40 MG tablet Take 1 tablet by mouth every evening 1/8/24   Gunnar Perez MD   lidocaine-prilocaine (EMLA) 2.5-2.5 % cream Apply topically as needed. Place small amount to port site 45 minutes prior to any blood draws/infusions. Cover with saran wrap 1/8/24   Gunnar Perez MD   nystatin (MYCOSTATIN) 919400 UNIT/GM powder Apply 3 times daily. 1/8/24   Gunnar Perez MD   HYDROcodone-acetaminophen (NORCO)  MG per

## 2024-03-26 NOTE — BRIEF OP NOTE
[facility]  Department of Interventional Radiology  [phone]        Interventional Radiology Brief Procedure Note    Patient: Constance Gomes MRN: [id number]  SSN: [id number]    YOB: 1950  Age: 73 y.o.  Sex: female      Date of Procedure: 3/26/2024    Pre-Procedure Diagnosis: AML with severe thrombocytopenia    Post-Procedure Diagnosis: SAME    Procedure(s): Image Guided Biopsy    Brief Description of Procedure: CT-guided bone marrow aspiration and core bone biopsy performed on the right posterior iliac bone without complication    Performed By: JORDON Neal     Assistants: None    Anesthesia:Moderate Sedation was given under the direction and supervision of Dr. Tereso Tan MD.  Total face-to-face time 19 minutes.  IV Versed and fentanyl were given    Estimated Blood Loss: Less than 10ml    Specimens:  Pathology    Implants:  Subcutaneous Port    Findings: Unremarkable CT-guided bone marrow biopsy and aspiration.  Platelets were infused during procedure due to patient's thrombocytopenia.  No excessive bleeding noted    Complications: None    Recommendations: Bedrest for minimum 1 hour.  Leave bandage on for 2 days    Follow Up: As needed    Signed By: JORDON Neal     March 26, 2024

## 2024-03-26 NOTE — OR NURSING
TRANSFER - OUT REPORT:           Verbal report given to CHINO Saavedra on Constance Gomes  being transferred to 5th Floor for routine progression of patient care      Report consisted of patient’s Situation, Background, Assessment and Recommendations(SBAR).          Information from the following report(s) SBAR, Procedure Summary, and MAR was reviewed with the receiving nurse.       Opportunity for questions and clarification was provided.          Conscious Sedation:    100 Mcg of Fentanyl administered   2 Mg of Versed administered   0 Mg of Benadryl administered        Pt tolerated procedure well.     bandaid-type dressing clean, dry, intact, and nontender    VITALS:  /62   Pulse 67   Temp 97.3 °F (36.3 °C) (Infrared)   Resp 16   Ht 1.524 m (5')   Wt 76.7 kg (169 lb)   SpO2 94%   BMI 33.01 kg/m²

## 2024-03-27 ENCOUNTER — HOSPITAL ENCOUNTER (OUTPATIENT)
Dept: INFUSION THERAPY | Age: 74
Setting detail: INFUSION SERIES
End: 2024-03-27

## 2024-03-27 LAB
ALBUMIN SERPL-MCNC: 3.7 G/DL (ref 3.2–4.6)
ALBUMIN/GLOB SERPL: 0.7 (ref 0.4–1.6)
ALP SERPL-CCNC: 111 U/L (ref 50–136)
ALT SERPL-CCNC: 33 U/L (ref 12–65)
ANION GAP SERPL CALC-SCNC: 7 MMOL/L (ref 2–11)
AST SERPL-CCNC: 40 U/L (ref 15–37)
BASOPHILS # BLD: 0 K/UL (ref 0–0.2)
BASOPHILS NFR BLD: 0 % (ref 0–2)
BILIRUB SERPL-MCNC: 0.7 MG/DL (ref 0.2–1.1)
BLD PROD TYP BPU: NORMAL
BLOOD BANK BLOOD PRODUCT EXPIRATION DATE: NORMAL
BLOOD BANK CMNT PATIENT-IMP: NORMAL
BLOOD BANK CMNT PATIENT-IMP: NORMAL
BLOOD BANK DISPENSE STATUS: NORMAL
BLOOD BANK ISBT PRODUCT BLOOD TYPE: 5100
BLOOD BANK ISBT PRODUCT BLOOD TYPE: 6200
BLOOD BANK ISBT PRODUCT BLOOD TYPE: 9500
BLOOD BANK PRODUCT CODE: NORMAL
BLOOD BANK PRODUCT CODE: NORMAL
BLOOD BANK UNIT TYPE AND RH: NORMAL
BPU ID: NORMAL
BUN SERPL-MCNC: 16 MG/DL (ref 8–23)
CALCIUM SERPL-MCNC: 10.2 MG/DL (ref 8.3–10.4)
CHLORIDE SERPL-SCNC: 100 MMOL/L (ref 103–113)
CO2 SERPL-SCNC: 27 MMOL/L (ref 21–32)
CREAT SERPL-MCNC: 0.7 MG/DL (ref 0.6–1)
DIFFERENTIAL METHOD BLD: ABNORMAL
EOSINOPHIL # BLD: 0 K/UL (ref 0–0.8)
EOSINOPHIL NFR BLD: 0 % (ref 0.5–7.8)
ERYTHROCYTE [DISTWIDTH] IN BLOOD BY AUTOMATED COUNT: 13.9 % (ref 11.9–14.6)
ERYTHROCYTE [DISTWIDTH] IN BLOOD BY AUTOMATED COUNT: 14.1 % (ref 11.9–14.6)
GLOBULIN SER CALC-MCNC: 5 G/DL (ref 2.8–4.5)
GLUCOSE SERPL-MCNC: 140 MG/DL (ref 65–100)
HCT VFR BLD AUTO: 19.6 % (ref 35.8–46.3)
HCT VFR BLD AUTO: 20.2 % (ref 35.8–46.3)
HGB BLD-MCNC: 6.8 G/DL (ref 11.7–15.4)
HGB BLD-MCNC: 7.2 G/DL (ref 11.7–15.4)
HISTORY CHECK: NORMAL
IMM GRANULOCYTES # BLD AUTO: 0 K/UL (ref 0–0.5)
IMM GRANULOCYTES NFR BLD AUTO: 2 % (ref 0–5)
LYMPHOCYTES # BLD: 0.6 K/UL (ref 0.5–4.6)
LYMPHOCYTES NFR BLD: 70 % (ref 13–44)
MAGNESIUM SERPL-MCNC: 1.9 MG/DL (ref 1.8–2.4)
MCH RBC QN AUTO: 28.9 PG (ref 26.1–32.9)
MCH RBC QN AUTO: 29.6 PG (ref 26.1–32.9)
MCHC RBC AUTO-ENTMCNC: 34.7 G/DL (ref 31.4–35)
MCHC RBC AUTO-ENTMCNC: 35.6 G/DL (ref 31.4–35)
MCV RBC AUTO: 83.1 FL (ref 82–102)
MCV RBC AUTO: 83.4 FL (ref 82–102)
MONOCYTES # BLD: 0.1 K/UL (ref 0.1–1.3)
MONOCYTES NFR BLD: 8 % (ref 4–12)
NEUTS SEG # BLD: 0.2 K/UL (ref 1.7–8.2)
NEUTS SEG NFR BLD: 20 % (ref 43–78)
NRBC # BLD: 0 K/UL (ref 0–0.2)
NRBC # BLD: 0 K/UL (ref 0–0.2)
PLATELET # BLD AUTO: 2 K/UL (ref 150–450)
PLATELET # BLD AUTO: 6 K/UL (ref 150–450)
PLATELET COMMENT: ABNORMAL
PMV BLD AUTO: ABNORMAL FL (ref 9.4–12.3)
PMV BLD AUTO: ABNORMAL FL (ref 9.4–12.3)
POTASSIUM SERPL-SCNC: 4.1 MMOL/L (ref 3.5–5.1)
PROT SERPL-MCNC: 8.7 G/DL (ref 6.3–8.2)
RBC # BLD AUTO: 2.35 M/UL (ref 4.05–5.2)
RBC # BLD AUTO: 2.43 M/UL (ref 4.05–5.2)
RBC MORPH BLD: ABNORMAL
SODIUM SERPL-SCNC: 134 MMOL/L (ref 136–146)
UNIT DIVISION: 0
UNIT ISSUE DATE/TIME: NORMAL
WBC # BLD AUTO: 0.8 K/UL (ref 4.3–11.1)
WBC # BLD AUTO: 0.9 K/UL (ref 4.3–11.1)
WBC MORPH BLD: ABNORMAL

## 2024-03-27 PROCEDURE — 86921 COMPATIBILITY TEST INCUBATE: CPT

## 2024-03-27 PROCEDURE — 97530 THERAPEUTIC ACTIVITIES: CPT

## 2024-03-27 PROCEDURE — 99232 SBSQ HOSP IP/OBS MODERATE 35: CPT | Performed by: INTERNAL MEDICINE

## 2024-03-27 PROCEDURE — 36591 DRAW BLOOD OFF VENOUS DEVICE: CPT

## 2024-03-27 PROCEDURE — 6370000000 HC RX 637 (ALT 250 FOR IP): Performed by: STUDENT IN AN ORGANIZED HEALTH CARE EDUCATION/TRAINING PROGRAM

## 2024-03-27 PROCEDURE — APPSS30 APP SPLIT SHARED TIME 16-30 MINUTES: Performed by: NURSE PRACTITIONER

## 2024-03-27 PROCEDURE — 86920 COMPATIBILITY TEST SPIN: CPT

## 2024-03-27 PROCEDURE — 86902 BLOOD TYPE ANTIGEN DONOR EA: CPT

## 2024-03-27 PROCEDURE — 97112 NEUROMUSCULAR REEDUCATION: CPT

## 2024-03-27 PROCEDURE — 86922 COMPATIBILITY TEST ANTIGLOB: CPT

## 2024-03-27 PROCEDURE — 6370000000 HC RX 637 (ALT 250 FOR IP): Performed by: INTERNAL MEDICINE

## 2024-03-27 PROCEDURE — 36430 TRANSFUSION BLD/BLD COMPNT: CPT

## 2024-03-27 PROCEDURE — 86900 BLOOD TYPING SEROLOGIC ABO: CPT

## 2024-03-27 PROCEDURE — 97535 SELF CARE MNGMENT TRAINING: CPT

## 2024-03-27 PROCEDURE — 85027 COMPLETE CBC AUTOMATED: CPT

## 2024-03-27 PROCEDURE — 83735 ASSAY OF MAGNESIUM: CPT

## 2024-03-27 PROCEDURE — 86901 BLOOD TYPING SEROLOGIC RH(D): CPT

## 2024-03-27 PROCEDURE — 86850 RBC ANTIBODY SCREEN: CPT

## 2024-03-27 PROCEDURE — 85025 COMPLETE CBC W/AUTO DIFF WBC: CPT

## 2024-03-27 PROCEDURE — 6370000000 HC RX 637 (ALT 250 FOR IP): Performed by: NURSE PRACTITIONER

## 2024-03-27 PROCEDURE — 80053 COMPREHEN METABOLIC PANEL: CPT

## 2024-03-27 PROCEDURE — 6360000002 HC RX W HCPCS: Performed by: NURSE PRACTITIONER

## 2024-03-27 PROCEDURE — 2580000003 HC RX 258: Performed by: STUDENT IN AN ORGANIZED HEALTH CARE EDUCATION/TRAINING PROGRAM

## 2024-03-27 PROCEDURE — 1100000000 HC RM PRIVATE

## 2024-03-27 PROCEDURE — 86022 PLATELET ANTIBODIES: CPT

## 2024-03-27 PROCEDURE — P9037 PLATE PHERES LEUKOREDU IRRAD: HCPCS

## 2024-03-27 RX ORDER — LACTULOSE 10 G/15ML
20 SOLUTION ORAL ONCE
Status: COMPLETED | OUTPATIENT
Start: 2024-03-27 | End: 2024-03-27

## 2024-03-27 RX ADMIN — TIZANIDINE 4 MG: 2 TABLET ORAL at 21:35

## 2024-03-27 RX ADMIN — MORPHINE SULFATE 15 MG: 15 TABLET, FILM COATED, EXTENDED RELEASE ORAL at 21:35

## 2024-03-27 RX ADMIN — PANTOPRAZOLE SODIUM 40 MG: 40 TABLET, DELAYED RELEASE ORAL at 08:13

## 2024-03-27 RX ADMIN — FLUOXETINE HYDROCHLORIDE 20 MG: 20 CAPSULE ORAL at 22:04

## 2024-03-27 RX ADMIN — ACETAMINOPHEN 650 MG: 325 TABLET ORAL at 14:01

## 2024-03-27 RX ADMIN — HYDROCODONE BITARTRATE AND ACETAMINOPHEN 1 TABLET: 10; 325 TABLET ORAL at 15:19

## 2024-03-27 RX ADMIN — LEVOTHYROXINE SODIUM 125 MCG: 0.12 TABLET ORAL at 08:13

## 2024-03-27 RX ADMIN — DOCUSATE SODIUM 100 MG: 100 CAPSULE, LIQUID FILLED ORAL at 08:14

## 2024-03-27 RX ADMIN — ACETAMINOPHEN 650 MG: 325 TABLET ORAL at 22:06

## 2024-03-27 RX ADMIN — DIPHENHYDRAMINE HYDROCHLORIDE 25 MG: 25 CAPSULE ORAL at 22:04

## 2024-03-27 RX ADMIN — LACTULOSE 20 G: 10 SOLUTION ORAL at 16:20

## 2024-03-27 RX ADMIN — DIPHENHYDRAMINE HYDROCHLORIDE 25 MG: 25 CAPSULE ORAL at 14:01

## 2024-03-27 RX ADMIN — FAMOTIDINE 40 MG: 20 TABLET, FILM COATED ORAL at 16:20

## 2024-03-27 RX ADMIN — POLYETHYLENE GLYCOL 3350 17 G: 17 POWDER, FOR SOLUTION ORAL at 14:05

## 2024-03-27 RX ADMIN — FLUOXETINE HYDROCHLORIDE 20 MG: 20 CAPSULE ORAL at 10:17

## 2024-03-27 RX ADMIN — MORPHINE SULFATE 15 MG: 15 TABLET, FILM COATED, EXTENDED RELEASE ORAL at 08:13

## 2024-03-27 RX ADMIN — SODIUM CHLORIDE, PRESERVATIVE FREE 10 ML: 5 INJECTION INTRAVENOUS at 21:36

## 2024-03-27 RX ADMIN — CYANOCOBALAMIN TAB 1000 MCG 500 MCG: 1000 TAB at 08:14

## 2024-03-27 RX ADMIN — IMMUNE GLOBULIN (HUMAN) 40 G: 10 INJECTION INTRAVENOUS; SUBCUTANEOUS at 10:24

## 2024-03-27 RX ADMIN — DOCUSATE SODIUM 100 MG: 100 CAPSULE, LIQUID FILLED ORAL at 21:35

## 2024-03-27 RX ADMIN — PANTOPRAZOLE SODIUM 40 MG: 40 TABLET, DELAYED RELEASE ORAL at 21:35

## 2024-03-27 RX ADMIN — DEXAMETHASONE 40 MG: 4 TABLET ORAL at 08:11

## 2024-03-27 RX ADMIN — SODIUM CHLORIDE, PRESERVATIVE FREE 10 ML: 5 INJECTION INTRAVENOUS at 08:19

## 2024-03-27 ASSESSMENT — PAIN DESCRIPTION - FREQUENCY: FREQUENCY: INTERMITTENT

## 2024-03-27 ASSESSMENT — PAIN DESCRIPTION - DESCRIPTORS
DESCRIPTORS: ACHING
DESCRIPTORS: ACHING

## 2024-03-27 ASSESSMENT — PAIN DESCRIPTION - ORIENTATION
ORIENTATION: RIGHT;LEFT
ORIENTATION: RIGHT;LEFT

## 2024-03-27 ASSESSMENT — PAIN DESCRIPTION - ONSET: ONSET: GRADUAL

## 2024-03-27 ASSESSMENT — PAIN DESCRIPTION - LOCATION
LOCATION: KNEE
LOCATION: KNEE

## 2024-03-27 ASSESSMENT — PAIN SCALES - GENERAL
PAINLEVEL_OUTOF10: 0
PAINLEVEL_OUTOF10: 4
PAINLEVEL_OUTOF10: 6
PAINLEVEL_OUTOF10: 0

## 2024-03-27 ASSESSMENT — PAIN DESCRIPTION - PAIN TYPE: TYPE: CHRONIC PAIN

## 2024-03-28 LAB
ALBUMIN SERPL-MCNC: 3.6 G/DL (ref 3.2–4.6)
ALBUMIN/GLOB SERPL: 0.6 (ref 0.4–1.6)
ALP SERPL-CCNC: 102 U/L (ref 50–136)
ALT SERPL-CCNC: 30 U/L (ref 12–65)
ANION GAP SERPL CALC-SCNC: 6 MMOL/L (ref 2–11)
AST SERPL-CCNC: 37 U/L (ref 15–37)
BASOPHILS # BLD: 0 K/UL (ref 0–0.2)
BASOPHILS NFR BLD: 0 % (ref 0–2)
BILIRUB SERPL-MCNC: 0.7 MG/DL (ref 0.2–1.1)
BLD PROD TYP BPU: NORMAL
BLD PROD TYP BPU: NORMAL
BLOOD BANK BLOOD PRODUCT EXPIRATION DATE: NORMAL
BLOOD BANK BLOOD PRODUCT EXPIRATION DATE: NORMAL
BLOOD BANK CMNT PATIENT-IMP: NORMAL
BLOOD BANK CMNT PATIENT-IMP: NORMAL
BLOOD BANK DISPENSE STATUS: NORMAL
BLOOD BANK DISPENSE STATUS: NORMAL
BLOOD BANK ISBT PRODUCT BLOOD TYPE: 5100
BLOOD BANK ISBT PRODUCT BLOOD TYPE: 5100
BLOOD BANK PRODUCT CODE: NORMAL
BLOOD BANK PRODUCT CODE: NORMAL
BLOOD BANK UNIT TYPE AND RH: NORMAL
BLOOD BANK UNIT TYPE AND RH: NORMAL
BPU ID: NORMAL
BPU ID: NORMAL
BUN SERPL-MCNC: 18 MG/DL (ref 8–23)
CALCIUM SERPL-MCNC: 9.7 MG/DL (ref 8.3–10.4)
CHLORIDE SERPL-SCNC: 101 MMOL/L (ref 103–113)
CO2 SERPL-SCNC: 26 MMOL/L (ref 21–32)
CREAT SERPL-MCNC: 0.7 MG/DL (ref 0.6–1)
DIFFERENTIAL METHOD BLD: ABNORMAL
EKG ATRIAL RATE: 43 BPM
EKG DIAGNOSIS: NORMAL
EKG P AXIS: 18 DEGREES
EKG P-R INTERVAL: 156 MS
EKG Q-T INTERVAL: 498 MS
EKG QRS DURATION: 84 MS
EKG QTC CALCULATION (BAZETT): 420 MS
EKG R AXIS: 28 DEGREES
EKG T AXIS: 63 DEGREES
EKG VENTRICULAR RATE: 43 BPM
EOSINOPHIL # BLD: 0 K/UL (ref 0–0.8)
EOSINOPHIL NFR BLD: 0 % (ref 0.5–7.8)
ERYTHROCYTE [DISTWIDTH] IN BLOOD BY AUTOMATED COUNT: 14.5 % (ref 11.9–14.6)
GLOBULIN SER CALC-MCNC: 5.7 G/DL (ref 2.8–4.5)
GLUCOSE SERPL-MCNC: 117 MG/DL (ref 65–100)
HCT VFR BLD AUTO: 22.9 % (ref 35.8–46.3)
HGB BLD-MCNC: 7.9 G/DL (ref 11.7–15.4)
IMM GRANULOCYTES # BLD AUTO: 0 K/UL (ref 0–0.5)
IMM GRANULOCYTES NFR BLD AUTO: 1 % (ref 0–5)
LYMPHOCYTES # BLD: 0.5 K/UL (ref 0.5–4.6)
LYMPHOCYTES NFR BLD: 63 % (ref 13–44)
MAGNESIUM SERPL-MCNC: 2 MG/DL (ref 1.8–2.4)
MCH RBC QN AUTO: 29 PG (ref 26.1–32.9)
MCHC RBC AUTO-ENTMCNC: 34.5 G/DL (ref 31.4–35)
MCV RBC AUTO: 84.2 FL (ref 82–102)
MONOCYTES # BLD: 0.1 K/UL (ref 0.1–1.3)
MONOCYTES NFR BLD: 10 % (ref 4–12)
NEUTS SEG # BLD: 0.2 K/UL (ref 1.7–8.2)
NEUTS SEG NFR BLD: 26 % (ref 43–78)
NRBC # BLD: 0 K/UL (ref 0–0.2)
PLATELET # BLD AUTO: 2 K/UL (ref 150–450)
PLATELET # BLD AUTO: <2 K/UL (ref 150–450)
PLATELET COMMENT: ABNORMAL
PMV BLD AUTO: ABNORMAL FL (ref 9.4–12.3)
POTASSIUM SERPL-SCNC: 3.8 MMOL/L (ref 3.5–5.1)
PROT SERPL-MCNC: 9.3 G/DL (ref 6.3–8.2)
RBC # BLD AUTO: 2.72 M/UL (ref 4.05–5.2)
RBC MORPH BLD: ABNORMAL
SODIUM SERPL-SCNC: 133 MMOL/L (ref 136–146)
UNIT DIVISION: 0
UNIT DIVISION: 0
UNIT ISSUE DATE/TIME: NORMAL
UNIT ISSUE DATE/TIME: NORMAL
WBC # BLD AUTO: 0.8 K/UL (ref 4.3–11.1)
WBC MORPH BLD: ABNORMAL

## 2024-03-28 PROCEDURE — 6360000002 HC RX W HCPCS: Performed by: NURSE PRACTITIONER

## 2024-03-28 PROCEDURE — P9037 PLATE PHERES LEUKOREDU IRRAD: HCPCS

## 2024-03-28 PROCEDURE — 80053 COMPREHEN METABOLIC PANEL: CPT

## 2024-03-28 PROCEDURE — 2580000003 HC RX 258: Performed by: STUDENT IN AN ORGANIZED HEALTH CARE EDUCATION/TRAINING PROGRAM

## 2024-03-28 PROCEDURE — 85025 COMPLETE CBC W/AUTO DIFF WBC: CPT

## 2024-03-28 PROCEDURE — 93005 ELECTROCARDIOGRAM TRACING: CPT | Performed by: NURSE PRACTITIONER

## 2024-03-28 PROCEDURE — 93010 ELECTROCARDIOGRAM REPORT: CPT | Performed by: INTERNAL MEDICINE

## 2024-03-28 PROCEDURE — 97530 THERAPEUTIC ACTIVITIES: CPT

## 2024-03-28 PROCEDURE — 86022 PLATELET ANTIBODIES: CPT

## 2024-03-28 PROCEDURE — 36430 TRANSFUSION BLD/BLD COMPNT: CPT

## 2024-03-28 PROCEDURE — APPSS30 APP SPLIT SHARED TIME 16-30 MINUTES: Performed by: NURSE PRACTITIONER

## 2024-03-28 PROCEDURE — 6370000000 HC RX 637 (ALT 250 FOR IP): Performed by: STUDENT IN AN ORGANIZED HEALTH CARE EDUCATION/TRAINING PROGRAM

## 2024-03-28 PROCEDURE — P9040 RBC LEUKOREDUCED IRRADIATED: HCPCS

## 2024-03-28 PROCEDURE — 6370000000 HC RX 637 (ALT 250 FOR IP): Performed by: INTERNAL MEDICINE

## 2024-03-28 PROCEDURE — 1100000000 HC RM PRIVATE

## 2024-03-28 PROCEDURE — 85049 AUTOMATED PLATELET COUNT: CPT

## 2024-03-28 PROCEDURE — 6370000000 HC RX 637 (ALT 250 FOR IP): Performed by: NURSE PRACTITIONER

## 2024-03-28 PROCEDURE — 83735 ASSAY OF MAGNESIUM: CPT

## 2024-03-28 PROCEDURE — 99232 SBSQ HOSP IP/OBS MODERATE 35: CPT | Performed by: INTERNAL MEDICINE

## 2024-03-28 RX ORDER — MAGNESIUM HYDROXIDE/ALUMINUM HYDROXICE/SIMETHICONE 120; 1200; 1200 MG/30ML; MG/30ML; MG/30ML
30 SUSPENSION ORAL EVERY 6 HOURS PRN
Status: DISCONTINUED | OUTPATIENT
Start: 2024-03-28 | End: 2024-04-02 | Stop reason: HOSPADM

## 2024-03-28 RX ORDER — SODIUM CHLORIDE 9 MG/ML
INJECTION, SOLUTION INTRAVENOUS PRN
Status: DISCONTINUED | OUTPATIENT
Start: 2024-03-28 | End: 2024-03-31

## 2024-03-28 RX ADMIN — PANTOPRAZOLE SODIUM 40 MG: 40 TABLET, DELAYED RELEASE ORAL at 19:41

## 2024-03-28 RX ADMIN — DOCUSATE SODIUM 100 MG: 100 CAPSULE, LIQUID FILLED ORAL at 07:51

## 2024-03-28 RX ADMIN — SODIUM CHLORIDE, PRESERVATIVE FREE 10 ML: 5 INJECTION INTRAVENOUS at 19:41

## 2024-03-28 RX ADMIN — DEXAMETHASONE 40 MG: 4 TABLET ORAL at 07:52

## 2024-03-28 RX ADMIN — ALUMINUM HYDROXIDE, MAGNESIUM HYDROXIDE, AND SIMETHICONE 30 ML: 1200; 120; 1200 SUSPENSION ORAL at 16:32

## 2024-03-28 RX ADMIN — FAMOTIDINE 40 MG: 20 TABLET, FILM COATED ORAL at 17:05

## 2024-03-28 RX ADMIN — FLUOXETINE HYDROCHLORIDE 20 MG: 20 CAPSULE ORAL at 19:41

## 2024-03-28 RX ADMIN — HYDROCODONE BITARTRATE AND ACETAMINOPHEN 1 TABLET: 10; 325 TABLET ORAL at 21:09

## 2024-03-28 RX ADMIN — MORPHINE SULFATE 15 MG: 15 TABLET, FILM COATED, EXTENDED RELEASE ORAL at 07:51

## 2024-03-28 RX ADMIN — FLUOXETINE HYDROCHLORIDE 20 MG: 20 CAPSULE ORAL at 08:09

## 2024-03-28 RX ADMIN — ACETAMINOPHEN 650 MG: 325 TABLET ORAL at 15:17

## 2024-03-28 RX ADMIN — SODIUM CHLORIDE, PRESERVATIVE FREE 10 ML: 5 INJECTION INTRAVENOUS at 07:52

## 2024-03-28 RX ADMIN — PANTOPRAZOLE SODIUM 40 MG: 40 TABLET, DELAYED RELEASE ORAL at 07:52

## 2024-03-28 RX ADMIN — IMMUNE GLOBULIN (HUMAN) 40 G: 10 INJECTION INTRAVENOUS; SUBCUTANEOUS at 09:52

## 2024-03-28 RX ADMIN — HYDROCODONE BITARTRATE AND ACETAMINOPHEN 1 TABLET: 10; 325 TABLET ORAL at 05:01

## 2024-03-28 RX ADMIN — MORPHINE SULFATE 15 MG: 15 TABLET, FILM COATED, EXTENDED RELEASE ORAL at 19:41

## 2024-03-28 RX ADMIN — TIZANIDINE 4 MG: 2 TABLET ORAL at 19:41

## 2024-03-28 RX ADMIN — LEVOTHYROXINE SODIUM 125 MCG: 0.12 TABLET ORAL at 05:01

## 2024-03-28 RX ADMIN — CYANOCOBALAMIN TAB 1000 MCG 500 MCG: 1000 TAB at 07:51

## 2024-03-28 RX ADMIN — DOCUSATE SODIUM 100 MG: 100 CAPSULE, LIQUID FILLED ORAL at 19:41

## 2024-03-28 RX ADMIN — DIPHENHYDRAMINE HYDROCHLORIDE 25 MG: 25 CAPSULE ORAL at 15:17

## 2024-03-28 RX ADMIN — AMLODIPINE BESYLATE 5 MG: 5 TABLET ORAL at 07:51

## 2024-03-28 ASSESSMENT — PAIN SCALES - GENERAL
PAINLEVEL_OUTOF10: 0
PAINLEVEL_OUTOF10: 6
PAINLEVEL_OUTOF10: 3
PAINLEVEL_OUTOF10: 4
PAINLEVEL_OUTOF10: 0

## 2024-03-28 ASSESSMENT — PAIN - FUNCTIONAL ASSESSMENT: PAIN_FUNCTIONAL_ASSESSMENT: ACTIVITIES ARE NOT PREVENTED

## 2024-03-28 ASSESSMENT — PAIN SCALES - WONG BAKER
WONGBAKER_NUMERICALRESPONSE: NO HURT

## 2024-03-28 ASSESSMENT — PAIN DESCRIPTION - LOCATION
LOCATION: KNEE
LOCATION: KNEE

## 2024-03-28 ASSESSMENT — PAIN DESCRIPTION - ORIENTATION
ORIENTATION: RIGHT
ORIENTATION: RIGHT;LEFT

## 2024-03-28 ASSESSMENT — PAIN DESCRIPTION - DESCRIPTORS: DESCRIPTORS: ACHING

## 2024-03-29 PROBLEM — R00.1 BRADYCARDIA, SINUS: Status: ACTIVE | Noted: 2024-03-29

## 2024-03-29 LAB
ABO + RH BLD: NORMAL
ALBUMIN SERPL-MCNC: 3.6 G/DL (ref 3.2–4.6)
ALBUMIN/GLOB SERPL: 0.6 (ref 0.4–1.6)
ALP SERPL-CCNC: 99 U/L (ref 50–136)
ALT SERPL-CCNC: 37 U/L (ref 12–65)
ANION GAP SERPL CALC-SCNC: 5 MMOL/L (ref 2–11)
ANTIGENS PRESENT RBC DONR: NORMAL
AST SERPL-CCNC: 40 U/L (ref 15–37)
BASOPHILS # BLD: 0 K/UL (ref 0–0.2)
BASOPHILS NFR BLD: 0 % (ref 0–2)
BILIRUB SERPL-MCNC: 0.7 MG/DL (ref 0.2–1.1)
BLD PROD TYP BPU: NORMAL
BLOOD BANK BLOOD PRODUCT EXPIRATION DATE: NORMAL
BLOOD BANK CMNT PATIENT-IMP: NORMAL
BLOOD BANK DISPENSE STATUS: NORMAL
BLOOD BANK ISBT PRODUCT BLOOD TYPE: 5100
BLOOD BANK ISBT PRODUCT BLOOD TYPE: 9500
BLOOD BANK ISBT PRODUCT BLOOD TYPE: 9500
BLOOD BANK PRODUCT CODE: NORMAL
BLOOD BANK PRODUCT CODE: NORMAL
BLOOD BANK UNIT TYPE AND RH: NORMAL
BLOOD GROUP ANTIBODIES SERPL: NORMAL
BPU ID: NORMAL
BUN SERPL-MCNC: 22 MG/DL (ref 8–23)
CALCIUM SERPL-MCNC: 9.6 MG/DL (ref 8.3–10.4)
CHLORIDE SERPL-SCNC: 102 MMOL/L (ref 103–113)
CO2 SERPL-SCNC: 27 MMOL/L (ref 21–32)
CREAT SERPL-MCNC: 0.6 MG/DL (ref 0.6–1)
CROSSMATCH RESULT: NORMAL
DIFFERENTIAL METHOD BLD: ABNORMAL
EOSINOPHIL # BLD: 0 K/UL (ref 0–0.8)
EOSINOPHIL NFR BLD: 0 % (ref 0.5–7.8)
ERYTHROCYTE [DISTWIDTH] IN BLOOD BY AUTOMATED COUNT: 14.5 % (ref 11.9–14.6)
FLOW CYTOMETRY RESULTS: NORMAL
GLOBULIN SER CALC-MCNC: 6 G/DL (ref 2.8–4.5)
GLUCOSE SERPL-MCNC: 128 MG/DL (ref 65–100)
HCT VFR BLD AUTO: 21.2 % (ref 35.8–46.3)
HGB BLD-MCNC: 7.5 G/DL (ref 11.7–15.4)
IMM GRANULOCYTES # BLD AUTO: 0 K/UL (ref 0–0.5)
IMM GRANULOCYTES NFR BLD AUTO: 5 % (ref 0–5)
LYMPHOCYTES # BLD: 0.4 K/UL (ref 0.5–4.6)
LYMPHOCYTES NFR BLD: 52 % (ref 13–44)
MAGNESIUM SERPL-MCNC: 2.3 MG/DL (ref 1.8–2.4)
MCH RBC QN AUTO: 29.3 PG (ref 26.1–32.9)
MCHC RBC AUTO-ENTMCNC: 35.4 G/DL (ref 31.4–35)
MCV RBC AUTO: 82.8 FL (ref 82–102)
MONOCYTES # BLD: 0.1 K/UL (ref 0.1–1.3)
MONOCYTES NFR BLD: 11 % (ref 4–12)
NEUTS SEG # BLD: 0.3 K/UL (ref 1.7–8.2)
NEUTS SEG NFR BLD: 32 % (ref 43–78)
NRBC # BLD: 0 K/UL (ref 0–0.2)
PLATELET # BLD AUTO: 3 K/UL (ref 150–450)
PLATELET # BLD AUTO: 48 K/UL (ref 150–450)
PLATELET COMMENT: ABNORMAL
PMV BLD AUTO: ABNORMAL FL (ref 9.4–12.3)
POTASSIUM SERPL-SCNC: 4 MMOL/L (ref 3.5–5.1)
PROT SERPL-MCNC: 9.6 G/DL (ref 6.3–8.2)
RBC # BLD AUTO: 2.56 M/UL (ref 4.05–5.2)
RBC MORPH BLD: ABNORMAL
SODIUM SERPL-SCNC: 134 MMOL/L (ref 136–146)
SPECIMEN EXP DATE BLD: NORMAL
SPECIMEN SOURCE: NORMAL
T4 FREE SERPL-MCNC: 0.9 NG/DL (ref 0.78–1.46)
TEST ORDERED: NORMAL
TSH W FREE THYROID IF ABNORMAL: 8.25 UIU/ML (ref 0.36–3.74)
UNIT DIVISION: 0
UNIT ISSUE DATE/TIME: NORMAL
WBC # BLD AUTO: 0.8 K/UL (ref 4.3–11.1)
WBC MORPH BLD: ABNORMAL

## 2024-03-29 PROCEDURE — APPSS30 APP SPLIT SHARED TIME 16-30 MINUTES: Performed by: NURSE PRACTITIONER

## 2024-03-29 PROCEDURE — 36591 DRAW BLOOD OFF VENOUS DEVICE: CPT

## 2024-03-29 PROCEDURE — 6370000000 HC RX 637 (ALT 250 FOR IP): Performed by: NURSE PRACTITIONER

## 2024-03-29 PROCEDURE — 2580000003 HC RX 258: Performed by: STUDENT IN AN ORGANIZED HEALTH CARE EDUCATION/TRAINING PROGRAM

## 2024-03-29 PROCEDURE — 97535 SELF CARE MNGMENT TRAINING: CPT

## 2024-03-29 PROCEDURE — 85025 COMPLETE CBC W/AUTO DIFF WBC: CPT

## 2024-03-29 PROCEDURE — 36430 TRANSFUSION BLD/BLD COMPNT: CPT

## 2024-03-29 PROCEDURE — 85049 AUTOMATED PLATELET COUNT: CPT

## 2024-03-29 PROCEDURE — 6360000002 HC RX W HCPCS: Performed by: NURSE PRACTITIONER

## 2024-03-29 PROCEDURE — 86813 HLA TYPING A B OR C: CPT

## 2024-03-29 PROCEDURE — P9037 PLATE PHERES LEUKOREDU IRRAD: HCPCS

## 2024-03-29 PROCEDURE — 1100000000 HC RM PRIVATE

## 2024-03-29 PROCEDURE — 99232 SBSQ HOSP IP/OBS MODERATE 35: CPT | Performed by: INTERNAL MEDICINE

## 2024-03-29 PROCEDURE — 80053 COMPREHEN METABOLIC PANEL: CPT

## 2024-03-29 PROCEDURE — 83735 ASSAY OF MAGNESIUM: CPT

## 2024-03-29 PROCEDURE — 97530 THERAPEUTIC ACTIVITIES: CPT

## 2024-03-29 PROCEDURE — 84443 ASSAY THYROID STIM HORMONE: CPT

## 2024-03-29 PROCEDURE — 84439 ASSAY OF FREE THYROXINE: CPT

## 2024-03-29 PROCEDURE — 6370000000 HC RX 637 (ALT 250 FOR IP): Performed by: STUDENT IN AN ORGANIZED HEALTH CARE EDUCATION/TRAINING PROGRAM

## 2024-03-29 PROCEDURE — 1170000001 HC RM PRIVATE ONCOLOGY W/TELEMETRY

## 2024-03-29 RX ORDER — ACYCLOVIR 400 MG/1
400 TABLET ORAL 2 TIMES DAILY
Status: DISCONTINUED | OUTPATIENT
Start: 2024-03-29 | End: 2024-04-02 | Stop reason: HOSPADM

## 2024-03-29 RX ORDER — ACYCLOVIR 200 MG/1
400 CAPSULE ORAL 2 TIMES DAILY
Status: DISCONTINUED | OUTPATIENT
Start: 2024-03-29 | End: 2024-03-29 | Stop reason: CLARIF

## 2024-03-29 RX ORDER — FLUCONAZOLE 100 MG/1
200 TABLET ORAL DAILY
Status: DISCONTINUED | OUTPATIENT
Start: 2024-03-29 | End: 2024-04-02 | Stop reason: HOSPADM

## 2024-03-29 RX ORDER — SODIUM CHLORIDE 9 MG/ML
INJECTION, SOLUTION INTRAVENOUS PRN
Status: DISCONTINUED | OUTPATIENT
Start: 2024-03-29 | End: 2024-04-02 | Stop reason: HOSPADM

## 2024-03-29 RX ADMIN — FLUCONAZOLE 200 MG: 100 TABLET ORAL at 08:48

## 2024-03-29 RX ADMIN — FLUOXETINE HYDROCHLORIDE 20 MG: 20 CAPSULE ORAL at 21:35

## 2024-03-29 RX ADMIN — HYDROCODONE BITARTRATE AND ACETAMINOPHEN 1 TABLET: 10; 325 TABLET ORAL at 14:54

## 2024-03-29 RX ADMIN — ALUMINUM HYDROXIDE, MAGNESIUM HYDROXIDE, AND SIMETHICONE 30 ML: 1200; 120; 1200 SUSPENSION ORAL at 23:56

## 2024-03-29 RX ADMIN — SODIUM CHLORIDE, PRESERVATIVE FREE 10 ML: 5 INJECTION INTRAVENOUS at 09:29

## 2024-03-29 RX ADMIN — ALUMINUM HYDROXIDE, MAGNESIUM HYDROXIDE, AND SIMETHICONE 30 ML: 1200; 120; 1200 SUSPENSION ORAL at 04:25

## 2024-03-29 RX ADMIN — DOCUSATE SODIUM 100 MG: 100 CAPSULE, LIQUID FILLED ORAL at 21:31

## 2024-03-29 RX ADMIN — AMLODIPINE BESYLATE 5 MG: 5 TABLET ORAL at 08:47

## 2024-03-29 RX ADMIN — DOCUSATE SODIUM 100 MG: 100 CAPSULE, LIQUID FILLED ORAL at 08:47

## 2024-03-29 RX ADMIN — FLUOXETINE HYDROCHLORIDE 20 MG: 20 CAPSULE ORAL at 09:23

## 2024-03-29 RX ADMIN — TIZANIDINE 4 MG: 2 TABLET ORAL at 21:31

## 2024-03-29 RX ADMIN — SODIUM CHLORIDE, PRESERVATIVE FREE 10 ML: 5 INJECTION INTRAVENOUS at 21:31

## 2024-03-29 RX ADMIN — MORPHINE SULFATE 15 MG: 15 TABLET, FILM COATED, EXTENDED RELEASE ORAL at 08:51

## 2024-03-29 RX ADMIN — ACETAMINOPHEN 650 MG: 325 TABLET ORAL at 08:47

## 2024-03-29 RX ADMIN — ACYCLOVIR 400 MG: 400 TABLET ORAL at 21:31

## 2024-03-29 RX ADMIN — FAMOTIDINE 40 MG: 20 TABLET, FILM COATED ORAL at 16:13

## 2024-03-29 RX ADMIN — DEXAMETHASONE 40 MG: 4 TABLET ORAL at 08:47

## 2024-03-29 RX ADMIN — ACYCLOVIR 400 MG: 400 TABLET ORAL at 09:22

## 2024-03-29 RX ADMIN — IMMUNE GLOBULIN (HUMAN) 40 G: 10 INJECTION INTRAVENOUS; SUBCUTANEOUS at 11:38

## 2024-03-29 RX ADMIN — ONDANSETRON 4 MG: 4 TABLET, ORALLY DISINTEGRATING ORAL at 08:44

## 2024-03-29 RX ADMIN — PANTOPRAZOLE SODIUM 40 MG: 40 TABLET, DELAYED RELEASE ORAL at 21:31

## 2024-03-29 RX ADMIN — MORPHINE SULFATE 15 MG: 15 TABLET, FILM COATED, EXTENDED RELEASE ORAL at 21:31

## 2024-03-29 RX ADMIN — LEVOTHYROXINE SODIUM 125 MCG: 0.12 TABLET ORAL at 08:48

## 2024-03-29 RX ADMIN — ALUMINUM HYDROXIDE, MAGNESIUM HYDROXIDE, AND SIMETHICONE 30 ML: 1200; 120; 1200 SUSPENSION ORAL at 12:27

## 2024-03-29 RX ADMIN — CYANOCOBALAMIN TAB 1000 MCG 500 MCG: 1000 TAB at 08:48

## 2024-03-29 RX ADMIN — PANTOPRAZOLE SODIUM 40 MG: 40 TABLET, DELAYED RELEASE ORAL at 08:48

## 2024-03-29 ASSESSMENT — PAIN DESCRIPTION - DESCRIPTORS: DESCRIPTORS: ACHING;THROBBING

## 2024-03-29 ASSESSMENT — PAIN SCALES - GENERAL
PAINLEVEL_OUTOF10: 0
PAINLEVEL_OUTOF10: 0
PAINLEVEL_OUTOF10: 6
PAINLEVEL_OUTOF10: 3

## 2024-03-29 ASSESSMENT — PAIN DESCRIPTION - ORIENTATION: ORIENTATION: RIGHT;LEFT

## 2024-03-29 ASSESSMENT — PAIN SCALES - WONG BAKER
WONGBAKER_NUMERICALRESPONSE: NO HURT
WONGBAKER_NUMERICALRESPONSE: NO HURT

## 2024-03-29 ASSESSMENT — PAIN DESCRIPTION - LOCATION: LOCATION: KNEE

## 2024-03-29 ASSESSMENT — PAIN - FUNCTIONAL ASSESSMENT: PAIN_FUNCTIONAL_ASSESSMENT: ACTIVITIES ARE NOT PREVENTED

## 2024-03-29 NOTE — CONSULTS
[facility] Cardiology Initial Cardiac Evaluation      Date of  Admission: 3/18/2024  2:19 PM     Primary Care Physician: Kris Roblero MD  Primary Cardiologist: None - prior Dr. Su  Referring Physician: Dr. Vora  Supervising Physician: Dr. Rodriguez    CC/Reason for evaluation: bradycardia    HPI:  Constance Gomes is a 73 y.o. female with prior medical history of AML/MDS, chronic pain, hypothyroidism, HTN, anxiety/depression, hx of prinzmetals angina (2012). She presented to [facility] on 3/18 w/ acute anemia/thrombocytopenia after labwork obtained at cancer center and had bleeding gums/petechiae/purpura. Workup in the ED revealed: Hgb 6, Plt 7, WBC 1.4. She was admitted for her pancytopenia. 1u Plt and 1u PRBCs ordered, oncology consulted and labs closely monitored. BP improved following transfusions however BP soft so amlodipine held. Pt continued to receive transfusions and on 3/26 pt underwent bone marrow aspiration and core bone biopsy with IR. The pt was given IVIG for empiric ITP.     On 3/29 pt was noted to be bradycardic on VS checks therefore Cardiology consulted for further recommendations. Pt denies lightheadedness, dizziness, syncope, near syncope, falls, CP, palpitations. States that for the last several days she has had generalized fatigue and occasionally has more labored breathing but that she has had fatigue like this prior.       Recent Cardiac Synopsis:  10/27/23 TTE    ECHO (TTE) LIMITED (PRN CONTRAST/BUBBLE/STRAIN/3D) 12/08/2023  4:23 PM (Final)    Interpretation Summary    Left Ventricle: Normal left ventricular systolic function. EF by 2D Simpsons Biplane is 61%. Left ventricle size is normal. Mildly increased wall thickness. Normal wall motion. Normal diastolic function.    Mitral Valve: Mild regurgitation.    Tricuspid Valve: The estimated RVSP is 28 mmHg.    Signed by: Ed Dunn MD on 12/8/2023  4:23 PM    5/2020 NST:  CONCLUSION:   1. Stress EKG: Non diagnostic due to

## 2024-03-30 LAB
ALBUMIN SERPL-MCNC: 3.4 G/DL (ref 3.2–4.6)
ALBUMIN/GLOB SERPL: 0.5 (ref 0.4–1.6)
ALP SERPL-CCNC: 88 U/L (ref 50–136)
ALT SERPL-CCNC: 31 U/L (ref 12–65)
ANION GAP SERPL CALC-SCNC: 4 MMOL/L (ref 2–11)
AST SERPL-CCNC: 41 U/L (ref 15–37)
BASOPHILS # BLD: 0 K/UL (ref 0–0.2)
BASOPHILS NFR BLD: 0 % (ref 0–2)
BILIRUB SERPL-MCNC: 0.6 MG/DL (ref 0.2–1.1)
BLD PROD TYP BPU: NORMAL
BLOOD BANK BLOOD PRODUCT EXPIRATION DATE: NORMAL
BLOOD BANK CMNT PATIENT-IMP: NORMAL
BLOOD BANK DISPENSE STATUS: NORMAL
BLOOD BANK ISBT PRODUCT BLOOD TYPE: 6200
BLOOD BANK PRODUCT CODE: NORMAL
BLOOD BANK UNIT TYPE AND RH: NORMAL
BPU ID: NORMAL
BUN SERPL-MCNC: 24 MG/DL (ref 8–23)
CALCIUM SERPL-MCNC: 9.3 MG/DL (ref 8.3–10.4)
CHLORIDE SERPL-SCNC: 102 MMOL/L (ref 103–113)
CO2 SERPL-SCNC: 28 MMOL/L (ref 21–32)
CREAT SERPL-MCNC: 0.6 MG/DL (ref 0.6–1)
DIFFERENTIAL METHOD BLD: ABNORMAL
EOSINOPHIL # BLD: 0 K/UL (ref 0–0.8)
EOSINOPHIL NFR BLD: 0 % (ref 0.5–7.8)
ERYTHROCYTE [DISTWIDTH] IN BLOOD BY AUTOMATED COUNT: 14.4 % (ref 11.9–14.6)
GLOBULIN SER CALC-MCNC: 6.5 G/DL (ref 2.8–4.5)
GLUCOSE SERPL-MCNC: 125 MG/DL (ref 65–100)
HCT VFR BLD AUTO: 22.2 % (ref 35.8–46.3)
HGB BLD-MCNC: 7.7 G/DL (ref 11.7–15.4)
IMM GRANULOCYTES # BLD AUTO: 0 K/UL (ref 0–0.5)
IMM GRANULOCYTES NFR BLD AUTO: 5 % (ref 0–5)
LYMPHOCYTES # BLD: 0.5 K/UL (ref 0.5–4.6)
LYMPHOCYTES NFR BLD: 50 % (ref 13–44)
MAGNESIUM SERPL-MCNC: 2.7 MG/DL (ref 1.8–2.4)
MCH RBC QN AUTO: 29.1 PG (ref 26.1–32.9)
MCHC RBC AUTO-ENTMCNC: 34.7 G/DL (ref 31.4–35)
MCV RBC AUTO: 83.8 FL (ref 82–102)
MONOCYTES # BLD: 0.1 K/UL (ref 0.1–1.3)
MONOCYTES NFR BLD: 10 % (ref 4–12)
NEUTS SEG # BLD: 0.3 K/UL (ref 1.7–8.2)
NEUTS SEG NFR BLD: 35 % (ref 43–78)
NRBC # BLD: 0 K/UL (ref 0–0.2)
PLATELET # BLD AUTO: 27 K/UL (ref 150–450)
PLATELET COMMENT: ABNORMAL
PMV BLD AUTO: 10.9 FL (ref 9.4–12.3)
POTASSIUM SERPL-SCNC: 4.1 MMOL/L (ref 3.5–5.1)
PROT SERPL-MCNC: 9.9 G/DL (ref 6.3–8.2)
RBC # BLD AUTO: 2.65 M/UL (ref 4.05–5.2)
RBC MORPH BLD: ABNORMAL
RBC MORPH BLD: ABNORMAL
SODIUM SERPL-SCNC: 134 MMOL/L (ref 136–146)
UNIT DIVISION: 0
UNIT ISSUE DATE/TIME: NORMAL
WBC # BLD AUTO: 0.9 K/UL (ref 4.3–11.1)
WBC MORPH BLD: ABNORMAL

## 2024-03-30 PROCEDURE — APPSS30 APP SPLIT SHARED TIME 16-30 MINUTES: Performed by: NURSE PRACTITIONER

## 2024-03-30 PROCEDURE — 2580000003 HC RX 258: Performed by: STUDENT IN AN ORGANIZED HEALTH CARE EDUCATION/TRAINING PROGRAM

## 2024-03-30 PROCEDURE — 83735 ASSAY OF MAGNESIUM: CPT

## 2024-03-30 PROCEDURE — 1170000001 HC RM PRIVATE ONCOLOGY W/TELEMETRY

## 2024-03-30 PROCEDURE — 85025 COMPLETE CBC W/AUTO DIFF WBC: CPT

## 2024-03-30 PROCEDURE — 6370000000 HC RX 637 (ALT 250 FOR IP): Performed by: STUDENT IN AN ORGANIZED HEALTH CARE EDUCATION/TRAINING PROGRAM

## 2024-03-30 PROCEDURE — 6370000000 HC RX 637 (ALT 250 FOR IP): Performed by: NURSE PRACTITIONER

## 2024-03-30 PROCEDURE — 6360000002 HC RX W HCPCS: Performed by: STUDENT IN AN ORGANIZED HEALTH CARE EDUCATION/TRAINING PROGRAM

## 2024-03-30 PROCEDURE — 99232 SBSQ HOSP IP/OBS MODERATE 35: CPT | Performed by: INTERNAL MEDICINE

## 2024-03-30 PROCEDURE — 80053 COMPREHEN METABOLIC PANEL: CPT

## 2024-03-30 PROCEDURE — 36591 DRAW BLOOD OFF VENOUS DEVICE: CPT

## 2024-03-30 RX ORDER — LEVOTHYROXINE SODIUM 0.15 MG/1
150 TABLET ORAL
Status: DISCONTINUED | OUTPATIENT
Start: 2024-03-31 | End: 2024-04-02 | Stop reason: HOSPADM

## 2024-03-30 RX ADMIN — CYANOCOBALAMIN TAB 1000 MCG 500 MCG: 1000 TAB at 07:56

## 2024-03-30 RX ADMIN — DOCUSATE SODIUM 100 MG: 100 CAPSULE, LIQUID FILLED ORAL at 07:56

## 2024-03-30 RX ADMIN — ACYCLOVIR 400 MG: 400 TABLET ORAL at 21:31

## 2024-03-30 RX ADMIN — SODIUM CHLORIDE, PRESERVATIVE FREE 10 ML: 5 INJECTION INTRAVENOUS at 21:31

## 2024-03-30 RX ADMIN — FLUCONAZOLE 200 MG: 100 TABLET ORAL at 07:56

## 2024-03-30 RX ADMIN — TIZANIDINE 4 MG: 2 TABLET ORAL at 21:31

## 2024-03-30 RX ADMIN — ACYCLOVIR 400 MG: 400 TABLET ORAL at 07:57

## 2024-03-30 RX ADMIN — FLUOXETINE HYDROCHLORIDE 20 MG: 20 CAPSULE ORAL at 07:56

## 2024-03-30 RX ADMIN — ONDANSETRON 4 MG: 2 INJECTION INTRAMUSCULAR; INTRAVENOUS at 03:57

## 2024-03-30 RX ADMIN — MORPHINE SULFATE 15 MG: 15 TABLET, FILM COATED, EXTENDED RELEASE ORAL at 21:31

## 2024-03-30 RX ADMIN — PANTOPRAZOLE SODIUM 40 MG: 40 TABLET, DELAYED RELEASE ORAL at 21:31

## 2024-03-30 RX ADMIN — ONDANSETRON 4 MG: 2 INJECTION INTRAMUSCULAR; INTRAVENOUS at 08:31

## 2024-03-30 RX ADMIN — MORPHINE SULFATE 15 MG: 15 TABLET, FILM COATED, EXTENDED RELEASE ORAL at 07:57

## 2024-03-30 RX ADMIN — FAMOTIDINE 40 MG: 20 TABLET, FILM COATED ORAL at 16:50

## 2024-03-30 RX ADMIN — DOCUSATE SODIUM 100 MG: 100 CAPSULE, LIQUID FILLED ORAL at 21:31

## 2024-03-30 RX ADMIN — SODIUM CHLORIDE, PRESERVATIVE FREE 10 ML: 5 INJECTION INTRAVENOUS at 08:00

## 2024-03-30 RX ADMIN — PANTOPRAZOLE SODIUM 40 MG: 40 TABLET, DELAYED RELEASE ORAL at 07:56

## 2024-03-30 RX ADMIN — FLUOXETINE HYDROCHLORIDE 20 MG: 20 CAPSULE ORAL at 22:09

## 2024-03-30 ASSESSMENT — PAIN DESCRIPTION - ORIENTATION: ORIENTATION: LOWER

## 2024-03-30 ASSESSMENT — PAIN SCALES - GENERAL
PAINLEVEL_OUTOF10: 3
PAINLEVEL_OUTOF10: 0
PAINLEVEL_OUTOF10: 4

## 2024-03-30 ASSESSMENT — PAIN DESCRIPTION - DESCRIPTORS: DESCRIPTORS: ACHING

## 2024-03-30 ASSESSMENT — PAIN DESCRIPTION - PAIN TYPE: TYPE: CHRONIC PAIN

## 2024-03-30 ASSESSMENT — PAIN SCALES - WONG BAKER: WONGBAKER_NUMERICALRESPONSE: NO HURT

## 2024-03-30 ASSESSMENT — PAIN DESCRIPTION - LOCATION: LOCATION: BACK

## 2024-03-31 LAB
ALBUMIN SERPL-MCNC: 3.2 G/DL (ref 3.2–4.6)
ALBUMIN/GLOB SERPL: 0.5 (ref 0.4–1.6)
ALP SERPL-CCNC: 76 U/L (ref 50–136)
ALT SERPL-CCNC: 30 U/L (ref 12–65)
ANION GAP SERPL CALC-SCNC: 3 MMOL/L (ref 2–11)
AST SERPL-CCNC: 44 U/L (ref 15–37)
BASOPHILS # BLD: 0 K/UL (ref 0–0.2)
BASOPHILS NFR BLD: 0 % (ref 0–2)
BILIRUB SERPL-MCNC: 0.8 MG/DL (ref 0.2–1.1)
BUN SERPL-MCNC: 28 MG/DL (ref 8–23)
CALCIUM SERPL-MCNC: 9.1 MG/DL (ref 8.3–10.4)
CHLORIDE SERPL-SCNC: 101 MMOL/L (ref 103–113)
CO2 SERPL-SCNC: 29 MMOL/L (ref 21–32)
CREAT SERPL-MCNC: 0.8 MG/DL (ref 0.6–1)
DIFFERENTIAL METHOD BLD: ABNORMAL
EOSINOPHIL # BLD: 0 K/UL (ref 0–0.8)
EOSINOPHIL NFR BLD: 0 % (ref 0.5–7.8)
ERYTHROCYTE [DISTWIDTH] IN BLOOD BY AUTOMATED COUNT: 14.3 % (ref 11.9–14.6)
GLOBULIN SER CALC-MCNC: 5.9 G/DL (ref 2.8–4.5)
GLUCOSE SERPL-MCNC: 86 MG/DL (ref 65–100)
HCT VFR BLD AUTO: 22.5 % (ref 35.8–46.3)
HGB BLD-MCNC: 7.7 G/DL (ref 11.7–15.4)
IMM GRANULOCYTES # BLD AUTO: 0 K/UL (ref 0–0.5)
IMM GRANULOCYTES NFR BLD AUTO: 4 % (ref 0–5)
LYMPHOCYTES # BLD: 0.7 K/UL (ref 0.5–4.6)
LYMPHOCYTES NFR BLD: 71 % (ref 13–44)
MAGNESIUM SERPL-MCNC: 2.4 MG/DL (ref 1.8–2.4)
MCH RBC QN AUTO: 28.8 PG (ref 26.1–32.9)
MCHC RBC AUTO-ENTMCNC: 34.2 G/DL (ref 31.4–35)
MCV RBC AUTO: 84.3 FL (ref 82–102)
MONOCYTES # BLD: 0.1 K/UL (ref 0.1–1.3)
MONOCYTES NFR BLD: 7 % (ref 4–12)
NEUTS SEG # BLD: 0.2 K/UL (ref 1.7–8.2)
NEUTS SEG NFR BLD: 18 % (ref 43–78)
NRBC # BLD: 0 K/UL (ref 0–0.2)
PLATELET # BLD AUTO: 12 K/UL (ref 150–450)
PLATELET COMMENT: ABNORMAL
PMV BLD AUTO: 10.7 FL (ref 9.4–12.3)
POTASSIUM SERPL-SCNC: 3.7 MMOL/L (ref 3.5–5.1)
PROT SERPL-MCNC: 9.1 G/DL (ref 6.3–8.2)
RBC # BLD AUTO: 2.67 M/UL (ref 4.05–5.2)
RBC MORPH BLD: ABNORMAL
SODIUM SERPL-SCNC: 133 MMOL/L (ref 136–146)
WBC # BLD AUTO: 1 K/UL (ref 4.3–11.1)
WBC MORPH BLD: ABNORMAL

## 2024-03-31 PROCEDURE — 2580000003 HC RX 258: Performed by: STUDENT IN AN ORGANIZED HEALTH CARE EDUCATION/TRAINING PROGRAM

## 2024-03-31 PROCEDURE — 6370000000 HC RX 637 (ALT 250 FOR IP): Performed by: NURSE PRACTITIONER

## 2024-03-31 PROCEDURE — 83735 ASSAY OF MAGNESIUM: CPT

## 2024-03-31 PROCEDURE — 6370000000 HC RX 637 (ALT 250 FOR IP): Performed by: STUDENT IN AN ORGANIZED HEALTH CARE EDUCATION/TRAINING PROGRAM

## 2024-03-31 PROCEDURE — 80053 COMPREHEN METABOLIC PANEL: CPT

## 2024-03-31 PROCEDURE — 99232 SBSQ HOSP IP/OBS MODERATE 35: CPT | Performed by: INTERNAL MEDICINE

## 2024-03-31 PROCEDURE — P9037 PLATE PHERES LEUKOREDU IRRAD: HCPCS

## 2024-03-31 PROCEDURE — 1170000001 HC RM PRIVATE ONCOLOGY W/TELEMETRY

## 2024-03-31 PROCEDURE — 6370000000 HC RX 637 (ALT 250 FOR IP): Performed by: INTERNAL MEDICINE

## 2024-03-31 PROCEDURE — 85025 COMPLETE CBC W/AUTO DIFF WBC: CPT

## 2024-03-31 PROCEDURE — 6360000002 HC RX W HCPCS: Performed by: STUDENT IN AN ORGANIZED HEALTH CARE EDUCATION/TRAINING PROGRAM

## 2024-03-31 PROCEDURE — APPSS30 APP SPLIT SHARED TIME 16-30 MINUTES: Performed by: NURSE PRACTITIONER

## 2024-03-31 PROCEDURE — 36430 TRANSFUSION BLD/BLD COMPNT: CPT

## 2024-03-31 RX ORDER — SODIUM CHLORIDE 9 MG/ML
INJECTION, SOLUTION INTRAVENOUS PRN
Status: DISCONTINUED | OUTPATIENT
Start: 2024-03-31 | End: 2024-04-02 | Stop reason: HOSPADM

## 2024-03-31 RX ADMIN — DIPHENHYDRAMINE HYDROCHLORIDE 25 MG: 25 CAPSULE ORAL at 13:29

## 2024-03-31 RX ADMIN — FLUOXETINE HYDROCHLORIDE 20 MG: 20 CAPSULE ORAL at 21:05

## 2024-03-31 RX ADMIN — ONDANSETRON 4 MG: 2 INJECTION INTRAMUSCULAR; INTRAVENOUS at 08:10

## 2024-03-31 RX ADMIN — TIZANIDINE 4 MG: 2 TABLET ORAL at 21:04

## 2024-03-31 RX ADMIN — MORPHINE SULFATE 15 MG: 15 TABLET, FILM COATED, EXTENDED RELEASE ORAL at 08:08

## 2024-03-31 RX ADMIN — FAMOTIDINE 40 MG: 20 TABLET, FILM COATED ORAL at 18:19

## 2024-03-31 RX ADMIN — PANTOPRAZOLE SODIUM 40 MG: 40 TABLET, DELAYED RELEASE ORAL at 21:05

## 2024-03-31 RX ADMIN — FLUCONAZOLE 200 MG: 100 TABLET ORAL at 08:07

## 2024-03-31 RX ADMIN — ACYCLOVIR 400 MG: 400 TABLET ORAL at 08:08

## 2024-03-31 RX ADMIN — DOCUSATE SODIUM 100 MG: 100 CAPSULE, LIQUID FILLED ORAL at 08:07

## 2024-03-31 RX ADMIN — SODIUM CHLORIDE, PRESERVATIVE FREE 10 ML: 5 INJECTION INTRAVENOUS at 21:05

## 2024-03-31 RX ADMIN — ACYCLOVIR 400 MG: 400 TABLET ORAL at 21:05

## 2024-03-31 RX ADMIN — CYANOCOBALAMIN TAB 1000 MCG 500 MCG: 1000 TAB at 08:08

## 2024-03-31 RX ADMIN — MORPHINE SULFATE 15 MG: 15 TABLET, FILM COATED, EXTENDED RELEASE ORAL at 21:06

## 2024-03-31 RX ADMIN — SODIUM CHLORIDE, PRESERVATIVE FREE 10 ML: 5 INJECTION INTRAVENOUS at 08:10

## 2024-03-31 RX ADMIN — ACETAMINOPHEN 650 MG: 325 TABLET ORAL at 13:29

## 2024-03-31 RX ADMIN — FLUOXETINE HYDROCHLORIDE 20 MG: 20 CAPSULE ORAL at 08:14

## 2024-03-31 RX ADMIN — PANTOPRAZOLE SODIUM 40 MG: 40 TABLET, DELAYED RELEASE ORAL at 08:07

## 2024-03-31 RX ADMIN — LEVOTHYROXINE SODIUM 150 MCG: 0.15 TABLET ORAL at 06:38

## 2024-03-31 ASSESSMENT — PAIN DESCRIPTION - DESCRIPTORS: DESCRIPTORS: ACHING

## 2024-03-31 ASSESSMENT — PAIN DESCRIPTION - ORIENTATION: ORIENTATION: RIGHT;LEFT;LOWER

## 2024-03-31 ASSESSMENT — PAIN DESCRIPTION - LOCATION: LOCATION: BACK;KNEE

## 2024-03-31 ASSESSMENT — PAIN SCALES - GENERAL: PAINLEVEL_OUTOF10: 3

## 2024-04-01 LAB
ALBUMIN SERPL-MCNC: 3.2 G/DL (ref 3.2–4.6)
ALBUMIN/GLOB SERPL: 0.6 (ref 0.4–1.6)
ALP SERPL-CCNC: 80 U/L (ref 50–136)
ALT SERPL-CCNC: 45 U/L (ref 12–65)
ANION GAP SERPL CALC-SCNC: 4 MMOL/L (ref 2–11)
AST SERPL-CCNC: 60 U/L (ref 15–37)
BASOPHILS # BLD: 0 K/UL (ref 0–0.2)
BASOPHILS NFR BLD: 0 % (ref 0–2)
BILIRUB SERPL-MCNC: 1.1 MG/DL (ref 0.2–1.1)
BLD PROD TYP BPU: NORMAL
BLOOD BANK BLOOD PRODUCT EXPIRATION DATE: NORMAL
BLOOD BANK CMNT PATIENT-IMP: NORMAL
BLOOD BANK DISPENSE STATUS: NORMAL
BLOOD BANK ISBT PRODUCT BLOOD TYPE: 6200
BLOOD BANK PRODUCT CODE: NORMAL
BLOOD BANK UNIT TYPE AND RH: NORMAL
BPU ID: NORMAL
BUN SERPL-MCNC: 21 MG/DL (ref 8–23)
CALCIUM SERPL-MCNC: 9.3 MG/DL (ref 8.3–10.4)
CHLORIDE SERPL-SCNC: 99 MMOL/L (ref 103–113)
CO2 SERPL-SCNC: 30 MMOL/L (ref 21–32)
CREAT SERPL-MCNC: 0.7 MG/DL (ref 0.6–1)
DIFFERENTIAL METHOD BLD: ABNORMAL
EOSINOPHIL # BLD: 0 K/UL (ref 0–0.8)
EOSINOPHIL NFR BLD: 0 % (ref 0.5–7.8)
ERYTHROCYTE [DISTWIDTH] IN BLOOD BY AUTOMATED COUNT: 14.4 % (ref 11.9–14.6)
FLOW CYTOMETRY RESULTS: NORMAL
GLOBULIN SER CALC-MCNC: 5.6 G/DL (ref 2.8–4.5)
GLUCOSE SERPL-MCNC: 82 MG/DL (ref 65–100)
HCT VFR BLD AUTO: 22.7 % (ref 35.8–46.3)
HGB BLD-MCNC: 7.8 G/DL (ref 11.7–15.4)
IMM GRANULOCYTES # BLD AUTO: 0.1 K/UL (ref 0–0.5)
IMM GRANULOCYTES NFR BLD AUTO: 6 % (ref 0–5)
LYMPHOCYTES # BLD: 0.7 K/UL (ref 0.5–4.6)
LYMPHOCYTES NFR BLD: 71 % (ref 13–44)
MAGNESIUM SERPL-MCNC: 2 MG/DL (ref 1.8–2.4)
MCH RBC QN AUTO: 28.9 PG (ref 26.1–32.9)
MCHC RBC AUTO-ENTMCNC: 34.4 G/DL (ref 31.4–35)
MCV RBC AUTO: 84.1 FL (ref 82–102)
MONOCYTES # BLD: 0.1 K/UL (ref 0.1–1.3)
MONOCYTES NFR BLD: 7 % (ref 4–12)
NEUTS SEG # BLD: 0.2 K/UL (ref 1.7–8.2)
NEUTS SEG NFR BLD: 16 % (ref 43–78)
NRBC # BLD: 0 K/UL (ref 0–0.2)
PLATELET # BLD AUTO: 32 K/UL (ref 150–450)
PLATELET COMMENT: ABNORMAL
PMV BLD AUTO: 10.6 FL (ref 9.4–12.3)
POTASSIUM SERPL-SCNC: 3.8 MMOL/L (ref 3.5–5.1)
PROT SERPL-MCNC: 8.8 G/DL (ref 6.3–8.2)
RBC # BLD AUTO: 2.7 M/UL (ref 4.05–5.2)
RBC MORPH BLD: ABNORMAL
RBC MORPH BLD: ABNORMAL
SODIUM SERPL-SCNC: 133 MMOL/L (ref 136–146)
SPECIMEN SOURCE: NORMAL
TEST ORDERED: NORMAL
UNIT DIVISION: 0
UNIT ISSUE DATE/TIME: NORMAL
WBC # BLD AUTO: 1.1 K/UL (ref 4.3–11.1)
WBC MORPH BLD: ABNORMAL

## 2024-04-01 PROCEDURE — 85025 COMPLETE CBC W/AUTO DIFF WBC: CPT

## 2024-04-01 PROCEDURE — 6370000000 HC RX 637 (ALT 250 FOR IP): Performed by: NURSE PRACTITIONER

## 2024-04-01 PROCEDURE — 2580000003 HC RX 258: Performed by: STUDENT IN AN ORGANIZED HEALTH CARE EDUCATION/TRAINING PROGRAM

## 2024-04-01 PROCEDURE — 80053 COMPREHEN METABOLIC PANEL: CPT

## 2024-04-01 PROCEDURE — 97168 OT RE-EVAL EST PLAN CARE: CPT

## 2024-04-01 PROCEDURE — 6370000000 HC RX 637 (ALT 250 FOR IP): Performed by: INTERNAL MEDICINE

## 2024-04-01 PROCEDURE — 6370000000 HC RX 637 (ALT 250 FOR IP): Performed by: STUDENT IN AN ORGANIZED HEALTH CARE EDUCATION/TRAINING PROGRAM

## 2024-04-01 PROCEDURE — APPSS30 APP SPLIT SHARED TIME 16-30 MINUTES: Performed by: NURSE PRACTITIONER

## 2024-04-01 PROCEDURE — 97164 PT RE-EVAL EST PLAN CARE: CPT

## 2024-04-01 PROCEDURE — 99233 SBSQ HOSP IP/OBS HIGH 50: CPT | Performed by: INTERNAL MEDICINE

## 2024-04-01 PROCEDURE — 97530 THERAPEUTIC ACTIVITIES: CPT

## 2024-04-01 PROCEDURE — 83735 ASSAY OF MAGNESIUM: CPT

## 2024-04-01 PROCEDURE — 36591 DRAW BLOOD OFF VENOUS DEVICE: CPT

## 2024-04-01 PROCEDURE — 1170000001 HC RM PRIVATE ONCOLOGY W/TELEMETRY

## 2024-04-01 RX ORDER — LIDOCAINE AND PRILOCAINE 25; 25 MG/G; MG/G
CREAM TOPICAL ONCE
Status: COMPLETED | OUTPATIENT
Start: 2024-04-01 | End: 2024-04-01

## 2024-04-01 RX ORDER — ALLOPURINOL 300 MG/1
300 TABLET ORAL DAILY
Qty: 90 TABLET | Refills: 1 | Status: CANCELLED | OUTPATIENT
Start: 2024-04-01

## 2024-04-01 RX ORDER — BACLOFEN 10 MG/1
5 TABLET ORAL EVERY 8 HOURS PRN
Status: DISPENSED | OUTPATIENT
Start: 2024-04-01 | End: 2024-04-01

## 2024-04-01 RX ADMIN — SODIUM CHLORIDE, PRESERVATIVE FREE 10 ML: 5 INJECTION INTRAVENOUS at 19:55

## 2024-04-01 RX ADMIN — HYDROCODONE BITARTRATE AND ACETAMINOPHEN 1 TABLET: 10; 325 TABLET ORAL at 04:09

## 2024-04-01 RX ADMIN — FLUCONAZOLE 200 MG: 100 TABLET ORAL at 09:28

## 2024-04-01 RX ADMIN — ACYCLOVIR 400 MG: 400 TABLET ORAL at 19:55

## 2024-04-01 RX ADMIN — LEVOTHYROXINE SODIUM 150 MCG: 0.15 TABLET ORAL at 04:09

## 2024-04-01 RX ADMIN — FLUOXETINE HYDROCHLORIDE 20 MG: 20 CAPSULE ORAL at 09:37

## 2024-04-01 RX ADMIN — SODIUM CHLORIDE, PRESERVATIVE FREE 10 ML: 5 INJECTION INTRAVENOUS at 09:29

## 2024-04-01 RX ADMIN — MORPHINE SULFATE 15 MG: 15 TABLET, FILM COATED, EXTENDED RELEASE ORAL at 09:28

## 2024-04-01 RX ADMIN — ACYCLOVIR 400 MG: 400 TABLET ORAL at 09:28

## 2024-04-01 RX ADMIN — CYANOCOBALAMIN TAB 1000 MCG 500 MCG: 1000 TAB at 09:28

## 2024-04-01 RX ADMIN — BACLOFEN 5 MG: 10 TABLET ORAL at 04:12

## 2024-04-01 RX ADMIN — FAMOTIDINE 40 MG: 20 TABLET, FILM COATED ORAL at 17:22

## 2024-04-01 RX ADMIN — TIZANIDINE 4 MG: 2 TABLET ORAL at 19:54

## 2024-04-01 RX ADMIN — MORPHINE SULFATE 15 MG: 15 TABLET, FILM COATED, EXTENDED RELEASE ORAL at 19:55

## 2024-04-01 RX ADMIN — PANTOPRAZOLE SODIUM 40 MG: 40 TABLET, DELAYED RELEASE ORAL at 19:55

## 2024-04-01 RX ADMIN — FLUOXETINE HYDROCHLORIDE 20 MG: 20 CAPSULE ORAL at 19:54

## 2024-04-01 RX ADMIN — LIDOCAINE AND PRILOCAINE: 25; 25 CREAM TOPICAL at 12:30

## 2024-04-01 RX ADMIN — DOCUSATE SODIUM 100 MG: 100 CAPSULE, LIQUID FILLED ORAL at 09:28

## 2024-04-01 RX ADMIN — HYDROCODONE BITARTRATE AND ACETAMINOPHEN 1 TABLET: 10; 325 TABLET ORAL at 17:57

## 2024-04-01 RX ADMIN — HYDROCODONE BITARTRATE AND ACETAMINOPHEN 1 TABLET: 10; 325 TABLET ORAL at 11:54

## 2024-04-01 RX ADMIN — PANTOPRAZOLE SODIUM 40 MG: 40 TABLET, DELAYED RELEASE ORAL at 09:28

## 2024-04-01 RX ADMIN — DOCUSATE SODIUM 100 MG: 100 CAPSULE, LIQUID FILLED ORAL at 19:54

## 2024-04-01 RX ADMIN — HYDROXYZINE HYDROCHLORIDE 25 MG: 25 TABLET, FILM COATED ORAL at 10:44

## 2024-04-01 RX ADMIN — DIPHENHYDRAMINE HYDROCHLORIDE 5 ML: 12.5 LIQUID ORAL at 09:30

## 2024-04-01 RX ADMIN — SODIUM CHLORIDE, PRESERVATIVE FREE 40 ML: 5 INJECTION INTRAVENOUS at 02:45

## 2024-04-01 ASSESSMENT — PAIN SCALES - GENERAL
PAINLEVEL_OUTOF10: 0
PAINLEVEL_OUTOF10: 0
PAINLEVEL_OUTOF10: 2
PAINLEVEL_OUTOF10: 2
PAINLEVEL_OUTOF10: 0
PAINLEVEL_OUTOF10: 7
PAINLEVEL_OUTOF10: 6
PAINLEVEL_OUTOF10: 7

## 2024-04-01 ASSESSMENT — PAIN DESCRIPTION - LOCATION
LOCATION: BACK
LOCATION: BACK
LOCATION: GENERALIZED

## 2024-04-01 ASSESSMENT — PAIN DESCRIPTION - DESCRIPTORS
DESCRIPTORS: ACHING
DESCRIPTORS: ACHING
DESCRIPTORS: OTHER (COMMENT)

## 2024-04-01 ASSESSMENT — PAIN DESCRIPTION - ORIENTATION
ORIENTATION: LOWER
ORIENTATION: ANTERIOR;POSTERIOR
ORIENTATION: RIGHT;LEFT

## 2024-04-01 ASSESSMENT — PAIN - FUNCTIONAL ASSESSMENT: PAIN_FUNCTIONAL_ASSESSMENT: ACTIVITIES ARE NOT PREVENTED

## 2024-04-01 ASSESSMENT — PAIN SCALES - WONG BAKER: WONGBAKER_NUMERICALRESPONSE: NO HURT

## 2024-04-02 VITALS
TEMPERATURE: 98 F | OXYGEN SATURATION: 96 % | RESPIRATION RATE: 17 BRPM | WEIGHT: 167.13 LBS | HEART RATE: 74 BPM | HEIGHT: 60 IN | SYSTOLIC BLOOD PRESSURE: 121 MMHG | DIASTOLIC BLOOD PRESSURE: 83 MMHG | BODY MASS INDEX: 32.81 KG/M2

## 2024-04-02 LAB
ALBUMIN SERPL-MCNC: 3.2 G/DL (ref 3.2–4.6)
ALBUMIN/GLOB SERPL: 0.6 (ref 0.4–1.6)
ALP SERPL-CCNC: 87 U/L (ref 50–136)
ALT SERPL-CCNC: 42 U/L (ref 12–65)
ANION GAP SERPL CALC-SCNC: 3 MMOL/L (ref 2–11)
AST SERPL-CCNC: 58 U/L (ref 15–37)
BASOPHILS # BLD: 0 K/UL (ref 0–0.2)
BASOPHILS NFR BLD: 0 % (ref 0–2)
BILIRUB SERPL-MCNC: 0.8 MG/DL (ref 0.2–1.1)
BUN SERPL-MCNC: 20 MG/DL (ref 8–23)
CALCIUM SERPL-MCNC: 9.3 MG/DL (ref 8.3–10.4)
CHLORIDE SERPL-SCNC: 100 MMOL/L (ref 103–113)
CO2 SERPL-SCNC: 32 MMOL/L (ref 21–32)
CREAT SERPL-MCNC: 0.7 MG/DL (ref 0.6–1)
DIFFERENTIAL METHOD BLD: ABNORMAL
EOSINOPHIL # BLD: 0 K/UL (ref 0–0.8)
EOSINOPHIL NFR BLD: 0 % (ref 0.5–7.8)
ERYTHROCYTE [DISTWIDTH] IN BLOOD BY AUTOMATED COUNT: 14.4 % (ref 11.9–14.6)
GLOBULIN SER CALC-MCNC: 5.4 G/DL (ref 2.8–4.5)
GLUCOSE SERPL-MCNC: 91 MG/DL (ref 65–100)
HCT VFR BLD AUTO: 21.8 % (ref 35.8–46.3)
HGB BLD-MCNC: 7.4 G/DL (ref 11.7–15.4)
IMM GRANULOCYTES # BLD AUTO: 0.1 K/UL (ref 0–0.5)
IMM GRANULOCYTES NFR BLD AUTO: 6 % (ref 0–5)
LYMPHOCYTES # BLD: 0.9 K/UL (ref 0.5–4.6)
LYMPHOCYTES NFR BLD: 79 % (ref 13–44)
MAGNESIUM SERPL-MCNC: 2 MG/DL (ref 1.8–2.4)
MCH RBC QN AUTO: 28.8 PG (ref 26.1–32.9)
MCHC RBC AUTO-ENTMCNC: 33.9 G/DL (ref 31.4–35)
MCV RBC AUTO: 84.8 FL (ref 82–102)
MONOCYTES # BLD: 0.1 K/UL (ref 0.1–1.3)
MONOCYTES NFR BLD: 6 % (ref 4–12)
NEUTS SEG # BLD: 0.1 K/UL (ref 1.7–8.2)
NEUTS SEG NFR BLD: 9 % (ref 43–78)
NRBC # BLD: 0 K/UL (ref 0–0.2)
PLATELET # BLD AUTO: 18 K/UL (ref 150–450)
PLATELET COMMENT: ABNORMAL
PMV BLD AUTO: 10.7 FL (ref 9.4–12.3)
POTASSIUM SERPL-SCNC: 3.7 MMOL/L (ref 3.5–5.1)
PROT SERPL-MCNC: 8.6 G/DL (ref 6.3–8.2)
RBC # BLD AUTO: 2.57 M/UL (ref 4.05–5.2)
RBC MORPH BLD: ABNORMAL
SODIUM SERPL-SCNC: 135 MMOL/L (ref 136–146)
WBC # BLD AUTO: 1.2 K/UL (ref 4.3–11.1)
WBC MORPH BLD: ABNORMAL

## 2024-04-02 PROCEDURE — 97110 THERAPEUTIC EXERCISES: CPT

## 2024-04-02 PROCEDURE — 85025 COMPLETE CBC W/AUTO DIFF WBC: CPT

## 2024-04-02 PROCEDURE — 86813 HLA TYPING A B OR C: CPT

## 2024-04-02 PROCEDURE — 80053 COMPREHEN METABOLIC PANEL: CPT

## 2024-04-02 PROCEDURE — 6360000002 HC RX W HCPCS: Performed by: NURSE PRACTITIONER

## 2024-04-02 PROCEDURE — 99239 HOSP IP/OBS DSCHRG MGMT >30: CPT | Performed by: INTERNAL MEDICINE

## 2024-04-02 PROCEDURE — 6370000000 HC RX 637 (ALT 250 FOR IP): Performed by: NURSE PRACTITIONER

## 2024-04-02 PROCEDURE — 83735 ASSAY OF MAGNESIUM: CPT

## 2024-04-02 PROCEDURE — 6370000000 HC RX 637 (ALT 250 FOR IP): Performed by: INTERNAL MEDICINE

## 2024-04-02 PROCEDURE — 6370000000 HC RX 637 (ALT 250 FOR IP): Performed by: STUDENT IN AN ORGANIZED HEALTH CARE EDUCATION/TRAINING PROGRAM

## 2024-04-02 PROCEDURE — P9037 PLATE PHERES LEUKOREDU IRRAD: HCPCS

## 2024-04-02 PROCEDURE — APPSS60 APP SPLIT SHARED TIME 46-60 MINUTES: Performed by: NURSE PRACTITIONER

## 2024-04-02 PROCEDURE — 36430 TRANSFUSION BLD/BLD COMPNT: CPT

## 2024-04-02 RX ORDER — TIZANIDINE 4 MG/1
4 TABLET ORAL NIGHTLY PRN
Qty: 7 TABLET | Refills: 0 | Status: SHIPPED | OUTPATIENT
Start: 2024-04-02 | End: 2024-04-09

## 2024-04-02 RX ORDER — FLUCONAZOLE 200 MG/1
200 TABLET ORAL DAILY
Qty: 30 TABLET | Refills: 0 | Status: SHIPPED | OUTPATIENT
Start: 2024-04-03 | End: 2024-05-03

## 2024-04-02 RX ORDER — SODIUM CHLORIDE 9 MG/ML
INJECTION, SOLUTION INTRAVENOUS PRN
Status: DISCONTINUED | OUTPATIENT
Start: 2024-04-02 | End: 2024-04-02 | Stop reason: HOSPADM

## 2024-04-02 RX ORDER — HEPARIN 100 UNIT/ML
300 SYRINGE INTRAVENOUS PRN
Status: DISCONTINUED | OUTPATIENT
Start: 2024-04-02 | End: 2024-04-02 | Stop reason: HOSPADM

## 2024-04-02 RX ORDER — CIPROFLOXACIN 500 MG/1
500 TABLET, FILM COATED ORAL 2 TIMES DAILY
Qty: 60 TABLET | Refills: 0 | Status: SHIPPED | OUTPATIENT
Start: 2024-04-02 | End: 2024-05-02

## 2024-04-02 RX ORDER — ACYCLOVIR 400 MG/1
400 TABLET ORAL 2 TIMES DAILY
Qty: 60 TABLET | Refills: 1 | Status: SHIPPED | OUTPATIENT
Start: 2024-04-02 | End: 2024-06-01

## 2024-04-02 RX ADMIN — HEPARIN 300 UNITS: 100 SYRINGE at 16:32

## 2024-04-02 RX ADMIN — AMLODIPINE BESYLATE 5 MG: 5 TABLET ORAL at 09:00

## 2024-04-02 RX ADMIN — DIPHENHYDRAMINE HYDROCHLORIDE 25 MG: 25 CAPSULE ORAL at 13:30

## 2024-04-02 RX ADMIN — CYANOCOBALAMIN TAB 1000 MCG 500 MCG: 1000 TAB at 09:01

## 2024-04-02 RX ADMIN — FLUCONAZOLE 200 MG: 100 TABLET ORAL at 09:00

## 2024-04-02 RX ADMIN — MORPHINE SULFATE 15 MG: 15 TABLET, FILM COATED, EXTENDED RELEASE ORAL at 09:59

## 2024-04-02 RX ADMIN — FAMOTIDINE 40 MG: 20 TABLET, FILM COATED ORAL at 16:32

## 2024-04-02 RX ADMIN — FLUOXETINE HYDROCHLORIDE 20 MG: 20 CAPSULE ORAL at 09:01

## 2024-04-02 RX ADMIN — HYDROCODONE BITARTRATE AND ACETAMINOPHEN 1 TABLET: 10; 325 TABLET ORAL at 09:01

## 2024-04-02 RX ADMIN — LEVOTHYROXINE SODIUM 150 MCG: 0.15 TABLET ORAL at 09:00

## 2024-04-02 RX ADMIN — PANTOPRAZOLE SODIUM 40 MG: 40 TABLET, DELAYED RELEASE ORAL at 08:58

## 2024-04-02 RX ADMIN — ACETAMINOPHEN 650 MG: 325 TABLET ORAL at 13:29

## 2024-04-02 RX ADMIN — ACYCLOVIR 400 MG: 400 TABLET ORAL at 09:00

## 2024-04-02 ASSESSMENT — PAIN SCALES - WONG BAKER: WONGBAKER_NUMERICALRESPONSE: NO HURT

## 2024-04-02 ASSESSMENT — PAIN DESCRIPTION - LOCATION: LOCATION: BACK

## 2024-04-02 ASSESSMENT — PAIN DESCRIPTION - DESCRIPTORS: DESCRIPTORS: ACHING

## 2024-04-02 ASSESSMENT — PAIN SCALES - GENERAL
PAINLEVEL_OUTOF10: 7
PAINLEVEL_OUTOF10: 3

## 2024-04-02 ASSESSMENT — PAIN - FUNCTIONAL ASSESSMENT: PAIN_FUNCTIONAL_ASSESSMENT: PREVENTS OR INTERFERES SOME ACTIVE ACTIVITIES AND ADLS

## 2024-04-02 ASSESSMENT — PAIN DESCRIPTION - ORIENTATION: ORIENTATION: LOWER

## 2024-04-02 NOTE — PROGRESS NOTES
-Critical platelet of 4 called 2036  -1 unit plts ordered per Rigo Sops  -Awaiting plts to come in from blood bank    0150:  -this RN called [facility] to see ETA on plts  -plts still not available, but ordered  -[facility] said they will call when plts get here    0625:   -attempted to call [facility] 2x about status of platelets, no answer  
    [facility] Hematology & Oncology        Inpatient Hematology / Oncology Progress Note    Reason for Consult:  Thrombocytopenia (HCC) [D69.6]  Acute anemia [D64.9]  Pancytopenia (HCC) [D61.818]  Referring Physician:  Chris Vora MD    24 Hour Events:  VSS, afebrile    Day #3 IVIG/pulse dex for empiric ITP, plts bumped up to 6k 1 hr post-transfusion last night  Plts down to 2k this AM - transfusing  Hgb up to 6.8 after PRBC transfusion  BMBx 3/26, path pending  Otherwise, no complaints/overnight events    ROS:  Constitutional: Negative for fever, chills, weakness, malaise, fatigue.  CV: Negative for chest pain, palpitations, edema.  Respiratory: Negative for dyspnea, cough, wheezing.  GI: Negative for nausea, abdominal pain, diarrhea.    10 point review of systems is otherwise negative with the exception of the elements mentioned above in the HPI.     Allergies   Allergen Reactions    Penicillins Hives    Sulfa Antibiotics Hives    Codeine Nausea And Vomiting     Past Medical History:   Diagnosis Date    Arthritis     Autoimmune disease (HCC)     skin changes, unknown name    Cancer (HCC) 1990    ovarian    Chronic pain     arthritis in back and legs    Coronary artery spasm (HCC)     COVID 05/15/2022    Essential hypertension 11/8/2019    Gastritis     GERD (gastroesophageal reflux disease)     Hx antineoplastic chemo     Migraine headache     Psoriasis     Psychiatric disorder     anxiety and depression    Severe obesity (BMI 35.0-39.9) 6/26/2018    Thyroid disease     Diagnosed in 1996     Past Surgical History:   Procedure Laterality Date    CARPAL TUNNEL RELEASE      GYN      ovaries    HYSTERECTOMY, TOTAL ABDOMINAL (CERVIX REMOVED)  1990    IR PORT PLACEMENT EQUAL OR GREATER THAN 5 YEARS  1/3/2024    IR PORT PLACEMENT EQUAL OR GREATER THAN 5 YEARS 1/3/2024 [facility] RADIOLOGY SPECIALS    MD UNLISTED PROCEDURE CARDIAC SURGERY      cardiac cath.  Dx with spasms.    TUBAL LIGATION       Family History   Problem 
    [facility] Hematology & Oncology        Inpatient Hematology / Oncology Progress Note    Reason for Consult:  Thrombocytopenia (HCC) [D69.6]  Acute anemia [D64.9]  Pancytopenia (HCC) [D61.818]  Referring Physician:  Mindy Gomez MD    24 Hour Events:  VSS, afebrile    Plts 2k - transfusion   Hgb 7.7, no reported bleeding   Otherwise, no complaints/overnight events  Flow cytometry neg     ROS:  Constitutional: Negative for fever, chills, weakness, malaise, fatigue.  CV: Negative for chest pain, palpitations, edema.  Respiratory: Negative for dyspnea, cough, wheezing.  GI: Negative for nausea, abdominal pain, diarrhea.    10 point review of systems is otherwise negative with the exception of the elements mentioned above in the HPI.     Allergies   Allergen Reactions    Penicillins Hives    Sulfa Antibiotics Hives    Codeine Nausea And Vomiting     Past Medical History:   Diagnosis Date    Arthritis     Autoimmune disease (HCC)     skin changes, unknown name    Cancer (HCC) 1990    ovarian    Chronic pain     arthritis in back and legs    Coronary artery spasm (HCC)     COVID 05/15/2022    Essential hypertension 11/8/2019    Gastritis     GERD (gastroesophageal reflux disease)     Hx antineoplastic chemo     Migraine headache     Psoriasis     Psychiatric disorder     anxiety and depression    Severe obesity (BMI 35.0-39.9) 6/26/2018    Thyroid disease     Diagnosed in 1996     Past Surgical History:   Procedure Laterality Date    CARPAL TUNNEL RELEASE      GYN      ovaries    HYSTERECTOMY, TOTAL ABDOMINAL (CERVIX REMOVED)  1990    IR PORT PLACEMENT EQUAL OR GREATER THAN 5 YEARS  1/3/2024    IR PORT PLACEMENT EQUAL OR GREATER THAN 5 YEARS 1/3/2024 [facility] RADIOLOGY SPECIALS    [place] UNLISTED PROCEDURE CARDIAC SURGERY      cardiac cath.  Dx with spasms.    TUBAL LIGATION       Family History   Problem Relation Age of Onset    Parkinson's Disease Mother     Stroke Mother         Passed away in 1969    No Known Problems 
    [facility] Hematology & Oncology        Inpatient Hematology / Oncology Progress Note    Reason for Consult:  Thrombocytopenia (HCC) [D69.6]  Acute anemia [D64.9]  Pancytopenia (HCC) [D61.818]  Referring Physician:  Chris Vora MD    24 Hour Events:  VSS, afebrile    Plts remain 2k - transfusing  Last 6U (since 3/23) have all been HLA matched and still no response to tx  Hgb 7.2, no reported bleeding   BMBx today  Start IVIG/pulse dex for ITP  Otherwise, no complaints/overnight events    ROS:  Constitutional: Negative for fever, chills, weakness, malaise, fatigue.  CV: Negative for chest pain, palpitations, edema.  Respiratory: Negative for dyspnea, cough, wheezing.  GI: Negative for nausea, abdominal pain, diarrhea.    10 point review of systems is otherwise negative with the exception of the elements mentioned above in the HPI.     Allergies   Allergen Reactions    Penicillins Hives    Sulfa Antibiotics Hives    Codeine Nausea And Vomiting     Past Medical History:   Diagnosis Date    Arthritis     Autoimmune disease (HCC)     skin changes, unknown name    Cancer (HCC) 1990    ovarian    Chronic pain     arthritis in back and legs    Coronary artery spasm (HCC)     COVID 05/15/2022    Essential hypertension 11/8/2019    Gastritis     GERD (gastroesophageal reflux disease)     Hx antineoplastic chemo     Migraine headache     Psoriasis     Psychiatric disorder     anxiety and depression    Severe obesity (BMI 35.0-39.9) 6/26/2018    Thyroid disease     Diagnosed in 1996     Past Surgical History:   Procedure Laterality Date    CARPAL TUNNEL RELEASE      GYN      ovaries    HYSTERECTOMY, TOTAL ABDOMINAL (CERVIX REMOVED)  1990    IR PORT PLACEMENT EQUAL OR GREATER THAN 5 YEARS  1/3/2024    IR PORT PLACEMENT EQUAL OR GREATER THAN 5 YEARS 1/3/2024 [facility] RADIOLOGY SPECIALS    [place] UNLISTED PROCEDURE CARDIAC SURGERY      cardiac cath.  Dx with spasms.    TUBAL LIGATION       Family History   Problem Relation Age of 
    [facility] Hematology & Oncology        Inpatient Hematology / Oncology Progress Note    Reason for Consult:  Thrombocytopenia (HCC) [D69.6]  Acute anemia [D64.9]  Pancytopenia (HCC) [D61.818]  Referring Physician:  Chris Vora MD    24 Hour Events:  VSS, afebrile    Plts remain 2k - transfusing  Hgb stable 7.2, no reported bleeding   BMBx 3/26, path pending  Day #2 IVIG/pulse dex for empiric ITP  Otherwise, no complaints/overnight events    ROS:  Constitutional: Negative for fever, chills, weakness, malaise, fatigue.  CV: Negative for chest pain, palpitations, edema.  Respiratory: Negative for dyspnea, cough, wheezing.  GI: Negative for nausea, abdominal pain, diarrhea.    10 point review of systems is otherwise negative with the exception of the elements mentioned above in the HPI.     Allergies   Allergen Reactions    Penicillins Hives    Sulfa Antibiotics Hives    Codeine Nausea And Vomiting     Past Medical History:   Diagnosis Date    Arthritis     Autoimmune disease (HCC)     skin changes, unknown name    Cancer (HCC) 1990    ovarian    Chronic pain     arthritis in back and legs    Coronary artery spasm (HCC)     COVID 05/15/2022    Essential hypertension 11/8/2019    Gastritis     GERD (gastroesophageal reflux disease)     Hx antineoplastic chemo     Migraine headache     Psoriasis     Psychiatric disorder     anxiety and depression    Severe obesity (BMI 35.0-39.9) 6/26/2018    Thyroid disease     Diagnosed in 1996     Past Surgical History:   Procedure Laterality Date    CARPAL TUNNEL RELEASE      GYN      ovaries    HYSTERECTOMY, TOTAL ABDOMINAL (CERVIX REMOVED)  1990    IR PORT PLACEMENT EQUAL OR GREATER THAN 5 YEARS  1/3/2024    IR PORT PLACEMENT EQUAL OR GREATER THAN 5 YEARS 1/3/2024 [facility] RADIOLOGY SPECIALS    IN UNLISTED PROCEDURE CARDIAC SURGERY      cardiac cath.  Dx with spasms.    TUBAL LIGATION       Family History   Problem Relation Age of Onset    Parkinson's Disease Mother     Stroke 
  Physician Progress Note      PATIENT:               SANTHOSH ERNST  CSN #:                  [id number]  :                       1950  ADMIT DATE:       3/18/2024 2:19 PM  DISCH DATE:  RESPONDING  PROVIDER #:        Varsha BURTON          QUERY TEXT:    Patient admitted with acute anemia and thrombocytopenia and has documented   pancytopenia (OR documented anemia, leukopenia or neutropenia, and   thrombocytopenia). If possible, please document in progress notes and   discharge summary further specificity regarding pancytopenia:    The medical record reflects the following:  Risk Factors: 73 YOF, AML remote chemotherapy, MDS,  Clinical Indicators: 3/18 WBC 1.3 RBC 2.15 HGB 6.6 PLTs 2  3/20 PN: currently undergoing Vidaza and Venetoclax. She completed cycle 2 day   1-5 - and is on continuous Venetoclax 100mg.  Treatment: Oncology consult, transfusion PRBC and platelets    Thank You,  Angelica Sheehan RN. C BSN  Clinical   O: [phone]   [email or web address]  Options provided:  -- Antineoplastic (chemotherapy) induced pancytopenia  -- Pancytopenia due to aplastic anemia  -- Pancytopenia with aplastic anemia  -- Pancytopenia due to AML  -- Pancytopenia due to myelodysplastic syndrome  -- Pancytopenia due to, Please specify in documentation.  -- Other - I will add my own diagnosis  -- Disagree - Not applicable / Not valid  -- Disagree - Clinically unable to determine / Unknown  -- Refer to Clinical Documentation Reviewer    PROVIDER RESPONSE TEXT:    Patient has antineoplastic (chemotherapy) induced pancytopenia.    Query created by: Angelica Sheehan on 3/20/2024 7:43 PM      Electronically signed by:  Varsha BURTON 3/21/2024 7:44 AM          
  Physician Progress Note      PATIENT:               SANTHOSH ERNST  CSN #:                  [id number]  :                       1950  ADMIT DATE:       3/18/2024 2:19 PM  DISCH DATE:  RESPONDING  PROVIDER #:        Varsha BURTON          QUERY TEXT:    Pt admitted with anemia and has acute anemia documented. If possible, please   document in progress notes and discharge summary further specificity regarding   the acuity and type of anemia:    The medical record reflects the following:  Risk Factors: 73 YOF, AML on chemotherapy  Clinical Indicators:  reported chills, from Cancer center,  report some   generalized malaise and fatigue.  Labs: 3/18 HGB 6.7-- 3/19 HGB 7.7 -- 3/22 HGB 6.6 -- 3/27 HGB 7.2  3/19 Consult note: Denies nausea, vomiting, changes in bowel habits, bloody or   black stools, abdominal pain.  Treatment: transfusion 2U PRBCs and multiple platelets, serial labs    Thank You,  Angelica Sheehan RN. C BSN  Clinical   O: [phone]   [email or web address]  Options provided:  -- Anemia due to iron deficiency  -- Anemia due to AML  -- Anemia due to antineoplastic chemotherapy  -- Dilutional anemia  -- Other - I will add my own diagnosis  -- Disagree - Not applicable / Not valid  -- Disagree - Clinically unable to determine / Unknown  -- Refer to Clinical Documentation Reviewer    PROVIDER RESPONSE TEXT:    This patient has anemia due to antineoplastic chemotherapy.    Query created by: Angelica Sheehan on 3/28/2024 6:41 PM      Electronically signed by:  Varsha BURTON 3/29/2024 7:37 AM          
2231: Pt called this RN in for itching during plt transfusion. No s./sx of respiratory distress. Rash noted on legs, arms and back    2234: Pt given atarax.    2237: NP Catie Sorensen notified. Orders received for solumedrol 125mg, Pepcid 20mg, and hydrocortisone cream.    0020: Rash resolved.    0139: 2nd unit of plts started    0524: critical plt of 7 called.   1 unit plts ordered per Rigo Sops  
ACUTE OCCUPATIONAL THERAPY GOALS:   (Developed with and agreed upon by patient and/or caregiver.)  1. Patient will complete lower body bathing and dressing with modified independence and adaptive equipment as needed.   2. Patient will complete toileting with modified independence.   3. Patient will tolerate  30 minutes of OT treatment with as needed rest breaks to increase activity tolerance for ADLs.   4. Patient will complete functional transfers with modified independence and adaptive equipment as needed.   5. Patient will verbalize at least 3 energy conservation techniques to utilize in the home.     New Goals Added 4/1/24:  6. Patient will complete upper body bathing and dress with MOD I and adaptive equipment as needed.  7. Patient will complete simple grooming task standing at the sink with MOD I.    Timeframe: 7 visits       OCCUPATIONAL THERAPY Daily Note and Re-evaluation       OT Visit Days: 1  Acknowledge Orders  Time  OT Charge Capture  Rehab Caseload Tracker      Constance Gomes is a 73 y.o. female   PRIMARY DIAGNOSIS: Acute anemia  Thrombocytopenia (HCC) [D69.6]  Acute anemia [D64.9]  Pancytopenia (HCC) [D61.818]       Reason for Referral: Other lack of cordination (R27.8)  Dizziness and Giddiness (R42)  Inpatient: Payor: [employer] MEDICARE / Plan: [employer] CHOICE-PPO MEDICARE / Product Type: *No Product type* /     ASSESSMENT:     REHAB RECOMMENDATIONS:   Recommendation to date pending progress:  Setting:  No further skilled occupational therapy after discharge from hospital    Equipment:    To Be Determined     ASSESSMENT:  \Ms. Gomes presented to the hospital with from the cancer center due to anemia and thrombocytopenia. Pt with a hx of AML. Pt is now on hospital day 14 and still awaiting count recovery. At baseline, pt is independent for bADLs, mobility, and IADLs. Pt seen for re-evaluation today with goals upgraded/added and POC addressed.  Today, pt was received supine in bed. Completed bed 
ACUTE OCCUPATIONAL THERAPY GOALS:   (Developed with and agreed upon by patient and/or caregiver.)  1. Patient will complete lower body bathing and dressing with modified independence and adaptive equipment as needed.   2. Patient will complete toileting with modified independence.   3. Patient will tolerate 20 minutes of OT treatment with as needed rest breaks to increase activity tolerance for ADLs.   4. Patient will complete functional transfers with modified independence and adaptive equipment as needed.   5. Patient will verbalize at least 3 energy conservation techniques to utilize in the home.      Timeframe: 7 visits     OCCUPATIONAL THERAPY: Daily Note    OT Visit Days: 3   Time In/Out  OT Charge Capture  Rehab Caseload Tracker  OT Orders    Constance Gomes is a 73 y.o. female   PRIMARY DIAGNOSIS: Acute anemia  Thrombocytopenia (HCC) [D69.6]  Acute anemia [D64.9]  Pancytopenia (HCC) [D61.818]       Inpatient: Payor: [employer] MEDICARE / Plan: [employer] CHOICE-PPO MEDICARE / Product Type: *No Product type* /     ASSESSMENT:     REHAB RECOMMENDATIONS:   Recommendation to date pending progress:  Setting:  No further skilled occupational therapy after discharge from hospital    Equipment:    None     ASSESSMENT:  Ms. Gomes is progressing well towards OT goals. Today, pt was received supine in bed. Completed bed mobility, functional transfers, ambulation without AD, and grooming tasks standing at the sink with overall standby assist. Fatigued with activity and required seated rest breaks. Do not anticipate any further OT needs at discharge. Ms. Gomes continues to demonstrate overall deficits in strength, balance, activity tolerance, and ADL performance. Continue OT efforts and POC in order to address functional deficits and OT goals stated above.        SUBJECTIVE:     Ms. Gomes states, \"I am back\"     Social/Functional Lives With: Spouse  Type of Home: Mobile home  Home Layout: One level  Home Access: Ramped 
ACUTE OCCUPATIONAL THERAPY GOALS:   (Developed with and agreed upon by patient and/or caregiver.)  1. Patient will complete lower body bathing and dressing with modified independence and adaptive equipment as needed.   2. Patient will complete toileting with modified independence.   3. Patient will tolerate 20 minutes of OT treatment with as needed rest breaks to increase activity tolerance for ADLs.   4. Patient will complete functional transfers with modified independence and adaptive equipment as needed.   5. Patient will verbalize at least 3 energy conservation techniques to utilize in the home.      Timeframe: 7 visits     OCCUPATIONAL THERAPY: Daily Note    OT Visit Days: 5   Time In/Out  OT Charge Capture  Rehab Caseload Tracker  OT Orders    Constance Goems is a 73 y.o. female   PRIMARY DIAGNOSIS: Acute anemia  Thrombocytopenia (HCC) [D69.6]  Acute anemia [D64.9]  Pancytopenia (HCC) [D61.818]       Inpatient: Payor: [employer] MEDICARE / Plan: [employer] CHOICE-PPO MEDICARE / Product Type: *No Product type* /     ASSESSMENT:     REHAB RECOMMENDATIONS:   Recommendation to date pending progress:  Setting:  No further skilled occupational therapy after discharge from hospital    Equipment:    None     ASSESSMENT:  Ms. Gomes is progressing well towards OT goals. Today, pt was received supine in bed. Pt reported it being a rough day today. Completed bed mobility, functional transfers, ambulation without AD, toileting and grooming tasks standing at the sink with overall supervision-standby assist. Sat in the windowsill in the [facility] for sunshine. Fatigued with activity and required seated rest breaks. Do not anticipate any further OT needs at discharge. Ms. Gomes continues to demonstrate overall deficits in strength, balance, activity tolerance, and ADL performance. Continue OT efforts and POC in order to address functional deficits and OT goals stated above.        SUBJECTIVE:     Ms. Gomes states, \"I am not having a good 
ACUTE PHYSICAL THERAPY GOALS:   (Developed with and agreed upon by patient and/or caregiver.)    (1.) Constance Gomes  will move from supine to sit and sit to supine  with INDEPENDENCE within 7 treatment day(s).    (2.) Constance Gomes will transfer from bed to chair and chair to bed with INDEPENDENCE using the least restrictive device within 7 treatment day(s).    (3.) Constance Gomes will ambulate with MODIFIED INDEPENDENCE for 300 feet with the least restrictive device within 7 treatment day(s).   (4.) Constance Gomes will perform standing static and dynamic balance activities x 20 minutes with SUPERVISION to improve safety within 7 treatment day(s).  (5.) Constance Gomes will perform therapeutic exercises x 20 min for HEP with INDEPENDENCE to improve strength, endurance, and functional mobility within 7 treatment day(s).     PHYSICAL THERAPY: Daily Note PM   (Link to Caseload Tracking: PT Visit Days : 2  Time In/Out PT Charge Capture  Rehab Caseload Tracker  Orders    Constance Gomes is a 73 y.o. female   PRIMARY DIAGNOSIS: Acute anemia  Thrombocytopenia (HCC) [D69.6]  Acute anemia [D64.9]  Pancytopenia (HCC) [D61.818]       Inpatient: Payor: [employer] MEDICARE / Plan: [employer] CHOICE-O MEDICARE / Product Type: *No Product type* /     ASSESSMENT:     REHAB RECOMMENDATIONS:   Recommendation to date pending progress:  Setting:  No further skilled physical therapy after discharge from hospital    Equipment:    None     ASSESSMENT:  Ms. Gomes was received supine in bed, agreeable to therapy. She reports feeling better today. She was able to progress to ambulating 500 ft with SBA and no AD. She demonstrates mild activity tolerance and balance deficits but overall tolerated well and demonstrated great progress. Will continue to follow.      SUBJECTIVE:   Ms. Gomes states, \"I'm ready to walk\"     Social/Functional Lives With: Spouse  Type of Home: Mobile home  Home Layout: One level  Home Access: Ramped 
ACUTE PHYSICAL THERAPY GOALS:   (Developed with and agreed upon by patient and/or caregiver.)    (1.) Constance Gomes  will move from supine to sit and sit to supine  with INDEPENDENCE within 7 treatment day(s).    (2.) Constance Gomes will transfer from bed to chair and chair to bed with INDEPENDENCE using the least restrictive device within 7 treatment day(s).    (3.) Constance Gomes will ambulate with MODIFIED INDEPENDENCE for 300 feet with the least restrictive device within 7 treatment day(s).   (4.) Constance Gomes will perform standing static and dynamic balance activities x 20 minutes with SUPERVISION to improve safety within 7 treatment day(s).  (5.) Constance Gomes will perform therapeutic exercises x 20 min for HEP with INDEPENDENCE to improve strength, endurance, and functional mobility within 7 treatment day(s).     PHYSICAL THERAPY: Daily Note PM   (Link to Caseload Tracking: PT Visit Days : 3  Time In/Out PT Charge Capture  Rehab Caseload Tracker  Orders    Constance Gomes is a 73 y.o. female   PRIMARY DIAGNOSIS: Acute anemia  Thrombocytopenia (HCC) [D69.6]  Acute anemia [D64.9]  Pancytopenia (HCC) [D61.818]       Inpatient: Payor: [employer] MEDICARE / Plan: [employer] CHOICE-O MEDICARE / Product Type: *No Product type* /     ASSESSMENT:     REHAB RECOMMENDATIONS:   Recommendation to date pending progress:  Setting:  No further skilled physical therapy after discharge from hospital    Equipment:    None     ASSESSMENT:  Ms. Gomes is seated upright in her chair and agreeable to PT this AM. Pt performed STS with SBA/Supervision and ambulated 500ft with SBA and no AD. Pt returned to room and wanted to get back in her bed, as she was tired from also working with OT this AM. Pt educated on importance of OOB mobility for meals. Pt left supine with all needs in reach. Pt continues to progress towards goals. Will continue to follow.      SUBJECTIVE:   Ms. Gomes states, \"Sure, I'll walk again.\" 
ACUTE PHYSICAL THERAPY GOALS:   (Developed with and agreed upon by patient and/or caregiver.)    (1.) Constance Gomes  will move from supine to sit and sit to supine  with INDEPENDENCE within 7 treatment day(s).    (2.) Constance Gomes will transfer from bed to chair and chair to bed with INDEPENDENCE using the least restrictive device within 7 treatment day(s).    (3.) Constance Gomes will ambulate with MODIFIED INDEPENDENCE for 300 feet with the least restrictive device within 7 treatment day(s).   (4.) Constance Gomes will perform standing static and dynamic balance activities x 20 minutes with SUPERVISION to improve safety within 7 treatment day(s).  (5.) Constance Gomes will perform therapeutic exercises x 20 min for HEP with INDEPENDENCE to improve strength, endurance, and functional mobility within 7 treatment day(s).     PHYSICAL THERAPY: Daily Note PM   (Link to Caseload Tracking: PT Visit Days : 4  Time In/Out PT Charge Capture  Rehab Caseload Tracker  Orders    Constance Gomes is a 73 y.o. female   PRIMARY DIAGNOSIS: Acute anemia  Thrombocytopenia (HCC) [D69.6]  Acute anemia [D64.9]  Pancytopenia (HCC) [D61.818]       Inpatient: Payor: [employer] MEDICARE / Plan: [employer] CHOICE-O MEDICARE / Product Type: *No Product type* /     ASSESSMENT:     REHAB RECOMMENDATIONS:   Recommendation to date pending progress:  Setting:  No further skilled physical therapy after discharge from hospital    Equipment:    None     ASSESSMENT:  Ms. Gomes was supine in bed on arrival and agreeable to PT this afternoon. She ambulated 500' with no AD and SBA with one standing rest break. She continues to do well and progress towards goals.       SUBJECTIVE:   Ms. Gomes states, \"Lets go again.\"     Social/Functional Lives With: Spouse  Type of Home: Mobile home  Home Layout: One level  Home Access: Ramped entrance  Bathroom Toilet: Standard  Bathroom Equipment: Commode  Bathroom Accessibility: Accessible  Home Equipment: 
ACUTE PHYSICAL THERAPY GOALS:   (Developed with and agreed upon by patient and/or caregiver.)    (1.) Constance Gomes  will move from supine to sit and sit to supine  with INDEPENDENCE within 7 treatment day(s).    (2.) Constance Gomes will transfer from bed to chair and chair to bed with INDEPENDENCE using the least restrictive device within 7 treatment day(s).    (3.) Constance Gomes will ambulate with MODIFIED INDEPENDENCE for 300 feet with the least restrictive device within 7 treatment day(s).   (4.) Constance Gomes will perform standing static and dynamic balance activities x 20 minutes with SUPERVISION to improve safety within 7 treatment day(s).  (5.) Constance Gomes will perform therapeutic exercises x 20 min for HEP with INDEPENDENCE to improve strength, endurance, and functional mobility within 7 treatment day(s).     PHYSICAL THERAPY: Daily Note PM   (Link to Caseload Tracking: PT Visit Days : 6  Time In/Out PT Charge Capture  Rehab Caseload Tracker  Orders    Constance Gomes is a 73 y.o. female   PRIMARY DIAGNOSIS: Acute anemia  Thrombocytopenia (HCC) [D69.6]  Acute anemia [D64.9]  Pancytopenia (HCC) [D61.818]       Inpatient: Payor: [employer] MEDICARE / Plan: [employer] CHOICE-O MEDICARE / Product Type: *No Product type* /     ASSESSMENT:     REHAB RECOMMENDATIONS:   Recommendation to date pending progress:  Setting:  No further skilled physical therapy after discharge from hospital    Equipment:    None     ASSESSMENT:  Ms. Gomes was sitting up in chair on arrival and agreeable to PT. She ambulated 700' with SBA and no assistive device. Pt left sitting up in chair with needs in reach. Good progress towards goals.       SUBJECTIVE:   Ms. Gomes states, \"I just want some wash clothes for later.\"     Social/Functional Lives With: Spouse  Type of Home: Mobile home  Home Layout: One level  Home Access: Ramped entrance  Bathroom Toilet: Standard  Bathroom Equipment: Commode  Bathroom Accessibility: 
ACUTE PHYSICAL THERAPY GOALS:   (Developed with and agreed upon by patient and/or caregiver.)    (1.) Constance Gomes  will move from supine to sit and sit to supine  with INDEPENDENCE within 7 treatment day(s).    (2.) Constance Gomes will transfer from bed to chair and chair to bed with INDEPENDENCE using the least restrictive device within 7 treatment day(s).  GOAL MET 4/1/24  (3.) Constance Gomes will ambulate with MODIFIED INDEPENDENCE for 300 feet with the least restrictive device within 7 treatment day(s). PARTIALLY MET 4/1/24  (4.) Constance Gomes will perform standing static and dynamic balance activities x 20 minutes with SUPERVISION to improve safety within 7 treatment day(s).  (5.) Constance Gomes will perform therapeutic exercises x 20 min for HEP with INDEPENDENCE to improve strength, endurance, and functional mobility within 7 treatment day(s).     New Goal    (3.) Constance Gomes will ambulate with INDEPENDENCE for 500+ feet on even and uneven surfaces with the least restrictive device within 7 treatment day(s). PARTIALLY MET 4/1/24        PHYSICAL THERAPY Daily Note, Re-evaluation, and AM  (Link to Caseload Tracking: PT Visit Days : 1  Acknowledge Orders  Time In/Out  PT Charge Capture  Rehab Caseload Tracker    Constance Gomes is a 73 y.o. female   PRIMARY DIAGNOSIS: Acute anemia  Thrombocytopenia (HCC) [D69.6]  Acute anemia [D64.9]  Pancytopenia (HCC) [D61.818]       Reason for Referral: Generalized Muscle Weakness (M62.81)  Difficulty in walking, Not elsewhere classified (R26.2)  Inpatient: Payor: [employer]ICARE / Plan: [employer] CHOICE-PPO MEDICARE / Product Type: *No Product type* /     ASSESSMENT:     REHAB RECOMMENDATIONS:   Recommendation to date pending progress:  Setting:  No further skilled physical therapy after discharge from hospital    Equipment:    To Be Determined     ASSESSMENT:  Ms. Gomes is a 73 year old F who presents with acute anemia and thrombocytopenia. PMHx 
Care assumed of pt at 1300, BSSR received from off going CHINO Szymanski     Pt received one unit of platelets this shift.   Recheck was 3   Additional unit ordered. Awaiting unit to be ready.     BSSR given to oncoming CHINO Galvez   
Discharge education completed with patient and family. pt given opportunity to ask questions. Pt made aware of upcoming appointment and educated on new med medications. Pt educated on when to seek medical attention if needed.      Chest port flushed with NS then heparin and needle removed. Pt discharged home with family. Taken out to car via wheelchair.     
IVIG received   1 unit of plt transfused.  Critical plt value of 2 received, see new order.  Pt HR low this shift reported to NP, see new orders.  
PT was in bed awake. CH introduced self. PT shared about health and hospitalization including frustrations and hopes. PT expressed importance of and comfort in Nataly and Prayer. PT is Judaism. CH offered prayer. CH checked for unmet needs and offered support.     Rev. Neelam Wong M.Div.    
PT was in bed with Spouse resting at bedside. PT expressed PT was feeling better. PT expressed importance of and comfort in Nataly and Prayer. PT is Anabaptist. CH offered prayer. CH checked for unmet needs and offered support.     . BRAXTON BlountDiv.    
PT was in bed. PT briefly updated CH. CH checked for unmet needs and offered support.    Rev. Neelam Wong M.Div.    
Physical Therapy Note:    Attempted to see patient this x 2  for physical therapy treatment  session. Patient politely declined both times due to fatigue. Will follow and re-attempt as schedule permits/patient available. Thank you,    MARVIN DUNN, PT     Rehab Caseload Tracker    
Pt had unit of PRBC to be administered.  PT peripheral IV went bad when going to hang the unit. Messaged hospitalist and got the ok to use to the port for administration of PRBC.  
Pt received one unit of platelets per order.   Premeds given. Pt tolerated well. No adverse reactions noted.     BSSR given to oncoming CHINO Ha  
TRANSFER - IN REPORT:    Verbal report received from Torsten MAGANA on Constance Gomes  being received from ED for routine progression of patient care      Report consisted of patient's Situation, Background, Assessment and   Recommendations(SBAR).     Information from the following report(s) Nurse Handoff Report was reviewed with the receiving nurse.    Opportunity for questions and clarification was provided.      Assessment completed upon patient's arrival to unit and care assumed.    
Standing Scale  BMI: 33.2, Obese Class 1 (BMI 30.0-34.9)  Admission Body Weight: 76.7 kg (169 lb 1.5 oz) (3/19 standing scale)  Ideal Body Weight (Kg) (Calculated): 45 kg (100 lbs), 170.2 %  BMI Category Obese Class 1 (BMI 30.0-34.9)  Estimated Daily Nutrient Needs:  Energy (kcal/day): 3000-8248 (20-25 kcal/kg) (Kcal/kg Weight used: 77.2 kg Current  Protein (g/day): 77-96 (20% kcal) Weight Used: (Other (Comment))    Fluid (ml/day):   (1 ml/kcal)    Nutrition Diagnosis:   Inadequate oral intake related to altered GI function as evidenced by  (reported barrier to PO, intake as above)  Nutrition Interventions:   Food and/or Nutrient Delivery: Continue Current Diet, Start Oral Nutrition Supplement     Coordination of Nutrition Care: Continue to monitor while inpatient      Goals:      Active Goal: Meet at least 75% of estimated needs, by next RD assessment       Nutrition Monitoring and Evaluation:      Food/Nutrient Intake Outcomes: Food and Nutrient Intake, Supplement Intake  Physical Signs/Symptoms Outcomes: GI Status, Meal Time Behavior, Weight    Discharge Planning:    Too soon to determine    FITO WEIR, JOAQUIM      
Brushed hair, washed face, and brushed teeth standing at the sink   Personal Device Care [] [] [] [] [] [] [] [] [] [x]    Functional Mobility [] [] [] [x] [] [] [] [] [] [] No AD   I=Independent, Mod I=Modified Independent, S=Supervision/Setup, SBA=Standby Assistance, CGA=Contact Guard Assistance, Min=Minimal Assistance, Mod=Moderate Assistance, Max=Maximal Assistance, Total=Total Assistance, NT=Not Tested    BALANCE: Good Fair+ Fair Fair- Poor NT Comments   Sitting Static [x] [] [] [] [] []    Sitting Dynamic [x] [] [] [] [] []              Standing Static [x] [] [] [] [] []    Standing Dynamic [] [x] [] [] [] []        PLAN:     FREQUENCY/DURATION   OT Plan of Care: 3 times/week for duration of hospital stay or until stated goals are met, whichever comes first.    TREATMENT:     TREATMENT:   Neuromuscular Re-education (13 Minutes): Patient participated in neuromuscular re-education including functional reaching, weight shifting, postural training, midline training, standing tolerance activity , and sitting balance activity   with minimal verbal cues to improve sitting balance, standing balance, posture, static balance, and dynamic balance in order to prepare for functional task, prepare for standing ADLs, prepare for functional transfer, increase safety awareness, prepare for discharge home , and prepare for self care..   Self Care (10 minutes): Patient participated in toileting, lower body dressing, and grooming ADLs in unsupported sitting and standing with minimal verbal cueing to increase independence, decrease assistance required, increase activity tolerance, and increase safety awareness. Patient also participated in functional mobility and functional transfer training to increase independence, increase activity tolerance, and increase safety awareness.     TREATMENT GRID:  N/A    AFTER TREATMENT PRECAUTIONS: Call light within reach, Chair, Needs within reach, and RN notified    INTERDISCIPLINARY 
Catie Sorensen, APRN - CNP   500 mcg at 03/23/24 0808    diphenhydrAMINE (BENADRYL) capsule 25 mg  25 mg Oral Q6H PRN Mindy Gomez MD   25 mg at 03/23/24 1009    0.9 % sodium chloride infusion   IntraVENous PRN TemiKhadijah APRN - NP        hydrocortisone 1 % cream   Topical TID PRN Catie Sorensen, APRN - CNP        0.9 % sodium chloride infusion   IntraVENous PRN Breanna Jimenez PA        0.9 % sodium chloride infusion   IntraVENous PRN Isaac Quiroga MD        [Held by provider] amLODIPine (NORVASC) tablet 5 mg  5 mg Oral Daily Pritesh Dior MD   5 mg at 03/18/24 2003    docusate sodium (COLACE) capsule 100 mg  100 mg Oral BID Pritesh Dior MD   100 mg at 03/23/24 0808    famotidine (PEPCID) tablet 40 mg  40 mg Oral QPM Pritesh Dior MD   40 mg at 03/22/24 1706    FLUoxetine (PROZAC) capsule 20 mg  20 mg Oral BID Pritesh Dior MD   20 mg at 03/23/24 0808    HYDROcodone-acetaminophen (NORCO)  MG per tablet 1 tablet  1 tablet Oral Q6H PRN Pritesh Dior MD   1 tablet at 03/23/24 1256    hydrOXYzine HCl (ATARAX) tablet 25 mg  25 mg Oral TID PRN Pritesh Dior MD   25 mg at 03/20/24 2234    levothyroxine (SYNTHROID) tablet 125 mcg  125 mcg Oral QAM AC Pritesh Dior MD   125 mcg at 03/23/24 0808    morphine (MS CONTIN) extended release tablet 15 mg  15 mg Oral BID Pritesh Dior MD   15 mg at 03/23/24 0808    pantoprazole (PROTONIX) tablet 40 mg  40 mg Oral BID Pritesh Dior MD   40 mg at 03/23/24 0808    prochlorperazine (COMPAZINE) tablet 10 mg  10 mg Oral Q6H PRN Pritesh Dior MD        tiZANidine (ZANAFLEX) tablet 4 mg  4 mg Oral Nightly Pritesh Dior MD   4 mg at 03/22/24 2116    sodium chloride flush 0.9 % injection 5-40 mL  5-40 mL IntraVENous 2 times per day Pritesh Dior MD   10 mL at 03/23/24 0809    sodium chloride flush 0.9 % injection 5-40 mL  5-40 mL IntraVENous PRN Pritesh Dior MD        0.9 % sodium chloride infusion   IntraVENous PRN 
I=Modified Independent, S=Supervision/Setup, SBA=Standby Assistance, CGA=Contact Guard Assistance, Min=Minimal Assistance, Mod=Moderate Assistance, Max=Maximal Assistance, Total=Total Assistance, NT=Not Tested    BALANCE: Good Fair+ Fair Fair- Poor NT Comments   Sitting Static [x] [] [] [] [] []    Sitting Dynamic [x] [] [] [] [] []              Standing Static [] [] [] [] [] [x]    Standing Dynamic [] [] [] [] [] [x]        PLAN:     FREQUENCY/DURATION   OT Plan of Care: 1 time/week for duration of hospital stay or until stated goals are met, whichever comes first.    TREATMENT:     TREATMENT:   Therapeutic Exercise (14 Minutes): Therapeutic exercises noted below to improve functional activity tolerance, strength, and mobility.     TREATMENT GRID:   Date:  4/2/24 Date:   Date:     Activity/Exercise Parameters Parameters Parameters   Shoulder Abd/Adduction 10 reps     Shoulder Flexion  10 reps     Elbow Flexion 10 reps     Punches 10 reps                           AFTER TREATMENT PRECAUTIONS: Bed, Bed/Chair Locked, Call light within reach, Needs within reach, and Visitors at bedside    INTERDISCIPLINARY COLLABORATION:  RN/ PCT and OT/ PETTY    EDUCATION:       TOTAL TREATMENT DURATION AND TIME:  Time In: 1330  Time Out: 1344  Minutes: 14    ROBERT Smiley              
Max=Maximal Assistance, Total=Total Assistance, NT=Not Tested    PLAN:   FREQUENCY AND DURATION: 2 times/week for duration of hospital stay or until stated goals are met, whichever comes first.    TREATMENT:   TREATMENT:   Therapeutic Activity (15 Minutes): Therapeutic activity included Scooting, Transfer Training, Ambulation on level ground, Sitting balance , and Standing balance to improve functional Activity tolerance, Balance, Mobility, and Strength.    TREATMENT GRID:  N/A    AFTER TREATMENT PRECAUTIONS: Bed/Chair Locked, Call light within reach, Chair, Needs within reach, and RN at bedside    INTERDISCIPLINARY COLLABORATION:  RN/ PCT    EDUCATION:      TIME IN/OUT:  Time In: 0925  Time Out: 0940  Minutes: 15    Violeta Ford PTA    
Mod=Moderate Assistance, Max=Maximal Assistance, Total=Total Assistance, NT=Not Tested    BALANCE: Good Fair+ Fair Fair- Poor NT Comments   Sitting Static [x] [] [] [] [] []    Sitting Dynamic [x] [] [] [] [] []              Standing Static [] [] [] [] [] []    Standing Dynamic [] [] [] [] [] []        PLAN:     FREQUENCY/DURATION   OT Plan of Care: 3 times/week for duration of hospital stay or until stated goals are met, whichever comes first.    TREATMENT:     TREATMENT:   Self Care (15 minutes): Patient participated in upper body bathing, lower body bathing, upper body dressing, lower body dressing, and grooming ADLs in unsupported sitting with minimal verbal cueing to increase activity tolerance. Patient also participated in bed mobility training to increase activity tolerance.     TREATMENT GRID:  N/A    AFTER TREATMENT PRECAUTIONS: Bed, Bed/Chair Locked, Call light within reach, Needs within reach, RN notified, and Side rails x2    INTERDISCIPLINARY COLLABORATION:  RN/ PCT and PT/ PTA    EDUCATION:  Education Given To: Patient  Education Provided: Role of Therapy;Plan of Care  Education Outcome: Continued education needed    TOTAL TREATMENT DURATION AND TIME:  Time In: 1524  Time Out: 1540  Minutes: 16    CIRILO FISH, OT            
Treatment: 0 Vitals        Oxygen      IV    RESTRICTIONS/PRECAUTIONS:                    GROSS EVALUATION: B LE Intact Impaired (Comments):   AROM [x]     PROM [x]    Strength []  Generalized weakness and fatigue 2/2 low platlets   Balance [] Posture: Good  Sitting - Static: Good  Sitting - Dynamic: Good  Standing - Static: Good, -  Standing - Dynamic: Fair, +   Posture [] N/A   Sensation []     Coordination []      Tone []     Edema []    Activity Tolerance [] Patient limited by fatigue    []      COGNITION/  PERCEPTION: Intact Impaired (Comments):   Orientation [x]     Vision [x]     Hearing [x]     Cognition  [x]       MOBILITY: I Mod I S SBA CGA Min Mod Max Total  NT x2 Comments:   Bed Mobility    Rolling [] [] [x] [] [] [] [] [] [] [] []    Supine to Sit [] [] [x] [] [] [] [] [] [] [] []    Scooting [] [] [x] [] [] [] [] [] [] [] []    Sit to Supine [] [] [] [] [] [] [] [] [] [x] []    Transfers    Sit to Stand [] [] [] [x] [] [] [] [] [] [] []    Bed to Chair [] [] [] [x] [] [] [] [] [] [] []    Stand to Sit [] [] [] [x] [] [] [] [] [] [] []     [] [] [] [] [] [] [] [] [] [] []    I=Independent, Mod I=Modified Independent, S=Supervision, SBA=Standby Assistance, CGA=Contact Guard Assistance,   Min=Minimal Assistance, Mod=Moderate Assistance, Max=Maximal Assistance, Total=Total Assistance, NT=Not Tested    GAIT: I Mod I S SBA CGA Min Mod Max Total  NT x2 Comments:   Level of Assistance [] [] [] [x] [] [] [] [] [] [] []    Distance 10 feet x 3    DME None    Gait Quality Decreased annabel  and Trunk sway increased    Weightbearing Status      Stairs      I=Independent, Mod I=Modified Independent, S=Supervision, SBA=Standby Assistance, CGA=Contact Guard Assistance,   Min=Minimal Assistance, Mod=Moderate Assistance, Max=Maximal Assistance, Total=Total Assistance, NT=Not Tested    PLAN:   FREQUENCY AND DURATION: 2 times/week for duration of hospital stay or until stated goals are met, whichever comes 
[]    Transfers    Sit to Stand [] [] [] [] [x] [] [] [] [] [] []    Bed to Chair [] [] [] [] [x] [] [] [] [] [] []    Stand to Sit [] [] [] [] [x] [] [] [] [] [] []    Tub/Shower [] [] [] [] [] [] [] [] [] [] []     Toilet [] [] [] [] [x] [] [] [] [] [] []      [] [] [] [] [] [] [] [] [] [] []    I=Independent, Mod I=Modified Independent, S=Supervision/Setup, SBA=Standby Assistance, CGA=Contact Guard Assistance, Min=Minimal Assistance, Mod=Moderate Assistance, Max=Maximal Assistance, Total=Total Assistance, NT=Not Tested    ACTIVITIES OF DAILY LIVING: I Mod I S SBA CGA Min Mod Max Total NT Comments   BASIC ADLs:              Upper Body Bathing  [] [] [] [] [] [] [] [] [] []     Lower Body Bathing [] [] [] [] [] [] [] [] [] []     Toileting [] [] [x] [] [] [] [] [] [] []    Upper Body Dressing [] [] [] [] [] [] [] [] [] []    Lower Body Dressing [] [] [] [] [] [] [] [] [] []    Feeding [] [] [] [] [] [] [] [] [] []    Grooming [] [] [] [] [] [] [] [] [] []    Personal Device Care [x] [] [] [] [] [] [] [] [] []    Functional Mobility [] [] [] [] [x] [] [] [] [] []    I=Independent, Mod I=Modified Independent, S=Supervision/Setup, SBA=Standby Assistance, CGA=Contact Guard Assistance, Min=Minimal Assistance, Mod=Moderate Assistance, Max=Maximal Assistance, Total=Total Assistance, NT=Not Tested    PLAN:   FREQUENCY/DURATION   OT Plan of Care: 3 times/week for duration of hospital stay or until stated goals are met, whichever comes first.    PROBLEM LIST:   (Skilled intervention is medically necessary to address:)  Decreased ADL/Functional Activities  Decreased Activity Tolerance  Decreased Balance  Decreased Transfer Abilities   INTERVENTIONS PLANNED:  (Benefits and precautions of occupational therapy have been discussed with the patient.)  Self Care Training  Therapeutic Activity  Therapeutic Exercise/HEP  Neuromuscular Re-education  Manual Therapy  Education         TREATMENT:     EVALUATION: LOW COMPLEXITY: (Untimed 
data recorded.    Physical Exam:  Constitutional: Well developed, well nourished female in no acute distress, sitting comfortably in the hospital bed.    HEENT: Normocephalic and atraumatic. Oropharynx is clear, mucous membranes are moist.  Extraocular muscles are intact.  Sclerae anicteric.  Small bld blister on lower lip    Skin Warm and dry.  No erythema.    Neuro Grossly nonfocal with no obvious sensory or motor deficits.   MSK Normal range of motion in general.  No edema and no tenderness.   Psych Appropriate mood and affect.    CV - RRR, S1S2 nl   Pulm - no abnormal BS; no wheezing  Abd - soft NT, ND     Labs:    Recent Results (from the past 24 hour(s))   PREPARE PLATELETS, 1 Product    Collection Time: 03/29/24  9:30 AM   Result Value Ref Range    Blood Bank Comment HLA MATCHED BY THE BLOOD CONNECTION     Unit Number [id number]     Product Code Blood Bank [facility],LR[facility]     Unit Divison 00     Dispense Status Blood Bank ALLOCATED    Platelet Count    Collection Time: 03/29/24 12:30 PM   Result Value Ref Range    Platelets 48 (L) 150 - 450 K/uL   CBC with Auto Differential    Collection Time: 03/30/24  3:50 AM   Result Value Ref Range    WBC 0.9 (LL) 4.3 - 11.1 K/uL    RBC 2.65 (L) 4.05 - 5.2 M/uL    Hemoglobin 7.7 (L) 11.7 - 15.4 g/dL    Hematocrit 22.2 (L) 35.8 - 46.3 %    MCV 83.8 82 - 102 FL    MCH 29.1 26.1 - 32.9 PG    MCHC 34.7 31.4 - 35.0 g/dL    RDW 14.4 11.9 - 14.6 %    Platelets 27 (LL) 150 - 450 K/uL    MPV 10.9 9.4 - 12.3 FL    nRBC 0.00 0.0 - 0.2 K/uL    Neutrophils % 35 (L) 43 - 78 %    Lymphocytes % 50 (H) 13 - 44 %    Monocytes % 10 4.0 - 12.0 %    Eosinophils % 0 (L) 0.5 - 7.8 %    Basophils % 0 0.0 - 2.0 %    Immature Granulocytes 5 0.0 - 5.0 %    Neutrophils Absolute 0.3 (L) 1.7 - 8.2 K/UL    Lymphocytes Absolute 0.5 0.5 - 4.6 K/UL    Monocytes Absolute 0.1 0.1 - 1.3 K/UL    Eosinophils Absolute 0.0 0.0 - 0.8 K/UL    Basophils Absolute 0.0 0.0 - 0.2 K/UL    Absolute Immature Granulocyte 0.0 
no wheezing  Abd - soft NT, ND     Labs:    Recent Results (from the past 24 hour(s))   Platelet Count    Collection Time: 03/24/24  2:34 PM   Result Value Ref Range    Platelets 3 (LL) 150 - 450 K/uL   CBC with Auto Differential    Collection Time: 03/25/24  3:44 AM   Result Value Ref Range    WBC 1.2 (LL) 4.3 - 11.1 K/uL    RBC 2.56 (L) 4.05 - 5.2 M/uL    Hemoglobin 7.4 (L) 11.7 - 15.4 g/dL    Hematocrit 21.9 (L) 35.8 - 46.3 %    MCV 85.5 82 - 102 FL    MCH 28.9 26.1 - 32.9 PG    MCHC 33.8 31.4 - 35.0 g/dL    RDW 14.1 11.9 - 14.6 %    Platelets 2 (LL) 150 - 450 K/uL    MPV Unable to calculate. Recommend adding IPF. 9.4 - 12.3 FL    nRBC 0.00 0.0 - 0.2 K/uL    Differential Type PENDING    Comprehensive Metabolic Panel    Collection Time: 03/25/24  3:44 AM   Result Value Ref Range    Sodium 136 136 - 146 mmol/L    Potassium 3.7 3.5 - 5.1 mmol/L    Chloride 103 103 - 113 mmol/L    CO2 30 21 - 32 mmol/L    Anion Gap 3 2 - 11 mmol/L    Glucose 98 65 - 100 mg/dL    BUN 16 8 - 23 MG/DL    Creatinine 0.60 0.6 - 1.0 MG/DL    Est, Glom Filt Rate >60 >60 ml/min/1.73m2    Calcium 9.5 8.3 - 10.4 MG/DL    Total Bilirubin 0.6 0.2 - 1.1 MG/DL    ALT 30 12 - 65 U/L    AST 34 15 - 37 U/L    Alk Phosphatase 110 50 - 136 U/L    Total Protein 7.2 6.3 - 8.2 g/dL    Albumin 3.5 3.2 - 4.6 g/dL    Globulin 3.7 2.8 - 4.5 g/dL    Albumin/Globulin Ratio 0.9 0.4 - 1.6     Magnesium    Collection Time: 03/25/24  3:44 AM   Result Value Ref Range    Magnesium 1.8 1.8 - 2.4 mg/dL     Imaging:  XR CHEST PORTABLE 03/19/2024    Narrative  Exam:  XR CHEST PORTABLE  3/19/2024 8:17 AM    HISTORY: 73 years old Female with a history of Anemia, unspecified / Reason for  exam:->baseline CXR, presented pancytopenic. r/o infection    TECHNIQUE: Single frontal view of the chest.    COMPARISON: 12/6/2023    FINDINGS:    Cardiac silhouette is enlarged. Pulmonary vascular congestion again noted. There  is no airspace consolidation, pleural effusion or 
LEFT  ARM        Culture NO GROWTH AFTER 8 HOURS     Lactate, Sepsis    Collection Time: 03/18/24 10:18 PM   Result Value Ref Range    Lactic Acid, Sepsis 1.3 0.4 - 2.0 MMOL/L   CBC with Auto Differential    Collection Time: 03/19/24  8:27 AM   Result Value Ref Range    WBC 1.3 (LL) 4.3 - 11.1 K/uL    RBC 2.52 (L) 4.05 - 5.2 M/uL    Hemoglobin 7.7 (L) 11.7 - 15.4 g/dL    Hematocrit 22.0 (L) 35.8 - 46.3 %    MCV 87.3 82 - 102 FL    MCH 30.6 26.1 - 32.9 PG    MCHC 35.0 31.4 - 35.0 g/dL    RDW 14.3 11.9 - 14.6 %    Platelets 2 (LL) 150 - 450 K/uL    MPV Unable to calculate. Recommend adding IPF. 9.4 - 12.3 FL    nRBC 0.00 0.0 - 0.2 K/uL    Neutrophils % 27 (L) 43 - 78 %    Lymphocytes % 64 (H) 13 - 44 %    Monocytes % 7 4.0 - 12.0 %    Eosinophils % 0 (L) 0.5 - 7.8 %    Basophils % 0 0.0 - 2.0 %    Immature Granulocytes 2 0.0 - 5.0 %    Neutrophils Absolute 0.4 (L) 1.7 - 8.2 K/UL    Lymphocytes Absolute 0.8 0.5 - 4.6 K/UL    Monocytes Absolute 0.1 0.1 - 1.3 K/UL    Eosinophils Absolute 0.0 0.0 - 0.8 K/UL    Basophils Absolute 0.0 0.0 - 0.2 K/UL    Absolute Immature Granulocyte 0.0 0.0 - 0.5 K/UL    RBC Comment SLIGHT  ANISOCYTOSIS + POIKILOCYTOSIS        RBC Comment HYPOCHROMIA      WBC Comment Result Confirmed By Smear      Platelet Comment MARKED      Differential Type AUTOMATED     Comprehensive Metabolic Panel w/ Reflex to MG    Collection Time: 03/19/24  8:27 AM   Result Value Ref Range    Sodium 140 136 - 146 mmol/L    Potassium 4.0 3.5 - 5.1 mmol/L    Chloride 108 103 - 113 mmol/L    CO2 26 21 - 32 mmol/L    Anion Gap 6 2 - 11 mmol/L    Glucose 101 (H) 65 - 100 mg/dL    BUN 12 8 - 23 MG/DL    Creatinine 0.70 0.6 - 1.0 MG/DL    Est, Glom Filt Rate >60 >60 ml/min/1.73m2    Calcium 9.0 8.3 - 10.4 MG/DL    Total Bilirubin 1.0 0.2 - 1.1 MG/DL    ALT 21 12 - 65 U/L    AST 40 (H) 15 - 37 U/L    Alk Phosphatase 92 50 - 136 U/L    Total Protein 6.6 6.3 - 8.2 g/dL    Albumin 3.0 (L) 3.2 - 4.6 g/dL    Globulin 3.6 2.8 - 
SLIGHT  TEARDROP CELLS        RBC Comment SLIGHT  OVALOCYTES        RBC Comment OCCASIONAL  SCHISTOCYTES        WBC Comment WBC TOO FEW TO DIFFERENTIATE      Platelet Comment MARKED      Differential Type AUTOMATED     Comprehensive Metabolic Panel w/ Reflex to MG    Collection Time: 03/20/24  2:16 AM   Result Value Ref Range    Sodium 137 136 - 146 mmol/L    Potassium 3.6 3.5 - 5.1 mmol/L    Chloride 103 103 - 113 mmol/L    CO2 29 21 - 32 mmol/L    Anion Gap 5 2 - 11 mmol/L    Glucose 121 (H) 65 - 100 mg/dL    BUN 12 8 - 23 MG/DL    Creatinine 0.70 0.6 - 1.0 MG/DL    Est, Glom Filt Rate >60 >60 ml/min/1.73m2    Calcium 9.3 8.3 - 10.4 MG/DL    Total Bilirubin 0.7 0.2 - 1.1 MG/DL    ALT 23 12 - 65 U/L    AST 39 (H) 15 - 37 U/L    Alk Phosphatase 98 50 - 136 U/L    Total Protein 6.8 6.3 - 8.2 g/dL    Albumin 3.2 3.2 - 4.6 g/dL    Globulin 3.6 2.8 - 4.5 g/dL    Albumin/Globulin Ratio 0.9 0.4 - 1.6     Magnesium    Collection Time: 03/20/24  2:16 AM   Result Value Ref Range    Magnesium 1.8 1.8 - 2.4 mg/dL       Imaging:    Xray Result (most recent):  XR CHEST PORTABLE 03/19/2024    Narrative  Exam:  XR CHEST PORTABLE  3/19/2024 8:17 AM    HISTORY: 73 years old Female with a history of Anemia, unspecified / Reason for  exam:->baseline CXR, presented pancytopenic. r/o infection    TECHNIQUE: Single frontal view of the chest.    COMPARISON: 12/6/2023    FINDINGS:    Cardiac silhouette is enlarged. Pulmonary vascular congestion again noted. There  is no airspace consolidation, pleural effusion or pneumothorax. Right-sided PICC  line has been removed since prior study. There is a new right IJ CVC with distal  tip at the superior atriocaval junction. There is degenerative change in the  thoracic spine.    Impression  Enlarged cardiac silhouette and pulmonary vascular congestion again noted.  Recommend follow-up chest radiograph to document resolution      ASSESSMENT:      ICD-10-CM    1. Acute anemia  D64.9       2. 
mmol/L    Potassium 3.7 3.5 - 5.1 mmol/L    Chloride 104 103 - 113 mmol/L    CO2 31 21 - 32 mmol/L    Anion Gap 5 2 - 11 mmol/L    Glucose 100 65 - 100 mg/dL    BUN 15 8 - 23 MG/DL    Creatinine 0.70 0.6 - 1.0 MG/DL    Est, Glom Filt Rate >60 >60 ml/min/1.73m2    Calcium 9.7 8.3 - 10.4 MG/DL    Total Bilirubin 0.5 0.2 - 1.1 MG/DL    ALT 25 12 - 65 U/L    AST 36 15 - 37 U/L    Alk Phosphatase 95 50 - 136 U/L    Total Protein 7.3 6.3 - 8.2 g/dL    Albumin 3.5 3.2 - 4.6 g/dL    Globulin 3.8 2.8 - 4.5 g/dL    Albumin/Globulin Ratio 0.9 0.4 - 1.6     Magnesium    Collection Time: 03/22/24  3:12 AM   Result Value Ref Range    Magnesium 1.9 1.8 - 2.4 mg/dL   PREPARE RBC (CROSSMATCH), 1 Units    Collection Time: 03/22/24  4:30 AM   Result Value Ref Range    History Check Historical check performed        Imaging:    Xray Result (most recent):  XR CHEST PORTABLE 03/19/2024    Narrative  Exam:  XR CHEST PORTABLE  3/19/2024 8:17 AM    HISTORY: 73 years old Female with a history of Anemia, unspecified / Reason for  exam:->baseline CXR, presented pancytopenic. r/o infection    TECHNIQUE: Single frontal view of the chest.    COMPARISON: 12/6/2023    FINDINGS:    Cardiac silhouette is enlarged. Pulmonary vascular congestion again noted. There  is no airspace consolidation, pleural effusion or pneumothorax. Right-sided PICC  line has been removed since prior study. There is a new right IJ CVC with distal  tip at the superior atriocaval junction. There is degenerative change in the  thoracic spine.    Impression  Enlarged cardiac silhouette and pulmonary vascular congestion again noted.  Recommend follow-up chest radiograph to document resolution      ASSESSMENT:      ICD-10-CM    1. Acute anemia  D64.9       2. Thrombocytopenia (HCC)  D69.6         Ms. Gomes is a 73 y.o. female admitted on 3/18/2024. The primary encounter diagnosis was Acute anemia. A diagnosis of Thrombocytopenia (HCC) was also pertinent to this visit.    Ms Gomes 
follow-up chest radiograph to document resolution      ASSESSMENT:      ICD-10-CM    1. Acute anemia  D64.9       2. Thrombocytopenia (HCC)  D69.6         Ms. Gomes is a 73 y.o. female admitted on 3/18/2024. The primary encounter diagnosis was Acute anemia. A diagnosis of Thrombocytopenia (HCC) was also pertinent to this visit.    Ms Gomes has PMH of psoriatic arthritis on Humira, HTN, HLD, GERD and hypothyroidism and chronic pain on chronic opioids. 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 She proceeded with Revlimid in 10/2023 but was noted to have POD in 11/2023 with BMBx showed AEL and 60% blasts. She completed 10 days of dacogen and Mylotarg and achieved CR-1. She is followed by Dr Perez and is currently undergoing Vidaza and Venetoclax. She completed cycle 2 day 1-5 2/5-2/9 and is on continuous Venetoclax 100mg.     Ms Gomes presented to infusion center on day of admission for routine labs/replacements. Hgb 6.6 and plts 2k. She reported being weak and noted some gingival bleeding. Unable to transfuse at cancer center in timely manner due to special attributes/cross-matching. She was admitted for further management. CBC today with WBC 1.3, . Hgb up to 7.7 after 1 U PRBCs. Plts up to 7k after transfusion, back down to 2k this AM. She was somewhat hypotensive overnight, improved this morning. LA and procal WNL. CXR without infection. BC pending. Overall, she is feeling ok after transfusion. Denies further bleeding or evidence of blood in stools or urine. Oncology asked to assume care.     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 She proceeded with Revlimid in 10/2023 but was noted to have POD in 11/2023 with BMBx showed AEL and 60% blasts. She completed 10 days of dacogen and Mylotarg and achieved CR-1. She is 
post-transfusion platelet count  3/20 Doesn't appear to be responding to plt transfusion. Hgb down to 7.2 today.  Continue 1hr post-transfusion plt checks. IPF WNL. No evidence of hemolysis to date, smear and hapto pending. Check DIC labs as well as nutritional labs and peripheral flow.   3/21 Smear without schistocytes, did note immature cells suspicious for blasts. Peripheral flow pending. No DIC noted on labs. Continue HLA/cross-matched plt transfusions for plts <10k with 1 hr post-transfusion plt count. Start B12 supplement.  3/22 Peripheral flow pending. S/p 3 U plts and plt count appears to be responding more to transfusion, up to 8k this AM. Hgb down to 6.6, PRBCs today.  3/23 flow neg; will need plt transfusion; Hb up to 7.8 after PRBC yesterday, no bleeding reported   3/24 plts transfusion; Hb stable 7.7   3/25 Plts remain 2k. Transfusing. Hgb stable. Asking IR for BMBx. Remains refractory to plt transfusion, including HLA- and cross-matched products.   3/26 Last 6U of plts have been HLA-matched, since 3/23 and still no response. Coags unremarkable. BMBx today per IR. Start pulse dex and IVIG.  3/28 Plts remain refractory to transfusion despite HLA-matching when available. Did have slight bump up to 6k 1-hr post transfusion yesterday. BMBx completed 3/26, path pending. Day #3 of pulse dex/IVIG for empiric ITP treatment.  3/29 Remains pancytopenic and refractory to platelet transfusions. Day 4 pulse dex/IVIG - no response, plts remain 2k. Awaiting BMBx results.   3/30 Completed Dex/IVIG x 4 yesterday.  Plt up to 48k last PM, down to 27k this AM.  BMBx results pending.  4/1 Plt up to 32k after transfusion.  Awaiting BMBx results.    Hypotension / asymptomatic bradycardia  - Hold amlodipine  - s/p IVF with improvement  - LA and procal WNL, no indication of sepsis  3/20 Still intermittently hypotensive. Continue to hold amlodipine.  3/22 Still holding amlodipine, BP trending up - likely restart tomorrow.  3/28 
stable 7.7   3/25 Plts remain 2k. Transfusing. Hgb stable. Asking IR for BMBx. Remains refractory to plt transfusion, including HLA- and cross-matched products.   3/26 Last 6U of plts have been HLA-matched, since 3/23 and still no response. Coags unremarkable. BMBx today per IR. Start pulse dex and IVIG.  3/28 Plts remain refractory to transfusion despite HLA-matching when available. Did have slight bump up to 6k 1-hr post transfusion yesterday. BMBx completed 3/26, path pending. Day #3 of pulse dex/IVIG for empiric ITP treatment.  3/29 Remains pancytopenic and refractory to platelet transfusions. Day 4 pulse dex/IVIG - no response, plts remain 2k. Awaiting BMBx results.     Hypotension / asymptomatic bradycardia  - Hold amlodipine  - s/p IVF with improvement  - LA and procal WNL, no indication of sepsis  3/20 Still intermittently hypotensive. Continue to hold amlodipine.  3/22 Still holding amlodipine, BP trending up - likely restart tomorrow.  3/28 BP stable - resume amlodipine. HR 44 this AM, sinus bradycardia.  3/29 Hypotension resolved, amlodipine restarted yesterday. HR remains in the 40s, asymptomatic. EKG yesterday with sinus soraida - ?secondary to IVIG, scheduled to complete today. Check TSH. Consult cardiology.    Chronic pain   - Continue MS contin    History of R axillary vein thrombus / line-associated  - Dx 12/27, previously on Eliquis but now off. AC contraindicated with thrombocytopenia.    Continue home meds  Rigo SOPs  VTE prophylaxis - AC contraindicated due to thrombocytopenia  Diet - Regular  PT/OT - Deferred  Code - Full  Acyclovir, diflucan prophylaxis    Dispo - too soon to determine.  Await plts recovery     Goals and plan of care reviewed with the patient.  All questions answered to the best of our ability.  Lab studies and imaging studies were personally reviewed.  Thank you for allowing us to participate in the care of Ms. Gomes.          Catie Sorensen, APRN - [facility] 
crossmatched/HLA matched platelets.  S/p bm biopsy, await results. Transfusion support as needed.  Started empirically treat for ITP w IVIG and pulse dex, plts faaling, repeat plt transfusion today. Supportive care as above.                   Chris Vora MD  [facility] Hematology/Oncology  [address] [place] [address]  Office : [phone]  Fax : [phone]

## 2024-04-02 NOTE — PLAN OF CARE
Problem: Discharge Planning  Goal: Discharge to home or other facility with appropriate resources  3/19/2024 0352 by Rush Isaacs RN  Outcome: Progressing  3/19/2024 0041 by Rush Isaacs RN  Outcome: Progressing  3/19/2024 0041 by Rush Isaacs RN  Outcome: Progressing  Flowsheets  Taken 3/18/2024 1940 by Rush Isaacs RN  Discharge to home or other facility with appropriate resources:   Identify barriers to discharge with patient and caregiver   Identify discharge learning needs (meds, wound care, etc)  Taken 3/18/2024 1824 by Liliana Suarez RN  Discharge to home or other facility with appropriate resources:   Identify barriers to discharge with patient and caregiver   Arrange for needed discharge resources and transportation as appropriate   Identify discharge learning needs (meds, wound care, etc)     Problem: Safety - Adult  Goal: Free from fall injury  3/19/2024 0352 by Rush Isaacs RN  Outcome: Progressing  3/19/2024 0041 by Rush Isaacs RN  Outcome: Progressing  3/19/2024 0041 by Rush Isaacs RN  Outcome: Progressing     
  Problem: Discharge Planning  Goal: Discharge to home or other facility with appropriate resources  3/19/2024 0900 by Gi Abdullahi RN  Outcome: Progressing  3/19/2024 0352 by Rush Isaacs RN  Outcome: Progressing  3/19/2024 0041 by Rush Isaacs RN  Outcome: Progressing  3/19/2024 0041 by Rush Isaacs RN  Outcome: Progressing  Flowsheets  Taken 3/18/2024 1940 by Rush Isaacs RN  Discharge to home or other facility with appropriate resources:   Identify barriers to discharge with patient and caregiver   Identify discharge learning needs (meds, wound care, etc)  Taken 3/18/2024 1824 by Liliana Suarez RN  Discharge to home or other facility with appropriate resources:   Identify barriers to discharge with patient and caregiver   Arrange for needed discharge resources and transportation as appropriate   Identify discharge learning needs (meds, wound care, etc)     Problem: Safety - Adult  Goal: Free from fall injury  3/19/2024 0900 by Gi Abdullahi RN  Outcome: Progressing  3/19/2024 0352 by Rush Isaacs RN  Outcome: Progressing  3/19/2024 0041 by Rush Isaacs RN  Outcome: Progressing  3/19/2024 0041 by Rush Isaacs RN  Outcome: Progressing     Problem: ABCDS Injury Assessment  Goal: Absence of physical injury  Outcome: Progressing     
  Problem: Discharge Planning  Goal: Discharge to home or other facility with appropriate resources  3/29/2024 2151 by Mariana Wood RN  Outcome: Progressing  Flowsheets (Taken 3/29/2024 2136)  Discharge to home or other facility with appropriate resources: Identify barriers to discharge with patient and caregiver  3/29/2024 1146 by Savannah Acevedo RN  Outcome: Progressing     Problem: Safety - Adult  Goal: Free from fall injury  3/29/2024 2151 by Mariana Wood RN  Outcome: Progressing  Flowsheets (Taken 3/29/2024 2146)  Free From Fall Injury: Instruct family/caregiver on patient safety  3/29/2024 1146 by Savannah Acevedo RN  Outcome: Progressing     Problem: ABCDS Injury Assessment  Goal: Absence of physical injury  3/29/2024 2151 by Mariana Wood RN  Outcome: Progressing  Flowsheets (Taken 3/29/2024 2146)  Absence of Physical Injury: Implement safety measures based on patient assessment  3/29/2024 1146 by Savannah Acevedo RN  Outcome: Progressing     Problem: Pain  Goal: Verbalizes/displays adequate comfort level or baseline comfort level  3/29/2024 2151 by Mariana Wood RN  Outcome: Progressing  3/29/2024 1146 by Savannah Acevedo RN  Outcome: Progressing     
  Problem: Discharge Planning  Goal: Discharge to home or other facility with appropriate resources  4/1/2024 2015 by Mariana Wood RN  Outcome: Progressing  Flowsheets (Taken 4/1/2024 1953)  Discharge to home or other facility with appropriate resources: Identify barriers to discharge with patient and caregiver  4/1/2024 1132 by Savannah Acevedo RN  Outcome: Progressing     Problem: Safety - Adult  Goal: Free from fall injury  4/1/2024 2015 by Mariana Wood RN  Outcome: Progressing  Flowsheets (Taken 4/1/2024 1953)  Free From Fall Injury: Instruct family/caregiver on patient safety  4/1/2024 1132 by Savannah Acevedo RN  Outcome: Progressing  Flowsheets (Taken 4/1/2024 0245 by Dilcia Huggins RN)  Free From Fall Injury: Instruct family/caregiver on patient safety     Problem: ABCDS Injury Assessment  Goal: Absence of physical injury  4/1/2024 2015 by Mariana Wood RN  Outcome: Progressing  Flowsheets  Taken 4/1/2024 1953 by Mariana Wood RN  Absence of Physical Injury: Implement safety measures based on patient assessment  Taken 4/1/2024 1400 by Savannah Acevedo RN  Absence of Physical Injury: Implement safety measures based on patient assessment  4/1/2024 1132 by Savannah Acevedo RN  Outcome: Progressing  Flowsheets  Taken 4/1/2024 0815 by Savannah Acevedo RN  Absence of Physical Injury: Implement safety measures based on patient assessment  Taken 4/1/2024 0245 by Dilcia Huggins RN  Absence of Physical Injury: Implement safety measures based on patient assessment     Problem: Pain  Goal: Verbalizes/displays adequate comfort level or baseline comfort level  4/1/2024 2015 by Mariana Wood RN  Outcome: Progressing  4/1/2024 1132 by Savannah Acevedo RN  Outcome: Progressing     
  Problem: Discharge Planning  Goal: Discharge to home or other facility with appropriate resources  Outcome: Progressing     Problem: Safety - Adult  Goal: Free from fall injury  Outcome: Progressing     
  Problem: Discharge Planning  Goal: Discharge to home or other facility with appropriate resources  Outcome: Progressing     Problem: Safety - Adult  Goal: Free from fall injury  Outcome: Progressing     Problem: ABCDS Injury Assessment  Goal: Absence of physical injury  Outcome: Progressing     Problem: Pain  Goal: Verbalizes/displays adequate comfort level or baseline comfort level  Outcome: Progressing     
  Problem: Discharge Planning  Goal: Discharge to home or other facility with appropriate resources  Outcome: Progressing     Problem: Safety - Adult  Goal: Free from fall injury  Outcome: Progressing  Flowsheets (Taken 3/27/2024 1525 by Khadijah Buitrago, RN)  Free From Fall Injury: Instruct family/caregiver on patient safety     Problem: ABCDS Injury Assessment  Goal: Absence of physical injury  Outcome: Progressing     Problem: Pain  Goal: Verbalizes/displays adequate comfort level or baseline comfort level  Outcome: Progressing     
  Problem: Discharge Planning  Goal: Discharge to home or other facility with appropriate resources  Outcome: Progressing  Flowsheets (Taken 3/23/2024 2045)  Discharge to home or other facility with appropriate resources: Identify barriers to discharge with patient and caregiver     Problem: Safety - Adult  Goal: Free from fall injury  Outcome: Progressing     Problem: ABCDS Injury Assessment  Goal: Absence of physical injury  Outcome: Progressing     Problem: Pain  Goal: Verbalizes/displays adequate comfort level or baseline comfort level  Outcome: Progressing     
  Problem: Discharge Planning  Goal: Discharge to home or other facility with appropriate resources  Outcome: Progressing  Flowsheets (Taken 3/28/2024 1943)  Discharge to home or other facility with appropriate resources: Identify barriers to discharge with patient and caregiver     Problem: Safety - Adult  Goal: Free from fall injury  3/28/2024 2040 by Mariana Wood, RN  Outcome: Progressing  Flowsheets (Taken 3/28/2024 1943)  Free From Fall Injury: Instruct family/caregiver on patient safety  3/28/2024 1612 by Daisy Keating RN  Outcome: Progressing     Problem: ABCDS Injury Assessment  Goal: Absence of physical injury  Outcome: Progressing  Flowsheets (Taken 3/28/2024 1943)  Absence of Physical Injury: Implement safety measures based on patient assessment     Problem: Pain  Goal: Verbalizes/displays adequate comfort level or baseline comfort level  Outcome: Progressing  Flowsheets (Taken 3/28/2024 1943)  Verbalizes/displays adequate comfort level or baseline comfort level: Encourage patient to monitor pain and request assistance     
END OF SHIFT SUMMARY:    Significant vitals this shift:  BP mildly elevated. Has remained afebrile   Significant labs this shift:  plts 3 after 2nd plt transfusion  Tests performed this shift:  BMBx right hip/lower back  Orders to be followed up on:  plt count and transfuion  Blood products given this shift:  finished 2/2 plt transfusion this AM. 1 more unit ordered for tonight  Additional events this shift:  started immunoglobin this afternoon. Given  tylenol, benadryl and decadron prior to starting infusion.      Unmeasured Occurrences this Shift:  Urine 4, BM 0, Emesis 0    Bedside shift report given to CHINO Galvez RN      Problem: Discharge Planning  Goal: Discharge to home or other facility with appropriate resources  Outcome: Progressing  Flowsheets (Taken 3/26/2024 0755)  Discharge to home or other facility with appropriate resources:   Identify barriers to discharge with patient and caregiver   Arrange for needed discharge resources and transportation as appropriate   Identify discharge learning needs (meds, wound care, etc)     Problem: Safety - Adult  Goal: Free from fall injury  Outcome: Progressing     Problem: ABCDS Injury Assessment  Goal: Absence of physical injury  Outcome: Progressing  Flowsheets (Taken 3/26/2024 0800)  Absence of Physical Injury: Implement safety measures based on patient assessment     Problem: Pain  Goal: Verbalizes/displays adequate comfort level or baseline comfort level  Outcome: Progressing  Flowsheets (Taken 3/26/2024 0311 by Leonor George, RN)  Verbalizes/displays adequate comfort level or baseline comfort level:   Encourage patient to monitor pain and request assistance   Assess pain using appropriate pain scale   Administer analgesics based on type and severity of pain and evaluate response   Implement non-pharmacological measures as appropriate and evaluate response   Consider cultural and social influences on pain and pain management     
END OF SHIFT SUMMARY:    Significant vitals this shift:  HR still in the mid 40s to 50s. Cardiology consult, placed on remote tele running sinus soraida.  Significant labs this shift:  plts 3 this AM. 48 on recheck!  Tests performed this shift:  NA  Orders to be followed up on:    Blood products given this shift:  1 unit plts this AM, pre meds given. Tolerated well  Additional events this shift:   -Day 4 of Immunoglobulin  -norco x1    I/Os:  +/- this shift:   03/29 0701 - 03/29 1900  In: 559.2 [P.O.:240]  Out: -   Unmeasured Occurrences this Shift:  Urine x3, BM 0, Emesis 0      Bedside shift report given to Aparna NELSON RN      Problem: Discharge Planning  Goal: Discharge to home or other facility with appropriate resources  Outcome: Progressing     Problem: Safety - Adult  Goal: Free from fall injury  Outcome: Progressing     Problem: ABCDS Injury Assessment  Goal: Absence of physical injury  Outcome: Progressing     Problem: Pain  Goal: Verbalizes/displays adequate comfort level or baseline comfort level  Outcome: Progressing     
Patient has remained A&O x 4.   -c/o anxiety- atarax x1  -ambulated in the [place] and went outside with physical therapy  -plts 32 and hgb 7.8- no replacements needed  -norco x2 for c/o knee pain  -port dressing and needles changed due to loss of integrity after shower.    Patient rounded on hourly by myself or patient assistant and encouraged to call with any needs. No signs of distress noted. Bed low, locked, call bell within reach.   BSSR given to CHINO Braun RN    Problem: Discharge Planning  Goal: Discharge to home or other facility with appropriate resources  Outcome: Progressing     Problem: Safety - Adult  Goal: Free from fall injury  Outcome: Progressing  Flowsheets (Taken 4/1/2024 0245 by Dilcia Huggins RN)  Free From Fall Injury: Instruct family/caregiver on patient safety     Problem: ABCDS Injury Assessment  Goal: Absence of physical injury  Outcome: Progressing  Flowsheets  Taken 4/1/2024 0815 by Savannah Acevedo RN  Absence of Physical Injury: Implement safety measures based on patient assessment  Taken 4/1/2024 0245 by Dilcia Huggins RN  Absence of Physical Injury: Implement safety measures based on patient assessment     Problem: Pain  Goal: Verbalizes/displays adequate comfort level or baseline comfort level  Outcome: Progressing     
Patient has remained A&O x 4. She has been up ambulating in [place]. VS stable. NSR on remote tele.   -norco x 1 for c/o lower back pain.  -plts 18 this AM- 1 unit plts prior to discharge.  Tolerated well.  No signs of reaction noted.    Patient rounded on hourly by myself or patient assistant and encouraged to call with any needs. No signs of distress noted. Bed low, locked, call bell within reach.     Savannah Acevedo RN    Problem: Discharge Planning  Goal: Discharge to home or other facility with appropriate resources  Outcome: Adequate for Discharge     Problem: Safety - Adult  Goal: Free from fall injury  Outcome: Adequate for Discharge     Problem: ABCDS Injury Assessment  Goal: Absence of physical injury  Outcome: Adequate for Discharge  Flowsheets (Taken 4/2/2024 0800)  Absence of Physical Injury: Implement safety measures based on patient assessment     Problem: Pain  Goal: Verbalizes/displays adequate comfort level or baseline comfort level  Outcome: Adequate for Discharge     
2211 by Leonor George, RN  Outcome: Progressing     Problem: Pain  Goal: Verbalizes/displays adequate comfort level or baseline comfort level  3/25/2024 1032 by Savannah Acevedo RN  Outcome: Progressing  Flowsheets (Taken 3/25/2024 0344 by Leonor George, RN)  Verbalizes/displays adequate comfort level or baseline comfort level:   Encourage patient to monitor pain and request assistance   Assess pain using appropriate pain scale   Administer analgesics based on type and severity of pain and evaluate response   Implement non-pharmacological measures as appropriate and evaluate response   Consider cultural and social influences on pain and pain management  3/24/2024 2211 by Leonor George, RN  Outcome: Progressing  Flowsheets (Taken 3/24/2024 2015)  Verbalizes/displays adequate comfort level or baseline comfort level:   Encourage patient to monitor pain and request assistance   Assess pain using appropriate pain scale   Administer analgesics based on type and severity of pain and evaluate response   Implement non-pharmacological measures as appropriate and evaluate response   Consider cultural and social influences on pain and pain management

## 2024-04-02 NOTE — DISCHARGE SUMMARY
known as: NORVASC  TAKE 1 TABLET BY MOUTH EVERY DAY     docusate 100 MG Caps  Commonly known as: COLACE, DULCOLAX     famotidine 40 MG tablet  Commonly known as: PEPCID  Take 1 tablet by mouth every evening     FLUoxetine 20 MG capsule  Commonly known as: PROZAC  TAKE 1 CAPSULE BY MOUTH TWICE A DAY     HYDROcodone-acetaminophen  MG per tablet  Commonly known as: NORCO     hydrOXYzine HCl 25 MG tablet  Commonly known as: ATARAX  TAKE 1 TABLET BY MOUTH IN THE MORNING AND IN THE EVENING     levothyroxine 125 MCG tablet  Commonly known as: SYNTHROID  TAKE 1 TABLET BY MOUTH EVERY DAY BEFORE BREAKFAST     lidocaine-prilocaine 2.5-2.5 % cream  Commonly known as: EMLA  Apply topically as needed. Place small amount to port site 45 minutes prior to any blood draws/infusions. Cover with saran wrap     Magic Mouthwash  Commonly known as: Miracle Mouthwash  Swish and spit 5 mLs 4 times daily as needed for Irritation     montelukast 10 MG tablet  Commonly known as: SINGULAIR  Take 1 tablet by mouth daily     morphine 15 MG extended release tablet  Commonly known as: MS CONTIN     ondansetron 4 MG disintegrating tablet  Commonly known as: ZOFRAN-ODT  Take 1 tablet by mouth 3 times daily as needed for Nausea or Vomiting     pantoprazole 40 MG tablet  Commonly known as: PROTONIX  TAKE 1 TABLET BY MOUTH TWICE A DAY     potassium chloride 20 MEQ extended release tablet  Commonly known as: KLOR-CON M  Take 1 tablet by mouth daily     prochlorperazine 10 MG tablet  Commonly known as: COMPAZINE  Take 1 tablet by mouth every 6 hours as needed (nausea)     tiZANidine 4 MG tablet  Commonly known as: ZANAFLEX  Take 1 pill at bedtime as needed.            STOP taking these medications      allopurinol 300 MG tablet  Commonly known as: ZYLOPRIM     apixaban 5 MG Tabs tablet  Commonly known as: Eliquis     aspirin 81 MG EC tablet     diclofenac sodium 1 % Gel  Commonly known as: VOLTAREN     nystatin 027032 UNIT/GM powder  Commonly known

## 2024-04-02 NOTE — CARE COORDINATION
Chart reviewed by  for potential transition of care (ARIANNA) needs or concerns.  Pt is insured with pharmacy benefits and is established with a PCP and outpatient oncology.  OT eval complete with no further therapy or DME needed at dc.  No HH RN needs.  CM anticipates pt will dc home with no transition of care needs.  CM will continue to monitor.  Please notify/consult  if ARIANNA needs arise.       03/19/24 5664   Service Assessment   Patient Orientation Alert and Oriented   Cognition Alert   History Provided By Medical Record   Primary Caregiver Self   Support Systems Spouse/Significant Other;Children;Family Members   Patient's Healthcare Decision Maker is: Legal Next of Kin   PCP Verified by CM Yes   Last Visit to PCP Within last 3 months  (2/2/2024)   Prior Functional Level Independent in ADLs/IADLs   Current Functional Level Independent in ADLs/IADLs   Can patient return to prior living arrangement Yes   Ability to make needs known: Good   Family able to assist with home care needs: Yes   Would you like for me to discuss the discharge plan with any other family members/significant others, and if so, who? No   Financial Resources Medicare   Social/Functional History   Lives With Spouse   Type of Home Mobile home   Home Access Ramped entrance   Home Equipment Cane   ADL Assistance Independent   Homemaking Assistance Independent   Ambulation Assistance Independent   Transfer Assistance Independent   Active   --    Occupation Retired   Discharge Planning   Type of Residence Trailer/Mobile Home   Living Arrangements Spouse/Significant Other   Current Services Prior To Admission Durable Medical Equipment   Current DME Prior to Arrival Cane   Potential Assistance Needed N/A   DME Ordered? No   Potential Assistance Purchasing Medications No   Type of Home Care Services None   Patient expects to be discharged to: Trailer/mobile home   History of falls? 0   Services At/After Discharge   Transition 
Chart reviewed for continued stay and transition of care planning.  Therapy evals complete with no further therapy needed at dc.  Pt has completed day #4 of IVIG.  Per MD, pt will hopefully dc home in 24-48 hours, if platelets remain adequate.  No ARIANNA needs identified or reported at present.  CM remains available to assist if needed.  
Chart reviewed s/p covering for CM this day. Pt continues inpatient 5th floor. Followed by Oncology. IVIG per MD. Continues to monitor Plts and pending BMBx. PT/OT following, no needs at present. CM will need to continue following for transition of care needs when stable per MD. LOS 9 days.   
Chart screened by CM for d/c planning.  Pt is receiving transfusions but plt count is not responding to Tx.  On RA.  On 3/26 pt underwent a BMBx - awaiting path.  3/26: started pulse dex and IVIG.  PT/OT have evaluated pt and do not recommend any further skilled therapy at d/c.  Anticipated dispo: return home once plt count improves/pt is medically stable.  D/C timeframe undetermined at the present time.  CM will continue to follow.  LOS = 7 days        
Pt discussed during IDR and chart screened by CM for d/c planning.  Pt is receiving transfusions but plt count is not responding quickly to Tx.  On RA.  PT/OT have evaluated pt and do not recommend any further skilled therapy at d/c.  Anticipated dispo: return home once plt count improves/pt is medically stable.  CM will continue to follow.  LOS = 5 days  
Pt discussed during IDR and chart screened by CM for d/c planning.  Pt is receiving transfusions but plt count is not responding well.  PT/OT have evaluated pt and do not recommend any further skilled therapy at d/c.  Anticipated dispo: return home once plt count improves/pt is medically stable.  CM will continue to follow.  LOS = 3 days  
Needed N/A   DME Ordered? No   Potential Assistance Purchasing Medications No   Type of Home Care Services None   Patient expects to be discharged to: Trailer/mobile home   One/Two Story Residence One story   History of falls? 0   Services At/After Discharge   Transition of Care Consult (CM Consult) Discharge Planning  (no CM consult received)   Services At/After Discharge None   [place] Resource Information Provided? No   Mode of Transport at Discharge Other (see comment)  (family)   Confirm Follow Up Transport Family   Condition of Participation: Discharge Planning   The Plan for Transition of Care is related to the following treatment goals: Pt to obtain care to become medically stable and to return with a safe transition.   The Patient and/or Patient Representative was provided with a Choice of Provider? Patient   The Patient and/Or Patient Representative agree with the Discharge Plan? Yes   Freedom of Choice list was provided with basic dialogue that supports the patient's individualized plan of care/goals, treatment preferences, and shares the quality data associated with the providers?  Yes

## 2024-04-03 ENCOUNTER — TELEPHONE (OUTPATIENT)
Dept: FAMILY MEDICINE CLINIC | Facility: CLINIC | Age: 74
End: 2024-04-03

## 2024-04-03 LAB
BLD PROD TYP BPU: NORMAL
BLOOD BANK BLOOD PRODUCT EXPIRATION DATE: NORMAL
BLOOD BANK CMNT PATIENT-IMP: NORMAL
BLOOD BANK DISPENSE STATUS: NORMAL
BLOOD BANK ISBT PRODUCT BLOOD TYPE: 5100
BLOOD BANK PRODUCT CODE: NORMAL
BLOOD BANK UNIT TYPE AND RH: NORMAL
BPU ID: NORMAL
UNIT DIVISION: 0
UNIT ISSUE DATE/TIME: NORMAL

## 2024-04-05 ENCOUNTER — HOSPITAL ENCOUNTER (OUTPATIENT)
Dept: INFUSION THERAPY | Age: 74
Setting detail: INFUSION SERIES
Discharge: HOME | End: 2024-04-05
Payer: MEDICARE

## 2024-04-05 VITALS
HEART RATE: 69 BPM | OXYGEN SATURATION: 96 % | SYSTOLIC BLOOD PRESSURE: 137 MMHG | RESPIRATION RATE: 16 BRPM | DIASTOLIC BLOOD PRESSURE: 75 MMHG | TEMPERATURE: 98.3 F

## 2024-04-05 DIAGNOSIS — E55.9 VITAMIN D DEFICIENCY, UNSPECIFIED: ICD-10-CM

## 2024-04-05 DIAGNOSIS — C92.01 ACUTE MYELOID LEUKEMIA IN REMISSION (HCC): ICD-10-CM

## 2024-04-05 DIAGNOSIS — C94.00 ACUTE ERYTHROID LEUKEMIA NOT HAVING ACHIEVED REMISSION (HCC): Primary | ICD-10-CM

## 2024-04-05 DIAGNOSIS — R79.9 ABNORMAL FINDING OF BLOOD CHEMISTRY, UNSPECIFIED: ICD-10-CM

## 2024-04-05 LAB
ALBUMIN SERPL-MCNC: 3.6 G/DL (ref 3.2–4.6)
ALBUMIN/GLOB SERPL: 0.7 (ref 0.4–1.6)
ALP SERPL-CCNC: 91 U/L (ref 50–136)
ALT SERPL-CCNC: 32 U/L (ref 12–65)
ANION GAP SERPL CALC-SCNC: 8 MMOL/L (ref 2–11)
AST SERPL-CCNC: 67 U/L (ref 15–37)
BASOPHILS # BLD: 0 K/UL (ref 0–0.2)
BASOPHILS NFR BLD: 0 % (ref 0–2)
BILIRUB SERPL-MCNC: 1 MG/DL (ref 0.2–1.1)
BUN SERPL-MCNC: 25 MG/DL (ref 8–23)
CALCIUM SERPL-MCNC: 9.3 MG/DL (ref 8.3–10.4)
CHLORIDE SERPL-SCNC: 104 MMOL/L (ref 103–113)
CO2 SERPL-SCNC: 24 MMOL/L (ref 21–32)
CREAT SERPL-MCNC: 0.8 MG/DL (ref 0.6–1)
DIFFERENTIAL METHOD BLD: ABNORMAL
EOSINOPHIL # BLD: 0 K/UL (ref 0–0.8)
EOSINOPHIL NFR BLD: 0 % (ref 0.5–7.8)
ERYTHROCYTE [DISTWIDTH] IN BLOOD BY AUTOMATED COUNT: 14.8 % (ref 11.9–14.6)
GLOBULIN SER CALC-MCNC: 5.5 G/DL (ref 2.8–4.5)
GLUCOSE SERPL-MCNC: 107 MG/DL (ref 65–100)
HCT VFR BLD AUTO: 20.6 % (ref 35.8–46.3)
HGB BLD-MCNC: 7 G/DL (ref 11.7–15.4)
HISTORY CHECK: NORMAL
IMM GRANULOCYTES # BLD AUTO: 0.1 K/UL (ref 0–0.5)
IMM GRANULOCYTES NFR BLD AUTO: 7 % (ref 0–5)
LYMPHOCYTES # BLD: 0.6 K/UL (ref 0.5–4.6)
LYMPHOCYTES NFR BLD: 66 % (ref 13–44)
MAGNESIUM SERPL-MCNC: 2 MG/DL (ref 1.8–2.4)
MCH RBC QN AUTO: 28.5 PG (ref 26.1–32.9)
MCHC RBC AUTO-ENTMCNC: 34 G/DL (ref 31.4–35)
MCV RBC AUTO: 83.7 FL (ref 82–102)
MONOCYTES # BLD: 0.1 K/UL (ref 0.1–1.3)
MONOCYTES NFR BLD: 6 % (ref 4–12)
NEUTS SEG # BLD: 0.2 K/UL (ref 1.7–8.2)
NEUTS SEG NFR BLD: 21 % (ref 43–78)
NRBC # BLD: 0 K/UL (ref 0–0.2)
PLATELET # BLD AUTO: 2 K/UL (ref 150–450)
PLATELET COMMENT: ABNORMAL
PMV BLD AUTO: ABNORMAL FL (ref 9.4–12.3)
POTASSIUM SERPL-SCNC: 3.4 MMOL/L (ref 3.5–5.1)
PROT SERPL-MCNC: 9.1 G/DL (ref 6.3–8.2)
RBC # BLD AUTO: 2.46 M/UL (ref 4.05–5.2)
RBC MORPH BLD: ABNORMAL
RBC MORPH BLD: ABNORMAL
SODIUM SERPL-SCNC: 136 MMOL/L (ref 136–146)
WBC # BLD AUTO: 1 K/UL (ref 4.3–11.1)
WBC MORPH BLD: ABNORMAL

## 2024-04-05 PROCEDURE — 86900 BLOOD TYPING SEROLOGIC ABO: CPT

## 2024-04-05 PROCEDURE — 86022 PLATELET ANTIBODIES: CPT

## 2024-04-05 PROCEDURE — 86901 BLOOD TYPING SEROLOGIC RH(D): CPT

## 2024-04-05 PROCEDURE — 86644 CMV ANTIBODY: CPT

## 2024-04-05 PROCEDURE — 36430 TRANSFUSION BLD/BLD COMPNT: CPT

## 2024-04-05 PROCEDURE — 86922 COMPATIBILITY TEST ANTIGLOB: CPT

## 2024-04-05 PROCEDURE — 86920 COMPATIBILITY TEST SPIN: CPT

## 2024-04-05 PROCEDURE — 86850 RBC ANTIBODY SCREEN: CPT

## 2024-04-05 PROCEDURE — 83735 ASSAY OF MAGNESIUM: CPT

## 2024-04-05 PROCEDURE — 86921 COMPATIBILITY TEST INCUBATE: CPT

## 2024-04-05 PROCEDURE — 6360000002 HC RX W HCPCS: Performed by: INTERNAL MEDICINE

## 2024-04-05 PROCEDURE — 85025 COMPLETE CBC W/AUTO DIFF WBC: CPT

## 2024-04-05 PROCEDURE — 80053 COMPREHEN METABOLIC PANEL: CPT

## 2024-04-05 PROCEDURE — P9037 PLATE PHERES LEUKOREDU IRRAD: HCPCS

## 2024-04-05 PROCEDURE — 96374 THER/PROPH/DIAG INJ IV PUSH: CPT

## 2024-04-05 PROCEDURE — 2580000003 HC RX 258: Performed by: INTERNAL MEDICINE

## 2024-04-05 PROCEDURE — P9040 RBC LEUKOREDUCED IRRADIATED: HCPCS

## 2024-04-05 PROCEDURE — 6370000000 HC RX 637 (ALT 250 FOR IP): Performed by: INTERNAL MEDICINE

## 2024-04-05 RX ORDER — SODIUM CHLORIDE 9 MG/ML
INJECTION, SOLUTION INTRAVENOUS CONTINUOUS
Status: CANCELLED | OUTPATIENT
Start: 2024-04-05

## 2024-04-05 RX ORDER — EPINEPHRINE 1 MG/ML
0.3 INJECTION, SOLUTION, CONCENTRATE INTRAVENOUS PRN
Status: CANCELLED | OUTPATIENT
Start: 2024-04-05

## 2024-04-05 RX ORDER — SODIUM CHLORIDE 9 MG/ML
20 INJECTION, SOLUTION INTRAVENOUS CONTINUOUS
Status: CANCELLED | OUTPATIENT
Start: 2024-04-05

## 2024-04-05 RX ORDER — SODIUM CHLORIDE 9 MG/ML
INJECTION, SOLUTION INTRAVENOUS CONTINUOUS
OUTPATIENT
Start: 2024-04-05

## 2024-04-05 RX ORDER — ACETAMINOPHEN 325 MG/1
650 TABLET ORAL
Status: CANCELLED | OUTPATIENT
Start: 2024-04-05

## 2024-04-05 RX ORDER — ONDANSETRON 2 MG/ML
8 INJECTION INTRAMUSCULAR; INTRAVENOUS
OUTPATIENT
Start: 2024-04-05

## 2024-04-05 RX ORDER — EPINEPHRINE 1 MG/ML
0.3 INJECTION, SOLUTION, CONCENTRATE INTRAVENOUS PRN
OUTPATIENT
Start: 2024-04-05

## 2024-04-05 RX ORDER — DIPHENHYDRAMINE HCL 25 MG
25 CAPSULE ORAL ONCE
Status: CANCELLED | OUTPATIENT
Start: 2024-04-05 | End: 2024-04-05

## 2024-04-05 RX ORDER — ACETAMINOPHEN 325 MG/1
650 TABLET ORAL ONCE
Status: CANCELLED | OUTPATIENT
Start: 2024-04-05 | End: 2024-04-05

## 2024-04-05 RX ORDER — ACETAMINOPHEN 325 MG/1
650 TABLET ORAL ONCE
Status: COMPLETED | OUTPATIENT
Start: 2024-04-05 | End: 2024-04-05

## 2024-04-05 RX ORDER — ONDANSETRON 2 MG/ML
8 INJECTION INTRAMUSCULAR; INTRAVENOUS ONCE
Status: COMPLETED | OUTPATIENT
Start: 2024-04-05 | End: 2024-04-05

## 2024-04-05 RX ORDER — ALBUTEROL SULFATE 90 UG/1
4 AEROSOL, METERED RESPIRATORY (INHALATION) PRN
OUTPATIENT
Start: 2024-04-05

## 2024-04-05 RX ORDER — DIPHENHYDRAMINE HYDROCHLORIDE 50 MG/ML
50 INJECTION INTRAMUSCULAR; INTRAVENOUS
Status: CANCELLED | OUTPATIENT
Start: 2024-04-05

## 2024-04-05 RX ORDER — SODIUM CHLORIDE 9 MG/ML
5-250 INJECTION, SOLUTION INTRAVENOUS PRN
OUTPATIENT
Start: 2024-04-05

## 2024-04-05 RX ORDER — SODIUM CHLORIDE 9 MG/ML
INJECTION, SOLUTION INTRAVENOUS PRN
Status: DISCONTINUED | OUTPATIENT
Start: 2024-04-05 | End: 2024-04-06 | Stop reason: HOSPADM

## 2024-04-05 RX ORDER — SODIUM CHLORIDE 0.9 % (FLUSH) 0.9 %
5-40 SYRINGE (ML) INJECTION PRN
Status: CANCELLED | OUTPATIENT
Start: 2024-04-05

## 2024-04-05 RX ORDER — SODIUM CHLORIDE 9 MG/ML
25 INJECTION, SOLUTION INTRAVENOUS PRN
Status: CANCELLED | OUTPATIENT
Start: 2024-04-05

## 2024-04-05 RX ORDER — SODIUM CHLORIDE 9 MG/ML
25 INJECTION, SOLUTION INTRAVENOUS PRN
Status: DISCONTINUED | OUTPATIENT
Start: 2024-04-05 | End: 2024-04-06 | Stop reason: HOSPADM

## 2024-04-05 RX ORDER — SODIUM CHLORIDE 0.9 % (FLUSH) 0.9 %
5-40 SYRINGE (ML) INJECTION PRN
Status: DISCONTINUED | OUTPATIENT
Start: 2024-04-05 | End: 2024-04-06 | Stop reason: HOSPADM

## 2024-04-05 RX ORDER — ACETAMINOPHEN 325 MG/1
650 TABLET ORAL
OUTPATIENT
Start: 2024-04-05

## 2024-04-05 RX ORDER — ALBUTEROL SULFATE 90 UG/1
4 AEROSOL, METERED RESPIRATORY (INHALATION) PRN
Status: CANCELLED | OUTPATIENT
Start: 2024-04-05

## 2024-04-05 RX ORDER — DIPHENHYDRAMINE HYDROCHLORIDE 50 MG/ML
50 INJECTION INTRAMUSCULAR; INTRAVENOUS
OUTPATIENT
Start: 2024-04-05

## 2024-04-05 RX ORDER — HEPARIN 100 UNIT/ML
500 SYRINGE INTRAVENOUS PRN
OUTPATIENT
Start: 2024-04-05

## 2024-04-05 RX ORDER — DIPHENHYDRAMINE HCL 25 MG
25 CAPSULE ORAL ONCE
Status: COMPLETED | OUTPATIENT
Start: 2024-04-05 | End: 2024-04-05

## 2024-04-05 RX ORDER — SODIUM CHLORIDE 9 MG/ML
20 INJECTION, SOLUTION INTRAVENOUS CONTINUOUS
Status: DISCONTINUED | OUTPATIENT
Start: 2024-04-05 | End: 2024-04-06 | Stop reason: HOSPADM

## 2024-04-05 RX ORDER — ONDANSETRON 2 MG/ML
8 INJECTION INTRAMUSCULAR; INTRAVENOUS
Status: CANCELLED | OUTPATIENT
Start: 2024-04-05

## 2024-04-05 RX ADMIN — SODIUM CHLORIDE, PRESERVATIVE FREE 10 ML: 5 INJECTION INTRAVENOUS at 18:38

## 2024-04-05 RX ADMIN — SODIUM CHLORIDE, PRESERVATIVE FREE 10 ML: 5 INJECTION INTRAVENOUS at 10:08

## 2024-04-05 RX ADMIN — SODIUM CHLORIDE 25 ML: 9 INJECTION, SOLUTION INTRAVENOUS at 16:15

## 2024-04-05 RX ADMIN — ONDANSETRON 8 MG: 2 INJECTION INTRAMUSCULAR; INTRAVENOUS at 10:05

## 2024-04-05 RX ADMIN — ACETAMINOPHEN 650 MG: 325 TABLET ORAL at 15:01

## 2024-04-05 RX ADMIN — DIPHENHYDRAMINE HYDROCHLORIDE 25 MG: 25 CAPSULE ORAL at 15:02

## 2024-04-05 NOTE — PROGRESS NOTES
Arrived to the Infusion Center.  Lab draw 1unit of PRBC's and 1unit of Platelets completed. Patient tolerated well.   Any issues or concerns during appointment: no.  Patient aware of next infusion appointment on 4/6 at 0900  Patient instructed to call provider with temperature of 100.4 or greater or nausea/vomiting/ diarrhea or pain not controlled by medications  Discharged to home ambulatory.

## 2024-04-06 ENCOUNTER — HOSPITAL ENCOUNTER (OUTPATIENT)
Dept: INFUSION THERAPY | Age: 74
Setting detail: INFUSION SERIES
Discharge: HOME | End: 2024-04-06
Payer: MEDICARE

## 2024-04-06 VITALS
SYSTOLIC BLOOD PRESSURE: 123 MMHG | HEART RATE: 72 BPM | DIASTOLIC BLOOD PRESSURE: 64 MMHG | OXYGEN SATURATION: 98 % | TEMPERATURE: 98.5 F | RESPIRATION RATE: 16 BRPM

## 2024-04-06 DIAGNOSIS — R79.9 ABNORMAL FINDING OF BLOOD CHEMISTRY, UNSPECIFIED: ICD-10-CM

## 2024-04-06 DIAGNOSIS — I20.1 ANGINA PECTORIS WITH DOCUMENTED SPASM (HCC): ICD-10-CM

## 2024-04-06 DIAGNOSIS — F41.9 ANXIETY DISORDER, UNSPECIFIED: ICD-10-CM

## 2024-04-06 DIAGNOSIS — K21.9 GASTRO-ESOPHAGEAL REFLUX DISEASE WITHOUT ESOPHAGITIS: ICD-10-CM

## 2024-04-06 DIAGNOSIS — C94.00 ACUTE ERYTHROID LEUKEMIA NOT HAVING ACHIEVED REMISSION (HCC): Primary | ICD-10-CM

## 2024-04-06 LAB
ABO + RH BLD: NORMAL
ANION GAP SERPL CALC-SCNC: 7 MMOL/L (ref 2–11)
BASOPHILS # BLD: 0 K/UL (ref 0–0.2)
BASOPHILS NFR BLD: 0 % (ref 0–2)
BLD PROD TYP BPU: NORMAL
BLD PROD TYP BPU: NORMAL
BLOOD BANK BLOOD PRODUCT EXPIRATION DATE: NORMAL
BLOOD BANK BLOOD PRODUCT EXPIRATION DATE: NORMAL
BLOOD BANK CMNT PATIENT-IMP: NORMAL
BLOOD BANK DISPENSE STATUS: NORMAL
BLOOD BANK DISPENSE STATUS: NORMAL
BLOOD BANK ISBT PRODUCT BLOOD TYPE: 9500
BLOOD BANK ISBT PRODUCT BLOOD TYPE: 9500
BLOOD BANK UNIT TYPE AND RH: NORMAL
BLOOD BANK UNIT TYPE AND RH: NORMAL
BLOOD GROUP ANTIBODIES SERPL: NORMAL
BPU ID: NORMAL
BPU ID: NORMAL
BUN SERPL-MCNC: 20 MG/DL (ref 8–23)
CALCIUM SERPL-MCNC: 9 MG/DL (ref 8.3–10.4)
CHLORIDE SERPL-SCNC: 105 MMOL/L (ref 103–113)
CO2 SERPL-SCNC: 25 MMOL/L (ref 21–32)
CREAT SERPL-MCNC: 0.7 MG/DL (ref 0.6–1)
CROSSMATCH RESULT: NORMAL
DIFFERENTIAL METHOD BLD: ABNORMAL
EOSINOPHIL # BLD: 0 K/UL (ref 0–0.8)
EOSINOPHIL NFR BLD: 0 % (ref 0.5–7.8)
ERYTHROCYTE [DISTWIDTH] IN BLOOD BY AUTOMATED COUNT: 14.6 % (ref 11.9–14.6)
GLUCOSE SERPL-MCNC: 106 MG/DL (ref 65–100)
HCT VFR BLD AUTO: 21.9 % (ref 35.8–46.3)
HGB BLD-MCNC: 7.5 G/DL (ref 11.7–15.4)
IMM GRANULOCYTES # BLD AUTO: 0 K/UL (ref 0–0.5)
IMM GRANULOCYTES NFR BLD AUTO: 5 % (ref 0–5)
LYMPHOCYTES # BLD: 0.7 K/UL (ref 0.5–4.6)
LYMPHOCYTES NFR BLD: 72 % (ref 13–44)
MAGNESIUM SERPL-MCNC: 1.9 MG/DL (ref 1.8–2.4)
MCH RBC QN AUTO: 29.1 PG (ref 26.1–32.9)
MCHC RBC AUTO-ENTMCNC: 34.2 G/DL (ref 31.4–35)
MCV RBC AUTO: 84.9 FL (ref 82–102)
MONOCYTES # BLD: 0.1 K/UL (ref 0.1–1.3)
MONOCYTES NFR BLD: 7 % (ref 4–12)
NEUTS SEG # BLD: 0.1 K/UL (ref 1.7–8.2)
NEUTS SEG NFR BLD: 16 % (ref 43–78)
NRBC # BLD: 0 K/UL (ref 0–0.2)
PLATELET # BLD AUTO: <2 K/UL (ref 150–450)
PLATELET COMMENT: ABNORMAL
PMV BLD AUTO: ABNORMAL FL (ref 9.4–12.3)
POTASSIUM SERPL-SCNC: 3.5 MMOL/L (ref 3.5–5.1)
RBC # BLD AUTO: 2.58 M/UL (ref 4.05–5.2)
RBC MORPH BLD: ABNORMAL
RBC MORPH BLD: ABNORMAL
SODIUM SERPL-SCNC: 137 MMOL/L (ref 136–146)
SPECIMEN EXP DATE BLD: NORMAL
UNIT DIVISION: 0
UNIT DIVISION: 0
UNIT ISSUE DATE/TIME: NORMAL
UNIT ISSUE DATE/TIME: NORMAL
WBC # BLD AUTO: 0.9 K/UL (ref 4.3–11.1)
WBC MORPH BLD: ABNORMAL

## 2024-04-06 PROCEDURE — 85025 COMPLETE CBC W/AUTO DIFF WBC: CPT

## 2024-04-06 PROCEDURE — 2580000003 HC RX 258: Performed by: NURSE PRACTITIONER

## 2024-04-06 PROCEDURE — 86022 PLATELET ANTIBODIES: CPT

## 2024-04-06 PROCEDURE — 2580000003 HC RX 258: Performed by: INTERNAL MEDICINE

## 2024-04-06 PROCEDURE — 6370000000 HC RX 637 (ALT 250 FOR IP)

## 2024-04-06 PROCEDURE — 80048 BASIC METABOLIC PNL TOTAL CA: CPT

## 2024-04-06 PROCEDURE — P9037 PLATE PHERES LEUKOREDU IRRAD: HCPCS

## 2024-04-06 PROCEDURE — 83735 ASSAY OF MAGNESIUM: CPT

## 2024-04-06 PROCEDURE — 36430 TRANSFUSION BLD/BLD COMPNT: CPT

## 2024-04-06 RX ORDER — EPINEPHRINE 1 MG/ML
0.3 INJECTION, SOLUTION, CONCENTRATE INTRAVENOUS PRN
OUTPATIENT
Start: 2024-04-06

## 2024-04-06 RX ORDER — ACETAMINOPHEN 325 MG/1
650 TABLET ORAL
OUTPATIENT
Start: 2024-04-06

## 2024-04-06 RX ORDER — DIPHENHYDRAMINE HYDROCHLORIDE 50 MG/ML
50 INJECTION INTRAMUSCULAR; INTRAVENOUS
OUTPATIENT
Start: 2024-04-06

## 2024-04-06 RX ORDER — ALBUTEROL SULFATE 90 UG/1
4 AEROSOL, METERED RESPIRATORY (INHALATION) PRN
OUTPATIENT
Start: 2024-04-06

## 2024-04-06 RX ORDER — ACETAMINOPHEN 325 MG/1
650 TABLET ORAL ONCE
Status: COMPLETED | OUTPATIENT
Start: 2024-04-06 | End: 2024-04-06

## 2024-04-06 RX ORDER — EPINEPHRINE 1 MG/ML
0.3 INJECTION, SOLUTION, CONCENTRATE INTRAVENOUS PRN
Status: DISCONTINUED | OUTPATIENT
Start: 2024-04-06 | End: 2024-04-07 | Stop reason: HOSPADM

## 2024-04-06 RX ORDER — SODIUM CHLORIDE 9 MG/ML
5-250 INJECTION, SOLUTION INTRAVENOUS PRN
OUTPATIENT
Start: 2024-04-06

## 2024-04-06 RX ORDER — SODIUM CHLORIDE 0.9 % (FLUSH) 0.9 %
5-40 SYRINGE (ML) INJECTION PRN
Status: DISCONTINUED | OUTPATIENT
Start: 2024-04-06 | End: 2024-04-07 | Stop reason: HOSPADM

## 2024-04-06 RX ORDER — SODIUM CHLORIDE 0.9 % (FLUSH) 0.9 %
5-40 SYRINGE (ML) INJECTION PRN
OUTPATIENT
Start: 2024-04-06

## 2024-04-06 RX ORDER — ALBUTEROL SULFATE 90 UG/1
4 AEROSOL, METERED RESPIRATORY (INHALATION) PRN
Status: DISCONTINUED | OUTPATIENT
Start: 2024-04-06 | End: 2024-04-07 | Stop reason: HOSPADM

## 2024-04-06 RX ORDER — SODIUM CHLORIDE 9 MG/ML
INJECTION, SOLUTION INTRAVENOUS CONTINUOUS
Status: DISCONTINUED | OUTPATIENT
Start: 2024-04-06 | End: 2024-04-07 | Stop reason: HOSPADM

## 2024-04-06 RX ORDER — SODIUM CHLORIDE 9 MG/ML
INJECTION, SOLUTION INTRAVENOUS PRN
Status: COMPLETED | OUTPATIENT
Start: 2024-04-06 | End: 2024-04-06

## 2024-04-06 RX ORDER — HEPARIN 100 UNIT/ML
500 SYRINGE INTRAVENOUS PRN
OUTPATIENT
Start: 2024-04-06

## 2024-04-06 RX ORDER — ONDANSETRON 2 MG/ML
8 INJECTION INTRAMUSCULAR; INTRAVENOUS
Status: DISCONTINUED | OUTPATIENT
Start: 2024-04-06 | End: 2024-04-07 | Stop reason: HOSPADM

## 2024-04-06 RX ORDER — DIPHENHYDRAMINE HYDROCHLORIDE 50 MG/ML
50 INJECTION INTRAMUSCULAR; INTRAVENOUS
Status: DISCONTINUED | OUTPATIENT
Start: 2024-04-06 | End: 2024-04-07 | Stop reason: HOSPADM

## 2024-04-06 RX ORDER — ACETAMINOPHEN 325 MG/1
650 TABLET ORAL
Status: DISCONTINUED | OUTPATIENT
Start: 2024-04-06 | End: 2024-04-07 | Stop reason: HOSPADM

## 2024-04-06 RX ORDER — DIPHENHYDRAMINE HCL 25 MG
25 CAPSULE ORAL ONCE
Status: COMPLETED | OUTPATIENT
Start: 2024-04-06 | End: 2024-04-06

## 2024-04-06 RX ORDER — SODIUM CHLORIDE 9 MG/ML
INJECTION, SOLUTION INTRAVENOUS CONTINUOUS
OUTPATIENT
Start: 2024-04-06

## 2024-04-06 RX ORDER — ONDANSETRON 2 MG/ML
8 INJECTION INTRAMUSCULAR; INTRAVENOUS
OUTPATIENT
Start: 2024-04-06

## 2024-04-06 RX ORDER — SODIUM CHLORIDE 0.9 % (FLUSH) 0.9 %
5-40 SYRINGE (ML) INJECTION 2 TIMES DAILY
Status: DISCONTINUED | OUTPATIENT
Start: 2024-04-06 | End: 2024-04-07 | Stop reason: HOSPADM

## 2024-04-06 RX ADMIN — DIPHENHYDRAMINE HYDROCHLORIDE 25 MG: 25 CAPSULE ORAL at 13:50

## 2024-04-06 RX ADMIN — SODIUM CHLORIDE, PRESERVATIVE FREE 10 ML: 5 INJECTION INTRAVENOUS at 09:54

## 2024-04-06 RX ADMIN — SODIUM CHLORIDE: 9 INJECTION, SOLUTION INTRAVENOUS at 13:51

## 2024-04-06 RX ADMIN — ACETAMINOPHEN 650 MG: 325 TABLET ORAL at 13:49

## 2024-04-06 RX ADMIN — SODIUM CHLORIDE, PRESERVATIVE FREE 10 ML: 5 INJECTION INTRAVENOUS at 13:51

## 2024-04-06 NOTE — PROGRESS NOTES
Arrived to the Infusion Center. Labs and 1 unit of platelets completed. Patient tolerated well.   Any issues or concerns during appointment: no.  Patient aware of next appointment on 4/8/24 (date) at 0715 (time).  Patient instructed to call provider with temperature of 100.4 or greater or nausea/vomiting/ diarrhea or pain not controlled by medications  Discharged ambulatory.

## 2024-04-07 ENCOUNTER — HOSPITAL ENCOUNTER (INPATIENT)
Age: 74
LOS: 32 days | DRG: 834 | End: 2024-05-10
Attending: EMERGENCY MEDICINE | Admitting: EMERGENCY MEDICINE
Payer: MEDICARE

## 2024-04-07 DIAGNOSIS — D46.9 MDS (MYELODYSPLASTIC SYNDROME) (HCC): ICD-10-CM

## 2024-04-07 DIAGNOSIS — R04.0 EPISTAXIS: ICD-10-CM

## 2024-04-07 DIAGNOSIS — D64.9 SYMPTOMATIC ANEMIA: ICD-10-CM

## 2024-04-07 DIAGNOSIS — R42 LIGHTHEADEDNESS: ICD-10-CM

## 2024-04-07 DIAGNOSIS — D61.818 PANCYTOPENIA (HCC): ICD-10-CM

## 2024-04-07 DIAGNOSIS — D69.6 THROMBOCYTOPENIA (HCC): Primary | ICD-10-CM

## 2024-04-07 DIAGNOSIS — C92.01 ACUTE MYELOID LEUKEMIA IN REMISSION (HCC): ICD-10-CM

## 2024-04-07 DIAGNOSIS — C92.02 AML (ACUTE MYELOID LEUKEMIA) IN RELAPSE (HCC): ICD-10-CM

## 2024-04-07 LAB
ALBUMIN SERPL-MCNC: 3.1 G/DL (ref 3.2–4.6)
ALBUMIN/GLOB SERPL: 0.6 (ref 0.4–1.6)
ALP SERPL-CCNC: 116 U/L (ref 50–136)
ALT SERPL-CCNC: 35 U/L (ref 12–65)
ANION GAP SERPL CALC-SCNC: 3 MMOL/L (ref 2–11)
AST SERPL-CCNC: 71 U/L (ref 15–37)
BASOPHILS # BLD: 0 K/UL (ref 0–0.2)
BASOPHILS NFR BLD: 1 % (ref 0–2)
BILIRUB SERPL-MCNC: 1 MG/DL (ref 0.2–1.1)
BLD PROD TYP BPU: NORMAL
BLOOD BANK BLOOD PRODUCT EXPIRATION DATE: NORMAL
BLOOD BANK CMNT PATIENT-IMP: NORMAL
BLOOD BANK DISPENSE STATUS: NORMAL
BLOOD BANK ISBT PRODUCT BLOOD TYPE: 9500
BLOOD BANK PRODUCT CODE: NORMAL
BLOOD BANK UNIT TYPE AND RH: NORMAL
BPU ID: NORMAL
BUN SERPL-MCNC: 14 MG/DL (ref 8–23)
CALCIUM SERPL-MCNC: 9.2 MG/DL (ref 8.3–10.4)
CHLORIDE SERPL-SCNC: 101 MMOL/L (ref 103–113)
CO2 SERPL-SCNC: 29 MMOL/L (ref 21–32)
CREAT SERPL-MCNC: 0.7 MG/DL (ref 0.6–1)
DIFFERENTIAL METHOD BLD: ABNORMAL
EOSINOPHIL # BLD: 0 K/UL (ref 0–0.8)
EOSINOPHIL NFR BLD: 0 % (ref 0.5–7.8)
ERYTHROCYTE [DISTWIDTH] IN BLOOD BY AUTOMATED COUNT: 14.4 % (ref 11.9–14.6)
GLOBULIN SER CALC-MCNC: 5.3 G/DL (ref 2.8–4.5)
GLUCOSE SERPL-MCNC: 104 MG/DL (ref 65–100)
HCT VFR BLD AUTO: 20.5 % (ref 35.8–46.3)
HGB BLD-MCNC: 7.2 G/DL (ref 11.7–15.4)
IMM GRANULOCYTES # BLD AUTO: 0 K/UL (ref 0–0.5)
IMM GRANULOCYTES NFR BLD AUTO: 4 % (ref 0–5)
INR PPP: 1.1
LYMPHOCYTES # BLD: 0.8 K/UL (ref 0.5–4.6)
LYMPHOCYTES NFR BLD: 71 % (ref 13–44)
MCH RBC QN AUTO: 29 PG (ref 26.1–32.9)
MCHC RBC AUTO-ENTMCNC: 35.1 G/DL (ref 31.4–35)
MCV RBC AUTO: 82.7 FL (ref 82–102)
MONOCYTES # BLD: 0.1 K/UL (ref 0.1–1.3)
MONOCYTES NFR BLD: 6 % (ref 4–12)
NEUTS SEG # BLD: 0.2 K/UL (ref 1.7–8.2)
NEUTS SEG NFR BLD: 18 % (ref 43–78)
NRBC # BLD: 0 K/UL (ref 0–0.2)
PLATELET # BLD AUTO: <2 K/UL (ref 150–450)
PLATELET COMMENT: ABNORMAL
PMV BLD AUTO: ABNORMAL FL (ref 9.4–12.3)
POTASSIUM SERPL-SCNC: 3.6 MMOL/L (ref 3.5–5.1)
PROT SERPL-MCNC: 8.4 G/DL (ref 6.3–8.2)
PROTHROMBIN TIME: 14.8 SEC (ref 11.3–14.9)
RBC # BLD AUTO: 2.48 M/UL (ref 4.05–5.2)
RBC MORPH BLD: ABNORMAL
SODIUM SERPL-SCNC: 133 MMOL/L (ref 136–146)
UNIT DIVISION: 0
UNIT ISSUE DATE/TIME: NORMAL
WBC # BLD AUTO: 1.1 K/UL (ref 4.3–11.1)
WBC MORPH BLD: ABNORMAL

## 2024-04-07 PROCEDURE — 86920 COMPATIBILITY TEST SPIN: CPT

## 2024-04-07 PROCEDURE — 86900 BLOOD TYPING SEROLOGIC ABO: CPT

## 2024-04-07 PROCEDURE — 96374 THER/PROPH/DIAG INJ IV PUSH: CPT

## 2024-04-07 PROCEDURE — 6360000002 HC RX W HCPCS: Performed by: EMERGENCY MEDICINE

## 2024-04-07 PROCEDURE — 85025 COMPLETE CBC W/AUTO DIFF WBC: CPT

## 2024-04-07 PROCEDURE — 86850 RBC ANTIBODY SCREEN: CPT

## 2024-04-07 PROCEDURE — 80076 HEPATIC FUNCTION PANEL: CPT

## 2024-04-07 PROCEDURE — 85610 PROTHROMBIN TIME: CPT

## 2024-04-07 PROCEDURE — 86901 BLOOD TYPING SEROLOGIC RH(D): CPT

## 2024-04-07 PROCEDURE — 80053 COMPREHEN METABOLIC PANEL: CPT

## 2024-04-07 PROCEDURE — 99285 EMERGENCY DEPT VISIT HI MDM: CPT

## 2024-04-07 PROCEDURE — 6370000000 HC RX 637 (ALT 250 FOR IP): Performed by: EMERGENCY MEDICINE

## 2024-04-07 RX ORDER — OXYMETAZOLINE HYDROCHLORIDE 0.05 G/100ML
2 SPRAY NASAL
Status: COMPLETED | OUTPATIENT
Start: 2024-04-07 | End: 2024-04-07

## 2024-04-07 RX ORDER — ONDANSETRON 2 MG/ML
4 INJECTION INTRAMUSCULAR; INTRAVENOUS
Status: COMPLETED | OUTPATIENT
Start: 2024-04-07 | End: 2024-04-07

## 2024-04-07 RX ORDER — SODIUM CHLORIDE 9 MG/ML
INJECTION, SOLUTION INTRAVENOUS PRN
Status: COMPLETED | OUTPATIENT
Start: 2024-04-07 | End: 2024-04-08

## 2024-04-07 RX ORDER — FAMOTIDINE 40 MG/1
40 TABLET, FILM COATED ORAL EVERY EVENING
Qty: 90 TABLET | Status: CANCELLED | OUTPATIENT
Start: 2024-04-07

## 2024-04-07 RX ADMIN — Medication 2 SPRAY: at 22:53

## 2024-04-07 RX ADMIN — ONDANSETRON 4 MG: 2 INJECTION INTRAMUSCULAR; INTRAVENOUS at 22:54

## 2024-04-07 ASSESSMENT — PAIN SCALES - GENERAL: PAINLEVEL_OUTOF10: 4

## 2024-04-07 ASSESSMENT — PAIN DESCRIPTION - DESCRIPTORS: DESCRIPTORS: ACHING

## 2024-04-07 ASSESSMENT — PAIN DESCRIPTION - LOCATION: LOCATION: BACK

## 2024-04-07 ASSESSMENT — PAIN DESCRIPTION - PAIN TYPE: TYPE: CHRONIC PAIN

## 2024-04-07 ASSESSMENT — PAIN - FUNCTIONAL ASSESSMENT: PAIN_FUNCTIONAL_ASSESSMENT: 0-10

## 2024-04-08 ENCOUNTER — APPOINTMENT (OUTPATIENT)
Dept: GENERAL RADIOLOGY | Age: 74
DRG: 834 | End: 2024-04-08
Payer: MEDICARE

## 2024-04-08 PROBLEM — R42 LIGHTHEADEDNESS: Status: ACTIVE | Noted: 2024-04-08

## 2024-04-08 PROBLEM — C92.02 AML (ACUTE MYELOID LEUKEMIA) IN RELAPSE (HCC): Status: ACTIVE | Noted: 2024-04-08

## 2024-04-08 PROBLEM — R04.0 EPISTAXIS: Status: ACTIVE | Noted: 2024-04-08

## 2024-04-08 LAB
ALBUMIN SERPL-MCNC: 3.2 G/DL (ref 3.2–4.6)
ALBUMIN/GLOB SERPL: 0.6 (ref 0.4–1.6)
ALP SERPL-CCNC: 113 U/L (ref 50–136)
ALT SERPL-CCNC: 35 U/L (ref 12–65)
APPEARANCE UR: CLEAR
AST SERPL-CCNC: 66 U/L (ref 15–37)
BACTERIA URNS QL MICRO: ABNORMAL /HPF
BASOPHILS # BLD: 0 K/UL (ref 0–0.2)
BASOPHILS NFR BLD: 0 % (ref 0–2)
BILIRUB DIRECT SERPL-MCNC: 0.2 MG/DL
BILIRUB DIRECT SERPL-MCNC: 0.2 MG/DL
BILIRUB INDIRECT SERPL-MCNC: 0.7 MG/DL (ref 0–1.1)
BILIRUB SERPL-MCNC: 0.9 MG/DL (ref 0.2–1.1)
BILIRUB SERPL-MCNC: 1 MG/DL (ref 0.2–1.1)
BILIRUB UR QL: NEGATIVE
CASTS URNS QL MICRO: ABNORMAL /LPF
COLOR UR: ABNORMAL
CRYSTALS URNS QL MICRO: 0 /LPF
DAT POLY-SP REAG RBC QL: NORMAL
DIFFERENTIAL METHOD BLD: ABNORMAL
EOSINOPHIL # BLD: 0 K/UL (ref 0–0.8)
EOSINOPHIL NFR BLD: 0 % (ref 0.5–7.8)
EPI CELLS #/AREA URNS HPF: ABNORMAL /HPF
ERYTHROCYTE [DISTWIDTH] IN BLOOD BY AUTOMATED COUNT: 14.5 % (ref 11.9–14.6)
FLUAV RNA SPEC QL NAA+PROBE: NOT DETECTED
FLUBV RNA SPEC QL NAA+PROBE: NOT DETECTED
GLOBULIN SER CALC-MCNC: 5.2 G/DL (ref 2.8–4.5)
GLUCOSE UR STRIP.AUTO-MCNC: NEGATIVE MG/DL
HCT VFR BLD AUTO: 20.1 % (ref 35.8–46.3)
HGB BLD-MCNC: 7.1 G/DL (ref 11.7–15.4)
HGB RETIC QN AUTO: 36 PG (ref 29–35)
HGB UR QL STRIP: ABNORMAL
IMM GRANULOCYTES # BLD AUTO: 0.1 K/UL (ref 0–0.5)
IMM GRANULOCYTES NFR BLD AUTO: 8 % (ref 0–5)
IMM RETICS NFR: 0 % (ref 3–15.9)
KETONES UR QL STRIP.AUTO: NEGATIVE MG/DL
LDH SERPL L TO P-CCNC: 1442 U/L (ref 110–210)
LEUKOCYTE ESTERASE UR QL STRIP.AUTO: NEGATIVE
LYMPHOCYTES # BLD: 0.9 K/UL (ref 0.5–4.6)
LYMPHOCYTES NFR BLD: 72 % (ref 13–44)
MCH RBC QN AUTO: 29.5 PG (ref 26.1–32.9)
MCHC RBC AUTO-ENTMCNC: 35.3 G/DL (ref 31.4–35)
MCV RBC AUTO: 83.4 FL (ref 82–102)
MONOCYTES # BLD: 0.1 K/UL (ref 0.1–1.3)
MONOCYTES NFR BLD: 4 % (ref 4–12)
MRSA DNA SPEC QL NAA+PROBE: NOT DETECTED
MUCOUS THREADS URNS QL MICRO: ABNORMAL /LPF
NEUTS SEG # BLD: 0.2 K/UL (ref 1.7–8.2)
NEUTS SEG NFR BLD: 16 % (ref 43–78)
NITRITE UR QL STRIP.AUTO: NEGATIVE
NRBC # BLD: 0 K/UL (ref 0–0.2)
OTHER OBSERVATIONS: ABNORMAL
PATH REV BLD -IMP: NORMAL
PH UR STRIP: 6 (ref 5–9)
PLATELET # BLD AUTO: 2 K/UL (ref 150–450)
PLATELET # BLD AUTO: <2 K/UL (ref 150–450)
PLATELET COMMENT: ABNORMAL
PMV BLD AUTO: ABNORMAL FL (ref 9.4–12.3)
PROT SERPL-MCNC: 8.4 G/DL (ref 6.3–8.2)
PROT UR STRIP-MCNC: 100 MG/DL
RBC # BLD AUTO: 2.41 M/UL (ref 4.05–5.2)
RBC #/AREA URNS HPF: ABNORMAL /HPF
RBC MORPH BLD: ABNORMAL
RETICS # AUTO: 0 M/UL (ref 0.03–0.1)
RETICS/RBC NFR AUTO: 0.1 % (ref 0.3–2)
S AUREUS CPE NOSE QL NAA+PROBE: NOT DETECTED
SARS-COV-2 RDRP RESP QL NAA+PROBE: NOT DETECTED
SOURCE: NORMAL
SP GR UR REFRACTOMETRY: 1.02 (ref 1–1.02)
URINE CULTURE IF INDICATED: ABNORMAL
UROBILINOGEN UR QL STRIP.AUTO: 1 EU/DL (ref 0.2–1)
WBC # BLD AUTO: 1.3 K/UL (ref 4.3–11.1)
WBC MORPH BLD: ABNORMAL
WBC URNS QL MICRO: ABNORMAL /HPF

## 2024-04-08 PROCEDURE — 2580000003 HC RX 258: Performed by: EMERGENCY MEDICINE

## 2024-04-08 PROCEDURE — 99223 1ST HOSP IP/OBS HIGH 75: CPT | Performed by: INTERNAL MEDICINE

## 2024-04-08 PROCEDURE — 2500000003 HC RX 250 WO HCPCS: Performed by: STUDENT IN AN ORGANIZED HEALTH CARE EDUCATION/TRAINING PROGRAM

## 2024-04-08 PROCEDURE — 6360000002 HC RX W HCPCS: Performed by: STUDENT IN AN ORGANIZED HEALTH CARE EDUCATION/TRAINING PROGRAM

## 2024-04-08 PROCEDURE — 1100000000 HC RM PRIVATE

## 2024-04-08 PROCEDURE — 6360000002 HC RX W HCPCS: Performed by: INTERNAL MEDICINE

## 2024-04-08 PROCEDURE — 83010 ASSAY OF HAPTOGLOBIN QUANT: CPT

## 2024-04-08 PROCEDURE — P9037 PLATE PHERES LEUKOREDU IRRAD: HCPCS

## 2024-04-08 PROCEDURE — 2580000003 HC RX 258: Performed by: NURSE PRACTITIONER

## 2024-04-08 PROCEDURE — 6370000000 HC RX 637 (ALT 250 FOR IP): Performed by: STUDENT IN AN ORGANIZED HEALTH CARE EDUCATION/TRAINING PROGRAM

## 2024-04-08 PROCEDURE — 36430 TRANSFUSION BLD/BLD COMPNT: CPT

## 2024-04-08 PROCEDURE — 86022 PLATELET ANTIBODIES: CPT

## 2024-04-08 PROCEDURE — 83615 LACTATE (LD) (LDH) ENZYME: CPT

## 2024-04-08 PROCEDURE — 87502 INFLUENZA DNA AMP PROBE: CPT

## 2024-04-08 PROCEDURE — APPSS30 APP SPLIT SHARED TIME 16-30 MINUTES: Performed by: NURSE PRACTITIONER

## 2024-04-08 PROCEDURE — 71046 X-RAY EXAM CHEST 2 VIEWS: CPT

## 2024-04-08 PROCEDURE — 86813 HLA TYPING A B OR C: CPT

## 2024-04-08 PROCEDURE — 85046 RETICYTE/HGB CONCENTRATE: CPT

## 2024-04-08 PROCEDURE — 2580000003 HC RX 258: Performed by: STUDENT IN AN ORGANIZED HEALTH CARE EDUCATION/TRAINING PROGRAM

## 2024-04-08 PROCEDURE — 82247 BILIRUBIN TOTAL: CPT

## 2024-04-08 PROCEDURE — 81001 URINALYSIS AUTO W/SCOPE: CPT

## 2024-04-08 PROCEDURE — 86644 CMV ANTIBODY: CPT

## 2024-04-08 PROCEDURE — 82248 BILIRUBIN DIRECT: CPT

## 2024-04-08 PROCEDURE — 30233R1 TRANSFUSION OF NONAUTOLOGOUS PLATELETS INTO PERIPHERAL VEIN, PERCUTANEOUS APPROACH: ICD-10-PCS | Performed by: INTERNAL MEDICINE

## 2024-04-08 PROCEDURE — 85025 COMPLETE CBC W/AUTO DIFF WBC: CPT

## 2024-04-08 PROCEDURE — 36591 DRAW BLOOD OFF VENOUS DEVICE: CPT

## 2024-04-08 PROCEDURE — 86880 COOMBS TEST DIRECT: CPT

## 2024-04-08 PROCEDURE — 6360000002 HC RX W HCPCS: Performed by: NURSE PRACTITIONER

## 2024-04-08 PROCEDURE — 87040 BLOOD CULTURE FOR BACTERIA: CPT

## 2024-04-08 PROCEDURE — 87635 SARS-COV-2 COVID-19 AMP PRB: CPT

## 2024-04-08 PROCEDURE — 87641 MR-STAPH DNA AMP PROBE: CPT

## 2024-04-08 PROCEDURE — 85049 AUTOMATED PLATELET COUNT: CPT

## 2024-04-08 RX ORDER — POTASSIUM CHLORIDE 7.45 MG/ML
10 INJECTION INTRAVENOUS PRN
Status: DISCONTINUED | OUTPATIENT
Start: 2024-04-08 | End: 2024-05-09

## 2024-04-08 RX ORDER — FLUOXETINE 10 MG/1
20 CAPSULE ORAL 2 TIMES DAILY
Status: DISCONTINUED | OUTPATIENT
Start: 2024-04-08 | End: 2024-04-08

## 2024-04-08 RX ORDER — DIPHENHYDRAMINE HCL 25 MG
25 CAPSULE ORAL EVERY 6 HOURS PRN
Status: DISCONTINUED | OUTPATIENT
Start: 2024-04-08 | End: 2024-05-09

## 2024-04-08 RX ORDER — ACETAMINOPHEN 325 MG/1
650 TABLET ORAL EVERY 6 HOURS PRN
Status: DISCONTINUED | OUTPATIENT
Start: 2024-04-08 | End: 2024-05-07

## 2024-04-08 RX ORDER — SENNOSIDES A AND B 8.6 MG/1
2 TABLET, FILM COATED ORAL NIGHTLY PRN
Status: DISCONTINUED | OUTPATIENT
Start: 2024-04-08 | End: 2024-04-11

## 2024-04-08 RX ORDER — ACYCLOVIR 400 MG/1
400 TABLET ORAL 2 TIMES DAILY
Status: DISCONTINUED | OUTPATIENT
Start: 2024-04-08 | End: 2024-05-09

## 2024-04-08 RX ORDER — TIZANIDINE 2 MG/1
4 TABLET ORAL NIGHTLY PRN
Status: DISCONTINUED | OUTPATIENT
Start: 2024-04-08 | End: 2024-05-09

## 2024-04-08 RX ORDER — AMLODIPINE BESYLATE 5 MG/1
5 TABLET ORAL DAILY
Status: DISCONTINUED | OUTPATIENT
Start: 2024-04-08 | End: 2024-05-06

## 2024-04-08 RX ORDER — ONDANSETRON 2 MG/ML
4 INJECTION INTRAMUSCULAR; INTRAVENOUS EVERY 6 HOURS PRN
Status: DISCONTINUED | OUTPATIENT
Start: 2024-04-08 | End: 2024-05-09

## 2024-04-08 RX ORDER — CIPROFLOXACIN 500 MG/1
500 TABLET, FILM COATED ORAL 2 TIMES DAILY
Status: DISCONTINUED | OUTPATIENT
Start: 2024-04-08 | End: 2024-04-14

## 2024-04-08 RX ORDER — POLYETHYLENE GLYCOL 3350 17 G/17G
17 POWDER, FOR SOLUTION ORAL DAILY PRN
Status: DISCONTINUED | OUTPATIENT
Start: 2024-04-08 | End: 2024-04-11

## 2024-04-08 RX ORDER — MAGNESIUM SULFATE IN WATER 40 MG/ML
2000 INJECTION, SOLUTION INTRAVENOUS PRN
Status: DISCONTINUED | OUTPATIENT
Start: 2024-04-08 | End: 2024-05-09

## 2024-04-08 RX ORDER — FAMOTIDINE 20 MG/1
40 TABLET, FILM COATED ORAL EVERY EVENING
Status: DISCONTINUED | OUTPATIENT
Start: 2024-04-08 | End: 2024-05-09

## 2024-04-08 RX ORDER — MAGNESIUM HYDROXIDE/ALUMINUM HYDROXICE/SIMETHICONE 120; 1200; 1200 MG/30ML; MG/30ML; MG/30ML
30 SUSPENSION ORAL EVERY 6 HOURS PRN
Status: DISCONTINUED | OUTPATIENT
Start: 2024-04-08 | End: 2024-05-09

## 2024-04-08 RX ORDER — POTASSIUM CHLORIDE 20 MEQ/1
40 TABLET, EXTENDED RELEASE ORAL PRN
Status: DISCONTINUED | OUTPATIENT
Start: 2024-04-08 | End: 2024-05-09

## 2024-04-08 RX ORDER — MORPHINE SULFATE 15 MG/1
15 TABLET, FILM COATED, EXTENDED RELEASE ORAL 2 TIMES DAILY
Status: DISCONTINUED | OUTPATIENT
Start: 2024-04-08 | End: 2024-05-09

## 2024-04-08 RX ORDER — PANTOPRAZOLE SODIUM 40 MG/1
40 TABLET, DELAYED RELEASE ORAL 2 TIMES DAILY
Status: DISCONTINUED | OUTPATIENT
Start: 2024-04-08 | End: 2024-05-09

## 2024-04-08 RX ORDER — HYDROXYZINE HYDROCHLORIDE 25 MG/1
25 TABLET, FILM COATED ORAL 2 TIMES DAILY
Status: DISCONTINUED | OUTPATIENT
Start: 2024-04-08 | End: 2024-05-09

## 2024-04-08 RX ORDER — LEVOTHYROXINE SODIUM 0.15 MG/1
150 TABLET ORAL
Status: DISCONTINUED | OUTPATIENT
Start: 2024-04-09 | End: 2024-05-09

## 2024-04-08 RX ORDER — HYDROCODONE BITARTRATE AND ACETAMINOPHEN 10; 325 MG/1; MG/1
1 TABLET ORAL EVERY 6 HOURS PRN
Status: DISCONTINUED | OUTPATIENT
Start: 2024-04-08 | End: 2024-05-06

## 2024-04-08 RX ORDER — HYDROXYZINE HYDROCHLORIDE 10 MG/1
25 TABLET, FILM COATED ORAL EVERY 12 HOURS
Status: DISCONTINUED | OUTPATIENT
Start: 2024-04-08 | End: 2024-04-08

## 2024-04-08 RX ORDER — LORAZEPAM 0.5 MG/1
0.5 TABLET ORAL EVERY 6 HOURS PRN
Status: DISCONTINUED | OUTPATIENT
Start: 2024-04-08 | End: 2024-04-12

## 2024-04-08 RX ORDER — FLUOXETINE 10 MG/1
20 CAPSULE ORAL 2 TIMES DAILY
Status: DISCONTINUED | OUTPATIENT
Start: 2024-04-08 | End: 2024-05-09

## 2024-04-08 RX ORDER — TRAZODONE HYDROCHLORIDE 50 MG/1
50 TABLET ORAL NIGHTLY PRN
Status: DISCONTINUED | OUTPATIENT
Start: 2024-04-08 | End: 2024-05-09

## 2024-04-08 RX ORDER — LANOLIN ALCOHOL/MO/W.PET/CERES
1.5 CREAM (GRAM) TOPICAL NIGHTLY PRN
Status: DISCONTINUED | OUTPATIENT
Start: 2024-04-08 | End: 2024-05-09

## 2024-04-08 RX ORDER — SODIUM CHLORIDE 9 MG/ML
INJECTION, SOLUTION INTRAVENOUS PRN
Status: DISCONTINUED | OUTPATIENT
Start: 2024-04-08 | End: 2024-04-16

## 2024-04-08 RX ORDER — ONDANSETRON 4 MG/1
4 TABLET, ORALLY DISINTEGRATING ORAL EVERY 8 HOURS PRN
Status: DISCONTINUED | OUTPATIENT
Start: 2024-04-08 | End: 2024-05-09

## 2024-04-08 RX ORDER — HYDROMORPHONE HYDROCHLORIDE 1 MG/ML
0.25 INJECTION, SOLUTION INTRAMUSCULAR; INTRAVENOUS; SUBCUTANEOUS EVERY 4 HOURS PRN
Status: DISCONTINUED | OUTPATIENT
Start: 2024-04-08 | End: 2024-05-04

## 2024-04-08 RX ADMIN — TIZANIDINE 4 MG: 2 TABLET ORAL at 01:16

## 2024-04-08 RX ADMIN — ACYCLOVIR 400 MG: 400 TABLET ORAL at 08:11

## 2024-04-08 RX ADMIN — FLUOXETINE HYDROCHLORIDE 20 MG: 10 CAPSULE ORAL at 01:16

## 2024-04-08 RX ADMIN — LEVOTHYROXINE SODIUM 125 MCG: 0.07 TABLET ORAL at 05:37

## 2024-04-08 RX ADMIN — ACETAMINOPHEN 650 MG: 325 TABLET ORAL at 21:30

## 2024-04-08 RX ADMIN — PANTOPRAZOLE SODIUM 40 MG: 40 TABLET, DELAYED RELEASE ORAL at 08:11

## 2024-04-08 RX ADMIN — MORPHINE SULFATE 15 MG: 15 TABLET, FILM COATED, EXTENDED RELEASE ORAL at 08:12

## 2024-04-08 RX ADMIN — VANCOMYCIN HYDROCHLORIDE 1750 MG: 10 INJECTION, POWDER, LYOPHILIZED, FOR SOLUTION INTRAVENOUS at 03:12

## 2024-04-08 RX ADMIN — HYDROXYZINE HYDROCHLORIDE 25 MG: 25 TABLET, FILM COATED ORAL at 21:31

## 2024-04-08 RX ADMIN — HYDROXYZINE HYDROCHLORIDE 25 MG: 25 TABLET, FILM COATED ORAL at 08:12

## 2024-04-08 RX ADMIN — ACETAMINOPHEN 650 MG: 325 TABLET ORAL at 05:34

## 2024-04-08 RX ADMIN — HYDROCODONE BITARTRATE AND ACETAMINOPHEN 1 TABLET: 10; 325 TABLET ORAL at 18:18

## 2024-04-08 RX ADMIN — WATER 2000 MG: 1 INJECTION INTRAMUSCULAR; INTRAVENOUS; SUBCUTANEOUS at 02:34

## 2024-04-08 RX ADMIN — MORPHINE SULFATE 15 MG: 15 TABLET, FILM COATED, EXTENDED RELEASE ORAL at 01:16

## 2024-04-08 RX ADMIN — FAMOTIDINE 40 MG: 20 TABLET, FILM COATED ORAL at 16:10

## 2024-04-08 RX ADMIN — IMMUNE GLOBULIN (HUMAN) 50 G: 10 INJECTION INTRAVENOUS; SUBCUTANEOUS at 15:47

## 2024-04-08 RX ADMIN — SODIUM CHLORIDE: 9 INJECTION, SOLUTION INTRAVENOUS at 02:45

## 2024-04-08 RX ADMIN — PANTOPRAZOLE SODIUM 40 MG: 40 TABLET, DELAYED RELEASE ORAL at 01:19

## 2024-04-08 RX ADMIN — DIPHENHYDRAMINE HYDROCHLORIDE 25 MG: 25 CAPSULE ORAL at 21:30

## 2024-04-08 RX ADMIN — FAMOTIDINE 40 MG: 20 TABLET, FILM COATED ORAL at 01:16

## 2024-04-08 RX ADMIN — PANTOPRAZOLE SODIUM 40 MG: 40 TABLET, DELAYED RELEASE ORAL at 16:10

## 2024-04-08 RX ADMIN — HYDROMORPHONE HYDROCHLORIDE 0.25 MG: 1 INJECTION, SOLUTION INTRAMUSCULAR; INTRAVENOUS; SUBCUTANEOUS at 12:43

## 2024-04-08 RX ADMIN — ONDANSETRON 4 MG: 2 INJECTION INTRAMUSCULAR; INTRAVENOUS at 03:15

## 2024-04-08 RX ADMIN — DIPHENHYDRAMINE HYDROCHLORIDE 25 MG: 25 CAPSULE ORAL at 05:34

## 2024-04-08 RX ADMIN — FLUOXETINE HYDROCHLORIDE 20 MG: 10 CAPSULE ORAL at 21:31

## 2024-04-08 RX ADMIN — MORPHINE SULFATE 15 MG: 15 TABLET, FILM COATED, EXTENDED RELEASE ORAL at 21:31

## 2024-04-08 RX ADMIN — CEFEPIME 2000 MG: 2 INJECTION, POWDER, FOR SOLUTION INTRAVENOUS at 18:24

## 2024-04-08 RX ADMIN — CEFEPIME 2000 MG: 2 INJECTION, POWDER, FOR SOLUTION INTRAVENOUS at 11:05

## 2024-04-08 RX ADMIN — HYDROXYZINE HYDROCHLORIDE 25 MG: 25 TABLET, FILM COATED ORAL at 01:19

## 2024-04-08 RX ADMIN — ACYCLOVIR 400 MG: 400 TABLET ORAL at 21:31

## 2024-04-08 RX ADMIN — FLUOXETINE HYDROCHLORIDE 20 MG: 10 CAPSULE ORAL at 08:11

## 2024-04-08 RX ADMIN — DEXAMETHASONE SODIUM PHOSPHATE 40 MG: 4 INJECTION, SOLUTION INTRAMUSCULAR; INTRAVENOUS at 15:09

## 2024-04-08 ASSESSMENT — PAIN DESCRIPTION - LOCATION
LOCATION: BACK
LOCATION: BACK
LOCATION: GENERALIZED

## 2024-04-08 ASSESSMENT — PAIN SCALES - GENERAL
PAINLEVEL_OUTOF10: 5
PAINLEVEL_OUTOF10: 9
PAINLEVEL_OUTOF10: 6
PAINLEVEL_OUTOF10: 0

## 2024-04-08 ASSESSMENT — PAIN SCALES - WONG BAKER: WONGBAKER_NUMERICALRESPONSE: NO HURT

## 2024-04-08 ASSESSMENT — PAIN DESCRIPTION - DESCRIPTORS: DESCRIPTORS: ACHING

## 2024-04-08 NOTE — CONSULTS
Value Ref Range    Source NASAL O2      SARS-CoV-2, Rapid Not detected NOTD     Influenza A/B, Molecular    Collection Time: 04/08/24  3:19 AM    Specimen: Nasopharyngeal   Result Value Ref Range    Influenza A, ELLY Not detected NOTD      Influenza B, ELLY Not detected NOTD     Lactate Dehydrogenase    Collection Time: 04/08/24  9:59 AM   Result Value Ref Range    LD 1,442 (H) 110 - 210 U/L   Reticulocytes    Collection Time: 04/08/24  9:59 AM   Result Value Ref Range    Reticulocyte Count,Automated 0.1 (L) 0.3 - 2.0 %    Absolute Retic # 0.0024 (L) 0.026 - 0.095 M/ul    Immature Retic Fraction 0.0 (L) 3.0 - 15.9 %    Retic Hemoglobin conc. 36 (H) 29 - 35 pg   DIRECT ANTIGLOBULIN TEST    Collection Time: 04/08/24  9:59 AM   Result Value Ref Range    Polyspecific Alexandra NEG    Bilirubin Total Direct & Indirect    Collection Time: 04/08/24  9:59 AM   Result Value Ref Range    Total Bilirubin 0.9 0.2 - 1.1 MG/DL    Bilirubin, Direct 0.2 <0.4 MG/DL    Bilirubin, Indirect 0.7 0.0 - 1.1 MG/DL       Imaging:    Xray Result (most recent):  XR CHEST STANDARD TWO VW 04/08/2024    Narrative  Chest X-ray    INDICATION: Neutropenia    COMPARISON: X-ray chest March 19, 2024  TECHNIQUE: PA and lateral views of the chest were obtained.    FINDINGS: Right chest port a catheter with the tip at the cavoatrial junction.  Right lung base atelectasis and or mild infiltrate. The heart size is normal.  The bony thorax is intact.    Impression  Right lung base atelectasis and or mild infiltrate        ASSESSMENT:      ICD-10-CM    1. Thrombocytopenia (HCC)  D69.6       2. Pancytopenia (HCC)  D61.818       3. Epistaxis  R04.0       4. Symptomatic anemia  D64.9       5. 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 Perez for further treatment plan,  appears in denial of the very poor prognosis and was given sufficient opportunity to address his concern and was agreeable to cooperate with care.        [place]isco [place] [address]  Office : [phone]  Fax : [phone]

## 2024-04-08 NOTE — CARE COORDINATION
Pt chart reviewed for discharge planning. MSW unable to speak with pt, spouse at bedside and verified demographic information/ health insurance. Pt lives with spouse in one level home with ramp entrance, uses no DME at this time, and drives . PCP was confirmed, last seen in the office six weeks ago. Pt spouse reports no outside services in the home at this time. CM will follow pt plan of care and assist with supportive care referrals pending pt clinical progress.  Please consult case management if specific needs arise.        04/08/24 1101   Service Assessment   Patient Orientation Person   Cognition Other (see comment)  (Pt sleeping)   History Provided By Spouse   Primary Caregiver Self   Support Systems Spouse/Significant Other;Family Members   Patient's Healthcare Decision Maker is: Legal Next of Kin   PCP Verified by CM Yes   Last Visit to PCP Within last 3 months   Prior Functional Level Independent in ADLs/IADLs   Current Functional Level Independent in ADLs/IADLs   Can patient return to prior living arrangement Yes   Ability to make needs known: Good   Family able to assist with home care needs: Yes   Financial Resources Medicare  ([employer])   Community Resources None   Social/Functional History   Lives With Spouse   Type of Home Mobile home   Home Layout One level   Home Access Ramped entrance   Bathroom Toilet Standard   Bathroom Equipment Commode   Bathroom Accessibility Accessible   Home Equipment Cane   Receives Help From Family   ADL Assistance Independent   Homemaking Assistance Independent   Ambulation Assistance Independent   Transfer Assistance Independent   Active  No   Patient's  Info Family   Mode of Transportation Family   Occupation Retired   Discharge Planning   Type of Residence Trailer/Mobile Home   Living Arrangements Spouse/Significant Other   Current Services Prior To Admission None   Potential Assistance Needed N/A   DME Ordered? No   Potential Assistance Purchasing Medications

## 2024-04-08 NOTE — ED NOTES
Assumed care of pt at this time. Pt resting quietly in bed, attached to bedside monitor. Pt denies questions and concerns at this time.      Deidra Biswas, RN  04/07/24 2983

## 2024-04-08 NOTE — ED PROVIDER NOTES
Emergency Department Provider Note                   PCP:                Kris Roblero MD               Age: 73 y.o.      Sex: female   Final diagnosis/impression:  1. Thrombocytopenia (HCC)    2. Pancytopenia (HCC)    3. Epistaxis    4. Symptomatic anemia    5. Lightheadedness       Disposition: Admit to Hospitalist    MDM/Clinical Course:  Patient seen by myself at the [facility] emergency department. Patient had signs symptoms and clinical history most consistent with bleeding from the lips and nose in the context of persistent thrombocytopenia. My independent analysis/interpretation of laboratory work-up here shows CBC shows leukopenia with white blood cell count at 0.9 which appears to be chronic, hemoglobin near baseline currently at 7.5, platelet count less than 2, BMP is generally unremarkable, magnesium is unremarkable. While under my care, patient received platelet administration as well as Afrin to help with epistaxis.  In considertion of patient clinical presentation, laboratory/radiologic findings, diagnoses/conditions previously discussed and clinical course as previously discussed, I did feel it appropriate to admit the patient for further evaluation, observation and management.  I communicated with the hospitalist in detail about the patient and they agreed to see and admit the patient.  Patient/family verbalized understanding and agreement with this course of action and plan..    Complexity of Problems Addressed:  1 or more acute illnesses that pose a threat to life or bodily function.   1 or more chronic illnesses with a severe exacerbation or progression.    Data Reviewed and Analyzed:    Category 1:   I reviewed external records: Reviewed outside labs from 4/5/2024 extremity platelets of 2  Reviewed hospital discharge summary 4/2/2024 or hospitalization related to anemia and pancytopenia  I ordered each unique test.  I reviewed the results of each unique test.    Category 2:  typographical errors may be present.  This note has not been completely proofread for errors.     Tyrone Fitzpatrick MD  04/17/24 0116

## 2024-04-08 NOTE — ED NOTES
TRANSFER - OUT REPORT:    Verbal report given to CHINO Ramirez on Constance Gomes  being transferred to 5th floor for routine progression of patient care       Report consisted of patient's Situation, Background, Assessment and   Recommendations(SBAR).     Information from the following report(s) Nurse Handoff Report was reviewed with the receiving nurse.    Rodney Fall Assessment:    Presents to emergency department  because of falls (Syncope, seizure, or loss of consciousness): No  Age > 70: Yes     Impaired Mobility: Ambulates or transfers with assistive devices or assistance; Unable to ambulate or transer.: No  Nursing Judgement: Yes          Lines:   Single Lumen Implantable Port 01/03/24 Right Subclavian (Active)       Peripheral IV 04/08/24 Right Antecubital (Active)   Site Assessment Clean, dry & intact 04/08/24 0021   Line Status Flushed 04/08/24 0021   Line Care Connections checked and tightened 04/08/24 0021   Phlebitis Assessment No symptoms 04/08/24 0021   Infiltration Assessment 0 04/08/24 0021   Alcohol Cap Used No 04/08/24 0021   Dressing Status Clean, dry & intact 04/08/24 0021   Dressing Type Transparent 04/08/24 0021        Opportunity for questions and clarification was provided.      Patient transported with:  Registered Nurse           Deidra Biswas RN  04/08/24 0022

## 2024-04-08 NOTE — ED TRIAGE NOTES
Pt brought in by EMS from home c/o dizziness, weakness, and bleeding gums. (+) nausea (-) V/D. Pt denies seeing blood in the stool. Pt reports she has leukemia and has a low platelet count. Pt reports she received platelet infusions yesterday and Friday. Provider at bedside.

## 2024-04-08 NOTE — PROGRESS NOTES
Pt seen by Dr Abdalla and orders received.    0213: Blood Cx obtained from port.  0218: Urine specimen obtained.  0219: Bld Cx obtained from peripheral site.  0234: IV Cefepime administered per order.  0255: Pt to Xray via W/C with assist.  0310: Pt returned from Xray via W/C.  0312: Vancomycin IV started per order.  0315: Medicated with Zofran IV for c/o nausea.  0319: MRSA, Covid and Flu A/B swabs sent to lab.    0530: BP noted 87/55, HR 62, Temp 99. Notified MD of current VS. No new orders received at present. To start platelets soon.    0552: Platelet transfusion started. No immediate reaction noted.    0613: BP up to 110/62, Temp remains 99. NAD noted.     0645: Platelet transfusion completed. Currently BP 93/52. Pt tolerated well.    0715: Report to oncyaima RN at .

## 2024-04-08 NOTE — ED NOTES
Spoke with blood bank, blood bank stated that pt platelets will be delayed d/t getting the product sent from the blood connection. Provider aware.      Anmol Bal RN  04/07/24 [id number]

## 2024-04-08 NOTE — PLAN OF CARE
Problem: Pain  Goal: Verbalizes/displays adequate comfort level or baseline comfort level  Outcome: Progressing     Problem: Safety - Adult  Goal: Free from fall injury  Outcome: Progressing  Flowsheets (Taken 4/8/2024 0030)  Free From Fall Injury: Instruct family/caregiver on patient safety     Problem: ABCDS Injury Assessment  Goal: Absence of physical injury  Outcome: Progressing  Flowsheets (Taken 4/8/2024 0030)  Absence of Physical Injury: Implement safety measures based on patient assessment

## 2024-04-08 NOTE — ED NOTES
Pt arrives w/ port already accessed. Port capped w/ an alcohol cap. Ok to use port per provider.      Deidra Biswas, RN  04/07/24 6579

## 2024-04-08 NOTE — CONSENT
Informed Consent for Blood Component Transfusion Note    I have discussed with the patient the rationale for blood component transfusion; its benefits in treating or preventing fatigue, organ damage, or death; and its risk which includes mild transfusion reactions, rare risk of blood borne infection, or more serious but rare reactions. I have discussed the alternatives to transfusion, including the risk and consequences of not receiving transfusion. The patient had an opportunity to ask questions and had agreed to proceed with transfusion of blood components.    Electronically signed by Tyrone Fitzpatrick MD on 4/7/24 at 11:04 PM EDT

## 2024-04-08 NOTE — PROGRESS NOTES
VANCO NOTE    [facility]   Pharmacy Pharmacokinetic Monitoring Service - Vancomycin    Consulting Provider: Saman Abdalla MD    Indication: Neutropenic Fever  Target Concentration: Goal AUC/MARIAMA 400-600 mg*hr/L  Day of Therapy: 1  Additional Antimicrobials: Cefepime, acyclovir    Patient eligible for piperacillin-tazobactam to cefepime auto-substitution per P&T approved protocol? N/A    Pertinent Laboratory Values:   Wt Readings from Last 1 Encounters:   04/08/24 73.6 kg (162 lb 3.2 oz)     Temp Readings from Last 1 Encounters:   04/08/24 98.4 °F (36.9 °C) (Oral)     Recent Labs     04/05/24  0936 04/06/24  0936 04/07/24  2229 04/08/24  0213   BUN 25* 20 14  --    CREATININE 0.80 0.70 0.70  --    WBC 1.0* 0.9* 1.1* 1.3*     Estimated Creatinine Clearance: 65 mL/min (based on SCr of 0.7 mg/dL).    Lab Results   Component Value Date/Time    VANCORANDOM 7.7 12/07/2023 02:28 AM       MRSA Nasal Swab: N/A. Non-respiratory infection    Assessment:  Date/Time Dose Concentration AUC         Note: Serum concentrations collected for AUC dosing may appear elevated if collected in close proximity to the dose administered, this is not necessarily an indication of toxicity    Plan:  Dosing recommendations based on Bayesian software and patient specific kinetics  Start vancomycin 1750mg IV x 1 then 1000mg IV q12h  Anticipated AUC of 474 and trough concentration of 13 at steady state  Renal labs as indicated   Vancomycin concentrations will be ordered as clinically appropriate   Pharmacy will continue to monitor patient and adjust therapy as indicated    Thank you for the consult,  Destiny Leblanc [facility]

## 2024-04-08 NOTE — H&P
Hospitalist History and Physical   Admit Date:  2024 10:18 PM   Name:  Constance Gomes   Age:  73 y.o.  Sex:  female  :  1950   MRN:  [id number]   [facility]    Presenting Complaint: Low Platelet Count, Dizziness, and Fatigue     Reason(s) for Admission: Epistaxis [R04.0]  Lightheadedness [R42]  Thrombocytopenia (HCC) [D69.6]  Pancytopenia (HCC) [D61.818]  Symptomatic anemia [D64.9]     History of Present Illness:   73-year-old female with AML (status post induction, was previously on venetoclax that has been recently stopped and recently discharged  due to refractory thrombocytopenia and status post pulse dose dexamethasone, IVIG x 4 days and multiple refractory platelet transfusions), and then just 3 days after discharge on  as well as  received outpatient 1 unit platelet transfusions due to platelets again less than 2 presents to the ER 1 day later () with active bleeding/oozing from her nose lips and gums, again found to have platelets less than 2.  Was just discharged in the hospital, and then platelets remained low on  as well as yesterday , and she went in for 2 separate infusions for 1 unit of platelets each day.  Then she had these new symptoms of mucosal bleeding today, platelets have not responded.  In addition to bleeding gums, she is complaining of generalized dizziness and weakness.  She has not seen any blood in her stool.  Finally, patient's  tells me (later on in the ED stay) that she had fevers today 100.5 and then on recheck 102 at home.  In our emergency room, VSS, sodium 133, WBCs of 1100 with ANC of 200 (similar to her CBC on discharge ), hemoglobin 7.2 (was 7.4 on the day of discharge) INR of 1.1, platelets undetectable.  Emergency room started a transfusion of 1 unit of platelets with request for admission.    Obtained medication history by reviewing the most recently filled/dispensed meds per the EMR and the most updated, clinically

## 2024-04-08 NOTE — PROGRESS NOTES
TRANSFER - IN REPORT:    Verbal report received from Deidra Biswas RN on Constance Gomes  being received from ER for routine progression of patient care      Report consisted of patient's Situation, Background, Assessment and   Recommendations(SBAR).     Information from the following report(s) Nurse Handoff Report, ED Encounter Summary, ED SBAR, and Intake/Output was reviewed with the receiving nurse.    Opportunity for questions and clarification was provided.      Assessment to be completed upon patient's arrival to unit and care assumed.

## 2024-04-09 LAB
ALBUMIN SERPL-MCNC: 2.9 G/DL (ref 3.2–4.6)
ALBUMIN/GLOB SERPL: 0.5 (ref 0.4–1.6)
ALP SERPL-CCNC: 101 U/L (ref 50–136)
ALT SERPL-CCNC: 35 U/L (ref 12–65)
ANION GAP SERPL CALC-SCNC: 4 MMOL/L (ref 2–11)
AST SERPL-CCNC: 62 U/L (ref 15–37)
BASOPHILS # BLD: 0 K/UL (ref 0–0.2)
BASOPHILS # BLD: 0 K/UL (ref 0–0.2)
BASOPHILS NFR BLD: 0 % (ref 0–2)
BASOPHILS NFR BLD: 1 % (ref 0–2)
BILIRUB SERPL-MCNC: 0.7 MG/DL (ref 0.2–1.1)
BLD PROD TYP BPU: NORMAL
BLOOD BANK BLOOD PRODUCT EXPIRATION DATE: NORMAL
BLOOD BANK CMNT PATIENT-IMP: NORMAL
BLOOD BANK DISPENSE STATUS: NORMAL
BLOOD BANK ISBT PRODUCT BLOOD TYPE: 5100
BLOOD BANK UNIT TYPE AND RH: NORMAL
BPU ID: NORMAL
BUN SERPL-MCNC: 20 MG/DL (ref 8–23)
CALCIUM SERPL-MCNC: 9.3 MG/DL (ref 8.3–10.4)
CHLORIDE SERPL-SCNC: 106 MMOL/L (ref 103–113)
CO2 SERPL-SCNC: 26 MMOL/L (ref 21–32)
CREAT SERPL-MCNC: 0.7 MG/DL (ref 0.6–1)
DIFFERENTIAL METHOD BLD: ABNORMAL
DIFFERENTIAL METHOD BLD: ABNORMAL
EOSINOPHIL # BLD: 0 K/UL (ref 0–0.8)
EOSINOPHIL # BLD: 0 K/UL (ref 0–0.8)
EOSINOPHIL NFR BLD: 0 % (ref 0.5–7.8)
EOSINOPHIL NFR BLD: 0 % (ref 0.5–7.8)
ERYTHROCYTE [DISTWIDTH] IN BLOOD BY AUTOMATED COUNT: 14.6 % (ref 11.9–14.6)
ERYTHROCYTE [DISTWIDTH] IN BLOOD BY AUTOMATED COUNT: 14.7 % (ref 11.9–14.6)
GLOBULIN SER CALC-MCNC: 6.3 G/DL (ref 2.8–4.5)
GLUCOSE SERPL-MCNC: 138 MG/DL (ref 65–100)
HAPTOGLOB SERPL-MCNC: 144 MG/DL (ref 30–200)
HCT VFR BLD AUTO: 18.5 % (ref 35.8–46.3)
HCT VFR BLD AUTO: 22.4 % (ref 35.8–46.3)
HGB BLD-MCNC: 6.3 G/DL (ref 11.7–15.4)
HGB BLD-MCNC: 7.6 G/DL (ref 11.7–15.4)
HISTORY CHECK: NORMAL
IMM GRANULOCYTES # BLD AUTO: 0 K/UL (ref 0–0.5)
IMM GRANULOCYTES # BLD AUTO: 0.1 K/UL (ref 0–0.5)
IMM GRANULOCYTES NFR BLD AUTO: 10 % (ref 0–5)
IMM GRANULOCYTES NFR BLD AUTO: 5 % (ref 0–5)
LYMPHOCYTES # BLD: 0.5 K/UL (ref 0.5–4.6)
LYMPHOCYTES # BLD: 0.6 K/UL (ref 0.5–4.6)
LYMPHOCYTES NFR BLD: 55 % (ref 13–44)
LYMPHOCYTES NFR BLD: 57 % (ref 13–44)
MCH RBC QN AUTO: 28.6 PG (ref 26.1–32.9)
MCH RBC QN AUTO: 28.8 PG (ref 26.1–32.9)
MCHC RBC AUTO-ENTMCNC: 33.9 G/DL (ref 31.4–35)
MCHC RBC AUTO-ENTMCNC: 34.1 G/DL (ref 31.4–35)
MCV RBC AUTO: 84.2 FL (ref 82–102)
MCV RBC AUTO: 84.5 FL (ref 82–102)
MONOCYTES # BLD: 0 K/UL (ref 0.1–1.3)
MONOCYTES # BLD: 0 K/UL (ref 0.1–1.3)
MONOCYTES NFR BLD: 4 % (ref 4–12)
MONOCYTES NFR BLD: 5 % (ref 4–12)
NEUTS SEG # BLD: 0.2 K/UL (ref 1.7–8.2)
NEUTS SEG # BLD: 0.3 K/UL (ref 1.7–8.2)
NEUTS SEG NFR BLD: 30 % (ref 43–78)
NEUTS SEG NFR BLD: 33 % (ref 43–78)
NRBC # BLD: 0 K/UL (ref 0–0.2)
NRBC # BLD: 0 K/UL (ref 0–0.2)
PLATELET # BLD AUTO: 10 K/UL (ref 150–450)
PLATELET # BLD AUTO: 18 K/UL (ref 150–450)
PLATELET # BLD AUTO: 2 K/UL (ref 150–450)
PLATELET COMMENT: ABNORMAL
PLATELET COMMENT: ABNORMAL
PMV BLD AUTO: 9.7 FL (ref 9.4–12.3)
PMV BLD AUTO: ABNORMAL FL (ref 9.4–12.3)
POTASSIUM SERPL-SCNC: 3.9 MMOL/L (ref 3.5–5.1)
PROT SERPL-MCNC: 9.2 G/DL (ref 6.3–8.2)
RBC # BLD AUTO: 2.19 M/UL (ref 4.05–5.2)
RBC # BLD AUTO: 2.66 M/UL (ref 4.05–5.2)
RBC MORPH BLD: ABNORMAL
RBC MORPH BLD: ABNORMAL
SODIUM SERPL-SCNC: 136 MMOL/L (ref 136–146)
UNIT DIVISION: 0
UNIT ISSUE DATE/TIME: NORMAL
WBC # BLD AUTO: 0.8 K/UL (ref 4.3–11.1)
WBC # BLD AUTO: 0.9 K/UL (ref 4.3–11.1)
WBC MORPH BLD: ABNORMAL
WBC MORPH BLD: ABNORMAL

## 2024-04-09 PROCEDURE — 86644 CMV ANTIBODY: CPT

## 2024-04-09 PROCEDURE — 30233P1 TRANSFUSION OF NONAUTOLOGOUS FROZEN RED CELLS INTO PERIPHERAL VEIN, PERCUTANEOUS APPROACH: ICD-10-PCS | Performed by: INTERNAL MEDICINE

## 2024-04-09 PROCEDURE — 6370000000 HC RX 637 (ALT 250 FOR IP): Performed by: STUDENT IN AN ORGANIZED HEALTH CARE EDUCATION/TRAINING PROGRAM

## 2024-04-09 PROCEDURE — 99233 SBSQ HOSP IP/OBS HIGH 50: CPT | Performed by: INTERNAL MEDICINE

## 2024-04-09 PROCEDURE — P9040 RBC LEUKOREDUCED IRRADIATED: HCPCS

## 2024-04-09 PROCEDURE — 86921 COMPATIBILITY TEST INCUBATE: CPT

## 2024-04-09 PROCEDURE — 85025 COMPLETE CBC W/AUTO DIFF WBC: CPT

## 2024-04-09 PROCEDURE — 36430 TRANSFUSION BLD/BLD COMPNT: CPT

## 2024-04-09 PROCEDURE — 80053 COMPREHEN METABOLIC PANEL: CPT

## 2024-04-09 PROCEDURE — 36591 DRAW BLOOD OFF VENOUS DEVICE: CPT

## 2024-04-09 PROCEDURE — 2580000003 HC RX 258: Performed by: NURSE PRACTITIONER

## 2024-04-09 PROCEDURE — 86813 HLA TYPING A B OR C: CPT

## 2024-04-09 PROCEDURE — P9037 PLATE PHERES LEUKOREDU IRRAD: HCPCS

## 2024-04-09 PROCEDURE — 2580000003 HC RX 258: Performed by: STUDENT IN AN ORGANIZED HEALTH CARE EDUCATION/TRAINING PROGRAM

## 2024-04-09 PROCEDURE — 86922 COMPATIBILITY TEST ANTIGLOB: CPT

## 2024-04-09 PROCEDURE — 6360000002 HC RX W HCPCS: Performed by: INTERNAL MEDICINE

## 2024-04-09 PROCEDURE — 6370000000 HC RX 637 (ALT 250 FOR IP): Performed by: NURSE PRACTITIONER

## 2024-04-09 PROCEDURE — 6360000002 HC RX W HCPCS: Performed by: NURSE PRACTITIONER

## 2024-04-09 PROCEDURE — 85049 AUTOMATED PLATELET COUNT: CPT

## 2024-04-09 PROCEDURE — 6360000002 HC RX W HCPCS: Performed by: STUDENT IN AN ORGANIZED HEALTH CARE EDUCATION/TRAINING PROGRAM

## 2024-04-09 PROCEDURE — 1100000000 HC RM PRIVATE

## 2024-04-09 RX ORDER — PANTOPRAZOLE SODIUM 40 MG/1
TABLET, DELAYED RELEASE ORAL
Qty: 180 TABLET | Refills: 1 | Status: SHIPPED | OUTPATIENT
Start: 2024-04-09

## 2024-04-09 RX ORDER — SODIUM CHLORIDE 9 MG/ML
INJECTION, SOLUTION INTRAVENOUS PRN
Status: DISCONTINUED | OUTPATIENT
Start: 2024-04-09 | End: 2024-04-16

## 2024-04-09 RX ORDER — FLUOXETINE HYDROCHLORIDE 20 MG/1
CAPSULE ORAL
Qty: 180 CAPSULE | Refills: 1 | Status: SHIPPED | OUTPATIENT
Start: 2024-04-09

## 2024-04-09 RX ORDER — AMLODIPINE BESYLATE 5 MG/1
TABLET ORAL
Qty: 90 TABLET | Refills: 1 | Status: SHIPPED | OUTPATIENT
Start: 2024-04-09

## 2024-04-09 RX ADMIN — PANTOPRAZOLE SODIUM 40 MG: 40 TABLET, DELAYED RELEASE ORAL at 09:38

## 2024-04-09 RX ADMIN — HYDROXYZINE HYDROCHLORIDE 25 MG: 25 TABLET, FILM COATED ORAL at 09:34

## 2024-04-09 RX ADMIN — ACYCLOVIR 400 MG: 400 TABLET ORAL at 21:33

## 2024-04-09 RX ADMIN — DIPHENHYDRAMINE HYDROCHLORIDE 25 MG: 25 CAPSULE ORAL at 16:31

## 2024-04-09 RX ADMIN — PANTOPRAZOLE SODIUM 40 MG: 40 TABLET, DELAYED RELEASE ORAL at 15:21

## 2024-04-09 RX ADMIN — CEFEPIME 2000 MG: 2 INJECTION, POWDER, FOR SOLUTION INTRAVENOUS at 18:19

## 2024-04-09 RX ADMIN — HYDROXYZINE HYDROCHLORIDE 25 MG: 25 TABLET, FILM COATED ORAL at 21:33

## 2024-04-09 RX ADMIN — LEVOTHYROXINE SODIUM 150 MCG: 0.15 TABLET ORAL at 09:38

## 2024-04-09 RX ADMIN — ACETAMINOPHEN 650 MG: 325 TABLET ORAL at 09:34

## 2024-04-09 RX ADMIN — FLUOXETINE HYDROCHLORIDE 20 MG: 10 CAPSULE ORAL at 09:34

## 2024-04-09 RX ADMIN — CEFEPIME 2000 MG: 2 INJECTION, POWDER, FOR SOLUTION INTRAVENOUS at 03:17

## 2024-04-09 RX ADMIN — MORPHINE SULFATE 15 MG: 15 TABLET, FILM COATED, EXTENDED RELEASE ORAL at 21:33

## 2024-04-09 RX ADMIN — MORPHINE SULFATE 15 MG: 15 TABLET, FILM COATED, EXTENDED RELEASE ORAL at 09:34

## 2024-04-09 RX ADMIN — POLYETHYLENE GLYCOL 3350 17 G: 17 POWDER, FOR SOLUTION ORAL at 18:10

## 2024-04-09 RX ADMIN — DEXAMETHASONE SODIUM PHOSPHATE 40 MG: 4 INJECTION, SOLUTION INTRAMUSCULAR; INTRAVENOUS at 11:11

## 2024-04-09 RX ADMIN — ACYCLOVIR 400 MG: 400 TABLET ORAL at 09:34

## 2024-04-09 RX ADMIN — IMMUNE GLOBULIN (HUMAN) 50 G: 10 INJECTION INTRAVENOUS; SUBCUTANEOUS at 11:43

## 2024-04-09 RX ADMIN — CEFEPIME 2000 MG: 2 INJECTION, POWDER, FOR SOLUTION INTRAVENOUS at 09:58

## 2024-04-09 RX ADMIN — FLUOXETINE HYDROCHLORIDE 20 MG: 10 CAPSULE ORAL at 21:33

## 2024-04-09 RX ADMIN — FAMOTIDINE 40 MG: 20 TABLET, FILM COATED ORAL at 15:21

## 2024-04-09 RX ADMIN — ACETAMINOPHEN 650 MG: 325 TABLET ORAL at 16:31

## 2024-04-09 ASSESSMENT — PAIN SCALES - GENERAL
PAINLEVEL_OUTOF10: 0
PAINLEVEL_OUTOF10: 0

## 2024-04-09 NOTE — PROGRESS NOTES
Comprehensive Nutrition Assessment    Type and Reason for Visit: Initial, Positive Nutrition Screen  Malnutrition Screening Tool: Malnutrition Screen  Have you recently lost weight without trying?: 2 to 13 pounds (1 point)  Have you been eating poorly because of a decreased appetite?: Yes (1 point)  Malnutrition Screening Tool Score: 2    Nutrition Recommendations/Plan:   Meals and Snacks:  Diet: Continue current order  Nutrition Supplement Therapy:  Medical food supplement therapy:  Initiate Ensure Clear three times per day (this provides 240 kcal and 8 grams protein per bottle)     Malnutrition Assessment:  Malnutrition Status: At risk for malnutrition (Comment) (recurrent and prolonged admissions, variable intakes, wt loss)    No wasting  Nutrition Assessment:  Nutrition History: Patient known to clinical nutrition from previous admissions. She initially lost weight with COVID in May 2022 due to loss of taste. During previous admissions she ate fair during previous admissions and used Ensure intermittently to supplement intake. Patient reports she has been eating fair since recent discharge.  at bedside states she started going downhill recently and her PO declined with it. He states the most he has been able to get her to eat is half a cheeseburger.      Do You Have Any Cultural, Caodaism, or Ethnic Food Preferences?: No   Weight History: 6/20/23 180#, 10/10/23 173#, 1/8/24 160#. This reveals a 9.9% weight loss in ~10 months, 6.2% weight loss in last 6 months. This is notable but not clinically significant.  Nutrition Background:       PMH significant for psoriatic arthritis on Humira, ovarian cancer, HTN, HLD, GERD< AML s/p chemo and SCT. She presented with dizziness and gum bleeding. She was admitted with neutropenic fever, refractory thrombocytopenia, and relapsed AML.   Nutrition Interval:  Patient up to recliner with  at bedside. She states her appetite is fair. She reports recalls about 75%

## 2024-04-09 NOTE — PLAN OF CARE
Problem: Pain  Goal: Verbalizes/displays adequate comfort level or baseline comfort level  4/9/2024 1207 by Natacha Salinas RN  Outcome: Progressing  Flowsheets  Taken 4/9/2024 0930 by Natacha Salinas RN  Verbalizes/displays adequate comfort level or baseline comfort level: Encourage patient to monitor pain and request assistance  Taken 4/9/2024 0315 by Leonor George RN  Verbalizes/displays adequate comfort level or baseline comfort level:   Encourage patient to monitor pain and request assistance   Assess pain using appropriate pain scale   Administer analgesics based on type and severity of pain and evaluate response   Implement non-pharmacological measures as appropriate and evaluate response   Consider cultural and social influences on pain and pain management  4/8/2024 2230 by Leonor George RN  Outcome: Progressing  Flowsheets (Taken 4/8/2024 2100)  Verbalizes/displays adequate comfort level or baseline comfort level:   Encourage patient to monitor pain and request assistance   Assess pain using appropriate pain scale   Administer analgesics based on type and severity of pain and evaluate response   Implement non-pharmacological measures as appropriate and evaluate response   Consider cultural and social influences on pain and pain management     Problem: Safety - Adult  Goal: Free from fall injury  4/9/2024 1207 by Natacha Salinas RN  Outcome: Progressing  4/8/2024 2230 by Leonor George RN  Outcome: Progressing     Problem: ABCDS Injury Assessment  Goal: Absence of physical injury  4/8/2024 2230 by Leonor George RN  Outcome: Progressing

## 2024-04-09 NOTE — PROGRESS NOTES
[facility] Hematology & Oncology        Inpatient Hematology / Oncology Progress Note    Reason for Consult:  Epistaxis [R04.0]  Lightheadedness [R42]  Thrombocytopenia (HCC) [D69.6]  Pancytopenia (HCC) [D61.818]  Symptomatic anemia [D64.9]  Referring Physician:  Tamela Nick MD    24 Hour Events:  VSS, afebrile  Day 2 Cefepime for NF, BC NGTD  Day 2 IVIG/pulse dex for thrombocytopenia  Plts up to 10k after transfusion  Hgb down to 6.3 - transfusing  No complaints, gingival/oral bleeding resolved        ROS:  Constitutional: Negative for fever, chills, weakness, malaise, fatigue.  CV: Negative for chest pain, palpitations, edema.  Respiratory: Negative for dyspnea, cough, wheezing.  GI: Negative for nausea, abdominal pain, diarrhea.    10 point review of systems is otherwise negative with the exception of the elements mentioned above in the HPI.           Allergies   Allergen Reactions    Penicillins Hives    Sulfa Antibiotics Hives    Codeine Nausea And Vomiting     Past Medical History:   Diagnosis Date    Arthritis     Autoimmune disease (HCC)     skin changes, unknown name    Cancer (HCC) 1990    ovarian    Chronic pain     arthritis in back and legs    Coronary artery spasm (HCC)     COVID 05/15/2022    Essential hypertension 11/8/2019    Gastritis     GERD (gastroesophageal reflux disease)     Hx antineoplastic chemo     Migraine headache     Psoriasis     Psychiatric disorder     anxiety and depression    Severe obesity (BMI 35.0-39.9) 6/26/2018    Thyroid disease     Diagnosed in 1996     Past Surgical History:   Procedure Laterality Date    CARPAL TUNNEL RELEASE      GYN      ovaries    HYSTERECTOMY, TOTAL ABDOMINAL (CERVIX REMOVED)  1990    IR PORT PLACEMENT EQUAL OR GREATER THAN 5 YEARS  1/3/2024    IR PORT PLACEMENT EQUAL OR GREATER THAN 5 YEARS 1/3/2024 [facility] RADIOLOGY SPECIALS    [place] UNLISTED PROCEDURE CARDIAC SURGERY      cardiac cath.  Dx with spasms.    TUBAL LIGATION       Family History

## 2024-04-09 NOTE — PROGRESS NOTES
Patient sleepy this AM. 1 unit of RBCs given. Recheck CBC. 1 unit of plts given.  Tolerated IVIG well.

## 2024-04-09 NOTE — PROGRESS NOTES
CH attempted to visit PT, but PT was asleep. CH prayed silently for PT, PT's Family, and Staff. PT is Bahai.    Rev. Neelam Wong M.Div.

## 2024-04-09 NOTE — PROGRESS NOTES
CH attempted to visit earlier but PT was sleeping. PT was in chair with Spouse at bedside. PT updated CH on health and hospitalization. PT and Spouse expressed appreciation for each other and engaged in engaged in life review. PT and Spouse have been  for 54 years and met in high school band. 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 PT and Spouse expressed importance of jatinder and prayer. PT is Adventism. CH offered prayer. CH thanked PT and Spouse for visit and offered support.     . Neelam Wong M.Div.

## 2024-04-10 ENCOUNTER — HOSPITAL ENCOUNTER (OUTPATIENT)
Dept: INFUSION THERAPY | Age: 74
Setting detail: INFUSION SERIES
End: 2024-04-10

## 2024-04-10 LAB
ALBUMIN SERPL-MCNC: 2.7 G/DL (ref 3.2–4.6)
ALBUMIN/GLOB SERPL: 0.4 (ref 0.4–1.6)
ALP SERPL-CCNC: 94 U/L (ref 50–136)
ALT SERPL-CCNC: 33 U/L (ref 12–65)
AMYLASE SERPL-CCNC: 52 U/L (ref 25–115)
ANION GAP SERPL CALC-SCNC: 3 MMOL/L (ref 2–11)
AST SERPL-CCNC: 54 U/L (ref 15–37)
BASOPHILS # BLD: 0 K/UL (ref 0–0.2)
BASOPHILS NFR BLD: 0 % (ref 0–2)
BILIRUB SERPL-MCNC: 0.7 MG/DL (ref 0.2–1.1)
BLD PROD TYP BPU: NORMAL
BLD PROD TYP BPU: NORMAL
BLOOD BANK BLOOD PRODUCT EXPIRATION DATE: NORMAL
BLOOD BANK BLOOD PRODUCT EXPIRATION DATE: NORMAL
BLOOD BANK CMNT PATIENT-IMP: NORMAL
BLOOD BANK CMNT PATIENT-IMP: NORMAL
BLOOD BANK DISPENSE STATUS: NORMAL
BLOOD BANK DISPENSE STATUS: NORMAL
BLOOD BANK ISBT PRODUCT BLOOD TYPE: 5100
BLOOD BANK ISBT PRODUCT BLOOD TYPE: 5100
BLOOD BANK PRODUCT CODE: NORMAL
BLOOD BANK PRODUCT CODE: NORMAL
BLOOD BANK UNIT TYPE AND RH: NORMAL
BLOOD BANK UNIT TYPE AND RH: NORMAL
BPU ID: NORMAL
BPU ID: NORMAL
BUN SERPL-MCNC: 22 MG/DL (ref 8–23)
CALCIUM SERPL-MCNC: 9.1 MG/DL (ref 8.3–10.4)
CHLORIDE SERPL-SCNC: 107 MMOL/L (ref 103–113)
CHOLEST SERPL-MCNC: 188 MG/DL
CO2 SERPL-SCNC: 26 MMOL/L (ref 21–32)
CREAT SERPL-MCNC: 0.7 MG/DL (ref 0.6–1)
DIFFERENTIAL METHOD BLD: ABNORMAL
EOSINOPHIL # BLD: 0 K/UL (ref 0–0.8)
EOSINOPHIL NFR BLD: 0 % (ref 0.5–7.8)
ERYTHROCYTE [DISTWIDTH] IN BLOOD BY AUTOMATED COUNT: 14.6 % (ref 11.9–14.6)
ERYTHROCYTE [DISTWIDTH] IN BLOOD BY AUTOMATED COUNT: 14.8 % (ref 11.9–14.6)
ERYTHROCYTE [DISTWIDTH] IN BLOOD BY AUTOMATED COUNT: 15.1 % (ref 11.9–14.6)
GLOBULIN SER CALC-MCNC: 6.7 G/DL (ref 2.8–4.5)
GLUCOSE SERPL-MCNC: 115 MG/DL (ref 65–100)
HAV IGM SER QL: NONREACTIVE
HBV CORE IGM SER QL: NONREACTIVE
HBV SURFACE AG SER QL: NONREACTIVE
HCT VFR BLD AUTO: 18.9 % (ref 35.8–46.3)
HCT VFR BLD AUTO: 19.6 % (ref 35.8–46.3)
HCT VFR BLD AUTO: 23.2 % (ref 35.8–46.3)
HCV AB SER QL: NONREACTIVE
HDLC SERPL-MCNC: 20 MG/DL (ref 40–60)
HGB BLD-MCNC: 6.4 G/DL (ref 11.7–15.4)
HGB BLD-MCNC: 6.7 G/DL (ref 11.7–15.4)
HGB BLD-MCNC: 7.7 G/DL (ref 11.7–15.4)
HISTORY CHECK: NORMAL
IMM GRANULOCYTES # BLD AUTO: 0 K/UL (ref 0–0.5)
IMM GRANULOCYTES # BLD AUTO: 0 K/UL (ref 0–0.5)
IMM GRANULOCYTES # BLD AUTO: 0.1 K/UL (ref 0–0.5)
IMM GRANULOCYTES NFR BLD AUTO: 5 % (ref 0–5)
IMM GRANULOCYTES NFR BLD AUTO: 6 % (ref 0–5)
IMM GRANULOCYTES NFR BLD AUTO: 9 % (ref 0–5)
LDLC SERPL DIRECT ASSAY-MCNC: 133 MG/DL
LIPASE SERPL-CCNC: 99 U/L (ref 73–393)
LYMPHOCYTES # BLD: 0.4 K/UL (ref 0.5–4.6)
LYMPHOCYTES # BLD: 0.4 K/UL (ref 0.5–4.6)
LYMPHOCYTES # BLD: 0.6 K/UL (ref 0.5–4.6)
LYMPHOCYTES NFR BLD: 57 % (ref 13–44)
LYMPHOCYTES NFR BLD: 59 % (ref 13–44)
LYMPHOCYTES NFR BLD: 60 % (ref 13–44)
MAGNESIUM SERPL-MCNC: 1.9 MG/DL (ref 1.8–2.4)
MCH RBC QN AUTO: 27.7 PG (ref 26.1–32.9)
MCH RBC QN AUTO: 28.5 PG (ref 26.1–32.9)
MCH RBC QN AUTO: 28.7 PG (ref 26.1–32.9)
MCHC RBC AUTO-ENTMCNC: 33.2 G/DL (ref 31.4–35)
MCHC RBC AUTO-ENTMCNC: 33.8 G/DL (ref 31.4–35)
MCHC RBC AUTO-ENTMCNC: 33.9 G/DL (ref 31.4–35)
MCV RBC AUTO: 83.5 FL (ref 82–102)
MCV RBC AUTO: 84.3 FL (ref 82–102)
MCV RBC AUTO: 84.8 FL (ref 82–102)
MONOCYTES # BLD: 0 K/UL (ref 0.1–1.3)
MONOCYTES # BLD: 0 K/UL (ref 0.1–1.3)
MONOCYTES # BLD: 0.1 K/UL (ref 0.1–1.3)
MONOCYTES NFR BLD: 1 % (ref 4–12)
MONOCYTES NFR BLD: 10 % (ref 4–12)
MONOCYTES NFR BLD: 5 % (ref 4–12)
NEUTS SEG # BLD: 0.2 K/UL (ref 1.7–8.2)
NEUTS SEG NFR BLD: 25 % (ref 43–78)
NEUTS SEG NFR BLD: 30 % (ref 43–78)
NEUTS SEG NFR BLD: 33 % (ref 43–78)
NRBC # BLD: 0 K/UL (ref 0–0.2)
PLATELET # BLD AUTO: 16 K/UL (ref 150–450)
PLATELET # BLD AUTO: 24 K/UL (ref 150–450)
PLATELET # BLD AUTO: 8 K/UL (ref 150–450)
PLATELET # BLD AUTO: 9 K/UL (ref 150–450)
PLATELET COMMENT: ABNORMAL
PMV BLD AUTO: 8.7 FL (ref 9.4–12.3)
PMV BLD AUTO: 9.3 FL (ref 9.4–12.3)
PMV BLD AUTO: 9.4 FL (ref 9.4–12.3)
POTASSIUM SERPL-SCNC: 3.8 MMOL/L (ref 3.5–5.1)
PROT SERPL-MCNC: 9.4 G/DL (ref 6.3–8.2)
RBC # BLD AUTO: 2.23 M/UL (ref 4.05–5.2)
RBC # BLD AUTO: 2.35 M/UL (ref 4.05–5.2)
RBC # BLD AUTO: 2.78 M/UL (ref 4.05–5.2)
RBC MORPH BLD: ABNORMAL
SODIUM SERPL-SCNC: 136 MMOL/L (ref 136–146)
TRIGL SERPL-MCNC: 195 MG/DL (ref 35–150)
TROPONIN I SERPL HS-MCNC: 21.3 PG/ML (ref 0–14)
UNIT DIVISION: 0
UNIT DIVISION: 0
UNIT ISSUE DATE/TIME: NORMAL
UNIT ISSUE DATE/TIME: NORMAL
URATE SERPL-MCNC: 2.4 MG/DL (ref 2.6–6)
WBC # BLD AUTO: 0.7 K/UL (ref 4.3–11.1)
WBC # BLD AUTO: 0.7 K/UL (ref 4.3–11.1)
WBC # BLD AUTO: 0.8 K/UL (ref 4.3–11.1)
WBC MORPH BLD: ABNORMAL

## 2024-04-10 PROCEDURE — 80074 ACUTE HEPATITIS PANEL: CPT

## 2024-04-10 PROCEDURE — 36430 TRANSFUSION BLD/BLD COMPNT: CPT

## 2024-04-10 PROCEDURE — P9037 PLATE PHERES LEUKOREDU IRRAD: HCPCS

## 2024-04-10 PROCEDURE — 36591 DRAW BLOOD OFF VENOUS DEVICE: CPT

## 2024-04-10 PROCEDURE — 2580000003 HC RX 258: Performed by: NURSE PRACTITIONER

## 2024-04-10 PROCEDURE — 83690 ASSAY OF LIPASE: CPT

## 2024-04-10 PROCEDURE — 6360000002 HC RX W HCPCS: Performed by: NURSE PRACTITIONER

## 2024-04-10 PROCEDURE — 83735 ASSAY OF MAGNESIUM: CPT

## 2024-04-10 PROCEDURE — 83721 ASSAY OF BLOOD LIPOPROTEIN: CPT

## 2024-04-10 PROCEDURE — 83718 ASSAY OF LIPOPROTEIN: CPT

## 2024-04-10 PROCEDURE — 6360000002 HC RX W HCPCS: Performed by: INTERNAL MEDICINE

## 2024-04-10 PROCEDURE — 99233 SBSQ HOSP IP/OBS HIGH 50: CPT | Performed by: INTERNAL MEDICINE

## 2024-04-10 PROCEDURE — 85025 COMPLETE CBC W/AUTO DIFF WBC: CPT

## 2024-04-10 PROCEDURE — 2580000003 HC RX 258: Performed by: STUDENT IN AN ORGANIZED HEALTH CARE EDUCATION/TRAINING PROGRAM

## 2024-04-10 PROCEDURE — 1100000000 HC RM PRIVATE

## 2024-04-10 PROCEDURE — 86813 HLA TYPING A B OR C: CPT

## 2024-04-10 PROCEDURE — 84484 ASSAY OF TROPONIN QUANT: CPT

## 2024-04-10 PROCEDURE — 84478 ASSAY OF TRIGLYCERIDES: CPT

## 2024-04-10 PROCEDURE — 85049 AUTOMATED PLATELET COUNT: CPT

## 2024-04-10 PROCEDURE — P9040 RBC LEUKOREDUCED IRRADIATED: HCPCS

## 2024-04-10 PROCEDURE — 84550 ASSAY OF BLOOD/URIC ACID: CPT

## 2024-04-10 PROCEDURE — 82465 ASSAY BLD/SERUM CHOLESTEROL: CPT

## 2024-04-10 PROCEDURE — 80053 COMPREHEN METABOLIC PANEL: CPT

## 2024-04-10 PROCEDURE — 6370000000 HC RX 637 (ALT 250 FOR IP): Performed by: STUDENT IN AN ORGANIZED HEALTH CARE EDUCATION/TRAINING PROGRAM

## 2024-04-10 PROCEDURE — 86644 CMV ANTIBODY: CPT

## 2024-04-10 PROCEDURE — 6360000002 HC RX W HCPCS: Performed by: STUDENT IN AN ORGANIZED HEALTH CARE EDUCATION/TRAINING PROGRAM

## 2024-04-10 PROCEDURE — 82150 ASSAY OF AMYLASE: CPT

## 2024-04-10 PROCEDURE — 6370000000 HC RX 637 (ALT 250 FOR IP): Performed by: NURSE PRACTITIONER

## 2024-04-10 RX ORDER — SODIUM CHLORIDE 9 MG/ML
INJECTION, SOLUTION INTRAVENOUS PRN
Status: DISCONTINUED | OUTPATIENT
Start: 2024-04-10 | End: 2024-04-16

## 2024-04-10 RX ADMIN — FAMOTIDINE 40 MG: 20 TABLET, FILM COATED ORAL at 16:49

## 2024-04-10 RX ADMIN — CEFEPIME 2000 MG: 2 INJECTION, POWDER, FOR SOLUTION INTRAVENOUS at 03:06

## 2024-04-10 RX ADMIN — SENNOSIDES 17.2 MG: 8.6 TABLET, FILM COATED ORAL at 17:41

## 2024-04-10 RX ADMIN — TIZANIDINE 4 MG: 2 TABLET ORAL at 21:59

## 2024-04-10 RX ADMIN — ACYCLOVIR 400 MG: 400 TABLET ORAL at 21:56

## 2024-04-10 RX ADMIN — ACYCLOVIR 400 MG: 400 TABLET ORAL at 08:46

## 2024-04-10 RX ADMIN — DIPHENHYDRAMINE HYDROCHLORIDE 25 MG: 25 CAPSULE ORAL at 16:49

## 2024-04-10 RX ADMIN — ACETAMINOPHEN 650 MG: 325 TABLET ORAL at 10:33

## 2024-04-10 RX ADMIN — FLUOXETINE HYDROCHLORIDE 20 MG: 10 CAPSULE ORAL at 21:56

## 2024-04-10 RX ADMIN — MORPHINE SULFATE 15 MG: 15 TABLET, FILM COATED, EXTENDED RELEASE ORAL at 21:56

## 2024-04-10 RX ADMIN — MORPHINE SULFATE 15 MG: 15 TABLET, FILM COATED, EXTENDED RELEASE ORAL at 08:46

## 2024-04-10 RX ADMIN — POLYETHYLENE GLYCOL 3350 17 G: 17 POWDER, FOR SOLUTION ORAL at 08:52

## 2024-04-10 RX ADMIN — CEFEPIME 2000 MG: 2 INJECTION, POWDER, FOR SOLUTION INTRAVENOUS at 10:15

## 2024-04-10 RX ADMIN — FLUOXETINE HYDROCHLORIDE 20 MG: 10 CAPSULE ORAL at 08:46

## 2024-04-10 RX ADMIN — CEFEPIME 2000 MG: 2 INJECTION, POWDER, FOR SOLUTION INTRAVENOUS at 18:26

## 2024-04-10 RX ADMIN — DIPHENHYDRAMINE HYDROCHLORIDE 25 MG: 25 CAPSULE ORAL at 04:12

## 2024-04-10 RX ADMIN — IMMUNE GLOBULIN (HUMAN) 50 G: 10 INJECTION INTRAVENOUS; SUBCUTANEOUS at 10:41

## 2024-04-10 RX ADMIN — DIPHENHYDRAMINE HYDROCHLORIDE 5 ML: 12.5 LIQUID ORAL at 12:41

## 2024-04-10 RX ADMIN — PANTOPRAZOLE SODIUM 40 MG: 40 TABLET, DELAYED RELEASE ORAL at 16:49

## 2024-04-10 RX ADMIN — DIPHENHYDRAMINE HYDROCHLORIDE 25 MG: 25 CAPSULE ORAL at 10:33

## 2024-04-10 RX ADMIN — DEXAMETHASONE SODIUM PHOSPHATE 40 MG: 4 INJECTION, SOLUTION INTRAMUSCULAR; INTRAVENOUS at 09:38

## 2024-04-10 RX ADMIN — LORAZEPAM 0.5 MG: 0.5 TABLET ORAL at 23:09

## 2024-04-10 RX ADMIN — HYDROXYZINE HYDROCHLORIDE 25 MG: 25 TABLET, FILM COATED ORAL at 08:46

## 2024-04-10 RX ADMIN — HYDROCODONE BITARTRATE AND ACETAMINOPHEN 1 TABLET: 10; 325 TABLET ORAL at 04:43

## 2024-04-10 RX ADMIN — ACETAMINOPHEN 650 MG: 325 TABLET ORAL at 16:49

## 2024-04-10 RX ADMIN — HYDROCODONE BITARTRATE AND ACETAMINOPHEN 1 TABLET: 10; 325 TABLET ORAL at 23:10

## 2024-04-10 RX ADMIN — HYDROXYZINE HYDROCHLORIDE 25 MG: 25 TABLET, FILM COATED ORAL at 21:56

## 2024-04-10 RX ADMIN — PANTOPRAZOLE SODIUM 40 MG: 40 TABLET, DELAYED RELEASE ORAL at 08:46

## 2024-04-10 RX ADMIN — ACETAMINOPHEN 650 MG: 325 TABLET ORAL at 04:12

## 2024-04-10 RX ADMIN — LEVOTHYROXINE SODIUM 150 MCG: 0.15 TABLET ORAL at 08:46

## 2024-04-10 ASSESSMENT — PAIN SCALES - GENERAL
PAINLEVEL_OUTOF10: 7
PAINLEVEL_OUTOF10: 7
PAINLEVEL_OUTOF10: 0
PAINLEVEL_OUTOF10: 0

## 2024-04-10 ASSESSMENT — PAIN DESCRIPTION - DESCRIPTORS
DESCRIPTORS: ACHING;THROBBING
DESCRIPTORS: ACHING

## 2024-04-10 ASSESSMENT — PAIN - FUNCTIONAL ASSESSMENT
PAIN_FUNCTIONAL_ASSESSMENT: PREVENTS OR INTERFERES SOME ACTIVE ACTIVITIES AND ADLS
PAIN_FUNCTIONAL_ASSESSMENT: PREVENTS OR INTERFERES SOME ACTIVE ACTIVITIES AND ADLS

## 2024-04-10 ASSESSMENT — PAIN DESCRIPTION - LOCATION
LOCATION: KNEE
LOCATION: BACK

## 2024-04-10 ASSESSMENT — PAIN DESCRIPTION - ORIENTATION
ORIENTATION: LOWER
ORIENTATION: RIGHT;LEFT

## 2024-04-10 NOTE — CARE COORDINATION
LOS 2d  IDR and chart reviewed for discharge planning.    Patient being evaluated for potential study. Receiving platelets.    Transitions of Care plan is ongoing, no further concerns as of present.   Please consult  if any new issues arise.  Will continue to follow.

## 2024-04-10 NOTE — PROGRESS NOTES
END OF SHIFT SUMMARY:    Blood products given this shift:  1 unit of RBC and 1 unit of plts given   Additional events this shift:   Patient constipated stool softener given.  Tolerating IVIG well.  Restart amlodipine per doctor in notes.  Cholesterol labs ordered and complete for study.         Bedside shift report given to oncoming nurse     Natacha Salinas RN

## 2024-04-10 NOTE — PROGRESS NOTES
treatment-focused.    Pancytopenia  - ?secondary to recent treatment vs secondary to relapsed disease  - Transfuse to keep Hgb >7 and plts >10k (unless actively bleeding), receives cross-matched or HLA-matched platelets as OP  - Check hemolysis labs  - Transfuse plts over 4 hours, recheck 1 hr post-transfusion  4/10 Continue above.     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 Plts respond slightly to transfusion but drop again.    Neutropenic fever / ?pneumonia  - CXR with atelectasis vs infiltrate in R lung  - Continue Cefe/Vanc  - Follow BC  4/10 Afebrile. BC NGTD. Continues Cefepime (day 3)     Hypotension / asymptomatic bradycardia  - Hold amlodipine  4/10 BP improving, resume amlodipine.    Chronic pain   - Continue MS contin     History of R axillary vein thrombus / line-associated  - Dx 12/27, previously on Eliquis but now off. AC contraindicated with thrombocytopenia    Continue home meds  Rigo SOPs  VTE prophylaxis - AC contraindicated due to thrombocytopenia  Diet - Regular  PT/OT - Deferred  Code - Full  Acyclovir, diflucan    Dispo - too soon to determine       Goals and plan of care reviewed with the patient.  All questions answered to the best of our ability.  Lab studies and imaging studies were personally reviewed.  Thank you for allowing us to participate in the care of Ms. Gomes.          Catie Sorensen, APRN - CNP   [facility]  [address] [place] [address]  Office : [phone]  Fax : [phone]    I personally saw, exammed and counselled the patient, and discussed with NP, agree with above history/assessment/plan. Exam: No acute distress, normal breathing effort and breath sounds, regular heart rate and heart sound, benign abd, normal ROM of limbs. 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 Perez for further treatment plan,  appears in denial of the very poor prognosis and was given sufficient opportunity to address his concern and was agreeable to cooperate with care.  Platelet improved with IVIG/steroids/transfusion and will transfuse again today, mouth bleeding stopped, I discussed with Dr. Perez for further chemotherapy plan for relapsed AML, will reach out to research team regarding clinical trial, versus rechallenge with Dacogen Mylotarg venetoclax if not eligible, continue above care.          Fransisco [place] [address]  Office : [phone]  Fax : [phone]

## 2024-04-10 NOTE — PLAN OF CARE
Problem: Pain  Goal: Verbalizes/displays adequate comfort level or baseline comfort level  4/10/2024 1226 by Natacha Salinas RN  Outcome: Progressing  4/10/2024 1226 by Natacha Salinas RN  Outcome: Progressing  Flowsheets  Taken 4/10/2024 0846 by Natacha Salinas RN  Verbalizes/displays adequate comfort level or baseline comfort level: Encourage patient to monitor pain and request assistance  Taken 4/10/2024 0306 by Leonor George RN  Verbalizes/displays adequate comfort level or baseline comfort level:   Encourage patient to monitor pain and request assistance   Assess pain using appropriate pain scale   Administer analgesics based on type and severity of pain and evaluate response   Implement non-pharmacological measures as appropriate and evaluate response   Consider cultural and social influences on pain and pain management

## 2024-04-10 NOTE — PLAN OF CARE
Problem: Pain  Goal: Verbalizes/displays adequate comfort level or baseline comfort level  Outcome: Progressing  Flowsheets  Taken 4/10/2024 0846 by Natacha Salinas RN  Verbalizes/displays adequate comfort level or baseline comfort level: Encourage patient to monitor pain and request assistance  Taken 4/10/2024 0306 by Leonor George RN  Verbalizes/displays adequate comfort level or baseline comfort level:   Encourage patient to monitor pain and request assistance   Assess pain using appropriate pain scale   Administer analgesics based on type and severity of pain and evaluate response   Implement non-pharmacological measures as appropriate and evaluate response   Consider cultural and social influences on pain and pain management

## 2024-04-11 ENCOUNTER — APPOINTMENT (OUTPATIENT)
Dept: NON INVASIVE DIAGNOSTICS | Age: 74
DRG: 834 | End: 2024-04-11
Payer: MEDICARE

## 2024-04-11 ENCOUNTER — APPOINTMENT (OUTPATIENT)
Dept: GENERAL RADIOLOGY | Age: 74
DRG: 834 | End: 2024-04-11
Payer: MEDICARE

## 2024-04-11 ENCOUNTER — RESEARCH ENCOUNTER (OUTPATIENT)
Dept: ONCOLOGY | Age: 74
End: 2024-04-11

## 2024-04-11 LAB
ABO + RH BLD: NORMAL
ALBUMIN SERPL-MCNC: 2.9 G/DL (ref 3.2–4.6)
ALBUMIN/GLOB SERPL: 0.4 (ref 0.4–1.6)
ALP SERPL-CCNC: 98 U/L (ref 50–136)
ALT SERPL-CCNC: 33 U/L (ref 12–65)
ANION GAP SERPL CALC-SCNC: 3 MMOL/L (ref 2–11)
AST SERPL-CCNC: 52 U/L (ref 15–37)
BASOPHILS # BLD: 0 K/UL (ref 0–0.2)
BASOPHILS NFR BLD: 0 % (ref 0–2)
BILIRUB SERPL-MCNC: 0.7 MG/DL (ref 0.2–1.1)
BLD PROD TYP BPU: NORMAL
BLOOD BANK BLOOD PRODUCT EXPIRATION DATE: NORMAL
BLOOD BANK CMNT PATIENT-IMP: NORMAL
BLOOD BANK CMNT PATIENT-IMP: NORMAL
BLOOD BANK DISPENSE STATUS: NORMAL
BLOOD BANK ISBT PRODUCT BLOOD TYPE: 5100
BLOOD BANK ISBT PRODUCT BLOOD TYPE: 6200
BLOOD BANK ISBT PRODUCT BLOOD TYPE: 9500
BLOOD BANK ISBT PRODUCT BLOOD TYPE: 9500
BLOOD BANK PRODUCT CODE: NORMAL
BLOOD BANK UNIT TYPE AND RH: NORMAL
BLOOD GROUP ANTIBODIES SERPL: NORMAL
BPU ID: NORMAL
BUN SERPL-MCNC: 23 MG/DL (ref 8–23)
CALCIUM SERPL-MCNC: 9.1 MG/DL (ref 8.3–10.4)
CHLORIDE SERPL-SCNC: 106 MMOL/L (ref 103–113)
CO2 SERPL-SCNC: 26 MMOL/L (ref 21–32)
CREAT SERPL-MCNC: 0.7 MG/DL (ref 0.6–1)
CROSSMATCH RESULT: NORMAL
CROSSMATCH RESULT: NORMAL
DIFFERENTIAL METHOD BLD: ABNORMAL
ECHO AO ASC DIAM: 3.4 CM
ECHO AO ASCENDING AORTA INDEX: 1.97 CM/M2
ECHO AO ROOT DIAM: 2.9 CM
ECHO AO ROOT INDEX: 1.68 CM/M2
ECHO AV AREA PEAK VELOCITY: 2.1 CM2
ECHO AV AREA VTI: 2.3 CM2
ECHO AV AREA/BSA PEAK VELOCITY: 1.2 CM2/M2
ECHO AV AREA/BSA VTI: 1.3 CM2/M2
ECHO AV MEAN GRADIENT: 6 MMHG
ECHO AV MEAN VELOCITY: 1.1 M/S
ECHO AV PEAK GRADIENT: 11 MMHG
ECHO AV PEAK VELOCITY: 1.7 M/S
ECHO AV VELOCITY RATIO: 0.65
ECHO AV VTI: 36.7 CM
ECHO BSA: 1.76 M2
ECHO EST RA PRESSURE: 3 MMHG
ECHO IVC PROX: 1.5 CM
ECHO LA AREA 2C: 16.6 CM2
ECHO LA AREA 4C: 19.1 CM2
ECHO LA DIAMETER INDEX: 2.14 CM/M2
ECHO LA DIAMETER: 3.7 CM
ECHO LA MAJOR AXIS: 5.6 CM
ECHO LA MINOR AXIS: 5.1 CM
ECHO LA TO AORTIC ROOT RATIO: 1.28
ECHO LA VOL BP: 50 ML (ref 22–52)
ECHO LA VOL MOD A2C: 44 ML (ref 22–52)
ECHO LA VOL MOD A4C: 53 ML (ref 22–52)
ECHO LA VOL/BSA BIPLANE: 29 ML/M2 (ref 16–34)
ECHO LA VOLUME INDEX MOD A2C: 25 ML/M2 (ref 16–34)
ECHO LA VOLUME INDEX MOD A4C: 31 ML/M2 (ref 16–34)
ECHO LV E' LATERAL VELOCITY: 11 CM/S
ECHO LV E' SEPTAL VELOCITY: 12 CM/S
ECHO LV EDV A2C: 92 ML
ECHO LV EDV A4C: 104 ML
ECHO LV EDV INDEX A4C: 60 ML/M2
ECHO LV EDV NDEX A2C: 53 ML/M2
ECHO LV EJECTION FRACTION A2C: 66 %
ECHO LV EJECTION FRACTION A4C: 63 %
ECHO LV EJECTION FRACTION BIPLANE: 65 % (ref 55–100)
ECHO LV ESV A2C: 31 ML
ECHO LV ESV A4C: 38 ML
ECHO LV ESV INDEX A2C: 18 ML/M2
ECHO LV ESV INDEX A4C: 22 ML/M2
ECHO LV FRACTIONAL SHORTENING: 36 % (ref 28–44)
ECHO LV GLOBAL LONGITUDINAL STRAIN (GLS): -20.4 %
ECHO LV INTERNAL DIMENSION DIASTOLE INDEX: 2.6 CM/M2
ECHO LV INTERNAL DIMENSION DIASTOLIC: 4.5 CM (ref 3.9–5.3)
ECHO LV INTERNAL DIMENSION SYSTOLIC INDEX: 1.68 CM/M2
ECHO LV INTERNAL DIMENSION SYSTOLIC: 2.9 CM
ECHO LV IVSD: 1.1 CM (ref 0.6–0.9)
ECHO LV MASS 2D: 164 G (ref 67–162)
ECHO LV MASS INDEX 2D: 94.8 G/M2 (ref 43–95)
ECHO LV POSTERIOR WALL DIASTOLIC: 1 CM (ref 0.6–0.9)
ECHO LV RELATIVE WALL THICKNESS RATIO: 0.44
ECHO LVOT AREA: 3.1 CM2
ECHO LVOT AV VTI INDEX: 0.75
ECHO LVOT DIAM: 2 CM
ECHO LVOT MEAN GRADIENT: 3 MMHG
ECHO LVOT PEAK GRADIENT: 5 MMHG
ECHO LVOT PEAK VELOCITY: 1.1 M/S
ECHO LVOT STROKE VOLUME INDEX: 49.7 ML/M2
ECHO LVOT SV: 86 ML
ECHO LVOT VTI: 27.4 CM
ECHO MV A VELOCITY: 0.77 M/S
ECHO MV AREA VTI: 2.5 CM2
ECHO MV E DECELERATION TIME (DT): 179 MS
ECHO MV E VELOCITY: 0.92 M/S
ECHO MV E/A RATIO: 1.19
ECHO MV E/E' LATERAL: 8.36
ECHO MV E/E' RATIO (AVERAGED): 8.02
ECHO MV LVOT VTI INDEX: 1.28
ECHO MV MAX VELOCITY: 1.2 M/S
ECHO MV MEAN GRADIENT: 2 MMHG
ECHO MV MEAN VELOCITY: 0.6 M/S
ECHO MV PEAK GRADIENT: 6 MMHG
ECHO MV REGURGITANT ALIASING (NYQUIST) VELOCITY: 36 CM/S
ECHO MV REGURGITANT PEAK GRADIENT: 144 MMHG
ECHO MV REGURGITANT PEAK VELOCITY: 6 M/S
ECHO MV REGURGITANT RADIUS PISA: 0.6 CM
ECHO MV REGURGITANT VTIA: 230 CM
ECHO MV VTI: 35 CM
ECHO PV ACCELERATION TIME (AT): 90 MS
ECHO PV MAX VELOCITY: 1.1 M/S
ECHO PV PEAK GRADIENT: 5 MMHG
ECHO RIGHT VENTRICULAR SYSTOLIC PRESSURE (RVSP): 44 MMHG
ECHO RV BASAL DIMENSION: 3.5 CM
ECHO RV FREE WALL PEAK S': 11 CM/S
ECHO RV TAPSE: 2.2 CM (ref 1.7–?)
ECHO TV REGURGITANT MAX VELOCITY: 3.21 M/S
ECHO TV REGURGITANT PEAK GRADIENT: 41 MMHG
EKG ATRIAL RATE: 62 BPM
EKG DIAGNOSIS: NORMAL
EKG P AXIS: 32 DEGREES
EKG P-R INTERVAL: 152 MS
EKG Q-T INTERVAL: 456 MS
EKG QRS DURATION: 88 MS
EKG QTC CALCULATION (BAZETT): 462 MS
EKG R AXIS: 23 DEGREES
EKG T AXIS: 49 DEGREES
EKG VENTRICULAR RATE: 62 BPM
EOSINOPHIL # BLD: 0 K/UL (ref 0–0.8)
EOSINOPHIL NFR BLD: 0 % (ref 0.5–7.8)
ERYTHROCYTE [DISTWIDTH] IN BLOOD BY AUTOMATED COUNT: 15.2 % (ref 11.9–14.6)
GLOBULIN SER CALC-MCNC: 6.9 G/DL (ref 2.8–4.5)
GLUCOSE SERPL-MCNC: 102 MG/DL (ref 65–100)
HCT VFR BLD AUTO: 21.6 % (ref 35.8–46.3)
HGB BLD-MCNC: 7.3 G/DL (ref 11.7–15.4)
IMM GRANULOCYTES # BLD AUTO: 0 K/UL (ref 0–0.5)
IMM GRANULOCYTES NFR BLD AUTO: 5 % (ref 0–5)
LYMPHOCYTES # BLD: 0.6 K/UL (ref 0.5–4.6)
LYMPHOCYTES NFR BLD: 66 % (ref 13–44)
MCH RBC QN AUTO: 28.5 PG (ref 26.1–32.9)
MCHC RBC AUTO-ENTMCNC: 33.8 G/DL (ref 31.4–35)
MCV RBC AUTO: 84.4 FL (ref 82–102)
MONOCYTES # BLD: 0 K/UL (ref 0.1–1.3)
MONOCYTES NFR BLD: 5 % (ref 4–12)
NEUTS SEG # BLD: 0.2 K/UL (ref 1.7–8.2)
NEUTS SEG NFR BLD: 24 % (ref 43–78)
NRBC # BLD: 0 K/UL (ref 0–0.2)
NT PRO BNP: 5612 PG/ML (ref 5–125)
PLATELET # BLD AUTO: 13 K/UL (ref 150–450)
PLATELET COMMENT: ABNORMAL
PMV BLD AUTO: 9.4 FL (ref 9.4–12.3)
POTASSIUM SERPL-SCNC: 3.8 MMOL/L (ref 3.5–5.1)
PROT SERPL-MCNC: 9.8 G/DL (ref 6.3–8.2)
RBC # BLD AUTO: 2.56 M/UL (ref 4.05–5.2)
RBC MORPH BLD: ABNORMAL
SODIUM SERPL-SCNC: 135 MMOL/L (ref 136–146)
SPECIMEN EXP DATE BLD: NORMAL
UNIT DIVISION: 0
UNIT ISSUE DATE/TIME: NORMAL
WBC # BLD AUTO: 0.8 K/UL (ref 4.3–11.1)
WBC MORPH BLD: ABNORMAL

## 2024-04-11 PROCEDURE — 6360000002 HC RX W HCPCS: Performed by: NURSE PRACTITIONER

## 2024-04-11 PROCEDURE — 2500000003 HC RX 250 WO HCPCS: Performed by: NURSE PRACTITIONER

## 2024-04-11 PROCEDURE — 80053 COMPREHEN METABOLIC PANEL: CPT

## 2024-04-11 PROCEDURE — 93005 ELECTROCARDIOGRAM TRACING: CPT | Performed by: NURSE PRACTITIONER

## 2024-04-11 PROCEDURE — 85025 COMPLETE CBC W/AUTO DIFF WBC: CPT

## 2024-04-11 PROCEDURE — 93356 MYOCRD STRAIN IMG SPCKL TRCK: CPT

## 2024-04-11 PROCEDURE — 6360000002 HC RX W HCPCS: Performed by: STUDENT IN AN ORGANIZED HEALTH CARE EDUCATION/TRAINING PROGRAM

## 2024-04-11 PROCEDURE — 93356 MYOCRD STRAIN IMG SPCKL TRCK: CPT | Performed by: INTERNAL MEDICINE

## 2024-04-11 PROCEDURE — 6370000000 HC RX 637 (ALT 250 FOR IP): Performed by: STUDENT IN AN ORGANIZED HEALTH CARE EDUCATION/TRAINING PROGRAM

## 2024-04-11 PROCEDURE — 93010 ELECTROCARDIOGRAM REPORT: CPT | Performed by: INTERNAL MEDICINE

## 2024-04-11 PROCEDURE — 83880 ASSAY OF NATRIURETIC PEPTIDE: CPT

## 2024-04-11 PROCEDURE — 6370000000 HC RX 637 (ALT 250 FOR IP): Performed by: NURSE PRACTITIONER

## 2024-04-11 PROCEDURE — 99233 SBSQ HOSP IP/OBS HIGH 50: CPT | Performed by: INTERNAL MEDICINE

## 2024-04-11 PROCEDURE — 6370000000 HC RX 637 (ALT 250 FOR IP): Performed by: INTERNAL MEDICINE

## 2024-04-11 PROCEDURE — 71045 X-RAY EXAM CHEST 1 VIEW: CPT

## 2024-04-11 PROCEDURE — 93306 TTE W/DOPPLER COMPLETE: CPT | Performed by: INTERNAL MEDICINE

## 2024-04-11 PROCEDURE — 1100000000 HC RM PRIVATE

## 2024-04-11 PROCEDURE — 2580000003 HC RX 258: Performed by: NURSE PRACTITIONER

## 2024-04-11 PROCEDURE — 36591 DRAW BLOOD OFF VENOUS DEVICE: CPT

## 2024-04-11 PROCEDURE — 2580000003 HC RX 258: Performed by: STUDENT IN AN ORGANIZED HEALTH CARE EDUCATION/TRAINING PROGRAM

## 2024-04-11 PROCEDURE — APPSS30 APP SPLIT SHARED TIME 16-30 MINUTES: Performed by: NURSE PRACTITIONER

## 2024-04-11 RX ORDER — POLYETHYLENE GLYCOL 3350 17 G/17G
17 POWDER, FOR SOLUTION ORAL DAILY
Status: DISCONTINUED | OUTPATIENT
Start: 2024-04-11 | End: 2024-05-09

## 2024-04-11 RX ORDER — SENNOSIDES A AND B 8.6 MG/1
2 TABLET, FILM COATED ORAL NIGHTLY
Status: DISCONTINUED | OUTPATIENT
Start: 2024-04-11 | End: 2024-05-09

## 2024-04-11 RX ORDER — GUAIFENESIN 200 MG/10ML
200 LIQUID ORAL EVERY 4 HOURS PRN
Status: DISCONTINUED | OUTPATIENT
Start: 2024-04-11 | End: 2024-05-09

## 2024-04-11 RX ORDER — FUROSEMIDE 10 MG/ML
40 INJECTION INTRAMUSCULAR; INTRAVENOUS ONCE
Status: COMPLETED | OUTPATIENT
Start: 2024-04-11 | End: 2024-04-11

## 2024-04-11 RX ORDER — HYDROCODONE BITARTRATE AND HOMATROPINE METHYLBROMIDE ORAL SOLUTION 5; 1.5 MG/5ML; MG/5ML
5 LIQUID ORAL EVERY 4 HOURS PRN
Status: DISCONTINUED | OUTPATIENT
Start: 2024-04-11 | End: 2024-05-09

## 2024-04-11 RX ORDER — POLYETHYLENE GLYCOL 3350 17 G/17G
17 POWDER, FOR SOLUTION ORAL DAILY
Status: DISCONTINUED | OUTPATIENT
Start: 2024-04-12 | End: 2024-04-11

## 2024-04-11 RX ADMIN — CEFEPIME 2000 MG: 2 INJECTION, POWDER, FOR SOLUTION INTRAVENOUS at 10:38

## 2024-04-11 RX ADMIN — CEFEPIME 2000 MG: 2 INJECTION, POWDER, FOR SOLUTION INTRAVENOUS at 18:30

## 2024-04-11 RX ADMIN — MORPHINE SULFATE 15 MG: 15 TABLET, FILM COATED, EXTENDED RELEASE ORAL at 20:36

## 2024-04-11 RX ADMIN — FLUOXETINE HYDROCHLORIDE 20 MG: 10 CAPSULE ORAL at 20:35

## 2024-04-11 RX ADMIN — HYDROXYZINE HYDROCHLORIDE 25 MG: 25 TABLET, FILM COATED ORAL at 08:11

## 2024-04-11 RX ADMIN — HYDROXYZINE HYDROCHLORIDE 25 MG: 25 TABLET, FILM COATED ORAL at 20:35

## 2024-04-11 RX ADMIN — GUAIFENESIN 200 MG: 200 SOLUTION ORAL at 08:50

## 2024-04-11 RX ADMIN — MORPHINE SULFATE 15 MG: 15 TABLET, FILM COATED, EXTENDED RELEASE ORAL at 08:11

## 2024-04-11 RX ADMIN — ACYCLOVIR 400 MG: 400 TABLET ORAL at 20:35

## 2024-04-11 RX ADMIN — AMLODIPINE BESYLATE 5 MG: 5 TABLET ORAL at 08:14

## 2024-04-11 RX ADMIN — SENNOSIDES 17.2 MG: 8.6 TABLET, FILM COATED ORAL at 20:35

## 2024-04-11 RX ADMIN — PANTOPRAZOLE SODIUM 40 MG: 40 TABLET, DELAYED RELEASE ORAL at 16:09

## 2024-04-11 RX ADMIN — FAMOTIDINE 40 MG: 20 TABLET, FILM COATED ORAL at 16:09

## 2024-04-11 RX ADMIN — PANTOPRAZOLE SODIUM 40 MG: 40 TABLET, DELAYED RELEASE ORAL at 08:11

## 2024-04-11 RX ADMIN — CEFEPIME 2000 MG: 2 INJECTION, POWDER, FOR SOLUTION INTRAVENOUS at 03:20

## 2024-04-11 RX ADMIN — HYDROCODONE BITARTRATE AND HOMATROPINE METHYLBROMIDE 5 ML: 5; 1.5 SOLUTION ORAL at 20:45

## 2024-04-11 RX ADMIN — FUROSEMIDE 40 MG: 10 INJECTION, SOLUTION INTRAMUSCULAR; INTRAVENOUS at 10:27

## 2024-04-11 RX ADMIN — TIZANIDINE 4 MG: 2 TABLET ORAL at 20:42

## 2024-04-11 RX ADMIN — ACYCLOVIR 400 MG: 400 TABLET ORAL at 08:12

## 2024-04-11 RX ADMIN — DEXAMETHASONE SODIUM PHOSPHATE 40 MG: 4 INJECTION, SOLUTION INTRAMUSCULAR; INTRAVENOUS at 08:51

## 2024-04-11 RX ADMIN — POLYETHYLENE GLYCOL 3350 17 G: 17 POWDER, FOR SOLUTION ORAL at 14:08

## 2024-04-11 RX ADMIN — FLUOXETINE HYDROCHLORIDE 20 MG: 10 CAPSULE ORAL at 08:12

## 2024-04-11 RX ADMIN — LEVOTHYROXINE SODIUM 150 MCG: 0.15 TABLET ORAL at 08:11

## 2024-04-11 ASSESSMENT — PAIN SCALES - GENERAL
PAINLEVEL_OUTOF10: 0
PAINLEVEL_OUTOF10: 7

## 2024-04-11 ASSESSMENT — PAIN DESCRIPTION - ORIENTATION: ORIENTATION: LOWER

## 2024-04-11 ASSESSMENT — PAIN DESCRIPTION - DESCRIPTORS: DESCRIPTORS: ACHING

## 2024-04-11 ASSESSMENT — PAIN DESCRIPTION - LOCATION: LOCATION: BACK

## 2024-04-11 NOTE — PROGRESS NOTES
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 She proceeded with Revlimid in 10/2023 but was noted to have POD in 11/2023 with BMBx showed AEL and 60% blasts. She completed 10 days of dacogen and Mylotarg and achieved CR-1. She is followed by Dr Perez and is currently undergoing Vidaza and Venetoclax. She completed cycle 2 day 1-5 2/5-2/9 and is on continuous Venetoclax 100mg. She was admitted 3/18-4/2 for pancytopenia and refractory thrombocytopenia despite cross-matched/HLA-matched platelets. She did complete 4 days pulse Dex/IVIG and plts began to respond to transfusion and felt to be either a response to Dex/IVIG vs bone marrow recovery from recent treatment.     Ms Gomes presented on day of admission with dizziness and gum-bleeding. Platelet count <2. She had lab/replacement visits to infusion on 4/5 and 4/6 and was noted to be refractory to platelet transfusions despite cross-matched platelets. She also reported fever at home up to 102. CBC noted pancytopenia with  on arrival and plts remain <2k. Hgb 7.2, down to 7.1 today. She was started on treatment for neutropenic fever with Cefepime/Vancomycin. CXR with R lung atelectasis vs infiltrate. BC pending. Hematology asked to assume care.    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 She proceeded with Revlimid in 10/2023 but was noted to have POD in 11/2023 with BMBx showed AEL and 60% blasts. She completed 10 days of dacogen and Mylotarg and achieved CR-1. She is currently undergoing Vidaza and Venetoclax. She completed cycle 2 day 1-5 2/5-2/9 and is on continuous Venetoclax 100mg.   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 Will need to communicate with Dr Perez to discuss next course of treatment  4/10 Discussed with Dr Perez,  next course would be clinical trial. Reaching out to clinical trial team. If unable to give IP (needs to be IP due to severe refractory thrombocytopenia), could consider Dacogen x10 days/Mylotarg/Venetoclax. Patient and family remain treatment-focused.  4/11 Obtaining consent for clinical trial, labs/pre-trial work-up ordered.    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 Plts respond slightly - holding day 4 IVIG due to concern for fluid overload.    Pulmonary edema / volume overload  4/11 On 2L overnight, reports cough. CXR with pulmonary edema, BNP pending. Weight up - give 40mg lasix. Echo pending.    Neutropenic fever / ?pneumonia  - CXR with atelectasis vs infiltrate in R lung  - Continue Cefe/Vanc  - Follow BC  4/11 Afebrile. BC NGTD. Continues Cefepime (day 4)     Hypotension / asymptomatic bradycardia  - Hold amlodipine  4/10 BP improving, resume amlodipine.    Chronic pain   - Continue MS contin     History of R axillary vein thrombus / line-associated  - Dx 12/27, previously on Eliquis but now off. AC contraindicated with thrombocytopenia    Continue home meds  Rigo SOPs  VTE prophylaxis - AC contraindicated due to thrombocytopenia  Diet - Regular  PT/OT - Deferred  Code - Full  Acyclovir, diflucan    Dispo - too soon to determine       Goals and plan of care reviewed with the patient.  All questions answered to the best of our ability.  Lab studies and imaging studies were personally reviewed.  Thank you for allowing us to participate in the care of Ms. Gomes.          Catie

## 2024-04-11 NOTE — PROGRESS NOTES
Comprehensive Nutrition Assessment    Type and Reason for Visit: Reassess  Malnutrition Screening Tool: Malnutrition Screen  Have you recently lost weight without trying?: 2 to 13 pounds (1 point)  Have you been eating poorly because of a decreased appetite?: Yes (1 point)  Malnutrition Screening Tool Score: 2    Nutrition Recommendations/Plan:   Meals and Snacks:  Diet: Continue current order  Nutrition Supplement Therapy:  Medical food supplement therapy:  Continue Ensure Clear three times per day (this provides 240 kcal and 8 grams protein per bottle) - apple flavor  Change bowel regimen to scheduled     Malnutrition Assessment:  Malnutrition Status: At risk for malnutrition (Comment) (recurrent and prolonged admissions, variable intakes, wt loss)    No wasting  Nutrition Assessment:  Nutrition History: Patient known to clinical nutrition from previous admissions. She initially lost weight with COVID in May 2022 due to loss of taste. During previous admissions she ate fair during previous admissions and used Ensure intermittently to supplement intake. Patient reports she has been eating fair since recent discharge.  at bedside states she started going downhill recently and her PO declined with it. He states the most he has been able to get her to eat is half a cheeseburger.      Do You Have Any Cultural, Adventist, or Ethnic Food Preferences?: No   Weight History: 6/20/23 180#, 10/10/23 173#, 1/8/24 160#. This reveals a 9.9% weight loss in ~10 months, 6.2% weight loss in last 6 months. This is notable but not clinically significant.  Nutrition Background:       PMH significant for psoriatic arthritis on Humira, ovarian cancer, HTN, HLD, GERD, AML s/p chemo and SCT. She presented with dizziness and gum bleeding. She was admitted with neutropenic fever, refractory thrombocytopenia, and relapsed AML. On IVIG. Pending acceptance to clinical trial.  Nutrition Interval:  Patient reclined in bed. She states

## 2024-04-11 NOTE — PROGRESS NOTES
Patient referred to research yesterday (4/10/24) by Varsha Sorensen NP for the [facility] relapsed/refractory AML trial.  Patient meets all inclusion/exclusion criteria for the [id number] trial at initial review.  Study contacted for a slot confirmation and slot reservation confirmed.  The trial was thoroughly discussed with patient and her spouse/ in room 524 this morning after we received the slot reservation.  ECOG=1. The study's purpose, design, risks/benefits, financial aspects and alternatives were reviewed. Patient is aware that study participation is voluntary and that if she chooses to participate, she may withdraw consent at any time. She was given time to read the informed consent. Patient signed consent and was given a copy, along with contact information for the research coordinator. All her questions were answered. Patient verbalized understanding of the study and of her desire to participate. The patient is not of child-bearing potential. Pt agrees to study requirements for birth control.  Triplicate ECGs collected per protocol for screening.  Patient and spouse given research staff contact info to contact with any questions.  Subject eligibility review form submitted to [facility] and medical monitor for approval.

## 2024-04-12 LAB
ANION GAP SERPL CALC-SCNC: 4 MMOL/L (ref 2–11)
BASOPHILS # BLD: 0 K/UL (ref 0–0.2)
BASOPHILS NFR BLD: 0 % (ref 0–2)
BUN SERPL-MCNC: 26 MG/DL (ref 8–23)
CALCIUM SERPL-MCNC: 9.5 MG/DL (ref 8.3–10.4)
CHLORIDE SERPL-SCNC: 107 MMOL/L (ref 103–113)
CO2 SERPL-SCNC: 28 MMOL/L (ref 21–32)
CREAT SERPL-MCNC: 0.7 MG/DL (ref 0.6–1)
DIFFERENTIAL METHOD BLD: ABNORMAL
EOSINOPHIL # BLD: 0 K/UL (ref 0–0.8)
EOSINOPHIL NFR BLD: 0 % (ref 0.5–7.8)
ERYTHROCYTE [DISTWIDTH] IN BLOOD BY AUTOMATED COUNT: 14.8 % (ref 11.9–14.6)
GLUCOSE SERPL-MCNC: 91 MG/DL (ref 65–100)
HCT VFR BLD AUTO: 23 % (ref 35.8–46.3)
HEMOCCULT STL QL: POSITIVE
HGB BLD-MCNC: 7.7 G/DL (ref 11.7–15.4)
IMM GRANULOCYTES # BLD AUTO: 0.1 K/UL (ref 0–0.5)
IMM GRANULOCYTES NFR BLD AUTO: 5 % (ref 0–5)
LYMPHOCYTES # BLD: 0.6 K/UL (ref 0.5–4.6)
LYMPHOCYTES NFR BLD: 72 % (ref 13–44)
MAGNESIUM SERPL-MCNC: 2 MG/DL (ref 1.8–2.4)
MCH RBC QN AUTO: 28.8 PG (ref 26.1–32.9)
MCHC RBC AUTO-ENTMCNC: 33.5 G/DL (ref 31.4–35)
MCV RBC AUTO: 86.1 FL (ref 82–102)
MONOCYTES # BLD: 0.1 K/UL (ref 0.1–1.3)
MONOCYTES NFR BLD: 7 % (ref 4–12)
NEUTS SEG # BLD: 0.2 K/UL (ref 1.7–8.2)
NEUTS SEG NFR BLD: 16 % (ref 43–78)
NRBC # BLD: 0 K/UL (ref 0–0.2)
PLATELET # BLD AUTO: 3 K/UL (ref 150–450)
PLATELET COMMENT: ABNORMAL
PMV BLD AUTO: ABNORMAL FL (ref 9.4–12.3)
POTASSIUM SERPL-SCNC: 3.5 MMOL/L (ref 3.5–5.1)
RBC # BLD AUTO: 2.67 M/UL (ref 4.05–5.2)
RBC MORPH BLD: ABNORMAL
SODIUM SERPL-SCNC: 139 MMOL/L (ref 136–146)
WBC # BLD AUTO: 1 K/UL (ref 4.3–11.1)
WBC MORPH BLD: ABNORMAL

## 2024-04-12 PROCEDURE — 83735 ASSAY OF MAGNESIUM: CPT

## 2024-04-12 PROCEDURE — 6360000002 HC RX W HCPCS: Performed by: NURSE PRACTITIONER

## 2024-04-12 PROCEDURE — 2500000003 HC RX 250 WO HCPCS: Performed by: NURSE PRACTITIONER

## 2024-04-12 PROCEDURE — 6370000000 HC RX 637 (ALT 250 FOR IP): Performed by: NURSE PRACTITIONER

## 2024-04-12 PROCEDURE — 2580000003 HC RX 258: Performed by: NURSE PRACTITIONER

## 2024-04-12 PROCEDURE — 86813 HLA TYPING A B OR C: CPT

## 2024-04-12 PROCEDURE — P9037 PLATE PHERES LEUKOREDU IRRAD: HCPCS

## 2024-04-12 PROCEDURE — 80048 BASIC METABOLIC PNL TOTAL CA: CPT

## 2024-04-12 PROCEDURE — 82272 OCCULT BLD FECES 1-3 TESTS: CPT

## 2024-04-12 PROCEDURE — 85025 COMPLETE CBC W/AUTO DIFF WBC: CPT

## 2024-04-12 PROCEDURE — 2700000000 HC OXYGEN THERAPY PER DAY

## 2024-04-12 PROCEDURE — 36591 DRAW BLOOD OFF VENOUS DEVICE: CPT

## 2024-04-12 PROCEDURE — 1100000000 HC RM PRIVATE

## 2024-04-12 PROCEDURE — 94761 N-INVAS EAR/PLS OXIMETRY MLT: CPT

## 2024-04-12 PROCEDURE — 6370000000 HC RX 637 (ALT 250 FOR IP): Performed by: STUDENT IN AN ORGANIZED HEALTH CARE EDUCATION/TRAINING PROGRAM

## 2024-04-12 PROCEDURE — APPSS45 APP SPLIT SHARED TIME 31-45 MINUTES: Performed by: NURSE PRACTITIONER

## 2024-04-12 PROCEDURE — 2580000003 HC RX 258: Performed by: STUDENT IN AN ORGANIZED HEALTH CARE EDUCATION/TRAINING PROGRAM

## 2024-04-12 PROCEDURE — 6360000002 HC RX W HCPCS: Performed by: STUDENT IN AN ORGANIZED HEALTH CARE EDUCATION/TRAINING PROGRAM

## 2024-04-12 PROCEDURE — 36430 TRANSFUSION BLD/BLD COMPNT: CPT

## 2024-04-12 PROCEDURE — 94640 AIRWAY INHALATION TREATMENT: CPT

## 2024-04-12 PROCEDURE — A4216 STERILE WATER/SALINE, 10 ML: HCPCS | Performed by: NURSE PRACTITIONER

## 2024-04-12 PROCEDURE — 6370000000 HC RX 637 (ALT 250 FOR IP): Performed by: INTERNAL MEDICINE

## 2024-04-12 PROCEDURE — 99233 SBSQ HOSP IP/OBS HIGH 50: CPT | Performed by: INTERNAL MEDICINE

## 2024-04-12 RX ORDER — PROCHLORPERAZINE EDISYLATE 5 MG/ML
10 INJECTION INTRAMUSCULAR; INTRAVENOUS EVERY 6 HOURS PRN
Status: DISCONTINUED | OUTPATIENT
Start: 2024-04-12 | End: 2024-05-11 | Stop reason: HOSPADM

## 2024-04-12 RX ORDER — LACTULOSE 10 G/15ML
20 SOLUTION ORAL ONCE
Status: COMPLETED | OUTPATIENT
Start: 2024-04-12 | End: 2024-04-12

## 2024-04-12 RX ORDER — FUROSEMIDE 10 MG/ML
40 INJECTION INTRAMUSCULAR; INTRAVENOUS ONCE
Status: COMPLETED | OUTPATIENT
Start: 2024-04-12 | End: 2024-04-12

## 2024-04-12 RX ORDER — LACTULOSE 10 G/15ML
20 SOLUTION ORAL EVERY 8 HOURS PRN
Status: DISCONTINUED | OUTPATIENT
Start: 2024-04-12 | End: 2024-05-09

## 2024-04-12 RX ORDER — IPRATROPIUM BROMIDE AND ALBUTEROL SULFATE 2.5; .5 MG/3ML; MG/3ML
1 SOLUTION RESPIRATORY (INHALATION) EVERY 4 HOURS PRN
Status: DISCONTINUED | OUTPATIENT
Start: 2024-04-12 | End: 2024-05-09

## 2024-04-12 RX ORDER — FUROSEMIDE 10 MG/ML
40 INJECTION INTRAMUSCULAR; INTRAVENOUS 2 TIMES DAILY
Status: COMPLETED | OUTPATIENT
Start: 2024-04-12 | End: 2024-04-15

## 2024-04-12 RX ADMIN — ONDANSETRON 4 MG: 2 INJECTION INTRAMUSCULAR; INTRAVENOUS at 18:22

## 2024-04-12 RX ADMIN — FUROSEMIDE 40 MG: 10 INJECTION, SOLUTION INTRAMUSCULAR; INTRAVENOUS at 14:38

## 2024-04-12 RX ADMIN — GUAIFENESIN 200 MG: 200 SOLUTION ORAL at 03:06

## 2024-04-12 RX ADMIN — IPRATROPIUM BROMIDE AND ALBUTEROL SULFATE 1 DOSE: 2.5; .5 SOLUTION RESPIRATORY (INHALATION) at 20:17

## 2024-04-12 RX ADMIN — ACETAMINOPHEN 650 MG: 325 TABLET ORAL at 11:13

## 2024-04-12 RX ADMIN — FLUOXETINE HYDROCHLORIDE 20 MG: 10 CAPSULE ORAL at 08:12

## 2024-04-12 RX ADMIN — HYDROXYZINE HYDROCHLORIDE 25 MG: 25 TABLET, FILM COATED ORAL at 08:12

## 2024-04-12 RX ADMIN — HYDROXYZINE HYDROCHLORIDE 25 MG: 25 TABLET, FILM COATED ORAL at 20:09

## 2024-04-12 RX ADMIN — IMMUNE GLOBULIN (HUMAN) 55 G: 10 INJECTION INTRAVENOUS; SUBCUTANEOUS at 14:47

## 2024-04-12 RX ADMIN — DIPHENHYDRAMINE HYDROCHLORIDE 25 MG: 25 CAPSULE ORAL at 11:14

## 2024-04-12 RX ADMIN — HYDROCODONE BITARTRATE AND ACETAMINOPHEN 1 TABLET: 10; 325 TABLET ORAL at 02:51

## 2024-04-12 RX ADMIN — LORAZEPAM 0.5 MG: 0.5 TABLET ORAL at 02:51

## 2024-04-12 RX ADMIN — HYDROCODONE BITARTRATE AND HOMATROPINE METHYLBROMIDE 5 ML: 5; 1.5 SOLUTION ORAL at 05:34

## 2024-04-12 RX ADMIN — SODIUM CHLORIDE 0.5 MG: 9 INJECTION INTRAMUSCULAR; INTRAVENOUS; SUBCUTANEOUS at 22:23

## 2024-04-12 RX ADMIN — ONDANSETRON 4 MG: 2 INJECTION INTRAMUSCULAR; INTRAVENOUS at 05:35

## 2024-04-12 RX ADMIN — PROCHLORPERAZINE EDISYLATE 10 MG: 5 INJECTION INTRAMUSCULAR; INTRAVENOUS at 20:03

## 2024-04-12 RX ADMIN — LACTULOSE 20 G: 10 SOLUTION ORAL at 10:57

## 2024-04-12 RX ADMIN — GUAIFENESIN 200 MG: 200 SOLUTION ORAL at 08:12

## 2024-04-12 RX ADMIN — MORPHINE SULFATE 15 MG: 15 TABLET, FILM COATED, EXTENDED RELEASE ORAL at 20:09

## 2024-04-12 RX ADMIN — FLUOXETINE HYDROCHLORIDE 20 MG: 10 CAPSULE ORAL at 20:08

## 2024-04-12 RX ADMIN — PANTOPRAZOLE SODIUM 40 MG: 40 TABLET, DELAYED RELEASE ORAL at 16:36

## 2024-04-12 RX ADMIN — CEFEPIME 2000 MG: 2 INJECTION, POWDER, FOR SOLUTION INTRAVENOUS at 10:58

## 2024-04-12 RX ADMIN — POLYETHYLENE GLYCOL 3350 17 G: 17 POWDER, FOR SOLUTION ORAL at 08:12

## 2024-04-12 RX ADMIN — CEFEPIME 2000 MG: 2 INJECTION, POWDER, FOR SOLUTION INTRAVENOUS at 02:43

## 2024-04-12 RX ADMIN — PROCHLORPERAZINE EDISYLATE 10 MG: 5 INJECTION INTRAMUSCULAR; INTRAVENOUS at 08:50

## 2024-04-12 RX ADMIN — CEFEPIME 2000 MG: 2 INJECTION, POWDER, FOR SOLUTION INTRAVENOUS at 18:31

## 2024-04-12 RX ADMIN — AMLODIPINE BESYLATE 5 MG: 5 TABLET ORAL at 08:12

## 2024-04-12 RX ADMIN — FAMOTIDINE 40 MG: 20 TABLET, FILM COATED ORAL at 16:36

## 2024-04-12 RX ADMIN — LEVOTHYROXINE SODIUM 150 MCG: 0.15 TABLET ORAL at 05:20

## 2024-04-12 RX ADMIN — MORPHINE SULFATE 15 MG: 15 TABLET, FILM COATED, EXTENDED RELEASE ORAL at 08:12

## 2024-04-12 RX ADMIN — ACYCLOVIR 400 MG: 400 TABLET ORAL at 08:12

## 2024-04-12 RX ADMIN — ACYCLOVIR 400 MG: 400 TABLET ORAL at 20:09

## 2024-04-12 RX ADMIN — PANTOPRAZOLE SODIUM 40 MG: 40 TABLET, DELAYED RELEASE ORAL at 05:20

## 2024-04-12 RX ADMIN — FUROSEMIDE 40 MG: 10 INJECTION, SOLUTION INTRAMUSCULAR; INTRAVENOUS at 08:17

## 2024-04-12 ASSESSMENT — PAIN DESCRIPTION - LOCATION
LOCATION: BACK
LOCATION: BACK

## 2024-04-12 ASSESSMENT — PAIN SCALES - GENERAL
PAINLEVEL_OUTOF10: 4
PAINLEVEL_OUTOF10: 6

## 2024-04-12 ASSESSMENT — PAIN DESCRIPTION - ORIENTATION
ORIENTATION: LOWER
ORIENTATION: POSTERIOR

## 2024-04-12 ASSESSMENT — PAIN SCALES - WONG BAKER: WONGBAKER_NUMERICALRESPONSE: NO HURT

## 2024-04-12 ASSESSMENT — PAIN DESCRIPTION - DESCRIPTORS
DESCRIPTORS: ACHING;SORE
DESCRIPTORS: ACHING

## 2024-04-12 NOTE — PROGRESS NOTES
END OF SHIFT SUMMARY: 7a - 7p    Significant vitals this shift:    Vitals:    04/12/24 1503 04/12/24 1515 04/12/24 1825 04/12/24 1830   BP: 125/69 120/77     Pulse: 65 69  69   Resp: 22 28     Temp: 97.5 °F (36.4 °C) 97.4 °F (36.3 °C)     TempSrc: Axillary Axillary     SpO2: 92% 91% (!) 83% 92%   Weight:       Height:        Pt requiring 7 L NC at end of shift   Significant labs this shift:    Plt 3  WBC 1.0  Orders to be followed up on:    2nd unit of PLTs to be transfused after completion of IVIG and IV lasix   Blood products given this shift:  1 unit PLTs - premeds given, no adverse reactions noted.  Additional events this shift:   Pt lad 2 large BMs this shift, fecal occult stool collected and positive.    IVIG administered at slow rate (66 ml/hr) due to overlaod - once complete give 40 mg IV lasix then begin 2nd unit of platelets - Per NP Catie Sorensen     See MAR for prns given    I/Os:  +/- this shift:   Unmeasured Occurrences this Shift:  Urine 1 unmeasured occurence, BM 2, Emesis 0   Approximately 2500 output urine     Bedside shift report given to Keslie France RN

## 2024-04-12 NOTE — PROGRESS NOTES
[facility]        Inpatient Hematology / Oncology Progress Note    Reason for Consult:  Epistaxis [R04.0]  Lightheadedness [R42]  Thrombocytopenia (HCC) [D69.6]  Pancytopenia (HCC) [D61.818]  Symptomatic anemia [D64.9]  Referring Physician:  Tamela Nick MD    24 Hour Events:  VSS, afebrile - on 4L NC  Day 5 Cefepime for NF, BC NGTD  Plts down to 3k - transfusing; some epistaxis from O2  Hgb stable  Ongoing diuresis for volume overload  Awaiting start of clinical trial    ROS:  Constitutional: Negative for fever, chills, weakness, malaise, fatigue.  CV: Negative for chest pain, palpitations, edema.  Respiratory: +dyspnea, cough. Negative for wheezing.  GI: Negative for nausea, abdominal pain, diarrhea.    10 point review of systems is otherwise negative with the exception of the elements mentioned above in the HPI.           Allergies   Allergen Reactions    Penicillins Hives    Sulfa Antibiotics Hives    Codeine Nausea And Vomiting     Past Medical History:   Diagnosis Date    Arthritis     Autoimmune disease (HCC)     skin changes, unknown name    Cancer (HCC) 1990    ovarian    Chronic pain     arthritis in back and legs    Coronary artery spasm (HCC)     COVID 05/15/2022    Essential hypertension 11/8/2019    Gastritis     GERD (gastroesophageal reflux disease)     Hx antineoplastic chemo     Migraine headache     Psoriasis     Psychiatric disorder     anxiety and depression    Severe obesity (BMI 35.0-39.9) 6/26/2018    Thyroid disease     Diagnosed in 1996     Past Surgical History:   Procedure Laterality Date    CARPAL TUNNEL RELEASE      GYN      ovaries    HYSTERECTOMY, TOTAL ABDOMINAL (CERVIX REMOVED)  1990    IR PORT PLACEMENT EQUAL OR GREATER THAN 5 YEARS  1/3/2024    IR PORT PLACEMENT EQUAL OR GREATER THAN 5 YEARS 1/3/2024 [facility] RADIOLOGY SPECIALS    MT UNLISTED PROCEDURE CARDIAC SURGERY      cardiac cath.  Dx with spasms.    TUBAL LIGATION       Family History   Problem  normal ROM of limbs. 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 Perez for further treatment plan,  appears in denial of the very poor prognosis and was given sufficient opportunity to address his concern and was agreeable to cooperate with care.  Platelet improved with IVIG/steroids/transfusion and will transfuse again today, mouth bleeding stopped, I discussed with Dr. Perez for further chemotherapy plan for relapsed AML, prefer clinical trial and eligible to participate, randomized start treatment as soon as possible, received 3 doses of IVIG and developed volume overload, held IVIG and give Lasix but platelet down to 3 again and having epistaxis, discussed for platelet control with IVIG dependent now and agrees to give another dose with understanding of volume overload risk, increase diuresis may have to move to ICU if respiratory distress and patient like to be full code at this point.      Fransisco [place] [address]  Office : [phone]  Fax : [phone]

## 2024-04-12 NOTE — CARE COORDINATION
LOS 4d  IDR and chart reviewed for discharge planning.  24 Hour Events:  VSS, afebrile - on 4L NC  Day 5 Cefepime for NF, BC NGTD  Plts down to 3k - transfusing; some epistaxis from O2  Hgb stable  Ongoing diuresis for volume overload  Awaiting start of clinical trial    Transition of care if patient is accepted for trail study  will be here dure count recovery.    No needs identified at this time.    RNCM will continue to follow

## 2024-04-12 NOTE — ICUWATCH
RRT Clinical Rounding Nurse Progress Report      SUBJECTIVE: Patient assessed secondary to RN/provider concern - increasing O2 demands; pancytopenia; fluid overload.      Vitals:    04/12/24 0812 04/12/24 1113 04/12/24 1146 04/12/24 1209   BP: 132/69 116/69 116/69 116/65   Pulse:  84 84 78   Resp: 20 22 22 22   Temp:  98.5 °F (36.9 °C) 98.5 °F (36.9 °C) 97.5 °F (36.4 °C)   TempSrc:  Axillary  Oral   SpO2:  90% 90% 96%   Weight:       Height:            DETERIORATION INDEX SCORE: 38    ASSESSMENT:  Patient alert and oriented. Tachypneic, but denies feeling SOB. Currently on 7L NC. Has had epistaxis, but not actively bleeding at the moment. Trace lower extremity edema present. VS, labs, and progress notes reviewed. Already received platelets. About to receive dose of IVIG.     PLAN:  Will follow per RRT Clinical Rounding Program protocol.    Brant Cruz RN  [facility]: [phone]  [facility]: [phone]

## 2024-04-13 ENCOUNTER — APPOINTMENT (OUTPATIENT)
Dept: GENERAL RADIOLOGY | Age: 74
DRG: 834 | End: 2024-04-13
Payer: MEDICARE

## 2024-04-13 LAB
ALBUMIN SERPL-MCNC: 2.9 G/DL (ref 3.2–4.6)
ALBUMIN/GLOB SERPL: 0.4 (ref 0.4–1.6)
ALP SERPL-CCNC: 146 U/L (ref 50–136)
ALT SERPL-CCNC: 63 U/L (ref 12–65)
ANION GAP SERPL CALC-SCNC: 2 MMOL/L (ref 2–11)
AST SERPL-CCNC: 114 U/L (ref 15–37)
BACTERIA SPEC CULT: NORMAL
BACTERIA SPEC CULT: NORMAL
BILIRUB SERPL-MCNC: 1.5 MG/DL (ref 0.2–1.1)
BLD PROD TYP BPU: NORMAL
BLOOD BANK BLOOD PRODUCT EXPIRATION DATE: NORMAL
BLOOD BANK CMNT PATIENT-IMP: NORMAL
BLOOD BANK DISPENSE STATUS: NORMAL
BLOOD BANK ISBT PRODUCT BLOOD TYPE: 6200
BLOOD BANK PRODUCT CODE: NORMAL
BLOOD BANK UNIT TYPE AND RH: NORMAL
BPU ID: NORMAL
BUN SERPL-MCNC: 28 MG/DL (ref 8–23)
CALCIUM SERPL-MCNC: 9 MG/DL (ref 8.3–10.4)
CHLORIDE SERPL-SCNC: 98 MMOL/L (ref 103–113)
CO2 SERPL-SCNC: 31 MMOL/L (ref 21–32)
CREAT SERPL-MCNC: 0.8 MG/DL (ref 0.6–1)
GLOBULIN SER CALC-MCNC: 7.8 G/DL (ref 2.8–4.5)
GLUCOSE SERPL-MCNC: 95 MG/DL (ref 65–100)
HISTORY CHECK: NORMAL
MAGNESIUM SERPL-MCNC: 1.8 MG/DL (ref 1.8–2.4)
POTASSIUM SERPL-SCNC: 2.9 MMOL/L (ref 3.5–5.1)
PROT SERPL-MCNC: 10.7 G/DL (ref 6.3–8.2)
SERVICE CMNT-IMP: NORMAL
SERVICE CMNT-IMP: NORMAL
SODIUM SERPL-SCNC: 131 MMOL/L (ref 136–146)
UNIT DIVISION: 0
UNIT ISSUE DATE/TIME: NORMAL

## 2024-04-13 PROCEDURE — 85025 COMPLETE CBC W/AUTO DIFF WBC: CPT

## 2024-04-13 PROCEDURE — 1100000000 HC RM PRIVATE

## 2024-04-13 PROCEDURE — 83735 ASSAY OF MAGNESIUM: CPT

## 2024-04-13 PROCEDURE — 86850 RBC ANTIBODY SCREEN: CPT

## 2024-04-13 PROCEDURE — APPSS45 APP SPLIT SHARED TIME 31-45 MINUTES: Performed by: NURSE PRACTITIONER

## 2024-04-13 PROCEDURE — 2500000003 HC RX 250 WO HCPCS: Performed by: NURSE PRACTITIONER

## 2024-04-13 PROCEDURE — 86922 COMPATIBILITY TEST ANTIGLOB: CPT

## 2024-04-13 PROCEDURE — 2580000003 HC RX 258: Performed by: NURSE PRACTITIONER

## 2024-04-13 PROCEDURE — 2580000003 HC RX 258: Performed by: STUDENT IN AN ORGANIZED HEALTH CARE EDUCATION/TRAINING PROGRAM

## 2024-04-13 PROCEDURE — 86022 PLATELET ANTIBODIES: CPT

## 2024-04-13 PROCEDURE — 6370000000 HC RX 637 (ALT 250 FOR IP): Performed by: NURSE PRACTITIONER

## 2024-04-13 PROCEDURE — 6360000002 HC RX W HCPCS: Performed by: NURSE PRACTITIONER

## 2024-04-13 PROCEDURE — 6360000002 HC RX W HCPCS: Performed by: STUDENT IN AN ORGANIZED HEALTH CARE EDUCATION/TRAINING PROGRAM

## 2024-04-13 PROCEDURE — 6370000000 HC RX 637 (ALT 250 FOR IP): Performed by: INTERNAL MEDICINE

## 2024-04-13 PROCEDURE — 86900 BLOOD TYPING SEROLOGIC ABO: CPT

## 2024-04-13 PROCEDURE — 71045 X-RAY EXAM CHEST 1 VIEW: CPT

## 2024-04-13 PROCEDURE — 51798 US URINE CAPACITY MEASURE: CPT

## 2024-04-13 PROCEDURE — 86921 COMPATIBILITY TEST INCUBATE: CPT

## 2024-04-13 PROCEDURE — 6370000000 HC RX 637 (ALT 250 FOR IP): Performed by: STUDENT IN AN ORGANIZED HEALTH CARE EDUCATION/TRAINING PROGRAM

## 2024-04-13 PROCEDURE — 86920 COMPATIBILITY TEST SPIN: CPT

## 2024-04-13 PROCEDURE — 80053 COMPREHEN METABOLIC PANEL: CPT

## 2024-04-13 PROCEDURE — 86901 BLOOD TYPING SEROLOGIC RH(D): CPT

## 2024-04-13 PROCEDURE — 94760 N-INVAS EAR/PLS OXIMETRY 1: CPT

## 2024-04-13 PROCEDURE — 86644 CMV ANTIBODY: CPT

## 2024-04-13 PROCEDURE — 36430 TRANSFUSION BLD/BLD COMPNT: CPT

## 2024-04-13 PROCEDURE — 94761 N-INVAS EAR/PLS OXIMETRY MLT: CPT

## 2024-04-13 PROCEDURE — P9037 PLATE PHERES LEUKOREDU IRRAD: HCPCS

## 2024-04-13 PROCEDURE — 99233 SBSQ HOSP IP/OBS HIGH 50: CPT | Performed by: INTERNAL MEDICINE

## 2024-04-13 PROCEDURE — A4216 STERILE WATER/SALINE, 10 ML: HCPCS | Performed by: NURSE PRACTITIONER

## 2024-04-13 PROCEDURE — 2700000000 HC OXYGEN THERAPY PER DAY

## 2024-04-13 RX ORDER — POTASSIUM CHLORIDE 29.8 MG/ML
20 INJECTION INTRAVENOUS
Status: COMPLETED | OUTPATIENT
Start: 2024-04-13 | End: 2024-04-13

## 2024-04-13 RX ADMIN — SENNOSIDES 17.2 MG: 8.6 TABLET, FILM COATED ORAL at 19:55

## 2024-04-13 RX ADMIN — CEFEPIME 2000 MG: 2 INJECTION, POWDER, FOR SOLUTION INTRAVENOUS at 10:47

## 2024-04-13 RX ADMIN — ACYCLOVIR 400 MG: 400 TABLET ORAL at 09:00

## 2024-04-13 RX ADMIN — ACYCLOVIR 400 MG: 400 TABLET ORAL at 19:55

## 2024-04-13 RX ADMIN — POTASSIUM CHLORIDE 20 MEQ: 400 INJECTION, SOLUTION INTRAVENOUS at 15:09

## 2024-04-13 RX ADMIN — PANTOPRAZOLE SODIUM 40 MG: 40 TABLET, DELAYED RELEASE ORAL at 05:51

## 2024-04-13 RX ADMIN — FUROSEMIDE 40 MG: 10 INJECTION, SOLUTION INTRAMUSCULAR; INTRAVENOUS at 17:50

## 2024-04-13 RX ADMIN — DIPHENHYDRAMINE HYDROCHLORIDE 25 MG: 25 CAPSULE ORAL at 02:41

## 2024-04-13 RX ADMIN — DIPHENHYDRAMINE HYDROCHLORIDE 25 MG: 25 CAPSULE ORAL at 22:09

## 2024-04-13 RX ADMIN — POTASSIUM CHLORIDE 20 MEQ: 400 INJECTION, SOLUTION INTRAVENOUS at 10:54

## 2024-04-13 RX ADMIN — MORPHINE SULFATE 15 MG: 15 TABLET, FILM COATED, EXTENDED RELEASE ORAL at 09:01

## 2024-04-13 RX ADMIN — POLYETHYLENE GLYCOL 3350 17 G: 17 POWDER, FOR SOLUTION ORAL at 10:42

## 2024-04-13 RX ADMIN — HYDROXYZINE HYDROCHLORIDE 25 MG: 25 TABLET, FILM COATED ORAL at 09:01

## 2024-04-13 RX ADMIN — LEVOTHYROXINE SODIUM 150 MCG: 0.15 TABLET ORAL at 05:51

## 2024-04-13 RX ADMIN — CEFEPIME 2000 MG: 2 INJECTION, POWDER, FOR SOLUTION INTRAVENOUS at 18:43

## 2024-04-13 RX ADMIN — HYDROXYZINE HYDROCHLORIDE 25 MG: 25 TABLET, FILM COATED ORAL at 19:54

## 2024-04-13 RX ADMIN — SODIUM CHLORIDE 0.5 MG: 9 INJECTION INTRAMUSCULAR; INTRAVENOUS; SUBCUTANEOUS at 10:53

## 2024-04-13 RX ADMIN — IMMUNE GLOBULIN (HUMAN) 45 G: 10 INJECTION INTRAVENOUS; SUBCUTANEOUS at 12:40

## 2024-04-13 RX ADMIN — ACETAMINOPHEN 650 MG: 325 TABLET ORAL at 22:09

## 2024-04-13 RX ADMIN — ONDANSETRON 4 MG: 2 INJECTION INTRAMUSCULAR; INTRAVENOUS at 06:56

## 2024-04-13 RX ADMIN — ACETAMINOPHEN 650 MG: 325 TABLET ORAL at 02:41

## 2024-04-13 RX ADMIN — DIPHENHYDRAMINE HYDROCHLORIDE 25 MG: 25 CAPSULE ORAL at 12:38

## 2024-04-13 RX ADMIN — FAMOTIDINE 40 MG: 20 TABLET, FILM COATED ORAL at 17:50

## 2024-04-13 RX ADMIN — PANTOPRAZOLE SODIUM 40 MG: 40 TABLET, DELAYED RELEASE ORAL at 16:36

## 2024-04-13 RX ADMIN — FLUOXETINE HYDROCHLORIDE 20 MG: 10 CAPSULE ORAL at 19:54

## 2024-04-13 RX ADMIN — POTASSIUM CHLORIDE 20 MEQ: 400 INJECTION, SOLUTION INTRAVENOUS at 09:10

## 2024-04-13 RX ADMIN — FUROSEMIDE 40 MG: 10 INJECTION, SOLUTION INTRAMUSCULAR; INTRAVENOUS at 00:56

## 2024-04-13 RX ADMIN — GUAIFENESIN 200 MG: 200 SOLUTION ORAL at 19:53

## 2024-04-13 RX ADMIN — HYDROCODONE BITARTRATE AND ACETAMINOPHEN 1 TABLET: 10; 325 TABLET ORAL at 18:51

## 2024-04-13 RX ADMIN — MORPHINE SULFATE 15 MG: 15 TABLET, FILM COATED, EXTENDED RELEASE ORAL at 19:55

## 2024-04-13 RX ADMIN — CEFEPIME 2000 MG: 2 INJECTION, POWDER, FOR SOLUTION INTRAVENOUS at 03:19

## 2024-04-13 RX ADMIN — TIZANIDINE 4 MG: 2 TABLET ORAL at 22:18

## 2024-04-13 RX ADMIN — ONDANSETRON 4 MG: 2 INJECTION INTRAMUSCULAR; INTRAVENOUS at 17:50

## 2024-04-13 RX ADMIN — FUROSEMIDE 40 MG: 10 INJECTION, SOLUTION INTRAMUSCULAR; INTRAVENOUS at 09:00

## 2024-04-13 RX ADMIN — ACETAMINOPHEN 650 MG: 325 TABLET ORAL at 12:38

## 2024-04-13 RX ADMIN — FLUOXETINE HYDROCHLORIDE 20 MG: 10 CAPSULE ORAL at 09:00

## 2024-04-13 ASSESSMENT — PAIN SCALES - WONG BAKER: WONGBAKER_NUMERICALRESPONSE: NO HURT

## 2024-04-13 ASSESSMENT — PAIN SCALES - GENERAL
PAINLEVEL_OUTOF10: 0
PAINLEVEL_OUTOF10: 5

## 2024-04-13 NOTE — ICUWATCH
RRT Clinical Rounding Nurse Progress Report      SUBJECTIVE: Patient assessed secondary to RN/provider concern - increasing O2 requirements.      Vitals:    04/12/24 1915 04/12/24 1926 04/12/24 2018 04/12/24 2215   BP:    119/60   Pulse:   76 80   Resp:   24    Temp:    98.8 °F (37.1 °C)   TempSrc:    Oral   SpO2:  93% 96% 94%   Weight: 75.5 kg (166 lb 8 oz)      Height:            DETERIORATION INDEX SCORE: 43    ASSESSMENT:  Previous outreach assessment reviewed. Pt is resting in bed, awakens to voice and is oriented to self, place and time. Unlabored, slightly tachypneic breathing on 7L HFNC. Lung sounds with fine crackles bilaterally, more on the R side, for which she is laying on currently. Pt denies CP, lightheadedness or dizziness at time of exam. VS otherwise stable.    Pertinent lab work, vital signs and progress notes from today have been reviewed.    PLAN:  Will follow per RRT Clinical Rounding Program protocol.    Tyrone Guzman RN  [facility]: [phone]  [facility]: [phone]

## 2024-04-13 NOTE — PROGRESS NOTES
Discussed code status with family, patient alert and oriented x 4. Patient voiced at this time she would like to remain a full code.  wishes for patient to be a DNR, daughter wishes patient to remain full code.     1 unit plts given

## 2024-04-14 ENCOUNTER — APPOINTMENT (OUTPATIENT)
Dept: GENERAL RADIOLOGY | Age: 74
DRG: 834 | End: 2024-04-14
Payer: MEDICARE

## 2024-04-14 LAB
ALBUMIN SERPL-MCNC: 2.7 G/DL (ref 3.2–4.6)
ALBUMIN/GLOB SERPL: 0.3 (ref 0.4–1.6)
ALP SERPL-CCNC: 128 U/L (ref 50–136)
ALT SERPL-CCNC: 45 U/L (ref 12–65)
ANION GAP BLD CALC-SCNC: ABNORMAL MMOL/L
ANION GAP SERPL CALC-SCNC: 1 MMOL/L (ref 2–11)
ARTERIAL PATENCY WRIST A: POSITIVE
AST SERPL-CCNC: 81 U/L (ref 15–37)
BASE EXCESS BLD CALC-SCNC: 1.7 MMOL/L
BASOPHILS # BLD: 0 K/UL (ref 0–0.2)
BASOPHILS # BLD: 0 K/UL (ref 0–0.2)
BASOPHILS NFR BLD: 0 % (ref 0–2)
BASOPHILS NFR BLD: 1 % (ref 0–2)
BDY SITE: ABNORMAL
BILIRUB SERPL-MCNC: 1.3 MG/DL (ref 0.2–1.1)
BLD PROD TYP BPU: NORMAL
BLD PROD TYP BPU: NORMAL
BLOOD BANK BLOOD PRODUCT EXPIRATION DATE: NORMAL
BLOOD BANK BLOOD PRODUCT EXPIRATION DATE: NORMAL
BLOOD BANK CMNT PATIENT-IMP: NORMAL
BLOOD BANK CMNT PATIENT-IMP: NORMAL
BLOOD BANK DISPENSE STATUS: NORMAL
BLOOD BANK DISPENSE STATUS: NORMAL
BLOOD BANK ISBT PRODUCT BLOOD TYPE: 5100
BLOOD BANK ISBT PRODUCT BLOOD TYPE: 6200
BLOOD BANK PRODUCT CODE: NORMAL
BLOOD BANK PRODUCT CODE: NORMAL
BLOOD BANK UNIT TYPE AND RH: NORMAL
BLOOD BANK UNIT TYPE AND RH: NORMAL
BPU ID: NORMAL
BPU ID: NORMAL
BUN SERPL-MCNC: 34 MG/DL (ref 8–23)
CA-I BLD-MCNC: 1.17 MMOL/L (ref 1.12–1.32)
CALCIUM SERPL-MCNC: 9.2 MG/DL (ref 8.3–10.4)
CHLORIDE SERPL-SCNC: 99 MMOL/L (ref 103–113)
CO2 BLD-SCNC: 25 MMOL/L (ref 13–23)
CO2 SERPL-SCNC: 29 MMOL/L (ref 21–32)
CREAT SERPL-MCNC: 0.7 MG/DL (ref 0.6–1)
DIFFERENTIAL METHOD BLD: ABNORMAL
DIFFERENTIAL METHOD BLD: ABNORMAL
EOSINOPHIL # BLD: 0 K/UL (ref 0–0.8)
EOSINOPHIL # BLD: 0 K/UL (ref 0–0.8)
EOSINOPHIL NFR BLD: 0 % (ref 0.5–7.8)
EOSINOPHIL NFR BLD: 0 % (ref 0.5–7.8)
ERYTHROCYTE [DISTWIDTH] IN BLOOD BY AUTOMATED COUNT: 15 % (ref 11.9–14.6)
ERYTHROCYTE [DISTWIDTH] IN BLOOD BY AUTOMATED COUNT: 15.1 % (ref 11.9–14.6)
FIO2 ON VENT: 90 %
GAS FLOW.O2 O2 DELIVERY SYS: ABNORMAL
GLOBULIN SER CALC-MCNC: 8 G/DL (ref 2.8–4.5)
GLUCOSE SERPL-MCNC: 102 MG/DL (ref 65–100)
HCO3 BLD-SCNC: 25.4 MMOL/L (ref 22–26)
HCT VFR BLD AUTO: 19.8 % (ref 35.8–46.3)
HCT VFR BLD AUTO: 22.3 % (ref 35.8–46.3)
HGB BLD-MCNC: 7 G/DL (ref 11.7–15.4)
HGB BLD-MCNC: 7.7 G/DL (ref 11.7–15.4)
IMM GRANULOCYTES # BLD AUTO: 0 K/UL (ref 0–0.5)
IMM GRANULOCYTES # BLD AUTO: 0.1 K/UL (ref 0–0.5)
IMM GRANULOCYTES NFR BLD AUTO: 3 % (ref 0–5)
IMM GRANULOCYTES NFR BLD AUTO: 5 % (ref 0–5)
LYMPHOCYTES # BLD: 0.6 K/UL (ref 0.5–4.6)
LYMPHOCYTES # BLD: 0.9 K/UL (ref 0.5–4.6)
LYMPHOCYTES NFR BLD: 72 % (ref 13–44)
LYMPHOCYTES NFR BLD: 74 % (ref 13–44)
MAGNESIUM SERPL-MCNC: 1.8 MG/DL (ref 1.8–2.4)
MCH RBC QN AUTO: 31 PG (ref 26.1–32.9)
MCH RBC QN AUTO: 31.2 PG (ref 26.1–32.9)
MCHC RBC AUTO-ENTMCNC: 34.5 G/DL (ref 31.4–35)
MCHC RBC AUTO-ENTMCNC: 35.4 G/DL (ref 31.4–35)
MCV RBC AUTO: 87.6 FL (ref 82–102)
MCV RBC AUTO: 90.3 FL (ref 82–102)
MONOCYTES # BLD: 0.1 K/UL (ref 0.1–1.3)
MONOCYTES # BLD: 0.1 K/UL (ref 0.1–1.3)
MONOCYTES NFR BLD: 13 % (ref 4–12)
MONOCYTES NFR BLD: 6 % (ref 4–12)
NEUTS SEG # BLD: 0.1 K/UL (ref 1.7–8.2)
NEUTS SEG # BLD: 0.2 K/UL (ref 1.7–8.2)
NEUTS SEG NFR BLD: 11 % (ref 43–78)
NEUTS SEG NFR BLD: 15 % (ref 43–78)
NRBC # BLD: 0 K/UL (ref 0–0.2)
NRBC # BLD: 0 K/UL (ref 0–0.2)
PCO2 BLD: 34.9 MMHG (ref 35–45)
PH BLD: 7.47 (ref 7.35–7.45)
PLATELET # BLD AUTO: 10 K/UL (ref 150–450)
PLATELET # BLD AUTO: 18 K/UL (ref 150–450)
PLATELET COMMENT: ABNORMAL
PLATELET COMMENT: ABNORMAL
PMV BLD AUTO: 10.3 FL (ref 9.4–12.3)
PMV BLD AUTO: 10.6 FL (ref 9.4–12.3)
PO2 BLD: 187 MMHG (ref 75–100)
POTASSIUM BLD-SCNC: 3.1 MMOL/L (ref 3.5–5.1)
POTASSIUM SERPL-SCNC: 3.7 MMOL/L (ref 3.5–5.1)
PROT SERPL-MCNC: 10.7 G/DL (ref 6.3–8.2)
RBC # BLD AUTO: 2.26 M/UL (ref 4.05–5.2)
RBC # BLD AUTO: 2.47 M/UL (ref 4.05–5.2)
RBC MORPH BLD: ABNORMAL
RBC MORPH BLD: ABNORMAL
SAO2 % BLD: 100 %
SERVICE CMNT-IMP: ABNORMAL
SODIUM BLD-SCNC: 134 MMOL/L (ref 136–145)
SODIUM SERPL-SCNC: 129 MMOL/L (ref 136–146)
SPECIMEN SITE: ABNORMAL
UNIT DIVISION: 0
UNIT DIVISION: 0
UNIT ISSUE DATE/TIME: NORMAL
UNIT ISSUE DATE/TIME: NORMAL
WBC # BLD AUTO: 1 K/UL (ref 4.3–11.1)
WBC # BLD AUTO: 1.1 K/UL (ref 4.3–11.1)
WBC MORPH BLD: ABNORMAL
WBC MORPH BLD: ABNORMAL

## 2024-04-14 PROCEDURE — 82803 BLOOD GASES ANY COMBINATION: CPT

## 2024-04-14 PROCEDURE — 6370000000 HC RX 637 (ALT 250 FOR IP): Performed by: STUDENT IN AN ORGANIZED HEALTH CARE EDUCATION/TRAINING PROGRAM

## 2024-04-14 PROCEDURE — 84295 ASSAY OF SERUM SODIUM: CPT

## 2024-04-14 PROCEDURE — 80053 COMPREHEN METABOLIC PANEL: CPT

## 2024-04-14 PROCEDURE — 6360000002 HC RX W HCPCS: Performed by: NURSE PRACTITIONER

## 2024-04-14 PROCEDURE — 82330 ASSAY OF CALCIUM: CPT

## 2024-04-14 PROCEDURE — 6370000000 HC RX 637 (ALT 250 FOR IP): Performed by: NURSE PRACTITIONER

## 2024-04-14 PROCEDURE — 84132 ASSAY OF SERUM POTASSIUM: CPT

## 2024-04-14 PROCEDURE — 94760 N-INVAS EAR/PLS OXIMETRY 1: CPT

## 2024-04-14 PROCEDURE — 82947 ASSAY GLUCOSE BLOOD QUANT: CPT

## 2024-04-14 PROCEDURE — 6360000002 HC RX W HCPCS: Performed by: STUDENT IN AN ORGANIZED HEALTH CARE EDUCATION/TRAINING PROGRAM

## 2024-04-14 PROCEDURE — P9040 RBC LEUKOREDUCED IRRADIATED: HCPCS

## 2024-04-14 PROCEDURE — 36600 WITHDRAWAL OF ARTERIAL BLOOD: CPT

## 2024-04-14 PROCEDURE — 2500000003 HC RX 250 WO HCPCS: Performed by: NURSE PRACTITIONER

## 2024-04-14 PROCEDURE — 6370000000 HC RX 637 (ALT 250 FOR IP): Performed by: INTERNAL MEDICINE

## 2024-04-14 PROCEDURE — 2580000003 HC RX 258: Performed by: NURSE PRACTITIONER

## 2024-04-14 PROCEDURE — 99233 SBSQ HOSP IP/OBS HIGH 50: CPT | Performed by: INTERNAL MEDICINE

## 2024-04-14 PROCEDURE — 86022 PLATELET ANTIBODIES: CPT

## 2024-04-14 PROCEDURE — 1100000000 HC RM PRIVATE

## 2024-04-14 PROCEDURE — APPSS30 APP SPLIT SHARED TIME 16-30 MINUTES: Performed by: NURSE PRACTITIONER

## 2024-04-14 PROCEDURE — P9037 PLATE PHERES LEUKOREDU IRRAD: HCPCS

## 2024-04-14 PROCEDURE — 2700000000 HC OXYGEN THERAPY PER DAY

## 2024-04-14 PROCEDURE — 94761 N-INVAS EAR/PLS OXIMETRY MLT: CPT

## 2024-04-14 PROCEDURE — 36430 TRANSFUSION BLD/BLD COMPNT: CPT

## 2024-04-14 PROCEDURE — 2580000003 HC RX 258: Performed by: STUDENT IN AN ORGANIZED HEALTH CARE EDUCATION/TRAINING PROGRAM

## 2024-04-14 PROCEDURE — 85025 COMPLETE CBC W/AUTO DIFF WBC: CPT

## 2024-04-14 PROCEDURE — 83735 ASSAY OF MAGNESIUM: CPT

## 2024-04-14 PROCEDURE — 71045 X-RAY EXAM CHEST 1 VIEW: CPT

## 2024-04-14 RX ORDER — CIPROFLOXACIN 500 MG/1
500 TABLET, FILM COATED ORAL 2 TIMES DAILY
Status: DISCONTINUED | OUTPATIENT
Start: 2024-04-15 | End: 2024-04-26

## 2024-04-14 RX ORDER — MORPHINE SULFATE 2 MG/ML
1 INJECTION, SOLUTION INTRAMUSCULAR; INTRAVENOUS ONCE
Status: COMPLETED | OUTPATIENT
Start: 2024-04-14 | End: 2024-04-14

## 2024-04-14 RX ORDER — FUROSEMIDE 10 MG/ML
20 INJECTION INTRAMUSCULAR; INTRAVENOUS ONCE
Status: COMPLETED | OUTPATIENT
Start: 2024-04-14 | End: 2024-04-14

## 2024-04-14 RX ADMIN — LEVOTHYROXINE SODIUM 150 MCG: 0.15 TABLET ORAL at 06:22

## 2024-04-14 RX ADMIN — MORPHINE SULFATE 15 MG: 15 TABLET, FILM COATED, EXTENDED RELEASE ORAL at 20:16

## 2024-04-14 RX ADMIN — AMLODIPINE BESYLATE 5 MG: 5 TABLET ORAL at 08:43

## 2024-04-14 RX ADMIN — FAMOTIDINE 40 MG: 20 TABLET, FILM COATED ORAL at 16:33

## 2024-04-14 RX ADMIN — FUROSEMIDE 40 MG: 10 INJECTION, SOLUTION INTRAMUSCULAR; INTRAVENOUS at 16:33

## 2024-04-14 RX ADMIN — ACYCLOVIR 400 MG: 400 TABLET ORAL at 08:43

## 2024-04-14 RX ADMIN — FUROSEMIDE 20 MG: 10 INJECTION, SOLUTION INTRAMUSCULAR; INTRAVENOUS at 20:39

## 2024-04-14 RX ADMIN — PANTOPRAZOLE SODIUM 40 MG: 40 TABLET, DELAYED RELEASE ORAL at 16:33

## 2024-04-14 RX ADMIN — IMMUNE GLOBULIN (HUMAN) 45 G: 10 INJECTION INTRAVENOUS; SUBCUTANEOUS at 14:24

## 2024-04-14 RX ADMIN — PANTOPRAZOLE SODIUM 40 MG: 40 TABLET, DELAYED RELEASE ORAL at 06:22

## 2024-04-14 RX ADMIN — GUAIFENESIN 200 MG: 200 SOLUTION ORAL at 08:52

## 2024-04-14 RX ADMIN — TIZANIDINE 4 MG: 2 TABLET ORAL at 23:53

## 2024-04-14 RX ADMIN — FLUOXETINE HYDROCHLORIDE 20 MG: 10 CAPSULE ORAL at 08:43

## 2024-04-14 RX ADMIN — CEFEPIME 2000 MG: 2 INJECTION, POWDER, FOR SOLUTION INTRAVENOUS at 11:19

## 2024-04-14 RX ADMIN — HYDROCODONE BITARTRATE AND HOMATROPINE METHYLBROMIDE 5 ML: 5; 1.5 SOLUTION ORAL at 20:15

## 2024-04-14 RX ADMIN — CEFEPIME 2000 MG: 2 INJECTION, POWDER, FOR SOLUTION INTRAVENOUS at 03:08

## 2024-04-14 RX ADMIN — MORPHINE SULFATE 1 MG: 2 INJECTION, SOLUTION INTRAMUSCULAR; INTRAVENOUS at 21:50

## 2024-04-14 RX ADMIN — HYDROXYZINE HYDROCHLORIDE 25 MG: 25 TABLET, FILM COATED ORAL at 20:16

## 2024-04-14 RX ADMIN — ACETAMINOPHEN 650 MG: 325 TABLET ORAL at 18:53

## 2024-04-14 RX ADMIN — ACETAMINOPHEN 650 MG: 325 TABLET ORAL at 12:05

## 2024-04-14 RX ADMIN — ONDANSETRON 4 MG: 2 INJECTION INTRAMUSCULAR; INTRAVENOUS at 07:49

## 2024-04-14 RX ADMIN — MORPHINE SULFATE 15 MG: 15 TABLET, FILM COATED, EXTENDED RELEASE ORAL at 08:44

## 2024-04-14 RX ADMIN — IPRATROPIUM BROMIDE AND ALBUTEROL SULFATE 1 DOSE: 2.5; .5 SOLUTION RESPIRATORY (INHALATION) at 20:36

## 2024-04-14 RX ADMIN — POLYETHYLENE GLYCOL 3350 17 G: 17 POWDER, FOR SOLUTION ORAL at 08:43

## 2024-04-14 RX ADMIN — CEFEPIME 2000 MG: 2 INJECTION, POWDER, FOR SOLUTION INTRAVENOUS at 18:20

## 2024-04-14 RX ADMIN — FUROSEMIDE 40 MG: 10 INJECTION, SOLUTION INTRAMUSCULAR; INTRAVENOUS at 08:44

## 2024-04-14 RX ADMIN — HYDROXYZINE HYDROCHLORIDE 25 MG: 25 TABLET, FILM COATED ORAL at 08:44

## 2024-04-14 RX ADMIN — GUAIFENESIN 200 MG: 200 SOLUTION ORAL at 18:17

## 2024-04-14 RX ADMIN — DIPHENHYDRAMINE HYDROCHLORIDE 25 MG: 25 CAPSULE ORAL at 12:05

## 2024-04-14 RX ADMIN — FLUOXETINE HYDROCHLORIDE 20 MG: 10 CAPSULE ORAL at 20:16

## 2024-04-14 RX ADMIN — SENNOSIDES 17.2 MG: 8.6 TABLET, FILM COATED ORAL at 20:16

## 2024-04-14 RX ADMIN — ACYCLOVIR 400 MG: 400 TABLET ORAL at 20:16

## 2024-04-14 ASSESSMENT — PAIN SCALES - GENERAL
PAINLEVEL_OUTOF10: 0
PAINLEVEL_OUTOF10: 4
PAINLEVEL_OUTOF10: 3

## 2024-04-14 ASSESSMENT — PAIN - FUNCTIONAL ASSESSMENT: PAIN_FUNCTIONAL_ASSESSMENT: PREVENTS OR INTERFERES SOME ACTIVE ACTIVITIES AND ADLS

## 2024-04-14 ASSESSMENT — PAIN DESCRIPTION - LOCATION: LOCATION: HEAD

## 2024-04-14 ASSESSMENT — PAIN DESCRIPTION - ORIENTATION: ORIENTATION: ANTERIOR

## 2024-04-14 ASSESSMENT — PAIN DESCRIPTION - DESCRIPTORS: DESCRIPTORS: ACHING

## 2024-04-14 NOTE — PROGRESS NOTES
0703: Received pt from [facility] in stable condition. Pt in bed resting quietly. Resp even & unlabored on 7L HF NC; no acute signs of distress noted. Bed low & locked; call light in reach; no needs voiced.  at bedside.      0749: Zofran 4 mg IV given for c/o nausea.     0818: Pt states she is less nauseous; she was able to eat some of her breakfast.     0850: Pt was able to get her meds down with applesauce; pt appears pale, tired, shallow breathing & slightly tachypneic. Pt in bed with call light in reach;  at bedside.    0853: Robitussin 5 mL po given for c/o coughing. Pt states her mouth feels a little better today.    0932: Pt states the Robitussin did help a little with her coughing.      1258: Pt is not saturating well. Only 86% on 10L HF NC. Pt has bloody nose in L nare with clots & the R nare also sounds clogged. Pt breathing thru her mouth. Switched over to non rebreather mask on 15L: 99% saturation. RT here to assess. Plan to find a venturi mask to place her on to aid with O2. Pt in bed. Still actively bleeding slowly from her L nare. Charge nurse Nessa at bedside also.     1710: Pt continues to have nosebleeds in alternating nares. Venturi mask does appear to help with her breathing & O2 saturation. She is on 15L/50%. [facility] aware & has rounded on pt. Currently trying to eat a little dinner.  Tung is helping her. Pt appears very pale & very tired. IGIF infusing with no difficulty. No acute signs of distress noted.    1817: Robitussin 5 mL po given for c/o coughing.

## 2024-04-14 NOTE — PLAN OF CARE
Problem: Pain  Goal: Verbalizes/displays adequate comfort level or baseline comfort level  4/14/2024 1041 by Irene Sharma RN  Outcome: Progressing  4/14/2024 0401 by Kelsie Gaspar RN  Outcome: Progressing     Problem: Safety - Adult  Goal: Free from fall injury  4/14/2024 1041 by Irene Sharma RN  Outcome: Progressing  4/14/2024 0401 by Kelsie Gaspar RN  Outcome: Progressing     Problem: ABCDS Injury Assessment  Goal: Absence of physical injury  Outcome: Progressing     Problem: Skin/Tissue Integrity  Goal: Absence of new skin breakdown  Description: 1.  Monitor for areas of redness and/or skin breakdown  2.  Assess vascular access sites hourly  3.  Every 4-6 hours minimum:  Change oxygen saturation probe site  4.  Every 4-6 hours:  If on nasal continuous positive airway pressure, respiratory therapy assess nares and determine need for appliance change or resting period.  Outcome: Progressing

## 2024-04-14 NOTE — PROGRESS NOTES
fluid overload.  4/13 IVIG again today, slow rate.  4/14 IVIG again today, slow rate.    Pulmonary edema / volume overload  4/11 On 2L overnight, reports cough. CXR with pulmonary edema, BNP pending. Weight up - give 40mg lasix. Echo pending.  4/12 Echo with preserved EF, BNP elevated. On 4L NC, I/O net neg 800ml and weight down 7#. Continue diuresing, lasix 40mg BID as blood pressure tolerates.  4/13 Up to 7L NC. Net neg 3.4L, weight appears down. CXR with ongoing fluid overload. Continues lasix for diuresis but Na and K also low. Replete K.   4/14 Weight appears stable, incomplete I/O yesterday. O2 stable at 7L. Continues diuresis. Electrolytes stable/better today.    Neutropenic fever / ?pneumonia  - CXR with atelectasis vs infiltrate in R lung  - Continue Cefe/Vanc  - Follow BC  4/14 Afebrile. BC NGTD. Continues Cefepime (day 7), completes today and then transition to prophylactic Cipro.     Hypotension / asymptomatic bradycardia  - Hold amlodipine  4/10 BP improving, resume amlodipine.    Chronic pain   - Continue MS contin     History of R axillary vein thrombus / line-associated  - Dx 12/27, previously on Eliquis but now off. AC contraindicated with thrombocytopenia    Continue home meds  Rigo SOPs  VTE prophylaxis - AC contraindicated due to thrombocytopenia  Diet - Regular  PT/OT - Deferred  Code - Full (ongoing discussions regarding GOC)  Acyclovir, diflucan, Cipro    Dispo - too soon to determine.    4/12 Discussed with patient and  risk for clinical deterioration due to increasing O2 needs and in light of aggressive leukemia. Will ask ICU rover to follow. Discussed potential for move to ICU if she continues to deteriorate and could potentially require intubation/etc for resuscitative measures. She is currently a full code. They will continue to think about code status.       Goals and plan of care reviewed with the patient.  All questions answered to the best of our ability.  Lab studies and  [id number]  Office : [phone]  Fax : [phone]

## 2024-04-14 NOTE — ICUWATCH
RRT Clinical Rounding Nurse Progress Report      SUBJECTIVE: Patient assessed secondary to RN/provider concern - increasing O2 requirements.      Vitals:    04/13/24 0619 04/13/24 0754 04/13/24 1541 04/13/24 1933   BP: 119/68 116/69 107/72 115/69   Pulse: 73 77 70 71   Resp: 24 20 28 22   Temp: 97.4 °F (36.3 °C) 97.7 °F (36.5 °C) 97.7 °F (36.5 °C) 97.5 °F (36.4 °C)   TempSrc: Oral Oral Oral Oral   SpO2: 91% 96% 94% 97%   Weight:       Height:            DETERIORATION INDEX SCORE: 50    ASSESSMENT:  Pt is more alert than previously assessed last night, oriented to self, place and time. Following commands appropriately. Unlabored breathing remaining on 7L HFNC. Lung sounds with fine crackles bilaterally. Platelets persistently low refractory to recurrent platelet infusions. VSS. No additional concerns or complaints expressed at time of exam.    Pertinent lab work, vital signs and progress notes from today have been reviewed.    PLAN:  Will follow per RRT Clinical Rounding Program protocol.    Tyrone Guzman RN  [facility]: [phone]  [facility]: [phone]

## 2024-04-15 ENCOUNTER — RESEARCH ENCOUNTER (OUTPATIENT)
Dept: ONCOLOGY | Age: 74
End: 2024-04-15

## 2024-04-15 LAB
ABO + RH BLD: NORMAL
ALBUMIN SERPL-MCNC: 2.5 G/DL (ref 3.2–4.6)
ALBUMIN/GLOB SERPL: 0.3 (ref 0.4–1.6)
ALP SERPL-CCNC: 193 U/L (ref 50–136)
ALT SERPL-CCNC: 51 U/L (ref 12–65)
ANION GAP SERPL CALC-SCNC: 3 MMOL/L (ref 2–11)
AST SERPL-CCNC: 93 U/L (ref 15–37)
BASOPHILS # BLD: 0 K/UL (ref 0–0.2)
BASOPHILS NFR BLD: 0 % (ref 0–2)
BILIRUB SERPL-MCNC: 1 MG/DL (ref 0.2–1.1)
BLD PROD TYP BPU: NORMAL
BLD PROD TYP BPU: NORMAL
BLOOD BANK BLOOD PRODUCT EXPIRATION DATE: NORMAL
BLOOD BANK BLOOD PRODUCT EXPIRATION DATE: NORMAL
BLOOD BANK CMNT PATIENT-IMP: NORMAL
BLOOD BANK DISPENSE STATUS: NORMAL
BLOOD BANK DISPENSE STATUS: NORMAL
BLOOD BANK ISBT PRODUCT BLOOD TYPE: 5100
BLOOD BANK ISBT PRODUCT BLOOD TYPE: 9500
BLOOD BANK PRODUCT CODE: NORMAL
BLOOD BANK UNIT TYPE AND RH: NORMAL
BLOOD BANK UNIT TYPE AND RH: NORMAL
BLOOD GROUP ANTIBODIES SERPL: NORMAL
BPU ID: NORMAL
BPU ID: NORMAL
BUN SERPL-MCNC: 36 MG/DL (ref 8–23)
CALCIUM SERPL-MCNC: 9.6 MG/DL (ref 8.3–10.4)
CHLORIDE SERPL-SCNC: 96 MMOL/L (ref 103–113)
CO2 SERPL-SCNC: 29 MMOL/L (ref 21–32)
CREAT SERPL-MCNC: 0.9 MG/DL (ref 0.6–1)
CROSSMATCH RESULT: NORMAL
DIFFERENTIAL METHOD BLD: ABNORMAL
EOSINOPHIL # BLD: 0 K/UL (ref 0–0.8)
EOSINOPHIL NFR BLD: 0 % (ref 0.5–7.8)
ERYTHROCYTE [DISTWIDTH] IN BLOOD BY AUTOMATED COUNT: 16.6 % (ref 11.9–14.6)
GLOBULIN SER CALC-MCNC: 8.5 G/DL (ref 2.8–4.5)
GLUCOSE SERPL-MCNC: 110 MG/DL (ref 65–100)
HCT VFR BLD AUTO: 19.9 % (ref 35.8–46.3)
HGB BLD-MCNC: 7.2 G/DL (ref 11.7–15.4)
IMM GRANULOCYTES # BLD AUTO: 0 K/UL (ref 0–0.5)
IMM GRANULOCYTES NFR BLD AUTO: 3 % (ref 0–5)
LYMPHOCYTES # BLD: 0.6 K/UL (ref 0.5–4.6)
LYMPHOCYTES NFR BLD: 68 % (ref 13–44)
MAGNESIUM SERPL-MCNC: 1.4 MG/DL (ref 1.8–2.4)
MCH RBC QN AUTO: 33.3 PG (ref 26.1–32.9)
MCHC RBC AUTO-ENTMCNC: 36.2 G/DL (ref 31.4–35)
MCV RBC AUTO: 92.1 FL (ref 82–102)
MONOCYTES # BLD: 0.1 K/UL (ref 0.1–1.3)
MONOCYTES NFR BLD: 12 % (ref 4–12)
NEUTS SEG # BLD: 0.2 K/UL (ref 1.7–8.2)
NEUTS SEG NFR BLD: 17 % (ref 43–78)
NRBC # BLD: 0 K/UL (ref 0–0.2)
PLATELET # BLD AUTO: 2 K/UL (ref 150–450)
PLATELET COMMENT: ABNORMAL
PMV BLD AUTO: ABNORMAL FL (ref 9.4–12.3)
POTASSIUM SERPL-SCNC: 3.4 MMOL/L (ref 3.5–5.1)
PROT SERPL-MCNC: 11 G/DL (ref 6.3–8.2)
RBC # BLD AUTO: 2.16 M/UL (ref 4.05–5.2)
RBC MORPH BLD: ABNORMAL
RBC MORPH BLD: ABNORMAL
SODIUM SERPL-SCNC: 128 MMOL/L (ref 136–146)
SPECIMEN EXP DATE BLD: NORMAL
UNIT DIVISION: 0
UNIT DIVISION: 0
UNIT ISSUE DATE/TIME: NORMAL
UNIT ISSUE DATE/TIME: NORMAL
WBC # BLD AUTO: 0.9 K/UL (ref 4.3–11.1)
WBC MORPH BLD: ABNORMAL

## 2024-04-15 PROCEDURE — 80053 COMPREHEN METABOLIC PANEL: CPT

## 2024-04-15 PROCEDURE — 2700000000 HC OXYGEN THERAPY PER DAY

## 2024-04-15 PROCEDURE — 6360000002 HC RX W HCPCS: Performed by: NURSE PRACTITIONER

## 2024-04-15 PROCEDURE — 6370000000 HC RX 637 (ALT 250 FOR IP): Performed by: NURSE PRACTITIONER

## 2024-04-15 PROCEDURE — 6370000000 HC RX 637 (ALT 250 FOR IP): Performed by: STUDENT IN AN ORGANIZED HEALTH CARE EDUCATION/TRAINING PROGRAM

## 2024-04-15 PROCEDURE — 1100000000 HC RM PRIVATE

## 2024-04-15 PROCEDURE — 36591 DRAW BLOOD OFF VENOUS DEVICE: CPT

## 2024-04-15 PROCEDURE — 94761 N-INVAS EAR/PLS OXIMETRY MLT: CPT

## 2024-04-15 PROCEDURE — 2500000003 HC RX 250 WO HCPCS: Performed by: STUDENT IN AN ORGANIZED HEALTH CARE EDUCATION/TRAINING PROGRAM

## 2024-04-15 PROCEDURE — 85025 COMPLETE CBC W/AUTO DIFF WBC: CPT

## 2024-04-15 PROCEDURE — P9037 PLATE PHERES LEUKOREDU IRRAD: HCPCS

## 2024-04-15 PROCEDURE — 83735 ASSAY OF MAGNESIUM: CPT

## 2024-04-15 PROCEDURE — 2500000003 HC RX 250 WO HCPCS: Performed by: NURSE PRACTITIONER

## 2024-04-15 PROCEDURE — 86022 PLATELET ANTIBODIES: CPT

## 2024-04-15 PROCEDURE — 6370000000 HC RX 637 (ALT 250 FOR IP): Performed by: INTERNAL MEDICINE

## 2024-04-15 PROCEDURE — 36430 TRANSFUSION BLD/BLD COMPNT: CPT

## 2024-04-15 PROCEDURE — 99233 SBSQ HOSP IP/OBS HIGH 50: CPT | Performed by: INTERNAL MEDICINE

## 2024-04-15 PROCEDURE — 94760 N-INVAS EAR/PLS OXIMETRY 1: CPT

## 2024-04-15 RX ORDER — LANOLIN ALCOHOL/MO/W.PET/CERES
400 CREAM (GRAM) TOPICAL 3 TIMES DAILY
Status: DISCONTINUED | OUTPATIENT
Start: 2024-04-15 | End: 2024-04-26

## 2024-04-15 RX ORDER — POTASSIUM CHLORIDE 20 MEQ/1
20 TABLET, EXTENDED RELEASE ORAL 2 TIMES DAILY
Status: DISCONTINUED | OUTPATIENT
Start: 2024-04-15 | End: 2024-04-26

## 2024-04-15 RX ADMIN — FLUOXETINE HYDROCHLORIDE 20 MG: 10 CAPSULE ORAL at 19:24

## 2024-04-15 RX ADMIN — POLYETHYLENE GLYCOL 3350 17 G: 17 POWDER, FOR SOLUTION ORAL at 08:48

## 2024-04-15 RX ADMIN — MAGNESIUM GLUCONATE 500 MG ORAL TABLET 400 MG: 500 TABLET ORAL at 08:49

## 2024-04-15 RX ADMIN — AMLODIPINE BESYLATE 5 MG: 5 TABLET ORAL at 08:49

## 2024-04-15 RX ADMIN — HYDROCODONE BITARTRATE AND ACETAMINOPHEN 1 TABLET: 10; 325 TABLET ORAL at 19:24

## 2024-04-15 RX ADMIN — MAGNESIUM GLUCONATE 500 MG ORAL TABLET 400 MG: 500 TABLET ORAL at 19:23

## 2024-04-15 RX ADMIN — POTASSIUM CHLORIDE 20 MEQ: 1500 TABLET, EXTENDED RELEASE ORAL at 08:49

## 2024-04-15 RX ADMIN — PANTOPRAZOLE SODIUM 40 MG: 40 TABLET, DELAYED RELEASE ORAL at 17:10

## 2024-04-15 RX ADMIN — MORPHINE SULFATE 15 MG: 15 TABLET, FILM COATED, EXTENDED RELEASE ORAL at 19:24

## 2024-04-15 RX ADMIN — HYDROXYZINE HYDROCHLORIDE 25 MG: 25 TABLET, FILM COATED ORAL at 21:32

## 2024-04-15 RX ADMIN — FUROSEMIDE 40 MG: 10 INJECTION, SOLUTION INTRAMUSCULAR; INTRAVENOUS at 08:48

## 2024-04-15 RX ADMIN — GUAIFENESIN 200 MG: 200 SOLUTION ORAL at 21:48

## 2024-04-15 RX ADMIN — CIPROFLOXACIN HYDROCHLORIDE 500 MG: 500 TABLET, FILM COATED ORAL at 19:24

## 2024-04-15 RX ADMIN — FUROSEMIDE 40 MG: 10 INJECTION, SOLUTION INTRAMUSCULAR; INTRAVENOUS at 17:10

## 2024-04-15 RX ADMIN — ACYCLOVIR 400 MG: 400 TABLET ORAL at 08:49

## 2024-04-15 RX ADMIN — LEVOTHYROXINE SODIUM 150 MCG: 0.15 TABLET ORAL at 06:27

## 2024-04-15 RX ADMIN — SENNOSIDES 17.2 MG: 8.6 TABLET, FILM COATED ORAL at 21:32

## 2024-04-15 RX ADMIN — ACYCLOVIR 400 MG: 400 TABLET ORAL at 19:24

## 2024-04-15 RX ADMIN — PANTOPRAZOLE SODIUM 40 MG: 40 TABLET, DELAYED RELEASE ORAL at 06:27

## 2024-04-15 RX ADMIN — POTASSIUM CHLORIDE 20 MEQ: 1500 TABLET, EXTENDED RELEASE ORAL at 19:23

## 2024-04-15 RX ADMIN — MORPHINE SULFATE 15 MG: 15 TABLET, FILM COATED, EXTENDED RELEASE ORAL at 08:48

## 2024-04-15 RX ADMIN — HYDROXYZINE HYDROCHLORIDE 25 MG: 25 TABLET, FILM COATED ORAL at 08:49

## 2024-04-15 RX ADMIN — FLUOXETINE HYDROCHLORIDE 20 MG: 10 CAPSULE ORAL at 08:48

## 2024-04-15 RX ADMIN — CIPROFLOXACIN HYDROCHLORIDE 500 MG: 500 TABLET, FILM COATED ORAL at 08:49

## 2024-04-15 RX ADMIN — HYDROMORPHONE HYDROCHLORIDE 0.25 MG: 1 INJECTION, SOLUTION INTRAMUSCULAR; INTRAVENOUS; SUBCUTANEOUS at 06:25

## 2024-04-15 RX ADMIN — MAGNESIUM GLUCONATE 500 MG ORAL TABLET 400 MG: 500 TABLET ORAL at 17:10

## 2024-04-15 RX ADMIN — FAMOTIDINE 40 MG: 20 TABLET, FILM COATED ORAL at 17:10

## 2024-04-15 RX ADMIN — ACETAMINOPHEN 650 MG: 325 TABLET ORAL at 11:06

## 2024-04-15 ASSESSMENT — PAIN DESCRIPTION - DESCRIPTORS
DESCRIPTORS: ACHING
DESCRIPTORS: ACHING

## 2024-04-15 ASSESSMENT — PAIN SCALES - GENERAL
PAINLEVEL_OUTOF10: 7
PAINLEVEL_OUTOF10: 0
PAINLEVEL_OUTOF10: 3
PAINLEVEL_OUTOF10: 0
PAINLEVEL_OUTOF10: 7

## 2024-04-15 ASSESSMENT — PAIN DESCRIPTION - ONSET
ONSET: ON-GOING
ONSET: AWAKENED FROM SLEEP

## 2024-04-15 ASSESSMENT — PAIN DESCRIPTION - ORIENTATION
ORIENTATION: RIGHT;LEFT
ORIENTATION: RIGHT;LEFT

## 2024-04-15 ASSESSMENT — PAIN DESCRIPTION - FREQUENCY
FREQUENCY: CONTINUOUS
FREQUENCY: INTERMITTENT

## 2024-04-15 ASSESSMENT — PAIN DESCRIPTION - PAIN TYPE
TYPE: ACUTE PAIN
TYPE: ACUTE PAIN

## 2024-04-15 ASSESSMENT — PAIN DESCRIPTION - LOCATION
LOCATION: LEG
LOCATION: LEG

## 2024-04-15 NOTE — PROGRESS NOTES
PT was in bed. Spouse was at bedside.PT and Spouse updated CH on PT's health and hospitalization. PT and Spouse expressed importance of and comfort in Nataly and Prayer. PT's Spouse is a [employer] . CH offered prayer. CH checked for unmet needs and offered support.     Rev. Neelam Wong M.Div.

## 2024-04-15 NOTE — PROGRESS NOTES
0706: Received pt from CHINO Junior in stable condition. Pt in bed resting quietly. Resp even, tachypneic on Airvo 60L/70%; no acute signs of distress noted. Bed low & locked; call light in reach; no needs voiced.  Tung at bedside.

## 2024-04-15 NOTE — ICUWATCH
RRT Clinical Rounding Nurse Update    Vitals:    04/14/24 2203 04/14/24 2210 04/14/24 2235 04/15/24 0047   BP:  117/64     Pulse: 90 90     Resp: (!) 35 (!) 40 (!) 38 30   Temp:  99.9 °F (37.7 °C)     TempSrc:  Oral     SpO2: 100% 96%     Weight:  72.5 kg (159 lb 14.4 oz)     Height:            DETERIORATION INDEX SCORE: 61    ASSESSMENT:  Previous outreach assessment and chart were reviewed. Pt asleep with respirations 30 on AirVo 60L/80%. Discussed pt with primary RN. Pt to start clinical trial today.    PLAN:  Will follow per RRT Clinical Rounding Program protocol.    Shannon Braun RN  [facility]: [phone]  [facility]: [phone]

## 2024-04-15 NOTE — ICUWATCH
RRT Clinical Rounding Nurse Update    Vitals:    04/15/24 0215 04/15/24 0739 04/15/24 0740 04/15/24 0848   BP: (!) 107/59  130/67    Pulse: 58  65    Resp: 23  18 18   Temp: 97.7 °F (36.5 °C) 97.9 °F (36.6 °C)     TempSrc: Oral Oral     SpO2: 98%  96%    Weight:       Height:            DETERIORATION INDEX SCORE: 54    ASSESSMENT:  Previous outreach assessment was reviewed.  Pt resting in bed,  at bedside. Pt reports respirations have improved. Denies pain at this time. O2 facemask in place. Per , it fell off multiple times overnight.     PLAN:  Will follow per RRT Clinical Rounding Program protocol.    Rich Ireland RN  [facility]: [phone]  [facility]: [phone]

## 2024-04-15 NOTE — PROGRESS NOTES
Called and spoke to Tung, the  and he said patient was resting.  Educated him that we did hear back from study today confirming eligibility for the SELLLAS trial.  Discussed that study randomized patient to weekly dosing.  We discussed we will begin treatment tomorrow and discussed research staff to be present for Pks, ECGs, and study related procedures.   verbalized understanding and thanked us.  Plan to start Cycle 1, Day 1 tomorrow AM with 10 AM tentative dose time.

## 2024-04-15 NOTE — ICUWATCH
RRT Clinical Rounding Nurse Progress Report      SUBJECTIVE: Patient assessed secondary to RN/provider concern - increased O2 needs.      Vitals:    04/14/24 2032 04/14/24 2038 04/14/24 2043 04/14/24 2049   BP:       Pulse:   73 79   Resp:   (!) 41 (!) 38   Temp:       TempSrc:       SpO2: (!) 86% 92%  92%   Weight:       Height:            DETERIORATION INDEX SCORE: 44    ASSESSMENT:  Pt is A&O x4 and appears to be in NAD at this time. O2 sat 93% on Venturimask 50%. Pt denies any pain and voices no complaints. Discussed pt with primary RN and chart reviewed.       PLAN:  Will follow per RRT Clinical Rounding Program protocol.    Shannon Braun RN  [facility]: [phone]  [facility]: [phone]

## 2024-04-15 NOTE — PLAN OF CARE
Problem: Pain  Goal: Verbalizes/displays adequate comfort level or baseline comfort level  4/15/2024 1042 by Irene Sharma RN  Outcome: Progressing  4/14/2024 2219 by Laurie Eubanks RN  Outcome: Progressing     Problem: Safety - Adult  Goal: Free from fall injury  4/15/2024 1042 by Irene Sharma RN  Outcome: Progressing  4/14/2024 2219 by Laurie Eubanks RN  Outcome: Progressing     Problem: ABCDS Injury Assessment  Goal: Absence of physical injury  4/15/2024 1042 by Irene Sharma RN  Outcome: Progressing  4/14/2024 2219 by Laurie Eubanks RN  Outcome: Progressing     Problem: Skin/Tissue Integrity  Goal: Absence of new skin breakdown  Description: 1.  Monitor for areas of redness and/or skin breakdown  2.  Assess vascular access sites hourly  3.  Every 4-6 hours minimum:  Change oxygen saturation probe site  4.  Every 4-6 hours:  If on nasal continuous positive airway pressure, respiratory therapy assess nares and determine need for appliance change or resting period.  4/15/2024 1042 by Irene Sharma RN  Outcome: Progressing  4/14/2024 2219 by Laurie Eubanks RN  Outcome: Progressing

## 2024-04-15 NOTE — ICUWATCH
RRT Clinical Rounding Nurse Update    Vitals:    04/14/24 2032 04/14/24 2038 04/14/24 2043 04/14/24 2049   BP:       Pulse:   73 79   Resp:   (!) 41 (!) 38   Temp:       TempSrc:       SpO2: (!) 86% 92%  92%   Weight:       Height:            DETERIORATION INDEX SCORE: 72    ASSESSMENT:  Previous outreach assessment was reviewed.  Notified by Primary RN that pt was SOB with RR in the 40's with O2 sat 86% on Venturimask. Continuous fluids and IVIG stopped per NP and Lasix 20mg given IV x1 . CXR ordered.    PLAN:  Will follow per RRT Clinical Rounding Program protocol.    Shannon Braun RN  [facility]: [phone]  [facility]: [phone]

## 2024-04-15 NOTE — PROGRESS NOTES
[facility] Hematology & Oncology        Inpatient Hematology / Oncology Progress Note    Reason for Consult:  Epistaxis [R04.0]  Lightheadedness [R42]  Thrombocytopenia (HCC) [D69.6]  Pancytopenia (HCC) [D61.818]  Symptomatic anemia [D64.9]  Referring Physician:  Benjamin Castro MD    24 Hour Events:  VSS, afebrile - on Airvo  Ongoing refractory thrombocytopenia - transfusing  Remains volume overloaded and diuresing  Awaiting start of clinical trial    ROS:  Constitutional: Negative for fever, chills, weakness, malaise, fatigue.  CV: Negative for chest pain, palpitations, edema.  Respiratory: +dyspnea, cough. Negative for wheezing.  GI: Negative for nausea, abdominal pain, diarrhea.    10 point review of systems is otherwise negative with the exception of the elements mentioned above in the HPI.           Allergies   Allergen Reactions    Penicillins Hives    Sulfa Antibiotics Hives    Codeine Nausea And Vomiting     Past Medical History:   Diagnosis Date    Arthritis     Autoimmune disease (HCC)     skin changes, unknown name    Cancer (HCC) 1990    ovarian    Chronic pain     arthritis in back and legs    Coronary artery spasm (HCC)     COVID 05/15/2022    Essential hypertension 11/8/2019    Gastritis     GERD (gastroesophageal reflux disease)     Hx antineoplastic chemo     Migraine headache     Psoriasis     Psychiatric disorder     anxiety and depression    Severe obesity (BMI 35.0-39.9) 6/26/2018    Thyroid disease     Diagnosed in 1996     Past Surgical History:   Procedure Laterality Date    CARPAL TUNNEL RELEASE      GYN      ovaries    HYSTERECTOMY, TOTAL ABDOMINAL (CERVIX REMOVED)  1990    IR PORT PLACEMENT EQUAL OR GREATER THAN 5 YEARS  1/3/2024    IR PORT PLACEMENT EQUAL OR GREATER THAN 5 YEARS 1/3/2024 [facility] RADIOLOGY SPECIALS    [place] UNLISTED PROCEDURE CARDIAC SURGERY      cardiac cath.  Dx with spasms.    TUBAL LIGATION       Family History   Problem Relation Age of Onset    Parkinson's Disease Mother

## 2024-04-15 NOTE — PROGRESS NOTES
2006: Pt's RR 38-40 per minute post pt coughing multiple times.    Hycodan given for cough.    Breathing treatment given by RT.    NP Catie ALEJO notified of increased RR.    Orders to stop IVIG and cefepime, repeat CXR, and give 20mg IV lasix.    ICU Outreach, CHINO Ortega notified.    2110: Pt still having increased RR. 38.    RT placed pt on masked airvo. RR 32, O2 98%    2143: Pt RR still in 40s.  Catie ALEJO NP notified.  Orders to get ABG and give 1mg IV morphine.    2235: RR 38. Patient resting in bed.    0047: RR 30. Pt still having epistaxis with clots in nares.     Patient resting comfortably in bed. No needs at this time. VSS

## 2024-04-16 ENCOUNTER — RESEARCH ENCOUNTER (OUTPATIENT)
Dept: ONCOLOGY | Age: 74
End: 2024-04-16

## 2024-04-16 ENCOUNTER — APPOINTMENT (OUTPATIENT)
Dept: GENERAL RADIOLOGY | Age: 74
DRG: 834 | End: 2024-04-16
Payer: MEDICARE

## 2024-04-16 LAB
ALBUMIN SERPL-MCNC: 2.4 G/DL (ref 3.2–4.6)
ALBUMIN SERPL-MCNC: 2.5 G/DL (ref 3.2–4.6)
ALBUMIN/GLOB SERPL: 0.3 (ref 0.4–1.6)
ALP SERPL-CCNC: 222 U/L (ref 50–136)
ALP SERPL-CCNC: 224 U/L (ref 50–136)
ALP SERPL-CCNC: 233 U/L (ref 50–136)
ALP SERPL-CCNC: 234 U/L (ref 50–136)
ALP SERPL-CCNC: 235 U/L (ref 50–136)
ALT SERPL-CCNC: 41 U/L (ref 12–65)
ALT SERPL-CCNC: 42 U/L (ref 12–65)
ALT SERPL-CCNC: 43 U/L (ref 12–65)
ALT SERPL-CCNC: 44 U/L (ref 12–65)
ALT SERPL-CCNC: 45 U/L (ref 12–65)
AMYLASE SERPL-CCNC: 40 U/L (ref 25–115)
AMYLASE SERPL-CCNC: 40 U/L (ref 25–115)
AMYLASE SERPL-CCNC: 44 U/L (ref 25–115)
ANION GAP SERPL CALC-SCNC: 1 MMOL/L (ref 2–11)
ANION GAP SERPL CALC-SCNC: 2 MMOL/L (ref 2–11)
ANION GAP SERPL CALC-SCNC: 3 MMOL/L (ref 2–11)
ANION GAP SERPL CALC-SCNC: 3 MMOL/L (ref 2–11)
ANION GAP SERPL CALC-SCNC: ABNORMAL MMOL/L (ref 2–11)
APTT PPP: 31.3 SEC (ref 23.3–37.4)
APTT PPP: 32.1 SEC (ref 23.3–37.4)
AST SERPL-CCNC: 78 U/L (ref 15–37)
AST SERPL-CCNC: 80 U/L (ref 15–37)
AST SERPL-CCNC: 84 U/L (ref 15–37)
AST SERPL-CCNC: 85 U/L (ref 15–37)
AST SERPL-CCNC: 86 U/L (ref 15–37)
BILIRUB DIRECT SERPL-MCNC: 0.3 MG/DL
BILIRUB SERPL-MCNC: 0.7 MG/DL (ref 0.2–1.1)
BILIRUB SERPL-MCNC: 0.7 MG/DL (ref 0.2–1.1)
BILIRUB SERPL-MCNC: 0.8 MG/DL (ref 0.2–1.1)
BILIRUB SERPL-MCNC: 0.9 MG/DL (ref 0.2–1.1)
BILIRUB SERPL-MCNC: 1 MG/DL (ref 0.2–1.1)
BLD PROD TYP BPU: NORMAL
BLOOD BANK BLOOD PRODUCT EXPIRATION DATE: NORMAL
BLOOD BANK CMNT PATIENT-IMP: NORMAL
BLOOD BANK DISPENSE STATUS: NORMAL
BLOOD BANK ISBT PRODUCT BLOOD TYPE: 5100
BLOOD BANK PRODUCT CODE: NORMAL
BLOOD BANK UNIT TYPE AND RH: NORMAL
BPU ID: NORMAL
BUN SERPL-MCNC: 34 MG/DL (ref 8–23)
BUN SERPL-MCNC: 36 MG/DL (ref 8–23)
BUN SERPL-MCNC: 39 MG/DL (ref 8–23)
BUN SERPL-MCNC: 40 MG/DL (ref 8–23)
BUN SERPL-MCNC: 40 MG/DL (ref 8–23)
CALCIUM SERPL-MCNC: 9.5 MG/DL (ref 8.3–10.4)
CALCIUM SERPL-MCNC: 9.6 MG/DL (ref 8.3–10.4)
CHLORIDE SERPL-SCNC: 96 MMOL/L (ref 103–113)
CHLORIDE SERPL-SCNC: 96 MMOL/L (ref 103–113)
CHLORIDE SERPL-SCNC: 97 MMOL/L (ref 103–113)
CHLORIDE SERPL-SCNC: 97 MMOL/L (ref 103–113)
CHLORIDE SERPL-SCNC: 98 MMOL/L (ref 103–113)
CHOLEST SERPL-MCNC: 142 MG/DL
CO2 SERPL-SCNC: 30 MMOL/L (ref 21–32)
CO2 SERPL-SCNC: 31 MMOL/L (ref 21–32)
CO2 SERPL-SCNC: 34 MMOL/L (ref 21–32)
CREAT SERPL-MCNC: 0.7 MG/DL (ref 0.6–1)
CREAT SERPL-MCNC: 0.8 MG/DL (ref 0.6–1)
D DIMER PPP FEU-MCNC: 2.09 UG/ML(FEU)
D DIMER PPP FEU-MCNC: 2.31 UG/ML(FEU)
DIFFERENTIAL METHOD BLD: ABNORMAL
EKG ATRIAL RATE: 68 BPM
EKG DIAGNOSIS: NORMAL
EKG P AXIS: 24 DEGREES
EKG P-R INTERVAL: 138 MS
EKG Q-T INTERVAL: 422 MS
EKG QRS DURATION: 80 MS
EKG QTC CALCULATION (BAZETT): 448 MS
EKG R AXIS: 9 DEGREES
EKG T AXIS: 49 DEGREES
EKG VENTRICULAR RATE: 68 BPM
ERYTHROCYTE [DISTWIDTH] IN BLOOD BY AUTOMATED COUNT: 18.1 % (ref 11.9–14.6)
FIBRINOGEN PPP-MCNC: 715 MG/DL (ref 190–501)
FIBRINOGEN PPP-MCNC: 799 MG/DL (ref 190–501)
GLOBULIN SER CALC-MCNC: 8.2 G/DL (ref 2.8–4.5)
GLOBULIN SER CALC-MCNC: 8.3 G/DL (ref 2.8–4.5)
GLOBULIN SER CALC-MCNC: 8.3 G/DL (ref 2.8–4.5)
GLOBULIN SER CALC-MCNC: 8.8 G/DL (ref 2.8–4.5)
GLOBULIN SER CALC-MCNC: 8.8 G/DL (ref 2.8–4.5)
GLUCOSE SERPL-MCNC: 102 MG/DL (ref 65–100)
GLUCOSE SERPL-MCNC: 119 MG/DL (ref 65–100)
GLUCOSE SERPL-MCNC: 139 MG/DL (ref 65–100)
GLUCOSE SERPL-MCNC: 143 MG/DL (ref 65–100)
GLUCOSE SERPL-MCNC: 98 MG/DL (ref 65–100)
HCT VFR BLD AUTO: 18.8 % (ref 35.8–46.3)
HDLC SERPL-MCNC: 15 MG/DL (ref 40–60)
HGB BLD-MCNC: 7.1 G/DL (ref 11.7–15.4)
INR PPP: 1.2
INR PPP: 1.2
LDH SERPL L TO P-CCNC: 1518 U/L (ref 110–210)
LDH SERPL L TO P-CCNC: 1567 U/L (ref 110–210)
LDH SERPL L TO P-CCNC: 1594 U/L (ref 110–210)
LDH SERPL L TO P-CCNC: 1603 U/L (ref 110–210)
LDH SERPL L TO P-CCNC: 1615 U/L (ref 110–210)
LIPASE SERPL-CCNC: 54 U/L (ref 73–393)
MAGNESIUM SERPL-MCNC: 1.1 MG/DL (ref 1.8–2.4)
MAGNESIUM SERPL-MCNC: 2 MG/DL (ref 1.8–2.4)
MAGNESIUM SERPL-MCNC: 2.1 MG/DL (ref 1.8–2.4)
MAGNESIUM SERPL-MCNC: 2.2 MG/DL (ref 1.8–2.4)
MCH RBC QN AUTO: 35.7 PG (ref 26.1–32.9)
MCHC RBC AUTO-ENTMCNC: 37.8 G/DL (ref 31.4–35)
MCV RBC AUTO: 94.5 FL (ref 82–102)
NRBC # BLD: 0 K/UL (ref 0–0.2)
PHOSPHATE SERPL-MCNC: 1.9 MG/DL (ref 2.3–3.7)
PHOSPHATE SERPL-MCNC: 3.2 MG/DL (ref 2.3–3.7)
PHOSPHATE SERPL-MCNC: 4.1 MG/DL (ref 2.3–3.7)
PLATELET # BLD AUTO: <2 K/UL (ref 150–450)
PLATELET COMMENT: ABNORMAL
PMV BLD AUTO: ABNORMAL FL (ref 9.4–12.3)
POTASSIUM SERPL-SCNC: 4 MMOL/L (ref 3.5–5.1)
POTASSIUM SERPL-SCNC: 4.1 MMOL/L (ref 3.5–5.1)
POTASSIUM SERPL-SCNC: 4.1 MMOL/L (ref 3.5–5.1)
POTASSIUM SERPL-SCNC: 4.3 MMOL/L (ref 3.5–5.1)
POTASSIUM SERPL-SCNC: 4.6 MMOL/L (ref 3.5–5.1)
PROT SERPL-MCNC: 10.6 G/DL (ref 6.3–8.2)
PROT SERPL-MCNC: 10.7 G/DL (ref 6.3–8.2)
PROT SERPL-MCNC: 10.7 G/DL (ref 6.3–8.2)
PROT SERPL-MCNC: 11.2 G/DL (ref 6.3–8.2)
PROT SERPL-MCNC: 11.3 G/DL (ref 6.3–8.2)
PROTHROMBIN TIME: 15.5 SEC (ref 11.3–14.9)
PROTHROMBIN TIME: 15.9 SEC (ref 11.3–14.9)
RBC # BLD AUTO: 1.99 M/UL (ref 4.05–5.2)
RBC MORPH BLD: ABNORMAL
SODIUM SERPL-SCNC: 129 MMOL/L (ref 136–146)
SODIUM SERPL-SCNC: 129 MMOL/L (ref 136–146)
SODIUM SERPL-SCNC: 130 MMOL/L (ref 136–146)
TRIGL SERPL-MCNC: 193 MG/DL (ref 35–150)
TROPONIN I SERPL HS-MCNC: 30.6 PG/ML (ref 0–14)
UNIT DIVISION: 0
UNIT ISSUE DATE/TIME: NORMAL
URATE SERPL-MCNC: 3.7 MG/DL (ref 2.6–6)
URATE SERPL-MCNC: 4.4 MG/DL (ref 2.6–6)
URATE SERPL-MCNC: 4.6 MG/DL (ref 2.6–6)
URATE SERPL-MCNC: 4.7 MG/DL (ref 2.6–6)
URATE SERPL-MCNC: 5.2 MG/DL (ref 2.6–6)
WBC # BLD AUTO: 0.9 K/UL (ref 4.3–11.1)
WBC MORPH BLD: ABNORMAL

## 2024-04-16 PROCEDURE — 85379 FIBRIN DEGRADATION QUANT: CPT

## 2024-04-16 PROCEDURE — 85025 COMPLETE CBC W/AUTO DIFF WBC: CPT

## 2024-04-16 PROCEDURE — 36591 DRAW BLOOD OFF VENOUS DEVICE: CPT

## 2024-04-16 PROCEDURE — 6370000000 HC RX 637 (ALT 250 FOR IP): Performed by: NURSE PRACTITIONER

## 2024-04-16 PROCEDURE — 82150 ASSAY OF AMYLASE: CPT

## 2024-04-16 PROCEDURE — 6360000002 HC RX W HCPCS: Performed by: INTERNAL MEDICINE

## 2024-04-16 PROCEDURE — 83615 LACTATE (LD) (LDH) ENZYME: CPT

## 2024-04-16 PROCEDURE — 2700000000 HC OXYGEN THERAPY PER DAY

## 2024-04-16 PROCEDURE — 2580000003 HC RX 258: Performed by: INTERNAL MEDICINE

## 2024-04-16 PROCEDURE — 94640 AIRWAY INHALATION TREATMENT: CPT

## 2024-04-16 PROCEDURE — 84478 ASSAY OF TRIGLYCERIDES: CPT

## 2024-04-16 PROCEDURE — 93005 ELECTROCARDIOGRAM TRACING: CPT | Performed by: INTERNAL MEDICINE

## 2024-04-16 PROCEDURE — 83735 ASSAY OF MAGNESIUM: CPT

## 2024-04-16 PROCEDURE — 84484 ASSAY OF TROPONIN QUANT: CPT

## 2024-04-16 PROCEDURE — 6360000002 HC RX W HCPCS: Performed by: NURSE PRACTITIONER

## 2024-04-16 PROCEDURE — 82248 BILIRUBIN DIRECT: CPT

## 2024-04-16 PROCEDURE — 2500000003 HC RX 250 WO HCPCS: Performed by: INTERNAL MEDICINE

## 2024-04-16 PROCEDURE — 6370000000 HC RX 637 (ALT 250 FOR IP): Performed by: STUDENT IN AN ORGANIZED HEALTH CARE EDUCATION/TRAINING PROGRAM

## 2024-04-16 PROCEDURE — 80053 COMPREHEN METABOLIC PANEL: CPT

## 2024-04-16 PROCEDURE — 6370000000 HC RX 637 (ALT 250 FOR IP): Performed by: INTERNAL MEDICINE

## 2024-04-16 PROCEDURE — 86644 CMV ANTIBODY: CPT

## 2024-04-16 PROCEDURE — 94760 N-INVAS EAR/PLS OXIMETRY 1: CPT

## 2024-04-16 PROCEDURE — 84550 ASSAY OF BLOOD/URIC ACID: CPT

## 2024-04-16 PROCEDURE — 86022 PLATELET ANTIBODIES: CPT

## 2024-04-16 PROCEDURE — 82465 ASSAY BLD/SERUM CHOLESTEROL: CPT

## 2024-04-16 PROCEDURE — 36430 TRANSFUSION BLD/BLD COMPNT: CPT

## 2024-04-16 PROCEDURE — 1100000000 HC RM PRIVATE

## 2024-04-16 PROCEDURE — 71045 X-RAY EXAM CHEST 1 VIEW: CPT

## 2024-04-16 PROCEDURE — P9037 PLATE PHERES LEUKOREDU IRRAD: HCPCS

## 2024-04-16 PROCEDURE — 85730 THROMBOPLASTIN TIME PARTIAL: CPT

## 2024-04-16 PROCEDURE — 85610 PROTHROMBIN TIME: CPT

## 2024-04-16 PROCEDURE — 83718 ASSAY OF LIPOPROTEIN: CPT

## 2024-04-16 PROCEDURE — 84100 ASSAY OF PHOSPHORUS: CPT

## 2024-04-16 PROCEDURE — 94761 N-INVAS EAR/PLS OXIMETRY MLT: CPT

## 2024-04-16 PROCEDURE — 6360000002 HC RX W HCPCS: Performed by: STUDENT IN AN ORGANIZED HEALTH CARE EDUCATION/TRAINING PROGRAM

## 2024-04-16 PROCEDURE — 83690 ASSAY OF LIPASE: CPT

## 2024-04-16 PROCEDURE — 85384 FIBRINOGEN ACTIVITY: CPT

## 2024-04-16 PROCEDURE — APPSS30 APP SPLIT SHARED TIME 16-30 MINUTES: Performed by: NURSE PRACTITIONER

## 2024-04-16 RX ORDER — SODIUM CHLORIDE 9 MG/ML
5-250 INJECTION, SOLUTION INTRAVENOUS PRN
Status: CANCELLED | OUTPATIENT
Start: 2024-04-22

## 2024-04-16 RX ORDER — CYCLOBENZAPRINE HCL 10 MG
10 TABLET ORAL 3 TIMES DAILY PRN
Status: DISCONTINUED | OUTPATIENT
Start: 2024-04-16 | End: 2024-05-09

## 2024-04-16 RX ORDER — ONDANSETRON 2 MG/ML
8 INJECTION INTRAMUSCULAR; INTRAVENOUS ONCE
Status: CANCELLED
Start: 2024-04-17 | End: 2024-04-17

## 2024-04-16 RX ORDER — SODIUM CHLORIDE 9 MG/ML
5-250 INJECTION, SOLUTION INTRAVENOUS PRN
Status: CANCELLED | OUTPATIENT
Start: 2024-04-23

## 2024-04-16 RX ORDER — SODIUM CHLORIDE 9 MG/ML
5-250 INJECTION, SOLUTION INTRAVENOUS PRN
Status: CANCELLED | OUTPATIENT
Start: 2024-04-16

## 2024-04-16 RX ORDER — HEPARIN 100 UNIT/ML
500 SYRINGE INTRAVENOUS PRN
Status: CANCELLED | OUTPATIENT
Start: 2024-04-19

## 2024-04-16 RX ORDER — ACETAMINOPHEN 325 MG/1
650 TABLET ORAL
Status: CANCELLED | OUTPATIENT
Start: 2024-04-17

## 2024-04-16 RX ORDER — ACETAMINOPHEN 325 MG/1
650 TABLET ORAL
Status: CANCELLED | OUTPATIENT
Start: 2024-04-18

## 2024-04-16 RX ORDER — ALBUTEROL SULFATE 90 UG/1
4 AEROSOL, METERED RESPIRATORY (INHALATION) PRN
Status: CANCELLED | OUTPATIENT
Start: 2024-05-08

## 2024-04-16 RX ORDER — SODIUM CHLORIDE 9 MG/ML
5-250 INJECTION, SOLUTION INTRAVENOUS PRN
Status: CANCELLED | OUTPATIENT
Start: 2024-04-18

## 2024-04-16 RX ORDER — MEPERIDINE HYDROCHLORIDE 25 MG/ML
12.5 INJECTION INTRAMUSCULAR; INTRAVENOUS; SUBCUTANEOUS PRN
Status: CANCELLED | OUTPATIENT
Start: 2024-04-21

## 2024-04-16 RX ORDER — DIPHENHYDRAMINE HYDROCHLORIDE 50 MG/ML
50 INJECTION INTRAMUSCULAR; INTRAVENOUS
Status: CANCELLED | OUTPATIENT
Start: 2024-04-17

## 2024-04-16 RX ORDER — SODIUM CHLORIDE 9 MG/ML
5-250 INJECTION, SOLUTION INTRAVENOUS PRN
Status: CANCELLED | OUTPATIENT
Start: 2024-05-08

## 2024-04-16 RX ORDER — ONDANSETRON 2 MG/ML
8 INJECTION INTRAMUSCULAR; INTRAVENOUS
Status: CANCELLED | OUTPATIENT
Start: 2024-04-18

## 2024-04-16 RX ORDER — ACETAMINOPHEN 325 MG/1
650 TABLET ORAL
Status: CANCELLED | OUTPATIENT
Start: 2024-04-23

## 2024-04-16 RX ORDER — ONDANSETRON 2 MG/ML
8 INJECTION INTRAMUSCULAR; INTRAVENOUS ONCE
Status: CANCELLED
Start: 2024-04-22 | End: 2024-04-22

## 2024-04-16 RX ORDER — HEPARIN 100 UNIT/ML
500 SYRINGE INTRAVENOUS PRN
Status: CANCELLED | OUTPATIENT
Start: 2024-04-16

## 2024-04-16 RX ORDER — ONDANSETRON 2 MG/ML
8 INJECTION INTRAMUSCULAR; INTRAVENOUS ONCE
Status: COMPLETED | OUTPATIENT
Start: 2024-04-16 | End: 2024-04-16

## 2024-04-16 RX ORDER — SODIUM CHLORIDE 9 MG/ML
INJECTION, SOLUTION INTRAVENOUS CONTINUOUS
Status: CANCELLED | OUTPATIENT
Start: 2024-04-30

## 2024-04-16 RX ORDER — SODIUM CHLORIDE 0.9 % (FLUSH) 0.9 %
5-40 SYRINGE (ML) INJECTION PRN
Status: CANCELLED | OUTPATIENT
Start: 2024-04-21

## 2024-04-16 RX ORDER — MEPERIDINE HYDROCHLORIDE 25 MG/ML
12.5 INJECTION INTRAMUSCULAR; INTRAVENOUS; SUBCUTANEOUS PRN
Status: CANCELLED | OUTPATIENT
Start: 2024-04-20

## 2024-04-16 RX ORDER — ACETAMINOPHEN 325 MG/1
650 TABLET ORAL
Status: CANCELLED | OUTPATIENT
Start: 2024-04-21

## 2024-04-16 RX ORDER — ALBUTEROL SULFATE 90 UG/1
4 AEROSOL, METERED RESPIRATORY (INHALATION) PRN
Status: CANCELLED | OUTPATIENT
Start: 2024-04-21

## 2024-04-16 RX ORDER — SODIUM CHLORIDE 9 MG/ML
5-250 INJECTION, SOLUTION INTRAVENOUS PRN
Status: CANCELLED | OUTPATIENT
Start: 2024-04-17

## 2024-04-16 RX ORDER — DIPHENHYDRAMINE HYDROCHLORIDE 50 MG/ML
50 INJECTION INTRAMUSCULAR; INTRAVENOUS
Status: CANCELLED | OUTPATIENT
Start: 2024-04-18

## 2024-04-16 RX ORDER — SODIUM CHLORIDE 9 MG/ML
5-250 INJECTION, SOLUTION INTRAVENOUS PRN
Status: CANCELLED | OUTPATIENT
Start: 2024-04-21

## 2024-04-16 RX ORDER — DIPHENHYDRAMINE HYDROCHLORIDE 50 MG/ML
50 INJECTION INTRAMUSCULAR; INTRAVENOUS
Status: CANCELLED
Start: 2024-04-30

## 2024-04-16 RX ORDER — SODIUM CHLORIDE 0.9 % (FLUSH) 0.9 %
5-40 SYRINGE (ML) INJECTION PRN
Status: CANCELLED | OUTPATIENT
Start: 2024-05-08

## 2024-04-16 RX ORDER — SODIUM CHLORIDE 9 MG/ML
INJECTION, SOLUTION INTRAVENOUS CONTINUOUS
Status: CANCELLED | OUTPATIENT
Start: 2024-04-18

## 2024-04-16 RX ORDER — DIPHENHYDRAMINE HYDROCHLORIDE 50 MG/ML
50 INJECTION INTRAMUSCULAR; INTRAVENOUS
Status: CANCELLED | OUTPATIENT
Start: 2024-04-30

## 2024-04-16 RX ORDER — EPINEPHRINE 1 MG/ML
0.3 INJECTION, SOLUTION, CONCENTRATE INTRAVENOUS PRN
Status: CANCELLED | OUTPATIENT
Start: 2024-04-20

## 2024-04-16 RX ORDER — MEPERIDINE HYDROCHLORIDE 25 MG/ML
12.5 INJECTION INTRAMUSCULAR; INTRAVENOUS; SUBCUTANEOUS PRN
Status: CANCELLED | OUTPATIENT
Start: 2024-04-22

## 2024-04-16 RX ORDER — EPINEPHRINE 1 MG/ML
0.3 INJECTION, SOLUTION, CONCENTRATE INTRAVENOUS PRN
Status: CANCELLED | OUTPATIENT
Start: 2024-04-30

## 2024-04-16 RX ORDER — SODIUM CHLORIDE 0.9 % (FLUSH) 0.9 %
5-40 SYRINGE (ML) INJECTION PRN
Status: CANCELLED | OUTPATIENT
Start: 2024-04-16

## 2024-04-16 RX ORDER — ACETAMINOPHEN 325 MG/1
650 TABLET ORAL
Status: CANCELLED | OUTPATIENT
Start: 2024-04-22

## 2024-04-16 RX ORDER — ONDANSETRON 2 MG/ML
8 INJECTION INTRAMUSCULAR; INTRAVENOUS
Status: CANCELLED | OUTPATIENT
Start: 2024-04-23

## 2024-04-16 RX ORDER — SODIUM CHLORIDE 9 MG/ML
INJECTION, SOLUTION INTRAVENOUS CONTINUOUS
Status: CANCELLED | OUTPATIENT
Start: 2024-04-20

## 2024-04-16 RX ORDER — EPINEPHRINE 1 MG/ML
0.3 INJECTION, SOLUTION, CONCENTRATE INTRAVENOUS PRN
Status: CANCELLED | OUTPATIENT
Start: 2024-04-21

## 2024-04-16 RX ORDER — ONDANSETRON 2 MG/ML
8 INJECTION INTRAMUSCULAR; INTRAVENOUS
Status: CANCELLED | OUTPATIENT
Start: 2024-04-19

## 2024-04-16 RX ORDER — FUROSEMIDE 10 MG/ML
40 INJECTION INTRAMUSCULAR; INTRAVENOUS ONCE
Status: COMPLETED | OUTPATIENT
Start: 2024-04-16 | End: 2024-04-16

## 2024-04-16 RX ORDER — SODIUM CHLORIDE 0.9 % (FLUSH) 0.9 %
5-40 SYRINGE (ML) INJECTION PRN
Status: CANCELLED | OUTPATIENT
Start: 2024-04-20

## 2024-04-16 RX ORDER — SODIUM CHLORIDE 9 MG/ML
5-250 INJECTION, SOLUTION INTRAVENOUS PRN
Status: CANCELLED | OUTPATIENT
Start: 2024-04-19

## 2024-04-16 RX ORDER — MEPERIDINE HYDROCHLORIDE 25 MG/ML
12.5 INJECTION INTRAMUSCULAR; INTRAVENOUS; SUBCUTANEOUS PRN
Status: CANCELLED | OUTPATIENT
Start: 2024-05-08

## 2024-04-16 RX ORDER — MEPERIDINE HYDROCHLORIDE 25 MG/ML
12.5 INJECTION INTRAMUSCULAR; INTRAVENOUS; SUBCUTANEOUS PRN
Status: CANCELLED | OUTPATIENT
Start: 2024-04-18

## 2024-04-16 RX ORDER — SODIUM CHLORIDE 0.9 % (FLUSH) 0.9 %
5-40 SYRINGE (ML) INJECTION PRN
Status: CANCELLED | OUTPATIENT
Start: 2024-04-23

## 2024-04-16 RX ORDER — DIPHENHYDRAMINE HYDROCHLORIDE 50 MG/ML
50 INJECTION INTRAMUSCULAR; INTRAVENOUS
Status: CANCELLED | OUTPATIENT
Start: 2024-04-22

## 2024-04-16 RX ORDER — ONDANSETRON 2 MG/ML
8 INJECTION INTRAMUSCULAR; INTRAVENOUS
Status: CANCELLED | OUTPATIENT
Start: 2024-05-08

## 2024-04-16 RX ORDER — DIPHENHYDRAMINE HYDROCHLORIDE 50 MG/ML
50 INJECTION INTRAMUSCULAR; INTRAVENOUS
Status: CANCELLED | OUTPATIENT
Start: 2024-05-08

## 2024-04-16 RX ORDER — SODIUM CHLORIDE 9 MG/ML
5-250 INJECTION, SOLUTION INTRAVENOUS PRN
Status: CANCELLED | OUTPATIENT
Start: 2024-04-20

## 2024-04-16 RX ORDER — SODIUM CHLORIDE 9 MG/ML
INJECTION, SOLUTION INTRAVENOUS CONTINUOUS
Status: CANCELLED | OUTPATIENT
Start: 2024-04-17

## 2024-04-16 RX ORDER — SODIUM CHLORIDE 9 MG/ML
INJECTION, SOLUTION INTRAVENOUS CONTINUOUS
Status: CANCELLED | OUTPATIENT
Start: 2024-04-22

## 2024-04-16 RX ORDER — EPINEPHRINE 1 MG/ML
0.3 INJECTION, SOLUTION, CONCENTRATE INTRAVENOUS PRN
Status: CANCELLED | OUTPATIENT
Start: 2024-04-19

## 2024-04-16 RX ORDER — DIPHENHYDRAMINE HYDROCHLORIDE 50 MG/ML
50 INJECTION INTRAMUSCULAR; INTRAVENOUS
Status: CANCELLED
Start: 2024-05-08

## 2024-04-16 RX ORDER — SODIUM CHLORIDE 0.9 % (FLUSH) 0.9 %
5-40 SYRINGE (ML) INJECTION PRN
Status: CANCELLED | OUTPATIENT
Start: 2024-04-18

## 2024-04-16 RX ORDER — ALBUTEROL SULFATE 90 UG/1
4 AEROSOL, METERED RESPIRATORY (INHALATION) PRN
Status: ACTIVE | OUTPATIENT
Start: 2024-04-16 | End: 2024-04-17

## 2024-04-16 RX ORDER — ALLOPURINOL 300 MG/1
300 TABLET ORAL DAILY
Status: DISCONTINUED | OUTPATIENT
Start: 2024-04-16 | End: 2024-05-09

## 2024-04-16 RX ORDER — ACETAMINOPHEN 325 MG/1
650 TABLET ORAL
Status: CANCELLED | OUTPATIENT
Start: 2024-04-30

## 2024-04-16 RX ORDER — ONDANSETRON 2 MG/ML
8 INJECTION INTRAMUSCULAR; INTRAVENOUS
Status: CANCELLED | OUTPATIENT
Start: 2024-04-17

## 2024-04-16 RX ORDER — SODIUM CHLORIDE 0.9 % (FLUSH) 0.9 %
5-40 SYRINGE (ML) INJECTION PRN
Status: CANCELLED | OUTPATIENT
Start: 2024-04-17

## 2024-04-16 RX ORDER — ONDANSETRON 2 MG/ML
8 INJECTION INTRAMUSCULAR; INTRAVENOUS
Status: CANCELLED | OUTPATIENT
Start: 2024-04-20

## 2024-04-16 RX ORDER — ALBUTEROL SULFATE 90 UG/1
4 AEROSOL, METERED RESPIRATORY (INHALATION) PRN
Status: CANCELLED | OUTPATIENT
Start: 2024-04-17

## 2024-04-16 RX ORDER — MEPERIDINE HYDROCHLORIDE 25 MG/ML
12.5 INJECTION INTRAMUSCULAR; INTRAVENOUS; SUBCUTANEOUS PRN
Status: CANCELLED | OUTPATIENT
Start: 2024-04-17

## 2024-04-16 RX ORDER — ACETAMINOPHEN 325 MG/1
650 TABLET ORAL
Status: CANCELLED | OUTPATIENT
Start: 2024-04-19

## 2024-04-16 RX ORDER — ALBUTEROL SULFATE 90 UG/1
4 AEROSOL, METERED RESPIRATORY (INHALATION) PRN
Status: CANCELLED | OUTPATIENT
Start: 2024-04-22

## 2024-04-16 RX ORDER — MEPERIDINE HYDROCHLORIDE 25 MG/ML
12.5 INJECTION INTRAMUSCULAR; INTRAVENOUS; SUBCUTANEOUS PRN
Status: CANCELLED | OUTPATIENT
Start: 2024-04-30

## 2024-04-16 RX ORDER — HEPARIN 100 UNIT/ML
500 SYRINGE INTRAVENOUS PRN
Status: CANCELLED | OUTPATIENT
Start: 2024-04-17

## 2024-04-16 RX ORDER — DIPHENHYDRAMINE HYDROCHLORIDE 50 MG/ML
50 INJECTION INTRAMUSCULAR; INTRAVENOUS
Status: CANCELLED | OUTPATIENT
Start: 2024-04-19

## 2024-04-16 RX ORDER — SODIUM CHLORIDE 0.9 % (FLUSH) 0.9 %
5-40 SYRINGE (ML) INJECTION PRN
Status: CANCELLED | OUTPATIENT
Start: 2024-04-19

## 2024-04-16 RX ORDER — EPINEPHRINE 1 MG/ML
0.3 INJECTION, SOLUTION, CONCENTRATE INTRAVENOUS PRN
Status: CANCELLED | OUTPATIENT
Start: 2024-05-08

## 2024-04-16 RX ORDER — HEPARIN 100 UNIT/ML
500 SYRINGE INTRAVENOUS PRN
Status: CANCELLED | OUTPATIENT
Start: 2024-04-23

## 2024-04-16 RX ORDER — ONDANSETRON 2 MG/ML
8 INJECTION INTRAMUSCULAR; INTRAVENOUS
Status: CANCELLED | OUTPATIENT
Start: 2024-04-21

## 2024-04-16 RX ORDER — EPINEPHRINE 1 MG/ML
0.3 INJECTION, SOLUTION, CONCENTRATE INTRAVENOUS PRN
Status: CANCELLED | OUTPATIENT
Start: 2024-04-22

## 2024-04-16 RX ORDER — ACETAMINOPHEN 325 MG/1
650 TABLET ORAL
Status: CANCELLED | OUTPATIENT
Start: 2024-04-20

## 2024-04-16 RX ORDER — SODIUM CHLORIDE 9 MG/ML
INJECTION, SOLUTION INTRAVENOUS CONTINUOUS
Status: CANCELLED | OUTPATIENT
Start: 2024-04-23

## 2024-04-16 RX ORDER — DIPHENHYDRAMINE HYDROCHLORIDE 50 MG/ML
50 INJECTION INTRAMUSCULAR; INTRAVENOUS
Status: CANCELLED | OUTPATIENT
Start: 2024-04-20

## 2024-04-16 RX ORDER — DIPHENHYDRAMINE HYDROCHLORIDE 50 MG/ML
50 INJECTION INTRAMUSCULAR; INTRAVENOUS
Status: CANCELLED | OUTPATIENT
Start: 2024-04-21

## 2024-04-16 RX ORDER — ALBUTEROL SULFATE 90 UG/1
4 AEROSOL, METERED RESPIRATORY (INHALATION) PRN
Status: CANCELLED | OUTPATIENT
Start: 2024-04-30

## 2024-04-16 RX ORDER — ONDANSETRON 2 MG/ML
8 INJECTION INTRAMUSCULAR; INTRAVENOUS
Status: CANCELLED | OUTPATIENT
Start: 2024-04-16

## 2024-04-16 RX ORDER — MEPERIDINE HYDROCHLORIDE 25 MG/ML
12.5 INJECTION INTRAMUSCULAR; INTRAVENOUS; SUBCUTANEOUS PRN
Status: CANCELLED | OUTPATIENT
Start: 2024-04-16

## 2024-04-16 RX ORDER — SODIUM CHLORIDE 9 MG/ML
INJECTION, SOLUTION INTRAVENOUS CONTINUOUS
Status: CANCELLED | OUTPATIENT
Start: 2024-04-21

## 2024-04-16 RX ORDER — ALBUTEROL SULFATE 90 UG/1
4 AEROSOL, METERED RESPIRATORY (INHALATION) PRN
Status: CANCELLED | OUTPATIENT
Start: 2024-04-19

## 2024-04-16 RX ORDER — SODIUM CHLORIDE 0.9 % (FLUSH) 0.9 %
5-40 SYRINGE (ML) INJECTION PRN
Status: CANCELLED | OUTPATIENT
Start: 2024-04-22

## 2024-04-16 RX ORDER — ACETAMINOPHEN 325 MG/1
650 TABLET ORAL
Status: CANCELLED | OUTPATIENT
Start: 2024-04-16

## 2024-04-16 RX ORDER — HEPARIN 100 UNIT/ML
500 SYRINGE INTRAVENOUS PRN
Status: CANCELLED | OUTPATIENT
Start: 2024-04-22

## 2024-04-16 RX ORDER — ACETAMINOPHEN 325 MG/1
650 TABLET ORAL
Status: CANCELLED
Start: 2024-04-30

## 2024-04-16 RX ORDER — SODIUM CHLORIDE 9 MG/ML
INJECTION, SOLUTION INTRAVENOUS CONTINUOUS
Status: CANCELLED | OUTPATIENT
Start: 2024-05-08

## 2024-04-16 RX ORDER — ONDANSETRON 2 MG/ML
8 INJECTION INTRAMUSCULAR; INTRAVENOUS ONCE
Status: CANCELLED
Start: 2024-04-21 | End: 2024-04-21

## 2024-04-16 RX ORDER — HEPARIN 100 UNIT/ML
500 SYRINGE INTRAVENOUS PRN
Status: CANCELLED | OUTPATIENT
Start: 2024-04-18

## 2024-04-16 RX ORDER — ALBUTEROL SULFATE 90 UG/1
4 AEROSOL, METERED RESPIRATORY (INHALATION) PRN
Status: CANCELLED | OUTPATIENT
Start: 2024-04-18

## 2024-04-16 RX ORDER — SODIUM CHLORIDE 9 MG/ML
INJECTION, SOLUTION INTRAVENOUS CONTINUOUS
Status: CANCELLED | OUTPATIENT
Start: 2024-04-16

## 2024-04-16 RX ORDER — EPINEPHRINE 1 MG/ML
0.3 INJECTION, SOLUTION, CONCENTRATE INTRAVENOUS PRN
Status: CANCELLED | OUTPATIENT
Start: 2024-04-16

## 2024-04-16 RX ORDER — ACETAMINOPHEN 325 MG/1
650 TABLET ORAL
Status: CANCELLED
Start: 2024-05-08

## 2024-04-16 RX ORDER — EPINEPHRINE 1 MG/ML
0.3 INJECTION, SOLUTION, CONCENTRATE INTRAVENOUS PRN
Status: CANCELLED | OUTPATIENT
Start: 2024-04-18

## 2024-04-16 RX ORDER — MEPERIDINE HYDROCHLORIDE 25 MG/ML
12.5 INJECTION INTRAMUSCULAR; INTRAVENOUS; SUBCUTANEOUS PRN
Status: CANCELLED | OUTPATIENT
Start: 2024-04-19

## 2024-04-16 RX ORDER — SODIUM CHLORIDE 9 MG/ML
5-250 INJECTION, SOLUTION INTRAVENOUS PRN
Status: CANCELLED | OUTPATIENT
Start: 2024-04-30

## 2024-04-16 RX ORDER — HEPARIN 100 UNIT/ML
500 SYRINGE INTRAVENOUS PRN
Status: CANCELLED | OUTPATIENT
Start: 2024-05-08

## 2024-04-16 RX ORDER — SODIUM CHLORIDE 9 MG/ML
5-250 INJECTION, SOLUTION INTRAVENOUS PRN
Status: ACTIVE | OUTPATIENT
Start: 2024-04-16 | End: 2024-04-17

## 2024-04-16 RX ORDER — SODIUM CHLORIDE 0.9 % (FLUSH) 0.9 %
5-40 SYRINGE (ML) INJECTION PRN
Status: CANCELLED | OUTPATIENT
Start: 2024-04-30

## 2024-04-16 RX ORDER — HEPARIN 100 UNIT/ML
500 SYRINGE INTRAVENOUS PRN
Status: CANCELLED | OUTPATIENT
Start: 2024-04-20

## 2024-04-16 RX ORDER — ONDANSETRON 2 MG/ML
8 INJECTION INTRAMUSCULAR; INTRAVENOUS ONCE
Status: CANCELLED
Start: 2024-04-20 | End: 2024-04-20

## 2024-04-16 RX ORDER — SODIUM CHLORIDE 9 MG/ML
INJECTION, SOLUTION INTRAVENOUS CONTINUOUS
Status: CANCELLED | OUTPATIENT
Start: 2024-04-19

## 2024-04-16 RX ORDER — HEPARIN 100 UNIT/ML
500 SYRINGE INTRAVENOUS PRN
Status: CANCELLED | OUTPATIENT
Start: 2024-04-21

## 2024-04-16 RX ORDER — ONDANSETRON 2 MG/ML
8 INJECTION INTRAMUSCULAR; INTRAVENOUS ONCE
Status: CANCELLED
Start: 2024-04-18 | End: 2024-04-18

## 2024-04-16 RX ORDER — HEPARIN 100 UNIT/ML
500 SYRINGE INTRAVENOUS PRN
Status: CANCELLED | OUTPATIENT
Start: 2024-04-30

## 2024-04-16 RX ORDER — ALBUTEROL SULFATE 90 UG/1
4 AEROSOL, METERED RESPIRATORY (INHALATION) PRN
Status: CANCELLED | OUTPATIENT
Start: 2024-04-23

## 2024-04-16 RX ORDER — EPINEPHRINE 1 MG/ML
0.3 INJECTION, SOLUTION, CONCENTRATE INTRAVENOUS PRN
Status: CANCELLED | OUTPATIENT
Start: 2024-04-17

## 2024-04-16 RX ORDER — ONDANSETRON 2 MG/ML
8 INJECTION INTRAMUSCULAR; INTRAVENOUS EVERY 24 HOURS
Status: COMPLETED | OUTPATIENT
Start: 2024-04-17 | End: 2024-04-22

## 2024-04-16 RX ORDER — MEPERIDINE HYDROCHLORIDE 25 MG/ML
12.5 INJECTION INTRAMUSCULAR; INTRAVENOUS; SUBCUTANEOUS PRN
Status: CANCELLED | OUTPATIENT
Start: 2024-04-23

## 2024-04-16 RX ORDER — ALBUTEROL SULFATE 90 UG/1
4 AEROSOL, METERED RESPIRATORY (INHALATION) PRN
Status: CANCELLED | OUTPATIENT
Start: 2024-04-20

## 2024-04-16 RX ORDER — DIPHENHYDRAMINE HYDROCHLORIDE 50 MG/ML
50 INJECTION INTRAMUSCULAR; INTRAVENOUS
Status: CANCELLED | OUTPATIENT
Start: 2024-04-16

## 2024-04-16 RX ORDER — ACETAMINOPHEN 325 MG/1
650 TABLET ORAL
Status: CANCELLED | OUTPATIENT
Start: 2024-05-08

## 2024-04-16 RX ORDER — ONDANSETRON 2 MG/ML
8 INJECTION INTRAMUSCULAR; INTRAVENOUS
Status: CANCELLED | OUTPATIENT
Start: 2024-04-30

## 2024-04-16 RX ORDER — EPINEPHRINE 1 MG/ML
0.3 INJECTION, SOLUTION, CONCENTRATE INTRAVENOUS PRN
Status: CANCELLED | OUTPATIENT
Start: 2024-04-23

## 2024-04-16 RX ORDER — ONDANSETRON 2 MG/ML
8 INJECTION INTRAMUSCULAR; INTRAVENOUS ONCE
Status: CANCELLED
Start: 2024-04-19 | End: 2024-04-19

## 2024-04-16 RX ORDER — DIPHENHYDRAMINE HYDROCHLORIDE 50 MG/ML
50 INJECTION INTRAMUSCULAR; INTRAVENOUS
Status: CANCELLED | OUTPATIENT
Start: 2024-04-23

## 2024-04-16 RX ORDER — ONDANSETRON 2 MG/ML
8 INJECTION INTRAMUSCULAR; INTRAVENOUS
Status: CANCELLED | OUTPATIENT
Start: 2024-04-22

## 2024-04-16 RX ADMIN — AMLODIPINE BESYLATE 5 MG: 5 TABLET ORAL at 09:09

## 2024-04-16 RX ADMIN — PANTOPRAZOLE SODIUM 40 MG: 40 TABLET, DELAYED RELEASE ORAL at 09:08

## 2024-04-16 RX ADMIN — FUROSEMIDE 40 MG: 10 INJECTION, SOLUTION INTRAMUSCULAR; INTRAVENOUS at 13:52

## 2024-04-16 RX ADMIN — ACYCLOVIR 400 MG: 400 TABLET ORAL at 21:12

## 2024-04-16 RX ADMIN — FLUOXETINE HYDROCHLORIDE 20 MG: 10 CAPSULE ORAL at 09:08

## 2024-04-16 RX ADMIN — MAGNESIUM GLUCONATE 500 MG ORAL TABLET 400 MG: 500 TABLET ORAL at 21:12

## 2024-04-16 RX ADMIN — LEVOTHYROXINE SODIUM 150 MCG: 0.15 TABLET ORAL at 09:09

## 2024-04-16 RX ADMIN — HYDROXYZINE HYDROCHLORIDE 25 MG: 25 TABLET, FILM COATED ORAL at 09:08

## 2024-04-16 RX ADMIN — DEXAMETHASONE SODIUM PHOSPHATE 12 MG: 4 INJECTION, SOLUTION INTRAMUSCULAR; INTRAVENOUS at 10:17

## 2024-04-16 RX ADMIN — MORPHINE SULFATE 15 MG: 15 TABLET, FILM COATED, EXTENDED RELEASE ORAL at 09:08

## 2024-04-16 RX ADMIN — CIPROFLOXACIN HYDROCHLORIDE 500 MG: 500 TABLET, FILM COATED ORAL at 21:13

## 2024-04-16 RX ADMIN — SODIUM CHLORIDE 5 ML/HR: 9 INJECTION, SOLUTION INTRAVENOUS at 21:08

## 2024-04-16 RX ADMIN — FLUOXETINE HYDROCHLORIDE 20 MG: 10 CAPSULE ORAL at 21:12

## 2024-04-16 RX ADMIN — MAGNESIUM SULFATE HEPTAHYDRATE 2000 MG: 40 INJECTION, SOLUTION INTRAVENOUS at 21:10

## 2024-04-16 RX ADMIN — Medication 60 MG: at 11:15

## 2024-04-16 RX ADMIN — FAMOTIDINE 40 MG: 20 TABLET, FILM COATED ORAL at 18:31

## 2024-04-16 RX ADMIN — DIPHENHYDRAMINE HYDROCHLORIDE 25 MG: 25 CAPSULE ORAL at 16:28

## 2024-04-16 RX ADMIN — ACYCLOVIR 400 MG: 400 TABLET ORAL at 09:08

## 2024-04-16 RX ADMIN — MAGNESIUM GLUCONATE 500 MG ORAL TABLET 400 MG: 500 TABLET ORAL at 09:09

## 2024-04-16 RX ADMIN — POTASSIUM CHLORIDE 20 MEQ: 1500 TABLET, EXTENDED RELEASE ORAL at 09:09

## 2024-04-16 RX ADMIN — HYDROXYZINE HYDROCHLORIDE 25 MG: 25 TABLET, FILM COATED ORAL at 21:12

## 2024-04-16 RX ADMIN — WATER 131.25 MG: 1 INJECTION INTRAMUSCULAR; INTRAVENOUS; SUBCUTANEOUS at 10:46

## 2024-04-16 RX ADMIN — CIPROFLOXACIN HYDROCHLORIDE 500 MG: 500 TABLET, FILM COATED ORAL at 09:09

## 2024-04-16 RX ADMIN — SENNOSIDES 17.2 MG: 8.6 TABLET, FILM COATED ORAL at 21:12

## 2024-04-16 RX ADMIN — IPRATROPIUM BROMIDE AND ALBUTEROL SULFATE 1 DOSE: 2.5; .5 SOLUTION RESPIRATORY (INHALATION) at 13:50

## 2024-04-16 RX ADMIN — POTASSIUM CHLORIDE 20 MEQ: 1500 TABLET, EXTENDED RELEASE ORAL at 21:13

## 2024-04-16 RX ADMIN — ALLOPURINOL 300 MG: 300 TABLET ORAL at 09:08

## 2024-04-16 RX ADMIN — MAGNESIUM GLUCONATE 500 MG ORAL TABLET 400 MG: 500 TABLET ORAL at 14:08

## 2024-04-16 RX ADMIN — ONDANSETRON 8 MG: 2 INJECTION INTRAMUSCULAR; INTRAVENOUS at 10:06

## 2024-04-16 RX ADMIN — PANTOPRAZOLE SODIUM 40 MG: 40 TABLET, DELAYED RELEASE ORAL at 16:28

## 2024-04-16 RX ADMIN — CYCLOBENZAPRINE 10 MG: 10 TABLET, FILM COATED ORAL at 14:08

## 2024-04-16 RX ADMIN — MORPHINE SULFATE 15 MG: 15 TABLET, FILM COATED, EXTENDED RELEASE ORAL at 21:12

## 2024-04-16 RX ADMIN — ACETAMINOPHEN 650 MG: 325 TABLET ORAL at 16:28

## 2024-04-16 RX ADMIN — TIZANIDINE 4 MG: 2 TABLET ORAL at 00:03

## 2024-04-16 ASSESSMENT — PAIN SCALES - GENERAL
PAINLEVEL_OUTOF10: 0
PAINLEVEL_OUTOF10: 0

## 2024-04-16 NOTE — SIGNIFICANT EVENT
Pt currently undergoing treatment on clinical trial.  Reports cramping in legs bilaterally (eletrolytes ok) and increased shortness of breath.  Albuterol ordered per treatment plan.  Lasix 40mg IV x 1 and repeat CXR ordered.      TIM Dacosta - CNP  [facility] Hematology & Oncology

## 2024-04-16 NOTE — PROGRESS NOTES
11:15 pt VSS pt trail [id number] chemo started pt resting in bed no complaints of pain or discomfort  11:30 VSS no complaints voiced by pt no distress observed  1155 VV no distressed observed resting in bed   Will continue to monitor  1335 RN round on pt took more labs as per order pt complain of increased SOB/difficulty breathing plus cramping in legs no CP, no Palps VSS informed Deya, NP order for Albuterol continue to watch cramping order for XR chest and flexeril  Will continue to monitor  1515 Trial chemo done no distress noted VSS resting in bed call light in reach will continue to monitor

## 2024-04-16 NOTE — ICUWATCH
RRT Clinical Rounding Nurse Progress Report      SUBJECTIVE: Patient assessed secondary to RN/provider concern - inc O2 needs.      Vitals:    04/16/24 1330 04/16/24 1352 04/16/24 1506 04/16/24 1635   BP: (!) 148/73  128/75 120/64   Pulse: 66  72 72   Resp: 22 22 22   Temp: 98.8 °F (37.1 °C)  97.9 °F (36.6 °C) 98.2 °F (36.8 °C)   TempSrc: Axillary  Axillary Axillary   SpO2: 98% 98% 99% 97%   Weight:       Height:            DETERIORATION INDEX SCORE: 48    ASSESSMENT:  Pt lying quietly in bed, appears comfortable.  A&Ox3.  Family at bedside.  Lung sounds w/ few bibasilar crackles.  On Airvo 70%/60L, O2 Sat 97%, RR 20s and shallow.  Becomes easily dyspneic with exertion. NSR on remote telemetry. Primary RN at bedside administering new chemo trial. Pt denies any complaints at this time. No new concerns per Primary RN.      PLAN:  Will follow per RRT Clinical Rounding Program protocol.    Jahaira Du, CHINO  [facility]: [phone]  [facility]: [phone]

## 2024-04-16 NOTE — CARE COORDINATION
LOS 8d  IDR and chart reviewed by RNCM for discharge planning.  Relapse in AML  24 Hour Events:  Afebrile, VSS, on Airvo @ 60L/70%  Ongoing refractory thrombocytopenia - transfusing  Remains volume overloaded and diuresing  C1D1 Vidaza / Venetoclax / [id number] per clinical trial [id number]  Patient has been unable to work with PT/OT at this time.    Transition of care is undetermined at this time.    Transitions of Care plan is ongoing, no further concerns as of present.   Please consult  if any new issues arise.  Will continue to follow

## 2024-04-16 NOTE — ICUWATCH
RRT Clinical Rounding Nurse Update    Vitals:    04/16/24 1352 04/16/24 1506 04/16/24 1635 04/16/24 1709   BP:  128/75 120/64 128/67   Pulse:  72 72 70   Resp:  22 22 22   Temp:  97.9 °F (36.6 °C) 98.2 °F (36.8 °C) 97.5 °F (36.4 °C)   TempSrc:  Axillary Axillary Axillary   SpO2: 98% 99% 97% 96%   Weight:       Height:            DETERIORATION INDEX SCORE: 54    ASSESSMENT:  Previous outreach assessment was reviewed. There have been no significant changes since previous assessment. Pt finished trial chemo tx.  Did have some sob and cramping in BLE.  Given flexeril, albuteral and dose of lasix with improvement.  EKG and CXR were ok.  Remains on Airvo 70%/60L.  No new concerns per Primary RN.      PLAN:  Will follow per RRT Clinical Rounding Program protocol.    Jahaira Du RN  [facility]: [phone]  [facility]: [phone]

## 2024-04-16 NOTE — PROGRESS NOTES
CH attempted to visit PT, but PT was with Staff.  CH prayed silently for PT, PT's Family, and Staff.    Rev. BRAXTON BlountDiv.

## 2024-04-16 NOTE — PROGRESS NOTES
Comprehensive Nutrition Assessment    Type and Reason for Visit: Reassess  Malnutrition Screening Tool: Malnutrition Screen  Have you recently lost weight without trying?: 2 to 13 pounds (1 point)  Have you been eating poorly because of a decreased appetite?: Yes (1 point)  Malnutrition Screening Tool Score: 2    Nutrition Recommendations/Plan:   Meals and Snacks:  Diet: Downgrade Easy to chew  Nutrition Supplement Therapy:  Medical food supplement therapy:  Continue Ensure Clear three times per day (this provides 240 kcal and 8 grams protein per bottle) - apple flavor     Malnutrition Assessment:  Malnutrition Status: At risk for malnutrition (Comment) (recurrent and prolonged admissions, variable intakes, wt loss)    No wasting  Nutrition Assessment:  Nutrition History: Patient known to clinical nutrition from previous admissions. She initially lost weight with COVID in May 2022 due to loss of taste. During previous admissions she ate fair during previous admissions and used Ensure intermittently to supplement intake. Patient reports she has been eating fair since recent discharge.  at bedside states she started going downhill recently and her PO declined with it. He states the most he has been able to get her to eat is half a cheeseburger.      Do You Have Any Cultural, Lutheran, or Ethnic Food Preferences?: No   Weight History: 6/20/23 180#, 10/10/23 173#, 1/8/24 160#. This reveals a 9.9% weight loss in ~10 months, 6.2% weight loss in last 6 months. This is notable but not clinically significant.  Nutrition Background:       PMH significant for psoriatic arthritis on Humira, ovarian cancer, HTN, HLD, GERD, AML s/p chemo and SCT. She presented with dizziness and gum bleeding. She was admitted with neutropenic fever, refractory thrombocytopenia, and relapsed AML. S/p IVIG. Clinical trial initiated today.  Nutrition Interval:  Patient reclined in bed on non rebreather. Spouse and RN, Amilcar, at bedside.

## 2024-04-16 NOTE — PROGRESS NOTES
[facility] Hematology & Oncology        Inpatient Hematology / Oncology Progress Note    Reason for Consult:  Epistaxis [R04.0]  Lightheadedness [R42]  Thrombocytopenia (HCC) [D69.6]  Pancytopenia (HCC) [D61.818]  Symptomatic anemia [D64.9]  Referring Physician:  Benjamin Castro MD    24 Hour Events:  Afebrile, VSS, on Airvo @ 60L/70%  Ongoing refractory thrombocytopenia - transfusing  Remains volume overloaded and diuresing  C1D1 Vidaza / Venetoclax / [id number] per clinical trial [id number]   at bedside    Transfusions: 1 unit plt  Replacements: None      ROS:  Constitutional: +fatigue, weakness. Negative for fever, chills.  CV: +edema. Negative for chest pain, palpitations.  Respiratory: +dyspnea, cough. Negative for wheezing.  GI: Negative for nausea, abdominal pain, diarrhea.    10 point review of systems is otherwise negative with the exception of the elements mentioned above in the HPI.           Allergies   Allergen Reactions    Penicillins Hives    Sulfa Antibiotics Hives    Codeine Nausea And Vomiting     Past Medical History:   Diagnosis Date    Arthritis     Autoimmune disease (HCC)     skin changes, unknown name    Cancer (HCC) 1990    ovarian    Chronic pain     arthritis in back and legs    Coronary artery spasm (HCC)     COVID 05/15/2022    Essential hypertension 11/8/2019    Gastritis     GERD (gastroesophageal reflux disease)     Hx antineoplastic chemo     Migraine headache     Psoriasis     Psychiatric disorder     anxiety and depression    Severe obesity (BMI 35.0-39.9) 6/26/2018    Thyroid disease     Diagnosed in 1996     Past Surgical History:   Procedure Laterality Date    CARPAL TUNNEL RELEASE      GYN      ovaries    HYSTERECTOMY, TOTAL ABDOMINAL (CERVIX REMOVED)  1990    IR PORT PLACEMENT EQUAL OR GREATER THAN 5 YEARS  1/3/2024    IR PORT PLACEMENT EQUAL OR GREATER THAN 5 YEARS 1/3/2024 [facility] RADIOLOGY SPECIALS    [place] UNLISTED PROCEDURE CARDIAC SURGERY      cardiac cath.  Dx with  04/14/2024    Narrative  Chest X-ray    INDICATION: Shortness of breath    COMPARISON: X-ray chest April 13, 2024  TECHNIQUE: Frontal view of the chest was obtained.    FINDINGS: Right chest port a catheter with the tip at the cavoatrial junction.  Redemonstrated extensive airspace infiltrates, not significantly changed. No  significant pleural effusion or pneumothorax. Normal cardiomediastinal  silhouette.    Impression  No significant interval changes.        ASSESSMENT:      ICD-10-CM    1. Thrombocytopenia ([facility])  D69.6       2. Pancytopenia ([facility])  D61.818 0.9 % sodium chloride infusion     dexAMETHasone (DECADRON) 12 mg in sodium chloride 0.9 % 50 mL IVPB     ondansetron (ZOFRAN) injection 8 mg     azaCITIDine (VIDAZA) 131.25 mg in sterile water 5.25 mL chemo syringe     (HQT551) investigational 60 mg in sodium chloride 0.9 % 250 mL IVPB     CBC with Auto Differential     Comprehensive Metabolic Panel     Magnesium     Phosphorus     Lactate Dehydrogenase     Uric Acid     Bilirubin, Direct     Amylase     Lipase     Cholesterol, Total     LDL Cholesterol, Direct     HDL Cholesterol     Triglyceride     CBC with Auto Differential     Comprehensive Metabolic Panel     Magnesium     Phosphorus     Lactate Dehydrogenase     Uric Acid     Bilirubin, Direct     Amylase     Lipase     Cholesterol, Total     LDL Cholesterol, Direct     HDL Cholesterol     Triglyceride     CBC with Auto Differential     Comprehensive Metabolic Panel     Magnesium     Phosphorus     Lactate Dehydrogenase     Uric Acid     Bilirubin, Direct     Amylase     Lipase     Cholesterol, Total     LDL Cholesterol, Direct     HDL Cholesterol     Triglyceride     Misc nursing order (specify)     Misc nursing order (specify)      3. Epistaxis  R04.0       4. Symptomatic anemia  D64.9       5. Lightheadedness  R42       6. AML (acute myeloid leukemia) in relapse ([facility])  C92.02 Echo (TTE) complete (PRN contrast/bubble/strain/3D)     Echo (TTE) complete

## 2024-04-17 ENCOUNTER — RESEARCH ENCOUNTER (OUTPATIENT)
Dept: ONCOLOGY | Age: 74
End: 2024-04-17

## 2024-04-17 PROBLEM — Z51.11 ENCOUNTER FOR ANTINEOPLASTIC CHEMOTHERAPY: Status: ACTIVE | Noted: 2023-12-12

## 2024-04-17 LAB
ALBUMIN SERPL-MCNC: 2.4 G/DL (ref 3.2–4.6)
ALBUMIN/GLOB SERPL: 0.3 (ref 0.4–1.6)
ALP SERPL-CCNC: 225 U/L (ref 50–136)
ALT SERPL-CCNC: 41 U/L (ref 12–65)
ANION GAP SERPL CALC-SCNC: 2 MMOL/L (ref 2–11)
APTT PPP: 32.2 SEC (ref 23.3–37.4)
AST SERPL-CCNC: 82 U/L (ref 15–37)
BILIRUB SERPL-MCNC: 0.7 MG/DL (ref 0.2–1.1)
BLD PROD TYP BPU: NORMAL
BLOOD BANK BLOOD PRODUCT EXPIRATION DATE: NORMAL
BLOOD BANK CMNT PATIENT-IMP: NORMAL
BLOOD BANK DISPENSE STATUS: NORMAL
BLOOD BANK ISBT PRODUCT BLOOD TYPE: 5100
BLOOD BANK UNIT TYPE AND RH: NORMAL
BPU ID: NORMAL
BUN SERPL-MCNC: 39 MG/DL (ref 8–23)
CALCIUM SERPL-MCNC: 9.6 MG/DL (ref 8.3–10.4)
CHLORIDE SERPL-SCNC: 99 MMOL/L (ref 103–113)
CO2 SERPL-SCNC: 30 MMOL/L (ref 21–32)
CREAT SERPL-MCNC: 0.7 MG/DL (ref 0.6–1)
D DIMER PPP FEU-MCNC: 1.6 UG/ML(FEU)
DIFFERENTIAL METHOD BLD: ABNORMAL
ERYTHROCYTE [DISTWIDTH] IN BLOOD BY AUTOMATED COUNT: 15 % (ref 11.9–14.6)
FIBRINOGEN PPP-MCNC: 797 MG/DL (ref 190–501)
GLOBULIN SER CALC-MCNC: 8.4 G/DL (ref 2.8–4.5)
GLUCOSE SERPL-MCNC: 132 MG/DL (ref 65–100)
HCT VFR BLD AUTO: 21.3 % (ref 35.8–46.3)
HGB BLD-MCNC: 7.4 G/DL (ref 11.7–15.4)
INR PPP: 1.1
LDH SERPL L TO P-CCNC: 1649 U/L (ref 110–210)
MAGNESIUM SERPL-MCNC: 3.6 MG/DL (ref 1.8–2.4)
MCH RBC QN AUTO: 29.7 PG (ref 26.1–32.9)
MCHC RBC AUTO-ENTMCNC: 34.7 G/DL (ref 31.4–35)
MCV RBC AUTO: 85.5 FL (ref 82–102)
NRBC # BLD: 0 K/UL (ref 0–0.2)
PHOSPHATE SERPL-MCNC: 4.4 MG/DL (ref 2.3–3.7)
PLATELET # BLD AUTO: 7 K/UL (ref 150–450)
PLATELET COMMENT: ABNORMAL
PMV BLD AUTO: 10.2 FL (ref 9.4–12.3)
POTASSIUM SERPL-SCNC: 4.7 MMOL/L (ref 3.5–5.1)
PROT SERPL-MCNC: 10.8 G/DL (ref 6.3–8.2)
PROTHROMBIN TIME: 15.1 SEC (ref 11.3–14.9)
RBC # BLD AUTO: 2.49 M/UL (ref 4.05–5.2)
RBC MORPH BLD: ABNORMAL
SODIUM SERPL-SCNC: 131 MMOL/L (ref 136–146)
UNIT DIVISION: 0
UNIT ISSUE DATE/TIME: NORMAL
URATE SERPL-MCNC: 3.3 MG/DL (ref 2.6–6)
WBC # BLD AUTO: 0.6 K/UL (ref 4.3–11.1)
WBC MORPH BLD: ABNORMAL

## 2024-04-17 PROCEDURE — 86813 HLA TYPING A B OR C: CPT

## 2024-04-17 PROCEDURE — 80053 COMPREHEN METABOLIC PANEL: CPT

## 2024-04-17 PROCEDURE — 86850 RBC ANTIBODY SCREEN: CPT

## 2024-04-17 PROCEDURE — 85730 THROMBOPLASTIN TIME PARTIAL: CPT

## 2024-04-17 PROCEDURE — 6360000002 HC RX W HCPCS: Performed by: INTERNAL MEDICINE

## 2024-04-17 PROCEDURE — 85025 COMPLETE CBC W/AUTO DIFF WBC: CPT

## 2024-04-17 PROCEDURE — 85384 FIBRINOGEN ACTIVITY: CPT

## 2024-04-17 PROCEDURE — 83615 LACTATE (LD) (LDH) ENZYME: CPT

## 2024-04-17 PROCEDURE — 85610 PROTHROMBIN TIME: CPT

## 2024-04-17 PROCEDURE — 86900 BLOOD TYPING SEROLOGIC ABO: CPT

## 2024-04-17 PROCEDURE — 6360000002 HC RX W HCPCS: Performed by: NURSE PRACTITIONER

## 2024-04-17 PROCEDURE — 2700000000 HC OXYGEN THERAPY PER DAY

## 2024-04-17 PROCEDURE — 6370000000 HC RX 637 (ALT 250 FOR IP): Performed by: NURSE PRACTITIONER

## 2024-04-17 PROCEDURE — 2580000003 HC RX 258: Performed by: INTERNAL MEDICINE

## 2024-04-17 PROCEDURE — 94761 N-INVAS EAR/PLS OXIMETRY MLT: CPT

## 2024-04-17 PROCEDURE — P9037 PLATE PHERES LEUKOREDU IRRAD: HCPCS

## 2024-04-17 PROCEDURE — 6360000002 HC RX W HCPCS: Performed by: STUDENT IN AN ORGANIZED HEALTH CARE EDUCATION/TRAINING PROGRAM

## 2024-04-17 PROCEDURE — 86920 COMPATIBILITY TEST SPIN: CPT

## 2024-04-17 PROCEDURE — 1100000000 HC RM PRIVATE

## 2024-04-17 PROCEDURE — 84550 ASSAY OF BLOOD/URIC ACID: CPT

## 2024-04-17 PROCEDURE — 84100 ASSAY OF PHOSPHORUS: CPT

## 2024-04-17 PROCEDURE — 6370000000 HC RX 637 (ALT 250 FOR IP): Performed by: INTERNAL MEDICINE

## 2024-04-17 PROCEDURE — 36591 DRAW BLOOD OFF VENOUS DEVICE: CPT

## 2024-04-17 PROCEDURE — 36430 TRANSFUSION BLD/BLD COMPNT: CPT

## 2024-04-17 PROCEDURE — 6370000000 HC RX 637 (ALT 250 FOR IP): Performed by: STUDENT IN AN ORGANIZED HEALTH CARE EDUCATION/TRAINING PROGRAM

## 2024-04-17 PROCEDURE — 86901 BLOOD TYPING SEROLOGIC RH(D): CPT

## 2024-04-17 PROCEDURE — 94760 N-INVAS EAR/PLS OXIMETRY 1: CPT

## 2024-04-17 PROCEDURE — 86022 PLATELET ANTIBODIES: CPT

## 2024-04-17 PROCEDURE — 85379 FIBRIN DEGRADATION QUANT: CPT

## 2024-04-17 PROCEDURE — 83735 ASSAY OF MAGNESIUM: CPT

## 2024-04-17 RX ORDER — SODIUM CHLORIDE 9 MG/ML
INJECTION, SOLUTION INTRAVENOUS PRN
Status: COMPLETED | OUTPATIENT
Start: 2024-04-17 | End: 2024-04-19

## 2024-04-17 RX ORDER — SODIUM CHLORIDE 9 MG/ML
INJECTION, SOLUTION INTRAVENOUS PRN
Status: COMPLETED | OUTPATIENT
Start: 2024-04-17 | End: 2024-04-29

## 2024-04-17 RX ORDER — FUROSEMIDE 10 MG/ML
20 INJECTION INTRAMUSCULAR; INTRAVENOUS ONCE
Status: COMPLETED | OUTPATIENT
Start: 2024-04-17 | End: 2024-04-17

## 2024-04-17 RX ADMIN — FUROSEMIDE 20 MG: 10 INJECTION, SOLUTION INTRAMUSCULAR; INTRAVENOUS at 11:49

## 2024-04-17 RX ADMIN — ACYCLOVIR 400 MG: 400 TABLET ORAL at 08:32

## 2024-04-17 RX ADMIN — WATER 131.25 MG: 1 INJECTION INTRAMUSCULAR; INTRAVENOUS; SUBCUTANEOUS at 11:24

## 2024-04-17 RX ADMIN — ACETAMINOPHEN 650 MG: 325 TABLET ORAL at 21:51

## 2024-04-17 RX ADMIN — DIPHENHYDRAMINE HYDROCHLORIDE 5 ML: 12.5 LIQUID ORAL at 21:03

## 2024-04-17 RX ADMIN — MAGNESIUM GLUCONATE 500 MG ORAL TABLET 400 MG: 500 TABLET ORAL at 17:11

## 2024-04-17 RX ADMIN — ACETAMINOPHEN 650 MG: 325 TABLET ORAL at 10:51

## 2024-04-17 RX ADMIN — FAMOTIDINE 40 MG: 20 TABLET, FILM COATED ORAL at 17:11

## 2024-04-17 RX ADMIN — HYDROCODONE BITARTRATE AND ACETAMINOPHEN 1 TABLET: 10; 325 TABLET ORAL at 19:39

## 2024-04-17 RX ADMIN — PANTOPRAZOLE SODIUM 40 MG: 40 TABLET, DELAYED RELEASE ORAL at 17:11

## 2024-04-17 RX ADMIN — ALLOPURINOL 300 MG: 300 TABLET ORAL at 08:32

## 2024-04-17 RX ADMIN — CIPROFLOXACIN HYDROCHLORIDE 500 MG: 500 TABLET, FILM COATED ORAL at 08:32

## 2024-04-17 RX ADMIN — MAGNESIUM GLUCONATE 500 MG ORAL TABLET 400 MG: 500 TABLET ORAL at 08:32

## 2024-04-17 RX ADMIN — DIPHENHYDRAMINE HYDROCHLORIDE 25 MG: 25 CAPSULE ORAL at 21:51

## 2024-04-17 RX ADMIN — POTASSIUM CHLORIDE 20 MEQ: 1500 TABLET, EXTENDED RELEASE ORAL at 20:57

## 2024-04-17 RX ADMIN — MAGNESIUM SULFATE HEPTAHYDRATE 2000 MG: 40 INJECTION, SOLUTION INTRAVENOUS at 00:05

## 2024-04-17 RX ADMIN — CIPROFLOXACIN HYDROCHLORIDE 500 MG: 500 TABLET, FILM COATED ORAL at 20:57

## 2024-04-17 RX ADMIN — CYCLOBENZAPRINE 10 MG: 10 TABLET, FILM COATED ORAL at 21:51

## 2024-04-17 RX ADMIN — HYDROXYZINE HYDROCHLORIDE 25 MG: 25 TABLET, FILM COATED ORAL at 08:32

## 2024-04-17 RX ADMIN — FLUOXETINE HYDROCHLORIDE 20 MG: 10 CAPSULE ORAL at 08:32

## 2024-04-17 RX ADMIN — ONDANSETRON 8 MG: 2 INJECTION INTRAMUSCULAR; INTRAVENOUS at 08:32

## 2024-04-17 RX ADMIN — HYDROCODONE BITARTRATE AND ACETAMINOPHEN 1 TABLET: 10; 325 TABLET ORAL at 03:08

## 2024-04-17 RX ADMIN — LEVOTHYROXINE SODIUM 150 MCG: 0.15 TABLET ORAL at 03:42

## 2024-04-17 RX ADMIN — DEXAMETHASONE SODIUM PHOSPHATE 12 MG: 4 INJECTION, SOLUTION INTRAMUSCULAR; INTRAVENOUS at 10:55

## 2024-04-17 RX ADMIN — MORPHINE SULFATE 15 MG: 15 TABLET, FILM COATED, EXTENDED RELEASE ORAL at 08:32

## 2024-04-17 RX ADMIN — AMLODIPINE BESYLATE 5 MG: 5 TABLET ORAL at 08:32

## 2024-04-17 RX ADMIN — FLUOXETINE HYDROCHLORIDE 20 MG: 10 CAPSULE ORAL at 20:57

## 2024-04-17 RX ADMIN — ACYCLOVIR 400 MG: 400 TABLET ORAL at 20:57

## 2024-04-17 RX ADMIN — DIPHENHYDRAMINE HYDROCHLORIDE 25 MG: 25 CAPSULE ORAL at 10:51

## 2024-04-17 RX ADMIN — POTASSIUM CHLORIDE 20 MEQ: 1500 TABLET, EXTENDED RELEASE ORAL at 08:32

## 2024-04-17 RX ADMIN — POLYETHYLENE GLYCOL 3350 17 G: 17 POWDER, FOR SOLUTION ORAL at 08:31

## 2024-04-17 RX ADMIN — HYDROXYZINE HYDROCHLORIDE 25 MG: 25 TABLET, FILM COATED ORAL at 20:57

## 2024-04-17 RX ADMIN — SENNOSIDES 17.2 MG: 8.6 TABLET, FILM COATED ORAL at 20:57

## 2024-04-17 RX ADMIN — PANTOPRAZOLE SODIUM 40 MG: 40 TABLET, DELAYED RELEASE ORAL at 03:43

## 2024-04-17 RX ADMIN — MAGNESIUM GLUCONATE 500 MG ORAL TABLET 400 MG: 500 TABLET ORAL at 20:58

## 2024-04-17 RX ADMIN — MORPHINE SULFATE 15 MG: 15 TABLET, FILM COATED, EXTENDED RELEASE ORAL at 20:58

## 2024-04-17 ASSESSMENT — PAIN DESCRIPTION - DESCRIPTORS
DESCRIPTORS: ACHING
DESCRIPTORS: ACHING

## 2024-04-17 ASSESSMENT — PAIN DESCRIPTION - ONSET: ONSET: PROGRESSIVE

## 2024-04-17 ASSESSMENT — PAIN SCALES - GENERAL
PAINLEVEL_OUTOF10: 5
PAINLEVEL_OUTOF10: 0
PAINLEVEL_OUTOF10: 7
PAINLEVEL_OUTOF10: 0

## 2024-04-17 ASSESSMENT — PAIN DESCRIPTION - LOCATION
LOCATION: LEG
LOCATION: BACK

## 2024-04-17 ASSESSMENT — PAIN DESCRIPTION - ORIENTATION
ORIENTATION: LOWER
ORIENTATION: RIGHT;LEFT

## 2024-04-17 ASSESSMENT — PAIN DESCRIPTION - FREQUENCY
FREQUENCY: INTERMITTENT
FREQUENCY: INTERMITTENT

## 2024-04-17 ASSESSMENT — PAIN DESCRIPTION - PAIN TYPE: TYPE: ACUTE PAIN

## 2024-04-17 ASSESSMENT — PAIN - FUNCTIONAL ASSESSMENT: PAIN_FUNCTIONAL_ASSESSMENT: ACTIVITIES ARE NOT PREVENTED

## 2024-04-17 NOTE — ICUWATCH
RRT Clinical Rounding Nurse Progress Report      SUBJECTIVE: Called to assess patient secondary to MD/nurse concern - increased O2 needs .      Vitals:    04/17/24 0718 04/17/24 0724 04/17/24 1129 04/17/24 1148   BP:  126/74 137/75 130/73   Pulse: 68 63 67 69   Resp: 16 18 20 18   Temp:  97.5 °F (36.4 °C) 97 °F (36.1 °C) 97.6 °F (36.4 °C)   TempSrc:  Axillary Axillary Axillary   SpO2: 98% 98% 95% 94%   Weight:       Height:              ASSESSMENT:  On arrival to room, I found patient to be resting in bed with  at bedside. Patient is alert. States she is feeling much better today. Denies any pain or SOB. Respirations even and unlabored with bilateral lung sounds diminished. Patient is currently on Airvo via simple mask with O2 saturation of 99% on 60L 60% FiO2. Platelets infusing on exam. No needs or concerns expressed at this time.     PLAN:  VS, labs, and progress notes reviewed. No concern per primary RN. Will follow per RRT Clinical Rounding Program protocol. Primary RN to call with concerns.    Albin Verdin RN  [place]: [phone]

## 2024-04-17 NOTE — PROGRESS NOTES
C1D2 on [place]: Pt seen inpatient, room 524.   at bedside.  Pt was awake more and talkative, seemed to be in better spirits.  Pt stated, \"I feel better today.\"  Provided tubes for PK and PD collection to CHINO Wong.  Labs collected and processed per protocol.  Will f/up with patient tomorrow for C1D3 blood draws per protocol.  Pt verbalized understanding and agrees to remain on study at this time.

## 2024-04-17 NOTE — PROGRESS NOTES
Physician Progress Note      PATIENT:               SANTHOSH ERNST  CSN #:                  [id number]  :                       1950  ADMIT DATE:       2024 10:18 PM  DISCH DATE:  RESPONDING  PROVIDER #:        Deya Robbins NP          QUERY TEXT:    Pt admitted with pancytopenia.  Pt noted to have required supplemental oxygen.   If possible, please document in the progress notes and discharge summary if   you are evaluating and/or treating any of the following:    The medical record reflects the following:  Risk Factors:  IVIG  Clinical Indicators:   Some SOB, pulm edema on Xray - diuresing and holding day 4 IVIG   on 4L NC  Echo with preserved EF, BNP elevated.   Up to 7L NC. Net neg 3.4L  4/15 Treatment pending clinical trial acceptance  Required Airvo overnight, CXR unchanged. Continue diuresis.   Remains on Airvo.  Treatment: oxygen, Lasix IV  Options provided:  -- Acute respiratory failure with hypoxia  -- Acute respiratory failure with hypercapnia  -- Acute respiratory failure with hypoxia and hypercapnia  -- Other - I will add my own diagnosis  -- Disagree - Not applicable / Not valid  -- Disagree - Clinically unable to determine / Unknown  -- Refer to Clinical Documentation Reviewer    PROVIDER RESPONSE TEXT:    This patient is in acute respiratory failure with hypoxia.    Query created by: Kiarra Niño on 2024 3:32 PM      QUERY TEXT:    Patient admitted with pancytopenia. Documentation reflects possible pneumonia.    If possible, please document in the progress notes and discharge summary if   pneumonia was:    The medical record reflects the following:  Risk Factors: AML, relapsed  Clinical Indicators: Neutropenic fever / ?pneumonia- CXR with atelectasis vs   infiltrate in R lung  Treatment: Cefe/Vanc- transition to prophylactic Cipro  Options provided:  -- Pneumonia confirmed after study  -- Pneumonia treated and resolved  -- Pneumonia ruled out after study  -- Other - I  will add my own diagnosis  -- Disagree - Not applicable / Not valid  -- Disagree - Clinically unable to determine / Unknown  -- Refer to Clinical Documentation Reviewer    PROVIDER RESPONSE TEXT:    Pneumonia confirmed after study.    Query created by: Kiarra Niño on 4/16/2024 3:34 PM      Electronically signed by:  Deya Robbins NP 4/17/2024 7:44 AM

## 2024-04-17 NOTE — PROGRESS NOTES
[facility] Hematology & Oncology        Inpatient Hematology / Oncology Progress Note    Reason for Consult:  Epistaxis [R04.0]  Lightheadedness [R42]  Thrombocytopenia (HCC) [D69.6]  Pancytopenia (HCC) [D61.818]  Symptomatic anemia [D64.9]  Referring Physician:  Benjamin Castro MD    24 Hour Events:  Afebrile, VSS, on Airvo @ 60L/75%  C1D2 Vidaza / Venetoclax / [id number] per clinical trial [id number]  Ongoing refractory thrombocytopenia - transfusing  Remains volume overloaded and diuresing  Feeling a little better today, some mild epistaxis   at bedside    Transfusions: 1 unit plt  Replacements: None      ROS:  Constitutional: +fatigue, weakness. Negative for fever, chills.  CV: +edema. Negative for chest pain, palpitations.  Respiratory: +dyspnea, cough. Negative for wheezing.  GI: Negative for nausea, abdominal pain, diarrhea.    10 point review of systems is otherwise negative with the exception of the elements mentioned above in the HPI.           Allergies   Allergen Reactions    Penicillins Hives    Sulfa Antibiotics Hives    Codeine Nausea And Vomiting     Past Medical History:   Diagnosis Date    Arthritis     Autoimmune disease (HCC)     skin changes, unknown name    Cancer (HCC) 1990    ovarian    Chronic pain     arthritis in back and legs    Coronary artery spasm (HCC)     COVID 05/15/2022    Essential hypertension 11/8/2019    Gastritis     GERD (gastroesophageal reflux disease)     Hx antineoplastic chemo     Migraine headache     Psoriasis     Psychiatric disorder     anxiety and depression    Severe obesity (BMI 35.0-39.9) 6/26/2018    Thyroid disease     Diagnosed in 1996     Past Surgical History:   Procedure Laterality Date    CARPAL TUNNEL RELEASE      GYN      ovaries    HYSTERECTOMY, TOTAL ABDOMINAL (CERVIX REMOVED)  1990    IR PORT PLACEMENT EQUAL OR GREATER THAN 5 YEARS  1/3/2024    IR PORT PLACEMENT EQUAL OR GREATER THAN 5 YEARS 1/3/2024 [facility] RADIOLOGY SPECIALS    AK UNLISTED  CNP   [facility] Hematology & Oncology  [address] [place] [id number]  Office : [phone]  Fax : [phone]   Attending Addendum:  I have personally performed a face to face diagnostic evaluation on this patient. I have reviewed and agree with the care plan as documented above by NOctaviaP.  My findings are as follows:   Vitals were reviewed.  /70   Pulse 69   Temp 98.8 °F (37.1 °C) (Oral)   Resp 17   Ht 1.524 m (5')   Wt 76.6 kg (168 lb 14.4 oz)   SpO2 97%   BMI 32.99 kg/m²   Appears fatigued, lungs dim bilaterally, abdomen soft, cor regular,  is at bedside.   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 Randomized to Cohort 2 [id number] IV weekly + venetoclax + Vidaza. D2 today. Good diuresis overnight.  Repeat low-dose Lasix today.  Supportive care as outlined above.    Benjamin Castro MD  [facility] Hematology/Oncology

## 2024-04-17 NOTE — PROGRESS NOTES
4/16/24: C1D1 on [place]  Pt seen inpatient, room 524.   at bedside.  Pt napping on and off and appears tired, ECOG= 1 (see e-mail in source from treating physician stating her ecog has not changed).  Labs reviewed by RN and MD, no dose mods on day 1 per protocol.  Pt meets inclusion/exclusion criteria and labs meet inclusion criteria 6-9 judged by investigator.  Discussed with treating physician (Gloria) and NP (Deya) TLS monitoring procedures recommened in protocol.  MD stated they will treat accordingly and will not hydrate/give fluids since patient has been in overload but treated with allopurinol and rasburicase.  They will order TLS monitoring labs if they want them collected.  Patient dosed with [id number] at 11:15.  Venetoclax dose given at 2:00 pm.  Venetoclax given later than study drug due to medicine being at patient's home.   had to go home and get the venetoclax the patient had for dosing since med is supplied via a specialty pharmacy.  irma Kline contacted to place new order for Venetoclax for 400 mg daily for days 1-28.  New order placed.  All day 1 procedures (EKG, vitals and Pks) collected per protocol.   However, technical difficulties were experienced with pre and post dose EKGs.  Artifact was so severe on the post EKGs on our EKG machine so we ordered the EKGs to be done inpatient.  This is why the post EKGs were collected late, out of window.  See deviation flied in source for documentation.  Please see sheet filed in source for vital times and vitals collected pre, during and post infusion per protocol.  See lab slips and requisition filed in source for lab collection times for PK & PD samples.  Will follow up with patient tomorrow for day 2 blood draws per protocol.  Pt verbalized understanding and agrees to remain on study at this time.

## 2024-04-17 NOTE — PROGRESS NOTES
4/16/24 2023 Critical Mg readback/addressed per SOP.    4/17/24 0554 Critical CBC readback/addressed per SOP.

## 2024-04-17 NOTE — PROGRESS NOTES
PT was in bed with Spouse at bedside. PT stated PT felt better today. CH checked for unmet needs. CH offered prayer and support.    Rev. Neelam Wong M.Div.

## 2024-04-17 NOTE — PLAN OF CARE
Problem: Pain  Goal: Verbalizes/displays adequate comfort level or baseline comfort level  Outcome: HH/HSPC Progressing

## 2024-04-17 NOTE — CARE COORDINATION
LOS 9d  IDR and chart reviewed for transition of care planning.    24 Hour Events:  Afebrile, VSS, on Airvo @ 60L/75%  C1D2 Vidaza / Venetoclax / BEB700 per clinical trial [id number]  Ongoing refractory thrombocytopenia - transfusing  Remains volume overloaded and diuresing  Transfusions: 1 unit plt    Transition of care is undetermined at this time pending outcome.    Transitions of Care plan is ongoing, no further concerns as of present.   Please consult  if any new issues arise.  Will continue to follow.

## 2024-04-17 NOTE — ICUWATCH
RRT Clinical Rounding Nurse Update    Vitals:    04/17/24 0724 04/17/24 1129 04/17/24 1148 04/17/24 1530   BP: 126/74 137/75 130/73 (!) 146/82   Pulse: 63 67 69 67   Resp: 18 20 18 20   Temp: 97.5 °F (36.4 °C) 97 °F (36.1 °C) 97.6 °F (36.4 °C) 97.7 °F (36.5 °C)   TempSrc: Axillary Axillary Axillary Oral   SpO2: 98% 95% 94% 99%   Weight:       Height:          ASSESSMENT:  Previous outreach assessment was reviewed. There have been no significant changes since previous assessment. Patient remains on 60L 60% with O2 saturation of 98%. Patient with reported slow nose bleed starting about an hour ago.     PLAN:  Will follow per RRT Clinical Rounding Program protocol.    Albin Verdin RN  [place]: [phone]

## 2024-04-17 NOTE — PROGRESS NOTES
Physician Progress Note      PATIENT:               SANTHOSH ERNST  CSN #:                  [id number]  :                       1950  ADMIT DATE:       2024 10:18 PM  DISCH DATE:  RESPONDING  PROVIDER #:        Varsha BURTON          QUERY TEXT:    Pt admitted with Epistaxis. Pt noted to have pancytopenia, pneumonia,   respiratory failure. If possible, please document in the progress notes and   discharge summary if you are evaluating and /or treating any of the following:    The medical record reflects the following:  Risk Factors: AML, thrombocytopenia, pneumonia, respiratory failure  Clinical Indicators: Neutropenic fever, WBC 1.1, fevers 100.5 and then on   recheck 102 at home, hyperglycemia >140, Bilirub 1.0, Plt 2  Treatment: empiric cefepime and vancomycin, neutropenic precautions, Airvo  Options provided:  -- Sepsis, present on admission  -- Sepsis, present on admission, now resolved  -- Sepsis, developed following admission  -- Sepsis was ruled out  -- Other - I will add my own diagnosis  -- Disagree - Not applicable / Not valid  -- Disagree - Clinically unable to determine / Unknown  -- Refer to Clinical Documentation Reviewer    PROVIDER RESPONSE TEXT:    This patient was treated for sepsis this admission, which was present on   admission and is currently resolved.    Query created by: Kiarra Niño on 2024 12:31 PM      Electronically signed by:  Varsha BURTON 2024 12:38 PM

## 2024-04-18 LAB
ALBUMIN SERPL-MCNC: 2.5 G/DL (ref 3.2–4.6)
ALBUMIN/GLOB SERPL: 0.3 (ref 0.4–1.6)
ALP SERPL-CCNC: 212 U/L (ref 50–136)
ALT SERPL-CCNC: 39 U/L (ref 12–65)
ANION GAP SERPL CALC-SCNC: 0 MMOL/L (ref 2–11)
APTT PPP: 29.2 SEC (ref 23.3–37.4)
AST SERPL-CCNC: 71 U/L (ref 15–37)
BILIRUB SERPL-MCNC: 0.7 MG/DL (ref 0.2–1.1)
BUN SERPL-MCNC: 41 MG/DL (ref 8–23)
CALCIUM SERPL-MCNC: 9.9 MG/DL (ref 8.3–10.4)
CHLORIDE SERPL-SCNC: 99 MMOL/L (ref 103–113)
CO2 SERPL-SCNC: 32 MMOL/L (ref 21–32)
CREAT SERPL-MCNC: 0.6 MG/DL (ref 0.6–1)
D DIMER PPP FEU-MCNC: 1.25 UG/ML(FEU)
DIFFERENTIAL METHOD BLD: ABNORMAL
ERYTHROCYTE [DISTWIDTH] IN BLOOD BY AUTOMATED COUNT: 16.8 % (ref 11.9–14.6)
FIBRINOGEN PPP-MCNC: 681 MG/DL (ref 190–501)
GLOBULIN SER CALC-MCNC: 7.9 G/DL (ref 2.8–4.5)
GLUCOSE SERPL-MCNC: 125 MG/DL (ref 65–100)
HCT VFR BLD AUTO: 18.7 % (ref 35.8–46.3)
HGB BLD-MCNC: 6.8 G/DL (ref 11.7–15.4)
HISTORY CHECK: NORMAL
INR PPP: 1.2
LDH SERPL L TO P-CCNC: 1529 U/L (ref 110–210)
MAGNESIUM SERPL-MCNC: 2.4 MG/DL (ref 1.8–2.4)
MCH RBC QN AUTO: 33.7 PG (ref 26.1–32.9)
MCHC RBC AUTO-ENTMCNC: 36.4 G/DL (ref 31.4–35)
MCV RBC AUTO: 92.6 FL (ref 82–102)
NRBC # BLD: 0 K/UL (ref 0–0.2)
PHOSPHATE SERPL-MCNC: 3.8 MG/DL (ref 2.3–3.7)
PLATELET # BLD AUTO: 20 K/UL (ref 150–450)
PLATELET COMMENT: ABNORMAL
PMV BLD AUTO: 9.8 FL (ref 9.4–12.3)
POTASSIUM SERPL-SCNC: 5.4 MMOL/L (ref 3.5–5.1)
PROT SERPL-MCNC: 10.4 G/DL (ref 6.3–8.2)
PROTHROMBIN TIME: 15.7 SEC (ref 11.3–14.9)
RBC # BLD AUTO: 2.02 M/UL (ref 4.05–5.2)
RBC MORPH BLD: ABNORMAL
SODIUM SERPL-SCNC: 131 MMOL/L (ref 136–146)
URATE SERPL-MCNC: 2.2 MG/DL (ref 2.6–6)
WBC # BLD AUTO: 0.5 K/UL (ref 4.3–11.1)
WBC MORPH BLD: ABNORMAL

## 2024-04-18 PROCEDURE — 84100 ASSAY OF PHOSPHORUS: CPT

## 2024-04-18 PROCEDURE — 1100000000 HC RM PRIVATE

## 2024-04-18 PROCEDURE — 2700000000 HC OXYGEN THERAPY PER DAY

## 2024-04-18 PROCEDURE — 94760 N-INVAS EAR/PLS OXIMETRY 1: CPT

## 2024-04-18 PROCEDURE — 85730 THROMBOPLASTIN TIME PARTIAL: CPT

## 2024-04-18 PROCEDURE — P9040 RBC LEUKOREDUCED IRRADIATED: HCPCS

## 2024-04-18 PROCEDURE — 84550 ASSAY OF BLOOD/URIC ACID: CPT

## 2024-04-18 PROCEDURE — 85025 COMPLETE CBC W/AUTO DIFF WBC: CPT

## 2024-04-18 PROCEDURE — 85384 FIBRINOGEN ACTIVITY: CPT

## 2024-04-18 PROCEDURE — 86922 COMPATIBILITY TEST ANTIGLOB: CPT

## 2024-04-18 PROCEDURE — 85610 PROTHROMBIN TIME: CPT

## 2024-04-18 PROCEDURE — 94761 N-INVAS EAR/PLS OXIMETRY MLT: CPT

## 2024-04-18 PROCEDURE — 6360000002 HC RX W HCPCS: Performed by: INTERNAL MEDICINE

## 2024-04-18 PROCEDURE — 6370000000 HC RX 637 (ALT 250 FOR IP): Performed by: NURSE PRACTITIONER

## 2024-04-18 PROCEDURE — 85379 FIBRIN DEGRADATION QUANT: CPT

## 2024-04-18 PROCEDURE — 6370000000 HC RX 637 (ALT 250 FOR IP): Performed by: INTERNAL MEDICINE

## 2024-04-18 PROCEDURE — 86921 COMPATIBILITY TEST INCUBATE: CPT

## 2024-04-18 PROCEDURE — 80053 COMPREHEN METABOLIC PANEL: CPT

## 2024-04-18 PROCEDURE — P9037 PLATE PHERES LEUKOREDU IRRAD: HCPCS

## 2024-04-18 PROCEDURE — 6370000000 HC RX 637 (ALT 250 FOR IP): Performed by: STUDENT IN AN ORGANIZED HEALTH CARE EDUCATION/TRAINING PROGRAM

## 2024-04-18 PROCEDURE — 2500000003 HC RX 250 WO HCPCS: Performed by: STUDENT IN AN ORGANIZED HEALTH CARE EDUCATION/TRAINING PROGRAM

## 2024-04-18 PROCEDURE — 86022 PLATELET ANTIBODIES: CPT

## 2024-04-18 PROCEDURE — 86644 CMV ANTIBODY: CPT

## 2024-04-18 PROCEDURE — 83615 LACTATE (LD) (LDH) ENZYME: CPT

## 2024-04-18 PROCEDURE — 2580000003 HC RX 258: Performed by: INTERNAL MEDICINE

## 2024-04-18 PROCEDURE — 36430 TRANSFUSION BLD/BLD COMPNT: CPT

## 2024-04-18 PROCEDURE — 83735 ASSAY OF MAGNESIUM: CPT

## 2024-04-18 RX ORDER — SODIUM CHLORIDE 9 MG/ML
INJECTION, SOLUTION INTRAVENOUS PRN
Status: DISCONTINUED | OUTPATIENT
Start: 2024-04-18 | End: 2024-04-24

## 2024-04-18 RX ADMIN — HYDROXYZINE HYDROCHLORIDE 25 MG: 25 TABLET, FILM COATED ORAL at 21:14

## 2024-04-18 RX ADMIN — ACYCLOVIR 400 MG: 400 TABLET ORAL at 09:21

## 2024-04-18 RX ADMIN — CIPROFLOXACIN HYDROCHLORIDE 500 MG: 500 TABLET, FILM COATED ORAL at 09:21

## 2024-04-18 RX ADMIN — MAGNESIUM GLUCONATE 500 MG ORAL TABLET 400 MG: 500 TABLET ORAL at 16:42

## 2024-04-18 RX ADMIN — FAMOTIDINE 40 MG: 20 TABLET, FILM COATED ORAL at 16:42

## 2024-04-18 RX ADMIN — ACYCLOVIR 400 MG: 400 TABLET ORAL at 21:15

## 2024-04-18 RX ADMIN — SENNOSIDES 17.2 MG: 8.6 TABLET, FILM COATED ORAL at 21:15

## 2024-04-18 RX ADMIN — MAGNESIUM GLUCONATE 500 MG ORAL TABLET 400 MG: 500 TABLET ORAL at 21:18

## 2024-04-18 RX ADMIN — HYDROXYZINE HYDROCHLORIDE 25 MG: 25 TABLET, FILM COATED ORAL at 09:21

## 2024-04-18 RX ADMIN — ACETAMINOPHEN 650 MG: 325 TABLET ORAL at 10:09

## 2024-04-18 RX ADMIN — DIPHENHYDRAMINE HYDROCHLORIDE 25 MG: 25 CAPSULE ORAL at 10:09

## 2024-04-18 RX ADMIN — MAGNESIUM GLUCONATE 500 MG ORAL TABLET 400 MG: 500 TABLET ORAL at 09:21

## 2024-04-18 RX ADMIN — PANTOPRAZOLE SODIUM 40 MG: 40 TABLET, DELAYED RELEASE ORAL at 04:38

## 2024-04-18 RX ADMIN — HYDROMORPHONE HYDROCHLORIDE 0.25 MG: 1 INJECTION, SOLUTION INTRAMUSCULAR; INTRAVENOUS; SUBCUTANEOUS at 00:07

## 2024-04-18 RX ADMIN — WATER 131.25 MG: 1 INJECTION INTRAMUSCULAR; INTRAVENOUS; SUBCUTANEOUS at 10:11

## 2024-04-18 RX ADMIN — HYDROCODONE BITARTRATE AND ACETAMINOPHEN 1 TABLET: 10; 325 TABLET ORAL at 04:39

## 2024-04-18 RX ADMIN — ONDANSETRON 8 MG: 2 INJECTION INTRAMUSCULAR; INTRAVENOUS at 09:25

## 2024-04-18 RX ADMIN — AMLODIPINE BESYLATE 5 MG: 5 TABLET ORAL at 09:21

## 2024-04-18 RX ADMIN — DEXAMETHASONE SODIUM PHOSPHATE 12 MG: 4 INJECTION, SOLUTION INTRAMUSCULAR; INTRAVENOUS at 09:30

## 2024-04-18 RX ADMIN — DIPHENHYDRAMINE HYDROCHLORIDE 25 MG: 25 CAPSULE ORAL at 21:15

## 2024-04-18 RX ADMIN — MORPHINE SULFATE 15 MG: 15 TABLET, FILM COATED, EXTENDED RELEASE ORAL at 09:21

## 2024-04-18 RX ADMIN — CIPROFLOXACIN HYDROCHLORIDE 500 MG: 500 TABLET, FILM COATED ORAL at 21:15

## 2024-04-18 RX ADMIN — FLUOXETINE HYDROCHLORIDE 20 MG: 10 CAPSULE ORAL at 09:21

## 2024-04-18 RX ADMIN — LEVOTHYROXINE SODIUM 150 MCG: 0.15 TABLET ORAL at 04:38

## 2024-04-18 RX ADMIN — ACETAMINOPHEN 650 MG: 325 TABLET ORAL at 21:15

## 2024-04-18 RX ADMIN — FLUOXETINE HYDROCHLORIDE 20 MG: 10 CAPSULE ORAL at 21:14

## 2024-04-18 RX ADMIN — ALLOPURINOL 300 MG: 300 TABLET ORAL at 09:21

## 2024-04-18 RX ADMIN — POLYETHYLENE GLYCOL 3350 17 G: 17 POWDER, FOR SOLUTION ORAL at 09:21

## 2024-04-18 RX ADMIN — CYCLOBENZAPRINE 10 MG: 10 TABLET, FILM COATED ORAL at 21:15

## 2024-04-18 RX ADMIN — MORPHINE SULFATE 15 MG: 15 TABLET, FILM COATED, EXTENDED RELEASE ORAL at 21:15

## 2024-04-18 RX ADMIN — PANTOPRAZOLE SODIUM 40 MG: 40 TABLET, DELAYED RELEASE ORAL at 16:42

## 2024-04-18 ASSESSMENT — PAIN DESCRIPTION - LOCATION
LOCATION: BACK
LOCATION: BACK

## 2024-04-18 ASSESSMENT — PAIN SCALES - GENERAL
PAINLEVEL_OUTOF10: 5
PAINLEVEL_OUTOF10: 8

## 2024-04-18 ASSESSMENT — PAIN DESCRIPTION - DESCRIPTORS
DESCRIPTORS: ACHING
DESCRIPTORS: ACHING

## 2024-04-18 ASSESSMENT — PAIN DESCRIPTION - ORIENTATION
ORIENTATION: LOWER
ORIENTATION: LEFT

## 2024-04-18 NOTE — ICUWATCH
RRT Clinical Rounding Nurse Update    Vitals:    04/18/24 1049 04/18/24 1215 04/18/24 1503 04/18/24 1528   BP: (!) 140/74 (!) 156/80 (!) 154/81 (!) 152/94   Pulse: 66 60 66 63   Resp: 16 20 18 18   Temp: 97.9 °F (36.6 °C) 97.5 °F (36.4 °C) 97.7 °F (36.5 °C) 97.5 °F (36.4 °C)   TempSrc: Axillary Oral Oral Oral   SpO2: 93% 96% 95% 95%   Weight:       Height:            DETERIORATION INDEX SCORE: 43    ASSESSMENT:  Previous outreach assessment was reviewed. There have been no significant changes since previous assessment. Patient doing very well this afternoon. States she is just feeling tired. Patient is currently on 3L NC without dyspnea. Respirations even and unlabored with bilateral lung sounds clear.     PLAN:  Will discharge from RRT Clinical Rounding Program per protocol. Please call if needed. No concerns per primary RN.    Albin Verdin RN  [place]: [phone]

## 2024-04-18 NOTE — PROGRESS NOTES
(LL) 11.7 - 15.4 g/dL    Hematocrit 18.7 (L) 35.8 - 46.3 %    MCV 92.6 82 - 102 FL    MCH 33.7 (H) 26.1 - 32.9 PG    MCHC 36.4 (H) 31.4 - 35.0 g/dL    RDW 16.8 (H) 11.9 - 14.6 %    Platelets 20 (LL) 150 - 450 K/uL    MPV 9.8 9.4 - 12.3 FL    nRBC 0.00 0.0 - 0.2 K/uL    RBC Comment ROULEAUX      RBC Comment OCCASIONAL  ANISOCYTOSIS + POIKILOCYTOSIS        RBC Comment POLYCHROMASIA      WBC Comment WBC TOO FEW TO DIFFERENTIATE      Platelet Comment MARKED      Differential Type MANUAL     PREPARE RBC (CROSSMATCH), 1 Units    Collection Time: 04/18/24  6:30 AM   Result Value Ref Range    History Check Historical check performed        Imaging:    Xray Result (most recent):  XR CHEST PORTABLE 04/16/2024    Narrative  Chest X-ray    INDICATION: Dyspnea    COMPARISON: April 14, 2024.    TECHNIQUE: AP/PA view of the chest was obtained.    FINDINGS: Stable a right central venous port with distal tip in the right  atrium.    The lungs are unchanged compared to prior study with diffuse patchy  opacifications in bilateral lung field. There are no infiltrates or effusions.  The heart size is stable.  The bony thorax is unchanged.. The mediastinum is and  hemidiaphragms also stable.    Impression  No significant changes compared to prior study with diffuse patchy  opacifications in bilateral lung fields.    No evidence of pneumothorax or pleural effusion.        ASSESSMENT:      ICD-10-CM    1. Thrombocytopenia ([facility])  D69.6       2. Pancytopenia ([facility])  D61.818 0.9 % sodium chloride infusion     dexAMETHasone (DECADRON) 12 mg in sodium chloride 0.9 % 50 mL IVPB     ondansetron (ZOFRAN) injection 8 mg     azaCITIDine (VIDAZA) 131.25 mg in sterile water 5.25 mL chemo syringe     (RDF797) investigational 60 mg in sodium chloride 0.9 % 250 mL IVPB     albuterol sulfate HFA (PROVENTIL;VENTOLIN;PROAIR) 108 (90 Base) MCG/ACT inhaler 4 puff     CBC with Auto Differential     Comprehensive Metabolic Panel     Magnesium     Phosphorus      overload.  4/13 IVIG again today, slow rate.  4/14 IVIG again today, slow rate.  4/15 IVIG stopped yesterday mid-transfusion due to concern for overload.    Pulmonary edema / volume overload  4/11 On 2L overnight, reports cough. CXR with pulmonary edema, BNP pending. Weight up - give 40mg lasix. Echo pending.  4/12 Echo with preserved EF, BNP elevated. On 4L NC, I/O net neg 800ml and weight down 7#. Continue diuresing, lasix 40mg BID as blood pressure tolerates.  4/13 Up to 7L NC. Net neg 3.4L, weight appears down. CXR with ongoing fluid overload. Continues lasix for diuresis but Na and K also low. Replete K.   4/14 Weight appears stable, incomplete I/O yesterday. O2 stable at 7L. Continues diuresis. Electrolytes stable/better today.  4/15 Required Airvo overnight, CXR unchanged. Continue diuresis.  4/18 Remains on Airvo. Weaning as tolerated - able to be off at times. Diuresed yesterday with good results, hold on diuresis today.     Neutropenic fever / ?pneumonia  - CXR with atelectasis vs infiltrate in R lung  - Continue Cefe/Vanc  - Follow BC  4/14 Afebrile. BC NGTD. Continues Cefepime (day 7), completes today and then transition to prophylactic Cipro.  RESOLVED     Hypotension / asymptomatic bradycardia  - Hold amlodipine  4/10 BP improving, resume amlodipine.    Chronic pain   - Continue MS contin     History of R axillary vein thrombus / line-associated  - Dx 12/27, previously on Eliquis but now off. AC contraindicated with thrombocytopenia    Continue home meds  Rigo SOPs  VTE prophylaxis - AC contraindicated due to thrombocytopenia  Diet - Regular  PT/OT - Deferred  Code - Full (ongoing discussions regarding GOC)  Acyclovir, Cipro, diflucan (held due to venetoclax)    Dispo - too soon to determine.    4/12 Discussed with patient and  risk for clinical deterioration due to increasing O2 needs and in light of aggressive leukemia. Will ask ICU rover to follow. Discussed potential for move to ICU if she

## 2024-04-18 NOTE — ICUWATCH
RRT Clinical Rounding Nurse Progress Report      SUBJECTIVE: Patient assessed secondary to RN/provider concern - increased O2 needs.      Vitals:    04/17/24 1148 04/17/24 1530 04/17/24 1926 04/17/24 2108   BP: 130/73 (!) 146/82 132/70    Pulse: 69 67 69 70   Resp: 18 20 17 20   Temp: 97.6 °F (36.4 °C) 97.7 °F (36.5 °C) 98.8 °F (37.1 °C)    TempSrc: Axillary Oral Oral    SpO2: 94% 99% 97% 97%   Weight:   74.7 kg (164 lb 9.6 oz)    Height:            DETERIORATION INDEX SCORE: 51    ASSESSMENT:  Previous outreach assessment reviewed. Pt is resting in bed, alert and oriented to self, place and time. Unlabored, regular breathing on Heated High Flow Mask 55L/60%, recently weaned from 60L/65%. Lung sounds slightly diminished, otherwise clear. No complaints at time of exam. VSS.    Pertinent lab work, vital signs and progress notes from today have been reviewed    PLAN:  Will follow per RRT Clinical Rounding Program protocol.    Tyrone Guzman RN  [facility]: [phone]  [facility]: [phone]

## 2024-04-18 NOTE — ICUWATCH
RRT Clinical Rounding Nurse Update    Vitals:    04/17/24 2320 04/18/24 0026 04/18/24 0233 04/18/24 0254   BP: 137/78   (!) 152/90   Pulse: 72  72 60   Resp: 22   17   Temp: 97.2 °F (36.2 °C)   97.3 °F (36.3 °C)   TempSrc: Oral   Axillary   SpO2: 93% 95% 95% 100%   Weight:       Height:            DETERIORATION INDEX SCORE: 40    ASSESSMENT:  Previous outreach assessment was reviewed. There have been no significant changes since previous assessment. Airvo 55L/60% on mask as pt is sleeping and a mouth breather. VSS.    PLAN:  Will follow per RRT Clinical Rounding Program protocol.    Tyrone Guzman RN  [facility]: [phone]  [facility]: [phone]

## 2024-04-19 ENCOUNTER — TELEPHONE (OUTPATIENT)
Dept: ONCOLOGY | Age: 74
End: 2024-04-19

## 2024-04-19 DIAGNOSIS — C92.02 AML (ACUTE MYELOID LEUKEMIA) IN RELAPSE (HCC): Primary | ICD-10-CM

## 2024-04-19 DIAGNOSIS — E87.6 HYPOKALEMIA: ICD-10-CM

## 2024-04-19 LAB
ALBUMIN SERPL-MCNC: 2.7 G/DL (ref 3.2–4.6)
ALBUMIN/GLOB SERPL: 0.4 (ref 0.4–1.6)
ALP SERPL-CCNC: 207 U/L (ref 50–136)
ALT SERPL-CCNC: 43 U/L (ref 12–65)
ANION GAP SERPL CALC-SCNC: 3 MMOL/L (ref 2–11)
APTT PPP: 29 SEC (ref 23.3–37.4)
AST SERPL-CCNC: 77 U/L (ref 15–37)
BASOPHILS # BLD: 0 K/UL (ref 0–0.2)
BASOPHILS NFR BLD: 0 % (ref 0–2)
BILIRUB SERPL-MCNC: 0.7 MG/DL (ref 0.2–1.1)
BLD PROD TYP BPU: NORMAL
BLOOD BANK BLOOD PRODUCT EXPIRATION DATE: NORMAL
BLOOD BANK CMNT PATIENT-IMP: NORMAL
BLOOD BANK CMNT PATIENT-IMP: NORMAL
BLOOD BANK DISPENSE STATUS: NORMAL
BLOOD BANK ISBT PRODUCT BLOOD TYPE: 5100
BLOOD BANK PRODUCT CODE: NORMAL
BLOOD BANK UNIT TYPE AND RH: NORMAL
BPU ID: NORMAL
BUN SERPL-MCNC: 32 MG/DL (ref 8–23)
CALCIUM SERPL-MCNC: 10.1 MG/DL (ref 8.3–10.4)
CHLORIDE SERPL-SCNC: 96 MMOL/L (ref 103–113)
CO2 SERPL-SCNC: 30 MMOL/L (ref 21–32)
CREAT SERPL-MCNC: 0.6 MG/DL (ref 0.6–1)
D DIMER PPP FEU-MCNC: 1.53 UG/ML(FEU)
DIFFERENTIAL METHOD BLD: ABNORMAL
EOSINOPHIL # BLD: 0 K/UL (ref 0–0.8)
EOSINOPHIL NFR BLD: 0 % (ref 0.5–7.8)
ERYTHROCYTE [DISTWIDTH] IN BLOOD BY AUTOMATED COUNT: 17.2 % (ref 11.9–14.6)
FIBRINOGEN PPP-MCNC: 620 MG/DL (ref 190–501)
GLOBULIN SER CALC-MCNC: 7.5 G/DL (ref 2.8–4.5)
GLUCOSE SERPL-MCNC: 106 MG/DL (ref 65–100)
HCT VFR BLD AUTO: 23.2 % (ref 35.8–46.3)
HGB BLD-MCNC: 8.6 G/DL (ref 11.7–15.4)
IMM GRANULOCYTES # BLD AUTO: 0 K/UL (ref 0–0.5)
IMM GRANULOCYTES NFR BLD AUTO: 4 % (ref 0–5)
INR PPP: 1.1
LDH SERPL L TO P-CCNC: 1466 U/L (ref 110–210)
LYMPHOCYTES # BLD: 0.5 K/UL (ref 0.5–4.6)
LYMPHOCYTES NFR BLD: 66 % (ref 13–44)
MAGNESIUM SERPL-MCNC: 2.1 MG/DL (ref 1.8–2.4)
MCH RBC QN AUTO: 33.3 PG (ref 26.1–32.9)
MCHC RBC AUTO-ENTMCNC: 37.1 G/DL (ref 31.4–35)
MCV RBC AUTO: 89.9 FL (ref 82–102)
MONOCYTES # BLD: 0 K/UL (ref 0.1–1.3)
MONOCYTES NFR BLD: 4 % (ref 4–12)
NEUTS SEG # BLD: 0.2 K/UL (ref 1.7–8.2)
NEUTS SEG NFR BLD: 26 % (ref 43–78)
NRBC # BLD: 0 K/UL (ref 0–0.2)
PHOSPHATE SERPL-MCNC: 3.5 MG/DL (ref 2.3–3.7)
PLATELET # BLD AUTO: 23 K/UL (ref 150–450)
PLATELET COMMENT: ABNORMAL
PMV BLD AUTO: 10 FL (ref 9.4–12.3)
POTASSIUM SERPL-SCNC: 5.1 MMOL/L (ref 3.5–5.1)
PROT SERPL-MCNC: 10.2 G/DL (ref 6.3–8.2)
PROTHROMBIN TIME: 15.2 SEC (ref 11.3–14.9)
RBC # BLD AUTO: 2.58 M/UL (ref 4.05–5.2)
RBC MORPH BLD: ABNORMAL
SODIUM SERPL-SCNC: 129 MMOL/L (ref 136–146)
UNIT DIVISION: 0
UNIT ISSUE DATE/TIME: NORMAL
URATE SERPL-MCNC: 2 MG/DL (ref 2.6–6)
WBC # BLD AUTO: 0.7 K/UL (ref 4.3–11.1)
WBC MORPH BLD: ABNORMAL

## 2024-04-19 PROCEDURE — 80053 COMPREHEN METABOLIC PANEL: CPT

## 2024-04-19 PROCEDURE — 6360000002 HC RX W HCPCS: Performed by: INTERNAL MEDICINE

## 2024-04-19 PROCEDURE — 2580000003 HC RX 258: Performed by: INTERNAL MEDICINE

## 2024-04-19 PROCEDURE — 6370000000 HC RX 637 (ALT 250 FOR IP): Performed by: NURSE PRACTITIONER

## 2024-04-19 PROCEDURE — 83735 ASSAY OF MAGNESIUM: CPT

## 2024-04-19 PROCEDURE — 2700000000 HC OXYGEN THERAPY PER DAY

## 2024-04-19 PROCEDURE — 83615 LACTATE (LD) (LDH) ENZYME: CPT

## 2024-04-19 PROCEDURE — 6370000000 HC RX 637 (ALT 250 FOR IP): Performed by: INTERNAL MEDICINE

## 2024-04-19 PROCEDURE — 85379 FIBRIN DEGRADATION QUANT: CPT

## 2024-04-19 PROCEDURE — 36591 DRAW BLOOD OFF VENOUS DEVICE: CPT

## 2024-04-19 PROCEDURE — 85025 COMPLETE CBC W/AUTO DIFF WBC: CPT

## 2024-04-19 PROCEDURE — 84100 ASSAY OF PHOSPHORUS: CPT

## 2024-04-19 PROCEDURE — 6370000000 HC RX 637 (ALT 250 FOR IP): Performed by: STUDENT IN AN ORGANIZED HEALTH CARE EDUCATION/TRAINING PROGRAM

## 2024-04-19 PROCEDURE — 2500000003 HC RX 250 WO HCPCS: Performed by: NURSE PRACTITIONER

## 2024-04-19 PROCEDURE — 85730 THROMBOPLASTIN TIME PARTIAL: CPT

## 2024-04-19 PROCEDURE — 85610 PROTHROMBIN TIME: CPT

## 2024-04-19 PROCEDURE — 1100000000 HC RM PRIVATE

## 2024-04-19 PROCEDURE — 84550 ASSAY OF BLOOD/URIC ACID: CPT

## 2024-04-19 PROCEDURE — 85384 FIBRINOGEN ACTIVITY: CPT

## 2024-04-19 RX ORDER — POTASSIUM CHLORIDE 1500 MG/1
20 TABLET, EXTENDED RELEASE ORAL DAILY
Qty: 90 TABLET | Refills: 1 | Status: SHIPPED | OUTPATIENT
Start: 2024-04-19

## 2024-04-19 RX ADMIN — ONDANSETRON 8 MG: 2 INJECTION INTRAMUSCULAR; INTRAVENOUS at 09:59

## 2024-04-19 RX ADMIN — MORPHINE SULFATE 15 MG: 15 TABLET, FILM COATED, EXTENDED RELEASE ORAL at 08:44

## 2024-04-19 RX ADMIN — ACETAMINOPHEN 650 MG: 325 TABLET ORAL at 04:59

## 2024-04-19 RX ADMIN — FLUOXETINE HYDROCHLORIDE 20 MG: 10 CAPSULE ORAL at 08:43

## 2024-04-19 RX ADMIN — PANTOPRAZOLE SODIUM 40 MG: 40 TABLET, DELAYED RELEASE ORAL at 04:48

## 2024-04-19 RX ADMIN — SENNOSIDES 17.2 MG: 8.6 TABLET, FILM COATED ORAL at 20:25

## 2024-04-19 RX ADMIN — ALLOPURINOL 300 MG: 300 TABLET ORAL at 08:43

## 2024-04-19 RX ADMIN — ACYCLOVIR 400 MG: 400 TABLET ORAL at 08:43

## 2024-04-19 RX ADMIN — MAGNESIUM GLUCONATE 500 MG ORAL TABLET 400 MG: 500 TABLET ORAL at 20:26

## 2024-04-19 RX ADMIN — MAGNESIUM GLUCONATE 500 MG ORAL TABLET 400 MG: 500 TABLET ORAL at 08:43

## 2024-04-19 RX ADMIN — HYDROXYZINE HYDROCHLORIDE 25 MG: 25 TABLET, FILM COATED ORAL at 08:43

## 2024-04-19 RX ADMIN — FLUOXETINE HYDROCHLORIDE 20 MG: 10 CAPSULE ORAL at 20:25

## 2024-04-19 RX ADMIN — WATER 131.25 MG: 1 INJECTION INTRAMUSCULAR; INTRAVENOUS; SUBCUTANEOUS at 10:46

## 2024-04-19 RX ADMIN — GUAIFENESIN 200 MG: 200 SOLUTION ORAL at 21:43

## 2024-04-19 RX ADMIN — LEVOTHYROXINE SODIUM 150 MCG: 0.15 TABLET ORAL at 04:48

## 2024-04-19 RX ADMIN — ACYCLOVIR 400 MG: 400 TABLET ORAL at 20:26

## 2024-04-19 RX ADMIN — MAGNESIUM GLUCONATE 500 MG ORAL TABLET 400 MG: 500 TABLET ORAL at 15:50

## 2024-04-19 RX ADMIN — CYCLOBENZAPRINE 10 MG: 10 TABLET, FILM COATED ORAL at 21:43

## 2024-04-19 RX ADMIN — FAMOTIDINE 40 MG: 20 TABLET, FILM COATED ORAL at 17:57

## 2024-04-19 RX ADMIN — CIPROFLOXACIN HYDROCHLORIDE 500 MG: 500 TABLET, FILM COATED ORAL at 08:43

## 2024-04-19 RX ADMIN — CIPROFLOXACIN HYDROCHLORIDE 500 MG: 500 TABLET, FILM COATED ORAL at 20:26

## 2024-04-19 RX ADMIN — SODIUM CHLORIDE: 9 INJECTION, SOLUTION INTRAVENOUS at 10:00

## 2024-04-19 RX ADMIN — DEXAMETHASONE SODIUM PHOSPHATE 12 MG: 4 INJECTION, SOLUTION INTRAMUSCULAR; INTRAVENOUS at 10:01

## 2024-04-19 RX ADMIN — HYDROXYZINE HYDROCHLORIDE 25 MG: 25 TABLET, FILM COATED ORAL at 20:26

## 2024-04-19 RX ADMIN — AMLODIPINE BESYLATE 5 MG: 5 TABLET ORAL at 08:43

## 2024-04-19 RX ADMIN — POLYETHYLENE GLYCOL 3350 17 G: 17 POWDER, FOR SOLUTION ORAL at 08:43

## 2024-04-19 RX ADMIN — MORPHINE SULFATE 15 MG: 15 TABLET, FILM COATED, EXTENDED RELEASE ORAL at 20:25

## 2024-04-19 RX ADMIN — GUAIFENESIN 200 MG: 200 SOLUTION ORAL at 00:13

## 2024-04-19 RX ADMIN — PANTOPRAZOLE SODIUM 40 MG: 40 TABLET, DELAYED RELEASE ORAL at 15:50

## 2024-04-19 ASSESSMENT — PAIN SCALES - GENERAL
PAINLEVEL_OUTOF10: 5
PAINLEVEL_OUTOF10: 0
PAINLEVEL_OUTOF10: 3

## 2024-04-19 ASSESSMENT — PAIN DESCRIPTION - LOCATION
LOCATION: BACK;LEG
LOCATION: LEG

## 2024-04-19 ASSESSMENT — PAIN - FUNCTIONAL ASSESSMENT: PAIN_FUNCTIONAL_ASSESSMENT: ACTIVITIES ARE NOT PREVENTED

## 2024-04-19 ASSESSMENT — PAIN DESCRIPTION - ORIENTATION
ORIENTATION: RIGHT;LEFT
ORIENTATION: RIGHT;LEFT

## 2024-04-19 ASSESSMENT — PAIN DESCRIPTION - DESCRIPTORS
DESCRIPTORS: ACHING
DESCRIPTORS: ACHING

## 2024-04-19 ASSESSMENT — PAIN DESCRIPTION - PAIN TYPE: TYPE: ACUTE PAIN

## 2024-04-19 ASSESSMENT — PAIN DESCRIPTION - ONSET: ONSET: PROGRESSIVE

## 2024-04-19 NOTE — CARE COORDINATION
LOS 11d  IDR and chart reviewed for transition of care planning.    Patient 3 need has improved to requiring only 3L/NC.  24 Hour Events:  Afebrile, VSS, on 3L NC  C1D3 Vidaza / Venetoclax / DOX723 per clinical trial [id number]  Ongoing refractory thrombocytopenia - better today, up to 20k  Volume status better today   PT/OT currently not ordered    Transition of care is undetermined at this time pending progress from treatment.    Transitions of Care plan is ongoing, no further concerns as of present.   Please consult  if any new issues arise.  Will continue to follow.

## 2024-04-19 NOTE — TELEPHONE ENCOUNTER
Physician provider: Gunnar Perez MD  Reason for today's call: Medication Question  Last office visit:02/05/2024    Patient notified that their information will be routed to the [facility] clinical triage team for review. Patient is advised that they will receive a phone call from the triage department. If symptoms worsen before receiving a call back, the patient has been advised to proceed to the nearest ED.     Tad zacarias/ [id number] called stating that the pt is in the hospital and wanted to confirm if they can put the Venclexta 100 mg on hold until the pt is out of the hospital.

## 2024-04-19 NOTE — PROGRESS NOTES
PT was in bed with Daughter at bedside. PT updated CH on health and hospitalization. CH offered prayer. CH checked for unmet needs and offered support.    Rev. Neelam Wong M.Div.

## 2024-04-19 NOTE — PROGRESS NOTES
cath.  Dx with spasms.    TUBAL LIGATION       Family History   Problem Relation Age of Onset    Parkinson's Disease Mother     Stroke Mother         Passed away in     No Known Problems Paternal Grandfather     No Known Problems Paternal Grandmother     No Known Problems Maternal Grandfather     No Known Problems Maternal Grandmother     Prostate Cancer Father          age 86     Cancer Father         Kidney     Social History     Socioeconomic History    Marital status:      Spouse name: Not on file    Number of children: Not on file    Years of education: Not on file    Highest education level: Not on file   Occupational History    Not on file   Tobacco Use    Smoking status: Never     Passive exposure: Past    Smokeless tobacco: Never   Vaping Use    Vaping Use: Never used   Substance and Sexual Activity    Alcohol use: No    Drug use: Yes     Types: Prescription    Sexual activity: Not Currently     Birth control/protection: None   Other Topics Concern    Not on file   Social History Narrative    Not on file     Social Determinants of Health     Financial Resource Strain: Low Risk  (2023)    Overall Financial Resource Strain (CARDIA)     Difficulty of Paying Living Expenses: Not hard at all   Food Insecurity: No Food Insecurity (2024)    Hunger Vital Sign     Worried About Running Out of Food in the Last Year: Never true     Ran Out of Food in the Last Year: Never true   Transportation Needs: No Transportation Needs (2024)    PRAPARE - Transportation     Lack of Transportation (Medical): No     Lack of Transportation (Non-Medical): No   Physical Activity: Not on file   Stress: Not on file   Social Connections: Not on file   Intimate Partner Violence: Not on file   Housing Stability: Low Risk  (2024)    Housing Stability Vital Sign     Unable to Pay for Housing in the Last Year: No     Number of Places Lived in the Last Year: 1     Unstable Housing in the Last Year: No     Current  Venetoclax 100mg.   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 Will need to communicate with Dr Perez to discuss next course of treatment  4/10 Discussed with Dr Perez, next course would be clinical trial. Reaching out to clinical trial team. If unable to give IP (needs to be IP due to severe refractory thrombocytopenia), could consider Dacogen x10 days/Mylotarg/Venetoclax. Patient and family remain treatment-focused.  4/12 Obtaining consent for clinical trial, labs/pre-trial work-up ordered. Per study, will likely start Monday.  4/15 Treatment pending clinical trial acceptance vs conversation with Dr Perez  4/19 C1D4 Vidaza (days 1-7) / Venetoclax (400mg daily days 1-14) / HYL175 (weekly - next due D8) per clinical trial [id number]. Needs BMBx on day 14.    Risk for TLS / DIC  - monitor labs  - allopurinol ordered; no IVF ordered d/t volume overload  4/18 No evidence of TLS or DIC, monitoring labs  RESOLVED    Pancytopenia  - ?secondary to recent treatment vs secondary to relapsed disease  - Transfuse to keep Hgb >7 and plts >10k (unless actively bleeding), receives cross-matched or HLA-matched platelets as OP  - Check hemolysis labs  - Transfuse plts over 4 hours, recheck 1 hr post-transfusion  4/10 PRBCs yesterday, transfuse again today  4/11 PRBCs yesterday, Hgb improved  4/12 Plts again today  4/13 Plts x2 and IVIG given yesterday, plts up to 13k. Repeat IVIG and transfuse plts due to some epistaxis. PRBCs today as well.  4/14 Plts x1U and IVIG yesterday, plts 10k. No further epistaxis. Hgb responded to transfusion.  4/15 Transfusing plts, other counts fairly stable.  4/16 Plt <2k.  1 unit plt ordered.  4/18 Plts up to 20k today after plts this AM - hold on transfusion of plts. PRBCs today.  4/19 Plts up to 23k this morning after plt transfusion. Hgb up to 8.6 after transfusion.    Refractory thrombocytopenia secondary to disease  - Empirically start IVIG and dex  4/11 Day 4

## 2024-04-20 ENCOUNTER — RESEARCH ENCOUNTER (OUTPATIENT)
Dept: ONCOLOGY | Age: 74
End: 2024-04-20

## 2024-04-20 LAB
ABO + RH BLD: NORMAL
ALBUMIN SERPL-MCNC: 2.7 G/DL (ref 3.2–4.6)
ALBUMIN/GLOB SERPL: 0.4 (ref 0.4–1.6)
ALP SERPL-CCNC: 206 U/L (ref 50–136)
ALT SERPL-CCNC: 46 U/L (ref 12–65)
ANION GAP SERPL CALC-SCNC: 4 MMOL/L (ref 2–11)
APTT PPP: 27.6 SEC (ref 23.3–37.4)
AST SERPL-CCNC: 72 U/L (ref 15–37)
BASOPHILS # BLD: 0 K/UL (ref 0–0.2)
BASOPHILS NFR BLD: 0 % (ref 0–2)
BILIRUB SERPL-MCNC: 0.7 MG/DL (ref 0.2–1.1)
BLD PROD TYP BPU: NORMAL
BLOOD BANK BLOOD PRODUCT EXPIRATION DATE: NORMAL
BLOOD BANK DISPENSE STATUS: NORMAL
BLOOD BANK ISBT PRODUCT BLOOD TYPE: 9500
BLOOD BANK PRODUCT CODE: NORMAL
BLOOD BANK UNIT TYPE AND RH: NORMAL
BLOOD GROUP ANTIBODIES SERPL: NORMAL
BPU ID: NORMAL
BUN SERPL-MCNC: 31 MG/DL (ref 8–23)
CALCIUM SERPL-MCNC: 9.8 MG/DL (ref 8.3–10.4)
CHLORIDE SERPL-SCNC: 94 MMOL/L (ref 103–113)
CO2 SERPL-SCNC: 30 MMOL/L (ref 21–32)
CREAT SERPL-MCNC: 0.6 MG/DL (ref 0.6–1)
CROSSMATCH RESULT: NORMAL
D DIMER PPP FEU-MCNC: 1.39 UG/ML(FEU)
DIFFERENTIAL METHOD BLD: ABNORMAL
EOSINOPHIL # BLD: 0 K/UL (ref 0–0.8)
EOSINOPHIL NFR BLD: 0 % (ref 0.5–7.8)
ERYTHROCYTE [DISTWIDTH] IN BLOOD BY AUTOMATED COUNT: 15.9 % (ref 11.9–14.6)
FIBRINOGEN PPP-MCNC: 614 MG/DL (ref 190–501)
GLOBULIN SER CALC-MCNC: 7.5 G/DL (ref 2.8–4.5)
GLUCOSE SERPL-MCNC: 113 MG/DL (ref 65–100)
HCT VFR BLD AUTO: 24.9 % (ref 35.8–46.3)
HGB BLD-MCNC: 8.7 G/DL (ref 11.7–15.4)
IMM GRANULOCYTES # BLD AUTO: 0 K/UL (ref 0–0.5)
IMM GRANULOCYTES NFR BLD AUTO: 1 % (ref 0–5)
INR PPP: 1.2
LDH SERPL L TO P-CCNC: 1468 U/L (ref 110–210)
LYMPHOCYTES # BLD: 0.5 K/UL (ref 0.5–4.6)
LYMPHOCYTES NFR BLD: 61 % (ref 13–44)
MAGNESIUM SERPL-MCNC: 2.4 MG/DL (ref 1.8–2.4)
MCH RBC QN AUTO: 30.4 PG (ref 26.1–32.9)
MCHC RBC AUTO-ENTMCNC: 34.9 G/DL (ref 31.4–35)
MCV RBC AUTO: 87.1 FL (ref 82–102)
MONOCYTES # BLD: 0 K/UL (ref 0.1–1.3)
MONOCYTES NFR BLD: 6 % (ref 4–12)
NEUTS SEG # BLD: 0.2 K/UL (ref 1.7–8.2)
NEUTS SEG NFR BLD: 32 % (ref 43–78)
NRBC # BLD: 0 K/UL (ref 0–0.2)
PHOSPHATE SERPL-MCNC: 4.8 MG/DL (ref 2.3–3.7)
PLATELET # BLD AUTO: 6 K/UL (ref 150–450)
PLATELET COMMENT: ABNORMAL
PMV BLD AUTO: 10.5 FL (ref 9.4–12.3)
POTASSIUM SERPL-SCNC: 5 MMOL/L (ref 3.5–5.1)
PROT SERPL-MCNC: 10.2 G/DL (ref 6.3–8.2)
PROTHROMBIN TIME: 15.3 SEC (ref 11.3–14.9)
RBC # BLD AUTO: 2.86 M/UL (ref 4.05–5.2)
RBC MORPH BLD: ABNORMAL
SODIUM SERPL-SCNC: 128 MMOL/L (ref 136–146)
SPECIMEN EXP DATE BLD: NORMAL
UNIT DIVISION: 0
UNIT ISSUE DATE/TIME: NORMAL
URATE SERPL-MCNC: 1.8 MG/DL (ref 2.6–6)
WBC # BLD AUTO: 0.7 K/UL (ref 4.3–11.1)
WBC MORPH BLD: ABNORMAL

## 2024-04-20 PROCEDURE — 83615 LACTATE (LD) (LDH) ENZYME: CPT

## 2024-04-20 PROCEDURE — 85379 FIBRIN DEGRADATION QUANT: CPT

## 2024-04-20 PROCEDURE — 86922 COMPATIBILITY TEST ANTIGLOB: CPT

## 2024-04-20 PROCEDURE — 6360000002 HC RX W HCPCS: Performed by: INTERNAL MEDICINE

## 2024-04-20 PROCEDURE — 84100 ASSAY OF PHOSPHORUS: CPT

## 2024-04-20 PROCEDURE — 86921 COMPATIBILITY TEST INCUBATE: CPT

## 2024-04-20 PROCEDURE — 6370000000 HC RX 637 (ALT 250 FOR IP): Performed by: STUDENT IN AN ORGANIZED HEALTH CARE EDUCATION/TRAINING PROGRAM

## 2024-04-20 PROCEDURE — 36430 TRANSFUSION BLD/BLD COMPNT: CPT

## 2024-04-20 PROCEDURE — 86644 CMV ANTIBODY: CPT

## 2024-04-20 PROCEDURE — 97535 SELF CARE MNGMENT TRAINING: CPT

## 2024-04-20 PROCEDURE — 2580000003 HC RX 258: Performed by: INTERNAL MEDICINE

## 2024-04-20 PROCEDURE — 86850 RBC ANTIBODY SCREEN: CPT

## 2024-04-20 PROCEDURE — 6370000000 HC RX 637 (ALT 250 FOR IP): Performed by: NURSE PRACTITIONER

## 2024-04-20 PROCEDURE — 36591 DRAW BLOOD OFF VENOUS DEVICE: CPT

## 2024-04-20 PROCEDURE — 80053 COMPREHEN METABOLIC PANEL: CPT

## 2024-04-20 PROCEDURE — 97530 THERAPEUTIC ACTIVITIES: CPT

## 2024-04-20 PROCEDURE — 97161 PT EVAL LOW COMPLEX 20 MIN: CPT

## 2024-04-20 PROCEDURE — 86900 BLOOD TYPING SEROLOGIC ABO: CPT

## 2024-04-20 PROCEDURE — 86920 COMPATIBILITY TEST SPIN: CPT

## 2024-04-20 PROCEDURE — 6370000000 HC RX 637 (ALT 250 FOR IP): Performed by: INTERNAL MEDICINE

## 2024-04-20 PROCEDURE — 85025 COMPLETE CBC W/AUTO DIFF WBC: CPT

## 2024-04-20 PROCEDURE — 97165 OT EVAL LOW COMPLEX 30 MIN: CPT

## 2024-04-20 PROCEDURE — 84550 ASSAY OF BLOOD/URIC ACID: CPT

## 2024-04-20 PROCEDURE — 85610 PROTHROMBIN TIME: CPT

## 2024-04-20 PROCEDURE — 83735 ASSAY OF MAGNESIUM: CPT

## 2024-04-20 PROCEDURE — 85730 THROMBOPLASTIN TIME PARTIAL: CPT

## 2024-04-20 PROCEDURE — 85384 FIBRINOGEN ACTIVITY: CPT

## 2024-04-20 PROCEDURE — P9037 PLATE PHERES LEUKOREDU IRRAD: HCPCS

## 2024-04-20 PROCEDURE — 1100000000 HC RM PRIVATE

## 2024-04-20 PROCEDURE — 86901 BLOOD TYPING SEROLOGIC RH(D): CPT

## 2024-04-20 PROCEDURE — 86813 HLA TYPING A B OR C: CPT

## 2024-04-20 RX ADMIN — PANTOPRAZOLE SODIUM 40 MG: 40 TABLET, DELAYED RELEASE ORAL at 06:19

## 2024-04-20 RX ADMIN — WATER 131.25 MG: 1 INJECTION INTRAMUSCULAR; INTRAVENOUS; SUBCUTANEOUS at 10:33

## 2024-04-20 RX ADMIN — CIPROFLOXACIN HYDROCHLORIDE 500 MG: 500 TABLET, FILM COATED ORAL at 08:23

## 2024-04-20 RX ADMIN — AMLODIPINE BESYLATE 5 MG: 5 TABLET ORAL at 08:24

## 2024-04-20 RX ADMIN — DIPHENHYDRAMINE HYDROCHLORIDE 25 MG: 25 CAPSULE ORAL at 10:32

## 2024-04-20 RX ADMIN — LEVOTHYROXINE SODIUM 150 MCG: 0.15 TABLET ORAL at 06:19

## 2024-04-20 RX ADMIN — FLUOXETINE HYDROCHLORIDE 20 MG: 10 CAPSULE ORAL at 08:23

## 2024-04-20 RX ADMIN — FAMOTIDINE 40 MG: 20 TABLET, FILM COATED ORAL at 18:00

## 2024-04-20 RX ADMIN — CYCLOBENZAPRINE 10 MG: 10 TABLET, FILM COATED ORAL at 23:47

## 2024-04-20 RX ADMIN — DEXAMETHASONE SODIUM PHOSPHATE 12 MG: 4 INJECTION, SOLUTION INTRAMUSCULAR; INTRAVENOUS at 10:06

## 2024-04-20 RX ADMIN — CIPROFLOXACIN HYDROCHLORIDE 500 MG: 500 TABLET, FILM COATED ORAL at 20:49

## 2024-04-20 RX ADMIN — HYDROXYZINE HYDROCHLORIDE 25 MG: 25 TABLET, FILM COATED ORAL at 20:49

## 2024-04-20 RX ADMIN — MAGNESIUM GLUCONATE 500 MG ORAL TABLET 400 MG: 500 TABLET ORAL at 08:23

## 2024-04-20 RX ADMIN — PANTOPRAZOLE SODIUM 40 MG: 40 TABLET, DELAYED RELEASE ORAL at 15:12

## 2024-04-20 RX ADMIN — ALLOPURINOL 300 MG: 300 TABLET ORAL at 08:24

## 2024-04-20 RX ADMIN — ACYCLOVIR 400 MG: 400 TABLET ORAL at 20:49

## 2024-04-20 RX ADMIN — POLYETHYLENE GLYCOL 3350 17 G: 17 POWDER, FOR SOLUTION ORAL at 08:25

## 2024-04-20 RX ADMIN — ACETAMINOPHEN 650 MG: 325 TABLET ORAL at 10:32

## 2024-04-20 RX ADMIN — SENNOSIDES 17.2 MG: 8.6 TABLET, FILM COATED ORAL at 20:49

## 2024-04-20 RX ADMIN — HYDROXYZINE HYDROCHLORIDE 25 MG: 25 TABLET, FILM COATED ORAL at 08:24

## 2024-04-20 RX ADMIN — MAGNESIUM GLUCONATE 500 MG ORAL TABLET 400 MG: 500 TABLET ORAL at 20:49

## 2024-04-20 RX ADMIN — MORPHINE SULFATE 15 MG: 15 TABLET, FILM COATED, EXTENDED RELEASE ORAL at 20:49

## 2024-04-20 RX ADMIN — ONDANSETRON 8 MG: 2 INJECTION INTRAMUSCULAR; INTRAVENOUS at 08:31

## 2024-04-20 RX ADMIN — ACYCLOVIR 400 MG: 400 TABLET ORAL at 08:24

## 2024-04-20 RX ADMIN — MAGNESIUM GLUCONATE 500 MG ORAL TABLET 400 MG: 500 TABLET ORAL at 15:12

## 2024-04-20 RX ADMIN — MORPHINE SULFATE 15 MG: 15 TABLET, FILM COATED, EXTENDED RELEASE ORAL at 08:24

## 2024-04-20 RX ADMIN — FLUOXETINE HYDROCHLORIDE 20 MG: 10 CAPSULE ORAL at 20:49

## 2024-04-20 ASSESSMENT — PAIN SCALES - GENERAL
PAINLEVEL_OUTOF10: 0
PAINLEVEL_OUTOF10: 0

## 2024-04-20 NOTE — PROGRESS NOTES
[facility] Hematology & Oncology        Inpatient Hematology / Oncology Progress Note    Reason for Consult:  Epistaxis [R04.0]  Lightheadedness [R42]  Thrombocytopenia (HCC) [D69.6]  Pancytopenia (HCC) [D61.818]  Symptomatic anemia [D64.9]  Referring Physician:  Benjamin Castro MD    24 Hour Events:  Afebrile, VSS  On 3L NC f  C1D5 Vidaza / Venetoclax / MIZ250 per clinical trial [id number]  Ongoing refractory thrombocytopenia - 6,000   Overall feeling better     Transfusions: platelest  Replacements: None      ROS:  Constitutional: +fatigue, weakness. Negative for fever, chills.  CV: +edema (better). Negative for chest pain, palpitations.  Respiratory: +dyspnea (better), cough. Negative for wheezing.  GI: Negative for nausea, abdominal pain, diarrhea.    10 point review of systems is otherwise negative with the exception of the elements mentioned above in the HPI.           Allergies   Allergen Reactions    Penicillins Hives    Sulfa Antibiotics Hives    Codeine Nausea And Vomiting     Past Medical History:   Diagnosis Date    Arthritis     Autoimmune disease (HCC)     skin changes, unknown name    Cancer (HCC) 1990    ovarian    Chronic pain     arthritis in back and legs    Coronary artery spasm (HCC)     COVID 05/15/2022    Essential hypertension 11/8/2019    Gastritis     GERD (gastroesophageal reflux disease)     Hx antineoplastic chemo     Migraine headache     Psoriasis     Psychiatric disorder     anxiety and depression    Severe obesity (BMI 35.0-39.9) 6/26/2018    Thyroid disease     Diagnosed in 1996     Past Surgical History:   Procedure Laterality Date    CARPAL TUNNEL RELEASE      GYN      ovaries    HYSTERECTOMY, TOTAL ABDOMINAL (CERVIX REMOVED)  1990    IR PORT PLACEMENT EQUAL OR GREATER THAN 5 YEARS  1/3/2024    IR PORT PLACEMENT EQUAL OR GREATER THAN 5 YEARS 1/3/2024 [facility] RADIOLOGY SPECIALS    ID UNLISTED PROCEDURE CARDIAC SURGERY      cardiac cath.  Dx with spasms.    TUBAL LIGATION    due to venetoclax)    Dispo - too soon to determine.    4/12 Discussed with patient and  risk for clinical deterioration due to increasing O2 needs and in light of aggressive leukemia. Will ask ICU rover to follow. Discussed potential for move to ICU if she continues to deteriorate and could potentially require intubation/etc for resuscitative measures. She is currently a full code. They will continue to think about code status.       Goals and plan of care reviewed with the patient.  All questions answered to the best of our ability.         EUGENIA GainesC  [facility] Hematology & Oncology  [address] [place] [address]  Office : [phone]  Fax : [phone]       Attending Addendum:  I have personally performed a face to face diagnostic evaluation on this patient. I have reviewed and agree with the care plan as documented above by NOctaviaP.  My findings are as follows:   Vitals were reviewed.  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 Randomized to Cohort 2 [id number] IV weekly + venetoclax + Vidaza. D5 today.  Tolerating treatment well, volume status stable, monitor sodium level, Rigo SOPs for blood product transfusions.      MD [facility] Hematology/Oncology

## 2024-04-20 NOTE — PROGRESS NOTES
ACUTE OCCUPATIONAL THERAPY GOALS:   (Developed with and agreed upon by patient and/or caregiver.)  1. Patient will complete lower body bathing and dressing with SUPERVISION and adaptive equipment as needed.     2. Patient will complete toileting with SUPERVISION.  3. Patient will complete grooming ADL standing at sink with SUPERVISION.  4. Patient will tolerate 25 minutes of OT treatment with 1-2 rest breaks to increase activity tolerance for ADLs.   5. Patient will complete functional transfers with SUPERVISION and adaptive equipment as needed.   6. Patient will tolerate 10 minutes BUE exercises to increase strength for safe, functional transfers.     Timeframe: 7 visits     OCCUPATIONAL THERAPY Initial Assessment and Daily Note       OT Visit Days: 1  Acknowledge Orders  Time  OT Charge Capture  Rehab Caseload Tracker      Constance Gomes is a 73 y.o. female   PRIMARY DIAGNOSIS: Thrombocytopenia (HCC)  Epistaxis [R04.0]  Lightheadedness [R42]  Thrombocytopenia (HCC) [D69.6]  Pancytopenia (HCC) [D61.818]  Symptomatic anemia [D64.9]       Reason for Referral: Generalized Muscle Weakness (M62.81)  Difficulty in walking, Not elsewhere classified (R26.2)  Other abnormalities of gait and mobility (R26.89)  Inpatient: Payor: [employer] MEDICARE / Plan: HUMANA CHOICE-PPO MEDICARE / Product Type: *No Product type* /     ASSESSMENT:     REHAB RECOMMENDATIONS:   Recommendation to date pending progress:  Setting:  Home Health Therapy    Equipment:    To Be Determined--pt has rolling walker, bedside commode and SC     ASSESSMENT:  Ms. Gomes is a 72 y/o female presents with volume overload, hypotension, pancytopenia and thrombocytopenia. Pt with hx of AML, pending clinical trial treatment. At baseline pt lives with her , is independent all ADLs and does not use DME for ambulation, does have rolling walker if needed. Today pt presents with decreased activity tolerance, balance, strength and mobility impacting ADLs. Pt has

## 2024-04-20 NOTE — PROGRESS NOTES
ACUTE PHYSICAL THERAPY GOALS:   (Developed with and agreed upon by patient and/or caregiver.)  LTG:  (1.)Ms. Gomes will move from supine to sit and sit to supine , scoot up and down, and roll side to side in bed with INDEPENDENT within 7 treatment day(s).    (2.)Ms. Gomes will transfer from bed to chair and chair to bed with MODIFIED INDEPENDENCE using the least restrictive device within 7 treatment day(s).    (3.)Ms. Gomes will ambulate with STAND BY ASSIST for 250+ feet with the least restrictive device within 7 treatment day(s), while maintaining normal vital signs.  (4.)Ms. Gomes will tolerate 23+ minutes of therapeutic activity within 7 treatment days for increased activity tolerance and functional mobility.   ________________________________________________________________________________________________     PHYSICAL THERAPY Initial Assessment and AM  (Link to Caseload Tracking: PT Visit Days : 1  Acknowledge Orders  Time In/Out  PT Charge Capture  Rehab Caseload Tracker    Constance Gomes is a 73 y.o. female   PRIMARY DIAGNOSIS: Thrombocytopenia (HCC)  Epistaxis [R04.0]  Lightheadedness [R42]  Thrombocytopenia (HCC) [D69.6]  Pancytopenia (HCC) [D61.818]  Symptomatic anemia [D64.9]       Reason for Referral: Generalized Muscle Weakness (M62.81)  Difficulty in walking, Not elsewhere classified (R26.2)  Other abnormalities of gait and mobility (R26.89)  Inpatient: Payor: [employer] MEDICARE / Plan: HUMANA CHOICE-PPO MEDICARE / Product Type: *No Product type* /     ASSESSMENT:     REHAB RECOMMENDATIONS:   Recommendation to date pending progress:  Setting:  Home Health Therapy    Equipment:    To Be Determined=has RW, shower chair, BSC at home     ASSESSMENT:  Ms. Gomes presents with decreased bed mobility, transfers, ambulation, balance, activity tolerance, strength and overall general functional mobility s/p hospital admission on 4/7/24 with above. Pt with hx of AML. Pt at baseline lives with spouse, independent  until stated goals are met, whichever comes first.    THERAPY PROGNOSIS: Good    PROBLEM LIST:   (Skilled intervention is medically necessary to address:)  Decreased ADL/Functional Activities  Decreased Activity Tolerance  Decreased Balance  Decreased Gait Ability  Decreased Strength  Decreased Transfer Abilities INTERVENTIONS PLANNED:   (Benefits and precautions of physical therapy have been discussed with the patient.)  Therapeutic Activity  Therapeutic Exercise/HEP  Neuromuscular Re-education  Gait Training  Education       TREATMENT:   EVALUATION: LOW COMPLEXITY: (Untimed Charge)  The initial evaluation charge encompasses clinical chart review, objective assessment, interpretation of assessment, and skilled monitoring of the patient's response to treatment in order to develop a plan of care.     TREATMENT:   Co-Treatment PT/OT necessary due to patient's decreased overall endurance/tolerance levels, as well as need for high level skilled assistance to complete functional transfers/mobility and functional tasks  Therapeutic Activity (10 Minutes): Therapeutic activity included Rolling, Supine to Sit, Sit to Supine, Scooting, Transfer Training, Ambulation on level ground, Sitting balance , and Standing balance to improve functional Activity tolerance, Balance, Coordination, and Mobility.    TREATMENT GRID:  N/A    AFTER TREATMENT PRECAUTIONS: Bed, Bed/Chair Locked, Call light within reach, Needs within reach, RN notified, Side rails x3, and Visitors at bedside    INTERDISCIPLINARY COLLABORATION:  RN/ PCT, PT/ PTA, and OT/ PETTY    EDUCATION: Education Given To: Patient  Education Provided: Role of Therapy;Plan of Care  Education Method: Verbal  Barriers to Learning: None  Education Outcome: Verbalized understanding    TIME IN/OUT:  Time In: 1110  Time Out: 1127  Minutes: 17    Nani Latham, PT

## 2024-04-20 NOTE — PROGRESS NOTES
Pt VSS pt A&O X's 4 complain of nausea gave med as ordered in mar pt received 1 unit of PLT today tolerated it well received Vidaza today resting in bed voiding well no BM during shift call light it reach  at bedside

## 2024-04-20 NOTE — PLAN OF CARE
Problem: Pain  Goal: Verbalizes/displays adequate comfort level or baseline comfort level  4/20/2024 0928 by Irene Fitzgerald RN  Outcome: Progressing  4/20/2024 0124 by Irene Cueva RN  Outcome: Progressing     Problem: Safety - Adult  Goal: Free from fall injury  4/20/2024 0928 by Irene Fitzgerald, RN  Outcome: Progressing  4/20/2024 0124 by Irene Cueva RN  Outcome: Progressing

## 2024-04-21 LAB
ALBUMIN SERPL-MCNC: 2.8 G/DL (ref 3.2–4.6)
ALBUMIN/GLOB SERPL: 0.4 (ref 0.4–1.6)
ALP SERPL-CCNC: 173 U/L (ref 50–136)
ALT SERPL-CCNC: 39 U/L (ref 12–65)
ANION GAP SERPL CALC-SCNC: 3 MMOL/L (ref 2–11)
APTT PPP: 27.9 SEC (ref 23.3–37.4)
AST SERPL-CCNC: 59 U/L (ref 15–37)
BASOPHILS # BLD: 0 K/UL (ref 0–0.2)
BASOPHILS NFR BLD: 0 % (ref 0–2)
BILIRUB SERPL-MCNC: 0.7 MG/DL (ref 0.2–1.1)
BLD PROD TYP BPU: NORMAL
BLOOD BANK BLOOD PRODUCT EXPIRATION DATE: NORMAL
BLOOD BANK CMNT PATIENT-IMP: NORMAL
BLOOD BANK DISPENSE STATUS: NORMAL
BLOOD BANK ISBT PRODUCT BLOOD TYPE: 6200
BLOOD BANK PRODUCT CODE: NORMAL
BLOOD BANK UNIT TYPE AND RH: NORMAL
BPU ID: NORMAL
BUN SERPL-MCNC: 32 MG/DL (ref 8–23)
CALCIUM SERPL-MCNC: 9.9 MG/DL (ref 8.3–10.4)
CHLORIDE SERPL-SCNC: 97 MMOL/L (ref 103–113)
CO2 SERPL-SCNC: 29 MMOL/L (ref 21–32)
CREAT SERPL-MCNC: 0.6 MG/DL (ref 0.6–1)
D DIMER PPP FEU-MCNC: 1.13 UG/ML(FEU)
DIFFERENTIAL METHOD BLD: ABNORMAL
EOSINOPHIL # BLD: 0 K/UL (ref 0–0.8)
EOSINOPHIL NFR BLD: 0 % (ref 0.5–7.8)
ERYTHROCYTE [DISTWIDTH] IN BLOOD BY AUTOMATED COUNT: 14.8 % (ref 11.9–14.6)
FIBRINOGEN PPP-MCNC: 547 MG/DL (ref 190–501)
GLOBULIN SER CALC-MCNC: 6.9 G/DL (ref 2.8–4.5)
GLUCOSE SERPL-MCNC: 106 MG/DL (ref 65–100)
HCT VFR BLD AUTO: 24.1 % (ref 35.8–46.3)
HGB BLD-MCNC: 8.3 G/DL (ref 11.7–15.4)
IMM GRANULOCYTES # BLD AUTO: 0 K/UL (ref 0–0.5)
IMM GRANULOCYTES NFR BLD AUTO: 1 % (ref 0–5)
INR PPP: 1.1
LDH SERPL L TO P-CCNC: 1203 U/L (ref 110–210)
LYMPHOCYTES # BLD: 0.5 K/UL (ref 0.5–4.6)
LYMPHOCYTES NFR BLD: 62 % (ref 13–44)
MAGNESIUM SERPL-MCNC: 2.5 MG/DL (ref 1.8–2.4)
MCH RBC QN AUTO: 29.3 PG (ref 26.1–32.9)
MCHC RBC AUTO-ENTMCNC: 34.4 G/DL (ref 31.4–35)
MCV RBC AUTO: 85.2 FL (ref 82–102)
MONOCYTES # BLD: 0 K/UL (ref 0.1–1.3)
MONOCYTES NFR BLD: 3 % (ref 4–12)
NEUTS SEG # BLD: 0.2 K/UL (ref 1.7–8.2)
NEUTS SEG NFR BLD: 34 % (ref 43–78)
NRBC # BLD: 0 K/UL (ref 0–0.2)
PHOSPHATE SERPL-MCNC: 4 MG/DL (ref 2.3–3.7)
PLATELET # BLD AUTO: 3 K/UL (ref 150–450)
PLATELET COMMENT: ABNORMAL
PMV BLD AUTO: ABNORMAL FL (ref 9.4–12.3)
POTASSIUM SERPL-SCNC: 4.7 MMOL/L (ref 3.5–5.1)
PROT SERPL-MCNC: 9.7 G/DL (ref 6.3–8.2)
PROTHROMBIN TIME: 14.7 SEC (ref 11.3–14.9)
RBC # BLD AUTO: 2.83 M/UL (ref 4.05–5.2)
RBC MORPH BLD: ABNORMAL
SODIUM SERPL-SCNC: 129 MMOL/L (ref 136–146)
UNIT DIVISION: 0
UNIT ISSUE DATE/TIME: NORMAL
URATE SERPL-MCNC: 2.3 MG/DL (ref 2.6–6)
WBC # BLD AUTO: 0.7 K/UL (ref 4.3–11.1)
WBC MORPH BLD: ABNORMAL

## 2024-04-21 PROCEDURE — 6370000000 HC RX 637 (ALT 250 FOR IP): Performed by: STUDENT IN AN ORGANIZED HEALTH CARE EDUCATION/TRAINING PROGRAM

## 2024-04-21 PROCEDURE — 86022 PLATELET ANTIBODIES: CPT

## 2024-04-21 PROCEDURE — 83735 ASSAY OF MAGNESIUM: CPT

## 2024-04-21 PROCEDURE — 85610 PROTHROMBIN TIME: CPT

## 2024-04-21 PROCEDURE — 80053 COMPREHEN METABOLIC PANEL: CPT

## 2024-04-21 PROCEDURE — 86813 HLA TYPING A B OR C: CPT

## 2024-04-21 PROCEDURE — 85384 FIBRINOGEN ACTIVITY: CPT

## 2024-04-21 PROCEDURE — 85025 COMPLETE CBC W/AUTO DIFF WBC: CPT

## 2024-04-21 PROCEDURE — 85379 FIBRIN DEGRADATION QUANT: CPT

## 2024-04-21 PROCEDURE — 36430 TRANSFUSION BLD/BLD COMPNT: CPT

## 2024-04-21 PROCEDURE — 2580000003 HC RX 258: Performed by: INTERNAL MEDICINE

## 2024-04-21 PROCEDURE — 85730 THROMBOPLASTIN TIME PARTIAL: CPT

## 2024-04-21 PROCEDURE — 6370000000 HC RX 637 (ALT 250 FOR IP): Performed by: NURSE PRACTITIONER

## 2024-04-21 PROCEDURE — 83615 LACTATE (LD) (LDH) ENZYME: CPT

## 2024-04-21 PROCEDURE — 2700000000 HC OXYGEN THERAPY PER DAY

## 2024-04-21 PROCEDURE — P9037 PLATE PHERES LEUKOREDU IRRAD: HCPCS

## 2024-04-21 PROCEDURE — 6370000000 HC RX 637 (ALT 250 FOR IP): Performed by: INTERNAL MEDICINE

## 2024-04-21 PROCEDURE — 84100 ASSAY OF PHOSPHORUS: CPT

## 2024-04-21 PROCEDURE — 1100000000 HC RM PRIVATE

## 2024-04-21 PROCEDURE — 84550 ASSAY OF BLOOD/URIC ACID: CPT

## 2024-04-21 PROCEDURE — 6360000002 HC RX W HCPCS: Performed by: INTERNAL MEDICINE

## 2024-04-21 RX ORDER — SODIUM CHLORIDE 9 MG/ML
INJECTION, SOLUTION INTRAVENOUS PRN
Status: DISCONTINUED | OUTPATIENT
Start: 2024-04-21 | End: 2024-04-24

## 2024-04-21 RX ADMIN — MAGNESIUM GLUCONATE 500 MG ORAL TABLET 400 MG: 500 TABLET ORAL at 09:15

## 2024-04-21 RX ADMIN — ACYCLOVIR 400 MG: 400 TABLET ORAL at 09:15

## 2024-04-21 RX ADMIN — AMLODIPINE BESYLATE 5 MG: 5 TABLET ORAL at 09:15

## 2024-04-21 RX ADMIN — POLYETHYLENE GLYCOL 3350 17 G: 17 POWDER, FOR SOLUTION ORAL at 09:15

## 2024-04-21 RX ADMIN — CIPROFLOXACIN HYDROCHLORIDE 500 MG: 500 TABLET, FILM COATED ORAL at 09:15

## 2024-04-21 RX ADMIN — MORPHINE SULFATE 15 MG: 15 TABLET, FILM COATED, EXTENDED RELEASE ORAL at 21:03

## 2024-04-21 RX ADMIN — DEXAMETHASONE SODIUM PHOSPHATE 12 MG: 4 INJECTION, SOLUTION INTRAMUSCULAR; INTRAVENOUS at 10:07

## 2024-04-21 RX ADMIN — ACYCLOVIR 400 MG: 400 TABLET ORAL at 21:04

## 2024-04-21 RX ADMIN — FLUOXETINE HYDROCHLORIDE 20 MG: 10 CAPSULE ORAL at 09:15

## 2024-04-21 RX ADMIN — PANTOPRAZOLE SODIUM 40 MG: 40 TABLET, DELAYED RELEASE ORAL at 16:47

## 2024-04-21 RX ADMIN — MAGNESIUM GLUCONATE 500 MG ORAL TABLET 400 MG: 500 TABLET ORAL at 21:04

## 2024-04-21 RX ADMIN — MAGNESIUM GLUCONATE 500 MG ORAL TABLET 400 MG: 500 TABLET ORAL at 13:58

## 2024-04-21 RX ADMIN — ONDANSETRON 8 MG: 2 INJECTION INTRAMUSCULAR; INTRAVENOUS at 09:22

## 2024-04-21 RX ADMIN — WATER 131.25 MG: 1 INJECTION INTRAMUSCULAR; INTRAVENOUS; SUBCUTANEOUS at 10:28

## 2024-04-21 RX ADMIN — DIPHENHYDRAMINE HYDROCHLORIDE 25 MG: 25 CAPSULE ORAL at 10:06

## 2024-04-21 RX ADMIN — DIPHENHYDRAMINE HYDROCHLORIDE 25 MG: 25 CAPSULE ORAL at 23:10

## 2024-04-21 RX ADMIN — MORPHINE SULFATE 15 MG: 15 TABLET, FILM COATED, EXTENDED RELEASE ORAL at 09:15

## 2024-04-21 RX ADMIN — PANTOPRAZOLE SODIUM 40 MG: 40 TABLET, DELAYED RELEASE ORAL at 09:15

## 2024-04-21 RX ADMIN — FAMOTIDINE 40 MG: 20 TABLET, FILM COATED ORAL at 16:47

## 2024-04-21 RX ADMIN — ACETAMINOPHEN 650 MG: 325 TABLET ORAL at 23:10

## 2024-04-21 RX ADMIN — HYDROXYZINE HYDROCHLORIDE 25 MG: 25 TABLET, FILM COATED ORAL at 21:04

## 2024-04-21 RX ADMIN — ALLOPURINOL 300 MG: 300 TABLET ORAL at 09:15

## 2024-04-21 RX ADMIN — ALUMINUM HYDROXIDE, MAGNESIUM HYDROXIDE, AND SIMETHICONE 30 ML: 1200; 120; 1200 SUSPENSION ORAL at 10:38

## 2024-04-21 RX ADMIN — FLUOXETINE HYDROCHLORIDE 20 MG: 10 CAPSULE ORAL at 21:04

## 2024-04-21 RX ADMIN — HYDROXYZINE HYDROCHLORIDE 25 MG: 25 TABLET, FILM COATED ORAL at 09:15

## 2024-04-21 RX ADMIN — LEVOTHYROXINE SODIUM 150 MCG: 0.15 TABLET ORAL at 09:15

## 2024-04-21 RX ADMIN — CIPROFLOXACIN HYDROCHLORIDE 500 MG: 500 TABLET, FILM COATED ORAL at 21:03

## 2024-04-21 RX ADMIN — ACETAMINOPHEN 650 MG: 325 TABLET ORAL at 10:06

## 2024-04-21 RX ADMIN — SENNOSIDES 17.2 MG: 8.6 TABLET, FILM COATED ORAL at 21:03

## 2024-04-21 ASSESSMENT — PAIN SCALES - GENERAL
PAINLEVEL_OUTOF10: 0

## 2024-04-21 NOTE — PROGRESS NOTES
Pt vss pt A&O X's 4 no complaints of pain or discomfort complain of upset stomach Xs 1 gave medication as ordered in mar pt received 1 unit of PLT during shift as well as Vidaza tolerated it well voiding well had 1 large BM during shift resting in bed  at bedside call light in reach

## 2024-04-21 NOTE — PROGRESS NOTES
24 1038    ondansetron (ZOFRAN-ODT) disintegrating tablet 4 mg  4 mg Oral Q8H PRN Saman Abdalla MD        traZODone (DESYREL) tablet 50 mg  50 mg Oral Nightly PRN Saman Abdalla MD        potassium chloride (KLOR-CON M) extended release tablet 40 mEq  40 mEq Oral PRN Saman Abdalla MD        Or    potassium bicarb-citric acid (EFFER-K) effervescent tablet 40 mEq  40 mEq Oral PRN Saman Abdalla MD        Or    potassium chloride 10 mEq/100 mL IVPB (Peripheral Line)  10 mEq IntraVENous PRN Saman Abdalla MD        magnesium sulfate 2000 mg in 50 mL IVPB premix  2,000 mg IntraVENous PRN Saman Abdalla MD   Stopped at 24 0205    FLUoxetine (PROZAC) capsule 20 mg  20 mg Oral BID Saman Abdalla MD   20 mg at 24 0915    hydrOXYzine HCl (ATARAX) tablet 25 mg  25 mg Oral BID Saman Abdalla MD   25 mg at 24 0915    diphenhydrAMINE (BENADRYL) capsule 25 mg  25 mg Oral Q6H PRN Saman Abdalla MD   25 mg at 24 1006    levothyroxine (SYNTHROID) tablet 150 mcg  150 mcg Oral QAM AC Catie Sorensen, TIM - CNP   150 mcg at 24 0915       OBJECTIVE:  Patient Vitals for the past 8 hrs:   BP Temp Temp src Pulse Resp SpO2   24 1155 119/78 97.3 °F (36.3 °C) Oral 80 18 99 %   24 1034 138/84 97.7 °F (36.5 °C) Oral 83 20 95 %   24 1006 127/80 98.2 °F (36.8 °C) Oral 83 20 99 %   24 0753 (!) 140/86 97.3 °F (36.3 °C) Axillary 81 18 99 %     Temp (24hrs), Av.2 °F (36.2 °C), Min:94.1 °F (34.5 °C), Max:98.2 °F (36.8 °C)    701 -  1900  In: 240 [P.O.:240]  Out: 300 [Urine:300]    Physical Exam:  Constitutional: Well developed, well nourished female in no acute distress, sitting comfortably in the hospital bed.    HEENT: Normocephalic and atraumatic. Oropharynx is clear, mucous membranes are moist.  Extraocular muscles are intact.  Sclerae anicteric. Neck supple without JVD. No thyromegaly present.    Skin Warm and dry.  No bruising and no rash noted.  No erythema.  No pallor.    Neuro Grossly  diuresis today.   4/19 Down to 3L NC from [place] overnight. Volume status much improved - monitoring and diuresing PRN.    Neutropenic fever / ?pneumonia  - CXR with atelectasis vs infiltrate in R lung  - Continue Cefe/Vanc  - Follow BC  4/14 Afebrile. BC NGTD. Continues Cefepime (day 7), completes today and then transition to prophylactic Cipro.  RESOLVED     Hypotension / asymptomatic bradycardia  - Hold amlodipine  4/10 BP improving, resume amlodipine.    Chronic pain   - Continue MS contin     History of R axillary vein thrombus / line-associated  - Dx 12/27, previously on Eliquis but now off. AC contraindicated with thrombocytopenia    Continue home meds  Rigo SOPs  VTE prophylaxis - AC contraindicated due to thrombocytopenia  Diet - Regular  PT/OT - Deferred  Code - Full (ongoing discussions regarding GOC)  Acyclovir, Cipro, diflucan (held due to venetoclax)    Dispo - too soon to determine.    4/12 Discussed with patient and  risk for clinical deterioration due to increasing O2 needs and in light of aggressive leukemia. Will ask ICU rover to follow. Discussed potential for move to ICU if she continues to deteriorate and could potentially require intubation/etc for resuscitative measures. She is currently a full code. They will continue to think about code status.       Goals and plan of care reviewed with the patient.  All questions answered to the best of our ability.         FLORENTINO Gaines  [facility]  [address] [place] [address]  Office : [phone]  Fax : [phone]       Attending Addendum:  I have personally performed a face to face diagnostic evaluation on this patient. I have reviewed and agree with the care plan as documented above by NOctaviaP.  My findings are as follows:   Vitals were reviewed.  /78   Pulse 80   Temp 97.3 °F (36.3 °C) (Oral)   Resp 18   Ht 1.524 m (5')   Wt 73.7 kg (162 lb 8 oz)   SpO2 99%   BMI 31.74 kg/m²

## 2024-04-21 NOTE — PLAN OF CARE
Problem: Pain  Goal: Verbalizes/displays adequate comfort level or baseline comfort level  Outcome: Progressing     Problem: Safety - Adult  Goal: Free from fall injury  Outcome: Progressing  Flowsheets (Taken 4/20/2024 2113 by Mariana Wood RN)  Free From Fall Injury: Instruct family/caregiver on patient safety

## 2024-04-22 PROBLEM — Z00.6 RESEARCH EXAM: Status: ACTIVE | Noted: 2024-04-22

## 2024-04-22 LAB
ALBUMIN SERPL-MCNC: 2.8 G/DL (ref 3.2–4.6)
ALBUMIN/GLOB SERPL: 0.4 (ref 0.4–1.6)
ALP SERPL-CCNC: 158 U/L (ref 50–136)
ALT SERPL-CCNC: 33 U/L (ref 12–65)
ANION GAP SERPL CALC-SCNC: 5 MMOL/L (ref 2–11)
APTT PPP: 25.7 SEC (ref 23.3–37.4)
AST SERPL-CCNC: 51 U/L (ref 15–37)
BASOPHILS # BLD: 0 K/UL (ref 0–0.2)
BASOPHILS NFR BLD: 0 % (ref 0–2)
BILIRUB SERPL-MCNC: 0.7 MG/DL (ref 0.2–1.1)
BLD PROD TYP BPU: NORMAL
BLOOD BANK BLOOD PRODUCT EXPIRATION DATE: NORMAL
BLOOD BANK CMNT PATIENT-IMP: NORMAL
BLOOD BANK DISPENSE STATUS: NORMAL
BLOOD BANK ISBT PRODUCT BLOOD TYPE: 6200
BLOOD BANK PRODUCT CODE: NORMAL
BLOOD BANK UNIT TYPE AND RH: NORMAL
BPU ID: NORMAL
BUN SERPL-MCNC: 32 MG/DL (ref 8–23)
CALCIUM SERPL-MCNC: 9.6 MG/DL (ref 8.3–10.4)
CHLORIDE SERPL-SCNC: 98 MMOL/L (ref 103–113)
CO2 SERPL-SCNC: 28 MMOL/L (ref 21–32)
CREAT SERPL-MCNC: 0.7 MG/DL (ref 0.6–1)
D DIMER PPP FEU-MCNC: 0.97 UG/ML(FEU)
DIFFERENTIAL METHOD BLD: ABNORMAL
EOSINOPHIL # BLD: 0 K/UL (ref 0–0.8)
EOSINOPHIL NFR BLD: 0 % (ref 0.5–7.8)
ERYTHROCYTE [DISTWIDTH] IN BLOOD BY AUTOMATED COUNT: 14.6 % (ref 11.9–14.6)
FIBRINOGEN PPP-MCNC: 515 MG/DL (ref 190–501)
GLOBULIN SER CALC-MCNC: 6.3 G/DL (ref 2.8–4.5)
GLUCOSE SERPL-MCNC: 104 MG/DL (ref 65–100)
HCT VFR BLD AUTO: 21.1 % (ref 35.8–46.3)
HGB BLD-MCNC: 7.3 G/DL (ref 11.7–15.4)
HISTORY CHECK: NORMAL
IMM GRANULOCYTES # BLD AUTO: 0 K/UL (ref 0–0.5)
IMM GRANULOCYTES NFR BLD AUTO: 3 % (ref 0–5)
INR PPP: 1.1
LDH SERPL L TO P-CCNC: 1059 U/L (ref 110–210)
LYMPHOCYTES # BLD: 0.5 K/UL (ref 0.5–4.6)
LYMPHOCYTES NFR BLD: 67 % (ref 13–44)
MAGNESIUM SERPL-MCNC: 2.5 MG/DL (ref 1.8–2.4)
MCH RBC QN AUTO: 30.7 PG (ref 26.1–32.9)
MCHC RBC AUTO-ENTMCNC: 34.6 G/DL (ref 31.4–35)
MCV RBC AUTO: 88.7 FL (ref 82–102)
MONOCYTES # BLD: 0 K/UL (ref 0.1–1.3)
MONOCYTES NFR BLD: 5 % (ref 4–12)
NEUTS SEG # BLD: 0.2 K/UL (ref 1.7–8.2)
NEUTS SEG NFR BLD: 25 % (ref 43–78)
NRBC # BLD: 0 K/UL (ref 0–0.2)
PHOSPHATE SERPL-MCNC: 3.5 MG/DL (ref 2.3–3.7)
PLATELET # BLD AUTO: 3 K/UL (ref 150–450)
PLATELET COMMENT: ABNORMAL
PMV BLD AUTO: ABNORMAL FL (ref 9.4–12.3)
POTASSIUM SERPL-SCNC: 4.3 MMOL/L (ref 3.5–5.1)
PROT SERPL-MCNC: 9.1 G/DL (ref 6.3–8.2)
PROTHROMBIN TIME: 14.5 SEC (ref 11.3–14.9)
RBC # BLD AUTO: 2.38 M/UL (ref 4.05–5.2)
RBC MORPH BLD: ABNORMAL
SODIUM SERPL-SCNC: 131 MMOL/L (ref 136–146)
UNIT DIVISION: 0
UNIT ISSUE DATE/TIME: NORMAL
URATE SERPL-MCNC: 2.5 MG/DL (ref 2.6–6)
WBC # BLD AUTO: 0.7 K/UL (ref 4.3–11.1)
WBC MORPH BLD: ABNORMAL

## 2024-04-22 PROCEDURE — 85384 FIBRINOGEN ACTIVITY: CPT

## 2024-04-22 PROCEDURE — 84550 ASSAY OF BLOOD/URIC ACID: CPT

## 2024-04-22 PROCEDURE — 6370000000 HC RX 637 (ALT 250 FOR IP): Performed by: STUDENT IN AN ORGANIZED HEALTH CARE EDUCATION/TRAINING PROGRAM

## 2024-04-22 PROCEDURE — 83615 LACTATE (LD) (LDH) ENZYME: CPT

## 2024-04-22 PROCEDURE — 83735 ASSAY OF MAGNESIUM: CPT

## 2024-04-22 PROCEDURE — 6370000000 HC RX 637 (ALT 250 FOR IP): Performed by: INTERNAL MEDICINE

## 2024-04-22 PROCEDURE — 86022 PLATELET ANTIBODIES: CPT

## 2024-04-22 PROCEDURE — 36430 TRANSFUSION BLD/BLD COMPNT: CPT

## 2024-04-22 PROCEDURE — 85730 THROMBOPLASTIN TIME PARTIAL: CPT

## 2024-04-22 PROCEDURE — 85025 COMPLETE CBC W/AUTO DIFF WBC: CPT

## 2024-04-22 PROCEDURE — 6360000002 HC RX W HCPCS: Performed by: INTERNAL MEDICINE

## 2024-04-22 PROCEDURE — 86813 HLA TYPING A B OR C: CPT

## 2024-04-22 PROCEDURE — 80053 COMPREHEN METABOLIC PANEL: CPT

## 2024-04-22 PROCEDURE — 36591 DRAW BLOOD OFF VENOUS DEVICE: CPT

## 2024-04-22 PROCEDURE — 1100000000 HC RM PRIVATE

## 2024-04-22 PROCEDURE — 6370000000 HC RX 637 (ALT 250 FOR IP): Performed by: NURSE PRACTITIONER

## 2024-04-22 PROCEDURE — 86644 CMV ANTIBODY: CPT

## 2024-04-22 PROCEDURE — 97530 THERAPEUTIC ACTIVITIES: CPT

## 2024-04-22 PROCEDURE — P9037 PLATE PHERES LEUKOREDU IRRAD: HCPCS

## 2024-04-22 PROCEDURE — 2580000003 HC RX 258: Performed by: INTERNAL MEDICINE

## 2024-04-22 PROCEDURE — 6360000002 HC RX W HCPCS: Performed by: NURSE PRACTITIONER

## 2024-04-22 PROCEDURE — 85610 PROTHROMBIN TIME: CPT

## 2024-04-22 PROCEDURE — 84100 ASSAY OF PHOSPHORUS: CPT

## 2024-04-22 PROCEDURE — 85379 FIBRIN DEGRADATION QUANT: CPT

## 2024-04-22 RX ORDER — SODIUM CHLORIDE 9 MG/ML
INJECTION, SOLUTION INTRAVENOUS PRN
Status: DISCONTINUED | OUTPATIENT
Start: 2024-04-22 | End: 2024-05-01

## 2024-04-22 RX ORDER — SODIUM CHLORIDE 9 MG/ML
INJECTION, SOLUTION INTRAVENOUS PRN
Status: DISCONTINUED | OUTPATIENT
Start: 2024-04-22 | End: 2024-04-24

## 2024-04-22 RX ADMIN — MORPHINE SULFATE 15 MG: 15 TABLET, FILM COATED, EXTENDED RELEASE ORAL at 20:21

## 2024-04-22 RX ADMIN — ALLOPURINOL 300 MG: 300 TABLET ORAL at 09:15

## 2024-04-22 RX ADMIN — FLUOXETINE HYDROCHLORIDE 20 MG: 10 CAPSULE ORAL at 09:15

## 2024-04-22 RX ADMIN — ACYCLOVIR 400 MG: 400 TABLET ORAL at 20:21

## 2024-04-22 RX ADMIN — DEXAMETHASONE SODIUM PHOSPHATE 12 MG: 4 INJECTION, SOLUTION INTRAMUSCULAR; INTRAVENOUS at 09:32

## 2024-04-22 RX ADMIN — DIPHENHYDRAMINE HYDROCHLORIDE 25 MG: 25 CAPSULE ORAL at 23:40

## 2024-04-22 RX ADMIN — ACETAMINOPHEN 650 MG: 325 TABLET ORAL at 23:39

## 2024-04-22 RX ADMIN — FLUOXETINE HYDROCHLORIDE 20 MG: 10 CAPSULE ORAL at 20:21

## 2024-04-22 RX ADMIN — MORPHINE SULFATE 15 MG: 15 TABLET, FILM COATED, EXTENDED RELEASE ORAL at 09:15

## 2024-04-22 RX ADMIN — FAMOTIDINE 40 MG: 20 TABLET, FILM COATED ORAL at 17:10

## 2024-04-22 RX ADMIN — HYDROXYZINE HYDROCHLORIDE 25 MG: 25 TABLET, FILM COATED ORAL at 09:15

## 2024-04-22 RX ADMIN — MAGNESIUM GLUCONATE 500 MG ORAL TABLET 400 MG: 500 TABLET ORAL at 20:21

## 2024-04-22 RX ADMIN — PROCHLORPERAZINE EDISYLATE 10 MG: 5 INJECTION INTRAMUSCULAR; INTRAVENOUS at 10:22

## 2024-04-22 RX ADMIN — MAGNESIUM GLUCONATE 500 MG ORAL TABLET 400 MG: 500 TABLET ORAL at 09:15

## 2024-04-22 RX ADMIN — CYCLOBENZAPRINE 10 MG: 10 TABLET, FILM COATED ORAL at 23:40

## 2024-04-22 RX ADMIN — HYDROXYZINE HYDROCHLORIDE 25 MG: 25 TABLET, FILM COATED ORAL at 20:20

## 2024-04-22 RX ADMIN — ACYCLOVIR 400 MG: 400 TABLET ORAL at 09:15

## 2024-04-22 RX ADMIN — POLYETHYLENE GLYCOL 3350 17 G: 17 POWDER, FOR SOLUTION ORAL at 09:16

## 2024-04-22 RX ADMIN — PANTOPRAZOLE SODIUM 40 MG: 40 TABLET, DELAYED RELEASE ORAL at 15:44

## 2024-04-22 RX ADMIN — CIPROFLOXACIN HYDROCHLORIDE 500 MG: 500 TABLET, FILM COATED ORAL at 20:21

## 2024-04-22 RX ADMIN — ACETAMINOPHEN 650 MG: 325 TABLET ORAL at 12:56

## 2024-04-22 RX ADMIN — DIPHENHYDRAMINE HYDROCHLORIDE 25 MG: 25 CAPSULE ORAL at 12:56

## 2024-04-22 RX ADMIN — MAGNESIUM GLUCONATE 500 MG ORAL TABLET 400 MG: 500 TABLET ORAL at 15:44

## 2024-04-22 RX ADMIN — AMLODIPINE BESYLATE 5 MG: 5 TABLET ORAL at 09:15

## 2024-04-22 RX ADMIN — ONDANSETRON 8 MG: 2 INJECTION INTRAMUSCULAR; INTRAVENOUS at 09:15

## 2024-04-22 RX ADMIN — SENNOSIDES 17.2 MG: 8.6 TABLET, FILM COATED ORAL at 20:20

## 2024-04-22 RX ADMIN — PANTOPRAZOLE SODIUM 40 MG: 40 TABLET, DELAYED RELEASE ORAL at 09:24

## 2024-04-22 RX ADMIN — LEVOTHYROXINE SODIUM 150 MCG: 0.15 TABLET ORAL at 09:24

## 2024-04-22 RX ADMIN — CIPROFLOXACIN HYDROCHLORIDE 500 MG: 500 TABLET, FILM COATED ORAL at 09:15

## 2024-04-22 RX ADMIN — WATER 131.25 MG: 1 INJECTION INTRAMUSCULAR; INTRAVENOUS; SUBCUTANEOUS at 10:12

## 2024-04-22 ASSESSMENT — PAIN SCALES - GENERAL
PAINLEVEL_OUTOF10: 3
PAINLEVEL_OUTOF10: 0
PAINLEVEL_OUTOF10: 0

## 2024-04-22 NOTE — PLAN OF CARE
Problem: Pain  Goal: Verbalizes/displays adequate comfort level or baseline comfort level  Outcome: Progressing  Flowsheets (Taken 4/22/2024 0900)  Verbalizes/displays adequate comfort level or baseline comfort level: Encourage patient to monitor pain and request assistance     Problem: Safety - Adult  Goal: Free from fall injury  Outcome: Progressing

## 2024-04-22 NOTE — PROGRESS NOTES
She is currently undergoing Vidaza and Venetoclax. She completed cycle 2 day 1-5 2/5-2/9 and is on continuous Venetoclax 100mg.   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 Will need to communicate with Dr Perez to discuss next course of treatment  4/10 Discussed with Dr Perez, next course would be clinical trial. Reaching out to clinical trial team. If unable to give IP (needs to be IP due to severe refractory thrombocytopenia), could consider Dacogen x10 days/Mylotarg/Venetoclax. Patient and family remain treatment-focused.  4/12 Obtaining consent for clinical trial, labs/pre-trial work-up ordered. Per study, will likely start Monday.  4/15 Treatment pending clinical trial acceptance vs conversation with Dr Perez  4/22 C1D7 Vidaza (days 1-7) / Venetoclax (400mg daily days 1-14) / [id number] (weekly - next due D8) per clinical trial [id number]. Needs BMBx on day 14. Of note, awaiting shipment of Venetoclax as she ran out over the weekend.    Risk for TLS / DIC  - monitor labs  - allopurinol ordered; no IVF ordered d/t volume overload  4/18 No evidence of TLS or DIC, monitoring labs  RESOLVED    Pancytopenia  - ?secondary to recent treatment vs secondary to relapsed disease  - Transfuse to keep Hgb >7 and plts >10k (unless actively bleeding), receives cross-matched or HLA-matched platelets as OP  - Check hemolysis labs  - Transfuse plts over 4 hours, recheck 1 hr post-transfusion  4/10 PRBCs yesterday, transfuse again today  4/11 PRBCs yesterday, Hgb improved  4/12 Plts again today  4/13 Plts x2 and IVIG given yesterday, plts up to 13k. Repeat IVIG and transfuse plts due to some epistaxis. PRBCs today as well.  4/14 Plts x1U and IVIG yesterday, plts 10k. No further epistaxis. Hgb responded to transfusion.  4/15 Transfusing plts, other counts fairly stable.  4/16 Plt <2k.  1 unit plt ordered.  4/18 Plts up to 20k today after plts this AM - hold on transfusion of plts. PRBCs today.  4/19 Plts  line-associated  - Dx 12/27, previously on Eliquis but now off. AC contraindicated with thrombocytopenia    Continue home meds  Rigo SOPs  VTE prophylaxis - AC contraindicated due to thrombocytopenia  Diet - Regular  PT/OT - Deferred  Code - Full (ongoing discussions regarding GOC)  Acyclovir, Cipro, diflucan (held due to venetoclax)    Dispo - too soon to determine.    4/12 Discussed with patient and  risk for clinical deterioration due to increasing O2 needs and in light of aggressive leukemia. Will ask ICU rover to follow. Discussed potential for move to ICU if she continues to deteriorate and could potentially require intubation/etc for resuscitative measures. She is currently a full code. They will continue to think about code status.       Goals and plan of care reviewed with the patient.  All questions answered to the best of our ability.         Catie Sorensen, APRN - CNP  [facility]ology & On[facility]  [address] [place] [address]  Office : [phone]  Fax : [phone]

## 2024-04-22 NOTE — PROGRESS NOTES
This Research Nurse arrived to 5th floor unit to collect 96 hour post dose PK.  This Research Nurse provided CHINO Ivy with lavender tube and she collected specimen from pt's port.  Blood collected @ 10:31 AM.    Specimen processed per protocol and placed in - 80 degrees freezer at hospital.     Pt tolerated specimen collection well.

## 2024-04-22 NOTE — PROGRESS NOTES
ACUTE PHYSICAL THERAPY GOALS:   (Developed with and agreed upon by patient and/or caregiver.)    LTG:  (1.)Ms. Gomes will move from supine to sit and sit to supine , scoot up and down, and roll side to side in bed with INDEPENDENT within 7 treatment day(s).    (2.)Ms. Gomes will transfer from bed to chair and chair to bed with MODIFIED INDEPENDENCE using the least restrictive device within 7 treatment day(s).    (3.)Ms. Gomes will ambulate with STAND BY ASSIST for 250+ feet with the least restrictive device within 7 treatment day(s), while maintaining normal vital signs.  (4.)Ms. Gomes will tolerate 23+ minutes of therapeutic activity within 7 treatment days for increased activity tolerance and functional mobility.    PHYSICAL THERAPY: Daily Note AM   (Link to Caseload Tracking: PT Visit Days : 2  Time In/Out PT Charge Capture  Rehab Caseload Tracker  Orders    Constance Gomes is a 73 y.o. female   PRIMARY DIAGNOSIS: Thrombocytopenia (HCC)  Epistaxis [R04.0]  Lightheadedness [R42]  Thrombocytopenia (HCC) [D69.6]  Pancytopenia (HCC) [D61.818]  Symptomatic anemia [D64.9]       Inpatient: Payor: [employer] MEDICARE / Plan: [employer] CHOICE-PPO MEDICARE / Product Type: *No Product type* /     ASSESSMENT:     REHAB RECOMMENDATIONS:   Recommendation to date pending progress:  Setting:  Home Health Therapy    Equipment:    To Be Determined  has RW, shower chair, BSC at home      ASSESSMENT:  Ms. Gomes is supine in bed and agreeable to therapy.   at bedside.  MD arrives during session.  Bed mobility is with min to mod assist to get the patient to the edge of the bed.  Sitting balance good.  Patient was able to sit to stand and transfer tot he recliner with hand held assist.  Patient was able to scoot herself back in the recliner.  She was positioned for comfort.  Therapeutic exercises were reviewed with patient and performed slowly but well.  Good session with slow progress demonstrated as the  states that the

## 2024-04-22 NOTE — PLAN OF CARE
Problem: Pain  Goal: Verbalizes/displays adequate comfort level or baseline comfort level  4/21/2024 2253 by Mariana Wood RN  Outcome: Progressing  4/21/2024 0951 by Irene Fitzgerald RN  Outcome: Progressing     Problem: Safety - Adult  Goal: Free from fall injury  4/21/2024 2253 by Mariana Wood RN  Outcome: Progressing  Flowsheets (Taken 4/21/2024 2118)  Free From Fall Injury: Instruct family/caregiver on patient safety  4/21/2024 0951 by Irene Fitzgerald RN  Outcome: Progressing  Flowsheets (Taken 4/20/2024 2113 by Mariana Wood RN)  Free From Fall Injury: Instruct family/caregiver on patient safety     Problem: ABCDS Injury Assessment  Goal: Absence of physical injury  Outcome: Progressing     Problem: Skin/Tissue Integrity  Goal: Absence of new skin breakdown  Description: 1.  Monitor for areas of redness and/or skin breakdown  2.  Assess vascular access sites hourly  3.  Every 4-6 hours minimum:  Change oxygen saturation probe site  4.  Every 4-6 hours:  If on nasal continuous positive airway pressure, respiratory therapy assess nares and determine need for appliance change or resting period.  Outcome: Progressing

## 2024-04-22 NOTE — CARE COORDINATION
LOS 14d  IDR and chart reviewed for transition of care planning.    24 Hour Events:  Afebrile, VSS  On RA now  C1D7 Vidaza / Venetoclax / [id number] per clinical trial [id number]  Ongoing refractory thrombocytopenia - 3k  Overall feeling better, up with PT  Transfusions: platelets, PRBCs    PT/OT recommendations is for Home Health at this time    Transition of care is Home with Home Health when medically stable.    Transitions of Care plan is ongoing, no further concerns as of present.   Please consult  if any new issues arise.  Will continue to follow.

## 2024-04-23 PROBLEM — Z79.899 HIGH RISK MEDICATION USE: Status: ACTIVE | Noted: 2024-04-23

## 2024-04-23 LAB
ALBUMIN SERPL-MCNC: 2.8 G/DL (ref 3.2–4.6)
ALBUMIN/GLOB SERPL: 0.5 (ref 0.4–1.6)
ALP SERPL-CCNC: 139 U/L (ref 50–136)
ALT SERPL-CCNC: 33 U/L (ref 12–65)
AMYLASE SERPL-CCNC: 39 U/L (ref 25–115)
ANION GAP SERPL CALC-SCNC: 3 MMOL/L (ref 2–11)
APTT PPP: 25.7 SEC (ref 23.3–37.4)
AST SERPL-CCNC: 46 U/L (ref 15–37)
BASOPHILS # BLD: 0 K/UL (ref 0–0.2)
BASOPHILS NFR BLD: 0 % (ref 0–2)
BILIRUB DIRECT SERPL-MCNC: 0.2 MG/DL
BILIRUB SERPL-MCNC: 0.6 MG/DL (ref 0.2–1.1)
BLD PROD TYP BPU: NORMAL
BLD PROD TYP BPU: NORMAL
BLOOD BANK BLOOD PRODUCT EXPIRATION DATE: NORMAL
BLOOD BANK BLOOD PRODUCT EXPIRATION DATE: NORMAL
BLOOD BANK CMNT PATIENT-IMP: NORMAL
BLOOD BANK CMNT PATIENT-IMP: NORMAL
BLOOD BANK DISPENSE STATUS: NORMAL
BLOOD BANK DISPENSE STATUS: NORMAL
BLOOD BANK ISBT PRODUCT BLOOD TYPE: 5100
BLOOD BANK ISBT PRODUCT BLOOD TYPE: 5100
BLOOD BANK PRODUCT CODE: NORMAL
BLOOD BANK PRODUCT CODE: NORMAL
BLOOD BANK UNIT TYPE AND RH: NORMAL
BLOOD BANK UNIT TYPE AND RH: NORMAL
BPU ID: NORMAL
BPU ID: NORMAL
BUN SERPL-MCNC: 29 MG/DL (ref 8–23)
CALCIUM SERPL-MCNC: 9.6 MG/DL (ref 8.3–10.4)
CHLORIDE SERPL-SCNC: 101 MMOL/L (ref 103–113)
CHOLEST SERPL-MCNC: 162 MG/DL
CO2 SERPL-SCNC: 28 MMOL/L (ref 21–32)
CREAT SERPL-MCNC: 0.6 MG/DL (ref 0.6–1)
D DIMER PPP FEU-MCNC: 1.01 UG/ML(FEU)
DIFFERENTIAL METHOD BLD: ABNORMAL
EKG ATRIAL RATE: 67 BPM
EKG ATRIAL RATE: 78 BPM
EKG DIAGNOSIS: NORMAL
EKG DIAGNOSIS: NORMAL
EKG P AXIS: 30 DEGREES
EKG P AXIS: 31 DEGREES
EKG P-R INTERVAL: 150 MS
EKG P-R INTERVAL: 168 MS
EKG Q-T INTERVAL: 406 MS
EKG Q-T INTERVAL: 434 MS
EKG QRS DURATION: 82 MS
EKG QRS DURATION: 86 MS
EKG QTC CALCULATION (BAZETT): 458 MS
EKG QTC CALCULATION (BAZETT): 462 MS
EKG R AXIS: 10 DEGREES
EKG R AXIS: 22 DEGREES
EKG T AXIS: 49 DEGREES
EKG T AXIS: 64 DEGREES
EKG VENTRICULAR RATE: 67 BPM
EKG VENTRICULAR RATE: 78 BPM
EOSINOPHIL # BLD: 0 K/UL (ref 0–0.8)
EOSINOPHIL NFR BLD: 0 % (ref 0.5–7.8)
ERYTHROCYTE [DISTWIDTH] IN BLOOD BY AUTOMATED COUNT: 14 % (ref 11.9–14.6)
FIBRINOGEN PPP-MCNC: 464 MG/DL (ref 190–501)
GLOBULIN SER CALC-MCNC: 5.6 G/DL (ref 2.8–4.5)
GLUCOSE SERPL-MCNC: 100 MG/DL (ref 65–100)
HCT VFR BLD AUTO: 23.3 % (ref 35.8–46.3)
HDLC SERPL-MCNC: 26 MG/DL (ref 40–60)
HGB BLD-MCNC: 7.9 G/DL (ref 11.7–15.4)
IMM GRANULOCYTES # BLD AUTO: 0 K/UL (ref 0–0.5)
IMM GRANULOCYTES NFR BLD AUTO: 2 % (ref 0–5)
INR PPP: 1.1
LDH SERPL L TO P-CCNC: 967 U/L (ref 110–210)
LDLC SERPL DIRECT ASSAY-MCNC: 112 MG/DL
LIPASE SERPL-CCNC: 63 U/L (ref 73–393)
LYMPHOCYTES # BLD: 0.4 K/UL (ref 0.5–4.6)
LYMPHOCYTES NFR BLD: 66 % (ref 13–44)
MAGNESIUM SERPL-MCNC: 2.5 MG/DL (ref 1.8–2.4)
MCH RBC QN AUTO: 28 PG (ref 26.1–32.9)
MCHC RBC AUTO-ENTMCNC: 33.9 G/DL (ref 31.4–35)
MCV RBC AUTO: 82.6 FL (ref 82–102)
MONOCYTES # BLD: 0 K/UL (ref 0.1–1.3)
MONOCYTES NFR BLD: 5 % (ref 4–12)
NEUTS SEG # BLD: 0.2 K/UL (ref 1.7–8.2)
NEUTS SEG NFR BLD: 27 % (ref 43–78)
NRBC # BLD: 0 K/UL (ref 0–0.2)
PHOSPHATE SERPL-MCNC: 2.9 MG/DL (ref 2.3–3.7)
PLATELET # BLD AUTO: 3 K/UL (ref 150–450)
PLATELET # BLD AUTO: 8 K/UL (ref 150–450)
PLATELET COMMENT: ABNORMAL
PMV BLD AUTO: 11.5 FL (ref 9.4–12.3)
POTASSIUM SERPL-SCNC: 4.1 MMOL/L (ref 3.5–5.1)
PROT SERPL-MCNC: 8.4 G/DL (ref 6.3–8.2)
PROTHROMBIN TIME: 14.5 SEC (ref 11.3–14.9)
RBC # BLD AUTO: 2.82 M/UL (ref 4.05–5.2)
RBC MORPH BLD: ABNORMAL
SODIUM SERPL-SCNC: 132 MMOL/L (ref 136–146)
TRIGL SERPL-MCNC: 158 MG/DL (ref 35–150)
UNIT DIVISION: 0
UNIT DIVISION: 0
UNIT ISSUE DATE/TIME: NORMAL
UNIT ISSUE DATE/TIME: NORMAL
URATE SERPL-MCNC: 1.8 MG/DL (ref 2.6–6)
WBC # BLD AUTO: 0.6 K/UL (ref 4.3–11.1)
WBC MORPH BLD: ABNORMAL

## 2024-04-23 PROCEDURE — 1100000000 HC RM PRIVATE

## 2024-04-23 PROCEDURE — 2500000003 HC RX 250 WO HCPCS: Performed by: INTERNAL MEDICINE

## 2024-04-23 PROCEDURE — 6360000002 HC RX W HCPCS: Performed by: STUDENT IN AN ORGANIZED HEALTH CARE EDUCATION/TRAINING PROGRAM

## 2024-04-23 PROCEDURE — 6370000000 HC RX 637 (ALT 250 FOR IP): Performed by: NURSE PRACTITIONER

## 2024-04-23 PROCEDURE — 84550 ASSAY OF BLOOD/URIC ACID: CPT

## 2024-04-23 PROCEDURE — 83718 ASSAY OF LIPOPROTEIN: CPT

## 2024-04-23 PROCEDURE — 84100 ASSAY OF PHOSPHORUS: CPT

## 2024-04-23 PROCEDURE — 83735 ASSAY OF MAGNESIUM: CPT

## 2024-04-23 PROCEDURE — 2700000000 HC OXYGEN THERAPY PER DAY

## 2024-04-23 PROCEDURE — 85384 FIBRINOGEN ACTIVITY: CPT

## 2024-04-23 PROCEDURE — 6370000000 HC RX 637 (ALT 250 FOR IP): Performed by: STUDENT IN AN ORGANIZED HEALTH CARE EDUCATION/TRAINING PROGRAM

## 2024-04-23 PROCEDURE — 2580000003 HC RX 258: Performed by: INTERNAL MEDICINE

## 2024-04-23 PROCEDURE — 82465 ASSAY BLD/SERUM CHOLESTEROL: CPT

## 2024-04-23 PROCEDURE — 85379 FIBRIN DEGRADATION QUANT: CPT

## 2024-04-23 PROCEDURE — 6360000002 HC RX W HCPCS: Performed by: NURSE PRACTITIONER

## 2024-04-23 PROCEDURE — 86813 HLA TYPING A B OR C: CPT

## 2024-04-23 PROCEDURE — 84478 ASSAY OF TRIGLYCERIDES: CPT

## 2024-04-23 PROCEDURE — 36430 TRANSFUSION BLD/BLD COMPNT: CPT

## 2024-04-23 PROCEDURE — 94760 N-INVAS EAR/PLS OXIMETRY 1: CPT

## 2024-04-23 PROCEDURE — 93005 ELECTROCARDIOGRAM TRACING: CPT | Performed by: INTERNAL MEDICINE

## 2024-04-23 PROCEDURE — 83721 ASSAY OF BLOOD LIPOPROTEIN: CPT

## 2024-04-23 PROCEDURE — 82248 BILIRUBIN DIRECT: CPT

## 2024-04-23 PROCEDURE — 83690 ASSAY OF LIPASE: CPT

## 2024-04-23 PROCEDURE — 6370000000 HC RX 637 (ALT 250 FOR IP): Performed by: INTERNAL MEDICINE

## 2024-04-23 PROCEDURE — 80053 COMPREHEN METABOLIC PANEL: CPT

## 2024-04-23 PROCEDURE — P9037 PLATE PHERES LEUKOREDU IRRAD: HCPCS

## 2024-04-23 PROCEDURE — 83615 LACTATE (LD) (LDH) ENZYME: CPT

## 2024-04-23 PROCEDURE — 85610 PROTHROMBIN TIME: CPT

## 2024-04-23 PROCEDURE — 85025 COMPLETE CBC W/AUTO DIFF WBC: CPT

## 2024-04-23 PROCEDURE — 85730 THROMBOPLASTIN TIME PARTIAL: CPT

## 2024-04-23 PROCEDURE — P9040 RBC LEUKOREDUCED IRRADIATED: HCPCS

## 2024-04-23 PROCEDURE — 82150 ASSAY OF AMYLASE: CPT

## 2024-04-23 PROCEDURE — 85049 AUTOMATED PLATELET COUNT: CPT

## 2024-04-23 RX ORDER — ACETAMINOPHEN 325 MG/1
650 TABLET ORAL
Status: ACTIVE | OUTPATIENT
Start: 2024-04-23 | End: 2024-04-24

## 2024-04-23 RX ORDER — DIPHENHYDRAMINE HYDROCHLORIDE 50 MG/ML
50 INJECTION INTRAMUSCULAR; INTRAVENOUS
Status: ACTIVE | OUTPATIENT
Start: 2024-04-23 | End: 2024-04-24

## 2024-04-23 RX ORDER — SODIUM CHLORIDE 9 MG/ML
INJECTION, SOLUTION INTRAVENOUS PRN
Status: COMPLETED | OUTPATIENT
Start: 2024-04-23 | End: 2024-04-25

## 2024-04-23 RX ADMIN — FAMOTIDINE 40 MG: 20 TABLET, FILM COATED ORAL at 17:03

## 2024-04-23 RX ADMIN — FLUOXETINE HYDROCHLORIDE 20 MG: 10 CAPSULE ORAL at 20:19

## 2024-04-23 RX ADMIN — ACYCLOVIR 400 MG: 400 TABLET ORAL at 20:19

## 2024-04-23 RX ADMIN — Medication 60 MG: at 12:31

## 2024-04-23 RX ADMIN — MORPHINE SULFATE 15 MG: 15 TABLET, FILM COATED, EXTENDED RELEASE ORAL at 20:19

## 2024-04-23 RX ADMIN — MAGNESIUM GLUCONATE 500 MG ORAL TABLET 400 MG: 500 TABLET ORAL at 08:04

## 2024-04-23 RX ADMIN — ONDANSETRON 4 MG: 2 INJECTION INTRAMUSCULAR; INTRAVENOUS at 18:33

## 2024-04-23 RX ADMIN — FLUOXETINE HYDROCHLORIDE 20 MG: 10 CAPSULE ORAL at 08:03

## 2024-04-23 RX ADMIN — MAGNESIUM GLUCONATE 500 MG ORAL TABLET 400 MG: 500 TABLET ORAL at 15:31

## 2024-04-23 RX ADMIN — CIPROFLOXACIN HYDROCHLORIDE 500 MG: 500 TABLET, FILM COATED ORAL at 20:19

## 2024-04-23 RX ADMIN — LEVOTHYROXINE SODIUM 150 MCG: 0.15 TABLET ORAL at 08:04

## 2024-04-23 RX ADMIN — PANTOPRAZOLE SODIUM 40 MG: 40 TABLET, DELAYED RELEASE ORAL at 15:31

## 2024-04-23 RX ADMIN — MORPHINE SULFATE 15 MG: 15 TABLET, FILM COATED, EXTENDED RELEASE ORAL at 08:04

## 2024-04-23 RX ADMIN — PANTOPRAZOLE SODIUM 40 MG: 40 TABLET, DELAYED RELEASE ORAL at 08:04

## 2024-04-23 RX ADMIN — PROCHLORPERAZINE EDISYLATE 10 MG: 5 INJECTION INTRAMUSCULAR; INTRAVENOUS at 17:03

## 2024-04-23 RX ADMIN — CIPROFLOXACIN HYDROCHLORIDE 500 MG: 500 TABLET, FILM COATED ORAL at 08:03

## 2024-04-23 RX ADMIN — ALLOPURINOL 300 MG: 300 TABLET ORAL at 08:03

## 2024-04-23 RX ADMIN — ACETAMINOPHEN 650 MG: 325 TABLET ORAL at 16:42

## 2024-04-23 RX ADMIN — AMLODIPINE BESYLATE 5 MG: 5 TABLET ORAL at 08:04

## 2024-04-23 RX ADMIN — CYCLOBENZAPRINE 10 MG: 10 TABLET, FILM COATED ORAL at 21:55

## 2024-04-23 RX ADMIN — HYDROXYZINE HYDROCHLORIDE 25 MG: 25 TABLET, FILM COATED ORAL at 20:19

## 2024-04-23 RX ADMIN — LACTULOSE 20 G: 10 SOLUTION ORAL at 18:58

## 2024-04-23 RX ADMIN — DIPHENHYDRAMINE HYDROCHLORIDE 25 MG: 25 CAPSULE ORAL at 16:43

## 2024-04-23 RX ADMIN — ACYCLOVIR 400 MG: 400 TABLET ORAL at 08:04

## 2024-04-23 RX ADMIN — HYDROXYZINE HYDROCHLORIDE 25 MG: 25 TABLET, FILM COATED ORAL at 08:04

## 2024-04-23 ASSESSMENT — PAIN SCALES - GENERAL: PAINLEVEL_OUTOF10: 5

## 2024-04-23 ASSESSMENT — PAIN DESCRIPTION - DESCRIPTORS: DESCRIPTORS: ACHING

## 2024-04-23 ASSESSMENT — PAIN DESCRIPTION - LOCATION: LOCATION: BACK

## 2024-04-23 ASSESSMENT — PAIN DESCRIPTION - ORIENTATION: ORIENTATION: MID;LOWER

## 2024-04-23 ASSESSMENT — PAIN - FUNCTIONAL ASSESSMENT: PAIN_FUNCTIONAL_ASSESSMENT: PREVENTS OR INTERFERES SOME ACTIVE ACTIVITIES AND ADLS

## 2024-04-23 NOTE — PROGRESS NOTES
CH saw Spouse in elevator. CH asked about PT and Spouse. CH offered support and checked for unmet needs. Afterwards, CH prayed silently for PT, PT's Family, and Staff.    Rev. Neelam Wong M.Div.

## 2024-04-23 NOTE — PROGRESS NOTES
Comprehensive Nutrition Assessment    Type and Reason for Visit: Reassess  Malnutrition Screening Tool: Malnutrition Screen  Have you recently lost weight without trying?: 2 to 13 pounds (1 point)  Have you been eating poorly because of a decreased appetite?: Yes (1 point)  Malnutrition Screening Tool Score: 2    Nutrition Recommendations/Plan:   Meals and Snacks:  Diet: Continue current order  Nutrition Supplement Therapy:  Medical food supplement therapy:  Continue Ensure Clear three times per day (this provides 240 kcal and 8 grams protein per bottle) - berry flavor     Malnutrition Assessment:  Malnutrition Status: At risk for malnutrition (Comment) (recurrent and prolonged admissions, variable intakes, wt loss)    No wasting  Nutrition Assessment:  Nutrition History: Patient known to clinical nutrition from previous admissions. She initially lost weight with COVID in May 2022 due to loss of taste. During previous admissions she ate fair during previous admissions and used Ensure intermittently to supplement intake. Patient reports she has been eating fair since recent discharge.  at bedside states she started going downhill recently and her PO declined with it. He states the most he has been able to get her to eat is half a cheeseburger.      Do You Have Any Cultural, Rastafarian, or Ethnic Food Preferences?: No   Weight History: 6/20/23 180#, 10/10/23 173#, 1/8/24 160#. This reveals a 9.9% weight loss in ~10 months, 6.2% weight loss in last 6 months. This is notable but not clinically significant.  Nutrition Background:       PMH significant for psoriatic arthritis on Humira, ovarian cancer, HTN, HLD, GERD, AML s/p chemo and SCT. She presented with dizziness and gum bleeding. She was admitted with neutropenic fever, refractory thrombocytopenia, and relapsed AML. S/p IVIG. Clinical trial initiated today.  Nutrition Interval:  Patient sitting up in bed. She reports all of her oatmeal this am and ~50% of  Meal Time Behavior, Weight    Discharge Planning:    Too soon to determine    FITO WEIR, RD

## 2024-04-23 NOTE — PROGRESS NOTES
Physical Therapy Note:    Attempted to see patient this AM for physical therapy treatment  session. Treatment deferred as the patient is getting ready to get her Research study infusion x 4 hours. Will follow and re-attempt as schedule permits/patient available. Thank you,    Catrachita Johnson-Lalo, [facility]     Rehab Caseload Tracker

## 2024-04-23 NOTE — PROGRESS NOTES
labs  RESOLVED    Pancytopenia  - ?secondary to recent treatment vs secondary to relapsed disease  - Transfuse to keep Hgb >7 and plts >10k (unless actively bleeding), receives cross-matched or HLA-matched platelets as OP  - Check hemolysis labs  - Transfuse plts over 4 hours, recheck 1 hr post-transfusion  4/10 PRBCs yesterday, transfuse again today  4/11 PRBCs yesterday, Hgb improved  4/12 Plts again today  4/13 Plts x2 and IVIG given yesterday, plts up to 13k. Repeat IVIG and transfuse plts due to some epistaxis. PRBCs today as well.  4/14 Plts x1U and IVIG yesterday, plts 10k. No further epistaxis. Hgb responded to transfusion.  4/15 Transfusing plts, other counts fairly stable.  4/16 Plt <2k.  1 unit plt ordered.  4/18 Plts up to 20k today after plts this AM - hold on transfusion of plts. PRBCs today.  4/19 Plts up to 23k this morning after plt transfusion. Hgb up to 8.6 after transfusion.  4/21 Plts 3,000 - give one unit. Hgb 8.3.  4/22 Plts 3k - transfuse again, Hgb down to 7.3 - transfuse PRBCs slowly   4/23 Plts 3k this AM despite 2U plts yesterday. Hgb improved after transfusion. Has small hematoma on abdomen from recent SQ injections - monitor.    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 Plts respond slightly - holding day 4 IVIG due to concern for fluid overload.  4/12 IVIG again today but as slow as possible due to concern for fluid overload.  4/13 IVIG again today, slow rate.  4/14 IVIG again today, slow rate.  4/15 IVIG stopped yesterday mid-transfusion due to concern for overload.    Pulmonary edema / volume overload  4/11 On 2L overnight, reports cough. CXR with pulmonary edema, BNP pending. Weight up - give 40mg lasix. Echo pending.  4/12 Echo with preserved EF, BNP elevated. On 4L NC, I/O net neg 800ml and weight down 7#. Continue diuresing, lasix 40mg BID as blood pressure tolerates.  4/13 Up to 7L NC. Net neg 3.4L, weight appears  [id number]  Office : [phone]  Fax : [phone]

## 2024-04-23 NOTE — PROGRESS NOTES
Patient refused port and dressing change. Port dressing clean, dry, and intact. Patient educated on importance of scheduled and PRN dressing changes and verbalizes understanding. Patient continues to refuse.

## 2024-04-23 NOTE — PROGRESS NOTES
Occupational Therapy Note:    Attempted to see patient this AM for occupational therapy treatment  session. Patient about to start tx study which will take about 4 hours per team member. Will follow and re-attempt as schedule permits/patient available. Thank you,    ROBERT Small    Rehab Caseload Tracker

## 2024-04-23 NOTE — PLAN OF CARE
Problem: Pain  Goal: Verbalizes/displays adequate comfort level or baseline comfort level  4/22/2024 2031 by Mariana Wood RN  Outcome: Progressing  4/22/2024 1604 by Natacha Salinas RN  Outcome: Progressing  Flowsheets (Taken 4/22/2024 0900)  Verbalizes/displays adequate comfort level or baseline comfort level: Encourage patient to monitor pain and request assistance     Problem: Safety - Adult  Goal: Free from fall injury  4/22/2024 2031 by Mariana Wood RN  Outcome: Progressing  Flowsheets (Taken 4/22/2024 2030)  Free From Fall Injury: Instruct family/caregiver on patient safety  4/22/2024 1604 by Natacha Salinas RN  Outcome: Progressing     Problem: ABCDS Injury Assessment  Goal: Absence of physical injury  Outcome: Progressing  Flowsheets  Taken 4/22/2024 2030 by Mariana Wood RN  Absence of Physical Injury: Implement safety measures based on patient assessment  Taken 4/22/2024 0900 by Natacha Salinas RN  Absence of Physical Injury: Implement safety measures based on patient assessment     Problem: Skin/Tissue Integrity  Goal: Absence of new skin breakdown  Description: 1.  Monitor for areas of redness and/or skin breakdown  2.  Assess vascular access sites hourly  3.  Every 4-6 hours minimum:  Change oxygen saturation probe site  4.  Every 4-6 hours:  If on nasal continuous positive airway pressure, respiratory therapy assess nares and determine need for appliance change or resting period.  Outcome: Progressing

## 2024-04-24 ENCOUNTER — APPOINTMENT (OUTPATIENT)
Dept: GENERAL RADIOLOGY | Age: 74
DRG: 834 | End: 2024-04-24
Payer: MEDICARE

## 2024-04-24 LAB
ALBUMIN SERPL-MCNC: 3 G/DL (ref 3.2–4.6)
ALBUMIN/GLOB SERPL: 0.6 (ref 1–1.9)
ALP SERPL-CCNC: 121 U/L (ref 35–104)
ALT SERPL-CCNC: 28 U/L (ref 12–65)
ANION GAP SERPL CALC-SCNC: 9 MMOL/L (ref 9–18)
AST SERPL-CCNC: 55 U/L (ref 15–37)
BILIRUB SERPL-MCNC: 0.7 MG/DL (ref 0–1.2)
BUN SERPL-MCNC: 22 MG/DL (ref 8–23)
CALCIUM SERPL-MCNC: 9.2 MG/DL (ref 8.8–10.2)
CHLORIDE SERPL-SCNC: 95 MMOL/L (ref 98–107)
CO2 SERPL-SCNC: 27 MMOL/L (ref 20–28)
CREAT SERPL-MCNC: 0.45 MG/DL (ref 0.6–1.1)
DIFFERENTIAL METHOD BLD: ABNORMAL
ERYTHROCYTE [DISTWIDTH] IN BLOOD BY AUTOMATED COUNT: 13.9 % (ref 11.9–14.6)
GLOBULIN SER CALC-MCNC: 5.1 G/DL (ref 2.3–3.5)
GLUCOSE SERPL-MCNC: 94 MG/DL (ref 70–99)
HCT VFR BLD AUTO: 22.9 % (ref 35.8–46.3)
HGB BLD-MCNC: 7.9 G/DL (ref 11.7–15.4)
MAGNESIUM SERPL-MCNC: 2.2 MG/DL (ref 1.8–2.4)
MCH RBC QN AUTO: 29.4 PG (ref 26.1–32.9)
MCHC RBC AUTO-ENTMCNC: 34.5 G/DL (ref 31.4–35)
MCV RBC AUTO: 85.1 FL (ref 82–102)
NRBC # BLD: 0 K/UL (ref 0–0.2)
PLATELET # BLD AUTO: 25 K/UL (ref 150–450)
PLATELET # BLD AUTO: 4 K/UL (ref 150–450)
PLATELET COMMENT: ABNORMAL
PMV BLD AUTO: ABNORMAL FL (ref 9.4–12.3)
POTASSIUM SERPL-SCNC: 4.5 MMOL/L (ref 3.5–5.1)
PROT SERPL-MCNC: 8.1 G/DL (ref 6.3–8.2)
RBC # BLD AUTO: 2.69 M/UL (ref 4.05–5.2)
RBC MORPH BLD: ABNORMAL
SODIUM SERPL-SCNC: 131 MMOL/L (ref 136–145)
WBC # BLD AUTO: 0.5 K/UL (ref 4.3–11.1)
WBC MORPH BLD: ABNORMAL

## 2024-04-24 PROCEDURE — 71045 X-RAY EXAM CHEST 1 VIEW: CPT

## 2024-04-24 PROCEDURE — 86813 HLA TYPING A B OR C: CPT

## 2024-04-24 PROCEDURE — 6370000000 HC RX 637 (ALT 250 FOR IP): Performed by: NURSE PRACTITIONER

## 2024-04-24 PROCEDURE — P9037 PLATE PHERES LEUKOREDU IRRAD: HCPCS

## 2024-04-24 PROCEDURE — 36591 DRAW BLOOD OFF VENOUS DEVICE: CPT

## 2024-04-24 PROCEDURE — 6370000000 HC RX 637 (ALT 250 FOR IP): Performed by: INTERNAL MEDICINE

## 2024-04-24 PROCEDURE — 1100000000 HC RM PRIVATE

## 2024-04-24 PROCEDURE — 97530 THERAPEUTIC ACTIVITIES: CPT

## 2024-04-24 PROCEDURE — 36430 TRANSFUSION BLD/BLD COMPNT: CPT

## 2024-04-24 PROCEDURE — 83735 ASSAY OF MAGNESIUM: CPT

## 2024-04-24 PROCEDURE — 6370000000 HC RX 637 (ALT 250 FOR IP): Performed by: STUDENT IN AN ORGANIZED HEALTH CARE EDUCATION/TRAINING PROGRAM

## 2024-04-24 PROCEDURE — 85025 COMPLETE CBC W/AUTO DIFF WBC: CPT

## 2024-04-24 PROCEDURE — 85049 AUTOMATED PLATELET COUNT: CPT

## 2024-04-24 PROCEDURE — 80053 COMPREHEN METABOLIC PANEL: CPT

## 2024-04-24 PROCEDURE — 97535 SELF CARE MNGMENT TRAINING: CPT

## 2024-04-24 RX ADMIN — AMLODIPINE BESYLATE 5 MG: 5 TABLET ORAL at 08:32

## 2024-04-24 RX ADMIN — FAMOTIDINE 40 MG: 20 TABLET, FILM COATED ORAL at 16:04

## 2024-04-24 RX ADMIN — LEVOTHYROXINE SODIUM 150 MCG: 0.15 TABLET ORAL at 08:32

## 2024-04-24 RX ADMIN — FLUOXETINE HYDROCHLORIDE 20 MG: 10 CAPSULE ORAL at 20:54

## 2024-04-24 RX ADMIN — CIPROFLOXACIN HYDROCHLORIDE 500 MG: 500 TABLET, FILM COATED ORAL at 08:32

## 2024-04-24 RX ADMIN — CIPROFLOXACIN HYDROCHLORIDE 500 MG: 500 TABLET, FILM COATED ORAL at 20:54

## 2024-04-24 RX ADMIN — ACYCLOVIR 400 MG: 400 TABLET ORAL at 20:55

## 2024-04-24 RX ADMIN — MAGNESIUM GLUCONATE 500 MG ORAL TABLET 400 MG: 500 TABLET ORAL at 08:32

## 2024-04-24 RX ADMIN — HYDROXYZINE HYDROCHLORIDE 25 MG: 25 TABLET, FILM COATED ORAL at 20:54

## 2024-04-24 RX ADMIN — DIPHENHYDRAMINE HYDROCHLORIDE 25 MG: 25 CAPSULE ORAL at 21:08

## 2024-04-24 RX ADMIN — ACYCLOVIR 400 MG: 400 TABLET ORAL at 08:32

## 2024-04-24 RX ADMIN — POLYETHYLENE GLYCOL 3350 17 G: 17 POWDER, FOR SOLUTION ORAL at 08:33

## 2024-04-24 RX ADMIN — ACETAMINOPHEN 650 MG: 325 TABLET ORAL at 21:08

## 2024-04-24 RX ADMIN — MAGNESIUM GLUCONATE 500 MG ORAL TABLET 400 MG: 500 TABLET ORAL at 20:54

## 2024-04-24 RX ADMIN — DIPHENHYDRAMINE HYDROCHLORIDE 25 MG: 25 CAPSULE ORAL at 10:12

## 2024-04-24 RX ADMIN — PANTOPRAZOLE SODIUM 40 MG: 40 TABLET, DELAYED RELEASE ORAL at 16:04

## 2024-04-24 RX ADMIN — MORPHINE SULFATE 15 MG: 15 TABLET, FILM COATED, EXTENDED RELEASE ORAL at 20:54

## 2024-04-24 RX ADMIN — ACETAMINOPHEN 650 MG: 325 TABLET ORAL at 10:12

## 2024-04-24 RX ADMIN — ALLOPURINOL 300 MG: 300 TABLET ORAL at 08:32

## 2024-04-24 RX ADMIN — PANTOPRAZOLE SODIUM 40 MG: 40 TABLET, DELAYED RELEASE ORAL at 08:33

## 2024-04-24 RX ADMIN — FLUOXETINE HYDROCHLORIDE 20 MG: 10 CAPSULE ORAL at 08:32

## 2024-04-24 RX ADMIN — MORPHINE SULFATE 15 MG: 15 TABLET, FILM COATED, EXTENDED RELEASE ORAL at 08:32

## 2024-04-24 RX ADMIN — MAGNESIUM GLUCONATE 500 MG ORAL TABLET 400 MG: 500 TABLET ORAL at 16:04

## 2024-04-24 RX ADMIN — HYDROXYZINE HYDROCHLORIDE 25 MG: 25 TABLET, FILM COATED ORAL at 08:32

## 2024-04-24 ASSESSMENT — PAIN SCALES - GENERAL
PAINLEVEL_OUTOF10: 7
PAINLEVEL_OUTOF10: 3

## 2024-04-24 ASSESSMENT — PAIN - FUNCTIONAL ASSESSMENT: PAIN_FUNCTIONAL_ASSESSMENT: ACTIVITIES ARE NOT PREVENTED

## 2024-04-24 ASSESSMENT — PAIN DESCRIPTION - ONSET: ONSET: ON-GOING

## 2024-04-24 ASSESSMENT — PAIN DESCRIPTION - FREQUENCY: FREQUENCY: INTERMITTENT

## 2024-04-24 ASSESSMENT — PAIN DESCRIPTION - LOCATION: LOCATION: ABDOMEN

## 2024-04-24 ASSESSMENT — PAIN DESCRIPTION - PAIN TYPE: TYPE: ACUTE PAIN

## 2024-04-24 ASSESSMENT — PAIN DESCRIPTION - ORIENTATION: ORIENTATION: LOWER;MID

## 2024-04-24 ASSESSMENT — PAIN DESCRIPTION - DESCRIPTORS: DESCRIPTORS: SORE

## 2024-04-24 NOTE — RESEARCH
To [facility] Room 524 to see participant on [id number] Trial.  Pt has been Randomized to the Weekly dosing of 60 mg [id number] cohort.  Today begins pt treatment of C1D1 with LNE051.  at bedside. Pt sitting up in bed resting. ECOG= 1 (see e-mail in source from treating physician stating her ecog has not changed).   Research RN attempted to obtain EKG in triplicate for 1 hr prior to pt treatment start, but EKG machine was showing artifact in multiple leads. Dr. Catsro entered room while EKG leads were attached and Research RN and MD could not see reason for difficulty with EKG reading.  Research RN did obtain readings in triplicate per protocol, but with artifact present. Times noted on EKG readings in pt source.  EKG showed no concerning changes from prior, EKGs obtained in screening. Vital signs were collected for pre-dose vital sign monitoring as well with time noted in pt source.  Primary floor nurse Cata obtained pre-dose PK labs and [facility] Research coordinator processed labs per protocol.    Ninfa Coughlin RN arrived with study drug for patient and to continue monitoring vitals and treatment for subject.  Labs reviewed by RN and MD, no dose mods on day 1 per protocol.

## 2024-04-24 NOTE — PROGRESS NOTES
[facility] Hematology & Oncology        Inpatient Hematology / Oncology Progress Note    Reason for Consult:  Epistaxis [R04.0]  Lightheadedness [R42]  Thrombocytopenia (HCC) [D69.6]  Pancytopenia (HCC) [D61.818]  Symptomatic anemia [D64.9]  Referring Physician:  Mindy Gomez MD    24 Hour Events:  Afebrile, VSS  On RA now  C1D9 Vidaza / Venetoclax / [id number] per clinical trial [id number]  Ongoing refractory thrombocytopenia - 4k, no bleeding    Transfusions: platelets  Replacements: None      ROS:  Constitutional: +fatigue, weakness. Negative for fever, chills.  CV: +edema (better). Negative for chest pain, palpitations.  Respiratory: +dyspnea (better), cough. Negative for wheezing.  GI: Negative for nausea, abdominal pain, diarrhea.    10 point review of systems is otherwise negative with the exception of the elements mentioned above in the HPI.           Allergies   Allergen Reactions    Penicillins Hives    Sulfa Antibiotics Hives    Codeine Nausea And Vomiting     Past Medical History:   Diagnosis Date    Arthritis     Autoimmune disease (HCC)     skin changes, unknown name    Cancer (HCC) 1990    ovarian    Chronic pain     arthritis in back and legs    Coronary artery spasm (HCC)     COVID 05/15/2022    Essential hypertension 11/8/2019    Gastritis     GERD (gastroesophageal reflux disease)     Hx antineoplastic chemo     Migraine headache     Psoriasis     Psychiatric disorder     anxiety and depression    Severe obesity (BMI 35.0-39.9) 6/26/2018    Thyroid disease     Diagnosed in 1996     Past Surgical History:   Procedure Laterality Date    CARPAL TUNNEL RELEASE      GYN      ovaries    HYSTERECTOMY, TOTAL ABDOMINAL (CERVIX REMOVED)  1990    IR PORT PLACEMENT EQUAL OR GREATER THAN 5 YEARS  1/3/2024    IR PORT PLACEMENT EQUAL OR GREATER THAN 5 YEARS 1/3/2024 [facility] RADIOLOGY SPECIALS    [place] UNLISTED PROCEDURE CARDIAC SURGERY      cardiac cath.  Dx with spasms.    TUBAL LIGATION       Family History  unchanged. Continue diuresis.  4/18 Remains on Airvo. Weaning as tolerated - able to be off at times. Diuresed yesterday with good results, hold on diuresis today.   4/19 Down to 3L NC from Airvo overnight. Volume status much improved - monitoring and diuresing PRN.  4/24 On RA-3L    Neutropenic fever / ?pneumonia  - CXR with atelectasis vs infiltrate in R lung  - Continue Cefe/Vanc  - Follow BC  4/14 Afebrile. BC NGTD. Continues Cefepime (day 7), completes today and then transition to prophylactic Cipro.  RESOLVED     Hypotension / asymptomatic bradycardia  - Hold amlodipine  4/10 BP improving, resume amlodipine.    Chronic pain   - Continue MS contin     History of R axillary vein thrombus / line-associated  - Dx 12/27, previously on Eliquis but now off. AC contraindicated with thrombocytopenia    Continue home meds  Rigo SOPs  VTE prophylaxis - AC contraindicated due to thrombocytopenia  Diet - Regular  PT/OT - Deferred  Code - Full (ongoing discussions regarding GOC)  Acyclovir, Cipro, diflucan (held due to venetoclax)    Dispo - too soon to determine.    4/12 Discussed with patient and  risk for clinical deterioration due to increasing O2 needs and in light of aggressive leukemia. Will ask ICU rover to follow. Discussed potential for move to ICU if she continues to deteriorate and could potentially require intubation/etc for resuscitative measures. She is currently a full code. They will continue to think about code status.       Goals and plan of care reviewed with the patient.  All questions answered to the best of our ability.         Catie Sorensen, APRN - CNP  [facility] Hematology & Oncology  [address] [place] [address]  Office : [phone]  Fax : [phone]

## 2024-04-24 NOTE — PROGRESS NOTES
ACUTE OCCUPATIONAL THERAPY GOALS:   (Developed with and agreed upon by patient and/or caregiver.)  1. Patient will complete lower body bathing and dressing with SUPERVISION and adaptive equipment as needed.     2. Patient will complete toileting with SUPERVISION.  3. Patient will complete grooming ADL standing at sink with SUPERVISION.  4. Patient will tolerate 25 minutes of OT treatment with 1-2 rest breaks to increase activity tolerance for ADLs.   5. Patient will complete functional transfers with SUPERVISION and adaptive equipment as needed.   6. Patient will tolerate 10 minutes BUE exercises to increase strength for safe, functional transfers.      Timeframe: 7 visits     OCCUPATIONAL THERAPY: Daily Note    OT Visit Days: 2   Time In/Out  OT Charge Capture  Rehab Caseload Tracker  OT Orders    Constance Gomes is a 73 y.o. female   PRIMARY DIAGNOSIS: Thrombocytopenia (HCC)  Epistaxis [R04.0]  Lightheadedness [R42]  Thrombocytopenia (HCC) [D69.6]  Pancytopenia (HCC) [D61.818]  Symptomatic anemia [D64.9]       Inpatient: Payor: [employer] MEDICARE / Plan: [employer] CHOICE-O MEDICARE / Product Type: *No Product type* /     ASSESSMENT:     REHAB RECOMMENDATIONS:   Recommendation to date pending progress:  Setting:  Home Health Therapy    Equipment:    To Be Determined     ASSESSMENT:  Ms. Gomes is progressing well towards OT goals. Today, pt was received supine in bed. Completed bed mobility and LB dressing with CGA. Sit>stand and ambulation in room with rolling walker Abelardo. Performed grooming tasks in sitting with supervision. Pt educated on importance of daily OOB activity but need for staff assistance at this time--verbalized understanding. Anticipate pt will continue to progress well--recommend d/c home with HH though will continue to assess. Ms. Gomes continues to demonstrate overall deficits in strength, balance, activity tolerance, and ADL performance. Continue OT efforts and POC in order to address functional  [] [] [] [x] [] [] [] [] [] Socks    Feeding [] [] [] [] [] [] [] [] [] [x]    Grooming [] [] [x] [] [] [] [] [] [] [] Washed face, used shampoo cap, and combed hair sitting unsupported   Personal Device Care [] [] [] [] [] [] [] [] [] [x]    Functional Mobility [] [] [] [] [] [x] [] [] [] [] RW   I=Independent, Mod I=Modified Independent, S=Supervision/Setup, SBA=Standby Assistance, CGA=Contact Guard Assistance, Min=Minimal Assistance, Mod=Moderate Assistance, Max=Maximal Assistance, Total=Total Assistance, NT=Not Tested    BALANCE: Good Fair+ Fair Fair- Poor NT Comments   Sitting Static [x] [] [] [] [] []    Sitting Dynamic [] [x] [] [] [] []              Standing Static [] [x] [] [] [] []    Standing Dynamic [] [] [x] [] [] []        PLAN:     FREQUENCY/DURATION   OT Plan of Care: 3 times/week for duration of hospital stay or until stated goals are met, whichever comes first.    TREATMENT:     TREATMENT:   Therapeutic Activity (15 Minutes): Patient participated in therapeutic activities including bed mobility training, functional transfer training, functional mobility of household distances, sitting tolerance activity, and standing tolerance activity with minimal verbal cues in order to increase independence, increase safety awareness, and increase activity tolerance.   Self Care (12 minutes): Patient participated in lower body dressing and grooming ADLs in unsupported sitting with minimal verbal cueing to increase independence, increase activity tolerance, and increase safety awareness. Patient also participated in functional mobility, functional transfer, and energy conservation training to increase independence, increase activity tolerance, and increase safety awareness.     TREATMENT GRID:  N/A    AFTER TREATMENT PRECAUTIONS: Alarm Activated, Call light within reach, Chair, Needs within reach, RN notified, and Visitors at bedside    INTERDISCIPLINARY COLLABORATION:  RN/ PCT and OT/ PETTY    EDUCATION:

## 2024-04-24 NOTE — RESEARCH
To [facility] Room 524 to see participant on [id number] Trial. Today is the subjects C1D8 treatment.  Research RN arrived with study medication from Research pharmacist and initiated stat EKG x 3 on pt per protocol and obtained vital signs per protocol.      Pt is sitting up, resting in bed.  Pt states she is feeling stronger and has been working with PT recently, sitting up in her bedside chair for 1 hour yesterday.  ECOG=1.  Pt  is not at pt bedside as he is going home to obtain Venetoclax that was delivered to pt home.      MD and Research RN reviewed pt labs and EKGs. No dose modifications or holding parameters apply.  Pt administered VDL430 with Vital Signs monitored per protocol and recorded in pt source.  Pt AE assessed, see log below.    Pt  arrived with Venetoclax and Primary RN administered 400 mg PO to pt at time noted in pt chart.  Pt ran out of Venetoclax on 4/20/2024, thus pt missed 2 doses of Venetoclax.  Sponsor aware and indicated to just resume Venetoclax as soon as pt medication available, see study emails in pt source.      Pt tolerated SKP817 without any difficulties.  Vital signs obtained 1 hour post infusion per protocol and stable.  Pt informed by Research RN of next infusion date of study drug on 4/30/24, and bone marrow biopsy for study on 4/29/24.  All questions answered.  Pt consents to remain on trial.  Research will continue to follow.    AE Grade Status Start Date Stop Date Comments   Electrocardiogram QT corrected interval prolonged 1 New 4/23/24   Second of triplicate EKGs run 1 hr predose showing QTc of 477 msec.  No other significant changes from prior EKGs.  Pt denies cardiac symptoms    Hematoma 1 New 4/23/24   stated that bruising noted to abd after pt started to receive Vidaza injections, but has noted a knot under skin and feels area of bruising has been increasing.  Primary RN and NP notified and region of bruising outlined.  NP stated is r/t pt low Plt level

## 2024-04-24 NOTE — FLOWSHEET NOTE
04/24/24 1654   Treatment Team Notification   Reason for Communication Critical results   Type of Critical Result Laboratory   Critical Lab Information Plts 25   Person Result Received From Shari- lab   Critical Lab Result Type Platelets   Name of Team Member Notified HUMERA Bravo RN   Treatment Team Role Nurse/Charge Nurse   Method of Communication Face to face   Response No new orders   Notification Time 1654

## 2024-04-24 NOTE — PROGRESS NOTES
ACUTE PHYSICAL THERAPY GOALS:   (Developed with and agreed upon by patient and/or caregiver.)    LTG:  (1.)Ms. Gomes will move from supine to sit and sit to supine , scoot up and down, and roll side to side in bed with INDEPENDENT within 7 treatment day(s).    (2.)Ms. Gomes will transfer from bed to chair and chair to bed with MODIFIED INDEPENDENCE using the least restrictive device within 7 treatment day(s).    (3.)Ms. Gomes will ambulate with STAND BY ASSIST for 250+ feet with the least restrictive device within 7 treatment day(s), while maintaining normal vital signs.  (4.)Ms. Gomes will tolerate 23+ minutes of therapeutic activity within 7 treatment days for increased activity tolerance and functional mobility.    PHYSICAL THERAPY: Daily Note AM   (Link to Caseload Tracking: PT Visit Days : 3  Time In/Out PT Charge Capture  Rehab Caseload Tracker  Orders    Constance Gomes is a 73 y.o. female   PRIMARY DIAGNOSIS: Thrombocytopenia (HCC)  Epistaxis [R04.0]  Lightheadedness [R42]  Thrombocytopenia (HCC) [D69.6]  Pancytopenia (HCC) [D61.818]  Symptomatic anemia [D64.9]       Inpatient: Payor: [employer] MEDICARE / Plan: [employer] CHOICE-PPO MEDICARE / Product Type: *No Product type* /     ASSESSMENT:     REHAB RECOMMENDATIONS:   Recommendation to date pending progress:  Setting:  Home Health Therapy    Equipment:    To Be Determined  has RW, shower chair, BSC at home      ASSESSMENT:  Ms. Gomes is supine in bed and agreeable to therapy.  She was able to sit at EOB with SBA. She was able to progress to ambulating 10 ft x 2, 15 ft with RW and CGA. She transferred to toilet with Abelardo. Frequent rest breaks needed throughout session and pt declined attempting ambulation in the hallway. She fatigues easily but maintained SpO2 >92% on RA. Cueing required for slowing pace, safety awareness. Steady progress, will continue to follow.      SUBJECTIVE:   Ms. Gomes states, \"I guess we can walk\"     Social/Functional Lives With:

## 2024-04-24 NOTE — CARE COORDINATION
LOS 16d  IDR and chart reviewed for transition of care planning.  24 Hour Events:  Afebrile, VSS  On RA now  C1D8 Vidaza / Venetoclax / [id number] per clinical trial [id number]  Ongoing refractory thrombocytopenia - 3k  Overall feeling better, up with PT yesterday  Transfusions: platelets  PT/OT: ordered (recommendation at this time is Home Health)    Transition of care is Home with Home Health at this time when patient is medically stable for discharge.    Transitions of Care plan is ongoing, no further concerns as of present.   Please consult  if any new issues arise.  Will continue to follow.

## 2024-04-25 LAB
ABO + RH BLD: NORMAL
ALBUMIN SERPL-MCNC: 2.8 G/DL (ref 3.2–4.6)
ALBUMIN/GLOB SERPL: 0.6 (ref 1–1.9)
ALP SERPL-CCNC: 129 U/L (ref 35–104)
ALT SERPL-CCNC: 27 U/L (ref 12–65)
ANION GAP SERPL CALC-SCNC: 10 MMOL/L (ref 9–18)
AST SERPL-CCNC: 51 U/L (ref 15–37)
BASOPHILS # BLD: 0 K/UL (ref 0–0.2)
BASOPHILS NFR BLD: 0 % (ref 0–2)
BILIRUB SERPL-MCNC: 0.5 MG/DL (ref 0–1.2)
BLD PROD TYP BPU: NORMAL
BLOOD BANK BLOOD PRODUCT EXPIRATION DATE: NORMAL
BLOOD BANK CMNT PATIENT-IMP: NORMAL
BLOOD BANK DISPENSE STATUS: NORMAL
BLOOD BANK ISBT PRODUCT BLOOD TYPE: 6200
BLOOD BANK ISBT PRODUCT BLOOD TYPE: 9500
BLOOD BANK PRODUCT CODE: NORMAL
BLOOD BANK UNIT TYPE AND RH: NORMAL
BLOOD GROUP ANTIBODIES SERPL: NORMAL
BPU ID: NORMAL
BUN SERPL-MCNC: 17 MG/DL (ref 8–23)
CALCIUM SERPL-MCNC: 8.8 MG/DL (ref 8.8–10.2)
CHLORIDE SERPL-SCNC: 96 MMOL/L (ref 98–107)
CO2 SERPL-SCNC: 25 MMOL/L (ref 20–28)
CREAT SERPL-MCNC: 0.49 MG/DL (ref 0.6–1.1)
CROSSMATCH RESULT: NORMAL
DIFFERENTIAL METHOD BLD: ABNORMAL
EOSINOPHIL # BLD: 0 K/UL (ref 0–0.8)
EOSINOPHIL NFR BLD: 0 % (ref 0.5–7.8)
ERYTHROCYTE [DISTWIDTH] IN BLOOD BY AUTOMATED COUNT: 13.9 % (ref 11.9–14.6)
GLOBULIN SER CALC-MCNC: 4.6 G/DL (ref 2.3–3.5)
GLUCOSE SERPL-MCNC: 99 MG/DL (ref 70–99)
HCT VFR BLD AUTO: 20.5 % (ref 35.8–46.3)
HGB BLD-MCNC: 7 G/DL (ref 11.7–15.4)
HISTORY CHECK: NORMAL
IMM GRANULOCYTES # BLD AUTO: 0 K/UL (ref 0–0.5)
IMM GRANULOCYTES NFR BLD AUTO: 5 % (ref 0–5)
LYMPHOCYTES # BLD: 0.4 K/UL (ref 0.5–4.6)
LYMPHOCYTES NFR BLD: 82 % (ref 13–44)
MAGNESIUM SERPL-MCNC: 1.8 MG/DL (ref 1.8–2.4)
MCH RBC QN AUTO: 28.2 PG (ref 26.1–32.9)
MCHC RBC AUTO-ENTMCNC: 34.1 G/DL (ref 31.4–35)
MCV RBC AUTO: 82.7 FL (ref 82–102)
MONOCYTES # BLD: 0 K/UL (ref 0.1–1.3)
MONOCYTES NFR BLD: 5 % (ref 4–12)
NEUTS SEG # BLD: 0 K/UL (ref 1.7–8.2)
NEUTS SEG NFR BLD: 8 % (ref 43–78)
NRBC # BLD: 0 K/UL (ref 0–0.2)
PLATELET # BLD AUTO: 35 K/UL (ref 150–450)
PLATELET COMMENT: ABNORMAL
PMV BLD AUTO: 10.1 FL (ref 9.4–12.3)
POTASSIUM SERPL-SCNC: 3.7 MMOL/L (ref 3.5–5.1)
PROT SERPL-MCNC: 7.4 G/DL (ref 6.3–8.2)
RBC # BLD AUTO: 2.48 M/UL (ref 4.05–5.2)
RBC MORPH BLD: ABNORMAL
SODIUM SERPL-SCNC: 131 MMOL/L (ref 136–145)
SPECIMEN EXP DATE BLD: NORMAL
UNIT DIVISION: 0
UNIT ISSUE DATE/TIME: NORMAL
WBC # BLD AUTO: 0.4 K/UL (ref 4.3–11.1)
WBC MORPH BLD: ABNORMAL

## 2024-04-25 PROCEDURE — 87305 ASPERGILLUS AG IA: CPT

## 2024-04-25 PROCEDURE — P9040 RBC LEUKOREDUCED IRRADIATED: HCPCS

## 2024-04-25 PROCEDURE — 86922 COMPATIBILITY TEST ANTIGLOB: CPT

## 2024-04-25 PROCEDURE — 6370000000 HC RX 637 (ALT 250 FOR IP): Performed by: NURSE PRACTITIONER

## 2024-04-25 PROCEDURE — 1100000000 HC RM PRIVATE

## 2024-04-25 PROCEDURE — 6370000000 HC RX 637 (ALT 250 FOR IP): Performed by: STUDENT IN AN ORGANIZED HEALTH CARE EDUCATION/TRAINING PROGRAM

## 2024-04-25 PROCEDURE — 86813 HLA TYPING A B OR C: CPT

## 2024-04-25 PROCEDURE — 85025 COMPLETE CBC W/AUTO DIFF WBC: CPT

## 2024-04-25 PROCEDURE — 2580000003 HC RX 258: Performed by: NURSE PRACTITIONER

## 2024-04-25 PROCEDURE — 83735 ASSAY OF MAGNESIUM: CPT

## 2024-04-25 PROCEDURE — 86921 COMPATIBILITY TEST INCUBATE: CPT

## 2024-04-25 PROCEDURE — 86920 COMPATIBILITY TEST SPIN: CPT

## 2024-04-25 PROCEDURE — 87449 NOS EACH ORGANISM AG IA: CPT

## 2024-04-25 PROCEDURE — 86901 BLOOD TYPING SEROLOGIC RH(D): CPT

## 2024-04-25 PROCEDURE — 86850 RBC ANTIBODY SCREEN: CPT

## 2024-04-25 PROCEDURE — 36430 TRANSFUSION BLD/BLD COMPNT: CPT

## 2024-04-25 PROCEDURE — 6370000000 HC RX 637 (ALT 250 FOR IP): Performed by: INTERNAL MEDICINE

## 2024-04-25 PROCEDURE — 80053 COMPREHEN METABOLIC PANEL: CPT

## 2024-04-25 PROCEDURE — P9037 PLATE PHERES LEUKOREDU IRRAD: HCPCS

## 2024-04-25 PROCEDURE — 36592 COLLECT BLOOD FROM PICC: CPT

## 2024-04-25 PROCEDURE — 86900 BLOOD TYPING SEROLOGIC ABO: CPT

## 2024-04-25 PROCEDURE — 86644 CMV ANTIBODY: CPT

## 2024-04-25 PROCEDURE — 36591 DRAW BLOOD OFF VENOUS DEVICE: CPT

## 2024-04-25 RX ADMIN — FLUOXETINE HYDROCHLORIDE 20 MG: 10 CAPSULE ORAL at 08:17

## 2024-04-25 RX ADMIN — SENNOSIDES 17.2 MG: 8.6 TABLET, FILM COATED ORAL at 20:03

## 2024-04-25 RX ADMIN — CYCLOBENZAPRINE 10 MG: 10 TABLET, FILM COATED ORAL at 22:16

## 2024-04-25 RX ADMIN — LEVOTHYROXINE SODIUM 150 MCG: 0.15 TABLET ORAL at 08:18

## 2024-04-25 RX ADMIN — DIPHENHYDRAMINE HYDROCHLORIDE 25 MG: 25 CAPSULE ORAL at 09:35

## 2024-04-25 RX ADMIN — FAMOTIDINE 40 MG: 20 TABLET, FILM COATED ORAL at 17:54

## 2024-04-25 RX ADMIN — PANTOPRAZOLE SODIUM 40 MG: 40 TABLET, DELAYED RELEASE ORAL at 08:18

## 2024-04-25 RX ADMIN — MAGNESIUM GLUCONATE 500 MG ORAL TABLET 400 MG: 500 TABLET ORAL at 20:03

## 2024-04-25 RX ADMIN — FLUOXETINE HYDROCHLORIDE 20 MG: 10 CAPSULE ORAL at 20:03

## 2024-04-25 RX ADMIN — HYDROXYZINE HYDROCHLORIDE 25 MG: 25 TABLET, FILM COATED ORAL at 20:03

## 2024-04-25 RX ADMIN — MAGNESIUM GLUCONATE 500 MG ORAL TABLET 400 MG: 500 TABLET ORAL at 08:18

## 2024-04-25 RX ADMIN — HYDROXYZINE HYDROCHLORIDE 25 MG: 25 TABLET, FILM COATED ORAL at 08:18

## 2024-04-25 RX ADMIN — MAGNESIUM GLUCONATE 500 MG ORAL TABLET 400 MG: 500 TABLET ORAL at 14:44

## 2024-04-25 RX ADMIN — MORPHINE SULFATE 15 MG: 15 TABLET, FILM COATED, EXTENDED RELEASE ORAL at 20:04

## 2024-04-25 RX ADMIN — DIPHENHYDRAMINE HYDROCHLORIDE 5 ML: 12.5 LIQUID ORAL at 05:33

## 2024-04-25 RX ADMIN — ACETAMINOPHEN 650 MG: 325 TABLET ORAL at 09:35

## 2024-04-25 RX ADMIN — ALLOPURINOL 300 MG: 300 TABLET ORAL at 08:18

## 2024-04-25 RX ADMIN — LACTULOSE 20 G: 10 SOLUTION ORAL at 21:31

## 2024-04-25 RX ADMIN — ACYCLOVIR 400 MG: 400 TABLET ORAL at 08:18

## 2024-04-25 RX ADMIN — SODIUM CHLORIDE: 9 INJECTION, SOLUTION INTRAVENOUS at 09:41

## 2024-04-25 RX ADMIN — CIPROFLOXACIN HYDROCHLORIDE 500 MG: 500 TABLET, FILM COATED ORAL at 20:03

## 2024-04-25 RX ADMIN — ACYCLOVIR 400 MG: 400 TABLET ORAL at 20:03

## 2024-04-25 RX ADMIN — CIPROFLOXACIN HYDROCHLORIDE 500 MG: 500 TABLET, FILM COATED ORAL at 08:18

## 2024-04-25 RX ADMIN — PANTOPRAZOLE SODIUM 40 MG: 40 TABLET, DELAYED RELEASE ORAL at 14:44

## 2024-04-25 RX ADMIN — MORPHINE SULFATE 15 MG: 15 TABLET, FILM COATED, EXTENDED RELEASE ORAL at 08:18

## 2024-04-25 RX ADMIN — AMLODIPINE BESYLATE 5 MG: 5 TABLET ORAL at 08:18

## 2024-04-25 ASSESSMENT — PAIN SCALES - GENERAL
PAINLEVEL_OUTOF10: 0
PAINLEVEL_OUTOF10: 1
PAINLEVEL_OUTOF10: 1
PAINLEVEL_OUTOF10: 0

## 2024-04-25 NOTE — PROGRESS NOTES
Comprehensive Nutrition Assessment    Type and Reason for Visit: Reassess  Malnutrition Screening Tool: Malnutrition Screen  Have you recently lost weight without trying?: 2 to 13 pounds (1 point)  Have you been eating poorly because of a decreased appetite?: Yes (1 point)  Malnutrition Screening Tool Score: 2    Nutrition Recommendations/Plan:   Meals and Snacks:  Diet: Continue current order  Nutrition Supplement Therapy:  Medical food supplement therapy:  Continue Ensure Clear three times per day (this provides 240 kcal and 8 grams protein per bottle) - berry flavor     Malnutrition Assessment:  Malnutrition Status: At risk for malnutrition (Comment) (recurrent and prolonged admissions, variable intakes, wt loss)    No wasting  Nutrition Assessment:  Nutrition History: Patient known to clinical nutrition from previous admissions. She initially lost weight with COVID in May 2022 due to loss of taste. During previous admissions she ate fair during previous admissions and used Ensure intermittently to supplement intake. Patient reports she has been eating fair since recent discharge.  at bedside states she started going downhill recently and her PO declined with it. He states the most he has been able to get her to eat is half a cheeseburger.      Do You Have Any Cultural, Mosque, or Ethnic Food Preferences?: No   Weight History: 6/20/23 180#, 10/10/23 173#, 1/8/24 160#. This reveals a 9.9% weight loss in ~10 months, 6.2% weight loss in last 6 months. This is notable but not clinically significant.  Nutrition Background:       PMH significant for psoriatic arthritis on Humira, ovarian cancer, HTN, HLD, GERD, AML s/p chemo and SCT. She presented with dizziness and gum bleeding. She was admitted with neutropenic fever, refractory thrombocytopenia, and relapsed AML. S/p IVIG. Clinical trial initiated today.  Nutrition Interval:  Patient sitting up in bed. She states she has been eating better until today and

## 2024-04-25 NOTE — PROGRESS NOTES
[facility] Hematology & Oncology        Inpatient Hematology / Oncology Progress Note    Reason for Consult:  Epistaxis [R04.0]  Lightheadedness [R42]  Thrombocytopenia (HCC) [D69.6]  Pancytopenia (HCC) [D61.818]  Symptomatic anemia [D64.9]  Referring Physician:  Mindy Gomez MD    24 Hour Events:  Afebrile, VSS  On RA now  C1D10 Vidaza / Venetoclax / [id number] per clinical trial [id number]  Ongoing refractory thrombocytopenia - coughed up scant blood clots last PM  CXR unremarkable but does show persistent but improved opacities    Transfusions: platelets, PRBCs  Replacements: None      ROS:  Constitutional: +fatigue, weakness. Negative for fever, chills.  CV: +edema (better). Negative for chest pain, palpitations.  Respiratory: +dyspnea (better), cough. Negative for wheezing.  GI: Negative for nausea, abdominal pain, diarrhea.    10 point review of systems is otherwise negative with the exception of the elements mentioned above in the HPI.           Allergies   Allergen Reactions    Penicillins Hives    Sulfa Antibiotics Hives    Codeine Nausea And Vomiting     Past Medical History:   Diagnosis Date    Arthritis     Autoimmune disease (HCC)     skin changes, unknown name    Cancer (HCC) 1990    ovarian    Chronic pain     arthritis in back and legs    Coronary artery spasm (HCC)     COVID 05/15/2022    Essential hypertension 11/8/2019    Gastritis     GERD (gastroesophageal reflux disease)     Hx antineoplastic chemo     Migraine headache     Psoriasis     Psychiatric disorder     anxiety and depression    Severe obesity (BMI 35.0-39.9) 6/26/2018    Thyroid disease     Diagnosed in 1996     Past Surgical History:   Procedure Laterality Date    CARPAL TUNNEL RELEASE      GYN      ovaries    HYSTERECTOMY, TOTAL ABDOMINAL (CERVIX REMOVED)  1990    IR PORT PLACEMENT EQUAL OR GREATER THAN 5 YEARS  1/3/2024    IR PORT PLACEMENT EQUAL OR GREATER THAN 5 YEARS 1/3/2024 [facility] RADIOLOGY SPECIALS    [place] UNLISTED PROCEDURE  CARDIAC SURGERY      cardiac cath.  Dx with spasms.    TUBAL LIGATION       Family History   Problem Relation Age of Onset    Parkinson's Disease Mother     Stroke Mother         Passed away in     No Known Problems Paternal Grandfather     No Known Problems Paternal Grandmother     No Known Problems Maternal Grandfather     No Known Problems Maternal Grandmother     Prostate Cancer Father          age 86     Cancer Father         Kidney     Social History     Socioeconomic History    Marital status:      Spouse name: Not on file    Number of children: Not on file    Years of education: Not on file    Highest education level: Not on file   Occupational History    Not on file   Tobacco Use    Smoking status: Never     Passive exposure: Past    Smokeless tobacco: Never   Vaping Use    Vaping Use: Never used   Substance and Sexual Activity    Alcohol use: No    Drug use: Yes     Types: Prescription    Sexual activity: Not Currently     Birth control/protection: None   Other Topics Concern    Not on file   Social History Narrative    Not on file     Social Determinants of Health     Financial Resource Strain: Low Risk  (2023)    Overall Financial Resource Strain (CARDIA)     Difficulty of Paying Living Expenses: Not hard at all   Food Insecurity: No Food Insecurity (2024)    Hunger Vital Sign     Worried About Running Out of Food in the Last Year: Never true     Ran Out of Food in the Last Year: Never true   Transportation Needs: No Transportation Needs (2024)    PRAPARE - Transportation     Lack of Transportation (Medical): No     Lack of Transportation (Non-Medical): No   Physical Activity: Not on file   Stress: Not on file   Social Connections: Not on file   Intimate Partner Violence: Not on file   Housing Stability: Low Risk  (2024)    Housing Stability Vital Sign     Unable to Pay for Housing in the Last Year: No     Number of Places Lived in the Last Year: 1     Unstable Housing in

## 2024-04-26 ENCOUNTER — APPOINTMENT (OUTPATIENT)
Dept: CT IMAGING | Age: 74
DRG: 834 | End: 2024-04-26
Payer: MEDICARE

## 2024-04-26 PROBLEM — R40.0 SOMNOLENCE: Status: ACTIVE | Noted: 2024-04-26

## 2024-04-26 LAB
ABO + RH BLD: NORMAL
ALBUMIN SERPL-MCNC: 2.4 G/DL (ref 3.2–4.6)
ALBUMIN/GLOB SERPL: 0.6 (ref 1–1.9)
ALP SERPL-CCNC: 102 U/L (ref 35–104)
ALT SERPL-CCNC: 16 U/L (ref 12–65)
ANION GAP SERPL CALC-SCNC: 11 MMOL/L (ref 9–18)
AST SERPL-CCNC: 49 U/L (ref 15–37)
BILIRUB SERPL-MCNC: 0.7 MG/DL (ref 0–1.2)
BLD PROD TYP BPU: NORMAL
BLD PROD TYP BPU: NORMAL
BLOOD BANK BLOOD PRODUCT EXPIRATION DATE: NORMAL
BLOOD BANK BLOOD PRODUCT EXPIRATION DATE: NORMAL
BLOOD BANK CMNT PATIENT-IMP: NORMAL
BLOOD BANK DISPENSE STATUS: NORMAL
BLOOD BANK DISPENSE STATUS: NORMAL
BLOOD BANK ISBT PRODUCT BLOOD TYPE: 6200
BLOOD BANK ISBT PRODUCT BLOOD TYPE: 9500
BLOOD BANK PRODUCT CODE: NORMAL
BLOOD BANK PRODUCT CODE: NORMAL
BLOOD BANK UNIT TYPE AND RH: NORMAL
BLOOD BANK UNIT TYPE AND RH: NORMAL
BLOOD GROUP ANTIBODIES SERPL: NORMAL
BPU ID: NORMAL
BPU ID: NORMAL
BUN SERPL-MCNC: 14 MG/DL (ref 8–23)
CALCIUM SERPL-MCNC: 7.9 MG/DL (ref 8.8–10.2)
CHLORIDE SERPL-SCNC: 101 MMOL/L (ref 98–107)
CMV DNA SERPL NAA+PROBE-ACNC: NORMAL IU/ML
CO2 SERPL-SCNC: 20 MMOL/L (ref 20–28)
CREAT SERPL-MCNC: 0.35 MG/DL (ref 0.6–1.1)
CROSSMATCH RESULT: NORMAL
DIFFERENTIAL METHOD BLD: ABNORMAL
ERYTHROCYTE [DISTWIDTH] IN BLOOD BY AUTOMATED COUNT: 13.9 % (ref 11.9–14.6)
GLOBULIN SER CALC-MCNC: 4.3 G/DL (ref 2.3–3.5)
GLUCOSE SERPL-MCNC: 97 MG/DL (ref 70–99)
HCT VFR BLD AUTO: 26.3 % (ref 35.8–46.3)
HGB BLD-MCNC: 9.1 G/DL (ref 11.7–15.4)
MAGNESIUM SERPL-MCNC: 1.5 MG/DL (ref 1.8–2.4)
MCH RBC QN AUTO: 28.1 PG (ref 26.1–32.9)
MCHC RBC AUTO-ENTMCNC: 34.6 G/DL (ref 31.4–35)
MCV RBC AUTO: 81.2 FL (ref 82–102)
NRBC # BLD: 0 K/UL (ref 0–0.2)
PLATELET # BLD AUTO: 34 K/UL (ref 150–450)
PLATELET COMMENT: ABNORMAL
PMV BLD AUTO: 10.3 FL (ref 9.4–12.3)
POTASSIUM SERPL-SCNC: 3.4 MMOL/L (ref 3.5–5.1)
PROT SERPL-MCNC: 6.7 G/DL (ref 6.3–8.2)
RBC # BLD AUTO: 3.24 M/UL (ref 4.05–5.2)
RBC MORPH BLD: ABNORMAL
SODIUM SERPL-SCNC: 132 MMOL/L (ref 136–145)
SPECIMEN EXP DATE BLD: NORMAL
UNIT DIVISION: 0
UNIT DIVISION: 0
UNIT ISSUE DATE/TIME: NORMAL
UNIT ISSUE DATE/TIME: NORMAL
WBC # BLD AUTO: 0.4 K/UL (ref 4.3–11.1)
WBC MORPH BLD: ABNORMAL

## 2024-04-26 PROCEDURE — 83735 ASSAY OF MAGNESIUM: CPT

## 2024-04-26 PROCEDURE — 6370000000 HC RX 637 (ALT 250 FOR IP): Performed by: INTERNAL MEDICINE

## 2024-04-26 PROCEDURE — 6370000000 HC RX 637 (ALT 250 FOR IP): Performed by: STUDENT IN AN ORGANIZED HEALTH CARE EDUCATION/TRAINING PROGRAM

## 2024-04-26 PROCEDURE — 2580000003 HC RX 258: Performed by: INTERNAL MEDICINE

## 2024-04-26 PROCEDURE — 1100000000 HC RM PRIVATE

## 2024-04-26 PROCEDURE — 80053 COMPREHEN METABOLIC PANEL: CPT

## 2024-04-26 PROCEDURE — 6370000000 HC RX 637 (ALT 250 FOR IP): Performed by: NURSE PRACTITIONER

## 2024-04-26 PROCEDURE — 2580000003 HC RX 258: Performed by: NURSE PRACTITIONER

## 2024-04-26 PROCEDURE — 85025 COMPLETE CBC W/AUTO DIFF WBC: CPT

## 2024-04-26 PROCEDURE — 6360000002 HC RX W HCPCS: Performed by: NURSE PRACTITIONER

## 2024-04-26 PROCEDURE — 70450 CT HEAD/BRAIN W/O DYE: CPT

## 2024-04-26 PROCEDURE — 6360000002 HC RX W HCPCS: Performed by: STUDENT IN AN ORGANIZED HEALTH CARE EDUCATION/TRAINING PROGRAM

## 2024-04-26 PROCEDURE — A4216 STERILE WATER/SALINE, 10 ML: HCPCS | Performed by: NURSE PRACTITIONER

## 2024-04-26 RX ORDER — POTASSIUM CHLORIDE 20 MEQ/1
20 TABLET, EXTENDED RELEASE ORAL DAILY
Status: DISCONTINUED | OUTPATIENT
Start: 2024-04-27 | End: 2024-05-01

## 2024-04-26 RX ORDER — LANOLIN ALCOHOL/MO/W.PET/CERES
400 CREAM (GRAM) TOPICAL 4 TIMES DAILY
Status: DISCONTINUED | OUTPATIENT
Start: 2024-04-26 | End: 2024-04-26

## 2024-04-26 RX ORDER — LEVOFLOXACIN 500 MG/1
500 TABLET, FILM COATED ORAL DAILY
Status: DISCONTINUED | OUTPATIENT
Start: 2024-04-27 | End: 2024-05-09

## 2024-04-26 RX ORDER — LANOLIN ALCOHOL/MO/W.PET/CERES
400 CREAM (GRAM) TOPICAL 4 TIMES DAILY
Status: DISCONTINUED | OUTPATIENT
Start: 2024-04-27 | End: 2024-05-09

## 2024-04-26 RX ORDER — SODIUM CHLORIDE 0.9 % (FLUSH) 0.9 %
5-40 SYRINGE (ML) INJECTION PRN
Status: DISCONTINUED | OUTPATIENT
Start: 2024-04-26 | End: 2024-05-09

## 2024-04-26 RX ADMIN — POTASSIUM CHLORIDE 40 MEQ: 1500 TABLET, EXTENDED RELEASE ORAL at 05:58

## 2024-04-26 RX ADMIN — PANTOPRAZOLE SODIUM 40 MG: 40 TABLET, DELAYED RELEASE ORAL at 05:02

## 2024-04-26 RX ADMIN — ACYCLOVIR 400 MG: 400 TABLET ORAL at 09:21

## 2024-04-26 RX ADMIN — AMLODIPINE BESYLATE 5 MG: 5 TABLET ORAL at 09:21

## 2024-04-26 RX ADMIN — FAMOTIDINE 40 MG: 20 TABLET, FILM COATED ORAL at 16:34

## 2024-04-26 RX ADMIN — CYCLOBENZAPRINE 10 MG: 10 TABLET, FILM COATED ORAL at 20:50

## 2024-04-26 RX ADMIN — PANTOPRAZOLE SODIUM 40 MG: 40 TABLET, DELAYED RELEASE ORAL at 16:34

## 2024-04-26 RX ADMIN — ONDANSETRON 4 MG: 2 INJECTION INTRAMUSCULAR; INTRAVENOUS at 07:25

## 2024-04-26 RX ADMIN — SODIUM CHLORIDE, PRESERVATIVE FREE 20 ML: 5 INJECTION INTRAVENOUS at 20:51

## 2024-04-26 RX ADMIN — HYDROXYZINE HYDROCHLORIDE 25 MG: 25 TABLET, FILM COATED ORAL at 20:40

## 2024-04-26 RX ADMIN — ACYCLOVIR 400 MG: 400 TABLET ORAL at 20:40

## 2024-04-26 RX ADMIN — FLUOXETINE HYDROCHLORIDE 20 MG: 10 CAPSULE ORAL at 20:40

## 2024-04-26 RX ADMIN — CIPROFLOXACIN HYDROCHLORIDE 500 MG: 500 TABLET, FILM COATED ORAL at 09:21

## 2024-04-26 RX ADMIN — ALLOPURINOL 300 MG: 300 TABLET ORAL at 09:21

## 2024-04-26 RX ADMIN — SENNOSIDES 17.2 MG: 8.6 TABLET, FILM COATED ORAL at 20:40

## 2024-04-26 RX ADMIN — FLUOXETINE HYDROCHLORIDE 20 MG: 10 CAPSULE ORAL at 09:21

## 2024-04-26 RX ADMIN — HYDROXYZINE HYDROCHLORIDE 25 MG: 25 TABLET, FILM COATED ORAL at 09:21

## 2024-04-26 RX ADMIN — SODIUM CHLORIDE 0.5 MG: 9 INJECTION INTRAMUSCULAR; INTRAVENOUS; SUBCUTANEOUS at 10:38

## 2024-04-26 RX ADMIN — MORPHINE SULFATE 15 MG: 15 TABLET, FILM COATED, EXTENDED RELEASE ORAL at 09:21

## 2024-04-26 RX ADMIN — MAGNESIUM SULFATE HEPTAHYDRATE 2000 MG: 40 INJECTION, SOLUTION INTRAVENOUS at 05:59

## 2024-04-26 RX ADMIN — PROCHLORPERAZINE EDISYLATE 10 MG: 5 INJECTION INTRAMUSCULAR; INTRAVENOUS at 09:32

## 2024-04-26 RX ADMIN — LEVOTHYROXINE SODIUM 150 MCG: 0.15 TABLET ORAL at 05:02

## 2024-04-26 RX ADMIN — MORPHINE SULFATE 15 MG: 15 TABLET, FILM COATED, EXTENDED RELEASE ORAL at 20:40

## 2024-04-26 ASSESSMENT — PAIN DESCRIPTION - LOCATION: LOCATION: BACK

## 2024-04-26 ASSESSMENT — PAIN DESCRIPTION - DESCRIPTORS: DESCRIPTORS: ACHING

## 2024-04-26 ASSESSMENT — PAIN SCALES - GENERAL
PAINLEVEL_OUTOF10: 6
PAINLEVEL_OUTOF10: 0

## 2024-04-26 ASSESSMENT — PAIN DESCRIPTION - ORIENTATION: ORIENTATION: LOWER

## 2024-04-26 NOTE — CARE COORDINATION
LOS 18d  IDR and Chart reviewed for Transition of care planning.    24 Hour Events:  Afebrile, VSS  On RA now  C1D10 Vidaza / Venetoclax / [id number] per clinical trial [id number]  Ongoing refractory thrombocytopenia - coughed up scant blood clots last PM  CXR unremarkable but does show persistent but improved opacities  Transfusions: platelets, PRBCs  PT/OT continue to work with patient  during admission     Transition of care is home with home when medically stable.    Transitions of Care plan is ongoing, no further concerns as of present.   Please consult  if any new issues arise.  Will continue to follow.

## 2024-04-26 NOTE — PROGRESS NOTES
Physical Therapy Note:    Attempted to see patient this PM for physical therapy treatment  session. Patient was sound asleep on first attempt this afternoon.  requested to let her sleep. Later in the afternoon, pt was going off the floor. Will follow and re-attempt as schedule permits/patient available. Thank you,    Violeta Ford, [facility]     Rehab Caseload Tracker

## 2024-04-26 NOTE — PROGRESS NOTES
[facility] Hematology & Oncology        Inpatient Hematology / Oncology Progress Note    Reason for Consult:  Epistaxis [R04.0]  Lightheadedness [R42]  Thrombocytopenia (HCC) [D69.6]  Pancytopenia (HCC) [D61.818]  Symptomatic anemia [D64.9]  Referring Physician:  Mindy Gomez MD    24 Hour Events:  Afebrile, VSS - , remains on RA  C1D11 Vidaza / Venetoclax / [id number] per clinical trial [id number]  Plts up to 34k after transfusion yesterday  Hgb up to 9.1 after transfusion  More somnolent this morning, complains of nausea/vomiting overnight   at bedside    Transfusions: None  Replacements: None      ROS:  Constitutional: +fatigue, weakness. Negative for fever, chills.  CV: +edema (better). Negative for chest pain, palpitations.  Respiratory: +dyspnea (better), cough. Negative for wheezing.  GI: +nausea. Negative for abdominal pain, diarrhea.    10 point review of systems is otherwise negative with the exception of the elements mentioned above in the HPI.           Allergies   Allergen Reactions    Penicillins Hives    Sulfa Antibiotics Hives    Codeine Nausea And Vomiting     Past Medical History:   Diagnosis Date    Arthritis     Autoimmune disease (HCC)     skin changes, unknown name    Cancer (HCC) 1990    ovarian    Chronic pain     arthritis in back and legs    Coronary artery spasm (HCC)     COVID 05/15/2022    Essential hypertension 11/8/2019    Gastritis     GERD (gastroesophageal reflux disease)     Hx antineoplastic chemo     Migraine headache     Psoriasis     Psychiatric disorder     anxiety and depression    Severe obesity (BMI 35.0-39.9) 6/26/2018    Thyroid disease     Diagnosed in 1996     Past Surgical History:   Procedure Laterality Date    CARPAL TUNNEL RELEASE      GYN      ovaries    HYSTERECTOMY, TOTAL ABDOMINAL (CERVIX REMOVED)  1990    IR PORT PLACEMENT EQUAL OR GREATER THAN 5 YEARS  1/3/2024    IR PORT PLACEMENT EQUAL OR GREATER THAN 5 YEARS 1/3/2024 [facility]  gus Sierra SOPs  VTE prophylaxis - AC contraindicated due to thrombocytopenia  Diet - Regular  PT/OT - Deferred  Code - Full (ongoing discussions regarding GOC)  Acyclovir, Levaquin (changed from Cipro d/t interaction with Venetoclax per study), diflucan (held due to venetoclax)    Dispo - too soon to determine. Possibly DC home once platelet transfusion can be safely managed as OP.    4/12 Discussed with patient and  risk for clinical deterioration due to increasing O2 needs and in light of aggressive leukemia. Will ask ICU rover to follow. Discussed potential for move to ICU if she continues to deteriorate and could potentially require intubation/etc for resuscitative measures. She is currently a full code. They will continue to think about code status.       Goals and plan of care reviewed with the patient.  All questions answered to the best of our ability.         Catie Sorensen, TIM - CNP  [facility] Hematology & Oncology  [address] [place] [address]  Office : [phone]  Fax : [phone]

## 2024-04-26 NOTE — PROGRESS NOTES
Occupational Therapy Note:    Attempted to see pt for OT treatment session--pt soundly asleep. Per pt's , pt did not sleep last night and had had a rough morning today. Requested to allow pt to sleep at this time. Will continue to follow and attempt to see as schedule allows.    Thank you,  Irene Ritter, OTR/L

## 2024-04-26 NOTE — PROGRESS NOTES
Awake most of night.  States needs to have BM so lactulose given.  BM x 2 this shift.  OOB minimal assist.  Weak, fatigues easily.  No bleeding.  Labs drawn from port.  Low grade temp.  Bed alarm on, call light in reach,  at bedside.

## 2024-04-27 LAB
ALBUMIN SERPL-MCNC: 2.7 G/DL (ref 3.2–4.6)
ALBUMIN/GLOB SERPL: 0.5 (ref 1–1.9)
ALP SERPL-CCNC: 111 U/L (ref 35–104)
ALT SERPL-CCNC: 19 U/L (ref 12–65)
ANION GAP SERPL CALC-SCNC: 10 MMOL/L (ref 9–18)
AST SERPL-CCNC: 42 U/L (ref 15–37)
BASOPHILS # BLD: 0 K/UL (ref 0–0.2)
BASOPHILS NFR BLD: 0 % (ref 0–2)
BILIRUB SERPL-MCNC: 0.7 MG/DL (ref 0–1.2)
BUN SERPL-MCNC: 18 MG/DL (ref 8–23)
CALCIUM SERPL-MCNC: 9 MG/DL (ref 8.8–10.2)
CHLORIDE SERPL-SCNC: 97 MMOL/L (ref 98–107)
CO2 SERPL-SCNC: 24 MMOL/L (ref 20–28)
CREAT SERPL-MCNC: 0.43 MG/DL (ref 0.6–1.1)
DIFFERENTIAL METHOD BLD: ABNORMAL
EOSINOPHIL # BLD: 0 K/UL (ref 0–0.8)
EOSINOPHIL NFR BLD: 0 % (ref 0.5–7.8)
ERYTHROCYTE [DISTWIDTH] IN BLOOD BY AUTOMATED COUNT: 14.2 % (ref 11.9–14.6)
GLOBULIN SER CALC-MCNC: 4.9 G/DL (ref 2.3–3.5)
GLUCOSE SERPL-MCNC: 98 MG/DL (ref 70–99)
HCT VFR BLD AUTO: 24.6 % (ref 35.8–46.3)
HGB BLD-MCNC: 8.2 G/DL (ref 11.7–15.4)
IMM GRANULOCYTES # BLD AUTO: 0 K/UL (ref 0–0.5)
IMM GRANULOCYTES NFR BLD AUTO: 2 % (ref 0–5)
LYMPHOCYTES # BLD: 0.5 K/UL (ref 0.5–4.6)
LYMPHOCYTES NFR BLD: 88 % (ref 13–44)
MAGNESIUM SERPL-MCNC: 1.9 MG/DL (ref 1.8–2.4)
MCH RBC QN AUTO: 27.8 PG (ref 26.1–32.9)
MCHC RBC AUTO-ENTMCNC: 33.3 G/DL (ref 31.4–35)
MCV RBC AUTO: 83.4 FL (ref 82–102)
MONOCYTES # BLD: 0 K/UL (ref 0.1–1.3)
MONOCYTES NFR BLD: 4 % (ref 4–12)
NEUTS SEG # BLD: 0 K/UL (ref 1.7–8.2)
NEUTS SEG NFR BLD: 6 % (ref 43–78)
NRBC # BLD: 0 K/UL (ref 0–0.2)
PLATELET # BLD AUTO: 15 K/UL (ref 150–450)
PLATELET COMMENT: ABNORMAL
PMV BLD AUTO: 11.7 FL (ref 9.4–12.3)
POTASSIUM SERPL-SCNC: 4.2 MMOL/L (ref 3.5–5.1)
PROT SERPL-MCNC: 7.6 G/DL (ref 6.3–8.2)
RBC # BLD AUTO: 2.95 M/UL (ref 4.05–5.2)
RBC MORPH BLD: ABNORMAL
RBC MORPH BLD: ABNORMAL
SODIUM SERPL-SCNC: 132 MMOL/L (ref 136–145)
WBC # BLD AUTO: 0.5 K/UL (ref 4.3–11.1)
WBC MORPH BLD: ABNORMAL

## 2024-04-27 PROCEDURE — 83735 ASSAY OF MAGNESIUM: CPT

## 2024-04-27 PROCEDURE — 85025 COMPLETE CBC W/AUTO DIFF WBC: CPT

## 2024-04-27 PROCEDURE — 6370000000 HC RX 637 (ALT 250 FOR IP): Performed by: STUDENT IN AN ORGANIZED HEALTH CARE EDUCATION/TRAINING PROGRAM

## 2024-04-27 PROCEDURE — 6370000000 HC RX 637 (ALT 250 FOR IP): Performed by: INTERNAL MEDICINE

## 2024-04-27 PROCEDURE — 1100000000 HC RM PRIVATE

## 2024-04-27 PROCEDURE — 6370000000 HC RX 637 (ALT 250 FOR IP): Performed by: NURSE PRACTITIONER

## 2024-04-27 PROCEDURE — 2580000003 HC RX 258: Performed by: INTERNAL MEDICINE

## 2024-04-27 PROCEDURE — 80053 COMPREHEN METABOLIC PANEL: CPT

## 2024-04-27 PROCEDURE — 36591 DRAW BLOOD OFF VENOUS DEVICE: CPT

## 2024-04-27 RX ADMIN — PANTOPRAZOLE SODIUM 40 MG: 40 TABLET, DELAYED RELEASE ORAL at 16:00

## 2024-04-27 RX ADMIN — HYDROXYZINE HYDROCHLORIDE 25 MG: 25 TABLET, FILM COATED ORAL at 09:29

## 2024-04-27 RX ADMIN — AMLODIPINE BESYLATE 5 MG: 5 TABLET ORAL at 09:30

## 2024-04-27 RX ADMIN — ACYCLOVIR 400 MG: 400 TABLET ORAL at 20:26

## 2024-04-27 RX ADMIN — POTASSIUM CHLORIDE 20 MEQ: 1500 TABLET, EXTENDED RELEASE ORAL at 09:29

## 2024-04-27 RX ADMIN — MORPHINE SULFATE 15 MG: 15 TABLET, FILM COATED, EXTENDED RELEASE ORAL at 20:25

## 2024-04-27 RX ADMIN — LEVOFLOXACIN 500 MG: 500 TABLET, FILM COATED ORAL at 09:29

## 2024-04-27 RX ADMIN — MAGNESIUM GLUCONATE 500 MG ORAL TABLET 400 MG: 500 TABLET ORAL at 09:29

## 2024-04-27 RX ADMIN — ACYCLOVIR 400 MG: 400 TABLET ORAL at 09:29

## 2024-04-27 RX ADMIN — FAMOTIDINE 40 MG: 20 TABLET, FILM COATED ORAL at 16:00

## 2024-04-27 RX ADMIN — HYDROXYZINE HYDROCHLORIDE 25 MG: 25 TABLET, FILM COATED ORAL at 20:25

## 2024-04-27 RX ADMIN — FLUOXETINE HYDROCHLORIDE 20 MG: 10 CAPSULE ORAL at 09:29

## 2024-04-27 RX ADMIN — MORPHINE SULFATE 15 MG: 15 TABLET, FILM COATED, EXTENDED RELEASE ORAL at 09:30

## 2024-04-27 RX ADMIN — MAGNESIUM GLUCONATE 500 MG ORAL TABLET 400 MG: 500 TABLET ORAL at 16:00

## 2024-04-27 RX ADMIN — MAGNESIUM GLUCONATE 500 MG ORAL TABLET 400 MG: 500 TABLET ORAL at 20:25

## 2024-04-27 RX ADMIN — FLUOXETINE HYDROCHLORIDE 20 MG: 10 CAPSULE ORAL at 20:25

## 2024-04-27 RX ADMIN — ALLOPURINOL 300 MG: 300 TABLET ORAL at 09:29

## 2024-04-27 RX ADMIN — MAGNESIUM GLUCONATE 500 MG ORAL TABLET 400 MG: 500 TABLET ORAL at 12:23

## 2024-04-27 RX ADMIN — SODIUM CHLORIDE, PRESERVATIVE FREE 30 ML: 5 INJECTION INTRAVENOUS at 03:27

## 2024-04-27 RX ADMIN — SODIUM CHLORIDE, PRESERVATIVE FREE 10 ML: 5 INJECTION INTRAVENOUS at 19:15

## 2024-04-27 RX ADMIN — SENNOSIDES 17.2 MG: 8.6 TABLET, FILM COATED ORAL at 20:25

## 2024-04-27 ASSESSMENT — PAIN SCALES - GENERAL
PAINLEVEL_OUTOF10: 0
PAINLEVEL_OUTOF10: 0

## 2024-04-27 NOTE — PROGRESS NOTES
admitted 3/18-4/2 for pancytopenia and refractory thrombocytopenia despite cross-matched/HLA-matched platelets. She did complete 4 days pulse Dex/IVIG and plts began to respond to transfusion and felt to be either a response to Dex/IVIG vs bone marrow recovery from recent treatment.     Ms Gomes presented on day of admission with dizziness and gum-bleeding. Platelet count <2. She had lab/replacement visits to infusion on 4/5 and 4/6 and was noted to be refractory to platelet transfusions despite cross-matched platelets. She also reported fever at home up to 102. CBC noted pancytopenia with  on arrival and plts remain <2k. Hgb 7.2, down to 7.1 today. She was started on treatment for neutropenic fever with Cefepime/Vancomycin. CXR with R lung atelectasis vs infiltrate. BC pending. Hematology asked to assume care.    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 She proceeded with Revlimid in 10/2023 but was noted to have POD in 11/2023 with BMBx showed AEL and 60% blasts. She completed 10 days of dacogen and Mylotarg and achieved CR-1. She is currently undergoing Vidaza and Venetoclax. She completed cycle 2 day 1-5 2/5-2/9 and is on continuous Venetoclax 100mg.   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 Will need to communicate with Dr Perez to discuss next course of treatment  4/10 Discussed with Dr Perez, next course would be clinical trial. Reaching out to clinical trial team. If unable to give IP (needs to be IP due to severe refractory thrombocytopenia), could consider Dacogen x10 days/Mylotarg/Venetoclax. Patient and family remain treatment-focused.  4/12 Obtaining consent for clinical trial, labs/pre-trial work-up ordered. Per study, will likely start Monday.  4/15 Treatment pending clinical trial acceptance vs conversation with Dr Perez  4/26 C1D11 Vidaza (days  disease  - Empirically start IVIG and dex  4/11 Day 4 IVIG/dex. Plts respond slightly - holding day 4 IVIG due to concern for fluid overload.  4/12 IVIG again today but as slow as possible due to concern for fluid overload.  4/13 IVIG again today, slow rate.  4/14 IVIG again today, slow rate.  4/15 IVIG stopped yesterday mid-transfusion due to concern for overload.    Pulmonary edema / volume overload  4/11 On 2L overnight, reports cough. CXR with pulmonary edema, BNP pending. Weight up - give 40mg lasix. Echo pending.  4/12 Echo with preserved EF, BNP elevated. On 4L NC, I/O net neg 800ml and weight down 7#. Continue diuresing, lasix 40mg BID as blood pressure tolerates.  4/13 Up to 7L NC. Net neg 3.4L, weight appears down. CXR with ongoing fluid overload. Continues lasix for diuresis but Na and K also low. Replete K.   4/14 Weight appears stable, incomplete I/O yesterday. O2 stable at 7L. Continues diuresis. Electrolytes stable/better today.  4/15 Required Airvo overnight, CXR unchanged. Continue diuresis.  4/18 Remains on Airvo. Weaning as tolerated - able to be off at times. Diuresed yesterday with good results, hold on diuresis today.   4/19 Down to 3L NC from Airvo overnight. Volume status much improved - monitoring and diuresing PRN.  4/24 On RA-3L  4/25 On RA. CXR completed yesterday shows BL opacities, checking fungal studies and monitoring fever (.2 and neutropenic), as well as prominent pulm vasculature. Monitoring for overload - remains off IVF.  4/26  - remains neutropenic. Fungal studies pending due to recent XR with BL opacities. Continues prophylactic cipro. Monitoring for fluid overload with frequent transfusions (off IV fluids).  4/27 afeb, Tm 98.6; fungal studies pending; on LVQ prophy - venetoclax adjusted per Trial Protocol     Somnolence / nausea / vomiting  4/26 Somnolent this morning but possibly related to antiemetics. Check CT head given recent thrombocytopenia. Continue

## 2024-04-27 NOTE — PLAN OF CARE
Problem: Pain  Goal: Verbalizes/displays adequate comfort level or baseline comfort level  Outcome: Progressing     Problem: Safety - Adult  Goal: Free from fall injury  Outcome: Progressing  Flowsheets (Taken 4/26/2024 2040)  Free From Fall Injury: Instruct family/caregiver on patient safety     Problem: ABCDS Injury Assessment  Goal: Absence of physical injury  Recent Flowsheet Documentation  Taken 4/26/2024 2040 by Dilcia Huggins RN  Absence of Physical Injury: Implement safety measures based on patient assessment

## 2024-04-28 ENCOUNTER — APPOINTMENT (OUTPATIENT)
Dept: GENERAL RADIOLOGY | Age: 74
DRG: 834 | End: 2024-04-28
Payer: MEDICARE

## 2024-04-28 PROBLEM — E83.42 HYPOMAGNESEMIA: Status: ACTIVE | Noted: 2024-04-28

## 2024-04-28 PROBLEM — J02.9 SORE THROAT: Status: ACTIVE | Noted: 2024-04-28

## 2024-04-28 PROBLEM — B37.0 THRUSH: Status: ACTIVE | Noted: 2024-04-28

## 2024-04-28 LAB
ALBUMIN SERPL-MCNC: 2.6 G/DL (ref 3.2–4.6)
ALBUMIN/GLOB SERPL: 0.5 (ref 1–1.9)
ALP SERPL-CCNC: 155 U/L (ref 35–104)
ALT SERPL-CCNC: 18 U/L (ref 12–65)
ANION GAP SERPL CALC-SCNC: 10 MMOL/L (ref 9–18)
AST SERPL-CCNC: 44 U/L (ref 15–37)
B PERT DNA SPEC QL NAA+PROBE: NOT DETECTED
BASOPHILS # BLD: 0 K/UL (ref 0–0.2)
BASOPHILS NFR BLD: 0 % (ref 0–2)
BILIRUB SERPL-MCNC: 0.7 MG/DL (ref 0–1.2)
BORDETELLA PARAPERTUSSIS BY PCR: NOT DETECTED
BUN SERPL-MCNC: 15 MG/DL (ref 8–23)
C PNEUM DNA SPEC QL NAA+PROBE: NOT DETECTED
CALCIUM SERPL-MCNC: 9.1 MG/DL (ref 8.8–10.2)
CHLORIDE SERPL-SCNC: 95 MMOL/L (ref 98–107)
CO2 SERPL-SCNC: 24 MMOL/L (ref 20–28)
CREAT SERPL-MCNC: 0.48 MG/DL (ref 0.6–1.1)
DIFFERENTIAL METHOD BLD: ABNORMAL
EOSINOPHIL # BLD: 0 K/UL (ref 0–0.8)
EOSINOPHIL NFR BLD: 0 % (ref 0.5–7.8)
ERYTHROCYTE [DISTWIDTH] IN BLOOD BY AUTOMATED COUNT: 14.1 % (ref 11.9–14.6)
FLUAV SUBTYP SPEC NAA+PROBE: NOT DETECTED
FLUBV RNA SPEC QL NAA+PROBE: NOT DETECTED
GLOBULIN SER CALC-MCNC: 4.8 G/DL (ref 2.3–3.5)
GLUCOSE SERPL-MCNC: 97 MG/DL (ref 70–99)
HADV DNA SPEC QL NAA+PROBE: NOT DETECTED
HCOV 229E RNA SPEC QL NAA+PROBE: NOT DETECTED
HCOV HKU1 RNA SPEC QL NAA+PROBE: NOT DETECTED
HCOV NL63 RNA SPEC QL NAA+PROBE: NOT DETECTED
HCOV OC43 RNA SPEC QL NAA+PROBE: NOT DETECTED
HCT VFR BLD AUTO: 25.4 % (ref 35.8–46.3)
HGB BLD-MCNC: 8.5 G/DL (ref 11.7–15.4)
HMPV RNA SPEC QL NAA+PROBE: NOT DETECTED
HPIV1 RNA SPEC QL NAA+PROBE: NOT DETECTED
HPIV2 RNA SPEC QL NAA+PROBE: NOT DETECTED
HPIV3 RNA SPEC QL NAA+PROBE: NOT DETECTED
HPIV4 RNA SPEC QL NAA+PROBE: NOT DETECTED
IMM GRANULOCYTES # BLD AUTO: 0 K/UL (ref 0–0.5)
IMM GRANULOCYTES NFR BLD AUTO: 4 % (ref 0–5)
LYMPHOCYTES # BLD: 0.5 K/UL (ref 0.5–4.6)
LYMPHOCYTES NFR BLD: 88 % (ref 13–44)
M PNEUMO DNA SPEC QL NAA+PROBE: NOT DETECTED
MAGNESIUM SERPL-MCNC: 1.7 MG/DL (ref 1.8–2.4)
MCH RBC QN AUTO: 27.6 PG (ref 26.1–32.9)
MCHC RBC AUTO-ENTMCNC: 33.5 G/DL (ref 31.4–35)
MCV RBC AUTO: 82.5 FL (ref 82–102)
MONOCYTES # BLD: 0 K/UL (ref 0.1–1.3)
MONOCYTES NFR BLD: 6 % (ref 4–12)
NEUTS SEG # BLD: 0 K/UL (ref 1.7–8.2)
NEUTS SEG NFR BLD: 2 % (ref 43–78)
NRBC # BLD: 0 K/UL (ref 0–0.2)
PLATELET # BLD AUTO: 6 K/UL (ref 150–450)
PLATELET COMMENT: ABNORMAL
PMV BLD AUTO: 9.8 FL (ref 9.4–12.3)
POTASSIUM SERPL-SCNC: 4.1 MMOL/L (ref 3.5–5.1)
PROT SERPL-MCNC: 7.4 G/DL (ref 6.3–8.2)
RBC # BLD AUTO: 3.08 M/UL (ref 4.05–5.2)
RBC MORPH BLD: ABNORMAL
RSV RNA SPEC QL NAA+PROBE: NOT DETECTED
RV+EV RNA SPEC QL NAA+PROBE: NOT DETECTED
SARS-COV-2 RNA RESP QL NAA+PROBE: NOT DETECTED
SODIUM SERPL-SCNC: 128 MMOL/L (ref 136–145)
WBC # BLD AUTO: 0.5 K/UL (ref 4.3–11.1)
WBC MORPH BLD: ABNORMAL

## 2024-04-28 PROCEDURE — 6370000000 HC RX 637 (ALT 250 FOR IP): Performed by: NURSE PRACTITIONER

## 2024-04-28 PROCEDURE — 0202U NFCT DS 22 TRGT SARS-COV-2: CPT

## 2024-04-28 PROCEDURE — 36591 DRAW BLOOD OFF VENOUS DEVICE: CPT

## 2024-04-28 PROCEDURE — 1100000000 HC RM PRIVATE

## 2024-04-28 PROCEDURE — 80053 COMPREHEN METABOLIC PANEL: CPT

## 2024-04-28 PROCEDURE — 2580000003 HC RX 258: Performed by: INTERNAL MEDICINE

## 2024-04-28 PROCEDURE — 6360000002 HC RX W HCPCS: Performed by: NURSE PRACTITIONER

## 2024-04-28 PROCEDURE — 2580000003 HC RX 258: Performed by: NURSE PRACTITIONER

## 2024-04-28 PROCEDURE — 6370000000 HC RX 637 (ALT 250 FOR IP): Performed by: INTERNAL MEDICINE

## 2024-04-28 PROCEDURE — 71045 X-RAY EXAM CHEST 1 VIEW: CPT

## 2024-04-28 PROCEDURE — 86813 HLA TYPING A B OR C: CPT

## 2024-04-28 PROCEDURE — 85025 COMPLETE CBC W/AUTO DIFF WBC: CPT

## 2024-04-28 PROCEDURE — 2500000003 HC RX 250 WO HCPCS: Performed by: STUDENT IN AN ORGANIZED HEALTH CARE EDUCATION/TRAINING PROGRAM

## 2024-04-28 PROCEDURE — 36415 COLL VENOUS BLD VENIPUNCTURE: CPT

## 2024-04-28 PROCEDURE — P9037 PLATE PHERES LEUKOREDU IRRAD: HCPCS

## 2024-04-28 PROCEDURE — 86644 CMV ANTIBODY: CPT

## 2024-04-28 PROCEDURE — 83735 ASSAY OF MAGNESIUM: CPT

## 2024-04-28 PROCEDURE — 36430 TRANSFUSION BLD/BLD COMPNT: CPT

## 2024-04-28 PROCEDURE — 87040 BLOOD CULTURE FOR BACTERIA: CPT

## 2024-04-28 PROCEDURE — 6370000000 HC RX 637 (ALT 250 FOR IP): Performed by: STUDENT IN AN ORGANIZED HEALTH CARE EDUCATION/TRAINING PROGRAM

## 2024-04-28 RX ORDER — SODIUM CHLORIDE 1 G/1
1 TABLET ORAL
Status: DISCONTINUED | OUTPATIENT
Start: 2024-04-28 | End: 2024-05-09

## 2024-04-28 RX ORDER — SODIUM CHLORIDE 9 MG/ML
INJECTION, SOLUTION INTRAVENOUS PRN
Status: DISCONTINUED | OUTPATIENT
Start: 2024-04-28 | End: 2024-05-07

## 2024-04-28 RX ADMIN — SODIUM CHLORIDE: 9 INJECTION, SOLUTION INTRAVENOUS at 23:29

## 2024-04-28 RX ADMIN — HYDROMORPHONE HYDROCHLORIDE 0.25 MG: 1 INJECTION, SOLUTION INTRAMUSCULAR; INTRAVENOUS; SUBCUTANEOUS at 21:00

## 2024-04-28 RX ADMIN — PHENOL 1 SPRAY: 1.5 LIQUID ORAL at 00:19

## 2024-04-28 RX ADMIN — FAMOTIDINE 40 MG: 20 TABLET, FILM COATED ORAL at 16:46

## 2024-04-28 RX ADMIN — LEVOFLOXACIN 500 MG: 500 TABLET, FILM COATED ORAL at 09:04

## 2024-04-28 RX ADMIN — Medication 1 G: at 16:46

## 2024-04-28 RX ADMIN — CYCLOBENZAPRINE 10 MG: 10 TABLET, FILM COATED ORAL at 20:49

## 2024-04-28 RX ADMIN — MORPHINE SULFATE 15 MG: 15 TABLET, FILM COATED, EXTENDED RELEASE ORAL at 20:49

## 2024-04-28 RX ADMIN — ACYCLOVIR 400 MG: 400 TABLET ORAL at 09:04

## 2024-04-28 RX ADMIN — WATER 2000 MG: 1 INJECTION INTRAMUSCULAR; INTRAVENOUS; SUBCUTANEOUS at 23:18

## 2024-04-28 RX ADMIN — ACYCLOVIR 400 MG: 400 TABLET ORAL at 20:49

## 2024-04-28 RX ADMIN — HYDROXYZINE HYDROCHLORIDE 25 MG: 25 TABLET, FILM COATED ORAL at 09:05

## 2024-04-28 RX ADMIN — FLUOXETINE HYDROCHLORIDE 20 MG: 10 CAPSULE ORAL at 09:04

## 2024-04-28 RX ADMIN — SODIUM CHLORIDE, PRESERVATIVE FREE 10 ML: 5 INJECTION INTRAVENOUS at 21:00

## 2024-04-28 RX ADMIN — ALLOPURINOL 300 MG: 300 TABLET ORAL at 09:04

## 2024-04-28 RX ADMIN — AMLODIPINE BESYLATE 5 MG: 5 TABLET ORAL at 09:04

## 2024-04-28 RX ADMIN — MAGNESIUM GLUCONATE 500 MG ORAL TABLET 400 MG: 500 TABLET ORAL at 09:04

## 2024-04-28 RX ADMIN — FLUOXETINE HYDROCHLORIDE 20 MG: 10 CAPSULE ORAL at 20:49

## 2024-04-28 RX ADMIN — Medication 1 G: at 12:56

## 2024-04-28 RX ADMIN — NYSTATIN 500000 UNITS: 100000 SUSPENSION ORAL at 20:49

## 2024-04-28 RX ADMIN — NYSTATIN 500000 UNITS: 100000 SUSPENSION ORAL at 16:46

## 2024-04-28 RX ADMIN — POTASSIUM CHLORIDE 20 MEQ: 1500 TABLET, EXTENDED RELEASE ORAL at 09:05

## 2024-04-28 RX ADMIN — LEVOTHYROXINE SODIUM 150 MCG: 0.15 TABLET ORAL at 05:49

## 2024-04-28 RX ADMIN — HYDROXYZINE HYDROCHLORIDE 25 MG: 25 TABLET, FILM COATED ORAL at 20:49

## 2024-04-28 RX ADMIN — DIPHENHYDRAMINE HYDROCHLORIDE 25 MG: 25 CAPSULE ORAL at 11:22

## 2024-04-28 RX ADMIN — SENNOSIDES 17.2 MG: 8.6 TABLET, FILM COATED ORAL at 20:52

## 2024-04-28 RX ADMIN — ALUMINUM HYDROXIDE, MAGNESIUM HYDROXIDE, AND SIMETHICONE 30 ML: 1200; 120; 1200 SUSPENSION ORAL at 22:43

## 2024-04-28 RX ADMIN — NYSTATIN 500000 UNITS: 100000 SUSPENSION ORAL at 12:56

## 2024-04-28 RX ADMIN — SODIUM CHLORIDE, PRESERVATIVE FREE 40 ML: 5 INJECTION INTRAVENOUS at 23:17

## 2024-04-28 RX ADMIN — ACETAMINOPHEN 650 MG: 325 TABLET ORAL at 11:22

## 2024-04-28 RX ADMIN — MAGNESIUM GLUCONATE 500 MG ORAL TABLET 400 MG: 500 TABLET ORAL at 20:49

## 2024-04-28 RX ADMIN — CYCLOBENZAPRINE 10 MG: 10 TABLET, FILM COATED ORAL at 00:18

## 2024-04-28 RX ADMIN — VANCOMYCIN HYDROCHLORIDE 1750 MG: 10 INJECTION, POWDER, LYOPHILIZED, FOR SOLUTION INTRAVENOUS at 23:30

## 2024-04-28 RX ADMIN — PANTOPRAZOLE SODIUM 40 MG: 40 TABLET, DELAYED RELEASE ORAL at 16:46

## 2024-04-28 RX ADMIN — MAGNESIUM GLUCONATE 500 MG ORAL TABLET 400 MG: 500 TABLET ORAL at 12:56

## 2024-04-28 RX ADMIN — DIPHENHYDRAMINE HYDROCHLORIDE 5 ML: 12.5 LIQUID ORAL at 00:21

## 2024-04-28 RX ADMIN — MAGNESIUM GLUCONATE 500 MG ORAL TABLET 400 MG: 500 TABLET ORAL at 16:46

## 2024-04-28 RX ADMIN — MORPHINE SULFATE 15 MG: 15 TABLET, FILM COATED, EXTENDED RELEASE ORAL at 09:05

## 2024-04-28 RX ADMIN — PANTOPRAZOLE SODIUM 40 MG: 40 TABLET, DELAYED RELEASE ORAL at 05:49

## 2024-04-28 ASSESSMENT — PAIN SCALES - GENERAL
PAINLEVEL_OUTOF10: 8
PAINLEVEL_OUTOF10: 0
PAINLEVEL_OUTOF10: 6

## 2024-04-28 ASSESSMENT — PAIN DESCRIPTION - DESCRIPTORS: DESCRIPTORS: ACHING

## 2024-04-28 ASSESSMENT — PAIN DESCRIPTION - ORIENTATION: ORIENTATION: MID;RIGHT;ANTERIOR

## 2024-04-28 ASSESSMENT — PAIN DESCRIPTION - LOCATION: LOCATION: CHEST

## 2024-04-28 NOTE — PLAN OF CARE
Problem: Pain  Goal: Verbalizes/displays adequate comfort level or baseline comfort level  4/28/2024 1038 by Nessa Borrego, RN  Outcome: Progressing     Problem: Safety - Adult  Goal: Free from fall injury  4/28/2024 1038 by Nessa Borrego, RN  Outcome: Progressing

## 2024-04-28 NOTE — PROGRESS NOTES
EOS:    Platelets needed today.  Pt kept saying her throat was \"so sore\" and when this RN looked in mouth noticeable white patches in back of throat- gave PRN MMW.  VSS, bed in low position, call light in reach. No needs at this time.     BSR given to CHINO Szymanski

## 2024-04-28 NOTE — PLAN OF CARE
Problem: Pain  Goal: Verbalizes/displays adequate comfort level or baseline comfort level  4/27/2024 2131 by Charles Green, RN  Outcome: Progressing  4/27/2024 1118 by Nessa Borrego, RN  Outcome: Progressing     Problem: Safety - Adult  Goal: Free from fall injury  4/27/2024 2131 by Charles Green, RN  Outcome: Progressing  Flowsheets (Taken 4/27/2024 1915)  Free From Fall Injury: Instruct family/caregiver on patient safety  4/27/2024 1118 by Nessa Borrego, RN  Outcome: Progressing  Flowsheets (Taken 4/27/2024 0327 by Dilcia Huggins, RN)  Free From Fall Injury: Instruct family/caregiver on patient safety

## 2024-04-28 NOTE — PROGRESS NOTES
END OF SHIFT:  1 unit of platelet transfused and tolerated well. VSS. Afebrile. Patient knows to be NPO at midnight tonight for BMBx tomorrow. Continuing magic mouth wash and nystatin for mouth sores.

## 2024-04-28 NOTE — PROGRESS NOTES
[facility] Hematology & Oncology        Inpatient Hematology / Oncology Progress Note    Reason for Consult:  Epistaxis [R04.0]  Lightheadedness [R42]  Thrombocytopenia (HCC) [D69.6]  Pancytopenia (HCC) [D61.818]  Symptomatic anemia [D64.9]  Referring Physician:  Mindy Gomez MD    24 Hour Events:  Afebrile, VSS, Tm 98.6 remains on RA  C1D12 Vidaza / Venetoclax / [id number] per clinical trial [id number]  Plts 15, Hb 8.2 after transfusion  More alert today and feels better   CT head neg    at bedside  Planned BMBx D14   On LVQ for prophy w dose adjusted venetoclax     Transfusions: None  Replacements: Mg     ROS:  Constitutional: +fatigue, weakness. Negative for fever, chills.  CV: +edema (better). Negative for chest pain, palpitations.  Respiratory: +dyspnea (better), cough. Negative for wheezing.  GI: +nausea. Negative for abdominal pain, diarrhea.    10 point review of systems is otherwise negative with the exception of the elements mentioned above in the HPI.     Allergies   Allergen Reactions    Penicillins Hives    Sulfa Antibiotics Hives    Codeine Nausea And Vomiting     Past Medical History:   Diagnosis Date    Arthritis     Autoimmune disease (HCC)     skin changes, unknown name    Cancer (HCC) 1990    ovarian    Chronic pain     arthritis in back and legs    Coronary artery spasm (HCC)     COVID 05/15/2022    Essential hypertension 11/8/2019    Gastritis     GERD (gastroesophageal reflux disease)     Hx antineoplastic chemo     Migraine headache     Psoriasis     Psychiatric disorder     anxiety and depression    Severe obesity (BMI 35.0-39.9) 6/26/2018    Thyroid disease     Diagnosed in 1996     Past Surgical History:   Procedure Laterality Date    CARPAL TUNNEL RELEASE      GYN      ovaries    HYSTERECTOMY, TOTAL ABDOMINAL (CERVIX REMOVED)  1990    IR PORT PLACEMENT EQUAL OR GREATER THAN 5 YEARS  1/3/2024    IR PORT PLACEMENT EQUAL OR GREATER THAN 5 YEARS 1/3/2024 [facility] SPECI[facility]       Comprehensive Metabolic Panel    Collection Time: 04/28/24  3:32 AM   Result Value Ref Range    Sodium 128 (L) 136 - 145 mmol/L    Potassium 4.1 3.5 - 5.1 mmol/L    Chloride 95 (L) 98 - 107 mmol/L    CO2 24 20 - 28 mmol/L    Anion Gap 10 9 - 18 mmol/L    Glucose 97 70 - 99 mg/dL    BUN 15 8 - 23 MG/DL    Creatinine 0.48 (L) 0.60 - 1.10 MG/DL    Est, Glom Filt Rate >90 >60 ml/min/1.73m2    Calcium 9.1 8.8 - 10.2 MG/DL    Total Bilirubin 0.7 0.0 - 1.2 MG/DL    ALT 18 12 - 65 U/L    AST 44 (H) 15 - 37 U/L    Alk Phosphatase 155 (H) 35 - 104 U/L    Total Protein 7.4 6.3 - 8.2 g/dL    Albumin 2.6 (L) 3.2 - 4.6 g/dL    Globulin 4.8 (H) 2.3 - 3.5 g/dL    Albumin/Globulin Ratio 0.5 (L) 1.0 - 1.9     Magnesium    Collection Time: 04/28/24  3:32 AM   Result Value Ref Range    Magnesium 1.7 (L) 1.8 - 2.4 mg/dL   CBC with Auto Differential    Collection Time: 04/28/24  3:32 AM   Result Value Ref Range    WBC 0.5 (LL) 4.3 - 11.1 K/uL    RBC 3.08 (L) 4.05 - 5.2 M/uL    Hemoglobin 8.5 (L) 11.7 - 15.4 g/dL    Hematocrit 25.4 (L) 35.8 - 46.3 %    MCV 82.5 82 - 102 FL    MCH 27.6 26.1 - 32.9 PG    MCHC 33.5 31.4 - 35.0 g/dL    RDW 14.1 11.9 - 14.6 %    Platelets 6 (LL) 150 - 450 K/uL    MPV 9.8 9.4 - 12.3 FL    nRBC 0.00 0.0 - 0.2 K/uL    Neutrophils % 2 (L) 43 - 78 %    Lymphocytes % 88 (H) 13 - 44 %    Monocytes % 6 4.0 - 12.0 %    Eosinophils % 0 (L) 0.5 - 7.8 %    Basophils % 0 0.0 - 2.0 %    Immature Granulocytes % 4 0.0 - 5.0 %    Neutrophils Absolute 0.0 (L) 1.7 - 8.2 K/UL    Lymphocytes Absolute 0.5 0.5 - 4.6 K/UL    Monocytes Absolute 0.0 (L) 0.1 - 1.3 K/UL    Eosinophils Absolute 0.0 0.0 - 0.8 K/UL    Basophils Absolute 0.0 0.0 - 0.2 K/UL    Immature Granulocytes Absolute 0.0 0.0 - 0.5 K/UL    RBC Comment NORMOCYTIC/NORMOCHROMIC      WBC Comment Result Confirmed By Smear      Platelet Comment MARKED      Differential Type AUTOMATED     PREPARE PLATELETS, 1 Product    Collection Time: 04/28/24  5:30 AM   Result Value Ref

## 2024-04-29 ENCOUNTER — APPOINTMENT (OUTPATIENT)
Dept: CT IMAGING | Age: 74
DRG: 834 | End: 2024-04-29
Payer: MEDICARE

## 2024-04-29 LAB
ALBUMIN SERPL-MCNC: 2.5 G/DL (ref 3.2–4.6)
ALBUMIN/GLOB SERPL: 0.5 (ref 1–1.9)
ALP SERPL-CCNC: 211 U/L (ref 35–104)
ALT SERPL-CCNC: 19 U/L (ref 12–65)
ANION GAP SERPL CALC-SCNC: 11 MMOL/L (ref 9–18)
APPEARANCE UR: ABNORMAL
AST SERPL-CCNC: 51 U/L (ref 15–37)
BACTERIA URNS QL MICRO: ABNORMAL /HPF
BASOPHILS # BLD: 0 K/UL (ref 0–0.2)
BASOPHILS NFR BLD: 0 % (ref 0–2)
BILIRUB SERPL-MCNC: 0.6 MG/DL (ref 0–1.2)
BILIRUB UR QL: NEGATIVE
BLD PROD TYP BPU: NORMAL
BLOOD BANK BLOOD PRODUCT EXPIRATION DATE: NORMAL
BLOOD BANK CMNT PATIENT-IMP: NORMAL
BLOOD BANK DISPENSE STATUS: NORMAL
BLOOD BANK ISBT PRODUCT BLOOD TYPE: 5100
BLOOD BANK PRODUCT CODE: NORMAL
BLOOD BANK UNIT TYPE AND RH: NORMAL
BPU ID: NORMAL
BUN SERPL-MCNC: 12 MG/DL (ref 8–23)
CALCIUM SERPL-MCNC: 8.7 MG/DL (ref 8.8–10.2)
CASTS URNS QL MICRO: ABNORMAL /LPF
CHLORIDE SERPL-SCNC: 92 MMOL/L (ref 98–107)
CO2 SERPL-SCNC: 23 MMOL/L (ref 20–28)
COLOR UR: ABNORMAL
CREAT SERPL-MCNC: 0.44 MG/DL (ref 0.6–1.1)
CRYSTALS URNS QL MICRO: 0 /LPF
DIFFERENTIAL METHOD BLD: ABNORMAL
EOSINOPHIL # BLD: 0 K/UL (ref 0–0.8)
EOSINOPHIL NFR BLD: 0 % (ref 0.5–7.8)
EPI CELLS #/AREA URNS HPF: ABNORMAL /HPF
ERYTHROCYTE [DISTWIDTH] IN BLOOD BY AUTOMATED COUNT: 14 % (ref 11.9–14.6)
GALACTOMANNAN AG SPEC IA-ACNC: 0.13 INDEX (ref 0–0.49)
GLOBULIN SER CALC-MCNC: 4.6 G/DL (ref 2.3–3.5)
GLUCOSE SERPL-MCNC: 95 MG/DL (ref 70–99)
GLUCOSE UR STRIP.AUTO-MCNC: NEGATIVE MG/DL
HCT VFR BLD AUTO: 23.8 % (ref 35.8–46.3)
HGB BLD-MCNC: 7.9 G/DL (ref 11.7–15.4)
HGB UR QL STRIP: NEGATIVE
IMM GRANULOCYTES # BLD AUTO: 0 K/UL (ref 0–0.5)
IMM GRANULOCYTES NFR BLD AUTO: 6 % (ref 0–5)
KETONES UR QL STRIP.AUTO: NEGATIVE MG/DL
LEUKOCYTE ESTERASE UR QL STRIP.AUTO: NEGATIVE
LYMPHOCYTES # BLD: 0.5 K/UL (ref 0.5–4.6)
LYMPHOCYTES NFR BLD: 81 % (ref 13–44)
MAGNESIUM SERPL-MCNC: 1.4 MG/DL (ref 1.8–2.4)
MCH RBC QN AUTO: 27.2 PG (ref 26.1–32.9)
MCHC RBC AUTO-ENTMCNC: 33.2 G/DL (ref 31.4–35)
MCV RBC AUTO: 82.1 FL (ref 82–102)
MONOCYTES # BLD: 0 K/UL (ref 0.1–1.3)
MONOCYTES NFR BLD: 9 % (ref 4–12)
MUCOUS THREADS URNS QL MICRO: 0 /LPF
NEUTS SEG # BLD: 0 K/UL (ref 1.7–8.2)
NEUTS SEG NFR BLD: 4 % (ref 43–78)
NITRITE UR QL STRIP.AUTO: NEGATIVE
NRBC # BLD: 0 K/UL (ref 0–0.2)
OTHER OBSERVATIONS: ABNORMAL
PH UR STRIP: 6 (ref 5–9)
PLATELET # BLD AUTO: 20 K/UL (ref 150–450)
PLATELET COMMENT: ABNORMAL
PMV BLD AUTO: 11.4 FL (ref 9.4–12.3)
POTASSIUM SERPL-SCNC: 3.9 MMOL/L (ref 3.5–5.1)
PROT SERPL-MCNC: 7.1 G/DL (ref 6.3–8.2)
PROT UR STRIP-MCNC: 100 MG/DL
RBC # BLD AUTO: 2.9 M/UL (ref 4.05–5.2)
RBC #/AREA URNS HPF: ABNORMAL /HPF
RBC MORPH BLD: ABNORMAL
RBC MORPH BLD: ABNORMAL
SODIUM SERPL-SCNC: 126 MMOL/L (ref 136–145)
SP GR UR REFRACTOMETRY: 1.03 (ref 1–1.02)
UNIT DIVISION: 0
UNIT ISSUE DATE/TIME: NORMAL
URINE CULTURE IF INDICATED: ABNORMAL
UROBILINOGEN UR QL STRIP.AUTO: 0.2 EU/DL (ref 0.2–1)
WBC # BLD AUTO: 0.5 K/UL (ref 4.3–11.1)
WBC MORPH BLD: ABNORMAL
WBC URNS QL MICRO: ABNORMAL /HPF

## 2024-04-29 PROCEDURE — 97112 NEUROMUSCULAR REEDUCATION: CPT

## 2024-04-29 PROCEDURE — 2700000000 HC OXYGEN THERAPY PER DAY

## 2024-04-29 PROCEDURE — 079T3ZX DRAINAGE OF BONE MARROW, PERCUTANEOUS APPROACH, DIAGNOSTIC: ICD-10-PCS | Performed by: RADIOLOGY

## 2024-04-29 PROCEDURE — 1100000000 HC RM PRIVATE

## 2024-04-29 PROCEDURE — 36430 TRANSFUSION BLD/BLD COMPNT: CPT

## 2024-04-29 PROCEDURE — 86920 COMPATIBILITY TEST SPIN: CPT

## 2024-04-29 PROCEDURE — 6370000000 HC RX 637 (ALT 250 FOR IP): Performed by: NURSE PRACTITIONER

## 2024-04-29 PROCEDURE — 88264 CHROMOSOME ANALYSIS 20-25: CPT

## 2024-04-29 PROCEDURE — 6360000002 HC RX W HCPCS: Performed by: NURSE PRACTITIONER

## 2024-04-29 PROCEDURE — 2580000003 HC RX 258: Performed by: NURSE PRACTITIONER

## 2024-04-29 PROCEDURE — 2580000003 HC RX 258: Performed by: INTERNAL MEDICINE

## 2024-04-29 PROCEDURE — 88313 SPECIAL STAINS GROUP 2: CPT

## 2024-04-29 PROCEDURE — 86850 RBC ANTIBODY SCREEN: CPT

## 2024-04-29 PROCEDURE — 86900 BLOOD TYPING SEROLOGIC ABO: CPT

## 2024-04-29 PROCEDURE — 88237 TISSUE CULTURE BONE MARROW: CPT

## 2024-04-29 PROCEDURE — 80053 COMPREHEN METABOLIC PANEL: CPT

## 2024-04-29 PROCEDURE — P9037 PLATE PHERES LEUKOREDU IRRAD: HCPCS

## 2024-04-29 PROCEDURE — 88280 CHROMOSOME KARYOTYPE STUDY: CPT

## 2024-04-29 PROCEDURE — 86901 BLOOD TYPING SEROLOGIC RH(D): CPT

## 2024-04-29 PROCEDURE — C1830 POWER BONE MARROW BX NEEDLE: HCPCS

## 2024-04-29 PROCEDURE — 6360000002 HC RX W HCPCS: Performed by: INTERNAL MEDICINE

## 2024-04-29 PROCEDURE — 81001 URINALYSIS AUTO W/SCOPE: CPT

## 2024-04-29 PROCEDURE — 88305 TISSUE EXAM BY PATHOLOGIST: CPT

## 2024-04-29 PROCEDURE — 2500000003 HC RX 250 WO HCPCS: Performed by: PHYSICIAN ASSISTANT

## 2024-04-29 PROCEDURE — 6360000002 HC RX W HCPCS: Performed by: RADIOLOGY

## 2024-04-29 PROCEDURE — 88342 IMHCHEM/IMCYTCHM 1ST ANTB: CPT

## 2024-04-29 PROCEDURE — 86022 PLATELET ANTIBODIES: CPT

## 2024-04-29 PROCEDURE — 97530 THERAPEUTIC ACTIVITIES: CPT

## 2024-04-29 PROCEDURE — 97535 SELF CARE MNGMENT TRAINING: CPT

## 2024-04-29 PROCEDURE — 88311 DECALCIFY TISSUE: CPT

## 2024-04-29 PROCEDURE — 94760 N-INVAS EAR/PLS OXIMETRY 1: CPT

## 2024-04-29 PROCEDURE — 6370000000 HC RX 637 (ALT 250 FOR IP): Performed by: STUDENT IN AN ORGANIZED HEALTH CARE EDUCATION/TRAINING PROGRAM

## 2024-04-29 PROCEDURE — 6360000002 HC RX W HCPCS: Performed by: STUDENT IN AN ORGANIZED HEALTH CARE EDUCATION/TRAINING PROGRAM

## 2024-04-29 PROCEDURE — 85025 COMPLETE CBC W/AUTO DIFF WBC: CPT

## 2024-04-29 PROCEDURE — 07DR3ZX EXTRACTION OF ILIAC BONE MARROW, PERCUTANEOUS APPROACH, DIAGNOSTIC: ICD-10-PCS | Performed by: RADIOLOGY

## 2024-04-29 PROCEDURE — 99152 MOD SED SAME PHYS/QHP 5/>YRS: CPT

## 2024-04-29 PROCEDURE — 6370000000 HC RX 637 (ALT 250 FOR IP): Performed by: INTERNAL MEDICINE

## 2024-04-29 PROCEDURE — 88341 IMHCHEM/IMCYTCHM EA ADD ANTB: CPT

## 2024-04-29 PROCEDURE — 2580000003 HC RX 258: Performed by: RADIOLOGY

## 2024-04-29 PROCEDURE — 36591 DRAW BLOOD OFF VENOUS DEVICE: CPT

## 2024-04-29 PROCEDURE — 83735 ASSAY OF MAGNESIUM: CPT

## 2024-04-29 RX ORDER — MIDAZOLAM HYDROCHLORIDE 1 MG/ML
INJECTION INTRAMUSCULAR; INTRAVENOUS PRN
Status: COMPLETED | OUTPATIENT
Start: 2024-04-29 | End: 2024-04-29

## 2024-04-29 RX ORDER — MAGNESIUM SULFATE IN WATER 40 MG/ML
2000 INJECTION, SOLUTION INTRAVENOUS ONCE
Status: COMPLETED | OUTPATIENT
Start: 2024-04-29 | End: 2024-04-29

## 2024-04-29 RX ORDER — FENTANYL CITRATE 50 UG/ML
INJECTION, SOLUTION INTRAMUSCULAR; INTRAVENOUS PRN
Status: COMPLETED | OUTPATIENT
Start: 2024-04-29 | End: 2024-04-29

## 2024-04-29 RX ORDER — LIDOCAINE HYDROCHLORIDE 20 MG/ML
INJECTION, SOLUTION INFILTRATION; PERINEURAL PRN
Status: COMPLETED | OUTPATIENT
Start: 2024-04-29 | End: 2024-04-29

## 2024-04-29 RX ORDER — SODIUM CHLORIDE 9 MG/ML
INJECTION, SOLUTION INTRAVENOUS CONTINUOUS PRN
Status: COMPLETED | OUTPATIENT
Start: 2024-04-29 | End: 2024-04-29

## 2024-04-29 RX ADMIN — ALLOPURINOL 300 MG: 300 TABLET ORAL at 11:08

## 2024-04-29 RX ADMIN — DIPHENHYDRAMINE HYDROCHLORIDE 25 MG: 25 CAPSULE ORAL at 09:08

## 2024-04-29 RX ADMIN — ACYCLOVIR 400 MG: 400 TABLET ORAL at 11:08

## 2024-04-29 RX ADMIN — IPRATROPIUM BROMIDE AND ALBUTEROL SULFATE 1 DOSE: 2.5; .5 SOLUTION RESPIRATORY (INHALATION) at 04:47

## 2024-04-29 RX ADMIN — SODIUM CHLORIDE 50 ML/HR: 9 INJECTION, SOLUTION INTRAVENOUS at 12:40

## 2024-04-29 RX ADMIN — FLUOXETINE HYDROCHLORIDE 20 MG: 10 CAPSULE ORAL at 19:38

## 2024-04-29 RX ADMIN — VANCOMYCIN HYDROCHLORIDE 1000 MG: 1 INJECTION, POWDER, LYOPHILIZED, FOR SOLUTION INTRAVENOUS at 15:54

## 2024-04-29 RX ADMIN — FENTANYL CITRATE 50 MCG: 50 INJECTION, SOLUTION INTRAMUSCULAR; INTRAVENOUS at 12:38

## 2024-04-29 RX ADMIN — HYDROMORPHONE HYDROCHLORIDE 0.25 MG: 1 INJECTION, SOLUTION INTRAMUSCULAR; INTRAVENOUS; SUBCUTANEOUS at 03:11

## 2024-04-29 RX ADMIN — SODIUM CHLORIDE, PRESERVATIVE FREE 10 ML: 5 INJECTION INTRAVENOUS at 06:31

## 2024-04-29 RX ADMIN — ACETAMINOPHEN 650 MG: 325 TABLET ORAL at 09:08

## 2024-04-29 RX ADMIN — ACETAMINOPHEN 650 MG: 325 TABLET ORAL at 03:11

## 2024-04-29 RX ADMIN — ACETAMINOPHEN 650 MG: 325 TABLET ORAL at 19:40

## 2024-04-29 RX ADMIN — LIDOCAINE HYDROCHLORIDE 10 ML: 20 INJECTION, SOLUTION INFILTRATION; PERINEURAL at 12:47

## 2024-04-29 RX ADMIN — FAMOTIDINE 40 MG: 20 TABLET, FILM COATED ORAL at 19:39

## 2024-04-29 RX ADMIN — NYSTATIN 500000 UNITS: 100000 SUSPENSION ORAL at 15:55

## 2024-04-29 RX ADMIN — CEFEPIME 2000 MG: 2 INJECTION, POWDER, FOR SOLUTION INTRAVENOUS at 21:25

## 2024-04-29 RX ADMIN — MAGNESIUM GLUCONATE 500 MG ORAL TABLET 400 MG: 500 TABLET ORAL at 19:38

## 2024-04-29 RX ADMIN — PANTOPRAZOLE SODIUM 40 MG: 40 TABLET, DELAYED RELEASE ORAL at 15:58

## 2024-04-29 RX ADMIN — CEFEPIME 2000 MG: 2 INJECTION, POWDER, FOR SOLUTION INTRAVENOUS at 17:15

## 2024-04-29 RX ADMIN — MORPHINE SULFATE 15 MG: 15 TABLET, FILM COATED, EXTENDED RELEASE ORAL at 19:38

## 2024-04-29 RX ADMIN — PANTOPRAZOLE SODIUM 40 MG: 40 TABLET, DELAYED RELEASE ORAL at 05:30

## 2024-04-29 RX ADMIN — HYDROXYZINE HYDROCHLORIDE 25 MG: 25 TABLET, FILM COATED ORAL at 19:39

## 2024-04-29 RX ADMIN — LEVOTHYROXINE SODIUM 150 MCG: 0.15 TABLET ORAL at 05:30

## 2024-04-29 RX ADMIN — MAGNESIUM SULFATE HEPTAHYDRATE 2000 MG: 40 INJECTION, SOLUTION INTRAVENOUS at 05:29

## 2024-04-29 RX ADMIN — MIDAZOLAM 1 MG: 1 INJECTION INTRAMUSCULAR; INTRAVENOUS at 12:46

## 2024-04-29 RX ADMIN — NYSTATIN 500000 UNITS: 100000 SUSPENSION ORAL at 19:40

## 2024-04-29 RX ADMIN — SENNOSIDES 17.2 MG: 8.6 TABLET, FILM COATED ORAL at 19:39

## 2024-04-29 RX ADMIN — POTASSIUM CHLORIDE 20 MEQ: 1500 TABLET, EXTENDED RELEASE ORAL at 15:58

## 2024-04-29 RX ADMIN — CEFEPIME 2000 MG: 2 INJECTION, POWDER, FOR SOLUTION INTRAVENOUS at 06:32

## 2024-04-29 RX ADMIN — SODIUM CHLORIDE, PRESERVATIVE FREE 10 ML: 5 INJECTION INTRAVENOUS at 19:40

## 2024-04-29 RX ADMIN — PHENOL 1 SPRAY: 1.5 LIQUID ORAL at 19:40

## 2024-04-29 RX ADMIN — MAGNESIUM SULFATE HEPTAHYDRATE 2000 MG: 40 INJECTION, SOLUTION INTRAVENOUS at 11:11

## 2024-04-29 RX ADMIN — FENTANYL CITRATE 50 MCG: 50 INJECTION, SOLUTION INTRAMUSCULAR; INTRAVENOUS at 12:46

## 2024-04-29 RX ADMIN — MIDAZOLAM 1 MG: 1 INJECTION INTRAMUSCULAR; INTRAVENOUS at 12:38

## 2024-04-29 RX ADMIN — ACYCLOVIR 400 MG: 400 TABLET ORAL at 19:39

## 2024-04-29 RX ADMIN — VANCOMYCIN HYDROCHLORIDE 1000 MG: 1 INJECTION, POWDER, LYOPHILIZED, FOR SOLUTION INTRAVENOUS at 23:47

## 2024-04-29 RX ADMIN — SODIUM CHLORIDE, PRESERVATIVE FREE 30 ML: 5 INJECTION INTRAVENOUS at 03:10

## 2024-04-29 RX ADMIN — MORPHINE SULFATE 15 MG: 15 TABLET, FILM COATED, EXTENDED RELEASE ORAL at 09:08

## 2024-04-29 RX ADMIN — MAGNESIUM SULFATE HEPTAHYDRATE 2000 MG: 40 INJECTION, SOLUTION INTRAVENOUS at 15:47

## 2024-04-29 RX ADMIN — Medication 1 G: at 15:57

## 2024-04-29 ASSESSMENT — PAIN DESCRIPTION - LOCATION
LOCATION: GENERALIZED
LOCATION: THROAT

## 2024-04-29 ASSESSMENT — PAIN SCALES - GENERAL
PAINLEVEL_OUTOF10: 0
PAINLEVEL_OUTOF10: 7
PAINLEVEL_OUTOF10: 8
PAINLEVEL_OUTOF10: 0
PAINLEVEL_OUTOF10: 3
PAINLEVEL_OUTOF10: 0
PAINLEVEL_OUTOF10: 0

## 2024-04-29 ASSESSMENT — PAIN DESCRIPTION - FREQUENCY: FREQUENCY: CONTINUOUS

## 2024-04-29 ASSESSMENT — PAIN DESCRIPTION - DESCRIPTORS
DESCRIPTORS: ACHING
DESCRIPTORS: SORE

## 2024-04-29 ASSESSMENT — PAIN DESCRIPTION - ORIENTATION
ORIENTATION: INNER
ORIENTATION: ANTERIOR;POSTERIOR

## 2024-04-29 ASSESSMENT — PAIN DESCRIPTION - PAIN TYPE
TYPE: ACUTE PAIN
TYPE: ACUTE PAIN

## 2024-04-29 ASSESSMENT — PAIN DESCRIPTION - ONSET: ONSET: ON-GOING

## 2024-04-29 NOTE — PROGRESS NOTES
Report to Chetan MAGANA, pt has one unit of platelets already in lab for transfusion prior to bone marrow biopsy.  Pt has been NPO since midnight, x sips of water for meds.  Pt resting in bed, daughter at bedside.

## 2024-04-29 NOTE — OR NURSING
TRANSFER - OUT REPORT:           Verbal report given to CHINO Benton on Constance Gomes  being transferred to 5th Floor for routine progression of patient care      Report consisted of patient’s Situation, Background, Assessment and Recommendations(SBAR).          Information from the following report(s) SBAR, Procedure Summary, and MAR was reviewed with the receiving nurse.       Opportunity for questions and clarification was provided.          Conscious Sedation:    100 Mcg of Fentanyl administered   2 Mg of Versed administered   0 Mg of Benadryl administered        Pt tolerated procedure well.     bandaid-type dressing clean, dry, intact, and nontender    VITALS:  /60   Pulse 85   Temp 98.6 °F (37 °C) (Oral)   Resp 16   Ht 1.524 m (5')   Wt 71.2 kg (156 lb 14.4 oz)   SpO2 99%   BMI 30.64 kg/m²

## 2024-04-29 NOTE — PROGRESS NOTES
VANCO DAILY FOLLOW UP NOTE  [facility]   Pharmacy Pharmacokinetic Monitoring Service - Vancomycin    Consulting Provider: Radha Schwab   Indication: febrile neutropenia  Target Concentration: Goal AUC/MARIAMA 400-600 mg*hr/L  Day of Therapy: 1  Additional Antimicrobials: cefepime    Pertinent Laboratory Values:   Wt Readings from Last 1 Encounters:   04/28/24 71.2 kg (156 lb 14.4 oz)     Temp Readings from Last 1 Encounters:   04/29/24 (!) 100.8 °F (38.2 °C) (Oral)     Recent Labs     04/26/24  0429 04/27/24  0326 04/28/24  0332   BUN 14 18 15   CREATININE 0.35* 0.43* 0.48*   WBC 0.4* 0.5* 0.5*     Estimated Creatinine Clearance: 93 mL/min (A) (based on SCr of 0.48 mg/dL (L)).    Lab Results   Component Value Date/Time    VANCORANDOM 7.7 12/07/2023 02:28 AM       MRSA Nasal Swab: N/A. Non-respiratory infection    Assessment:  Date/Time Dose Concentration AUC         Note: Serum concentrations collected for AUC dosing may appear elevated if collected in close proximity to the dose administered, this is not necessarily an indication of toxicity    Plan:  Dosing recommendations based on Bayesian software  Start vancomycin 750mg q12h.  Anticipated AUC of 450 and trough concentration of 14.5 at steady state  Renal labs as indicated   Vancomycin concentrations will be ordered as clinically appropriate   Pharmacy will continue to monitor patient and adjust therapy as indicated    Thank you for the consult,  Lolly Moreira [facility]

## 2024-04-29 NOTE — PROGRESS NOTES
IP (needs to be IP due to severe refractory thrombocytopenia), could consider Dacogen x10 days/Mylotarg/Venetoclax. Patient and family remain treatment-focused.  4/12 Obtaining consent for clinical trial, labs/pre-trial work-up ordered. Per study, will likely start Monday.  4/15 Treatment pending clinical trial acceptance vs conversation with Dr Perez  4/29 C1D14 Vidaza (days 1-7) / Venetoclax (400mg daily days 1-14) / [id number] (weekly - next due 15) per clinical trial [id number]. BMBx today after plts, per clinical trial. Back on Venetoclax (missed 2 doses due to running out of medication). DA Venetoclax to 200mg for 2 days due to interaction with Cipro, resume 400mg on Tuesday.    Risk for TLS / DIC  - monitor labs  - allopurinol ordered; no IVF ordered d/t volume overload  4/18 No evidence of TLS or DIC, monitoring labs  RESOLVED    Pancytopenia  - ?secondary to recent treatment vs secondary to relapsed disease  - Transfuse to keep Hgb >7 and plts >10k (unless actively bleeding), receives cross-matched or HLA-matched platelets as OP  - Check hemolysis labs  - Transfuse plts over 4 hours, recheck 1 hr post-transfusion  4/10 PRBCs yesterday, transfuse again today  4/11 PRBCs yesterday, Hgb improved  4/12 Plts again today  4/13 Plts x2 and IVIG given yesterday, plts up to 13k. Repeat IVIG and transfuse plts due to some epistaxis. PRBCs today as well.  4/14 Plts x1U and IVIG yesterday, plts 10k. No further epistaxis. Hgb responded to transfusion.  4/15 Transfusing plts, other counts fairly stable.  4/16 Plt <2k.  1 unit plt ordered.  4/18 Plts up to 20k today after plts this AM - hold on transfusion of plts. PRBCs today.  4/19 Plts up to 23k this morning after plt transfusion. Hgb up to 8.6 after transfusion.  4/21 Plts 3,000 - give one unit. Hgb 8.3.  4/22 Plts 3k - transfuse again, Hgb down to 7.3 - transfuse PRBCs slowly   4/23 Plts 3k this AM despite 2U plts yesterday. Hgb improved after transfusion. Has small  remains neutropenic. Fungal studies pending due to recent XR with BL opacities. Continues prophylactic cipro. Monitoring for fluid overload with frequent transfusions (off IV fluids).  4/27 afeb, Tm 98.6; fungal studies pending; on LVQ prophy - venetoclax adjusted per Trial Protocol   4/29 Stable fluid status    Neutropenic fever / ?pneumonia  - CXR with atelectasis vs infiltrate in R lung  - Continue Cefe/Vanc  - Follow BC  4/14 Afebrile. BC NGTD. Continues Cefepime (day 7), completes today and then transition to prophylactic Cipro.  RESOLVED  4/29 .8, on Cefe/Vanc overnight. BC pending. Fungal studies collected last week, pending. CXR with mild interstitial edema vs atypical infection.     Sore Throat  4/28 check RVP, nystatin swish and swallow   4/29 RVP negative.    Hyponatremia  4/28 start salt tabs  4/29 Na down to 126, continues salt tabs. Fluid restriction ordered.     Somnolence / nausea / vomiting  4/26 Somnolent this morning but possibly related to antiemetics. Check CT head given recent thrombocytopenia. Continue antiemetics.  attributes nausea to constipation (she has been declining bowel regimen).   4/27 Mentation at baseline; CT head neg     Hypotension / asymptomatic bradycardia  - Hold amlodipine  4/10 BP improving, resume amlodipine.    Chronic pain / OIC  - Continue MS contin  - On regular bowel regimen, PRN lactulose     History of R axillary vein thrombus / line-associated  - Dx 12/27, previously on Eliquis but now off. AC contraindicated with thrombocytopenia    Continue home gus Sierra SOPs  VTE prophylaxis - AC contraindicated due to thrombocytopenia  Diet - Regular  PT/OT - Deferred  Code - Full (ongoing discussions regarding GOC)  Acyclovir, Levaquin (changed from Cipro d/t interaction with Venetoclax per study), diflucan (held due to venetoclax)    Dispo - too soon to determine. Possibly DC home once platelet transfusion can be safely managed as OP.    4/12 Discussed with

## 2024-04-29 NOTE — PRE SEDATION
Sedation Pre-Procedure Note    Patient Name: Constance Gomes   YOB: 1950  Room/Bed: [facility]  Medical Record Number: [id number]  Date: 4/29/2024   Time: 12:17 PM       Indication:  AML/Bone marrow bx for clinical trial    Consent: I have discussed with the patient and/or the patient representative the indication, alternatives, and the possible risks and/or complications of the planned procedure and the anesthesia methods. The patient and/or patient representative appear to understand and agree to proceed.    Vital Signs:   Vitals:    04/29/24 1208   BP: (P) 121/65   Pulse: (P) 92   Resp: (P) 16   Temp: (P) 98.6 °F (37 °C)   SpO2:        Past Medical History:   has a past medical history of Arthritis, Autoimmune disease (HCC), Cancer (HCC), Chronic pain, Coronary artery spasm (HCC), COVID, Essential hypertension, Gastritis, GERD (gastroesophageal reflux disease), Hx antineoplastic chemo, Migraine headache, Psoriasis, Psychiatric disorder, Severe obesity (BMI 35.0-39.9), and Thyroid disease.    Past Surgical History:   has a past surgical history that includes Hysterectomy, total abdominal (1990); Carpal tunnel release; pr unlisted procedure cardiac surgery; gyn; Tubal ligation; and IR PORT PLACEMENT > 5 YEARS (1/3/2024).    Medications:   Scheduled Meds:    magnesium sulfate  2,000 mg IntraVENous Once    magnesium sulfate  2,000 mg IntraVENous Once    vancomycin  1,000 mg IntraVENous Q12H    nystatin  5 mL Oral 4x Daily    sodium chloride  1 g Oral TID WC    cefepime  2,000 mg IntraVENous q8h    potassium chloride  20 mEq Oral Daily    magnesium oxide  400 mg Oral 4x daily    [Held by provider] levoFLOXacin  500 mg Oral Daily    Venetoclax  200 mg Oral Daily    allopurinol  300 mg Oral Daily    senna  2 tablet Oral Nightly    polyethylene glycol  17 g Oral Daily    acyclovir  400 mg Oral BID    amLODIPine  5 mg Oral Daily    famotidine  40 mg Oral QPM    morphine  15 mg Oral BID    pantoprazole  40 mg 
appropriate for situation/labile

## 2024-04-29 NOTE — OR NURSING
TRANSFER - OUT REPORT:           Verbal report given to CHINO Mclaughlin on Constance Gomes  being transferred to IR Recovery for routine post-op      Report consisted of patient’s Situation, Background, Assessment and Recommendations(SBAR).          Information from the following report(s) SBAR, Procedure Summary, and MAR was reviewed with the receiving nurse.       Opportunity for questions and clarification was provided.          Conscious Sedation:    100 Mcg of Fentanyl administered   2 Mg of Versed administered   0 Mg of Benadryl administered        Pt tolerated procedure well.     bandaid-type dressing clean, dry, intact, and nontender    VITALS:  /60   Pulse 84   Temp 98.6 °F (37 °C) (Oral)   Resp 16   Ht 1.524 m (5')   Wt 71.2 kg (156 lb 14.4 oz)   SpO2 99%   BMI 30.64 kg/m²

## 2024-04-29 NOTE — PROGRESS NOTES
VANCO DAILY FOLLOW UP NOTE  [facility]   Pharmacy Pharmacokinetic Monitoring Service - Vancomycin    Consulting Provider: Radha Schwab   Indication: febrile neutropenia  Target Concentration: Goal AUC/MARIAMA 400-600 mg*hr/L  Day of Therapy: 1  Additional Antimicrobials: cefepime    Pertinent Laboratory Values:   Wt Readings from Last 1 Encounters:   04/28/24 71.2 kg (156 lb 14.4 oz)     Temp Readings from Last 1 Encounters:   04/29/24 98.4 °F (36.9 °C) (Oral)     Recent Labs     04/27/24  0326 04/28/24  0332 04/29/24  0303   BUN 18 15 12   CREATININE 0.43* 0.48* 0.44*   WBC 0.5* 0.5* 0.5*     Estimated Creatinine Clearance: 102 mL/min (A) (based on SCr of 0.44 mg/dL (L)).    Lab Results   Component Value Date/Time    VANCORANDOM 7.7 12/07/2023 02:28 AM       MRSA Nasal Swab: N/A. Non-respiratory infection    Assessment:  Date/Time Dose Concentration AUC         Note: Serum concentrations collected for AUC dosing may appear elevated if collected in close proximity to the dose administered, this is not necessarily an indication of toxicity    Plan:  Dosing recommendations based on Bayesian software  Change vancomycin to 1000 mg q12 per previous regimen  Anticipated AUC of 532 and trough concentration of 16.7 at steady state  Renal labs as indicated   Vancomycin concentrations will be ordered as clinically appropriate   Pharmacy will continue to monitor patient and adjust therapy as indicated    Thank you for the consult,  Bala Lam, PharmD, BCOP  Clinical Pharmacist  Contact Via [employer]

## 2024-04-29 NOTE — PROGRESS NOTES
Pt with elevated temp....100.4.  Valeria Schwab NP notified and orders received for blood cultures x2; Stat PCXR, ua c&s/ vancomycin and maxipeme, see MAR.  C/o heartburn, Maalox given as ordered, see MAR.

## 2024-04-29 NOTE — PROGRESS NOTES
ACUTE PHYSICAL THERAPY GOALS:   (Developed with and agreed upon by patient and/or caregiver.)    LTG:  (1.)Ms. Gomes will move from supine to sit and sit to supine , scoot up and down, and roll side to side in bed with INDEPENDENT within 7 treatment day(s).    (2.)Ms. Gomes will transfer from bed to chair and chair to bed with MODIFIED INDEPENDENCE using the least restrictive device within 7 treatment day(s).    (3.)Ms. Gomes will ambulate with STAND BY ASSIST for 250+ feet with the least restrictive device within 7 treatment day(s), while maintaining normal vital signs.  (4.)Ms. Gomes will tolerate 23+ minutes of therapeutic activity within 7 treatment days for increased activity tolerance and functional mobility.    PHYSICAL THERAPY: Daily Note AM   (Link to Caseload Tracking: PT Visit Days : 4  Time In/Out PT Charge Capture  Rehab Caseload Tracker  Orders    Constance Gomes is a 73 y.o. female   PRIMARY DIAGNOSIS: Thrombocytopenia (HCC)  Epistaxis [R04.0]  Lightheadedness [R42]  Thrombocytopenia (HCC) [D69.6]  Pancytopenia (HCC) [D61.818]  Symptomatic anemia [D64.9]       Inpatient: Payor: [employer] MEDICARE / Plan: HU[employer]NA CHOICE-PPO MEDICARE / Product Type: *No Product type* /     ASSESSMENT:     REHAB RECOMMENDATIONS:   Recommendation to date pending progress:  Setting:  Home Health Therapy    Equipment:    To Be Determined  has RW, shower chair, BSC at home      ASSESSMENT:  Ms. Gomes was supine in bed on arrival. She is getting platelets before her BMBx today. Bed mobility with min A and extra time. She performed sit to stand several times with min A x2. Pt was not able to tolerated standing long but encouraged to march and side step. She returned to supine with needs in reach and daughter in room. Not feeling great today but continue to work toward goals.      SUBJECTIVE:   Ms. Gomes states, \"I'm tired today\"     Social/Functional Lives With: Spouse  Type of Home: Mobile home  Home Layout: One level  Home  Mod=Moderate Assistance, Max=Maximal Assistance, Total=Total Assistance, NT=Not Tested    PLAN:   FREQUENCY AND DURATION: 3 times/week for duration of hospital stay or until stated goals are met, whichever comes first.    TREATMENT:   TREATMENT:   Therapeutic Activity (23 Minutes): Therapeutic activity included Rolling, Supine to Sit, Sit to Supine, Scooting, Sitting balance , and Standing balance to improve functional Activity tolerance, Balance, Coordination, Mobility, and Strength.    TREATMENT GRID:   Date:  4/22/24 Date:   Date:     Activity/Exercise Parameters Parameters Parameters   abduction 15     Ankle pumps 15                                     AFTER TREATMENT PRECAUTIONS: Bed, Bed/Chair Locked, Call light within reach, Needs within reach, RN notified, and Visitors at bedside    INTERDISCIPLINARY COLLABORATION:  RN/ PCT and OT/ PETTY    EDUCATION:  review POC and importance of mobility in the recovery process    TIME IN/OUT:  Time In: 0940  Time Out: 1003  Minutes: 23    Violeta Ford, PTA

## 2024-04-29 NOTE — BRIEF OP NOTE
[facility]  Department of Interventional Radiology  [phone]        Interventional Radiology Brief Procedure Note    Patient: Constance Gomes MRN: [id number]  SSN: [id number]    YOB: 1950  Age: 73 y.o.  Sex: female      Date of Procedure: 4/29/2024    Pre-Procedure Diagnosis: AML and severe thrombocytopenia    Post-Procedure Diagnosis: SAME    Procedure(s): Image Guided Biopsy    Brief Description of Procedure: CT-guided bone marrow aspiration and core bone biopsy were performed on the right posterior iliac bone.    Performed By: JORDON Neal     Assistants: None    Anesthesia:Moderate Sedation was given under the direction and supervision of Dr. Tereso Tan MD.  Total face-to-face time 18 minutes.  IV Versed and fentanyl were given    Estimated Blood Loss: Approximately 20 cc.  Patient had slow ooze from biopsy site during her postbiopsy observation period in IR department.  Quik clot dressing was applied and hemostasis was achieved    Specimens:  Pathology    Implants:  Subcutaneous Port    Findings: Routine CT-guided bone marrow biopsy and aspiration    Complications: None    Recommendations: Leave bandage on for 48 hours before washing wound with soap and water and applying a new bandage    Follow Up: As needed    Signed By: JORDON Neal     April 29, 2024

## 2024-04-29 NOTE — PROGRESS NOTES
Comprehensive Nutrition Assessment    Type and Reason for Visit: Reassess  Malnutrition Screening Tool: Malnutrition Screen  Have you recently lost weight without trying?: 2 to 13 pounds (1 point)  Have you been eating poorly because of a decreased appetite?: Yes (1 point)  Malnutrition Screening Tool Score: 2    Nutrition Recommendations/Plan:   Meals and Snacks:  Diet: Continue current order  Nutrition Supplement Therapy:  Medical food supplement therapy:  Continue Ensure Clear three times per day (this provides 240 kcal and 8 grams protein per bottle) - berry flavor     Malnutrition Assessment:  Malnutrition Status: At risk for malnutrition (Comment) (recurrent and prolonged admissions, variable intakes, wt loss)    No wasting  Nutrition Assessment:  Nutrition History: Patient known to clinical nutrition from previous admissions. She initially lost weight with COVID in May 2022 due to loss of taste. During previous admissions she ate fair during previous admissions and used Ensure intermittently to supplement intake. Patient reports she has been eating fair since recent discharge.  at bedside states she started going downhill recently and her PO declined with it. He states the most he has been able to get her to eat is half a cheeseburger.      Do You Have Any Cultural, Rastafarian, or Ethnic Food Preferences?: No   Weight History: 6/20/23 180#, 10/10/23 173#, 1/8/24 160#. This reveals a 9.9% weight loss in ~10 months, 6.2% weight loss in last 6 months. This is notable but not clinically significant.  Nutrition Background:       PMH significant for psoriatic arthritis on Humira, ovarian cancer, HTN, HLD, GERD, AML s/p chemo and SCT. She presented with dizziness and gum bleeding. She was admitted with neutropenic fever, refractory thrombocytopenia, and relapsed AML. S/p IVIG. Clinical trial initiated 4/16. S/p BMBx 4/29.  Nutrition Interval:  Patient reclined in bed. She states she is tired s/p Bx. When asked  Outcomes: Biochemical Data, Meal Time Behavior, Weight    Discharge Planning:    Too soon to determine    FITO WEIR, RD

## 2024-04-29 NOTE — PROGRESS NOTES
ACUTE OCCUPATIONAL THERAPY GOALS:   (Developed with and agreed upon by patient and/or caregiver.)  1. Patient will complete lower body bathing and dressing with SUPERVISION and adaptive equipment as needed.     2. Patient will complete toileting with SUPERVISION.  3. Patient will complete grooming ADL standing at sink with SUPERVISION.  4. Patient will tolerate 25 minutes of OT treatment with 1-2 rest breaks to increase activity tolerance for ADLs.   5. Patient will complete functional transfers with SUPERVISION and adaptive equipment as needed.   6. Patient will tolerate 10 minutes BUE exercises to increase strength for safe, functional transfers.      Timeframe: 7 visits     OCCUPATIONAL THERAPY: Daily Note    OT Visit Days: 3   Time In/Out  OT Charge Capture  Rehab Caseload Tracker  OT Orders    Constance Gomes is a 73 y.o. female   PRIMARY DIAGNOSIS: Thrombocytopenia (HCC)  Epistaxis [R04.0]  Lightheadedness [R42]  Thrombocytopenia (HCC) [D69.6]  Pancytopenia (HCC) [D61.818]  Symptomatic anemia [D64.9]       Inpatient: Payor: [employer] MEDICARE / Plan: HUMANA CHOICE-O MEDICARE / Product Type: *No Product type* /     ASSESSMENT:     REHAB RECOMMENDATIONS:   Recommendation to date pending progress:  Setting:  Home Health Therapy    Equipment:    To Be Determined     ASSESSMENT:  Ms. Gomes is progressing well towards OT goals. Today, pt was received supine in bed. Completed bed mobility and LB dressing with Abelardo. Performed grooming tasks sitting unsupported with CGA. Multiple sit>stands and side steps edge of bed Abelardo x2. Pt not feeling well today and pending bone marrow biopsy. Anticipate pt will progress well once she is feeling better--recommend pt return home with  therapy service though will continue to assess pt's progress. Ms. Gomes continues to demonstrate overall deficits in strength, balance, activity tolerance, and ADL performance. Continue OT efforts and POC in order to address functional deficits

## 2024-04-29 NOTE — OR NURSING
Patients dressing changed. Quick clot placed on puncture site with a transparent dressing over it. Patients sheets, brief and gown changed at this time.

## 2024-04-30 ENCOUNTER — RESEARCH ENCOUNTER (OUTPATIENT)
Dept: ONCOLOGY | Age: 74
End: 2024-04-30

## 2024-04-30 LAB
ALBUMIN SERPL-MCNC: 2.3 G/DL (ref 3.2–4.6)
ALBUMIN/GLOB SERPL: 0.5 (ref 1–1.9)
ALP SERPL-CCNC: 203 U/L (ref 35–104)
ALT SERPL-CCNC: 18 U/L (ref 12–65)
AMYLASE SERPL-CCNC: 14 U/L (ref 25–115)
ANION GAP SERPL CALC-SCNC: 9 MMOL/L (ref 9–18)
AST SERPL-CCNC: 45 U/L (ref 15–37)
BILIRUB DIRECT SERPL-MCNC: 0.2 MG/DL (ref 0–0.4)
BILIRUB SERPL-MCNC: 0.6 MG/DL (ref 0–1.2)
BLD PROD TYP BPU: NORMAL
BLOOD BANK BLOOD PRODUCT EXPIRATION DATE: NORMAL
BLOOD BANK CMNT PATIENT-IMP: NORMAL
BLOOD BANK DISPENSE STATUS: NORMAL
BLOOD BANK ISBT PRODUCT BLOOD TYPE: 5100
BLOOD BANK PRODUCT CODE: NORMAL
BLOOD BANK UNIT TYPE AND RH: NORMAL
BONE MARROW PREP & WRIGHT STAIN: NORMAL
BPU ID: NORMAL
BUN SERPL-MCNC: 11 MG/DL (ref 8–23)
CALCIUM SERPL-MCNC: 8.4 MG/DL (ref 8.8–10.2)
CHLORIDE SERPL-SCNC: 96 MMOL/L (ref 98–107)
CHOLEST SERPL-MCNC: 120 MG/DL (ref 0–200)
CO2 SERPL-SCNC: 24 MMOL/L (ref 20–28)
CREAT SERPL-MCNC: 0.38 MG/DL (ref 0.6–1.1)
DIFFERENTIAL METHOD BLD: ABNORMAL
EKG ATRIAL RATE: 84 BPM
EKG DIAGNOSIS: NORMAL
EKG P AXIS: 32 DEGREES
EKG P-R INTERVAL: 148 MS
EKG Q-T INTERVAL: 402 MS
EKG QRS DURATION: 84 MS
EKG QTC CALCULATION (BAZETT): 475 MS
EKG R AXIS: 10 DEGREES
EKG T AXIS: 31 DEGREES
EKG VENTRICULAR RATE: 84 BPM
ERYTHROCYTE [DISTWIDTH] IN BLOOD BY AUTOMATED COUNT: 14.3 % (ref 11.9–14.6)
GLOBULIN SER CALC-MCNC: 4.4 G/DL (ref 2.3–3.5)
GLUCOSE SERPL-MCNC: 104 MG/DL (ref 70–99)
HCT VFR BLD AUTO: 20.1 % (ref 35.8–46.3)
HDLC SERPL-MCNC: 22 MG/DL (ref 40–60)
HGB BLD-MCNC: 6.7 G/DL (ref 11.7–15.4)
HISTORY CHECK: NORMAL
LDH SERPL L TO P-CCNC: 1032 U/L (ref 127–281)
LDLC SERPL DIRECT ASSAY-MCNC: 57 MG/DL (ref 0–100)
LIPASE SERPL-CCNC: 9 U/L (ref 13–60)
MAGNESIUM SERPL-MCNC: 2.2 MG/DL (ref 1.8–2.4)
MCH RBC QN AUTO: 27.1 PG (ref 26.1–32.9)
MCHC RBC AUTO-ENTMCNC: 33.3 G/DL (ref 31.4–35)
MCV RBC AUTO: 81.4 FL (ref 82–102)
NRBC # BLD: 0 K/UL (ref 0–0.2)
PHOSPHATE SERPL-MCNC: 3.2 MG/DL (ref 2.5–4.5)
PLATELET # BLD AUTO: 6 K/UL (ref 150–450)
PLATELET COMMENT: ABNORMAL
PMV BLD AUTO: ABNORMAL FL (ref 9.4–12.3)
POTASSIUM SERPL-SCNC: 3.6 MMOL/L (ref 3.5–5.1)
PROT SERPL-MCNC: 6.7 G/DL (ref 6.3–8.2)
RBC # BLD AUTO: 2.47 M/UL (ref 4.05–5.2)
RBC MORPH BLD: ABNORMAL
RBC MORPH BLD: ABNORMAL
SODIUM SERPL-SCNC: 128 MMOL/L (ref 136–145)
TRIGL SERPL-MCNC: 159 MG/DL (ref 0–150)
UNIT DIVISION: 0
UNIT ISSUE DATE/TIME: NORMAL
URATE SERPL-MCNC: 1.7 MG/DL (ref 2.5–7.1)
WBC # BLD AUTO: 0.3 K/UL (ref 4.3–11.1)
WBC MORPH BLD: ABNORMAL

## 2024-04-30 PROCEDURE — 84550 ASSAY OF BLOOD/URIC ACID: CPT

## 2024-04-30 PROCEDURE — 86022 PLATELET ANTIBODIES: CPT

## 2024-04-30 PROCEDURE — 6370000000 HC RX 637 (ALT 250 FOR IP): Performed by: NURSE PRACTITIONER

## 2024-04-30 PROCEDURE — 6370000000 HC RX 637 (ALT 250 FOR IP): Performed by: INTERNAL MEDICINE

## 2024-04-30 PROCEDURE — 83735 ASSAY OF MAGNESIUM: CPT

## 2024-04-30 PROCEDURE — 83721 ASSAY OF BLOOD LIPOPROTEIN: CPT

## 2024-04-30 PROCEDURE — 84478 ASSAY OF TRIGLYCERIDES: CPT

## 2024-04-30 PROCEDURE — 36591 DRAW BLOOD OFF VENOUS DEVICE: CPT

## 2024-04-30 PROCEDURE — 6360000002 HC RX W HCPCS: Performed by: INTERNAL MEDICINE

## 2024-04-30 PROCEDURE — 2500000003 HC RX 250 WO HCPCS: Performed by: INTERNAL MEDICINE

## 2024-04-30 PROCEDURE — 86644 CMV ANTIBODY: CPT

## 2024-04-30 PROCEDURE — 2580000003 HC RX 258: Performed by: NURSE PRACTITIONER

## 2024-04-30 PROCEDURE — 36430 TRANSFUSION BLD/BLD COMPNT: CPT

## 2024-04-30 PROCEDURE — 86921 COMPATIBILITY TEST INCUBATE: CPT

## 2024-04-30 PROCEDURE — 6360000002 HC RX W HCPCS: Performed by: NURSE PRACTITIONER

## 2024-04-30 PROCEDURE — 6370000000 HC RX 637 (ALT 250 FOR IP): Performed by: STUDENT IN AN ORGANIZED HEALTH CARE EDUCATION/TRAINING PROGRAM

## 2024-04-30 PROCEDURE — 82248 BILIRUBIN DIRECT: CPT

## 2024-04-30 PROCEDURE — 85025 COMPLETE CBC W/AUTO DIFF WBC: CPT

## 2024-04-30 PROCEDURE — 80053 COMPREHEN METABOLIC PANEL: CPT

## 2024-04-30 PROCEDURE — 86922 COMPATIBILITY TEST ANTIGLOB: CPT

## 2024-04-30 PROCEDURE — 83615 LACTATE (LD) (LDH) ENZYME: CPT

## 2024-04-30 PROCEDURE — 1100000000 HC RM PRIVATE

## 2024-04-30 PROCEDURE — 97530 THERAPEUTIC ACTIVITIES: CPT

## 2024-04-30 PROCEDURE — 83718 ASSAY OF LIPOPROTEIN: CPT

## 2024-04-30 PROCEDURE — 6360000002 HC RX W HCPCS: Performed by: STUDENT IN AN ORGANIZED HEALTH CARE EDUCATION/TRAINING PROGRAM

## 2024-04-30 PROCEDURE — P9040 RBC LEUKOREDUCED IRRADIATED: HCPCS

## 2024-04-30 PROCEDURE — 82150 ASSAY OF AMYLASE: CPT

## 2024-04-30 PROCEDURE — 84100 ASSAY OF PHOSPHORUS: CPT

## 2024-04-30 PROCEDURE — 82465 ASSAY BLD/SERUM CHOLESTEROL: CPT

## 2024-04-30 PROCEDURE — 93005 ELECTROCARDIOGRAM TRACING: CPT | Performed by: INTERNAL MEDICINE

## 2024-04-30 PROCEDURE — 83690 ASSAY OF LIPASE: CPT

## 2024-04-30 PROCEDURE — 2580000003 HC RX 258: Performed by: INTERNAL MEDICINE

## 2024-04-30 PROCEDURE — P9037 PLATE PHERES LEUKOREDU IRRAD: HCPCS

## 2024-04-30 RX ADMIN — ACETAMINOPHEN 650 MG: 325 TABLET ORAL at 11:13

## 2024-04-30 RX ADMIN — NYSTATIN 500000 UNITS: 100000 SUSPENSION ORAL at 21:53

## 2024-04-30 RX ADMIN — CEFEPIME 2000 MG: 2 INJECTION, POWDER, FOR SOLUTION INTRAVENOUS at 17:55

## 2024-04-30 RX ADMIN — LEVOTHYROXINE SODIUM 150 MCG: 0.15 TABLET ORAL at 05:04

## 2024-04-30 RX ADMIN — PANTOPRAZOLE SODIUM 40 MG: 40 TABLET, DELAYED RELEASE ORAL at 17:55

## 2024-04-30 RX ADMIN — HYDROMORPHONE HYDROCHLORIDE 0.25 MG: 1 INJECTION, SOLUTION INTRAMUSCULAR; INTRAVENOUS; SUBCUTANEOUS at 01:46

## 2024-04-30 RX ADMIN — FAMOTIDINE 40 MG: 20 TABLET, FILM COATED ORAL at 17:55

## 2024-04-30 RX ADMIN — NYSTATIN 500000 UNITS: 100000 SUSPENSION ORAL at 07:53

## 2024-04-30 RX ADMIN — PANTOPRAZOLE SODIUM 40 MG: 40 TABLET, DELAYED RELEASE ORAL at 05:04

## 2024-04-30 RX ADMIN — DIPHENHYDRAMINE HYDROCHLORIDE 25 MG: 25 CAPSULE ORAL at 11:13

## 2024-04-30 RX ADMIN — MORPHINE SULFATE 15 MG: 15 TABLET, FILM COATED, EXTENDED RELEASE ORAL at 21:23

## 2024-04-30 RX ADMIN — MAGNESIUM GLUCONATE 500 MG ORAL TABLET 400 MG: 500 TABLET ORAL at 12:14

## 2024-04-30 RX ADMIN — HYDROXYZINE HYDROCHLORIDE 25 MG: 25 TABLET, FILM COATED ORAL at 21:23

## 2024-04-30 RX ADMIN — DIPHENHYDRAMINE HYDROCHLORIDE 25 MG: 25 CAPSULE ORAL at 21:23

## 2024-04-30 RX ADMIN — MAGNESIUM GLUCONATE 500 MG ORAL TABLET 400 MG: 500 TABLET ORAL at 07:53

## 2024-04-30 RX ADMIN — MORPHINE SULFATE 15 MG: 15 TABLET, FILM COATED, EXTENDED RELEASE ORAL at 07:54

## 2024-04-30 RX ADMIN — CEFEPIME 2000 MG: 2 INJECTION, POWDER, FOR SOLUTION INTRAVENOUS at 05:09

## 2024-04-30 RX ADMIN — Medication 1 G: at 12:14

## 2024-04-30 RX ADMIN — ACETAMINOPHEN 650 MG: 325 TABLET ORAL at 05:49

## 2024-04-30 RX ADMIN — CYCLOBENZAPRINE 10 MG: 10 TABLET, FILM COATED ORAL at 23:14

## 2024-04-30 RX ADMIN — CEFEPIME 2000 MG: 2 INJECTION, POWDER, FOR SOLUTION INTRAVENOUS at 22:00

## 2024-04-30 RX ADMIN — ACETAMINOPHEN 650 MG: 325 TABLET ORAL at 21:23

## 2024-04-30 RX ADMIN — FLUOXETINE HYDROCHLORIDE 20 MG: 10 CAPSULE ORAL at 07:52

## 2024-04-30 RX ADMIN — POTASSIUM CHLORIDE 20 MEQ: 1500 TABLET, EXTENDED RELEASE ORAL at 07:53

## 2024-04-30 RX ADMIN — DIPHENHYDRAMINE HYDROCHLORIDE 5 ML: 12.5 LIQUID ORAL at 06:01

## 2024-04-30 RX ADMIN — POLYETHYLENE GLYCOL 3350 17 G: 17 POWDER, FOR SOLUTION ORAL at 07:54

## 2024-04-30 RX ADMIN — ALLOPURINOL 300 MG: 300 TABLET ORAL at 07:53

## 2024-04-30 RX ADMIN — Medication 60 MG: at 12:48

## 2024-04-30 RX ADMIN — NYSTATIN 500000 UNITS: 100000 SUSPENSION ORAL at 17:55

## 2024-04-30 RX ADMIN — ACYCLOVIR 400 MG: 400 TABLET ORAL at 21:23

## 2024-04-30 RX ADMIN — NYSTATIN 500000 UNITS: 100000 SUSPENSION ORAL at 12:14

## 2024-04-30 RX ADMIN — FLUOXETINE HYDROCHLORIDE 20 MG: 10 CAPSULE ORAL at 21:23

## 2024-04-30 RX ADMIN — HYDROXYZINE HYDROCHLORIDE 25 MG: 25 TABLET, FILM COATED ORAL at 07:54

## 2024-04-30 RX ADMIN — ACYCLOVIR 400 MG: 400 TABLET ORAL at 07:53

## 2024-04-30 RX ADMIN — Medication 1 G: at 07:53

## 2024-04-30 ASSESSMENT — PAIN DESCRIPTION - FREQUENCY: FREQUENCY: CONTINUOUS

## 2024-04-30 ASSESSMENT — PAIN - FUNCTIONAL ASSESSMENT: PAIN_FUNCTIONAL_ASSESSMENT: PREVENTS OR INTERFERES SOME ACTIVE ACTIVITIES AND ADLS

## 2024-04-30 ASSESSMENT — PAIN SCALES - GENERAL
PAINLEVEL_OUTOF10: 8
PAINLEVEL_OUTOF10: 4
PAINLEVEL_OUTOF10: 4
PAINLEVEL_OUTOF10: 7

## 2024-04-30 ASSESSMENT — PAIN DESCRIPTION - DESCRIPTORS
DESCRIPTORS: DISCOMFORT
DESCRIPTORS: ACHING
DESCRIPTORS: DISCOMFORT

## 2024-04-30 ASSESSMENT — PAIN DESCRIPTION - LOCATION
LOCATION: OTHER (COMMENT)
LOCATION: THROAT
LOCATION: THROAT

## 2024-04-30 ASSESSMENT — PAIN DESCRIPTION - PAIN TYPE: TYPE: ACUTE PAIN

## 2024-04-30 ASSESSMENT — PAIN DESCRIPTION - ORIENTATION: ORIENTATION: INNER

## 2024-04-30 ASSESSMENT — PAIN DESCRIPTION - ONSET: ONSET: AWAKENED FROM SLEEP

## 2024-04-30 NOTE — PROGRESS NOTES
0218: temp spiked at 100.4  NP Catie Sorensen notified.  No new orders, follow lab results from am, and bc that were drawn 4/28 at 2311 & 2321.

## 2024-04-30 NOTE — PROGRESS NOTES
[facility]        Inpatient Hematology / Oncology Progress Note    Reason for Consult:  Epistaxis [R04.0]  Lightheadedness [R42]  Thrombocytopenia (HCC) [D69.6]  Pancytopenia (HCC) [D61.818]  Symptomatic anemia [D64.9]  Referring Physician:  Gunnra Perez MD    24 Hour Events:  .4, VSS, on 3L  C1D15 Vidaza / Venetoclax / [id number] per clinical trial [id number]  Hgb 6.7, plts 6k  BMBx completed 4/29  [id number] today  On Cefe/Vanc for neutropenic fever, BC NGTD    Transfusions: PRBCs, Plts  Replacements: Mg     ROS:  Constitutional: +fatigue, weakness. Negative for fever, chills.  CV: +edema (better). Negative for chest pain, palpitations.  Respiratory: +dyspnea (better), cough. Negative for wheezing.  GI: +nausea. Negative for abdominal pain, diarrhea.    10 point review of systems is otherwise negative with the exception of the elements mentioned above in the HPI.     Allergies   Allergen Reactions    Penicillins Hives    Sulfa Antibiotics Hives    Codeine Nausea And Vomiting     Past Medical History:   Diagnosis Date    Arthritis     Autoimmune disease (HCC)     skin changes, unknown name    Cancer (HCC) 1990    ovarian    Chronic pain     arthritis in back and legs    Coronary artery spasm (HCC)     COVID 05/15/2022    Essential hypertension 11/8/2019    Gastritis     GERD (gastroesophageal reflux disease)     Hx antineoplastic chemo     Migraine headache     Psoriasis     Psychiatric disorder     anxiety and depression    Severe obesity (BMI 35.0-39.9) 6/26/2018    Thyroid disease     Diagnosed in 1996     Past Surgical History:   Procedure Laterality Date    CARPAL TUNNEL RELEASE      GYN      ovaries    HYSTERECTOMY, TOTAL ABDOMINAL (CERVIX REMOVED)  1990    IR PORT PLACEMENT EQUAL OR GREATER THAN 5 YEARS  1/3/2024    IR PORT PLACEMENT EQUAL OR GREATER THAN 5 YEARS 1/3/2024 [facility] RADIOLOGY SPECIALS    [place] UNLISTED PROCEDURE CARDIAC SURGERY      cardiac cath.  Dx with spasms.    TUBAL

## 2024-04-30 NOTE — PROGRESS NOTES
VANCO DAILY FOLLOW UP NOTE  [facility]   Pharmacy Pharmacokinetic Monitoring Service - Vancomycin    Consulting Provider: Radha Schwab   Indication: febrile neutropenia  Target Concentration: Goal AUC/MARIAMA 400-600 mg*hr/L  Day of Therapy: 3  Additional Antimicrobials: cefepime    Pertinent Laboratory Values:   Wt Readings from Last 1 Encounters:   04/29/24 70.4 kg (155 lb 2.4 oz)     Temp Readings from Last 1 Encounters:   04/30/24 99 °F (37.2 °C) (Oral)     Recent Labs     04/28/24  0332 04/29/24  0303 04/30/24  0212   BUN 15 12 11   CREATININE 0.48* 0.44* 0.38*   WBC 0.5* 0.5* 0.3*     Estimated Creatinine Clearance: 117 mL/min (A) (based on SCr of 0.38 mg/dL (L)).    Lab Results   Component Value Date/Time    VANCORANDOM 7.7 12/07/2023 02:28 AM       MRSA Nasal Swab: N/A. Non-respiratory infection    Assessment:  Date/Time Dose Concentration AUC         Note: Serum concentrations collected for AUC dosing may appear elevated if collected in close proximity to the dose administered, this is not necessarily an indication of toxicity    Plan:  Dosing recommendations based on Bayesian software  Continue vancomycin to 1000 mg q12   Renal labs as indicated   Vancomycin concentrations will be ordered as clinically appropriate   Pharmacy will continue to monitor patient and adjust therapy as indicated    Thank you for the consult,  Bala Lam, PharmD, BCOP  Clinical Pharmacist  Contact [employer]

## 2024-04-30 NOTE — PROGRESS NOTES
ACUTE PHYSICAL THERAPY GOALS:   (Developed with and agreed upon by patient and/or caregiver.)    LTG:  (1.)Ms. Gomes will move from supine to sit and sit to supine , scoot up and down, and roll side to side in bed with INDEPENDENT within 7 treatment day(s).    (2.)Ms. Gomes will transfer from bed to chair and chair to bed with MODIFIED INDEPENDENCE using the least restrictive device within 7 treatment day(s).    (3.)Ms. Gomes will ambulate with STAND BY ASSIST for 250+ feet with the least restrictive device within 7 treatment day(s), while maintaining normal vital signs.  (4.)Ms. Gomes will tolerate 23+ minutes of therapeutic activity within 7 treatment days for increased activity tolerance and functional mobility.    PHYSICAL THERAPY: Daily Note AM   (Link to Caseload Tracking: PT Visit Days : 5  Time In/Out PT Charge Capture  Rehab Caseload Tracker  Orders    Constance Gomes is a 73 y.o. female   PRIMARY DIAGNOSIS: Thrombocytopenia (HCC)  Epistaxis [R04.0]  Lightheadedness [R42]  Thrombocytopenia (HCC) [D69.6]  Pancytopenia (HCC) [D61.818]  Symptomatic anemia [D64.9]       Inpatient: Payor: [employer] MEDICARE / Plan: HU[employer]NA CHOICE-PPO MEDICARE / Product Type: *No Product type* /     ASSESSMENT:     REHAB RECOMMENDATIONS:   Recommendation to date pending progress:  Setting:  Home Health Therapy    Equipment:    To Be Determined  has RW, shower chair, BSC at home      ASSESSMENT:  Ms. Gomes was supine in bed on arrival and agreeable to therapy. She is requesting to use the restroom. Bed mobility with SBA. Transfer and ambulation with CGA. BLE exercises sitting edge of bed. Pt requested to return to supine due to daughter being asleep in the chair. Encouraged pt to get up later today and eat her meals in the chair. Slow progress towards goals.      SUBJECTIVE:   Ms. Gomes states, \"My legs feel weak\"     Social/Functional Lives With: Spouse  Type of Home: Mobile home  Home Layout: One level  Home Access: Ramped  entrance  Bathroom Toilet: Standard  Bathroom Equipment: Commode  Bathroom Accessibility: Accessible  Home Equipment: Cane  Receives Help From: Family  ADL Assistance: Independent  Homemaking Assistance: Independent  Ambulation Assistance: Independent  Transfer Assistance: Independent  Active : No  Patient's  Info: Family  Mode of Transportation: Family  Occupation: Retired  OBJECTIVE:     PAIN: VITALS / O2: PRECAUTION / LINES / DRAINS:   Pre Treatment:  not rated         Post Treatment:  not rated Vitals        Oxygen    2L O2 External Catheter and IV    RESTRICTIONS/PRECAUTIONS:        MOBILITY:   I Mod I S SBA CGA Min Mod Max Total  NT x2 Comments:   Bed Mobility    Rolling [] [] [] [] [] [] [] [] [] [] []    Supine to Sit [] [] [] [x] [] [] [] [] [] [] []    Scooting [] [] [] [x] [] [] [] [] [] [] []    Sit to Supine [] [] [] [x] [] [] [] [] [] [] []    Transfers    Sit to Stand [] [] [] [] [x] [] [] [] [] [] []    Bed to Chair [] [] [] [] [x] [] [] [] [] [] []    Stand to Sit [] [] [] [] [x] [] [] [] [] [] []     [] [] [] [] [] [] [] [] [] [] []    I=Independent, Mod I=Modified Independent, S=Supervision, SBA=Standby Assistance, CGA=Contact Guard Assistance,   Min=Minimal Assistance, Mod=Moderate Assistance, Max=Maximal Assistance, Total=Total Assistance, NT=Not Tested    BALANCE: Good Fair+ Fair Fair- Poor NT Comments   Sitting Static [x] [] [] [] [] []    Sitting Dynamic [x] [] [] [] [] []              Standing Static [] [x] [] [] [] []    Standing Dynamic [] [x] [] [] [] []      GAIT: I Mod I S SBA CGA Min Mod Max Total  NT x2 Comments:   Level of Assistance [] [] [] [] [x] [] [] [] [] [] []    Distance 20 feet     DME Rolling Walker    Gait Quality Decreased annabel  and Decreased step clearance    Weightbearing Status      Stairs      I=Independent, Mod I=Modified Independent, S=Supervision, SBA=Standby Assistance, CGA=Contact Guard Assistance,   Min=Minimal Assistance, Mod=Moderate Assistance,

## 2024-05-01 LAB
ALBUMIN SERPL-MCNC: 2.2 G/DL (ref 3.2–4.6)
ALBUMIN/GLOB SERPL: 0.6 (ref 1–1.9)
ALP SERPL-CCNC: 256 U/L (ref 35–104)
ALT SERPL-CCNC: 20 U/L (ref 12–65)
ANION GAP SERPL CALC-SCNC: 9 MMOL/L (ref 9–18)
AST SERPL-CCNC: 52 U/L (ref 15–37)
BILIRUB SERPL-MCNC: 0.7 MG/DL (ref 0–1.2)
BUN SERPL-MCNC: 10 MG/DL (ref 8–23)
CALCIUM SERPL-MCNC: 8.2 MG/DL (ref 8.8–10.2)
CHLORIDE SERPL-SCNC: 100 MMOL/L (ref 98–107)
CMV DNA SERPL NAA+PROBE-ACNC: NEGATIVE IU/ML
CMV DNA SERPL NAA+PROBE-LOG IU: NORMAL LOG10 IU/ML
CO2 SERPL-SCNC: 24 MMOL/L (ref 20–28)
CREAT SERPL-MCNC: 0.34 MG/DL (ref 0.6–1.1)
DIFFERENTIAL METHOD BLD: ABNORMAL
EKG ATRIAL RATE: 81 BPM
EKG DIAGNOSIS: NORMAL
EKG P AXIS: 32 DEGREES
EKG P-R INTERVAL: 154 MS
EKG Q-T INTERVAL: 412 MS
EKG QRS DURATION: 92 MS
EKG QTC CALCULATION (BAZETT): 478 MS
EKG R AXIS: 26 DEGREES
EKG T AXIS: 48 DEGREES
EKG VENTRICULAR RATE: 81 BPM
ERYTHROCYTE [DISTWIDTH] IN BLOOD BY AUTOMATED COUNT: 14.1 % (ref 11.9–14.6)
FUNGITELL INTERPRETATION: ABNORMAL
FUNGITELL: 354.87 PG/ML
GLOBULIN SER CALC-MCNC: 3.8 G/DL (ref 2.3–3.5)
GLUCOSE SERPL-MCNC: 99 MG/DL (ref 70–99)
HCT VFR BLD AUTO: 24.3 % (ref 35.8–46.3)
HGB BLD-MCNC: 8.3 G/DL (ref 11.7–15.4)
Lab: ABNORMAL
MAGNESIUM SERPL-MCNC: 1.6 MG/DL (ref 1.8–2.4)
MCH RBC QN AUTO: 27.8 PG (ref 26.1–32.9)
MCHC RBC AUTO-ENTMCNC: 34.2 G/DL (ref 31.4–35)
MCV RBC AUTO: 81.3 FL (ref 82–102)
NRBC # BLD: 0 K/UL (ref 0–0.2)
PLATELET # BLD AUTO: 2 K/UL (ref 150–450)
PLATELET # BLD AUTO: 3 K/UL (ref 150–450)
PLATELET COMMENT: ABNORMAL
PMV BLD AUTO: ABNORMAL FL (ref 9.4–12.3)
POTASSIUM SERPL-SCNC: 3.5 MMOL/L (ref 3.5–5.1)
PROT SERPL-MCNC: 5.9 G/DL (ref 6.3–8.2)
RBC # BLD AUTO: 2.99 M/UL (ref 4.05–5.2)
RBC MORPH BLD: ABNORMAL
REFERENCE VALUE: ABNORMAL
RESULT: POSITIVE
SODIUM SERPL-SCNC: 132 MMOL/L (ref 136–145)
VANCOMYCIN SERPL-MCNC: 11.4 UG/ML
WBC # BLD AUTO: 0.3 K/UL (ref 4.3–11.1)
WBC MORPH BLD: ABNORMAL

## 2024-05-01 PROCEDURE — 85049 AUTOMATED PLATELET COUNT: CPT

## 2024-05-01 PROCEDURE — 36430 TRANSFUSION BLD/BLD COMPNT: CPT

## 2024-05-01 PROCEDURE — 2580000003 HC RX 258: Performed by: NURSE PRACTITIONER

## 2024-05-01 PROCEDURE — 6360000002 HC RX W HCPCS: Performed by: INTERNAL MEDICINE

## 2024-05-01 PROCEDURE — 97535 SELF CARE MNGMENT TRAINING: CPT

## 2024-05-01 PROCEDURE — 6370000000 HC RX 637 (ALT 250 FOR IP): Performed by: STUDENT IN AN ORGANIZED HEALTH CARE EDUCATION/TRAINING PROGRAM

## 2024-05-01 PROCEDURE — 2700000000 HC OXYGEN THERAPY PER DAY

## 2024-05-01 PROCEDURE — 6370000000 HC RX 637 (ALT 250 FOR IP): Performed by: NURSE PRACTITIONER

## 2024-05-01 PROCEDURE — 86813 HLA TYPING A B OR C: CPT

## 2024-05-01 PROCEDURE — 6360000002 HC RX W HCPCS: Performed by: NURSE PRACTITIONER

## 2024-05-01 PROCEDURE — 6360000002 HC RX W HCPCS: Performed by: STUDENT IN AN ORGANIZED HEALTH CARE EDUCATION/TRAINING PROGRAM

## 2024-05-01 PROCEDURE — 94760 N-INVAS EAR/PLS OXIMETRY 1: CPT

## 2024-05-01 PROCEDURE — 1100000000 HC RM PRIVATE

## 2024-05-01 PROCEDURE — P9037 PLATE PHERES LEUKOREDU IRRAD: HCPCS

## 2024-05-01 PROCEDURE — 83735 ASSAY OF MAGNESIUM: CPT

## 2024-05-01 PROCEDURE — 85025 COMPLETE CBC W/AUTO DIFF WBC: CPT

## 2024-05-01 PROCEDURE — 97530 THERAPEUTIC ACTIVITIES: CPT

## 2024-05-01 PROCEDURE — 2580000003 HC RX 258: Performed by: INTERNAL MEDICINE

## 2024-05-01 PROCEDURE — 97112 NEUROMUSCULAR REEDUCATION: CPT

## 2024-05-01 PROCEDURE — 80053 COMPREHEN METABOLIC PANEL: CPT

## 2024-05-01 PROCEDURE — 6370000000 HC RX 637 (ALT 250 FOR IP): Performed by: INTERNAL MEDICINE

## 2024-05-01 PROCEDURE — 80202 ASSAY OF VANCOMYCIN: CPT

## 2024-05-01 RX ORDER — POTASSIUM CHLORIDE 20 MEQ/1
40 TABLET, EXTENDED RELEASE ORAL 2 TIMES DAILY
Status: DISCONTINUED | OUTPATIENT
Start: 2024-05-01 | End: 2024-05-09

## 2024-05-01 RX ORDER — MAGNESIUM SULFATE IN WATER 40 MG/ML
2000 INJECTION, SOLUTION INTRAVENOUS ONCE
Status: COMPLETED | OUTPATIENT
Start: 2024-05-01 | End: 2024-05-01

## 2024-05-01 RX ADMIN — NYSTATIN 500000 UNITS: 100000 SUSPENSION ORAL at 17:49

## 2024-05-01 RX ADMIN — MAGNESIUM SULFATE HEPTAHYDRATE 2000 MG: 40 INJECTION, SOLUTION INTRAVENOUS at 05:29

## 2024-05-01 RX ADMIN — Medication 1 G: at 07:38

## 2024-05-01 RX ADMIN — ALLOPURINOL 300 MG: 300 TABLET ORAL at 07:39

## 2024-05-01 RX ADMIN — HYDROXYZINE HYDROCHLORIDE 25 MG: 25 TABLET, FILM COATED ORAL at 20:05

## 2024-05-01 RX ADMIN — POTASSIUM CHLORIDE 20 MEQ: 1500 TABLET, EXTENDED RELEASE ORAL at 07:38

## 2024-05-01 RX ADMIN — FAMOTIDINE 40 MG: 20 TABLET, FILM COATED ORAL at 17:48

## 2024-05-01 RX ADMIN — AMLODIPINE BESYLATE 5 MG: 5 TABLET ORAL at 07:39

## 2024-05-01 RX ADMIN — CEFEPIME 2000 MG: 2 INJECTION, POWDER, FOR SOLUTION INTRAVENOUS at 06:10

## 2024-05-01 RX ADMIN — NYSTATIN 500000 UNITS: 100000 SUSPENSION ORAL at 20:06

## 2024-05-01 RX ADMIN — LEVOTHYROXINE SODIUM 150 MCG: 0.15 TABLET ORAL at 05:26

## 2024-05-01 RX ADMIN — VANCOMYCIN HYDROCHLORIDE 1000 MG: 1 INJECTION, POWDER, LYOPHILIZED, FOR SOLUTION INTRAVENOUS at 00:24

## 2024-05-01 RX ADMIN — MAGNESIUM SULFATE HEPTAHYDRATE 2000 MG: 40 INJECTION, SOLUTION INTRAVENOUS at 09:03

## 2024-05-01 RX ADMIN — DIPHENHYDRAMINE HYDROCHLORIDE 25 MG: 25 CAPSULE ORAL at 13:29

## 2024-05-01 RX ADMIN — ACETAMINOPHEN 650 MG: 325 TABLET ORAL at 13:29

## 2024-05-01 RX ADMIN — FLUOXETINE HYDROCHLORIDE 20 MG: 10 CAPSULE ORAL at 07:38

## 2024-05-01 RX ADMIN — POTASSIUM CHLORIDE 40 MEQ: 1500 TABLET, EXTENDED RELEASE ORAL at 20:05

## 2024-05-01 RX ADMIN — ACYCLOVIR 400 MG: 400 TABLET ORAL at 20:06

## 2024-05-01 RX ADMIN — HYDROXYZINE HYDROCHLORIDE 25 MG: 25 TABLET, FILM COATED ORAL at 07:38

## 2024-05-01 RX ADMIN — NYSTATIN 500000 UNITS: 100000 SUSPENSION ORAL at 12:38

## 2024-05-01 RX ADMIN — PANTOPRAZOLE SODIUM 40 MG: 40 TABLET, DELAYED RELEASE ORAL at 05:26

## 2024-05-01 RX ADMIN — POLYETHYLENE GLYCOL 3350 17 G: 17 POWDER, FOR SOLUTION ORAL at 07:38

## 2024-05-01 RX ADMIN — NYSTATIN 500000 UNITS: 100000 SUSPENSION ORAL at 07:38

## 2024-05-01 RX ADMIN — HYDROMORPHONE HYDROCHLORIDE 0.25 MG: 1 INJECTION, SOLUTION INTRAMUSCULAR; INTRAVENOUS; SUBCUTANEOUS at 05:36

## 2024-05-01 RX ADMIN — ONDANSETRON 4 MG: 2 INJECTION INTRAMUSCULAR; INTRAVENOUS at 10:08

## 2024-05-01 RX ADMIN — FLUOXETINE HYDROCHLORIDE 20 MG: 10 CAPSULE ORAL at 20:06

## 2024-05-01 RX ADMIN — CEFEPIME 2000 MG: 2 INJECTION, POWDER, FOR SOLUTION INTRAVENOUS at 14:53

## 2024-05-01 RX ADMIN — PANTOPRAZOLE SODIUM 40 MG: 40 TABLET, DELAYED RELEASE ORAL at 17:48

## 2024-05-01 RX ADMIN — ACYCLOVIR 400 MG: 400 TABLET ORAL at 07:39

## 2024-05-01 RX ADMIN — MORPHINE SULFATE 15 MG: 15 TABLET, FILM COATED, EXTENDED RELEASE ORAL at 07:38

## 2024-05-01 RX ADMIN — Medication 1 G: at 11:10

## 2024-05-01 RX ADMIN — MAGNESIUM GLUCONATE 500 MG ORAL TABLET 400 MG: 500 TABLET ORAL at 07:38

## 2024-05-01 RX ADMIN — HYDROMORPHONE HYDROCHLORIDE 0.25 MG: 1 INJECTION, SOLUTION INTRAMUSCULAR; INTRAVENOUS; SUBCUTANEOUS at 23:07

## 2024-05-01 RX ADMIN — MORPHINE SULFATE 15 MG: 15 TABLET, FILM COATED, EXTENDED RELEASE ORAL at 20:05

## 2024-05-01 RX ADMIN — Medication 1 G: at 17:48

## 2024-05-01 ASSESSMENT — PAIN DESCRIPTION - LOCATION: LOCATION: GENERALIZED

## 2024-05-01 ASSESSMENT — PAIN SCALES - GENERAL
PAINLEVEL_OUTOF10: 8
PAINLEVEL_OUTOF10: 8

## 2024-05-01 ASSESSMENT — PAIN DESCRIPTION - DESCRIPTORS: DESCRIPTORS: DISCOMFORT;ACHING

## 2024-05-01 NOTE — PROGRESS NOTES
hr post-transfusion  4/10 PRBCs yesterday, transfuse again today  4/11 PRBCs yesterday, Hgb improved  4/12 Plts again today  4/13 Plts x2 and IVIG given yesterday, plts up to 13k. Repeat IVIG and transfuse plts due to some epistaxis. PRBCs today as well.  4/14 Plts x1U and IVIG yesterday, plts 10k. No further epistaxis. Hgb responded to transfusion.  4/15 Transfusing plts, other counts fairly stable.  4/16 Plt <2k.  1 unit plt ordered.  4/18 Plts up to 20k today after plts this AM - hold on transfusion of plts. PRBCs today.  4/19 Plts up to 23k this morning after plt transfusion. Hgb up to 8.6 after transfusion.  4/21 Plts 3,000 - give one unit. Hgb 8.3.  4/22 Plts 3k - transfuse again, Hgb down to 7.3 - transfuse PRBCs slowly   4/23 Plts 3k this AM despite 2U plts yesterday. Hgb improved after transfusion. Has small hematoma on abdomen from recent SQ injections - monitor.  4/24 Plts 4k - transfusing.  4/25 Plts and PRBCs  4/26 No transfusions today, Hgb up to 9.1 after transfusion. Plts up to 34k after transfusion yesterday but hopefully now responding to transfusion.  4/27 no transfusion need today; Hb 8.2, plts 15   4/28 Plt 6K-transfuse 1 unit, Hgb 8.5  4/29 Plts 20k, plt transfusion for Bmbx. Hgb 7.9.  4/30 Plts down to 6k, Hgb 6.7 - transfusing  5/1 Plts down to 3k despite transfusion.    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 Plts respond slightly - holding day 4 IVIG due to concern for fluid overload.  4/12 IVIG again today but as slow as possible due to concern for fluid overload.  4/13 IVIG again today, slow rate.  4/14 IVIG again today, slow rate.  4/15 IVIG stopped yesterday mid-transfusion due to concern for overload.    Neutropenic fever / ?pneumonia  - CXR with atelectasis vs infiltrate in R lung  - Continue Cefe/Vanc  - Follow BC  4/14 Afebrile. BC NGTD. Continues Cefepime (day 7), completes today and then transition to prophylactic  Cipro.  RESOLVED  4/29 .8, on Cefe/Vanc overnight. BC pending. Fungal studies collected last week, pending. CXR with mild interstitial edema vs atypical infection.   4/30 .4, day 3 Cefe/Vanc. BC NGTD. Fungal studies pending.  5/1 TM 99.9 - day 4 Cefe/Vanc, DC Vanc. BC NGTD, fungitell pending.     Hyponatremia  4/28 start salt tabs  4/29 Na down to 126, continues salt tabs. Fluid restriction ordered.  4/30 Na up to 128  5/1 Na up to 132    Diarrhea / electrolyte abnormalities  5/1 Holding PO Mg and stool softeners/laxatives. IV Mg and PO KCl ordered.    Pulmonary edema / volume overload  4/11 On 2L overnight, reports cough. CXR with pulmonary edema, BNP pending. Weight up - give 40mg lasix. Echo pending.  4/12 Echo with preserved EF, BNP elevated. On 4L NC, I/O net neg 800ml and weight down 7#. Continue diuresing, lasix 40mg BID as blood pressure tolerates.  4/13 Up to 7L NC. Net neg 3.4L, weight appears down. CXR with ongoing fluid overload. Continues lasix for diuresis but Na and K also low. Replete K.   4/14 Weight appears stable, incomplete I/O yesterday. O2 stable at 7L. Continues diuresis. Electrolytes stable/better today.  4/15 Required Airvo overnight, CXR unchanged. Continue diuresis.  4/18 Remains on Airvo. Weaning as tolerated - able to be off at times. Diuresed yesterday with good results, hold on diuresis today.   4/19 Down to 3L NC from Airvo overnight. Volume status much improved - monitoring and diuresing PRN.  4/24 On RA-3L  4/25 On RA. CXR completed yesterday shows BL opacities, checking fungal studies and monitoring fever (.2 and neutropenic), as well as prominent pulm vasculature. Monitoring for overload - remains off IVF.  4/26  - remains neutropenic. Fungal studies pending due to recent XR with BL opacities. Continues prophylactic cipro. Monitoring for fluid overload with frequent transfusions (off IV fluids).  4/27 afeb, Tm 98.6; fungal studies pending; on LVQ prophy -

## 2024-05-01 NOTE — PROGRESS NOTES
Pt vSS pt complain of nausea X's 1 gave med as ordered in mar no complaints of pain pt received 1 bag mag 2000mg  pt needs PLT, they are ordered but are not here yet pt up in chair call light in reach

## 2024-05-01 NOTE — PROGRESS NOTES
Comprehensive Nutrition Assessment    Type and Reason for Visit: Reassess  Malnutrition Screening Tool: Malnutrition Screen  Have you recently lost weight without trying?: 2 to 13 pounds (1 point)  Have you been eating poorly because of a decreased appetite?: Yes (1 point)  Malnutrition Screening Tool Score: 2    Nutrition Recommendations/Plan:   Meals and Snacks:  Diet: Continue current order  Nutrition Supplement Therapy:  Medical food supplement therapy:  Continue Ensure Clear three times per day (this provides 240 kcal and 8 grams protein per bottle) - berry flavor  Start Magic Cup BID (290 kcal and 9 g protein per serving) - chocolate     Malnutrition Assessment:  Malnutrition Status: At risk for malnutrition (Comment) (recurrent and prolonged admissions, variable intakes, wt loss)    No wasting  Nutrition Assessment:  Nutrition History: Patient known to clinical nutrition from previous admissions. She initially lost weight with COVID in May 2022 due to loss of taste. During previous admissions she ate fair during previous admissions and used Ensure intermittently to supplement intake. Patient reports she has been eating fair since recent discharge.  at bedside states she started going downhill recently and her PO declined with it. He states the most he has been able to get her to eat is half a cheeseburger.      Do You Have Any Cultural, Congregational, or Ethnic Food Preferences?: No   Weight History: 6/20/23 180#, 10/10/23 173#, 1/8/24 160#. This reveals a 9.9% weight loss in ~10 months, 6.2% weight loss in last 6 months. This is notable but not clinically significant.  Nutrition Background:       PMH significant for psoriatic arthritis on Humira, ovarian cancer, HTN, HLD, GERD, AML s/p chemo and SCT. She presented with dizziness and gum bleeding. She was admitted with neutropenic fever, refractory thrombocytopenia, and relapsed AML. S/p IVIG. Clinical trial initiated 4/16. S/p BMBx 4/29.  Nutrition

## 2024-05-01 NOTE — PROGRESS NOTES
1 unit PRBC transfused. Patient tolerated well. Patient with mild nose bleed. Critical PLT count of 3K called. 1 unit PLT ordered. IV Dilaudid administered for throat pain.

## 2024-05-01 NOTE — PROGRESS NOTES
ACUTE PHYSICAL THERAPY GOALS:   (Developed with and agreed upon by patient and/or caregiver.)    LTG:  (1.)Ms. Gomes will move from supine to sit and sit to supine , scoot up and down, and roll side to side in bed with INDEPENDENT within 7 treatment day(s).    (2.)Ms. Gomes will transfer from bed to chair and chair to bed with MODIFIED INDEPENDENCE using the least restrictive device within 7 treatment day(s).    (3.)Ms. Gomes will ambulate with STAND BY ASSIST for 250+ feet with the least restrictive device within 7 treatment day(s), while maintaining normal vital signs.  (4.)Ms. Gomes will tolerate 23+ minutes of therapeutic activity within 7 treatment days for increased activity tolerance and functional mobility.    PHYSICAL THERAPY: Daily Note AM   (Link to Caseload Tracking: PT Visit Days : 6  Time In/Out PT Charge Capture  Rehab Caseload Tracker  Orders    Constance Gomes is a 73 y.o. female   PRIMARY DIAGNOSIS: Thrombocytopenia (HCC)  Epistaxis [R04.0]  Lightheadedness [R42]  Thrombocytopenia (HCC) [D69.6]  Pancytopenia (HCC) [D61.818]  Symptomatic anemia [D64.9]       Inpatient: Payor: [employer] [place] MEDICARE / Plan: HUMANA CHOICE-PPO MEDICARE / Product Type: *No Product type* /     ASSESSMENT:     REHAB RECOMMENDATIONS:   Recommendation to date pending progress:  Setting:  Home Health Therapy    Equipment:    To Be Determined  has RW, shower chair, BSC at home      ASSESSMENT:  Ms. Gomes was supine in bed on arrival and not feeling well today. She received chemo yesterday and a little nauseous. RN medicated for nausea. Bed mobility with SBA. Dynamic sitting balance activities on edge of bed with SBA. Transfer to/from bedside commode with min A x2. Ambulated 10' with RW and CGA/min A x2 for safety due to pt not feeling well. Pt left sitting up in chair with needs in reach and daughter present. Continue to work towards goals.      SUBJECTIVE:   Ms. Gomes states, \"I'm nauseous\"     Social/Functional Lives With:

## 2024-05-01 NOTE — PROGRESS NOTES
ACUTE OCCUPATIONAL THERAPY GOALS:   (Developed with and agreed upon by patient and/or caregiver.)  1. Patient will complete lower body bathing and dressing with SUPERVISION and adaptive equipment as needed.     2. Patient will complete toileting with SUPERVISION.  3. Patient will complete grooming ADL standing at sink with SUPERVISION.  4. Patient will tolerate 25 minutes of OT treatment with 1-2 rest breaks to increase activity tolerance for ADLs.   5. Patient will complete functional transfers with SUPERVISION and adaptive equipment as needed.   6. Patient will tolerate 10 minutes BUE exercises to increase strength for safe, functional transfers.      Timeframe: 7 visits     OCCUPATIONAL THERAPY: Daily Note    OT Visit Days: 4   Time In/Out  OT Charge Capture  Rehab Caseload Tracker  OT Orders    Constance Gomes is a 73 y.o. female   PRIMARY DIAGNOSIS: Thrombocytopenia (HCC)  Epistaxis [R04.0]  Lightheadedness [R42]  Thrombocytopenia (HCC) [D69.6]  Pancytopenia (HCC) [D61.818]  Symptomatic anemia [D64.9]       Inpatient: Payor: HUMANA MEDICARE / Plan: HUMANA CHOICE-PPO MEDICARE / Product Type: *No Product type* /     ASSESSMENT:     REHAB RECOMMENDATIONS:   Recommendation to date pending progress:  Setting:  Home Health Therapy    Equipment:    To Be Determined     ASSESSMENT:  Ms. Gomes is progressing well towards OT goals. Today, pt was received supine in bed. Reported feeling horrible today from her chemo yesterday. Completed bed mobility with CGA. Completed grooming tasks sitting edge of bed with CGA. Required Abelardo for UB bathing/dressing and maxA for LB dressing/bathing. Sit>stand and transfer to bedside commode using rolling walker Abelardo. Abelardo for toileting in standing. Ambulated in room to chair with Abelardo. Pt fatigued easily this session with poor activity tolerance. Pt did feel better once receiving her IV nausea meds. Anticipate pt will progress well once she is feeling better--recommend pt return home

## 2024-05-01 NOTE — PROGRESS NOTES
4/29/24: Pt seen inpatient, room 524 for C1D15 on [facility] clinical trial for CLB415 administration.  Pt in bed resting with daughter at bedside.  ECOG=1.  Labs reviewed by MD and RN, NCS.  No non heme grade 3 Aes are present for any dose mods.  See Infusion tip sheet filed in source for documentation of protocol vitals, EKG and PK collection times.  The first two set of vitals were collected w/in window by the floor nurse and taken from the vitals flowsheet in EMR.  Research RN collected the EOI infusion at 5:02 PM and they were as follows: /73, P 88, Temp 99.4, RR 20, 02 99%.  Notified patient's nurse, Ra of mild temp increase.     All vitals collected per protocol except for the 1 hour post infusion vitals were collected out of window.  Patient's nurse, Ra, educated that protocol needs vitals 1 hour post dose.  CHINO Knapp verbalized understanding and confirmation of obtaining 1 hour post dose vital signs. Research staff was no longer present on the floor at the time of the 1 hr post vital collection.  Therefore, the exact cause of the oow Vitals is unknown.  EOI PK collected and processed per protocol.  However, the PK was collected out of window @ 1700.  The patient had to use the bedside commode to use the restroom right after the EOI ECGs were obtained.  As a result, the PK blood draw was delayed, see PD filed in source for documentation.  ECGs collected per protocol and were abnormal, NCS.  Gift card dispensed to patient for reimbursement.  W-9 and reimbursement form signed by patient.  Patient planned to have next [id number] study drug administration for C1D22 on 5/7/24.  Patient verbalized understanding of treatment plan and agrees to remain on study at this time.  The following AEs were reviewed:    AE Term Grade Start Date Stop Date Status and Con Med Given   Lower limb edema 1 4/12/24  Ongoing, no change-no con med given on 4/30   Hematoma 1 4/23/24  Ongoing, no change-no med given 4/30   QTC

## 2024-05-01 NOTE — PLAN OF CARE
Problem: Pain  Goal: Verbalizes/displays adequate comfort level or baseline comfort level  5/1/2024 0902 by Irene Fitzgerald, RN  Outcome: Progressing  4/30/2024 1947 by Ely Rod, RN  Outcome: Progressing  4/30/2024 1936 by Ely Rod, RN  Outcome: Progressing     Problem: Safety - Adult  Goal: Free from fall injury  5/1/2024 0902 by Irene Fitzgerald, RN  Outcome: Progressing  4/30/2024 1936 by Ely Rod, RN  Outcome: Progressing

## 2024-05-02 ENCOUNTER — APPOINTMENT (OUTPATIENT)
Dept: CT IMAGING | Age: 74
DRG: 834 | End: 2024-05-02
Payer: MEDICARE

## 2024-05-02 LAB
ABO + RH BLD: NORMAL
ALBUMIN SERPL-MCNC: 2.1 G/DL (ref 3.2–4.6)
ALBUMIN/GLOB SERPL: 0.5 (ref 1–1.9)
ALP SERPL-CCNC: 286 U/L (ref 35–104)
ALT SERPL-CCNC: 19 U/L (ref 12–65)
ANION GAP SERPL CALC-SCNC: 10 MMOL/L (ref 9–18)
AST SERPL-CCNC: 55 U/L (ref 15–37)
BILIRUB SERPL-MCNC: 0.7 MG/DL (ref 0–1.2)
BLD PROD TYP BPU: NORMAL
BLOOD BANK BLOOD PRODUCT EXPIRATION DATE: NORMAL
BLOOD BANK CMNT PATIENT-IMP: NORMAL
BLOOD BANK CMNT PATIENT-IMP: NORMAL
BLOOD BANK DISPENSE STATUS: NORMAL
BLOOD BANK ISBT PRODUCT BLOOD TYPE: 5100
BLOOD BANK ISBT PRODUCT BLOOD TYPE: 5100
BLOOD BANK ISBT PRODUCT BLOOD TYPE: 9500
BLOOD BANK PRODUCT CODE: NORMAL
BLOOD BANK UNIT TYPE AND RH: NORMAL
BLOOD GROUP ANTIBODIES SERPL: NORMAL
BPU ID: NORMAL
BUN SERPL-MCNC: 10 MG/DL (ref 8–23)
CALCIUM SERPL-MCNC: 8.7 MG/DL (ref 8.8–10.2)
CHLORIDE SERPL-SCNC: 96 MMOL/L (ref 98–107)
CO2 SERPL-SCNC: 25 MMOL/L (ref 20–28)
CREAT SERPL-MCNC: 0.34 MG/DL (ref 0.6–1.1)
CROSSMATCH RESULT: NORMAL
DIFFERENTIAL METHOD BLD: ABNORMAL
ERYTHROCYTE [DISTWIDTH] IN BLOOD BY AUTOMATED COUNT: 14.4 % (ref 11.9–14.6)
GLOBULIN SER CALC-MCNC: 4.3 G/DL (ref 2.3–3.5)
GLUCOSE SERPL-MCNC: 90 MG/DL (ref 70–99)
HCT VFR BLD AUTO: 21.4 % (ref 35.8–46.3)
HGB BLD-MCNC: 7.5 G/DL (ref 11.7–15.4)
MAGNESIUM SERPL-MCNC: 1.7 MG/DL (ref 1.8–2.4)
MCH RBC QN AUTO: 28.1 PG (ref 26.1–32.9)
MCHC RBC AUTO-ENTMCNC: 35 G/DL (ref 31.4–35)
MCV RBC AUTO: 80.1 FL (ref 82–102)
NRBC # BLD: 0 K/UL (ref 0–0.2)
PLATELET # BLD AUTO: <2 K/UL (ref 150–450)
PLATELET COMMENT: ABNORMAL
PMV BLD AUTO: ABNORMAL FL (ref 9.4–12.3)
POTASSIUM SERPL-SCNC: 3.9 MMOL/L (ref 3.5–5.1)
PROT SERPL-MCNC: 6.5 G/DL (ref 6.3–8.2)
RBC # BLD AUTO: 2.67 M/UL (ref 4.05–5.2)
RBC MORPH BLD: ABNORMAL
SODIUM SERPL-SCNC: 130 MMOL/L (ref 136–145)
SPECIMEN EXP DATE BLD: NORMAL
UNIT DIVISION: 0
UNIT ISSUE DATE/TIME: NORMAL
WBC # BLD AUTO: 0.4 K/UL (ref 4.3–11.1)
WBC MORPH BLD: ABNORMAL

## 2024-05-02 PROCEDURE — 6360000002 HC RX W HCPCS: Performed by: NURSE PRACTITIONER

## 2024-05-02 PROCEDURE — 97530 THERAPEUTIC ACTIVITIES: CPT

## 2024-05-02 PROCEDURE — 6370000000 HC RX 637 (ALT 250 FOR IP): Performed by: INTERNAL MEDICINE

## 2024-05-02 PROCEDURE — 36415 COLL VENOUS BLD VENIPUNCTURE: CPT

## 2024-05-02 PROCEDURE — 80053 COMPREHEN METABOLIC PANEL: CPT

## 2024-05-02 PROCEDURE — 2580000003 HC RX 258: Performed by: INTERNAL MEDICINE

## 2024-05-02 PROCEDURE — 83735 ASSAY OF MAGNESIUM: CPT

## 2024-05-02 PROCEDURE — 86813 HLA TYPING A B OR C: CPT

## 2024-05-02 PROCEDURE — 92610 EVALUATE SWALLOWING FUNCTION: CPT

## 2024-05-02 PROCEDURE — 6360000002 HC RX W HCPCS: Performed by: INTERNAL MEDICINE

## 2024-05-02 PROCEDURE — 1100000000 HC RM PRIVATE

## 2024-05-02 PROCEDURE — 71250 CT THORAX DX C-: CPT

## 2024-05-02 PROCEDURE — 6370000000 HC RX 637 (ALT 250 FOR IP): Performed by: NURSE PRACTITIONER

## 2024-05-02 PROCEDURE — 36430 TRANSFUSION BLD/BLD COMPNT: CPT

## 2024-05-02 PROCEDURE — 2580000003 HC RX 258: Performed by: NURSE PRACTITIONER

## 2024-05-02 PROCEDURE — 6370000000 HC RX 637 (ALT 250 FOR IP): Performed by: STUDENT IN AN ORGANIZED HEALTH CARE EDUCATION/TRAINING PROGRAM

## 2024-05-02 PROCEDURE — P9037 PLATE PHERES LEUKOREDU IRRAD: HCPCS

## 2024-05-02 PROCEDURE — 97112 NEUROMUSCULAR REEDUCATION: CPT

## 2024-05-02 PROCEDURE — 97535 SELF CARE MNGMENT TRAINING: CPT

## 2024-05-02 PROCEDURE — 0152U NFCT DS DNA UNTRGT NGNRJ SEQ: CPT

## 2024-05-02 PROCEDURE — 2700000000 HC OXYGEN THERAPY PER DAY

## 2024-05-02 PROCEDURE — 85025 COMPLETE CBC W/AUTO DIFF WBC: CPT

## 2024-05-02 PROCEDURE — 76937 US GUIDE VASCULAR ACCESS: CPT

## 2024-05-02 RX ORDER — HYDROXYZINE HYDROCHLORIDE 25 MG/1
TABLET, FILM COATED ORAL
Qty: 180 TABLET | Refills: 1 | Status: CANCELLED | OUTPATIENT
Start: 2024-05-02

## 2024-05-02 RX ORDER — PRIMAQUINE PHOSPHATE 15 MG/1
30 TABLET, FILM COATED ORAL DAILY
Status: DISCONTINUED | OUTPATIENT
Start: 2024-05-02 | End: 2024-05-08

## 2024-05-02 RX ORDER — PREDNISONE 20 MG/1
40 TABLET ORAL 2 TIMES DAILY
Status: DISCONTINUED | OUTPATIENT
Start: 2024-05-02 | End: 2024-05-09

## 2024-05-02 RX ORDER — MAGNESIUM SULFATE IN WATER 40 MG/ML
4000 INJECTION, SOLUTION INTRAVENOUS ONCE
Status: COMPLETED | OUTPATIENT
Start: 2024-05-02 | End: 2024-05-02

## 2024-05-02 RX ORDER — CLINDAMYCIN HYDROCHLORIDE 150 MG/1
300 CAPSULE ORAL EVERY 6 HOURS SCHEDULED
Status: DISCONTINUED | OUTPATIENT
Start: 2024-05-02 | End: 2024-05-06

## 2024-05-02 RX ORDER — SODIUM CHLORIDE 9 MG/ML
INJECTION, SOLUTION INTRAVENOUS PRN
Status: DISCONTINUED | OUTPATIENT
Start: 2024-05-02 | End: 2024-05-07

## 2024-05-02 RX ORDER — POSACONAZOLE 100 MG/1
300 TABLET, DELAYED RELEASE ORAL
Status: DISCONTINUED | OUTPATIENT
Start: 2024-05-03 | End: 2024-05-02

## 2024-05-02 RX ORDER — ACETAMINOPHEN 160 MG/5ML
650 SUSPENSION ORAL EVERY 6 HOURS PRN
Status: DISCONTINUED | OUTPATIENT
Start: 2024-05-02 | End: 2024-05-11 | Stop reason: HOSPADM

## 2024-05-02 RX ADMIN — PANTOPRAZOLE SODIUM 40 MG: 40 TABLET, DELAYED RELEASE ORAL at 18:20

## 2024-05-02 RX ADMIN — ACETAMINOPHEN 650 MG: 325 TABLET ORAL at 08:47

## 2024-05-02 RX ADMIN — CEFEPIME 2000 MG: 2 INJECTION, POWDER, FOR SOLUTION INTRAVENOUS at 00:02

## 2024-05-02 RX ADMIN — PANTOPRAZOLE SODIUM 40 MG: 40 TABLET, DELAYED RELEASE ORAL at 06:20

## 2024-05-02 RX ADMIN — FAMOTIDINE 40 MG: 20 TABLET, FILM COATED ORAL at 18:19

## 2024-05-02 RX ADMIN — NYSTATIN 500000 UNITS: 100000 SUSPENSION ORAL at 18:20

## 2024-05-02 RX ADMIN — ACYCLOVIR 400 MG: 400 TABLET ORAL at 22:41

## 2024-05-02 RX ADMIN — HYDROXYZINE HYDROCHLORIDE 25 MG: 25 TABLET, FILM COATED ORAL at 08:47

## 2024-05-02 RX ADMIN — FLUOXETINE HYDROCHLORIDE 20 MG: 10 CAPSULE ORAL at 20:55

## 2024-05-02 RX ADMIN — NYSTATIN 500000 UNITS: 100000 SUSPENSION ORAL at 13:01

## 2024-05-02 RX ADMIN — CYCLOBENZAPRINE 10 MG: 10 TABLET, FILM COATED ORAL at 20:55

## 2024-05-02 RX ADMIN — CLINDAMYCIN HYDROCHLORIDE 300 MG: 150 CAPSULE ORAL at 13:01

## 2024-05-02 RX ADMIN — PRIMAQUINE PHOSPHATE 30 MG: 15 TABLET, FILM COATED ORAL at 13:08

## 2024-05-02 RX ADMIN — HYDROXYZINE HYDROCHLORIDE 25 MG: 25 TABLET, FILM COATED ORAL at 20:55

## 2024-05-02 RX ADMIN — MORPHINE SULFATE 15 MG: 15 TABLET, FILM COATED, EXTENDED RELEASE ORAL at 20:55

## 2024-05-02 RX ADMIN — ACYCLOVIR 400 MG: 400 TABLET ORAL at 08:47

## 2024-05-02 RX ADMIN — MAGNESIUM SULFATE HEPTAHYDRATE 4000 MG: 40 INJECTION, SOLUTION INTRAVENOUS at 10:24

## 2024-05-02 RX ADMIN — ACETAMINOPHEN 650 MG: 325 SUSPENSION ORAL at 23:28

## 2024-05-02 RX ADMIN — HYDROCODONE BITARTRATE AND HOMATROPINE METHYLBROMIDE 5 ML: 5; 1.5 SOLUTION ORAL at 13:08

## 2024-05-02 RX ADMIN — CEFEPIME 2000 MG: 2 INJECTION, POWDER, FOR SOLUTION INTRAVENOUS at 14:30

## 2024-05-02 RX ADMIN — Medication 1 G: at 08:47

## 2024-05-02 RX ADMIN — NYSTATIN 500000 UNITS: 100000 SUSPENSION ORAL at 20:55

## 2024-05-02 RX ADMIN — MORPHINE SULFATE 15 MG: 15 TABLET, FILM COATED, EXTENDED RELEASE ORAL at 08:47

## 2024-05-02 RX ADMIN — MICAFUNGIN SODIUM 100 MG: 100 INJECTION, POWDER, LYOPHILIZED, FOR SOLUTION INTRAVENOUS at 13:33

## 2024-05-02 RX ADMIN — PREDNISONE 40 MG: 20 TABLET ORAL at 20:55

## 2024-05-02 RX ADMIN — Medication 1 G: at 13:01

## 2024-05-02 RX ADMIN — DIPHENHYDRAMINE HYDROCHLORIDE 5 ML: 12.5 LIQUID ORAL at 13:11

## 2024-05-02 RX ADMIN — HYDROCODONE BITARTRATE AND HOMATROPINE METHYLBROMIDE 5 ML: 5; 1.5 SOLUTION ORAL at 22:51

## 2024-05-02 RX ADMIN — NYSTATIN 500000 UNITS: 100000 SUSPENSION ORAL at 08:47

## 2024-05-02 RX ADMIN — Medication 1 G: at 18:19

## 2024-05-02 RX ADMIN — ALLOPURINOL 300 MG: 300 TABLET ORAL at 08:47

## 2024-05-02 RX ADMIN — PREDNISONE 40 MG: 20 TABLET ORAL at 13:08

## 2024-05-02 RX ADMIN — LEVOTHYROXINE SODIUM 150 MCG: 0.15 TABLET ORAL at 06:20

## 2024-05-02 RX ADMIN — AMLODIPINE BESYLATE 5 MG: 5 TABLET ORAL at 08:47

## 2024-05-02 RX ADMIN — CEFEPIME 2000 MG: 2 INJECTION, POWDER, FOR SOLUTION INTRAVENOUS at 22:49

## 2024-05-02 RX ADMIN — FLUOXETINE HYDROCHLORIDE 20 MG: 10 CAPSULE ORAL at 08:47

## 2024-05-02 RX ADMIN — CEFEPIME 2000 MG: 2 INJECTION, POWDER, FOR SOLUTION INTRAVENOUS at 06:20

## 2024-05-02 RX ADMIN — CLINDAMYCIN HYDROCHLORIDE 300 MG: 150 CAPSULE ORAL at 18:19

## 2024-05-02 RX ADMIN — DIPHENHYDRAMINE HYDROCHLORIDE 25 MG: 25 CAPSULE ORAL at 08:47

## 2024-05-02 ASSESSMENT — PAIN SCALES - GENERAL
PAINLEVEL_OUTOF10: 5
PAINLEVEL_OUTOF10: 5

## 2024-05-02 ASSESSMENT — PAIN DESCRIPTION - LOCATION: LOCATION: HEAD

## 2024-05-02 ASSESSMENT — PAIN DESCRIPTION - PAIN TYPE: TYPE: CHRONIC PAIN

## 2024-05-02 NOTE — CARE COORDINATION
LOS 24d  IDR and chart reviewed for Transition of Care planning.     24 Hour Events:  .4, VSS, on 2L  C1D17 Vidaza / Venetoclax / CNS521 per clinical trial [id number]  Hgb 7.5 after transfusion, plts <2k  BMBx completed 4/29 - path pending  On Cefe/Vanc for neutropenic fever, BC NGTD  PT/OT continue to follow    Transition of care is Home with home health when medically ready for discharge.    Transitions of Care plan is ongoing, no further concerns as of present.   Please consult  if any new issues arise.  Will continue to follow.

## 2024-05-02 NOTE — PROGRESS NOTES
GOALS:  LTG: Patient will maintain adequate hydration/nutrition with optimum safety and efficiency of swallowing function with PO intake without overt signs or symptoms of aspiration for the highest appropriate diet level.  STG:  Patient will consume easy to chew textures and thin liquids without overt signs or symptoms of airway compromise.    SPEECH LANGUAGE PATHOLOGY: DYSPHAGIA Initial Assessment    Acknowledge Order  I  Therapy Time  I   Charges     I  Rehab Caseload Tracker      NAME: Constance Gomes  : 1950  MRN: [id number]    ADMISSION DATE: 2024  PRIMARY DIAGNOSIS: Thrombocytopenia (HCC)    ICD-10: Treatment Diagnosis: R13.13 Dysphagia, Pharyngeal Phase    RECOMMENDATIONS   Diet:    Easy to Chew  Thin Liquids    Medication: as tolerated   Compensatory Swallowing Strategies:   Encouraged use of available medications around meal times to address throat pain   Therapeutic Intervention:   Patient/family education   Patient continues to require skilled intervention:  Yes. Recommend ongoing speech therapy services during this hospitalization.     Anticipated Discharge Needs: Do not anticipate ongoing speech therapy needs upon discharge.      ASSESSMENT    Patient presents with complaints of painful swallow which impacts her po intake. Currently with orders for throat spray, nystatin, and magic mouthwash which she restates she does not consistently use around meal times. No obvious oropharyngeal dysphagia observed, but intake is certainly impacted by discomfort. Discussed diet options with patient, RN, and RD. Will downgrade to easy to chew diet as patient requests \"soft foods\". OK for bread and sandwiches. Encouraged use of available medications to address sore throat around meals.  Will follow for diet tolerance.       GENERAL    Subjective: Patient alert, oriented. Drinking Ensure when SLP arrived.     Recent Information/Background: SLP consult received for difficulty swallowing. Recent onset of

## 2024-05-02 NOTE — PROGRESS NOTES
Hourly rounds performed this shift. Bed lowered and locked. Call light within reach. All needs met at this time. Bedside shift report will be given to oncoming nurse.

## 2024-05-02 NOTE — PROGRESS NOTES
ACUTE OCCUPATIONAL THERAPY GOALS:   (Developed with and agreed upon by patient and/or caregiver.)  1. Patient will complete lower body bathing and dressing with SUPERVISION and adaptive equipment as needed.     2. Patient will complete toileting with SUPERVISION.  3. Patient will complete grooming ADL standing at sink with SUPERVISION.  4. Patient will tolerate 25 minutes of OT treatment with 1-2 rest breaks to increase activity tolerance for ADLs.   5. Patient will complete functional transfers with SUPERVISION and adaptive equipment as needed.   6. Patient will tolerate 10 minutes BUE exercises to increase strength for safe, functional transfers.      Timeframe: 7 visits     OCCUPATIONAL THERAPY: Daily Note    OT Visit Days: 5   Time In/Out  OT Charge Capture  Rehab Caseload Tracker  OT Orders    Constance Gomes is a 73 y.o. female   PRIMARY DIAGNOSIS: Thrombocytopenia (HCC)  Epistaxis [R04.0]  Lightheadedness [R42]  Thrombocytopenia (HCC) [D69.6]  Pancytopenia (HCC) [D61.818]  Symptomatic anemia [D64.9]       Inpatient: Payor: HU[employer]NA MEDICARE / Plan: HUMANA CHOICE-O MEDICARE / Product Type: *No Product type* /     ASSESSMENT:     REHAB RECOMMENDATIONS:   Recommendation to date pending progress:  Setting:  Home Health Therapy    Equipment:    To Be Determined     ASSESSMENT:  Ms. Gomes is progressing well towards OT goals. Today, pt was received supine in bed and reported feeling better than yesterday. Completed bed mobility, LB dressing, functional transfers, ambulation with rolling walker, and grooming tasks standing at the sink with overall CGA. Rest breaks required throughout session. Pt required encouragement and motivation throughout session though very capable. Recommend pt return home with  therapy services at discharge. Ms. Gomes continues to demonstrate overall deficits in strength, balance, activity tolerance, and ADL performance. Continue OT efforts and POC in order to address functional  deficits and OT goals stated above.        SUBJECTIVE:     Ms. Gomes states, \"today is better\"     Social/Functional Lives With: Spouse  Type of Home: Mobile home  Home Layout: One level  Home Access: Ramped entrance  Bathroom Toilet: Standard  Bathroom Equipment: Commode  Bathroom Accessibility: Accessible  Home Equipment: Cane  Receives Help From: Family  ADL Assistance: Independent  Homemaking Assistance: Independent  Ambulation Assistance: Independent  Transfer Assistance: Independent  Active : No  Patient's  Info: Family  Mode of Transportation: Family  Occupation: Retired    OBJECTIVE:     LINES / DRAINS / AIRWAY: IV    RESTRICTIONS/PRECAUTIONS:           PAIN: VITALS / O2:   Pre Treatment:   Pain Assessment: None - Denies Pain        Post Treatment: no change  Vitals          Oxygen   3L      MOBILITY: I Mod I S SBA CGA Min Mod Max Total  NT x2 Comments:   Bed Mobility    Rolling [] [] [] [] [] [] [] [] [] [x] []    Supine to Sit [] [] [] [] [x] [] [] [] [] [] []    Scooting [] [] [] [] [x] [] [] [] [] [] []    Sit to Supine [] [] [] [] [] [] [] [] [] [x] [] Left sitting in the chair   Transfers    Sit to Stand [] [] [] [] [x] [] [] [] [] [] []    Bed to Chair [] [] [] [] [x] [] [] [] [] [] [] RW   Stand to Sit [] [] [] [] [x] [] [] [] [] [] []    Tub/Shower [] [] [] [] [] [] [] [] [] [x] []     Toilet [] [] [] [] [] [] [] [] [] [x] []      [] [] [] [] [] [] [] [] [] [] []    I=Independent, Mod I=Modified Independent, S=Supervision/Setup, SBA=Standby Assistance, CGA=Contact Guard Assistance, Min=Minimal Assistance, Mod=Moderate Assistance, Max=Maximal Assistance, Total=Total Assistance, NT=Not Tested    ACTIVITIES OF DAILY LIVING: I Mod I S SBA CGA Min Mod Max Total NT Comments   BASIC ADLs:              Upper Body   Bathing [] [] [] [] [] [] [] [] [] [x]     Lower Body Bathing [] [] [] [] [] [] [] [] [] [x]     Toileting [] [] [] [] [] [] [] [] [] [x]    Upper Body Dressing [] [] [] [] [x] [] []

## 2024-05-02 NOTE — PROGRESS NOTES
EOS.Tmax 101.4 today. Pt afebrile at this time. No complaints other than patient feeling tired and having painful swallowing. Encouraged pt to use MMW prior to eating. Pt up in chair for a short time after working with PT. Report given to Kavon at bedside.

## 2024-05-02 NOTE — CONSULTS
Infectious Disease Consult    Today's Date: 2024   Admit Date: 2024  : 1950    Impression:   Neutropenic Fever with hypoxia  Relapsed AML with severe cytopenias despite transfusions  Elevated fungitell from  with negative aspergillus galactomannan from the same date.  Odynophagia  Sulfa and PCN allergies described as hives.   Psoriatic Arthritis with h/o receiving humira    Plan:   I am concerned about PJP given the new O2 requirements over the last 5 days, the fevers, the CXR findings, and the elevated fungitell. She is allergic to sulfa, so we will use clindamycin and primaquine empirically. She also warrants steroids.   I only put in the initial prednisone dose, so after day 5 she may need a taper.   Yocasta ordered  Of note only certain fungi produce 1,3 Beta D glucan in their cell walls, so things like crypto, mucor, blasto, etc... should not do this.   It is not clear to me if the patient was receiving fluconazole since admission?  ID will follow    Anti-infectives:   Micafungin 5/ -  Clinda 5/ -  Primaquine 5/ -  Prednisone for PJP / -  Cefepime resumed  -  Acyclovir prophylaxis  Vanc -  Levaquin intermittently    Subjective:   Date of Consultation:  May 2, 2024  Referring Physician: Gunnar Perez MD for: assistance in management of positive fungitell in neutropenic fever patient.     Patient is a 73 y.o. female with relapsed AML who was admitted on 24 for severe cytopenias. She has been receiving vidaza and venetoclax and also recent Dex/IVIG. For significant details, please see Oncology notes. She just underwent repeat BM biopsy on 24 and we are awaiting results.     ID was consulted due to a + fungitell at ~ 350 (significantly elevated), in the setting of prolonged neutopenia and intermittent fevers. The pt is also coughing, and her daughter and nurse note she has needed supplemental O2 for 5 days. She reports pain with swallowing as well, but it appears she  Av.8 °F (37.1 °C), Min:97.5 °F (36.4 °C), Max:101.4 °F (38.6 °C)       Intake/Output Summary (Last 24 hours) at 2024 1024  Last data filed at 2024 0613  Gross per 24 hour   Intake 1149.92 ml   Output 550 ml   Net 599.92 ml       Physical Exam:   General:  NAD  HEENT:  NCAT, Sclerae anicteric, PERRL, MMM  CV:   RRR, no M/R/G  Lungs:  No W/R/R. Symmetric expansion  Abdomen:  Appears soft, NT, ND  Extremities: No cyanosis or clubbing, no edema  Skin:   No rashes, normal coloration, warm and dry  Psych:  Normal mood and affect    Lines:    External Urinary Catheter (Active)     Single Lumen Implantable Port 24 Right Subclavian (Active)       Data Review:   I have personally reviewed labs, micro, and other relevant tests:    Labs: Results:   BMP, Mg, Phos Recent Labs     24  02124   * 132* 130*   K 3.6 3.5 3.9   CL 96* 100 96*   CO2  25   ANIONGAP 9 9 10   BUN 11 10 10   CREATININE 0.38* 0.34* 0.34*   LABGLOM >90 >90 >90   CALCIUM 8.4* 8.2* 8.7*   GLUCOSE 104* 99 90   MG 2.2 1.6* 1.7*   PHOS 3.2  --   --       CBC w/ diff Recent Labs     24  02124  0357 24  1844 24   WBC 0.3* 0.3*  --  0.4*   RBC 2.47* 2.99*  --  2.67*   HGB 6.7* 8.3*  --  7.5*   HCT 20.1* 24.3*  --  21.4*   MCV 81.4* 81.3*  --  80.1*   MCH 27.1 27.8  --  28.1   MCHC 33.3 34.2  --  35.0   RDW 14.3 14.1  --  14.4   PLT 6* 3* 2* <2*   MPV Unable to calculate. Recommend adding IPF. Unable to calculate. Recommend adding IPF.  --  Unable to calculate. Recommend adding IPF.   NRBC 0.00 0.00  --  0.00      LFT Recent Labs     24  0212 24  0357 24  035   BILITOT 0.6 0.7 0.7   BILIDIR 0.2  --   --    ALKPHOS 203* 256* 286*   AST 45* 52* 55*   ALT 18 20 19   GLOB 4.4* 3.8* 4.3*      A1c Lab Results   Component Value Date/Time    LABA1C 6.1 10/31/2023 11:18 PM     10/31/2023 11:18 PM        Microbiology:     Results       Procedure Component

## 2024-05-02 NOTE — PROGRESS NOTES
CH attempted to visit PT several times. PT was in bed. PT stated PT was not feeling well.  CH offered prayer and support.     Rev. Neelam Wong M.Div.

## 2024-05-02 NOTE — PROGRESS NOTES
ACUTE PHYSICAL THERAPY GOALS:   (Developed with and agreed upon by patient and/or caregiver.)    LTG:  (1.)Ms. Gomes will move from supine to sit and sit to supine , scoot up and down, and roll side to side in bed with INDEPENDENT within 7 treatment day(s).    (2.)Ms. Gomes will transfer from bed to chair and chair to bed with MODIFIED INDEPENDENCE using the least restrictive device within 7 treatment day(s).    (3.)Ms. Gomes will ambulate with STAND BY ASSIST for 250+ feet with the least restrictive device within 7 treatment day(s), while maintaining normal vital signs.  (4.)Ms. Gomes will tolerate 23+ minutes of therapeutic activity within 7 treatment days for increased activity tolerance and functional mobility.    PHYSICAL THERAPY: Re evaluation-Daily Note AM   (Link to Caseload Tracking: PT Visit Days : 1  Time In/Out PT Charge Capture  Rehab Caseload Tracker  Orders    Constance Gomes is a 73 y.o. female   PRIMARY DIAGNOSIS: Thrombocytopenia (HCC)  Epistaxis [R04.0]  Lightheadedness [R42]  Thrombocytopenia (HCC) [D69.6]  Pancytopenia (HCC) [D61.818]  Symptomatic anemia [D64.9]       Inpatient: Payor: [employer] MEDICARE / Plan: [employer] CHOICE-PPO MEDICARE / Product Type: *No Product type* /     ASSESSMENT:     REHAB RECOMMENDATIONS:   Recommendation to date pending progress:  Setting:  Home Health Therapy    Equipment:    To Be Determined  has RW, shower chair, BSC at home      ASSESSMENT:  Ms. Gomes is feeling better today.  She is now 2 days post her last chemo treatment.  She has been seen by PT for mobility and gait training.  Its unfortunate that she is on a roller coaster ride from a functional standpoint.  She does ok and her activity tolerance and strength improve until she gets her next dose of chemo.  Then she gets weak and sick.  Today was a good day.  She needs some motivation but was able to move around the room with SBA to cg of 1 with a rolling walker.  She stood to perform dynamic task at the sink

## 2024-05-02 NOTE — PROGRESS NOTES
[facility] Hematology & Oncology        Inpatient Hematology / Oncology Progress Note    Reason for Consult:  Epistaxis [R04.0]  Lightheadedness [R42]  Thrombocytopenia (HCC) [D69.6]  Pancytopenia (HCC) [D61.818]  Symptomatic anemia [D64.9]  Referring Physician:  Gunnar Perez MD    24 Hour Events:  .4, VSS, on 2L  C1D17 Vidaza / Venetoclax / [id number] per clinical trial [id number]  Hgb 7.5 after transfusion, plts <2k  BMBx completed 4/29 - path pending  On Cefe/Vanc for neutropenic fever, BC NGTD    Transfusions: Plts  Replacements: Mg, KCl    ROS:  Constitutional: +fatigue, weakness. Negative for fever, chills.  CV: +edema (better). Negative for chest pain, palpitations.  Respiratory: +dyspnea (better), cough. Negative for wheezing.  GI: +nausea. Negative for abdominal pain, diarrhea.    10 point review of systems is otherwise negative with the exception of the elements mentioned above in the HPI.     Allergies   Allergen Reactions    Penicillins Hives    Sulfa Antibiotics Hives    Codeine Nausea And Vomiting     Past Medical History:   Diagnosis Date    Arthritis     Autoimmune disease (HCC)     skin changes, unknown name    Cancer (HCC) 1990    ovarian    Chronic pain     arthritis in back and legs    Coronary artery spasm (HCC)     COVID 05/15/2022    Essential hypertension 11/8/2019    Gastritis     GERD (gastroesophageal reflux disease)     Hx antineoplastic chemo     Migraine headache     Psoriasis     Psychiatric disorder     anxiety and depression    Severe obesity (BMI 35.0-39.9) 6/26/2018    Thyroid disease     Diagnosed in 1996     Past Surgical History:   Procedure Laterality Date    CARPAL TUNNEL RELEASE      GYN      ovaries    HYSTERECTOMY, TOTAL ABDOMINAL (CERVIX REMOVED)  1990    IR PORT PLACEMENT EQUAL OR GREATER THAN 5 YEARS  1/3/2024    IR PORT PLACEMENT EQUAL OR GREATER THAN 5 YEARS 1/3/2024 [facility] RADIOLOGY SPECIALS    [place] UNLISTED PROCEDURE CARDIAC SURGERY      cardiac cath.  Dx with  in sodium chloride (PF) 0.9 % 10 mL injection  0.5 mg IntraVENous Q6H PRN Catie Sorensen APRN - CNP   0.5 mg at 04/26/24 1038    lactulose (CHRONULAC) 10 GM/15ML solution 20 g  20 g Oral Q8H PRN Catie Sorensen APRN - CNP   20 g at 04/25/24 2131    ipratropium 0.5 mg-albuterol 2.5 mg (DUONEB) nebulizer solution 1 Dose  1 Dose Inhalation Q4H PRN Catie Sorensen APRN - CNP   1 Dose at 04/29/24 0447    guaiFENesin (ROBITUSSIN) 100 MG/5ML liquid 200 mg  200 mg Oral Q4H PRN Catie Sorensen APRN - CNP   200 mg at 04/19/24 2143    HYDROcodone homatropine (HYCODAN) 5-1.5 MG/5ML solution 5 mL  5 mL Oral Q4H PRN Catie Sorensen APRN - CNP   5 mL at 04/14/24 2015    [Held by provider] senna (SENOKOT) tablet 17.2 mg  2 tablet Oral Nightly Tamela Nick MD   17.2 mg at 04/29/24 1939    [Held by provider] polyethylene glycol (GLYCOLAX) packet 17 g  17 g Oral Daily Tamela Nick MD   17 g at 05/01/24 0738    magic (miracle) mouthwash  5 mL Swish & Spit 4x Daily PRN Catie Sorensen APRN - CNP   5 mL at 04/30/24 0601    acyclovir (ZOVIRAX) tablet 400 mg  400 mg Oral BID Saman Abdalla MD   400 mg at 05/02/24 0847    amLODIPine (NORVASC) tablet 5 mg  5 mg Oral Daily Saman Abdalla MD   5 mg at 05/02/24 0847    famotidine (PEPCID) tablet 40 mg  40 mg Oral QPM Saman Abdalla MD   40 mg at 05/01/24 1748    HYDROcodone-acetaminophen (NORCO)  MG per tablet 1 tablet  1 tablet Oral Q6H PRN Saman Abdalla MD   1 tablet at 04/18/24 0439    morphine (MS CONTIN) extended release tablet 15 mg  15 mg Oral BID Saman Abdalla MD   15 mg at 05/02/24 0847    pantoprazole (PROTONIX) tablet 40 mg  40 mg Oral BID Saman Abdalla MD   40 mg at 05/02/24 0620    tiZANidine (ZANAFLEX) tablet 4 mg  4 mg Oral Nightly PRN Saman Abdalla MD   4 mg at 04/16/24 0003    HYDROmorphone HCl PF (DILAUDID) injection 0.25 mg  0.25 mg IntraVENous Q4H PRN Saman Abdalla MD   0.25 mg at 05/01/24 6199    acetaminophen (TYLENOL) tablet 650 mg  650 mg Oral Q6H

## 2024-05-02 NOTE — PROGRESS NOTES
Pt initially assessed for a PICC line for difficult access. Pt did not have any vessels large enough for 45% occupancy rule in LUE. RUE is not a candidate due to history of PICC associated thrombosis.    Also assessed for the possibility of Mid-line in LUE. Pt again did not have any vessels large enough to measure for trimmable Mid-line.    An extended dwell length catheter was ultimately chosen to be the most suitable access for pt at this time.   Ultrasound was used to find the vein which was compressible and without any ultrasound features of an artery or nerve bundle. Skin was cleaned and disinfected prior to IV puncture.  Under real-time ultrasound guidance peripheral access was obtained in the left brachial using 20 G 2.5\" B [place] Deep Access after 1 attempt(s). Blood return was present and IV flushed without difficulty with no clinical signs of infiltration. IV dressing applied and no immediate complications noted. Patient tolerated the procedure well.    Insertion went very smoothly. Post insertion the pt started to develop some sight trauma hematoma where catheter entered the skin. Pressure dressing was placed using tape and gauze 2x2 over top of the IV dressing.

## 2024-05-03 PROBLEM — B59 PNEUMOCYSTIS JIROVECI PNEUMONIA (HCC): Status: ACTIVE | Noted: 2024-05-03

## 2024-05-03 PROBLEM — J96.01 ACUTE HYPOXEMIC RESPIRATORY FAILURE (HCC): Status: ACTIVE | Noted: 2024-05-03

## 2024-05-03 LAB
ALBUMIN SERPL-MCNC: 2.2 G/DL (ref 3.2–4.6)
ALBUMIN/GLOB SERPL: 0.5 (ref 1–1.9)
ALP SERPL-CCNC: 372 U/L (ref 35–104)
ALT SERPL-CCNC: 25 U/L (ref 12–65)
ANION GAP SERPL CALC-SCNC: 8 MMOL/L (ref 9–18)
AST SERPL-CCNC: 55 U/L (ref 15–37)
BACTERIA SPEC CULT: NORMAL
BILIRUB SERPL-MCNC: 0.6 MG/DL (ref 0–1.2)
BLD PROD TYP BPU: NORMAL
BLD PROD TYP BPU: NORMAL
BLOOD BANK BLOOD PRODUCT EXPIRATION DATE: NORMAL
BLOOD BANK BLOOD PRODUCT EXPIRATION DATE: NORMAL
BLOOD BANK CMNT PATIENT-IMP: NORMAL
BLOOD BANK CMNT PATIENT-IMP: NORMAL
BLOOD BANK DISPENSE STATUS: NORMAL
BLOOD BANK DISPENSE STATUS: NORMAL
BLOOD BANK ISBT PRODUCT BLOOD TYPE: 5100
BLOOD BANK ISBT PRODUCT BLOOD TYPE: 6200
BLOOD BANK PRODUCT CODE: NORMAL
BLOOD BANK PRODUCT CODE: NORMAL
BLOOD BANK UNIT TYPE AND RH: NORMAL
BLOOD BANK UNIT TYPE AND RH: NORMAL
BPU ID: NORMAL
BPU ID: NORMAL
BUN SERPL-MCNC: 14 MG/DL (ref 8–23)
CALCIUM SERPL-MCNC: 8.9 MG/DL (ref 8.8–10.2)
CHLORIDE SERPL-SCNC: 97 MMOL/L (ref 98–107)
CO2 SERPL-SCNC: 27 MMOL/L (ref 20–28)
CREAT SERPL-MCNC: 0.35 MG/DL (ref 0.6–1.1)
DIFFERENTIAL METHOD BLD: ABNORMAL
ERYTHROCYTE [DISTWIDTH] IN BLOOD BY AUTOMATED COUNT: 14.7 % (ref 11.9–14.6)
GLOBULIN SER CALC-MCNC: 4.5 G/DL (ref 2.3–3.5)
GLUCOSE SERPL-MCNC: 167 MG/DL (ref 70–99)
HCT VFR BLD AUTO: 21.3 % (ref 35.8–46.3)
HGB BLD-MCNC: 7.3 G/DL (ref 11.7–15.4)
MAGNESIUM SERPL-MCNC: 2.2 MG/DL (ref 1.8–2.4)
MCH RBC QN AUTO: 27.8 PG (ref 26.1–32.9)
MCHC RBC AUTO-ENTMCNC: 34.3 G/DL (ref 31.4–35)
MCV RBC AUTO: 81 FL (ref 82–102)
NRBC # BLD: 0 K/UL (ref 0–0.2)
PLATELET # BLD AUTO: 10 K/UL (ref 150–450)
PLATELET # BLD AUTO: <2 K/UL (ref 150–450)
PLATELET COMMENT: ABNORMAL
PMV BLD AUTO: 7.7 FL (ref 9.4–12.3)
POTASSIUM SERPL-SCNC: 3.7 MMOL/L (ref 3.5–5.1)
PROT SERPL-MCNC: 6.6 G/DL (ref 6.3–8.2)
RBC # BLD AUTO: 2.63 M/UL (ref 4.05–5.2)
RBC MORPH BLD: ABNORMAL
SERVICE CMNT-IMP: NORMAL
SODIUM SERPL-SCNC: 132 MMOL/L (ref 136–145)
UNIT DIVISION: 0
UNIT DIVISION: 0
UNIT ISSUE DATE/TIME: NORMAL
UNIT ISSUE DATE/TIME: NORMAL
WBC # BLD AUTO: ABNORMAL K/UL (ref 4.3–11.1)
WBC MORPH BLD: ABNORMAL

## 2024-05-03 PROCEDURE — 1100000000 HC RM PRIVATE

## 2024-05-03 PROCEDURE — 6360000002 HC RX W HCPCS: Performed by: INTERNAL MEDICINE

## 2024-05-03 PROCEDURE — 2580000003 HC RX 258: Performed by: INTERNAL MEDICINE

## 2024-05-03 PROCEDURE — 2700000000 HC OXYGEN THERAPY PER DAY

## 2024-05-03 PROCEDURE — 36592 COLLECT BLOOD FROM PICC: CPT

## 2024-05-03 PROCEDURE — 94640 AIRWAY INHALATION TREATMENT: CPT

## 2024-05-03 PROCEDURE — 2580000003 HC RX 258: Performed by: STUDENT IN AN ORGANIZED HEALTH CARE EDUCATION/TRAINING PROGRAM

## 2024-05-03 PROCEDURE — 94761 N-INVAS EAR/PLS OXIMETRY MLT: CPT

## 2024-05-03 PROCEDURE — 83735 ASSAY OF MAGNESIUM: CPT

## 2024-05-03 PROCEDURE — 94760 N-INVAS EAR/PLS OXIMETRY 1: CPT

## 2024-05-03 PROCEDURE — 6360000002 HC RX W HCPCS: Performed by: NURSE PRACTITIONER

## 2024-05-03 PROCEDURE — 6370000000 HC RX 637 (ALT 250 FOR IP)

## 2024-05-03 PROCEDURE — 6370000000 HC RX 637 (ALT 250 FOR IP): Performed by: INTERNAL MEDICINE

## 2024-05-03 PROCEDURE — P9037 PLATE PHERES LEUKOREDU IRRAD: HCPCS

## 2024-05-03 PROCEDURE — 85049 AUTOMATED PLATELET COUNT: CPT

## 2024-05-03 PROCEDURE — 36430 TRANSFUSION BLD/BLD COMPNT: CPT

## 2024-05-03 PROCEDURE — 86813 HLA TYPING A B OR C: CPT

## 2024-05-03 PROCEDURE — 2580000003 HC RX 258: Performed by: NURSE PRACTITIONER

## 2024-05-03 PROCEDURE — 85025 COMPLETE CBC W/AUTO DIFF WBC: CPT

## 2024-05-03 PROCEDURE — 6370000000 HC RX 637 (ALT 250 FOR IP): Performed by: STUDENT IN AN ORGANIZED HEALTH CARE EDUCATION/TRAINING PROGRAM

## 2024-05-03 PROCEDURE — 6370000000 HC RX 637 (ALT 250 FOR IP): Performed by: NURSE PRACTITIONER

## 2024-05-03 PROCEDURE — 80053 COMPREHEN METABOLIC PANEL: CPT

## 2024-05-03 PROCEDURE — 36591 DRAW BLOOD OFF VENOUS DEVICE: CPT

## 2024-05-03 RX ORDER — SODIUM CHLORIDE 9 MG/ML
INJECTION, SOLUTION INTRAVENOUS CONTINUOUS
Status: DISCONTINUED | OUTPATIENT
Start: 2024-05-03 | End: 2024-05-09

## 2024-05-03 RX ORDER — SODIUM CHLORIDE FOR INHALATION 3 %
4 VIAL, NEBULIZER (ML) INHALATION 2 TIMES DAILY
Status: DISPENSED | OUTPATIENT
Start: 2024-05-03 | End: 2024-05-05

## 2024-05-03 RX ORDER — SODIUM CHLORIDE 9 MG/ML
INJECTION, SOLUTION INTRAVENOUS PRN
Status: COMPLETED | OUTPATIENT
Start: 2024-05-03 | End: 2024-05-03

## 2024-05-03 RX ORDER — SODIUM CHLORIDE 9 MG/ML
INJECTION, SOLUTION INTRAVENOUS PRN
Status: DISCONTINUED | OUTPATIENT
Start: 2024-05-03 | End: 2024-05-07

## 2024-05-03 RX ORDER — POLYETHYLENE GLYCOL 3350 17 G/17G
17 POWDER, FOR SOLUTION ORAL DAILY PRN
Status: DISCONTINUED | OUTPATIENT
Start: 2024-05-03 | End: 2024-05-09

## 2024-05-03 RX ORDER — LIDOCAINE HYDROCHLORIDE 20 MG/ML
15 SOLUTION OROPHARYNGEAL
Status: DISCONTINUED | OUTPATIENT
Start: 2024-05-03 | End: 2024-05-09

## 2024-05-03 RX ADMIN — SODIUM CHLORIDE, PRESERVATIVE FREE 10 ML: 5 INJECTION INTRAVENOUS at 06:10

## 2024-05-03 RX ADMIN — CYCLOBENZAPRINE 10 MG: 10 TABLET, FILM COATED ORAL at 22:22

## 2024-05-03 RX ADMIN — LIDOCAINE HYDROCHLORIDE 15 ML: 20 SOLUTION ORAL at 01:45

## 2024-05-03 RX ADMIN — ALLOPURINOL 300 MG: 300 TABLET ORAL at 08:43

## 2024-05-03 RX ADMIN — Medication 1 G: at 08:42

## 2024-05-03 RX ADMIN — AMLODIPINE BESYLATE 5 MG: 5 TABLET ORAL at 08:42

## 2024-05-03 RX ADMIN — MICAFUNGIN SODIUM 100 MG: 100 INJECTION, POWDER, LYOPHILIZED, FOR SOLUTION INTRAVENOUS at 10:45

## 2024-05-03 RX ADMIN — ACYCLOVIR 400 MG: 400 TABLET ORAL at 08:43

## 2024-05-03 RX ADMIN — NYSTATIN 500000 UNITS: 100000 SUSPENSION ORAL at 21:25

## 2024-05-03 RX ADMIN — ACYCLOVIR 400 MG: 400 TABLET ORAL at 21:23

## 2024-05-03 RX ADMIN — SODIUM CHLORIDE: 9 INJECTION, SOLUTION INTRAVENOUS at 12:54

## 2024-05-03 RX ADMIN — MORPHINE SULFATE 15 MG: 15 TABLET, FILM COATED, EXTENDED RELEASE ORAL at 21:24

## 2024-05-03 RX ADMIN — FLUOXETINE HYDROCHLORIDE 20 MG: 10 CAPSULE ORAL at 21:24

## 2024-05-03 RX ADMIN — MORPHINE SULFATE 15 MG: 15 TABLET, FILM COATED, EXTENDED RELEASE ORAL at 08:50

## 2024-05-03 RX ADMIN — LEVOTHYROXINE SODIUM 150 MCG: 0.15 TABLET ORAL at 08:40

## 2024-05-03 RX ADMIN — PRIMAQUINE PHOSPHATE 30 MG: 15 TABLET, FILM COATED ORAL at 08:43

## 2024-05-03 RX ADMIN — CLINDAMYCIN HYDROCHLORIDE 300 MG: 150 CAPSULE ORAL at 08:50

## 2024-05-03 RX ADMIN — SODIUM CHLORIDE SOLN NEBU 3% 4 ML: 3 NEBU SOLN at 16:33

## 2024-05-03 RX ADMIN — PREDNISONE 40 MG: 20 TABLET ORAL at 08:43

## 2024-05-03 RX ADMIN — Medication 1 G: at 17:39

## 2024-05-03 RX ADMIN — FLUOXETINE HYDROCHLORIDE 20 MG: 10 CAPSULE ORAL at 08:42

## 2024-05-03 RX ADMIN — CEFEPIME 2000 MG: 2 INJECTION, POWDER, FOR SOLUTION INTRAVENOUS at 14:36

## 2024-05-03 RX ADMIN — CEFEPIME 2000 MG: 2 INJECTION, POWDER, FOR SOLUTION INTRAVENOUS at 22:23

## 2024-05-03 RX ADMIN — POTASSIUM CHLORIDE 40 MEQ: 1500 TABLET, EXTENDED RELEASE ORAL at 08:43

## 2024-05-03 RX ADMIN — HYDROCODONE BITARTRATE AND ACETAMINOPHEN 1 TABLET: 10; 325 TABLET ORAL at 22:27

## 2024-05-03 RX ADMIN — PANTOPRAZOLE SODIUM 40 MG: 40 TABLET, DELAYED RELEASE ORAL at 08:40

## 2024-05-03 RX ADMIN — HYDROXYZINE HYDROCHLORIDE 25 MG: 25 TABLET, FILM COATED ORAL at 21:23

## 2024-05-03 RX ADMIN — Medication 1 G: at 11:29

## 2024-05-03 RX ADMIN — SODIUM CHLORIDE: 9 INJECTION, SOLUTION INTRAVENOUS at 17:49

## 2024-05-03 RX ADMIN — HYDROXYZINE HYDROCHLORIDE 25 MG: 25 TABLET, FILM COATED ORAL at 08:43

## 2024-05-03 RX ADMIN — NYSTATIN 500000 UNITS: 100000 SUSPENSION ORAL at 17:39

## 2024-05-03 RX ADMIN — PANTOPRAZOLE SODIUM 40 MG: 40 TABLET, DELAYED RELEASE ORAL at 17:39

## 2024-05-03 RX ADMIN — PREDNISONE 40 MG: 20 TABLET ORAL at 21:23

## 2024-05-03 RX ADMIN — CEFEPIME 2000 MG: 2 INJECTION, POWDER, FOR SOLUTION INTRAVENOUS at 06:10

## 2024-05-03 RX ADMIN — ACETAMINOPHEN 650 MG: 325 TABLET ORAL at 10:45

## 2024-05-03 RX ADMIN — PHENOL 1 SPRAY: 1.5 LIQUID ORAL at 11:34

## 2024-05-03 RX ADMIN — POTASSIUM CHLORIDE 40 MEQ: 1500 TABLET, EXTENDED RELEASE ORAL at 21:23

## 2024-05-03 RX ADMIN — FAMOTIDINE 40 MG: 20 TABLET, FILM COATED ORAL at 17:39

## 2024-05-03 RX ADMIN — VORICONAZOLE 445 MG: 10 INJECTION, POWDER, LYOPHILIZED, FOR SOLUTION INTRAVENOUS at 17:54

## 2024-05-03 RX ADMIN — CLINDAMYCIN HYDROCHLORIDE 300 MG: 150 CAPSULE ORAL at 11:29

## 2024-05-03 RX ADMIN — CLINDAMYCIN HYDROCHLORIDE 300 MG: 150 CAPSULE ORAL at 01:42

## 2024-05-03 RX ADMIN — CLINDAMYCIN HYDROCHLORIDE 300 MG: 150 CAPSULE ORAL at 17:38

## 2024-05-03 RX ADMIN — NYSTATIN 500000 UNITS: 100000 SUSPENSION ORAL at 08:43

## 2024-05-03 ASSESSMENT — PAIN DESCRIPTION - PAIN TYPE: TYPE: ACUTE PAIN

## 2024-05-03 ASSESSMENT — PAIN DESCRIPTION - ORIENTATION: ORIENTATION: LOWER

## 2024-05-03 ASSESSMENT — PAIN DESCRIPTION - ONSET: ONSET: ON-GOING

## 2024-05-03 ASSESSMENT — PAIN SCALES - GENERAL
PAINLEVEL_OUTOF10: 0
PAINLEVEL_OUTOF10: 5
PAINLEVEL_OUTOF10: 8

## 2024-05-03 ASSESSMENT — PAIN DESCRIPTION - LOCATION: LOCATION: BACK

## 2024-05-03 ASSESSMENT — PAIN - FUNCTIONAL ASSESSMENT: PAIN_FUNCTIONAL_ASSESSMENT: PREVENTS OR INTERFERES SOME ACTIVE ACTIVITIES AND ADLS

## 2024-05-03 ASSESSMENT — PAIN DESCRIPTION - FREQUENCY: FREQUENCY: CONTINUOUS

## 2024-05-03 ASSESSMENT — PAIN DESCRIPTION - DESCRIPTORS: DESCRIPTORS: ACHING

## 2024-05-03 NOTE — PROGRESS NOTES
[facility] Hematology & Oncology        Inpatient Hematology / Oncology Progress Note    Reason for Consult:  Epistaxis [R04.0]  Lightheadedness [R42]  Thrombocytopenia (HCC) [D69.6]  Pancytopenia (HCC) [D61.818]  Symptomatic anemia [D64.9]  Referring Physician:  Gunnar Perez MD    24 Hour Events:  , VSS, on 3L  NC  C1D18 Vidaza / Venetoclax / [id number] per clinical trial [id number]  Ongoing refractory thrombocytopenia - daily plt transfusion  BMBx completed 4/29 - path pending  On Cefepime for NF (day 6)   +fungitell; started on clinda/pred/primiquine and Micafungin    Transfusions: Plts  Replacements: Mg, KCl    ROS:  Constitutional: +fatigue, weakness. Negative for fever, chills.  CV: +edema (better). Negative for chest pain, palpitations.  Respiratory: +dyspnea (better), cough. Negative for wheezing.  GI: +nausea. Negative for abdominal pain, diarrhea.    10 point review of systems is otherwise negative with the exception of the elements mentioned above in the HPI.     Allergies   Allergen Reactions    Penicillins Hives    Sulfa Antibiotics Hives    Codeine Nausea And Vomiting     Past Medical History:   Diagnosis Date    Arthritis     Autoimmune disease (HCC)     skin changes, unknown name    Cancer (HCC) 1990    ovarian    Chronic pain     arthritis in back and legs    Coronary artery spasm (HCC)     COVID 05/15/2022    Essential hypertension 11/8/2019    Gastritis     GERD (gastroesophageal reflux disease)     Hx antineoplastic chemo     Migraine headache     Psoriasis     Psychiatric disorder     anxiety and depression    Severe obesity (BMI 35.0-39.9) 6/26/2018    Thyroid disease     Diagnosed in 1996     Past Surgical History:   Procedure Laterality Date    CARPAL TUNNEL RELEASE      GYN      ovaries    HYSTERECTOMY, TOTAL ABDOMINAL (CERVIX REMOVED)  1990    IR PORT PLACEMENT EQUAL OR GREATER THAN 5 YEARS  1/3/2024    IR PORT PLACEMENT EQUAL OR GREATER THAN 5 YEARS 1/3/2024 [facility] RADIOLOGY  stable, incomplete I/O yesterday. O2 stable at 7L. Continues diuresis. Electrolytes stable/better today.  4/15 Required Airvo overnight, CXR unchanged. Continue diuresis.  4/18 Remains on Airvo. Weaning as tolerated - able to be off at times. Diuresed yesterday with good results, hold on diuresis today.   4/19 Down to 3L NC from Airvo overnight. Volume status much improved - monitoring and diuresing PRN.  4/24 On RA-3L  4/25 On RA. CXR completed yesterday shows BL opacities, checking fungal studies and monitoring fever (.2 and neutropenic), as well as prominent pulm vasculature. Monitoring for overload - remains off IVF.  4/26  - remains neutropenic. Fungal studies pending due to recent XR with BL opacities. Continues prophylactic cipro. Monitoring for fluid overload with frequent transfusions (off IV fluids).  4/27 afeb, Tm 98.6; fungal studies pending; on LVQ prophy - venetoclax adjusted per Trial Protocol   4/29 Stable fluid status  RESOLVED    Sore Throat  4/28 check RVP, nystatin swish and swallow   4/29 RVP negative.  RESOLVED    Somnolence / nausea / vomiting  4/26 Somnolent this morning but possibly related to antiemetics. Check CT head given recent thrombocytopenia. Continue antiemetics.  attributes nausea to constipation (she has been declining bowel regimen).   4/27 Mentation at baseline; CT head neg   RESOLVED    Hypotension / asymptomatic bradycardia  - Hold amlodipine  4/10 BP improving, resume amlodipine.  RESOLVED    Continue home meds  Rigo SOPs  VTE prophylaxis - AC contraindicated due to thrombocytopenia  Diet - Regular  PT/OT - Deferred  Code - Full  Acyclovir, Levaquin (changed from Cipro d/t interaction with Venetoclax per study), diflucan (held due to venetoclax) --> Micafungin    Dispo - too soon to determine. Possibly DC home once platelet transfusion can be safely managed as OP.    4/12 Discussed with patient and  risk for clinical deterioration due to increasing

## 2024-05-03 NOTE — CONSULTS
History and Physical Initial Visit NOTE           5/3/2024    Constance Gomes                        Date of Admission:  4/7/2024    The patient's chart is reviewed and the patient is discussed with the staff.    Subjective:     Patient is a 73 y.o.  female seen and evaluated at the request of  for PJP. PMH includes relapsed AML, ovarian cancer, hypothyroidism, CAD, HTN, psoriatic arthritis previously on humira. Has been receiving vidaza and venetoclax, dex/IVIG.  Patient was recently admitted 3/18 - 4/2 for pancytopenia and refractory thrombocytopenia despite platelet transfusions.  She received pulse dose Dex and IVIG and initially platelets began to respond to transfusion.  She presented to the hospital 4/7 with dizziness and gum bleeding.  Platelet counts were found to be less than 2.  She has been undergoing multiple platelet transfusions.  She was spiking some fevers at home.  She was admitted and started on treatment for neutropenic fevers with cefepime/vancomycin.  DIC was ruled out as well as TLS.  She underwent a bone marrow biopsy on 429 with path still pending.  During this hospitalization she is also received Dex and IVIG, has received 4 doses.  Fungitell was positive, galactomannan was negative.  She was empirically started on clinda/primaquine/prednisone for PJP as well as micafungin.  Throughout her hospitalization she also had worsening oxygen demands.  At one point did need Airvo however is currently on 3 L.  CXR and CT chest concerning for PJP as well.  ID is following.  Given thrombocytopenia unable to perform bronchoscopy at this time.  We were consulted for PJP.    Patient denies any known lung issues including COPD or asthma.  She states she smoked for 2 years but quit several decades ago.    Review of Systems: Comprehensive ROS negative except in HPI    Current Outpatient Medications   Medication Instructions    acyclovir (ZOVIRAX) 400 mg, Oral, 2 TIMES DAILY     hypertension 11/8/2019    Gastritis     GERD (gastroesophageal reflux disease)     Hx antineoplastic chemo     Migraine headache     Psoriasis     Psychiatric disorder     anxiety and depression    Severe obesity (BMI 35.0-39.9) 6/26/2018    Thyroid disease     Diagnosed in 1996     Past Surgical History:   Procedure Laterality Date    CARPAL TUNNEL RELEASE      GYN      ovaries    HYSTERECTOMY, TOTAL ABDOMINAL (CERVIX REMOVED)  1990    IR PORT PLACEMENT EQUAL OR GREATER THAN 5 YEARS  1/3/2024    IR PORT PLACEMENT EQUAL OR GREATER THAN 5 YEARS 1/3/2024 [facility] RADIOLOGY SPECIALS    [place] UNLISTED PROCEDURE CARDIAC SURGERY      cardiac cath.  Dx with spasms.    TUBAL LIGATION       Social History     Socioeconomic History    Marital status:      Spouse name: Not on file    Number of children: Not on file    Years of education: Not on file    Highest education level: Not on file   Occupational History    Not on file   Tobacco Use    Smoking status: Never     Passive exposure: Past    Smokeless tobacco: Never   Vaping Use    Vaping Use: Never used   Substance and Sexual Activity    Alcohol use: No    Drug use: Yes     Types: Prescription    Sexual activity: Not Currently     Birth control/protection: None   Other Topics Concern    Not on file   Social History Narrative    Not on file     Social Determinants of Health     Financial Resource Strain: Low Risk  (8/7/2023)    Overall Financial Resource Strain (CARDIA)     Difficulty of Paying Living Expenses: Not hard at all   Food Insecurity: No Food Insecurity (4/7/2024)    Hunger Vital Sign     Worried About Running Out of Food in the Last Year: Never true     Ran Out of Food in the Last Year: Never true   Transportation Needs: No Transportation Needs (4/7/2024)    PRAPARE - Transportation     Lack of Transportation (Medical): No     Lack of Transportation (Non-Medical): No   Physical Activity: Not on file   Stress: Not on file   Social Connections: Not on file

## 2024-05-03 NOTE — PROGRESS NOTES
Infectious Disease Consult    Today's Date: 5/3/2024   Admit Date: 2024  : 1950    Impression:   Neutropenic Fever with hypoxia  LLL nodular consolidation  Relapsed AML with severe cytopenias despite transfusions  Elevated fungitell from  with negative aspergillus galactomannan from the same date.  Odynophagia  Sulfa and PCN allergies described as hives.   Psoriatic Arthritis with h/o receiving humira    I am concerned about PJP given the new O2 requirements over the last 5 days, the fevers, the CXR findings, and the elevated fungitell.  Plan:   Continue empiric clindamycin, primaquine, and prednisone   Karius pending  Substitute voriconazole for micafungin based on the appearance of the CT.  No bronchoscopy planned due to thrombocytopenia      Anti-infectives:   Micafungin / -  Clinda  -  Primaquine  -  Prednisone for PJP  -  Cefepime resumed  -  Acyclovir prophylaxis  Vanc -  Levaquin intermittently    Subjective:   Occasional cough.  No dyspnea.    Allergies   Allergen Reactions    Penicillins Hives    Sulfa Antibiotics Hives    Codeine Nausea And Vomiting        Objective:     Visit Vitals  /73   Pulse 80   Temp 97.5 °F (36.4 °C) (Oral)   Resp 16   Ht 1.524 m (5')   Wt 74.2 kg (163 lb 9.6 oz)   SpO2 97%   BMI 31.95 kg/m²     Temp (24hrs), Av.3 °F (36.8 °C), Min:97.3 °F (36.3 °C), Max:100 °F (37.8 °C)       Intake/Output Summary (Last 24 hours) at 5/3/2024 1532  Last data filed at 5/3/2024 1254  Gross per 24 hour   Intake 1215.18 ml   Output 400 ml   Net 815.18 ml         Physical Exam:   General:  NAD; lying comfortably in bed  HEENT:  NCAT, Sclerae anicteric, PERRL, MMM  CV:   RRR, no M/R/G  Lungs:  No W/R/R. Symmetric expansion; not dyspneic appearing  Abdomen:  Appears soft, NT, ND  Extremities: No cyanosis or clubbing, no edema  Skin:   No rashes, normal coloration, warm and dry  Psych:  Normal mood and affect    Lines:    External Urinary Catheter (Active)

## 2024-05-03 NOTE — PROGRESS NOTES
Comprehensive Nutrition Assessment    Type and Reason for Visit: Reassess  Malnutrition Screening Tool: Malnutrition Screen  Have you recently lost weight without trying?: 2 to 13 pounds (1 point)  Have you been eating poorly because of a decreased appetite?: Yes (1 point)  Malnutrition Screening Tool Score: 2    Nutrition Recommendations/Plan:   Meals and Snacks:  Diet: Continue current order  Nutrition Supplement Therapy:  Medical food supplement therapy:  Continue Ensure Clear three times per day (this provides 240 kcal and 8 grams protein per bottle) and Magic Cup twice per day (this provides 290 kcal and 9 grams protein per serving) - EC berry flavor; MC chocolate  Start Ensure Enlive once daily (350 kcals and 20g protein) - chocolate     Malnutrition Assessment:  Malnutrition Status: At risk for malnutrition (Comment) (recurrent and prolonged admissions, variable intakes, wt loss)    No wasting  Nutrition Assessment:  Nutrition History: Patient known to clinical nutrition from previous admissions. She initially lost weight with COVID in May 2022 due to loss of taste. During previous admissions she ate fair during previous admissions and used Ensure intermittently to supplement intake. Patient reports she has been eating fair since recent discharge.  at bedside states she started going downhill recently and her PO declined with it. He states the most he has been able to get her to eat is half a cheeseburger.      Do You Have Any Cultural, Zoroastrian, or Ethnic Food Preferences?: No   Weight History: 6/20/23 180#, 10/10/23 173#, 1/8/24 160#. This reveals a 9.9% weight loss in ~10 months, 6.2% weight loss in last 6 months. This is notable but not clinically significant.  Nutrition Background:       PMH significant for psoriatic arthritis on Humira, ovarian cancer, HTN, HLD, GERD, AML s/p chemo and SCT. She presented with dizziness and gum bleeding. She was admitted with neutropenic fever, refractory

## 2024-05-03 NOTE — PROGRESS NOTES
Occupational Therapy Note:    Attempted to see pt for OT treatment session--pt very lethargic due to receiving benadryl and reported feeling poorly today. Requested to rest. Will continue to follow and attempt to see as schedule allows.    Thank you,  Irene Ritter, OTR/L

## 2024-05-03 NOTE — CARE COORDINATION
LOS 25d  IDR and Chart reviewed for Transition of Care planning.    24 Hour Events:  , VSS, on 3L  NC  C1D18 Vidaza / Venetoclax / [id number] per clinical trial [id number]  Ongoing refractory thrombocytopenia - daily plt transfusion  BMBx completed 4/29 - path pending  On Cefepime for NF (day 6)   +fungitell; started on clinda/pred/primiquine and Micafungin  Transfusions: Plts  Replacements: Mg, Kcl  PT/OT continue to follow    Transition of care is Home health when patient is medically stable.    Transitions of Care plan is ongoing, no further concerns as of present.   Please consult  if any new issues arise.  Will continue to follow

## 2024-05-03 NOTE — PLAN OF CARE
Patient has remained A&O x 4. Patient educated on new medication, Voriconazole  -Pt given one unit of platelets for platelet count of <2. Pt tolerated well no signs of reaction noted. PLT count of 10. Pt needing one more bag of PLT tonight.  -VS stable   -no complaints of pain  -Pt encouraged to deep breathe    Pt rounded on hourly by myself or patient assistant and encouraged to call with any needs. No signs of distress noted. Bed low, locked, call bell within reach.     Bedside shift report given to oncoming nurse    Andressa Pal RN   Problem: Pain  Goal: Verbalizes/displays adequate comfort level or baseline comfort level  5/3/2024 1450 by Andressa Pal RN  Outcome: Progressing  5/3/2024 0306 by Kavon Guardado RN  Outcome: Progressing  Flowsheets  Taken 5/2/2024 2214  Verbalizes/displays adequate comfort level or baseline comfort level:   Encourage patient to monitor pain and request assistance   Administer analgesics based on type and severity of pain and evaluate response   Implement non-pharmacological measures as appropriate and evaluate response  Taken 5/2/2024 1922  Verbalizes/displays adequate comfort level or baseline comfort level:   Encourage patient to monitor pain and request assistance   Administer analgesics based on type and severity of pain and evaluate response   Implement non-pharmacological measures as appropriate and evaluate response     Problem: Safety - Adult  Goal: Free from fall injury  5/3/2024 1450 by Andressa Pal RN  Outcome: Progressing  5/3/2024 0306 by Kavon Guardado RN  Outcome: Progressing  Flowsheets (Taken 5/2/2024 2323)  Free From Fall Injury: Instruct family/caregiver on patient safety     Problem: ABCDS Injury Assessment  Goal: Absence of physical injury  5/3/2024 1450 by Andressa Pal RN  Outcome: Progressing  Flowsheets (Taken 5/3/2024 0750)  Absence of Physical Injury: Implement safety measures based on patient assessment  5/3/2024 0306 by Kavon Guardado

## 2024-05-04 LAB
ALBUMIN SERPL-MCNC: 2.1 G/DL (ref 3.2–4.6)
ALBUMIN/GLOB SERPL: 0.5 (ref 1–1.9)
ALP SERPL-CCNC: 365 U/L (ref 35–104)
ALT SERPL-CCNC: 27 U/L (ref 12–65)
ANION GAP SERPL CALC-SCNC: 9 MMOL/L (ref 9–18)
AST SERPL-CCNC: 82 U/L (ref 15–37)
BACTERIA SPEC CULT: NORMAL
BASOPHILS # BLD: 0 K/UL (ref 0–0.2)
BASOPHILS NFR BLD: 0 % (ref 0–2)
BILIRUB SERPL-MCNC: 0.5 MG/DL (ref 0–1.2)
BLD PROD TYP BPU: NORMAL
BLOOD BANK BLOOD PRODUCT EXPIRATION DATE: NORMAL
BLOOD BANK CMNT PATIENT-IMP: NORMAL
BLOOD BANK DISPENSE STATUS: NORMAL
BLOOD BANK ISBT PRODUCT BLOOD TYPE: 6200
BLOOD BANK PRODUCT CODE: NORMAL
BLOOD BANK UNIT TYPE AND RH: NORMAL
BPU ID: NORMAL
BUN SERPL-MCNC: 17 MG/DL (ref 8–23)
CALCIUM SERPL-MCNC: 8.9 MG/DL (ref 8.8–10.2)
CHLORIDE SERPL-SCNC: 103 MMOL/L (ref 98–107)
CO2 SERPL-SCNC: 25 MMOL/L (ref 20–28)
CREAT SERPL-MCNC: 0.34 MG/DL (ref 0.6–1.1)
DIFFERENTIAL METHOD BLD: ABNORMAL
EOSINOPHIL # BLD: 0 K/UL (ref 0–0.8)
EOSINOPHIL NFR BLD: 0 % (ref 0.5–7.8)
ERYTHROCYTE [DISTWIDTH] IN BLOOD BY AUTOMATED COUNT: 15.2 % (ref 11.9–14.6)
GLOBULIN SER CALC-MCNC: 4.5 G/DL (ref 2.3–3.5)
GLUCOSE SERPL-MCNC: 133 MG/DL (ref 70–99)
HCT VFR BLD AUTO: 22.4 % (ref 35.8–46.3)
HGB BLD-MCNC: 7.4 G/DL (ref 11.7–15.4)
IMM GRANULOCYTES # BLD AUTO: 0.1 K/UL (ref 0–0.5)
IMM GRANULOCYTES NFR BLD AUTO: 14 % (ref 0–5)
LYMPHOCYTES # BLD: 0.4 K/UL (ref 0.5–4.6)
LYMPHOCYTES NFR BLD: 59 % (ref 13–44)
MAGNESIUM SERPL-MCNC: 1.9 MG/DL (ref 1.8–2.4)
MCH RBC QN AUTO: 26.9 PG (ref 26.1–32.9)
MCHC RBC AUTO-ENTMCNC: 33 G/DL (ref 31.4–35)
MCV RBC AUTO: 81.5 FL (ref 82–102)
MONOCYTES # BLD: 0.1 K/UL (ref 0.1–1.3)
MONOCYTES NFR BLD: 20 % (ref 4–12)
NEUTS SEG # BLD: 0 K/UL (ref 1.7–8.2)
NEUTS SEG NFR BLD: 7 % (ref 43–78)
NRBC # BLD: 0 K/UL (ref 0–0.2)
PLATELET # BLD AUTO: 2 K/UL (ref 150–450)
PLATELET # BLD AUTO: 29 K/UL (ref 150–450)
PLATELET COMMENT: ABNORMAL
PMV BLD AUTO: ABNORMAL FL (ref 9.4–12.3)
POTASSIUM SERPL-SCNC: 4.1 MMOL/L (ref 3.5–5.1)
PROT SERPL-MCNC: 6.6 G/DL (ref 6.3–8.2)
RBC # BLD AUTO: 2.75 M/UL (ref 4.05–5.2)
RBC MORPH BLD: ABNORMAL
SERVICE CMNT-IMP: NORMAL
SODIUM SERPL-SCNC: 137 MMOL/L (ref 136–145)
UNIT DIVISION: 0
UNIT ISSUE DATE/TIME: NORMAL
WBC # BLD AUTO: 0.6 K/UL (ref 4.3–11.1)
WBC MORPH BLD: ABNORMAL

## 2024-05-04 PROCEDURE — 83735 ASSAY OF MAGNESIUM: CPT

## 2024-05-04 PROCEDURE — 94640 AIRWAY INHALATION TREATMENT: CPT

## 2024-05-04 PROCEDURE — 6360000002 HC RX W HCPCS: Performed by: NURSE PRACTITIONER

## 2024-05-04 PROCEDURE — 6360000002 HC RX W HCPCS: Performed by: INTERNAL MEDICINE

## 2024-05-04 PROCEDURE — P9037 PLATE PHERES LEUKOREDU IRRAD: HCPCS

## 2024-05-04 PROCEDURE — 6370000000 HC RX 637 (ALT 250 FOR IP): Performed by: NURSE PRACTITIONER

## 2024-05-04 PROCEDURE — 6370000000 HC RX 637 (ALT 250 FOR IP): Performed by: STUDENT IN AN ORGANIZED HEALTH CARE EDUCATION/TRAINING PROGRAM

## 2024-05-04 PROCEDURE — 86644 CMV ANTIBODY: CPT

## 2024-05-04 PROCEDURE — 6370000000 HC RX 637 (ALT 250 FOR IP): Performed by: INTERNAL MEDICINE

## 2024-05-04 PROCEDURE — 94760 N-INVAS EAR/PLS OXIMETRY 1: CPT

## 2024-05-04 PROCEDURE — 2580000003 HC RX 258: Performed by: NURSE PRACTITIONER

## 2024-05-04 PROCEDURE — 36591 DRAW BLOOD OFF VENOUS DEVICE: CPT

## 2024-05-04 PROCEDURE — 2580000003 HC RX 258: Performed by: INTERNAL MEDICINE

## 2024-05-04 PROCEDURE — 80053 COMPREHEN METABOLIC PANEL: CPT

## 2024-05-04 PROCEDURE — 85025 COMPLETE CBC W/AUTO DIFF WBC: CPT

## 2024-05-04 PROCEDURE — 2580000003 HC RX 258: Performed by: STUDENT IN AN ORGANIZED HEALTH CARE EDUCATION/TRAINING PROGRAM

## 2024-05-04 PROCEDURE — 1100000000 HC RM PRIVATE

## 2024-05-04 PROCEDURE — 85049 AUTOMATED PLATELET COUNT: CPT

## 2024-05-04 PROCEDURE — 36430 TRANSFUSION BLD/BLD COMPNT: CPT

## 2024-05-04 PROCEDURE — 6360000002 HC RX W HCPCS: Performed by: STUDENT IN AN ORGANIZED HEALTH CARE EDUCATION/TRAINING PROGRAM

## 2024-05-04 RX ORDER — SODIUM CHLORIDE 9 MG/ML
INJECTION, SOLUTION INTRAVENOUS PRN
Status: DISCONTINUED | OUTPATIENT
Start: 2024-05-04 | End: 2024-05-07

## 2024-05-04 RX ORDER — HYDROMORPHONE HYDROCHLORIDE 1 MG/ML
0.5 INJECTION, SOLUTION INTRAMUSCULAR; INTRAVENOUS; SUBCUTANEOUS EVERY 4 HOURS PRN
Status: DISCONTINUED | OUTPATIENT
Start: 2024-05-04 | End: 2024-05-05

## 2024-05-04 RX ADMIN — HYDROXYZINE HYDROCHLORIDE 25 MG: 25 TABLET, FILM COATED ORAL at 07:59

## 2024-05-04 RX ADMIN — CLINDAMYCIN HYDROCHLORIDE 300 MG: 150 CAPSULE ORAL at 11:59

## 2024-05-04 RX ADMIN — ONDANSETRON 4 MG: 2 INJECTION INTRAMUSCULAR; INTRAVENOUS at 20:47

## 2024-05-04 RX ADMIN — ACYCLOVIR 400 MG: 400 TABLET ORAL at 07:59

## 2024-05-04 RX ADMIN — LEVOTHYROXINE SODIUM 150 MCG: 0.15 TABLET ORAL at 06:32

## 2024-05-04 RX ADMIN — HYDROMORPHONE HYDROCHLORIDE 0.5 MG: 1 INJECTION, SOLUTION INTRAMUSCULAR; INTRAVENOUS; SUBCUTANEOUS at 14:59

## 2024-05-04 RX ADMIN — AMLODIPINE BESYLATE 5 MG: 5 TABLET ORAL at 07:59

## 2024-05-04 RX ADMIN — PRIMAQUINE PHOSPHATE 30 MG: 15 TABLET, FILM COATED ORAL at 07:59

## 2024-05-04 RX ADMIN — POTASSIUM CHLORIDE 40 MEQ: 1500 TABLET, EXTENDED RELEASE ORAL at 07:58

## 2024-05-04 RX ADMIN — ACETAMINOPHEN 650 MG: 325 TABLET ORAL at 04:28

## 2024-05-04 RX ADMIN — PROCHLORPERAZINE EDISYLATE 10 MG: 5 INJECTION INTRAMUSCULAR; INTRAVENOUS at 14:13

## 2024-05-04 RX ADMIN — CLINDAMYCIN HYDROCHLORIDE 300 MG: 150 CAPSULE ORAL at 00:19

## 2024-05-04 RX ADMIN — PANTOPRAZOLE SODIUM 40 MG: 40 TABLET, DELAYED RELEASE ORAL at 17:48

## 2024-05-04 RX ADMIN — HYDROCODONE BITARTRATE AND ACETAMINOPHEN 1 TABLET: 10; 325 TABLET ORAL at 22:34

## 2024-05-04 RX ADMIN — Medication 1 G: at 17:48

## 2024-05-04 RX ADMIN — FLUOXETINE HYDROCHLORIDE 20 MG: 10 CAPSULE ORAL at 20:40

## 2024-05-04 RX ADMIN — HYDROMORPHONE HYDROCHLORIDE 0.5 MG: 1 INJECTION, SOLUTION INTRAMUSCULAR; INTRAVENOUS; SUBCUTANEOUS at 20:20

## 2024-05-04 RX ADMIN — ALLOPURINOL 300 MG: 300 TABLET ORAL at 07:59

## 2024-05-04 RX ADMIN — PROCHLORPERAZINE EDISYLATE 10 MG: 5 INJECTION INTRAMUSCULAR; INTRAVENOUS at 22:34

## 2024-05-04 RX ADMIN — PREDNISONE 40 MG: 20 TABLET ORAL at 07:59

## 2024-05-04 RX ADMIN — PANTOPRAZOLE SODIUM 40 MG: 40 TABLET, DELAYED RELEASE ORAL at 06:32

## 2024-05-04 RX ADMIN — ACYCLOVIR 400 MG: 400 TABLET ORAL at 20:41

## 2024-05-04 RX ADMIN — NYSTATIN 500000 UNITS: 100000 SUSPENSION ORAL at 20:40

## 2024-05-04 RX ADMIN — PREDNISONE 40 MG: 20 TABLET ORAL at 20:41

## 2024-05-04 RX ADMIN — CEFEPIME 2000 MG: 2 INJECTION, POWDER, FOR SOLUTION INTRAVENOUS at 17:47

## 2024-05-04 RX ADMIN — HYDROMORPHONE HYDROCHLORIDE 0.25 MG: 1 INJECTION, SOLUTION INTRAMUSCULAR; INTRAVENOUS; SUBCUTANEOUS at 09:50

## 2024-05-04 RX ADMIN — HYDROXYZINE HYDROCHLORIDE 25 MG: 25 TABLET, FILM COATED ORAL at 20:41

## 2024-05-04 RX ADMIN — Medication 1 G: at 07:59

## 2024-05-04 RX ADMIN — VORICONAZOLE 445 MG: 10 INJECTION, POWDER, LYOPHILIZED, FOR SOLUTION INTRAVENOUS at 08:21

## 2024-05-04 RX ADMIN — SODIUM CHLORIDE SOLN NEBU 3% 4 ML: 3 NEBU SOLN at 20:06

## 2024-05-04 RX ADMIN — CEFEPIME 2000 MG: 2 INJECTION, POWDER, FOR SOLUTION INTRAVENOUS at 09:54

## 2024-05-04 RX ADMIN — ONDANSETRON 4 MG: 2 INJECTION INTRAMUSCULAR; INTRAVENOUS at 11:58

## 2024-05-04 RX ADMIN — HYDROCODONE BITARTRATE AND ACETAMINOPHEN 1 TABLET: 10; 325 TABLET ORAL at 14:13

## 2024-05-04 RX ADMIN — MORPHINE SULFATE 15 MG: 15 TABLET, FILM COATED, EXTENDED RELEASE ORAL at 08:00

## 2024-05-04 RX ADMIN — SODIUM CHLORIDE: 9 INJECTION, SOLUTION INTRAVENOUS at 17:58

## 2024-05-04 RX ADMIN — DIPHENHYDRAMINE HYDROCHLORIDE 25 MG: 25 CAPSULE ORAL at 04:28

## 2024-05-04 RX ADMIN — Medication 1 G: at 11:59

## 2024-05-04 RX ADMIN — FLUOXETINE HYDROCHLORIDE 20 MG: 10 CAPSULE ORAL at 07:58

## 2024-05-04 RX ADMIN — VORICONAZOLE 300 MG: 10 INJECTION, POWDER, LYOPHILIZED, FOR SOLUTION INTRAVENOUS at 20:36

## 2024-05-04 RX ADMIN — MORPHINE SULFATE 15 MG: 15 TABLET, FILM COATED, EXTENDED RELEASE ORAL at 20:41

## 2024-05-04 RX ADMIN — CYCLOBENZAPRINE 10 MG: 10 TABLET, FILM COATED ORAL at 11:59

## 2024-05-04 RX ADMIN — CLINDAMYCIN HYDROCHLORIDE 300 MG: 150 CAPSULE ORAL at 17:48

## 2024-05-04 RX ADMIN — FAMOTIDINE 40 MG: 20 TABLET, FILM COATED ORAL at 17:47

## 2024-05-04 RX ADMIN — CYCLOBENZAPRINE 10 MG: 10 TABLET, FILM COATED ORAL at 17:47

## 2024-05-04 RX ADMIN — CLINDAMYCIN HYDROCHLORIDE 300 MG: 150 CAPSULE ORAL at 06:32

## 2024-05-04 RX ADMIN — NYSTATIN 500000 UNITS: 100000 SUSPENSION ORAL at 07:58

## 2024-05-04 RX ADMIN — NYSTATIN 500000 UNITS: 100000 SUSPENSION ORAL at 17:48

## 2024-05-04 RX ADMIN — POTASSIUM CHLORIDE 40 MEQ: 1500 TABLET, EXTENDED RELEASE ORAL at 20:41

## 2024-05-04 RX ADMIN — PROCHLORPERAZINE EDISYLATE 10 MG: 5 INJECTION INTRAMUSCULAR; INTRAVENOUS at 14:08

## 2024-05-04 ASSESSMENT — PAIN SCALES - GENERAL
PAINLEVEL_OUTOF10: 5
PAINLEVEL_OUTOF10: 9
PAINLEVEL_OUTOF10: 3
PAINLEVEL_OUTOF10: 9
PAINLEVEL_OUTOF10: 8
PAINLEVEL_OUTOF10: 2
PAINLEVEL_OUTOF10: 10
PAINLEVEL_OUTOF10: 10
PAINLEVEL_OUTOF10: 8

## 2024-05-04 ASSESSMENT — PAIN DESCRIPTION - LOCATION
LOCATION: BACK
LOCATION: GENERALIZED;BACK
LOCATION: BACK
LOCATION: BACK;SACRUM
LOCATION: BACK

## 2024-05-04 ASSESSMENT — PAIN - FUNCTIONAL ASSESSMENT
PAIN_FUNCTIONAL_ASSESSMENT: PREVENTS OR INTERFERES WITH ALL ACTIVE AND SOME PASSIVE ACTIVITIES
PAIN_FUNCTIONAL_ASSESSMENT: ACTIVITIES ARE NOT PREVENTED
PAIN_FUNCTIONAL_ASSESSMENT: PREVENTS OR INTERFERES SOME ACTIVE ACTIVITIES AND ADLS

## 2024-05-04 ASSESSMENT — PAIN DESCRIPTION - DESCRIPTORS
DESCRIPTORS: THROBBING
DESCRIPTORS: THROBBING
DESCRIPTORS: ACHING;THROBBING
DESCRIPTORS: ACHING;THROBBING
DESCRIPTORS: THROBBING
DESCRIPTORS: ACHING;THROBBING
DESCRIPTORS: DULL;ACHING

## 2024-05-04 ASSESSMENT — PAIN DESCRIPTION - ORIENTATION
ORIENTATION: LOWER;MID
ORIENTATION: LOWER;MID
ORIENTATION: LOWER
ORIENTATION: LOWER;MID
ORIENTATION: LEFT;RIGHT;LOWER
ORIENTATION: LOWER
ORIENTATION: LOWER

## 2024-05-04 ASSESSMENT — PAIN DESCRIPTION - FREQUENCY
FREQUENCY: CONTINUOUS
FREQUENCY: CONTINUOUS

## 2024-05-04 ASSESSMENT — PAIN DESCRIPTION - ONSET
ONSET: ON-GOING
ONSET: ON-GOING

## 2024-05-04 ASSESSMENT — PAIN DESCRIPTION - PAIN TYPE
TYPE: ACUTE PAIN
TYPE: ACUTE PAIN

## 2024-05-04 NOTE — PROGRESS NOTES
lactulose (CHRONULAC) 10 GM/15ML solution 20 g  20 g Oral Q8H PRN    ipratropium 0.5 mg-albuterol 2.5 mg (DUONEB) nebulizer solution 1 Dose  1 Dose Inhalation Q4H PRN    guaiFENesin (ROBITUSSIN) 100 MG/5ML liquid 200 mg  200 mg Oral Q4H PRN    HYDROcodone homatropine (HYCODAN) 5-1.5 MG/5ML solution 5 mL  5 mL Oral Q4H PRN    [Held by provider] senna (SENOKOT) tablet 17.2 mg  2 tablet Oral Nightly    [Held by provider] polyethylene glycol (GLYCOLAX) packet 17 g  17 g Oral Daily    magic (miracle) mouthwash  5 mL Swish & Spit 4x Daily PRN    acyclovir (ZOVIRAX) tablet 400 mg  400 mg Oral BID    amLODIPine (NORVASC) tablet 5 mg  5 mg Oral Daily    famotidine (PEPCID) tablet 40 mg  40 mg Oral QPM    HYDROcodone-acetaminophen (NORCO)  MG per tablet 1 tablet  1 tablet Oral Q6H PRN    morphine (MS CONTIN) extended release tablet 15 mg  15 mg Oral BID    pantoprazole (PROTONIX) tablet 40 mg  40 mg Oral BID    tiZANidine (ZANAFLEX) tablet 4 mg  4 mg Oral Nightly PRN    HYDROmorphone HCl PF (DILAUDID) injection 0.25 mg  0.25 mg IntraVENous Q4H PRN    acetaminophen (TYLENOL) tablet 650 mg  650 mg Oral Q6H PRN    ondansetron (ZOFRAN) injection 4 mg  4 mg IntraVENous Q6H PRN    melatonin tablet 1.5 mg  1.5 mg Oral Nightly PRN    aluminum & magnesium hydroxide-simethicone (MAALOX) 200-200-20 MG/5ML suspension 30 mL  30 mL Oral Q6H PRN    ondansetron (ZOFRAN-ODT) disintegrating tablet 4 mg  4 mg Oral Q8H PRN    traZODone (DESYREL) tablet 50 mg  50 mg Oral Nightly PRN    potassium chloride (KLOR-CON M) extended release tablet 40 mEq  40 mEq Oral PRN    Or    potassium bicarb-citric acid (EFFER-K) effervescent tablet 40 mEq  40 mEq Oral PRN    Or    potassium chloride 10 mEq/100 mL IVPB (Peripheral Line)  10 mEq IntraVENous PRN    magnesium sulfate 2000 mg in 50 mL IVPB premix  2,000 mg IntraVENous PRN    FLUoxetine (PROZAC) capsule 20 mg  20 mg Oral BID    hydrOXYzine HCl (ATARAX) tablet 25 mg  25 mg Oral BID

## 2024-05-04 NOTE — PROGRESS NOTES
Increased pain today, especially in the lower back.  Pt feels like it is from her psoriatic arthritis.  Pain unrelieved with scheduled MS Contin, only brief mild relief from Dilaudid 0.25 mg.  Flexeril and norco given as well. Spoke to Khadijah resendiz NP.  Dilaudid increased to 0.5 mg IV.  Okay to give dose now.

## 2024-05-04 NOTE — PROGRESS NOTES
[facility] Hematology & Oncology        Inpatient Hematology / Oncology Progress Note    Reason for Consult:  Epistaxis [R04.0]  Lightheadedness [R42]  Thrombocytopenia (HCC) [D69.6]  Pancytopenia (HCC) [D61.818]  Symptomatic anemia [D64.9]  Referring Physician:  Gunnra Perez MD    24 Hour Events:  Afeb, VSS, on 3L  NC  C1D19 Vidaza / Venetoclax / [id number] per clinical trial [id number]  Ongoing refractory thrombocytopenia - daily plt transfusion  BMBx completed 4/29 - path pending  On Cefepime for NF (day 7)   +fungitell; On clinda/pred/primiquine and Voriconazole    Transfusions: Plts  Replacements: Mg, KCl    ROS:  Constitutional: +fatigue, weakness. Negative for fever, chills.  CV: +edema (better). Negative for chest pain, palpitations.  Respiratory: +dyspnea (better), cough. Negative for wheezing.  GI: +nausea. Negative for abdominal pain, diarrhea.    10 point review of systems is otherwise negative with the exception of the elements mentioned above in the HPI.     Allergies   Allergen Reactions    Penicillins Hives    Sulfa Antibiotics Hives    Codeine Nausea And Vomiting     Past Medical History:   Diagnosis Date    Arthritis     Autoimmune disease (HCC)     skin changes, unknown name    Cancer (HCC) 1990    ovarian    Chronic pain     arthritis in back and legs    Coronary artery spasm (HCC)     COVID 05/15/2022    Essential hypertension 11/8/2019    Gastritis     GERD (gastroesophageal reflux disease)     Hx antineoplastic chemo     Migraine headache     Psoriasis     Psychiatric disorder     anxiety and depression    Severe obesity (BMI 35.0-39.9) 6/26/2018    Thyroid disease     Diagnosed in 1996     Past Surgical History:   Procedure Laterality Date    CARPAL TUNNEL RELEASE      GYN      ovaries    HYSTERECTOMY, TOTAL ABDOMINAL (CERVIX REMOVED)  1990    IR PORT PLACEMENT EQUAL OR GREATER THAN 5 YEARS  1/3/2024    IR PORT PLACEMENT EQUAL OR GREATER THAN 5 YEARS 1/3/2024 [facility] RADIOLOGY SPECIALS    NV  I/O yesterday. O2 stable at 7L. Continues diuresis. Electrolytes stable/better today.  4/15 Required Airvo overnight, CXR unchanged. Continue diuresis.  4/18 Remains on Airvo. Weaning as tolerated - able to be off at times. Diuresed yesterday with good results, hold on diuresis today.   4/19 Down to 3L NC from Airvo overnight. Volume status much improved - monitoring and diuresing PRN.  4/24 On RA-3L  4/25 On RA. CXR completed yesterday shows BL opacities, checking fungal studies and monitoring fever (.2 and neutropenic), as well as prominent pulm vasculature. Monitoring for overload - remains off IVF.  4/26  - remains neutropenic. Fungal studies pending due to recent XR with BL opacities. Continues prophylactic cipro. Monitoring for fluid overload with frequent transfusions (off IV fluids).  4/27 afeb, Tm 98.6; fungal studies pending; on LVQ prophy - venetoclax adjusted per Trial Protocol   4/29 Stable fluid status  RESOLVED    Sore Throat  4/28 check RVP, nystatin swish and swallow   4/29 RVP negative.  RESOLVED    Somnolence / nausea / vomiting  4/26 Somnolent this morning but possibly related to antiemetics. Check CT head given recent thrombocytopenia. Continue antiemetics.  attributes nausea to constipation (she has been declining bowel regimen).   4/27 Mentation at baseline; CT head neg   RESOLVED    Hypotension / asymptomatic bradycardia  - Hold amlodipine  4/10 BP improving, resume amlodipine.  RESOLVED    Continue home meds  Rigo SOPs  VTE prophylaxis - AC contraindicated due to thrombocytopenia  Diet - Regular  PT/OT - Deferred  Code - Full  Acyclovir, Levaquin (changed from Cipro d/t interaction with Venetoclax per study), diflucan (held due to venetoclax) --> Micafungin    Dispo - too soon to determine. Possibly DC home once platelet transfusion can be safely managed as OP.    4/12 Discussed with patient and  risk for clinical deterioration due to increasing O2 needs and in

## 2024-05-04 NOTE — PLAN OF CARE
END OF SHIFT SUMMARY:    Significant vitals this shift:  Pt vitals remained stable throughout the shift  Significant labs this shift:    Tests performed this shift:  PLT: >2 this morning and went up to 29 after one unit   Orders to be followed up on:  Follow up on pain management   -pt needs one unit of PLT couldn't give PLT due to loss of IV assess  Blood products given this shift:  n/a  Additional events this shift:   Pt was in pain most of day. Pt c/o back pain. MD increased  dose to .5 of dilaudid to help manage pain      I/Os:  +/- this shift:   05/04 0701 - 05/04 1900  In: 562.5 [P.O.:240]  Out: 650 [Urine:650]    Bedside shift report given to oncoming nurse.    RONALD STUART RN     Problem: Pain  Goal: Verbalizes/displays adequate comfort level or baseline comfort level  5/4/2024 1523 by Ronald Stuart RN  Outcome: Not Progressing  5/4/2024 0521 by Lisa Quiroga RN  Outcome: Progressing  Flowsheets (Taken 5/3/2024 2257)  Verbalizes/displays adequate comfort level or baseline comfort level:   Encourage patient to monitor pain and request assistance   Assess pain using appropriate pain scale     Problem: Pain  Goal: Verbalizes/displays adequate comfort level or baseline comfort level  5/4/2024 1523 by Ronald Stuart RN  Outcome: Not Progressing  5/4/2024 0521 by Lisa Quiroga RN  Outcome: Progressing  Flowsheets (Taken 5/3/2024 2257)  Verbalizes/displays adequate comfort level or baseline comfort level:   Encourage patient to monitor pain and request assistance   Assess pain using appropriate pain scale

## 2024-05-05 ENCOUNTER — APPOINTMENT (OUTPATIENT)
Dept: GENERAL RADIOLOGY | Age: 74
DRG: 834 | End: 2024-05-05
Payer: MEDICARE

## 2024-05-05 LAB
ALBUMIN SERPL-MCNC: 2.4 G/DL (ref 3.2–4.6)
ALBUMIN/GLOB SERPL: 0.5 (ref 1–1.9)
ALP SERPL-CCNC: 464 U/L (ref 35–104)
ALT SERPL-CCNC: 32 U/L (ref 12–65)
ANION GAP SERPL CALC-SCNC: 11 MMOL/L (ref 9–18)
AST SERPL-CCNC: 195 U/L (ref 15–37)
BASOPHILS # BLD: 0 K/UL (ref 0–0.2)
BASOPHILS NFR BLD: 1 % (ref 0–2)
BILIRUB SERPL-MCNC: 0.7 MG/DL (ref 0–1.2)
BLD PROD TYP BPU: NORMAL
BLOOD BANK BLOOD PRODUCT EXPIRATION DATE: NORMAL
BLOOD BANK CMNT PATIENT-IMP: NORMAL
BLOOD BANK DISPENSE STATUS: NORMAL
BLOOD BANK ISBT PRODUCT BLOOD TYPE: 5100
BLOOD BANK PRODUCT CODE: NORMAL
BLOOD BANK UNIT TYPE AND RH: NORMAL
BPU ID: NORMAL
BUN SERPL-MCNC: 14 MG/DL (ref 8–23)
CALCIUM SERPL-MCNC: 9.1 MG/DL (ref 8.8–10.2)
CHLORIDE SERPL-SCNC: 101 MMOL/L (ref 98–107)
CO2 SERPL-SCNC: 24 MMOL/L (ref 20–28)
CREAT SERPL-MCNC: 0.35 MG/DL (ref 0.6–1.1)
DIFFERENTIAL METHOD BLD: ABNORMAL
EOSINOPHIL # BLD: 0 K/UL (ref 0–0.8)
EOSINOPHIL NFR BLD: 0 % (ref 0.5–7.8)
ERYTHROCYTE [DISTWIDTH] IN BLOOD BY AUTOMATED COUNT: 15.7 % (ref 11.9–14.6)
ERYTHROCYTE [DISTWIDTH] IN BLOOD BY AUTOMATED COUNT: 15.9 % (ref 11.9–14.6)
GLOBULIN SER CALC-MCNC: 4.7 G/DL (ref 2.3–3.5)
GLUCOSE SERPL-MCNC: 135 MG/DL (ref 70–99)
HCT VFR BLD AUTO: 21.3 % (ref 35.8–46.3)
HCT VFR BLD AUTO: 23.4 % (ref 35.8–46.3)
HGB BLD-MCNC: 7 G/DL (ref 11.7–15.4)
HGB BLD-MCNC: 7.8 G/DL (ref 11.7–15.4)
HISTORY CHECK: NORMAL
IMM GRANULOCYTES # BLD AUTO: 0.1 K/UL (ref 0–0.5)
IMM GRANULOCYTES NFR BLD AUTO: 6 % (ref 0–5)
LYMPHOCYTES # BLD: 0.7 K/UL (ref 0.5–4.6)
LYMPHOCYTES NFR BLD: 63 % (ref 13–44)
MAGNESIUM SERPL-MCNC: 1.8 MG/DL (ref 1.8–2.4)
MCH RBC QN AUTO: 26.9 PG (ref 26.1–32.9)
MCH RBC QN AUTO: 27 PG (ref 26.1–32.9)
MCHC RBC AUTO-ENTMCNC: 32.9 G/DL (ref 31.4–35)
MCHC RBC AUTO-ENTMCNC: 33.3 G/DL (ref 31.4–35)
MCV RBC AUTO: 81 FL (ref 82–102)
MCV RBC AUTO: 81.9 FL (ref 82–102)
MONOCYTES # BLD: 0.3 K/UL (ref 0.1–1.3)
MONOCYTES NFR BLD: 26 % (ref 4–12)
NEUTS SEG # BLD: 0 K/UL (ref 1.7–8.2)
NEUTS SEG NFR BLD: 4 % (ref 43–78)
NRBC # BLD: 0 K/UL (ref 0–0.2)
NRBC # BLD: 0 K/UL (ref 0–0.2)
PLATELET # BLD AUTO: 18 K/UL (ref 150–450)
PLATELET # BLD AUTO: 35 K/UL (ref 150–450)
PLATELET COMMENT: ABNORMAL
PMV BLD AUTO: 10.7 FL (ref 9.4–12.3)
PMV BLD AUTO: 9.5 FL (ref 9.4–12.3)
POTASSIUM SERPL-SCNC: 4.3 MMOL/L (ref 3.5–5.1)
PROT SERPL-MCNC: 7.2 G/DL (ref 6.3–8.2)
RBC # BLD AUTO: 2.6 M/UL (ref 4.05–5.2)
RBC # BLD AUTO: 2.89 M/UL (ref 4.05–5.2)
RBC MORPH BLD: ABNORMAL
RBC MORPH BLD: ABNORMAL
SODIUM SERPL-SCNC: 136 MMOL/L (ref 136–145)
UNIT DIVISION: 0
UNIT ISSUE DATE/TIME: NORMAL
WBC # BLD AUTO: 1.1 K/UL (ref 4.3–11.1)
WBC # BLD AUTO: 1.1 K/UL (ref 4.3–11.1)

## 2024-05-05 PROCEDURE — 71045 X-RAY EXAM CHEST 1 VIEW: CPT

## 2024-05-05 PROCEDURE — 36415 COLL VENOUS BLD VENIPUNCTURE: CPT

## 2024-05-05 PROCEDURE — 86922 COMPATIBILITY TEST ANTIGLOB: CPT

## 2024-05-05 PROCEDURE — 86900 BLOOD TYPING SEROLOGIC ABO: CPT

## 2024-05-05 PROCEDURE — 6360000002 HC RX W HCPCS: Performed by: NURSE PRACTITIONER

## 2024-05-05 PROCEDURE — 2500000003 HC RX 250 WO HCPCS: Performed by: NURSE PRACTITIONER

## 2024-05-05 PROCEDURE — 85027 COMPLETE CBC AUTOMATED: CPT

## 2024-05-05 PROCEDURE — 2700000000 HC OXYGEN THERAPY PER DAY

## 2024-05-05 PROCEDURE — 86850 RBC ANTIBODY SCREEN: CPT

## 2024-05-05 PROCEDURE — 6370000000 HC RX 637 (ALT 250 FOR IP): Performed by: NURSE PRACTITIONER

## 2024-05-05 PROCEDURE — 94760 N-INVAS EAR/PLS OXIMETRY 1: CPT

## 2024-05-05 PROCEDURE — 86920 COMPATIBILITY TEST SPIN: CPT

## 2024-05-05 PROCEDURE — 6370000000 HC RX 637 (ALT 250 FOR IP): Performed by: INTERNAL MEDICINE

## 2024-05-05 PROCEDURE — 6360000002 HC RX W HCPCS: Performed by: STUDENT IN AN ORGANIZED HEALTH CARE EDUCATION/TRAINING PROGRAM

## 2024-05-05 PROCEDURE — 94640 AIRWAY INHALATION TREATMENT: CPT

## 2024-05-05 PROCEDURE — 2580000003 HC RX 258: Performed by: STUDENT IN AN ORGANIZED HEALTH CARE EDUCATION/TRAINING PROGRAM

## 2024-05-05 PROCEDURE — 86644 CMV ANTIBODY: CPT

## 2024-05-05 PROCEDURE — 6370000000 HC RX 637 (ALT 250 FOR IP): Performed by: STUDENT IN AN ORGANIZED HEALTH CARE EDUCATION/TRAINING PROGRAM

## 2024-05-05 PROCEDURE — 2580000003 HC RX 258: Performed by: NURSE PRACTITIONER

## 2024-05-05 PROCEDURE — 86813 HLA TYPING A B OR C: CPT

## 2024-05-05 PROCEDURE — 2580000003 HC RX 258: Performed by: INTERNAL MEDICINE

## 2024-05-05 PROCEDURE — 86921 COMPATIBILITY TEST INCUBATE: CPT

## 2024-05-05 PROCEDURE — 1100000000 HC RM PRIVATE

## 2024-05-05 PROCEDURE — 86901 BLOOD TYPING SEROLOGIC RH(D): CPT

## 2024-05-05 PROCEDURE — 83735 ASSAY OF MAGNESIUM: CPT

## 2024-05-05 PROCEDURE — 36430 TRANSFUSION BLD/BLD COMPNT: CPT

## 2024-05-05 PROCEDURE — 86022 PLATELET ANTIBODIES: CPT

## 2024-05-05 PROCEDURE — 85025 COMPLETE CBC W/AUTO DIFF WBC: CPT

## 2024-05-05 PROCEDURE — 2400000000

## 2024-05-05 PROCEDURE — 80053 COMPREHEN METABOLIC PANEL: CPT

## 2024-05-05 PROCEDURE — P9037 PLATE PHERES LEUKOREDU IRRAD: HCPCS

## 2024-05-05 PROCEDURE — 6360000002 HC RX W HCPCS: Performed by: INTERNAL MEDICINE

## 2024-05-05 RX ORDER — SODIUM CHLORIDE 9 MG/ML
INJECTION, SOLUTION INTRAVENOUS PRN
Status: DISCONTINUED | OUTPATIENT
Start: 2024-05-05 | End: 2024-05-07

## 2024-05-05 RX ORDER — SODIUM CHLORIDE 9 MG/ML
INJECTION, SOLUTION INTRAVENOUS PRN
Status: DISCONTINUED | OUTPATIENT
Start: 2024-05-05 | End: 2024-05-08

## 2024-05-05 RX ORDER — HYDROMORPHONE HYDROCHLORIDE 1 MG/ML
0.5 INJECTION, SOLUTION INTRAMUSCULAR; INTRAVENOUS; SUBCUTANEOUS ONCE
Status: COMPLETED | OUTPATIENT
Start: 2024-05-05 | End: 2024-05-05

## 2024-05-05 RX ORDER — HYDROMORPHONE HYDROCHLORIDE 1 MG/ML
1 INJECTION, SOLUTION INTRAMUSCULAR; INTRAVENOUS; SUBCUTANEOUS EVERY 4 HOURS PRN
Status: DISCONTINUED | OUTPATIENT
Start: 2024-05-05 | End: 2024-05-08

## 2024-05-05 RX ADMIN — CYCLOBENZAPRINE 10 MG: 10 TABLET, FILM COATED ORAL at 01:27

## 2024-05-05 RX ADMIN — CLINDAMYCIN HYDROCHLORIDE 300 MG: 150 CAPSULE ORAL at 23:25

## 2024-05-05 RX ADMIN — CEFEPIME 2000 MG: 2 INJECTION, POWDER, FOR SOLUTION INTRAVENOUS at 09:07

## 2024-05-05 RX ADMIN — HYDROMORPHONE HYDROCHLORIDE 1 MG: 1 INJECTION, SOLUTION INTRAMUSCULAR; INTRAVENOUS; SUBCUTANEOUS at 19:32

## 2024-05-05 RX ADMIN — NYSTATIN 500000 UNITS: 100000 SUSPENSION ORAL at 20:05

## 2024-05-05 RX ADMIN — HYDROCODONE BITARTRATE AND ACETAMINOPHEN 1 TABLET: 10; 325 TABLET ORAL at 17:01

## 2024-05-05 RX ADMIN — HYDROMORPHONE HYDROCHLORIDE 0.5 MG: 1 INJECTION, SOLUTION INTRAMUSCULAR; INTRAVENOUS; SUBCUTANEOUS at 00:05

## 2024-05-05 RX ADMIN — HYDROXYZINE HYDROCHLORIDE 25 MG: 25 TABLET, FILM COATED ORAL at 20:05

## 2024-05-05 RX ADMIN — HYDROMORPHONE HYDROCHLORIDE 1 MG: 1 INJECTION, SOLUTION INTRAMUSCULAR; INTRAVENOUS; SUBCUTANEOUS at 10:49

## 2024-05-05 RX ADMIN — VORICONAZOLE 300 MG: 10 INJECTION, POWDER, LYOPHILIZED, FOR SOLUTION INTRAVENOUS at 10:55

## 2024-05-05 RX ADMIN — FLUOXETINE HYDROCHLORIDE 20 MG: 10 CAPSULE ORAL at 08:55

## 2024-05-05 RX ADMIN — PREDNISONE 40 MG: 20 TABLET ORAL at 20:04

## 2024-05-05 RX ADMIN — SODIUM CHLORIDE: 9 INJECTION, SOLUTION INTRAVENOUS at 17:52

## 2024-05-05 RX ADMIN — HYDROXYZINE HYDROCHLORIDE 25 MG: 25 TABLET, FILM COATED ORAL at 08:56

## 2024-05-05 RX ADMIN — VORICONAZOLE 300 MG: 10 INJECTION, POWDER, LYOPHILIZED, FOR SOLUTION INTRAVENOUS at 20:04

## 2024-05-05 RX ADMIN — POTASSIUM CHLORIDE 40 MEQ: 1500 TABLET, EXTENDED RELEASE ORAL at 08:55

## 2024-05-05 RX ADMIN — HYDROMORPHONE HYDROCHLORIDE 1 MG: 1 INJECTION, SOLUTION INTRAMUSCULAR; INTRAVENOUS; SUBCUTANEOUS at 14:36

## 2024-05-05 RX ADMIN — ACYCLOVIR 400 MG: 400 TABLET ORAL at 20:05

## 2024-05-05 RX ADMIN — POTASSIUM CHLORIDE 40 MEQ: 1500 TABLET, EXTENDED RELEASE ORAL at 20:04

## 2024-05-05 RX ADMIN — FAMOTIDINE 40 MG: 20 TABLET, FILM COATED ORAL at 17:04

## 2024-05-05 RX ADMIN — MORPHINE SULFATE 15 MG: 15 TABLET, FILM COATED, EXTENDED RELEASE ORAL at 08:56

## 2024-05-05 RX ADMIN — HYDROMORPHONE HYDROCHLORIDE 0.5 MG: 1 INJECTION, SOLUTION INTRAMUSCULAR; INTRAVENOUS; SUBCUTANEOUS at 02:10

## 2024-05-05 RX ADMIN — AMLODIPINE BESYLATE 5 MG: 5 TABLET ORAL at 08:55

## 2024-05-05 RX ADMIN — CEFEPIME 2000 MG: 2 INJECTION, POWDER, FOR SOLUTION INTRAVENOUS at 01:23

## 2024-05-05 RX ADMIN — ACYCLOVIR 400 MG: 400 TABLET ORAL at 08:55

## 2024-05-05 RX ADMIN — FLUOXETINE HYDROCHLORIDE 20 MG: 10 CAPSULE ORAL at 20:05

## 2024-05-05 RX ADMIN — Medication 1 G: at 08:55

## 2024-05-05 RX ADMIN — PRIMAQUINE PHOSPHATE 30 MG: 15 TABLET, FILM COATED ORAL at 08:55

## 2024-05-05 RX ADMIN — HYDROMORPHONE HYDROCHLORIDE 1 MG: 1 INJECTION, SOLUTION INTRAMUSCULAR; INTRAVENOUS; SUBCUTANEOUS at 23:25

## 2024-05-05 RX ADMIN — Medication 1 G: at 17:04

## 2024-05-05 RX ADMIN — CLINDAMYCIN HYDROCHLORIDE 300 MG: 150 CAPSULE ORAL at 12:09

## 2024-05-05 RX ADMIN — SODIUM CHLORIDE SOLN NEBU 3% 4 ML: 3 NEBU SOLN at 07:36

## 2024-05-05 RX ADMIN — NYSTATIN 500000 UNITS: 100000 SUSPENSION ORAL at 10:30

## 2024-05-05 RX ADMIN — LEVOTHYROXINE SODIUM 150 MCG: 0.15 TABLET ORAL at 08:56

## 2024-05-05 RX ADMIN — CLINDAMYCIN HYDROCHLORIDE 300 MG: 150 CAPSULE ORAL at 05:54

## 2024-05-05 RX ADMIN — ALLOPURINOL 300 MG: 300 TABLET ORAL at 08:55

## 2024-05-05 RX ADMIN — CLINDAMYCIN HYDROCHLORIDE 300 MG: 150 CAPSULE ORAL at 00:06

## 2024-05-05 RX ADMIN — PANTOPRAZOLE SODIUM 40 MG: 40 TABLET, DELAYED RELEASE ORAL at 17:05

## 2024-05-05 RX ADMIN — CLINDAMYCIN HYDROCHLORIDE 300 MG: 150 CAPSULE ORAL at 17:05

## 2024-05-05 RX ADMIN — IPRATROPIUM BROMIDE AND ALBUTEROL SULFATE 1 DOSE: 2.5; .5 SOLUTION RESPIRATORY (INHALATION) at 07:36

## 2024-05-05 RX ADMIN — ONDANSETRON 4 MG: 2 INJECTION INTRAMUSCULAR; INTRAVENOUS at 12:55

## 2024-05-05 RX ADMIN — PREDNISONE 40 MG: 20 TABLET ORAL at 08:56

## 2024-05-05 RX ADMIN — HYDROCODONE BITARTRATE AND ACETAMINOPHEN 1 TABLET: 10; 325 TABLET ORAL at 22:35

## 2024-05-05 RX ADMIN — PANTOPRAZOLE SODIUM 40 MG: 40 TABLET, DELAYED RELEASE ORAL at 08:55

## 2024-05-05 RX ADMIN — ONDANSETRON 4 MG: 2 INJECTION INTRAMUSCULAR; INTRAVENOUS at 05:54

## 2024-05-05 RX ADMIN — NYSTATIN 500000 UNITS: 100000 SUSPENSION ORAL at 17:09

## 2024-05-05 RX ADMIN — NYSTATIN 500000 UNITS: 100000 SUSPENSION ORAL at 12:09

## 2024-05-05 RX ADMIN — HYDROMORPHONE HYDROCHLORIDE 0.5 MG: 1 INJECTION, SOLUTION INTRAMUSCULAR; INTRAVENOUS; SUBCUTANEOUS at 05:54

## 2024-05-05 RX ADMIN — Medication 1 G: at 12:09

## 2024-05-05 RX ADMIN — CEFEPIME 2000 MG: 2 INJECTION, POWDER, FOR SOLUTION INTRAVENOUS at 17:08

## 2024-05-05 RX ADMIN — MORPHINE SULFATE 15 MG: 15 TABLET, FILM COATED, EXTENDED RELEASE ORAL at 20:05

## 2024-05-05 RX ADMIN — ANTI-FUNGAL POWDER MICONAZOLE NITRATE TALC FREE: 1.42 POWDER TOPICAL at 12:09

## 2024-05-05 ASSESSMENT — PAIN DESCRIPTION - ORIENTATION
ORIENTATION: LOWER
ORIENTATION: LOWER
ORIENTATION: RIGHT;LEFT
ORIENTATION: LOWER

## 2024-05-05 ASSESSMENT — PAIN DESCRIPTION - LOCATION
LOCATION: BACK;LEG
LOCATION: BACK

## 2024-05-05 ASSESSMENT — PAIN DESCRIPTION - DESCRIPTORS
DESCRIPTORS: ACHING;DULL
DESCRIPTORS: THROBBING
DESCRIPTORS: ACHING;CRAMPING
DESCRIPTORS: ACHING;CRAMPING
DESCRIPTORS: THROBBING
DESCRIPTORS: ACHING;CRAMPING;GNAWING
DESCRIPTORS: ACHING;CRAMPING
DESCRIPTORS: SHARP;TIGHTNESS
DESCRIPTORS: THROBBING

## 2024-05-05 ASSESSMENT — PAIN DESCRIPTION - PAIN TYPE
TYPE: ACUTE PAIN

## 2024-05-05 ASSESSMENT — PAIN DESCRIPTION - FREQUENCY
FREQUENCY: CONTINUOUS

## 2024-05-05 ASSESSMENT — PAIN SCALES - GENERAL
PAINLEVEL_OUTOF10: 8
PAINLEVEL_OUTOF10: 8
PAINLEVEL_OUTOF10: 7
PAINLEVEL_OUTOF10: 6
PAINLEVEL_OUTOF10: 5
PAINLEVEL_OUTOF10: 5
PAINLEVEL_OUTOF10: 7
PAINLEVEL_OUTOF10: 2
PAINLEVEL_OUTOF10: 7
PAINLEVEL_OUTOF10: 6
PAINLEVEL_OUTOF10: 2
PAINLEVEL_OUTOF10: 4
PAINLEVEL_OUTOF10: 4
PAINLEVEL_OUTOF10: 8
PAINLEVEL_OUTOF10: 10
PAINLEVEL_OUTOF10: 8

## 2024-05-05 ASSESSMENT — PAIN - FUNCTIONAL ASSESSMENT
PAIN_FUNCTIONAL_ASSESSMENT: PREVENTS OR INTERFERES SOME ACTIVE ACTIVITIES AND ADLS
PAIN_FUNCTIONAL_ASSESSMENT: PREVENTS OR INTERFERES SOME ACTIVE ACTIVITIES AND ADLS
PAIN_FUNCTIONAL_ASSESSMENT: ACTIVITIES ARE NOT PREVENTED

## 2024-05-05 ASSESSMENT — PAIN DESCRIPTION - ONSET
ONSET: GRADUAL
ONSET: ON-GOING
ONSET: GRADUAL

## 2024-05-05 NOTE — PROGRESS NOTES
day 4 Cefe/Vanc, DC Vanc. BC NGTD, fungitell pending.   5/2 .4, day 5 Cefepime. BC NGTD. Fungitell +, consulting ID. Discussed with research, ok for posaconazole prophylaxis.  5/3 . Day 6 cefepime for NF. ID following for +fungitell. Karius pending. On empiric clinda/primiquine/prednisone for PJP as well as micafungin.  5/4 Afeb.  Day 7 Cef for NF.  ID following, Karius pending.  On Clinda/Primiquine/Pred for PJP and Maria Guadalupe changed to Vori by ID.  Pulm consulted and not able to perform bronch d/t TCP   5/5 Afeb, Day 8 Cef for NF.  ID following, Rodius pending.  On Clinda/Primiquine/Pred for PJP and also Vori.  Checked CXR d/t nurse concern - will FU on result with PA/Lat when patient able.      Hyponatremia  4/28 start salt tabs  4/29 Na down to 126, continues salt tabs. Fluid restriction ordered.  4/30 Na up to 128  5/1 Na up to 132  5/2 Na 130  5/3 Na 132, continues salt tabs.  5/4 Na improved to 137     Diarrhea / electrolyte abnormalities / dehydration  5/1 Holding PO Mg and stool softeners/laxatives. IV Mg and PO KCl ordered.  5/3 No report of diarrhea. Concern for dehydration, start gentle IVF    Pulmonary edema / volume overload  4/11 On 2L overnight, reports cough. CXR with pulmonary edema, BNP pending. Weight up - give 40mg lasix. Echo pending.  4/12 Echo with preserved EF, BNP elevated. On 4L NC, I/O net neg 800ml and weight down 7#. Continue diuresing, lasix 40mg BID as blood pressure tolerates.  4/13 Up to 7L NC. Net neg 3.4L, weight appears down. CXR with ongoing fluid overload. Continues lasix for diuresis but Na and K also low. Replete K.   4/14 Weight appears stable, incomplete I/O yesterday. O2 stable at 7L. Continues diuresis. Electrolytes stable/better today.  4/15 Required Airvo overnight, CXR unchanged. Continue diuresis.  4/18 Remains on Airvo. Weaning as tolerated - able to be off at times. Diuresed yesterday with good results, hold on diuresis today.   4/19 Down to 3L NC from Airvo  overnight. Volume status much improved - monitoring and diuresing PRN.  4/24 On RA-3L  4/25 On RA. CXR completed yesterday shows BL opacities, checking fungal studies and monitoring fever (.2 and neutropenic), as well as prominent pulm vasculature. Monitoring for overload - remains off IVF.  4/26  - remains neutropenic. Fungal studies pending due to recent XR with BL opacities. Continues prophylactic cipro. Monitoring for fluid overload with frequent transfusions (off IV fluids).  4/27 afeb, Tm 98.6; fungal studies pending; on LVQ prophy - venetoclax adjusted per Trial Protocol   4/29 Stable fluid status  RESOLVED    Sore Throat  4/28 check RVP, nystatin swish and swallow   4/29 RVP negative.  RESOLVED    Somnolence / nausea / vomiting  4/26 Somnolent this morning but possibly related to antiemetics. Check CT head given recent thrombocytopenia. Continue antiemetics.  attributes nausea to constipation (she has been declining bowel regimen).   4/27 Mentation at baseline; CT head neg   RESOLVED    Hypotension / asymptomatic bradycardia  - Hold amlodipine  4/10 BP improving, resume amlodipine.  RESOLVED    Elevated AST  5/5 AST up to 195, other LFTs normal.  Monitor tomorrow, may be medication related given multiple ABX/antimicrobials as above    Lower Back Pain  5/5 Pt with increased back pain yesterday and ON, requiring Dilaudid, Norco, Flexeril.  Will check MRI of L-spine.  Unable to give anti-inflammatories d/t TCP.  Consulting pall care for tomorrow.        Continue home meds  Rigo SOPs  VTE prophylaxis - AC contraindicated due to thrombocytopenia  Diet - Regular  PT/OT - Deferred  Code - Full  Acyclovir, Levaquin (changed from Cipro d/t interaction with Venetoclax per study), diflucan (held due to venetoclax) --> Micafungin    Dispo - too soon to determine. Possibly DC home once platelet transfusion can be safely managed as OP.    4/12 Discussed with patient and  risk for clinical

## 2024-05-05 NOTE — PLAN OF CARE
Problem: Pain  Goal: Verbalizes/displays adequate comfort level or baseline comfort level  5/4/2024 2313 by Mariana Wood RN  Outcome: Progressing  5/4/2024 1523 by Andressa Pal RN  Outcome: Not Progressing     Problem: Safety - Adult  Goal: Free from fall injury  5/4/2024 2313 by Mariana Wood RN  Outcome: Progressing  Flowsheets (Taken 5/4/2024 2108)  Free From Fall Injury: Instruct family/caregiver on patient safety  5/4/2024 1523 by Andressa Pal RN  Outcome: Progressing  Flowsheets (Taken 5/4/2024 0715)  Free From Fall Injury: Instruct family/caregiver on patient safety     Problem: ABCDS Injury Assessment  Goal: Absence of physical injury  5/4/2024 2313 by Mariana Wood RN  Outcome: Progressing  Flowsheets (Taken 5/4/2024 2108)  Absence of Physical Injury: Implement safety measures based on patient assessment  5/4/2024 1523 by Andressa Pal RN  Outcome: Progressing  Flowsheets (Taken 5/4/2024 0715)  Absence of Physical Injury: Implement safety measures based on patient assessment     Problem: Skin/Tissue Integrity  Goal: Absence of new skin breakdown  Description: 1.  Monitor for areas of redness and/or skin breakdown  2.  Assess vascular access sites hourly  3.  Every 4-6 hours minimum:  Change oxygen saturation probe site  4.  Every 4-6 hours:  If on nasal continuous positive airway pressure, respiratory therapy assess nares and determine need for appliance change or resting period.  5/4/2024 2313 by Mariana Wood RN  Outcome: Progressing  5/4/2024 1523 by Andressa Pal RN  Outcome: Progressing     Problem: Pain  Goal: Verbalizes/displays adequate comfort level or baseline comfort level  5/4/2024 2313 by Mariana Wood RN  Outcome: Progressing  5/4/2024 1523 by Andressa Pal RN  Outcome: Not Progressing

## 2024-05-05 NOTE — PROGRESS NOTES
1500- spoke to blood bank, per Khadijah Membreno, NP, okay for pt to drop Crossmatch and HLA on currently order of plts only as blood connection will not be able to obtain such plts for several more hours.     1755- plts ready. IV Abx infusing, attempted to flush peripheral IV, but IV occluded.  Messaged vascular access team about stating new IV as pt is receiving frequent iV abd and blood transfusion.      1920- Oncoming aware of situation and about 1 unit plts ready in blood lab.

## 2024-05-06 ENCOUNTER — APPOINTMENT (OUTPATIENT)
Dept: MRI IMAGING | Age: 74
DRG: 834 | End: 2024-05-06
Payer: MEDICARE

## 2024-05-06 ENCOUNTER — APPOINTMENT (OUTPATIENT)
Dept: GENERAL RADIOLOGY | Age: 74
DRG: 834 | End: 2024-05-06
Payer: MEDICARE

## 2024-05-06 PROBLEM — Z51.5 ENCOUNTER FOR PALLIATIVE CARE: Status: ACTIVE | Noted: 2024-05-06

## 2024-05-06 LAB
ALBUMIN SERPL-MCNC: 2.3 G/DL (ref 3.2–4.6)
ALBUMIN/GLOB SERPL: 0.5 (ref 1–1.9)
ALP SERPL-CCNC: 804 U/L (ref 35–104)
ALT SERPL-CCNC: 54 U/L (ref 12–65)
ANION GAP SERPL CALC-SCNC: 7 MMOL/L (ref 9–18)
AST SERPL-CCNC: 299 U/L (ref 15–37)
BASOPHILS # BLD: 0 K/UL (ref 0–0.2)
BASOPHILS NFR BLD: 0 % (ref 0–2)
BILIRUB SERPL-MCNC: 0.8 MG/DL (ref 0–1.2)
BLD PROD TYP BPU: NORMAL
BLD PROD TYP BPU: NORMAL
BLOOD BANK BLOOD PRODUCT EXPIRATION DATE: NORMAL
BLOOD BANK CMNT PATIENT-IMP: NORMAL
BLOOD BANK CMNT PATIENT-IMP: NORMAL
BLOOD BANK DISPENSE STATUS: NORMAL
BLOOD BANK DISPENSE STATUS: NORMAL
BLOOD BANK ISBT PRODUCT BLOOD TYPE: 5100
BLOOD BANK PRODUCT CODE: NORMAL
BLOOD BANK UNIT TYPE AND RH: NORMAL
BPU ID: NORMAL
BPU ID: NORMAL
BUN SERPL-MCNC: 14 MG/DL (ref 8–23)
CALCIUM SERPL-MCNC: 8.9 MG/DL (ref 8.8–10.2)
CHLORIDE SERPL-SCNC: 100 MMOL/L (ref 98–107)
CO2 SERPL-SCNC: 29 MMOL/L (ref 20–28)
CREAT SERPL-MCNC: 0.34 MG/DL (ref 0.6–1.1)
DIFFERENTIAL METHOD BLD: ABNORMAL
DIFFERENTIAL METHOD BLD: ABNORMAL
EOSINOPHIL # BLD: 0 K/UL (ref 0–0.8)
EOSINOPHIL NFR BLD: 0 % (ref 0.5–7.8)
ERYTHROCYTE [DISTWIDTH] IN BLOOD BY AUTOMATED COUNT: 15.5 % (ref 11.9–14.6)
ERYTHROCYTE [DISTWIDTH] IN BLOOD BY AUTOMATED COUNT: 15.6 % (ref 11.9–14.6)
ERYTHROCYTE [DISTWIDTH] IN BLOOD BY AUTOMATED COUNT: 15.9 % (ref 11.9–14.6)
GLOBULIN SER CALC-MCNC: 4.7 G/DL (ref 2.3–3.5)
GLUCOSE SERPL-MCNC: 141 MG/DL (ref 70–99)
HCT VFR BLD AUTO: 20.4 % (ref 35.8–46.3)
HCT VFR BLD AUTO: 24.8 % (ref 35.8–46.3)
HCT VFR BLD AUTO: 25.8 % (ref 35.8–46.3)
HGB BLD-MCNC: 6.7 G/DL (ref 11.7–15.4)
HGB BLD-MCNC: 8.1 G/DL (ref 11.7–15.4)
HGB BLD-MCNC: 8.4 G/DL (ref 11.7–15.4)
IMM GRANULOCYTES # BLD AUTO: 0.1 K/UL (ref 0–0.5)
IMM GRANULOCYTES NFR BLD AUTO: 6 % (ref 0–5)
LYMPHOCYTES # BLD: 0.7 K/UL (ref 0.5–4.6)
LYMPHOCYTES # BLD: 0.9 K/UL (ref 0.5–4.6)
LYMPHOCYTES NFR BLD MANUAL: 78 % (ref 16–44)
LYMPHOCYTES NFR BLD: 84 % (ref 13–44)
Lab: NORMAL
Lab: NORMAL
MAGNESIUM SERPL-MCNC: 1.8 MG/DL (ref 1.8–2.4)
MCH RBC QN AUTO: 26.7 PG (ref 26.1–32.9)
MCH RBC QN AUTO: 26.8 PG (ref 26.1–32.9)
MCH RBC QN AUTO: 27.2 PG (ref 26.1–32.9)
MCHC RBC AUTO-ENTMCNC: 32.6 G/DL (ref 31.4–35)
MCHC RBC AUTO-ENTMCNC: 32.7 G/DL (ref 31.4–35)
MCHC RBC AUTO-ENTMCNC: 32.8 G/DL (ref 31.4–35)
MCV RBC AUTO: 81.3 FL (ref 82–102)
MCV RBC AUTO: 82.4 FL (ref 82–102)
MCV RBC AUTO: 83.2 FL (ref 82–102)
MONOCYTES # BLD: 0 K/UL (ref 0.1–1.3)
MONOCYTES # BLD: 0.1 K/UL (ref 0.1–1.3)
MONOCYTES NFR BLD MANUAL: 4 % (ref 3–9)
MONOCYTES NFR BLD: 6 % (ref 4–12)
NEUTS SEG # BLD: 0 K/UL (ref 1.7–8.2)
NEUTS SEG # BLD: 0.2 K/UL (ref 1.7–8.2)
NEUTS SEG NFR BLD MANUAL: 18 % (ref 47–75)
NEUTS SEG NFR BLD: 4 % (ref 43–78)
NRBC # BLD: 0 K/UL (ref 0–0.2)
PLATELET # BLD AUTO: 20 K/UL (ref 150–450)
PLATELET # BLD AUTO: 21 K/UL (ref 150–450)
PLATELET # BLD AUTO: 26 K/UL (ref 150–450)
PLATELET COMMENT: ABNORMAL
PLATELET COMMENT: ABNORMAL
PMV BLD AUTO: 10.1 FL (ref 9.4–12.3)
PMV BLD AUTO: 10.5 FL (ref 9.4–12.3)
PMV BLD AUTO: ABNORMAL FL (ref 9.4–12.3)
POTASSIUM SERPL-SCNC: 5 MMOL/L (ref 3.5–5.1)
PROT SERPL-MCNC: 7 G/DL (ref 6.3–8.2)
RBC # BLD AUTO: 2.51 M/UL (ref 4.05–5.2)
RBC # BLD AUTO: 2.98 M/UL (ref 4.05–5.2)
RBC # BLD AUTO: 3.13 M/UL (ref 4.05–5.2)
RBC MORPH BLD: ABNORMAL
REFERENCE LAB: NORMAL
SODIUM SERPL-SCNC: 136 MMOL/L (ref 136–145)
TOTAL CELLS COUNTED SPEC: 50
UNIT DIVISION: 0
UNIT DIVISION: 0
UNIT ISSUE DATE/TIME: NORMAL
WBC # BLD AUTO: 0.8 K/UL (ref 4.3–11.1)
WBC # BLD AUTO: 0.9 K/UL (ref 4.3–11.1)
WBC # BLD AUTO: 1.1 K/UL (ref 4.3–11.1)
WBC MORPH BLD: ABNORMAL
WBC MORPH BLD: ABNORMAL

## 2024-05-06 PROCEDURE — 85027 COMPLETE CBC AUTOMATED: CPT

## 2024-05-06 PROCEDURE — 87205 SMEAR GRAM STAIN: CPT

## 2024-05-06 PROCEDURE — 97112 NEUROMUSCULAR REEDUCATION: CPT

## 2024-05-06 PROCEDURE — 6360000002 HC RX W HCPCS: Performed by: NURSE PRACTITIONER

## 2024-05-06 PROCEDURE — 2500000003 HC RX 250 WO HCPCS: Performed by: NURSE PRACTITIONER

## 2024-05-06 PROCEDURE — 72158 MRI LUMBAR SPINE W/O & W/DYE: CPT

## 2024-05-06 PROCEDURE — 36430 TRANSFUSION BLD/BLD COMPNT: CPT

## 2024-05-06 PROCEDURE — 87077 CULTURE AEROBIC IDENTIFY: CPT

## 2024-05-06 PROCEDURE — 2700000000 HC OXYGEN THERAPY PER DAY

## 2024-05-06 PROCEDURE — 80053 COMPREHEN METABOLIC PANEL: CPT

## 2024-05-06 PROCEDURE — 97168 OT RE-EVAL EST PLAN CARE: CPT

## 2024-05-06 PROCEDURE — 2580000003 HC RX 258: Performed by: NURSE PRACTITIONER

## 2024-05-06 PROCEDURE — 6370000000 HC RX 637 (ALT 250 FOR IP)

## 2024-05-06 PROCEDURE — 6360000002 HC RX W HCPCS: Performed by: INTERNAL MEDICINE

## 2024-05-06 PROCEDURE — 6370000000 HC RX 637 (ALT 250 FOR IP): Performed by: NURSE PRACTITIONER

## 2024-05-06 PROCEDURE — 36415 COLL VENOUS BLD VENIPUNCTURE: CPT

## 2024-05-06 PROCEDURE — 6370000000 HC RX 637 (ALT 250 FOR IP): Performed by: INTERNAL MEDICINE

## 2024-05-06 PROCEDURE — 97535 SELF CARE MNGMENT TRAINING: CPT

## 2024-05-06 PROCEDURE — 87186 SC STD MICRODIL/AGAR DIL: CPT

## 2024-05-06 PROCEDURE — A4216 STERILE WATER/SALINE, 10 ML: HCPCS | Performed by: INTERNAL MEDICINE

## 2024-05-06 PROCEDURE — A9579 GAD-BASE MR CONTRAST NOS,1ML: HCPCS | Performed by: NURSE PRACTITIONER

## 2024-05-06 PROCEDURE — 71046 X-RAY EXAM CHEST 2 VIEWS: CPT

## 2024-05-06 PROCEDURE — 85025 COMPLETE CBC W/AUTO DIFF WBC: CPT

## 2024-05-06 PROCEDURE — 87040 BLOOD CULTURE FOR BACTERIA: CPT

## 2024-05-06 PROCEDURE — 2580000003 HC RX 258: Performed by: INTERNAL MEDICINE

## 2024-05-06 PROCEDURE — 1100000000 HC RM PRIVATE

## 2024-05-06 PROCEDURE — 6370000000 HC RX 637 (ALT 250 FOR IP): Performed by: STUDENT IN AN ORGANIZED HEALTH CARE EDUCATION/TRAINING PROGRAM

## 2024-05-06 PROCEDURE — 97530 THERAPEUTIC ACTIVITIES: CPT

## 2024-05-06 PROCEDURE — 6360000004 HC RX CONTRAST MEDICATION: Performed by: NURSE PRACTITIONER

## 2024-05-06 PROCEDURE — 83735 ASSAY OF MAGNESIUM: CPT

## 2024-05-06 PROCEDURE — 51702 INSERT TEMP BLADDER CATH: CPT

## 2024-05-06 PROCEDURE — 6360000002 HC RX W HCPCS: Performed by: STUDENT IN AN ORGANIZED HEALTH CARE EDUCATION/TRAINING PROGRAM

## 2024-05-06 PROCEDURE — 36591 DRAW BLOOD OFF VENOUS DEVICE: CPT

## 2024-05-06 PROCEDURE — P9040 RBC LEUKOREDUCED IRRADIATED: HCPCS

## 2024-05-06 PROCEDURE — 87154 CUL TYP ID BLD PTHGN 6+ TRGT: CPT

## 2024-05-06 PROCEDURE — 94760 N-INVAS EAR/PLS OXIMETRY 1: CPT

## 2024-05-06 PROCEDURE — 92526 ORAL FUNCTION THERAPY: CPT

## 2024-05-06 PROCEDURE — 74018 RADEX ABDOMEN 1 VIEW: CPT

## 2024-05-06 RX ORDER — OXYCODONE HYDROCHLORIDE 5 MG/1
5 TABLET ORAL EVERY 4 HOURS PRN
Status: DISCONTINUED | OUTPATIENT
Start: 2024-05-06 | End: 2024-05-09

## 2024-05-06 RX ORDER — AMLODIPINE BESYLATE 10 MG/1
10 TABLET ORAL DAILY
Status: DISCONTINUED | OUTPATIENT
Start: 2024-05-06 | End: 2024-05-09

## 2024-05-06 RX ORDER — MIDAZOLAM HYDROCHLORIDE 2 MG/2ML
2 INJECTION, SOLUTION INTRAMUSCULAR; INTRAVENOUS
Status: CANCELLED | OUTPATIENT
Start: 2024-05-06 | End: 2024-05-07

## 2024-05-06 RX ORDER — FENTANYL CITRATE 50 UG/ML
100 INJECTION, SOLUTION INTRAMUSCULAR; INTRAVENOUS
Status: CANCELLED | OUTPATIENT
Start: 2024-05-06 | End: 2024-05-07

## 2024-05-06 RX ORDER — SODIUM CHLORIDE 9 MG/ML
INJECTION, SOLUTION INTRAVENOUS PRN
Status: CANCELLED | OUTPATIENT
Start: 2024-05-06

## 2024-05-06 RX ORDER — SODIUM CHLORIDE 0.9 % (FLUSH) 0.9 %
5-40 SYRINGE (ML) INJECTION PRN
Status: CANCELLED | OUTPATIENT
Start: 2024-05-06

## 2024-05-06 RX ORDER — ACETAMINOPHEN 500 MG
1000 TABLET ORAL ONCE
Status: CANCELLED | OUTPATIENT
Start: 2024-05-06 | End: 2024-05-06

## 2024-05-06 RX ORDER — SODIUM CHLORIDE, SODIUM LACTATE, POTASSIUM CHLORIDE, CALCIUM CHLORIDE 600; 310; 30; 20 MG/100ML; MG/100ML; MG/100ML; MG/100ML
INJECTION, SOLUTION INTRAVENOUS CONTINUOUS
Status: CANCELLED | OUTPATIENT
Start: 2024-05-06

## 2024-05-06 RX ORDER — OXYCODONE HYDROCHLORIDE 5 MG/1
5 TABLET ORAL
Status: CANCELLED | OUTPATIENT
Start: 2024-05-06 | End: 2024-05-07

## 2024-05-06 RX ORDER — HYDROMORPHONE HYDROCHLORIDE 2 MG/ML
0.5 INJECTION, SOLUTION INTRAMUSCULAR; INTRAVENOUS; SUBCUTANEOUS EVERY 5 MIN PRN
Status: CANCELLED | OUTPATIENT
Start: 2024-05-06

## 2024-05-06 RX ORDER — ONDANSETRON 2 MG/ML
4 INJECTION INTRAMUSCULAR; INTRAVENOUS
Status: CANCELLED | OUTPATIENT
Start: 2024-05-06 | End: 2024-05-07

## 2024-05-06 RX ORDER — DIPHENHYDRAMINE HYDROCHLORIDE 50 MG/ML
12.5 INJECTION INTRAMUSCULAR; INTRAVENOUS
Status: CANCELLED | OUTPATIENT
Start: 2024-05-06 | End: 2024-05-07

## 2024-05-06 RX ORDER — CLINDAMYCIN PHOSPHATE 900 MG/50ML
900 INJECTION, SOLUTION INTRAVENOUS EVERY 8 HOURS
Status: DISCONTINUED | OUTPATIENT
Start: 2024-05-06 | End: 2024-05-08

## 2024-05-06 RX ORDER — FUROSEMIDE 10 MG/ML
20 INJECTION INTRAMUSCULAR; INTRAVENOUS ONCE
Status: COMPLETED | OUTPATIENT
Start: 2024-05-06 | End: 2024-05-06

## 2024-05-06 RX ORDER — NALOXONE HYDROCHLORIDE 0.4 MG/ML
INJECTION, SOLUTION INTRAMUSCULAR; INTRAVENOUS; SUBCUTANEOUS PRN
Status: CANCELLED | OUTPATIENT
Start: 2024-05-06

## 2024-05-06 RX ORDER — SODIUM CHLORIDE 0.9 % (FLUSH) 0.9 %
5-40 SYRINGE (ML) INJECTION EVERY 12 HOURS SCHEDULED
Status: CANCELLED | OUTPATIENT
Start: 2024-05-06

## 2024-05-06 RX ORDER — FUROSEMIDE 10 MG/ML
20 INJECTION INTRAMUSCULAR; INTRAVENOUS DAILY
Status: DISCONTINUED | OUTPATIENT
Start: 2024-05-06 | End: 2024-05-09

## 2024-05-06 RX ADMIN — GADOTERIDOL 17 ML: 279.3 INJECTION, SOLUTION INTRAVENOUS at 18:26

## 2024-05-06 RX ADMIN — FUROSEMIDE 20 MG: 10 INJECTION, SOLUTION INTRAMUSCULAR; INTRAVENOUS at 13:09

## 2024-05-06 RX ADMIN — LEVOTHYROXINE SODIUM 150 MCG: 0.15 TABLET ORAL at 06:06

## 2024-05-06 RX ADMIN — FUROSEMIDE 20 MG: 10 INJECTION, SOLUTION INTRAMUSCULAR; INTRAVENOUS at 15:55

## 2024-05-06 RX ADMIN — CLINDAMYCIN HYDROCHLORIDE 300 MG: 150 CAPSULE ORAL at 06:06

## 2024-05-06 RX ADMIN — CLINDAMYCIN PHOSPHATE 900 MG: 900 INJECTION, SOLUTION INTRAVENOUS at 16:35

## 2024-05-06 RX ADMIN — VORICONAZOLE 300 MG: 10 INJECTION, POWDER, LYOPHILIZED, FOR SOLUTION INTRAVENOUS at 21:09

## 2024-05-06 RX ADMIN — DIPHENHYDRAMINE HYDROCHLORIDE 25 MG: 25 CAPSULE ORAL at 03:45

## 2024-05-06 RX ADMIN — NYSTATIN 500000 UNITS: 100000 SUSPENSION ORAL at 08:02

## 2024-05-06 RX ADMIN — CEFEPIME 2000 MG: 2 INJECTION, POWDER, FOR SOLUTION INTRAVENOUS at 01:04

## 2024-05-06 RX ADMIN — AMLODIPINE BESYLATE 10 MG: 10 TABLET ORAL at 08:02

## 2024-05-06 RX ADMIN — PRIMAQUINE PHOSPHATE 30 MG: 15 TABLET, FILM COATED ORAL at 08:02

## 2024-05-06 RX ADMIN — DAPTOMYCIN 800 MG: 500 INJECTION, POWDER, LYOPHILIZED, FOR SOLUTION INTRAVENOUS at 19:56

## 2024-05-06 RX ADMIN — OXYCODONE HYDROCHLORIDE 5 MG: 5 TABLET ORAL at 11:59

## 2024-05-06 RX ADMIN — PANTOPRAZOLE SODIUM 40 MG: 40 TABLET, DELAYED RELEASE ORAL at 06:06

## 2024-05-06 RX ADMIN — PREDNISONE 40 MG: 20 TABLET ORAL at 08:02

## 2024-05-06 RX ADMIN — HYDROMORPHONE HYDROCHLORIDE 1 MG: 1 INJECTION, SOLUTION INTRAMUSCULAR; INTRAVENOUS; SUBCUTANEOUS at 19:52

## 2024-05-06 RX ADMIN — CLINDAMYCIN PHOSPHATE 900 MG: 900 INJECTION, SOLUTION INTRAVENOUS at 23:48

## 2024-05-06 RX ADMIN — Medication 1 G: at 08:02

## 2024-05-06 RX ADMIN — HYDROMORPHONE HYDROCHLORIDE 1 MG: 1 INJECTION, SOLUTION INTRAMUSCULAR; INTRAVENOUS; SUBCUTANEOUS at 03:20

## 2024-05-06 RX ADMIN — CEFEPIME 2000 MG: 2 INJECTION, POWDER, FOR SOLUTION INTRAVENOUS at 08:07

## 2024-05-06 RX ADMIN — VORICONAZOLE 300 MG: 10 INJECTION, POWDER, LYOPHILIZED, FOR SOLUTION INTRAVENOUS at 09:31

## 2024-05-06 RX ADMIN — HYDROMORPHONE HYDROCHLORIDE 1 MG: 1 INJECTION, SOLUTION INTRAMUSCULAR; INTRAVENOUS; SUBCUTANEOUS at 14:33

## 2024-05-06 RX ADMIN — POTASSIUM CHLORIDE 40 MEQ: 1500 TABLET, EXTENDED RELEASE ORAL at 08:02

## 2024-05-06 RX ADMIN — NYSTATIN 500000 UNITS: 100000 SUSPENSION ORAL at 11:51

## 2024-05-06 RX ADMIN — CLINDAMYCIN HYDROCHLORIDE 300 MG: 150 CAPSULE ORAL at 11:51

## 2024-05-06 RX ADMIN — ONDANSETRON 4 MG: 2 INJECTION INTRAMUSCULAR; INTRAVENOUS at 11:04

## 2024-05-06 RX ADMIN — ACETAMINOPHEN 650 MG: 325 TABLET ORAL at 03:45

## 2024-05-06 RX ADMIN — FLUOXETINE HYDROCHLORIDE 20 MG: 10 CAPSULE ORAL at 08:02

## 2024-05-06 RX ADMIN — MORPHINE SULFATE 15 MG: 15 TABLET, FILM COATED, EXTENDED RELEASE ORAL at 08:02

## 2024-05-06 RX ADMIN — POLYETHYLENE GLYCOL 3350 17 G: 17 POWDER, FOR SOLUTION ORAL at 08:16

## 2024-05-06 RX ADMIN — ACYCLOVIR 400 MG: 400 TABLET ORAL at 08:02

## 2024-05-06 RX ADMIN — Medication 1 G: at 11:51

## 2024-05-06 RX ADMIN — CEFEPIME 2000 MG: 2 INJECTION, POWDER, FOR SOLUTION INTRAVENOUS at 20:38

## 2024-05-06 RX ADMIN — HYDROXYZINE HYDROCHLORIDE 25 MG: 25 TABLET, FILM COATED ORAL at 08:02

## 2024-05-06 RX ADMIN — ALLOPURINOL 300 MG: 300 TABLET ORAL at 08:02

## 2024-05-06 ASSESSMENT — PAIN DESCRIPTION - ONSET
ONSET: GRADUAL

## 2024-05-06 ASSESSMENT — PAIN DESCRIPTION - LOCATION
LOCATION: BACK;FLANK
LOCATION: BACK;ABDOMEN
LOCATION: BACK;LEG
LOCATION: BACK

## 2024-05-06 ASSESSMENT — PAIN DESCRIPTION - ORIENTATION
ORIENTATION: LOWER;POSTERIOR
ORIENTATION: RIGHT;LEFT;LOWER
ORIENTATION: LEFT;RIGHT
ORIENTATION: LOWER;POSTERIOR;RIGHT

## 2024-05-06 ASSESSMENT — PAIN SCALES - GENERAL
PAINLEVEL_OUTOF10: 0
PAINLEVEL_OUTOF10: 9
PAINLEVEL_OUTOF10: 7
PAINLEVEL_OUTOF10: 4
PAINLEVEL_OUTOF10: 2
PAINLEVEL_OUTOF10: 6

## 2024-05-06 ASSESSMENT — PAIN DESCRIPTION - PAIN TYPE
TYPE: ACUTE PAIN
TYPE: CHRONIC PAIN
TYPE: CHRONIC PAIN

## 2024-05-06 ASSESSMENT — PAIN DESCRIPTION - DESCRIPTORS
DESCRIPTORS: ACHING;CRAMPING
DESCRIPTORS: ACHING;DISCOMFORT;SHARP
DESCRIPTORS: ACHING;THROBBING
DESCRIPTORS: ACHING;SHARP

## 2024-05-06 ASSESSMENT — PAIN - FUNCTIONAL ASSESSMENT
PAIN_FUNCTIONAL_ASSESSMENT: ACTIVITIES ARE NOT PREVENTED
PAIN_FUNCTIONAL_ASSESSMENT: PREVENTS OR INTERFERES SOME ACTIVE ACTIVITIES AND ADLS
PAIN_FUNCTIONAL_ASSESSMENT: PREVENTS OR INTERFERES SOME ACTIVE ACTIVITIES AND ADLS

## 2024-05-06 ASSESSMENT — PAIN DESCRIPTION - FREQUENCY
FREQUENCY: CONTINUOUS

## 2024-05-06 NOTE — CONSULTS
Palliative Care    Patient: Constance Gomes MRN: [id number]  SSN: [id number]    YOB: 1950  Age: 73 y.o.  Sex: female       Date of Request: 5/5/2024  Date of Consult:  5/6/2024  Reason for Consult:   overwhelming symptoms  Requesting Physician: Khadijah Membreno NP     Assessment/Plan:     Principal Diagnosis:    Pain, back  M54.9    Additional Diagnoses:   Encounter for Palliative Care  Z51.5  AML    Medical Decision Making:   Reviewed and summarized recent labs, notes, and imaging. Upon my arrival, pt was being rolled out by transport for MRI L-spine and CXR. Pt's  Tung is at bedside and able to provide history. Tung reports pt's back pain is stabbing, which in the past has happened when she has slipped a disc. Her current pain regimen includes IV Dilaudid 1mg Q4hr PRN, Norco 10mg Q6hr PRN, and Flexeril 10mg TID PRN. Plan to follow MRI results. Today I changed Norco 10mg to Oxycodone IR 5mg Q4hr PRN. Limit Tylenol use given increased LFTs.   I spoke with Tung regarding pt's code status. I explained that since pt has been confused, Tung will serve as her decision maker as her legal spouse. Tung said that he does not want pt to suffer and he doesn't want any \"crash cart stuff.\" Code status changed to DNR. Tung also said that pt does not want to know when she is dying, she just wants to go to sleep.    Will discuss findings with members of the interdisciplinary team. Palliative Care will continue to follow.    Thank you for this referral.       More than 50% of this 55 minute visit was spent counseling and coordination of care as outlined above.    Subjective:     History obtained from:  Family and Chart    Chief Complaint: Back pain  History of Present Illness: Constance Gomes is a 73 y.o. female with a PMH of relapsed AML (pt of Dr. Perez, currently undergoing Vidaza and Venetoclax), psoriatic arthritis previously on Humira, HTN, HLD, GERD, hypothyroidism, and chronic pain on  Note Text (......Xxx Chief Complaint.): This diagnosis correlates with the Other (Free Text): continue ketaconzole shampoo as needed Detail Level: Zone

## 2024-05-06 NOTE — PROGRESS NOTES
0828: TRUNG Haddad notified of plt 20    1535: TRUNG Haddad notified pt /101 HFNC 7L sats at 91-93%    Lewis placed, 20mg Lasix IV ordered and given    1654: TRUNG Haddad notified RN holding oral meds for now due to pt mentation/alertness status

## 2024-05-06 NOTE — PROGRESS NOTES
ACUTE OCCUPATIONAL THERAPY GOALS:   Re-evaluation completed on 5/6/24  (Developed with and agreed upon by patient and/or caregiver.)  1. Patient will complete lower body bathing and dressing with MIN A and adaptive equipment as needed.     2. Patient will complete toileting with MIN A.  3. Patient will complete grooming task in standing with MIN A.  4. Patient will tolerate 25 minutes of OT treatment with 1-2 rest breaks to increase activity tolerance for ADLs.   5. Patient will complete functional transfers with MIN A and adaptive equipment as needed.   6. Patient will tolerate 10 minutes BUE exercises to increase strength for safe, functional transfers.     Goal Added 5/6/24  7. Patient will complete upper body bathing and dressing with SUPERVISION and adaptive equipment as needed.    Timeframe: 7 visits     OCCUPATIONAL THERAPY Daily Note and Re-evaluation       OT Visit Days: 1  Acknowledge Orders  Time  OT Charge Capture  Rehab Caseload Tracker      Constance Gomes is a 73 y.o. female   PRIMARY DIAGNOSIS: Thrombocytopenia (HCC)  Epistaxis [R04.0]  Lightheadedness [R42]  Thrombocytopenia (HCC) [D69.6]  Pancytopenia (HCC) [D61.818]  Symptomatic anemia [D64.9]       Reason for Referral: Generalized Muscle Weakness (M62.81)  Difficulty in walking, Not elsewhere classified (R26.2)  Other abnormalities of gait and mobility (R26.89)  Inpatient: Payor: [employer] MEDICARE / Plan: [employer] CHOICE-[facility] MEDICARE / Product Type: *No Product type* /     ASSESSMENT:     REHAB RECOMMENDATIONS:   Recommendation to date pending progress:  Setting:  Short-term Rehab    Equipment:    To Be Determined     ASSESSMENT:  Ms. Gomes is a 72 y/o female presents with volume overload, hypotension, pancytopenia and thrombocytopenia. Pt with hx of AML and began clinical trail drug this admission. At baseline, pt is mod I for bADLs and mobility. Pt seen for re-evaluation today--pt with a steady functional decline. All goals downgraded for pt's

## 2024-05-06 NOTE — PROGRESS NOTES
Discussed case with ID. Feel that bronch with BAL would be helpful.   Setting up tentatively for tomorrow with anesthesiology sedating.   However she is also now more hypoxic, up to 7L O2. Will need to determine if she is stable for bronch tomorrow morning based on her current O2 needs. Will make NPO at MN for now.     Kenton Aceves MD

## 2024-05-06 NOTE — CARE COORDINATION
LOS 28d    IDR and chart reviewed for transition of care planning.    24 Hour Events:  Afeb, VSS, on 1L NC  C1D20 Vidaza / Venetoclax / [id number] per clinical trial [id number]  Ongoing refractory thrombocytopenia - daily plt transfusion  BMBx completed 4/29 - path pending  On Cefepime for NF (day 8)   +fungitell; On clinda/pred/primiquine and Voriconazole  Monitor AST  Back pain worsened - checking MRI L spine  Consult pall care as well tomorrow    Transfusions: Plts  Replacements: Mg, KCl  ID following  PT/OT following ( 5/2 HH was recommended)    Transition of care is Home Health with possible need for Home IV therapy ( [facility]  is following, if needed at discharge).    Transitions of Care plan is ongoing, no further concerns as of present.   Please consult  if any new issues arise.  Will continue to follow.

## 2024-05-06 NOTE — PROGRESS NOTES
Infectious Disease Progress Note    Today's Date: 2024   Admit Date: 2024    Patient YOB: 1950    Impression:   Diffuse interstitial / ground glass opacification  Acute hypoxic respiratory failure  Neutropenic fever  Relapsed AML  Immunosuppression due to chemotherapy (Venetoclax)  Pancytopenia  Odynophagia  PCN / sulfa allergies    Plan:   She had E. Faecium, Lactobacillus, and Candida on Karius testing.  These were at generally low levels and unlikely to be the cause of her pulmonary infiltrate.  However, given her decline today I recommend repeating BCX and starting IV daptomycin.  Case discussed with pharmacy - would substitute isavuconazole for voriconazole to treat nodular infiltrate.  I am concerned about fungal infection based on CT results.  Case discussed with pulmonary - I am in favor of BAL as long as they feel it is safe.  Would send routine bacterial, fungal, and AFB cultures, PJP PCR, and any other testing that is routinely performed on immunocompromised patients.  Continue IV cefepime  Continue PO ACV  Change clindamycin to IV and continue primaquine.    Anti-infectives:   PO clindamycin  PO primaquine  IV voriconazole  IV cefepime  PO ACV    Subjective:   Interval history: lethargic, minimally responsive; tachypnea noted;    Allergies   Allergen Reactions    Penicillins Hives    Sulfa Antibiotics Hives    Codeine Nausea And Vomiting        Review of Systems:  Unable to obtain    Objective:     Visit Vitals  BP (!) 164/101   Pulse (!) 113   Temp 98.8 °F (37.1 °C) (Oral)   Resp 28   Ht 1.524 m (5')   Wt 82.1 kg (181 lb 1.6 oz)   SpO2 93%   BMI 35.37 kg/m²     Temp (24hrs), Av °F (36.7 °C), Min:97.3 °F (36.3 °C), Max:99.2 °F (37.3 °C)       Lines:    External Urinary Catheter (Active)     Single Lumen Implantable Port 24 Right Subclavian (Active)       Physical Exam:    General:  Lethargic, minimally responsive  CV:   Tachy, regular; no murmur  Lungs:  Coarse breath

## 2024-05-06 NOTE — PROGRESS NOTES
Comprehensive Nutrition Assessment    Type and Reason for Visit: Reassess  Malnutrition Screening Tool: Malnutrition Screen  Have you recently lost weight without trying?: 2 to 13 pounds (1 point)  Have you been eating poorly because of a decreased appetite?: Yes (1 point)  Malnutrition Screening Tool Score: 2    Nutrition Recommendations/Plan:   Meals and Snacks:  Diet: Downgrade Soft and bite sized- family request.   Nutrition Supplement Therapy:  Medical food supplement therapy:  Continue Ensure Enlive once per day (this provides 350 kcal and 20 grams protein per bottle) and Ensure Clear three times per day (this provides 240 kcal and 8 grams protein per bottle) - EC berry flavor; EE Strawberry or chocolate  Discontinue Magic cup at  request.      Malnutrition Assessment:  Malnutrition Status: At risk for malnutrition (Comment) (recurrent and prolonged admissions, variable intakes, wt loss)    No wasting  Nutrition Assessment:  Nutrition History: Patient known to clinical nutrition from previous admissions. She initially lost weight with COVID in May 2022 due to loss of taste. During previous admissions she ate fair during previous admissions and used Ensure intermittently to supplement intake. Patient reports she has been eating fair since recent discharge.  at bedside states she started going downhill recently and her PO declined with it. He states the most he has been able to get her to eat is half a cheeseburger.      Do You Have Any Cultural, [employer]aritan, or Ethnic Food Preferences?: No   Weight History: 6/20/23 180#, 10/10/23 173#, 1/8/24 160#. This reveals a 9.9% weight loss in ~10 months, 6.2% weight loss in last 6 months. This is notable but not clinically significant.  Nutrition Background:       PMH significant for psoriatic arthritis on Humira, ovarian cancer, HTN, HLD, GERD, AML s/p chemo and SCT. She presented with dizziness and gum bleeding. She was admitted with neutropenic fever,

## 2024-05-06 NOTE — PROGRESS NOTES
Platelet recheck 35 after receiving 1 unit on day shift today.    Hemoglobin at 7.0.    On-call oncology NP notified. Order to hold off on platelets, and to transfuse 1 unit PRBC's received.

## 2024-05-06 NOTE — PROGRESS NOTES
Constance Gomes  Admission Date: 4/7/2024         Daily Progress Note: 5/6/2024    The patient's chart is reviewed and the patient is discussed with the staff.    Background: Patient is a 73 y.o.  female seen and evaluated for PJP. PMH includes relapsed AML, ovarian cancer, hypothyroidism, CAD, HTN, psoriatic arthritis previously on humira. Has been receiving vidaza and venetoclax, dex/IVIG.  Patient was recently admitted 3/18 - 4/2 for pancytopenia and refractory thrombocytopenia despite platelet transfusions.  She received pulse dose Dex and IVIG and initially platelets began to respond to transfusion.  She presented to the hospital 4/7 with dizziness and gum bleeding.  Platelet counts were found to be less than 2.  She has been undergoing multiple platelet transfusions.  She was spiking some fevers at home.  She was admitted and started on treatment for neutropenic fevers with cefepime/vancomycin.  DIC was ruled out as well as TLS.  She underwent a bone marrow biopsy on 4/29 with path still pending.  During this hospitalization she is also received Dex and IVIG, has received 4 doses.  Fungitell was positive, galactomannan was negative.  She was empirically started on clinda/primaquine/prednisone for PJP as well as micafungin.  Throughout her hospitalization she also had worsening oxygen demands.  At one point did need Airvo however was weaned to nasal cannula. CXR and CT chest concerning for PJP as well.  ID is following.  Given thrombocytopenia unable to perform bronchoscopy at this time.    Patient denies any known lung issues including COPD or asthma.  She states she smoked for 2 years but quit several decades ago.    Subjective:     Pt's plts are 20 today.   Back up from 1L O2 yesterday to 4L today.   Afebrile.   CXR today with slightly increased B perihilar interstitial thickening and ground glass consolidations.   She states she generally feels worse today but cannot tell me any  Mouth/Throat PRN    [Held by provider] magnesium oxide (MAG-OX) tablet 400 mg  400 mg Oral 4x daily    [Held by provider] levoFLOXacin (LEVAQUIN) tablet 500 mg  500 mg Oral Daily    sodium chloride flush 0.9 % injection 5-40 mL  5-40 mL IntraVENous PRN    allopurinol (ZYLOPRIM) tablet 300 mg  300 mg Oral Daily    cyclobenzaprine (FLEXERIL) tablet 10 mg  10 mg Oral TID PRN    prochlorperazine (COMPAZINE) injection 10 mg  10 mg IntraVENous Q6H PRN    LORazepam (ATIVAN) 0.5 mg in sodium chloride (PF) 0.9 % 10 mL injection  0.5 mg IntraVENous Q6H PRN    lactulose (CHRONULAC) 10 GM/15ML solution 20 g  20 g Oral Q8H PRN    ipratropium 0.5 mg-albuterol 2.5 mg (DUONEB) nebulizer solution 1 Dose  1 Dose Inhalation Q4H PRN    guaiFENesin (ROBITUSSIN) 100 MG/5ML liquid 200 mg  200 mg Oral Q4H PRN    HYDROcodone homatropine (HYCODAN) 5-1.5 MG/5ML solution 5 mL  5 mL Oral Q4H PRN    senna (SENOKOT) tablet 17.2 mg  2 tablet Oral Nightly    polyethylene glycol (GLYCOLAX) packet 17 g  17 g Oral Daily    magic (miracle) mouthwash  5 mL Swish & Spit 4x Daily PRN    acyclovir (ZOVIRAX) tablet 400 mg  400 mg Oral BID    famotidine (PEPCID) tablet 40 mg  40 mg Oral QPM    morphine (MS CONTIN) extended release tablet 15 mg  15 mg Oral BID    pantoprazole (PROTONIX) tablet 40 mg  40 mg Oral BID    tiZANidine (ZANAFLEX) tablet 4 mg  4 mg Oral Nightly PRN    acetaminophen (TYLENOL) tablet 650 mg  650 mg Oral Q6H PRN    ondansetron (ZOFRAN) injection 4 mg  4 mg IntraVENous Q6H PRN    melatonin tablet 1.5 mg  1.5 mg Oral Nightly PRN    aluminum & magnesium hydroxide-simethicone (MAALOX) 200-200-20 MG/5ML suspension 30 mL  30 mL Oral Q6H PRN    ondansetron (ZOFRAN-ODT) disintegrating tablet 4 mg  4 mg Oral Q8H PRN    traZODone (DESYREL) tablet 50 mg  50 mg Oral Nightly PRN    potassium chloride (KLOR-CON M) extended release tablet 40 mEq  40 mEq Oral PRN    Or    potassium bicarb-citric acid (EFFER-K) effervescent tablet 40 mEq  40 mEq Oral

## 2024-05-06 NOTE — PROGRESS NOTES
5th floor notified of patient coming to pre op tomorrow at 0930.  to come to pre op with patient for consent signing.

## 2024-05-06 NOTE — PROGRESS NOTES
nonfocal with no obvious sensory or motor deficits.   MSK Normal range of motion in general.  No edema and no tenderness.   Psych Appropriate mood and affect.    Full exam per attending MD.      Labs:    Recent Results (from the past 24 hour(s))   PREPARE PLATELETS, 1 Product    Collection Time: 05/05/24  8:00 PM   Result Value Ref Range    Blood Bank Comment PLATELET CROSSMATCHED BY THE BLOOD CONNECTION     Unit Number [id number]     Product Code Blood Bank [facility]     Unit Divison 00     Dispense Status Blood Bank ALLOCATED    TYPE AND SCREEN    Collection Time: 05/05/24  8:20 PM   Result Value Ref Range    Crossmatch expiration date 05/08/2024,2359     ABO/Rh O NEGATIVE     Antibody Screen NEG     Unit Number [id number]     Product Code Blood Bank  LRIR     Unit Divison 00     Dispense Status Blood Bank ISSUED     Unit Issue Date/Time [id number]     Blood Bank Unit Type and Rh O NEG     Blood Bank ISBT Product Blood Type 9500     Blood Bank Blood Product Expiration Date 456576122910     Crossmatch Result Compatible    CBC    Collection Time: 05/05/24  8:20 PM   Result Value Ref Range    WBC 1.1 (LL) 4.3 - 11.1 K/uL    RBC 2.60 (L) 4.05 - 5.2 M/uL    Hemoglobin 7.0 (L) 11.7 - 15.4 g/dL    Hematocrit 21.3 (L) 35.8 - 46.3 %    MCV 81.9 (L) 82 - 102 FL    MCH 26.9 26.1 - 32.9 PG    MCHC 32.9 31.4 - 35.0 g/dL    RDW 15.9 (H) 11.9 - 14.6 %    Platelets 35 (L) 150 - 450 K/uL    MPV 10.7 9.4 - 12.3 FL    nRBC 0.00 0.0 - 0.2 K/uL   PREPARE RBC (CROSSMATCH), 1 Units    Collection Time: 05/05/24 10:00 PM   Result Value Ref Range    History Check Historical check performed    Comprehensive Metabolic Panel    Collection Time: 05/06/24  2:54 AM   Result Value Ref Range    Sodium 136 136 - 145 mmol/L    Potassium 5.0 3.5 - 5.1 mmol/L    Chloride 100 98 - 107 mmol/L    CO2 29 (H) 20 - 28 mmol/L    Anion Gap 7 (L) 9 - 18 mmol/L    Glucose 141 (H) 70 - 99 mg/dL    BUN 14 8 - 23 MG/DL    Creatinine 0.34 (L) 0.60 - 1.10  Lightheadedness were also pertinent to this visit.    Ms Gomes has PMH of psoriatic arthritis previously on Humira, HTN, HLD, GERD and hypothyroidism and chronic pain on chronic opioids. 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 She proceeded with Revlimid in 10/2023 but was noted to have POD in 11/2023 with BMBx showed AEL and 60% blasts. She completed 10 days of dacogen and Mylotarg and achieved CR-1. She is followed by Dr Perez and is currently undergoing Vidaza and Venetoclax. She completed cycle 2 day 1-5 2/5-2/9 and is on continuous Venetoclax 100mg. She was admitted 3/18-4/2 for pancytopenia and refractory thrombocytopenia despite cross-matched/HLA-matched platelets. She did complete 4 days pulse Dex/IVIG and plts began to respond to transfusion and felt to be either a response to Dex/IVIG vs bone marrow recovery from recent treatment.     Ms Gomes presented on day of admission with dizziness and gum-bleeding. Platelet count <2. She had lab/replacement visits to infusion on 4/5 and 4/6 and was noted to be refractory to platelet transfusions despite cross-matched platelets. She also reported fever at home up to 102. CBC noted pancytopenia with  on arrival and plts remain <2k. Hgb 7.2, down to 7.1 today. She was started on treatment for neutropenic fever with Cefepime/Vancomycin. CXR with R lung atelectasis vs infiltrate. BC pending. Hematology asked to assume care.    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 She proceeded with Revlimid in 10/2023 but was noted to have POD in 11/2023 with BMBx showed AEL and 60% blasts. She completed 10 days of dacogen and Mylotarg and achieved CR-1. She is currently undergoing Vidaza and Venetoclax. She completed cycle 2 day 1-5 2/5-2/9 and is on continuous Venetoclax

## 2024-05-06 NOTE — PROGRESS NOTES
ACUTE PHYSICAL THERAPY GOALS:   (Developed with and agreed upon by patient and/or caregiver.)    LTG:  (1.)Ms. Gomes will move from supine to sit and sit to supine , scoot up and down, and roll side to side in bed with INDEPENDENT within 7 treatment day(s).    (2.)Ms. Gomes will transfer from bed to chair and chair to bed with MODIFIED INDEPENDENCE using the least restrictive device within 7 treatment day(s).    (3.)Ms. Gomes will ambulate with STAND BY ASSIST for 250+ feet with the least restrictive device within 7 treatment day(s), while maintaining normal vital signs.  (4.)Ms. Gomes will tolerate 23+ minutes of therapeutic activity within 7 treatment days for increased activity tolerance and functional mobility.    PHYSICAL THERAPY: Daily Note AM   (Link to Caseload Tracking: PT Visit Days : 2  Time In/Out PT Charge Capture  Rehab Caseload Tracker  Orders    Constance Gomes is a 73 y.o. female   PRIMARY DIAGNOSIS: Thrombocytopenia (HCC)  Epistaxis [R04.0]  Lightheadedness [R42]  Thrombocytopenia (HCC) [D69.6]  Pancytopenia (HCC) [D61.818]  Symptomatic anemia [D64.9]       Inpatient: Payor: [employer] MEDICARE / Plan: [employer] CHOICE-PPO MEDICARE / Product Type: *No Product type* /     ASSESSMENT:     REHAB RECOMMENDATIONS:   Recommendation to date pending progress:  Setting:  Short-term Rehab    Equipment:    To Be Determined     ASSESSMENT:  Ms. Gomes was on the stretcher just coming back from MRI of her spine. Sheet transfer over to bed with total A x3. Rolling left and right with mod A to removed extra sheets. She was able to sit on edge of bed with mod A x2 but was SBA for sitting balance. Required encouragement to remain sitting. Sit to stand several times with mod A x2. Pt is confused today and requiring more assist and time today. Pt left supine with needs in reach and pillows in place for comfort. Changing recommendation to STR due to decline in medical status.     SUBJECTIVE:   Ms. Gomes states, \"I don't know

## 2024-05-06 NOTE — PROGRESS NOTES
GOALS:  LTG: Patient will maintain adequate hydration/nutrition with optimum safety and efficiency of swallowing function with PO intake without overt signs or symptoms of aspiration for the highest appropriate diet level.  STG:  Patient will consume soft and bite-sized textures and thin liquids without overt signs or symptoms of airway compromise. DOWNGRADED 24 PER PT REQUEST    SPEECH LANGUAGE PATHOLOGY: DYSPHAGIA Daily Note #1    Acknowledge Order  I  Therapy Time  I   Charges     I  Rehab Caseload Tracker      NAME: Constance Gomes  : 1950  MRN: [id number]    ADMISSION DATE: 2024  PRIMARY DIAGNOSIS: Thrombocytopenia (HCC)    ICD-10: Treatment Diagnosis: R13.13 Dysphagia, Pharyngeal Phase    RECOMMENDATIONS   Diet:    Soft and Bite-Sized  Thin Liquids    Medication: as tolerated   Compensatory Swallowing Strategies:   Alternate solids and liquids  Remain upright for 30-45 minutes after meals  Small bites/sips  Encouraged use of available medications around meal times to address throat pain   Therapeutic Intervention:   Patient/family education  Dysphagia treatment   Patient continues to require skilled intervention:  Yes. Recommend ongoing speech therapy services during this hospitalization.     Anticipated Discharge Needs: Do not anticipate ongoing speech therapy needs upon discharge.      ASSESSMENT    Patient seen at bedside, very lethargic due to pain meds and PT/OT session. Agreeable to trials of thin liquids with no overt clinical s/s of aspiration. Reports ETC diet is too challenging, requested diet downgrade (order changed by RD). Due to lethergy, oral intake held at this time per husbands request. OK for bread and sandwiches. Encouraged use of available medications to address sore throat around meals.  Will follow for diet tolerance.       GENERAL    Subjective: Patient alert, lethargic.  at bedside providing most information.     Recent Information/Background: SLP consult

## 2024-05-07 ENCOUNTER — RESEARCH ENCOUNTER (OUTPATIENT)
Dept: ONCOLOGY | Age: 74
End: 2024-05-07

## 2024-05-07 ENCOUNTER — APPOINTMENT (OUTPATIENT)
Dept: GENERAL RADIOLOGY | Age: 74
DRG: 834 | End: 2024-05-07
Payer: MEDICARE

## 2024-05-07 ENCOUNTER — APPOINTMENT (OUTPATIENT)
Dept: CT IMAGING | Age: 74
DRG: 834 | End: 2024-05-07
Payer: MEDICARE

## 2024-05-07 LAB
ACCESSION NUMBER, LLC1M: ABNORMAL
ACINETOBACTER CALCOAC BAUMANNII COMPLEX BY PCR: NOT DETECTED
ALBUMIN SERPL-MCNC: 2.2 G/DL (ref 3.2–4.6)
ALBUMIN/GLOB SERPL: 0.5 (ref 1–1.9)
ALP SERPL-CCNC: 960 U/L (ref 35–104)
ALT SERPL-CCNC: 67 U/L (ref 12–65)
AMYLASE SERPL-CCNC: 19 U/L (ref 25–115)
ANION GAP SERPL CALC-SCNC: 9 MMOL/L (ref 9–18)
AST SERPL-CCNC: 309 U/L (ref 15–37)
B FRAGILIS DNA BLD POS QL NAA+NON-PROBE: NOT DETECTED
BASOPHILS # BLD: 0 K/UL (ref 0–0.2)
BASOPHILS NFR BLD: 0 % (ref 0–2)
BILIRUB DIRECT SERPL-MCNC: 0.6 MG/DL (ref 0–0.4)
BILIRUB SERPL-MCNC: 1.2 MG/DL (ref 0–1.2)
BIOFIRE TEST COMMENT: ABNORMAL
BUN SERPL-MCNC: 19 MG/DL (ref 8–23)
C ALBICANS DNA BLD POS QL NAA+NON-PROBE: NOT DETECTED
C AURIS DNA BLD POS QL NAA+NON-PROBE: NOT DETECTED
C GATTII+NEOFOR DNA BLD POS QL NAA+N-PRB: NOT DETECTED
C GLABRATA DNA BLD POS QL NAA+NON-PROBE: NOT DETECTED
C KRUSEI DNA BLD POS QL NAA+NON-PROBE: NOT DETECTED
C PARAP DNA BLD POS QL NAA+NON-PROBE: NOT DETECTED
C TROPICLS DNA BLD POS QL NAA+NON-PROBE: NOT DETECTED
CALCIUM SERPL-MCNC: 8.9 MG/DL (ref 8.8–10.2)
CHLORIDE SERPL-SCNC: 97 MMOL/L (ref 98–107)
CHOLEST SERPL-MCNC: 148 MG/DL (ref 0–200)
CK SERPL-CCNC: 314 U/L (ref 21–215)
CO2 SERPL-SCNC: 30 MMOL/L (ref 20–28)
CREAT SERPL-MCNC: 0.32 MG/DL (ref 0.6–1.1)
DIFFERENTIAL METHOD BLD: ABNORMAL
E CLOAC COMP DNA BLD POS NAA+NON-PROBE: NOT DETECTED
E COLI DNA BLD POS QL NAA+NON-PROBE: NOT DETECTED
E FAECALIS DNA BLD POS QL NAA+NON-PROBE: NOT DETECTED
E FAECIUM DNA BLD POS QL NAA+NON-PROBE: DETECTED
ENTEROBACTERALES DNA BLD POS NAA+N-PRB: NOT DETECTED
EOSINOPHIL # BLD: 0 K/UL (ref 0–0.8)
EOSINOPHIL NFR BLD: 0 % (ref 0.5–7.8)
ERYTHROCYTE [DISTWIDTH] IN BLOOD BY AUTOMATED COUNT: 15.9 % (ref 11.9–14.6)
ERYTHROCYTE [DISTWIDTH] IN BLOOD BY AUTOMATED COUNT: 16.1 % (ref 11.9–14.6)
GLOBULIN SER CALC-MCNC: 4.8 G/DL (ref 2.3–3.5)
GLUCOSE SERPL-MCNC: 122 MG/DL (ref 70–99)
GP B STREP DNA BLD POS QL NAA+NON-PROBE: NOT DETECTED
HAEM INFLU DNA BLD POS QL NAA+NON-PROBE: NOT DETECTED
HCT VFR BLD AUTO: 20.8 % (ref 35.8–46.3)
HCT VFR BLD AUTO: 21.6 % (ref 35.8–46.3)
HDLC SERPL-MCNC: 21 MG/DL (ref 40–60)
HGB BLD-MCNC: 7 G/DL (ref 11.7–15.4)
HGB BLD-MCNC: 7.2 G/DL (ref 11.7–15.4)
IMM GRANULOCYTES # BLD AUTO: 0 K/UL (ref 0–0.5)
IMM GRANULOCYTES NFR BLD AUTO: 0 % (ref 0–5)
K OXYTOCA DNA BLD POS QL NAA+NON-PROBE: NOT DETECTED
KLEBSIELLA SP DNA BLD POS QL NAA+NON-PRB: NOT DETECTED
KLEBSIELLA SP DNA BLD POS QL NAA+NON-PRB: NOT DETECTED
L MONOCYTOG DNA BLD POS QL NAA+NON-PROBE: NOT DETECTED
LDH SERPL L TO P-CCNC: >2500 U/L (ref 127–281)
LDLC SERPL DIRECT ASSAY-MCNC: 35 MG/DL (ref 0–100)
LIPASE SERPL-CCNC: 12 U/L (ref 13–60)
LYMPHOCYTES # BLD: 0.5 K/UL (ref 0.5–4.6)
LYMPHOCYTES NFR BLD: 69 % (ref 13–44)
MAGNESIUM SERPL-MCNC: 1.9 MG/DL (ref 1.8–2.4)
MCH RBC QN AUTO: 27 PG (ref 26.1–32.9)
MCH RBC QN AUTO: 27.3 PG (ref 26.1–32.9)
MCHC RBC AUTO-ENTMCNC: 33.3 G/DL (ref 31.4–35)
MCHC RBC AUTO-ENTMCNC: 33.7 G/DL (ref 31.4–35)
MCV RBC AUTO: 80.3 FL (ref 82–102)
MCV RBC AUTO: 81.8 FL (ref 82–102)
MONOCYTES # BLD: 0.1 K/UL (ref 0.1–1.3)
MONOCYTES NFR BLD: 21 % (ref 4–12)
N MEN DNA BLD POS QL NAA+NON-PROBE: NOT DETECTED
NEUTS SEG # BLD: 0.1 K/UL (ref 1.7–8.2)
NEUTS SEG NFR BLD: 10 % (ref 43–78)
NRBC # BLD: 0 K/UL (ref 0–0.2)
NRBC # BLD: 0 K/UL (ref 0–0.2)
P AERUGINOSA DNA BLD POS NAA+NON-PROBE: NOT DETECTED
PHOSPHATE SERPL-MCNC: 2.6 MG/DL (ref 2.5–4.5)
PLATELET # BLD AUTO: 8 K/UL (ref 150–450)
PLATELET # BLD AUTO: 9 K/UL (ref 150–450)
PLATELET COMMENT: ABNORMAL
PMV BLD AUTO: ABNORMAL FL (ref 9.4–12.3)
PMV BLD AUTO: ABNORMAL FL (ref 9.4–12.3)
POTASSIUM SERPL-SCNC: 5.1 MMOL/L (ref 3.5–5.1)
PROT SERPL-MCNC: 7 G/DL (ref 6.3–8.2)
PROTEUS SP DNA BLD POS QL NAA+NON-PROBE: NOT DETECTED
RBC # BLD AUTO: 2.59 M/UL (ref 4.05–5.2)
RBC # BLD AUTO: 2.64 M/UL (ref 4.05–5.2)
RBC MORPH BLD: ABNORMAL
RESISTANT GENE TARGETS: ABNORMAL
S AUREUS DNA BLD POS QL NAA+NON-PROBE: NOT DETECTED
S AUREUS+CONS DNA BLD POS NAA+NON-PROBE: NOT DETECTED
S EPIDERMIDIS DNA BLD POS QL NAA+NON-PRB: NOT DETECTED
S LUGDUNENSIS DNA BLD POS QL NAA+NON-PRB: NOT DETECTED
S MALTOPHILIA DNA BLD POS QL NAA+NON-PRB: NOT DETECTED
S MARCESCENS DNA BLD POS NAA+NON-PROBE: NOT DETECTED
S PNEUM DNA BLD POS QL NAA+NON-PROBE: NOT DETECTED
S PYO DNA BLD POS QL NAA+NON-PROBE: NOT DETECTED
SALMONELLA DNA BLD POS QL NAA+NON-PROBE: NOT DETECTED
SODIUM SERPL-SCNC: 136 MMOL/L (ref 136–145)
STREPTOCOCCUS DNA BLD POS NAA+NON-PROBE: NOT DETECTED
TRIGL SERPL-MCNC: 231 MG/DL (ref 0–150)
URATE SERPL-MCNC: 1.8 MG/DL (ref 2.5–7.1)
VANA+VANB BLD POS QL NAA+NON-PROBE: DETECTED
WBC # BLD AUTO: 0.6 K/UL (ref 4.3–11.1)
WBC # BLD AUTO: 0.7 K/UL (ref 4.3–11.1)
WBC MORPH BLD: ABNORMAL

## 2024-05-07 PROCEDURE — P9037 PLATE PHERES LEUKOREDU IRRAD: HCPCS

## 2024-05-07 PROCEDURE — 2580000003 HC RX 258: Performed by: INTERNAL MEDICINE

## 2024-05-07 PROCEDURE — 84550 ASSAY OF BLOOD/URIC ACID: CPT

## 2024-05-07 PROCEDURE — 83735 ASSAY OF MAGNESIUM: CPT

## 2024-05-07 PROCEDURE — 51702 INSERT TEMP BLADDER CATH: CPT

## 2024-05-07 PROCEDURE — 6360000002 HC RX W HCPCS: Performed by: INTERNAL MEDICINE

## 2024-05-07 PROCEDURE — 71045 X-RAY EXAM CHEST 1 VIEW: CPT

## 2024-05-07 PROCEDURE — 1100000000 HC RM PRIVATE

## 2024-05-07 PROCEDURE — 94761 N-INVAS EAR/PLS OXIMETRY MLT: CPT

## 2024-05-07 PROCEDURE — 82550 ASSAY OF CK (CPK): CPT

## 2024-05-07 PROCEDURE — 85025 COMPLETE CBC W/AUTO DIFF WBC: CPT

## 2024-05-07 PROCEDURE — 36591 DRAW BLOOD OFF VENOUS DEVICE: CPT

## 2024-05-07 PROCEDURE — 92526 ORAL FUNCTION THERAPY: CPT

## 2024-05-07 PROCEDURE — 6360000004 HC RX CONTRAST MEDICATION: Performed by: INTERNAL MEDICINE

## 2024-05-07 PROCEDURE — 74177 CT ABD & PELVIS W/CONTRAST: CPT

## 2024-05-07 PROCEDURE — 80053 COMPREHEN METABOLIC PANEL: CPT

## 2024-05-07 PROCEDURE — 84478 ASSAY OF TRIGLYCERIDES: CPT

## 2024-05-07 PROCEDURE — 6360000002 HC RX W HCPCS: Performed by: STUDENT IN AN ORGANIZED HEALTH CARE EDUCATION/TRAINING PROGRAM

## 2024-05-07 PROCEDURE — 6370000000 HC RX 637 (ALT 250 FOR IP): Performed by: STUDENT IN AN ORGANIZED HEALTH CARE EDUCATION/TRAINING PROGRAM

## 2024-05-07 PROCEDURE — 84100 ASSAY OF PHOSPHORUS: CPT

## 2024-05-07 PROCEDURE — 83721 ASSAY OF BLOOD LIPOPROTEIN: CPT

## 2024-05-07 PROCEDURE — 6360000002 HC RX W HCPCS: Performed by: NURSE PRACTITIONER

## 2024-05-07 PROCEDURE — 86813 HLA TYPING A B OR C: CPT

## 2024-05-07 PROCEDURE — A4216 STERILE WATER/SALINE, 10 ML: HCPCS | Performed by: INTERNAL MEDICINE

## 2024-05-07 PROCEDURE — 83615 LACTATE (LD) (LDH) ENZYME: CPT

## 2024-05-07 PROCEDURE — 83690 ASSAY OF LIPASE: CPT

## 2024-05-07 PROCEDURE — 82465 ASSAY BLD/SERUM CHOLESTEROL: CPT

## 2024-05-07 PROCEDURE — 6370000000 HC RX 637 (ALT 250 FOR IP)

## 2024-05-07 PROCEDURE — 82150 ASSAY OF AMYLASE: CPT

## 2024-05-07 PROCEDURE — 85027 COMPLETE CBC AUTOMATED: CPT

## 2024-05-07 PROCEDURE — 36430 TRANSFUSION BLD/BLD COMPNT: CPT

## 2024-05-07 PROCEDURE — 82248 BILIRUBIN DIRECT: CPT

## 2024-05-07 PROCEDURE — 83718 ASSAY OF LIPOPROTEIN: CPT

## 2024-05-07 PROCEDURE — 6370000000 HC RX 637 (ALT 250 FOR IP): Performed by: INTERNAL MEDICINE

## 2024-05-07 PROCEDURE — 2500000003 HC RX 250 WO HCPCS: Performed by: NURSE PRACTITIONER

## 2024-05-07 PROCEDURE — 2580000003 HC RX 258: Performed by: NURSE PRACTITIONER

## 2024-05-07 RX ORDER — FUROSEMIDE 10 MG/ML
20 INJECTION INTRAMUSCULAR; INTRAVENOUS ONCE
Status: COMPLETED | OUTPATIENT
Start: 2024-05-07 | End: 2024-05-07

## 2024-05-07 RX ORDER — SODIUM CHLORIDE 9 MG/ML
INJECTION, SOLUTION INTRAVENOUS PRN
Status: DISCONTINUED | OUTPATIENT
Start: 2024-05-07 | End: 2024-05-08

## 2024-05-07 RX ORDER — DIPHENHYDRAMINE HYDROCHLORIDE 50 MG/ML
25 INJECTION INTRAMUSCULAR; INTRAVENOUS ONCE
Status: COMPLETED | OUTPATIENT
Start: 2024-05-07 | End: 2024-05-07

## 2024-05-07 RX ORDER — ACETAMINOPHEN 160 MG/5ML
650 SUSPENSION ORAL EVERY 6 HOURS PRN
Status: DISCONTINUED | OUTPATIENT
Start: 2024-05-07 | End: 2024-05-07 | Stop reason: SDUPTHER

## 2024-05-07 RX ADMIN — DIATRIZOATE MEGLUMINE AND DIATRIZOATE SODIUM 15 ML: 660; 100 LIQUID ORAL; RECTAL at 13:12

## 2024-05-07 RX ADMIN — IOPAMIDOL 100 ML: 755 INJECTION, SOLUTION INTRAVENOUS at 14:56

## 2024-05-07 RX ADMIN — PANTOPRAZOLE SODIUM 40 MG: 40 TABLET, DELAYED RELEASE ORAL at 16:00

## 2024-05-07 RX ADMIN — CLINDAMYCIN PHOSPHATE 900 MG: 900 INJECTION, SOLUTION INTRAVENOUS at 15:59

## 2024-05-07 RX ADMIN — DIPHENHYDRAMINE HYDROCHLORIDE 25 MG: 25 CAPSULE ORAL at 12:19

## 2024-05-07 RX ADMIN — CEFEPIME 2000 MG: 2 INJECTION, POWDER, FOR SOLUTION INTRAVENOUS at 12:19

## 2024-05-07 RX ADMIN — MEROPENEM 1000 MG: 1 INJECTION, POWDER, FOR SOLUTION INTRAVENOUS at 13:54

## 2024-05-07 RX ADMIN — CLINDAMYCIN PHOSPHATE 900 MG: 900 INJECTION, SOLUTION INTRAVENOUS at 09:54

## 2024-05-07 RX ADMIN — OXYCODONE HYDROCHLORIDE 5 MG: 5 TABLET ORAL at 07:57

## 2024-05-07 RX ADMIN — ONDANSETRON 4 MG: 2 INJECTION INTRAMUSCULAR; INTRAVENOUS at 21:26

## 2024-05-07 RX ADMIN — HYDROMORPHONE HYDROCHLORIDE 1 MG: 1 INJECTION, SOLUTION INTRAMUSCULAR; INTRAVENOUS; SUBCUTANEOUS at 23:20

## 2024-05-07 RX ADMIN — VORICONAZOLE 300 MG: 10 INJECTION, POWDER, LYOPHILIZED, FOR SOLUTION INTRAVENOUS at 10:37

## 2024-05-07 RX ADMIN — ACETAMINOPHEN 650 MG: 325 SUSPENSION ORAL at 12:19

## 2024-05-07 RX ADMIN — DIPHENHYDRAMINE HYDROCHLORIDE 25 MG: 50 INJECTION INTRAMUSCULAR; INTRAVENOUS at 02:49

## 2024-05-07 RX ADMIN — DAPTOMYCIN 800 MG: 500 INJECTION, POWDER, LYOPHILIZED, FOR SOLUTION INTRAVENOUS at 18:08

## 2024-05-07 RX ADMIN — FUROSEMIDE 20 MG: 10 INJECTION, SOLUTION INTRAMUSCULAR; INTRAVENOUS at 10:58

## 2024-05-07 RX ADMIN — DIATRIZOATE MEGLUMINE AND DIATRIZOATE SODIUM 15 ML: 660; 100 LIQUID ORAL; RECTAL at 13:06

## 2024-05-07 RX ADMIN — FUROSEMIDE 20 MG: 10 INJECTION, SOLUTION INTRAMUSCULAR; INTRAVENOUS at 18:05

## 2024-05-07 RX ADMIN — HYDROMORPHONE HYDROCHLORIDE 1 MG: 1 INJECTION, SOLUTION INTRAMUSCULAR; INTRAVENOUS; SUBCUTANEOUS at 09:48

## 2024-05-07 RX ADMIN — VORICONAZOLE 300 MG: 10 INJECTION, POWDER, LYOPHILIZED, FOR SOLUTION INTRAVENOUS at 20:03

## 2024-05-07 RX ADMIN — CLINDAMYCIN PHOSPHATE 900 MG: 900 INJECTION, SOLUTION INTRAVENOUS at 23:27

## 2024-05-07 RX ADMIN — CEFEPIME 2000 MG: 2 INJECTION, POWDER, FOR SOLUTION INTRAVENOUS at 04:20

## 2024-05-07 RX ADMIN — MEROPENEM 1000 MG: 1 INJECTION, POWDER, FOR SOLUTION INTRAVENOUS at 21:27

## 2024-05-07 RX ADMIN — ACETAMINOPHEN 650 MG: 325 SUSPENSION ORAL at 02:49

## 2024-05-07 ASSESSMENT — PAIN SCALES - GENERAL
PAINLEVEL_OUTOF10: 7
PAINLEVEL_OUTOF10: 6
PAINLEVEL_OUTOF10: 10

## 2024-05-07 ASSESSMENT — PAIN DESCRIPTION - ORIENTATION
ORIENTATION: RIGHT;LOWER
ORIENTATION: POSTERIOR
ORIENTATION: RIGHT;LEFT;LOWER

## 2024-05-07 ASSESSMENT — PAIN - FUNCTIONAL ASSESSMENT: PAIN_FUNCTIONAL_ASSESSMENT: PREVENTS OR INTERFERES SOME ACTIVE ACTIVITIES AND ADLS

## 2024-05-07 ASSESSMENT — PAIN DESCRIPTION - DESCRIPTORS
DESCRIPTORS: SORE
DESCRIPTORS: ACHING;THROBBING

## 2024-05-07 ASSESSMENT — PAIN DESCRIPTION - LOCATION
LOCATION: SACRUM
LOCATION: ABDOMEN
LOCATION: GENERALIZED;BACK

## 2024-05-07 NOTE — PLAN OF CARE
Problem: Pain  Goal: Verbalizes/displays adequate comfort level or baseline comfort level  5/7/2024 1038 by Irene Fitzgerald RN  Outcome: Progressing  5/7/2024 0123 by Kelsie Gaspar RN  Outcome: Progressing     Problem: Safety - Adult  Goal: Free from fall injury  5/7/2024 1038 by Irene Fitzgerald, RN  Outcome: Progressing  5/7/2024 0123 by Kelsie Gaspar RN  Outcome: Progressing

## 2024-05-07 NOTE — PROGRESS NOTES
Infectious Disease Progress Note    Today's Date: 2024   Admit Date: 2024    Patient YOB: 1950    Impression:   Diffuse interstitial / ground glass opacification  Acute hypoxic respiratory failure  Neutropenic fever  Right upper quadrant abdominal pain  Relapsed AML  Immunosuppression due to chemotherapy (Venetoclax)  Pancytopenia  Odynophagia  PCN / sulfa allergies    Plan:   Would get CT abd to evaluate RUQ abdominal pain.  Her KUB did not show obstruction (alluded to on MRI)  Change cefepime to meropenem pending CT scan.  Continue daptomycin based on Karius.  Continue voriconazole pending isavuconazole procurement.  Pt will stay on clinda/primaquine for PJP.  Continue oral acyclovir.  She is too hypoxic for BAL at this time.  Prognosis is poor given lack of bone marrow recovery.    Anti-infectives:   PO clindamycin  PO primaquine  IV voriconazole  IV cefepime  PO ACV    Subjective:   Interval history: poorly responsive; knows she is in the hospital;    Allergies   Allergen Reactions    Penicillins Hives    Sulfa Antibiotics Hives    Codeine Nausea And Vomiting        Review of Systems:  Unable to obtain    Objective:     Visit Vitals  BP (!) 150/86   Pulse (!) 107   Temp 98.2 °F (36.8 °C) (Axillary)   Resp (!) 32   Ht 1.524 m (5')   Wt 82.1 kg (181 lb 1.6 oz)   SpO2 93%   BMI 35.37 kg/m²     Temp (24hrs), Av.5 °F (36.9 °C), Min:97.8 °F (36.6 °C), Max:99.8 °F (37.7 °C)       Lines:    Urinary Catheter 24 (Active)     Single Lumen Implantable Port 24 Right Subclavian (Active)       Physical Exam:    General:  Lethargic, minimally responsive  CV:   Tachy, regular; no murmur  Lungs:  Coarse breath sounds bilaterally; diminished;  Abdomen:  Distended; tender to palpation in RUQ  Extremities: No cyanosis or clubbing, moderate LE edema  Skin:   Ecchymoses noted diffusely  Psych:  Lethargic, minimally responsive    Data Review:   I have personally reviewed labs, micro, and other  ANAEROBIC BOTTLE POSITIVE               RESULTS VERIFIED, PHONED TO AND READ BACK BY JESÚS NUNEZ AT 0925 ON 5.7.24, JUAN           Culture       CULTURE IN PROGRESS,FURTHER UPDATES TO FOLLOW                  Refer to Blood Culture ID Panel Accession [id number]          Culture, Blood, PCR ID Panel [[id number]]  (Abnormal) Collected: 05/06/24 1650    Order Status: Completed Specimen: Blood Updated: 05/07/24 0925     Accession Number [id number]     Enterococcus faecalis by PCR NOT DETECTED        Enterococcus faecium by PCR Detected        Comment: RESULTS VERIFIED, PHONED TO AND READ BACK BY  JESÚS NUNEZ AT 0925 ON 5.7.24, JUAN          Listeria monocytogenes by PCR NOT DETECTED        STAPHYLOCOCCUS NOT DETECTED        Staphylococcus Aureus NOT DETECTED        Staphylococcus epidermidis by PCR NOT DETECTED        Staphylococcus lugdunensis by PCR NOT DETECTED        STREPTOCOCCUS NOT DETECTED        Streptococcus agalactiae (Group B) NOT DETECTED        Strep pneumoniae NOT DETECTED        Strep pyogenes,(Grp. A) NOT DETECTED        Acinetobacter calcoac baumannii complex by PCR NOT DETECTED        Bacteroides fragilis by PCR NOT DETECTED        Enterobacteriaceae by PCR NOT DETECTED        Enterobacter cloacae complex by PCR NOT DETECTED        Escherichia Coli NOT DETECTED        Klebsiella aerogenes by PCR NOT DETECTED        Klebsiella oxytoca by PCR NOT DETECTED        Klebsiella pneumoniae group by PCR NOT DETECTED        Proteus by PCR NOT DETECTED        Salmonella species by PCR NOT DETECTED        Serratia marcescens by PCR NOT DETECTED        Haemophilus Influenzae by PCR NOT DETECTED        Neisseria meningitidis by PCR NOT DETECTED        Pseudomonas aeruginosa NOT DETECTED        Stenotrophomonas maltophilia by PCR NOT DETECTED        Candida albicans by PCR NOT DETECTED        Candida auris by PCR NOT DETECTED        Candida glabrata NOT DETECTED        Candida krusei by PCR NOT DETECTED        Candida

## 2024-05-07 NOTE — ICUWATCH
RRT Clinical Rounding Nurse Progress Report      SUBJECTIVE: Patient assessed secondary to RN/provider concern - volume overload, inc O2 needs.      Vitals:    05/07/24 0755 05/07/24 1143 05/07/24 1230 05/07/24 1251   BP: (!) 150/86 (!) 144/77  135/83   Pulse: (!) 107 (!) 111  (!) 109   Resp: (!) 32 28  22   Temp: 98.2 °F (36.8 °C) 100 °F (37.8 °C) 97.7 °F (36.5 °C)    TempSrc: Axillary  Axillary Axillary   SpO2: 93% 94%  94%   Weight:       Height:            DETERIORATION INDEX SCORE: 49     ASSESSMENT:  Pt lying calmly in bed, drowsy, in NAD.   at bedside.  Pt awakens to voice and answers simple questions, although delayed processing.  Oriented x3.  Follows commands.  Lung sounds with crackles, diminished in R base. On 10L HFNC, O2 Sat 93%, RR shallow but unlabored.  , BP stable.  Abd soft, bowel sounds present.  Lewis in place, patent/draining, UOP good.  BLE 2+ edema present; improved per  since yesterday.  Pt c/o low back pain- being medicated prn per MAR.  Discussed pt with NP.  Orders for f/u CXR today to reassess resp status.        PLAN:  Will follow per RRT Clinical Rounding Program protocol.    Jahaira Du RN  [place]: [phone]  [place]: [phone]

## 2024-05-07 NOTE — PROGRESS NOTES
PRN.  4/24 On RA-3L  4/25 On RA. CXR completed yesterday shows BL opacities, checking fungal studies and monitoring fever (.2 and neutropenic), as well as prominent pulm vasculature. Monitoring for overload - remains off IVF.  4/26  - remains neutropenic. Fungal studies pending due to recent XR with BL opacities. Continues prophylactic cipro. Monitoring for fluid overload with frequent transfusions (off IV fluids).  4/27 afeb, Tm 98.6; fungal studies pending; on LVQ prophy - venetoclax adjusted per Trial Protocol   4/29 Stable fluid status  5/6 CXR with possible pulmonary edema, stop IVF.   5/7 Up to 10L NC. Remains off IVF, started on lasix 20mg daily. Was scheduled to go for bronch today given concern for fungal infection but due to O2 needs and thrombocytopenia deferred for now.    Elevated AST  5/5 AST up to 195, other LFTs normal.  Monitor tomorrow, may be medication related given multiple ABX/antimicrobials as above  5/6 AST up to 299, alk phos 804 - monitor.   5/7 LFTs trending up, likely secondary to antibiotics/antifungals    Lower Back Pain  5/5 Pt with increased back pain yesterday and ON, requiring Dilaudid, Norco, Flexeril.  Will check MRI of L-spine.  Unable to give anti-inflammatories d/t TCP.  Consulting pall care for tomorrow.      5/6 MRI pending. Palliative care consulted. Some mental status changes, likely secondary to medications.  5/7 MRI spine negative for acute findings.    Abdominal pain  5/7 KUB with possible ileus, on bowel regimen given opioid usage. May need to start relistor.     Hyponatremia  4/28 start salt tabs  4/29 Na down to 126, continues salt tabs. Fluid restriction ordered.  4/30 Na up to 128  5/1 Na up to 132  5/2 Na 130  5/3 Na 132, continues salt tabs.  5/4 Na improved to 137   RESOLVED    Diarrhea / electrolyte abnormalities / dehydration  5/1 Holding PO Mg and stool softeners/laxatives. IV Mg and PO KCl ordered.  5/3 No report of diarrhea. Concern for  on voriconazole (reduce dose of venetoclax for CY inducer) - working on Cresemba.  Also on primaquine for empiric PJP pending testing.  Added dapto for VRE coverage based on Karius/updated BCx.  ID following.  LFTs are up.  MRI L-spine negative.  Multiple active issues all contributing to current illness, prognosis is guarded as a result.  .            Titus Roe MD  [facility]  [address] [place] [address]  Office : [phone]  Fax : [phone]

## 2024-05-07 NOTE — PROGRESS NOTES
GOALS:  LTG: Patient will maintain adequate hydration/nutrition with optimum safety and efficiency of swallowing function with PO intake without overt signs or symptoms of aspiration for the highest appropriate diet level.  STG:  Patient will consume soft and bite-sized textures and thin liquids without overt signs or symptoms of airway compromise. CONTINUE 24 PER PT REQUEST    SPEECH LANGUAGE PATHOLOGY: DYSPHAGIA Daily Note #2    Acknowledge Order  I  Therapy Time  I   Charges     I  Rehab Caseload Tracker      NAME: Constance Gomes  : 1950  MRN: [id number]    ADMISSION DATE: 2024  PRIMARY DIAGNOSIS: Thrombocytopenia (HCC)    ICD-10: Treatment Diagnosis: R13.13 Dysphagia, Pharyngeal Phase    RECOMMENDATIONS   Diet:    Soft and Bite-Sized  Thin Liquids- single sips at a time via straw    Medication: whole floated in puree or applesauce   Compensatory Swallowing Strategies:   Alternate solids and liquids  Remain upright for 30-45 minutes after meals  Small bites/sips  Encouraged use of available medications around meal times to address throat pain   Therapeutic Intervention:   Patient/family education  Dysphagia treatment   Patient continues to require skilled intervention:  Yes. Recommend ongoing speech therapy services during this hospitalization.     Anticipated Discharge Needs: Do not anticipate ongoing speech therapy needs upon discharge.      ASSESSMENT    Patient seen at bedside, RN concerned due to cough with oral care this date. Patient has been NPO for procedure, however bronch was cancelled/held per RN. Consumed thin liquids and ice chips with single sips and no overt clinical s/s of aspiration. Cough elicited with serial sips of thin liquids via straw. Liquid contrast administered with RN for CT with contrast. Small single sips at a time tolerated without cough reflex.     Continue following patient for dysphagia: SBS/thin via single sips. Meds in applesauce per .   GENERAL     Subjective: Patient awake, lethargic.  at bedside providing most information. RN present.    Recent Information/Background: SLP consult received for difficulty swallowing. Recent onset of sore throat. White patches observed in oropharynx. Currently has orders for nystatin and magic mouth wash. Throat spray also noted at bedside.     History of Present Injury/Illness: Ms. Gomes  has a past medical history of Arthritis, Autoimmune disease (HCC), Cancer (HCC), Chronic pain, Coronary artery spasm (HCC), COVID, Essential hypertension, Gastritis, GERD (gastroesophageal reflux disease), Hx antineoplastic chemo, Migraine headache, Psoriasis, Psychiatric disorder, Severe obesity (BMI 35.0-39.9), and Thyroid disease.. She also  has a past surgical history that includes Hysterectomy, total abdominal (1990); Carpal tunnel release; pr unlisted procedure cardiac surgery; gyn; Tubal ligation; and IR PORT PLACEMENT > 5 YEARS (1/3/2024).  Precautions/Allergies: Penicillins, Sulfa antibiotics, and Codeine     Observations:  Alertness: Alert  Voice: WFL  Speech: Intelligible  Expressive Language: Fluent  Receptive Language: Answers yes/no questions    Prior Dysphagia History:  None  Prior Instrumental Assessment: None    Current Diet:  Soft and Bite-Sized  Thin Liquids    Respiratory Status: Room air    Pain:  Patient c/o pain  Pain intervention: Emotional Support  Pain response: Pain improved    OBJECTIVE    Oral Motor Assessment: Labial: no impairment  Lingual: no impairment  Dentition: natural  Oral Hygiene: adequate  Some small white patches observed in oral cavity.   Oropharyngeal Assessment:  Patient agreeable to consume trials of thin liquids only, technically NPO for potential procedure this date. Per RN, patient was coughing with thin liquids earlier this date when completing oral swabs. Requested ST assess with thin liquids due to cough. Patient seen with ice chips x1, sips of thin via cup and straw by 4 sips each.

## 2024-05-07 NOTE — PROGRESS NOTES
Physical Therapy Note:    Attempted to see patient this AM for physical therapy treatment  session. Patient is on hold this AM due to decline in medical status and going down for procedure this AM. Will follow and re-attempt as schedule permits/patient available. Thank you,    Violeta Ford, [facility]     Rehab Caseload Tracker

## 2024-05-07 NOTE — PROGRESS NOTES
Pt complain of pain Xs 2 gave medication as ordered in mar pt received 2 units of PLTs during shift unable to take any PO meds to lethargic pt had CT abd as well as XR chest today see results in chart TRUNG Thakkar made aware of the results pt BP slightly elevated  at bedside   16:00 RN called into pt room by  pt having difficulty breathing, pt on 10 L HF stat at 80-85 increased 02 to 15 L no improvement RN placed non re breather mask 8 L oxygen at 94 % pt is pale and  cool to touch

## 2024-05-07 NOTE — PROGRESS NOTES
Constance Gomes  Admission Date: 4/7/2024         Daily Progress Note: 5/7/2024    The patient's chart is reviewed and the patient is discussed with the staff.    Background: Patient is a 73 y.o.  female seen and evaluated for PJP. PMH includes relapsed AML, ovarian cancer, hypothyroidism, CAD, HTN, psoriatic arthritis previously on humira. Has been receiving vidaza and venetoclax, dex/IVIG.  Patient was recently admitted 3/18 - 4/2 for pancytopenia and refractory thrombocytopenia despite platelet transfusions.  She received pulse dose Dex and IVIG and initially platelets began to respond to transfusion.  She presented to the hospital 4/7 with dizziness and gum bleeding.  Platelet counts were found to be less than 2.  She has been undergoing multiple platelet transfusions.  She was spiking some fevers at home.  She was admitted and started on treatment for neutropenic fevers with cefepime/vancomycin.  DIC was ruled out as well as TLS.  She underwent a bone marrow biopsy on 4/29 with path still pending.  During this hospitalization she is also received Dex and IVIG, has received 4 doses.  Fungitell was positive, galactomannan was negative.  She was empirically started on clinda/primaquine/prednisone for PJP as well as micafungin.  Throughout her hospitalization she also had worsening oxygen demands.  At one point did need Airvo however was weaned to nasal cannula. CXR and CT chest concerning for PJP as well.  ID is following.  Given thrombocytopenia unable to perform bronchoscopy at this time.    Patient denies any known lung issues including COPD or asthma.  She states she smoked for 2 years but quit several decades ago.    Subjective:     Pt was scheduled for bronch with BAL today under anesthesia. However she is now up to 10L o2 with sats low 90's and is a DNR.   In addition this morning her plts are back down to 8. WBC 0.7.   Blood cultures back with enterococcus faecium.   Pt has  HCl (ATARAX) tablet 25 mg  25 mg Oral BID    diphenhydrAMINE (BENADRYL) capsule 25 mg  25 mg Oral Q6H PRN    levothyroxine (SYNTHROID) tablet 150 mcg  150 mcg Oral QAM AC     Review of Systems: Comprehensive ROS negative except in HPI  Objective:   Blood pressure (!) 150/86, pulse (!) 107, temperature 98.2 °F (36.8 °C), temperature source Axillary, resp. rate (!) 32, height 1.524 m (5'), weight 82.1 kg (181 lb 1.6 oz), SpO2 93 %.   Intake/Output Summary (Last 24 hours) at 5/7/2024 1029  Last data filed at 5/7/2024 0636  Gross per 24 hour   Intake 818.4 ml   Output 1600 ml   Net -781.6 ml     Physical Exam:   Constitutional:  the patient is well developed and ill appearing  EENMT:  Sclera clear, pupils equal, oral mucosa moist  Respiratory: symmetric chest rise. B inspiratory crackles  Cardiovascular:  RRR without M,G,R. There is trace B foot edema.   Gastrointestinal: soft and non-tender; with positive bowel sounds.  Musculoskeletal: warm without cyanosis. Normal muscle tone.   Skin:  no jaundice or rashes, no wounds   Neurologic: somnolent. Will awaken, oriented to person only.   Psychiatric:  calm    Imaging: I performed an independent interpretation of the patient's images.  CXR:   5/6/24      LAB:  Recent Labs     05/06/24  0751 05/06/24  1520 05/07/24  0728   WBC 0.9* 0.8* 0.7*   HGB 8.1* 8.4* 7.2*   HCT 24.8* 25.8* 21.6*   PLT 20* 21* 8*     Recent Labs     05/05/24  0426 05/06/24  0254 05/07/24  0628    136 136   K 4.3 5.0 5.1    100 97*   CO2 24 29* 30*   BUN 14 14 19   CREATININE 0.35* 0.34* 0.32*   MG 1.8 1.8 1.9   PHOS  --   --  2.6   BILITOT 0.7 0.8 1.2   * 299* 309*   ALT 32 54 67*   ALKPHOS 464* 804* 960*     No results for input(s): \"TROPHS\", \"NTPROBNP\", \"CRP\", \"ESR\" in the last 72 hours.  Recent Labs     05/05/24  0426 05/06/24  0254 05/07/24  0628   GLUCOSE 135* 141* 122*      Microbiology:   Recent Labs     05/06/24  1649 05/06/24  1650   CULTURE CULTURE IN PROGRESS,FURTHER

## 2024-05-07 NOTE — PROGRESS NOTES
EOS 7p-7a    BSSR received from off going CHINO Ivy    1 unit of PLT give this shift. Premedicated w/ tylenol & benadryl. Tolerated well. Elevated BP's this shift. PRN available in [facility] w/ parameters. Bps did not require prns this shift. Plt 4 - PLT ordered per [facility] SOPs    Patient on TELE. Running NSR and sinus soraida per monitor room. Requiring 2 L NC, SOB noted on exertion. Using bedside commode. Pt rounded on hourly by myself and patient assistant. Bed locked, low, and call light within reach. Family remained at bedside throughout the night. No new needs voiced at this time.      BSSR given to oncoming CHINO [facility] Studies/Medications

## 2024-05-07 NOTE — ICUWATCH
RRT Clinical Rounding Nurse Update    Vitals:    05/07/24 1548 05/07/24 1551 05/07/24 1652 05/07/24 1755   BP: 138/84  (!) 162/93 (!) 155/88   Pulse: (!) 112  (!) 109 (!) 109   Resp: 28 24 22 22   Temp: 98.2 °F (36.8 °C)  97.7 °F (36.5 °C)    TempSrc: Axillary  Axillary Axillary   SpO2: (!) 86% 94% 93% 95%   Weight:       Height:            ASSESSMENT:  Previous outreach assessment was reviewed.  Pt on 7L via NRB mask, O2 Sat 94% .  Nose was getting dry and pt breathing through mouth as well, so transitioned to face mask.  Lung sounds with crackles noted, diminished on right.  RR shallow and mildly labored.  CXR shows inc edema.  Additional lasix 20mg IV given per MAR-- monitor output.  CT A/P also noted Partial small bowel obstruction with transition point in the right lower quadrant as described. Gen sx consult tmrw per Onc request. Unable to place NGT at this time d/t pt low platelet count.   at bedside and aware of all the above.     PLAN:  Will follow per RRT Clinical Rounding Program protocol.    Jahaira Du RN  [place]: [phone]  [place]: [phone]

## 2024-05-07 NOTE — PROGRESS NOTES
Pt scheduled today for C1D22 of JVP801 on [facility] clinical trial.  However, patient has grade 3 non-heme Aes present today that require dose delay per protocol.  See new adverse events described in table below:        Adverse Event Term Grade Start Date End Date Con-med  Related to study drug and any action taken    Lung infection 3 5/2/24  Voriconazole  Not related, dose reduced of Venetoclax to 100mg and C1D22 dose delay of AKE514   Sepsis 3 5/6/24  Cefe/Vanc Not related, C1D22 dose delay of ILO838   Confusion 2 5/6/24   none   Somnolence 3 5/7/24   Not related, C1D22 dose delay of JOC862   Aspartate aminotransferase (AST) increased 3 5/5/24   Not related, r/t antifungals, none   Alkaline phosphatase increased 2 5/2/24   Not related, r/t antifungals, none   Alkaline phosphatase increased 3 5/6/24   Not related, r/t antifungals, none         Discussed in person face-to-face with PI and pt's oncologist, Dr. Chris Magaña and he agrees to dose delay per protocol recommendations.  Study monitor and medical monitor notified.  Medical monitor stated, \"If the dose is given within 3 days then the next doses can occur on schedule. If delayed >3 days then we would consider this dose skipped and patient can resume with cycle 2.\"  Please refer to orange sheet and study e-mails filed in source for further documentation.

## 2024-05-08 PROBLEM — R54 FRAILTY: Status: ACTIVE | Noted: 2024-05-08

## 2024-05-08 LAB
ALBUMIN SERPL-MCNC: 2.2 G/DL (ref 3.2–4.6)
ALBUMIN/GLOB SERPL: 0.5 (ref 1–1.9)
ALP SERPL-CCNC: 841 U/L (ref 35–104)
ALT SERPL-CCNC: 55 U/L (ref 12–65)
ANION GAP SERPL CALC-SCNC: 8 MMOL/L (ref 9–18)
AST SERPL-CCNC: 230 U/L (ref 15–37)
BASOPHILS # BLD: 0 K/UL (ref 0–0.2)
BASOPHILS NFR BLD: 0 % (ref 0–2)
BILIRUB SERPL-MCNC: 1 MG/DL (ref 0–1.2)
BUN SERPL-MCNC: 20 MG/DL (ref 8–23)
CALCIUM SERPL-MCNC: 9.2 MG/DL (ref 8.8–10.2)
CHLORIDE SERPL-SCNC: 96 MMOL/L (ref 98–107)
CO2 SERPL-SCNC: 33 MMOL/L (ref 20–28)
CREAT SERPL-MCNC: 0.31 MG/DL (ref 0.6–1.1)
DIFFERENTIAL METHOD BLD: ABNORMAL
EOSINOPHIL # BLD: 0 K/UL (ref 0–0.8)
EOSINOPHIL NFR BLD: 0 % (ref 0.5–7.8)
ERYTHROCYTE [DISTWIDTH] IN BLOOD BY AUTOMATED COUNT: 17.3 % (ref 11.9–14.6)
FLOW CYTOMETRY RESULTS: NORMAL
GLOBULIN SER CALC-MCNC: 4.9 G/DL (ref 2.3–3.5)
GLUCOSE SERPL-MCNC: 99 MG/DL (ref 70–99)
HCT VFR BLD AUTO: 23.1 % (ref 35.8–46.3)
HGB BLD-MCNC: 7.6 G/DL (ref 11.7–15.4)
HISTORY CHECK: NORMAL
IMM GRANULOCYTES # BLD AUTO: 0 K/UL (ref 0–0.5)
IMM GRANULOCYTES NFR BLD AUTO: 7 % (ref 0–5)
LYMPHOCYTES # BLD: 0.5 K/UL (ref 0.5–4.6)
LYMPHOCYTES NFR BLD: 70 % (ref 13–44)
MAGNESIUM SERPL-MCNC: 2.1 MG/DL (ref 1.8–2.4)
MCH RBC QN AUTO: 25.9 PG (ref 26.1–32.9)
MCHC RBC AUTO-ENTMCNC: 32.9 G/DL (ref 31.4–35)
MCV RBC AUTO: 78.8 FL (ref 82–102)
MONOCYTES # BLD: 0.1 K/UL (ref 0.1–1.3)
MONOCYTES NFR BLD: 19 % (ref 4–12)
NEUTS SEG # BLD: 0 K/UL (ref 1.7–8.2)
NEUTS SEG NFR BLD: 4 % (ref 43–78)
NRBC # BLD: 0 K/UL (ref 0–0.2)
PLATELET # BLD AUTO: 72 K/UL (ref 150–450)
PLATELET COMMENT: ABNORMAL
PMV BLD AUTO: 10.3 FL (ref 9.4–12.3)
POTASSIUM SERPL-SCNC: 4.6 MMOL/L (ref 3.5–5.1)
PROT SERPL-MCNC: 7.1 G/DL (ref 6.3–8.2)
RBC # BLD AUTO: 2.93 M/UL (ref 4.05–5.2)
RBC MORPH BLD: ABNORMAL
RBC MORPH BLD: ABNORMAL
SODIUM SERPL-SCNC: 137 MMOL/L (ref 136–145)
SPECIMEN SOURCE: NORMAL
TEST ORDERED: NORMAL
WBC # BLD AUTO: 0.6 K/UL (ref 4.3–11.1)
WBC MORPH BLD: ABNORMAL

## 2024-05-08 PROCEDURE — 6360000002 HC RX W HCPCS: Performed by: INTERNAL MEDICINE

## 2024-05-08 PROCEDURE — 80053 COMPREHEN METABOLIC PANEL: CPT

## 2024-05-08 PROCEDURE — 85025 COMPLETE CBC W/AUTO DIFF WBC: CPT

## 2024-05-08 PROCEDURE — 2580000003 HC RX 258: Performed by: INTERNAL MEDICINE

## 2024-05-08 PROCEDURE — 86902 BLOOD TYPE ANTIGEN DONOR EA: CPT

## 2024-05-08 PROCEDURE — 2580000003 HC RX 258: Performed by: NURSE PRACTITIONER

## 2024-05-08 PROCEDURE — 86921 COMPATIBILITY TEST INCUBATE: CPT

## 2024-05-08 PROCEDURE — 83735 ASSAY OF MAGNESIUM: CPT

## 2024-05-08 PROCEDURE — 36430 TRANSFUSION BLD/BLD COMPNT: CPT

## 2024-05-08 PROCEDURE — 2500000003 HC RX 250 WO HCPCS: Performed by: NURSE PRACTITIONER

## 2024-05-08 PROCEDURE — 86813 HLA TYPING A B OR C: CPT

## 2024-05-08 PROCEDURE — A4216 STERILE WATER/SALINE, 10 ML: HCPCS | Performed by: NURSE PRACTITIONER

## 2024-05-08 PROCEDURE — 6360000002 HC RX W HCPCS: Performed by: NURSE PRACTITIONER

## 2024-05-08 PROCEDURE — P9040 RBC LEUKOREDUCED IRRADIATED: HCPCS

## 2024-05-08 PROCEDURE — 86922 COMPATIBILITY TEST ANTIGLOB: CPT

## 2024-05-08 PROCEDURE — 2500000003 HC RX 250 WO HCPCS: Performed by: INTERNAL MEDICINE

## 2024-05-08 PROCEDURE — P9037 PLATE PHERES LEUKOREDU IRRAD: HCPCS

## 2024-05-08 PROCEDURE — 1100000000 HC RM PRIVATE

## 2024-05-08 PROCEDURE — 51702 INSERT TEMP BLADDER CATH: CPT

## 2024-05-08 PROCEDURE — 86644 CMV ANTIBODY: CPT

## 2024-05-08 RX ORDER — HYDROMORPHONE HYDROCHLORIDE 1 MG/ML
1 INJECTION, SOLUTION INTRAMUSCULAR; INTRAVENOUS; SUBCUTANEOUS
Status: DISCONTINUED | OUTPATIENT
Start: 2024-05-08 | End: 2024-05-10

## 2024-05-08 RX ORDER — HYDROMORPHONE HYDROCHLORIDE 1 MG/ML
1 INJECTION, SOLUTION INTRAMUSCULAR; INTRAVENOUS; SUBCUTANEOUS
Status: DISCONTINUED | OUTPATIENT
Start: 2024-05-08 | End: 2024-05-08

## 2024-05-08 RX ADMIN — HYDROMORPHONE HYDROCHLORIDE 1 MG: 1 INJECTION, SOLUTION INTRAMUSCULAR; INTRAVENOUS; SUBCUTANEOUS at 09:57

## 2024-05-08 RX ADMIN — HYDROMORPHONE HYDROCHLORIDE 1 MG: 1 INJECTION, SOLUTION INTRAMUSCULAR; INTRAVENOUS; SUBCUTANEOUS at 17:08

## 2024-05-08 RX ADMIN — CLINDAMYCIN PHOSPHATE 900 MG: 900 INJECTION, SOLUTION INTRAVENOUS at 10:10

## 2024-05-08 RX ADMIN — MEROPENEM 1000 MG: 1 INJECTION, POWDER, FOR SOLUTION INTRAVENOUS at 05:52

## 2024-05-08 RX ADMIN — VORICONAZOLE 300 MG: 10 INJECTION, POWDER, LYOPHILIZED, FOR SOLUTION INTRAVENOUS at 09:04

## 2024-05-08 RX ADMIN — SODIUM CHLORIDE 1 MG: 9 INJECTION INTRAMUSCULAR; INTRAVENOUS; SUBCUTANEOUS at 21:04

## 2024-05-08 RX ADMIN — HYDROMORPHONE HYDROCHLORIDE 1 MG: 1 INJECTION, SOLUTION INTRAMUSCULAR; INTRAVENOUS; SUBCUTANEOUS at 14:04

## 2024-05-08 RX ADMIN — SODIUM CHLORIDE 0.5 MG: 9 INJECTION INTRAMUSCULAR; INTRAVENOUS; SUBCUTANEOUS at 05:24

## 2024-05-08 RX ADMIN — HYDROMORPHONE HYDROCHLORIDE 1 MG: 1 INJECTION, SOLUTION INTRAMUSCULAR; INTRAVENOUS; SUBCUTANEOUS at 05:48

## 2024-05-08 RX ADMIN — FUROSEMIDE 20 MG: 10 INJECTION, SOLUTION INTRAMUSCULAR; INTRAVENOUS at 12:15

## 2024-05-08 RX ADMIN — HYDROMORPHONE HYDROCHLORIDE 1 MG: 1 INJECTION, SOLUTION INTRAMUSCULAR; INTRAVENOUS; SUBCUTANEOUS at 20:57

## 2024-05-08 RX ADMIN — SODIUM CHLORIDE 0.5 MG: 9 INJECTION INTRAMUSCULAR; INTRAVENOUS; SUBCUTANEOUS at 12:01

## 2024-05-08 ASSESSMENT — PAIN DESCRIPTION - LOCATION: LOCATION: OTHER (COMMENT)

## 2024-05-08 ASSESSMENT — PAIN SCALES - GENERAL
PAINLEVEL_OUTOF10: 3
PAINLEVEL_OUTOF10: 6
PAINLEVEL_OUTOF10: 0

## 2024-05-08 NOTE — ICUWATCH
RRT Clinical Rounding Nurse Progress Report        Vitals:    05/07/24 1950 05/07/24 2000 05/07/24 2014 05/07/24 2033   BP: (!) 151/84      Pulse: (!) 107   (!) 110   Resp: 20   22   Temp: 98.4 °F (36.9 °C)      TempSrc: Oral      SpO2: 94% 94% 97% 94%   Weight: 79.6 kg (175 lb 6.4 oz)      Height:            DETERIORATION INDEX SCORE: 49    ASSESSMENT:  Previous outreach assessment reviewed. Pt is resting in bed, alert and oriented to person, place and time. Unlabored, slightly shallow breathing on 15L NRB. Lung sounds clear, diminished in bases. No complaints of SOB or pain, slight abdominal discomfort. VSS.    PLAN:  Will follow per RRT Clinical Rounding Program protocol.    Tyrone Guzman RN  [facility]: [phone]  [facility]: [phone]

## 2024-05-08 NOTE — PROGRESS NOTES
1030 : attempted to call and speak w/ daughter Bhanu. Called twice w/no answer , left a voicemail asking for her to call me back at [phone] 1700 :  said Bhanu was on the way an hour ago , still not present at bedside and  and Bhanu's son unable to get a hold of her.  verbally said \"don't do anything else but give her pain medications.\" Notified Catie NINO comfort orders placed.

## 2024-05-08 NOTE — PLAN OF CARE
Problem: Pain  Goal: Verbalizes/displays adequate comfort level or baseline comfort level  5/8/2024 1102 by Leatha Ribeiro RN  Outcome: Not Progressing  5/8/2024 0305 by Kelsie Gaspar RN  Outcome: Progressing     Problem: Safety - Adult  Goal: Free from fall injury  5/8/2024 1102 by Leatha Ribeiro RN  Outcome: Not Progressing  5/8/2024 0305 by Kelsie Gaspar RN  Outcome: Progressing     Problem: ABCDS Injury Assessment  Goal: Absence of physical injury  Outcome: Not Progressing     Problem: Skin/Tissue Integrity  Goal: Absence of new skin breakdown  Description: 1.  Monitor for areas of redness and/or skin breakdown  2.  Assess vascular access sites hourly  3.  Every 4-6 hours minimum:  Change oxygen saturation probe site  4.  Every 4-6 hours:  If on nasal continuous positive airway pressure, respiratory therapy assess nares and determine need for appliance change or resting period.  Outcome: Not Progressing     Problem: Pain  Goal: Verbalizes/displays adequate comfort level or baseline comfort level  5/8/2024 1102 by Leatha Ribeiro RN  Outcome: Not Progressing  5/8/2024 0305 by Kelsie Gaspar RN  Outcome: Progressing     Problem: Safety - Adult  Goal: Free from fall injury  5/8/2024 1102 by Leatha Ribeiro RN  Outcome: Not Progressing  5/8/2024 0305 by Kelsie Gaspar RN  Outcome: Progressing     Problem: ABCDS Injury Assessment  Goal: Absence of physical injury  Outcome: Not Progressing     Problem: Skin/Tissue Integrity  Goal: Absence of new skin breakdown  Description: 1.  Monitor for areas of redness and/or skin breakdown  2.  Assess vascular access sites hourly  3.  Every 4-6 hours minimum:  Change oxygen saturation probe site  4.  Every 4-6 hours:  If on nasal continuous positive airway pressure, respiratory therapy assess nares and determine need for appliance change or resting period.  Outcome: Not Progressing

## 2024-05-08 NOTE — PROGRESS NOTES
Physical Therapy Note:    Attempted to see patient this AM for physical therapy treatment  session. Patient not medically appropriate for therapy at this time. Will follow and re-attempt as schedule permits/patient available. Thank you,    MARVIN DUNN, PT     Rehab Caseload Tracker

## 2024-05-08 NOTE — PROGRESS NOTES
Constance Gomes  Admission Date: 4/7/2024         Daily Progress Note: 5/8/2024    The patient's chart is reviewed and the patient is discussed with the staff.    Background: Patient is a 73 y.o.  female seen and evaluated for PJP. PMH includes relapsed AML, ovarian cancer, hypothyroidism, CAD, HTN, psoriatic arthritis previously on humira. Has been receiving vidaza and venetoclax, dex/IVIG.  Patient was recently admitted 3/18 - 4/2 for pancytopenia and refractory thrombocytopenia despite platelet transfusions.  She received pulse dose Dex and IVIG and initially platelets began to respond to transfusion.  She presented to the hospital 4/7 with dizziness and gum bleeding.  Platelet counts were found to be less than 2.  She has been undergoing multiple platelet transfusions.  She was spiking some fevers at home.  She was admitted and started on treatment for neutropenic fevers with cefepime/vancomycin.  DIC was ruled out as well as TLS.  She underwent a bone marrow biopsy on 4/29 with path still pending.  During this hospitalization she is also received Dex and IVIG, has received 4 doses.  Fungitell was positive, galactomannan was negative.  She was empirically started on clinda/primaquine/prednisone for PJP as well as micafungin.  Throughout her hospitalization she also had worsening oxygen demands.  At one point did need Airvo however was weaned to nasal cannula. CXR and CT chest concerning for PJP as well.  ID is following.  Given thrombocytopenia unable to perform bronchoscopy at this time.    Patient denies any known lung issues including COPD or asthma.  She states she smoked for 2 years but quit several decades ago.    Subjective:     Pt still on 10L O2. Less responsive and getting dilaudid for pain. CT abd with partial SBO, seen by surgery with only conservative measures available.   Note made of rising bicarb. However palliative care has spoken with  and he is leaning towards  05/06/24  1649 05/06/24  1650   CULTURE PRESUMPTIVE ENTEROCOCCUS SPECIES For identification and susceptibility refer to culture [id number]*  CULTURE IN PROGRESS,FURTHER UPDATES TO FOLLOW PRESUMPTIVE ENTEROCOCCUS SPECIES IDENTIFICATION AND SUSCEPTIBILITY TO FOLLOW*  Refer to Blood Culture ID Panel Accession [id number]     No results for input(s): \"COVID19\" in the last 72 hours.  ECHO: 04/07/24    ECHO (TTE) COMPLETE (PRN CONTRAST/BUBBLE/STRAIN/3D) 04/11/2024  3:10 PM (Final)    Interpretation Summary    Left Ventricle: Normal left ventricular systolic function with a visually estimated EF of 55 - 60%. Left ventricle size is normal. Normal wall thickness. Normal wall motion. Normal diastolic function.    Mitral Valve: Mild regurgitation.    Tricuspid Valve: The estimated RVSP is 44 mmHg.    Image quality is adequate.    Signed by: Benson Medina MD on 4/11/2024  3:10 PM    Assessment and Plan:  (Medical Decision Making)   Principal Problem:    Thrombocytopenia (HCC)  Plan: 9 today.     Active Problems:    Encounter for antineoplastic chemotherapy  Plan:     Symptomatic anemia  Plan:       AML (acute myeloid leukemia) in relapse (HCC)  Plan:   Oncology awaiting bone marrow biopsy. Getting venetoclax.         Epistaxis  Plan: due to thrombocytopenia.       Pneumocystis jiroveci pneumonia (HCC)  Plan: presumed. PJP DFA pending.   Treating per ID.       Acute hypoxemic respiratory failure (HCC)  Plan: increased O2 to 10L. Had hoped to be able to perform bronchoscopy but plts too low for some of the time and now too unstable and hypoxia.   May also be developing hypercarbia, but if plan is comfort care no point in checking abg. Will holdd of for now but can consider if more aggressive measures desired. Currently DNR.   Cont to treat infection empirically.     Bacteremia:  Enterococcus faecium 2/2 in blood. Vanc resistance gene present. On dapto. ID on board.       Palliative care to see today. Already DNR, generally doing

## 2024-05-08 NOTE — PROGRESS NOTES
Patient continuing to pull at oxygen overnight. Patient de-sating into low 70s when pulling off O2. Patient placed on continuous pulse ox to monitor closely.    CBC rechecked following RBC and platelets     RBC and platelets ordered.     0320: Lab called to check if platelets or blood ready. Blood and platelets not ready lab. Lab stated that they would call when ready     0500: Staff called into room by . Patient with blood pouring from nose. Patient respirations in 40s.   Patients platelets started     Platelets x 2

## 2024-05-08 NOTE — ICUWATCH
RRT Clinical Rounding Nurse Progress Report      SUBJECTIVE: Called to assess patient secondary to MD/nurse concern - increased O2 needs .      Vitals:    05/08/24 0516 05/08/24 0630 05/08/24 0704 05/08/24 0814   BP: 138/83 123/84 (!) 140/90 (!) 141/89   Pulse: (!) 112 (!) 112 (!) 114 (!) 115   Resp: (!) 40 24 24 26   Temp: 98.9 °F (37.2 °C) 98.4 °F (36.9 °C) 98 °F (36.7 °C) 98.8 °F (37.1 °C)   TempSrc: Axillary Axillary Axillary Axillary   SpO2: 95% 96% 95% 94%   Weight:       Height:              ASSESSMENT:  On arrival to room, I found patient to be resting in bed quietly. Patient is drowsy and refuses to follow commands. Patient unable to verbalize to orientation questions. Patient attempting to remove NRB multiple times during assessment. Patient with poor safety awareness. Bilateral lung sounds diminished with upper airway expiratory grunting on NRB at 10L. Patient's O2 saturation showing 96% per bedside monitor. Reported quick desaturations when patient removes NRB. Patient with mild use of abdominal muscles on exam. Patient with reported abdominal pain this morning which increased the expiratory grunting and accessory muscle use. Distant and hypoactive bowel sounds. ABD distended and tender. Recent labs/notes/vitals reviewed and pt discussed with primary RN.     PLAN:  Will follow per RRT Clinical Rounding Program protocol. Primary RN to call with concerns.    Albin Verdin RN  [place]: [phone]

## 2024-05-08 NOTE — PROGRESS NOTES
and ON, requiring Dilaudid, Norco, Flexeril.  Will check MRI of L-spine.  Unable to give anti-inflammatories d/t TCP.  Consulting [facility] care for tomorrow.      5/6 MRI pending. Palliative care consulted. Some mental status changes, likely secondary to medications.  5/7 MRI spine negative for acute findings.    Abdominal pain / SBO  5/7 KUB with possible ileus, on bowel regimen given opioid usage. May need to start relistor.   5/8 CT AP with partial SBO, now NPO. Unable to place NGT. Obviously not a surgical candidate.     Alkalosis  5/8 CO2 up to 33, monitoring     Hyponatremia  4/28 start salt tabs  4/29 Na down to 126, continues salt tabs. Fluid restriction ordered.  4/30 Na up to 128  5/1 Na up to 132  5/2 Na 130  5/3 Na 132, continues salt tabs.  5/4 Na improved to 137   RESOLVED    Diarrhea / electrolyte abnormalities / dehydration  5/1 Holding PO Mg and stool softeners/laxatives. IV Mg and PO KCl ordered.  5/3 No report of diarrhea. Concern for dehydration, start gentle IVF    Sore Throat  4/28 check RVP, nystatin swish and swallow   4/29 RVP negative.  RESOLVED    Somnolence / nausea / vomiting  4/26 Somnolent this morning but possibly related to antiemetics. Check CT head given recent thrombocytopenia. Continue antiemetics.  attributes nausea to constipation (she has been declining bowel regimen).   4/27 Mentation at baseline; CT head neg   RESOLVED    Hypotension / asymptomatic bradycardia  - Hold amlodipine  4/10 BP improving, resume amlodipine.  RESOLVED    Continue home meds  [facility] SOPs  VTE prophylaxis - AC contraindicated due to thrombocytopenia  Diet - Regular  PT/OT - Deferred  Code - DNR  Acyclovir, Levaquin (changed from Cipro d/t interaction with Venetoclax per study), voriconazole    Dispo - too soon to determine. Possibly DC home once platelet transfusion can be safely managed as OP.    4/12 Discussed with patient and  risk for clinical deterioration due to increasing O2 needs and  in light of aggressive leukemia. Will ask ICU rover to follow. Discussed potential for move to ICU if she continues to deteriorate and could potentially require intubation/etc for resuscitative measures. She is currently a full code. They will continue to think about code status.    5/8 Discussed GOC with  at bedside, patient lethargic and unable to make decisions for herself. Discussed comfort measures given new SBO, inability to treat AML due to active bacteremia, worsening respiratory status, etc. He appears to agree with CMO but does not want to make any decisions without discussion with daughter. He agrees to have her visit so we can discuss further. PC also following and appreciate their assistance.        Goals and plan of care reviewed with the patient.  All questions answered to the best of our ability.              Catie Sorensen, TIM - CNP   [facility]  [address] [place] [address]  Office : [phone]  Fax : [phone]      Attending Addendum:  I personally evaluated the patient with Catie Sorensen, N.P.,  and agree with the assessment, findings and plan as documented.  Appears chronically ill, heart regular without murmur, lungs clear, abdomen benign.  Less responsive today, moaning and grasping at oxygen mask.  She was complaining of abdominal pain yesterday, KUB showed possible ileus, so CT performed and shows partial SBO.  Not a candidate for surgical intervention and not a candidate for NG decompression.  With this finding in addition to multiple infectious and other issues, she is worsening and we have very little to offer other than what has already been done.  Long discussion with  at bedside, he acknowledges how sick she is and recognizes that she will not recover from her multiple concurrent issues.  He wants to talk with their daughter before transitioning to comfort care, but she is now a DNR which is certainly appropriate.

## 2024-05-08 NOTE — PROGRESS NOTES
SPEECH LANGUAGE PATHOLOGY: ATTEMPT     NAME: Constance Gomes  : 1950  MRN: [id number]    ADMISSION DATE: 2024  PRIMARY DIAGNOSIS: Thrombocytopenia (HCC)    Speech Therapy attempted. Patient currently on none re breather, pulling at mask and moaning with each breath.  Patient is NPO and unsafe for oral trials at this time.  is requesting lactulose for bowel obstruction, but understands aspiration risk due to lethargy and poor swallow safety in current state. SLP informed  that if patient is able to ask for ice chips and able to participate enough to pull ice chip from spoon, single ice chips are ok for comfort at this time. RN informed. SLP educated  that he is not to dump ice chips into patients mouth from spoon, but rather she needs to demonstrate the ability to pull ice chip from spoon.  verbalizes understanding of specific instruction and risk of aspiration with all oral intake given current state.   Prayer provided.     Nohemy Gracia M.S., CCC-SLP   2024 9:23 AM

## 2024-05-08 NOTE — PROGRESS NOTES
Occupational Therapy Note:    Attempted to see patient this AM for occupational therapy treatment session. Patient not medically appropriate for therapy services at this time. Will follow and re-attempt as indicated.     Thank you,  Mariana Carcamo, OTR/L     Rehab Caseload Tracker

## 2024-05-08 NOTE — PROGRESS NOTES
Spoke with  at bedside, he has been unable to reach his daughter Bhanu via telephone. He is aware patient appears to be actively dying and does not want to leave the bedside. He is definitely interested in pursuing comfort measures but wants to make sure this is discussed with his daughter first as he doesn't feel comfortable making that decision without her. I asked about blood transfusions, antibiotics, etc and he is ok on holding off on all of these for now. He has communicated with his grandsons who have also tried reach their mother unsuccessfully and they are going to try to go by her house this afternoon.      Catie Sorensen, APRN - CNP

## 2024-05-08 NOTE — PROGRESS NOTES
Palliative Care Progress Note    Patient: Constance Gomes MRN: [id number]  SSN: [id number]    YOB: 1950  Age: 73 y.o.  Sex: female       Assessment/Plan:     Chief Complaint/Interval History: pt unresponsive, CT shows partial small bowel obstruction.      Principal Diagnosis:    Altered Mental Status R41.82    Additional Diagnoses:   Debility, Unspecified  R53.81  Frailty  R54  Pain, abdomen  R10.9  Counseling, Encounter for Medical Advice  Z71.9  Encounter for Palliative Care  Z51.5    Palliative Performance Scale (PPS)       Medical Decision Making:   Reviewed and summarized labs and imaging from past 24 hours.  Spoke with spouse at bedside.  Reviewed findings and discussed concerns as pt declining overall.  Spouse expressed his understanding.  We discussed comfort measures allowing pt to pass peacefully and spouse understands.  He is interested in comfort measures but is wishes to speak with his daughter prior to moving forward.  DNR in place.  Will change dilaudid to 1 mg iv q 3 hr prn pain, dyspnea.    Will follow.      Will discuss findings with members of the interdisciplinary team.      More than 50% of this 25 minute visit was spent counseling and coordination of care as outlined above.    Subjective:     Comprehensive Review of Systems unable to obtain due to mentation.      Objective:     Visit Vitals  /84   Pulse (!) 118   Temp 98 °F (36.7 °C) (Axillary)   Resp 30   Ht 1.524 m (5')   Wt 79.6 kg (175 lb 6.4 oz)   SpO2 95%   BMI 34.26 kg/m²       Physical Exam:    General:  Unresponsive, moaning.    Eyes:  Conjunctivae/corneas clear    Nose: Nares normal. Septum midline.   Neck: Supple, symmetrical, trachea midline, no JVD   Lungs:   Coarse bilateral bs   Heart:  Regular rate and rhythm, no murmur    Abdomen:   Soft, non-tender, non-distended   Extremities: Normal, atraumatic, no cyanosis or edema   Skin: Skin color, texture, turgor normal. No rash or lesions.   Neurologic: Not  following commands   Psych: Unresponsive.      Signed By: Serafin Aguiar MD     May 8, 2024

## 2024-05-08 NOTE — PROGRESS NOTES
Events noted.  Pt now with VRE in the blood.  I think her respiratory decline is related to bowel infection now.  I would favor stopping clinda/primaquine.  Would continue daptomycin, meropenem, and voriconazole until transition to comfort measures.    ID will sign off.  Please call us back with questions or concerns.

## 2024-05-08 NOTE — CONSULTS
H&P/Consult Note/Progress Note/Office Note:   Constance Gomes  MRN: [id number]  :1950  Age:73 y.o.    HPI: Constance Gomes is a 73 y.o. female with a history of Ovarian Cancer ()  and AML who we are asked by Oncology to see for partial SBO. The patient has a PMHx of AML and was admitted 24 with relapse, pancytopenia. She presented with dizziness and fatigue. She was admitted on 2024 for thrombocytopenia, dizziness and fatigue.     24 KUB was obtained for RUQ pain that demonstrated diffuse small and large bowel gas distension without evidence of obstruction. Yesterday CT abdomen obtained  for continued abdominal pain and demonstrated partial SBO with transition point in the RLQ.  Also is noted pneumonia and pulmonary edema bilaterally. Images reviewed as below.     Last BM in chart documented 24. The patient cannot provide much history as she is weak and on an oxygen re-breather. Her  at bedside states that she has been battling constipation.  Miralax does not help as it \"works too slow\" and she last had a \"blow out\" with a different medication - she is prescribed Senakot x 2 tabs nightly and prn Lactulose.     Complicating treatment options for pSBO is her thrombocytopenia.  Today her platelet count is 9k with WBC 0.6, Hgb 7.0. This prevents conservative treatment options such as insertion of NGT as well as rectal suppository in the setting of severe Thrombocytopenia.     ID is following since 24 for Neutropenic fever with hypoxia, pneumonia (PJP)  Pulmonary is following since 24 for PJP    Abdominal Surgical Hx: MARIBELL , Oophrectomy       Past Medical History:   Diagnosis Date    Arthritis     Autoimmune disease (HCC)     skin changes, unknown name    Cancer (HCC)     ovarian    Chronic pain     arthritis in back and legs    Coronary artery spasm (HCC)     COVID 05/15/2022    Essential hypertension 2019    Gastritis     GERD (gastroesophageal reflux disease)   unable to place NGT for decompression due to bleeding risk and unable to give rectal suppositories due to risk for bacteremia. For now, only management is symptom control for pain, and NPO.  Should she be able to swallow- can consider SBFT with gastrografin.    Palliative Care is following since 5/6/24- recommendations DNR decision that day  Will follow with you.     Signed:  TIM RUIZ - CNP

## 2024-05-08 NOTE — CARE COORDINATION
Chart reviewed, bedside RN requested to RNCM to attempt to call daughter Bhanu since spouse has not been able to reach daughter today. Call placed to Bhanu Ramirez [phone] and left VM message to call her father Tung Gomes [phone].    Case Manger will continue to follow.

## 2024-05-09 LAB
ABO + RH BLD: NORMAL
ANTIGENS PRESENT RBC DONR: NORMAL
BACTERIA SPEC CULT: ABNORMAL
BLD PROD TYP BPU: NORMAL
BLOOD BANK BLOOD PRODUCT EXPIRATION DATE: NORMAL
BLOOD BANK CMNT PATIENT-IMP: NORMAL
BLOOD BANK DISPENSE STATUS: NORMAL
BLOOD BANK ISBT PRODUCT BLOOD TYPE: 5100
BLOOD BANK ISBT PRODUCT BLOOD TYPE: 6200
BLOOD BANK ISBT PRODUCT BLOOD TYPE: 6200
BLOOD BANK ISBT PRODUCT BLOOD TYPE: 7300
BLOOD BANK ISBT PRODUCT BLOOD TYPE: 7300
BLOOD BANK ISBT PRODUCT BLOOD TYPE: 9500
BLOOD BANK ISBT PRODUCT BLOOD TYPE: 9500
BLOOD BANK PRODUCT CODE: NORMAL
BLOOD BANK UNIT TYPE AND RH: NORMAL
BLOOD GROUP ANTIBODIES SERPL: NORMAL
BPU ID: NORMAL
CROSSMATCH RESULT: NORMAL
CROSSMATCH RESULT: NORMAL
GRAM STN SPEC: ABNORMAL
SERVICE CMNT-IMP: ABNORMAL
SERVICE CMNT-IMP: ABNORMAL
SPECIMEN EXP DATE BLD: NORMAL
UNIT DIVISION: 0
UNIT ISSUE DATE/TIME: NORMAL

## 2024-05-09 PROCEDURE — 2500000003 HC RX 250 WO HCPCS: Performed by: NURSE PRACTITIONER

## 2024-05-09 PROCEDURE — 6360000002 HC RX W HCPCS: Performed by: NURSE PRACTITIONER

## 2024-05-09 PROCEDURE — 2580000003 HC RX 258: Performed by: NURSE PRACTITIONER

## 2024-05-09 PROCEDURE — A4216 STERILE WATER/SALINE, 10 ML: HCPCS | Performed by: NURSE PRACTITIONER

## 2024-05-09 PROCEDURE — 1100000000 HC RM PRIVATE

## 2024-05-09 RX ADMIN — SODIUM CHLORIDE 1 MG: 9 INJECTION INTRAMUSCULAR; INTRAVENOUS; SUBCUTANEOUS at 11:58

## 2024-05-09 RX ADMIN — HYDROMORPHONE HYDROCHLORIDE 1 MG: 1 INJECTION, SOLUTION INTRAMUSCULAR; INTRAVENOUS; SUBCUTANEOUS at 18:50

## 2024-05-09 RX ADMIN — SODIUM CHLORIDE 1 MG: 9 INJECTION INTRAMUSCULAR; INTRAVENOUS; SUBCUTANEOUS at 13:52

## 2024-05-09 RX ADMIN — SODIUM CHLORIDE 1 MG: 9 INJECTION INTRAMUSCULAR; INTRAVENOUS; SUBCUTANEOUS at 03:11

## 2024-05-09 RX ADMIN — SODIUM CHLORIDE 1 MG: 9 INJECTION INTRAMUSCULAR; INTRAVENOUS; SUBCUTANEOUS at 06:20

## 2024-05-09 RX ADMIN — HYDROMORPHONE HYDROCHLORIDE 1 MG: 1 INJECTION, SOLUTION INTRAMUSCULAR; INTRAVENOUS; SUBCUTANEOUS at 09:34

## 2024-05-09 RX ADMIN — HYDROMORPHONE HYDROCHLORIDE 1 MG: 1 INJECTION, SOLUTION INTRAMUSCULAR; INTRAVENOUS; SUBCUTANEOUS at 00:01

## 2024-05-09 RX ADMIN — HYDROMORPHONE HYDROCHLORIDE 1 MG: 1 INJECTION, SOLUTION INTRAMUSCULAR; INTRAVENOUS; SUBCUTANEOUS at 03:11

## 2024-05-09 RX ADMIN — SODIUM CHLORIDE 1 MG: 9 INJECTION INTRAMUSCULAR; INTRAVENOUS; SUBCUTANEOUS at 16:29

## 2024-05-09 RX ADMIN — HYDROMORPHONE HYDROCHLORIDE 1 MG: 1 INJECTION, SOLUTION INTRAMUSCULAR; INTRAVENOUS; SUBCUTANEOUS at 14:32

## 2024-05-09 RX ADMIN — HYDROMORPHONE HYDROCHLORIDE 1 MG: 1 INJECTION, SOLUTION INTRAMUSCULAR; INTRAVENOUS; SUBCUTANEOUS at 22:31

## 2024-05-09 RX ADMIN — HYDROMORPHONE HYDROCHLORIDE 1 MG: 1 INJECTION, SOLUTION INTRAMUSCULAR; INTRAVENOUS; SUBCUTANEOUS at 06:19

## 2024-05-09 ASSESSMENT — PAIN SCALES - GENERAL: PAINLEVEL_OUTOF10: 0

## 2024-05-09 NOTE — PROGRESS NOTES
Told that patient is now comfort care. Pulmonary will sign off but available if needed.     Kenton Aceves MD

## 2024-05-09 NOTE — PLAN OF CARE
Problem: Pain  Goal: Verbalizes/displays adequate comfort level or baseline comfort level  5/9/2024 1152 by Leatha Ribeiro RN  Outcome: Not Progressing  5/9/2024 0339 by Kelsie Gaspar RN  Outcome: Progressing     Problem: Safety - Adult  Goal: Free from fall injury  5/9/2024 1152 by Leatha Ribeiro RN  Outcome: Not Progressing  5/9/2024 0339 by Kelsie Gaspar RN  Outcome: Progressing     Problem: ABCDS Injury Assessment  Goal: Absence of physical injury  Outcome: Not Progressing     Problem: Pain  Goal: Verbalizes/displays adequate comfort level or baseline comfort level  5/9/2024 1152 by Leatha Ribeiro RN  Outcome: Not Progressing  5/9/2024 0339 by Kelsie Gaspar RN  Outcome: Progressing     Problem: Safety - Adult  Goal: Free from fall injury  5/9/2024 1152 by Leatha Ribeiro RN  Outcome: Not Progressing  5/9/2024 0339 by Kelsie Gaspar RN  Outcome: Progressing     Problem: ABCDS Injury Assessment  Goal: Absence of physical injury  Outcome: Not Progressing

## 2024-05-09 NOTE — PROGRESS NOTES
CH saw in chart and RN notified PT was now on comfort measures. PT was resting with Spouse at bedside. Spouse was showing photos of PT to FS. FS encouraged Spouse to get food. CH offered to stay with PT. CH offered comforting spiritual presence and talked gently to PT. When Spouse returned, CH checked for unmet needs and offered support. Later CH checked on Spouse and let Spouse know PT and Spouse are in CH's prayers.     Rev. Neelam Wong M.Div.

## 2024-05-09 NOTE — ADT AUTH CERT
UPDATED PROGRESS NOTES 05/08/24    Admitted - 4/7/2024 2218    Admitting provider: Tyrone Fitzpatrick MD   Total duration of encounter: 32d   Admitted to inpatient: 4/8/2024 0011   Inpatient length of stay: 31d     Current Department - [facility]    Patient class: Inpatient   Level of care: Acute   Service: Hematology and Oncology   Time in unit: 31d     Discharge Plan     Expected discharge: 5/10/2024 (1d)   Discharge delay: Since 05/03 (Lab)   Discharge milestones:         Notes  Patient: Constance Gomes MRN: [id number]     Note Information    Author Author Type Note Type New Service Status Filed   Titus Roe MD Physician Progress Notes New Oncology Revised 05/08/24 1906     Links    Link Type Author Type Service Author Filed   Original Note Nurse Practitioner Oncology Catie Sorensen, APRN - CNP 05/08/24 1056     Note Text    Expand All Collapse All         [facility]         Inpatient Hematology / Oncology Progress Note     Reason for Consult:  Epistaxis [R04.0]  Lightheadedness [R42]  Thrombocytopenia (HCC) [D69.6]  Pancytopenia (HCC) [D61.818]  Symptomatic anemia [D64.9]  Referring Physician:  Titus Roe MD     24 Hour Events:  Afeb, VSS, up to 10L NC  C1D23 Vidaza / Venetoclax / OIJ149 per clinical trial [id number]  BMBx completed 4/29 - path pending  On Merrem for NF/abd symptoms and now dapto for VRE (+BC)  +fungitell; On clinda/pred/primiquine and Voriconazole, unable to bronch  LFTs stable  +SBO on CT AP   at bedside - having GOC discussions as patient is not able to make decisions     Transfusions: Plts, PRBCs  Replacements: Mg, KCl     ROS:  Lethargic but complains of abdominal pain.     10 point review of systems is otherwise negative with the exception of the elements mentioned above in the HPI.          Allergies   Allergen Reactions    Penicillins Hives    Sulfa Antibiotics Hives    Codeine Nausea And Vomiting      Past Medical History       PLATELETS, 1 Product     Collection Time: 05/07/24 10:30 PM   Result Value Ref Range     Blood Bank Comment HLA MATCHED BY THE BLOOD CONNECTION       Unit Number [id number]       Product Code Blood Bank [facility]       Unit Divison 00       Dispense Status Blood Bank ISSUED       Unit Issue Date/Time 894910207045       Product Code Blood Bank [id number]       Blood Bank Unit Type and Rh A POS       Blood Bank ISBT Product Blood Type 6200       Blood Bank Blood Product Expiration Date 068721448722     PREPARE PLATELETS, 1 Product     Collection Time: 05/08/24  5:30 AM   Result Value Ref Range     Blood Bank Comment HLA MATCHED BY THE BLOOD CONNECTION       Unit Number [id number]       Product Code Blood Bank [facility],LR[facility]       Unit Divison 00       Dispense Status Blood Bank ISSUED       Unit Issue Date/Time [id number]       Product Code Blood Bank [id number]       Blood Bank Unit Type and Rh A POS       Blood Bank ISBT Product Blood Type 6200       Blood Bank Blood Product Expiration Date 790608532432     PREPARE PLATELETS, 1 Product     Collection Time: 05/08/24  6:00 AM   Result Value Ref Range     Blood Bank Comment PLATELET HLA MATCHED BY THE BLOOD CONNECTION       Unit Number [id number]       Product Code Blood Bank [facility],LR[facility]       Unit Divison 00       Dispense Status Blood Bank ALLOCATED     Comprehensive Metabolic Panel     Collection Time: 05/08/24  6:33 AM   Result Value Ref Range     Sodium 137 136 - 145 mmol/L     Potassium 4.6 3.5 - 5.1 mmol/L     Chloride 96 (L) 98 - 107 mmol/L     CO2 33 (H) 20 - 28 mmol/L     Anion Gap 8 (L) 9 - 18 mmol/L     Glucose 99 70 - 99 mg/dL     BUN 20 8 - 23 MG/DL     Creatinine 0.31 (L) 0.60 - 1.10 MG/DL     Est, Glom Filt Rate >90 >60 ml/min/1.73m2     Calcium 9.2 8.8 - 10.2 MG/DL     Total Bilirubin 1.0 0.0 - 1.2 MG/DL     ALT 55 12 - 65 U/L      (H) 15 - 37 U/L     Alk Phosphatase 841 (H) 35 - 104 U/L     Total Protein 7.1 6.3 - 8.2 g/dL     Albumin 2.2 (L)

## 2024-05-09 NOTE — PROGRESS NOTES
[facility]        Inpatient Hematology / Oncology Progress Note    Reason for Consult:  Epistaxis [R04.0]  Lightheadedness [R42]  Thrombocytopenia (HCC) [D69.6]  Pancytopenia (HCC) [D61.818]  Symptomatic anemia [D64.9]  Referring Physician:  Titus Roe MD    24 Hour Events:  Now CMO  Appears comfortable  Family at bedside    ROS:  LILY    10 point review of systems is otherwise negative with the exception of the elements mentioned above in the HPI.     Allergies   Allergen Reactions    Penicillins Hives    Sulfa Antibiotics Hives    Codeine Nausea And Vomiting     Past Medical History:   Diagnosis Date    Arthritis     Autoimmune disease (HCC)     skin changes, unknown name    Cancer (HCC)     ovarian    Chronic pain     arthritis in back and legs    Coronary artery spasm (HCC)     COVID 05/15/2022    Essential hypertension 2019    Gastritis     GERD (gastroesophageal reflux disease)     Hx antineoplastic chemo     Migraine headache     Psoriasis     Psychiatric disorder     anxiety and depression    Severe obesity (BMI 35.0-39.9) 2018    Thyroid disease     Diagnosed in      Past Surgical History:   Procedure Laterality Date    CARPAL TUNNEL RELEASE      GYN      ovaries    HYSTERECTOMY, TOTAL ABDOMINAL (CERVIX REMOVED)      IR PORT PLACEMENT EQUAL OR GREATER THAN 5 YEARS  1/3/2024    IR PORT PLACEMENT EQUAL OR GREATER THAN 5 YEARS 1/3/2024 [facility] RADIOLOGY SPECIALS    MD UNLISTED PROCEDURE CARDIAC SURGERY      cardiac cath.  Dx with spasms.    TUBAL LIGATION       Family History   Problem Relation Age of Onset    Parkinson's Disease Mother     Stroke Mother         Passed away in     No Known Problems Paternal Grandfather     No Known Problems Paternal Grandmother     No Known Problems Maternal Grandfather     No Known Problems Maternal Grandmother     Prostate Cancer Father          age 86     Cancer Father         Kidney     Social History  to 33, monitoring     Hyponatremia  4/28 start salt tabs  4/29 Na down to 126, continues salt tabs. Fluid restriction ordered.  4/30 Na up to 128  5/1 Na up to 132  5/2 Na 130  5/3 Na 132, continues salt tabs.  5/4 Na improved to 137   RESOLVED    Diarrhea / electrolyte abnormalities / dehydration  5/1 Holding PO Mg and stool softeners/laxatives. IV Mg and PO KCl ordered.  5/3 No report of diarrhea. Concern for dehydration, start gentle IVF    Sore Throat  4/28 check RVP, nystatin swish and swallow   4/29 RVP negative.  RESOLVED    Somnolence / nausea / vomiting  4/26 Somnolent this morning but possibly related to antiemetics. Check CT head given recent thrombocytopenia. Continue antiemetics.  attributes nausea to constipation (she has been declining bowel regimen).   4/27 Mentation at baseline; CT head neg   RESOLVED    Hypotension / asymptomatic bradycardia  - Hold amlodipine  4/10 BP improving, resume amlodipine.  RESOLVED    Continue home meds  Rigo SOPs  VTE prophylaxis - AC contraindicated due to thrombocytopenia  Diet - Regular  PT/OT - Deferred  Code - DNR  Acyclovir, Levaquin (changed from Cipro d/t interaction with Venetoclax per study), voriconazole    Dispo - too soon to determine. Possibly DC home once platelet transfusion can be safely managed as OP.    4/12 Discussed with patient and  risk for clinical deterioration due to increasing O2 needs and in light of aggressive leukemia. Will ask ICU rover to follow. Discussed potential for move to ICU if she continues to deteriorate and could potentially require intubation/etc for resuscitative measures. She is currently a full code. They will continue to think about code status.    5/8 Discussed GOC with  at bedside, patient lethargic and unable to make decisions for herself. Discussed comfort measures given new SBO, inability to treat AML due to active bacteremia, worsening respiratory status, etc. He appears to agree with CMO but

## 2024-05-10 VITALS
HEART RATE: 108 BPM | DIASTOLIC BLOOD PRESSURE: 46 MMHG | RESPIRATION RATE: 14 BRPM | OXYGEN SATURATION: 91 % | SYSTOLIC BLOOD PRESSURE: 66 MMHG | HEIGHT: 60 IN | WEIGHT: 175.4 LBS | BODY MASS INDEX: 34.44 KG/M2 | TEMPERATURE: 102.9 F

## 2024-05-10 PROCEDURE — 6360000002 HC RX W HCPCS: Performed by: NURSE PRACTITIONER

## 2024-05-10 PROCEDURE — 1100000000 HC RM PRIVATE

## 2024-05-10 PROCEDURE — 2580000003 HC RX 258: Performed by: NURSE PRACTITIONER

## 2024-05-10 PROCEDURE — A4216 STERILE WATER/SALINE, 10 ML: HCPCS | Performed by: NURSE PRACTITIONER

## 2024-05-10 RX ORDER — GLYCOPYRROLATE 0.2 MG/ML
0.1 INJECTION INTRAMUSCULAR; INTRAVENOUS EVERY 6 HOURS PRN
Status: DISCONTINUED | OUTPATIENT
Start: 2024-05-10 | End: 2024-05-11 | Stop reason: HOSPADM

## 2024-05-10 RX ORDER — HYDROMORPHONE HYDROCHLORIDE 2 MG/ML
1.5 INJECTION, SOLUTION INTRAMUSCULAR; INTRAVENOUS; SUBCUTANEOUS
Status: DISCONTINUED | OUTPATIENT
Start: 2024-05-10 | End: 2024-05-10

## 2024-05-10 RX ORDER — HYDROMORPHONE HYDROCHLORIDE 2 MG/ML
2 INJECTION, SOLUTION INTRAMUSCULAR; INTRAVENOUS; SUBCUTANEOUS
Status: DISCONTINUED | OUTPATIENT
Start: 2024-05-10 | End: 2024-05-11 | Stop reason: HOSPADM

## 2024-05-10 RX ADMIN — SODIUM CHLORIDE 1 MG: 9 INJECTION INTRAMUSCULAR; INTRAVENOUS; SUBCUTANEOUS at 06:58

## 2024-05-10 RX ADMIN — LORAZEPAM 2 MG: 2 INJECTION INTRAMUSCULAR; INTRAVENOUS at 16:17

## 2024-05-10 RX ADMIN — HYDROMORPHONE HYDROCHLORIDE 1 MG: 1 INJECTION, SOLUTION INTRAMUSCULAR; INTRAVENOUS; SUBCUTANEOUS at 04:15

## 2024-05-10 RX ADMIN — SODIUM CHLORIDE 1.5 MG: 9 INJECTION INTRAMUSCULAR; INTRAVENOUS; SUBCUTANEOUS at 10:57

## 2024-05-10 RX ADMIN — HYDROMORPHONE HYDROCHLORIDE 1.5 MG: 2 INJECTION INTRAMUSCULAR; INTRAVENOUS; SUBCUTANEOUS at 07:39

## 2024-05-10 RX ADMIN — SODIUM CHLORIDE 1 MG: 9 INJECTION INTRAMUSCULAR; INTRAVENOUS; SUBCUTANEOUS at 03:48

## 2024-05-10 RX ADMIN — GLYCOPYRROLATE 0.1 MG: 0.2 INJECTION INTRAMUSCULAR; INTRAVENOUS at 10:00

## 2024-05-10 RX ADMIN — HYDROMORPHONE HYDROCHLORIDE 2 MG: 2 INJECTION INTRAMUSCULAR; INTRAVENOUS; SUBCUTANEOUS at 12:17

## 2024-05-10 RX ADMIN — HYDROMORPHONE HYDROCHLORIDE 1 MG: 1 INJECTION, SOLUTION INTRAMUSCULAR; INTRAVENOUS; SUBCUTANEOUS at 06:26

## 2024-05-10 RX ADMIN — HYDROMORPHONE HYDROCHLORIDE 2 MG: 2 INJECTION INTRAMUSCULAR; INTRAVENOUS; SUBCUTANEOUS at 17:56

## 2024-05-10 RX ADMIN — SODIUM CHLORIDE 1.5 MG: 9 INJECTION INTRAMUSCULAR; INTRAVENOUS; SUBCUTANEOUS at 08:31

## 2024-05-10 RX ADMIN — LORAZEPAM 2 MG: 2 INJECTION INTRAMUSCULAR; INTRAVENOUS at 13:57

## 2024-05-10 ASSESSMENT — PAIN SCALES - GENERAL
PAINLEVEL_OUTOF10: 0
PAINLEVEL_OUTOF10: 0

## 2024-05-10 NOTE — PROGRESS NOTES
completed follow up visit, per request from nurse.  Patient is on comfort care and unable to respond.  Rhame  is at bedside with other family members planning to come in.   engaged in life review and spiritual reflection, reflecting on memories made together with patient and their jatinder that has helped them cope with many tragedies before.   provided pastoral presence, prayer and empathetic listening.  Peace be with you,  Signed by  BRAXTON ThompsonDiv.   [phone]

## 2024-05-10 NOTE — PROGRESS NOTES
[facility]        Inpatient Hematology / Oncology Progress Note    Reason for Consult:  Epistaxis [R04.0]  Lightheadedness [R42]  Thrombocytopenia (HCC) [D69.6]  Pancytopenia (HCC) [D61.818]  Symptomatic anemia [D64.9]  Referring Physician:  Titus Roe MD    24 Hour Events:  Now CMO  Appears to be actively dying  Family at bedside    ROS:  LILY    10 point review of systems is otherwise negative with the exception of the elements mentioned above in the HPI.     Allergies   Allergen Reactions    Penicillins Hives    Sulfa Antibiotics Hives    Codeine Nausea And Vomiting     Past Medical History:   Diagnosis Date    Arthritis     Autoimmune disease (HCC)     skin changes, unknown name    Cancer (HCC)     ovarian    Chronic pain     arthritis in back and legs    Coronary artery spasm (HCC)     COVID 05/15/2022    Essential hypertension 2019    Gastritis     GERD (gastroesophageal reflux disease)     Hx antineoplastic chemo     Migraine headache     Psoriasis     Psychiatric disorder     anxiety and depression    Severe obesity (BMI 35.0-39.9) 2018    Thyroid disease     Diagnosed in      Past Surgical History:   Procedure Laterality Date    CARPAL TUNNEL RELEASE      GYN      ovaries    HYSTERECTOMY, TOTAL ABDOMINAL (CERVIX REMOVED)      IR PORT PLACEMENT EQUAL OR GREATER THAN 5 YEARS  1/3/2024    IR PORT PLACEMENT EQUAL OR GREATER THAN 5 YEARS 1/3/2024 [facility] RADIOLOGY SPECIALS    [facility] UNLISTED PROCEDURE CARDIAC SURGERY      cardiac cath.  Dx with spasms.    TUBAL LIGATION       Family History   Problem Relation Age of Onset    Parkinson's Disease Mother     Stroke Mother         Passed away in     No Known Problems Paternal Grandfather     No Known Problems Paternal Grandmother     No Known Problems Maternal Grandfather     No Known Problems Maternal Grandmother     Prostate Cancer Father          age 86     Cancer Father         Kidney     Social History

## 2024-05-10 NOTE — CARE COORDINATION
Pt remains comfort care only.  On RA.  Runnig low grade fever.  DNR.  Family uninterested in discussing home hospice or hospice house.  Want pt to remain IP to .  LOS = 12 daus

## 2024-05-10 NOTE — PLAN OF CARE
Problem: Pain  Goal: Verbalizes/displays adequate comfort level or baseline comfort level  5/10/2024 0749 by Leatha Ribeiro RN  Outcome: HH/HSPC Resolved Not Met  Flowsheets (Taken 5/10/2024 0216 by Leonor George RN)  Verbalizes/displays adequate comfort level or baseline comfort level:   Assess pain using appropriate pain scale   Administer analgesics based on type and severity of pain and evaluate response   Implement non-pharmacological measures as appropriate and evaluate response  5/10/2024 0007 by Leonor George RN  Outcome: Progressing  Flowsheets (Taken 5/9/2024 1950)  Verbalizes/displays adequate comfort level or baseline comfort level:   Encourage patient to monitor pain and request assistance   Assess pain using appropriate pain scale   Administer analgesics based on type and severity of pain and evaluate response   Implement non-pharmacological measures as appropriate and evaluate response   Consider cultural and social influences on pain and pain management     Problem: Safety - Adult  Goal: Free from fall injury  5/10/2024 0749 by Leatha Ribeiro RN  Outcome: HH/HSPC Resolved Not Met  5/10/2024 0007 by Leonor George RN  Outcome: Progressing     Problem: Safety - Adult  Goal: Free from fall injury  5/10/2024 0749 by Leatha Ribeiro RN  Outcome: HH/HSPC Resolved Not Met  5/10/2024 0007 by Leonor George RN  Outcome: Progressing     Problem: Safety - Adult  Goal: Free from fall injury  5/10/2024 0749 by Leatha Ribeiro RN  Outcome: HH/HSPC Resolved Not Met  5/10/2024 0007 by Leonor George RN  Outcome: Progressing

## 2024-05-11 NOTE — DISCHARGE SUMMARY
[facility]    Death Summary    Constance Gomes  Admission date:  4/7/2024  Discharge date:  5/10/2024    Admitting Diagnosis:  Epistaxis [R04.0]  Lightheadedness [R42]  Thrombocytopenia (HCC) [D69.6]  Pancytopenia (HCC) [D61.818]  Symptomatic anemia [D64.9]  Discharge Diagnosis:    Principal Problem:    Thrombocytopenia (HCC)  Active Problems:    Encounter for antineoplastic chemotherapy    Symptomatic anemia    AML (acute myeloid leukemia) in relapse (HCC)    Epistaxis    Lightheadedness    Research exam    High risk medication use    Somnolence    Thrush    Sore throat    Hypomagnesemia    Pneumocystis jiroveci pneumonia (HCC)    Acute hypoxemic respiratory failure (HCC)    Encounter for palliative care    Frailty  Resolved Problems:    * No resolved hospital problems. *        Consultants: Surgery, Palliative care, Pulm, ID    Hospital course:  Ms. Gomes is a 73 y.o. female admitted on 4/7/2024. The primary encounter diagnosis was Thrombocytopenia (HCC). Diagnoses of Pancytopenia (HCC), Epistaxis, Symptomatic anemia, and Lightheadedness were also pertinent to this visit.     Ms Gomes has PMH of psoriatic arthritis previously on Humira, HTN, HLD, GERD and hypothyroidism and chronic pain on chronic opioids. 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 She proceeded with Revlimid in 10/2023 but was noted to have POD in 11/2023 with BMBx showed AEL and 60% blasts. She completed 10 days of dacogen and Mylotarg and achieved CR-1. She is followed by Dr Perez and is currently undergoing Vidaza and Venetoclax. She completed cycle 2 day 1-5 2/5-2/9 and is on continuous Venetoclax 100mg. She was admitted 3/18-4/2 for pancytopenia and refractory thrombocytopenia despite cross-matched/HLA-matched platelets. She did complete 4 days pulse Dex/IVIG and plts began to respond to transfusion and felt to be either a response  132  5/2 Na 130  5/3 Na 132, continues salt tabs.  5/4 Na improved to 137   RESOLVED     Diarrhea / electrolyte abnormalities / dehydration  5/1 Holding PO Mg and stool softeners/laxatives. IV Mg and PO KCl ordered.  5/3 No report of diarrhea. Concern for dehydration, start gentle IVF     Sore Throat  4/28 check RVP, nystatin swish and swallow   4/29 RVP negative.  RESOLVED     Somnolence / nausea / vomiting  4/26 Somnolent this morning but possibly related to antiemetics. Check CT head given recent thrombocytopenia. Continue antiemetics.  attributes nausea to constipation (she has been declining bowel regimen).   4/27 Mentation at baseline; CT head neg   RESOLVED     Hypotension / asymptomatic bradycardia  - Hold amlodipine  4/10 BP improving, resume amlodipine.  RESOLVED     4/12 Discussed with patient and  risk for clinical deterioration due to increasing O2 needs and in light of aggressive leukemia. Will ask ICU rover to follow. Discussed potential for move to ICU if she continues to deteriorate and could potentially require intubation/etc for resuscitative measures. She is currently a full code. They will continue to think about code status.     5/8 Discussed GOC with  at bedside, patient lethargic and unable to make decisions for herself. Discussed comfort measures given new SBO, inability to treat AML due to active bacteremia, worsening respiratory status, etc. He appears to agree with CMO but does not want to make any decisions without discussion with daughter. He agrees to have her visit so we can discuss further. PC also following and appreciate their assistance.      5/9 Made CMO yesterday evening by patient's . Family at bedside. Appears comfortable. Will plan for her to remain IP for now.      5/10 Now with slowed respirations, appears to be actively dying.  at bedside.       Final:  --Pronounced dead at 2212.  --Total discharge greater than 30 minutes in

## 2024-05-11 NOTE — PROGRESS NOTES
Called by RN to pronounce patient. On exam, no heart or breath sounds noted after auscultation for 1 minute. There was no spontaneous chest wall rise or fall. Pupils were fixed and dilated without pupillary light reflex. Patient was pronounced dead on 5/10/2024 at 2212. Family was present at bedside and condolences offered.

## 2024-05-11 NOTE — PROGRESS NOTES
2212 - Pt found w/ no signs of life.   HR 0; RR 0.  Family at bedside.  On-call Onc NP Deya Robbins notified.  House-supervisor notified.   On-call hospitilist notified & to bedside to pronounce.  Family declined need of  to bedside.    0010 - Post mortem care complete.  PIV removed, port de-accessed, & nuñez catheter removed.    0055 -  home arrived to unit.  Pt dc'd w/  home attendants.

## 2025-01-13 NOTE — PROGRESS NOTES
[facility] Hematology & Oncology        Inpatient Hematology / Oncology Progress Note    Reason for Consult:  Epistaxis [R04.0]  Lightheadedness [R42]  Thrombocytopenia (HCC) [D69.6]  Pancytopenia (HCC) [D61.818]  Symptomatic anemia [D64.9]  Referring Physician:  Tamela Nick MD    24 Hour Events:  VSS, afebrile - on 7L NC  Day 6 Cefepime for NF, BC NGTD  Plts 18k s/p IVIG and 2U plts  Hgb 7.0 - transfuse  Ongoing diuresis for volume overload  Awaiting start of clinical trial    ROS:  Constitutional: Negative for fever, chills, weakness, malaise, fatigue.  CV: Negative for chest pain, palpitations, edema.  Respiratory: +dyspnea, cough. Negative for wheezing.  GI: Negative for nausea, abdominal pain, diarrhea.    10 point review of systems is otherwise negative with the exception of the elements mentioned above in the HPI.           Allergies   Allergen Reactions    Penicillins Hives    Sulfa Antibiotics Hives    Codeine Nausea And Vomiting     Past Medical History:   Diagnosis Date    Arthritis     Autoimmune disease (HCC)     skin changes, unknown name    Cancer (HCC) 1990    ovarian    Chronic pain     arthritis in back and legs    Coronary artery spasm (HCC)     COVID 05/15/2022    Essential hypertension 11/8/2019    Gastritis     GERD (gastroesophageal reflux disease)     Hx antineoplastic chemo     Migraine headache     Psoriasis     Psychiatric disorder     anxiety and depression    Severe obesity (BMI 35.0-39.9) 6/26/2018    Thyroid disease     Diagnosed in 1996     Past Surgical History:   Procedure Laterality Date    CARPAL TUNNEL RELEASE      GYN      ovaries    HYSTERECTOMY, TOTAL ABDOMINAL (CERVIX REMOVED)  1990    IR PORT PLACEMENT EQUAL OR GREATER THAN 5 YEARS  1/3/2024    IR PORT PLACEMENT EQUAL OR GREATER THAN 5 YEARS 1/3/2024 [facility] RADIOLOGY SPECIALS    UT UNLISTED PROCEDURE CARDIAC SURGERY      cardiac cath.  Dx with spasms.    TUBAL LIGATION       Family History   Problem Relation Age of  pressure tolerates.  4/13 Up to 7L NC. Net neg 3.4L, weight appears down. CXR with ongoing fluid overload. Continues lasix for diuresis but Na and K also low. Replete K.     Neutropenic fever / ?pneumonia  - CXR with atelectasis vs infiltrate in R lung  - Continue Cefe/Vanc  - Follow BC  4/13 Afebrile. BC NGTD. Continues Cefepime (day 6), likely DC tomorrow.     Hypotension / asymptomatic bradycardia  - Hold amlodipine  4/10 BP improving, resume amlodipine.    Chronic pain   - Continue MS contin     History of R axillary vein thrombus / line-associated  - Dx 12/27, previously on Eliquis but now off. AC contraindicated with thrombocytopenia    Continue home meds  Rigo SOPs  VTE prophylaxis - AC contraindicated due to thrombocytopenia  Diet - Regular  PT/OT - Deferred  Code - Full (ongoing discussions regarding GOC)  Acyclovir, diflucan    Dispo - too soon to determine.    4/12 Discussed with patient and  risk for clinical deterioration due to increasing O2 needs and in light of aggressive leukemia. Will ask ICU rover to follow. Discussed potential for move to ICU if she continues to deteriorate and could potentially require intubation/etc for resuscitative measures. She is currently a full code. They will continue to think about code status.       Goals and plan of care reviewed with the patient.  All questions answered to the best of our ability.  Lab studies and imaging studies were personally reviewed.  Thank you for allowing us to participate in the care of Ms. Gomes.          Catie Sorensen, APRN - CNP   [facility]  [address] [place] [address]  Office : [phone]  Fax : [phone]      I personally saw, exammed and counselled the patient, and discussed with NP, agree with above history/assessment/plan. Exam: No acute distress, normal breathing effort and breath sounds, regular heart rate and heart sound, benign abd, normal ROM of limbs. 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